# Patient Record
Sex: MALE | Race: WHITE | NOT HISPANIC OR LATINO | Employment: OTHER | URBAN - METROPOLITAN AREA
[De-identification: names, ages, dates, MRNs, and addresses within clinical notes are randomized per-mention and may not be internally consistent; named-entity substitution may affect disease eponyms.]

---

## 2017-02-05 RX ORDER — FUROSEMIDE 40 MG/1
TABLET ORAL
Qty: 60 TABLET | Refills: 9 | Status: SHIPPED | OUTPATIENT
Start: 2017-02-05 | End: 2020-04-07 | Stop reason: ALTCHOICE

## 2017-12-21 RX ORDER — FUROSEMIDE 40 MG/1
TABLET ORAL
Qty: 60 TABLET | Refills: 9 | OUTPATIENT
Start: 2017-12-21

## 2019-01-30 ENCOUNTER — HOSPITAL ENCOUNTER (OUTPATIENT)
Dept: RADIOLOGY | Facility: HOSPITAL | Age: 69
Discharge: HOME | End: 2019-01-30
Attending: INTERNAL MEDICINE
Payer: MEDICARE

## 2019-01-30 ENCOUNTER — TELEPHONE (OUTPATIENT)
Dept: INTERNAL MEDICINE | Facility: CLINIC | Age: 69
End: 2019-01-30

## 2019-01-30 ENCOUNTER — OFFICE VISIT (OUTPATIENT)
Dept: INTERNAL MEDICINE | Facility: CLINIC | Age: 69
End: 2019-01-30
Payer: MEDICARE

## 2019-01-30 VITALS
HEIGHT: 66 IN | DIASTOLIC BLOOD PRESSURE: 62 MMHG | SYSTOLIC BLOOD PRESSURE: 120 MMHG | BODY MASS INDEX: 34.61 KG/M2 | HEART RATE: 78 BPM | OXYGEN SATURATION: 95 % | TEMPERATURE: 97 F | WEIGHT: 215.38 LBS

## 2019-01-30 DIAGNOSIS — Z85.46 HISTORY OF PROSTATE CANCER: ICD-10-CM

## 2019-01-30 DIAGNOSIS — Z13.6 SCREENING FOR AAA (ABDOMINAL AORTIC ANEURYSM): ICD-10-CM

## 2019-01-30 DIAGNOSIS — Z87.39 HISTORY OF GOUT: ICD-10-CM

## 2019-01-30 DIAGNOSIS — R73.01 IFG (IMPAIRED FASTING GLUCOSE): ICD-10-CM

## 2019-01-30 DIAGNOSIS — E78.00 PURE HYPERCHOLESTEROLEMIA: ICD-10-CM

## 2019-01-30 DIAGNOSIS — I10 ESSENTIAL HYPERTENSION: ICD-10-CM

## 2019-01-30 DIAGNOSIS — Z12.5 PROSTATE CANCER SCREENING: ICD-10-CM

## 2019-01-30 DIAGNOSIS — G47.33 OSA ON CPAP: ICD-10-CM

## 2019-01-30 DIAGNOSIS — M51.36 DDD (DEGENERATIVE DISC DISEASE), LUMBAR: ICD-10-CM

## 2019-01-30 DIAGNOSIS — Z12.11 SCREEN FOR COLON CANCER: ICD-10-CM

## 2019-01-30 DIAGNOSIS — Z00.00 ROUTINE GENERAL MEDICAL EXAMINATION AT A HEALTH CARE FACILITY: Primary | ICD-10-CM

## 2019-01-30 DIAGNOSIS — Z72.0 TOBACCO ABUSE: ICD-10-CM

## 2019-01-30 PROCEDURE — 3074F PR MOST RECENT SYSTOLIC BLOOD PRESSURE < 130 MM HG: ICD-10-PCS | Mod: CPTII,S$GLB,, | Performed by: INTERNAL MEDICINE

## 2019-01-30 PROCEDURE — 76775 US ABDOMINAL AORTA: ICD-10-PCS | Mod: 26,,, | Performed by: RADIOLOGY

## 2019-01-30 PROCEDURE — 3078F PR MOST RECENT DIASTOLIC BLOOD PRESSURE < 80 MM HG: ICD-10-PCS | Mod: CPTII,S$GLB,, | Performed by: INTERNAL MEDICINE

## 2019-01-30 PROCEDURE — 99387 INIT PM E/M NEW PAT 65+ YRS: CPT | Mod: S$GLB,,, | Performed by: INTERNAL MEDICINE

## 2019-01-30 PROCEDURE — 99999 PR PBB SHADOW E&M-EST. PATIENT-LVL III: CPT | Mod: PBBFAC,,, | Performed by: INTERNAL MEDICINE

## 2019-01-30 PROCEDURE — 3074F SYST BP LT 130 MM HG: CPT | Mod: CPTII,S$GLB,, | Performed by: INTERNAL MEDICINE

## 2019-01-30 PROCEDURE — 99999 PR PBB SHADOW E&M-EST. PATIENT-LVL III: ICD-10-PCS | Mod: PBBFAC,,, | Performed by: INTERNAL MEDICINE

## 2019-01-30 PROCEDURE — 3078F DIAST BP <80 MM HG: CPT | Mod: CPTII,S$GLB,, | Performed by: INTERNAL MEDICINE

## 2019-01-30 PROCEDURE — 76775 US EXAM ABDO BACK WALL LIM: CPT | Mod: 26,,, | Performed by: RADIOLOGY

## 2019-01-30 PROCEDURE — 99499 RISK ADDL DX/OHS AUDIT: ICD-10-PCS | Mod: S$GLB,,, | Performed by: INTERNAL MEDICINE

## 2019-01-30 PROCEDURE — 99499 UNLISTED E&M SERVICE: CPT | Mod: S$GLB,,, | Performed by: INTERNAL MEDICINE

## 2019-01-30 PROCEDURE — 76775 US EXAM ABDO BACK WALL LIM: CPT | Mod: TC

## 2019-01-30 PROCEDURE — 99387 PR PREVENTIVE VISIT,NEW,65 & OVER: ICD-10-PCS | Mod: S$GLB,,, | Performed by: INTERNAL MEDICINE

## 2019-01-30 RX ORDER — GLIMEPIRIDE 1 MG/1
TABLET ORAL
Refills: 4 | COMMUNITY
Start: 2019-01-28 | End: 2020-08-06

## 2019-01-30 RX ORDER — PROMETHAZINE HYDROCHLORIDE 12.5 MG/1
12.5 TABLET ORAL EVERY 6 HOURS PRN
Refills: 2 | COMMUNITY
Start: 2018-12-07 | End: 2022-03-11

## 2019-01-30 RX ORDER — METHOCARBAMOL 750 MG/1
TABLET, FILM COATED ORAL
Refills: 3 | COMMUNITY
Start: 2018-12-27 | End: 2019-11-14

## 2019-01-30 RX ORDER — MOMETASONE FUROATE 1 MG/G
CREAM TOPICAL
Refills: 0 | COMMUNITY
Start: 2018-12-12 | End: 2022-03-11

## 2019-01-30 RX ORDER — OMEPRAZOLE 40 MG/1
40 CAPSULE, DELAYED RELEASE ORAL DAILY
Qty: 30 CAPSULE | Refills: 11
Start: 2019-01-30 | End: 2019-10-26 | Stop reason: SDUPTHER

## 2019-01-30 RX ORDER — ZOLPIDEM TARTRATE 10 MG/1
5 TABLET ORAL NIGHTLY PRN
COMMUNITY
End: 2022-03-11

## 2019-01-30 RX ORDER — ALLOPURINOL 300 MG/1
TABLET ORAL
Refills: 3 | COMMUNITY
Start: 2018-12-04 | End: 2019-08-16

## 2019-01-30 RX ORDER — FLUTICASONE PROPIONATE 50 MCG
SPRAY, SUSPENSION (ML) NASAL
Refills: 4 | COMMUNITY
Start: 2019-01-17 | End: 2020-07-30 | Stop reason: SDUPTHER

## 2019-01-30 RX ORDER — COLCHICINE 0.6 MG/1
0.6 TABLET ORAL DAILY
COMMUNITY
End: 2019-02-05 | Stop reason: SDUPTHER

## 2019-01-30 NOTE — TELEPHONE ENCOUNTER
----- Message from Kashif Acosta MD sent at 1/30/2019  1:12 PM CST -----  Your abdominal ultrasound was normal

## 2019-01-30 NOTE — PROGRESS NOTES
"HPI:  Patient is a 68-year-old man who comes today for his initial visit myself to establish care.  Patient has no acute complaints.  We have reviewed his extensive past medical history outlined below.  His blood pressure has been doing well.  He uses his CPAP mask about half of the time.  He has had no recent flares of gout.  His recent PSAs have been undetectable.      Current MEDS: medcard review, verified and update  Allergies: Per the electronic medical record    Past Medical History:   Diagnosis Date    DDD (degenerative disc disease), lumbar     History of gout     History of prostate cancer     Hyperlipidemia     Hypertension     IFG (impaired fasting glucose)     BRADLEY on CPAP     Tobacco abuse        Past Surgical History:   Procedure Laterality Date    BICEPS TENDON REPAIR      HEMORRHOID SURGERY      HERNIA REPAIR      KNEE ARTHROSCOPY      LUMBAR LAMINECTOMY      PROSTATECTOMY         SHx: per the electronic medical record    FHx: recorded in the electronic medical record    ROS:    denies any chest pains or shortness of breath. Denies any nausea, vomiting or diarrhea. Denies any fever, chills or sweats. Denies any change in weight, voice, stool, skin or hair. Denies any dysuria, dyspepsia or dysphagia. Denies any change in vision, hearing or headaches. Denies any swollen lymph nodes or loss of memory.    PE:  /62 (BP Location: Right arm, Patient Position: Sitting, BP Method: Large (Manual))   Pulse 78   Temp 97.1 °F (36.2 °C)   Ht 5' 6.25" (1.683 m)   Wt 97.7 kg (215 lb 6.2 oz)   SpO2 95%   BMI 34.50 kg/m²   Gen: Well-developed, well-nourished, male, in no acute distress, oriented x3  HEENT: neck is supple, no adenopathy, carotids 2+ equal without bruits, thyroid exam normal size without nodules.  CHEST: clear to auscultation and percussion  CVS: regular rate and rhythm without significant murmur, gallop, or rubs  ABD: soft, benign, no rebound no guarding, no distention.  Bowel " sounds are normal.     nontender.  No palpable masses.  No organomegaly and no audible bruits.  RECTAL:  Deferred  EXT: no clubbing, cyanosis, or edema  LYMPH: no cervical, inguinal, or axillary adenopathy  FEET: no loss of sensation.  No ulcers or pressure sores.  NEURO: gait normal.  Cranial nerves II- XII intact. No nystagmus.  Speech normal.   Gross motor and sensory unremarkable.        Impression:  Multiple medical problems below  Patient Active Problem List   Diagnosis    Hypertension    Hyperlipidemia    History of prostate cancer    BRADLEY on CPAP    IFG (impaired fasting glucose)    History of gout    DDD (degenerative disc disease), lumbar    Tobacco abuse       Plan:   Orders Placed This Encounter    US Abdominal Aorta    CBC auto differential    Comprehensive metabolic panel    Lipid panel    TSH    PSA, Screening    Hemoglobin A1c    Uric acid    Hemoglobin A1c    Protein / creatinine ratio, urine    Basic metabolic panel    Lipid panel    omeprazole (PRILOSEC) 40 MG capsule    Case request GI: COLONOSCOPY   Patient will have an ultrasound of abdominal aorta for screening.  He will have the above lab work done today.  He will have additional lab work in 6 months.  Medications remain the same.  he is due for colonoscopy.

## 2019-01-31 ENCOUNTER — TELEPHONE (OUTPATIENT)
Dept: INTERNAL MEDICINE | Facility: CLINIC | Age: 69
End: 2019-01-31

## 2019-01-31 NOTE — TELEPHONE ENCOUNTER
----- Message from Kashif Acosta MD sent at 1/31/2019  6:15 AM CST -----  Your lab work was all within acceptable ranges. Stay on your current meds.

## 2019-02-05 ENCOUNTER — TELEPHONE (OUTPATIENT)
Dept: ENDOSCOPY | Facility: HOSPITAL | Age: 69
End: 2019-02-05

## 2019-02-05 ENCOUNTER — DOCUMENTATION ONLY (OUTPATIENT)
Dept: ENDOSCOPY | Facility: HOSPITAL | Age: 69
End: 2019-02-05

## 2019-02-05 RX ORDER — SODIUM, POTASSIUM,MAG SULFATES 17.5-3.13G
SOLUTION, RECONSTITUTED, ORAL ORAL
Qty: 354 ML | Refills: 0 | Status: SHIPPED | OUTPATIENT
Start: 2019-02-05 | End: 2019-12-18

## 2019-02-05 RX ORDER — COLCHICINE 0.6 MG/1
0.6 TABLET ORAL DAILY
Qty: 30 TABLET | Refills: 11 | Status: SHIPPED | OUTPATIENT
Start: 2019-02-05 | End: 2020-02-05 | Stop reason: SDUPTHER

## 2019-02-05 RX ORDER — DICLOFENAC SODIUM 10 MG/G
2 GEL TOPICAL
COMMUNITY
End: 2020-03-10

## 2019-02-05 NOTE — PROGRESS NOTES
Endoscopy Scheduling Questionnaire:    Call Type:Patient returned Televox call    1. Have you been admitted overnight to the hospital in the past 3 months? no  2. Do you get CP and SOB while walking up a flight of stairs? no  3. Have you had a stent placed in the past 12 months? no  4. Have you had a stroke or heart attack in the past 6 months? no  5. Have you had any chest pain in the past 3 months? no      If so, have you been evaluated by your PCP or Cardiologist? no  6. Do you take weight loss medications? no  7. Have you been diagnosed with Diverticulitis within the past 3 months? no  8. Are you having any GI symptoms that you feel need to be evaluated prior to your procedure? no  9. Are you on dialysis? no  10. Are you diabetic? no  11. Do you have any other health issues that you feel might limit your ability to safely have the procedure and/or sedation? no  12. Is the patient over 81 yo? no        If so, has the patient been seen by their PCP or GI in the last 3 months? N/A       -I have reviewed the last colonoscopy for recommendations regarding surveillance and bowel prep  is NA  -I have reviewed the patient's medications and allergies. He is not on high risk medications and will require cardiac clearance. A clearance request NA  -I have verified the pharmacy information. The prep being used is Suprep. The patient's prep instructions were sent via mail..    Date Endoscopy Scheduled: NA  Or  Date Gastro office visit Scheduled: Date: 3/13/19

## 2019-02-06 ENCOUNTER — TELEPHONE (OUTPATIENT)
Dept: INTERNAL MEDICINE | Facility: CLINIC | Age: 69
End: 2019-02-06

## 2019-02-06 NOTE — TELEPHONE ENCOUNTER
Fax received from [employer].  Colchicine 0.6 mg tablets is either not included on list of covered drugs or it's included on the formulary but subject to certain limits. Pt provided whit a temporary supply.  May need a prior authorization for refills

## 2019-02-07 ENCOUNTER — TELEPHONE (OUTPATIENT)
Dept: ENDOSCOPY | Facility: HOSPITAL | Age: 69
End: 2019-02-07

## 2019-02-07 ENCOUNTER — DOCUMENTATION ONLY (OUTPATIENT)
Dept: ENDOSCOPY | Facility: HOSPITAL | Age: 69
End: 2019-02-07

## 2019-03-04 ENCOUNTER — TELEPHONE (OUTPATIENT)
Dept: ENDOSCOPY | Facility: HOSPITAL | Age: 69
End: 2019-03-04

## 2019-03-04 NOTE — TELEPHONE ENCOUNTER
Advised pt of new arrival time.03/27 at 0900. Offered 077 spot. Did not want. Due to ride. Instructed to start prep one hour earlier.

## 2019-03-27 ENCOUNTER — HOSPITAL ENCOUNTER (OUTPATIENT)
Facility: HOSPITAL | Age: 69
Discharge: HOME | End: 2019-03-27
Attending: COLON & RECTAL SURGERY | Admitting: COLON & RECTAL SURGERY
Payer: MEDICARE

## 2019-03-27 ENCOUNTER — ANESTHESIA (OUTPATIENT)
Dept: ENDOSCOPY | Facility: HOSPITAL | Age: 69
End: 2019-03-27
Payer: MEDICARE

## 2019-03-27 ENCOUNTER — ANESTHESIA EVENT (OUTPATIENT)
Dept: ENDOSCOPY | Facility: HOSPITAL | Age: 69
End: 2019-03-27
Payer: MEDICARE

## 2019-03-27 DIAGNOSIS — Z12.11 SCREENING FOR COLON CANCER: ICD-10-CM

## 2019-03-27 PROCEDURE — 25000003 PHARM REV CODE 250: Performed by: COLON & RECTAL SURGERY

## 2019-03-27 PROCEDURE — 45380 COLONOSCOPY AND BIOPSY: CPT | Performed by: COLON & RECTAL SURGERY

## 2019-03-27 PROCEDURE — 45385 COLONOSCOPY W/LESION REMOVAL: CPT | Mod: PT,,, | Performed by: COLON & RECTAL SURGERY

## 2019-03-27 PROCEDURE — 63600175 PHARM REV CODE 636 W HCPCS: Performed by: NURSE ANESTHETIST, CERTIFIED REGISTERED

## 2019-03-27 PROCEDURE — 45380 PR COLONOSCOPY,BIOPSY: ICD-10-PCS | Mod: 59,,, | Performed by: COLON & RECTAL SURGERY

## 2019-03-27 PROCEDURE — 45385 COLONOSCOPY W/LESION REMOVAL: CPT | Performed by: COLON & RECTAL SURGERY

## 2019-03-27 PROCEDURE — 45385 PR COLONOSCOPY,REMV LESN,SNARE: ICD-10-PCS | Mod: PT,,, | Performed by: COLON & RECTAL SURGERY

## 2019-03-27 PROCEDURE — 27201089 HC SNARE, DISP (ANY): Performed by: COLON & RECTAL SURGERY

## 2019-03-27 PROCEDURE — 88305 TISSUE EXAM BY PATHOLOGIST: CPT | Mod: 26,,, | Performed by: PATHOLOGY

## 2019-03-27 PROCEDURE — 37000008 HC ANESTHESIA 1ST 15 MINUTES: Performed by: COLON & RECTAL SURGERY

## 2019-03-27 PROCEDURE — 88305 TISSUE SPECIMEN TO PATHOLOGY - SURGERY: ICD-10-PCS | Mod: 26,,, | Performed by: PATHOLOGY

## 2019-03-27 PROCEDURE — 45380 COLONOSCOPY AND BIOPSY: CPT | Mod: 59,,, | Performed by: COLON & RECTAL SURGERY

## 2019-03-27 PROCEDURE — 88305 TISSUE EXAM BY PATHOLOGIST: CPT | Mod: 59 | Performed by: PATHOLOGY

## 2019-03-27 PROCEDURE — 37000009 HC ANESTHESIA EA ADD 15 MINS: Performed by: COLON & RECTAL SURGERY

## 2019-03-27 PROCEDURE — 27201012 HC FORCEPS, HOT/COLD, DISP: Performed by: COLON & RECTAL SURGERY

## 2019-03-27 RX ORDER — LIDOCAINE HCL/PF 100 MG/5ML
SYRINGE (ML) INTRAVENOUS
Status: DISCONTINUED | OUTPATIENT
Start: 2019-03-27 | End: 2019-03-27

## 2019-03-27 RX ORDER — PROPOFOL 10 MG/ML
INJECTION, EMULSION INTRAVENOUS
Status: DISCONTINUED | OUTPATIENT
Start: 2019-03-27 | End: 2019-03-27

## 2019-03-27 RX ORDER — SODIUM CHLORIDE, SODIUM LACTATE, POTASSIUM CHLORIDE, CALCIUM CHLORIDE 600; 310; 30; 20 MG/100ML; MG/100ML; MG/100ML; MG/100ML
INJECTION, SOLUTION INTRAVENOUS CONTINUOUS
Status: DISCONTINUED | OUTPATIENT
Start: 2019-03-27 | End: 2019-03-27 | Stop reason: HOSPADM

## 2019-03-27 RX ADMIN — PROPOFOL 140 MG: 10 INJECTION, EMULSION INTRAVENOUS at 10:03

## 2019-03-27 RX ADMIN — PROPOFOL 30 MG: 10 INJECTION, EMULSION INTRAVENOUS at 10:03

## 2019-03-27 RX ADMIN — PROPOFOL 40 MG: 10 INJECTION, EMULSION INTRAVENOUS at 11:03

## 2019-03-27 RX ADMIN — LIDOCAINE HYDROCHLORIDE 100 MG: 20 INJECTION, SOLUTION INTRAVENOUS at 10:03

## 2019-03-27 RX ADMIN — SODIUM CHLORIDE, SODIUM LACTATE, POTASSIUM CHLORIDE, AND CALCIUM CHLORIDE: 600; 310; 30; 20 INJECTION, SOLUTION INTRAVENOUS at 10:03

## 2019-03-27 RX ADMIN — PROPOFOL 40 MG: 10 INJECTION, EMULSION INTRAVENOUS at 10:03

## 2019-03-27 NOTE — DISCHARGE INSTRUCTIONS

## 2019-03-27 NOTE — H&P
[facility]  Colon and Rectal Surgery  History & Physical    Patient Name: Santiago Khan  MRN: [id number]  Admission Date: 3/27/2019  Attending Physician: James Roger MD  Primary Care Provider: Kashif Acosta MD    Patient information was obtained from patient and medical records.    Subjective:     Chief Complaint/Reason for Admission: Here for Colonoscopy    History of Present Illness:  Patient is a 68 y.o. male presents for colonoscopy. Reports last colonscopy 5yrs ago without polyps or masses and no previous hx of polyps/masses. No current BRBPR, hematochezia, melena or change in bowel habits. No personal or fam hx of CRC, polyps or IBD.    No current facility-administered medications on file prior to encounter.      Current Outpatient Medications on File Prior to Encounter   Medication Sig    acetaZOLAMIDE (DIAMOX) 250 MG tablet Take 1 tablet by mouth Every Monday, Wednesday, and Friday.    allopurinol (ZYLOPRIM) 300 MG tablet TK 1/2 T PO ONCE D    bisoprolol (ZEBETA) 5 MG tablet     fluticasone (FLONASE) 50 mcg/actuation nasal spray U 1 TO 2 SPRAYS IEN QD    furosemide (LASIX) 40 MG tablet take 1 tablet by mouth twice a day    glimepiride (AMARYL) 1 MG tablet TK 1 T PO ONCE D    hydrocodone-acetaminophen (VICODIN ES) 7.5-750 mg per tablet Take 1 tablet by mouth Twice daily as needed.    methocarbamol (ROBAXIN) 750 MG Tab TK 1 T PO  BID PRF MUSCLE SPASMS    metolazone (ZAROXOLYN) 2.5 MG tablet Take 1 tablet by mouth Daily.    multivitamin-minerals-lutein (CENTRUM SILVER) Tab Take 1 tablet by mouth Daily.    omeprazole (PRILOSEC) 40 MG capsule Take 1 capsule (40 mg total) by mouth once daily.    potassium chloride SA (K-DUR,KLOR-CON) 20 MEQ tablet Take 1 tablet by mouth 3 (three) times daily.     promethazine (PHENERGAN) 12.5 MG Tab Take 12.5 mg by mouth every 6 (six) hours as needed.    simvastatin (ZOCOR) 40 MG tablet Take 1 tablet by mouth Daily.    mometasone 0.1%  (ELOCON) 0.1 % cream MARIA TERESA TO HANDS QD PRF FLARE UPS    tramadol (ULTRAM) 50 mg tablet Take 1 tablet by mouth Three times daily as needed.    zolpidem (AMBIEN) 10 mg Tab Take 5 mg by mouth nightly as needed.       Review of patient's allergies indicates:   Allergen Reactions    Amitriptyline      Other reaction(s): Rash    Celecoxib      Other reaction(s): Rash       Past Medical History:   Diagnosis Date    DDD (degenerative disc disease), lumbar     History of gout     History of prostate cancer     Hyperlipidemia     Hypertension     IFG (impaired fasting glucose)     BRADLEY on CPAP     Tobacco abuse      Past Surgical History:   Procedure Laterality Date    BICEPS TENDON REPAIR      HEMORRHOID SURGERY      HERNIA REPAIR      KNEE ARTHROSCOPY      LUMBAR LAMINECTOMY      PROSTATECTOMY       Family History     Problem Relation (Age of Onset)    Coronary artery disease Father    Prostate cancer Father        Tobacco Use    Smoking status: Current Every Day Smoker     Years: 50.00     Types: Cigarettes   Substance and Sexual Activity    Alcohol use: No     Frequency: Never    Drug use: Not on file    Sexual activity: Not on file     Review of Systems   Constitutional: Negative for activity change, appetite change, chills, fatigue, fever and unexpected weight change.   HENT: Negative for congestion, ear pain, sore throat and trouble swallowing.    Eyes: Negative for pain, redness and itching.   Respiratory: Negative for cough, shortness of breath and wheezing.    Cardiovascular: Negative for chest pain, palpitations and leg swelling.   Gastrointestinal: Negative for abdominal distention, abdominal pain, anal bleeding, blood in stool, constipation, diarrhea, nausea, rectal pain and vomiting.   Endocrine: Negative for cold intolerance, heat intolerance and polyuria.   Genitourinary: Negative for dysuria, flank pain, frequency and hematuria.   Musculoskeletal: Negative for gait problem, joint swelling  and neck pain.   Skin: Negative for color change, rash and wound.   Allergic/Immunologic: Negative for environmental allergies and immunocompromised state.   Neurological: Negative for dizziness, speech difficulty, weakness and numbness.   Psychiatric/Behavioral: Negative for agitation, confusion and hallucinations.     Objective:     Vital Signs (Most Recent):  Temp: 98.1 °F (36.7 °C) (03/27/19 1000)  Pulse: 80 (03/27/19 1000)  Resp: 18 (03/27/19 1000)  BP: 112/75 (03/27/19 1000)  SpO2: (!) 94 % (03/27/19 1000) Vital Signs (24h Range):  Temp:  [98.1 °F (36.7 °C)] 98.1 °F (36.7 °C)  Pulse:  [80] 80  Resp:  [18] 18  SpO2:  [94 %] 94 %  BP: (112)/(75) 112/75     Weight: 95.6 kg (210 lb 12.2 oz)  Body mass index is 33.01 kg/m².    Physical Exam   Constitutional: He is oriented to person, place, and time. He appears well-developed.   HENT:   Head: Normocephalic and atraumatic.   Eyes: Conjunctivae and EOM are normal.   Neck: Normal range of motion. No thyromegaly present.   Cardiovascular: Normal rate and regular rhythm.   Pulmonary/Chest: Effort normal. No respiratory distress.   Abdominal: Soft. He exhibits no distension and no mass. There is no tenderness.   Musculoskeletal: Normal range of motion. He exhibits no edema or tenderness.   Neurological: He is alert and oriented to person, place, and time.   Skin: Skin is warm and dry. Capillary refill takes less than 2 seconds. No rash noted.   Psychiatric: He has a normal mood and affect.         Assessment/Plan:     Patient is a 68 y.o. male who presents for colonoscopy     - Ok to proceed to endoscopy suite for colonoscopy  - Consent obtained. All risks, benefits and alternatives fully explained to patient, including but not limited to bleeding, infection, perforation, and missed polyps. All questions appropriately answered to patient's satisfaction. Consent signed and placed on chart.    Active Diagnoses:    Diagnosis Date Noted POA    Screening for colon cancer  [Z12.11] 03/27/2019 Not Applicable      Problems Resolved During this Admission:     VTE Risk Mitigation (From admission, onward)    None          James Roger MD  Colon and Rectal Surgery  [facility]

## 2019-03-27 NOTE — ANESTHESIA POSTPROCEDURE EVALUATION
Anesthesia Post Evaluation    Patient: Santiago Khan    Procedure(s) Performed: Procedure(s) (LRB):  COLONOSCOPY (N/A)    Final Anesthesia Type: MAC  Patient location during evaluation: PACU  Patient participation: Yes- Able to Participate  Level of consciousness: oriented and awake and alert  Post-procedure vital signs: reviewed and stable  Pain management: adequate  Airway patency: patent  PONV status at discharge: No PONV  Anesthetic complications: no      Cardiovascular status: blood pressure returned to baseline  Respiratory status: unassisted, spontaneous ventilation and room air  Hydration status: euvolemic  Follow-up not needed.          Vitals Value Taken Time   /73 3/27/2019 11:52 AM   Temp 36.7 °C (98.1 °F) 3/27/2019 10:00 AM   Pulse 72 3/27/2019 11:52 AM   Resp 18 3/27/2019 11:52 AM   SpO2 96 % 3/27/2019 11:52 AM         Event Time     Out of Recovery 12:11:49          Pain/Jeaneth Score: Jeaneth Score: 10 (3/27/2019 11:52 AM)

## 2019-03-27 NOTE — ANESTHESIA RELEASE NOTE
"Anesthesia Release from PACU Note    Patient: Santiago Khan    Procedure(s) Performed: Procedure(s) (LRB):  COLONOSCOPY (N/A)    Anesthesia type: MAC    Post pain: Adequate analgesia    Post assessment: no apparent anesthetic complications, tolerated procedure well and no evidence of recall    Last Vitals:   Visit Vitals  /73 (BP Location: Left arm, Patient Position: Lying)   Pulse 72   Temp 36.7 °C (98.1 °F) (Tympanic)   Resp 18   Ht 5' 7" (1.702 m)   Wt 95.6 kg (210 lb 12.2 oz)   SpO2 96%   BMI 33.01 kg/m²       Post vital signs: stable    Level of consciousness: awake and alert     Nausea/Vomiting: no nausea/no vomiting    Complications: none    Airway Patency: patent    Respiratory: unassisted, spontaneous ventilation, room air    Cardiovascular: stable    Hydration: euvolemic  "

## 2019-03-27 NOTE — BRIEF OP NOTE
[facility] -   Brief Operative Note     SUMMARY     Surgery Date: 3/27/2019     Surgeon(s) and Role:     * James Roger MD - Primary    Assisting Surgeon: None    Pre-op Diagnosis:  Screening [Z13.9]    Post-op Diagnosis:  Post-Op Diagnosis Codes:     * Screening [Z13.9]    Procedure(s) (LRB):  COLONOSCOPY (N/A)    Anesthesia: Choice    Description of the findings of the procedure: See Op Note    Findings/Key Components: See Op Note    Estimated Blood Loss: * No values recorded between 3/27/2019 12:00 AM and 3/27/2019 11:21 AM *         Specimens:   Specimen (12h ago, onward)    Start     Ordered    03/27/19 1110  Specimen to Pathology - Surgery  Once     Comments:  #1 descending polyps x2#2 sigmoid polyp#3 recto sigmoid polyps x3#4 recto sigmoid polyps x2     Start Status     03/27/19 1110 Collected (03/27/19 1129) Order ID: [id number]       03/27/19 1126          Discharge Note    SUMMARY     Admit Date: 3/27/2019    Discharge Date and Time: 3/27/2019 12:15 PM    Hospital Course Patient was seen in the preoperative area by both myself and anesthesia. All consents were verified and all questions appropriately answered. All risks, benefits and alternatives explained to patient. Patient proceeded to endoscopy suite for colonoscopy and was discharged home postoperative once cleared by anesthesia.    Final Diagnosis: Post-Op Diagnosis Codes:     * Screening [Z13.9]    Disposition: Home or Self Care    Follow Up/Patient Instructions: See Provation report    Medications:  Reconciled Home Medications:      Medication List      CONTINUE taking these medications    acetaZOLAMIDE 250 MG tablet  Commonly known as:  DIAMOX  Take 1 tablet by mouth Every Monday, Wednesday, and Friday.     allopurinol 300 MG tablet  Commonly known as:  ZYLOPRIM  TK 1/2 T PO ONCE D     bisoprolol 5 MG tablet  Commonly known as:  ZEBETA     CENTRUM SILVER Tab  Generic drug:  multivitamin-minerals-lutein  Take 1 tablet by mouth  Daily.     colchicine 0.6 mg tablet  Commonly known as:  COLCRYS  Take 1 tablet (0.6 mg total) by mouth once daily.     fluticasone 50 mcg/actuation nasal spray  Commonly known as:  FLONASE  U 1 TO 2 SPRAYS IEN QD     furosemide 40 MG tablet  Commonly known as:  LASIX  take 1 tablet by mouth twice a day     glimepiride 1 MG tablet  Commonly known as:  AMARYL  TK 1 T PO ONCE D     HYDROcodone-acetaminophen 7.5-750 mg per tablet  Commonly known as:  VICODIN ES  Take 1 tablet by mouth Twice daily as needed.     methocarbamol 750 MG Tab  Commonly known as:  ROBAXIN  TK 1 T PO  BID PRF MUSCLE SPASMS     metOLazone 2.5 MG tablet  Commonly known as:  ZAROXOLYN  Take 1 tablet by mouth Daily.     mometasone 0.1% 0.1 % cream  Commonly known as:  ELOCON  MARIA TERESA TO HANDS QD PRF FLARE UPS     omeprazole 40 MG capsule  Commonly known as:  PRILOSEC  Take 1 capsule (40 mg total) by mouth once daily.     potassium chloride SA 20 MEQ tablet  Commonly known as:  K-DUR,KLOR-CON  Take 1 tablet by mouth 3 (three) times daily.     promethazine 12.5 MG Tab  Commonly known as:  PHENERGAN  Take 12.5 mg by mouth every 6 (six) hours as needed.     simvastatin 40 MG tablet  Commonly known as:  ZOCOR  Take 1 tablet by mouth Daily.     SUPREP BOWEL PREP KIT 17.5-3.13-1.6 gram Solr  Generic drug:  sodium,potassium,mag sulfates  Use as directed     traMADol 50 mg tablet  Commonly known as:  ULTRAM  Take 1 tablet by mouth Three times daily as needed.     VOLTAREN 1 % Gel  Generic drug:  diclofenac sodium  Apply 2 g topically as needed.     zolpidem 10 mg Tab  Commonly known as:  AMBIEN  Take 5 mg by mouth nightly as needed.          Discharge Procedure Orders   Diet general     Call MD for:  temperature >100.4     Call MD for:  persistent nausea and vomiting     Call MD for:  severe uncontrolled pain     Call MD for:  difficulty breathing, headache or visual disturbances     Call MD for:  redness, tenderness, or signs of infection (pain, swelling,  redness, odor or green/yellow discharge around incision site)     Call MD for:  hives     Call MD for:  persistent dizziness or light-headedness     Call MD for:  extreme fatigue     Activity as tolerated     Follow-up Information     Kashif Acosta MD.    Specialty:  Internal Medicine  Why:  As needed  Contact information:  [address]  [address]  [facility] [address]  [phone]

## 2019-03-27 NOTE — ANESTHESIA PREPROCEDURE EVALUATION
03/27/2019  Santiago Khan is a 68 y.o., male.    Anesthesia Evaluation    I have reviewed the Patient Summary Reports.     I have reviewed the Medications.     Review of Systems  Anesthesia Hx:  No problems with previous Anesthesia Prolonged surgery with prostatectomy and pt slow to wake but no problems with knee scope History of prior surgery of interest to airway management or planning: Previous anesthesia: General Denies Family Hx of Anesthesia complications.   Denies Personal Hx of Anesthesia complications.   Social:  Smoker    Hematology/Oncology:  Hematology Normal       -- Cancer in past history:  surgery    EENT/Dental:EENT/Dental Normal   Cardiovascular:   Hypertension, well controlled Sees cardiology yearly for check ups.    Pulmonary:   Sleep Apnea, CPAP    Renal/:   Cr 1.4   Hepatic/GI:   Bowel Prep.    Musculoskeletal:   Arthritis     Neurological:  Neurology Normal    Endocrine:   Hgb A1C 6.9   Dermatological:  Skin Normal    Psych:  Psychiatric Normal           Physical Exam  General:  Well nourished    Airway/Jaw/Neck:  Airway Findings: Mouth Opening: Normal Tongue: Normal  General Airway Assessment: Adult  Mallampati: IV  Improves to III with phonation.  TM Distance: 4 - 6 cm  Jaw/Neck Findings:  Neck ROM: Normal ROM      Dental:  Dental Findings: Upper Dentures, Lower Dentures        Mental Status:  Mental Status Findings:  Cooperative, Alert and Oriented         Anesthesia Plan  Type of Anesthesia, risks & benefits discussed:  Anesthesia Type:  MAC  Patient's Preference:   Intra-op Monitoring Plan: standard ASA monitors  Intra-op Monitoring Plan Comments:   Post Op Pain Control Plan:   Post Op Pain Control Plan Comments:   Induction:   IV  Beta Blocker:  Patient is on a Beta-Blocker and has received one dose within the past 24 hours (No further documentation required).        Informed Consent: Patient understands risks and agrees with Anesthesia plan.  Questions answered.   ASA Score: 3     Day of Surgery Review of History & Physical: I have interviewed and examined the patient. I have reviewed the patient's H&P dated:            Ready For Surgery From Anesthesia Perspective.

## 2019-03-27 NOTE — [employer] PATIENT INSTRUCTIONS
Discharge Summary/Instructions after an Endoscopic Procedure  Patient Name: Santiago Khan  Patient MRN: [id number]  Patient YOB: 1950 Wednesday, March 27, 2019 James Roger MD  RESTRICTIONS:  During your procedure today, you received medications for sedation.  These   medications may affect your judgment, balance and coordination.  Therefore,   for 24 hours, you have the following restrictions:   - DO NOT drive a car, operate machinery, make legal/financial decisions,   sign important papers or drink alcohol.    ACTIVITY:  Today: no heavy lifting, straining or running due to procedural   sedation/anesthesia.  The following day: return to full activity including work.  DIET:  Eat and drink normally unless instructed otherwise.     TREATMENT FOR COMMON SIDE EFFECTS:  - Mild abdominal pain, nausea, belching, bloating or excessive gas:  rest,   eat lightly and use a heating pad.  - Sore Throat: treat with throat lozenges and/or gargle with warm salt   water.  - Because air was used during the procedure, expelling large amounts of air   from your rectum or belching is normal.  - If a bowel prep was taken, you may not have a bowel movement for 1-3 days.    This is normal.  SYMPTOMS TO WATCH FOR AND REPORT TO YOUR PHYSICIAN:  1. Abdominal pain or bloating, other than gas cramps.  2. Chest pain.  3. Back pain.  4. Signs of infection such as: chills or fever occurring within 24 hours   after the procedure.  5. Rectal bleeding, which would show as bright red, maroon, or black stools.   (A tablespoon of blood from the rectum is not serious, especially if   hemorrhoids are present.)  6. Vomiting.  7. Weakness or dizziness.  GO DIRECTLY TO THE NEAREST EMERGENCY ROOM IF YOU HAVE ANY OF THE FOLLOWING:      Difficulty breathing              Chills and/or fever over 101 F   Persistent vomiting and/or vomiting blood   Severe abdominal pain   Severe chest pain   Black, tarry stools   Bleeding- more than one  tablespoon   Any other symptom or condition that you feel may need urgent attention  Your doctor recommends these additional instructions:  If any biopsies were taken, your doctors clinic will contact you in 1 to 2   weeks with any results.  - Discharge patient to home.   - Resume previous diet.   - Continue present medications.   - Await pathology results.   - Repeat colonoscopy date to be determined after pending pathology results   are reviewed for surveillance based on pathology results and for   surveillance of multiple polyps.   - Return to primary care physician PRN.  For questions, problems or results please call your physician James Roger MD at Work:  [phone]  If you have any questions about the above instructions, call the GI   department at [phone] or call the endoscopy unit at [phone]   from 7am until 3 pm.  [facility], EMERGENCY ROOM PHONE NUMBER:   [phone]  IF A COMPLICATION OR EMERGENCY SITUATION ARISES AND YOU ARE UNABLE TO REACH   YOUR PHYSICIAN - GO DIRECTLY TO THE EMERGENCY ROOM.  I have read or have had read to me these discharge instructions for my   procedure and have received a written copy.  I understand these   instructions and will follow-up with my physician if I have any questions.     __________________________________       _____________________________________  Nurse Signature                                          Patient/Designated   Responsible Party Signature  MD James Arzola MD  3/27/2019 11:33:25 AM  This report has been verified and signed electronically.  [employer]

## 2019-03-27 NOTE — TRANSFER OF CARE
"Anesthesia Transfer of Care Note    Patient: Santiago Khan    Procedure(s) Performed: Procedure(s) (LRB):  COLONOSCOPY (N/A)    Patient location: PACU    Anesthesia Type: MAC    Transport from OR: Transported from OR on room air with adequate spontaneous ventilation    Post pain: adequate analgesia    Post assessment: no apparent anesthetic complications    Post vital signs: stable    Level of consciousness: awake and alert    Nausea/Vomiting: no nausea/vomiting    Complications: none    Transfer of care protocol was followed      Last vitals:   Visit Vitals  /73 (BP Location: Left arm, Patient Position: Lying)   Pulse 72   Temp 36.7 °C (98.1 °F) (Tympanic)   Resp 18   Ht 5' 7" (1.702 m)   Wt 95.6 kg (210 lb 12.2 oz)   SpO2 96%   BMI 33.01 kg/m²     "

## 2019-03-28 VITALS
TEMPERATURE: 98 F | BODY MASS INDEX: 33.08 KG/M2 | HEIGHT: 67 IN | SYSTOLIC BLOOD PRESSURE: 128 MMHG | WEIGHT: 210.75 LBS | DIASTOLIC BLOOD PRESSURE: 73 MMHG | HEART RATE: 72 BPM | OXYGEN SATURATION: 96 % | RESPIRATION RATE: 18 BRPM

## 2019-07-24 ENCOUNTER — LAB VISIT (OUTPATIENT)
Dept: LAB | Facility: HOSPITAL | Age: 69
End: 2019-07-24
Attending: INTERNAL MEDICINE
Payer: MEDICARE

## 2019-07-24 DIAGNOSIS — E78.00 PURE HYPERCHOLESTEROLEMIA: ICD-10-CM

## 2019-07-24 DIAGNOSIS — R73.01 IFG (IMPAIRED FASTING GLUCOSE): ICD-10-CM

## 2019-07-24 LAB
ANION GAP SERPL CALC-SCNC: 11 MMOL/L (ref 8–16)
BUN SERPL-MCNC: 22 MG/DL (ref 8–23)
CALCIUM SERPL-MCNC: 9.8 MG/DL (ref 8.7–10.5)
CHLORIDE SERPL-SCNC: 96 MMOL/L (ref 95–110)
CHOLEST SERPL-MCNC: 168 MG/DL (ref 120–199)
CHOLEST/HDLC SERPL: 3.3 {RATIO} (ref 2–5)
CO2 SERPL-SCNC: 33 MMOL/L (ref 23–29)
CREAT SERPL-MCNC: 1.6 MG/DL (ref 0.5–1.4)
EST. GFR  (AFRICAN AMERICAN): 50.8 ML/MIN/1.73 M^2
EST. GFR  (NON AFRICAN AMERICAN): 43.9 ML/MIN/1.73 M^2
ESTIMATED AVG GLUCOSE: 140 MG/DL (ref 68–131)
GLUCOSE SERPL-MCNC: 113 MG/DL (ref 70–110)
HBA1C MFR BLD HPLC: 6.5 % (ref 4–5.6)
HDLC SERPL-MCNC: 51 MG/DL (ref 40–75)
HDLC SERPL: 30.4 % (ref 20–50)
LDLC SERPL CALC-MCNC: 89.6 MG/DL (ref 63–159)
NONHDLC SERPL-MCNC: 117 MG/DL
POTASSIUM SERPL-SCNC: 3.2 MMOL/L (ref 3.5–5.1)
SODIUM SERPL-SCNC: 140 MMOL/L (ref 136–145)
TRIGL SERPL-MCNC: 137 MG/DL (ref 30–150)

## 2019-07-24 PROCEDURE — 80048 BASIC METABOLIC PNL TOTAL CA: CPT

## 2019-07-24 PROCEDURE — 83036 HEMOGLOBIN GLYCOSYLATED A1C: CPT

## 2019-07-24 PROCEDURE — 36415 COLL VENOUS BLD VENIPUNCTURE: CPT | Mod: PO

## 2019-07-24 PROCEDURE — 80061 LIPID PANEL: CPT

## 2019-07-30 ENCOUNTER — OFFICE VISIT (OUTPATIENT)
Dept: INTERNAL MEDICINE | Facility: CLINIC | Age: 69
End: 2019-07-30
Payer: MEDICARE

## 2019-07-30 ENCOUNTER — TELEPHONE (OUTPATIENT)
Dept: INTERNAL MEDICINE | Facility: CLINIC | Age: 69
End: 2019-07-30

## 2019-07-30 VITALS
HEART RATE: 83 BPM | SYSTOLIC BLOOD PRESSURE: 108 MMHG | BODY MASS INDEX: 34.11 KG/M2 | DIASTOLIC BLOOD PRESSURE: 62 MMHG | WEIGHT: 217.81 LBS | OXYGEN SATURATION: 96 % | TEMPERATURE: 98 F

## 2019-07-30 DIAGNOSIS — G47.33 OSA ON CPAP: ICD-10-CM

## 2019-07-30 DIAGNOSIS — N18.30 CHRONIC KIDNEY DISEASE, STAGE 3: ICD-10-CM

## 2019-07-30 DIAGNOSIS — E78.5 HYPERLIPIDEMIA ASSOCIATED WITH TYPE 2 DIABETES MELLITUS: ICD-10-CM

## 2019-07-30 DIAGNOSIS — E11.69 HYPERLIPIDEMIA ASSOCIATED WITH TYPE 2 DIABETES MELLITUS: ICD-10-CM

## 2019-07-30 DIAGNOSIS — I12.9 HYPERTENSION ASSOCIATED WITH STAGE 3 CHRONIC KIDNEY DISEASE DUE TO TYPE 2 DIABETES MELLITUS: ICD-10-CM

## 2019-07-30 DIAGNOSIS — E11.8 DM (DIABETES MELLITUS) WITH COMPLICATIONS: ICD-10-CM

## 2019-07-30 DIAGNOSIS — Z87.39 HISTORY OF GOUT: Primary | ICD-10-CM

## 2019-07-30 DIAGNOSIS — E11.22 HYPERTENSION ASSOCIATED WITH STAGE 3 CHRONIC KIDNEY DISEASE DUE TO TYPE 2 DIABETES MELLITUS: ICD-10-CM

## 2019-07-30 DIAGNOSIS — Z85.46 HISTORY OF PROSTATE CANCER: ICD-10-CM

## 2019-07-30 DIAGNOSIS — N18.30 HYPERTENSION ASSOCIATED WITH STAGE 3 CHRONIC KIDNEY DISEASE DUE TO TYPE 2 DIABETES MELLITUS: ICD-10-CM

## 2019-07-30 PROCEDURE — 3078F PR MOST RECENT DIASTOLIC BLOOD PRESSURE < 80 MM HG: ICD-10-PCS | Mod: CPTII,S$GLB,, | Performed by: INTERNAL MEDICINE

## 2019-07-30 PROCEDURE — 99499 UNLISTED E&M SERVICE: CPT | Mod: S$GLB,,, | Performed by: INTERNAL MEDICINE

## 2019-07-30 PROCEDURE — 3044F HG A1C LEVEL LT 7.0%: CPT | Mod: CPTII,S$GLB,, | Performed by: INTERNAL MEDICINE

## 2019-07-30 PROCEDURE — 3074F SYST BP LT 130 MM HG: CPT | Mod: CPTII,S$GLB,, | Performed by: INTERNAL MEDICINE

## 2019-07-30 PROCEDURE — 99214 PR OFFICE/OUTPT VISIT, EST, LEVL IV, 30-39 MIN: ICD-10-PCS | Mod: S$GLB,,, | Performed by: INTERNAL MEDICINE

## 2019-07-30 PROCEDURE — 3074F PR MOST RECENT SYSTOLIC BLOOD PRESSURE < 130 MM HG: ICD-10-PCS | Mod: CPTII,S$GLB,, | Performed by: INTERNAL MEDICINE

## 2019-07-30 PROCEDURE — 3078F DIAST BP <80 MM HG: CPT | Mod: CPTII,S$GLB,, | Performed by: INTERNAL MEDICINE

## 2019-07-30 PROCEDURE — 3044F PR MOST RECENT HEMOGLOBIN A1C LEVEL <7.0%: ICD-10-PCS | Mod: CPTII,S$GLB,, | Performed by: INTERNAL MEDICINE

## 2019-07-30 PROCEDURE — 99214 OFFICE O/P EST MOD 30 MIN: CPT | Mod: S$GLB,,, | Performed by: INTERNAL MEDICINE

## 2019-07-30 PROCEDURE — 99499 RISK ADDL DX/OHS AUDIT: ICD-10-PCS | Mod: S$GLB,,, | Performed by: INTERNAL MEDICINE

## 2019-07-30 PROCEDURE — 1101F PR PT FALLS ASSESS DOC 0-1 FALLS W/OUT INJ PAST YR: ICD-10-PCS | Mod: CPTII,S$GLB,, | Performed by: INTERNAL MEDICINE

## 2019-07-30 PROCEDURE — 99999 PR PBB SHADOW E&M-EST. PATIENT-LVL III: CPT | Mod: PBBFAC,,, | Performed by: INTERNAL MEDICINE

## 2019-07-30 PROCEDURE — 1101F PT FALLS ASSESS-DOCD LE1/YR: CPT | Mod: CPTII,S$GLB,, | Performed by: INTERNAL MEDICINE

## 2019-07-30 PROCEDURE — 99999 PR PBB SHADOW E&M-EST. PATIENT-LVL III: ICD-10-PCS | Mod: PBBFAC,,, | Performed by: INTERNAL MEDICINE

## 2019-07-30 RX ORDER — VARENICLINE TARTRATE 1 MG/1
1 TABLET, FILM COATED ORAL 2 TIMES DAILY
Qty: 60 TABLET | Refills: 1 | Status: SHIPPED | OUTPATIENT
Start: 2019-07-30 | End: 2019-12-18

## 2019-07-30 RX ORDER — VARENICLINE TARTRATE 0.5 (11)-1
KIT ORAL
Qty: 1 PACKAGE | Refills: 0 | Status: SHIPPED | OUTPATIENT
Start: 2019-07-30 | End: 2019-12-18

## 2019-07-30 RX ORDER — BACLOFEN 10 MG/1
1 TABLET ORAL NIGHTLY
Refills: 2 | COMMUNITY
Start: 2019-07-19

## 2019-07-30 NOTE — PROGRESS NOTES
HPI:  Patient is a 68-year-old man who comes today for follow-up of his diabetes, hypertension, lipids, chronic kidney disease, and history of gout.  He denies any flares of gout.  His blood pressures been very well controlled at home.  He denies any hypoglycemic events.  At this time, he does have a skin lesion along the right side of his lower neck that he would like to have removed because it irritates him.    Current meds have been verified and updated per the EMR  Exam:/62   Pulse 83   Temp 97.8 °F (36.6 °C)   Wt 98.8 kg (217 lb 13 oz)   SpO2 96%   BMI 34.11 kg/m²   The skin lesion is a seborrheic keratosis.  Carotids 2+ equal without bruit  Chest clear  Cardiovascular regular rate and rhythm without murmur gallop or rub  Extremity without edema    Lab Results   Component Value Date    WBC 11.80 01/30/2019    HGB 13.5 (L) 01/30/2019    HCT 45.3 01/30/2019     01/30/2019    CHOL 168 07/24/2019    TRIG 137 07/24/2019    HDL 51 07/24/2019    ALT 26 01/30/2019    AST 31 01/30/2019     07/24/2019    K 3.2 (L) 07/24/2019    CL 96 07/24/2019    CREATININE 1.6 (H) 07/24/2019    BUN 22 07/24/2019    CO2 33 (H) 07/24/2019    TSH 1.322 01/30/2019    PSA <0.01 01/30/2019    HGBA1C 6.5 (H) 07/24/2019       Impression:   tobacco dependence, he agrees to try Chantix  Diabetes, hypertension, lipids, chronic kidney disease all stable  Seborrheic keratosis  Patient Active Problem List   Diagnosis    History of prostate cancer    BRADLEY on CPAP    History of gout    DDD (degenerative disc disease), lumbar    Tobacco abuse    DM (diabetes mellitus) with complications    Hypertension associated with stage 3 chronic kidney disease due to type 2 diabetes mellitus    Hyperlipidemia associated with type 2 diabetes mellitus    Chronic kidney disease, stage 3       Plan:  Orders Placed This Encounter    Hemoglobin A1c    Comprehensive metabolic panel    Lipid panel    Protein / creatinine ratio, urine     TSH    CBC auto differential    Prostate Specific Antigen, Diagnostic    Uric acid    Ambulatory consult to Optometry    varenicline (CHANTIX STARTING MONTH BOX) 0.5 mg (11)- 1 mg (42) tablet    varenicline (CHANTIX) 1 mg Tab     Patient will see me in 6 months with above lab work.  He is due to get an eye exam.  He will get his immunizations record from his pharmacy.  He will start on Chantix.  He will see me in 2-3 weeks to freeze the skin lesion.  We currently do not have any liquid nitrogen    This note is generated with speech recognition software and is subject to transcription error and sound alike phrases that may be missed by proofreading.

## 2019-08-14 ENCOUNTER — OFFICE VISIT (OUTPATIENT)
Dept: INTERNAL MEDICINE | Facility: CLINIC | Age: 69
End: 2019-08-14
Payer: MEDICARE

## 2019-08-14 ENCOUNTER — LAB VISIT (OUTPATIENT)
Dept: LAB | Facility: HOSPITAL | Age: 69
End: 2019-08-14
Attending: INTERNAL MEDICINE
Payer: MEDICARE

## 2019-08-14 VITALS
DIASTOLIC BLOOD PRESSURE: 54 MMHG | TEMPERATURE: 98 F | HEART RATE: 90 BPM | BODY MASS INDEX: 34.36 KG/M2 | SYSTOLIC BLOOD PRESSURE: 95 MMHG | HEIGHT: 67 IN | OXYGEN SATURATION: 95 % | WEIGHT: 218.94 LBS | RESPIRATION RATE: 18 BRPM

## 2019-08-14 DIAGNOSIS — M10.9 GOUT, UNSPECIFIED CAUSE, UNSPECIFIED CHRONICITY, UNSPECIFIED SITE: Primary | ICD-10-CM

## 2019-08-14 DIAGNOSIS — M10.9 GOUT, UNSPECIFIED CAUSE, UNSPECIFIED CHRONICITY, UNSPECIFIED SITE: ICD-10-CM

## 2019-08-14 LAB
CRP SERPL-MCNC: 28.7 MG/L (ref 0–8.2)
ERYTHROCYTE [SEDIMENTATION RATE] IN BLOOD BY WESTERGREN METHOD: 67 MM/HR (ref 0–23)
URATE SERPL-MCNC: 8.7 MG/DL (ref 3.4–7)

## 2019-08-14 PROCEDURE — 1101F PR PT FALLS ASSESS DOC 0-1 FALLS W/OUT INJ PAST YR: ICD-10-PCS | Mod: CPTII,S$GLB,, | Performed by: NURSE PRACTITIONER

## 2019-08-14 PROCEDURE — 99999 PR PBB SHADOW E&M-EST. PATIENT-LVL V: ICD-10-PCS | Mod: PBBFAC,,, | Performed by: NURSE PRACTITIONER

## 2019-08-14 PROCEDURE — 84550 ASSAY OF BLOOD/URIC ACID: CPT

## 2019-08-14 PROCEDURE — 99213 PR OFFICE/OUTPT VISIT, EST, LEVL III, 20-29 MIN: ICD-10-PCS | Mod: 25,S$GLB,, | Performed by: NURSE PRACTITIONER

## 2019-08-14 PROCEDURE — 99213 OFFICE O/P EST LOW 20 MIN: CPT | Mod: 25,S$GLB,, | Performed by: NURSE PRACTITIONER

## 2019-08-14 PROCEDURE — 1101F PT FALLS ASSESS-DOCD LE1/YR: CPT | Mod: CPTII,S$GLB,, | Performed by: NURSE PRACTITIONER

## 2019-08-14 PROCEDURE — 3074F PR MOST RECENT SYSTOLIC BLOOD PRESSURE < 130 MM HG: ICD-10-PCS | Mod: CPTII,S$GLB,, | Performed by: NURSE PRACTITIONER

## 2019-08-14 PROCEDURE — 3078F PR MOST RECENT DIASTOLIC BLOOD PRESSURE < 80 MM HG: ICD-10-PCS | Mod: CPTII,S$GLB,, | Performed by: NURSE PRACTITIONER

## 2019-08-14 PROCEDURE — 99999 PR PBB SHADOW E&M-EST. PATIENT-LVL V: CPT | Mod: PBBFAC,,, | Performed by: NURSE PRACTITIONER

## 2019-08-14 PROCEDURE — 86140 C-REACTIVE PROTEIN: CPT

## 2019-08-14 PROCEDURE — 85652 RBC SED RATE AUTOMATED: CPT

## 2019-08-14 PROCEDURE — 36415 COLL VENOUS BLD VENIPUNCTURE: CPT | Mod: PO

## 2019-08-14 PROCEDURE — 3078F DIAST BP <80 MM HG: CPT | Mod: CPTII,S$GLB,, | Performed by: NURSE PRACTITIONER

## 2019-08-14 PROCEDURE — 96372 THER/PROPH/DIAG INJ SC/IM: CPT | Mod: S$GLB,,, | Performed by: NURSE PRACTITIONER

## 2019-08-14 PROCEDURE — 96372 PR INJECTION,THERAP/PROPH/DIAG2ST, IM OR SUBCUT: ICD-10-PCS | Mod: S$GLB,,, | Performed by: NURSE PRACTITIONER

## 2019-08-14 PROCEDURE — 3074F SYST BP LT 130 MM HG: CPT | Mod: CPTII,S$GLB,, | Performed by: NURSE PRACTITIONER

## 2019-08-14 RX ORDER — PREDNISONE 20 MG/1
TABLET ORAL
Qty: 11 TABLET | Refills: 0 | Status: SHIPPED | OUTPATIENT
Start: 2019-08-14 | End: 2020-01-30 | Stop reason: ALTCHOICE

## 2019-08-14 RX ORDER — METHYLPREDNISOLONE ACETATE 80 MG/ML
80 INJECTION, SUSPENSION INTRA-ARTICULAR; INTRALESIONAL; INTRAMUSCULAR; SOFT TISSUE
Status: COMPLETED | OUTPATIENT
Start: 2019-08-14 | End: 2019-08-14

## 2019-08-14 RX ADMIN — METHYLPREDNISOLONE ACETATE 80 MG: 80 INJECTION, SUSPENSION INTRA-ARTICULAR; INTRALESIONAL; INTRAMUSCULAR; SOFT TISSUE at 02:08

## 2019-08-14 NOTE — PROGRESS NOTES
"Subjective:      Patient ID: Santiago Khan is a 68 y.o. male.    Chief Complaint: Wrist Pain (right wrist swelling,hot to touch,hx of gout)    HPI:  Patient states he woke up with a red, swollen right wrist. Has a hx of gout to his knees, toes, several times.  He is on Allopurinol, 150 mg a day, says he doesn't remember why he had to cut the 300 mg in half many years ago.  He has a hx of CKD, last GFR 43.9.  Doesn't think he has been eating the wrong foods.  Denies any falls or injury to the wrist.      Past Medical History:   Diagnosis Date    Chronic kidney disease, stage 3     DDD (degenerative disc disease), lumbar     DM (diabetes mellitus) with complications     History of gout     History of prostate cancer     Hyperlipidemia associated with type 2 diabetes mellitus     Hypertension associated with stage 3 chronic kidney disease due to type 2 diabetes mellitus     BRADLEY on CPAP     Tobacco abuse        Past Surgical History:   Procedure Laterality Date    BICEPS TENDON REPAIR      COLONOSCOPY N/A 3/27/2019    Performed by James Roger MD at [facility]    HEMORRHOID SURGERY      HERNIA REPAIR      KNEE ARTHROSCOPY      LUMBAR LAMINECTOMY      PROSTATECTOMY         Lab Results   Component Value Date    WBC 11.80 01/30/2019    HGB 13.5 (L) 01/30/2019    HCT 45.3 01/30/2019     01/30/2019    CHOL 168 07/24/2019    TRIG 137 07/24/2019    HDL 51 07/24/2019    ALT 26 01/30/2019    AST 31 01/30/2019     07/24/2019    K 3.2 (L) 07/24/2019    CL 96 07/24/2019    CREATININE 1.6 (H) 07/24/2019    BUN 22 07/24/2019    CO2 33 (H) 07/24/2019    TSH 1.322 01/30/2019    PSA <0.01 01/30/2019    HGBA1C 6.5 (H) 07/24/2019       BP (!) 95/54   Pulse 90   Temp 98.4 °F (36.9 °C) (Oral)   Resp 18   Ht 5' 7" (1.702 m)   Wt 99.3 kg (218 lb 14.7 oz)   SpO2 95%   BMI 34.29 kg/m²       Review of Systems   Constitutional: Negative for appetite change, chills, diaphoresis and fever.   HENT: " Negative for congestion, ear pain, postnasal drip, rhinorrhea, sneezing, sore throat and trouble swallowing.    Eyes: Negative for photophobia, pain and visual disturbance.   Respiratory: Negative for apnea, cough, choking, chest tightness, shortness of breath and wheezing.    Cardiovascular: Negative for chest pain, palpitations and leg swelling.   Gastrointestinal: Negative for abdominal pain, constipation, diarrhea, nausea and vomiting.   Genitourinary: Negative for decreased urine volume, difficulty urinating, dysuria, hematuria and urgency.   Musculoskeletal: Positive for arthralgias. Negative for gait problem, joint swelling and myalgias.   Skin: Negative for rash.   Neurological: Negative for dizziness, tremors, seizures, syncope, weakness, light-headedness, numbness and headaches.   Psychiatric/Behavioral: Negative for agitation, confusion, decreased concentration, hallucinations and sleep disturbance. The patient is not nervous/anxious.       Objective:     Physical Exam   Constitutional: He is oriented to person, place, and time. He appears well-developed and well-nourished. No distress.   Musculoskeletal:   Normal gait  Top of his wrist is warm, red, TTP, a little swollen.     Neurological: He is alert and oriented to person, place, and time.   Skin: Skin is warm and dry.   Psychiatric: He has a normal mood and affect. His behavior is normal.     Assessment:      1. Gout, unspecified cause, unspecified chronicity, unspecified site      Plan:   Gout, unspecified cause, unspecified chronicity, unspecified site  -     Uric acid; Future; Expected date: 08/14/2019  -     Sedimentation rate; Future; Expected date: 08/14/2019  -     C-reactive protein; Future; Expected date: 08/14/2019    Other orders  -     methylPREDNISolone acetate injection 80 mg  -     predniSONE (DELTASONE) 20 MG tablet; Take two a day for 3 days, then one a day for 3 days, then 1/2 a day for 3 days  Dispense: 11 tablet; Refill: 0    will  monitor for lab results.        Current Outpatient Medications:     acetaZOLAMIDE (DIAMOX) 250 MG tablet, Take 1 tablet by mouth Every Monday, Wednesday, and Friday., Disp: , Rfl:     allopurinol (ZYLOPRIM) 300 MG tablet, TK 1/2 T PO ONCE D, Disp: , Rfl: 3    baclofen (LIORESAL) 10 MG tablet, Take 1 tablet by mouth every evening., Disp: , Rfl: 2    bisoprolol (ZEBETA) 5 MG tablet, , Disp: , Rfl:     colchicine (COLCRYS) 0.6 mg tablet, Take 1 tablet (0.6 mg total) by mouth once daily., Disp: 30 tablet, Rfl: 11    diclofenac sodium (VOLTAREN) 1 % Gel, Apply 2 g topically as needed., Disp: , Rfl:     fluticasone (FLONASE) 50 mcg/actuation nasal spray, U 1 TO 2 SPRAYS IEN QD, Disp: , Rfl: 4    furosemide (LASIX) 40 MG tablet, take 1 tablet by mouth twice a day, Disp: 60 tablet, Rfl: 9    glimepiride (AMARYL) 1 MG tablet, TK 1 T PO ONCE D, Disp: , Rfl: 4    hydrocodone-acetaminophen (VICODIN ES) 7.5-750 mg per tablet, Take 1 tablet by mouth Twice daily as needed., Disp: , Rfl:     methocarbamol (ROBAXIN) 750 MG Tab, TK 1 T PO  BID PRF MUSCLE SPASMS, Disp: , Rfl: 3    metolazone (ZAROXOLYN) 2.5 MG tablet, Take 1 tablet by mouth Daily., Disp: , Rfl:     mometasone 0.1% (ELOCON) 0.1 % cream, MARIA TERESA TO HANDS QD PRF FLARE UPS, Disp: , Rfl: 0    multivitamin-minerals-lutein (CENTRUM SILVER) Tab, Take 1 tablet by mouth Daily., Disp: , Rfl:     omeprazole (PRILOSEC) 40 MG capsule, Take 1 capsule (40 mg total) by mouth once daily., Disp: 30 capsule, Rfl: 11    potassium chloride SA (K-DUR,KLOR-CON) 20 MEQ tablet, Take 1 tablet by mouth 3 (three) times daily. , Disp: , Rfl:     promethazine (PHENERGAN) 12.5 MG Tab, Take 12.5 mg by mouth every 6 (six) hours as needed., Disp: , Rfl: 2    simvastatin (ZOCOR) 40 MG tablet, Take 1 tablet by mouth Daily., Disp: , Rfl:     SUPREP BOWEL PREP KIT 17.5-3.13-1.6 gram SolR, Use as directed, Disp: 354 mL, Rfl: 0    tramadol (ULTRAM) 50 mg tablet, Take 1 tablet by mouth Three  times daily as needed., Disp: , Rfl:     varenicline (CHANTIX STARTING MONTH BOX) 0.5 mg (11)- 1 mg (42) tablet, Take one 0.5mg tab by mouth once daily X3 days,then increase to one 0.5mg tab twice daily X4 days,then increase to one 1mg tab twice daily, Disp: 1 Package, Rfl: 0    varenicline (CHANTIX) 1 mg Tab, Take 1 tablet (1 mg total) by mouth 2 (two) times daily., Disp: 60 tablet, Rfl: 1    zolpidem (AMBIEN) 10 mg Tab, Take 5 mg by mouth nightly as needed., Disp: , Rfl:     predniSONE (DELTASONE) 20 MG tablet, Take two a day for 3 days, then one a day for 3 days, then 1/2 a day for 3 days, Disp: 11 tablet, Rfl: 0  No current facility-administered medications for this visit.

## 2019-08-16 ENCOUNTER — TELEPHONE (OUTPATIENT)
Dept: INTERNAL MEDICINE | Facility: CLINIC | Age: 69
End: 2019-08-16

## 2019-08-16 RX ORDER — FEBUXOSTAT 80 MG/1
80 TABLET, FILM COATED ORAL DAILY
Qty: 30 EACH | Refills: 11 | Status: SHIPPED | OUTPATIENT
Start: 2019-08-16 | End: 2020-07-30

## 2019-08-16 NOTE — TELEPHONE ENCOUNTER
----- Message from Sydnee Starr sent at 8/16/2019 11:06 AM CDT -----  Contact: [phone]  Would like to consult with the nurse, patient has some more Question that he needs to speak with the nurse About, please call back at, [phone], thanks sj

## 2019-08-16 NOTE — TELEPHONE ENCOUNTER
I called to check on him, says he is slowly improving.  The redness and tenderness in the wrist has gone down.  We decided to stop the allopurinol and start Uloric to see if we can prevent another flare up.  He voiced understanding

## 2019-08-28 ENCOUNTER — PATIENT OUTREACH (OUTPATIENT)
Dept: ADMINISTRATIVE | Facility: HOSPITAL | Age: 69
End: 2019-08-28

## 2019-08-28 NOTE — PROGRESS NOTES
I have contacted patient to schedule dm eye exam. Appointment scheduled 9-2-19 with Dr. Guadalupe Salamanca

## 2019-09-03 ENCOUNTER — OFFICE VISIT (OUTPATIENT)
Dept: OPHTHALMOLOGY | Facility: CLINIC | Age: 69
End: 2019-09-03
Payer: MEDICARE

## 2019-09-03 DIAGNOSIS — E11.9 TYPE 2 DIABETES MELLITUS WITHOUT RETINOPATHY: Primary | ICD-10-CM

## 2019-09-03 DIAGNOSIS — H52.4 MYOPIA OF BOTH EYES WITH ASTIGMATISM AND PRESBYOPIA: ICD-10-CM

## 2019-09-03 DIAGNOSIS — H52.13 MYOPIA OF BOTH EYES WITH ASTIGMATISM AND PRESBYOPIA: ICD-10-CM

## 2019-09-03 DIAGNOSIS — Z13.5 GLAUCOMA SCREENING: ICD-10-CM

## 2019-09-03 DIAGNOSIS — H52.203 MYOPIA OF BOTH EYES WITH ASTIGMATISM AND PRESBYOPIA: ICD-10-CM

## 2019-09-03 PROCEDURE — 99499 RISK ADDL DX/OHS AUDIT: ICD-10-PCS | Mod: S$GLB,,, | Performed by: OPTOMETRIST

## 2019-09-03 PROCEDURE — 99499 UNLISTED E&M SERVICE: CPT | Mod: S$GLB,,, | Performed by: OPTOMETRIST

## 2019-09-03 PROCEDURE — 99999 PR PBB SHADOW E&M-EST. PATIENT-LVL II: CPT | Mod: PBBFAC,,, | Performed by: OPTOMETRIST

## 2019-09-03 PROCEDURE — 92015 PR REFRACTION: ICD-10-PCS | Mod: S$GLB,,, | Performed by: OPTOMETRIST

## 2019-09-03 PROCEDURE — 92004 COMPRE OPH EXAM NEW PT 1/>: CPT | Mod: S$GLB,,, | Performed by: OPTOMETRIST

## 2019-09-03 PROCEDURE — 92015 DETERMINE REFRACTIVE STATE: CPT | Mod: S$GLB,,, | Performed by: OPTOMETRIST

## 2019-09-03 PROCEDURE — 99999 PR PBB SHADOW E&M-EST. PATIENT-LVL II: ICD-10-PCS | Mod: PBBFAC,,, | Performed by: OPTOMETRIST

## 2019-09-03 PROCEDURE — 92004 PR EYE EXAM, NEW PATIENT,COMPREHESV: ICD-10-PCS | Mod: S$GLB,,, | Performed by: OPTOMETRIST

## 2019-09-03 NOTE — LETTER
September 3, 2019      Kashif Acosta MD  [address]  [address]  [place] [address]           [facility]  [address] [place] [address]  Phone: [phone]  Fax: [phone]          Patient: Santiago Khan   MR Number: [id number]   YOB: 1950   Date of Visit: 9/3/2019       Dear Dr. Kashif Acosta:    Thank you for referring Santiago Khan to me for evaluation. Attached you will find relevant portions of my assessment and plan of care.    If you have questions, please do not hesitate to call me. I look forward to following Santiago Khan along with you.    Sincerely,    Guadalupe Salamanca, OD    Enclosure  CC:  No Recipients    If you would like to receive this communication electronically, please contact [email or web address] or [phone] to request more information on [employer] Link access.    For providers and/or their staff who would like to refer a patient to [facility], please contact us through our one-stop-shop provider referral line, [facility] , at [phone].    If you feel you have received this communication in error or would no longer like to receive these types of communications, please e-mail [email or web address]

## 2019-09-03 NOTE — PROGRESS NOTES
HPI     Eye Exam      Additional comments: Yearly              Comments     NP to [facility]- hypertension and diabetes are less of a problem since weight   loss.  Last eye exam  Patient here today for yearly eye exam  Wears SVL distance glasses part-time updated 3-4 years ago  Wears CTL full-time  Denies over wear  Uses Biotrue solution   CTL wearer 15+ years  Wears B&L colors  No other complaints  No drops          Last edited by Guadalupe Salamanca, OD on 9/3/2019  3:04 PM. (History)            Assessment /Plan     For exam results, see Encounter Report.    Type 2 diabetes mellitus without retinopathy    Glaucoma screening    Myopia of both eyes with astigmatism and presbyopia      No diabetic retinopathy in either eye.  Glaucoma screening negative in both eyes.  Refractive error change. Updated glasses and contact lens prescriptions.   Return to clinic 1 yr.    9-  Mr Khan reports that his current contact lens prescription is -2.00, not the -1.75 that we originally recorded.  I have made changes accordingly.

## 2019-09-13 ENCOUNTER — PATIENT MESSAGE (OUTPATIENT)
Dept: OPHTHALMOLOGY | Facility: CLINIC | Age: 69
End: 2019-09-13

## 2019-10-15 ENCOUNTER — PATIENT MESSAGE (OUTPATIENT)
Dept: INTERNAL MEDICINE | Facility: CLINIC | Age: 69
End: 2019-10-15

## 2019-10-16 ENCOUNTER — PATIENT MESSAGE (OUTPATIENT)
Dept: OPHTHALMOLOGY | Facility: CLINIC | Age: 69
End: 2019-10-16

## 2019-10-26 RX ORDER — OMEPRAZOLE 40 MG/1
CAPSULE, DELAYED RELEASE ORAL
Qty: 30 CAPSULE | Refills: 11 | Status: SHIPPED | OUTPATIENT
Start: 2019-10-26 | End: 2020-10-15

## 2019-11-14 ENCOUNTER — PATIENT OUTREACH (OUTPATIENT)
Dept: ADMINISTRATIVE | Facility: HOSPITAL | Age: 69
End: 2019-11-14

## 2019-11-14 ENCOUNTER — PATIENT MESSAGE (OUTPATIENT)
Dept: INTERNAL MEDICINE | Facility: CLINIC | Age: 69
End: 2019-11-14

## 2019-11-14 DIAGNOSIS — Z11.59 NEED FOR HEPATITIS C SCREENING TEST: Primary | ICD-10-CM

## 2019-11-14 RX ORDER — HYDROCODONE BITARTRATE AND ACETAMINOPHEN 7.5; 325 MG/1; MG/1
1 TABLET ORAL EVERY 4 HOURS PRN
Refills: 0 | COMMUNITY
Start: 2019-10-16

## 2019-11-15 ENCOUNTER — OFFICE VISIT (OUTPATIENT)
Dept: INTERNAL MEDICINE | Facility: CLINIC | Age: 69
End: 2019-11-15
Payer: MEDICARE

## 2019-11-15 VITALS
SYSTOLIC BLOOD PRESSURE: 110 MMHG | OXYGEN SATURATION: 94 % | DIASTOLIC BLOOD PRESSURE: 64 MMHG | WEIGHT: 207.44 LBS | BODY MASS INDEX: 32.56 KG/M2 | HEIGHT: 67 IN | TEMPERATURE: 98 F | HEART RATE: 83 BPM

## 2019-11-15 DIAGNOSIS — L82.1 SEBORRHEIC KERATOSIS: ICD-10-CM

## 2019-11-15 DIAGNOSIS — B35.1 TOENAIL FUNGUS: ICD-10-CM

## 2019-11-15 DIAGNOSIS — G47.33 OSA ON CPAP: Primary | ICD-10-CM

## 2019-11-15 DIAGNOSIS — E11.8 DM (DIABETES MELLITUS) WITH COMPLICATIONS: ICD-10-CM

## 2019-11-15 PROCEDURE — 99499 UNLISTED E&M SERVICE: CPT | Mod: S$GLB,,, | Performed by: INTERNAL MEDICINE

## 2019-11-15 PROCEDURE — 3078F DIAST BP <80 MM HG: CPT | Mod: CPTII,S$GLB,, | Performed by: INTERNAL MEDICINE

## 2019-11-15 PROCEDURE — 3044F PR MOST RECENT HEMOGLOBIN A1C LEVEL <7.0%: ICD-10-PCS | Mod: CPTII,S$GLB,, | Performed by: INTERNAL MEDICINE

## 2019-11-15 PROCEDURE — 17000 PR DESTRUCTION(LASER SURGERY,CRYOSURGERY,CHEMOSURGERY),PREMALIGNANT LESIONS,FIRST LESION: ICD-10-PCS | Mod: S$GLB,,, | Performed by: INTERNAL MEDICINE

## 2019-11-15 PROCEDURE — 99999 PR PBB SHADOW E&M-EST. PATIENT-LVL V: CPT | Mod: PBBFAC,,, | Performed by: INTERNAL MEDICINE

## 2019-11-15 PROCEDURE — 99213 PR OFFICE/OUTPT VISIT, EST, LEVL III, 20-29 MIN: ICD-10-PCS | Mod: 25,S$GLB,, | Performed by: INTERNAL MEDICINE

## 2019-11-15 PROCEDURE — 3044F HG A1C LEVEL LT 7.0%: CPT | Mod: CPTII,S$GLB,, | Performed by: INTERNAL MEDICINE

## 2019-11-15 PROCEDURE — 3074F SYST BP LT 130 MM HG: CPT | Mod: CPTII,S$GLB,, | Performed by: INTERNAL MEDICINE

## 2019-11-15 PROCEDURE — 1101F PR PT FALLS ASSESS DOC 0-1 FALLS W/OUT INJ PAST YR: ICD-10-PCS | Mod: CPTII,S$GLB,, | Performed by: INTERNAL MEDICINE

## 2019-11-15 PROCEDURE — 17000 DESTRUCT PREMALG LESION: CPT | Mod: S$GLB,,, | Performed by: INTERNAL MEDICINE

## 2019-11-15 PROCEDURE — 99213 OFFICE O/P EST LOW 20 MIN: CPT | Mod: 25,S$GLB,, | Performed by: INTERNAL MEDICINE

## 2019-11-15 PROCEDURE — 99499 RISK ADDL DX/OHS AUDIT: ICD-10-PCS | Mod: S$GLB,,, | Performed by: INTERNAL MEDICINE

## 2019-11-15 PROCEDURE — 3078F PR MOST RECENT DIASTOLIC BLOOD PRESSURE < 80 MM HG: ICD-10-PCS | Mod: CPTII,S$GLB,, | Performed by: INTERNAL MEDICINE

## 2019-11-15 PROCEDURE — 3074F PR MOST RECENT SYSTOLIC BLOOD PRESSURE < 130 MM HG: ICD-10-PCS | Mod: CPTII,S$GLB,, | Performed by: INTERNAL MEDICINE

## 2019-11-15 PROCEDURE — 99999 PR PBB SHADOW E&M-EST. PATIENT-LVL V: ICD-10-PCS | Mod: PBBFAC,,, | Performed by: INTERNAL MEDICINE

## 2019-11-15 PROCEDURE — 1101F PT FALLS ASSESS-DOCD LE1/YR: CPT | Mod: CPTII,S$GLB,, | Performed by: INTERNAL MEDICINE

## 2019-11-15 RX ORDER — ALLOPURINOL 300 MG/1
TABLET ORAL
Refills: 3 | COMMUNITY
Start: 2019-08-31 | End: 2019-11-15

## 2019-11-15 NOTE — PROGRESS NOTES
"HPI:  Patient is a 69-year-old man who comes in today for several concerns.  First see like to see somebody about getting put back on CPAP.  It has been several years since he has been on anything.  His last study was several years ago.  Next he likely also see a foot doctor about some toenail problems.  Lasted like to get a skin mole removed.    Current meds have been verified and updated per the EMR  Exam:/64 (BP Location: Right arm)   Pulse 83   Temp 98.4 °F (36.9 °C) (Tympanic)   Ht 5' 7" (1.702 m)   Wt 94.1 kg (207 lb 7.3 oz)   SpO2 (!) 94%   BMI 32.49 kg/m²   The skin lesion is a seborrheic keratosis in his right anterior superior chest wall near the neckline.    Lab Results   Component Value Date    WBC 11.80 01/30/2019    HGB 13.5 (L) 01/30/2019    HCT 45.3 01/30/2019     01/30/2019    CHOL 168 07/24/2019    TRIG 137 07/24/2019    HDL 51 07/24/2019    ALT 26 01/30/2019    AST 31 01/30/2019     07/24/2019    K 3.2 (L) 07/24/2019    CL 96 07/24/2019    CREATININE 1.6 (H) 07/24/2019    BUN 22 07/24/2019    CO2 33 (H) 07/24/2019    TSH 1.322 01/30/2019    PSA <0.01 01/30/2019    HGBA1C 6.5 (H) 07/24/2019       Impression:  Seborrheic keratosis  His obstructive sleep apnea  Onychomycosis  Patient Active Problem List   Diagnosis    History of prostate cancer    BRADLEY on CPAP    History of gout    DDD (degenerative disc disease), lumbar    Tobacco abuse    DM (diabetes mellitus) with complications    Hypertension associated with stage 3 chronic kidney disease due to type 2 diabetes mellitus    Hyperlipidemia associated with type 2 diabetes mellitus    Chronic kidney disease, stage 3       Plan:  Orders Placed This Encounter    Ambulatory consult to Pulmonology    Ambulatory consult to Podiatry    He had the skin lesion frozen off today.  He was referred to see Pulmonary and Podiatry.  He will see me in January for his regular appointment      This note is generated with speech " recognition software and is subject to transcription error and sound alike phrases that may be missed by proofreading.

## 2019-11-25 ENCOUNTER — OFFICE VISIT (OUTPATIENT)
Dept: PULMONOLOGY | Facility: CLINIC | Age: 69
End: 2019-11-25
Payer: MEDICARE

## 2019-11-25 ENCOUNTER — OFFICE VISIT (OUTPATIENT)
Dept: PODIATRY | Facility: CLINIC | Age: 69
End: 2019-11-25
Payer: MEDICARE

## 2019-11-25 VITALS
BODY MASS INDEX: 31.97 KG/M2 | SYSTOLIC BLOOD PRESSURE: 111 MMHG | WEIGHT: 203.69 LBS | HEIGHT: 67 IN | HEART RATE: 87 BPM | DIASTOLIC BLOOD PRESSURE: 77 MMHG

## 2019-11-25 VITALS
HEART RATE: 79 BPM | RESPIRATION RATE: 18 BRPM | WEIGHT: 203.69 LBS | DIASTOLIC BLOOD PRESSURE: 60 MMHG | SYSTOLIC BLOOD PRESSURE: 100 MMHG | BODY MASS INDEX: 31.97 KG/M2 | OXYGEN SATURATION: 96 % | HEIGHT: 67 IN

## 2019-11-25 DIAGNOSIS — G47.33 OSA (OBSTRUCTIVE SLEEP APNEA): Primary | ICD-10-CM

## 2019-11-25 DIAGNOSIS — B35.1 ONYCHOMYCOSIS OF TOENAIL: Primary | ICD-10-CM

## 2019-11-25 DIAGNOSIS — E66.9 OBESITY, CLASS I, BMI 30-34.9: ICD-10-CM

## 2019-11-25 PROBLEM — E66.811 OBESITY, CLASS I, BMI 30-34.9: Status: ACTIVE | Noted: 2019-11-25

## 2019-11-25 PROCEDURE — 99203 PR OFFICE/OUTPT VISIT, NEW, LEVL III, 30-44 MIN: ICD-10-PCS | Mod: S$GLB,,, | Performed by: PODIATRIST

## 2019-11-25 PROCEDURE — 3078F PR MOST RECENT DIASTOLIC BLOOD PRESSURE < 80 MM HG: ICD-10-PCS | Mod: CPTII,S$GLB,, | Performed by: PODIATRIST

## 2019-11-25 PROCEDURE — 99999 PR PBB SHADOW E&M-EST. PATIENT-LVL III: ICD-10-PCS | Mod: PBBFAC,,, | Performed by: PODIATRIST

## 2019-11-25 PROCEDURE — 1101F PR PT FALLS ASSESS DOC 0-1 FALLS W/OUT INJ PAST YR: ICD-10-PCS | Mod: CPTII,S$GLB,, | Performed by: PODIATRIST

## 2019-11-25 PROCEDURE — 1126F AMNT PAIN NOTED NONE PRSNT: CPT | Mod: S$GLB,,, | Performed by: PODIATRIST

## 2019-11-25 PROCEDURE — 3074F PR MOST RECENT SYSTOLIC BLOOD PRESSURE < 130 MM HG: ICD-10-PCS | Mod: CPTII,S$GLB,, | Performed by: PODIATRIST

## 2019-11-25 PROCEDURE — 3078F PR MOST RECENT DIASTOLIC BLOOD PRESSURE < 80 MM HG: ICD-10-PCS | Mod: CPTII,S$GLB,, | Performed by: NURSE PRACTITIONER

## 2019-11-25 PROCEDURE — 99999 PR PBB SHADOW E&M-EST. PATIENT-LVL III: CPT | Mod: PBBFAC,,, | Performed by: PODIATRIST

## 2019-11-25 PROCEDURE — 1159F MED LIST DOCD IN RCRD: CPT | Mod: S$GLB,,, | Performed by: PODIATRIST

## 2019-11-25 PROCEDURE — 1101F PT FALLS ASSESS-DOCD LE1/YR: CPT | Mod: CPTII,S$GLB,, | Performed by: NURSE PRACTITIONER

## 2019-11-25 PROCEDURE — 99204 PR OFFICE/OUTPT VISIT, NEW, LEVL IV, 45-59 MIN: ICD-10-PCS | Mod: S$GLB,,, | Performed by: NURSE PRACTITIONER

## 2019-11-25 PROCEDURE — 3074F SYST BP LT 130 MM HG: CPT | Mod: CPTII,S$GLB,, | Performed by: PODIATRIST

## 2019-11-25 PROCEDURE — 99999 PR PBB SHADOW E&M-EST. PATIENT-LVL V: CPT | Mod: PBBFAC,,, | Performed by: NURSE PRACTITIONER

## 2019-11-25 PROCEDURE — 1159F MED LIST DOCD IN RCRD: CPT | Mod: S$GLB,,, | Performed by: NURSE PRACTITIONER

## 2019-11-25 PROCEDURE — 1101F PR PT FALLS ASSESS DOC 0-1 FALLS W/OUT INJ PAST YR: ICD-10-PCS | Mod: CPTII,S$GLB,, | Performed by: NURSE PRACTITIONER

## 2019-11-25 PROCEDURE — 3078F DIAST BP <80 MM HG: CPT | Mod: CPTII,S$GLB,, | Performed by: NURSE PRACTITIONER

## 2019-11-25 PROCEDURE — 99203 OFFICE O/P NEW LOW 30 MIN: CPT | Mod: S$GLB,,, | Performed by: PODIATRIST

## 2019-11-25 PROCEDURE — 1126F PR PAIN SEVERITY QUANTIFIED, NO PAIN PRESENT: ICD-10-PCS | Mod: S$GLB,,, | Performed by: PODIATRIST

## 2019-11-25 PROCEDURE — 3074F PR MOST RECENT SYSTOLIC BLOOD PRESSURE < 130 MM HG: ICD-10-PCS | Mod: CPTII,S$GLB,, | Performed by: NURSE PRACTITIONER

## 2019-11-25 PROCEDURE — 99999 PR PBB SHADOW E&M-EST. PATIENT-LVL V: ICD-10-PCS | Mod: PBBFAC,,, | Performed by: NURSE PRACTITIONER

## 2019-11-25 PROCEDURE — 3078F DIAST BP <80 MM HG: CPT | Mod: CPTII,S$GLB,, | Performed by: PODIATRIST

## 2019-11-25 PROCEDURE — 1101F PT FALLS ASSESS-DOCD LE1/YR: CPT | Mod: CPTII,S$GLB,, | Performed by: PODIATRIST

## 2019-11-25 PROCEDURE — 1159F PR MEDICATION LIST DOCUMENTED IN MEDICAL RECORD: ICD-10-PCS | Mod: S$GLB,,, | Performed by: NURSE PRACTITIONER

## 2019-11-25 PROCEDURE — 1159F PR MEDICATION LIST DOCUMENTED IN MEDICAL RECORD: ICD-10-PCS | Mod: S$GLB,,, | Performed by: PODIATRIST

## 2019-11-25 PROCEDURE — 99204 OFFICE O/P NEW MOD 45 MIN: CPT | Mod: S$GLB,,, | Performed by: NURSE PRACTITIONER

## 2019-11-25 PROCEDURE — 3074F SYST BP LT 130 MM HG: CPT | Mod: CPTII,S$GLB,, | Performed by: NURSE PRACTITIONER

## 2019-11-25 NOTE — PATIENT INSTRUCTIONS
A home sleep study has been ordered.  You should be notified within 2 weeks to schedule your sleep study. If you have any questions please contact our sleep lab at [phone] or send a message to us through [employer].

## 2019-11-25 NOTE — LETTER
November 25, 2019      Kashif Acosta MD  [address]  [address]  [place] [address]           [facility]  [address] [place] [address]  Phone: [phone]  Fax: [phone]          Patient: Santiago Khan   MR Number: [id number]   YOB: 1950   Date of Visit: 11/25/2019       Dear Dr. Kashif Acosta:    Thank you for referring Sanitago Khan to me for evaluation. Attached you will find relevant portions of my assessment and plan of care.    If you have questions, please do not hesitate to call me. I look forward to following Santiago Khan along with you.    Sincerely,    Ricarda Burger, JERALD    Enclosure  CC:  No Recipients    If you would like to receive this communication electronically, please contact [email or web address] or [phone] to request more information on [employer] Link access.    For providers and/or their staff who would like to refer a patient to [facility], please contact us through our one-stop-shop provider referral line, [facility] , at [phone].    If you feel you have received this communication in error or would no longer like to receive these types of communications, please e-mail [email or web address]

## 2019-11-25 NOTE — PROGRESS NOTES
Subjective:      Patient ID: Santiago Khan is a 69 y.o. male.    Chief Complaint: Sleep Apnea    HPI    Patient presents to the office today for evaluation of sleep apnea.  He was diagnosed with sleep apnea quite a few years ago.  He returned his newer machine in 2017 due to high co-payment and problems with CPAP pressure.  He is interested in restarting CPAP therapy.  Patient with snoring and witnessed apneas. Patient not having problems falling asleep, but wakes up frequently throughout the night.  Patient does not wake up feeling refreshed in the morning.  Patient with daytime hypersomnolence.  [place] Sleepiness Scale score 24. He is having concentration problems. Comorbidities include HTN, DM      STOP - BANG Questionnaire:     1. Snoring : Do you snore loudly ?    Yes    2. Tired : Do you often feel tired, fatigued, or sleepy during daytime?   Yes    3. Observed: Has anyone observed you stop breathing during your sleep?   Yes    4. Blood pressure : Do you have or are you being treated for high blood pressure?   No    5. BMI :BMI more than 35 kg/m2?   No    6. Age : Age over 50 yr old?   Yes    7. Neck circumference: Neck circumference greater than 40 cm?   Yes    8. Gender: Gender male?   Yes    High risk of BRADLEY: Yes 5 - 8  Intermediate risk of BRADLEY: Yes 3 - 4  Low risk of BRADLEY: Yes 0 - 2      References:   STOP Questionnaire   A Tool to Screen Patients for Obstructive Sleep Apnea: RAHEL Leblanc.C.P.C., CANDE Gee.B.B.S., Aura Corona M.D.,Ilana Tsang, Ph.D., CANDE Porter.B.B.S.,_ Lul Lacey.,_ Roderick Polk M.D., Chun Swanson, F.R.C.P.C.; Anesthesiology 2008; 108:812-21 Copyright © 2008, the American Society of Anesthesiologists, Inc. [employer]    Patient Active Problem List   Diagnosis    History of prostate cancer    BRADLEY (obstructive sleep apnea)    History of gout    DDD (degenerative disc disease), lumbar    Tobacco abuse  "   DM (diabetes mellitus) with complications    Hypertension associated with stage 3 chronic kidney disease due to type 2 diabetes mellitus    Hyperlipidemia associated with type 2 diabetes mellitus    Chronic kidney disease, stage 3    Obesity, Class I, BMI 30-34.9         /60   Pulse 79   Resp 18   Ht 5' 7" (1.702 m)   Wt 92.4 kg (203 lb 11.3 oz)   SpO2 96%   BMI 31.90 kg/m²   Body mass index is 31.9 kg/m².    Review of Systems   Constitutional: Positive for fatigue.   Respiratory: Positive for snoring and somnolence.    Psychiatric/Behavioral: Positive for sleep disturbance.   All other systems reviewed and are negative.        Objective:      Physical Exam   Constitutional: He is oriented to person, place, and time. He appears well-developed and well-nourished.   HENT:   Head: Normocephalic and atraumatic.   Mouth/Throat: Oropharynx is clear and moist.   Mallampati Score: III   Eyes: Pupils are equal, round, and reactive to light. EOM are normal.   Neck: Normal range of motion. Neck supple.   Neck circumference:17.5 inches    Cardiovascular: Normal rate and regular rhythm. Exam reveals no gallop and no friction rub.   No murmur heard.  Pulmonary/Chest: Effort normal and breath sounds normal.   Abdominal: Soft. Bowel sounds are normal. He exhibits no distension. There is no tenderness.   Musculoskeletal: Normal range of motion.   Neurological: He is alert and oriented to person, place, and time.   Skin: Skin is warm and dry.   Psychiatric: He has a normal mood and affect.         Assessment:     1. BRADLEY (obstructive sleep apnea)    2. Obesity, Class I, BMI 30-34.9       Outpatient Encounter Medications as of 11/25/2019   Medication Sig Dispense Refill    acetaZOLAMIDE (DIAMOX) 250 MG tablet Take 1 tablet by mouth Every Monday, Wednesday, and Friday.      baclofen (LIORESAL) 10 MG tablet Take 1 tablet by mouth every evening.  2    bisoprolol (ZEBETA) 5 MG tablet       colchicine (COLCRYS) 0.6 " mg tablet Take 1 tablet (0.6 mg total) by mouth once daily. 30 tablet 11    diclofenac sodium (VOLTAREN) 1 % Gel Apply 2 g topically as needed.      febuxostat (ULORIC) 80 mg Tab Take 1 tablet (80 mg total) by mouth once daily. 30 each 11    fluticasone (FLONASE) 50 mcg/actuation nasal spray U 1 TO 2 SPRAYS IEN QD  4    furosemide (LASIX) 40 MG tablet take 1 tablet by mouth twice a day 60 tablet 9    glimepiride (AMARYL) 1 MG tablet TK 1 T PO ONCE D  4    HYDROcodone-acetaminophen (NORCO) 7.5-325 mg per tablet TK 1 T PO  TID PRF CHRONIC PAIN  0    metolazone (ZAROXOLYN) 2.5 MG tablet Take 1 tablet by mouth Daily.      mometasone 0.1% (ELOCON) 0.1 % cream MARIA TERESA TO HANDS QD PRF FLARE UPS  0    multivitamin-minerals-lutein (CENTRUM SILVER) Tab Take 1 tablet by mouth Daily.      omeprazole (PRILOSEC) 40 MG capsule TAKE ONE CAPSULE BY MOUTH EVERY DAY 30 capsule 11    potassium chloride SA (K-DUR,KLOR-CON) 20 MEQ tablet Take 1 tablet by mouth 3 (three) times daily.       predniSONE (DELTASONE) 20 MG tablet Take two a day for 3 days, then one a day for 3 days, then 1/2 a day for 3 days 11 tablet 0    promethazine (PHENERGAN) 12.5 MG Tab Take 12.5 mg by mouth every 6 (six) hours as needed.  2    simvastatin (ZOCOR) 40 MG tablet Take 1 tablet by mouth Daily.      zolpidem (AMBIEN) 10 mg Tab Take 5 mg by mouth nightly as needed.      SUPREP BOWEL PREP KIT 17.5-3.13-1.6 gram SolR Use as directed (Patient not taking: Reported on 11/15/2019) 354 mL 0    varenicline (CHANTIX STARTING MONTH BOX) 0.5 mg (11)- 1 mg (42) tablet Take one 0.5mg tab by mouth once daily X3 days,then increase to one 0.5mg tab twice daily X4 days,then increase to one 1mg tab twice daily (Patient not taking: Reported on 11/15/2019) 1 Package 0    varenicline (CHANTIX) 1 mg Tab Take 1 tablet (1 mg total) by mouth 2 (two) times daily. (Patient not taking: Reported on 11/15/2019) 60 tablet 1     No facility-administered encounter medications on  file as of 11/25/2019.      Orders Placed This Encounter   Procedures    Home Sleep Studies     Standing Status:   Future     Standing Expiration Date:   11/25/2020     Scheduling Instructions:      2 night protocol     Plan:     Problem List Items Addressed This Visit        Endocrine    Obesity, Class I, BMI 30-34.9    Current Assessment & Plan     Neck circumference 17.5. Weight loss and exercise to improve overall health.              Other    BRADLEY (obstructive sleep apnea) - Primary    Current Assessment & Plan     HST and anticipate starting CPAP therapy.         Relevant Orders    Home Sleep Studies

## 2019-11-25 NOTE — PROGRESS NOTES
Subjective:     Patient ID: Santiago Khan is a 69 y.o. male.    Chief Complaint: Nail Problem (c/o possible fungus to multiple digits. Pt has questions to L hallux nail, is wondering if the nail will grow back. c/o no pain. Diabetic Pt. Wears tennis shoes. PCP Dr Acosta last visit 11/15/19)    Santiago is a 69 y.o. male who presents to the clinic complaining of thick and discolored toenails on the left foot. Santiago is inquiring about treatment options.    Patient Active Problem List   Diagnosis    History of prostate cancer    BRADLEY (obstructive sleep apnea)    History of gout    DDD (degenerative disc disease), lumbar    Tobacco abuse    DM (diabetes mellitus) with complications    Hypertension associated with stage 3 chronic kidney disease due to type 2 diabetes mellitus    Hyperlipidemia associated with type 2 diabetes mellitus    Chronic kidney disease, stage 3    Obesity, Class I, BMI 30-34.9       Medication List with Changes/Refills   Current Medications    ACETAZOLAMIDE (DIAMOX) 250 MG TABLET    Take 1 tablet by mouth Every Monday, Wednesday, and Friday.    BACLOFEN (LIORESAL) 10 MG TABLET    Take 1 tablet by mouth every evening.    BISOPROLOL (ZEBETA) 5 MG TABLET        COLCHICINE (COLCRYS) 0.6 MG TABLET    Take 1 tablet (0.6 mg total) by mouth once daily.    DICLOFENAC SODIUM (VOLTAREN) 1 % GEL    Apply 2 g topically as needed.    FEBUXOSTAT (ULORIC) 80 MG TAB    Take 1 tablet (80 mg total) by mouth once daily.    FLUTICASONE (FLONASE) 50 MCG/ACTUATION NASAL SPRAY    U 1 TO 2 SPRAYS IEN QD    FUROSEMIDE (LASIX) 40 MG TABLET    take 1 tablet by mouth twice a day    GLIMEPIRIDE (AMARYL) 1 MG TABLET    TK 1 T PO ONCE D    HYDROCODONE-ACETAMINOPHEN (NORCO) 7.5-325 MG PER TABLET    TK 1 T PO  TID PRF CHRONIC PAIN    METOLAZONE (ZAROXOLYN) 2.5 MG TABLET    Take 1 tablet by mouth Daily.    MOMETASONE 0.1% (ELOCON) 0.1 % CREAM    MARIA TERESA TO HANDS QD PRF FLARE UPS    MULTIVITAMIN-MINERALS-LUTEIN  (CENTRUM SILVER) TAB    Take 1 tablet by mouth Daily.    OMEPRAZOLE (PRILOSEC) 40 MG CAPSULE    TAKE ONE CAPSULE BY MOUTH EVERY DAY    POTASSIUM CHLORIDE SA (K-DUR,KLOR-CON) 20 MEQ TABLET    Take 1 tablet by mouth 3 (three) times daily.     PREDNISONE (DELTASONE) 20 MG TABLET    Take two a day for 3 days, then one a day for 3 days, then 1/2 a day for 3 days    PROMETHAZINE (PHENERGAN) 12.5 MG TAB    Take 12.5 mg by mouth every 6 (six) hours as needed.    SIMVASTATIN (ZOCOR) 40 MG TABLET    Take 1 tablet by mouth Daily.    SUPREP BOWEL PREP KIT 17.5-3.13-1.6 GRAM SOLR    Use as directed    VARENICLINE (CHANTIX STARTING MONTH BOX) 0.5 MG (11)- 1 MG (42) TABLET    Take one 0.5mg tab by mouth once daily X3 days,then increase to one 0.5mg tab twice daily X4 days,then increase to one 1mg tab twice daily    VARENICLINE (CHANTIX) 1 MG TAB    Take 1 tablet (1 mg total) by mouth 2 (two) times daily.    ZOLPIDEM (AMBIEN) 10 MG TAB    Take 5 mg by mouth nightly as needed.       Review of patient's allergies indicates:   Allergen Reactions    Amitriptyline      Other reaction(s): Rash    Celecoxib      Other reaction(s): Rash       Past Surgical History:   Procedure Laterality Date    BICEPS TENDON REPAIR      COLONOSCOPY N/A 3/27/2019    Procedure: COLONOSCOPY;  Surgeon: James Roger MD;  Location[facility];  Service: Endoscopy;  Laterality: N/A;    HEMORRHOID SURGERY      HERNIA REPAIR      KNEE ARTHROSCOPY      LUMBAR LAMINECTOMY      PROSTATECTOMY         Family History   Problem Relation Age of Onset    Prostate cancer Father     Coronary artery disease Father        Social History     Socioeconomic History    Marital status:      Spouse name: Not on file    Number of children: Not on file    Years of education: Not on file    Highest education level: Not on file   Occupational History    Not on file   Social Needs    Financial resource strain: Somewhat hard    Food insecurity:     Worry:  "Never true     Inability: Never true    Transportation needs:     Medical: No     Non-medical: No   Tobacco Use    Smoking status: Current Every Day Smoker     Years: 50.00     Types: Cigarettes   Substance and Sexual Activity    Alcohol use: No     Frequency: Never     Drinks per session: Patient refused     Binge frequency: Never    Drug use: Not on file    Sexual activity: Not on file   Lifestyle    Physical activity:     Days per week: 0 days     Minutes per session: 0 min    Stress: To some extent   Relationships    Social connections:     Talks on phone: More than three times a week     Gets together: Twice a week     Attends L[employer] service: Not on file     Active member of club or organization: No     Attends meetings of clubs or organizations: Never     Relationship status:    Other Topics Concern    Not on file   Social History Narrative    Not on file       Vitals:    11/25/19 1354   BP: 111/77   Pulse: 87   Weight: 92.4 kg (203 lb 11.3 oz)   Height: 5' 7" (1.702 m)   PainSc: 0-No pain       Review of Systems   Constitutional: Negative for chills and fever.   Respiratory: Negative for shortness of breath.    Cardiovascular: Negative for chest pain, palpitations, orthopnea, claudication and leg swelling.   Gastrointestinal: Negative for diarrhea, nausea and vomiting.   Musculoskeletal: Negative for joint pain.   Skin: Negative for rash.   Neurological: Negative for dizziness, tingling, sensory change, focal weakness and weakness.   Psychiatric/Behavioral: Negative.              Objective:      PHYSICAL EXAM: Apperance: Alert and orient in no distress,well developed, and with good attention to grooming and body habits.   Patient presents ambulating in  LOWER EXTREMITY EXAM:  VASCULAR: Dorsalis pedis pulses 2/4 bilateral and Posterior Tibial pulses 2/4 bilateral. Capillary fill time <4 seconds bilateral. No edema observed bilateral. Varicosities absent bilateral. Skin temperature of the " lower extremities is warm to warm, proximal to distal. Hair growth WNL bilateral.  DERMATOLOGICAL: No skin rashes, subcutaneous nodules, lesions, or ulcers observed bilateral. Nails 1 bilateral and right 2nd thickened, and discolored with subungual debris. Webspaces 1,2,3,4 bilateral clean, dry and without evidence of break in skin integrity.  NEUROLOGICAL: Light touch, sharp-dull, proprioception all present and equal bilaterally.     MUSCULOSKELETAL: Muscle strength is 5/5 for foot inverters, everters, plantarflexors, and dorsiflexors. Muscle tone is normal. Negative pain on palpation of left hallux.         Assessment:       Encounter Diagnosis   Name Primary?    Onychomycosis of toenail Yes         Plan:   Onychomycosis of toenail      I counseled the patient on his conditions, regarding findings of my examination, my impressions, and usual treatment plan.   Patient instructed to spray all shoes with Lysol disinfectant spray and let dry before wearing. Patient instructed to wash all socks in hot water and bleach.  Discuss treatment options for nail fungus.  I explained that fungus lives in a warm dark moist environment and therefore patient should make every attempt to keep feet clean and dry.  We discussed drying feet thoroughly after shower particularly between the toes and then applying powder between the toes and in the shoes.    With patient's permission, nail clippings obtained for fungal nail culture.   For fungal toenails I prescribed topical medication to be used daily for up to a year.  We also discussed oral Lamisil but I did not recommend it as a first line of treatment since it is an internal medicine that may potentially have side effects, including liver problems. Patient elects for topical OTC treatment.  Recommendations given for OTC tea tree oil or Fungi Nail.     Patient to return as needed            Ricarda Burger DPM  [facility]

## 2019-11-25 NOTE — LETTER
November 25, 2019      Kashif Acosta MD  [address]  [address]  [place] [address]           [facility]  [address] [place] [place] [address]  Phone: [phone]  Fax: [phone]          Patient: Santiago Khan   MR Number: [id number]   YOB: 1950   Date of Visit: 11/25/2019       Dear Dr. Kashif Acosta:    Thank you for referring Santiago Khan to me for evaluation. Attached you will find relevant portions of my assessment and plan of care.    If you have questions, please do not hesitate to call me. I look forward to following Santiago Kahn along with you.    Sincerely,    Elizabeth Lejeune, RAVEN    Enclosure  CC:  No Recipients    If you would like to receive this communication electronically, please contact [email or web address] or [phone] to request more information on [employer] access.    For providers and/or their staff who would like to refer a patient to [facility], please contact us through our one-stop-shop provider referral line, [facility] , at [phone].    If you feel you have received this communication in error or would no longer like to receive these types of communications, please e-mail [email or web address]

## 2019-12-02 ENCOUNTER — TELEPHONE (OUTPATIENT)
Dept: PULMONOLOGY | Facility: HOSPITAL | Age: 69
End: 2019-12-02

## 2019-12-16 ENCOUNTER — PROCEDURE VISIT (OUTPATIENT)
Dept: SLEEP MEDICINE | Facility: CLINIC | Age: 69
End: 2019-12-16
Payer: MEDICARE

## 2019-12-16 DIAGNOSIS — G47.33 OSA (OBSTRUCTIVE SLEEP APNEA): Primary | ICD-10-CM

## 2019-12-16 PROCEDURE — 95800 SLP STDY UNATTENDED: CPT | Mod: 26,,, | Performed by: INTERNAL MEDICINE

## 2019-12-16 PROCEDURE — 99499 NO LOS: ICD-10-PCS | Mod: S$GLB,,, | Performed by: INTERNAL MEDICINE

## 2019-12-16 PROCEDURE — 95800 SLP STDY UNATTENDED: CPT

## 2019-12-16 PROCEDURE — 99499 UNLISTED E&M SERVICE: CPT | Mod: S$GLB,,, | Performed by: INTERNAL MEDICINE

## 2019-12-16 PROCEDURE — 95800 PR SLEEP STUDY, UNATTENDED, RECORD HEART RATE/O2 SAT/RESP ANAL/SLEEP TIME: ICD-10-PCS | Mod: 26,,, | Performed by: INTERNAL MEDICINE

## 2019-12-16 NOTE — Clinical Note
PHYSICIAN INTERPRETATION AND COMMENTS: Findings are consistent with severe, non-positional obstructive sleepapnea (BRADLEY). There is insufficient supine study time to assess the severity of positional obstructive sleep apnea (BRADLEY). Indicationsof cardiac dysrhythmia are recorded. Night #1 had no valid data. Night #2: AHI was 37.0/hr with 3.5 hours sleep. Consider INLABCPAP titration to allow proper habituation and mask fitting.CLINICAL HISTORY: 69 year old male presented with:Prior diagnosis of BRADLEY, seeks to restablish therapy. STOPBANG score6. Frequent waking up at night, snoring, Non refreshing sleep. 17 inch neck, BMI of 30, an Debord sleepiness score of 20, historyof diabetes and symptoms of nocturnal snoring, waking up choking and witnessed apneas. Based on the clinical history, the patienthas a high pre-test probability of having severe BRADLEY.SLEEP STUDY FINDINGS: Patient underwent a two night Home Sleep Test and by behavioral criteria, slept for approximately3.5 hours, with a sleep latency

## 2019-12-17 NOTE — PROCEDURES
Home Sleep Studies  Date/Time: 12/16/2019 8:00 AM  Performed by: John Ghotra MD  Authorized by: Elizabeth Lejeune, NP       PHYSICIAN INTERPRETATION AND COMMENTS: Findings are consistent with severe, non-positional obstructive sleep  apnea (BRADLEY). There is insufficient supine study time to assess the severity of positional obstructive sleep apnea (BRADLEY). Indications  of cardiac dysrhythmia are recorded. Night #1 had no valid data. Night #2: AHI was 37.0/hr with 3.5 hours sleep. Consider INLAB  CPAP titration to allow proper habituation and mask fitting.  CLINICAL HISTORY: 69 year old male presented with:Prior diagnosis of BRADLEY, seeks to restablish therapy. STOPBANG score  6. Frequent waking up at night, snoring, Non refreshing sleep. 17 inch neck, BMI of 30, an Leon sleepiness score of 20, history  of diabetes and symptoms of nocturnal snoring, waking up choking and witnessed apneas. Based on the clinical history, the patient  has a high pre-test probability of having severe BRADLEY.  SLEEP STUDY FINDINGS: Patient underwent a two night Home Sleep Test and by behavioral criteria, slept for approximately  3.5 hours, with a sleep latency of 24 minutes and a sleep efficiency of 66.6%. Severe sleep disordered breathing (AHI=37) is noted  based on a 4% hypopnea desaturation criteria. The patient slept supine 1.6% of the night based on valid recording time of 3.54  hours. The apneas/hypopneas are accompanied by moderate oxygen desaturation (percent time below 90% SpO2: 11.6%, Min  SpO2: 82.8%). The average desaturation across all sleep disordered breathing events is 5.0%. Snoring occurs for 37.3% (30 dB) of  the study, 22.6% is very loud. The mean pulse rate is 77 BPM, with very frequent pulse rate variability (90 events with >= 6 BPM  increase/decrease per hour).  TREATMENT CONSIDERATIONS: Consider nasal continuous positive airway pressure (CPAP/AutoPAP) as the initial  treatment choice for severe obstructive sleep  apnea. A mandibular advancement splint (MAS) or referral to an ENT surgeon for  modification to the airway should be considered to reduce the risk of mortality caused by severe BRADLEY if the patient prefers an  alternative therapy or the CPAP trial is unsuccessful.  DISEASE MANAGEMENT CONSIDERATIONS: Irregularity of the pulse rate signals indicates possible cardiac  dysrhythmia. If clinically appropriate, further cardiac evaluation is suggested. Sleeping pills may increase the severity of  untreated BRADLEY and their use should be reevaluated once the BRADLEY is treated.

## 2019-12-17 NOTE — PROGRESS NOTES
PHYSICIAN INTERPRETATION AND COMMENTS: Findings are consistent with severe, non-positional obstructive sleep  apnea (BRADLEY). There is insufficient supine study time to assess the severity of positional obstructive sleep apnea (BRADLEY). Indications  of cardiac dysrhythmia are recorded. Night #1 had no valid data. Night #2: AHI was 37.0/hr with 3.5 hours sleep. Consider INLAB  CPAP titration to allow proper habituation and mask fitting.  CLINICAL HISTORY: 69 year old male presented with:Prior diagnosis of BRADLEY, seeks to restablish therapy. STOPBANG score  6. Frequent waking up at night, snoring, Non refreshing sleep. 17 inch neck, BMI of 30, an Derby sleepiness score of 20, history  of diabetes and symptoms of nocturnal snoring, waking up choking and witnessed apneas. Based on the clinical history, the patient  has a high pre-test probability of having severe BRADLEY.  SLEEP STUDY FINDINGS: Patient underwent a two night Home Sleep Test and by behavioral criteria, slept for approximately  3.5 hours, with a sleep latency of 24 minutes and a sleep efficiency of 66.6%. Severe sleep disordered breathing (AHI=37) is noted  based on a 4% hypopnea desaturation criteria. The patient slept supine 1.6% of the night based on valid recording time of 3.54  hours. The apneas/hypopneas are accompanied by moderate oxygen desaturation (percent time below 90% SpO2: 11.6%, Min  SpO2: 82.8%). The average desaturation across all sleep disordered breathing events is 5.0%. Snoring occurs for 37.3% (30 dB) of  the study, 22.6% is very loud. The mean pulse rate is 77 BPM, with very frequent pulse rate variability (90 events with >= 6 BPM  increase/decrease per hour).  TREATMENT CONSIDERATIONS: Consider nasal continuous positive airway pressure (CPAP/AutoPAP) as the initial  treatment choice for severe obstructive sleep apnea. A mandibular advancement splint (MAS) or referral to an ENT surgeon for  modification to the airway should be considered to  reduce the risk of mortality caused by severe BRADLEY if the patient prefers an  alternative therapy or the CPAP trial is unsuccessful.  DISEASE MANAGEMENT CONSIDERATIONS: Irregularity of the pulse rate signals indicates possible cardiac  dysrhythmia. If clinically appropriate, further cardiac evaluation is suggested. Sleeping pills may increase the severity of  untreated BRADLEY and their use should be reevaluated once the BRADLEY is treated.

## 2019-12-18 ENCOUNTER — OFFICE VISIT (OUTPATIENT)
Dept: SLEEP MEDICINE | Facility: CLINIC | Age: 69
End: 2019-12-18
Payer: MEDICARE

## 2019-12-18 ENCOUNTER — TELEPHONE (OUTPATIENT)
Dept: PULMONOLOGY | Facility: CLINIC | Age: 69
End: 2019-12-18

## 2019-12-18 VITALS
DIASTOLIC BLOOD PRESSURE: 70 MMHG | SYSTOLIC BLOOD PRESSURE: 108 MMHG | WEIGHT: 201.5 LBS | HEART RATE: 90 BPM | OXYGEN SATURATION: 98 % | HEIGHT: 67 IN | BODY MASS INDEX: 31.63 KG/M2 | RESPIRATION RATE: 17 BRPM

## 2019-12-18 DIAGNOSIS — E66.9 OBESITY, CLASS I, BMI 30-34.9: ICD-10-CM

## 2019-12-18 DIAGNOSIS — G47.33 OSA (OBSTRUCTIVE SLEEP APNEA): Primary | ICD-10-CM

## 2019-12-18 PROCEDURE — 3074F PR MOST RECENT SYSTOLIC BLOOD PRESSURE < 130 MM HG: ICD-10-PCS | Mod: CPTII,S$GLB,, | Performed by: NURSE PRACTITIONER

## 2019-12-18 PROCEDURE — 3078F DIAST BP <80 MM HG: CPT | Mod: CPTII,S$GLB,, | Performed by: NURSE PRACTITIONER

## 2019-12-18 PROCEDURE — 1159F PR MEDICATION LIST DOCUMENTED IN MEDICAL RECORD: ICD-10-PCS | Mod: S$GLB,,, | Performed by: NURSE PRACTITIONER

## 2019-12-18 PROCEDURE — 99999 PR PBB SHADOW E&M-EST. PATIENT-LVL IV: ICD-10-PCS | Mod: PBBFAC,,, | Performed by: NURSE PRACTITIONER

## 2019-12-18 PROCEDURE — 99213 PR OFFICE/OUTPT VISIT, EST, LEVL III, 20-29 MIN: ICD-10-PCS | Mod: S$GLB,,, | Performed by: NURSE PRACTITIONER

## 2019-12-18 PROCEDURE — 99999 PR PBB SHADOW E&M-EST. PATIENT-LVL IV: CPT | Mod: PBBFAC,,, | Performed by: NURSE PRACTITIONER

## 2019-12-18 PROCEDURE — 1101F PR PT FALLS ASSESS DOC 0-1 FALLS W/OUT INJ PAST YR: ICD-10-PCS | Mod: CPTII,S$GLB,, | Performed by: NURSE PRACTITIONER

## 2019-12-18 PROCEDURE — 3074F SYST BP LT 130 MM HG: CPT | Mod: CPTII,S$GLB,, | Performed by: NURSE PRACTITIONER

## 2019-12-18 PROCEDURE — 3078F PR MOST RECENT DIASTOLIC BLOOD PRESSURE < 80 MM HG: ICD-10-PCS | Mod: CPTII,S$GLB,, | Performed by: NURSE PRACTITIONER

## 2019-12-18 PROCEDURE — 99213 OFFICE O/P EST LOW 20 MIN: CPT | Mod: S$GLB,,, | Performed by: NURSE PRACTITIONER

## 2019-12-18 PROCEDURE — 1101F PT FALLS ASSESS-DOCD LE1/YR: CPT | Mod: CPTII,S$GLB,, | Performed by: NURSE PRACTITIONER

## 2019-12-18 PROCEDURE — 1159F MED LIST DOCD IN RCRD: CPT | Mod: S$GLB,,, | Performed by: NURSE PRACTITIONER

## 2019-12-18 NOTE — PROGRESS NOTES
"Subjective:      Patient ID: Santiago Khan is a 69 y.o. male.    Chief Complaint: Sleep Apnea    HPI  Patient presents to the office today for evaluation of sleep apnea.  He was diagnosed with sleep apnea quite a few years ago.  He returned his newer machine in 2017 due to high co-payment and problems with CPAP pressure.  He is interested in restarting CPAP therapy.  Patient with snoring and witnessed apneas. Patient not having problems falling asleep, but wakes up frequently throughout the night.  Patient does not wake up feeling refreshed in the morning.  Patient with daytime hypersomnolence.  Lamar Sleepiness Scale score 21. He is having concentration problems. Comorbidities include HTN, DM. He is motivated to start PAP therapy. He had HST.      Patient Active Problem List   Diagnosis    History of prostate cancer    BRADLEY (obstructive sleep apnea)    History of gout    DDD (degenerative disc disease), lumbar    Tobacco abuse    DM (diabetes mellitus) with complications    Hypertension associated with stage 3 chronic kidney disease due to type 2 diabetes mellitus    Hyperlipidemia associated with type 2 diabetes mellitus    Chronic kidney disease, stage 3    Obesity, Class I, BMI 30-34.9       /70   Pulse 90   Resp 17   Ht 5' 7" (1.702 m)   Wt 91.4 kg (201 lb 8 oz)   SpO2 98%   BMI 31.56 kg/m²   Body mass index is 31.56 kg/m².    Review of Systems   Constitutional: Positive for fatigue.   HENT: Negative.    Respiratory: Positive for snoring and somnolence.    Cardiovascular: Negative.    Musculoskeletal: Negative.    Gastrointestinal: Negative.    Neurological: Negative.    Psychiatric/Behavioral: Positive for sleep disturbance.     Objective:      Physical Exam   Constitutional: He is oriented to person, place, and time. He appears well-developed and well-nourished.   HENT:   Head: Normocephalic and atraumatic.   Neck: Normal range of motion. Neck supple.   Cardiovascular: Normal " rate and regular rhythm.   Pulmonary/Chest: Effort normal and breath sounds normal.   Abdominal: Soft. There is no tenderness.   Musculoskeletal: He exhibits no edema.   Neurological: He is alert and oriented to person, place, and time.   Skin: Skin is warm and dry.   Psychiatric: He has a normal mood and affect.     Personal Diagnostic Review  HST with severe judy    Assessment:       1. JUDY (obstructive sleep apnea)    2. Obesity, Class I, BMI 30-34.9        Outpatient Encounter Medications as of 12/18/2019   Medication Sig Dispense Refill    acetaZOLAMIDE (DIAMOX) 250 MG tablet Take 1 tablet by mouth Every Monday, Wednesday, and Friday.      baclofen (LIORESAL) 10 MG tablet Take 1 tablet by mouth every evening.  2    bisoprolol (ZEBETA) 5 MG tablet       colchicine (COLCRYS) 0.6 mg tablet Take 1 tablet (0.6 mg total) by mouth once daily. 30 tablet 11    diclofenac sodium (VOLTAREN) 1 % Gel Apply 2 g topically as needed.      febuxostat (ULORIC) 80 mg Tab Take 1 tablet (80 mg total) by mouth once daily. 30 each 11    fluticasone (FLONASE) 50 mcg/actuation nasal spray U 1 TO 2 SPRAYS IEN QD  4    furosemide (LASIX) 40 MG tablet take 1 tablet by mouth twice a day 60 tablet 9    glimepiride (AMARYL) 1 MG tablet TK 1 T PO ONCE D  4    HYDROcodone-acetaminophen (NORCO) 7.5-325 mg per tablet TK 1 T PO  TID PRF CHRONIC PAIN  0    metolazone (ZAROXOLYN) 2.5 MG tablet Take 1 tablet by mouth Daily.      mometasone 0.1% (ELOCON) 0.1 % cream MARIA TERESA TO HANDS QD PRF FLARE UPS  0    multivitamin-minerals-lutein (CENTRUM SILVER) Tab Take 1 tablet by mouth Daily.      omeprazole (PRILOSEC) 40 MG capsule TAKE ONE CAPSULE BY MOUTH EVERY DAY 30 capsule 11    potassium chloride SA (K-DUR,KLOR-CON) 20 MEQ tablet Take 1 tablet by mouth 3 (three) times daily.       predniSONE (DELTASONE) 20 MG tablet Take two a day for 3 days, then one a day for 3 days, then 1/2 a day for 3 days 11 tablet 0    promethazine (PHENERGAN) 12.5  MG Tab Take 12.5 mg by mouth every 6 (six) hours as needed.  2    simvastatin (ZOCOR) 40 MG tablet Take 1 tablet by mouth Daily.      zolpidem (AMBIEN) 10 mg Tab Take 5 mg by mouth nightly as needed.      [DISCONTINUED] SUPREP BOWEL PREP KIT 17.5-3.13-1.6 gram SolR Use as directed 354 mL 0    [DISCONTINUED] varenicline (CHANTIX STARTING MONTH BOX) 0.5 mg (11)- 1 mg (42) tablet Take one 0.5mg tab by mouth once daily X3 days,then increase to one 0.5mg tab twice daily X4 days,then increase to one 1mg tab twice daily 1 Package 0    [DISCONTINUED] varenicline (CHANTIX) 1 mg Tab Take 1 tablet (1 mg total) by mouth 2 (two) times daily. 60 tablet 1     No facility-administered encounter medications on file as of 12/18/2019.      No orders of the defined types were placed in this encounter.    Plan:        Problem List Items Addressed This Visit        Endocrine    Obesity, Class I, BMI 30-34.9    Current Assessment & Plan     Weight loss and exercise to improve overall health.              Other    BRADLEY (obstructive sleep apnea) - Primary    Overview     Severe BRADLEY         Current Assessment & Plan     Ordered autopap

## 2020-01-02 ENCOUNTER — TELEPHONE (OUTPATIENT)
Dept: PULMONOLOGY | Facility: CLINIC | Age: 70
End: 2020-01-02

## 2020-01-02 NOTE — TELEPHONE ENCOUNTER
----- Message from Brea Kwong sent at 1/2/2020  2:00 PM CST -----  Contact: Self  Patient requesting a call back regarding his CPAP machine. He states that he was told to call in if he has not heard anything about it. Please call patient back at [phone]

## 2020-01-23 ENCOUNTER — LAB VISIT (OUTPATIENT)
Dept: LAB | Facility: HOSPITAL | Age: 70
End: 2020-01-23
Attending: INTERNAL MEDICINE
Payer: MEDICARE

## 2020-01-23 DIAGNOSIS — Z87.39 HISTORY OF GOUT: ICD-10-CM

## 2020-01-23 DIAGNOSIS — Z85.46 HISTORY OF PROSTATE CANCER: ICD-10-CM

## 2020-01-23 DIAGNOSIS — E11.8 DM (DIABETES MELLITUS) WITH COMPLICATIONS: ICD-10-CM

## 2020-01-23 DIAGNOSIS — Z11.59 NEED FOR HEPATITIS C SCREENING TEST: ICD-10-CM

## 2020-01-23 LAB
ALBUMIN SERPL BCP-MCNC: 3.9 G/DL (ref 3.5–5.2)
ALP SERPL-CCNC: 72 U/L (ref 55–135)
ALT SERPL W/O P-5'-P-CCNC: 25 U/L (ref 10–44)
ANION GAP SERPL CALC-SCNC: 11 MMOL/L (ref 8–16)
AST SERPL-CCNC: 26 U/L (ref 10–40)
BASOPHILS # BLD AUTO: 0.1 K/UL (ref 0–0.2)
BASOPHILS NFR BLD: 0.9 % (ref 0–1.9)
BILIRUB SERPL-MCNC: 0.5 MG/DL (ref 0.1–1)
BUN SERPL-MCNC: 27 MG/DL (ref 8–23)
CALCIUM SERPL-MCNC: 8.4 MG/DL (ref 8.7–10.5)
CHLORIDE SERPL-SCNC: 95 MMOL/L (ref 95–110)
CHOLEST SERPL-MCNC: 158 MG/DL (ref 120–199)
CHOLEST/HDLC SERPL: 3.3 {RATIO} (ref 2–5)
CO2 SERPL-SCNC: 33 MMOL/L (ref 23–29)
COMPLEXED PSA SERPL-MCNC: 0.02 NG/ML (ref 0–4)
CREAT SERPL-MCNC: 1.5 MG/DL (ref 0.5–1.4)
DIFFERENTIAL METHOD: ABNORMAL
EOSINOPHIL # BLD AUTO: 0.8 K/UL (ref 0–0.5)
EOSINOPHIL NFR BLD: 7.2 % (ref 0–8)
ERYTHROCYTE [DISTWIDTH] IN BLOOD BY AUTOMATED COUNT: 17.5 % (ref 11.5–14.5)
EST. GFR  (AFRICAN AMERICAN): 54.5 ML/MIN/1.73 M^2
EST. GFR  (NON AFRICAN AMERICAN): 47.2 ML/MIN/1.73 M^2
GLUCOSE SERPL-MCNC: 110 MG/DL (ref 70–110)
HCT VFR BLD AUTO: 45.9 % (ref 40–54)
HDLC SERPL-MCNC: 48 MG/DL (ref 40–75)
HDLC SERPL: 30.4 % (ref 20–50)
HGB BLD-MCNC: 13.3 G/DL (ref 14–18)
IMM GRANULOCYTES # BLD AUTO: 0.04 K/UL (ref 0–0.04)
IMM GRANULOCYTES NFR BLD AUTO: 0.4 % (ref 0–0.5)
LDLC SERPL CALC-MCNC: 89.4 MG/DL (ref 63–159)
LYMPHOCYTES # BLD AUTO: 2.8 K/UL (ref 1–4.8)
LYMPHOCYTES NFR BLD: 26.4 % (ref 18–48)
MCH RBC QN AUTO: 24 PG (ref 27–31)
MCHC RBC AUTO-ENTMCNC: 29 G/DL (ref 32–36)
MCV RBC AUTO: 83 FL (ref 82–98)
MONOCYTES # BLD AUTO: 0.8 K/UL (ref 0.3–1)
MONOCYTES NFR BLD: 7 % (ref 4–15)
NEUTROPHILS # BLD AUTO: 6.2 K/UL (ref 1.8–7.7)
NEUTROPHILS NFR BLD: 58.1 % (ref 38–73)
NONHDLC SERPL-MCNC: 110 MG/DL
NRBC BLD-RTO: 0 /100 WBC
PLATELET # BLD AUTO: 290 K/UL (ref 150–350)
PMV BLD AUTO: 10.7 FL (ref 9.2–12.9)
POTASSIUM SERPL-SCNC: 2.9 MMOL/L (ref 3.5–5.1)
PROT SERPL-MCNC: 7.5 G/DL (ref 6–8.4)
RBC # BLD AUTO: 5.54 M/UL (ref 4.6–6.2)
SODIUM SERPL-SCNC: 139 MMOL/L (ref 136–145)
TRIGL SERPL-MCNC: 103 MG/DL (ref 30–150)
TSH SERPL DL<=0.005 MIU/L-ACNC: 1.06 UIU/ML (ref 0.4–4)
URATE SERPL-MCNC: 6.3 MG/DL (ref 3.4–7)
WBC # BLD AUTO: 10.71 K/UL (ref 3.9–12.7)

## 2020-01-23 PROCEDURE — 80053 COMPREHEN METABOLIC PANEL: CPT

## 2020-01-23 PROCEDURE — 83036 HEMOGLOBIN GLYCOSYLATED A1C: CPT

## 2020-01-23 PROCEDURE — 84153 ASSAY OF PSA TOTAL: CPT

## 2020-01-23 PROCEDURE — 86803 HEPATITIS C AB TEST: CPT

## 2020-01-23 PROCEDURE — 85025 COMPLETE CBC W/AUTO DIFF WBC: CPT

## 2020-01-23 PROCEDURE — 84443 ASSAY THYROID STIM HORMONE: CPT

## 2020-01-23 PROCEDURE — 84550 ASSAY OF BLOOD/URIC ACID: CPT

## 2020-01-23 PROCEDURE — 80061 LIPID PANEL: CPT

## 2020-01-23 PROCEDURE — 36415 COLL VENOUS BLD VENIPUNCTURE: CPT | Mod: PO

## 2020-01-24 LAB
ESTIMATED AVG GLUCOSE: 123 MG/DL (ref 68–131)
HBA1C MFR BLD HPLC: 5.9 % (ref 4–5.6)
HCV AB SERPL QL IA: NEGATIVE

## 2020-01-30 ENCOUNTER — TELEPHONE (OUTPATIENT)
Dept: INTERNAL MEDICINE | Facility: CLINIC | Age: 70
End: 2020-01-30

## 2020-01-30 ENCOUNTER — OFFICE VISIT (OUTPATIENT)
Dept: INTERNAL MEDICINE | Facility: CLINIC | Age: 70
End: 2020-01-30
Payer: MEDICARE

## 2020-01-30 VITALS
WEIGHT: 206.56 LBS | BODY MASS INDEX: 32.42 KG/M2 | DIASTOLIC BLOOD PRESSURE: 64 MMHG | TEMPERATURE: 98 F | HEIGHT: 67 IN | OXYGEN SATURATION: 96 % | HEART RATE: 81 BPM | SYSTOLIC BLOOD PRESSURE: 110 MMHG

## 2020-01-30 DIAGNOSIS — N18.30 CHRONIC KIDNEY DISEASE, STAGE 3: ICD-10-CM

## 2020-01-30 DIAGNOSIS — E11.8 DM (DIABETES MELLITUS) WITH COMPLICATIONS: ICD-10-CM

## 2020-01-30 DIAGNOSIS — E66.9 OBESITY, CLASS I, BMI 30-34.9: ICD-10-CM

## 2020-01-30 DIAGNOSIS — Z85.46 HISTORY OF PROSTATE CANCER: ICD-10-CM

## 2020-01-30 DIAGNOSIS — E11.22 HYPERTENSION ASSOCIATED WITH STAGE 3 CHRONIC KIDNEY DISEASE DUE TO TYPE 2 DIABETES MELLITUS: ICD-10-CM

## 2020-01-30 DIAGNOSIS — G47.33 OSA (OBSTRUCTIVE SLEEP APNEA): ICD-10-CM

## 2020-01-30 DIAGNOSIS — Z00.00 ROUTINE GENERAL MEDICAL EXAMINATION AT A HEALTH CARE FACILITY: Primary | ICD-10-CM

## 2020-01-30 DIAGNOSIS — E87.6 HYPOKALEMIA: ICD-10-CM

## 2020-01-30 DIAGNOSIS — E11.69 HYPERLIPIDEMIA ASSOCIATED WITH TYPE 2 DIABETES MELLITUS: ICD-10-CM

## 2020-01-30 DIAGNOSIS — N18.30 HYPERTENSION ASSOCIATED WITH STAGE 3 CHRONIC KIDNEY DISEASE DUE TO TYPE 2 DIABETES MELLITUS: ICD-10-CM

## 2020-01-30 DIAGNOSIS — Z87.39 HISTORY OF GOUT: ICD-10-CM

## 2020-01-30 DIAGNOSIS — Z72.0 TOBACCO ABUSE: ICD-10-CM

## 2020-01-30 DIAGNOSIS — E78.5 HYPERLIPIDEMIA ASSOCIATED WITH TYPE 2 DIABETES MELLITUS: ICD-10-CM

## 2020-01-30 DIAGNOSIS — I12.9 HYPERTENSION ASSOCIATED WITH STAGE 3 CHRONIC KIDNEY DISEASE DUE TO TYPE 2 DIABETES MELLITUS: ICD-10-CM

## 2020-01-30 PROCEDURE — 99214 OFFICE O/P EST MOD 30 MIN: CPT | Mod: S$GLB,,, | Performed by: INTERNAL MEDICINE

## 2020-01-30 PROCEDURE — 99214 PR OFFICE/OUTPT VISIT, EST, LEVL IV, 30-39 MIN: ICD-10-PCS | Mod: S$GLB,,, | Performed by: INTERNAL MEDICINE

## 2020-01-30 PROCEDURE — 3044F HG A1C LEVEL LT 7.0%: CPT | Mod: CPTII,S$GLB,, | Performed by: INTERNAL MEDICINE

## 2020-01-30 PROCEDURE — 99499 RISK ADDL DX/OHS AUDIT: ICD-10-PCS | Mod: S$GLB,,, | Performed by: INTERNAL MEDICINE

## 2020-01-30 PROCEDURE — 3074F PR MOST RECENT SYSTOLIC BLOOD PRESSURE < 130 MM HG: ICD-10-PCS | Mod: CPTII,S$GLB,, | Performed by: INTERNAL MEDICINE

## 2020-01-30 PROCEDURE — 99999 PR PBB SHADOW E&M-EST. PATIENT-LVL IV: CPT | Mod: PBBFAC,,, | Performed by: INTERNAL MEDICINE

## 2020-01-30 PROCEDURE — 3078F PR MOST RECENT DIASTOLIC BLOOD PRESSURE < 80 MM HG: ICD-10-PCS | Mod: CPTII,S$GLB,, | Performed by: INTERNAL MEDICINE

## 2020-01-30 PROCEDURE — 3044F PR MOST RECENT HEMOGLOBIN A1C LEVEL <7.0%: ICD-10-PCS | Mod: CPTII,S$GLB,, | Performed by: INTERNAL MEDICINE

## 2020-01-30 PROCEDURE — 3078F DIAST BP <80 MM HG: CPT | Mod: CPTII,S$GLB,, | Performed by: INTERNAL MEDICINE

## 2020-01-30 PROCEDURE — 3074F SYST BP LT 130 MM HG: CPT | Mod: CPTII,S$GLB,, | Performed by: INTERNAL MEDICINE

## 2020-01-30 PROCEDURE — 99999 PR PBB SHADOW E&M-EST. PATIENT-LVL IV: ICD-10-PCS | Mod: PBBFAC,,, | Performed by: INTERNAL MEDICINE

## 2020-01-30 PROCEDURE — 99499 UNLISTED E&M SERVICE: CPT | Mod: S$GLB,,, | Performed by: INTERNAL MEDICINE

## 2020-01-30 RX ORDER — POTASSIUM CHLORIDE 20 MEQ/1
20 TABLET, EXTENDED RELEASE ORAL 4 TIMES DAILY
Qty: 120 TABLET | Refills: 11 | Status: ON HOLD | OUTPATIENT
Start: 2020-01-30 | End: 2020-06-29 | Stop reason: SDUPTHER

## 2020-01-30 NOTE — TELEPHONE ENCOUNTER
----- Message from Brigid Mckenna sent at 1/30/2020 10:57 AM CST -----  Will set up nephro appt on line

## 2020-01-30 NOTE — PROGRESS NOTES
"HPI:  Patient is a 69-year-old man who comes today for follow-up of his diabetes, hypertension, lipids, chronic kidney, and for his annual physical.  He she is doing well.  He denies any problems or complaints.  He states his blood pressures been doing well.  He also states he has had no problems with his diabetes.  He denies any hypoglycemia.  He states he has 100% compliant with his medications.      Current MEDS: medcard review, verified and update  Allergies: Per the electronic medical record    Past Medical History:   Diagnosis Date    Chronic kidney disease, stage 3     DDD (degenerative disc disease), lumbar     DM (diabetes mellitus) with complications     History of gout     History of prostate cancer     Hyperlipidemia associated with type 2 diabetes mellitus     Hypertension associated with stage 3 chronic kidney disease due to type 2 diabetes mellitus     BRADLEY on CPAP     Tobacco abuse        Past Surgical History:   Procedure Laterality Date    BICEPS TENDON REPAIR      COLONOSCOPY N/A 3/27/2019    Procedure: COLONOSCOPY;  Surgeon: James Roger MD;  Location: [facility];  Service: Endoscopy;  Laterality: N/A;    HEMORRHOID SURGERY      HERNIA REPAIR      KNEE ARTHROSCOPY      LUMBAR LAMINECTOMY      PROSTATECTOMY         SHx: per the electronic medical record    FHx: recorded in the electronic medical record    ROS:    denies any chest pains or shortness of breath. Denies any nausea, vomiting or diarrhea. Denies any fever, chills or sweats. Denies any change in weight, voice, stool, skin or hair. Denies any dysuria, dyspepsia or dysphagia. Denies any change in vision, hearing or headaches. Denies any swollen lymph nodes or loss of memory.    PE:  /64   Pulse 81   Temp 97.6 °F (36.4 °C) (Tympanic)   Ht 5' 7" (1.702 m)   Wt 93.7 kg (206 lb 9.1 oz)   SpO2 96%   BMI 32.35 kg/m²   Gen: Well-developed, well-nourished, male, in no acute distress, oriented x3  HEENT: neck is " supple, no adenopathy, carotids 2+ equal without bruits, thyroid exam normal size without nodules.  CHEST: clear to auscultation and percussion  CVS: regular rate and rhythm without significant murmur, gallop, or rubs  ABD: soft, benign, no rebound no guarding, no distention.  Bowel sounds are normal.     nontender.  No palpable masses.  No organomegaly and no audible bruits.  RECTAL:  Deferred  EXT: no clubbing, cyanosis, or edema  LYMPH: no cervical, inguinal, or axillary adenopathy  FEET: no loss of sensation.  No ulcers or pressure sores.  Monofilament testing normal  NEURO: gait normal.  Cranial nerves II- XII intact. No nystagmus.  Speech normal.   Gross motor and sensory unremarkable.    Lab Results   Component Value Date    WBC 10.71 01/23/2020    HGB 13.3 (L) 01/23/2020    HCT 45.9 01/23/2020     01/23/2020    CHOL 158 01/23/2020    TRIG 103 01/23/2020    HDL 48 01/23/2020    ALT 25 01/23/2020    AST 26 01/23/2020     01/23/2020    K 2.9 (L) 01/23/2020    CL 95 01/23/2020    CREATININE 1.5 (H) 01/23/2020    BUN 27 (H) 01/23/2020    CO2 33 (H) 01/23/2020    TSH 1.064 01/23/2020    PSA <0.01 01/30/2019    HGBA1C 5.9 (H) 01/23/2020       Impression:  Significant hypokalemia.  Patient is already taking 60 mg of potassium per day.  He is taking Lasix and metolazone prescribed by other physicians.  He states he has to take it in order to prevent swelling in his feet.  Diabetes, hypertension, lipids well controlled  Continued tobacco abuse  Other medical problems below, stable  Patient Active Problem List   Diagnosis    History of prostate cancer    BRADLEY (obstructive sleep apnea)    History of gout    DDD (degenerative disc disease), lumbar    Tobacco abuse    DM (diabetes mellitus) with complications    Hypertension associated with stage 3 chronic kidney disease due to type 2 diabetes mellitus    Hyperlipidemia associated with type 2 diabetes mellitus    Chronic kidney disease, stage 3     Obesity, Class I, BMI 30-34.9       Plan:   Orders Placed This Encounter    Basic metabolic panel    Hemoglobin A1c    Comprehensive metabolic panel    Lipid panel    TSH    CBC auto differential    Uric acid    Ambulatory consult to Nephrology    Ambulatory referral to Smoking Cessation Program    potassium chloride SA (K-STEPHANIE,KLOR-CON) 20 MEQ tablet     I would like him due to see the nephrologist that he is seen in the past.  Patient will increase the potassium to take 4 pills per day, 80 mEq per day.  He will see me in 1 month with repeat BMP  This note is generated with speech recognition software and is subject to transcription error and sound alike phrases that may be missed by proofreading.

## 2020-02-05 RX ORDER — COLCHICINE 0.6 MG/1
0.6 TABLET ORAL DAILY
Qty: 30 TABLET | Refills: 11 | Status: SHIPPED | OUTPATIENT
Start: 2020-02-05 | End: 2020-10-26 | Stop reason: SDUPTHER

## 2020-02-10 ENCOUNTER — CLINICAL SUPPORT (OUTPATIENT)
Dept: SMOKING CESSATION | Facility: CLINIC | Age: 70
End: 2020-02-10
Payer: COMMERCIAL

## 2020-02-10 DIAGNOSIS — F17.200 NICOTINE DEPENDENCE: Primary | ICD-10-CM

## 2020-02-10 PROCEDURE — 99404 PREV MED CNSL INDIV APPRX 60: CPT | Mod: S$GLB,,, | Performed by: GENERAL PRACTICE

## 2020-02-10 PROCEDURE — 99999 PR PBB SHADOW E&M-EST. PATIENT-LVL I: CPT | Mod: PBBFAC,,,

## 2020-02-10 PROCEDURE — 99404 PR PREVENT COUNSEL,INDIV,60 MIN: ICD-10-PCS | Mod: S$GLB,,, | Performed by: GENERAL PRACTICE

## 2020-02-10 PROCEDURE — 99999 PR PBB SHADOW E&M-EST. PATIENT-LVL I: ICD-10-PCS | Mod: PBBFAC,,,

## 2020-02-10 RX ORDER — VARENICLINE TARTRATE 0.5 (11)-1
KIT ORAL
Qty: 53 TABLET | Refills: 0 | Status: SHIPPED | OUTPATIENT
Start: 2020-02-10 | End: 2020-03-03 | Stop reason: ALTCHOICE

## 2020-02-17 ENCOUNTER — CLINICAL SUPPORT (OUTPATIENT)
Dept: SMOKING CESSATION | Facility: CLINIC | Age: 70
End: 2020-02-17
Payer: COMMERCIAL

## 2020-02-17 DIAGNOSIS — F17.200 NICOTINE DEPENDENCE: Primary | ICD-10-CM

## 2020-02-17 PROCEDURE — 90853 GROUP PSYCHOTHERAPY: CPT | Mod: S$GLB,,, | Performed by: GENERAL PRACTICE

## 2020-02-17 PROCEDURE — 90853 PR GROUP PSYCHOTHERAPY: ICD-10-PCS | Mod: S$GLB,,, | Performed by: GENERAL PRACTICE

## 2020-02-17 NOTE — PROGRESS NOTES
Smoking Cessation Group Session #2  Site:[facility]  Date:  2/17/2020  Clinical Status of Patient: Outpatient   Length of Service and Code: 90 minutes - 88245   Number in Attendance: 2  Group Activities/Focus of Group:  Sharing last weeks challenges, triggers, and coping activities to remain quit and/ or keep making progress toward cessation, completion of TCRS (Tobacco Cessation Rating Scale) learned addiction model, personal reasons for quitting, medications, goals, quit date.    Specific session focus: completion of TCRS (Tobacco Cessation Rating Scale) reviewed strategies, cues, and triggers. Introduced the negative impact of tobacco on health, the health advantages of discontinuing the use of tobacco, time line improved health changes after a quit, withdrawal issues to expect from nicotine and habit, and ways to achieve the goal of a quit.    Target symptoms:  withdrawal and medication side effects             The following were rated moderate (3) to severe (4) on TCRS:       Moderate 3: desire tobacco, nausea, headache     Severe 4:   none  Patient's Response to Intervention: The patient remains on the prescribed tobacco cessation medication regimen of 1 mg Chantix BID without any negative side effects at this time.   Progress Toward Goals and Other Mental Status Changes: The patient denies any abnormal behavioral or mental changes at this time.   Interval History: Patient has decreased his smoking to less than 1 pack per day.    Diagnosis: Z72.0  Plan: The patient will continue with group therapy sessions and medication regimen prescribed with management by physician or by the [facility] Provider. Patient will inform Smoking Cessation Counselor of symptoms as rated high on TCRS.    Return to Clinic: 1 week

## 2020-02-24 ENCOUNTER — CLINICAL SUPPORT (OUTPATIENT)
Dept: SMOKING CESSATION | Facility: CLINIC | Age: 70
End: 2020-02-24
Payer: COMMERCIAL

## 2020-02-24 DIAGNOSIS — F17.200 NICOTINE DEPENDENCE: Primary | ICD-10-CM

## 2020-02-24 PROCEDURE — 90853 GROUP PSYCHOTHERAPY: CPT | Mod: S$GLB,,, | Performed by: GENERAL PRACTICE

## 2020-02-24 PROCEDURE — 99999 PR PBB SHADOW E&M-EST. PATIENT-LVL I: CPT | Mod: PBBFAC,,,

## 2020-02-24 PROCEDURE — 99999 PR PBB SHADOW E&M-EST. PATIENT-LVL I: ICD-10-PCS | Mod: PBBFAC,,,

## 2020-02-24 PROCEDURE — 90853 PR GROUP PSYCHOTHERAPY: ICD-10-PCS | Mod: S$GLB,,, | Performed by: GENERAL PRACTICE

## 2020-02-26 NOTE — PROGRESS NOTES
Smoking Cessation Group Session #3    Site:[facility]  Date:  2-  Clinical Status of Patient: Outpatient   Length of Service and Code: 90 minutes - 10207   Number in Attendance: 3  Group Activities/Focus of Group:  Sharing last weeks challenges, triggers, and coping activities to remain quit and/ or keep making progress toward cessation, completion of TCRS (Tobacco Cessation Rating Scale) learned addiction model, personal reasons for quitting, medications, goals, quit date.    Specific session focus: completion of TCRS (Tobacco Cessation Rating Scale) reviewed strategies, controlling environment, cues, triggers, new goals set. Introduced high risk situations with preparation interventions, caffeine similarities with withdrawal issues of habit and nicotine, Alcohol, Understanding urges, cravings, stress and relaxation. Open discussion with intervention discussion.    Target symptoms:  withdrawal and medication side effects             The following were rated moderate (3) to severe (4) on TCRS:       Moderate 3: insomnia, back pain (chronic)     Severe 4:   none  Patient's Response to Intervention: The patient remains on the prescribed tobacco cessation medication regimen of 1 mg Chantix BID without any negative side effects at this time.   Progress Toward Goals and Other Mental Status Changes: The patient denies any abnormal behavioral or mental changes at this time.   Diagnosis: Z72.0  Plan: The patient will continue with group therapy sessions and medication regimen prescribed with management by physician or by the [facility] Provider. Patient will inform Smoking Cessation Counselor of symptoms as rated high on TCRS.    Return to Clinic: 1 week

## 2020-02-27 ENCOUNTER — OFFICE VISIT (OUTPATIENT)
Dept: PULMONOLOGY | Facility: CLINIC | Age: 70
End: 2020-02-27
Payer: MEDICARE

## 2020-02-27 VITALS
SYSTOLIC BLOOD PRESSURE: 114 MMHG | HEIGHT: 67 IN | RESPIRATION RATE: 18 BRPM | DIASTOLIC BLOOD PRESSURE: 60 MMHG | WEIGHT: 203.25 LBS | OXYGEN SATURATION: 98 % | BODY MASS INDEX: 31.9 KG/M2 | HEART RATE: 78 BPM

## 2020-02-27 DIAGNOSIS — E66.9 OBESITY, CLASS I, BMI 30-34.9: ICD-10-CM

## 2020-02-27 DIAGNOSIS — G47.33 OSA (OBSTRUCTIVE SLEEP APNEA): Primary | ICD-10-CM

## 2020-02-27 PROCEDURE — 1159F PR MEDICATION LIST DOCUMENTED IN MEDICAL RECORD: ICD-10-PCS | Mod: S$GLB,,, | Performed by: NURSE PRACTITIONER

## 2020-02-27 PROCEDURE — 3078F DIAST BP <80 MM HG: CPT | Mod: CPTII,S$GLB,, | Performed by: NURSE PRACTITIONER

## 2020-02-27 PROCEDURE — 3074F PR MOST RECENT SYSTOLIC BLOOD PRESSURE < 130 MM HG: ICD-10-PCS | Mod: CPTII,S$GLB,, | Performed by: NURSE PRACTITIONER

## 2020-02-27 PROCEDURE — 1101F PR PT FALLS ASSESS DOC 0-1 FALLS W/OUT INJ PAST YR: ICD-10-PCS | Mod: CPTII,S$GLB,, | Performed by: NURSE PRACTITIONER

## 2020-02-27 PROCEDURE — 99213 PR OFFICE/OUTPT VISIT, EST, LEVL III, 20-29 MIN: ICD-10-PCS | Mod: S$GLB,,, | Performed by: NURSE PRACTITIONER

## 2020-02-27 PROCEDURE — 3078F PR MOST RECENT DIASTOLIC BLOOD PRESSURE < 80 MM HG: ICD-10-PCS | Mod: CPTII,S$GLB,, | Performed by: NURSE PRACTITIONER

## 2020-02-27 PROCEDURE — 99999 PR PBB SHADOW E&M-EST. PATIENT-LVL V: ICD-10-PCS | Mod: PBBFAC,,, | Performed by: NURSE PRACTITIONER

## 2020-02-27 PROCEDURE — 1101F PT FALLS ASSESS-DOCD LE1/YR: CPT | Mod: CPTII,S$GLB,, | Performed by: NURSE PRACTITIONER

## 2020-02-27 PROCEDURE — 99213 OFFICE O/P EST LOW 20 MIN: CPT | Mod: S$GLB,,, | Performed by: NURSE PRACTITIONER

## 2020-02-27 PROCEDURE — 3074F SYST BP LT 130 MM HG: CPT | Mod: CPTII,S$GLB,, | Performed by: NURSE PRACTITIONER

## 2020-02-27 PROCEDURE — 1159F MED LIST DOCD IN RCRD: CPT | Mod: S$GLB,,, | Performed by: NURSE PRACTITIONER

## 2020-02-27 PROCEDURE — 99999 PR PBB SHADOW E&M-EST. PATIENT-LVL V: CPT | Mod: PBBFAC,,, | Performed by: NURSE PRACTITIONER

## 2020-02-27 NOTE — PROGRESS NOTES
"Subjective:      Patient ID: Santiago Khan is a 69 y.o. male.    Chief Complaint: Sleep Apnea (dl in media)    HPI  Presents to office for review of AutoPAP therapy. Patient states improved symptoms with use of AutoPAP. Sleeping more soundly. Waking up feeling more refreshed. Improved daytime sleepiness. Patient states he is benefiting from use of the AutoPAP. He is having better results with pressure settings from his last CPAP that was prescribed in the past that he did not tolerate.     Patient Active Problem List   Diagnosis    History of prostate cancer    BRADLEY (obstructive sleep apnea)    History of gout    DDD (degenerative disc disease), lumbar    Tobacco abuse    DM (diabetes mellitus) with complications    Hypertension associated with stage 3 chronic kidney disease due to type 2 diabetes mellitus    Hyperlipidemia associated with type 2 diabetes mellitus    Chronic kidney disease, stage 3    Obesity, Class I, BMI 30-34.9       /60   Pulse 78   Resp 18   Ht 5' 7" (1.702 m)   Wt 92.2 kg (203 lb 4.2 oz)   SpO2 98%   BMI 31.84 kg/m²   Body mass index is 31.84 kg/m².    Review of Systems   Respiratory: Negative.    All other systems reviewed and are negative.    Objective:      Physical Exam   Constitutional: He is oriented to person, place, and time. He appears well-developed and well-nourished.   HENT:   Head: Normocephalic and atraumatic.   Neck: Normal range of motion. Neck supple.   Cardiovascular: Normal rate and regular rhythm.   Pulmonary/Chest: Effort normal and breath sounds normal.   Abdominal: Soft. Bowel sounds are normal.   Neurological: He is alert and oriented to person, place, and time.   Skin: Skin is warm and dry.   Psychiatric: He has a normal mood and affect.     Personal Diagnostic Review  Review of PAP download. Special study media link attached to this encounter. Normal AHI and compliant.     Assessment:       1. BRADLEY (obstructive sleep apnea)    2. Obesity, " Class I, BMI 30-34.9        Outpatient Encounter Medications as of 2/27/2020   Medication Sig Dispense Refill    acetaZOLAMIDE (DIAMOX) 250 MG tablet Take 1 tablet by mouth Every Monday, Wednesday, and Friday.      baclofen (LIORESAL) 10 MG tablet Take 1 tablet by mouth every evening.  2    bisoprolol (ZEBETA) 5 MG tablet       colchicine (COLCRYS) 0.6 mg tablet Take 1 tablet (0.6 mg total) by mouth once daily. 30 tablet 11    diclofenac sodium (VOLTAREN) 1 % Gel Apply 2 g topically as needed.      febuxostat (ULORIC) 80 mg Tab Take 1 tablet (80 mg total) by mouth once daily. 30 each 11    fluticasone (FLONASE) 50 mcg/actuation nasal spray U 1 TO 2 SPRAYS IEN QD  4    furosemide (LASIX) 40 MG tablet take 1 tablet by mouth twice a day 60 tablet 9    glimepiride (AMARYL) 1 MG tablet TK 1 T PO ONCE D  4    HYDROcodone-acetaminophen (NORCO) 7.5-325 mg per tablet TK 1 T PO  TID PRF CHRONIC PAIN  0    metolazone (ZAROXOLYN) 2.5 MG tablet Take 1 tablet by mouth Daily.      mometasone 0.1% (ELOCON) 0.1 % cream MARIA TERESA TO HANDS QD PRF FLARE UPS  0    multivitamin-minerals-lutein (CENTRUM SILVER) Tab Take 1 tablet by mouth Daily.      omeprazole (PRILOSEC) 40 MG capsule TAKE ONE CAPSULE BY MOUTH EVERY DAY 30 capsule 11    potassium chloride SA (K-DUR,KLOR-CON) 20 MEQ tablet Take 1 tablet (20 mEq total) by mouth 4 (four) times daily. 120 tablet 11    promethazine (PHENERGAN) 12.5 MG Tab Take 12.5 mg by mouth every 6 (six) hours as needed.  2    simvastatin (ZOCOR) 40 MG tablet Take 1 tablet by mouth Daily.      varenicline (CHANTIX STARTING MONTH BOX) 0.5 mg (11)- 1 mg (42) tablet Take one 0.5mg tab by mouth once daily X3 days,then increase to one 0.5mg tab twice daily X4 days,then increase to one 1mg tab twice daily 53 tablet 0    zolpidem (AMBIEN) 10 mg Tab Take 5 mg by mouth nightly as needed.       No facility-administered encounter medications on file as of 2/27/2020.      No orders of the defined types  were placed in this encounter.    Plan:   Doing well on PAP settings. Patient is compliant. Follow up in 6 months with PAP data download or call earlier if any problems.     Weight loss and exercise to improve overall health.    Problem List Items Addressed This Visit        Endocrine    Obesity, Class I, BMI 30-34.9       Other    BRADLEY (obstructive sleep apnea) - Primary    Overview     Severe BRADLEY

## 2020-03-02 ENCOUNTER — CLINICAL SUPPORT (OUTPATIENT)
Dept: SMOKING CESSATION | Facility: CLINIC | Age: 70
End: 2020-03-02
Payer: COMMERCIAL

## 2020-03-02 DIAGNOSIS — F17.200 NICOTINE DEPENDENCE: Primary | ICD-10-CM

## 2020-03-02 PROCEDURE — 90853 GROUP PSYCHOTHERAPY: CPT | Mod: S$GLB,,, | Performed by: GENERAL PRACTICE

## 2020-03-02 PROCEDURE — 90853 PR GROUP PSYCHOTHERAPY: ICD-10-PCS | Mod: S$GLB,,, | Performed by: GENERAL PRACTICE

## 2020-03-03 DIAGNOSIS — F17.200 NICOTINE DEPENDENCE: Primary | ICD-10-CM

## 2020-03-03 RX ORDER — DM/P-EPHED/ACETAMINOPH/DOXYLAM 30-7.5/3
2 LIQUID (ML) ORAL
Qty: 300 LOZENGE | Refills: 1 | Status: SHIPPED | OUTPATIENT
Start: 2020-03-03 | End: 2021-02-05

## 2020-03-03 RX ORDER — VARENICLINE TARTRATE 1 MG/1
1 TABLET, FILM COATED ORAL DAILY
Qty: 60 TABLET | Refills: 2 | Status: SHIPPED | OUTPATIENT
Start: 2020-03-03 | End: 2020-07-30

## 2020-03-03 NOTE — PROGRESS NOTES
Smoking Cessation Group Session #4  Site: [facility]  Date:  3-2-2020  Clinical Status of Patient: Outpatient   Length of Service and Code: 90 minutes - 67762   Number in Attendance: 4  Group Activities/Focus of Group:  Sharing last weeks challenges, triggers, and coping activities to remain quit and/ or keep making progress toward cessation, completion of TCRS (Tobacco Cessation Rating Scale) learned addiction model, personal reasons for quitting, medications, goals, quit date.    Specific session focus: completion of TCRS (Tobacco Cessation Rating Scale) reviewed strategies, habitual behavior, stress, and high risk situations. Introduced stress with addition interventions, SOLVE, relaxation with interventions, nutrition, exercise, weight gain, and the importance of rewarding oneself for accomplishments toward becoming tobacco free. Open discussion of all items with interventions.     Patient's Response to Intervention: The patient remains on the prescribed tobacco cessation medication regimen of 1 mg Chantix BID without any negative side effects at this time.   Progress Toward Goals and Other Mental Status Changes: The patient denies any abnormal behavioral or mental changes at this time.       Diagnosis: Z72.0  Plan: The patient will continue with group therapy sessions and medication regimen prescribed with management by physician or by the [facility] Provider. Patient will inform Smoking Cessation Counselor of symptoms as rated high on TCRS.    Return to Clinic: 1 week    Quit Date: 2-

## 2020-03-09 ENCOUNTER — LAB VISIT (OUTPATIENT)
Dept: LAB | Facility: HOSPITAL | Age: 70
End: 2020-03-09
Attending: INTERNAL MEDICINE
Payer: MEDICARE

## 2020-03-09 ENCOUNTER — CLINICAL SUPPORT (OUTPATIENT)
Dept: SMOKING CESSATION | Facility: CLINIC | Age: 70
End: 2020-03-09
Payer: COMMERCIAL

## 2020-03-09 DIAGNOSIS — F17.200 NICOTINE DEPENDENCE: Primary | ICD-10-CM

## 2020-03-09 DIAGNOSIS — E11.8 DM (DIABETES MELLITUS) WITH COMPLICATIONS: ICD-10-CM

## 2020-03-09 LAB
ANION GAP SERPL CALC-SCNC: 11 MMOL/L (ref 8–16)
BUN SERPL-MCNC: 20 MG/DL (ref 8–23)
CALCIUM SERPL-MCNC: 7.7 MG/DL (ref 8.7–10.5)
CHLORIDE SERPL-SCNC: 95 MMOL/L (ref 95–110)
CO2 SERPL-SCNC: 32 MMOL/L (ref 23–29)
CREAT SERPL-MCNC: 1.4 MG/DL (ref 0.5–1.4)
EST. GFR  (AFRICAN AMERICAN): 59.3 ML/MIN/1.73 M^2
EST. GFR  (NON AFRICAN AMERICAN): 51.3 ML/MIN/1.73 M^2
GLUCOSE SERPL-MCNC: 119 MG/DL (ref 70–110)
POTASSIUM SERPL-SCNC: 2.6 MMOL/L (ref 3.5–5.1)
SODIUM SERPL-SCNC: 138 MMOL/L (ref 136–145)

## 2020-03-09 PROCEDURE — 90853 GROUP PSYCHOTHERAPY: CPT | Mod: S$GLB,,, | Performed by: GENERAL PRACTICE

## 2020-03-09 PROCEDURE — 99999 PR PBB SHADOW E&M-EST. PATIENT-LVL I: CPT | Mod: PBBFAC,,,

## 2020-03-09 PROCEDURE — 90853 PR GROUP PSYCHOTHERAPY: ICD-10-PCS | Mod: S$GLB,,, | Performed by: GENERAL PRACTICE

## 2020-03-09 PROCEDURE — 36415 COLL VENOUS BLD VENIPUNCTURE: CPT | Mod: PO

## 2020-03-09 PROCEDURE — 99999 PR PBB SHADOW E&M-EST. PATIENT-LVL I: ICD-10-PCS | Mod: PBBFAC,,,

## 2020-03-09 PROCEDURE — 80048 BASIC METABOLIC PNL TOTAL CA: CPT

## 2020-03-10 ENCOUNTER — HOSPITAL ENCOUNTER (EMERGENCY)
Facility: HOSPITAL | Age: 70
Discharge: HOME | End: 2020-03-10
Attending: EMERGENCY MEDICINE
Payer: MEDICARE

## 2020-03-10 ENCOUNTER — PATIENT MESSAGE (OUTPATIENT)
Dept: INTERNAL MEDICINE | Facility: CLINIC | Age: 70
End: 2020-03-10

## 2020-03-10 VITALS
WEIGHT: 205.13 LBS | RESPIRATION RATE: 19 BRPM | BODY MASS INDEX: 32.13 KG/M2 | DIASTOLIC BLOOD PRESSURE: 63 MMHG | TEMPERATURE: 98 F | HEART RATE: 74 BPM | OXYGEN SATURATION: 97 % | SYSTOLIC BLOOD PRESSURE: 105 MMHG

## 2020-03-10 DIAGNOSIS — R07.9 CHEST PAIN: ICD-10-CM

## 2020-03-10 DIAGNOSIS — E87.6 ACUTE HYPOKALEMIA: Primary | ICD-10-CM

## 2020-03-10 LAB
ALBUMIN SERPL BCP-MCNC: 3.9 G/DL (ref 3.5–5.2)
ALP SERPL-CCNC: 65 U/L (ref 55–135)
ALT SERPL W/O P-5'-P-CCNC: 24 U/L (ref 10–44)
ANION GAP SERPL CALC-SCNC: 12 MMOL/L (ref 8–16)
ANION GAP SERPL CALC-SCNC: 13 MMOL/L (ref 8–16)
APTT BLDCRRT: 26.6 SEC (ref 21–32)
AST SERPL-CCNC: 28 U/L (ref 10–40)
BASOPHILS # BLD AUTO: 0.05 K/UL (ref 0–0.2)
BASOPHILS NFR BLD: 0.5 % (ref 0–1.9)
BILIRUB SERPL-MCNC: 0.3 MG/DL (ref 0.1–1)
BUN SERPL-MCNC: 17 MG/DL (ref 8–23)
BUN SERPL-MCNC: 20 MG/DL (ref 8–23)
CALCIUM SERPL-MCNC: 7.4 MG/DL (ref 8.7–10.5)
CALCIUM SERPL-MCNC: 8.2 MG/DL (ref 8.7–10.5)
CHLORIDE SERPL-SCNC: 95 MMOL/L (ref 95–110)
CHLORIDE SERPL-SCNC: 97 MMOL/L (ref 95–110)
CO2 SERPL-SCNC: 27 MMOL/L (ref 23–29)
CO2 SERPL-SCNC: 28 MMOL/L (ref 23–29)
CREAT SERPL-MCNC: 1.4 MG/DL (ref 0.5–1.4)
CREAT SERPL-MCNC: 1.4 MG/DL (ref 0.5–1.4)
DIFFERENTIAL METHOD: ABNORMAL
EOSINOPHIL # BLD AUTO: 0.5 K/UL (ref 0–0.5)
EOSINOPHIL NFR BLD: 5.5 % (ref 0–8)
ERYTHROCYTE [DISTWIDTH] IN BLOOD BY AUTOMATED COUNT: 17.1 % (ref 11.5–14.5)
EST. GFR  (AFRICAN AMERICAN): 59 ML/MIN/1.73 M^2
EST. GFR  (AFRICAN AMERICAN): 59 ML/MIN/1.73 M^2
EST. GFR  (NON AFRICAN AMERICAN): 51 ML/MIN/1.73 M^2
EST. GFR  (NON AFRICAN AMERICAN): 51 ML/MIN/1.73 M^2
GLUCOSE SERPL-MCNC: 126 MG/DL (ref 70–110)
GLUCOSE SERPL-MCNC: 168 MG/DL (ref 70–110)
HCT VFR BLD AUTO: 33.2 % (ref 40–54)
HGB BLD-MCNC: 10.3 G/DL (ref 14–18)
IMM GRANULOCYTES # BLD AUTO: 0.06 K/UL (ref 0–0.04)
IMM GRANULOCYTES NFR BLD AUTO: 0.6 % (ref 0–0.5)
INR PPP: 1 (ref 0.8–1.2)
LYMPHOCYTES # BLD AUTO: 2.3 K/UL (ref 1–4.8)
LYMPHOCYTES NFR BLD: 25 % (ref 18–48)
MCH RBC QN AUTO: 24.5 PG (ref 27–31)
MCHC RBC AUTO-ENTMCNC: 31 G/DL (ref 32–36)
MCV RBC AUTO: 79 FL (ref 82–98)
MONOCYTES # BLD AUTO: 0.5 K/UL (ref 0.3–1)
MONOCYTES NFR BLD: 5.7 % (ref 4–15)
NEUTROPHILS # BLD AUTO: 5.9 K/UL (ref 1.8–7.7)
NEUTROPHILS NFR BLD: 62.7 % (ref 38–73)
NRBC BLD-RTO: 0 /100 WBC
PLATELET # BLD AUTO: 184 K/UL (ref 150–350)
PMV BLD AUTO: 10.3 FL (ref 9.2–12.9)
POTASSIUM SERPL-SCNC: 2.6 MMOL/L (ref 3.5–5.1)
POTASSIUM SERPL-SCNC: 2.7 MMOL/L (ref 3.5–5.1)
PROT SERPL-MCNC: 7.2 G/DL (ref 6–8.4)
PROTHROMBIN TIME: 10.9 SEC (ref 9–12.5)
RBC # BLD AUTO: 4.2 M/UL (ref 4.6–6.2)
SODIUM SERPL-SCNC: 136 MMOL/L (ref 136–145)
SODIUM SERPL-SCNC: 136 MMOL/L (ref 136–145)
TROPONIN I SERPL DL<=0.01 NG/ML-MCNC: 0.01 NG/ML (ref 0–0.03)
TROPONIN I SERPL DL<=0.01 NG/ML-MCNC: 0.01 NG/ML (ref 0–0.03)
WBC # BLD AUTO: 9.33 K/UL (ref 3.9–12.7)

## 2020-03-10 PROCEDURE — 80053 COMPREHEN METABOLIC PANEL: CPT

## 2020-03-10 PROCEDURE — 84484 ASSAY OF TROPONIN QUANT: CPT | Mod: 91

## 2020-03-10 PROCEDURE — 85610 PROTHROMBIN TIME: CPT

## 2020-03-10 PROCEDURE — 80048 BASIC METABOLIC PNL TOTAL CA: CPT

## 2020-03-10 PROCEDURE — 96365 THER/PROPH/DIAG IV INF INIT: CPT

## 2020-03-10 PROCEDURE — 85730 THROMBOPLASTIN TIME PARTIAL: CPT

## 2020-03-10 PROCEDURE — 25000003 PHARM REV CODE 250: Performed by: EMERGENCY MEDICINE

## 2020-03-10 PROCEDURE — 96366 THER/PROPH/DIAG IV INF ADDON: CPT

## 2020-03-10 PROCEDURE — 36415 COLL VENOUS BLD VENIPUNCTURE: CPT

## 2020-03-10 PROCEDURE — 63600175 PHARM REV CODE 636 W HCPCS: Performed by: EMERGENCY MEDICINE

## 2020-03-10 PROCEDURE — 93010 EKG 12-LEAD: ICD-10-PCS | Mod: ,,, | Performed by: INTERNAL MEDICINE

## 2020-03-10 PROCEDURE — 99284 EMERGENCY DEPT VISIT MOD MDM: CPT | Mod: 25

## 2020-03-10 PROCEDURE — 93005 ELECTROCARDIOGRAM TRACING: CPT

## 2020-03-10 PROCEDURE — 93010 ELECTROCARDIOGRAM REPORT: CPT | Mod: ,,, | Performed by: INTERNAL MEDICINE

## 2020-03-10 PROCEDURE — 85025 COMPLETE CBC W/AUTO DIFF WBC: CPT

## 2020-03-10 RX ORDER — POTASSIUM CHLORIDE 20 MEQ/15ML
40 SOLUTION ORAL
Status: COMPLETED | OUTPATIENT
Start: 2020-03-10 | End: 2020-03-10

## 2020-03-10 RX ORDER — POTASSIUM CHLORIDE 20 MEQ/15ML
20 SOLUTION ORAL
Status: COMPLETED | OUTPATIENT
Start: 2020-03-10 | End: 2020-03-10

## 2020-03-10 RX ORDER — MAGNESIUM SULFATE HEPTAHYDRATE 40 MG/ML
2 INJECTION, SOLUTION INTRAVENOUS
Status: COMPLETED | OUTPATIENT
Start: 2020-03-10 | End: 2020-03-10

## 2020-03-10 RX ADMIN — MAGNESIUM SULFATE 2 G: 2 INJECTION INTRAVENOUS at 02:03

## 2020-03-10 RX ADMIN — POTASSIUM CHLORIDE 40 MEQ: 20 SOLUTION ORAL at 02:03

## 2020-03-10 RX ADMIN — POTASSIUM CHLORIDE 20 MEQ: 20 SOLUTION ORAL at 04:03

## 2020-03-10 NOTE — ED PROVIDER NOTES
SCRIBE #1 NOTE: I, Jaki Rodriguez, am scribing for, and in the presence of, Guicho Reyes MD. I have scribed the entire note.       History     Chief Complaint   Patient presents with    Chest Pain     Chest pressure & tightness that began around 1pm. Radiates to L arm. States primary care doctor called him last night and told him his potassium was 2.3     Review of patient's allergies indicates:   Allergen Reactions    Amitriptyline      Other reaction(s): Rash    Celecoxib      Other reaction(s): Rash         History of Present Illness     HPI    3/10/2020, 1:43 AM  History obtained from the patient      History of Present Illness: Santiago Khan is a 69 y.o. male patient with a PMHx of DDD, CKD, DM, prostate, HLD, HTN who presents to the Emergency Department for evaluation of CP radiating to LUE which onset gradually around 1PM. Dr. Devi (Internal Medicine) his potassium level was 2.6 yesterday. He reports missing two days of KCL supplementation and Lasix. Symptoms are constant and moderate in severity. No mitigating or exacerbating factors reported. No other sxs reported. Patient denies any fever, chills, SOB, diaphoresis, palpitations, extremity weakness/numbness, leg swelling, dizziness, cough, N/V, and all other sxs at this time. No further complaints or concerns at this time.         Arrival mode: Personal vehicle      PCP: Kashif Acosta MD        Past Medical History:  Past Medical History:   Diagnosis Date    Chronic kidney disease, stage 3     DDD (degenerative disc disease), lumbar     DM (diabetes mellitus) with complications     History of gout     History of prostate cancer     Hyperlipidemia associated with type 2 diabetes mellitus     Hypertension associated with stage 3 chronic kidney disease due to type 2 diabetes mellitus     BRADLEY on CPAP     Tobacco abuse        Past Surgical History:  Past Surgical History:   Procedure Laterality Date    BICEPS TENDON REPAIR       COLONOSCOPY N/A 3/27/2019    Procedure: COLONOSCOPY;  Surgeon: James Roger MD;  Location: [facility];  Service: Endoscopy;  Laterality: N/A;    HEMORRHOID SURGERY      HERNIA REPAIR      KNEE ARTHROSCOPY      LUMBAR LAMINECTOMY      PROSTATECTOMY           Family History:  Family History   Problem Relation Age of Onset    Prostate cancer Father     Coronary artery disease Father        Social History:  Social History     Tobacco Use    Smoking status: Former Smoker     Packs/day: 2.00     Years: 50.00     Pack years: 100.00     Types: Cigarettes     Last attempt to quit: 2020     Years since quittin.0    Smokeless tobacco: Former User   Substance and Sexual Activity    Alcohol use: No     Frequency: Never     Drinks per session: Patient refused     Binge frequency: Never    Drug use: Never    Sexual activity: Unknown        Review of Systems     Review of Systems   Constitutional: Negative for chills, diaphoresis and fever.   HENT: Negative for sore throat.    Respiratory: Negative for cough and shortness of breath.    Cardiovascular: Positive for chest pain (radiating to LUE). Negative for palpitations and leg swelling.   Gastrointestinal: Negative for nausea and vomiting.   Genitourinary: Negative for dysuria.   Musculoskeletal: Negative for back pain.   Skin: Negative for rash.   Neurological: Negative for dizziness, weakness and numbness.   Hematological: Does not bruise/bleed easily.        Physical Exam     Initial Vitals [03/10/20 0101]   BP Pulse Resp Temp SpO2   128/70 78 16 98.2 °F (36.8 °C) 95 %      MAP       --          Physical Exam  Nursing Notes and Vital Signs Reviewed.  Constitutional: Patient is in no acute distress. Well-developed and well-nourished.  Head: Atraumatic. Normocephalic.  Eyes: PERRL. EOM intact. Conjunctivae are not pale. No scleral icterus.  ENT: Mucous membranes are moist. Oropharynx is clear and symmetric.    Neck: Supple. Full ROM. No  lymphadenopathy.  Cardiovascular: Regular rate. Regular rhythm. No murmurs, rubs, or gallops. Distal pulses are 2+ and symmetric.  Pulmonary/Chest: No respiratory distress. Clear to auscultation bilaterally. No wheezing or rales.  Abdominal: Soft and non-distended.  There is no tenderness.  No rebound, guarding, or rigidity. Good bowel sounds.  Genitourinary: No CVA tenderness  Musculoskeletal: Moves all extremities. No obvious deformities. No edema. No calf tenderness.  Skin: Warm and dry.  Neurological:  Alert, awake, and appropriate.  Normal speech.  No acute focal neurological deficits are appreciated.  Psychiatric: Normal affect. Good eye contact. Appropriate in content.     ED Course   Critical Care  Date/Time: 3/11/2020 6:01 PM  Performed by: Guicho Reyes MD  Authorized by: Guicho Reyes MD   Total critical care time (exclusive of procedural time) : 35 minutes  Critical care was necessary to treat or prevent imminent or life-threatening deterioration of the following conditions: severe hypokalemia.  Critical care was time spent personally by me on the following activities: blood draw for specimens, interpretation of cardiac output measurements, evaluation of patient's response to treatment, examination of patient, obtaining history from patient or surrogate, ordering and performing treatments and interventions, ordering and review of laboratory studies, ordering and review of radiographic studies, pulse oximetry, re-evaluation of patient's condition and review of old charts.        ED Vital Signs:  Vitals:    03/10/20 0101 03/10/20 0301 03/10/20 0332 03/10/20 0401   BP: 128/70 109/66 116/63 107/62   Pulse: 78 76 77 76   Resp: 16 (!) 22 20 20   Temp: 98.2 °F (36.8 °C)      TempSrc: Oral      SpO2: 95% (!) 93% 95% 95%   Weight: 93.1 kg (205 lb 2.2 oz)       03/10/20 0432 03/10/20 0501 03/10/20 0516   BP: (!) 108/59 105/63    Pulse: 74 74    Resp: 18 19    Temp:   98 °F (36.7 °C)   TempSrc:   Oral    SpO2: 97% 97%    Weight:          Abnormal Lab Results:  Labs Reviewed   CBC W/ AUTO DIFFERENTIAL - Abnormal; Notable for the following components:       Result Value    RBC 4.20 (*)     Hemoglobin 10.3 (*)     Hematocrit 33.2 (*)     Mean Corpuscular Volume 79 (*)     Mean Corpuscular Hemoglobin 24.5 (*)     Mean Corpuscular Hemoglobin Conc 31.0 (*)     RDW 17.1 (*)     Immature Granulocytes 0.6 (*)     Immature Grans (Abs) 0.06 (*)     All other components within normal limits   COMPREHENSIVE METABOLIC PANEL - Abnormal; Notable for the following components:    Potassium 2.6 (*)     Glucose 126 (*)     Calcium 8.2 (*)     eGFR if  59 (*)     eGFR if non  51 (*)     All other components within normal limits    Narrative:     K critical result(s) called and verbal readback obtained from KEM PELAYO RN by [facility] 03/10/2020 02:04   BASIC METABOLIC PANEL - Abnormal; Notable for the following components:    Potassium 2.7 (*)     Glucose 168 (*)     Calcium 7.4 (*)     eGFR if  59 (*)     eGFR if non  51 (*)     All other components within normal limits    Narrative:     K critical result(s) called and verbal readback obtained from LENNY REYES RN by [facility] 03/10/2020 04:13   TROPONIN I   PROTIME-INR   APTT   TROPONIN I        All Lab Results:  Results for orders placed or performed during the hospital encounter of 03/10/20   CBC auto differential   Result Value Ref Range    WBC 9.33 3.90 - 12.70 K/uL    RBC 4.20 (L) 4.60 - 6.20 M/uL    Hemoglobin 10.3 (L) 14.0 - 18.0 g/dL    Hematocrit 33.2 (L) 40.0 - 54.0 %    Mean Corpuscular Volume 79 (L) 82 - 98 fL    Mean Corpuscular Hemoglobin 24.5 (L) 27.0 - 31.0 pg    Mean Corpuscular Hemoglobin Conc 31.0 (L) 32.0 - 36.0 g/dL    RDW 17.1 (H) 11.5 - 14.5 %    Platelets 184 150 - 350 K/uL    MPV 10.3 9.2 - 12.9 fL    Immature Granulocytes 0.6 (H) 0.0 - 0.5 %    Gran # (ANC) 5.9 1.8 - 7.7 K/uL    Immature Grans  (Abs) 0.06 (H) 0.00 - 0.04 K/uL    Lymph # 2.3 1.0 - 4.8 K/uL    Mono # 0.5 0.3 - 1.0 K/uL    Eos # 0.5 0.0 - 0.5 K/uL    Baso # 0.05 0.00 - 0.20 K/uL    nRBC 0 0 /100 WBC    Gran% 62.7 38.0 - 73.0 %    Lymph% 25.0 18.0 - 48.0 %    Mono% 5.7 4.0 - 15.0 %    Eosinophil% 5.5 0.0 - 8.0 %    Basophil% 0.5 0.0 - 1.9 %    Differential Method Automated    Comprehensive metabolic panel   Result Value Ref Range    Sodium 136 136 - 145 mmol/L    Potassium 2.6 (LL) 3.5 - 5.1 mmol/L    Chloride 95 95 - 110 mmol/L    CO2 28 23 - 29 mmol/L    Glucose 126 (H) 70 - 110 mg/dL    BUN, Bld 17 8 - 23 mg/dL    Creatinine 1.4 0.5 - 1.4 mg/dL    Calcium 8.2 (L) 8.7 - 10.5 mg/dL    Total Protein 7.2 6.0 - 8.4 g/dL    Albumin 3.9 3.5 - 5.2 g/dL    Total Bilirubin 0.3 0.1 - 1.0 mg/dL    Alkaline Phosphatase 65 55 - 135 U/L    AST 28 10 - 40 U/L    ALT 24 10 - 44 U/L    Anion Gap 13 8 - 16 mmol/L    eGFR if African American 59 (A) >60 mL/min/1.73 m^2    eGFR if non African American 51 (A) >60 mL/min/1.73 m^2   Troponin I   Result Value Ref Range    Troponin I 0.011 0.000 - 0.026 ng/mL   Protime-INR   Result Value Ref Range    Prothrombin Time 10.9 9.0 - 12.5 sec    INR 1.0 0.8 - 1.2   APTT   Result Value Ref Range    aPTT 26.6 21.0 - 32.0 sec   Troponin I   Result Value Ref Range    Troponin I 0.010 0.000 - 0.026 ng/mL   Basic metabolic panel   Result Value Ref Range    Sodium 136 136 - 145 mmol/L    Potassium 2.7 (LL) 3.5 - 5.1 mmol/L    Chloride 97 95 - 110 mmol/L    CO2 27 23 - 29 mmol/L    Glucose 168 (H) 70 - 110 mg/dL    BUN, Bld 20 8 - 23 mg/dL    Creatinine 1.4 0.5 - 1.4 mg/dL    Calcium 7.4 (L) 8.7 - 10.5 mg/dL    Anion Gap 12 8 - 16 mmol/L    eGFR if African American 59 (A) >60 mL/min/1.73 m^2    eGFR if non African American 51 (A) >60 mL/min/1.73 m^2         Imaging Results:  Imaging Results          X-Ray Chest AP Portable (Final result)  Result time 03/10/20 07:56:39    Final result by Mohan Kaba MD (03/10/20 07:56:39)                  Impression:      No acute process seen.      Electronically signed by: Mohan Kaba MD  Date:    03/10/2020  Time:    07:56             Narrative:    EXAMINATION:  XR CHEST AP PORTABLE    CLINICAL HISTORY:  Chest pain, unspecified    FINDINGS:  Single view of the chest.  Aorta demonstrates atherosclerotic disease.    Cardiac silhouette is normal.  The lungs demonstrate no evidence of active disease.  No evidence of pleural effusion or pneumothorax.  Bones demonstrate degenerative changes.                               Per ED physician, pt's CXR results NAF.      The EKG was ordered, reviewed, and independently interpreted by the ED provider.  Interpretation time: 0109  Rate: 70 BPM  Rhythm: normal sinus rhythm  Interpretation: LAD. RBBB. Moderate voltage criteria for LVH, may be normal variant. Cannot rule out Septal infarct, age undetermined. No STEMI.         The Emergency Provider reviewed the vital signs and test results, which are outlined above.     ED Discussion       5:05 AM: Reassessed pt at this time.  Pt states his condition has improved at this time. Discussed with pt all pertinent ED information and results. Discussed pt dx and plan of tx. Pt to resume his potassium supplement. Gave pt all f/u and return to the ED instructions. All questions and concerns were addressed at this time. Pt expresses understanding of information and instructions, and is comfortable with plan to discharge. Pt is stable for discharge.    I discussed with patient and/or family/caretaker that evaluation in the ED does not suggest any emergent or life threatening medical conditions requiring immediate intervention beyond what was provided in the ED, and I believe patient is safe for discharge.  Regardless, an unremarkable evaluation in the ED does not preclude the development or presence of a serious of life threatening condition. As such, patient was instructed to return immediately for any worsening or change in  current symptoms.         Medical Decision Making:   Clinical Tests:   Lab Tests: Ordered and Reviewed  Radiological Study: Ordered and Reviewed  Medical Tests: Ordered and Reviewed           ED Medication(s):  Medications   potassium chloride 10% oral solution 40 mEq (40 mEq Oral Given 3/10/20 0216)   magnesium sulfate 2g in water 50mL IVPB (premix) (0 g Intravenous Stopped 3/10/20 0426)   potassium chloride 10% oral solution 20 mEq (20 mEq Oral Given 3/10/20 9154)       Discharge Medication List as of 3/10/2020  4:52 AM          Follow-up Information     Kashif Acosta MD In 2 days.    Specialty:  Internal Medicine  Why:  for repeat potassium level  Contact information:  [address]  [address]  [facility] [address] [phone]             [facility]    Specialty:  Emergency Medicine  Why:  As needed, If symptoms worsen  Contact information:  [address]  [facility] [address] [phone]                     Scribe Attestation:   Scribe #1: I performed the above scribed service and the documentation accurately describes the services I performed. I attest to the accuracy of the note.     Attending:   Physician Attestation Statement for Scribe #1: I, Guicho Reyes MD, personally performed the services described in this documentation, as scribed by Jaki Rodriguez, in my presence, and it is both accurate and complete.           Clinical Impression       ICD-10-CM ICD-9-CM   1. Acute hypokalemia E87.6 276.8   2. Chest pain R07.9 786.50       Disposition:   Disposition: Discharged  Condition: Stable         Guicho Reyes MD  03/11/20 0491

## 2020-03-10 NOTE — PROGRESS NOTES
Smoking Cessation Group Session #4  Site: [facility]  Date:  3-9-2020  Clinical Status of Patient: Outpatient   Length of Service and Code: 90 minutes - 78615   Number in Attendance: 4  Group Activities/Focus of Group:  Sharing last weeks challenges, triggers, and coping activities to remain quit and/ or keep making progress toward cessation, completion of TCRS (Tobacco Cessation Rating Scale) learned addiction model, personal reasons for quitting, medications, goals, quit date.    Specific session focus: completion of TCRS (Tobacco Cessation Rating Scale) reviewed strategies, habitual behavior, stress, and high risk situations. Introduced stress with addition interventions, SOLVE, relaxation with interventions, nutrition, exercise, weight gain, and the importance of rewarding oneself for accomplishments toward becoming tobacco free. Open discussion of all items with interventions.     Patient's Response to Intervention: The patient remains on the prescribed tobacco cessation medication regimen of 1 mg Chantix BID without any negative side effects at this time.   Progress Toward Goals and Other Mental Status Changes: The patient denies any abnormal behavioral or mental changes at this time.   Diagnosis: Z72.0  Plan: The patient will continue with group therapy sessions and medication regimen prescribed with management by physician or by the Cessation Clinic Provider. Patient will inform Smoking Cessation Counselor of symptoms as rated high on TCRS.    Return to Clinic: 1 week    Quit Date: 2-

## 2020-03-10 NOTE — PROGRESS NOTES
ON CALL MD    Called and left voice message on both phone numbers listed RE: critical lab of K of 2.6, which I suspect may be due to excessive furosemide relative to KCL supplement. Encouraged him on VM to either go to ER, particularly if sx of arrhythmia or severe cramping or to call us for further recommendations.

## 2020-03-10 NOTE — PROGRESS NOTES
ON CALL MD    Spoke with Me. Erin RE: K of 2.6, who reported missing two days of KCL supplementation due to being on the go, for which we discussed how doing so increases risk of arrhythmia, heart attack and stroke. He voiced understanding the importance and plans to take his KCL now and to f/u with Dr. Freed in the AM (and to set an alarm to remind himself in the future). He denied CP or SOB or irregular heartbeat or palpitations or dizziness or blurry vision or diaphoresis or nausea. He endorsed some pins & needles sensations of his chest and bilateral arms over the past few hours, which he described as the sensation of sleeping on his limbs, but no significant numbness or weakness. Recommended he go to the ER if this persists or worsens or any of the above negative ROS sx occur, which he agreed to do.

## 2020-03-13 ENCOUNTER — TELEPHONE (OUTPATIENT)
Dept: INTERNAL MEDICINE | Facility: CLINIC | Age: 70
End: 2020-03-13

## 2020-03-13 NOTE — TELEPHONE ENCOUNTER
----- Message from Celine Weems sent at 3/13/2020 10:05 AM CDT -----  Contact: PT  Type:  Patient Returning Call    Who Called:PT  Who Left Message for Patient:NURSE/MA  Does the patient know what this is regarding?:YES  Would the patient rather a call back or a response via [facility]? CALL BACK  Best Call Back Number:[phone]  Additional Information:

## 2020-03-16 ENCOUNTER — CLINICAL SUPPORT (OUTPATIENT)
Dept: SMOKING CESSATION | Facility: CLINIC | Age: 70
End: 2020-03-16
Payer: COMMERCIAL

## 2020-03-16 ENCOUNTER — TELEPHONE (OUTPATIENT)
Dept: SMOKING CESSATION | Facility: CLINIC | Age: 70
End: 2020-03-16

## 2020-03-16 DIAGNOSIS — F17.200 NICOTINE DEPENDENCE: Primary | ICD-10-CM

## 2020-03-16 PROCEDURE — 99407 BEHAV CHNG SMOKING > 10 MIN: CPT | Mod: S$GLB,,, | Performed by: GENERAL PRACTICE

## 2020-03-16 PROCEDURE — 99407 PR TOBACCO USE CESSATION INTENSIVE >10 MINUTES: ICD-10-PCS | Mod: S$GLB,,, | Performed by: GENERAL PRACTICE

## 2020-03-17 ENCOUNTER — OFFICE VISIT (OUTPATIENT)
Dept: INTERNAL MEDICINE | Facility: CLINIC | Age: 70
DRG: 641 | End: 2020-03-17
Payer: MEDICARE

## 2020-03-17 VITALS
DIASTOLIC BLOOD PRESSURE: 66 MMHG | BODY MASS INDEX: 32.04 KG/M2 | HEART RATE: 75 BPM | TEMPERATURE: 98 F | HEIGHT: 67 IN | WEIGHT: 204.13 LBS | SYSTOLIC BLOOD PRESSURE: 108 MMHG | OXYGEN SATURATION: 97 %

## 2020-03-17 DIAGNOSIS — H02.403 PTOSIS OF EYELID, BILATERAL: ICD-10-CM

## 2020-03-17 DIAGNOSIS — E87.6 HYPOKALEMIA: Primary | ICD-10-CM

## 2020-03-17 PROCEDURE — 1126F PR PAIN SEVERITY QUANTIFIED, NO PAIN PRESENT: ICD-10-PCS | Mod: S$GLB,,, | Performed by: INTERNAL MEDICINE

## 2020-03-17 PROCEDURE — 1101F PR PT FALLS ASSESS DOC 0-1 FALLS W/OUT INJ PAST YR: ICD-10-PCS | Mod: CPTII,S$GLB,, | Performed by: INTERNAL MEDICINE

## 2020-03-17 PROCEDURE — 3074F PR MOST RECENT SYSTOLIC BLOOD PRESSURE < 130 MM HG: ICD-10-PCS | Mod: CPTII,S$GLB,, | Performed by: INTERNAL MEDICINE

## 2020-03-17 PROCEDURE — 3078F DIAST BP <80 MM HG: CPT | Mod: CPTII,S$GLB,, | Performed by: INTERNAL MEDICINE

## 2020-03-17 PROCEDURE — 1159F MED LIST DOCD IN RCRD: CPT | Mod: S$GLB,,, | Performed by: INTERNAL MEDICINE

## 2020-03-17 PROCEDURE — 99213 PR OFFICE/OUTPT VISIT, EST, LEVL III, 20-29 MIN: ICD-10-PCS | Mod: S$GLB,,, | Performed by: INTERNAL MEDICINE

## 2020-03-17 PROCEDURE — 1126F AMNT PAIN NOTED NONE PRSNT: CPT | Mod: S$GLB,,, | Performed by: INTERNAL MEDICINE

## 2020-03-17 PROCEDURE — 3074F SYST BP LT 130 MM HG: CPT | Mod: CPTII,S$GLB,, | Performed by: INTERNAL MEDICINE

## 2020-03-17 PROCEDURE — 99999 PR PBB SHADOW E&M-EST. PATIENT-LVL III: ICD-10-PCS | Mod: PBBFAC,,, | Performed by: INTERNAL MEDICINE

## 2020-03-17 PROCEDURE — 99999 PR PBB SHADOW E&M-EST. PATIENT-LVL III: CPT | Mod: PBBFAC,,, | Performed by: INTERNAL MEDICINE

## 2020-03-17 PROCEDURE — 99213 OFFICE O/P EST LOW 20 MIN: CPT | Mod: S$GLB,,, | Performed by: INTERNAL MEDICINE

## 2020-03-17 PROCEDURE — 1159F PR MEDICATION LIST DOCUMENTED IN MEDICAL RECORD: ICD-10-PCS | Mod: S$GLB,,, | Performed by: INTERNAL MEDICINE

## 2020-03-17 PROCEDURE — 1101F PT FALLS ASSESS-DOCD LE1/YR: CPT | Mod: CPTII,S$GLB,, | Performed by: INTERNAL MEDICINE

## 2020-03-17 PROCEDURE — 3078F PR MOST RECENT DIASTOLIC BLOOD PRESSURE < 80 MM HG: ICD-10-PCS | Mod: CPTII,S$GLB,, | Performed by: INTERNAL MEDICINE

## 2020-03-17 NOTE — PROGRESS NOTES
"HPI:  Patient is a 69-year-old man who comes today for follow-up of his hypokalemia.  He unfortunately had not been taking his potassium as directed.  Was seen in the ER because his potassium was 2.6.  He is now taking it like he supposed to.  He was also supposed to see the his nephrologist but he did not do so.  He has problems with his eyelid coming over his field of vision.  He would like to see an ophthalmologist    Current meds have been verified and updated per the EMR  Exam:/66   Pulse 75   Temp 98.1 °F (36.7 °C) (Tympanic)   Ht 5' 7" (1.702 m)   Wt 92.6 kg (204 lb 2.3 oz)   SpO2 97%   BMI 31.97 kg/m²    he has ptosis due to excessive eyelid falling down over his eyes    Lab Results   Component Value Date    WBC 9.33 03/10/2020    HGB 10.3 (L) 03/10/2020    HCT 33.2 (L) 03/10/2020     03/10/2020    CHOL 158 01/23/2020    TRIG 103 01/23/2020    HDL 48 01/23/2020    ALT 24 03/10/2020    AST 28 03/10/2020     03/10/2020    K 2.7 (LL) 03/10/2020    CL 97 03/10/2020    CREATININE 1.4 03/10/2020    BUN 20 03/10/2020    CO2 27 03/10/2020    TSH 1.064 01/23/2020    PSA <0.01 01/30/2019    INR 1.0 03/10/2020    HGBA1C 5.9 (H) 01/23/2020       Impression:   hypokalemia   ptosis  Patient Active Problem List   Diagnosis    History of prostate cancer    BRADLEY (obstructive sleep apnea)    History of gout    DDD (degenerative disc disease), lumbar    Tobacco abuse    DM (diabetes mellitus) with complications    Hypertension associated with stage 3 chronic kidney disease due to type 2 diabetes mellitus    Hyperlipidemia associated with type 2 diabetes mellitus    Chronic kidney disease, stage 3    Obesity, Class I, BMI 30-34.9       Plan:  Orders Placed This Encounter    Basic metabolic panel    Ambulatory referral/consult to Ophthalmology    he will have repeat potassium level done today.  He needs his be seen by the nephrologist.  He is also referred to see an ophthalmologist.      This " note is generated with speech recognition software and is subject to transcription error and sound alike phrases that may be missed by proofreading.

## 2020-03-18 ENCOUNTER — PATIENT MESSAGE (OUTPATIENT)
Dept: INTERNAL MEDICINE | Facility: CLINIC | Age: 70
End: 2020-03-18

## 2020-03-18 NOTE — TELEPHONE ENCOUNTER
Call patient and tell him his potassium is still extremely low at 2.6.  I want him to take the potassium pills 2 tablets 3 times a day for now.  He absolutely needs to see his nephrologist this Friday.

## 2020-03-18 NOTE — TELEPHONE ENCOUNTER
"Called patient and informed him of what the provider stated. Patient verbalized understanding and stated "alright"  "

## 2020-03-19 ENCOUNTER — PATIENT OUTREACH (OUTPATIENT)
Dept: ADMINISTRATIVE | Facility: OTHER | Age: 70
End: 2020-03-19

## 2020-03-20 ENCOUNTER — OFFICE VISIT (OUTPATIENT)
Dept: NEPHROLOGY | Facility: CLINIC | Age: 70
DRG: 641 | End: 2020-03-20
Payer: MEDICARE

## 2020-03-20 ENCOUNTER — HOSPITAL ENCOUNTER (INPATIENT)
Facility: HOSPITAL | Age: 70
LOS: 1 days | Discharge: HOME | DRG: 641 | End: 2020-03-21
Attending: EMERGENCY MEDICINE | Admitting: EMERGENCY MEDICINE
Payer: MEDICARE

## 2020-03-20 VITALS
WEIGHT: 203.94 LBS | BODY MASS INDEX: 32.01 KG/M2 | DIASTOLIC BLOOD PRESSURE: 70 MMHG | HEIGHT: 67 IN | HEART RATE: 70 BPM | SYSTOLIC BLOOD PRESSURE: 118 MMHG

## 2020-03-20 DIAGNOSIS — I12.9 HYPERTENSION ASSOCIATED WITH STAGE 3 CHRONIC KIDNEY DISEASE DUE TO TYPE 2 DIABETES MELLITUS: ICD-10-CM

## 2020-03-20 DIAGNOSIS — N18.30 CHRONIC KIDNEY DISEASE, STAGE 3: ICD-10-CM

## 2020-03-20 DIAGNOSIS — E83.42 HYPOMAGNESEMIA: ICD-10-CM

## 2020-03-20 DIAGNOSIS — N18.30 CKD (CHRONIC KIDNEY DISEASE) STAGE 3, GFR 30-59 ML/MIN: Primary | ICD-10-CM

## 2020-03-20 DIAGNOSIS — N18.30 HYPERTENSION ASSOCIATED WITH STAGE 3 CHRONIC KIDNEY DISEASE DUE TO TYPE 2 DIABETES MELLITUS: ICD-10-CM

## 2020-03-20 DIAGNOSIS — N18.30 CKD (CHRONIC KIDNEY DISEASE) STAGE 3, GFR 30-59 ML/MIN: ICD-10-CM

## 2020-03-20 DIAGNOSIS — E87.6 HYPOKALEMIA: Primary | ICD-10-CM

## 2020-03-20 DIAGNOSIS — E83.51 HYPOCALCEMIA: ICD-10-CM

## 2020-03-20 DIAGNOSIS — E87.8 ELECTROLYTE DISORDER: ICD-10-CM

## 2020-03-20 DIAGNOSIS — I50.30 DIASTOLIC CHF: ICD-10-CM

## 2020-03-20 DIAGNOSIS — E11.22 HYPERTENSION ASSOCIATED WITH STAGE 3 CHRONIC KIDNEY DISEASE DUE TO TYPE 2 DIABETES MELLITUS: ICD-10-CM

## 2020-03-20 DIAGNOSIS — E87.6 HYPOKALEMIA: ICD-10-CM

## 2020-03-20 PROBLEM — D50.9 MICROCYTIC ANEMIA: Status: ACTIVE | Noted: 2020-03-20

## 2020-03-20 LAB — POCT GLUCOSE: 166 MG/DL (ref 70–110)

## 2020-03-20 PROCEDURE — 36415 COLL VENOUS BLD VENIPUNCTURE: CPT

## 2020-03-20 PROCEDURE — 25000003 PHARM REV CODE 250: Performed by: NURSE PRACTITIONER

## 2020-03-20 PROCEDURE — 99499 UNLISTED E&M SERVICE: CPT | Mod: S$GLB,,, | Performed by: INTERNAL MEDICINE

## 2020-03-20 PROCEDURE — 99223 1ST HOSP IP/OBS HIGH 75: CPT | Mod: ,,, | Performed by: INTERNAL MEDICINE

## 2020-03-20 PROCEDURE — 1159F MED LIST DOCD IN RCRD: CPT | Mod: S$GLB,,, | Performed by: INTERNAL MEDICINE

## 2020-03-20 PROCEDURE — 99999 PR PBB SHADOW E&M-EST. PATIENT-LVL III: ICD-10-PCS | Mod: PBBFAC,,, | Performed by: INTERNAL MEDICINE

## 2020-03-20 PROCEDURE — 84132 ASSAY OF SERUM POTASSIUM: CPT

## 2020-03-20 PROCEDURE — 99205 PR OFFICE/OUTPT VISIT, NEW, LEVL V, 60-74 MIN: ICD-10-PCS | Mod: S$GLB,,, | Performed by: INTERNAL MEDICINE

## 2020-03-20 PROCEDURE — 3074F SYST BP LT 130 MM HG: CPT | Mod: CPTII,S$GLB,, | Performed by: INTERNAL MEDICINE

## 2020-03-20 PROCEDURE — 1159F PR MEDICATION LIST DOCUMENTED IN MEDICAL RECORD: ICD-10-PCS | Mod: S$GLB,,, | Performed by: INTERNAL MEDICINE

## 2020-03-20 PROCEDURE — 1101F PR PT FALLS ASSESS DOC 0-1 FALLS W/OUT INJ PAST YR: ICD-10-PCS | Mod: CPTII,S$GLB,, | Performed by: INTERNAL MEDICINE

## 2020-03-20 PROCEDURE — 99223 PR INITIAL HOSPITAL CARE,LEVL III: ICD-10-PCS | Mod: ,,, | Performed by: INTERNAL MEDICINE

## 2020-03-20 PROCEDURE — 99291 CRITICAL CARE FIRST HOUR: CPT | Mod: 25

## 2020-03-20 PROCEDURE — 99499 RISK ADDL DX/OHS AUDIT: ICD-10-PCS | Mod: S$GLB,,, | Performed by: INTERNAL MEDICINE

## 2020-03-20 PROCEDURE — 63600175 PHARM REV CODE 636 W HCPCS: Performed by: NURSE PRACTITIONER

## 2020-03-20 PROCEDURE — 21400001 HC TELEMETRY ROOM

## 2020-03-20 PROCEDURE — 93010 EKG 12-LEAD: ICD-10-PCS | Mod: ,,, | Performed by: INTERNAL MEDICINE

## 2020-03-20 PROCEDURE — 1101F PT FALLS ASSESS-DOCD LE1/YR: CPT | Mod: CPTII,S$GLB,, | Performed by: INTERNAL MEDICINE

## 2020-03-20 PROCEDURE — 1126F AMNT PAIN NOTED NONE PRSNT: CPT | Mod: S$GLB,,, | Performed by: INTERNAL MEDICINE

## 2020-03-20 PROCEDURE — 3078F DIAST BP <80 MM HG: CPT | Mod: CPTII,S$GLB,, | Performed by: INTERNAL MEDICINE

## 2020-03-20 PROCEDURE — 63600175 PHARM REV CODE 636 W HCPCS: Performed by: EMERGENCY MEDICINE

## 2020-03-20 PROCEDURE — 1126F PR PAIN SEVERITY QUANTIFIED, NO PAIN PRESENT: ICD-10-PCS | Mod: S$GLB,,, | Performed by: INTERNAL MEDICINE

## 2020-03-20 PROCEDURE — 93005 ELECTROCARDIOGRAM TRACING: CPT

## 2020-03-20 PROCEDURE — 96374 THER/PROPH/DIAG INJ IV PUSH: CPT

## 2020-03-20 PROCEDURE — 99205 OFFICE O/P NEW HI 60 MIN: CPT | Mod: S$GLB,,, | Performed by: INTERNAL MEDICINE

## 2020-03-20 PROCEDURE — 93010 ELECTROCARDIOGRAM REPORT: CPT | Mod: ,,, | Performed by: INTERNAL MEDICINE

## 2020-03-20 PROCEDURE — 3078F PR MOST RECENT DIASTOLIC BLOOD PRESSURE < 80 MM HG: ICD-10-PCS | Mod: CPTII,S$GLB,, | Performed by: INTERNAL MEDICINE

## 2020-03-20 PROCEDURE — 99999 PR PBB SHADOW E&M-EST. PATIENT-LVL III: CPT | Mod: PBBFAC,,, | Performed by: INTERNAL MEDICINE

## 2020-03-20 PROCEDURE — 3074F PR MOST RECENT SYSTOLIC BLOOD PRESSURE < 130 MM HG: ICD-10-PCS | Mod: CPTII,S$GLB,, | Performed by: INTERNAL MEDICINE

## 2020-03-20 RX ORDER — ALLOPURINOL 100 MG/1
100 TABLET ORAL DAILY
COMMUNITY
End: 2020-10-26

## 2020-03-20 RX ORDER — VARENICLINE TARTRATE 0.5 MG/1
1 TABLET, FILM COATED ORAL DAILY
Status: DISCONTINUED | OUTPATIENT
Start: 2020-03-21 | End: 2020-03-21 | Stop reason: HOSPADM

## 2020-03-20 RX ORDER — DEXTROSE MONOHYDRATE 100 MG/ML
12.5 INJECTION, SOLUTION INTRAVENOUS
Status: DISCONTINUED | OUTPATIENT
Start: 2020-03-20 | End: 2020-03-21 | Stop reason: HOSPADM

## 2020-03-20 RX ORDER — POTASSIUM CHLORIDE 20 MEQ/1
40 TABLET, EXTENDED RELEASE ORAL ONCE
Status: COMPLETED | OUTPATIENT
Start: 2020-03-20 | End: 2020-03-20

## 2020-03-20 RX ORDER — LANOLIN ALCOHOL/MO/W.PET/CERES
400 CREAM (GRAM) TOPICAL 3 TIMES DAILY
Status: DISCONTINUED | OUTPATIENT
Start: 2020-03-21 | End: 2020-03-21 | Stop reason: HOSPADM

## 2020-03-20 RX ORDER — PANTOPRAZOLE SODIUM 40 MG/1
40 TABLET, DELAYED RELEASE ORAL DAILY
Status: DISCONTINUED | OUTPATIENT
Start: 2020-03-21 | End: 2020-03-20

## 2020-03-20 RX ORDER — GLUCAGON 1 MG
1 KIT INJECTION
Status: DISCONTINUED | OUTPATIENT
Start: 2020-03-20 | End: 2020-03-21 | Stop reason: HOSPADM

## 2020-03-20 RX ORDER — IBUPROFEN 200 MG
24 TABLET ORAL
Status: DISCONTINUED | OUTPATIENT
Start: 2020-03-20 | End: 2020-03-21 | Stop reason: HOSPADM

## 2020-03-20 RX ORDER — DEXTROSE MONOHYDRATE 100 MG/ML
25 INJECTION, SOLUTION INTRAVENOUS
Status: DISCONTINUED | OUTPATIENT
Start: 2020-03-20 | End: 2020-03-21 | Stop reason: HOSPADM

## 2020-03-20 RX ORDER — INSULIN ASPART 100 [IU]/ML
0-5 INJECTION, SOLUTION INTRAVENOUS; SUBCUTANEOUS
Status: DISCONTINUED | OUTPATIENT
Start: 2020-03-20 | End: 2020-03-21 | Stop reason: HOSPADM

## 2020-03-20 RX ORDER — HYDROCODONE BITARTRATE AND ACETAMINOPHEN 7.5; 325 MG/1; MG/1
1 TABLET ORAL EVERY 6 HOURS PRN
Status: DISCONTINUED | OUTPATIENT
Start: 2020-03-20 | End: 2020-03-21 | Stop reason: HOSPADM

## 2020-03-20 RX ORDER — BACLOFEN 10 MG/1
10 TABLET ORAL NIGHTLY
Status: DISCONTINUED | OUTPATIENT
Start: 2020-03-20 | End: 2020-03-21 | Stop reason: HOSPADM

## 2020-03-20 RX ORDER — ALLOPURINOL 100 MG/1
100 TABLET ORAL DAILY
Status: DISCONTINUED | OUTPATIENT
Start: 2020-03-21 | End: 2020-03-21 | Stop reason: HOSPADM

## 2020-03-20 RX ORDER — MAGNESIUM SULFATE HEPTAHYDRATE 40 MG/ML
2 INJECTION, SOLUTION INTRAVENOUS ONCE
Status: COMPLETED | OUTPATIENT
Start: 2020-03-20 | End: 2020-03-20

## 2020-03-20 RX ORDER — SIMVASTATIN 20 MG/1
40 TABLET, FILM COATED ORAL NIGHTLY
Status: DISCONTINUED | OUTPATIENT
Start: 2020-03-20 | End: 2020-03-21 | Stop reason: HOSPADM

## 2020-03-20 RX ORDER — IBUPROFEN 200 MG
16 TABLET ORAL
Status: DISCONTINUED | OUTPATIENT
Start: 2020-03-20 | End: 2020-03-21 | Stop reason: HOSPADM

## 2020-03-20 RX ORDER — MAGNESIUM SULFATE HEPTAHYDRATE 40 MG/ML
2 INJECTION, SOLUTION INTRAVENOUS
Status: COMPLETED | OUTPATIENT
Start: 2020-03-20 | End: 2020-03-20

## 2020-03-20 RX ORDER — FAMOTIDINE 20 MG/1
20 TABLET, FILM COATED ORAL 2 TIMES DAILY
Status: DISCONTINUED | OUTPATIENT
Start: 2020-03-20 | End: 2020-03-21 | Stop reason: HOSPADM

## 2020-03-20 RX ORDER — HEPARIN SODIUM 5000 [USP'U]/ML
5000 INJECTION, SOLUTION INTRAVENOUS; SUBCUTANEOUS EVERY 8 HOURS
Status: DISCONTINUED | OUTPATIENT
Start: 2020-03-20 | End: 2020-03-21 | Stop reason: HOSPADM

## 2020-03-20 RX ORDER — BISOPROLOL FUMARATE 5 MG/1
2.5 TABLET, FILM COATED ORAL DAILY
Status: DISCONTINUED | OUTPATIENT
Start: 2020-03-21 | End: 2020-03-20

## 2020-03-20 RX ADMIN — SIMVASTATIN 40 MG: 20 TABLET, FILM COATED ORAL at 09:03

## 2020-03-20 RX ADMIN — FAMOTIDINE 20 MG: 20 TABLET ORAL at 09:03

## 2020-03-20 RX ADMIN — BACLOFEN 10 MG: 10 TABLET ORAL at 09:03

## 2020-03-20 RX ADMIN — HEPARIN SODIUM 5000 UNITS: 5000 INJECTION INTRAVENOUS; SUBCUTANEOUS at 05:03

## 2020-03-20 RX ADMIN — MAGNESIUM SULFATE 2 G: 2 INJECTION INTRAVENOUS at 05:03

## 2020-03-20 RX ADMIN — POTASSIUM CHLORIDE 40 MEQ: 1500 TABLET, EXTENDED RELEASE ORAL at 09:03

## 2020-03-20 RX ADMIN — HEPARIN SODIUM 5000 UNITS: 5000 INJECTION INTRAVENOUS; SUBCUTANEOUS at 09:03

## 2020-03-20 RX ADMIN — POTASSIUM CHLORIDE 40 MEQ: 1500 TABLET, EXTENDED RELEASE ORAL at 04:03

## 2020-03-20 RX ADMIN — MAGNESIUM SULFATE IN WATER 2 G: 40 INJECTION, SOLUTION INTRAVENOUS at 03:03

## 2020-03-20 NOTE — ED PROVIDER NOTES
"SCRIBE #1 NOTE: I, Corinne Mack, am scribing for, and in the presence of, Florinda Doran MD. I have scribed the entire note.      History      Chief Complaint   Patient presents with    Abnormal Lab       Review of patient's allergies indicates:   Allergen Reactions    Amitriptyline      Other reaction(s): Rash    Celecoxib      Other reaction(s): Rash        HPI   HPI    3/20/2020, 3:45 PM   History obtained from the patient      History of Present Illness: Santiago Khan is a 69 y.o. male patient with PMHx of CKD, DM, HTN, and BRADLEY who presents to the Emergency Department for evaluation. Pt was seen by Dr. Burkett (Nephrology) today who referred the pt to the ED.    Per Dr. Burkett's note, "69 y.o. male with history of sleep apnea, HTN, DM2, hyperlipidemia, prostate cancer, gout presents to the renal clinic for evaluation of renal insufficiency and hypokalemia.     1. Renal insufficiency: Patient presents with mild renal insufficiency, consistent with CKD stage 3. Cause of his renal insufficiency is not entirely clear but past NSAID use and HTN in past may have contributed. Diabetes is less likely given absence of proteinuria. Hyperuricemia may also play a role. Patient's renal function will be monitored closely and he will return to the clinic in 2 weeks for follow up. I will order a renal panel, CBC, urinalysis, protein creatinine ratio, PTH prior to his return visit. Patient was advised to avoid NSAID pain medications such as advil, aleve, motrin, ibuprofen, naprosyn, meloxicam, diclofenac, mobic and to hydrate with 60-80 ounces of water per day.      2. Electrolytes:   2.1. Hypokalemia: possibly due to combined Lasix and metolazone use. Patient is currently on 120 mEq of KCl per day. In addition, hyperaldosteronism cannot be excluded and I will check daniel/renin levels. Will also follow up on hypomagnesemia and check magnesium level.      3. Acid base status: No acute issues.     4. Volume: " "Euvolemic at present.      5. Hypertension: Good BP control.      6. Medications: Reviewed. Agree with current medical regimen.      7. Anemia: Will monitor.     8. Diabetes mellitus: well controlled. Continue glimepiride".    Pt otherwise has no complaints in the ED. No associated sxs. Patient denies any fever, chills, CP, SOB, N/V/D, abd pain, back pain, neck pain, HA, dizziness, and all other sxs at this time. No prior Tx. No further complaints or concerns at this time.         Arrival mode: Personal vehicle    PCP: Kashif Acosta MD       Past Medical History:  Past Medical History:   Diagnosis Date    Chronic kidney disease, stage 3     DDD (degenerative disc disease), lumbar     DM (diabetes mellitus) with complications     History of gout     History of prostate cancer     Hyperlipidemia associated with type 2 diabetes mellitus     Hypertension associated with stage 3 chronic kidney disease due to type 2 diabetes mellitus     BRADLEY on CPAP     Tobacco abuse        Past Surgical History:  Past Surgical History:   Procedure Laterality Date    BICEPS TENDON REPAIR      COLONOSCOPY N/A 3/27/2019    Procedure: COLONOSCOPY;  Surgeon: James Roger MD;  Location: [facility];  Service: Endoscopy;  Laterality: N/A;    HEMORRHOID SURGERY      INGUINAL HERNIA REPAIR Left     KNEE ARTHROSCOPY Right     LUMBAR LAMINECTOMY      PROSTATECTOMY           Family History:  Family History   Problem Relation Age of Onset    Prostate cancer Father     Coronary artery disease Father 90    Alzheimer's disease Father     Hyperlipidemia Mother        Social History:  Social History     Tobacco Use    Smoking status: Former Smoker     Packs/day: 2.00     Years: 50.00     Pack years: 100.00     Types: Cigarettes     Last attempt to quit: 2020     Years since quittin.0    Smokeless tobacco: Former User   Substance and Sexual Activity    Alcohol use: No     Frequency: Never     Drinks per session: " Patient refused     Binge frequency: Never    Drug use: Never    Sexual activity: Unknown       ROS   Review of Systems   Constitutional: Negative for chills and fever.   Respiratory: Negative for cough and shortness of breath.    Cardiovascular: Negative for chest pain and leg swelling.   Gastrointestinal: Negative for abdominal pain, diarrhea, nausea and vomiting.   Musculoskeletal: Negative for back pain, neck pain and neck stiffness.   Skin: Negative for rash and wound.   Neurological: Negative for dizziness, light-headedness, numbness and headaches.   All other systems reviewed and are negative.    Physical Exam      Initial Vitals   BP Pulse Resp Temp SpO2   03/20/20 1537 03/20/20 1537 03/20/20 1537 03/20/20 1538 03/20/20 1537   126/75 82 (!) 24 98.6 °F (37 °C) 95 %      MAP       --                 Physical Exam  Nursing Notes and Vital Signs Reviewed.  Constitutional: Patient is in no acute distress. Well-developed and well-nourished.  Head: Atraumatic. Normocephalic.  Eyes: PERRL. EOM intact. Conjunctivae are not pale. No scleral icterus.  ENT: Mucous membranes are moist. Oropharynx is clear and symmetric.    Neck: Supple. Full ROM. No lymphadenopathy.  Cardiovascular: Regular rate. Regular rhythm. No murmurs, rubs, or gallops. Distal pulses are 2+ and symmetric.  Pulmonary/Chest: No respiratory distress. Clear to auscultation bilaterally. No wheezing or rales.  Abdominal: Soft and non-distended.  There is no tenderness.  No rebound, guarding, or rigidity.   Musculoskeletal: Moves all extremities. No obvious deformities. No edema.   Skin: Warm and dry.  Neurological:  Alert, awake, and appropriate.  Normal speech.  No acute focal neurological deficits are appreciated.  Psychiatric: Normal affect. Good eye contact. Appropriate in content.    ED Course    Critical Care  Date/Time: 3/20/2020 3:50 PM  Performed by: Florinda Doran MD  Authorized by: Florinda Doran MD   Direct patient critical  care time: 11 minutes  Additional history critical care time: 6 minutes  Ordering / reviewing critical care time: 5 minutes  Documentation critical care time: 7 minutes  Consulting other physicians critical care time: 6 minutes  Total critical care time (exclusive of procedural time) : 35 minutes  Critical care time was exclusive of separately billable procedures and treating other patients and teaching time.  Critical care was necessary to treat or prevent imminent or life-threatening deterioration of the following conditions: metabolic crisis.  Critical care was time spent personally by me on the following activities: blood draw for specimens, development of treatment plan with patient or surrogate, discussions with consultants, interpretation of cardiac output measurements, evaluation of patient's response to treatment, examination of patient, obtaining history from patient or surrogate, ordering and performing treatments and interventions, ordering and review of laboratory studies, pulse oximetry, re-evaluation of patient's condition and review of old charts.        ED Vital Signs:  Vitals:    03/20/20 1602 03/20/20 1651 03/20/20 1750 03/20/20 1955   BP: (!) 108/56 116/64  130/71   Pulse: 82 82 81 77   Resp:  16  18   Temp:  98.4 °F (36.9 °C)  98.2 °F (36.8 °C)   TempSrc:  Oral  Oral   SpO2: (!) 94% 97%  98%   Weight:       Height:        03/20/20 2115 03/20/20 2300 03/20/20 2352 03/21/20 0100   BP:   109/64    Pulse: 72 88 76 74   Resp:   18    Temp:   98.3 °F (36.8 °C)    TempSrc:   Oral    SpO2:   97%    Weight:       Height:        03/21/20 0300 03/21/20 0411 03/21/20 0500 03/21/20 0600   BP:  110/64     Pulse: 70 69 72    Resp:  18     Temp:  97.9 °F (36.6 °C)     TempSrc:  Oral     SpO2:  95%     Weight:    92 kg (202 lb 13.2 oz)   Height:        03/21/20 0742 03/21/20 0900 03/21/20 1100   BP: 113/73 113/73    Pulse: 67 72 74   Resp: 14     Temp: 97.5 °F (36.4 °C)     TempSrc: Axillary     SpO2: 99%    "  Weight:  91.6 kg (202 lb)    Height:  5' 7" (1.702 m)        Abnormal Lab Results:  Labs Reviewed - No data to display     All Lab Results:  Results for orders placed or performed in visit on 03/20/20   Urinalysis   Result Value Ref Range    Specimen UA Urine, Clean Catch     Color, UA Yellow Yellow, Straw, Martine    Appearance, UA Clear Clear    pH, UA 6.0 5.0 - 8.0    Specific Gravity, UA 1.015 1.005 - 1.030    Protein, UA Negative Negative    Glucose, UA Negative Negative    Ketones, UA Negative Negative    Bilirubin (UA) Negative Negative    Occult Blood UA Negative Negative    Nitrite, UA Negative Negative    Leukocytes, UA Negative Negative   Results for KLAUDIA ROBLEDO (MRN [id number]) as of 3/20/2020 15:52   Ref. Range 3/20/2020 10:27   Sodium Latest Ref Range: 136 - 145 mmol/L 139   Potassium Latest Ref Range: 3.5 - 5.1 mmol/L 2.8 (L)   Chloride Latest Ref Range: 95 - 110 mmol/L 98   CO2 Latest Ref Range: 23 - 29 mmol/L 28   Anion Gap Latest Ref Range: 8 - 16 mmol/L 13   BUN, Bld Latest Ref Range: 8 - 23 mg/dL 15   Creatinine Latest Ref Range: 0.5 - 1.4 mg/dL 1.4   eGFR if non African American Latest Ref Range: >60 mL/min/1.73 m^2 51 (A)   eGFR if African American Latest Ref Range: >60 mL/min/1.73 m^2 59 (A)   Glucose Latest Ref Range: 70 - 110 mg/dL 145 (H)   Calcium Latest Ref Range: 8.7 - 10.5 mg/dL 7.6 (L)   Phosphorus Latest Ref Range: 2.7 - 4.5 mg/dL 3.4   Magnesium Latest Ref Range: 1.6 - 2.6 mg/dL <0.7 (LL)   Albumin Latest Ref Range: 3.5 - 5.2 g/dL 3.8       Imaging Results:  Imaging Results    None          The EKG was ordered, reviewed, and independently interpreted by the ED provider.  Interpretation time: 1540  Rate: 81 BPM  Rhythm: normal sinus rhythm  Interpretation: RBBB. L anterior fascicular block. Bifascicular block. Minimal voltage criteria for LVH. No STEMI.             The Emergency Provider reviewed the vital signs and test results, which are outlined above.    ED Discussion "     3:51 PM: Discussed case with Brianna Cabrera NP ([facility]). Brianna agrees with current care and management of pt and accepts admission.   Admitting Service: Hospital medicine   Admitting Physician: Dr. Barney  Admit to: Telemetry    3:52 PM: Re-evaluated pt. I have discussed test results, shared treatment plan, and the need for admission with patient. Pt expresses understanding at this time and agrees with all information. All questions answered. Pt has no further questions or concerns at this time. Pt is ready for admit.      ED Medication(s):  Medications   magnesium sulfate 2g in water 50mL IVPB (premix) (2 g Intravenous New Bag 3/20/20 1545)   magnesium sulfate 2g in water 50mL IVPB (premix) (2 g Intravenous New Bag 3/20/20 1740)   potassium chloride SA CR tablet 40 mEq (40 mEq Oral Given 3/20/20 1615)   potassium chloride SA CR tablet 40 mEq (40 mEq Oral Given 3/20/20 2108)   potassium chloride SA CR tablet 40 mEq (40 mEq Oral Given 3/21/20 1047)          Medication List      CHANGE how you take these medications    colchicine 0.6 mg tablet  Commonly known as:  COLCRYS  Take 1 tablet (0.6 mg total) by mouth once daily.  What changed:    · when to take this  · reasons to take this        CONTINUE taking these medications    acetaZOLAMIDE 250 MG tablet  Commonly known as:  DIAMOX     allopurinoL 100 MG tablet  Commonly known as:  ZYLOPRIM     baclofen 10 MG tablet  Commonly known as:  LIORESAL     bisoprolol 5 MG tablet  Commonly known as:  ZEBETA     CENTRUM SILVER Tab  Generic drug:  multivitamin-minerals-lutein     CHANTIX 1 mg Tab  Generic drug:  varenicline  Take 1 tablet (1 mg total) by mouth once daily.     febuxostat 80 mg Tab  Commonly known as:  ULORIC  Take 1 tablet (80 mg total) by mouth once daily.     fluticasone propionate 50 mcg/actuation nasal spray  Commonly known as:  FLONASE     furosemide 40 MG tablet  Commonly known as:  LASIX  take 1 tablet by mouth twice a day     glimepiride 1  MG tablet  Commonly known as:  AMARYL     HYDROcodone-acetaminophen 7.5-325 mg per tablet  Commonly known as:  NORCO     mometasone 0.1% 0.1 % cream  Commonly known as:  ELOCON     nicotine polacrilex 2 MG Lozg  Take 1 lozenge (2 mg total) by mouth as needed (use  no more than 8 lozenges in 24 hours.).     omeprazole 40 MG capsule  Commonly known as:  PRILOSEC  TAKE ONE CAPSULE BY MOUTH EVERY DAY     potassium chloride SA 20 MEQ tablet  Commonly known as:  K-DUR,KLOR-CON  Take 1 tablet (20 mEq total) by mouth 4 (four) times daily.     promethazine 12.5 MG Tab  Commonly known as:  PHENERGAN     simvastatin 40 MG tablet  Commonly known as:  ZOCOR     zolpidem 10 mg Tab  Commonly known as:  AMBIEN        STOP taking these medications    metOLazone 2.5 MG tablet  Commonly known as:  ZAROXOLYN            Follow-up Information     Tomás Jennings MD In 2 weeks.    Specialty:  Vascular Surgery  Why:  Hospital follow up systolic heart failure management  Contact information:  [address] [place] [id number] [phone]             Jp Burkett MD In 2 weeks.    Specialty:  Nephrology  Why:  Hospital follow up  Contact information:  [id number]  [place] [id number] [phone]                     Medical Decision Making           Medical Decision Making:   Clinical Tests:   Lab Tests: Reviewed  Medical Tests: Ordered and Reviewed           Scribe Attestation:   Scribe #1: I performed the above scribed service and the documentation accurately describes the services I performed. I attest to the accuracy of the note 03/20/2020.    Attending:   Physician Attestation Statement for Scribe #1: I, Florinda Doran MD, personally performed the services described in this documentation, as scribed by Corinne Mack, in my presence, and it is both accurate and complete.          Clinical Impression       ICD-10-CM ICD-9-CM   1. Hypokalemia E87.6 276.8   2. Hypomagnesemia E83.42 275.2   3. CKD (chronic kidney  disease) stage 3, GFR 30-59 ml/min N18.3 585.3   4. Diastolic CHF I50.30 428.30     428.0   5. Chronic kidney disease, stage 3 N18.3 585.3   6. Electrolyte disorder E87.8 276.9   7. Hypertension associated with stage 3 chronic kidney disease due to type 2 diabetes mellitus E11.22 250.40    I12.9 403.90    N18.3 585.3   8. Hypocalcemia E83.51 275.41       Disposition:   Disposition: Admitted  Condition: Sabine Doran MD  03/23/20 2183

## 2020-03-20 NOTE — ASSESSMENT & PLAN NOTE
Magnesium level is undetectable  Pt is symptomatic   New Bifascicular block on EKG  Replete magnesium and recheck   Hold All Diuretics for now   Nephrology has been consulted  Unsure of why pt is on 3 diuretics   Will obtain Cardiac echo

## 2020-03-20 NOTE — PLAN OF CARE
Patient admitted to tele room 247 this shift from ED via stretcher.  Plan of care reviewed with patient, he verbalized understanding.  Pt remains free from falls this shift, fall precautions in place.  Pt remains free from skin breakdown, he turns and repositions independently while in bed and is ambulatory to the restroom.  AAOx4, VSS, NAD noted at this time.  Pt remained afebrile.  Room air.  NSR on the tele monitor.  Blood glucose monitoring AC/HS.  2g Mag sulfate infusing at this time.  Pt admitted for hypokalemia and hypomagnesemia; IV mag infusing and PO potassium given in the ED/  Pt currently resting comfortably in bed.  Hourly rounding complete.  Will continue to monitor.

## 2020-03-20 NOTE — HPI
The patient is a 70 yo male with BRADLEY, Prostate ca s/p prostatectomy, DM, HTN, CKD3, HLD,Gout, DDD who was sent to ED by Nephrology for hypomagnesemia and hypokalemia. The patient reports some intermittent generalized weakness and tremor. Pt's home medications include Diamox three times a week, Lasix 40mg BID, and Metolazone 2.5mg daily. Pt also takes KCL 20meq 4-6 times daily. Pt does not recall why he was prescribed diuretics. He denies hx of CHF.     In the ED, Serum K was 2.8. Serum Magnesium was <0.7.

## 2020-03-20 NOTE — SUBJECTIVE & OBJECTIVE
Past Medical History:   Diagnosis Date    Chronic kidney disease, stage 3     DDD (degenerative disc disease), lumbar     DM (diabetes mellitus) with complications     History of gout     History of prostate cancer     Hyperlipidemia associated with type 2 diabetes mellitus     Hypertension associated with stage 3 chronic kidney disease due to type 2 diabetes mellitus     BRADLEY on CPAP     Tobacco abuse        Past Surgical History:   Procedure Laterality Date    BICEPS TENDON REPAIR      COLONOSCOPY N/A 3/27/2019    Procedure: COLONOSCOPY;  Surgeon: James Roger MD;  Location: [facility];  Service: Endoscopy;  Laterality: N/A;    HEMORRHOID SURGERY      INGUINAL HERNIA REPAIR Left     KNEE ARTHROSCOPY Right     LUMBAR LAMINECTOMY      PROSTATECTOMY         Review of patient's allergies indicates:   Allergen Reactions    Amitriptyline      Other reaction(s): Rash    Celecoxib      Other reaction(s): Rash       No current facility-administered medications on file prior to encounter.      Current Outpatient Medications on File Prior to Encounter   Medication Sig    acetaZOLAMIDE (DIAMOX) 250 MG tablet Take 1 tablet by mouth Every Monday, Wednesday, and Friday.    allopurinoL (ZYLOPRIM) 100 MG tablet Take 100 mg by mouth once daily.    baclofen (LIORESAL) 10 MG tablet Take 1 tablet by mouth every evening.    bisoprolol (ZEBETA) 5 MG tablet Take 2.5 mg by mouth once daily.     colchicine (COLCRYS) 0.6 mg tablet Take 1 tablet (0.6 mg total) by mouth once daily. (Patient taking differently: Take 0.6 mg by mouth daily as needed. )    febuxostat (ULORIC) 80 mg Tab Take 1 tablet (80 mg total) by mouth once daily.    fluticasone (FLONASE) 50 mcg/actuation nasal spray U 1 TO 2 SPRAYS IEN QD    furosemide (LASIX) 40 MG tablet take 1 tablet by mouth twice a day    glimepiride (AMARYL) 1 MG tablet TK 1 T PO ONCE D    HYDROcodone-acetaminophen (NORCO) 7.5-325 mg per tablet TK 1 T PO  TID PRF CHRONIC  PAIN    metolazone (ZAROXOLYN) 2.5 MG tablet Take 1 tablet by mouth Daily.    multivitamin-minerals-lutein (CENTRUM SILVER) Tab Take 1 tablet by mouth Daily.    nicotine polacrilex 2 MG Lozg Take 1 lozenge (2 mg total) by mouth as needed (use  no more than 8 lozenges in 24 hours.).    omeprazole (PRILOSEC) 40 MG capsule TAKE ONE CAPSULE BY MOUTH EVERY DAY    potassium chloride SA (K-DUR,KLOR-CON) 20 MEQ tablet Take 1 tablet (20 mEq total) by mouth 4 (four) times daily.    promethazine (PHENERGAN) 12.5 MG Tab Take 12.5 mg by mouth every 6 (six) hours as needed.    simvastatin (ZOCOR) 40 MG tablet Take 40 mg by mouth every evening.     varenicline (CHANTIX) 1 mg Tab Take 1 tablet (1 mg total) by mouth once daily.    mometasone 0.1% (ELOCON) 0.1 % cream MARIA TERESA TO HANDS QD PRF FLARE [employer]    zolpidem (AMBIEN) 10 mg Tab Take 5 mg by mouth nightly as needed.     Family History     Problem Relation (Age of Onset)    Alzheimer's disease Father    Coronary artery disease Father (90)    Hyperlipidemia Mother    Prostate cancer Father        Tobacco Use    Smoking status: Former Smoker     Packs/day: 2.00     Years: 50.00     Pack years: 100.00     Types: Cigarettes     Last attempt to quit: 2020     Years since quittin.0    Smokeless tobacco: Former User   Substance and Sexual Activity    Alcohol use: Not Currently     Frequency: Never     Drinks per session: Patient refused     Binge frequency: Never    Drug use: Never    Sexual activity: Not on file     Review of Systems   Constitutional: Positive for fatigue. Negative for appetite change, chills, diaphoresis and fever.   HENT: Negative for congestion, nosebleeds, sore throat and trouble swallowing.    Eyes: Negative for pain, discharge and visual disturbance.   Respiratory: Negative for apnea, cough, chest tightness, shortness of breath, wheezing and stridor.    Cardiovascular: Negative for chest pain, palpitations and leg swelling.   Gastrointestinal:  Negative for abdominal distention, abdominal pain, blood in stool, constipation, diarrhea, nausea and vomiting.   Endocrine: Negative for cold intolerance and heat intolerance.   Genitourinary: Negative for difficulty urinating, dysuria, flank pain, frequency and urgency.   Musculoskeletal: Negative for arthralgias, back pain, joint swelling, myalgias, neck pain and neck stiffness.   Skin: Negative for rash and wound.   Allergic/Immunologic: Negative for food allergies and immunocompromised state.   Neurological: Positive for tremors and weakness. Negative for dizziness, seizures, syncope, facial asymmetry, light-headedness and headaches.   Hematological: Negative for adenopathy.   Psychiatric/Behavioral: Negative for agitation, behavioral problems and confusion. The patient is not nervous/anxious.      Objective:     Vital Signs (Most Recent):  Temp: 98.4 °F (36.9 °C) (03/20/20 1651)  Pulse: 82 (03/20/20 1651)  Resp: 16 (03/20/20 1651)  BP: 116/64 (03/20/20 1651)  SpO2: 97 % (03/20/20 1651) Vital Signs (24h Range):  Temp:  [98.4 °F (36.9 °C)-98.6 °F (37 °C)] 98.4 °F (36.9 °C)  Pulse:  [70-82] 82  Resp:  [16-24] 16  SpO2:  [94 %-97 %] 97 %  BP: (108-126)/(56-75) 116/64     Weight: 97 kg (213 lb 12.8 oz)  Body mass index is 33.49 kg/m².    Physical Exam   Constitutional: He is oriented to person, place, and time. He appears well-developed and well-nourished. No distress.   HENT:   Head: Normocephalic and atraumatic.   Nose: Nose normal.   Mouth/Throat: Oropharynx is clear and moist.   Eyes: Conjunctivae and EOM are normal. No scleral icterus.   Neck: Normal range of motion. Neck supple.   Cardiovascular: Normal rate, regular rhythm, normal heart sounds and intact distal pulses. Exam reveals no gallop and no friction rub.   No murmur heard.  Pulmonary/Chest: Effort normal and breath sounds normal. No stridor. No respiratory distress. He has no wheezes. He has no rales. He exhibits no tenderness.   Abdominal: Soft.  Bowel sounds are normal. He exhibits no distension. There is no tenderness. There is no rebound and no guarding.   Musculoskeletal: Normal range of motion. He exhibits no edema, tenderness or deformity.   Neurological: He is alert and oriented to person, place, and time. He has normal reflexes. No cranial nerve deficit. He exhibits normal muscle tone. Coordination normal.   Skin: Skin is warm and dry. No rash noted. He is not diaphoretic. No erythema. No pallor.   Psychiatric: He has a normal mood and affect. His behavior is normal. Thought content normal.         CRANIAL NERVES     CN III, IV, VI   Extraocular motions are normal.        Significant Labs:   Results for orders placed or performed in visit on 03/20/20   Urinalysis   Result Value Ref Range    Specimen UA Urine, Clean Catch     Color, UA Yellow Yellow, Straw, Martine    Appearance, UA Clear Clear    pH, UA 6.0 5.0 - 8.0    Specific Gravity, UA 1.015 1.005 - 1.030    Protein, UA Negative Negative    Glucose, UA Negative Negative    Ketones, UA Negative Negative    Bilirubin (UA) Negative Negative    Occult Blood UA Negative Negative    Nitrite, UA Negative Negative    Leukocytes, UA Negative Negative       Significant Imaging:   Imaging Results    None

## 2020-03-20 NOTE — PROGRESS NOTES
NEPHROLOGY CONSULTATION    PHYSICIAN REQUESTING THE CONSULT: Dr. Kashif Acosta    REASON FOR CONSULTATION: Renal insufficiency/hypokalemia    69 y.o. male with history of sleep apnea, HTN, DM2, hyperlipidemia, prostate cancer, gout presents to the renal clinic for evaluation of renal insufficiency and hypokalemia. A consultation has been requested by the patient's PMD, Dr. Kashif Acosta. Patient today presents to the clinic complaining of LE swelling. He denies any headaches, congenital hearing loss, chest pain, SOB, hemoptysis, abdominal or flank pain, diarrhea, nausea/vomiting, hematuria, dysuria, rashes, hand/foot paresthesia, nasal congestion. Patient reports occasional NSAID in distant past (details are unclear).   Patient has a longstanding history of DM2 that was diagnosed > 3 years ago. He is not aware of any associated diabetic retinopathy. Blood glucose is currently controlled with last HgA1c at 5.9 (1/23/20).  Patient is currently on glimepiride only. Patient also reports > 10 year history of hypertension. He is currently on bisoprolol. BP is currently well controlled at 118/70.  In addition, patient reports history of sleep apnea (on CPAP), prostate cancer (s/p prostatectomy about 10 years ago), gout (well controlled with Uloric and Colchicine, last attack was about 5 months ago), nephrolithiasis (single episode in distant past when he passed stone, no recurrence). Patient has been using Lasix/metolazone for several years because of LE edema. He is on KCl supplements. Dose was recently increased to 120 mEq of KCl per day.  Patient denies any history of renal disease or electrolyte abnormalities in his family. Laboratory review revealed that the patient's renal function has been mildly affected with creatinine fluctuating between 1.4 and 1.6 over the past 8 years. Hypokalemia is chronic with K ranging from 3.3 to 2.6 over the past 6-8 years.         Past Medical History:   Diagnosis Date    Chronic kidney  disease, stage 3     DDD (degenerative disc disease), lumbar     DM (diabetes mellitus) with complications     History of gout     History of prostate cancer     Hyperlipidemia associated with type 2 diabetes mellitus     Hypertension associated with stage 3 chronic kidney disease due to type 2 diabetes mellitus     BRADLEY on CPAP     Tobacco abuse        Past Surgical History:   Procedure Laterality Date    BICEPS TENDON REPAIR      COLONOSCOPY N/A 3/27/2019    Procedure: COLONOSCOPY;  Surgeon: James Roger MD;  Location: [facility];  Service: Endoscopy;  Laterality: N/A;    HEMORRHOID SURGERY      HERNIA REPAIR      KNEE ARTHROSCOPY      LUMBAR LAMINECTOMY      PROSTATECTOMY         Review of patient's allergies indicates:   Allergen Reactions    Amitriptyline      Other reaction(s): Rash    Celecoxib      Other reaction(s): Rash       Current Outpatient Medications   Medication Sig Dispense Refill    acetaZOLAMIDE (DIAMOX) 250 MG tablet Take 1 tablet by mouth Every Monday, Wednesday, and Friday.      baclofen (LIORESAL) 10 MG tablet Take 1 tablet by mouth every evening.  2    bisoprolol (ZEBETA) 5 MG tablet       colchicine (COLCRYS) 0.6 mg tablet Take 1 tablet (0.6 mg total) by mouth once daily. 30 tablet 11    fluticasone (FLONASE) 50 mcg/actuation nasal spray U 1 TO 2 SPRAYS IEN QD  4    furosemide (LASIX) 40 MG tablet take 1 tablet by mouth twice a day 60 tablet 9    glimepiride (AMARYL) 1 MG tablet TK 1 T PO ONCE D  4    HYDROcodone-acetaminophen (NORCO) 7.5-325 mg per tablet TK 1 T PO  TID PRF CHRONIC PAIN  0    metolazone (ZAROXOLYN) 2.5 MG tablet Take 1 tablet by mouth Daily.      mometasone 0.1% (ELOCON) 0.1 % cream MARIA TERESA TO HANDS QD PRF FLARE UPS  0    multivitamin-minerals-lutein (CENTRUM SILVER) Tab Take 1 tablet by mouth Daily.      nicotine polacrilex 2 MG Lozg Take 1 lozenge (2 mg total) by mouth as needed (use  no more than 8 lozenges in 24 hours.). 300 lozenge 1     omeprazole (PRILOSEC) 40 MG capsule TAKE ONE CAPSULE BY MOUTH EVERY DAY 30 capsule 11    potassium chloride SA (K-DUR,KLOR-CON) 20 MEQ tablet Take 1 tablet (20 mEq total) by mouth 4 (four) times daily. (Patient taking differently: Take 20 mEq by mouth 6 (six) times daily. ) 120 tablet 11    promethazine (PHENERGAN) 12.5 MG Tab Take 12.5 mg by mouth every 6 (six) hours as needed.  2    simvastatin (ZOCOR) 40 MG tablet Take 1 tablet by mouth Daily.      varenicline (CHANTIX) 1 mg Tab Take 1 tablet (1 mg total) by mouth once daily. 60 tablet 2    zolpidem (AMBIEN) 10 mg Tab Take 5 mg by mouth nightly as needed.      febuxostat (ULORIC) 80 mg Tab Take 1 tablet (80 mg total) by mouth once daily. 30 each 11     No current facility-administered medications for this visit.        Family History   Problem Relation Age of Onset    Prostate cancer Father     Coronary artery disease Father        Social History     Socioeconomic History    Marital status:      Spouse name: Not on file    Number of children: Not on file    Years of education: Not on file    Highest education level: Not on file   Occupational History    Not on file   Social Needs    Financial resource strain: Somewhat hard    Food insecurity:     Worry: Never true     Inability: Never true    Transportation needs:     Medical: No     Non-medical: No   Tobacco Use    Smoking status: Former Smoker     Packs/day: 2.00     Years: 50.00     Pack years: 100.00     Types: Cigarettes     Last attempt to quit: 2020     Years since quittin.0    Smokeless tobacco: Former User   Substance and Sexual Activity    Alcohol use: No     Frequency: Never     Drinks per session: Patient refused     Binge frequency: Never    Drug use: Never    Sexual activity: Not on file   Lifestyle    Physical activity:     Days per week: 0 days     Minutes per session: 0 min    Stress: Only a little   Relationships    Social connections:     Talks on phone:  "More than three times a week     Gets together: Twice a week     Attends [employer] service: Not on file     Active member of club or organization: No     Attends meetings of clubs or organizations: Never     Relationship status:    Other Topics Concern    Not on file   Social History Narrative    Not on file       Review of Systems:  1. GENERAL: patient denies any fever, weight changes, generalized weakness, dizziness.  2. HEENT: patient denies headaches, visual disturbances, swallowing problems, sinus pain, nasal congestion.  3. CARDIOVASCULAR: patient denies chest pain, palpitations.  4. PULMONARY: patient denies SOB, coughing, hemoptysis, wheezing.  5. GASTROINTESTINAL: patient denies abdominal pain, nausea, vomiting, diarrhea.  6. GENITOURINARY: patient denies dysuria, hematuria, hesitancy, frequency.  7. EXTREMITIES: patient reports LE edema, no LE cramping.  8. DERMATOLOGY: patient denies rashes, ulcers.  9. NEURO: patient denies tremors, extremity weakness, extremity numbness/tingling.  10. MUSCULOSKELETAL: patient denies joint pain, joint swelling.  11. HEMATOLOGY: patient denies rectal or gum bleeding.  12: PSYCH: patient denies anxiety, depression.      PHYSICAL EXAM:    /70   Pulse 70   Ht 5' 7" (1.702 m)   Wt 92.5 kg (203 lb 14.8 oz)   BMI 31.94 kg/m²     GENERAL: Pleasant gentleman presents to clinic with non-labored breathing.  HEENT: PER, no nasal discharge, no icterus, no oral exudates, moist mucosal membranes.  NECK: no thyroid mass, no lymphadenopathy.  HEART: RRR S1/S2, no rubs, good peripheral pulses.  LUNGS: CTA bilaterally, no wheezing, breathing is nonlabored.  ABDOMEN: soft, nontender, not distended, bowel sounds are present, no abdominal hernia.  EXTREM: no LE edema.  SKIN: no rashes, skin is warm and dry.  NEURO: A & O x 3, no obvious focal signs.    LABORATORY RESULTS:    Lab Results   Component Value Date    CREATININE 1.6 (H) 03/17/2020    BUN 21 03/17/2020     " 03/17/2020    K 2.6 (LL) 03/17/2020    CL 94 (L) 03/17/2020    CO2 32 (H) 03/17/2020      Lab Results   Component Value Date    CALCIUM 8.3 (L) 03/17/2020    PHOS 3.0 07/27/2012     Lab Results   Component Value Date    ALBUMIN 3.9 03/10/2020     Lab Results   Component Value Date    WBC 9.33 03/10/2020    HGB 10.3 (L) 03/10/2020    HCT 33.2 (L) 03/10/2020    MCV 79 (L) 03/10/2020     03/10/2020     Protein Creatinine Ratios:    Creatinine, Random Ur   Date Value Ref Range Status   01/23/2020 227.0 23.0 - 375.0 mg/dL Final     Comment:     The random urine reference ranges provided were established   for 24 hour urine collections.  No reference ranges exist for  random urine specimens.  Correlate clinically.     01/30/2019 163.0 23.0 - 375.0 mg/dL Final     Comment:     The random urine reference ranges provided were established   for 24 hour urine collections.  No reference ranges exist for  random urine specimens.  Correlate clinically.     07/27/2012 29 23 - 375 mg/dl Final     Protein, Urine Random   Date Value Ref Range Status   01/23/2020 24 (H) 0 - 15 mg/dL Final     Comment:     The random urine reference ranges provided were established   for 24 hour urine collections.  No reference ranges exist for  random urine specimens.  Correlate clinically.     01/30/2019 13 0 - 15 mg/dL Final     Comment:     The random urine reference ranges provided were established   for 24 hour urine collections.  No reference ranges exist for  random urine specimens.  Correlate clinically.     07/27/2012 < 7.0 0 - 15 mg/dl Final     Prot/Creat Ratio, Ur   Date Value Ref Range Status   01/23/2020 0.11 0.00 - 0.20 Final   01/30/2019 0.08 0.00 - 0.20 Final   07/27/2012 UNABLE TO CALCULATE 0.0 - 0.2 Final           ASSESSMENT AND PLAN:  69 y.o. male with history of sleep apnea, HTN, DM2, hyperlipidemia, prostate cancer, gout presents to the renal clinic for evaluation of renal insufficiency and hypokalemia.    1. Renal  insufficiency: Patient presents with mild renal insufficiency, consistent with CKD stage 3. Cause of his renal insufficiency is not entirely clear but past NSAID use and HTN in past may have contributed. Diabetes is less likely given absence of proteinuria. Hyperuricemia may also play a role. Patient's renal function will be monitored closely and he will return to the clinic in 2 weeks for follow up. I will order a renal panel, CBC, urinalysis, protein creatinine ratio, PTH prior to his return visit. Patient was advised to avoid NSAID pain medications such as advil, aleve, motrin, ibuprofen, naprosyn, meloxicam, diclofenac, mobic and to hydrate with 60-80 ounces of water per day.     2. Electrolytes:   2.1. Hypokalemia: possibly due to combined Lasix and metolazone use. Patient is currently on 120 mEq of KCl per day. In addition, hyperaldosteronism cannot be excluded and I will check daniel/renin levels. Will also follow up on hypomagnesemia and check magnesium level.     3. Acid base status: No acute issues.    4. Volume: Euvolemic at present.     5. Hypertension: Good BP control.     6. Medications: Reviewed. Agree with current medical regimen.     7. Anemia: Will monitor.    8. Diabetes mellitus: well controlled. Continue glimepiride.         Thank you very much for this consult. Please see my note in Epic for recommendations.    Total time spent was about 45 min. 50 % or more of time was spent on counseling patient about treatment options and educating patient about disease etiology. Complex case. Level 5 consult.       Addendum: magnesium level came back at < 0.7. Patient was contacted by phone to go to ER for IV magnesium. Patient verbalized understanding and will go to ER for IV magnesium loading.

## 2020-03-20 NOTE — H&P
[facility] Hospital Medicine  History & Physical    Patient Name: Santiago Khan  MRN: [id number]  Admission Date: 3/20/2020  Attending Physician: Carmen Barney MD   Primary Care Provider: Kashif Acosta MD         Patient information was obtained from patient and ER records.     Subjective:     Principal Problem:Hypomagnesemia    Chief Complaint:   Chief Complaint   Patient presents with    Abnormal Lab        HPI: The patient is a 68 yo male with BRADLEY, Prostate ca s/p prostatectomy, DM, HTN, CKD3, HLD,Gout, DDD who was sent to ED by Nephrology for hypomagnesemia and hypokalemia. The patient reports some intermittent generalized weakness and tremor. Pt's home medications include Diamox three times a week, Lasix 40mg BID, and Metolazone 2.5mg daily. Pt also takes KCL 20meq 4-6 times daily. Pt does not recall why he was prescribed diuretics. He denies hx of CHF.     In the ED, Serum K was 2.8. Serum Magnesium was <0.7.     Past Medical History:   Diagnosis Date    Chronic kidney disease, stage 3     DDD (degenerative disc disease), lumbar     DM (diabetes mellitus) with complications     History of gout     History of prostate cancer     Hyperlipidemia associated with type 2 diabetes mellitus     Hypertension associated with stage 3 chronic kidney disease due to type 2 diabetes mellitus     BRADLEY on CPAP     Tobacco abuse        Past Surgical History:   Procedure Laterality Date    BICEPS TENDON REPAIR      COLONOSCOPY N/A 3/27/2019    Procedure: COLONOSCOPY;  Surgeon: James Roger MD;  Location: [facility];  Service: Endoscopy;  Laterality: N/A;    HEMORRHOID SURGERY      INGUINAL HERNIA REPAIR Left     KNEE ARTHROSCOPY Right     LUMBAR LAMINECTOMY      PROSTATECTOMY         Review of patient's allergies indicates:   Allergen Reactions    Amitriptyline      Other reaction(s): Rash    Celecoxib      Other reaction(s): Rash       No current facility-administered  medications on file prior to encounter.      Current Outpatient Medications on File Prior to Encounter   Medication Sig    acetaZOLAMIDE (DIAMOX) 250 MG tablet Take 1 tablet by mouth Every Monday, Wednesday, and Friday.    allopurinoL (ZYLOPRIM) 100 MG tablet Take 100 mg by mouth once daily.    baclofen (LIORESAL) 10 MG tablet Take 1 tablet by mouth every evening.    bisoprolol (ZEBETA) 5 MG tablet Take 2.5 mg by mouth once daily.     colchicine (COLCRYS) 0.6 mg tablet Take 1 tablet (0.6 mg total) by mouth once daily. (Patient taking differently: Take 0.6 mg by mouth daily as needed. )    febuxostat (ULORIC) 80 mg Tab Take 1 tablet (80 mg total) by mouth once daily.    fluticasone (FLONASE) 50 mcg/actuation nasal spray U 1 TO 2 SPRAYS IEN QD    furosemide (LASIX) 40 MG tablet take 1 tablet by mouth twice a day    glimepiride (AMARYL) 1 MG tablet TK 1 T PO ONCE D    HYDROcodone-acetaminophen (NORCO) 7.5-325 mg per tablet TK 1 T PO  TID PRF CHRONIC PAIN    metolazone (ZAROXOLYN) 2.5 MG tablet Take 1 tablet by mouth Daily.    multivitamin-minerals-lutein (CENTRUM SILVER) Tab Take 1 tablet by mouth Daily.    nicotine polacrilex 2 MG Lozg Take 1 lozenge (2 mg total) by mouth as needed (use  no more than 8 lozenges in 24 hours.).    omeprazole (PRILOSEC) 40 MG capsule TAKE ONE CAPSULE BY MOUTH EVERY DAY    potassium chloride SA (K-DUR,KLOR-CON) 20 MEQ tablet Take 1 tablet (20 mEq total) by mouth 4 (four) times daily.    promethazine (PHENERGAN) 12.5 MG Tab Take 12.5 mg by mouth every 6 (six) hours as needed.    simvastatin (ZOCOR) 40 MG tablet Take 40 mg by mouth every evening.     varenicline (CHANTIX) 1 mg Tab Take 1 tablet (1 mg total) by mouth once daily.    mometasone 0.1% (ELOCON) 0.1 % cream MARIA TERESA TO HANDS QD PRF FLARE UPS    zolpidem (AMBIEN) 10 mg Tab Take 5 mg by mouth nightly as needed.     Family History     Problem Relation (Age of Onset)    Alzheimer's disease Father    Coronary artery  disease Father (90)    Hyperlipidemia Mother    Prostate cancer Father        Tobacco Use    Smoking status: Former Smoker     Packs/day: 2.00     Years: 50.00     Pack years: 100.00     Types: Cigarettes     Last attempt to quit: 2020     Years since quittin.0    Smokeless tobacco: Former User   Substance and Sexual Activity    Alcohol use: Not Currently     Frequency: Never     Drinks per session: Patient refused     Binge frequency: Never    Drug use: Never    Sexual activity: Not on file     Review of Systems   Constitutional: Positive for fatigue. Negative for appetite change, chills, diaphoresis and fever.   HENT: Negative for congestion, nosebleeds, sore throat and trouble swallowing.    Eyes: Negative for pain, discharge and visual disturbance.   Respiratory: Negative for apnea, cough, chest tightness, shortness of breath, wheezing and stridor.    Cardiovascular: Negative for chest pain, palpitations and leg swelling.   Gastrointestinal: Negative for abdominal distention, abdominal pain, blood in stool, constipation, diarrhea, nausea and vomiting.   Endocrine: Negative for cold intolerance and heat intolerance.   Genitourinary: Negative for difficulty urinating, dysuria, flank pain, frequency and urgency.   Musculoskeletal: Negative for arthralgias, back pain, joint swelling, myalgias, neck pain and neck stiffness.   Skin: Negative for rash and wound.   Allergic/Immunologic: Negative for food allergies and immunocompromised state.   Neurological: Positive for tremors and weakness. Negative for dizziness, seizures, syncope, facial asymmetry, light-headedness and headaches.   Hematological: Negative for adenopathy.   Psychiatric/Behavioral: Negative for agitation, behavioral problems and confusion. The patient is not nervous/anxious.      Objective:     Vital Signs (Most Recent):  Temp: 98.4 °F (36.9 °C) (20)  Pulse: 82 (20)  Resp: 16 (20)  BP: 116/64 (20  1651)  SpO2: 97 % (03/20/20 1651) Vital Signs (24h Range):  Temp:  [98.4 °F (36.9 °C)-98.6 °F (37 °C)] 98.4 °F (36.9 °C)  Pulse:  [70-82] 82  Resp:  [16-24] 16  SpO2:  [94 %-97 %] 97 %  BP: (108-126)/(56-75) 116/64     Weight: 97 kg (213 lb 12.8 oz)  Body mass index is 33.49 kg/m².    Physical Exam   Constitutional: He is oriented to person, place, and time. He appears well-developed and well-nourished. No distress.   HENT:   Head: Normocephalic and atraumatic.   Nose: Nose normal.   Mouth/Throat: Oropharynx is clear and moist.   Eyes: Conjunctivae and EOM are normal. No scleral icterus.   Neck: Normal range of motion. Neck supple.   Cardiovascular: Normal rate, regular rhythm, normal heart sounds and intact distal pulses. Exam reveals no gallop and no friction rub.   No murmur heard.  Pulmonary/Chest: Effort normal and breath sounds normal. No stridor. No respiratory distress. He has no wheezes. He has no rales. He exhibits no tenderness.   Abdominal: Soft. Bowel sounds are normal. He exhibits no distension. There is no tenderness. There is no rebound and no guarding.   Musculoskeletal: Normal range of motion. He exhibits no edema, tenderness or deformity.   Neurological: He is alert and oriented to person, place, and time. He has normal reflexes. No cranial nerve deficit. He exhibits normal muscle tone. Coordination normal.   Skin: Skin is warm and dry. No rash noted. He is not diaphoretic. No erythema. No pallor.   Psychiatric: He has a normal mood and affect. His behavior is normal. Thought content normal.         CRANIAL NERVES     CN III, IV, VI   Extraocular motions are normal.        Significant Labs:   Results for orders placed or performed in visit on 03/20/20   Urinalysis   Result Value Ref Range    Specimen UA Urine, Clean Catch     Color, UA Yellow Yellow, Straw, Martine    Appearance, UA Clear Clear    pH, UA 6.0 5.0 - 8.0    Specific Gravity, UA 1.015 1.005 - 1.030    Protein, UA Negative Negative     Glucose, UA Negative Negative    Ketones, UA Negative Negative    Bilirubin (UA) Negative Negative    Occult Blood UA Negative Negative    Nitrite, UA Negative Negative    Leukocytes, UA Negative Negative       Significant Imaging:   Imaging Results    None       Assessment/Plan:     * Hypomagnesemia  Magnesium level is undetectable  Pt is symptomatic   New Bifascicular block on EKG  Replete magnesium and recheck   Hold All Diuretics for now   Nephrology has been consulted  Unsure of why pt is on 3 diuretics   Will obtain Cardiac echo      Hypokalemia  Replete  Hold Diuretics for now       Chronic kidney disease, stage 3  Appears stable   Monitor       Hypertension associated with stage 3 chronic kidney disease due to type 2 diabetes mellitus  Cont Bisoprolol  Hold Diuretics       DM (diabetes mellitus) with complications  NISS  Diabetic diet       BRADLEY (obstructive sleep apnea)  Cont CPAP    Microcytic anemia  Appears stable  F/u with PCP      Hyperlipidemia associated with type 2 diabetes mellitus  Cont Statin         VTE Risk Mitigation (From admission, onward)    None             Brianna Cabrera NP  Department of Hospital Medicine   [facility]

## 2020-03-20 NOTE — LETTER
March 20, 2020      Kashif Acosta MD  [address]  [address]  [place] [place] [facility] [place] [address]           [facility]  [address] [place] [id number]  Phone: [phone]  Fax: [phone]          Patient: Santiago Khan   MR Number: [id number]   YOB: 1950   Date of Visit: 3/20/2020       Dear Dr. Kashif Acosta:    Thank you for referring Santiago Khan to me for evaluation. Attached you will find relevant portions of my assessment and plan of care.    If you have questions, please do not hesitate to call me. I look forward to following Santiago Khan along with you.    Sincerely,    Jp Burkett MD    Enclosure  CC:  No Recipients    If you would like to receive this communication electronically, please contact [email or web address] or [phone] to request more information on [employer] access.    For providers and/or their staff who would like to refer a patient to [facility], please contact us through our one-stop-shop provider referral line, [facility], at [phone].    If you feel you have received this communication in error or would no longer like to receive these types of communications, please e-mail [email or web address]

## 2020-03-21 VITALS
TEMPERATURE: 98 F | OXYGEN SATURATION: 99 % | WEIGHT: 202 LBS | SYSTOLIC BLOOD PRESSURE: 113 MMHG | BODY MASS INDEX: 31.71 KG/M2 | RESPIRATION RATE: 14 BRPM | HEART RATE: 74 BPM | DIASTOLIC BLOOD PRESSURE: 73 MMHG | HEIGHT: 67 IN

## 2020-03-21 LAB
ALBUMIN SERPL BCP-MCNC: 3.8 G/DL (ref 3.5–5.2)
ALP SERPL-CCNC: 61 U/L (ref 55–135)
ALT SERPL W/O P-5'-P-CCNC: 26 U/L (ref 10–44)
ANION GAP SERPL CALC-SCNC: 15 MMOL/L (ref 8–16)
AORTIC ROOT ANNULUS: 3.59 CM
ASCENDING AORTA: 3.56 CM
AST SERPL-CCNC: 26 U/L (ref 10–40)
AV INDEX (PROSTH): 0.6
AV MEAN GRADIENT: 5 MMHG
AV PEAK GRADIENT: 8 MMHG
AV VALVE AREA: 2.15 CM2
AV VELOCITY RATIO: 0.59
BASOPHILS # BLD AUTO: 0.08 K/UL (ref 0–0.2)
BASOPHILS NFR BLD: 0.9 % (ref 0–1.9)
BILIRUB SERPL-MCNC: 0.4 MG/DL (ref 0.1–1)
BSA FOR ECHO PROCEDURE: 2.08 M2
BUN SERPL-MCNC: 13 MG/DL (ref 8–23)
CALCIUM SERPL-MCNC: 7.5 MG/DL (ref 8.7–10.5)
CHLORIDE SERPL-SCNC: 99 MMOL/L (ref 95–110)
CO2 SERPL-SCNC: 27 MMOL/L (ref 23–29)
CREAT SERPL-MCNC: 1.4 MG/DL (ref 0.5–1.4)
CV ECHO LV RWT: 0.47 CM
DIFFERENTIAL METHOD: ABNORMAL
DOP CALC AO PEAK VEL: 1.41 M/S
DOP CALC AO VTI: 28.82 CM
DOP CALC LVOT AREA: 3.6 CM2
DOP CALC LVOT DIAMETER: 2.14 CM
DOP CALC LVOT PEAK VEL: 0.83 M/S
DOP CALC LVOT STROKE VOLUME: 61.98 CM3
DOP CALCLVOT PEAK VEL VTI: 17.24 CM
E WAVE DECELERATION TIME: 172.08 MSEC
E/A RATIO: 0.83
E/E' RATIO: 9.29 M/S
ECHO LV POSTERIOR WALL: 1.16 CM (ref 0.6–1.1)
EOSINOPHIL # BLD AUTO: 0.5 K/UL (ref 0–0.5)
EOSINOPHIL NFR BLD: 5.4 % (ref 0–8)
ERYTHROCYTE [DISTWIDTH] IN BLOOD BY AUTOMATED COUNT: 17.9 % (ref 11.5–14.5)
EST. GFR  (AFRICAN AMERICAN): 59 ML/MIN/1.73 M^2
EST. GFR  (NON AFRICAN AMERICAN): 51 ML/MIN/1.73 M^2
FRACTIONAL SHORTENING: 21 % (ref 28–44)
GLUCOSE SERPL-MCNC: 106 MG/DL (ref 70–110)
HCT VFR BLD AUTO: 38.1 % (ref 40–54)
HGB BLD-MCNC: 11.3 G/DL (ref 14–18)
IMM GRANULOCYTES # BLD AUTO: 0.04 K/UL (ref 0–0.04)
IMM GRANULOCYTES NFR BLD AUTO: 0.5 % (ref 0–0.5)
INTERVENTRICULAR SEPTUM: 1.31 CM (ref 0.6–1.1)
IVRT: 88.49 MSEC
LA MAJOR: 4.9 CM
LA MINOR: 3.93 CM
LEFT ATRIUM SIZE: 3.27 CM
LEFT INTERNAL DIMENSION IN SYSTOLE: 3.88 CM (ref 2.1–4)
LEFT VENTRICLE DIASTOLIC VOLUME INDEX: 56.53 ML/M2
LEFT VENTRICLE DIASTOLIC VOLUME: 114.8 ML
LEFT VENTRICLE MASS INDEX: 118 G/M2
LEFT VENTRICLE SYSTOLIC VOLUME INDEX: 32.1 ML/M2
LEFT VENTRICLE SYSTOLIC VOLUME: 65.1 ML
LEFT VENTRICULAR INTERNAL DIMENSION IN DIASTOLE: 4.94 CM (ref 3.5–6)
LEFT VENTRICULAR MASS: 238.81 G
LV LATERAL E/E' RATIO: 9.29 M/S
LV SEPTAL E/E' RATIO: 9.29 M/S
LYMPHOCYTES # BLD AUTO: 2.4 K/UL (ref 1–4.8)
LYMPHOCYTES NFR BLD: 27.3 % (ref 18–48)
MAGNESIUM SERPL-MCNC: 1.1 MG/DL (ref 1.6–2.6)
MCH RBC QN AUTO: 24.1 PG (ref 27–31)
MCHC RBC AUTO-ENTMCNC: 29.7 G/DL (ref 32–36)
MCV RBC AUTO: 81 FL (ref 82–98)
MONOCYTES # BLD AUTO: 0.6 K/UL (ref 0.3–1)
MONOCYTES NFR BLD: 6.4 % (ref 4–15)
MV PEAK A VEL: 0.78 M/S
MV PEAK E VEL: 0.65 M/S
NEUTROPHILS # BLD AUTO: 5.1 K/UL (ref 1.8–7.7)
NEUTROPHILS NFR BLD: 59.5 % (ref 38–73)
NRBC BLD-RTO: 0 /100 WBC
PHOSPHATE SERPL-MCNC: 3.4 MG/DL (ref 2.7–4.5)
PISA TR MAX VEL: 2.24 M/S
PLATELET # BLD AUTO: 243 K/UL (ref 150–350)
PMV BLD AUTO: 9.9 FL (ref 9.2–12.9)
POCT GLUCOSE: 130 MG/DL (ref 70–110)
POTASSIUM SERPL-SCNC: 2.9 MMOL/L (ref 3.5–5.1)
POTASSIUM SERPL-SCNC: 3.2 MMOL/L (ref 3.5–5.1)
PROT SERPL-MCNC: 7 G/DL (ref 6–8.4)
RA MAJOR: 3.92 CM
RA PRESSURE: 3 MMHG
RBC # BLD AUTO: 4.69 M/UL (ref 4.6–6.2)
RIGHT VENTRICULAR END-DIASTOLIC DIMENSION: 3.17 CM
SINUS: 3.74 CM
SODIUM SERPL-SCNC: 141 MMOL/L (ref 136–145)
STJ: 3.57 CM
TDI LATERAL: 0.07 M/S
TDI SEPTAL: 0.07 M/S
TDI: 0.07 M/S
TR MAX PG: 20 MMHG
TRICUSPID ANNULAR PLANE SYSTOLIC EXCURSION: 1.59 CM
TV REST PULMONARY ARTERY PRESSURE: 23 MMHG
WBC # BLD AUTO: 8.63 K/UL (ref 3.9–12.7)

## 2020-03-21 PROCEDURE — 84100 ASSAY OF PHOSPHORUS: CPT

## 2020-03-21 PROCEDURE — 63600175 PHARM REV CODE 636 W HCPCS: Performed by: NURSE PRACTITIONER

## 2020-03-21 PROCEDURE — 80053 COMPREHEN METABOLIC PANEL: CPT

## 2020-03-21 PROCEDURE — 99233 SBSQ HOSP IP/OBS HIGH 50: CPT | Mod: ,,, | Performed by: INTERNAL MEDICINE

## 2020-03-21 PROCEDURE — 25000003 PHARM REV CODE 250: Performed by: INTERNAL MEDICINE

## 2020-03-21 PROCEDURE — 36415 COLL VENOUS BLD VENIPUNCTURE: CPT

## 2020-03-21 PROCEDURE — 25000003 PHARM REV CODE 250: Performed by: EMERGENCY MEDICINE

## 2020-03-21 PROCEDURE — 85025 COMPLETE CBC W/AUTO DIFF WBC: CPT

## 2020-03-21 PROCEDURE — 25000003 PHARM REV CODE 250: Performed by: NURSE PRACTITIONER

## 2020-03-21 PROCEDURE — 83735 ASSAY OF MAGNESIUM: CPT

## 2020-03-21 PROCEDURE — 99233 PR SUBSEQUENT HOSPITAL CARE,LEVL III: ICD-10-PCS | Mod: ,,, | Performed by: INTERNAL MEDICINE

## 2020-03-21 RX ORDER — POTASSIUM CHLORIDE 20 MEQ/1
40 TABLET, EXTENDED RELEASE ORAL ONCE
Status: COMPLETED | OUTPATIENT
Start: 2020-03-21 | End: 2020-03-21

## 2020-03-21 RX ADMIN — METOPROLOL SUCCINATE 12.5 MG: 25 TABLET, EXTENDED RELEASE ORAL at 08:03

## 2020-03-21 RX ADMIN — POTASSIUM CHLORIDE 40 MEQ: 1500 TABLET, EXTENDED RELEASE ORAL at 10:03

## 2020-03-21 RX ADMIN — ALLOPURINOL 100 MG: 100 TABLET ORAL at 08:03

## 2020-03-21 RX ADMIN — Medication 400 MG: at 08:03

## 2020-03-21 RX ADMIN — HEPARIN SODIUM 5000 UNITS: 5000 INJECTION INTRAVENOUS; SUBCUTANEOUS at 05:03

## 2020-03-21 RX ADMIN — FAMOTIDINE 20 MG: 20 TABLET ORAL at 08:03

## 2020-03-21 RX ADMIN — VARENICLINE TARTRATE 1 MG: 0.5 TABLET, FILM COATED ORAL at 08:03

## 2020-03-21 NOTE — SUBJECTIVE & OBJECTIVE
Interval History: Pt was seen and examined this am. Ms Nidhi Patel, charge nurse, was present. Pt had a good night, no new events, no new c/o's, no chest discomfort, no SOB, no leg swelling. Discussed with pt his electrolyte deficiencies. Updated him of the lab and w/u results. Made suggestions and recommendations to reduce or adjust diuretic doses to match the specific medical indication for diuretics. Apart from very mild decrease in EF and mild diastolic dysfunction, pt was not found to have any other reasons for 3rd spacing. Advised pt that the number and the doses of diuretics he had used are out of proportion to the severity of his illness. Evidently, has has no leg swelling at all at this point. Pt is suspected to have used or abused diuretics, leading to developing diuretic rebound, which is a capillary refill disorder. Made pt aware of the potential for cardiac arrhythmia, palpitation, even sudden cardiac death with electrolyte deficiencies caused by diuretics. Opportunity for discussion provided. Again, charge nurse was present during rounds.    Review of patient's allergies indicates:   Allergen Reactions    Amitriptyline      Other reaction(s): Rash    Celecoxib      Other reaction(s): Rash     Current Facility-Administered Medications   Medication Frequency    allopurinoL tablet 100 mg Daily    baclofen tablet 10 mg QHS    dextrose 10 % infusion PRN    dextrose 10 % infusion PRN    famotidine tablet 20 mg BID    glucagon (human recombinant) injection 1 mg PRN    glucose chewable tablet 16 g PRN    glucose chewable tablet 24 g PRN    heparin (porcine) injection 5,000 Units Q8H    HYDROcodone-acetaminophen 7.5-325 mg per tablet 1 tablet Q6H PRN    insulin aspart U-100 pen 0-5 Units QID (AC + HS) PRN    magnesium oxide tablet 400 mg TID    metoprolol succinate (TOPROL-XL) 24 hr split tablet 12.5 mg Daily    simvastatin tablet 40 mg QHS    varenicline tablet 1 mg Daily       Objective:      Vital Signs (Most Recent):  Temp: 97.5 °F (36.4 °C) (03/21/20 0742)  Pulse: 74 (03/21/20 1100)  Resp: 14 (03/21/20 0742)  BP: 113/73 (03/21/20 0900)  SpO2: 99 % (03/21/20 0742)  O2 Device (Oxygen Therapy): room air (03/21/20 0742) Vital Signs (24h Range):  Temp:  [97.5 °F (36.4 °C)-98.6 °F (37 °C)] 97.5 °F (36.4 °C)  Pulse:  [67-88] 74  Resp:  [14-24] 14  SpO2:  [94 %-99 %] 99 %  BP: (108-130)/(56-75) 113/73     Weight: 91.6 kg (202 lb) (03/21/20 0900)  Body mass index is 31.64 kg/m².  Body surface area is 2.08 meters squared.    I/O last 3 completed shifts:  In: 236 [P.O.:236]  Out: 2 [Urine:2]    Physical Exam   Constitutional: He is oriented to person, place, and time. He appears well-developed and well-nourished. No distress.   HENT:   Head: Normocephalic and atraumatic.   Neck: No JVD present.   Cardiovascular: Normal rate, regular rhythm and normal heart sounds. Exam reveals no friction rub.   Pulmonary/Chest: Effort normal and breath sounds normal. He has no rales.   Abdominal: Soft. Bowel sounds are normal.   Musculoskeletal: He exhibits no edema.   No edema at all in either leg   Neurological: He is oriented to person, place, and time.   Skin: Skin is warm and dry.   Psychiatric: He has a normal mood and affect. His behavior is normal.   Nursing note and vitals reviewed.      Significant Labs: reviewed  BMP  Lab Results   Component Value Date     03/21/2020    K 2.9 (L) 03/21/2020    CL 99 03/21/2020    CO2 27 03/21/2020    BUN 13 03/21/2020    CREATININE 1.4 03/21/2020    CALCIUM 7.5 (L) 03/21/2020    ANIONGAP 15 03/21/2020    ESTGFRAFRICA 59 (A) 03/21/2020    EGFRNONAA 51 (A) 03/21/2020     Lab Results   Component Value Date    WBC 8.63 03/21/2020    HGB 11.3 (L) 03/21/2020    HCT 38.1 (L) 03/21/2020    MCV 81 (L) 03/21/2020     03/21/2020         Significant Imaging: reviewed

## 2020-03-21 NOTE — ASSESSMENT & PLAN NOTE
70 y/o male with multiple electrolyte deficiencies:     Electrolyte disorder  Has multiple electrolyte disorders:  Hypokalemia  Hypomagnesemia   Hypocalcemia      Most likely cause these multiple electrolyte disorders is the diuretics pt is taking:  Lasix (a loop diuretic) causes low K, low Mg, low Ca  Diamox (a carbonic anhydrase inhibitor) causes severe low K  Metolazone (a thiazide diuretic) causes low K and low Mg     Reason for the intake of these multiple diuretics not clear  No leg swelling present  Lungs are clear  Pt says leg swelling returns when pt stops diuretics or lowers dose  No known h/o of heart, liver, or thyroid disease that would explain the leg swelling  TSH normal  Liver tests normal  Renal insufficiency is mild and would not explain any fluid gain  Only negligible proteinuria  s albumin is normal (3.9), in the presence of which 3rd spacing is not possible        Highly suspect pt is using diuretics inappropriately or even abusing them  Pt likely has diuretic rebound     Pt was advised that inappropriate use of diuretics may cause cardiac complications, including palpitation, arrhythmias, and even sudden cardiac death     Chronic kidney disease, stage 3  Mild, stable, unchanged     Hypertension associated with stage 3 chronic kidney disease due to type 2 diabetes mellitus  H/o of DM noted.  Pt is hypotensive due to overdiuresis              Plans and recommendations:  As detailed above  Opportunity for questions provided  Order echo to r/o CHF  Hold all diuretics  Agree with current K and mg replacement  For more effective Mg replacement, use po route  Pt may need psychological assistance to cope with understanding that he is abusing diuretics  Total time spent 70 minutes including time needed to review the records, the   patient evaluation, documentation, face-to-face discussion with the patient,   more than 50% of the time was spent on coordination of care and counseling.    Level V visit.

## 2020-03-21 NOTE — PLAN OF CARE
SW spoke with patient to complete discharge planning assessment. Patient lives alone and states he is independent with his needs. Patient states his friend solomon will pick him up at discharge. Patient uses a CPAP machine and denies using any other assistive equipment. Patient states he attends smoking sensation classes weekly. Patient denies any other outpatient treatment. Patient denies LW/POA, but will be provided to him to complete at home per his request. No other CM needs identified at this time.       D/C PLAN: HOME  [id number] [place] - [address]  [address]  [place] [facility] [place] [address]  Phone: [phone] Fax: [phone]    [id number] - [place] - [address] AT [facility]  [address] [place] [address]  Phone: [phone] Fax: [phone]    [facility] [address] [address]  Phone: [phone] Fax: [phone]       03/21/20 1120   Discharge Assessment   Assessment Type Discharge Planning Assessment   Confirmed/corrected address and phone number on facesheet? Yes   Assessment information obtained from? Patient   Prior to hospitilization cognitive status: Alert/Oriented   Prior to hospitalization functional status: Independent   Current cognitive status: Alert/Oriented   Current Functional Status: Independent   Lives With alone   Is patient able to care for self after discharge? Yes   Who are your caregiver(s) and their phone number(s)? Abe Simon [phone] Friend    Readmission Within the Last 30 Days no previous admission in last 30 days   Patient currently being followed by outpatient case management? No   Patient currently receives any other outside agency services? No   Equipment Currently Used at Home CPAP   Do you have any problems affording any of your prescribed medications? No   Is the patient taking  medications as prescribed? yes   Does the patient have transportation home? Yes   Transportation Anticipated family or friend will provide   Does the patient receive services at the [facility]mad[facility] Clinic? No   Discharge Plan A Home with family   Discharge Plan B Home   DME Needed Upon Discharge  none   Patient/Family in Agreement with Plan yes

## 2020-03-21 NOTE — NURSING
I was at the bedside with Dr. Torres who was discussing the patient's medications with him.  Mr. Khan did not seem open to receiving education or recommendations from Dr. Torres at that time regarding the plan to decrease/eliminate diuretics from the patient's medication regimen.

## 2020-03-21 NOTE — NURSING
Discharge instructions reviewed with patient, he verbalizes understanding,  PIV removed, catheter intact.  Tele box # [id number] removed and returned to monitor room.  Stressed importance to keep and attend all follow up appointments.  Educated patient on the need to make prescribed medication meds.  Instructed patient to call when ride arrives.

## 2020-03-21 NOTE — PROGRESS NOTES
[facility] -   Nephrology  Progress Note    Patient Name: Santiago Khan  MRN: [id number]  Admission Date: 3/20/2020  Hospital Length of Stay: 1 days  Attending Provider: Zeeshan Falcon MD   Primary Care Physician: Kashif Acosta MD  Principal Problem:Hypomagnesemia and hypokalemia    Subjective:     HPI: Pt was seen and examined. H/o and chart reviewed. Pt is a 70 y/o male who was seen in renal clinic today for low Mg and K and was admitted. Pt has no acute or new c/o's today. No palpiattion. Reports occasional weakness. No SOB, no leg swelling. Pt is noted to be on 3 diuretics: lasix 40 mg po bid, metolazone 2.5 mg po qd, and diamox 250 mg po qod. Pt does not give a clear reason why he is using these diuretics, has used them for 5-6 years. He says is he does not use them, he gets leg swelling. The swelling is bilateral, not present currently, no SOB has occurred, no prior leg surgeries. No h/o of kidney failure (has very mild CKD), no known heart, liver, or thyroid disease.    Interval History: Pt was seen and examined this am. Ms Nidhi Patel, charge nurse, was present. Pt had a good night, no new events, no new c/o's, no chest discomfort, no SOB, no leg swelling. Discussed with pt his electrolyte deficiencies. Updated him of the lab and w/u results. Made suggestions and recommendations to reduce or adjust diuretic doses to match the specific medical indication for diuretics. Apart from very mild decrease in EF and mild diastolic dysfunction, pt was not found to have any other reasons for 3rd spacing. Advised pt that the number and the doses of diuretics he had used are out of proportion to the severity of his illness. Evidently, has has no leg swelling at all at this point. Pt is suspected to have used or abused diuretics, leading to developing diuretic rebound, which is a capillary refill disorder. Made pt aware of the potential for cardiac arrhythmia, palpitation, even sudden cardiac  death with electrolyte deficiencies caused by diuretics. Opportunity for discussion provided. Again, charge nurse was present during rounds. Pt was resistant to receiving education and recommendations.    Review of patient's allergies indicates:   Allergen Reactions    Amitriptyline      Other reaction(s): Rash    Celecoxib      Other reaction(s): Rash     Current Facility-Administered Medications   Medication Frequency    allopurinoL tablet 100 mg Daily    baclofen tablet 10 mg QHS    dextrose 10 % infusion PRN    dextrose 10 % infusion PRN    famotidine tablet 20 mg BID    glucagon (human recombinant) injection 1 mg PRN    glucose chewable tablet 16 g PRN    glucose chewable tablet 24 g PRN    heparin (porcine) injection 5,000 Units Q8H    HYDROcodone-acetaminophen 7.5-325 mg per tablet 1 tablet Q6H PRN    insulin aspart U-100 pen 0-5 Units QID (AC + HS) PRN    magnesium oxide tablet 400 mg TID    metoprolol succinate (TOPROL-XL) 24 hr split tablet 12.5 mg Daily    simvastatin tablet 40 mg QHS    varenicline tablet 1 mg Daily       Objective:     Vital Signs (Most Recent):  Temp: 97.5 °F (36.4 °C) (03/21/20 0742)  Pulse: 74 (03/21/20 1100)  Resp: 14 (03/21/20 0742)  BP: 113/73 (03/21/20 0900)  SpO2: 99 % (03/21/20 0742)  O2 Device (Oxygen Therapy): room air (03/21/20 0742) Vital Signs (24h Range):  Temp:  [97.5 °F (36.4 °C)-98.6 °F (37 °C)] 97.5 °F (36.4 °C)  Pulse:  [67-88] 74  Resp:  [14-24] 14  SpO2:  [94 %-99 %] 99 %  BP: (108-130)/(56-75) 113/73     Weight: 91.6 kg (202 lb) (03/21/20 0900)  Body mass index is 31.64 kg/m².  Body surface area is 2.08 meters squared.    I/O last 3 completed shifts:  In: 236 [P.O.:236]  Out: 2 [Urine:2]    Physical Exam   Constitutional: He is oriented to person, place, and time. He appears well-developed and well-nourished. No distress.   HENT:   Head: Normocephalic and atraumatic.   Neck: No JVD present.   Cardiovascular: Normal rate, regular rhythm and normal  heart sounds. Exam reveals no friction rub.   Pulmonary/Chest: Effort normal and breath sounds normal. He has no rales.   Abdominal: Soft. Bowel sounds are normal.   Musculoskeletal: He exhibits no edema.   No edema at all in either leg   Neurological: He is oriented to person, place, and time.   Skin: Skin is warm and dry.   Psychiatric: He has a normal mood and affect. His behavior is normal.   Nursing note and vitals reviewed.      Significant Labs: reviewed  BMP  Lab Results   Component Value Date     03/21/2020    K 2.9 (L) 03/21/2020    CL 99 03/21/2020    CO2 27 03/21/2020    BUN 13 03/21/2020    CREATININE 1.4 03/21/2020    CALCIUM 7.5 (L) 03/21/2020    ANIONGAP 15 03/21/2020    ESTGFRAFRICA 59 (A) 03/21/2020    EGFRNONAA 51 (A) 03/21/2020     Lab Results   Component Value Date    WBC 8.63 03/21/2020    HGB 11.3 (L) 03/21/2020    HCT 38.1 (L) 03/21/2020    MCV 81 (L) 03/21/2020     03/21/2020     Urine protein/Cr 110 mg    Significant Imaging: reviewed    Echo results:  · Mild concentric left ventricular hypertrophy.  · Mildly decreased left ventricular systolic function. The estimated ejection fraction is 45%.  · Grade I (mild) left ventricular diastolic dysfunction consistent with impaired relaxation.  · Low normal right ventricular systolic function.  · Moderate mitral regurgitation.  · Normal central venous pressure (3 mmHg).  · The estimated PA systolic pressure is 23 mmHg        Assessment/Plan:     68 y/o male with multiple electrolyte deficiencies:     Electrolyte disorder  Presented with multiple electrolyte disorders:  Hypokalemia.   Hypomagnesemia   Hypocalcemia   Improved with replacement     Due to the number and dosage of the diuretics pt was taking:  Lasix (a loop diuretic) causes low K, low Mg, low Ca  Diamox (a carbonic anhydrase inhibitor) causes severe low K  Metolazone (a thiazide diuretic) causes low K and low Mg     Pt does not have an overwhelming reason for high doses of  diuretic intake  Has only mild CHF  No current sx's of signs of fluid gain, looks euvolemic, even hypovolemic (BP low)  Reason for the intake of these multiple diuretics not clear  No known h/o of liver, or thyroid disease that would explain the leg swelling  TSH normal  Liver tests normal  Renal insufficiency is mild and would not explain any fluid gain  Only negligible proteinuria  s albumin is normal (3.8 to 3.9), in the presence of which 3rd spacing is not possible        Highly suspect pt is using diuretics inappropriately or even abusing them.  Pt likely has diuretic rebound, a capillary refill disorder which can develop with persistent diuretic use in the absence of a pathologic mechanism for 3rd spacing to occur  However, pt does have mild CHF, and salt and fluid moderation is advised     Pt was advised that inappropriate use of diuretics may cause cardiac complications, including palpitation, arrhythmias, and even sudden cardiac death     Chronic kidney disease, stage 3  Mild, stable, unchanged     Hypertension associated with stage 3 chronic kidney disease due to type 2 diabetes mellitus  H/o of DM noted.  Pt is mildly hypotensive due to overdiuresis  No sx's.              Plans and recommendations:  As detailed above  Opportunity for questions provided  Pt does not appear to need diuresis at this point.  Has f/u with renal on 4/6/20  Has f/u with Dr. Acosta, PCP  Agree with current K and mg replacement  Pt may need psychological assistance to cope with understanding that he is inappropriately using diuretics.  Charge nurse was present during rounds today with the pt        Ammon Torres MD  Nephrology  [facility]

## 2020-03-21 NOTE — DISCHARGE SUMMARY
[facility] Hospital Medicine  Discharge Summary      Patient Name: Santiago Khan  MRN: [id number]  Admission Date: 3/20/2020  Hospital Length of Stay: 1 days  Discharge Date and Time: 3/21/2020  2:58 PM  Attending Physician:  Zeeshan Falcon MD  Discharging Provider: Zeeshan Falcon MD  Primary Care Provider: Kashif Acosta MD      HPI:   The patient is a 68 yo male with BRADLEY, Prostate ca s/p prostatectomy, DM, HTN, CKD3, HLD,Gout, DDD who was sent to ED by Nephrology for hypomagnesemia and hypokalemia. The patient reports some intermittent generalized weakness and tremor. Pt's home medications include Diamox three times a week, Lasix 40mg BID, and Metolazone 2.5mg daily. Pt also takes KCL 20meq 4-6 times daily. Pt does not recall why he was prescribed diuretics. He denies hx of CHF.     In the ED, Serum K was 2.8. Serum Magnesium was <0.7.     * No surgery found *      Hospital Course:   Admitted for evaluation and treatment of hypomagnesemia and hypokalemia.  Held diuretics - Furosemide, Metolazone, and Acetazolamide.  IV and oral replacement of Magnesium and Potassium.  Asymptomatic.  Nephrology evaluated and assisted with management.  Performed cardiac echo which showed mild systolic heart failure.  Discharge plan to return home and stop home Metolazone.  Follow up outpatient with Cardiology and Nephrology.     Consults:     No new Assessment & Plan notes have been filed under this hospital service since the last note was generated.  Service: Hospital Medicine    Final Active Diagnoses:    Diagnosis Date Noted POA    PRINCIPAL PROBLEM:  Hypomagnesemia [E83.42] 03/20/2020 Yes    Hypokalemia [E87.6] 03/20/2020 Yes    Microcytic anemia [D50.9] 03/20/2020 Unknown    Electrolyte disorder [E87.8] 03/20/2020 Unknown    Hypocalcemia [E83.51]  Yes    Chronic kidney disease, stage 3 [N18.3]  Yes    Hypertension associated with stage 3 chronic kidney disease due to type 2 diabetes  mellitus [E11.22, I12.9, N18.3]  Yes    DM (diabetes mellitus) with complications [E11.8]  Yes    Hyperlipidemia associated with type 2 diabetes mellitus [E11.69, E78.5]  Yes    BRADLEY (obstructive sleep apnea) [G47.33]  Yes    DDD (degenerative disc disease), lumbar [M51.36]  Yes      Problems Resolved During this Admission:       Discharged Condition: good    Disposition: Home or Self Care    Follow Up:  Follow-up Information     Tomás Jennings MD In 2 weeks.    Specialty:  Vascular Surgery  Why:  Hospital follow up systolic heart failure management  Contact information:  [address] [place] [address] [phone]             Jp Burkett MD In 2 weeks.    Specialty:  Nephrology  Why:  Hospital follow up  Contact information:  [address]  [place] [address] [phone]                 Patient Instructions:      Diet Cardiac     Activity as tolerated       Significant Diagnostic Studies: Labs: All labs within the past 24 hours have been reviewed    Pending Diagnostic Studies:     None         Medications:  Reconciled Home Medications:      Medication List      CHANGE how you take these medications    colchicine 0.6 mg tablet  Commonly known as:  COLCRYS  Take 1 tablet (0.6 mg total) by mouth once daily.  What changed:    · when to take this  · reasons to take this        CONTINUE taking these medications    acetaZOLAMIDE 250 MG tablet  Commonly known as:  DIAMOX  Take 1 tablet by mouth Every Monday, Wednesday, and Friday.     allopurinoL 100 MG tablet  Commonly known as:  ZYLOPRIM  Take 100 mg by mouth once daily.     baclofen 10 MG tablet  Commonly known as:  LIORESAL  Take 1 tablet by mouth every evening.     bisoprolol 5 MG tablet  Commonly known as:  ZEBETA  Take 2.5 mg by mouth once daily.     CENTRUM SILVER Tab  Generic drug:  multivitamin-minerals-lutein  Take 1 tablet by mouth Daily.     CHANTIX 1 mg Tab  Generic drug:  varenicline  Take 1 tablet (1 mg total) by mouth once  daily.     febuxostat 80 mg Tab  Commonly known as:  ULORIC  Take 1 tablet (80 mg total) by mouth once daily.     fluticasone propionate 50 mcg/actuation nasal spray  Commonly known as:  FLONASE  U 1 TO 2 SPRAYS IEN QD     furosemide 40 MG tablet  Commonly known as:  LASIX  take 1 tablet by mouth twice a day     glimepiride 1 MG tablet  Commonly known as:  AMARYL  TK 1 T PO ONCE D     HYDROcodone-acetaminophen 7.5-325 mg per tablet  Commonly known as:  NORCO  TK 1 T PO  TID PRF CHRONIC PAIN     mometasone 0.1% 0.1 % cream  Commonly known as:  ELOCON  MARIA TERESA TO HANDS QD PRF FLARE UPS     nicotine polacrilex 2 MG Lozg  Take 1 lozenge (2 mg total) by mouth as needed (use  no more than 8 lozenges in 24 hours.).     omeprazole 40 MG capsule  Commonly known as:  PRILOSEC  TAKE ONE CAPSULE BY MOUTH EVERY DAY     potassium chloride SA 20 MEQ tablet  Commonly known as:  K-DUR,KLOR-CON  Take 1 tablet (20 mEq total) by mouth 4 (four) times daily.     promethazine 12.5 MG Tab  Commonly known as:  PHENERGAN  Take 12.5 mg by mouth every 6 (six) hours as needed.     simvastatin 40 MG tablet  Commonly known as:  ZOCOR  Take 40 mg by mouth every evening.     zolpidem 10 mg Tab  Commonly known as:  AMBIEN  Take 5 mg by mouth nightly as needed.        STOP taking these medications    metOLazone 2.5 MG tablet  Commonly known as:  ZAROXOLYN            Indwelling Lines/Drains at time of discharge:   Lines/Drains/Airways     None                 Time spent on the discharge of patient: 30 minutes  Patient was seen and examined on the date of discharge and determined to be suitable for discharge.         Zeeshan Falcon MD  Department of Hospital Medicine  [facility]

## 2020-03-21 NOTE — SUBJECTIVE & OBJECTIVE
Past Medical History:   Diagnosis Date    Chronic kidney disease, stage 3     DDD (degenerative disc disease), lumbar     DM (diabetes mellitus) with complications     History of gout     History of prostate cancer     Hyperlipidemia associated with type 2 diabetes mellitus     Hypertension associated with stage 3 chronic kidney disease due to type 2 diabetes mellitus     BRADLEY on CPAP     Tobacco abuse        Past Surgical History:   Procedure Laterality Date    BICEPS TENDON REPAIR      COLONOSCOPY N/A 3/27/2019    Procedure: COLONOSCOPY;  Surgeon: James Roger MD;  Location: [facility];  Service: Endoscopy;  Laterality: N/A;    HEMORRHOID SURGERY      INGUINAL HERNIA REPAIR Left     KNEE ARTHROSCOPY Right     LUMBAR LAMINECTOMY      PROSTATECTOMY         Review of patient's allergies indicates:   Allergen Reactions    Amitriptyline      Other reaction(s): Rash    Celecoxib      Other reaction(s): Rash     Current Facility-Administered Medications   Medication Frequency    [START ON 3/21/2020] allopurinoL tablet 100 mg Daily    baclofen tablet 10 mg QHS    dextrose 10 % infusion PRN    dextrose 10 % infusion PRN    famotidine tablet 20 mg BID    glucagon (human recombinant) injection 1 mg PRN    glucose chewable tablet 16 g PRN    glucose chewable tablet 24 g PRN    heparin (porcine) injection 5,000 Units Q8H    HYDROcodone-acetaminophen 7.5-325 mg per tablet 1 tablet Q6H PRN    insulin aspart U-100 pen 0-5 Units QID (AC + HS) PRN    magnesium sulfate 2g in water 50mL IVPB (premix) Once    [START ON 3/21/2020] metoprolol succinate (TOPROL-XL) 24 hr split tablet 12.5 mg Daily    potassium chloride SA CR tablet 40 mEq Once    simvastatin tablet 40 mg QHS    [START ON 3/21/2020] varenicline tablet 1 mg Daily     Family History     Problem Relation (Age of Onset)    Alzheimer's disease Father    Coronary artery disease Father (90)    Hyperlipidemia Mother    Prostate cancer Father         Tobacco Use    Smoking status: Former Smoker     Packs/day: 2.00     Years: 50.00     Pack years: 100.00     Types: Cigarettes     Last attempt to quit: 2020     Years since quittin.0    Smokeless tobacco: Former User   Substance and Sexual Activity    Alcohol use: Not Currently     Frequency: Never     Drinks per session: Patient refused     Binge frequency: Never    Drug use: Never    Sexual activity: Not on file     Review of Systems   Constitutional: Negative.    HENT: Negative.    Respiratory: Negative.    Cardiovascular: Negative.    Gastrointestinal: Negative.    Genitourinary: Negative.    Musculoskeletal: Negative.    Neurological: Positive for weakness.   Psychiatric/Behavioral: Negative.      Objective:     Vital Signs (Most Recent):  Temp: 98.4 °F (36.9 °C) (20)  Pulse: 81 (20 1750)  Resp: 16 (20)  BP: 116/64 (20)  SpO2: 97 % (20)  O2 Device (Oxygen Therapy): room air (20) Vital Signs (24h Range):  Temp:  [98.4 °F (36.9 °C)-98.6 °F (37 °C)] 98.4 °F (36.9 °C)  Pulse:  [70-82] 81  Resp:  [16-24] 16  SpO2:  [94 %-97 %] 97 %  BP: (108-126)/(56-75) 116/64     Weight: 97 kg (213 lb 12.8 oz) (20)  Body mass index is 33.49 kg/m².  Body surface area is 2.14 meters squared.    No intake/output data recorded.    Physical Exam   Constitutional: He is oriented to person, place, and time. He appears well-developed and well-nourished. No distress.   HENT:   Head: Normocephalic and atraumatic.   Neck: No JVD present.   Cardiovascular: Normal rate, regular rhythm and normal heart sounds. Exam reveals no friction rub.   Pulmonary/Chest: Effort normal and breath sounds normal. He has no rales.   Abdominal: Soft. Bowel sounds are normal.   Musculoskeletal: He exhibits no edema.   No edema at all in either leg   Neurological: He is oriented to person, place, and time.   Skin: Skin is warm and dry.   Psychiatric: He has a normal mood  and affect. His behavior is normal.   Nursing note and vitals reviewed.      Significant Labs: reviewed  BMP  Lab Results   Component Value Date     03/20/2020    K 2.8 (L) 03/20/2020    CL 98 03/20/2020    CO2 28 03/20/2020    BUN 15 03/20/2020    CREATININE 1.4 03/20/2020    CALCIUM 7.6 (L) 03/20/2020    ANIONGAP 13 03/20/2020    ESTGFRAFRICA 59 (A) 03/20/2020    EGFRNONAA 51 (A) 03/20/2020     Lab Results   Component Value Date    WBC 9.33 03/10/2020    HGB 10.3 (L) 03/10/2020    HCT 33.2 (L) 03/10/2020    MCV 79 (L) 03/10/2020     03/10/2020     Mg 0.7    Lab Results   Component Value Date    CALCIUM 7.6 (L) 03/20/2020    PHOS 3.4 03/20/2020         Significant Imaging: reviewed

## 2020-03-21 NOTE — CONSULTS
[facility] -   Nephrology  Consult Note    Patient Name: Santiago Khan  MRN: [id number]  Admission Date: 3/20/2020  Hospital Length of Stay: 0 days  Attending Provider: Carmen Barney MD   Primary Care Physician: Kashif Acosta MD  Principal Problem:Hypomagnesemia     Reason for consult: electrolyte deficiencies  Referring physician: Dr. Barney    Consults  Subjective:     HPI: Pt was seen and examined. H/o and chart reviewed. Pt is a 68 y/o male who was seen in renal clinic today for low Mg and K and was admitted. Pt has no acute or new c/o's today. No palpiattion. Reports occasional weakness. No SOB, no leg swelling. Pt is noted to be on 3 diuretics: lasix 40 mg po bid, metolazone 2.5 mg po qd, and diamox 250 mg po qod. Pt does not give a clear reason why he is using these diuretics, has used them for 5-6 years. He says is he does not use them, he gets leg swelling. The swelling is bilateral, not present currently, no SOB has occurred, no prior leg surgeries. No h/o of kidney failure (has very mild CKD), no known heart, liver, or thyroid disease. Says diet is not exceptionally salty. Fluid intake is about 6 glasses a day.    Past Medical History:   Diagnosis Date    Chronic kidney disease, stage 3     DDD (degenerative disc disease), lumbar     DM (diabetes mellitus) with complications     History of gout     History of prostate cancer     Hyperlipidemia associated with type 2 diabetes mellitus     Hypertension associated with stage 3 chronic kidney disease due to type 2 diabetes mellitus     BRADLEY on CPAP     Tobacco abuse        Past Surgical History:   Procedure Laterality Date    BICEPS TENDON REPAIR      COLONOSCOPY N/A 3/27/2019    Procedure: COLONOSCOPY;  Surgeon: James Roger MD;  Location: [facility];  Service: Endoscopy;  Laterality: N/A;    HEMORRHOID SURGERY      INGUINAL HERNIA REPAIR Left     KNEE ARTHROSCOPY Right     LUMBAR LAMINECTOMY      PROSTATECTOMY          Review of patient's allergies indicates:   Allergen Reactions    Amitriptyline      Other reaction(s): Rash    Celecoxib      Other reaction(s): Rash     Current Facility-Administered Medications   Medication Frequency    [START ON 3/21/2020] allopurinoL tablet 100 mg Daily    baclofen tablet 10 mg QHS    dextrose 10 % infusion PRN    dextrose 10 % infusion PRN    famotidine tablet 20 mg BID    glucagon (human recombinant) injection 1 mg PRN    glucose chewable tablet 16 g PRN    glucose chewable tablet 24 g PRN    heparin (porcine) injection 5,000 Units Q8H    HYDROcodone-acetaminophen 7.5-325 mg per tablet 1 tablet Q6H PRN    insulin aspart U-100 pen 0-5 Units QID (AC + HS) PRN    magnesium sulfate 2g in water 50mL IVPB (premix) Once    [START ON 3/21/2020] metoprolol succinate (TOPROL-XL) 24 hr split tablet 12.5 mg Daily    potassium chloride SA CR tablet 40 mEq Once    simvastatin tablet 40 mg QHS    [START ON 3/21/2020] varenicline tablet 1 mg Daily     Family History     Problem Relation (Age of Onset)    Alzheimer's disease Father    Coronary artery disease Father (90)    Hyperlipidemia Mother    Prostate cancer Father        Tobacco Use    Smoking status: Former Smoker     Packs/day: 2.00     Years: 50.00     Pack years: 100.00     Types: Cigarettes     Last attempt to quit: 2020     Years since quittin.0    Smokeless tobacco: Former User   Substance and Sexual Activity    Alcohol use: Not Currently     Frequency: Never     Drinks per session: Patient refused     Binge frequency: Never    Drug use: Never    Sexual activity: Not on file     Review of Systems   Constitutional: Negative.    HENT: Negative.    Respiratory: Negative.    Cardiovascular: Negative.    Gastrointestinal: Negative.    Genitourinary: Negative.    Musculoskeletal: Negative.    Neurological: Positive for weakness.   Psychiatric/Behavioral: Negative.      Objective:     Vital Signs (Most  Recent):  Temp: 98.4 °F (36.9 °C) (03/20/20 1651)  Pulse: 81 (03/20/20 1750)  Resp: 16 (03/20/20 1651)  BP: 116/64 (03/20/20 1651)  SpO2: 97 % (03/20/20 1651)  O2 Device (Oxygen Therapy): room air (03/20/20 1538) Vital Signs (24h Range):  Temp:  [98.4 °F (36.9 °C)-98.6 °F (37 °C)] 98.4 °F (36.9 °C)  Pulse:  [70-82] 81  Resp:  [16-24] 16  SpO2:  [94 %-97 %] 97 %  BP: (108-126)/(56-75) 116/64     Weight: 97 kg (213 lb 12.8 oz) (03/20/20 1538)  Body mass index is 33.49 kg/m².  Body surface area is 2.14 meters squared.    No intake/output data recorded.    Physical Exam   Constitutional: He is oriented to person, place, and time. He appears well-developed and well-nourished. No distress.   HENT:   Head: Normocephalic and atraumatic.   Neck: No JVD present.   Cardiovascular: Normal rate, regular rhythm and normal heart sounds. Exam reveals no friction rub.   Pulmonary/Chest: Effort normal and breath sounds normal. He has no rales.   Abdominal: Soft. Bowel sounds are normal.   Musculoskeletal: He exhibits no edema.   No edema at all in either leg   Neurological: He is oriented to person, place, and time.   Skin: Skin is warm and dry.   Psychiatric: He has a normal mood and affect. His behavior is normal.   Nursing note and vitals reviewed.      Significant Labs: reviewed  BMP  Lab Results   Component Value Date     03/20/2020    K 2.8 (L) 03/20/2020    CL 98 03/20/2020    CO2 28 03/20/2020    BUN 15 03/20/2020    CREATININE 1.4 03/20/2020    CALCIUM 7.6 (L) 03/20/2020    ANIONGAP 13 03/20/2020    ESTGFRAFRICA 59 (A) 03/20/2020    EGFRNONAA 51 (A) 03/20/2020     Lab Results   Component Value Date    WBC 9.33 03/10/2020    HGB 10.3 (L) 03/10/2020    HCT 33.2 (L) 03/10/2020    MCV 79 (L) 03/10/2020     03/10/2020     Mg 0.7    Lab Results   Component Value Date    CALCIUM 7.6 (L) 03/20/2020    PHOS 3.4 03/20/2020     TSH 1.96  Liver tests normal  Urine protein/Cr 1.1 g      Significant Imaging:  reviewed    Assessment/Plan:   68 y/o male with multiple electrolyte deficiencies:    Electrolyte disorder  Has multiple electrolyte disorders:  Hypokalemia  Hypomagnesemia   Hypocalcemia     Most likely cause these multiple electrolyte disorders is the diuretics pt is taking:  Lasix (a loop diuretic) causes low K, low Mg, low Ca  Diamox (a carbonic anhydrase inhibitor) causes severe low K  Metolazone (a thiazide diuretic) causes low K and low Mg    Reason for the intake of these multiple diuretics not clear  No leg swelling present  Lungs are clear  Pt says leg swelling returns when pt stops diuretics or lowers dose  No known h/o of heart, liver, or thyroid disease that would explain the leg swelling  TSH normal  Liver tests normal  Renal insufficiency is mild and would not explain any fluid gain  Only negligible proteinuria  s albumin is normal (3.9), in the presence of which 3rd spacing is not possible      Highly suspect pt is using diuretics inappropriately or even abusing them  Pt likely has diuretic rebound    Pt was advised that inappropriate use of diuretics may cause cardiac complications, including palpitation, arrhythmias, and even sudden cardiac death    Chronic kidney disease, stage 3  Mild, stable, unchanged    Hypertension associated with stage 3 chronic kidney disease due to type 2 diabetes mellitus  H/o of DM noted.  Pt is hypotensive due to overdiuresis          Plans and recommendations:  As detailed above  Opportunity for questions provided  Order echo to r/o CHF  Hold all diuretics  Agree with current K and mg replacement  For more effective Mg replacement, use po route  Pt may need psychological assistance to cope with understanding that he is abusing diuretics  Total time spent 70 minutes including time needed to review the records, the   patient evaluation, documentation, face-to-face discussion with the patient,   more than 50% of the time was spent on coordination of care and counseling.     Level V visit.          Ammon Torres MD   Nephrology  [facility]

## 2020-03-21 NOTE — HOSPITAL COURSE
Admitted for evaluation and treatment of hypomagnesemia and hypokalemia.  Held diuretics - Furosemide, Metolazone, and Acetazolamide.  IV and oral replacement of Magnesium and Potassium.  Asymptomatic.  Nephrology evaluated and assisted with management.  Performed cardiac echo which showed mild systolic heart failure.  Discharge plan to return home and stop home Metolazone.  Follow up outpatient with Cardiology and Nephrology.

## 2020-03-21 NOTE — HPI
Pt was seen and examined. H/o and chart reviewed. Pt is a 68 y/o male who was seen in renal clinic today for low Mg and K and was admitted. Pt has no acute or new c/o's today. No palpiattion. Reports occasional weakness. No SOB, no leg swelling. Pt is noted to be on 3 diuretics: lasix 40 mg po bid, metolazone 2.5 mg po qd, and diamox 250 mg po qod. Pt does not give a clear reason why he is using these diuretics, has used them for 5-6 years. He says is he does not use them, he gets leg swelling. The swelling is bilateral, not present currently, no SOB has occurred, no prior leg surgeries. No h/o of kidney failure (has very mild CKD), no known heart, liver, or thyroid disease.

## 2020-03-21 NOTE — ASSESSMENT & PLAN NOTE
Has multiple electrolyte disorders:  Hypokalemia  Hypomagnesemia   Hypocalcemia     Most likely cause these multiple electrolyte disorders is the diuretics pt is taking:  Lasix (a loop diuretic) causes low K, low Mg, low Ca  Diamox (a carbonic anhydrase inhibitor) causes severe low K  Metolazone (a thiazide diuretic) causes low K and low Mg    Reason for the intake of these multiple diuretics not clear  No leg swelling present  Lungs are clear  Pt says leg swelling returns when pt stops diuretics or lowers dose  No known h/o of heart, liver, or thyroid disease that would explain the leg swelling  Renal insufficiency is mild and would not explain any fluid gain  s albumin is normal (3.9), in the presence of which 3rd spacing is not possible    Highly suspect ps is using diuretics inappropriately or even abusing them  Pt likely has diuretic rebound

## 2020-03-23 ENCOUNTER — CLINICAL SUPPORT (OUTPATIENT)
Dept: SMOKING CESSATION | Facility: CLINIC | Age: 70
End: 2020-03-23
Payer: COMMERCIAL

## 2020-03-23 ENCOUNTER — TELEPHONE (OUTPATIENT)
Dept: SMOKING CESSATION | Facility: CLINIC | Age: 70
End: 2020-03-23

## 2020-03-23 DIAGNOSIS — F17.200 NICOTINE DEPENDENCE: Primary | ICD-10-CM

## 2020-03-23 PROCEDURE — 99407 BEHAV CHNG SMOKING > 10 MIN: CPT | Mod: S$GLB,,, | Performed by: GENERAL PRACTICE

## 2020-03-23 PROCEDURE — 99407 PR TOBACCO USE CESSATION INTENSIVE >10 MINUTES: ICD-10-PCS | Mod: S$GLB,,, | Performed by: GENERAL PRACTICE

## 2020-03-23 NOTE — TELEPHONE ENCOUNTER
Attempt to contact patient for a smoking cessation progress update. Recorded message left with return contact information

## 2020-04-01 ENCOUNTER — TELEPHONE (OUTPATIENT)
Dept: NEPHROLOGY | Facility: CLINIC | Age: 70
End: 2020-04-01

## 2020-04-01 NOTE — TELEPHONE ENCOUNTER
----- Message from Sydnee Starr sent at 4/1/2020  9:54 AM CDT -----  Contact: [phone]  Would like to consult with the nurse, patient is returning the nurse call, please call back at  [phone], Thanks sj  .Type:  Patient Returning Call    Who Called: Mr Tinoco  Who Left Message for Patient:Marlen  Does the patient know what this is regarding?:no  Would the patient rather a call back or a response via [facility]? callback  Best Call Back Number:[phone]  Additional Information:

## 2020-04-03 ENCOUNTER — PATIENT OUTREACH (OUTPATIENT)
Dept: ADMINISTRATIVE | Facility: OTHER | Age: 70
End: 2020-04-03

## 2020-04-03 DIAGNOSIS — N18.30 CHRONIC KIDNEY DISEASE, STAGE 3: Primary | ICD-10-CM

## 2020-04-06 ENCOUNTER — LAB VISIT (OUTPATIENT)
Dept: LAB | Facility: HOSPITAL | Age: 70
End: 2020-04-06
Attending: INTERNAL MEDICINE
Payer: MEDICARE

## 2020-04-06 ENCOUNTER — CLINICAL SUPPORT (OUTPATIENT)
Dept: SMOKING CESSATION | Facility: CLINIC | Age: 70
End: 2020-04-06
Payer: COMMERCIAL

## 2020-04-06 ENCOUNTER — OFFICE VISIT (OUTPATIENT)
Dept: NEPHROLOGY | Facility: CLINIC | Age: 70
End: 2020-04-06
Payer: MEDICARE

## 2020-04-06 VITALS
BODY MASS INDEX: 34.71 KG/M2 | HEIGHT: 67 IN | HEART RATE: 62 BPM | WEIGHT: 221.13 LBS | SYSTOLIC BLOOD PRESSURE: 126 MMHG | DIASTOLIC BLOOD PRESSURE: 76 MMHG

## 2020-04-06 DIAGNOSIS — N18.30 CHRONIC KIDNEY DISEASE, STAGE 3: ICD-10-CM

## 2020-04-06 DIAGNOSIS — T50.2X5A DIURETIC-INDUCED HYPOKALEMIA: ICD-10-CM

## 2020-04-06 DIAGNOSIS — E87.6 HYPOKALEMIA: ICD-10-CM

## 2020-04-06 DIAGNOSIS — E83.42 HYPOMAGNESEMIA: ICD-10-CM

## 2020-04-06 DIAGNOSIS — I50.22 CHRONIC SYSTOLIC CONGESTIVE HEART FAILURE: Primary | ICD-10-CM

## 2020-04-06 DIAGNOSIS — F17.200 NICOTINE DEPENDENCE: Primary | ICD-10-CM

## 2020-04-06 DIAGNOSIS — E87.6 DIURETIC-INDUCED HYPOKALEMIA: ICD-10-CM

## 2020-04-06 DIAGNOSIS — E86.0 DEHYDRATION: ICD-10-CM

## 2020-04-06 LAB
ACANTHOCYTES BLD QL SMEAR: PRESENT
ALBUMIN SERPL BCP-MCNC: 3.8 G/DL (ref 3.5–5.2)
ANION GAP SERPL CALC-SCNC: 8 MMOL/L (ref 8–16)
ANISOCYTOSIS BLD QL SMEAR: SLIGHT
BASOPHILS # BLD AUTO: 0.04 K/UL (ref 0–0.2)
BASOPHILS NFR BLD: 0.5 % (ref 0–1.9)
BUN SERPL-MCNC: 10 MG/DL (ref 8–23)
CALCIUM SERPL-MCNC: 7.4 MG/DL (ref 8.7–10.5)
CHLORIDE SERPL-SCNC: 111 MMOL/L (ref 95–110)
CO2 SERPL-SCNC: 23 MMOL/L (ref 23–29)
CREAT SERPL-MCNC: 1.2 MG/DL (ref 0.5–1.4)
DIFFERENTIAL METHOD: ABNORMAL
EOSINOPHIL # BLD AUTO: 0.3 K/UL (ref 0–0.5)
EOSINOPHIL NFR BLD: 4.1 % (ref 0–8)
ERYTHROCYTE [DISTWIDTH] IN BLOOD BY AUTOMATED COUNT: 18.8 % (ref 11.5–14.5)
EST. GFR  (AFRICAN AMERICAN): >60 ML/MIN/1.73 M^2
EST. GFR  (NON AFRICAN AMERICAN): >60 ML/MIN/1.73 M^2
GLUCOSE SERPL-MCNC: 106 MG/DL (ref 70–110)
HCT VFR BLD AUTO: 35.7 % (ref 40–54)
HGB BLD-MCNC: 10.6 G/DL (ref 14–18)
HYPOCHROMIA BLD QL SMEAR: ABNORMAL
IMM GRANULOCYTES # BLD AUTO: 0.07 K/UL (ref 0–0.04)
IMM GRANULOCYTES NFR BLD AUTO: 0.9 % (ref 0–0.5)
LYMPHOCYTES # BLD AUTO: 2.1 K/UL (ref 1–4.8)
LYMPHOCYTES NFR BLD: 25.8 % (ref 18–48)
MCH RBC QN AUTO: 25 PG (ref 27–31)
MCHC RBC AUTO-ENTMCNC: 29.7 G/DL (ref 32–36)
MCV RBC AUTO: 84 FL (ref 82–98)
MONOCYTES # BLD AUTO: 0.6 K/UL (ref 0.3–1)
MONOCYTES NFR BLD: 7.4 % (ref 4–15)
NEUTROPHILS # BLD AUTO: 5.1 K/UL (ref 1.8–7.7)
NEUTROPHILS NFR BLD: 62.2 % (ref 38–73)
NRBC BLD-RTO: 0 /100 WBC
OVALOCYTES BLD QL SMEAR: ABNORMAL
PHOSPHATE SERPL-MCNC: 2.2 MG/DL (ref 2.7–4.5)
PLATELET # BLD AUTO: 176 K/UL (ref 150–350)
PMV BLD AUTO: 9.7 FL (ref 9.2–12.9)
POIKILOCYTOSIS BLD QL SMEAR: SLIGHT
POLYCHROMASIA BLD QL SMEAR: ABNORMAL
POTASSIUM SERPL-SCNC: 4.3 MMOL/L (ref 3.5–5.1)
RBC # BLD AUTO: 4.24 M/UL (ref 4.6–6.2)
SODIUM SERPL-SCNC: 142 MMOL/L (ref 136–145)
WBC # BLD AUTO: 8.22 K/UL (ref 3.9–12.7)

## 2020-04-06 PROCEDURE — 1101F PR PT FALLS ASSESS DOC 0-1 FALLS W/OUT INJ PAST YR: ICD-10-PCS | Mod: CPTII,S$GLB,, | Performed by: INTERNAL MEDICINE

## 2020-04-06 PROCEDURE — 99402 PR PREVENT COUNSEL,INDIV,30 MIN: ICD-10-PCS | Mod: S$GLB,,, | Performed by: GENERAL PRACTICE

## 2020-04-06 PROCEDURE — 3078F DIAST BP <80 MM HG: CPT | Mod: CPTII,S$GLB,, | Performed by: INTERNAL MEDICINE

## 2020-04-06 PROCEDURE — 3074F PR MOST RECENT SYSTOLIC BLOOD PRESSURE < 130 MM HG: ICD-10-PCS | Mod: CPTII,S$GLB,, | Performed by: INTERNAL MEDICINE

## 2020-04-06 PROCEDURE — 99402 PREV MED CNSL INDIV APPRX 30: CPT | Mod: S$GLB,,, | Performed by: GENERAL PRACTICE

## 2020-04-06 PROCEDURE — 3078F PR MOST RECENT DIASTOLIC BLOOD PRESSURE < 80 MM HG: ICD-10-PCS | Mod: CPTII,S$GLB,, | Performed by: INTERNAL MEDICINE

## 2020-04-06 PROCEDURE — 99215 OFFICE O/P EST HI 40 MIN: CPT | Mod: S$GLB,,, | Performed by: INTERNAL MEDICINE

## 2020-04-06 PROCEDURE — 36415 COLL VENOUS BLD VENIPUNCTURE: CPT

## 2020-04-06 PROCEDURE — 1159F PR MEDICATION LIST DOCUMENTED IN MEDICAL RECORD: ICD-10-PCS | Mod: S$GLB,,, | Performed by: INTERNAL MEDICINE

## 2020-04-06 PROCEDURE — 1126F PR PAIN SEVERITY QUANTIFIED, NO PAIN PRESENT: ICD-10-PCS | Mod: S$GLB,,, | Performed by: INTERNAL MEDICINE

## 2020-04-06 PROCEDURE — 99999 PR PBB SHADOW E&M-EST. PATIENT-LVL IV: ICD-10-PCS | Mod: PBBFAC,,, | Performed by: INTERNAL MEDICINE

## 2020-04-06 PROCEDURE — 3074F SYST BP LT 130 MM HG: CPT | Mod: CPTII,S$GLB,, | Performed by: INTERNAL MEDICINE

## 2020-04-06 PROCEDURE — 1159F MED LIST DOCD IN RCRD: CPT | Mod: S$GLB,,, | Performed by: INTERNAL MEDICINE

## 2020-04-06 PROCEDURE — 99215 PR OFFICE/OUTPT VISIT, EST, LEVL V, 40-54 MIN: ICD-10-PCS | Mod: S$GLB,,, | Performed by: INTERNAL MEDICINE

## 2020-04-06 PROCEDURE — 85025 COMPLETE CBC W/AUTO DIFF WBC: CPT

## 2020-04-06 PROCEDURE — 80069 RENAL FUNCTION PANEL: CPT

## 2020-04-06 PROCEDURE — 1101F PT FALLS ASSESS-DOCD LE1/YR: CPT | Mod: CPTII,S$GLB,, | Performed by: INTERNAL MEDICINE

## 2020-04-06 PROCEDURE — 1126F AMNT PAIN NOTED NONE PRSNT: CPT | Mod: S$GLB,,, | Performed by: INTERNAL MEDICINE

## 2020-04-06 PROCEDURE — 99999 PR PBB SHADOW E&M-EST. PATIENT-LVL I: CPT | Mod: PBBFAC,,,

## 2020-04-06 PROCEDURE — 99999 PR PBB SHADOW E&M-EST. PATIENT-LVL IV: CPT | Mod: PBBFAC,,, | Performed by: INTERNAL MEDICINE

## 2020-04-06 PROCEDURE — 99999 PR PBB SHADOW E&M-EST. PATIENT-LVL I: ICD-10-PCS | Mod: PBBFAC,,,

## 2020-04-06 NOTE — PROGRESS NOTES
"Santiago Khan is a 69 y.o. male for whom nephrology consult has been requested to evaluate and give opinion.   Renal clinic f/u note:  Date of clinic visit: 4/6/20  Reason for f/u and chief c/o: post hospital f/u for hypokalemia  PCP: Dr. Kashif Acosta    HPI: Pt was seen and examined. H/o was reviewed. Pt is a 68 y/o male who was admitted to [facility] in late March 2020 for hypokalemia and hypomagnesemia. As documented in details, pt was using 3 diuretics (lasix, metolazone, and diamox) without clear reasons. Diuretic abuse leading to diuretic rebound syndrome was suspected. Pt's c/o and reason for using these diuretics were leg swelling and wt gain. The leg edema was notable for being non-pitting. Full w/u however also showed that pt had mild CHF. There was no proteinuria and no renal, thyroid, or liver causes of 3rd spacing were found. Pt received education and advice bitb from us and from hospitalist team. On d/c, metolazone was stopped. On f/u today, pt feels "OK", has some leg swelling, no SOB, no new or acute issues.      PAST MEDICAL HISTORY: DDD (degenerative disc disease), lumbar, DM (diabetes mellitus) with complications, History of gout, History of prostate cancer, Hyperlipidemia associated with type 2 diabetes mellitus, Hypertension, BRADLEY on CPAP, and Tobacco abuse.    PAST SURGICAL HISTORY:  He  has a past surgical history that includes Lumbar laminectomy; Prostatectomy; Knee arthroscopy (Right); Hemorrhoid surgery; Biceps tendon repair; Colonoscopy (N/A, 3/27/2019); and Inguinal hernia repair (Left).    SOCIAL HISTORY:  He  reports that he quit smoking about 5 weeks ago. His smoking use included cigarettes. He has a 100.00 pack-year smoking history. He has quit using smokeless tobacco. He reports that he drank alcohol. He reports that he does not use drugs.    FAMILY MEDICAL HISTORY:  His family history includes Alzheimer's disease in his father; Coronary artery disease (age of onset: 90) in his " father; Hyperlipidemia in his mother; Prostate cancer in his father.    Review of patient's allergies indicates:   Allergen Reactions    Amitriptyline      Other reaction(s): Rash    Celecoxib      Other reaction(s): Rash           Prior to Admission medications    Medication Sig Start Date End Date Taking? Authorizing Provider   acetaZOLAMIDE (DIAMOX) 250 MG tablet Take 1 tablet by mouth Every Monday, Wednesday, and Friday. 5/10/12  Yes Historical Provider, MD   allopurinoL (ZYLOPRIM) 100 MG tablet Take 100 mg by mouth once daily.   Yes Historical Provider, MD   baclofen (LIORESAL) 10 MG tablet Take 1 tablet by mouth every evening. 7/19/19  Yes Historical Provider, MD   bisoprolol (ZEBETA) 5 MG tablet Take 2.5 mg by mouth once daily.  5/10/12  Yes Historical Provider, MD   colchicine (COLCRYS) 0.6 mg tablet Take 1 tablet (0.6 mg total) by mouth once daily.  Patient taking differently: Take 0.6 mg by mouth daily as needed.  2/5/20  Yes Kashif Acosta MD   fluticasone (FLONASE) 50 mcg/actuation nasal spray U 1 TO 2 SPRAYS IEN QD 1/17/19  Yes Historical Provider, MD   furosemide (LASIX) 40 MG tablet take 1 tablet by mouth twice a day 2/5/17  Yes Paul Fenton MD   glimepiride (AMARYL) 1 MG tablet TK 1 T PO ONCE D 1/28/19  Yes Historical Provider, MD   HYDROcodone-acetaminophen (NORCO) 7.5-325 mg per tablet TK 1 T PO  TID PRF CHRONIC PAIN 10/16/19  Yes Historical Provider, MD   mometasone 0.1% (ELOCON) 0.1 % cream MARIA TERESA TO HANDS QD PRF FLARE UPS 12/12/18  Yes Historical Provider, MD   multivitamin-minerals-lutein (CENTRUM SILVER) Tab Take 1 tablet by mouth Daily. 5/10/12  Yes Historical Provider, MD   omeprazole (PRILOSEC) 40 MG capsule TAKE ONE CAPSULE BY MOUTH EVERY DAY 10/26/19  Yes Kashif Acosta MD   potassium chloride SA (K-DUR,KLOR-CON) 20 MEQ tablet Take 1 tablet (20 mEq total) by mouth 4 (four) times daily.  Patient taking differently: Take 40 mEq by mouth 4 (four) times daily.  1/30/20  Yes Kashif CARR  "MD Dave   promethazine (PHENERGAN) 12.5 MG Tab Take 12.5 mg by mouth every 6 (six) hours as needed. 12/7/18  Yes Historical Provider, MD   simvastatin (ZOCOR) 40 MG tablet Take 40 mg by mouth every evening.  5/10/12  Yes Historical Provider, MD   varenicline (CHANTIX) 1 mg Tab Take 1 tablet (1 mg total) by mouth once daily. 3/3/20  Yes Lois Guajardo MD   zolpidem (AMBIEN) 10 mg Tab Take 5 mg by mouth nightly as needed.   Yes Historical Provider, MD   febuxostat (ULORIC) 80 mg Tab Take 1 tablet (80 mg total) by mouth once daily.  Patient not taking: Reported on 4/6/2020 8/16/19   Chuck Liu NP   nicotine polacrilex 2 MG Lozg Take 1 lozenge (2 mg total) by mouth as needed (use  no more than 8 lozenges in 24 hours.).  Patient not taking: Reported on 4/6/2020 3/3/20   Lois Guajardo MD        REVIEW OF SYSTEMS:  Patient has no fever, fatigue, visual changes, chest pain, edema, cough, dyspnea, nausea, vomiting, constipation, diarrhea, arthralgias, pruritis, dizziness, weakness, depression, confusion.    PHYSICAL EXAM:   height is 5' 7" (1.702 m) and weight is 100.3 kg (221 lb 1.9 oz). His blood pressure is 126/76 and his pulse is 62.   Gen: WDWN male in no apparent distress  Psych: Normal mood and affect  Skin: No rashes or ulcers  Eyes: Normal conjunctiva and lids, PERRLA  ENT: Normal hearing with no oropharyngeal lesions  Neck: No JVD  Chest: Clear with no rales, rhonchi, wheezing with normal effort  CV: Regular with no murmurs, gallops or rubs  Abd: Soft, nontender, no distension, positive bowel sounds  Ext: 1+ non-pitting edema    Labs reviewed  BMP  Lab Results   Component Value Date     04/06/2020    K 4.3 04/06/2020     (H) 04/06/2020    CO2 23 04/06/2020    BUN 10 04/06/2020    CREATININE 1.2 04/06/2020    CALCIUM 7.4 (L) 04/06/2020    ANIONGAP 8 04/06/2020    ESTGFRAFRICA >60 04/06/2020    EGFRNONAA >60 04/06/2020     Alb 3.8  U/a: no proteinuria    Echo: EF 45% and mild diastolic " dysfunction      IMPRESSION AND RECOMMENDATIONS:  70 y/o male with electrolyte deficiencies related to extensive diuretics use:     Electrolyte disorder  Presented initially with with multiple electrolyte disorders:  Related to using multiple diuretics  Metolazone has been stopped  Hypokalemia. has corrected, K nromal  Hypomagnesemia: improved, repeat pending  Hypocalcemia: improved     Again, to review:   Lasix (a loop diuretic) causes low K, low Mg, low Ca  Diamox (a carbonic anhydrase inhibitor) causes severe low K  Metolazone (a thiazide diuretic) causes low K and low Mg     Pt does not have an overwhelming reason for high doses of diuretic intake  Has only mild CHF  No current sx's of signs of fluid gain, looks euvolemic  No known h/o of liver, or thyroid disease that would explain the leg swelling  TSH normal  Liver tests normal  Renal insufficiency is mild and would not explain any fluid gain  Only negligible proteinuria  s albumin is normal (3.8 to 3.9), in the presence of which 3rd spacing is not possible        Highly suspect pt is using diuretics inappropriately  Pt likely has diuretic rebound, a capillary refill disorder which can develop with persistent diuretic use in the absence of a pathologic mechanism for 3rd spacing to occur  However, pt does have mild CHF, and salt and fluid moderation is advised     Pt was advised that inappropriate use of diuretics may cause cardiac complications, including palpitation, arrhythmias, and even sudden cardiac death     Chronic kidney disease  Mild, stable, improved after reduction in diuretics  Was due to dehydration     Hypertension associated with stage 3 chronic kidney disease due to type 2 diabetes mellitus  H/o of DM noted.  Pt is mildly hypotensive due to overdiuresis  No sx's.              Plans and recommendations:  As detailed above  Opportunity for questions provided  Advised pt to reduce salt intake, examples of salty foods disscussed with pt  Keep  fluid intake low to moderate  Only low dose lasix 20 mg po qd to bid  Recommend d/c diamox  Has f/u with Dr. Acosta, PCP  Will refer routinely to cardiology for mild CHF  Total time spent 40 minutes including time needed to review the records, the   patient evaluation, documentation, face-to-face discussion with the patient,   more than 50% of the time was spent on coordination of care and counseling.    Level V visit.  Complex visit  RTC 3-4 months    Ammon Trores MD

## 2020-04-06 NOTE — PROGRESS NOTES
Individual Follow-Up Form    4/6/2020    Quit Date: 2-    Clinical Status of Patient: Outpatient    Length of Service: 30 minutes    Continuing Medication: no    Comments: Spoke with patient by telephone in regards to his smoking cessation progress. He remains tobacco free at this time. He states that he is interested in weaning off of Chantix at this time. Discussed coping strategies to help aid in any urges that he may have once off of Chantix. He verbalized understanding. He is only taking Chantix once daily and will discontinue use. The patient denies any abnormal behavioral or mental changes at this time. Discussed follow up appointments to assess long term progress. Will continue to encourage and monitor progress.    Diagnosis: F17.200    Next Visit: 2 weeks

## 2020-04-07 ENCOUNTER — PATIENT MESSAGE (OUTPATIENT)
Dept: INTERNAL MEDICINE | Facility: CLINIC | Age: 70
End: 2020-04-07

## 2020-04-07 ENCOUNTER — PATIENT MESSAGE (OUTPATIENT)
Dept: SMOKING CESSATION | Facility: CLINIC | Age: 70
End: 2020-04-07

## 2020-04-07 ENCOUNTER — OFFICE VISIT (OUTPATIENT)
Dept: CARDIOLOGY | Facility: CLINIC | Age: 70
End: 2020-04-07
Payer: MEDICARE

## 2020-04-07 VITALS
BODY MASS INDEX: 34.53 KG/M2 | OXYGEN SATURATION: 98 % | SYSTOLIC BLOOD PRESSURE: 112 MMHG | DIASTOLIC BLOOD PRESSURE: 66 MMHG | WEIGHT: 220.44 LBS | HEART RATE: 81 BPM

## 2020-04-07 DIAGNOSIS — Z72.0 TOBACCO ABUSE: ICD-10-CM

## 2020-04-07 DIAGNOSIS — E78.5 HYPERLIPIDEMIA ASSOCIATED WITH TYPE 2 DIABETES MELLITUS: ICD-10-CM

## 2020-04-07 DIAGNOSIS — E11.22 HYPERTENSION ASSOCIATED WITH STAGE 3 CHRONIC KIDNEY DISEASE DUE TO TYPE 2 DIABETES MELLITUS: ICD-10-CM

## 2020-04-07 DIAGNOSIS — N18.30 CHRONIC KIDNEY DISEASE, STAGE 3: ICD-10-CM

## 2020-04-07 DIAGNOSIS — E11.69 HYPERLIPIDEMIA ASSOCIATED WITH TYPE 2 DIABETES MELLITUS: ICD-10-CM

## 2020-04-07 DIAGNOSIS — I50.32 CHRONIC DIASTOLIC CONGESTIVE HEART FAILURE: Primary | ICD-10-CM

## 2020-04-07 DIAGNOSIS — I12.9 HYPERTENSION ASSOCIATED WITH STAGE 3 CHRONIC KIDNEY DISEASE DUE TO TYPE 2 DIABETES MELLITUS: ICD-10-CM

## 2020-04-07 DIAGNOSIS — G47.33 OSA (OBSTRUCTIVE SLEEP APNEA): ICD-10-CM

## 2020-04-07 DIAGNOSIS — N18.30 HYPERTENSION ASSOCIATED WITH STAGE 3 CHRONIC KIDNEY DISEASE DUE TO TYPE 2 DIABETES MELLITUS: ICD-10-CM

## 2020-04-07 DIAGNOSIS — E11.8 DM (DIABETES MELLITUS) WITH COMPLICATIONS: ICD-10-CM

## 2020-04-07 PROCEDURE — 3074F SYST BP LT 130 MM HG: CPT | Mod: CPTII,S$GLB,, | Performed by: INTERNAL MEDICINE

## 2020-04-07 PROCEDURE — 1159F PR MEDICATION LIST DOCUMENTED IN MEDICAL RECORD: ICD-10-PCS | Mod: S$GLB,,, | Performed by: INTERNAL MEDICINE

## 2020-04-07 PROCEDURE — 3078F PR MOST RECENT DIASTOLIC BLOOD PRESSURE < 80 MM HG: ICD-10-PCS | Mod: CPTII,S$GLB,, | Performed by: INTERNAL MEDICINE

## 2020-04-07 PROCEDURE — 3044F PR MOST RECENT HEMOGLOBIN A1C LEVEL <7.0%: ICD-10-PCS | Mod: CPTII,S$GLB,, | Performed by: INTERNAL MEDICINE

## 2020-04-07 PROCEDURE — 3074F PR MOST RECENT SYSTOLIC BLOOD PRESSURE < 130 MM HG: ICD-10-PCS | Mod: CPTII,S$GLB,, | Performed by: INTERNAL MEDICINE

## 2020-04-07 PROCEDURE — 99999 PR PBB SHADOW E&M-EST. PATIENT-LVL III: CPT | Mod: PBBFAC,,, | Performed by: INTERNAL MEDICINE

## 2020-04-07 PROCEDURE — 1101F PR PT FALLS ASSESS DOC 0-1 FALLS W/OUT INJ PAST YR: ICD-10-PCS | Mod: CPTII,S$GLB,, | Performed by: INTERNAL MEDICINE

## 2020-04-07 PROCEDURE — 3044F HG A1C LEVEL LT 7.0%: CPT | Mod: CPTII,S$GLB,, | Performed by: INTERNAL MEDICINE

## 2020-04-07 PROCEDURE — 3078F DIAST BP <80 MM HG: CPT | Mod: CPTII,S$GLB,, | Performed by: INTERNAL MEDICINE

## 2020-04-07 PROCEDURE — 1101F PT FALLS ASSESS-DOCD LE1/YR: CPT | Mod: CPTII,S$GLB,, | Performed by: INTERNAL MEDICINE

## 2020-04-07 PROCEDURE — 1125F AMNT PAIN NOTED PAIN PRSNT: CPT | Mod: S$GLB,,, | Performed by: INTERNAL MEDICINE

## 2020-04-07 PROCEDURE — 1125F PR PAIN SEVERITY QUANTIFIED, PAIN PRESENT: ICD-10-PCS | Mod: S$GLB,,, | Performed by: INTERNAL MEDICINE

## 2020-04-07 PROCEDURE — 99999 PR PBB SHADOW E&M-EST. PATIENT-LVL III: ICD-10-PCS | Mod: PBBFAC,,, | Performed by: INTERNAL MEDICINE

## 2020-04-07 PROCEDURE — 99205 PR OFFICE/OUTPT VISIT, NEW, LEVL V, 60-74 MIN: ICD-10-PCS | Mod: S$GLB,,, | Performed by: INTERNAL MEDICINE

## 2020-04-07 PROCEDURE — 99205 OFFICE O/P NEW HI 60 MIN: CPT | Mod: S$GLB,,, | Performed by: INTERNAL MEDICINE

## 2020-04-07 PROCEDURE — 1159F MED LIST DOCD IN RCRD: CPT | Mod: S$GLB,,, | Performed by: INTERNAL MEDICINE

## 2020-04-07 RX ORDER — BUMETANIDE 1 MG/1
1.5 TABLET ORAL 2 TIMES DAILY
Qty: 90 TABLET | Refills: 11 | Status: SHIPPED | OUTPATIENT
Start: 2020-04-07 | End: 2020-06-30

## 2020-04-07 NOTE — LETTER
April 7, 2020      Ammon Torres MD  [address]  [place] [address]           [facility]  [address] [facility] [address]  Phone: [phone]  Fax: [phone]          Patient: Santiago Khan   MR Number: [id number]   YOB: 1950   Date of Visit: 4/7/2020       Dear Dr. Ammon Torres:    Thank you for referring Santiago Khan to me for evaluation. Attached you will find relevant portions of my assessment and plan of care.    If you have questions, please do not hesitate to call me. I look forward to following Santiago Khan along with you.    Sincerely,    Sabino Cr MD    Enclosure  CC:  No Recipients    If you would like to receive this communication electronically, please contact [email or web address] or [phone] to request more information on [employer] Link access.    For providers and/or their staff who would like to refer a patient to [facility], please contact us through our one-stop-shop provider referral line, [facility], at [phone].    If you feel you have received this communication in error or would no longer like to receive these types of communications, please e-mail [email or web address]

## 2020-04-07 NOTE — PROGRESS NOTES
Subjective:   Patient ID:  Santiago Khan is a 69 y.o. male who presents for evaluation of No chief complaint on file.      68 yo male, referred to r/o CHF  PMH DM 4 yrs, HTN, HLD, CKD III, prostates Ca s/p TURP in , no h/o chemo RX and XRT. Gout. No h/o stroke and heart attack. Smoked 1ppd for 40 yrs quit in . Social drinker.   admitted for [facility] due to chest tightness. Had low K and Mg. Troponin x2 negative and EKG showed NSR, RBBB, and LVH. Echo showed EF 45% global HK and moderate MR. Had K and Mg supplement.  After d/c, continued Lasix 40 mg bid, and Diamox 3 time a week.  States that gained weight for 15 pounds  Worsening breathing and leg swelling  No orthopnea, sleeps with CPAP  No chest pain, faint      Past Medical History:   Diagnosis Date    Chronic kidney disease, stage 3     DDD (degenerative disc disease), lumbar     DM (diabetes mellitus) with complications     History of gout     History of prostate cancer     Hyperlipidemia associated with type 2 diabetes mellitus     Hypertension associated with stage 3 chronic kidney disease due to type 2 diabetes mellitus     BRADLEY on CPAP     Tobacco abuse        Past Surgical History:   Procedure Laterality Date    BICEPS TENDON REPAIR      COLONOSCOPY N/A 3/27/2019    Procedure: COLONOSCOPY;  Surgeon: James Roger MD;  Location: [facility];  Service: Endoscopy;  Laterality: N/A;    HEMORRHOID SURGERY      INGUINAL HERNIA REPAIR Left     KNEE ARTHROSCOPY Right     LUMBAR LAMINECTOMY      PROSTATECTOMY         Social History     Tobacco Use    Smoking status: Former Smoker     Packs/day: 2.00     Years: 50.00     Pack years: 100.00     Types: Cigarettes     Last attempt to quit: 2020     Years since quittin.1    Smokeless tobacco: Former User   Substance Use Topics    Alcohol use: Not Currently     Frequency: Never     Drinks per session: Patient refused     Binge frequency: Never    Drug use: Never        Family History   Problem Relation Age of Onset    Prostate cancer Father     Coronary artery disease Father 90    Alzheimer's disease Father     Hyperlipidemia Mother        Review of Systems   Constitution: Positive for weight gain. Negative for decreased appetite, diaphoresis, fever, malaise/fatigue and night sweats.   HENT: Negative for nosebleeds.    Eyes: Negative for blurred vision and double vision.   Cardiovascular: Positive for dyspnea on exertion and leg swelling. Negative for chest pain, claudication, irregular heartbeat, near-syncope, orthopnea, palpitations, paroxysmal nocturnal dyspnea and syncope.   Respiratory: Negative for cough, shortness of breath, sleep disturbances due to breathing, snoring, sputum production and wheezing.    Endocrine: Negative for cold intolerance and polyuria.   Hematologic/Lymphatic: Does not bruise/bleed easily.   Skin: Negative for rash.   Musculoskeletal: Negative for back pain, falls, joint pain, joint swelling and neck pain.   Gastrointestinal: Negative for abdominal pain, heartburn, nausea and vomiting.   Genitourinary: Negative for dysuria, frequency and hematuria.   Neurological: Negative for difficulty with concentration, dizziness, focal weakness, headaches, light-headedness, numbness, seizures and weakness.   Psychiatric/Behavioral: Negative for depression, memory loss and substance abuse. The patient does not have insomnia.    Allergic/Immunologic: Negative for HIV exposure and hives.       Objective:   Physical Exam   Constitutional: He is oriented to person, place, and time. He appears well-nourished.   HENT:   Head: Normocephalic.   Eyes: Pupils are equal, round, and reactive to light.   Neck: Normal carotid pulses and no JVD present. Carotid bruit is not present. No thyromegaly present.   Cardiovascular: Normal rate, regular rhythm, normal heart sounds and normal pulses.  No extrasystoles are present. PMI is not displaced. Exam reveals no gallop and  no S3.   No murmur heard.  Pulmonary/Chest: Breath sounds normal. No stridor. No respiratory distress.   Abdominal: Soft. Bowel sounds are normal. There is no tenderness. There is no rebound.   Musculoskeletal: Normal range of motion. He exhibits edema (BLE + edema).   Neurological: He is alert and oriented to person, place, and time.   Skin: Skin is intact. No rash noted.   Psychiatric: His behavior is normal.       Lab Results   Component Value Date    CHOL 158 01/23/2020    CHOL 168 07/24/2019    CHOL 158 01/30/2019     Lab Results   Component Value Date    HDL 48 01/23/2020    HDL 51 07/24/2019    HDL 46 01/30/2019     Lab Results   Component Value Date    LDLCALC 89.4 01/23/2020    LDLCALC 89.6 07/24/2019    LDLCALC 86.4 01/30/2019     Lab Results   Component Value Date    TRIG 103 01/23/2020    TRIG 137 07/24/2019    TRIG 128 01/30/2019     Lab Results   Component Value Date    CHOLHDL 30.4 01/23/2020    CHOLHDL 30.4 07/24/2019    CHOLHDL 29.1 01/30/2019       Chemistry        Component Value Date/Time     04/06/2020 1140    K 4.3 04/06/2020 1140     (H) 04/06/2020 1140    CO2 23 04/06/2020 1140    BUN 10 04/06/2020 1140    CREATININE 1.2 04/06/2020 1140     04/06/2020 1140        Component Value Date/Time    CALCIUM 7.4 (L) 04/06/2020 1140    ALKPHOS 61 03/21/2020 0457    AST 26 03/21/2020 0457    ALT 26 03/21/2020 0457    BILITOT 0.4 03/21/2020 0457    ESTGFRAFRICA >60 04/06/2020 1140    EGFRNONAA >60 04/06/2020 1140          Lab Results   Component Value Date    HGBA1C 5.9 (H) 01/23/2020     Lab Results   Component Value Date    TSH 1.064 01/23/2020     Lab Results   Component Value Date    INR 1.0 03/10/2020     Lab Results   Component Value Date    WBC 8.22 04/06/2020    HGB 10.6 (L) 04/06/2020    HCT 35.7 (L) 04/06/2020    MCV 84 04/06/2020     04/06/2020     BNP  @LABRCNTIP(BNP,BNPTRIAGEBLO)@  Estimated Creatinine Clearance: 66.4 mL/min (based on SCr of 1.2 mg/dL).  No results  found in the last 24 hours.  No results found in the last 24 hours.  No results found in the last 24 hours.    Assessment:      1. Chronic diastolic congestive heart failure    2. Hyperlipidemia associated with type 2 diabetes mellitus    3. Hypertension associated with stage 3 chronic kidney disease due to type 2 diabetes mellitus    4. Chronic kidney disease, stage 3    5. DM (diabetes mellitus) with complications    6. BRADLEY (obstructive sleep apnea)    7. Tobacco abuse      CHFpEF borderline EF 45%, fluid overloaded  Graves been on Lasix over 5 yrs    Plan:   NUKE stress test ordered due to low EF, CHF, DM and life long smoker  D/c Lasix  Switch to Bumex 2mg in AM and 1 mg in PM for 2 weeks and then 1mg bid  Check BMP and BNP in 2 weeks  Daily weight. Please call the office if gain 3 pounds in 1 day or 5 pounds in 1 week.  Fluid restriction 1.5 liters a day  Na< 2 gm  Continue DIAMOX, BB, statin and KCL  DM Rx per PCP  Recommend heart-healthy diet, weight control and regular exercise.  Mahsa. Risk modification.   RTC in 3 months    I have reviewed all pertinent labs and cardiac studies. Plans and recommendations have been formulated under my direct supervision. All questions answered and patient voiced understanding. Patient to continue current medications.

## 2020-04-21 ENCOUNTER — CLINICAL SUPPORT (OUTPATIENT)
Dept: SMOKING CESSATION | Facility: CLINIC | Age: 70
End: 2020-04-21
Payer: COMMERCIAL

## 2020-04-21 ENCOUNTER — LAB VISIT (OUTPATIENT)
Dept: LAB | Facility: HOSPITAL | Age: 70
End: 2020-04-21
Attending: INTERNAL MEDICINE
Payer: MEDICARE

## 2020-04-21 DIAGNOSIS — I50.32 CHRONIC DIASTOLIC CONGESTIVE HEART FAILURE: ICD-10-CM

## 2020-04-21 DIAGNOSIS — F17.200 NICOTINE DEPENDENCE: Primary | ICD-10-CM

## 2020-04-21 LAB
ANION GAP SERPL CALC-SCNC: 11 MMOL/L (ref 8–16)
BNP SERPL-MCNC: 15 PG/ML (ref 0–99)
BUN SERPL-MCNC: 12 MG/DL (ref 8–23)
CALCIUM SERPL-MCNC: 6.9 MG/DL (ref 8.7–10.5)
CHLORIDE SERPL-SCNC: 107 MMOL/L (ref 95–110)
CO2 SERPL-SCNC: 26 MMOL/L (ref 23–29)
CREAT SERPL-MCNC: 1.4 MG/DL (ref 0.5–1.4)
EST. GFR  (AFRICAN AMERICAN): 59.3 ML/MIN/1.73 M^2
EST. GFR  (NON AFRICAN AMERICAN): 51.3 ML/MIN/1.73 M^2
GLUCOSE SERPL-MCNC: 92 MG/DL (ref 70–110)
POTASSIUM SERPL-SCNC: 3.7 MMOL/L (ref 3.5–5.1)
SODIUM SERPL-SCNC: 144 MMOL/L (ref 136–145)

## 2020-04-21 PROCEDURE — 36415 COLL VENOUS BLD VENIPUNCTURE: CPT | Mod: PO

## 2020-04-21 PROCEDURE — 83880 ASSAY OF NATRIURETIC PEPTIDE: CPT

## 2020-04-21 PROCEDURE — 99402 PR PREVENT COUNSEL,INDIV,30 MIN: ICD-10-PCS | Mod: S$GLB,,, | Performed by: GENERAL PRACTICE

## 2020-04-21 PROCEDURE — 99999 PR PBB SHADOW E&M-EST. PATIENT-LVL I: CPT | Mod: PBBFAC,,,

## 2020-04-21 PROCEDURE — 99999 PR PBB SHADOW E&M-EST. PATIENT-LVL I: ICD-10-PCS | Mod: PBBFAC,,,

## 2020-04-21 PROCEDURE — 99402 PREV MED CNSL INDIV APPRX 30: CPT | Mod: S$GLB,,, | Performed by: GENERAL PRACTICE

## 2020-04-21 PROCEDURE — 80048 BASIC METABOLIC PNL TOTAL CA: CPT

## 2020-04-21 NOTE — PROGRESS NOTES
"Individual Follow-Up Form    4/21/2020    Quit Date: 2-    Clinical Status of Patient: Outpatient    Length of Service: 30 minutes    Continuing Medication: yes  Chantix    Comments: Spoke with patient at length in regards to his smoking cessation progress. He remains tobacco free at this time. He states that he continues to use Chantix but is not taking as prescribed. He is taking 1 mg every other day. He states that he has 15 pills left and feels that he will have more long term success "weaning" off of chantix this way. Discussed affects of chantix once he has completed his prescription. Reviewed coping strategies should he have increased urges to smoke. He states that he has been using coping strategies as previously discussed when he gets agitated with staying at home with the coronavirus pandemic. He states that he gets agitated and bored which can lead to urges. Discussed relaxation techniques and stress management. Discussed future follow up appointments. The patient denies any abnormal behavioral or mental changes at this time. Will continue to encourage and monitor long term progress.    Diagnosis: F17.200    Next Visit: 2 weeks  "

## 2020-06-04 ENCOUNTER — TELEPHONE (OUTPATIENT)
Dept: SMOKING CESSATION | Facility: CLINIC | Age: 70
End: 2020-06-04

## 2020-06-09 ENCOUNTER — TELEPHONE (OUTPATIENT)
Dept: SMOKING CESSATION | Facility: CLINIC | Age: 70
End: 2020-06-09

## 2020-06-22 ENCOUNTER — HOSPITAL ENCOUNTER (OUTPATIENT)
Dept: RADIOLOGY | Facility: HOSPITAL | Age: 70
Discharge: HOME | End: 2020-06-22
Attending: INTERNAL MEDICINE
Payer: MEDICARE

## 2020-06-22 ENCOUNTER — HOSPITAL ENCOUNTER (OUTPATIENT)
Dept: CARDIOLOGY | Facility: HOSPITAL | Age: 70
Discharge: HOME | End: 2020-06-22
Attending: INTERNAL MEDICINE
Payer: MEDICARE

## 2020-06-22 DIAGNOSIS — I50.32 CHRONIC DIASTOLIC CONGESTIVE HEART FAILURE: ICD-10-CM

## 2020-06-22 LAB
CV STRESS BASE HR: 69 BPM
DIASTOLIC BLOOD PRESSURE: 69 MMHG
NUC REST EJECTION FRACTION: 47
NUC STRESS EJECTION FRACTION: 45 %
OHS CV CPX 85 PERCENT MAX PREDICTED HEART RATE MALE: 129
OHS CV CPX ESTIMATED METS: 1
OHS CV CPX MAX PREDICTED HEART RATE: 152
OHS CV CPX PATIENT IS FEMALE: 0
OHS CV CPX PATIENT IS MALE: 1
OHS CV CPX PEAK DIASTOLIC BLOOD PRESSURE: 63 MMHG
OHS CV CPX PEAK HEAR RATE: 92 BPM
OHS CV CPX PEAK RATE PRESSURE PRODUCT: NORMAL
OHS CV CPX PEAK SYSTOLIC BLOOD PRESSURE: 139 MMHG
OHS CV CPX PERCENT MAX PREDICTED HEART RATE ACHIEVED: 61
OHS CV CPX RATE PRESSURE PRODUCT PRESENTING: 8694
STRESS ECHO POST EXERCISE DUR MIN: 1 MINUTES
STRESS ECHO POST EXERCISE DUR SEC: 9 SECONDS
SYSTOLIC BLOOD PRESSURE: 126 MMHG

## 2020-06-22 PROCEDURE — 93017 CV STRESS TEST TRACING ONLY: CPT

## 2020-06-22 PROCEDURE — 78452 HT MUSCLE IMAGE SPECT MULT: CPT | Mod: 26,,, | Performed by: INTERNAL MEDICINE

## 2020-06-22 PROCEDURE — 93016 CV STRESS TEST SUPVJ ONLY: CPT | Mod: ,,, | Performed by: INTERNAL MEDICINE

## 2020-06-22 PROCEDURE — 93018 CV STRESS TEST I&R ONLY: CPT | Mod: ,,, | Performed by: INTERNAL MEDICINE

## 2020-06-22 PROCEDURE — 78452 STRESS TEST WITH MYOCARDIAL PERFUSION (CUPID ONLY): ICD-10-PCS | Mod: 26,,, | Performed by: INTERNAL MEDICINE

## 2020-06-22 PROCEDURE — 93018 STRESS TEST WITH MYOCARDIAL PERFUSION (CUPID ONLY): ICD-10-PCS | Mod: ,,, | Performed by: INTERNAL MEDICINE

## 2020-06-22 PROCEDURE — A9502 TC99M TETROFOSMIN: HCPCS

## 2020-06-22 PROCEDURE — 93016 STRESS TEST WITH MYOCARDIAL PERFUSION (CUPID ONLY): ICD-10-PCS | Mod: ,,, | Performed by: INTERNAL MEDICINE

## 2020-06-22 RX ORDER — REGADENOSON 0.08 MG/ML
0.4 INJECTION, SOLUTION INTRAVENOUS ONCE
Status: COMPLETED | OUTPATIENT
Start: 2020-06-22 | End: 2020-06-22

## 2020-06-22 RX ADMIN — REGADENOSON 0.4 MG: 0.08 INJECTION, SOLUTION INTRAVENOUS at 10:06

## 2020-06-23 ENCOUNTER — TELEPHONE (OUTPATIENT)
Dept: CARDIOLOGY | Facility: CLINIC | Age: 70
End: 2020-06-23

## 2020-06-23 NOTE — TELEPHONE ENCOUNTER
Patient contacted and was advised that he had a  NUKE STRESS TEST SHOWED SMALL BLOCKAGE  MAKE A F/U THIS WEEK AND DOUBLE BOOK OK. The patient has been scheduled 06/25/2020 to discuss the next steps of care.

## 2020-06-24 ENCOUNTER — PATIENT OUTREACH (OUTPATIENT)
Dept: ADMINISTRATIVE | Facility: OTHER | Age: 70
End: 2020-06-24

## 2020-06-24 NOTE — PROGRESS NOTES
Requested updates within Care Everywhere.  Patient's chart was reviewed for overdue ISELA topics.  Immunizations reconciled.

## 2020-06-25 ENCOUNTER — TELEPHONE (OUTPATIENT)
Dept: CARDIOLOGY | Facility: CLINIC | Age: 70
End: 2020-06-25

## 2020-06-25 ENCOUNTER — OFFICE VISIT (OUTPATIENT)
Dept: CARDIOLOGY | Facility: CLINIC | Age: 70
End: 2020-06-25
Payer: MEDICARE

## 2020-06-25 VITALS
HEART RATE: 95 BPM | BODY MASS INDEX: 36.29 KG/M2 | OXYGEN SATURATION: 96 % | WEIGHT: 231.69 LBS | DIASTOLIC BLOOD PRESSURE: 78 MMHG | SYSTOLIC BLOOD PRESSURE: 122 MMHG

## 2020-06-25 DIAGNOSIS — E11.8 DM (DIABETES MELLITUS) WITH COMPLICATIONS: ICD-10-CM

## 2020-06-25 DIAGNOSIS — R94.39 ABNORMAL NUCLEAR STRESS TEST: ICD-10-CM

## 2020-06-25 DIAGNOSIS — I50.42 CHRONIC COMBINED SYSTOLIC AND DIASTOLIC CONGESTIVE HEART FAILURE: Primary | ICD-10-CM

## 2020-06-25 DIAGNOSIS — G47.33 OSA (OBSTRUCTIVE SLEEP APNEA): ICD-10-CM

## 2020-06-25 DIAGNOSIS — R94.39 ABNORMAL STRESS TEST: ICD-10-CM

## 2020-06-25 DIAGNOSIS — N18.30 CHRONIC KIDNEY DISEASE, STAGE 3: ICD-10-CM

## 2020-06-25 DIAGNOSIS — E11.22 HYPERTENSION ASSOCIATED WITH STAGE 3 CHRONIC KIDNEY DISEASE DUE TO TYPE 2 DIABETES MELLITUS: ICD-10-CM

## 2020-06-25 DIAGNOSIS — E78.5 HYPERLIPIDEMIA ASSOCIATED WITH TYPE 2 DIABETES MELLITUS: ICD-10-CM

## 2020-06-25 DIAGNOSIS — E11.69 HYPERLIPIDEMIA ASSOCIATED WITH TYPE 2 DIABETES MELLITUS: ICD-10-CM

## 2020-06-25 DIAGNOSIS — I12.9 HYPERTENSION ASSOCIATED WITH STAGE 3 CHRONIC KIDNEY DISEASE DUE TO TYPE 2 DIABETES MELLITUS: ICD-10-CM

## 2020-06-25 DIAGNOSIS — Z72.0 TOBACCO ABUSE: ICD-10-CM

## 2020-06-25 DIAGNOSIS — I25.118 CORONARY ARTERY DISEASE OF NATIVE ARTERY OF NATIVE HEART WITH STABLE ANGINA PECTORIS: ICD-10-CM

## 2020-06-25 DIAGNOSIS — N18.30 HYPERTENSION ASSOCIATED WITH STAGE 3 CHRONIC KIDNEY DISEASE DUE TO TYPE 2 DIABETES MELLITUS: ICD-10-CM

## 2020-06-25 PROCEDURE — 99999 PR PBB SHADOW E&M-EST. PATIENT-LVL III: CPT | Mod: PBBFAC,,, | Performed by: INTERNAL MEDICINE

## 2020-06-25 PROCEDURE — 3044F HG A1C LEVEL LT 7.0%: CPT | Mod: CPTII,S$GLB,, | Performed by: INTERNAL MEDICINE

## 2020-06-25 PROCEDURE — 99215 OFFICE O/P EST HI 40 MIN: CPT | Mod: S$GLB,,, | Performed by: INTERNAL MEDICINE

## 2020-06-25 PROCEDURE — 3078F DIAST BP <80 MM HG: CPT | Mod: CPTII,S$GLB,, | Performed by: INTERNAL MEDICINE

## 2020-06-25 PROCEDURE — 3078F PR MOST RECENT DIASTOLIC BLOOD PRESSURE < 80 MM HG: ICD-10-PCS | Mod: CPTII,S$GLB,, | Performed by: INTERNAL MEDICINE

## 2020-06-25 PROCEDURE — 99215 PR OFFICE/OUTPT VISIT, EST, LEVL V, 40-54 MIN: ICD-10-PCS | Mod: S$GLB,,, | Performed by: INTERNAL MEDICINE

## 2020-06-25 PROCEDURE — 1101F PR PT FALLS ASSESS DOC 0-1 FALLS W/OUT INJ PAST YR: ICD-10-PCS | Mod: CPTII,S$GLB,, | Performed by: INTERNAL MEDICINE

## 2020-06-25 PROCEDURE — 1159F PR MEDICATION LIST DOCUMENTED IN MEDICAL RECORD: ICD-10-PCS | Mod: S$GLB,,, | Performed by: INTERNAL MEDICINE

## 2020-06-25 PROCEDURE — 99499 UNLISTED E&M SERVICE: CPT | Mod: S$GLB,,, | Performed by: INTERNAL MEDICINE

## 2020-06-25 PROCEDURE — 99999 PR PBB SHADOW E&M-EST. PATIENT-LVL III: ICD-10-PCS | Mod: PBBFAC,,, | Performed by: INTERNAL MEDICINE

## 2020-06-25 PROCEDURE — 3074F PR MOST RECENT SYSTOLIC BLOOD PRESSURE < 130 MM HG: ICD-10-PCS | Mod: CPTII,S$GLB,, | Performed by: INTERNAL MEDICINE

## 2020-06-25 PROCEDURE — 99499 RISK ADDL DX/OHS AUDIT: ICD-10-PCS | Mod: S$GLB,,, | Performed by: INTERNAL MEDICINE

## 2020-06-25 PROCEDURE — 1159F MED LIST DOCD IN RCRD: CPT | Mod: S$GLB,,, | Performed by: INTERNAL MEDICINE

## 2020-06-25 PROCEDURE — 3074F SYST BP LT 130 MM HG: CPT | Mod: CPTII,S$GLB,, | Performed by: INTERNAL MEDICINE

## 2020-06-25 PROCEDURE — 1101F PT FALLS ASSESS-DOCD LE1/YR: CPT | Mod: CPTII,S$GLB,, | Performed by: INTERNAL MEDICINE

## 2020-06-25 PROCEDURE — 3044F PR MOST RECENT HEMOGLOBIN A1C LEVEL <7.0%: ICD-10-PCS | Mod: CPTII,S$GLB,, | Performed by: INTERNAL MEDICINE

## 2020-06-25 RX ORDER — ASPIRIN 81 MG/1
81 TABLET ORAL DAILY
COMMUNITY

## 2020-06-25 NOTE — H&P (VIEW-ONLY)
Subjective:   Patient ID:  Santiago Khan is a 69 y.o. male who presents for follow up of No chief complaint on file.      70 yo male, came in for abnormal nuke stress test   PMH CAD, DM 4 yrs, life long smoker, quit in ,  HTN, HLD, CKD III, prostates Ca s/p TURP in , no h/o chemo RX and XRT. Gout. No h/o stroke and heart attack. Social drinker.  Remote LHC showed miminal blockage by Dr. Mayra CHAPPELL,    admitted for [facility] due to chest tightness. Had low K and Mg. Troponin x2 negative and EKG showed NSR, RBBB, and LVH. Echo showed EF 45% global HK and moderate MR. Had K and Mg supplement.  States that gained weight for 30 pounds since 3/202 after held Metolazone. Even he was taking Bumex 1.5 mg bid. In  BNP 15 and Cr 1.4  Still has GOVEA. No orthopnea, sleeps with CPAP. No chest pain, faint  NUKE stress done in  showed inferior ischemia with scar. EF 45%              Past Medical History:   Diagnosis Date    Chronic kidney disease, stage 3     DDD (degenerative disc disease), lumbar     DM (diabetes mellitus) with complications     History of gout     History of prostate cancer     Hyperlipidemia associated with type 2 diabetes mellitus     Hypertension associated with stage 3 chronic kidney disease due to type 2 diabetes mellitus     BRADLEY on CPAP     Tobacco abuse        Past Surgical History:   Procedure Laterality Date    BICEPS TENDON REPAIR      COLONOSCOPY N/A 3/27/2019    Procedure: COLONOSCOPY;  Surgeon: James Roger MD;  Location: [facility];  Service: Endoscopy;  Laterality: N/A;    HEMORRHOID SURGERY      INGUINAL HERNIA REPAIR Left     KNEE ARTHROSCOPY Right     LUMBAR LAMINECTOMY      PROSTATECTOMY         Social History     Tobacco Use    Smoking status: Former Smoker     Packs/day: 2.00     Years: 50.00     Pack years: 100.00     Types: Cigarettes     Quit date: 2020     Years since quittin.3    Smokeless tobacco: Former User    Substance Use Topics    Alcohol use: Not Currently     Frequency: Never     Drinks per session: Patient refused     Binge frequency: Never    Drug use: Never       Family History   Problem Relation Age of Onset    Prostate cancer Father     Coronary artery disease Father 90    Alzheimer's disease Father     Hyperlipidemia Mother          Review of Systems   Constitution: Positive for weight gain. Negative for decreased appetite, diaphoresis, fever, malaise/fatigue and night sweats.   HENT: Negative for nosebleeds.    Eyes: Negative for blurred vision and double vision.   Cardiovascular: Positive for dyspnea on exertion and leg swelling. Negative for chest pain, claudication, irregular heartbeat, near-syncope, orthopnea, palpitations, paroxysmal nocturnal dyspnea and syncope.   Respiratory: Negative for cough, shortness of breath, sleep disturbances due to breathing, snoring, sputum production and wheezing.    Endocrine: Negative for cold intolerance and polyuria.   Hematologic/Lymphatic: Does not bruise/bleed easily.   Skin: Negative for rash.   Musculoskeletal: Negative for back pain, falls, joint pain, joint swelling and neck pain.   Gastrointestinal: Negative for abdominal pain, heartburn, nausea and vomiting.   Genitourinary: Negative for dysuria, frequency and hematuria.   Neurological: Negative for difficulty with concentration, dizziness, focal weakness, headaches, light-headedness, numbness, seizures and weakness.   Psychiatric/Behavioral: Negative for depression, memory loss and substance abuse. The patient does not have insomnia.    Allergic/Immunologic: Negative for HIV exposure and hives.       Objective:   Physical Exam   Constitutional: He is oriented to person, place, and time. He appears well-nourished.   HENT:   Head: Normocephalic.   Eyes: Pupils are equal, round, and reactive to light.   Neck: Normal carotid pulses and no JVD present. Carotid bruit is not present. No thyromegaly present.    Cardiovascular: Normal rate, regular rhythm, normal heart sounds and normal pulses.  No extrasystoles are present. PMI is not displaced. Exam reveals no gallop and no S3.   No murmur heard.  Pulmonary/Chest: Breath sounds normal. No stridor. No respiratory distress.   Abdominal: Soft. Bowel sounds are normal. There is no abdominal tenderness. There is no rebound.   Musculoskeletal: Normal range of motion.         General: Edema (BLE + edema) present.   Neurological: He is alert and oriented to person, place, and time.   Skin: Skin is intact. No rash noted.   Psychiatric: His behavior is normal.       Lab Results   Component Value Date    CHOL 158 01/23/2020    CHOL 168 07/24/2019    CHOL 158 01/30/2019     Lab Results   Component Value Date    HDL 48 01/23/2020    HDL 51 07/24/2019    HDL 46 01/30/2019     Lab Results   Component Value Date    LDLCALC 89.4 01/23/2020    LDLCALC 89.6 07/24/2019    LDLCALC 86.4 01/30/2019     Lab Results   Component Value Date    TRIG 103 01/23/2020    TRIG 137 07/24/2019    TRIG 128 01/30/2019     Lab Results   Component Value Date    CHOLHDL 30.4 01/23/2020    CHOLHDL 30.4 07/24/2019    CHOLHDL 29.1 01/30/2019       Chemistry        Component Value Date/Time     04/21/2020 0952    K 3.7 04/21/2020 0952     04/21/2020 0952    CO2 26 04/21/2020 0952    BUN 12 04/21/2020 0952    CREATININE 1.4 04/21/2020 0952    GLU 92 04/21/2020 0952        Component Value Date/Time    CALCIUM 6.9 (LL) 04/21/2020 0952    ALKPHOS 61 03/21/2020 0457    AST 26 03/21/2020 0457    ALT 26 03/21/2020 0457    BILITOT 0.4 03/21/2020 0457    ESTGFRAFRICA 59.3 (A) 04/21/2020 0952    EGFRNONAA 51.3 (A) 04/21/2020 0952          Lab Results   Component Value Date    HGBA1C 5.9 (H) 01/23/2020     Lab Results   Component Value Date    TSH 1.064 01/23/2020     Lab Results   Component Value Date    INR 1.0 03/10/2020     Lab Results   Component Value Date    WBC 8.22 04/06/2020    HGB 10.6 (L) 04/06/2020     HCT 35.7 (L) 04/06/2020    MCV 84 04/06/2020     04/06/2020     BMP  Sodium   Date Value Ref Range Status   04/21/2020 144 136 - 145 mmol/L Final     Potassium   Date Value Ref Range Status   04/21/2020 3.7 3.5 - 5.1 mmol/L Final     Chloride   Date Value Ref Range Status   04/21/2020 107 95 - 110 mmol/L Final     CO2   Date Value Ref Range Status   04/21/2020 26 23 - 29 mmol/L Final     BUN, Bld   Date Value Ref Range Status   04/21/2020 12 8 - 23 mg/dL Final     Creatinine   Date Value Ref Range Status   04/21/2020 1.4 0.5 - 1.4 mg/dL Final     Calcium   Date Value Ref Range Status   04/21/2020 6.9 (LL) 8.7 - 10.5 mg/dL Final     Comment:     *Critical value -  Results called to and read back by:JAYNE KENNY MD     Anion Gap   Date Value Ref Range Status   04/21/2020 11 8 - 16 mmol/L Final     eGFR if    Date Value Ref Range Status   04/21/2020 59.3 (A) >60 mL/min/1.73 m^2 Final     eGFR if non    Date Value Ref Range Status   04/21/2020 51.3 (A) >60 mL/min/1.73 m^2 Final     Comment:     Calculation used to obtain the estimated glomerular filtration  rate (eGFR) is the CKD-EPI equation.        BNP  @LABRCNTIP(BNP,BNPTRIAGEBLO)@  @LABRCNTIP(troponini)@  CrCl cannot be calculated (Patient's most recent lab result is older than the maximum 7 days allowed.).  No results found in the last 24 hours.  No results found in the last 24 hours.  No results found in the last 24 hours.    Assessment:      1. Chronic combined systolic and diastolic congestive heart failure    2. Abnormal stress test    3. Coronary artery disease of native artery of native heart with stable angina pectoris    4. Abnormal nuclear stress test    5. Hyperlipidemia associated with type 2 diabetes mellitus    6. Hypertension associated with stage 3 chronic kidney disease due to type 2 diabetes mellitus    7. Chronic kidney disease, stage 3    8. DM (diabetes mellitus) with complications    9. Tobacco abuse     10. BRADLEY (obstructive sleep apnea)        Plan:   Chronic combined systolic and diastolic congestive heart failure  -     CBC auto differential; Future; Expected date: 06/25/2020  -     Basic metabolic panel; Future; Expected date: 06/25/2020  -     APTT; Future; Expected date: 06/25/2020  -     Protime-INR; Future; Expected date: 06/25/2020  -     COVID-19 Routine Screening; Future; Expected date: 06/26/2020    Abnormal stress test  -     CBC auto differential; Future; Expected date: 06/25/2020  -     Basic metabolic panel; Future; Expected date: 06/25/2020  -     APTT; Future; Expected date: 06/25/2020  -     Protime-INR; Future; Expected date: 06/25/2020  -     COVID-19 Routine Screening; Future; Expected date: 06/26/2020    Arrange LHC  Continue Bisoprolol, statin and Bumex  Add ASA 81mg daily  I have explained the risks, benefits, and alternatives of the procedure in detail with patient and family. The patient voices understanding and all questions have been addressed.  The patient agrees to proceed as planned.  F/u after LHC done

## 2020-06-25 NOTE — PROGRESS NOTES
Subjective:   Patient ID:  Santiago Khan is a 69 y.o. male who presents for follow up of No chief complaint on file.      70 yo male, came in for abnormal nuke stress test   PMH CAD, DM 4 yrs, life long smoker, quit in ,  HTN, HLD, CKD III, prostates Ca s/p TURP in , no h/o chemo RX and XRT. Gout. No h/o stroke and heart attack. Social drinker.  Remote LHC showed miminal blockage by Dr. Mayra CHAPPELL,    admitted for [facility] due to chest tightness. Had low K and Mg. Troponin x2 negative and EKG showed NSR, RBBB, and LVH. Echo showed EF 45% global HK and moderate MR. Had K and Mg supplement.  States that gained weight for 30 pounds since 3/202 after held Metolazone. Even he was taking Bumex 1.5 mg bid. In  BNP 15 and Cr 1.4  Still has GOVEA. No orthopnea, sleeps with CPAP. No chest pain, faint  NUKE stress done in  showed inferior ischemia with scar. EF 45%              Past Medical History:   Diagnosis Date    Chronic kidney disease, stage 3     DDD (degenerative disc disease), lumbar     DM (diabetes mellitus) with complications     History of gout     History of prostate cancer     Hyperlipidemia associated with type 2 diabetes mellitus     Hypertension associated with stage 3 chronic kidney disease due to type 2 diabetes mellitus     BRADLEY on CPAP     Tobacco abuse        Past Surgical History:   Procedure Laterality Date    BICEPS TENDON REPAIR      COLONOSCOPY N/A 3/27/2019    Procedure: COLONOSCOPY;  Surgeon: James Roger MD;  Location: [facility];  Service: Endoscopy;  Laterality: N/A;    HEMORRHOID SURGERY      INGUINAL HERNIA REPAIR Left     KNEE ARTHROSCOPY Right     LUMBAR LAMINECTOMY      PROSTATECTOMY         Social History     Tobacco Use    Smoking status: Former Smoker     Packs/day: 2.00     Years: 50.00     Pack years: 100.00     Types: Cigarettes     Quit date: 2020     Years since quittin.3    Smokeless tobacco: Former User    Substance Use Topics    Alcohol use: Not Currently     Frequency: Never     Drinks per session: Patient refused     Binge frequency: Never    Drug use: Never       Family History   Problem Relation Age of Onset    Prostate cancer Father     Coronary artery disease Father 90    Alzheimer's disease Father     Hyperlipidemia Mother          Review of Systems   Constitution: Positive for weight gain. Negative for decreased appetite, diaphoresis, fever, malaise/fatigue and night sweats.   HENT: Negative for nosebleeds.    Eyes: Negative for blurred vision and double vision.   Cardiovascular: Positive for dyspnea on exertion and leg swelling. Negative for chest pain, claudication, irregular heartbeat, near-syncope, orthopnea, palpitations, paroxysmal nocturnal dyspnea and syncope.   Respiratory: Negative for cough, shortness of breath, sleep disturbances due to breathing, snoring, sputum production and wheezing.    Endocrine: Negative for cold intolerance and polyuria.   Hematologic/Lymphatic: Does not bruise/bleed easily.   Skin: Negative for rash.   Musculoskeletal: Negative for back pain, falls, joint pain, joint swelling and neck pain.   Gastrointestinal: Negative for abdominal pain, heartburn, nausea and vomiting.   Genitourinary: Negative for dysuria, frequency and hematuria.   Neurological: Negative for difficulty with concentration, dizziness, focal weakness, headaches, light-headedness, numbness, seizures and weakness.   Psychiatric/Behavioral: Negative for depression, memory loss and substance abuse. The patient does not have insomnia.    Allergic/Immunologic: Negative for HIV exposure and hives.       Objective:   Physical Exam   Constitutional: He is oriented to person, place, and time. He appears well-nourished.   HENT:   Head: Normocephalic.   Eyes: Pupils are equal, round, and reactive to light.   Neck: Normal carotid pulses and no JVD present. Carotid bruit is not present. No thyromegaly present.    Cardiovascular: Normal rate, regular rhythm, normal heart sounds and normal pulses.  No extrasystoles are present. PMI is not displaced. Exam reveals no gallop and no S3.   No murmur heard.  Pulmonary/Chest: Breath sounds normal. No stridor. No respiratory distress.   Abdominal: Soft. Bowel sounds are normal. There is no abdominal tenderness. There is no rebound.   Musculoskeletal: Normal range of motion.         General: Edema (BLE + edema) present.   Neurological: He is alert and oriented to person, place, and time.   Skin: Skin is intact. No rash noted.   Psychiatric: His behavior is normal.       Lab Results   Component Value Date    CHOL 158 01/23/2020    CHOL 168 07/24/2019    CHOL 158 01/30/2019     Lab Results   Component Value Date    HDL 48 01/23/2020    HDL 51 07/24/2019    HDL 46 01/30/2019     Lab Results   Component Value Date    LDLCALC 89.4 01/23/2020    LDLCALC 89.6 07/24/2019    LDLCALC 86.4 01/30/2019     Lab Results   Component Value Date    TRIG 103 01/23/2020    TRIG 137 07/24/2019    TRIG 128 01/30/2019     Lab Results   Component Value Date    CHOLHDL 30.4 01/23/2020    CHOLHDL 30.4 07/24/2019    CHOLHDL 29.1 01/30/2019       Chemistry        Component Value Date/Time     04/21/2020 0952    K 3.7 04/21/2020 0952     04/21/2020 0952    CO2 26 04/21/2020 0952    BUN 12 04/21/2020 0952    CREATININE 1.4 04/21/2020 0952    GLU 92 04/21/2020 0952        Component Value Date/Time    CALCIUM 6.9 (LL) 04/21/2020 0952    ALKPHOS 61 03/21/2020 0457    AST 26 03/21/2020 0457    ALT 26 03/21/2020 0457    BILITOT 0.4 03/21/2020 0457    ESTGFRAFRICA 59.3 (A) 04/21/2020 0952    EGFRNONAA 51.3 (A) 04/21/2020 0952          Lab Results   Component Value Date    HGBA1C 5.9 (H) 01/23/2020     Lab Results   Component Value Date    TSH 1.064 01/23/2020     Lab Results   Component Value Date    INR 1.0 03/10/2020     Lab Results   Component Value Date    WBC 8.22 04/06/2020    HGB 10.6 (L) 04/06/2020     HCT 35.7 (L) 04/06/2020    MCV 84 04/06/2020     04/06/2020     BMP  Sodium   Date Value Ref Range Status   04/21/2020 144 136 - 145 mmol/L Final     Potassium   Date Value Ref Range Status   04/21/2020 3.7 3.5 - 5.1 mmol/L Final     Chloride   Date Value Ref Range Status   04/21/2020 107 95 - 110 mmol/L Final     CO2   Date Value Ref Range Status   04/21/2020 26 23 - 29 mmol/L Final     BUN, Bld   Date Value Ref Range Status   04/21/2020 12 8 - 23 mg/dL Final     Creatinine   Date Value Ref Range Status   04/21/2020 1.4 0.5 - 1.4 mg/dL Final     Calcium   Date Value Ref Range Status   04/21/2020 6.9 (LL) 8.7 - 10.5 mg/dL Final     Comment:     *Critical value -  Results called to and read back by:JAYNE KENNY MD     Anion Gap   Date Value Ref Range Status   04/21/2020 11 8 - 16 mmol/L Final     eGFR if    Date Value Ref Range Status   04/21/2020 59.3 (A) >60 mL/min/1.73 m^2 Final     eGFR if non    Date Value Ref Range Status   04/21/2020 51.3 (A) >60 mL/min/1.73 m^2 Final     Comment:     Calculation used to obtain the estimated glomerular filtration  rate (eGFR) is the CKD-EPI equation.        BNP  @LABRCNTIP(BNP,BNPTRIAGEBLO)@  @LABRCNTIP(troponini)@  CrCl cannot be calculated (Patient's most recent lab result is older than the maximum 7 days allowed.).  No results found in the last 24 hours.  No results found in the last 24 hours.  No results found in the last 24 hours.    Assessment:      1. Chronic combined systolic and diastolic congestive heart failure    2. Abnormal stress test    3. Coronary artery disease of native artery of native heart with stable angina pectoris    4. Abnormal nuclear stress test    5. Hyperlipidemia associated with type 2 diabetes mellitus    6. Hypertension associated with stage 3 chronic kidney disease due to type 2 diabetes mellitus    7. Chronic kidney disease, stage 3    8. DM (diabetes mellitus) with complications    9. Tobacco abuse     10. BRADLEY (obstructive sleep apnea)        Plan:   Chronic combined systolic and diastolic congestive heart failure  -     CBC auto differential; Future; Expected date: 06/25/2020  -     Basic metabolic panel; Future; Expected date: 06/25/2020  -     APTT; Future; Expected date: 06/25/2020  -     Protime-INR; Future; Expected date: 06/25/2020  -     COVID-19 Routine Screening; Future; Expected date: 06/26/2020    Abnormal stress test  -     CBC auto differential; Future; Expected date: 06/25/2020  -     Basic metabolic panel; Future; Expected date: 06/25/2020  -     APTT; Future; Expected date: 06/25/2020  -     Protime-INR; Future; Expected date: 06/25/2020  -     COVID-19 Routine Screening; Future; Expected date: 06/26/2020    Arrange LHC  Continue Bisoprolol, statin and Bumex  Add ASA 81mg daily  I have explained the risks, benefits, and alternatives of the procedure in detail with patient and family. The patient voices understanding and all questions have been addressed.  The patient agrees to proceed as planned.  F/u after LHC done

## 2020-06-25 NOTE — TELEPHONE ENCOUNTER
Patient has already been instructed by special procedures nurse.      ----- Message from Sabino Cr MD sent at 6/25/2020  4:32 PM CDT -----  Remind pt to start ASA 81 mg daily

## 2020-06-26 ENCOUNTER — LAB VISIT (OUTPATIENT)
Dept: LAB | Facility: HOSPITAL | Age: 70
End: 2020-06-26
Attending: INTERNAL MEDICINE
Payer: MEDICARE

## 2020-06-26 DIAGNOSIS — R94.39 ABNORMAL STRESS TEST: ICD-10-CM

## 2020-06-26 DIAGNOSIS — I50.42 CHRONIC COMBINED SYSTOLIC AND DIASTOLIC CONGESTIVE HEART FAILURE: ICD-10-CM

## 2020-06-26 LAB
ANION GAP SERPL CALC-SCNC: 8 MMOL/L (ref 8–16)
ANISOCYTOSIS BLD QL SMEAR: SLIGHT
APTT BLDCRRT: 25.4 SEC (ref 21–32)
BASOPHILS # BLD AUTO: 0.06 K/UL (ref 0–0.2)
BASOPHILS NFR BLD: 0.6 % (ref 0–1.9)
BUN SERPL-MCNC: 14 MG/DL (ref 8–23)
CALCIUM SERPL-MCNC: 8.8 MG/DL (ref 8.7–10.5)
CHLORIDE SERPL-SCNC: 108 MMOL/L (ref 95–110)
CO2 SERPL-SCNC: 24 MMOL/L (ref 23–29)
CREAT SERPL-MCNC: 1.4 MG/DL (ref 0.5–1.4)
DACRYOCYTES BLD QL SMEAR: ABNORMAL
DIFFERENTIAL METHOD: ABNORMAL
EOSINOPHIL # BLD AUTO: 0.6 K/UL (ref 0–0.5)
EOSINOPHIL NFR BLD: 6 % (ref 0–8)
ERYTHROCYTE [DISTWIDTH] IN BLOOD BY AUTOMATED COUNT: 16.7 % (ref 11.5–14.5)
EST. GFR  (AFRICAN AMERICAN): 59 ML/MIN/1.73 M^2
EST. GFR  (NON AFRICAN AMERICAN): 51 ML/MIN/1.73 M^2
GLUCOSE SERPL-MCNC: 151 MG/DL (ref 70–110)
HCT VFR BLD AUTO: 37.6 % (ref 40–54)
HGB BLD-MCNC: 11.3 G/DL (ref 14–18)
IMM GRANULOCYTES # BLD AUTO: 0.06 K/UL (ref 0–0.04)
IMM GRANULOCYTES NFR BLD AUTO: 0.6 % (ref 0–0.5)
INR PPP: 1 (ref 0.8–1.2)
LYMPHOCYTES # BLD AUTO: 2.6 K/UL (ref 1–4.8)
LYMPHOCYTES NFR BLD: 26.7 % (ref 18–48)
MCH RBC QN AUTO: 25.6 PG (ref 27–31)
MCHC RBC AUTO-ENTMCNC: 30.1 G/DL (ref 32–36)
MCV RBC AUTO: 85 FL (ref 82–98)
MONOCYTES # BLD AUTO: 0.7 K/UL (ref 0.3–1)
MONOCYTES NFR BLD: 7 % (ref 4–15)
NEUTROPHILS # BLD AUTO: 5.8 K/UL (ref 1.8–7.7)
NEUTROPHILS NFR BLD: 59.7 % (ref 38–73)
NRBC BLD-RTO: 0 /100 WBC
OVALOCYTES BLD QL SMEAR: ABNORMAL
PLATELET # BLD AUTO: 198 K/UL (ref 150–350)
PLATELET BLD QL SMEAR: ABNORMAL
PMV BLD AUTO: 10 FL (ref 9.2–12.9)
POIKILOCYTOSIS BLD QL SMEAR: SLIGHT
POLYCHROMASIA BLD QL SMEAR: ABNORMAL
POTASSIUM SERPL-SCNC: 4.1 MMOL/L (ref 3.5–5.1)
PROTHROMBIN TIME: 11 SEC (ref 9–12.5)
RBC # BLD AUTO: 4.41 M/UL (ref 4.6–6.2)
SODIUM SERPL-SCNC: 140 MMOL/L (ref 136–145)
WBC # BLD AUTO: 9.71 K/UL (ref 3.9–12.7)

## 2020-06-26 PROCEDURE — 80048 BASIC METABOLIC PNL TOTAL CA: CPT

## 2020-06-26 PROCEDURE — 85025 COMPLETE CBC W/AUTO DIFF WBC: CPT

## 2020-06-26 PROCEDURE — 85730 THROMBOPLASTIN TIME PARTIAL: CPT

## 2020-06-26 PROCEDURE — 36415 COLL VENOUS BLD VENIPUNCTURE: CPT

## 2020-06-26 PROCEDURE — 85610 PROTHROMBIN TIME: CPT

## 2020-06-28 ENCOUNTER — PATIENT MESSAGE (OUTPATIENT)
Dept: INTERNAL MEDICINE | Facility: CLINIC | Age: 70
End: 2020-06-28

## 2020-06-29 ENCOUNTER — HOSPITAL ENCOUNTER (OUTPATIENT)
Facility: HOSPITAL | Age: 70
Discharge: HOME | End: 2020-06-29
Attending: INTERNAL MEDICINE | Admitting: INTERNAL MEDICINE
Payer: MEDICARE

## 2020-06-29 ENCOUNTER — TELEPHONE (OUTPATIENT)
Dept: INTERNAL MEDICINE | Facility: CLINIC | Age: 70
End: 2020-06-29

## 2020-06-29 VITALS
HEIGHT: 67 IN | BODY MASS INDEX: 36.26 KG/M2 | SYSTOLIC BLOOD PRESSURE: 112 MMHG | TEMPERATURE: 98 F | HEART RATE: 73 BPM | WEIGHT: 231 LBS | DIASTOLIC BLOOD PRESSURE: 60 MMHG | RESPIRATION RATE: 21 BRPM | OXYGEN SATURATION: 96 %

## 2020-06-29 DIAGNOSIS — R94.39 ABNORMAL STRESS TEST: ICD-10-CM

## 2020-06-29 DIAGNOSIS — I50.42 CHRONIC COMBINED SYSTOLIC AND DIASTOLIC CONGESTIVE HEART FAILURE: ICD-10-CM

## 2020-06-29 LAB
CATH EF QUANTITATIVE: 45 %
POCT GLUCOSE: 124 MG/DL (ref 70–110)

## 2020-06-29 PROCEDURE — 93458 PR CATH PLACE/CORON ANGIO, IMG SUPER/INTERP,W LEFT HEART VENTRICULOGRAPHY: ICD-10-PCS | Mod: 26,,, | Performed by: INTERNAL MEDICINE

## 2020-06-29 PROCEDURE — 27201423 OPTIME MED/SURG SUP & DEVICES STERILE SUPPLY: Performed by: INTERNAL MEDICINE

## 2020-06-29 PROCEDURE — 99152 MOD SED SAME PHYS/QHP 5/>YRS: CPT | Performed by: INTERNAL MEDICINE

## 2020-06-29 PROCEDURE — C1894 INTRO/SHEATH, NON-LASER: HCPCS | Performed by: INTERNAL MEDICINE

## 2020-06-29 PROCEDURE — 63600175 PHARM REV CODE 636 W HCPCS: Performed by: INTERNAL MEDICINE

## 2020-06-29 PROCEDURE — 99152 MOD SED SAME PHYS/QHP 5/>YRS: CPT | Mod: ,,, | Performed by: INTERNAL MEDICINE

## 2020-06-29 PROCEDURE — 93458 L HRT ARTERY/VENTRICLE ANGIO: CPT | Performed by: INTERNAL MEDICINE

## 2020-06-29 PROCEDURE — 93458 L HRT ARTERY/VENTRICLE ANGIO: CPT | Mod: 26,,, | Performed by: INTERNAL MEDICINE

## 2020-06-29 PROCEDURE — 99152 PR MOD CONSCIOUS SEDATION, SAME PHYS, 5+ YRS, FIRST 15 MIN: ICD-10-PCS | Mod: ,,, | Performed by: INTERNAL MEDICINE

## 2020-06-29 PROCEDURE — 25000003 PHARM REV CODE 250: Performed by: INTERNAL MEDICINE

## 2020-06-29 PROCEDURE — C1769 GUIDE WIRE: HCPCS | Performed by: INTERNAL MEDICINE

## 2020-06-29 PROCEDURE — 25500020 PHARM REV CODE 255: Performed by: INTERNAL MEDICINE

## 2020-06-29 RX ORDER — LIDOCAINE HYDROCHLORIDE 20 MG/ML
INJECTION, SOLUTION EPIDURAL; INFILTRATION; INTRACAUDAL; PERINEURAL
Status: DISCONTINUED | OUTPATIENT
Start: 2020-06-29 | End: 2020-06-29 | Stop reason: HOSPADM

## 2020-06-29 RX ORDER — SODIUM CHLORIDE 9 MG/ML
INJECTION, SOLUTION INTRAVENOUS CONTINUOUS
Status: DISCONTINUED | OUTPATIENT
Start: 2020-06-29 | End: 2020-06-29 | Stop reason: HOSPADM

## 2020-06-29 RX ORDER — METOPROLOL TARTRATE 1 MG/ML
INJECTION, SOLUTION INTRAVENOUS
Status: DISCONTINUED | OUTPATIENT
Start: 2020-06-29 | End: 2020-06-29 | Stop reason: HOSPADM

## 2020-06-29 RX ORDER — DIPHENHYDRAMINE HCL 50 MG
50 CAPSULE ORAL ONCE
Status: COMPLETED | OUTPATIENT
Start: 2020-06-29 | End: 2020-06-29

## 2020-06-29 RX ORDER — HEPARIN SODIUM 1000 [USP'U]/ML
INJECTION INTRAVENOUS; SUBCUTANEOUS
Status: DISCONTINUED | OUTPATIENT
Start: 2020-06-29 | End: 2020-06-29 | Stop reason: HOSPADM

## 2020-06-29 RX ORDER — VERAPAMIL HYDROCHLORIDE 2.5 MG/ML
INJECTION, SOLUTION INTRAVENOUS
Status: DISCONTINUED | OUTPATIENT
Start: 2020-06-29 | End: 2020-06-29 | Stop reason: HOSPADM

## 2020-06-29 RX ORDER — NAPROXEN SODIUM 220 MG/1
81 TABLET, FILM COATED ORAL ONCE
Status: DISCONTINUED | OUTPATIENT
Start: 2020-06-29 | End: 2020-06-29

## 2020-06-29 RX ORDER — POTASSIUM CHLORIDE 20 MEQ/1
20 TABLET, EXTENDED RELEASE ORAL 2 TIMES DAILY
Qty: 60 TABLET | Refills: 11 | Status: SHIPPED | OUTPATIENT
Start: 2020-06-29 | End: 2020-07-01 | Stop reason: SDUPTHER

## 2020-06-29 RX ORDER — SODIUM CHLORIDE 9 MG/ML
INJECTION, SOLUTION INTRAVENOUS
Status: DISCONTINUED | OUTPATIENT
Start: 2020-06-29 | End: 2020-06-29 | Stop reason: HOSPADM

## 2020-06-29 RX ORDER — FENTANYL CITRATE 50 UG/ML
INJECTION, SOLUTION INTRAMUSCULAR; INTRAVENOUS
Status: DISCONTINUED | OUTPATIENT
Start: 2020-06-29 | End: 2020-06-29 | Stop reason: HOSPADM

## 2020-06-29 RX ORDER — NITROGLYCERIN 5 MG/ML
INJECTION, SOLUTION INTRAVENOUS
Status: DISCONTINUED | OUTPATIENT
Start: 2020-06-29 | End: 2020-06-29 | Stop reason: HOSPADM

## 2020-06-29 RX ORDER — DIAZEPAM 5 MG/1
5 TABLET ORAL
Status: DISCONTINUED | OUTPATIENT
Start: 2020-06-29 | End: 2020-06-29 | Stop reason: HOSPADM

## 2020-06-29 RX ORDER — MIDAZOLAM HYDROCHLORIDE 1 MG/ML
INJECTION, SOLUTION INTRAMUSCULAR; INTRAVENOUS
Status: DISCONTINUED | OUTPATIENT
Start: 2020-06-29 | End: 2020-06-29 | Stop reason: HOSPADM

## 2020-06-29 RX ADMIN — DIPHENHYDRAMINE HYDROCHLORIDE 50 MG: 50 CAPSULE ORAL at 12:06

## 2020-06-29 RX ADMIN — SODIUM CHLORIDE: 0.9 INJECTION, SOLUTION INTRAVENOUS at 12:06

## 2020-06-29 RX ADMIN — DIAZEPAM 5 MG: 5 TABLET ORAL at 12:06

## 2020-06-29 NOTE — TELEPHONE ENCOUNTER
Patient states per your instructions he is taking 6 potassium per day. Needs new script. Please advise.

## 2020-06-29 NOTE — TELEPHONE ENCOUNTER
He should not need nearly as much potassium as he has in the past since off the other two diuretics. Tell him to only take the potassium twice per day

## 2020-06-29 NOTE — NURSING
Informed Dr. Wilson pt's creatinine 1.4; instructed to give pt NS bolus 250 ml preprocedure; which has been completed.

## 2020-06-29 NOTE — Clinical Note
Catheter is inserted into the ostium   right coronary artery. All catheter exchanges occurs over wire.

## 2020-06-29 NOTE — TELEPHONE ENCOUNTER
The patient is not taking the lasix and the metolazone, but he is taking the diamox. Please advise

## 2020-06-29 NOTE — INTERVAL H&P NOTE
The patient has been examined and the H&P has been reviewed:    I concur with the findings and no changes have occurred since H&P was written.  Has cardiomyopathy with ischemia on cardiolite for [facility].  Anesthesia/Surgery risks, benefits and alternative options discussed and understood by patient/family.  I have explained the risks, benefits , and alternatives of the procedure in detail.the patient voices understanding and all questions have been answered.the patient agrees to proceed as planned.        Active Hospital Problems    Diagnosis  POA    Abnormal stress test [R94.39]  Yes     Priority: High      Resolved Hospital Problems   No resolved problems to display.

## 2020-06-29 NOTE — BRIEF OP NOTE
Discharge Note  Short Stay      SUMMARY     Admit Date: 6/29/2020    Attending Physician: Cheli Wilson MD     Discharge Physician: Estuardo Wilson MD     Discharge Date: 6/29/2020    Final Diagnosis: cad    Outcome of Stay:patient tolerated procedure well has non obs cad with ef45% with inferior akinesis .hie d1 70% stenosis lad non obs cad . He was deemed stable for discahrge and will continue MEdical therapy AND  rf modification . Further therapy to be determined by DR KENNY.    Disposition: Home or Self Care    Patient Instructions:   Current Discharge Medication List      CONTINUE these medications which have NOT CHANGED    Details   acetaZOLAMIDE (DIAMOX) 250 MG tablet Take 1 tablet by mouth Every Monday, Wednesday, and Friday.      allopurinoL (ZYLOPRIM) 100 MG tablet Take 100 mg by mouth once daily.      aspirin (ECOTRIN) 81 MG EC tablet Take 81 mg by mouth once daily.      baclofen (LIORESAL) 10 MG tablet Take 1 tablet by mouth every evening.  Refills: 2      bisoprolol (ZEBETA) 5 MG tablet Take 2.5 mg by mouth once daily.       bumetanide (BUMEX) 1 MG tablet Take 1.5 tablets (1.5 mg total) by mouth 2 (two) times daily. Taking Bumex 2 mg in AM and 1 mg in PM for two weeks then 1mg bid.  Qty: 90 tablet, Refills: 11      fluticasone (FLONASE) 50 mcg/actuation nasal spray U 1 TO 2 SPRAYS IEN QD  Refills: 4      glimepiride (AMARYL) 1 MG tablet TK 1 T PO ONCE D  Refills: 4      HYDROcodone-acetaminophen (NORCO) 7.5-325 mg per tablet TK 1 T PO  TID PRF CHRONIC PAIN  Refills: 0    Comments: Quantity prescribed more than 7 day supply? Press F2 and select one:[id number]        mometasone 0.1% (ELOCON) 0.1 % cream MARIA TERESA TO HANDS QD PRF FLARE UPS  Refills: 0      multivitamin-minerals-lutein (CENTRUM SILVER) Tab Take 1 tablet by mouth Daily.      omeprazole (PRILOSEC) 40 MG capsule TAKE ONE CAPSULE BY MOUTH EVERY DAY  Qty: 30 capsule, Refills: 11      potassium chloride SA (K-DUR,KLOR-CON) 20 MEQ tablet Take 1 tablet (20  mEq total) by mouth 4 (four) times daily.  Qty: 120 tablet, Refills: 11      simvastatin (ZOCOR) 40 MG tablet Take 40 mg by mouth every evening.       colchicine (COLCRYS) 0.6 mg tablet Take 1 tablet (0.6 mg total) by mouth once daily.  Qty: 30 tablet, Refills: 11      febuxostat (ULORIC) 80 mg Tab Take 1 tablet (80 mg total) by mouth once daily.  Qty: 30 each, Refills: 11      nicotine polacrilex 2 MG Lozg Take 1 lozenge (2 mg total) by mouth as needed (use  no more than 8 lozenges in 24 hours.).  Qty: 300 lozenge, Refills: 1    Comments: Patient is part of the smoking cessation program.  Associated Diagnoses: Nicotine dependence      promethazine (PHENERGAN) 12.5 MG Tab Take 12.5 mg by mouth every 6 (six) hours as needed.  Refills: 2      varenicline (CHANTIX) 1 mg Tab Take 1 tablet (1 mg total) by mouth once daily.  Qty: 60 tablet, Refills: 2    Comments: Patient is participating in the smoking cessation program and qualifies for the free [facility] Ch[facility]ix.  Associated Diagnoses: Nicotine dependence      zolpidem (AMBIEN) 10 mg Tab Take 5 mg by mouth nightly as needed.             Discharge Procedure Orders (must include Diet, Follow-up, Activity)   Discharge Procedure Orders (must include Diet, Follow-up, Activity)   Diet general     Call MD for:  temperature >100.4     Call MD for:  persistent nausea and vomiting     Call MD for:  severe uncontrolled pain     Call MD for:  difficulty breathing, headache or visual disturbances     Call MD for:  redness, tenderness, or signs of infection (pain, swelling, redness, odor or green/yellow discharge around incision site)     Call MD for:  hives     Call MD for:  persistent dizziness or light-headedness     Call MD for:  extreme fatigue     Follow-up Information     Sabino Cr MD In 2 weeks.    Specialties: Cardiology, Cardiovascular Disease  Contact information:  [address]  [place] [address] [phone]

## 2020-06-29 NOTE — TELEPHONE ENCOUNTER
Patient's significant other stated that she is not sure if he's taking those. He is having a heart cath right now and she will get back with us later

## 2020-06-29 NOTE — TELEPHONE ENCOUNTER
----- Message from Tomy Fung sent at 6/29/2020 12:36 PM CDT -----  Name of Who is Calling: pt    What is the request in detail: is returning a call. Pt states he is going in to a procedure and please deliver the message through Aurora Fish (pt friend). Please contact to further discuss and advise      Can the clinic reply by [facility]: no    What Number [facility] Back [facility]: [phone]

## 2020-06-29 NOTE — PROGRESS NOTES
Patient, Santiago Khan (MRN #[id number]), presented with a recorded BMI of 36.29 kg/m^2 and a documented comorbidity(s):  - Diabetes Mellitus Type 2  - Obstructive Sleep Apnea  - Hypertension  - Hyperlipidemia  - Atrial Fibrillation  to which the severe obesity is a contributing factor. This is consistent with the definition of severe obesity (BMI 35.0-39.9) with comorbidity (ICD-10 E66.01, Z68.35). The patient's severe obesity was monitored, evaluated, addressed and/or treated. This addendum to the medical record is made on 06/29/2020.

## 2020-06-30 NOTE — NURSING
Pt given discharge instructions; reviewed; questions answered; understanding verbalized; pt discharged home via personal vehicle; accompanied by friend Aurora Fish; pt able to ambulate with standby asst from wheelchair to vehicle; ROBERTO; ELDA.

## 2020-07-01 ENCOUNTER — TELEPHONE (OUTPATIENT)
Dept: CARDIOLOGY | Facility: CLINIC | Age: 70
End: 2020-07-01

## 2020-07-01 RX ORDER — POTASSIUM CHLORIDE 20 MEQ/1
60 TABLET, EXTENDED RELEASE ORAL 2 TIMES DAILY
Qty: 180 TABLET | Refills: 11 | Status: SHIPPED | OUTPATIENT
Start: 2020-07-01 | End: 2021-09-20

## 2020-07-01 NOTE — TELEPHONE ENCOUNTER
Patient contacted and was advised that he should take  KCL 60 meq bid sent  Please do the BMP Dr. Freed' ordered for you. The patient stated understanding with no questions or concerns.

## 2020-07-02 ENCOUNTER — TELEPHONE (OUTPATIENT)
Dept: CARDIOLOGY | Facility: CLINIC | Age: 70
End: 2020-07-02

## 2020-07-02 NOTE — TELEPHONE ENCOUNTER
----- Message from Kashif Acosta MD sent at 7/1/2020  2:34 PM CDT -----  Patient told my nursing staff on two occasions that he was only taking Diamox and no other diuretics. If you know that not to be true, please confirm with the patient what he is taking as there is a discrepancy between what is in his records and what he states he is taking. He saw nephrology and they felt he was abusing diuretics in the past as the cause of his hypokalemia. He has no underlying kidney pathology that is causing potassium leak so if he is only taking Diamox as he told us, he should not be needing 80 meq of KCL per day.   Dr Acosta  ----- Message -----  From: Sabino Cr MD  Sent: 7/1/2020   1:27 PM CDT  To: MD Dr. Dave Rios    Pt called in that he is concerning about his K level due to taking BUmex and h/o hypokalemia.  His K was 2.9 about three months ago and has been taking  meq for a while. K was 4.1 on 06/26. Would advise him to take same KCL dosage and f/u BMP you ordered for him.  THX  -Dr. RIVERA

## 2020-07-02 NOTE — TELEPHONE ENCOUNTER
Patient contacted and was asked to   Please clarify the diuretics he is taking  Bumex and/or Diamox. The patient stated he is taking both medications, the medication list does not list Bumex as a medication prescribed.

## 2020-07-06 NOTE — TELEPHONE ENCOUNTER
Patient contacted and was advised of appointment times. The patient was scheduled for 07/09/2020 f/u.

## 2020-07-07 ENCOUNTER — OFFICE VISIT (OUTPATIENT)
Dept: OPHTHALMOLOGY | Facility: CLINIC | Age: 70
End: 2020-07-07
Payer: MEDICARE

## 2020-07-07 ENCOUNTER — CLINICAL SUPPORT (OUTPATIENT)
Dept: SMOKING CESSATION | Facility: CLINIC | Age: 70
End: 2020-07-07
Payer: COMMERCIAL

## 2020-07-07 DIAGNOSIS — H02.403 PTOSIS OF EYELID, BILATERAL: ICD-10-CM

## 2020-07-07 DIAGNOSIS — F17.200 NICOTINE DEPENDENCE: Primary | ICD-10-CM

## 2020-07-07 DIAGNOSIS — E11.9 DIABETES MELLITUS TYPE 2 WITHOUT RETINOPATHY: Primary | ICD-10-CM

## 2020-07-07 DIAGNOSIS — Z46.0 ENCOUNTER FOR FITTING OR ADJUSTMENT OF SPECTACLES OR CONTACT LENSES: ICD-10-CM

## 2020-07-07 DIAGNOSIS — H02.834 DERMATOCHALASIS OF BOTH UPPER EYELIDS: ICD-10-CM

## 2020-07-07 DIAGNOSIS — H52.4 MYOPIA OF BOTH EYES WITH ASTIGMATISM AND PRESBYOPIA: ICD-10-CM

## 2020-07-07 DIAGNOSIS — E11.36 DIABETIC CATARACT: ICD-10-CM

## 2020-07-07 DIAGNOSIS — H52.203 MYOPIA OF BOTH EYES WITH ASTIGMATISM AND PRESBYOPIA: ICD-10-CM

## 2020-07-07 DIAGNOSIS — H02.831 DERMATOCHALASIS OF BOTH UPPER EYELIDS: ICD-10-CM

## 2020-07-07 DIAGNOSIS — H52.13 MYOPIA OF BOTH EYES WITH ASTIGMATISM AND PRESBYOPIA: ICD-10-CM

## 2020-07-07 PROCEDURE — 92015 DETERMINE REFRACTIVE STATE: CPT | Mod: S$GLB,,, | Performed by: OPTOMETRIST

## 2020-07-07 PROCEDURE — 99999 PR PBB SHADOW E&M-EST. PATIENT-LVL III: CPT | Mod: PBBFAC,,, | Performed by: OPTOMETRIST

## 2020-07-07 PROCEDURE — 92285 EXTERNAL OCULAR PHOTOGRAPHY: CPT | Mod: S$GLB,,, | Performed by: OPTOMETRIST

## 2020-07-07 PROCEDURE — 92285 PR EYE PHOTOGRAPHY: ICD-10-PCS | Mod: S$GLB,,, | Performed by: OPTOMETRIST

## 2020-07-07 PROCEDURE — 92310 PR CONTACT LENS FITTING (NO CHANGE): ICD-10-PCS | Mod: CSM,S$GLB,, | Performed by: OPTOMETRIST

## 2020-07-07 PROCEDURE — 99407 PR TOBACCO USE CESSATION INTENSIVE >10 MINUTES: ICD-10-PCS | Mod: S$GLB,,, | Performed by: INTERNAL MEDICINE

## 2020-07-07 PROCEDURE — 99407 BEHAV CHNG SMOKING > 10 MIN: CPT | Mod: S$GLB,,, | Performed by: INTERNAL MEDICINE

## 2020-07-07 PROCEDURE — 92014 COMPRE OPH EXAM EST PT 1/>: CPT | Mod: S$GLB,,, | Performed by: OPTOMETRIST

## 2020-07-07 PROCEDURE — 92014 PR EYE EXAM, EST PATIENT,COMPREHESV: ICD-10-PCS | Mod: S$GLB,,, | Performed by: OPTOMETRIST

## 2020-07-07 PROCEDURE — 92310 CONTACT LENS FITTING OU: CPT | Mod: CSM,S$GLB,, | Performed by: OPTOMETRIST

## 2020-07-07 PROCEDURE — 99999 PR PBB SHADOW E&M-EST. PATIENT-LVL III: ICD-10-PCS | Mod: PBBFAC,,, | Performed by: OPTOMETRIST

## 2020-07-07 PROCEDURE — 92015 PR REFRACTION: ICD-10-PCS | Mod: S$GLB,,, | Performed by: OPTOMETRIST

## 2020-07-07 NOTE — PROGRESS NOTES
HPI     NIDDM exam.  Drooping eyelid both eyes.  Last eye exam 09/03/2019 [facility].   Patient states contact lenses prescription has never been correct from   last visit.  Patient wears monovision contact lenses never able to wear left eye   contact lens.  Update glasses and contact lenses RX.  Discuss fee to update contact lenses.  New patient to [facility].  Lab Results       Component                Value               Date                       HGBA1C                   5.9 (H)             01/23/2020              Last edited by Missael Rider, OD on 7/7/2020 12:42 PM. (History)            Assessment /Plan     For exam results, see Encounter Report.    Diabetes mellitus type 2 without retinopathy    Ptosis of eyelid, bilateral  -     Ambulatory referral/consult to Ophthalmology    Dermatochalasis of both upper eyelids    Diabetic cataract    Myopia of both eyes with astigmatism and presbyopia    Encounter for fitting or adjustment of spectacles or contact lenses      No Background Diabetic Retinopathy    Dermatochalasis OD/OS, consult oculoplastics for eval    Lid looks edematous but pt states that is normal. Dad had it too.      Discussed CL charges.  Dispense Final Rx for glasses  Note changes CL Rx  RTC 1 year  Discussed above and answered questions.

## 2020-07-07 NOTE — PROGRESS NOTES
Spoke with patient today in regard to smoking cessation progress for 3 month telephone follow up, he states tobacco free. Patient states using the prescribed tobacco cessation medication of Chantix and nicotine lozenges to help aid in his quit. Commended patient on the accomplishment thus far. Informed patient of benefit period, future follow ups, and contact information if any further help or support is needed. Will complete smart form for 3 month follow up on Quit attempt #1.

## 2020-07-07 NOTE — LETTER
July 7, 2020      Kashif Acosta MD  [address]  [address]  [place] [facility] [address]           [facility]  [address] [place] [id number]  Phone: [phone]  Fax: [phone]          Patient: Santiago Khan   MR Number: [id number]   YOB: 1950   Date of Visit: 7/7/2020       Dear Dr. Kashif Acosta:    Thank you for referring Santiago Khan to me for evaluation. Attached you will find relevant portions of my assessment and plan of care.    If you have questions, please do not hesitate to call me. I look forward to following Santiago Khan along with you.    Sincerely,    Missael Rider, OD    Enclosure  CC:  No Recipients    If you would like to receive this communication electronically, please contact [email or web address] or [phone] to request more information on [employer] access.    For providers and/or their staff who would like to refer a patient to [facility], please contact us through our one-stop-shop provider referral line, [facility], at [phone].    If you feel you have received this communication in error or would no longer like to receive these types of communications, please e-mail [email or web address]

## 2020-07-08 ENCOUNTER — PATIENT OUTREACH (OUTPATIENT)
Dept: ADMINISTRATIVE | Facility: OTHER | Age: 70
End: 2020-07-08

## 2020-07-09 ENCOUNTER — OFFICE VISIT (OUTPATIENT)
Dept: CARDIOLOGY | Facility: CLINIC | Age: 70
End: 2020-07-09
Payer: MEDICARE

## 2020-07-09 VITALS
SYSTOLIC BLOOD PRESSURE: 134 MMHG | HEART RATE: 77 BPM | WEIGHT: 236.31 LBS | BODY MASS INDEX: 37.02 KG/M2 | DIASTOLIC BLOOD PRESSURE: 78 MMHG

## 2020-07-09 DIAGNOSIS — N18.30 HYPERTENSION ASSOCIATED WITH STAGE 3 CHRONIC KIDNEY DISEASE DUE TO TYPE 2 DIABETES MELLITUS: ICD-10-CM

## 2020-07-09 DIAGNOSIS — I25.118 CORONARY ARTERY DISEASE OF NATIVE ARTERY OF NATIVE HEART WITH STABLE ANGINA PECTORIS: Primary | ICD-10-CM

## 2020-07-09 DIAGNOSIS — E11.22 HYPERTENSION ASSOCIATED WITH STAGE 3 CHRONIC KIDNEY DISEASE DUE TO TYPE 2 DIABETES MELLITUS: ICD-10-CM

## 2020-07-09 DIAGNOSIS — E78.5 HYPERLIPIDEMIA ASSOCIATED WITH TYPE 2 DIABETES MELLITUS: ICD-10-CM

## 2020-07-09 DIAGNOSIS — I50.22 CHRONIC SYSTOLIC CONGESTIVE HEART FAILURE: ICD-10-CM

## 2020-07-09 DIAGNOSIS — I12.9 HYPERTENSION ASSOCIATED WITH STAGE 3 CHRONIC KIDNEY DISEASE DUE TO TYPE 2 DIABETES MELLITUS: ICD-10-CM

## 2020-07-09 DIAGNOSIS — N18.30 CHRONIC KIDNEY DISEASE, STAGE 3: ICD-10-CM

## 2020-07-09 DIAGNOSIS — E11.69 HYPERLIPIDEMIA ASSOCIATED WITH TYPE 2 DIABETES MELLITUS: ICD-10-CM

## 2020-07-09 DIAGNOSIS — G47.33 OSA (OBSTRUCTIVE SLEEP APNEA): ICD-10-CM

## 2020-07-09 PROCEDURE — 99999 PR PBB SHADOW E&M-EST. PATIENT-LVL IV: CPT | Mod: PBBFAC,,, | Performed by: INTERNAL MEDICINE

## 2020-07-09 PROCEDURE — 3044F PR MOST RECENT HEMOGLOBIN A1C LEVEL <7.0%: ICD-10-PCS | Mod: CPTII,S$GLB,, | Performed by: INTERNAL MEDICINE

## 2020-07-09 PROCEDURE — 99999 PR PBB SHADOW E&M-EST. PATIENT-LVL IV: ICD-10-PCS | Mod: PBBFAC,,, | Performed by: INTERNAL MEDICINE

## 2020-07-09 PROCEDURE — 3075F PR MOST RECENT SYSTOLIC BLOOD PRESS GE 130-139MM HG: ICD-10-PCS | Mod: CPTII,S$GLB,, | Performed by: INTERNAL MEDICINE

## 2020-07-09 PROCEDURE — 3078F DIAST BP <80 MM HG: CPT | Mod: CPTII,S$GLB,, | Performed by: INTERNAL MEDICINE

## 2020-07-09 PROCEDURE — 3008F BODY MASS INDEX DOCD: CPT | Mod: CPTII,S$GLB,, | Performed by: INTERNAL MEDICINE

## 2020-07-09 PROCEDURE — 1159F MED LIST DOCD IN RCRD: CPT | Mod: S$GLB,,, | Performed by: INTERNAL MEDICINE

## 2020-07-09 PROCEDURE — 1101F PR PT FALLS ASSESS DOC 0-1 FALLS W/OUT INJ PAST YR: ICD-10-PCS | Mod: CPTII,S$GLB,, | Performed by: INTERNAL MEDICINE

## 2020-07-09 PROCEDURE — 3044F HG A1C LEVEL LT 7.0%: CPT | Mod: CPTII,S$GLB,, | Performed by: INTERNAL MEDICINE

## 2020-07-09 PROCEDURE — 1159F PR MEDICATION LIST DOCUMENTED IN MEDICAL RECORD: ICD-10-PCS | Mod: S$GLB,,, | Performed by: INTERNAL MEDICINE

## 2020-07-09 PROCEDURE — 3008F PR BODY MASS INDEX (BMI) DOCUMENTED: ICD-10-PCS | Mod: CPTII,S$GLB,, | Performed by: INTERNAL MEDICINE

## 2020-07-09 PROCEDURE — 3078F PR MOST RECENT DIASTOLIC BLOOD PRESSURE < 80 MM HG: ICD-10-PCS | Mod: CPTII,S$GLB,, | Performed by: INTERNAL MEDICINE

## 2020-07-09 PROCEDURE — 1101F PT FALLS ASSESS-DOCD LE1/YR: CPT | Mod: CPTII,S$GLB,, | Performed by: INTERNAL MEDICINE

## 2020-07-09 PROCEDURE — 99214 PR OFFICE/OUTPT VISIT, EST, LEVL IV, 30-39 MIN: ICD-10-PCS | Mod: S$GLB,,, | Performed by: INTERNAL MEDICINE

## 2020-07-09 PROCEDURE — 3075F SYST BP GE 130 - 139MM HG: CPT | Mod: CPTII,S$GLB,, | Performed by: INTERNAL MEDICINE

## 2020-07-09 PROCEDURE — 99214 OFFICE O/P EST MOD 30 MIN: CPT | Mod: S$GLB,,, | Performed by: INTERNAL MEDICINE

## 2020-07-09 RX ORDER — BUMETANIDE 2 MG/1
2 TABLET ORAL 2 TIMES DAILY
Qty: 60 TABLET | Refills: 11 | Status: SHIPPED | OUTPATIENT
Start: 2020-07-09 | End: 2020-09-01 | Stop reason: SDUPTHER

## 2020-07-09 RX ORDER — SACUBITRIL AND VALSARTAN 24; 26 MG/1; MG/1
1 TABLET, FILM COATED ORAL 2 TIMES DAILY
Qty: 60 TABLET | Refills: 3 | Status: SHIPPED | OUTPATIENT
Start: 2020-07-09 | End: 2020-08-10

## 2020-07-09 NOTE — PROGRESS NOTES
Subjective:   Patient ID:  Santiago Khan is a 69 y.o. male who presents for follow up of Follow-up      70 yo male, came in for CHF  PMH CAD s/p LHC in  D1 70%, no PCi, CHFmrEF 45%, DM 4 yrs, life long smoker, quit in ,  HTN, HLD, CKD III, prostates Ca s/p TURP in 2011, no h/o chemo RX and XRT. Gout. No h/o stroke and heart attack. Social drinker.  Remote LHC showed miminal blockage by Dr. Mayra CHAPPELL,    admitted for [facility] due to chest tightness. Had low K and Mg. Troponin x2 negative and EKG showed NSR, RBBB, and LVH. Echo showed EF 45% global HK and moderate MR. Had K and Mg supplement.  States that gained weight for 30 pounds since 3/202 after held Metolazone. Even he was taking Bumex 1.5 mg bid. In  BNP 15 and Cr 1.4  Still has GOVEA. No orthopnea, sleeps with CPAP. No chest pain, faint  NUKE stress done in  showed inferior ischemia with scar. EF 45%  LHC done in  D1 70% no PCI    No chest pain . Left radial access ok  SOB, weight gain  Sleeps on 1 pillow. No PND  Taking Bumex 2 mg bid and DIamox               Past Medical History:   Diagnosis Date    Chronic kidney disease, stage 3     DDD (degenerative disc disease), lumbar     DM (diabetes mellitus)     BS doesn't check 07/07/2020    DM (diabetes mellitus) with complications     History of gout     History of prostate cancer     Hyperlipidemia associated with type 2 diabetes mellitus     Hypertension associated with stage 3 chronic kidney disease due to type 2 diabetes mellitus     BRADLEY on CPAP     Tobacco abuse        Past Surgical History:   Procedure Laterality Date    BICEPS TENDON REPAIR      COLONOSCOPY N/A 3/27/2019    Procedure: COLONOSCOPY;  Surgeon: James Roger MD;  Location: [facility];  Service: Endoscopy;  Laterality: N/A;    HEMORRHOID SURGERY      INGUINAL HERNIA REPAIR Left     KNEE ARTHROSCOPY Right     LEFT HEART CATHETERIZATION Left 6/29/2020    Procedure:  CATHETERIZATION, HEART, LEFT;  Surgeon: Cheli Wilson MD;  Location: [facility] CATH LAB;  Service: Cardiology;  Laterality: Left;    LUMBAR LAMINECTOMY      PROSTATECTOMY         Social History     Tobacco Use    Smoking status: Former Smoker     Packs/day: 2.00     Years: 50.00     Pack years: 100.00     Types: Cigarettes     Quit date: 2020     Years since quittin.3    Smokeless tobacco: Former User   Substance Use Topics    Alcohol use: Not Currently     Frequency: Never     Drinks per session: Patient refused     Binge frequency: Never    Drug use: Never       Family History   Problem Relation Age of Onset    Prostate cancer Father     Coronary artery disease Father 90    Alzheimer's disease Father     Hyperlipidemia Mother          Review of Systems   Constitution: Positive for weight gain. Negative for decreased appetite, diaphoresis, fever, malaise/fatigue and night sweats.   HENT: Negative for nosebleeds.    Eyes: Negative for blurred vision and double vision.   Cardiovascular: Positive for dyspnea on exertion and leg swelling. Negative for chest pain, claudication, irregular heartbeat, near-syncope, orthopnea, palpitations, paroxysmal nocturnal dyspnea and syncope.   Respiratory: Negative for cough, shortness of breath, sleep disturbances due to breathing, snoring, sputum production and wheezing.    Endocrine: Negative for cold intolerance and polyuria.   Hematologic/Lymphatic: Does not bruise/bleed easily.   Skin: Negative for rash.   Musculoskeletal: Negative for back pain, falls, joint pain, joint swelling and neck pain.   Gastrointestinal: Negative for abdominal pain, heartburn, nausea and vomiting.   Genitourinary: Negative for dysuria, frequency and hematuria.   Neurological: Negative for difficulty with concentration, dizziness, focal weakness, headaches, light-headedness, numbness, seizures and weakness.   Psychiatric/Behavioral: Negative for depression, memory loss and substance  abuse. The patient does not have insomnia.    Allergic/Immunologic: Negative for HIV exposure and hives.       Objective:   Physical Exam   Constitutional: He is oriented to person, place, and time. He appears well-nourished.   HENT:   Head: Normocephalic.   Eyes: Pupils are equal, round, and reactive to light.   Neck: Normal carotid pulses and no JVD present. Carotid bruit is not present. No thyromegaly present.   Cardiovascular: Normal rate, regular rhythm, normal heart sounds and normal pulses.  No extrasystoles are present. PMI is not displaced. Exam reveals no gallop and no S3.   No murmur heard.  Pulmonary/Chest: Breath sounds normal. No stridor. No respiratory distress.   Abdominal: Soft. Bowel sounds are normal. There is no abdominal tenderness. There is no rebound.   Musculoskeletal: Normal range of motion.         General: Edema (BLE + edema) present.   Neurological: He is alert and oriented to person, place, and time.   Skin: Skin is intact. No rash noted.   Psychiatric: His behavior is normal.       Lab Results   Component Value Date    CHOL 158 01/23/2020    CHOL 168 07/24/2019    CHOL 158 01/30/2019     Lab Results   Component Value Date    HDL 48 01/23/2020    HDL 51 07/24/2019    HDL 46 01/30/2019     Lab Results   Component Value Date    LDLCALC 89.4 01/23/2020    LDLCALC 89.6 07/24/2019    LDLCALC 86.4 01/30/2019     Lab Results   Component Value Date    TRIG 103 01/23/2020    TRIG 137 07/24/2019    TRIG 128 01/30/2019     Lab Results   Component Value Date    CHOLHDL 30.4 01/23/2020    CHOLHDL 30.4 07/24/2019    CHOLHDL 29.1 01/30/2019       Chemistry        Component Value Date/Time     06/26/2020 0950    K 4.1 06/26/2020 0950     06/26/2020 0950    CO2 24 06/26/2020 0950    BUN 14 06/26/2020 0950    CREATININE 1.4 06/26/2020 0950     (H) 06/26/2020 0950        Component Value Date/Time    CALCIUM 8.8 06/26/2020 0950    ALKPHOS 61 03/21/2020 0457    AST 26 03/21/2020 0457     ALT 26 03/21/2020 0457    BILITOT 0.4 03/21/2020 0457    ESTGFRAFRICA 59 (A) 06/26/2020 0950    EGFRNONAA 51 (A) 06/26/2020 0950          Lab Results   Component Value Date    HGBA1C 5.9 (H) 01/23/2020     Lab Results   Component Value Date    TSH 1.064 01/23/2020     Lab Results   Component Value Date    INR 1.0 06/26/2020    INR 1.0 03/10/2020     Lab Results   Component Value Date    WBC 9.71 06/26/2020    HGB 11.3 (L) 06/26/2020    HCT 37.6 (L) 06/26/2020    MCV 85 06/26/2020     06/26/2020     BMP  Sodium   Date Value Ref Range Status   06/26/2020 140 136 - 145 mmol/L Final     Potassium   Date Value Ref Range Status   06/26/2020 4.1 3.5 - 5.1 mmol/L Final     Chloride   Date Value Ref Range Status   06/26/2020 108 95 - 110 mmol/L Final     CO2   Date Value Ref Range Status   06/26/2020 24 23 - 29 mmol/L Final     BUN, Bld   Date Value Ref Range Status   06/26/2020 14 8 - 23 mg/dL Final     Creatinine   Date Value Ref Range Status   06/26/2020 1.4 0.5 - 1.4 mg/dL Final     Calcium   Date Value Ref Range Status   06/26/2020 8.8 8.7 - 10.5 mg/dL Final     Anion Gap   Date Value Ref Range Status   06/26/2020 8 8 - 16 mmol/L Final     eGFR if    Date Value Ref Range Status   06/26/2020 59 (A) >60 mL/min/1.73 m^2 Final     eGFR if non    Date Value Ref Range Status   06/26/2020 51 (A) >60 mL/min/1.73 m^2 Final     Comment:     Calculation used to obtain the estimated glomerular filtration  rate (eGFR) is the CKD-EPI equation.        BNP  @LABRCNTIP(BNP,BNPTRIAGEBLO)@  @LABRCNTIP(troponini)@  CrCl cannot be calculated (Patient's most recent lab result is older than the maximum 7 days allowed.).  No results found in the last 24 hours.  No results found in the last 24 hours.  No results found in the last 24 hours.    Assessment:      1. Coronary artery disease of native artery of native heart with stable angina pectoris    2. Chronic systolic congestive heart failure    3.  Hyperlipidemia associated with type 2 diabetes mellitus    4. Hypertension associated with stage 3 chronic kidney disease due to type 2 diabetes mellitus    5. Chronic kidney disease, stage 3    6. BRADLEY (obstructive sleep apnea)        Plan:   Add zetia 10 mg   Continue Crestor 40 mg daily  Check Lipid profile and CMP in 3 months  Fluid restriction 50 to 60 oz a day  Continue Bumex 2mg bid and DIAMOX  conmtinue ASA bisoprolol  Add [id number]/26 mg bid  Check BMP in 2 weeks  US to r/o ascites  RTC in 2 months

## 2020-07-21 ENCOUNTER — TELEPHONE (OUTPATIENT)
Dept: RADIOLOGY | Facility: HOSPITAL | Age: 70
End: 2020-07-21

## 2020-07-22 ENCOUNTER — HOSPITAL ENCOUNTER (OUTPATIENT)
Dept: RADIOLOGY | Facility: HOSPITAL | Age: 70
Discharge: HOME | End: 2020-07-22
Attending: INTERNAL MEDICINE
Payer: MEDICARE

## 2020-07-22 DIAGNOSIS — I50.22 CHRONIC SYSTOLIC CONGESTIVE HEART FAILURE: ICD-10-CM

## 2020-07-22 PROCEDURE — 76705 ECHO EXAM OF ABDOMEN: CPT | Mod: 26,,, | Performed by: RADIOLOGY

## 2020-07-22 PROCEDURE — 76705 ECHO EXAM OF ABDOMEN: CPT | Mod: TC

## 2020-07-22 PROCEDURE — 76705 US ABDOMEN LIMITED: ICD-10-PCS | Mod: 26,,, | Performed by: RADIOLOGY

## 2020-07-23 ENCOUNTER — LAB VISIT (OUTPATIENT)
Dept: LAB | Facility: HOSPITAL | Age: 70
End: 2020-07-23
Attending: INTERNAL MEDICINE
Payer: MEDICARE

## 2020-07-23 DIAGNOSIS — E11.8 DM (DIABETES MELLITUS) WITH COMPLICATIONS: ICD-10-CM

## 2020-07-23 DIAGNOSIS — Z87.39 HISTORY OF GOUT: ICD-10-CM

## 2020-07-23 LAB
BASOPHILS # BLD AUTO: 0.09 K/UL (ref 0–0.2)
BASOPHILS NFR BLD: 1.1 % (ref 0–1.9)
DIFFERENTIAL METHOD: ABNORMAL
EOSINOPHIL # BLD AUTO: 0.6 K/UL (ref 0–0.5)
EOSINOPHIL NFR BLD: 7.4 % (ref 0–8)
ERYTHROCYTE [DISTWIDTH] IN BLOOD BY AUTOMATED COUNT: 16.8 % (ref 11.5–14.5)
HCT VFR BLD AUTO: 39.6 % (ref 40–54)
HGB BLD-MCNC: 11.7 G/DL (ref 14–18)
IMM GRANULOCYTES # BLD AUTO: 0.05 K/UL (ref 0–0.04)
IMM GRANULOCYTES NFR BLD AUTO: 0.6 % (ref 0–0.5)
LYMPHOCYTES # BLD AUTO: 2.3 K/UL (ref 1–4.8)
LYMPHOCYTES NFR BLD: 28.2 % (ref 18–48)
MCH RBC QN AUTO: 25.5 PG (ref 27–31)
MCHC RBC AUTO-ENTMCNC: 29.5 G/DL (ref 32–36)
MCV RBC AUTO: 86 FL (ref 82–98)
MONOCYTES # BLD AUTO: 0.6 K/UL (ref 0.3–1)
MONOCYTES NFR BLD: 6.8 % (ref 4–15)
NEUTROPHILS # BLD AUTO: 4.6 K/UL (ref 1.8–7.7)
NEUTROPHILS NFR BLD: 55.9 % (ref 38–73)
NRBC BLD-RTO: 0 /100 WBC
PLATELET # BLD AUTO: 190 K/UL (ref 150–350)
PMV BLD AUTO: 11.8 FL (ref 9.2–12.9)
RBC # BLD AUTO: 4.59 M/UL (ref 4.6–6.2)
WBC # BLD AUTO: 8.13 K/UL (ref 3.9–12.7)

## 2020-07-23 PROCEDURE — 84550 ASSAY OF BLOOD/URIC ACID: CPT

## 2020-07-23 PROCEDURE — 84443 ASSAY THYROID STIM HORMONE: CPT

## 2020-07-23 PROCEDURE — 80053 COMPREHEN METABOLIC PANEL: CPT

## 2020-07-23 PROCEDURE — 36415 COLL VENOUS BLD VENIPUNCTURE: CPT | Mod: PO

## 2020-07-23 PROCEDURE — 80061 LIPID PANEL: CPT

## 2020-07-23 PROCEDURE — 83036 HEMOGLOBIN GLYCOSYLATED A1C: CPT

## 2020-07-23 PROCEDURE — 85025 COMPLETE CBC W/AUTO DIFF WBC: CPT

## 2020-07-24 LAB
ALBUMIN SERPL BCP-MCNC: 4 G/DL (ref 3.5–5.2)
ALP SERPL-CCNC: 47 U/L (ref 55–135)
ALT SERPL W/O P-5'-P-CCNC: 24 U/L (ref 10–44)
ANION GAP SERPL CALC-SCNC: 8 MMOL/L (ref 8–16)
AST SERPL-CCNC: 28 U/L (ref 10–40)
BILIRUB SERPL-MCNC: 0.5 MG/DL (ref 0.1–1)
BUN SERPL-MCNC: 14 MG/DL (ref 8–23)
CALCIUM SERPL-MCNC: 8.5 MG/DL (ref 8.7–10.5)
CHLORIDE SERPL-SCNC: 105 MMOL/L (ref 95–110)
CHOLEST SERPL-MCNC: 153 MG/DL (ref 120–199)
CHOLEST/HDLC SERPL: 3.3 {RATIO} (ref 2–5)
CO2 SERPL-SCNC: 26 MMOL/L (ref 23–29)
CREAT SERPL-MCNC: 1.5 MG/DL (ref 0.5–1.4)
EST. GFR  (AFRICAN AMERICAN): 54.5 ML/MIN/1.73 M^2
EST. GFR  (NON AFRICAN AMERICAN): 47.2 ML/MIN/1.73 M^2
ESTIMATED AVG GLUCOSE: 134 MG/DL (ref 68–131)
GLUCOSE SERPL-MCNC: 141 MG/DL (ref 70–110)
HBA1C MFR BLD HPLC: 6.3 % (ref 4–5.6)
HDLC SERPL-MCNC: 47 MG/DL (ref 40–75)
HDLC SERPL: 30.7 % (ref 20–50)
LDLC SERPL CALC-MCNC: 87.6 MG/DL (ref 63–159)
NONHDLC SERPL-MCNC: 106 MG/DL
POTASSIUM SERPL-SCNC: 3.9 MMOL/L (ref 3.5–5.1)
PROT SERPL-MCNC: 7 G/DL (ref 6–8.4)
SODIUM SERPL-SCNC: 139 MMOL/L (ref 136–145)
TRIGL SERPL-MCNC: 92 MG/DL (ref 30–150)
TSH SERPL DL<=0.005 MIU/L-ACNC: 1.45 UIU/ML (ref 0.4–4)
URATE SERPL-MCNC: 6.3 MG/DL (ref 3.4–7)

## 2020-07-27 ENCOUNTER — TELEPHONE (OUTPATIENT)
Dept: CARDIOLOGY | Facility: CLINIC | Age: 70
End: 2020-07-27

## 2020-07-27 NOTE — TELEPHONE ENCOUNTER
Patient contacted, Left voicemail to return the call to the office to discuss.  ABD US REVEALED NO ASCITES. The patient stated understanding with no questions or concerns

## 2020-07-27 NOTE — TELEPHONE ENCOUNTER
Patient contacted, Left voicemail to return the call to the office to discuss.  ABD US REVEALED NO ASCITES

## 2020-07-30 ENCOUNTER — OFFICE VISIT (OUTPATIENT)
Dept: INTERNAL MEDICINE | Facility: CLINIC | Age: 70
End: 2020-07-30
Payer: MEDICARE

## 2020-07-30 ENCOUNTER — LAB VISIT (OUTPATIENT)
Dept: LAB | Facility: HOSPITAL | Age: 70
End: 2020-07-30
Attending: INTERNAL MEDICINE
Payer: MEDICARE

## 2020-07-30 VITALS
RESPIRATION RATE: 20 BRPM | BODY MASS INDEX: 35.92 KG/M2 | HEART RATE: 84 BPM | WEIGHT: 237 LBS | TEMPERATURE: 98 F | SYSTOLIC BLOOD PRESSURE: 116 MMHG | HEIGHT: 68 IN | DIASTOLIC BLOOD PRESSURE: 64 MMHG

## 2020-07-30 DIAGNOSIS — Z85.46 HISTORY OF PROSTATE CANCER: ICD-10-CM

## 2020-07-30 DIAGNOSIS — I12.9 HYPERTENSION ASSOCIATED WITH STAGE 3 CHRONIC KIDNEY DISEASE DUE TO TYPE 2 DIABETES MELLITUS: ICD-10-CM

## 2020-07-30 DIAGNOSIS — I50.22 CHRONIC SYSTOLIC CONGESTIVE HEART FAILURE: ICD-10-CM

## 2020-07-30 DIAGNOSIS — Z87.39 HISTORY OF GOUT: ICD-10-CM

## 2020-07-30 DIAGNOSIS — G47.33 OSA (OBSTRUCTIVE SLEEP APNEA): ICD-10-CM

## 2020-07-30 DIAGNOSIS — E87.6 HYPOKALEMIA: ICD-10-CM

## 2020-07-30 DIAGNOSIS — E11.22 HYPERTENSION ASSOCIATED WITH STAGE 3 CHRONIC KIDNEY DISEASE DUE TO TYPE 2 DIABETES MELLITUS: ICD-10-CM

## 2020-07-30 DIAGNOSIS — Z12.5 PROSTATE CANCER SCREENING: ICD-10-CM

## 2020-07-30 DIAGNOSIS — E11.8 DM (DIABETES MELLITUS) WITH COMPLICATIONS: ICD-10-CM

## 2020-07-30 DIAGNOSIS — I50.32 CHRONIC DIASTOLIC CONGESTIVE HEART FAILURE: ICD-10-CM

## 2020-07-30 DIAGNOSIS — N18.30 HYPERTENSION ASSOCIATED WITH STAGE 3 CHRONIC KIDNEY DISEASE DUE TO TYPE 2 DIABETES MELLITUS: ICD-10-CM

## 2020-07-30 DIAGNOSIS — M51.36 DDD (DEGENERATIVE DISC DISEASE), LUMBAR: ICD-10-CM

## 2020-07-30 DIAGNOSIS — N18.30 CHRONIC KIDNEY DISEASE, STAGE 3: ICD-10-CM

## 2020-07-30 DIAGNOSIS — I25.118 CORONARY ARTERY DISEASE OF NATIVE ARTERY OF NATIVE HEART WITH STABLE ANGINA PECTORIS: ICD-10-CM

## 2020-07-30 DIAGNOSIS — E78.5 HYPERLIPIDEMIA ASSOCIATED WITH TYPE 2 DIABETES MELLITUS: Primary | ICD-10-CM

## 2020-07-30 DIAGNOSIS — E11.69 HYPERLIPIDEMIA ASSOCIATED WITH TYPE 2 DIABETES MELLITUS: Primary | ICD-10-CM

## 2020-07-30 LAB
ANION GAP SERPL CALC-SCNC: 7 MMOL/L (ref 8–16)
BUN SERPL-MCNC: 15 MG/DL (ref 8–23)
CALCIUM SERPL-MCNC: 9.2 MG/DL (ref 8.7–10.5)
CHLORIDE SERPL-SCNC: 107 MMOL/L (ref 95–110)
CO2 SERPL-SCNC: 25 MMOL/L (ref 23–29)
CREAT SERPL-MCNC: 1.5 MG/DL (ref 0.5–1.4)
EST. GFR  (AFRICAN AMERICAN): 54.5 ML/MIN/1.73 M^2
EST. GFR  (NON AFRICAN AMERICAN): 47.2 ML/MIN/1.73 M^2
GLUCOSE SERPL-MCNC: 139 MG/DL (ref 70–110)
POTASSIUM SERPL-SCNC: 4.2 MMOL/L (ref 3.5–5.1)
SODIUM SERPL-SCNC: 139 MMOL/L (ref 136–145)

## 2020-07-30 PROCEDURE — 99214 PR OFFICE/OUTPT VISIT, EST, LEVL IV, 30-39 MIN: ICD-10-PCS | Mod: S$GLB,,, | Performed by: INTERNAL MEDICINE

## 2020-07-30 PROCEDURE — 3008F BODY MASS INDEX DOCD: CPT | Mod: CPTII,S$GLB,, | Performed by: INTERNAL MEDICINE

## 2020-07-30 PROCEDURE — 3008F PR BODY MASS INDEX (BMI) DOCUMENTED: ICD-10-PCS | Mod: CPTII,S$GLB,, | Performed by: INTERNAL MEDICINE

## 2020-07-30 PROCEDURE — 3078F PR MOST RECENT DIASTOLIC BLOOD PRESSURE < 80 MM HG: ICD-10-PCS | Mod: CPTII,S$GLB,, | Performed by: INTERNAL MEDICINE

## 2020-07-30 PROCEDURE — 1101F PT FALLS ASSESS-DOCD LE1/YR: CPT | Mod: CPTII,S$GLB,, | Performed by: INTERNAL MEDICINE

## 2020-07-30 PROCEDURE — 36415 COLL VENOUS BLD VENIPUNCTURE: CPT | Mod: PO

## 2020-07-30 PROCEDURE — 3078F DIAST BP <80 MM HG: CPT | Mod: CPTII,S$GLB,, | Performed by: INTERNAL MEDICINE

## 2020-07-30 PROCEDURE — 3074F PR MOST RECENT SYSTOLIC BLOOD PRESSURE < 130 MM HG: ICD-10-PCS | Mod: CPTII,S$GLB,, | Performed by: INTERNAL MEDICINE

## 2020-07-30 PROCEDURE — 99999 PR PBB SHADOW E&M-EST. PATIENT-LVL IV: CPT | Mod: PBBFAC,,, | Performed by: INTERNAL MEDICINE

## 2020-07-30 PROCEDURE — 3044F PR MOST RECENT HEMOGLOBIN A1C LEVEL <7.0%: ICD-10-PCS | Mod: CPTII,S$GLB,, | Performed by: INTERNAL MEDICINE

## 2020-07-30 PROCEDURE — 3074F SYST BP LT 130 MM HG: CPT | Mod: CPTII,S$GLB,, | Performed by: INTERNAL MEDICINE

## 2020-07-30 PROCEDURE — 80048 BASIC METABOLIC PNL TOTAL CA: CPT

## 2020-07-30 PROCEDURE — 99999 PR PBB SHADOW E&M-EST. PATIENT-LVL IV: ICD-10-PCS | Mod: PBBFAC,,, | Performed by: INTERNAL MEDICINE

## 2020-07-30 PROCEDURE — 1126F AMNT PAIN NOTED NONE PRSNT: CPT | Mod: S$GLB,,, | Performed by: INTERNAL MEDICINE

## 2020-07-30 PROCEDURE — 1101F PR PT FALLS ASSESS DOC 0-1 FALLS W/OUT INJ PAST YR: ICD-10-PCS | Mod: CPTII,S$GLB,, | Performed by: INTERNAL MEDICINE

## 2020-07-30 PROCEDURE — 1159F MED LIST DOCD IN RCRD: CPT | Mod: S$GLB,,, | Performed by: INTERNAL MEDICINE

## 2020-07-30 PROCEDURE — 3044F HG A1C LEVEL LT 7.0%: CPT | Mod: CPTII,S$GLB,, | Performed by: INTERNAL MEDICINE

## 2020-07-30 PROCEDURE — 99214 OFFICE O/P EST MOD 30 MIN: CPT | Mod: S$GLB,,, | Performed by: INTERNAL MEDICINE

## 2020-07-30 PROCEDURE — 1126F PR PAIN SEVERITY QUANTIFIED, NO PAIN PRESENT: ICD-10-PCS | Mod: S$GLB,,, | Performed by: INTERNAL MEDICINE

## 2020-07-30 PROCEDURE — 1159F PR MEDICATION LIST DOCUMENTED IN MEDICAL RECORD: ICD-10-PCS | Mod: S$GLB,,, | Performed by: INTERNAL MEDICINE

## 2020-07-30 RX ORDER — FLUTICASONE PROPIONATE 50 MCG
2 SPRAY, SUSPENSION (ML) NASAL DAILY
Qty: 16 G | Refills: 11 | Status: SHIPPED | OUTPATIENT
Start: 2020-07-30 | End: 2021-07-04

## 2020-07-30 NOTE — PROGRESS NOTES
"HPI:  Patient is a 69-year-old man who comes today for follow-up of his diabetes, hypertension, lipids, mild systolic and diastolic heart failure.  Patient's blood sugars have been doing fine.  He denies any hypoglycemic events.  He denies any chest pains or shortness of breath.  He had a recent heart catheterization that was fairly unremarkable.  His ejection fraction is about 45%.  He has had no flares of gout.    Current meds have been verified and updated per the EMR  Exam:/64   Pulse 84   Temp 98.4 °F (36.9 °C) (Temporal)   Resp 20   Ht 5' 8" (1.727 m)   Wt 107.5 kg (236 lb 15.9 oz)   BMI 36.03 kg/m²   Carotids 2+ equal without bruits  Chest clear  Cardiovascular regular rate and rhythm without murmur gallop or rub  Extremities shows no edema    Lab Results   Component Value Date    WBC 8.13 07/23/2020    HGB 11.7 (L) 07/23/2020    HCT 39.6 (L) 07/23/2020     07/23/2020    CHOL 153 07/23/2020    TRIG 92 07/23/2020    HDL 47 07/23/2020    ALT 24 07/23/2020    AST 28 07/23/2020     07/23/2020    K 3.9 07/23/2020     07/23/2020    CREATININE 1.5 (H) 07/23/2020    BUN 14 07/23/2020    CO2 26 07/23/2020    TSH 1.448 07/23/2020    PSA <0.01 01/30/2019    INR 1.0 06/26/2020    HGBA1C 6.3 (H) 07/23/2020       Impression:  Multiple medical problems identified below, stable  Patient Active Problem List   Diagnosis    History of prostate cancer    BRADLEY (obstructive sleep apnea)    History of gout    DDD (degenerative disc disease), lumbar    Tobacco abuse    DM (diabetes mellitus) with complications    Hypertension associated with stage 3 chronic kidney disease due to type 2 diabetes mellitus    Hyperlipidemia associated with type 2 diabetes mellitus    Chronic kidney disease, stage 3    Obesity, Class I, BMI 30-34.9    Chronic diastolic congestive heart failure    Coronary artery disease of native artery of native heart with stable angina pectoris    Chronic systolic congestive " heart failure       Plan:  Orders Placed This Encounter    Comprehensive metabolic panel    Lipid Panel    Hemoglobin A1C    Protein / creatinine ratio, urine    TSH    CBC auto differential    PSA, Screening    fluticasone propionate (FLONASE) 50 mcg/actuation nasal spray     Medications remain the same.  He will be seen again in 6 months with above lab work.    This note is generated with speech recognition software and is subject to transcription error and sound alike phrases that may be missed by proofreading.

## 2020-07-31 ENCOUNTER — TELEPHONE (OUTPATIENT)
Dept: CARDIOLOGY | Facility: CLINIC | Age: 70
End: 2020-07-31

## 2020-07-31 NOTE — TELEPHONE ENCOUNTER
Attempted to contact pt about results, lvm for pt to call back          ----- Message from Sabino Cr MD sent at 7/31/2020  4:29 PM CDT -----  Lab stable

## 2020-08-10 ENCOUNTER — PATIENT MESSAGE (OUTPATIENT)
Dept: CARDIOLOGY | Facility: CLINIC | Age: 70
End: 2020-08-10

## 2020-08-10 RX ORDER — LOSARTAN POTASSIUM 50 MG/1
50 TABLET ORAL DAILY
Qty: 30 TABLET | Refills: 11 | Status: SHIPPED | OUTPATIENT
Start: 2020-08-10 | End: 2021-08-02 | Stop reason: SDUPTHER

## 2020-08-17 ENCOUNTER — LAB VISIT (OUTPATIENT)
Dept: LAB | Facility: HOSPITAL | Age: 70
End: 2020-08-17
Attending: INTERNAL MEDICINE
Payer: MEDICARE

## 2020-08-17 DIAGNOSIS — E87.6 HYPOKALEMIA: ICD-10-CM

## 2020-08-17 PROCEDURE — 83735 ASSAY OF MAGNESIUM: CPT

## 2020-08-17 PROCEDURE — 80069 RENAL FUNCTION PANEL: CPT

## 2020-08-17 PROCEDURE — 36415 COLL VENOUS BLD VENIPUNCTURE: CPT | Mod: PO

## 2020-08-18 LAB
ALBUMIN SERPL BCP-MCNC: 3.9 G/DL (ref 3.5–5.2)
ANION GAP SERPL CALC-SCNC: 9 MMOL/L (ref 8–16)
BUN SERPL-MCNC: 18 MG/DL (ref 8–23)
CALCIUM SERPL-MCNC: 8.2 MG/DL (ref 8.7–10.5)
CHLORIDE SERPL-SCNC: 107 MMOL/L (ref 95–110)
CO2 SERPL-SCNC: 27 MMOL/L (ref 23–29)
CREAT SERPL-MCNC: 1.7 MG/DL (ref 0.5–1.4)
EST. GFR  (AFRICAN AMERICAN): 46.9 ML/MIN/1.73 M^2
EST. GFR  (NON AFRICAN AMERICAN): 40.5 ML/MIN/1.73 M^2
GLUCOSE SERPL-MCNC: 181 MG/DL (ref 70–110)
MAGNESIUM SERPL-MCNC: <0.7 MG/DL (ref 1.6–2.6)
PHOSPHATE SERPL-MCNC: 2.8 MG/DL (ref 2.7–4.5)
POTASSIUM SERPL-SCNC: 4.1 MMOL/L (ref 3.5–5.1)
SODIUM SERPL-SCNC: 143 MMOL/L (ref 136–145)

## 2020-08-18 RX ORDER — LANOLIN ALCOHOL/MO/W.PET/CERES
400 CREAM (GRAM) TOPICAL 2 TIMES DAILY
Qty: 60 TABLET | Refills: 2 | COMMUNITY
Start: 2020-08-18 | End: 2020-08-18 | Stop reason: SDUPTHER

## 2020-08-18 RX ORDER — LANOLIN ALCOHOL/MO/W.PET/CERES
400 CREAM (GRAM) TOPICAL 2 TIMES DAILY
Qty: 60 TABLET | Refills: 2 | Status: SHIPPED | OUTPATIENT
Start: 2020-08-18 | End: 2020-09-04

## 2020-08-18 NOTE — PROGRESS NOTES
Critical Magnesium <0.7     chart reviewed : seems like chronic issue     multiple attempts to call the patient but no answer     Rx : MgO 400 BID called in to the pharmacy     dr. Torres note reviewed     Fern Lanier MD

## 2020-08-19 ENCOUNTER — TELEPHONE (OUTPATIENT)
Dept: INTERNAL MEDICINE | Facility: CLINIC | Age: 70
End: 2020-08-19

## 2020-08-19 ENCOUNTER — TELEPHONE (OUTPATIENT)
Dept: NEPHROLOGY | Facility: CLINIC | Age: 70
End: 2020-08-19

## 2020-08-19 DIAGNOSIS — E83.42 HYPOMAGNESEMIA: Primary | ICD-10-CM

## 2020-08-19 NOTE — TELEPHONE ENCOUNTER
He needs to address this with his nephrologist as they are the ones who have been treating and monitoring his magnesium level

## 2020-08-19 NOTE — TELEPHONE ENCOUNTER
----- Message from Luciana Ramirez sent at 8/19/2020  2:00 PM CDT -----  Regarding: magnesium recheck  Contact: Patient  Please call patient concerning getting orders to have his magnesium levels rechecked at Ph .[phone] (home)

## 2020-08-19 NOTE — TELEPHONE ENCOUNTER
----- Message from Luciana Ramirez sent at 8/19/2020  2:00 PM CDT -----  Regarding: magnesium recheck  Contact: Patient  Please call patient concerning getting orders to have his magnesium levels rechecked at Ph .[phone] (home)

## 2020-08-19 NOTE — TELEPHONE ENCOUNTER
He wants to be tested again or mag since put on it yesterday so I told him that dr martin would order for him what he needs on Monday. He also wants to follow up with dr cruz as much as possible. Let him know that he was on vacation in the next month and had  No where to schedule him thisd time. He was ok with that.8/19/2020/1442/sf

## 2020-08-19 NOTE — PROGRESS NOTES
Called patient and sent to pharmacy: he will take 400 mg tablet 5 total right now = 2 grams and then 400 bid and recheck     no chest pain or shortness of breath ; no nausea or vomiting ; no hematemesis no melena no bleeding from anywhere;  no fevers no chills;  The rest of the review of system involving all 10 systems of the body is negative    Explained to come to ER if he has shortness of breath or nausea or vomiting or chest pain

## 2020-08-19 NOTE — TELEPHONE ENCOUNTER
Spoke with pt, pt states prev magnesium lab result was low pt is concerned and is requesting magnesium order placed before appt with Nephrology. Pt contacted Nephrology department as well. Please advise!

## 2020-08-20 NOTE — TELEPHONE ENCOUNTER
Spoke with patient and informed him of what the provider stated. Patient stated that he spoke with the Nephrology department and he has an appointment scheduled for labs tomorrow

## 2020-08-21 ENCOUNTER — LAB VISIT (OUTPATIENT)
Dept: LAB | Facility: HOSPITAL | Age: 70
End: 2020-08-21
Attending: INTERNAL MEDICINE
Payer: MEDICARE

## 2020-08-21 DIAGNOSIS — E83.42 HYPOMAGNESEMIA: ICD-10-CM

## 2020-08-21 LAB
ALBUMIN SERPL BCP-MCNC: 3.8 G/DL (ref 3.5–5.2)
ANION GAP SERPL CALC-SCNC: 9 MMOL/L (ref 8–16)
BASOPHILS # BLD AUTO: 0.06 K/UL (ref 0–0.2)
BASOPHILS NFR BLD: 0.7 % (ref 0–1.9)
BUN SERPL-MCNC: 13 MG/DL (ref 8–23)
CALCIUM SERPL-MCNC: 8.9 MG/DL (ref 8.7–10.5)
CHLORIDE SERPL-SCNC: 107 MMOL/L (ref 95–110)
CO2 SERPL-SCNC: 25 MMOL/L (ref 23–29)
CREAT SERPL-MCNC: 1.5 MG/DL (ref 0.5–1.4)
DIFFERENTIAL METHOD: ABNORMAL
EOSINOPHIL # BLD AUTO: 0.5 K/UL (ref 0–0.5)
EOSINOPHIL NFR BLD: 6 % (ref 0–8)
ERYTHROCYTE [DISTWIDTH] IN BLOOD BY AUTOMATED COUNT: 16.8 % (ref 11.5–14.5)
EST. GFR  (AFRICAN AMERICAN): 55 ML/MIN/1.73 M^2
EST. GFR  (NON AFRICAN AMERICAN): 47 ML/MIN/1.73 M^2
GLUCOSE SERPL-MCNC: 104 MG/DL (ref 70–110)
HCT VFR BLD AUTO: 36.1 % (ref 40–54)
HGB BLD-MCNC: 10.7 G/DL (ref 14–18)
IMM GRANULOCYTES # BLD AUTO: 0.04 K/UL (ref 0–0.04)
IMM GRANULOCYTES NFR BLD AUTO: 0.5 % (ref 0–0.5)
LYMPHOCYTES # BLD AUTO: 1.9 K/UL (ref 1–4.8)
LYMPHOCYTES NFR BLD: 22.2 % (ref 18–48)
MAGNESIUM SERPL-MCNC: 1.1 MG/DL (ref 1.6–2.6)
MCH RBC QN AUTO: 24.7 PG (ref 27–31)
MCHC RBC AUTO-ENTMCNC: 29.6 G/DL (ref 32–36)
MCV RBC AUTO: 83 FL (ref 82–98)
MONOCYTES # BLD AUTO: 0.7 K/UL (ref 0.3–1)
MONOCYTES NFR BLD: 7.5 % (ref 4–15)
NEUTROPHILS # BLD AUTO: 5.5 K/UL (ref 1.8–7.7)
NEUTROPHILS NFR BLD: 63.1 % (ref 38–73)
NRBC BLD-RTO: 0 /100 WBC
PHOSPHATE SERPL-MCNC: 2.7 MG/DL (ref 2.7–4.5)
PLATELET # BLD AUTO: 178 K/UL (ref 150–350)
PMV BLD AUTO: 10.3 FL (ref 9.2–12.9)
POTASSIUM SERPL-SCNC: 4.3 MMOL/L (ref 3.5–5.1)
RBC # BLD AUTO: 4.33 M/UL (ref 4.6–6.2)
SODIUM SERPL-SCNC: 141 MMOL/L (ref 136–145)
WBC # BLD AUTO: 8.69 K/UL (ref 3.9–12.7)

## 2020-08-21 PROCEDURE — 36415 COLL VENOUS BLD VENIPUNCTURE: CPT

## 2020-08-21 PROCEDURE — 85025 COMPLETE CBC W/AUTO DIFF WBC: CPT

## 2020-08-21 PROCEDURE — 83735 ASSAY OF MAGNESIUM: CPT

## 2020-08-21 PROCEDURE — 80069 RENAL FUNCTION PANEL: CPT

## 2020-08-24 ENCOUNTER — OFFICE VISIT (OUTPATIENT)
Dept: NEPHROLOGY | Facility: CLINIC | Age: 70
End: 2020-08-24
Payer: MEDICARE

## 2020-08-24 ENCOUNTER — PATIENT OUTREACH (OUTPATIENT)
Dept: ADMINISTRATIVE | Facility: OTHER | Age: 70
End: 2020-08-24

## 2020-08-24 VITALS — WEIGHT: 241.88 LBS | HEIGHT: 67 IN | BODY MASS INDEX: 37.96 KG/M2

## 2020-08-24 DIAGNOSIS — N18.30 CKD (CHRONIC KIDNEY DISEASE) STAGE 3, GFR 30-59 ML/MIN: Primary | ICD-10-CM

## 2020-08-24 DIAGNOSIS — E83.42 HYPOMAGNESEMIA: ICD-10-CM

## 2020-08-24 PROCEDURE — 3008F BODY MASS INDEX DOCD: CPT | Mod: CPTII,S$GLB,, | Performed by: INTERNAL MEDICINE

## 2020-08-24 PROCEDURE — 99214 OFFICE O/P EST MOD 30 MIN: CPT | Mod: S$GLB,,, | Performed by: INTERNAL MEDICINE

## 2020-08-24 PROCEDURE — 1159F PR MEDICATION LIST DOCUMENTED IN MEDICAL RECORD: ICD-10-PCS | Mod: S$GLB,,, | Performed by: INTERNAL MEDICINE

## 2020-08-24 PROCEDURE — 1101F PR PT FALLS ASSESS DOC 0-1 FALLS W/OUT INJ PAST YR: ICD-10-PCS | Mod: CPTII,S$GLB,, | Performed by: INTERNAL MEDICINE

## 2020-08-24 PROCEDURE — 99214 PR OFFICE/OUTPT VISIT, EST, LEVL IV, 30-39 MIN: ICD-10-PCS | Mod: S$GLB,,, | Performed by: INTERNAL MEDICINE

## 2020-08-24 PROCEDURE — 3008F PR BODY MASS INDEX (BMI) DOCUMENTED: ICD-10-PCS | Mod: CPTII,S$GLB,, | Performed by: INTERNAL MEDICINE

## 2020-08-24 PROCEDURE — 1126F PR PAIN SEVERITY QUANTIFIED, NO PAIN PRESENT: ICD-10-PCS | Mod: S$GLB,,, | Performed by: INTERNAL MEDICINE

## 2020-08-24 PROCEDURE — 1126F AMNT PAIN NOTED NONE PRSNT: CPT | Mod: S$GLB,,, | Performed by: INTERNAL MEDICINE

## 2020-08-24 PROCEDURE — 1101F PT FALLS ASSESS-DOCD LE1/YR: CPT | Mod: CPTII,S$GLB,, | Performed by: INTERNAL MEDICINE

## 2020-08-24 PROCEDURE — 99999 PR PBB SHADOW E&M-EST. PATIENT-LVL IV: CPT | Mod: PBBFAC,,, | Performed by: INTERNAL MEDICINE

## 2020-08-24 PROCEDURE — 1159F MED LIST DOCD IN RCRD: CPT | Mod: S$GLB,,, | Performed by: INTERNAL MEDICINE

## 2020-08-24 PROCEDURE — 99999 PR PBB SHADOW E&M-EST. PATIENT-LVL IV: ICD-10-PCS | Mod: PBBFAC,,, | Performed by: INTERNAL MEDICINE

## 2020-08-24 NOTE — PROGRESS NOTES
Health Maintenance Due   Topic Date Due    TETANUS VACCINE  09/14/1968    LDCT Lung Screen  09/14/2006    Shingles Vaccine (2 of 3) 07/18/2012    Pneumococcal Vaccine (65+ Low/Medium Risk) (2 of 2 - PPSV23) 10/12/2017     Updates were requested from care everywhere.  Chart was reviewed for overdue Proactive [facility] Encounters (ISELA) topics (CRS, Breast Cancer Screening, Eye exam)  Health Maintenance has been updated.  LINKS immunization registry triggered.  Immunizations were reconciled.

## 2020-08-24 NOTE — PROGRESS NOTES
PROGRESS NOTE FOR ESTABLISHED PATIENT    PHYSICIAN REQUESTING THE CONSULT: Dr. Kashif Acosta    REASON FOR VISIT: Renal insufficiency/hypokalemia      Initial consult note:  69 y.o. male with history of sleep apnea, HTN, DM2, hyperlipidemia, prostate cancer, gout presents to the renal clinic for evaluation of renal insufficiency and hypokalemia. A consultation has been requested by the patient's PMD, Dr. Kashif Acosta. Patient today presents to the clinic complaining of LE swelling. He denies any headaches, congenital hearing loss, chest pain, SOB, hemoptysis, abdominal or flank pain, diarrhea, nausea/vomiting, hematuria, dysuria, rashes, hand/foot paresthesia, nasal congestion. Patient reports occasional NSAID in distant past (details are unclear).   Patient has a longstanding history of DM2 that was diagnosed > 3 years ago. He is not aware of any associated diabetic retinopathy. Blood glucose is currently controlled with last HgA1c at 5.9 (1/23/20).  Patient is currently on glimepiride only. Patient also reports > 10 year history of hypertension. He is currently on bisoprolol. BP is currently well controlled at 118/70.  In addition, patient reports history of sleep apnea (on CPAP), prostate cancer (s/p prostatectomy about 10 years ago), gout (well controlled with Uloric and Colchicine, last attack was about 5 months ago), nephrolithiasis (single episode in distant past when he passed stone, no recurrence). Patient has been using Lasix/metolazone for several years because of LE edema. He is on KCl supplements. Dose was recently increased to 120 mEq of KCl per day.  Patient denies any history of renal disease or electrolyte abnormalities in his family. Laboratory review revealed that the patient's renal function has been mildly affected with creatinine fluctuating between 1.4 and 1.6 over the past 8 years. Hypokalemia is chronic with K ranging from 3.3 to 2.6 over the past 6-8 years.     August 24, 2020:  Patient  today. Creatinine stable at 1.5. Hypomagnesemia has improved from < 0.7 from 8/17/20) to 1.1 on 8/21/20.        Past Medical History:   Diagnosis Date    Chronic diastolic congestive heart failure 4/7/2020    Chronic kidney disease, stage 3     Chronic systolic congestive heart failure 7/9/2020    DDD (degenerative disc disease), lumbar     DM (diabetes mellitus) with complications     History of gout     History of prostate cancer     Hyperlipidemia associated with type 2 diabetes mellitus     Hypertension associated with stage 3 chronic kidney disease due to type 2 diabetes mellitus     BRADLEY on CPAP     Tobacco abuse        Past Surgical History:   Procedure Laterality Date    BICEPS TENDON REPAIR      COLONOSCOPY N/A 3/27/2019    Procedure: COLONOSCOPY;  Surgeon: James Roger MD;  Location: [facility] ENDO;  Service: Endoscopy;  Laterality: N/A;    HEMORRHOID SURGERY      INGUINAL HERNIA REPAIR Left     KNEE ARTHROSCOPY Right     LEFT HEART CATHETERIZATION Left 6/29/2020    Procedure: CATHETERIZATION, HEART, LEFT;  Surgeon: Cheli Wilson MD;  Location: [facility] CATH LAB;  Service: Cardiology;  Laterality: Left;    LUMBAR LAMINECTOMY      PROSTATECTOMY         Review of patient's allergies indicates:   Allergen Reactions    Amitriptyline      Other reaction(s): Rash    Celecoxib      Other reaction(s): Rash       Current Outpatient Medications   Medication Sig Dispense Refill    acetaZOLAMIDE (DIAMOX) 250 MG tablet Take 1 tablet by mouth Every Monday, Wednesday, and Friday.      allopurinoL (ZYLOPRIM) 100 MG tablet Take 100 mg by mouth once daily.      aspirin (ECOTRIN) 81 MG EC tablet Take 81 mg by mouth once daily.      baclofen (LIORESAL) 10 MG tablet Take 1 tablet by mouth every evening.  2    bisoprolol (ZEBETA) 5 MG tablet Take 2.5 mg by mouth once daily.       bumetanide (BUMEX) 2 MG tablet Take 1 tablet (2 mg total) by mouth 2 (two) times daily. 60 tablet 11    colchicine (COLCRYS)  0.6 mg tablet Take 1 tablet (0.6 mg total) by mouth once daily. (Patient taking differently: Take 0.6 mg by mouth daily as needed. ) 30 tablet 11    fluticasone propionate (FLONASE) 50 mcg/actuation nasal spray 2 sprays (100 mcg total) by Each Nostril route once daily. 16 g 11    glimepiride (AMARYL) 1 MG tablet TAKE 1 TABLET BY MOUTH ONCE DAILY 30 tablet 6    HYDROcodone-acetaminophen (NORCO) 7.5-325 mg per tablet TK 1 T PO  TID PRF CHRONIC PAIN  0    losartan (COZAAR) 50 MG tablet Take 1 tablet (50 mg total) by mouth once daily. 30 tablet 11    magnesium oxide (MAG-OX) 400 mg (241.3 mg magnesium) tablet Take 1 tablet (400 mg total) by mouth 2 (two) times daily. 60 tablet 2    mometasone 0.1% (ELOCON) 0.1 % cream MARIA TERESA TO HANDS QD PRF FLARE UPS  0    multivitamin-minerals-lutein (CENTRUM SILVER) Tab Take 1 tablet by mouth Daily.      nicotine polacrilex 2 MG Lozg Take 1 lozenge (2 mg total) by mouth as needed (use  no more than 8 lozenges in 24 hours.). 300 lozenge 1    omeprazole (PRILOSEC) 40 MG capsule TAKE ONE CAPSULE BY MOUTH EVERY DAY 30 capsule 11    potassium chloride SA (K-DUR,KLOR-CON) 20 MEQ tablet Take 3 tablets (60 mEq total) by mouth 2 (two) times daily. 180 tablet 11    promethazine (PHENERGAN) 12.5 MG Tab Take 12.5 mg by mouth every 6 (six) hours as needed.  2    simvastatin (ZOCOR) 40 MG tablet Take 40 mg by mouth every evening.       zolpidem (AMBIEN) 10 mg Tab Take 5 mg by mouth nightly as needed.       No current facility-administered medications for this visit.        Family History   Problem Relation Age of Onset    Prostate cancer Father     Coronary artery disease Father 90    Alzheimer's disease Father     Hyperlipidemia Mother        Social History     Socioeconomic History    Marital status:      Spouse name: Not on file    Number of children: Not on file    Years of education: Not on file    Highest education level: Not on file   Occupational History    Not on  file   Social Needs    Financial resource strain: Somewhat hard    Food insecurity     Worry: Never true     Inability: Never true    Transportation needs     Medical: No     Non-medical: No   Tobacco Use    Smoking status: Former Smoker     Packs/day: 2.00     Years: 50.00     Pack years: 100.00     Types: Cigarettes     Quit date: 2020     Years since quittin.4    Smokeless tobacco: Former User   Substance and Sexual Activity    Alcohol use: Not Currently     Frequency: Never     Drinks per session: Patient refused     Binge frequency: Never    Drug use: Never    Sexual activity: Not on file   Lifestyle    Physical activity     Days per week: 0 days     Minutes per session: 0 min    Stress: Only a little   Relationships    Social connections     Talks on phone: More than three times a week     Gets together: Twice a week     Attends [employer]ical service: Not on file     Active member of club or organization: No     Attends meetings of clubs or organizations: Never     Relationship status:    Other Topics Concern    Not on file   Social History Narrative    Not on file       Review of Systems:  1. GENERAL: patient denies any fever, weight changes, generalized weakness, dizziness.  2. HEENT: patient denies headaches, visual disturbances, swallowing problems, sinus pain, nasal congestion.  3. CARDIOVASCULAR: patient denies chest pain, palpitations.  4. PULMONARY: patient denies SOB, coughing, hemoptysis, wheezing.  5. GASTROINTESTINAL: patient denies abdominal pain, nausea, vomiting, diarrhea.  6. GENITOURINARY: patient denies dysuria, hematuria, hesitancy, frequency.  7. EXTREMITIES: patient reports LE edema, no LE cramping.  8. DERMATOLOGY: patient denies rashes, ulcers.  9. NEURO: patient denies tremors, extremity weakness, extremity numbness/tingling.  10. MUSCULOSKELETAL: patient denies joint pain, joint swelling.  11. HEMATOLOGY: patient denies rectal or gum bleeding.  12: PSYCH:  "patient denies anxiety, depression.      PHYSICAL EXAM:  Ht 5' 7" (1.702 m)   Wt 109.7 kg (241 lb 13.5 oz)   BMI 37.88 kg/m²   BP: 120/70  GENERAL: Pleasant gentleman presents to clinic with non-labored breathing.  HEENT: PER, no nasal discharge, no icterus, no oral exudates, moist mucosal membranes.  NECK: no thyroid mass, no lymphadenopathy.  HEART: RRR S1/S2, no rubs, good peripheral pulses.  LUNGS: CTA bilaterally, no wheezing, breathing is nonlabored.  ABDOMEN: soft, nontender, not distended, bowel sounds are present, no abdominal hernia.  EXTREM: trace bilateral LE edema.  SKIN: no rashes, skin is warm and dry.  NEURO: A & O x 3, no obvious focal signs.    LABORATORY RESULTS:    Lab Results   Component Value Date    CREATININE 1.5 (H) 08/21/2020    BUN 13 08/21/2020     08/21/2020    K 4.3 08/21/2020     08/21/2020    CO2 25 08/21/2020      Lab Results   Component Value Date    CALCIUM 8.9 08/21/2020    PHOS 2.7 08/21/2020     Lab Results   Component Value Date    ALBUMIN 3.8 08/21/2020     Lab Results   Component Value Date    WBC 8.69 08/21/2020    HGB 10.7 (L) 08/21/2020    HCT 36.1 (L) 08/21/2020    MCV 83 08/21/2020     08/21/2020       Magnesium: 1.1 (8/21/20), was < 0.7 on 8/17/20      Protein Creatinine Ratios:    Creatinine, Random Ur   Date Value Ref Range Status   04/06/2020 41.0 23.0 - 375.0 mg/dL Final     Comment:     The random urine reference ranges provided were established   for 24 hour urine collections.  No reference ranges exist for  random urine specimens.  Correlate clinically.     01/23/2020 227.0 23.0 - 375.0 mg/dL Final     Comment:     The random urine reference ranges provided were established   for 24 hour urine collections.  No reference ranges exist for  random urine specimens.  Correlate clinically.     01/30/2019 163.0 23.0 - 375.0 mg/dL Final     Comment:     The random urine reference ranges provided were established   for 24 hour urine collections.  No " reference ranges exist for  random urine specimens.  Correlate clinically.       Protein, Urine Random   Date Value Ref Range Status   04/06/2020 10 0 - 15 mg/dL Final     Comment:     The random urine reference ranges provided were established   for 24 hour urine collections.  No reference ranges exist for  random urine specimens.  Correlate clinically.     01/23/2020 24 (H) 0 - 15 mg/dL Final     Comment:     The random urine reference ranges provided were established   for 24 hour urine collections.  No reference ranges exist for  random urine specimens.  Correlate clinically.     01/30/2019 13 0 - 15 mg/dL Final     Comment:     The random urine reference ranges provided were established   for 24 hour urine collections.  No reference ranges exist for  random urine specimens.  Correlate clinically.       Prot/Creat Ratio, Ur   Date Value Ref Range Status   04/06/2020 0.24 (H) 0.00 - 0.20 Final   01/23/2020 0.11 0.00 - 0.20 Final   01/30/2019 0.08 0.00 - 0.20 Final           ASSESSMENT AND PLAN:  69 y.o. male with history of sleep apnea, HTN, DM2, hyperlipidemia, prostate cancer, gout presents to the renal clinic for evaluation of renal insufficiency and hypokalemia.    1. Renal insufficiency: Patient presents with mild renal insufficiency, consistent with CKD stage 3. Cause of his renal insufficiency is not entirely clear but past NSAID use and HTN in past may have contributed. Diabetes is less likely given absence of proteinuria. Hyperuricemia may also play a role. Last creatinine was 1.5. Patient's renal function will be monitored closely and he will return to the clinic in 1 week for follow up. Patient was advised to avoid NSAID pain medications such as advil, aleve, motrin, ibuprofen, naprosyn, meloxicam, diclofenac, mobic and to hydrate with 60-80 ounces of water per day.     2. Electrolytes:   2.1. Hypokalemia: this has now resolved. It was likely due to diuretic use and hypomagnesemia (he is currently on  Bumex and Diamox). Will hold Diamox. Patient is also 120 mEq of KCl per day which will be continued for now.  2.2. Hypomagnesemia: patient presented with severe hypomagnesemia (< 0.7 on 8/17/20). Patient is currently taking 3 x 400 mg of magnesium oxide per day. Magnesium level has increased to 1.1 (8/21/20). He will continue current magnesium supplements. Hypomagnesia is contributing to hypokalemia and has required high doses of K supplements. Hopefully K supplements can be reduced once magnesium level has normalized. Hypomagnesemia is likely related to diuretic use. It may also be related to omeprazole use. However, patient claims that this is the only medication that helps with his GERD and omeprazole will be continued for now.       3. Acid base status: No acute issues.    4. Volume: Trace LE edema. Continue Bumex.     5. Hypertension: Good BP control.     6. Medications: Reviewed. Diamox will be stopped.     7. Anemia: Will monitor.    8. Diabetes mellitus: well controlled. Continue glimepiride.     9. CHF: as per Cardiology.

## 2020-09-01 ENCOUNTER — OFFICE VISIT (OUTPATIENT)
Dept: CARDIOLOGY | Facility: CLINIC | Age: 70
End: 2020-09-01
Payer: MEDICARE

## 2020-09-01 ENCOUNTER — OFFICE VISIT (OUTPATIENT)
Dept: SLEEP MEDICINE | Facility: CLINIC | Age: 70
End: 2020-09-01
Payer: MEDICARE

## 2020-09-01 VITALS
DIASTOLIC BLOOD PRESSURE: 80 MMHG | BODY MASS INDEX: 37.92 KG/M2 | OXYGEN SATURATION: 95 % | WEIGHT: 241.63 LBS | SYSTOLIC BLOOD PRESSURE: 120 MMHG | DIASTOLIC BLOOD PRESSURE: 80 MMHG | RESPIRATION RATE: 17 BRPM | WEIGHT: 241.63 LBS | OXYGEN SATURATION: 95 % | HEART RATE: 83 BPM | BODY MASS INDEX: 37.84 KG/M2 | HEART RATE: 83 BPM | HEIGHT: 67 IN | SYSTOLIC BLOOD PRESSURE: 120 MMHG

## 2020-09-01 DIAGNOSIS — G47.33 OSA (OBSTRUCTIVE SLEEP APNEA): ICD-10-CM

## 2020-09-01 DIAGNOSIS — E11.8 DM (DIABETES MELLITUS) WITH COMPLICATIONS: ICD-10-CM

## 2020-09-01 DIAGNOSIS — R63.5 WEIGHT GAIN: ICD-10-CM

## 2020-09-01 DIAGNOSIS — E78.5 HYPERLIPIDEMIA ASSOCIATED WITH TYPE 2 DIABETES MELLITUS: ICD-10-CM

## 2020-09-01 DIAGNOSIS — Z72.0 TOBACCO ABUSE: Primary | ICD-10-CM

## 2020-09-01 DIAGNOSIS — N18.30 CHRONIC KIDNEY DISEASE, STAGE 3: ICD-10-CM

## 2020-09-01 DIAGNOSIS — I50.32 CHRONIC DIASTOLIC CONGESTIVE HEART FAILURE: Primary | ICD-10-CM

## 2020-09-01 DIAGNOSIS — R14.0 ABDOMINAL DISTENSION: ICD-10-CM

## 2020-09-01 DIAGNOSIS — I25.118 CORONARY ARTERY DISEASE OF NATIVE ARTERY OF NATIVE HEART WITH STABLE ANGINA PECTORIS: ICD-10-CM

## 2020-09-01 DIAGNOSIS — E66.01 CLASS 2 SEVERE OBESITY WITH SERIOUS COMORBIDITY AND BODY MASS INDEX (BMI) OF 37.0 TO 37.9 IN ADULT, UNSPECIFIED OBESITY TYPE: ICD-10-CM

## 2020-09-01 DIAGNOSIS — R63.5 ABNORMAL WEIGHT GAIN: ICD-10-CM

## 2020-09-01 DIAGNOSIS — E11.69 HYPERLIPIDEMIA ASSOCIATED WITH TYPE 2 DIABETES MELLITUS: ICD-10-CM

## 2020-09-01 PROCEDURE — 1126F AMNT PAIN NOTED NONE PRSNT: CPT | Mod: S$GLB,,, | Performed by: INTERNAL MEDICINE

## 2020-09-01 PROCEDURE — 3008F PR BODY MASS INDEX (BMI) DOCUMENTED: ICD-10-PCS | Mod: CPTII,S$GLB,, | Performed by: INTERNAL MEDICINE

## 2020-09-01 PROCEDURE — 1101F PT FALLS ASSESS-DOCD LE1/YR: CPT | Mod: CPTII,S$GLB,, | Performed by: INTERNAL MEDICINE

## 2020-09-01 PROCEDURE — 99999 PR PBB SHADOW E&M-EST. PATIENT-LVL V: CPT | Mod: PBBFAC,,, | Performed by: NURSE PRACTITIONER

## 2020-09-01 PROCEDURE — 99214 OFFICE O/P EST MOD 30 MIN: CPT | Mod: S$GLB,,, | Performed by: INTERNAL MEDICINE

## 2020-09-01 PROCEDURE — 99214 OFFICE O/P EST MOD 30 MIN: CPT | Mod: S$GLB,,, | Performed by: NURSE PRACTITIONER

## 2020-09-01 PROCEDURE — 3074F PR MOST RECENT SYSTOLIC BLOOD PRESSURE < 130 MM HG: ICD-10-PCS | Mod: CPTII,S$GLB,, | Performed by: NURSE PRACTITIONER

## 2020-09-01 PROCEDURE — 3044F PR MOST RECENT HEMOGLOBIN A1C LEVEL <7.0%: ICD-10-PCS | Mod: CPTII,S$GLB,, | Performed by: INTERNAL MEDICINE

## 2020-09-01 PROCEDURE — 1159F PR MEDICATION LIST DOCUMENTED IN MEDICAL RECORD: ICD-10-PCS | Mod: S$GLB,,, | Performed by: INTERNAL MEDICINE

## 2020-09-01 PROCEDURE — 1159F MED LIST DOCD IN RCRD: CPT | Mod: S$GLB,,, | Performed by: INTERNAL MEDICINE

## 2020-09-01 PROCEDURE — 3079F PR MOST RECENT DIASTOLIC BLOOD PRESSURE 80-89 MM HG: ICD-10-PCS | Mod: CPTII,S$GLB,, | Performed by: NURSE PRACTITIONER

## 2020-09-01 PROCEDURE — 3008F PR BODY MASS INDEX (BMI) DOCUMENTED: ICD-10-PCS | Mod: CPTII,S$GLB,, | Performed by: NURSE PRACTITIONER

## 2020-09-01 PROCEDURE — 3044F HG A1C LEVEL LT 7.0%: CPT | Mod: CPTII,S$GLB,, | Performed by: INTERNAL MEDICINE

## 2020-09-01 PROCEDURE — 1159F PR MEDICATION LIST DOCUMENTED IN MEDICAL RECORD: ICD-10-PCS | Mod: S$GLB,,, | Performed by: NURSE PRACTITIONER

## 2020-09-01 PROCEDURE — 1101F PR PT FALLS ASSESS DOC 0-1 FALLS W/OUT INJ PAST YR: ICD-10-PCS | Mod: CPTII,S$GLB,, | Performed by: INTERNAL MEDICINE

## 2020-09-01 PROCEDURE — 99999 PR PBB SHADOW E&M-EST. PATIENT-LVL IV: ICD-10-PCS | Mod: PBBFAC,,, | Performed by: INTERNAL MEDICINE

## 2020-09-01 PROCEDURE — 3074F PR MOST RECENT SYSTOLIC BLOOD PRESSURE < 130 MM HG: ICD-10-PCS | Mod: CPTII,S$GLB,, | Performed by: INTERNAL MEDICINE

## 2020-09-01 PROCEDURE — 1159F MED LIST DOCD IN RCRD: CPT | Mod: S$GLB,,, | Performed by: NURSE PRACTITIONER

## 2020-09-01 PROCEDURE — 3074F SYST BP LT 130 MM HG: CPT | Mod: CPTII,S$GLB,, | Performed by: INTERNAL MEDICINE

## 2020-09-01 PROCEDURE — 3008F BODY MASS INDEX DOCD: CPT | Mod: CPTII,S$GLB,, | Performed by: INTERNAL MEDICINE

## 2020-09-01 PROCEDURE — 1101F PT FALLS ASSESS-DOCD LE1/YR: CPT | Mod: CPTII,S$GLB,, | Performed by: NURSE PRACTITIONER

## 2020-09-01 PROCEDURE — 99999 PR PBB SHADOW E&M-EST. PATIENT-LVL V: ICD-10-PCS | Mod: PBBFAC,,, | Performed by: NURSE PRACTITIONER

## 2020-09-01 PROCEDURE — 3079F DIAST BP 80-89 MM HG: CPT | Mod: CPTII,S$GLB,, | Performed by: INTERNAL MEDICINE

## 2020-09-01 PROCEDURE — 99214 PR OFFICE/OUTPT VISIT, EST, LEVL IV, 30-39 MIN: ICD-10-PCS | Mod: S$GLB,,, | Performed by: INTERNAL MEDICINE

## 2020-09-01 PROCEDURE — 3074F SYST BP LT 130 MM HG: CPT | Mod: CPTII,S$GLB,, | Performed by: NURSE PRACTITIONER

## 2020-09-01 PROCEDURE — 3079F DIAST BP 80-89 MM HG: CPT | Mod: CPTII,S$GLB,, | Performed by: NURSE PRACTITIONER

## 2020-09-01 PROCEDURE — 1101F PR PT FALLS ASSESS DOC 0-1 FALLS W/OUT INJ PAST YR: ICD-10-PCS | Mod: CPTII,S$GLB,, | Performed by: NURSE PRACTITIONER

## 2020-09-01 PROCEDURE — 99999 PR PBB SHADOW E&M-EST. PATIENT-LVL IV: CPT | Mod: PBBFAC,,, | Performed by: INTERNAL MEDICINE

## 2020-09-01 PROCEDURE — 3079F PR MOST RECENT DIASTOLIC BLOOD PRESSURE 80-89 MM HG: ICD-10-PCS | Mod: CPTII,S$GLB,, | Performed by: INTERNAL MEDICINE

## 2020-09-01 PROCEDURE — 3008F BODY MASS INDEX DOCD: CPT | Mod: CPTII,S$GLB,, | Performed by: NURSE PRACTITIONER

## 2020-09-01 PROCEDURE — 1126F PR PAIN SEVERITY QUANTIFIED, NO PAIN PRESENT: ICD-10-PCS | Mod: S$GLB,,, | Performed by: INTERNAL MEDICINE

## 2020-09-01 PROCEDURE — 99214 PR OFFICE/OUTPT VISIT, EST, LEVL IV, 30-39 MIN: ICD-10-PCS | Mod: S$GLB,,, | Performed by: NURSE PRACTITIONER

## 2020-09-01 RX ORDER — METOLAZONE 2.5 MG/1
2.5 TABLET ORAL
Qty: 12 TABLET | Refills: 11 | Status: SHIPPED | OUTPATIENT
Start: 2020-09-02 | End: 2021-07-05

## 2020-09-01 RX ORDER — BUMETANIDE 1 MG/1
1 TABLET ORAL 2 TIMES DAILY
Qty: 60 TABLET | Refills: 11 | Status: SHIPPED | OUTPATIENT
Start: 2020-09-01 | End: 2021-03-26

## 2020-09-01 NOTE — PROGRESS NOTES
"Subjective:      Patient ID: Santiago Khan is a 69 y.o. male.    Chief Complaint: Sleep Apnea    HPI  Presents to office for review of AutoPAP therapy. Patient states improved symptoms with use of AutoPAP. Sleeping more soundly. Waking up feeling more refreshed. Improved daytime sleepiness. Patient states he is benefiting from use of the AutoPAP. He puts on nightly but may wake up in middle of night with mask off.     Patient Active Problem List   Diagnosis    History of prostate cancer    BRADLEY (obstructive sleep apnea)    History of gout    DDD (degenerative disc disease), lumbar    Tobacco abuse    DM (diabetes mellitus) with complications    Hypertension associated with stage 3 chronic kidney disease due to type 2 diabetes mellitus    Hyperlipidemia associated with type 2 diabetes mellitus    Chronic kidney disease, stage 3    Obesity with serious comorbidity    Chronic diastolic congestive heart failure    Coronary artery disease of native artery of native heart with stable angina pectoris    Chronic systolic congestive heart failure       /80   Pulse 83   Resp 17   Ht 5' 7" (1.702 m)   Wt 109.6 kg (241 lb 10 oz)   SpO2 95%   BMI 37.84 kg/m²   Body mass index is 37.84 kg/m².    Review of Systems   Constitutional: Positive for weight gain.   Musculoskeletal: Positive for arthralgias.   All other systems reviewed and are negative.    Objective:      Physical Exam  Constitutional:       Appearance: He is well-developed. He is obese.   HENT:      Head: Normocephalic and atraumatic.      Mouth/Throat:      Comments: Wearing mask due to COVID-19 protocol  Neck:      Musculoskeletal: Normal range of motion and neck supple.      Thyroid: No thyroid mass or thyromegaly.      Trachea: Trachea normal.   Cardiovascular:      Rate and Rhythm: Normal rate and regular rhythm.      Heart sounds: Normal heart sounds.   Pulmonary:      Effort: Pulmonary effort is normal.      Breath sounds: Normal " breath sounds. No wheezing, rhonchi or rales.   Chest:      Chest wall: There is no dullness to percussion.   Abdominal:      Palpations: Abdomen is soft. There is no splenomegaly or mass.      Tenderness: There is no abdominal tenderness.   Musculoskeletal: Normal range of motion.         General: No swelling.   Skin:     General: Skin is warm and dry.   Neurological:      Mental Status: He is alert and oriented to person, place, and time.   Psychiatric:         Mood and Affect: Mood normal.         Behavior: Behavior normal.       Personal Diagnostic Review  Review of PAP download. Special study media link attached to this encounter. Normal AHI and 43%compliant.       Assessment:       1. Tobacco abuse    2. BRADLEY (obstructive sleep apnea)    3. Class 2 severe obesity with serious comorbidity and body mass index (BMI) of 37.0 to 37.9 in adult, unspecified obesity type        Outpatient Encounter Medications as of 9/1/2020   Medication Sig Dispense Refill    allopurinoL (ZYLOPRIM) 100 MG tablet Take 100 mg by mouth once daily.      aspirin (ECOTRIN) 81 MG EC tablet Take 81 mg by mouth once daily.      baclofen (LIORESAL) 10 MG tablet Take 1 tablet by mouth every evening.  2    bisoprolol (ZEBETA) 5 MG tablet Take 2.5 mg by mouth once daily.       bumetanide (BUMEX) 2 MG tablet Take 1 tablet (2 mg total) by mouth 2 (two) times daily. 60 tablet 11    colchicine (COLCRYS) 0.6 mg tablet Take 1 tablet (0.6 mg total) by mouth once daily. (Patient taking differently: Take 0.6 mg by mouth daily as needed. ) 30 tablet 11    fluticasone propionate (FLONASE) 50 mcg/actuation nasal spray 2 sprays (100 mcg total) by Each Nostril route once daily. 16 g 11    glimepiride (AMARYL) 1 MG tablet TAKE 1 TABLET BY MOUTH ONCE DAILY 30 tablet 6    HYDROcodone-acetaminophen (NORCO) 7.5-325 mg per tablet TK 1 T PO  TID PRF CHRONIC PAIN  0    losartan (COZAAR) 50 MG tablet Take 1 tablet (50 mg total) by mouth once daily. 30 tablet  11    magnesium oxide (MAG-OX) 400 mg (241.3 mg magnesium) tablet Take 1 tablet (400 mg total) by mouth 2 (two) times daily. 60 tablet 2    mometasone 0.1% (ELOCON) 0.1 % cream MARIA TERESA TO HANDS QD PRF FLARE UPS  0    multivitamin-minerals-lutein (CENTRUM SILVER) Tab Take 1 tablet by mouth Daily.      nicotine polacrilex 2 MG Lozg Take 1 lozenge (2 mg total) by mouth as needed (use  no more than 8 lozenges in 24 hours.). 300 lozenge 1    omeprazole (PRILOSEC) 40 MG capsule TAKE ONE CAPSULE BY MOUTH EVERY DAY 30 capsule 11    potassium chloride SA (K-DUR,KLOR-CON) 20 MEQ tablet Take 3 tablets (60 mEq total) by mouth 2 (two) times daily. 180 tablet 11    promethazine (PHENERGAN) 12.5 MG Tab Take 12.5 mg by mouth every 6 (six) hours as needed.  2    simvastatin (ZOCOR) 40 MG tablet Take 40 mg by mouth every evening.       zolpidem (AMBIEN) 10 mg Tab Take 5 mg by mouth nightly as needed.      [DISCONTINUED] acetaZOLAMIDE (DIAMOX) 250 MG tablet Take 1 tablet by mouth Every Monday, Wednesday, and Friday.       No facility-administered encounter medications on file as of 9/1/2020.      No orders of the defined types were placed in this encounter.    Plan:        Problem List Items Addressed This Visit        Endocrine    Obesity with serious comorbidity    Current Assessment & Plan     Weight loss and exercise to improve overall health discussed.  38lb weight gain from 6 months ago.              Other    BRADLEY (obstructive sleep apnea)    Overview     Severe BRADLEY. Autopap.            Current Assessment & Plan     Wears nightly. Increase sleep time on PAP. Follow up one year.         Tobacco abuse - Primary    Overview     Quit smoking 02/2020           weight gain possibly due to smoking cessation. No fluid weight.   Discussed calorie restriction

## 2020-09-01 NOTE — ASSESSMENT & PLAN NOTE
Weight loss and exercise to improve overall health discussed.  38lb weight gain from 6 months ago.

## 2020-09-01 NOTE — PROGRESS NOTES
Subjective:   Patient ID:  Santiago Khan is a 69 y.o. male who presents for follow up of No chief complaint on file.      70 yo male, came in for CHF  PMH CAD s/p LHC in  D1 70%, no PCi, CHFmrEF 45%, DM 4 yrs, life long smoker, quit in ,  HTN, HLD, CKD III, prostates Ca s/p TURP in 2011, no h/o chemo RX and XRT. Gout. No h/o stroke and heart attack. Social drinker.  Remote LHC showed miminal blockage by Dr. Mayra CHAPPELL,    admitted for [facility] due to chest tightness. Had low K and Mg. Troponin x2 negative and EKG showed NSR, RBBB, and LVH. Echo showed EF 45% global HK and moderate MR. Had K and Mg supplement.  States that gained weight for 30 pounds since 3/202 after held Metolazone. Even he was taking Bumex 1.5 mg bid. In  BNP 15 and Cr 1.4  Still has GOVEA. No orthopnea, sleeps with CPAP. No chest pain, faint  NUKE stress done in  showed inferior ischemia with scar. EF 45%  LHC done in  D1 70% no PCI    No chest pain . Left radial access ok  SOB, weight gain  Sleeps on 1 pillow. No PND  Taking Bumex 2 mg bid and DIamox   Pt c/o weight gain 30 pounds after held his metolazone in the past 4 months  C/o abd distanesion SOB.               Past Medical History:   Diagnosis Date    Chronic diastolic congestive heart failure 4/7/2020    Chronic kidney disease, stage 3     Chronic systolic congestive heart failure 7/9/2020    DDD (degenerative disc disease), lumbar     DM (diabetes mellitus) with complications     History of gout     History of prostate cancer     Hyperlipidemia associated with type 2 diabetes mellitus     Hypertension associated with stage 3 chronic kidney disease due to type 2 diabetes mellitus     BRADLEY on CPAP     Tobacco abuse        Past Surgical History:   Procedure Laterality Date    BICEPS TENDON REPAIR      COLONOSCOPY N/A 3/27/2019    Procedure: COLONOSCOPY;  Surgeon: James Roger MD;  Location: [facility];  Service: Endoscopy;   Laterality: N/A;    HEMORRHOID SURGERY      INGUINAL HERNIA REPAIR Left     KNEE ARTHROSCOPY Right     LEFT HEART CATHETERIZATION Left 2020    Procedure: CATHETERIZATION, HEART, LEFT;  Surgeon: Cheli Wilson MD;  Location: [facility] CATH LAB;  Service: Cardiology;  Laterality: Left;    LUMBAR LAMINECTOMY      PROSTATECTOMY         Social History     Tobacco Use    Smoking status: Former Smoker     Packs/day: 2.00     Years: 50.00     Pack years: 100.00     Types: Cigarettes     Quit date: 2020     Years since quittin.5    Smokeless tobacco: Former User   Substance Use Topics    Alcohol use: Not Currently     Frequency: Never     Drinks per session: Patient refused     Binge frequency: Never    Drug use: Never       Family History   Problem Relation Age of Onset    Prostate cancer Father     Coronary artery disease Father 90    Alzheimer's disease Father     Hyperlipidemia Mother          Review of Systems   Constitution: Positive for weight gain. Negative for decreased appetite, diaphoresis, fever, malaise/fatigue and night sweats.   HENT: Negative for nosebleeds.    Eyes: Negative for blurred vision and double vision.   Cardiovascular: Positive for dyspnea on exertion and leg swelling. Negative for chest pain, claudication, irregular heartbeat, near-syncope, orthopnea, palpitations, paroxysmal nocturnal dyspnea and syncope.   Respiratory: Negative for cough, shortness of breath, sleep disturbances due to breathing, snoring, sputum production and wheezing.    Endocrine: Negative for cold intolerance and polyuria.   Hematologic/Lymphatic: Does not bruise/bleed easily.   Skin: Negative for rash.   Musculoskeletal: Negative for back pain, falls, joint pain, joint swelling and neck pain.   Gastrointestinal: Negative for abdominal pain, heartburn, nausea and vomiting.   Genitourinary: Negative for dysuria, frequency and hematuria.   Neurological: Negative for difficulty with concentration,  dizziness, focal weakness, headaches, light-headedness, numbness, seizures and weakness.   Psychiatric/Behavioral: Negative for depression, memory loss and substance abuse. The patient does not have insomnia.    Allergic/Immunologic: Negative for HIV exposure and hives.       Objective:   Physical Exam   Constitutional: He is oriented to person, place, and time. He appears well-nourished.   HENT:   Head: Normocephalic.   Eyes: Pupils are equal, round, and reactive to light.   Neck: Normal carotid pulses and no JVD present. Carotid bruit is not present. No thyromegaly present.   Cardiovascular: Normal rate, regular rhythm, normal heart sounds and normal pulses.  No extrasystoles are present. PMI is not displaced. Exam reveals no gallop and no S3.   No murmur heard.  Pulmonary/Chest: Breath sounds normal. No stridor. No respiratory distress.   Abdominal: Soft. Bowel sounds are normal. There is no abdominal tenderness. There is no rebound.   Musculoskeletal: Normal range of motion.         General: Edema (BLE + edema) present.   Neurological: He is alert and oriented to person, place, and time.   Skin: Skin is intact. No rash noted.   Psychiatric: His behavior is normal.       Lab Results   Component Value Date    CHOL 153 07/23/2020    CHOL 158 01/23/2020    CHOL 168 07/24/2019     Lab Results   Component Value Date    HDL 47 07/23/2020    HDL 48 01/23/2020    HDL 51 07/24/2019     Lab Results   Component Value Date    LDLCALC 87.6 07/23/2020    LDLCALC 89.4 01/23/2020    LDLCALC 89.6 07/24/2019     Lab Results   Component Value Date    TRIG 92 07/23/2020    TRIG 103 01/23/2020    TRIG 137 07/24/2019     Lab Results   Component Value Date    CHOLHDL 30.7 07/23/2020    CHOLHDL 30.4 01/23/2020    CHOLHDL 30.4 07/24/2019       Chemistry        Component Value Date/Time     08/21/2020 1209    K 4.3 08/21/2020 1209     08/21/2020 1209    CO2 25 08/21/2020 1209    BUN 13 08/21/2020 1209    CREATININE 1.5 (H)  08/21/2020 1209     08/21/2020 1209        Component Value Date/Time    CALCIUM 8.9 08/21/2020 1209    ALKPHOS 47 (L) 07/23/2020 0903    AST 28 07/23/2020 0903    ALT 24 07/23/2020 0903    BILITOT 0.5 07/23/2020 0903    ESTGFRAFRICA 55 (A) 08/21/2020 1209    EGFRNONAA 47 (A) 08/21/2020 1209          Lab Results   Component Value Date    HGBA1C 6.3 (H) 07/23/2020     Lab Results   Component Value Date    TSH 1.448 07/23/2020     Lab Results   Component Value Date    INR 1.0 06/26/2020    INR 1.0 03/10/2020     Lab Results   Component Value Date    WBC 8.69 08/21/2020    HGB 10.7 (L) 08/21/2020    HCT 36.1 (L) 08/21/2020    MCV 83 08/21/2020     08/21/2020     BMP  Sodium   Date Value Ref Range Status   08/21/2020 141 136 - 145 mmol/L Final     Potassium   Date Value Ref Range Status   08/21/2020 4.3 3.5 - 5.1 mmol/L Final     Chloride   Date Value Ref Range Status   08/21/2020 107 95 - 110 mmol/L Final     CO2   Date Value Ref Range Status   08/21/2020 25 23 - 29 mmol/L Final     BUN, Bld   Date Value Ref Range Status   08/21/2020 13 8 - 23 mg/dL Final     Creatinine   Date Value Ref Range Status   08/21/2020 1.5 (H) 0.5 - 1.4 mg/dL Final     Calcium   Date Value Ref Range Status   08/21/2020 8.9 8.7 - 10.5 mg/dL Final     Anion Gap   Date Value Ref Range Status   08/21/2020 9 8 - 16 mmol/L Final     eGFR if    Date Value Ref Range Status   08/21/2020 55 (A) >60 mL/min/1.73 m^2 Final     eGFR if non    Date Value Ref Range Status   08/21/2020 47 (A) >60 mL/min/1.73 m^2 Final     Comment:     Calculation used to obtain the estimated glomerular filtration  rate (eGFR) is the CKD-EPI equation.        BNP  @LABRCNTIP(BNP,BNPTRIAGEBLO)@  @LABRCNTIP(troponini)@  CrCl cannot be calculated (Patient's most recent lab result is older than the maximum 7 days allowed.).  No results found in the last 24 hours.  No results found in the last 24 hours.  No results found in the last 24  hours.    Assessment:      1. Chronic diastolic congestive heart failure    2. Coronary artery disease of native artery of native heart with stable angina pectoris    3. Hyperlipidemia associated with type 2 diabetes mellitus    4. Chronic kidney disease, stage 3    5. DM (diabetes mellitus) with complications    6. BRADLEY (obstructive sleep apnea)    7. Weight gain    8. Abdominal distension    9. Abnormal weight gain          CHFmrEF compensated  LVEDP normal per C  BNP wnl  Weight  Gain  ABD US negative for ascites  Plan:   CT of chest abd and pelvis with oral contrast  Decrease Bumex from 2 mg bid to 1 mg bid  Resume Metolaozne 2.5 mg on MWF (pt believed that weight gain after metolazone was held)  Continue ASA BB losartan and simva  Fluid restriction 1.5 liters a day  Na< 2 gm  DM Rx per PCP  Counseled DASH  Check Lipid profile in 6 months  Recommend heart-healthy diet, weight control and regular exercise.  Mahsa. Risk modification.   RTC in 6 months    I have reviewed all pertinent labs and cardiac studies independently. Plans and recommendations have been formulated under my direct supervision. All questions answered and patient voiced understanding.   If symptoms persist go to the ED

## 2020-09-03 ENCOUNTER — LAB VISIT (OUTPATIENT)
Dept: LAB | Facility: HOSPITAL | Age: 70
End: 2020-09-03
Attending: INTERNAL MEDICINE
Payer: MEDICARE

## 2020-09-03 DIAGNOSIS — N18.30 CKD (CHRONIC KIDNEY DISEASE) STAGE 3, GFR 30-59 ML/MIN: ICD-10-CM

## 2020-09-03 DIAGNOSIS — E83.42 HYPOMAGNESEMIA: ICD-10-CM

## 2020-09-03 LAB
ALBUMIN SERPL BCP-MCNC: 4.4 G/DL (ref 3.5–5.2)
ANION GAP SERPL CALC-SCNC: 11 MMOL/L (ref 8–16)
BUN SERPL-MCNC: 18 MG/DL (ref 8–23)
CALCIUM SERPL-MCNC: 10.5 MG/DL (ref 8.7–10.5)
CHLORIDE SERPL-SCNC: 96 MMOL/L (ref 95–110)
CO2 SERPL-SCNC: 32 MMOL/L (ref 23–29)
CREAT SERPL-MCNC: 1.8 MG/DL (ref 0.5–1.4)
EST. GFR  (AFRICAN AMERICAN): 44 ML/MIN/1.73 M^2
EST. GFR  (NON AFRICAN AMERICAN): 38 ML/MIN/1.73 M^2
GLUCOSE SERPL-MCNC: 167 MG/DL (ref 70–110)
MAGNESIUM SERPL-MCNC: 1.1 MG/DL (ref 1.6–2.6)
PHOSPHATE SERPL-MCNC: 4.3 MG/DL (ref 2.7–4.5)
POTASSIUM SERPL-SCNC: 4.3 MMOL/L (ref 3.5–5.1)
SODIUM SERPL-SCNC: 139 MMOL/L (ref 136–145)

## 2020-09-03 PROCEDURE — 80069 RENAL FUNCTION PANEL: CPT

## 2020-09-03 PROCEDURE — 83735 ASSAY OF MAGNESIUM: CPT

## 2020-09-03 PROCEDURE — 36415 COLL VENOUS BLD VENIPUNCTURE: CPT

## 2020-09-04 ENCOUNTER — TELEPHONE (OUTPATIENT)
Dept: NEPHROLOGY | Facility: CLINIC | Age: 70
End: 2020-09-04

## 2020-09-04 ENCOUNTER — OFFICE VISIT (OUTPATIENT)
Dept: NEPHROLOGY | Facility: CLINIC | Age: 70
End: 2020-09-04
Payer: MEDICARE

## 2020-09-04 VITALS
WEIGHT: 236.75 LBS | RESPIRATION RATE: 20 BRPM | HEIGHT: 67 IN | DIASTOLIC BLOOD PRESSURE: 68 MMHG | HEART RATE: 78 BPM | SYSTOLIC BLOOD PRESSURE: 120 MMHG | BODY MASS INDEX: 37.16 KG/M2

## 2020-09-04 DIAGNOSIS — R80.9 PROTEINURIA DUE TO TYPE 2 DIABETES MELLITUS: ICD-10-CM

## 2020-09-04 DIAGNOSIS — E83.42 HYPOMAGNESEMIA: ICD-10-CM

## 2020-09-04 DIAGNOSIS — E11.29 PROTEINURIA DUE TO TYPE 2 DIABETES MELLITUS: ICD-10-CM

## 2020-09-04 DIAGNOSIS — N18.30 CKD (CHRONIC KIDNEY DISEASE) STAGE 3, GFR 30-59 ML/MIN: Primary | ICD-10-CM

## 2020-09-04 PROCEDURE — 1159F PR MEDICATION LIST DOCUMENTED IN MEDICAL RECORD: ICD-10-PCS | Mod: S$GLB,,, | Performed by: INTERNAL MEDICINE

## 2020-09-04 PROCEDURE — 3078F PR MOST RECENT DIASTOLIC BLOOD PRESSURE < 80 MM HG: ICD-10-PCS | Mod: CPTII,S$GLB,, | Performed by: INTERNAL MEDICINE

## 2020-09-04 PROCEDURE — 1101F PR PT FALLS ASSESS DOC 0-1 FALLS W/OUT INJ PAST YR: ICD-10-PCS | Mod: CPTII,S$GLB,, | Performed by: INTERNAL MEDICINE

## 2020-09-04 PROCEDURE — 99999 PR PBB SHADOW E&M-EST. PATIENT-LVL IV: ICD-10-PCS | Mod: PBBFAC,,, | Performed by: INTERNAL MEDICINE

## 2020-09-04 PROCEDURE — 1101F PT FALLS ASSESS-DOCD LE1/YR: CPT | Mod: CPTII,S$GLB,, | Performed by: INTERNAL MEDICINE

## 2020-09-04 PROCEDURE — 99214 PR OFFICE/OUTPT VISIT, EST, LEVL IV, 30-39 MIN: ICD-10-PCS | Mod: S$GLB,,, | Performed by: INTERNAL MEDICINE

## 2020-09-04 PROCEDURE — 3074F PR MOST RECENT SYSTOLIC BLOOD PRESSURE < 130 MM HG: ICD-10-PCS | Mod: CPTII,S$GLB,, | Performed by: INTERNAL MEDICINE

## 2020-09-04 PROCEDURE — 3044F PR MOST RECENT HEMOGLOBIN A1C LEVEL <7.0%: ICD-10-PCS | Mod: CPTII,S$GLB,, | Performed by: INTERNAL MEDICINE

## 2020-09-04 PROCEDURE — 3044F HG A1C LEVEL LT 7.0%: CPT | Mod: CPTII,S$GLB,, | Performed by: INTERNAL MEDICINE

## 2020-09-04 PROCEDURE — 1159F MED LIST DOCD IN RCRD: CPT | Mod: S$GLB,,, | Performed by: INTERNAL MEDICINE

## 2020-09-04 PROCEDURE — 1126F AMNT PAIN NOTED NONE PRSNT: CPT | Mod: S$GLB,,, | Performed by: INTERNAL MEDICINE

## 2020-09-04 PROCEDURE — 3008F BODY MASS INDEX DOCD: CPT | Mod: CPTII,S$GLB,, | Performed by: INTERNAL MEDICINE

## 2020-09-04 PROCEDURE — 99999 PR PBB SHADOW E&M-EST. PATIENT-LVL IV: CPT | Mod: PBBFAC,,, | Performed by: INTERNAL MEDICINE

## 2020-09-04 PROCEDURE — 3008F PR BODY MASS INDEX (BMI) DOCUMENTED: ICD-10-PCS | Mod: CPTII,S$GLB,, | Performed by: INTERNAL MEDICINE

## 2020-09-04 PROCEDURE — 1126F PR PAIN SEVERITY QUANTIFIED, NO PAIN PRESENT: ICD-10-PCS | Mod: S$GLB,,, | Performed by: INTERNAL MEDICINE

## 2020-09-04 PROCEDURE — 99214 OFFICE O/P EST MOD 30 MIN: CPT | Mod: S$GLB,,, | Performed by: INTERNAL MEDICINE

## 2020-09-04 PROCEDURE — 3078F DIAST BP <80 MM HG: CPT | Mod: CPTII,S$GLB,, | Performed by: INTERNAL MEDICINE

## 2020-09-04 PROCEDURE — 3074F SYST BP LT 130 MM HG: CPT | Mod: CPTII,S$GLB,, | Performed by: INTERNAL MEDICINE

## 2020-09-04 RX ORDER — LANOLIN ALCOHOL/MO/W.PET/CERES
800 CREAM (GRAM) TOPICAL 2 TIMES DAILY
Qty: 120 TABLET | Refills: 5 | Status: SHIPPED | OUTPATIENT
Start: 2020-09-04 | End: 2020-12-03

## 2020-09-04 NOTE — PROGRESS NOTES
PROGRESS NOTE FOR ESTABLISHED PATIENT    PHYSICIAN REQUESTING THE CONSULT: Dr. Kashif Acosta    REASON FOR VISIT: Renal insufficiency/hypokalemia      Initial consult note:  69 y.o. male with history of sleep apnea, HTN, DM2, hyperlipidemia, prostate cancer, gout presents to the renal clinic for evaluation of renal insufficiency and hypokalemia. A consultation has been requested by the patient's PMD, Dr. Kashif Acosta. Patient today presents to the clinic complaining of LE swelling. He denies any headaches, congenital hearing loss, chest pain, SOB, hemoptysis, abdominal or flank pain, diarrhea, nausea/vomiting, hematuria, dysuria, rashes, hand/foot paresthesia, nasal congestion. Patient reports occasional NSAID in distant past (details are unclear).   Patient has a longstanding history of DM2 that was diagnosed > 3 years ago. He is not aware of any associated diabetic retinopathy. Blood glucose is currently controlled with last HgA1c at 5.9 (1/23/20).  Patient is currently on glimepiride only. Patient also reports > 10 year history of hypertension. He is currently on bisoprolol. BP is currently well controlled at 118/70.  In addition, patient reports history of sleep apnea (on CPAP), prostate cancer (s/p prostatectomy about 10 years ago), gout (well controlled with Uloric and Colchicine, last attack was about 5 months ago), nephrolithiasis (single episode in distant past when he passed stone, no recurrence). Patient has been using Lasix/metolazone for several years because of LE edema. He is on KCl supplements. Dose was recently increased to 120 mEq of KCl per day.  Patient denies any history of renal disease or electrolyte abnormalities in his family. Laboratory review revealed that the patient's renal function has been mildly affected with creatinine fluctuating between 1.4 and 1.6 over the past 8 years. Hypokalemia is chronic with K ranging from 3.3 to 2.6 over the past 6-8 years.     August 24, 2020:  Patient  today. Creatinine stable at 1.5. Hypomagnesemia has improved from < 0.7 from 8/17/20) to 1.1 on 8/21/20.    September 4, 2020:   Magnesium has remained at 1.1. Patient is feeling OK.         Past Medical History:   Diagnosis Date    Chronic diastolic congestive heart failure 4/7/2020    Chronic kidney disease, stage 3     Chronic systolic congestive heart failure 7/9/2020    DDD (degenerative disc disease), lumbar     DM (diabetes mellitus) with complications     History of gout     History of prostate cancer     Hyperlipidemia associated with type 2 diabetes mellitus     Hypertension associated with stage 3 chronic kidney disease due to type 2 diabetes mellitus     BRADLEY on CPAP     Tobacco abuse        Past Surgical History:   Procedure Laterality Date    BICEPS TENDON REPAIR      COLONOSCOPY N/A 3/27/2019    Procedure: COLONOSCOPY;  Surgeon: James Roger MD;  Location: [facility] ENDO;  Service: Endoscopy;  Laterality: N/A;    HEMORRHOID SURGERY      INGUINAL HERNIA REPAIR Left     KNEE ARTHROSCOPY Right     LEFT HEART CATHETERIZATION Left 6/29/2020    Procedure: CATHETERIZATION, HEART, LEFT;  Surgeon: Cheli Wilson MD;  Location: [facility] CATH LAB;  Service: Cardiology;  Laterality: Left;    LUMBAR LAMINECTOMY      PROSTATECTOMY         Review of patient's allergies indicates:   Allergen Reactions    Amitriptyline      Other reaction(s): Rash    Celecoxib      Other reaction(s): Rash       Current Outpatient Medications   Medication Sig Dispense Refill    allopurinoL (ZYLOPRIM) 100 MG tablet Take 100 mg by mouth once daily.      aspirin (ECOTRIN) 81 MG EC tablet Take 81 mg by mouth once daily.      baclofen (LIORESAL) 10 MG tablet Take 1 tablet by mouth every evening.  2    bisoprolol (ZEBETA) 5 MG tablet Take 2.5 mg by mouth once daily.       bumetanide (BUMEX) 1 MG tablet Take 1 tablet (1 mg total) by mouth 2 (two) times daily. 60 tablet 11    colchicine (COLCRYS) 0.6 mg tablet Take 1  tablet (0.6 mg total) by mouth once daily. (Patient taking differently: Take 0.6 mg by mouth daily as needed. ) 30 tablet 11    fluticasone propionate (FLONASE) 50 mcg/actuation nasal spray 2 sprays (100 mcg total) by Each Nostril route once daily. 16 g 11    glimepiride (AMARYL) 1 MG tablet TAKE 1 TABLET BY MOUTH ONCE DAILY 30 tablet 6    HYDROcodone-acetaminophen (NORCO) 7.5-325 mg per tablet TK 1 T PO  TID PRF CHRONIC PAIN  0    losartan (COZAAR) 50 MG tablet Take 1 tablet (50 mg total) by mouth once daily. 30 tablet 11    magnesium oxide (MAG-OX) 400 mg (241.3 mg magnesium) tablet Take 1 tablet (400 mg total) by mouth 2 (two) times daily. 60 tablet 2    metOLazone (ZAROXOLYN) 2.5 MG tablet Take 1 tablet (2.5 mg total) by mouth every Mon, Wed, Fri. 12 tablet 11    mometasone 0.1% (ELOCON) 0.1 % cream MARIA TERESA TO HANDS QD PRF FLARE UPS  0    multivitamin-minerals-lutein (CENTRUM SILVER) Tab Take 1 tablet by mouth Daily.      omeprazole (PRILOSEC) 40 MG capsule TAKE ONE CAPSULE BY MOUTH EVERY DAY 30 capsule 11    potassium chloride SA (K-DUR,KLOR-CON) 20 MEQ tablet Take 3 tablets (60 mEq total) by mouth 2 (two) times daily. 180 tablet 11    promethazine (PHENERGAN) 12.5 MG Tab Take 12.5 mg by mouth every 6 (six) hours as needed.  2    simvastatin (ZOCOR) 40 MG tablet Take 40 mg by mouth every evening.       zolpidem (AMBIEN) 10 mg Tab Take 5 mg by mouth nightly as needed.      nicotine polacrilex 2 MG Lozg Take 1 lozenge (2 mg total) by mouth as needed (use  no more than 8 lozenges in 24 hours.). 300 lozenge 1     No current facility-administered medications for this visit.        Family History   Problem Relation Age of Onset    Prostate cancer Father     Coronary artery disease Father 90    Alzheimer's disease Father     Hyperlipidemia Mother        Social History     Socioeconomic History    Marital status:      Spouse name: Not on file    Number of children: Not on file    Years of  education: Not on file    Highest education level: Not on file   Occupational History    Not on file   Social Needs    Financial resource strain: Somewhat hard    Food insecurity     Worry: Never true     Inability: Never true    Transportation needs     Medical: No     Non-medical: No   Tobacco Use    Smoking status: Former Smoker     Packs/day: 2.00     Years: 50.00     Pack years: 100.00     Types: Cigarettes     Quit date: 2020     Years since quittin.5    Smokeless tobacco: Former User   Substance and Sexual Activity    Alcohol use: Not Currently     Frequency: Never     Drinks per session: Patient refused     Binge frequency: Never    Drug use: Never    Sexual activity: Not on file   Lifestyle    Physical activity     Days per week: 0 days     Minutes per session: 0 min    Stress: Only a little   Relationships    Social connections     Talks on phone: More than three times a week     Gets together: Twice a week     Attends Judaism service: Not on file     Active member of club or organization: No     Attends meetings of clubs or organizations: Never     Relationship status:    Other Topics Concern    Not on file   Social History Narrative    Not on file       Review of Systems:  1. GENERAL: patient denies any fever, weight changes, generalized weakness, dizziness.  2. HEENT: patient denies headaches, visual disturbances, swallowing problems, sinus pain, nasal congestion.  3. CARDIOVASCULAR: patient denies chest pain, palpitations.  4. PULMONARY: patient denies SOB, coughing, hemoptysis, wheezing.  5. GASTROINTESTINAL: patient denies abdominal pain, nausea, vomiting, diarrhea.  6. GENITOURINARY: patient denies dysuria, hematuria, hesitancy, frequency.  7. EXTREMITIES: patient reports LE edema, no LE cramping.  8. DERMATOLOGY: patient denies rashes, ulcers.  9. NEURO: patient denies tremors, extremity weakness, extremity numbness/tingling.  10. MUSCULOSKELETAL: patient denies  "joint pain, joint swelling.  11. HEMATOLOGY: patient denies rectal or gum bleeding.  12: PSYCH: patient denies anxiety, depression.      PHYSICAL EXAM:  /68   Pulse 78   Resp 20   Ht 5' 7" (1.702 m)   Wt 107.4 kg (236 lb 12.4 oz)   BMI 37.08 kg/m²   BP: 120/70  GENERAL: Pleasant gentleman presents to clinic with non-labored breathing.  HEENT: PER, no nasal discharge, no icterus, no oral exudates, moist mucosal membranes.  NECK: no thyroid mass, no lymphadenopathy.  HEART: RRR S1/S2, no rubs, good peripheral pulses.  LUNGS: CTA bilaterally, no wheezing, breathing is nonlabored.  ABDOMEN: soft, nontender, not distended, bowel sounds are present, no abdominal hernia.  EXTREM: trace bilateral LE edema.  SKIN: no rashes, skin is warm and dry.  NEURO: A & O x 3, no obvious focal signs.    LABORATORY RESULTS:    Lab Results   Component Value Date    CREATININE 1.8 (H) 09/03/2020    BUN 18 09/03/2020     09/03/2020    K 4.3 09/03/2020    CL 96 09/03/2020    CO2 32 (H) 09/03/2020      Lab Results   Component Value Date    CALCIUM 10.5 09/03/2020    PHOS 4.3 09/03/2020     Lab Results   Component Value Date    ALBUMIN 4.4 09/03/2020     Lab Results   Component Value Date    WBC 8.69 08/21/2020    HGB 10.7 (L) 08/21/2020    HCT 36.1 (L) 08/21/2020    MCV 83 08/21/2020     08/21/2020       Magnesium: 1.1 (9/3/20), was < 0.7 on 8/17/20      Protein Creatinine Ratios:    Creatinine, Random Ur   Date Value Ref Range Status   04/06/2020 41.0 23.0 - 375.0 mg/dL Final     Comment:     The random urine reference ranges provided were established   for 24 hour urine collections.  No reference ranges exist for  random urine specimens.  Correlate clinically.     01/23/2020 227.0 23.0 - 375.0 mg/dL Final     Comment:     The random urine reference ranges provided were established   for 24 hour urine collections.  No reference ranges exist for  random urine specimens.  Correlate clinically.     01/30/2019 163.0 23.0 " - 375.0 mg/dL Final     Comment:     The random urine reference ranges provided were established   for 24 hour urine collections.  No reference ranges exist for  random urine specimens.  Correlate clinically.       Protein, Urine Random   Date Value Ref Range Status   04/06/2020 10 0 - 15 mg/dL Final     Comment:     The random urine reference ranges provided were established   for 24 hour urine collections.  No reference ranges exist for  random urine specimens.  Correlate clinically.     01/23/2020 24 (H) 0 - 15 mg/dL Final     Comment:     The random urine reference ranges provided were established   for 24 hour urine collections.  No reference ranges exist for  random urine specimens.  Correlate clinically.     01/30/2019 13 0 - 15 mg/dL Final     Comment:     The random urine reference ranges provided were established   for 24 hour urine collections.  No reference ranges exist for  random urine specimens.  Correlate clinically.       Prot/Creat Ratio, Ur   Date Value Ref Range Status   04/06/2020 0.24 (H) 0.00 - 0.20 Final   01/23/2020 0.11 0.00 - 0.20 Final   01/30/2019 0.08 0.00 - 0.20 Final           ASSESSMENT AND PLAN:  69 y.o. male with history of sleep apnea, HTN, DM2, hyperlipidemia, prostate cancer, gout presents to the renal clinic for evaluation of renal insufficiency and hypokalemia.    1. Renal insufficiency: Patient presents with mild renal insufficiency, consistent with CKD stage 3. Cause of his renal insufficiency is not entirely clear but past NSAID use and HTN in past may have contributed. Diabetes is less likely given absence of proteinuria. Hyperuricemia may also play a role. Last creatinine was 1.8. Patient's renal function will be monitored closely and he will return to the clinic in 1 month for follow up. Patient was advised to avoid NSAID pain medications such as advil, aleve, motrin, ibuprofen, naprosyn, meloxicam, diclofenac, mobic and to hydrate with 60-80 ounces of water per day.      2. Electrolytes:   2.1. Hypokalemia: this has now resolved. It was likely due to diuretic use and hypomagnesemia (he is currently on Bumex and Metolazone). Continue KCl supplements.   2.2. Hypomagnesemia: patient presented with severe hypomagnesemia (< 0.7 on 8/17/20). Patient is currently taking 2 x 400 mg of magnesium oxide per day. Magnesium level has increased to remained at 1.1 (9/3/20). He will increase magnesium supplements to 800 mg po bid. Hypomagnesia is contributing to hypokalemia and has required high doses of K supplements. Hopefully K supplements can be reduced once magnesium level has normalized. Hypomagnesemia is likely related to diuretic use. It may also be related to omeprazole use. However, patient claims that this is the only medication that helps with his GERD and omeprazole will be continued for now.       3. Acid base status: No acute issues.    4. Volume: Trace LE edema. Continue Bumex/metolazone.     5. Hypertension: Good BP control.     6. Medications: Reviewed.     7. Anemia: Will monitor.    8. Diabetes mellitus: well controlled. Continue glimepiride.     9. CHF: as per Cardiology.

## 2020-09-04 NOTE — TELEPHONE ENCOUNTER
----- Message from Yessenia Shaffer sent at 9/4/2020  9:18 AM CDT -----  .Type:  Patient Returning Call    Who Called:pt  Who Left Message for Patient:nurse  Does the patient know what this is regarding?:no possibly appt this morning  Would the patient rather a call back or a response via [facility]? Call back  Best Call Back Number:[phone]  Additional Information:

## 2020-09-09 ENCOUNTER — TELEPHONE (OUTPATIENT)
Dept: RADIOLOGY | Facility: HOSPITAL | Age: 70
End: 2020-09-09

## 2020-09-10 ENCOUNTER — HOSPITAL ENCOUNTER (OUTPATIENT)
Dept: RADIOLOGY | Facility: HOSPITAL | Age: 70
Discharge: HOME | End: 2020-09-10
Attending: INTERNAL MEDICINE
Payer: MEDICARE

## 2020-09-10 ENCOUNTER — TELEPHONE (OUTPATIENT)
Dept: CARDIOLOGY | Facility: CLINIC | Age: 70
End: 2020-09-10

## 2020-09-10 DIAGNOSIS — R63.5 ABNORMAL WEIGHT GAIN: ICD-10-CM

## 2020-09-10 PROCEDURE — 71250 CT CHEST ABDOMEN PELVIS WITHOUT CONTRAST(XPD): ICD-10-PCS | Mod: 26,,, | Performed by: RADIOLOGY

## 2020-09-10 PROCEDURE — 74176 CT ABD & PELVIS W/O CONTRAST: CPT | Mod: 26,,, | Performed by: RADIOLOGY

## 2020-09-10 PROCEDURE — 74176 CT ABD & PELVIS W/O CONTRAST: CPT | Mod: TC

## 2020-09-10 PROCEDURE — 25500020 PHARM REV CODE 255: Performed by: INTERNAL MEDICINE

## 2020-09-10 PROCEDURE — 74176 CT CHEST ABDOMEN PELVIS WITHOUT CONTRAST(XPD): ICD-10-PCS | Mod: 26,,, | Performed by: RADIOLOGY

## 2020-09-10 PROCEDURE — 71250 CT THORAX DX C-: CPT | Mod: TC

## 2020-09-10 PROCEDURE — 71250 CT THORAX DX C-: CPT | Mod: 26,,, | Performed by: RADIOLOGY

## 2020-09-10 RX ADMIN — IOHEXOL 30 ML: 350 INJECTION, SOLUTION INTRAVENOUS at 11:09

## 2020-09-10 NOTE — TELEPHONE ENCOUNTER
Pt was contacted about results. Pt was advised to F/U with PCP. Pt verbalized understanding with no question or concern.          ----- Message from Sabino Cr MD sent at 9/10/2020  3:36 PM CDT -----  CT scan showed gastritis, renal cyst  Advise to f/u with Dr. Acosta

## 2020-09-17 ENCOUNTER — PATIENT MESSAGE (OUTPATIENT)
Dept: INTERNAL MEDICINE | Facility: CLINIC | Age: 70
End: 2020-09-17

## 2020-09-18 RX ORDER — SIMVASTATIN 40 MG/1
40 TABLET, FILM COATED ORAL NIGHTLY
Qty: 90 TABLET | Refills: 3 | Status: SHIPPED | OUTPATIENT
Start: 2020-09-18 | End: 2021-09-10

## 2020-09-24 ENCOUNTER — TELEPHONE (OUTPATIENT)
Dept: SMOKING CESSATION | Facility: CLINIC | Age: 70
End: 2020-09-24

## 2020-10-08 ENCOUNTER — LAB VISIT (OUTPATIENT)
Dept: LAB | Facility: HOSPITAL | Age: 70
End: 2020-10-08
Attending: INTERNAL MEDICINE
Payer: MEDICARE

## 2020-10-08 ENCOUNTER — IMMUNIZATION (OUTPATIENT)
Dept: INTERNAL MEDICINE | Facility: CLINIC | Age: 70
End: 2020-10-08
Payer: MEDICARE

## 2020-10-08 DIAGNOSIS — I12.9 HYPERTENSION ASSOCIATED WITH STAGE 3 CHRONIC KIDNEY DISEASE DUE TO TYPE 2 DIABETES MELLITUS: ICD-10-CM

## 2020-10-08 DIAGNOSIS — N18.30 HYPERTENSION ASSOCIATED WITH STAGE 3 CHRONIC KIDNEY DISEASE DUE TO TYPE 2 DIABETES MELLITUS: ICD-10-CM

## 2020-10-08 DIAGNOSIS — E11.22 HYPERTENSION ASSOCIATED WITH STAGE 3 CHRONIC KIDNEY DISEASE DUE TO TYPE 2 DIABETES MELLITUS: ICD-10-CM

## 2020-10-08 LAB
ALBUMIN SERPL BCP-MCNC: 4.2 G/DL (ref 3.5–5.2)
ALP SERPL-CCNC: 59 U/L (ref 55–135)
ALT SERPL W/O P-5'-P-CCNC: 27 U/L (ref 10–44)
ANION GAP SERPL CALC-SCNC: 13 MMOL/L (ref 8–16)
AST SERPL-CCNC: 28 U/L (ref 10–40)
BILIRUB SERPL-MCNC: 0.4 MG/DL (ref 0.1–1)
BUN SERPL-MCNC: 16 MG/DL (ref 8–23)
CALCIUM SERPL-MCNC: 9.9 MG/DL (ref 8.7–10.5)
CHLORIDE SERPL-SCNC: 103 MMOL/L (ref 95–110)
CHOLEST SERPL-MCNC: 138 MG/DL (ref 120–199)
CHOLEST/HDLC SERPL: 3.1 {RATIO} (ref 2–5)
CO2 SERPL-SCNC: 24 MMOL/L (ref 23–29)
CREAT SERPL-MCNC: 1.7 MG/DL (ref 0.5–1.4)
EST. GFR  (AFRICAN AMERICAN): 46.5 ML/MIN/1.73 M^2
EST. GFR  (NON AFRICAN AMERICAN): 40.3 ML/MIN/1.73 M^2
GLUCOSE SERPL-MCNC: 110 MG/DL (ref 70–110)
HDLC SERPL-MCNC: 44 MG/DL (ref 40–75)
HDLC SERPL: 31.9 % (ref 20–50)
LDLC SERPL CALC-MCNC: 64.6 MG/DL (ref 63–159)
NONHDLC SERPL-MCNC: 94 MG/DL
POTASSIUM SERPL-SCNC: 3.8 MMOL/L (ref 3.5–5.1)
PROT SERPL-MCNC: 7.3 G/DL (ref 6–8.4)
SODIUM SERPL-SCNC: 140 MMOL/L (ref 136–145)
TRIGL SERPL-MCNC: 147 MG/DL (ref 30–150)

## 2020-10-08 PROCEDURE — 80053 COMPREHEN METABOLIC PANEL: CPT

## 2020-10-08 PROCEDURE — 90694 FLU VACCINE - QUADRIVALENT - ADJUVANTED: ICD-10-PCS | Mod: S$GLB,,, | Performed by: INTERNAL MEDICINE

## 2020-10-08 PROCEDURE — G0008 ADMIN INFLUENZA VIRUS VAC: HCPCS | Mod: S$GLB,,, | Performed by: INTERNAL MEDICINE

## 2020-10-08 PROCEDURE — 80061 LIPID PANEL: CPT

## 2020-10-08 PROCEDURE — 36415 COLL VENOUS BLD VENIPUNCTURE: CPT | Mod: PO

## 2020-10-08 PROCEDURE — G0008 FLU VACCINE - QUADRIVALENT - ADJUVANTED: ICD-10-PCS | Mod: S$GLB,,, | Performed by: INTERNAL MEDICINE

## 2020-10-08 PROCEDURE — 90694 VACC AIIV4 NO PRSRV 0.5ML IM: CPT | Mod: S$GLB,,, | Performed by: INTERNAL MEDICINE

## 2020-10-12 ENCOUNTER — TELEPHONE (OUTPATIENT)
Dept: NEPHROLOGY | Facility: CLINIC | Age: 70
End: 2020-10-12

## 2020-10-12 ENCOUNTER — LAB VISIT (OUTPATIENT)
Dept: LAB | Facility: HOSPITAL | Age: 70
End: 2020-10-12
Attending: INTERNAL MEDICINE
Payer: MEDICARE

## 2020-10-12 ENCOUNTER — TELEPHONE (OUTPATIENT)
Dept: CARDIOLOGY | Facility: CLINIC | Age: 70
End: 2020-10-12

## 2020-10-12 ENCOUNTER — OFFICE VISIT (OUTPATIENT)
Dept: NEPHROLOGY | Facility: CLINIC | Age: 70
End: 2020-10-12
Payer: MEDICARE

## 2020-10-12 VITALS
HEART RATE: 70 BPM | DIASTOLIC BLOOD PRESSURE: 70 MMHG | BODY MASS INDEX: 36.6 KG/M2 | SYSTOLIC BLOOD PRESSURE: 120 MMHG | WEIGHT: 233.69 LBS | OXYGEN SATURATION: 95 %

## 2020-10-12 DIAGNOSIS — D64.9 ANEMIA, UNSPECIFIED TYPE: ICD-10-CM

## 2020-10-12 DIAGNOSIS — E83.42 HYPOMAGNESEMIA: Primary | ICD-10-CM

## 2020-10-12 DIAGNOSIS — N18.30 STAGE 3 CHRONIC KIDNEY DISEASE, UNSPECIFIED WHETHER STAGE 3A OR 3B CKD: ICD-10-CM

## 2020-10-12 DIAGNOSIS — E83.42 HYPOMAGNESEMIA: ICD-10-CM

## 2020-10-12 LAB — MAGNESIUM SERPL-MCNC: 1.4 MG/DL (ref 1.6–2.6)

## 2020-10-12 PROCEDURE — 36415 COLL VENOUS BLD VENIPUNCTURE: CPT

## 2020-10-12 PROCEDURE — 3078F DIAST BP <80 MM HG: CPT | Mod: CPTII,S$GLB,, | Performed by: INTERNAL MEDICINE

## 2020-10-12 PROCEDURE — 1126F PR PAIN SEVERITY QUANTIFIED, NO PAIN PRESENT: ICD-10-PCS | Mod: S$GLB,,, | Performed by: INTERNAL MEDICINE

## 2020-10-12 PROCEDURE — 3008F PR BODY MASS INDEX (BMI) DOCUMENTED: ICD-10-PCS | Mod: CPTII,S$GLB,, | Performed by: INTERNAL MEDICINE

## 2020-10-12 PROCEDURE — 83540 ASSAY OF IRON: CPT

## 2020-10-12 PROCEDURE — 3078F PR MOST RECENT DIASTOLIC BLOOD PRESSURE < 80 MM HG: ICD-10-PCS | Mod: CPTII,S$GLB,, | Performed by: INTERNAL MEDICINE

## 2020-10-12 PROCEDURE — 3074F SYST BP LT 130 MM HG: CPT | Mod: CPTII,S$GLB,, | Performed by: INTERNAL MEDICINE

## 2020-10-12 PROCEDURE — 1101F PR PT FALLS ASSESS DOC 0-1 FALLS W/OUT INJ PAST YR: ICD-10-PCS | Mod: CPTII,S$GLB,, | Performed by: INTERNAL MEDICINE

## 2020-10-12 PROCEDURE — 99999 PR PBB SHADOW E&M-EST. PATIENT-LVL IV: ICD-10-PCS | Mod: PBBFAC,,, | Performed by: INTERNAL MEDICINE

## 2020-10-12 PROCEDURE — 1159F PR MEDICATION LIST DOCUMENTED IN MEDICAL RECORD: ICD-10-PCS | Mod: S$GLB,,, | Performed by: INTERNAL MEDICINE

## 2020-10-12 PROCEDURE — 3008F BODY MASS INDEX DOCD: CPT | Mod: CPTII,S$GLB,, | Performed by: INTERNAL MEDICINE

## 2020-10-12 PROCEDURE — 99214 OFFICE O/P EST MOD 30 MIN: CPT | Mod: S$GLB,,, | Performed by: INTERNAL MEDICINE

## 2020-10-12 PROCEDURE — 83735 ASSAY OF MAGNESIUM: CPT

## 2020-10-12 PROCEDURE — 99999 PR PBB SHADOW E&M-EST. PATIENT-LVL IV: CPT | Mod: PBBFAC,,, | Performed by: INTERNAL MEDICINE

## 2020-10-12 PROCEDURE — 3074F PR MOST RECENT SYSTOLIC BLOOD PRESSURE < 130 MM HG: ICD-10-PCS | Mod: CPTII,S$GLB,, | Performed by: INTERNAL MEDICINE

## 2020-10-12 PROCEDURE — 1159F MED LIST DOCD IN RCRD: CPT | Mod: S$GLB,,, | Performed by: INTERNAL MEDICINE

## 2020-10-12 PROCEDURE — 1126F AMNT PAIN NOTED NONE PRSNT: CPT | Mod: S$GLB,,, | Performed by: INTERNAL MEDICINE

## 2020-10-12 PROCEDURE — 1101F PT FALLS ASSESS-DOCD LE1/YR: CPT | Mod: CPTII,S$GLB,, | Performed by: INTERNAL MEDICINE

## 2020-10-12 PROCEDURE — 99214 PR OFFICE/OUTPT VISIT, EST, LEVL IV, 30-39 MIN: ICD-10-PCS | Mod: S$GLB,,, | Performed by: INTERNAL MEDICINE

## 2020-10-12 NOTE — TELEPHONE ENCOUNTER
----- Message from Camille Benitez sent at 10/12/2020  2:58 PM CDT -----  Regarding: pt  .Type:  Patient Returning Call    Who Called:pt   Who Left Message for Patient:Ursula   Does the patient know what this is regarding?:unsure   Would the patient rather a call back or a response via [facility]? Call back   Best Call Back Number:.[phone] (home)   Additional Information:     Thank you,   Camille Benitez

## 2020-10-12 NOTE — PROGRESS NOTES
PROGRESS NOTE FOR ESTABLISHED PATIENT    PHYSICIAN REQUESTING THE CONSULT: Dr. Kashif Acosta    REASON FOR VISIT: Renal insufficiency/hypokalemia      Initial consult note:  70 y.o. male with history of sleep apnea, HTN, DM2, hyperlipidemia, prostate cancer, gout presents to the renal clinic for evaluation of renal insufficiency and hypokalemia. A consultation has been requested by the patient's PMD, Dr. Kashif Acosta. Patient today presents to the clinic complaining of LE swelling. He denies any headaches, congenital hearing loss, chest pain, SOB, hemoptysis, abdominal or flank pain, diarrhea, nausea/vomiting, hematuria, dysuria, rashes, hand/foot paresthesia, nasal congestion. Patient reports occasional NSAID in distant past (details are unclear).   Patient has a longstanding history of DM2 that was diagnosed > 3 years ago. He is not aware of any associated diabetic retinopathy. Blood glucose is currently controlled with last HgA1c at 5.9 (1/23/20).  Patient is currently on glimepiride only. Patient also reports > 10 year history of hypertension. He is currently on bisoprolol. BP is currently well controlled at 118/70.  In addition, patient reports history of sleep apnea (on CPAP), prostate cancer (s/p prostatectomy about 10 years ago), gout (well controlled with Uloric and Colchicine, last attack was about 5 months ago), nephrolithiasis (single episode in distant past when he passed stone, no recurrence). Patient has been using Lasix/metolazone for several years because of LE edema. He is on KCl supplements. Dose was recently increased to 120 mEq of KCl per day.  Patient denies any history of renal disease or electrolyte abnormalities in his family. Laboratory review revealed that the patient's renal function has been mildly affected with creatinine fluctuating between 1.4 and 1.6 over the past 8 years. Hypokalemia is chronic with K ranging from 3.3 to 2.6 over the past 6-8 years.     August 24, 2020:  Patient  today. Creatinine stable at 1.5. Hypomagnesemia has improved from < 0.7 from 8/17/20) to 1.1 on 8/21/20.    September 4, 2020:   Magnesium has remained at 1.1. Patient is feeling OK.     October 12, 2020:  Patient confirms that he has increased MgO to 800 mg po bid. He denies any complaints.           Past Medical History:   Diagnosis Date    Chronic diastolic congestive heart failure 4/7/2020    Chronic kidney disease, stage 3     Chronic systolic congestive heart failure 7/9/2020    DDD (degenerative disc disease), lumbar     DM (diabetes mellitus) with complications     History of gout     History of prostate cancer     Hyperlipidemia associated with type 2 diabetes mellitus     Hypertension associated with stage 3 chronic kidney disease due to type 2 diabetes mellitus     BRADLEY on CPAP     Tobacco abuse        Past Surgical History:   Procedure Laterality Date    BICEPS TENDON REPAIR      COLONOSCOPY N/A 3/27/2019    Procedure: COLONOSCOPY;  Surgeon: James Roger MD;  Location: [facility] ENDO;  Service: Endoscopy;  Laterality: N/A;    HEMORRHOID SURGERY      INGUINAL HERNIA REPAIR Left     KNEE ARTHROSCOPY Right     LEFT HEART CATHETERIZATION Left 6/29/2020    Procedure: CATHETERIZATION, HEART, LEFT;  Surgeon: Cheli Wilson MD;  Location: [facility] CATH LAB;  Service: Cardiology;  Laterality: Left;    LUMBAR LAMINECTOMY      PROSTATECTOMY         Review of patient's allergies indicates:   Allergen Reactions    Amitriptyline      Other reaction(s): Rash    Celecoxib      Other reaction(s): Rash       Current Outpatient Medications   Medication Sig Dispense Refill    allopurinoL (ZYLOPRIM) 100 MG tablet Take 100 mg by mouth once daily.      aspirin (ECOTRIN) 81 MG EC tablet Take 81 mg by mouth once daily.      baclofen (LIORESAL) 10 MG tablet Take 1 tablet by mouth every evening.  2    bisoprolol (ZEBETA) 5 MG tablet Take 2.5 mg by mouth once daily.       bumetanide (BUMEX) 1 MG tablet Take 1  tablet (1 mg total) by mouth 2 (two) times daily. 60 tablet 11    colchicine (COLCRYS) 0.6 mg tablet Take 1 tablet (0.6 mg total) by mouth once daily. (Patient taking differently: Take 0.6 mg by mouth daily as needed. ) 30 tablet 11    fluticasone propionate (FLONASE) 50 mcg/actuation nasal spray 2 sprays (100 mcg total) by Each Nostril route once daily. 16 g 11    glimepiride (AMARYL) 1 MG tablet TAKE 1 TABLET BY MOUTH ONCE DAILY 30 tablet 6    HYDROcodone-acetaminophen (NORCO) 7.5-325 mg per tablet TK 1 T PO  TID PRF CHRONIC PAIN  0    losartan (COZAAR) 50 MG tablet Take 1 tablet (50 mg total) by mouth once daily. 30 tablet 11    magnesium oxide (MAG-OX) 400 mg (241.3 mg magnesium) tablet Take 2 tablets (800 mg total) by mouth 2 (two) times daily. 120 tablet 5    metOLazone (ZAROXOLYN) 2.5 MG tablet Take 1 tablet (2.5 mg total) by mouth every Mon, Wed, Fri. 12 tablet 11    mometasone 0.1% (ELOCON) 0.1 % cream MARIA TERESA TO HANDS QD PRF FLARE UPS  0    multivitamin-minerals-lutein (CENTRUM SILVER) Tab Take 1 tablet by mouth Daily.      nicotine polacrilex 2 MG Lozg Take 1 lozenge (2 mg total) by mouth as needed (use  no more than 8 lozenges in 24 hours.). 300 lozenge 1    omeprazole (PRILOSEC) 40 MG capsule TAKE ONE CAPSULE BY MOUTH EVERY DAY 30 capsule 11    potassium chloride SA (K-DUR,KLOR-CON) 20 MEQ tablet Take 3 tablets (60 mEq total) by mouth 2 (two) times daily. 180 tablet 11    promethazine (PHENERGAN) 12.5 MG Tab Take 12.5 mg by mouth every 6 (six) hours as needed.  2    simvastatin (ZOCOR) 40 MG tablet Take 1 tablet (40 mg total) by mouth every evening. 90 tablet 3    zolpidem (AMBIEN) 10 mg Tab Take 5 mg by mouth nightly as needed.       No current facility-administered medications for this visit.        Family History   Problem Relation Age of Onset    Prostate cancer Father     Coronary artery disease Father 90    Alzheimer's disease Father     Hyperlipidemia Mother        Social History      Socioeconomic History    Marital status:      Spouse name: Not on file    Number of children: Not on file    Years of education: Not on file    Highest education level: Not on file   Occupational History    Not on file   Social Needs    Financial resource strain: Somewhat hard    Food insecurity     Worry: Never true     Inability: Never true    Transportation needs     Medical: No     Non-medical: No   Tobacco Use    Smoking status: Former Smoker     Packs/day: 2.00     Years: 50.00     Pack years: 100.00     Types: Cigarettes     Quit date: 2020     Years since quittin.6    Smokeless tobacco: Former User   Substance and Sexual Activity    Alcohol use: Not Currently     Frequency: Never     Drinks per session: Patient refused     Binge frequency: Never    Drug use: Never    Sexual activity: Not on file   Lifestyle    Physical activity     Days per week: 0 days     Minutes per session: 0 min    Stress: Only a little   Relationships    Social connections     Talks on phone: More than three times a week     Gets together: Twice a week     Attends Protestant service: Not on file     Active member of club or organization: No     Attends meetings of clubs or organizations: Never     Relationship status:    Other Topics Concern    Not on file   Social History Narrative    Not on file       Review of Systems:  1. GENERAL: patient denies any fever, weight changes, generalized weakness, dizziness.  2. HEENT: patient denies headaches, visual disturbances, swallowing problems, sinus pain, nasal congestion.  3. CARDIOVASCULAR: patient denies chest pain, palpitations.  4. PULMONARY: patient denies SOB, coughing, hemoptysis, wheezing.  5. GASTROINTESTINAL: patient denies abdominal pain, nausea, vomiting, diarrhea.  6. GENITOURINARY: patient denies dysuria, hematuria, hesitancy, frequency.  7. EXTREMITIES: patient reports LE edema, no LE cramping.  8. DERMATOLOGY: patient denies  rashes, ulcers.  9. NEURO: patient denies tremors, extremity weakness, extremity numbness/tingling.  10. MUSCULOSKELETAL: patient denies joint pain, joint swelling.  11. HEMATOLOGY: patient denies rectal or gum bleeding.  12: PSYCH: patient denies anxiety, depression.      PHYSICAL EXAM:  /70   Pulse 70   Wt 106 kg (233 lb 11 oz)   SpO2 95%   BMI 36.60 kg/m²     GENERAL: Pleasant gentleman presents to clinic with non-labored breathing.  HEENT: PER, no nasal discharge, no icterus, no oral exudates, moist mucosal membranes.  NECK: no thyroid mass, no lymphadenopathy.  HEART: RRR S1/S2, no rubs, good peripheral pulses.  LUNGS: CTA bilaterally, no wheezing, breathing is nonlabored.  ABDOMEN: soft, nontender, not distended, bowel sounds are present, no abdominal hernia.  EXTREM: trace bilateral LE edema.  SKIN: no rashes, skin is warm and dry.  NEURO: A & O x 3, no obvious focal signs.    LABORATORY RESULTS:    Lab Results   Component Value Date    CREATININE 1.7 (H) 10/08/2020    BUN 16 10/08/2020     10/08/2020    K 3.8 10/08/2020     10/08/2020    CO2 24 10/08/2020      Lab Results   Component Value Date    CALCIUM 9.9 10/08/2020    PHOS 4.3 09/03/2020     Lab Results   Component Value Date    ALBUMIN 4.2 10/08/2020     Lab Results   Component Value Date    WBC 8.69 08/21/2020    HGB 10.7 (L) 08/21/2020    HCT 36.1 (L) 08/21/2020    MCV 83 08/21/2020     08/21/2020       Magnesium: 1.1 (9/3/20), was < 0.7 on 8/17/20      Protein Creatinine Ratios:    Creatinine, Random Ur   Date Value Ref Range Status   04/06/2020 41.0 23.0 - 375.0 mg/dL Final     Comment:     The random urine reference ranges provided were established   for 24 hour urine collections.  No reference ranges exist for  random urine specimens.  Correlate clinically.     01/23/2020 227.0 23.0 - 375.0 mg/dL Final     Comment:     The random urine reference ranges provided were established   for 24 hour urine collections.  No  reference ranges exist for  random urine specimens.  Correlate clinically.     01/30/2019 163.0 23.0 - 375.0 mg/dL Final     Comment:     The random urine reference ranges provided were established   for 24 hour urine collections.  No reference ranges exist for  random urine specimens.  Correlate clinically.       Protein, Urine Random   Date Value Ref Range Status   04/06/2020 10 0 - 15 mg/dL Final     Comment:     The random urine reference ranges provided were established   for 24 hour urine collections.  No reference ranges exist for  random urine specimens.  Correlate clinically.     01/23/2020 24 (H) 0 - 15 mg/dL Final     Comment:     The random urine reference ranges provided were established   for 24 hour urine collections.  No reference ranges exist for  random urine specimens.  Correlate clinically.     01/30/2019 13 0 - 15 mg/dL Final     Comment:     The random urine reference ranges provided were established   for 24 hour urine collections.  No reference ranges exist for  random urine specimens.  Correlate clinically.       Prot/Creat Ratio, Ur   Date Value Ref Range Status   04/06/2020 0.24 (H) 0.00 - 0.20 Final   01/23/2020 0.11 0.00 - 0.20 Final   01/30/2019 0.08 0.00 - 0.20 Final           ASSESSMENT AND PLAN:  70 y.o. male with history of sleep apnea, HTN, DM2, hyperlipidemia, prostate cancer, gout presents to the renal clinic for evaluation of renal insufficiency and hypokalemia.    1. Renal insufficiency: Patient presents with mild renal insufficiency, consistent with CKD stage 3. Cause of his renal insufficiency is not entirely clear but past NSAID use and HTN in past may have contributed. Diabetes is less likely given absence of proteinuria. Hyperuricemia may also play a role. Last creatinine was 1.7 (stable). Patient's renal function will be monitored closely and he will return to the clinic in 3 months for follow up. Patient was advised to avoid NSAID pain medications such as advil, aleve,  motrin, ibuprofen, naprosyn, meloxicam, diclofenac, mobic and to hydrate with 60-80 ounces of water per day.     2. Electrolytes:   2.1. Hypokalemia: this has now resolved. It was likely due to diuretic use and hypomagnesemia (he is currently on Bumex and Metolazone). Continue KCl supplements.   2.2. Hypomagnesemia: patient presented with severe hypomagnesemia (< 0.7 on 8/17/20). Patient is currently taking 2 x 800 mg of magnesium oxide per day. Magnesium level has increased to 1.1 (9/3/20). Will recheck magnesium today. Hypomagnesemia is likely related to diuretic use. It may also be related to omeprazole use. However, patient claims that this is the only medication that helps with his GERD and omeprazole will be continued for now.       3. Acid base status: No acute issues.    4. Volume: Trace LE edema. Continue Bumex/metolazone.     5. Hypertension: Good BP control.     6. Medications: Reviewed.     7. Anemia: Will monitor.    8. Diabetes mellitus: well controlled. Continue glimepiride.     9. CHF: as per Cardiology.     Addendum:  Repeat magnesium from 10/12/20 revealed level of 1.4. Patient was asked to continue current MgO dose of 800 mg po bid.

## 2020-10-13 ENCOUNTER — TELEPHONE (OUTPATIENT)
Dept: SMOKING CESSATION | Facility: CLINIC | Age: 70
End: 2020-10-13

## 2020-10-13 LAB
IRON SERPL-MCNC: 43 UG/DL (ref 45–160)
SATURATED IRON: 7 % (ref 20–50)
TOTAL IRON BINDING CAPACITY: 626 UG/DL (ref 250–450)
TRANSFERRIN SERPL-MCNC: 423 MG/DL (ref 200–375)

## 2020-10-16 ENCOUNTER — HOSPITAL ENCOUNTER (EMERGENCY)
Facility: HOSPITAL | Age: 70
Discharge: HOME | End: 2020-10-16
Attending: EMERGENCY MEDICINE
Payer: MEDICARE

## 2020-10-16 VITALS
BODY MASS INDEX: 36.47 KG/M2 | DIASTOLIC BLOOD PRESSURE: 71 MMHG | RESPIRATION RATE: 20 BRPM | WEIGHT: 232.38 LBS | TEMPERATURE: 98 F | HEIGHT: 67 IN | HEART RATE: 84 BPM | OXYGEN SATURATION: 97 % | SYSTOLIC BLOOD PRESSURE: 126 MMHG

## 2020-10-16 DIAGNOSIS — M10.9 ACUTE GOUT OF RIGHT HAND, UNSPECIFIED CAUSE: Primary | ICD-10-CM

## 2020-10-16 DIAGNOSIS — M79.641 RIGHT HAND PAIN: ICD-10-CM

## 2020-10-16 LAB
HCV AB SERPL QL IA: NEGATIVE
URATE SERPL-MCNC: 7.2 MG/DL (ref 3.4–7)

## 2020-10-16 PROCEDURE — 63600175 PHARM REV CODE 636 W HCPCS: Performed by: REGISTERED NURSE

## 2020-10-16 PROCEDURE — 86803 HEPATITIS C AB TEST: CPT

## 2020-10-16 PROCEDURE — 96372 THER/PROPH/DIAG INJ SC/IM: CPT

## 2020-10-16 PROCEDURE — 84550 ASSAY OF BLOOD/URIC ACID: CPT

## 2020-10-16 PROCEDURE — 99284 EMERGENCY DEPT VISIT MOD MDM: CPT | Mod: 25

## 2020-10-16 RX ORDER — PREDNISONE 10 MG/1
10 TABLET ORAL DAILY
Qty: 21 TABLET | Refills: 0 | Status: SHIPPED | OUTPATIENT
Start: 2020-10-16 | End: 2021-02-05

## 2020-10-16 RX ORDER — DEXAMETHASONE SODIUM PHOSPHATE 4 MG/ML
8 INJECTION, SOLUTION INTRA-ARTICULAR; INTRALESIONAL; INTRAMUSCULAR; INTRAVENOUS; SOFT TISSUE
Status: COMPLETED | OUTPATIENT
Start: 2020-10-16 | End: 2020-10-16

## 2020-10-16 RX ADMIN — DEXAMETHASONE SODIUM PHOSPHATE 8 MG: 4 INJECTION, SOLUTION INTRAMUSCULAR; INTRAVENOUS at 03:10

## 2020-10-16 NOTE — ED PROVIDER NOTES
Encounter Date: 10/16/2020       History     Chief Complaint   Patient presents with    Hand Pain     pt c/o R hand pain and swelling that began upon waking     The history is provided by the patient.   Hand Pain  This is a new problem. The current episode started 12 to 24 hours ago. The problem occurs constantly. Pertinent negatives include no chest pain and no shortness of breath.   R hand pain and swelling that began last night. Denies any injury or fever, hx of gout    Review of patient's allergies indicates:   Allergen Reactions    Amitriptyline      Other reaction(s): Rash    Celecoxib      Other reaction(s): Rash     Past Medical History:   Diagnosis Date    Chronic diastolic congestive heart failure 4/7/2020    Chronic kidney disease, stage 3     Chronic systolic congestive heart failure 7/9/2020    DDD (degenerative disc disease), lumbar     DM (diabetes mellitus) with complications     History of gout     History of prostate cancer     Hyperlipidemia associated with type 2 diabetes mellitus     Hypertension associated with stage 3 chronic kidney disease due to type 2 diabetes mellitus     BRADLEY on CPAP     Tobacco abuse      Past Surgical History:   Procedure Laterality Date    BICEPS TENDON REPAIR      COLONOSCOPY N/A 3/27/2019    Procedure: COLONOSCOPY;  Surgeon: James Roger MD;  Location: [facility] ENDO;  Service: Endoscopy;  Laterality: N/A;    HEMORRHOID SURGERY      INGUINAL HERNIA REPAIR Left     KNEE ARTHROSCOPY Right     LEFT HEART CATHETERIZATION Left 6/29/2020    Procedure: CATHETERIZATION, HEART, LEFT;  Surgeon: Cheli Wilson MD;  Location: [facility] CATH LAB;  Service: Cardiology;  Laterality: Left;    LUMBAR LAMINECTOMY      PROSTATECTOMY       Family History   Problem Relation Age of Onset    Prostate cancer Father     Coronary artery disease Father 90    Alzheimer's disease Father     Hyperlipidemia Mother      Social History     Tobacco Use    Smoking status: Former  Smoker     Packs/day: 2.00     Years: 50.00     Pack years: 100.00     Types: Cigarettes     Quit date: 2020     Years since quittin.6    Smokeless tobacco: Former User   Substance Use Topics    Alcohol use: Not Currently     Frequency: Never     Drinks per session: Patient refused     Binge frequency: Never    Drug use: Never     Review of Systems   Constitutional: Negative for fever.   HENT: Negative for sore throat.    Respiratory: Negative for shortness of breath.    Cardiovascular: Negative for chest pain.   Gastrointestinal: Negative for nausea.   Genitourinary: Negative for dysuria.   Musculoskeletal: Negative for back pain.        + R hand pain and swelling   Skin: Negative for rash.   Neurological: Negative for weakness.   Hematological: Does not bruise/bleed easily.   All other systems reviewed and are negative.      Physical Exam     Initial Vitals [10/16/20 1339]   BP Pulse Resp Temp SpO2   126/71 84 20 97.9 °F (36.6 °C) 97 %      MAP       --         Physical Exam    Constitutional: He appears well-developed and well-nourished. No distress.   HENT:   Head: Normocephalic and atraumatic.   Nose: Nose normal.   Mouth/Throat: Uvula is midline and oropharynx is clear and moist.   Eyes: Conjunctivae and EOM are normal. Pupils are equal, round, and reactive to light.   Neck: Normal range of motion. Neck supple.   Cardiovascular: Normal rate and regular rhythm.   Pulmonary/Chest: Effort normal and breath sounds normal. No respiratory distress. He has no decreased breath sounds. He has no wheezes. He has no rales.   Abdominal: Soft. Normal appearance and bowel sounds are normal. There is no abdominal tenderness.   Musculoskeletal: Normal range of motion.        Right hand: He exhibits tenderness and swelling. He exhibits normal range of motion, normal capillary refill and no deformity.        Hands:       Comments: Erythema and warmth to area, tenderness noted    Neurological: He is alert and  oriented to person, place, and time. He has normal strength. GCS eye subscore is 4. GCS verbal subscore is 5. GCS motor subscore is 6.   Skin: Skin is warm and dry. Capillary refill takes less than 2 seconds. No rash noted.   Psychiatric: He has a normal mood and affect. His speech is normal and behavior is normal.         ED Course   Procedures  Labs Reviewed   URIC ACID - Abnormal; Notable for the following components:       Result Value    Uric Acid 7.2 (*)     All other components within normal limits   HEPATITIS C ANTIBODY          Imaging Results          X-Ray Hand 2 View Right (Final result)  Result time 10/16/20 14:12:19    Final result by Mohan Jimenez MD (10/16/20 14:12:19)                 Impression:      Normal hand.      Electronically signed by: Mohan Jimenez MD  Date:    10/16/2020  Time:    14:12             Narrative:    EXAMINATION:  XR HAND 2 VIEW RIGHT    CLINICAL HISTORY:  Pain in right hand    TECHNIQUE:  PA, lateral, and oblique views of the right hand were performed.    COMPARISON:  None    FINDINGS:  No evidence of fracture or dislocation identified.                                   I discussed with patient and/or family/caretaker that evaluation in the ED does not suggest any emergent or life threatening medical conditions requiring immediate intervention beyond what was provided in the ED, and I believe patient is safe for discharge.  Regardless, an unremarkable evaluation in the ED does not preclude the development or presence of a serious of life threatening condition. As such, patient was instructed to return immediately for any worsening or change in current symptoms.                             Clinical Impression:       ICD-10-CM ICD-9-CM   1. Acute gout of right hand, unspecified cause  M10.9 274.01   2. Right hand pain  M79.641 729.5                          ED Disposition Condition    Discharge Stable        ED Prescriptions     Medication Sig Dispense Start Date End Date Auth.  Provider    predniSONE (DELTASONE) 10 MG tablet Take 1 tablet (10 mg total) by mouth once daily. Take 4 tabs x 3 days, then  Take 2 tabs x 3 days, then   Take 1 tab x 3 days. 21 tablet 10/16/2020  Edmund Morales Jr., FNP        Follow-up Information     Follow up With Specialties Details Why Contact Info    Kashif Acosta MD Internal Medicine In 3 days  [address]  [address]  [place] [address]  [phone]      [facility] -  Emergency Medicine  If symptoms worsen [address]  [place] [address] [phone]                                       Edmund Morales Jr., LOUISA  10/16/20 8922

## 2020-10-26 ENCOUNTER — OFFICE VISIT (OUTPATIENT)
Dept: INTERNAL MEDICINE | Facility: CLINIC | Age: 70
End: 2020-10-26
Payer: MEDICARE

## 2020-10-26 VITALS
BODY MASS INDEX: 37.2 KG/M2 | WEIGHT: 237 LBS | SYSTOLIC BLOOD PRESSURE: 132 MMHG | RESPIRATION RATE: 20 BRPM | TEMPERATURE: 99 F | DIASTOLIC BLOOD PRESSURE: 70 MMHG | OXYGEN SATURATION: 96 % | HEIGHT: 67 IN | HEART RATE: 66 BPM

## 2020-10-26 DIAGNOSIS — Z87.39 HISTORY OF GOUT: Primary | ICD-10-CM

## 2020-10-26 DIAGNOSIS — N18.30 HYPERTENSION ASSOCIATED WITH STAGE 3 CHRONIC KIDNEY DISEASE DUE TO TYPE 2 DIABETES MELLITUS: ICD-10-CM

## 2020-10-26 DIAGNOSIS — I12.9 HYPERTENSION ASSOCIATED WITH STAGE 3 CHRONIC KIDNEY DISEASE DUE TO TYPE 2 DIABETES MELLITUS: ICD-10-CM

## 2020-10-26 DIAGNOSIS — E11.22 HYPERTENSION ASSOCIATED WITH STAGE 3 CHRONIC KIDNEY DISEASE DUE TO TYPE 2 DIABETES MELLITUS: ICD-10-CM

## 2020-10-26 PROCEDURE — 99999 PR PBB SHADOW E&M-EST. PATIENT-LVL V: ICD-10-PCS | Mod: PBBFAC,,, | Performed by: INTERNAL MEDICINE

## 2020-10-26 PROCEDURE — 3075F SYST BP GE 130 - 139MM HG: CPT | Mod: CPTII,S$GLB,, | Performed by: INTERNAL MEDICINE

## 2020-10-26 PROCEDURE — 3075F PR MOST RECENT SYSTOLIC BLOOD PRESS GE 130-139MM HG: ICD-10-PCS | Mod: CPTII,S$GLB,, | Performed by: INTERNAL MEDICINE

## 2020-10-26 PROCEDURE — 99999 PR PBB SHADOW E&M-EST. PATIENT-LVL V: CPT | Mod: PBBFAC,,, | Performed by: INTERNAL MEDICINE

## 2020-10-26 PROCEDURE — 99213 PR OFFICE/OUTPT VISIT, EST, LEVL III, 20-29 MIN: ICD-10-PCS | Mod: S$GLB,,, | Performed by: INTERNAL MEDICINE

## 2020-10-26 PROCEDURE — 1159F MED LIST DOCD IN RCRD: CPT | Mod: S$GLB,,, | Performed by: INTERNAL MEDICINE

## 2020-10-26 PROCEDURE — 1101F PT FALLS ASSESS-DOCD LE1/YR: CPT | Mod: CPTII,S$GLB,, | Performed by: INTERNAL MEDICINE

## 2020-10-26 PROCEDURE — 1125F PR PAIN SEVERITY QUANTIFIED, PAIN PRESENT: ICD-10-PCS | Mod: S$GLB,,, | Performed by: INTERNAL MEDICINE

## 2020-10-26 PROCEDURE — 3008F BODY MASS INDEX DOCD: CPT | Mod: CPTII,S$GLB,, | Performed by: INTERNAL MEDICINE

## 2020-10-26 PROCEDURE — 3008F PR BODY MASS INDEX (BMI) DOCUMENTED: ICD-10-PCS | Mod: CPTII,S$GLB,, | Performed by: INTERNAL MEDICINE

## 2020-10-26 PROCEDURE — 1125F AMNT PAIN NOTED PAIN PRSNT: CPT | Mod: S$GLB,,, | Performed by: INTERNAL MEDICINE

## 2020-10-26 PROCEDURE — 3078F PR MOST RECENT DIASTOLIC BLOOD PRESSURE < 80 MM HG: ICD-10-PCS | Mod: CPTII,S$GLB,, | Performed by: INTERNAL MEDICINE

## 2020-10-26 PROCEDURE — 99213 OFFICE O/P EST LOW 20 MIN: CPT | Mod: S$GLB,,, | Performed by: INTERNAL MEDICINE

## 2020-10-26 PROCEDURE — 1101F PR PT FALLS ASSESS DOC 0-1 FALLS W/OUT INJ PAST YR: ICD-10-PCS | Mod: CPTII,S$GLB,, | Performed by: INTERNAL MEDICINE

## 2020-10-26 PROCEDURE — 3078F DIAST BP <80 MM HG: CPT | Mod: CPTII,S$GLB,, | Performed by: INTERNAL MEDICINE

## 2020-10-26 PROCEDURE — 3044F PR MOST RECENT HEMOGLOBIN A1C LEVEL <7.0%: ICD-10-PCS | Mod: CPTII,S$GLB,, | Performed by: INTERNAL MEDICINE

## 2020-10-26 PROCEDURE — 3044F HG A1C LEVEL LT 7.0%: CPT | Mod: CPTII,S$GLB,, | Performed by: INTERNAL MEDICINE

## 2020-10-26 PROCEDURE — 1159F PR MEDICATION LIST DOCUMENTED IN MEDICAL RECORD: ICD-10-PCS | Mod: S$GLB,,, | Performed by: INTERNAL MEDICINE

## 2020-10-26 RX ORDER — COLCHICINE 0.6 MG/1
0.6 TABLET ORAL DAILY PRN
Qty: 30 TABLET | Refills: 1 | Status: SHIPPED | OUTPATIENT
Start: 2020-10-26 | End: 2021-11-01

## 2020-10-26 RX ORDER — ALLOPURINOL 300 MG/1
300 TABLET ORAL DAILY
Qty: 90 TABLET | Refills: 3 | Status: SHIPPED | OUTPATIENT
Start: 2020-10-26 | End: 2021-10-18

## 2020-10-26 NOTE — PROGRESS NOTES
"HPI:  Patient is a 70-year-old man who comes in today for ER follow-up.  He was seen in the ER for flare of his gout.  He has shown me pictures [place] very classical monoarticular flare gout involving the right 4th MCP joint.  He was treated in ER with steroids.  He responded very nicely.    Current meds have been verified and updated per the EMR  Exam:/70   Pulse 66   Temp 98.6 °F (37 °C) (Temporal)   Resp 20   Ht 5' 7" (1.702 m)   Wt 107.5 kg (236 lb 15.9 oz)   SpO2 96%   BMI 37.12 kg/m²   He has no signs the synovitis today    Lab Results   Component Value Date    WBC 8.69 08/21/2020    HGB 10.7 (L) 08/21/2020    HCT 36.1 (L) 08/21/2020     08/21/2020    CHOL 138 10/08/2020    TRIG 147 10/08/2020    HDL 44 10/08/2020    ALT 27 10/08/2020    AST 28 10/08/2020     10/08/2020    K 3.8 10/08/2020     10/08/2020    CREATININE 1.7 (H) 10/08/2020    BUN 16 10/08/2020    CO2 24 10/08/2020    TSH 1.448 07/23/2020    PSA <0.01 01/30/2019    INR 1.0 06/26/2020    HGBA1C 6.3 (H) 07/23/2020       Impression:  History of gout with a recent flare  Patient Active Problem List   Diagnosis    History of prostate cancer    BRADLEY (obstructive sleep apnea)    History of gout    DDD (degenerative disc disease), lumbar    Tobacco abuse    DM (diabetes mellitus) with complications    Hypertension associated with stage 3 chronic kidney disease due to type 2 diabetes mellitus    Hyperlipidemia associated with type 2 diabetes mellitus    Chronic kidney disease, stage 3    Obesity with serious comorbidity    Chronic diastolic congestive heart failure    Coronary artery disease of native artery of native heart with stable angina pectoris    Chronic systolic congestive heart failure       Plan:  Orders Placed This Encounter    allopurinoL (ZYLOPRIM) 300 MG tablet    colchicine (COLCRYS) 0.6 mg tablet     He will increase the allopurinol take 300 mg today.  He will see me at his regular scheduled " appointment.    This note is generated with speech recognition software and is subject to transcription error and sound alike phrases that may be missed by proofreading.

## 2020-11-11 NOTE — ED NOTES
"Report received from JAJA Brannon. Pt resting on cart in room, denies any chest tightness/pain at this time. Breathing even and unlabored, pt in NAD. Pt states "I'm ready to go home." VSS, call light within reach, bed in low locked position. Family at bedside, will continue to monitor.  " Hemigard Postcare Instructions: The HEMIGARD strips are to remain completely dry for at least 5-7 days.

## 2020-11-23 RX ORDER — BISOPROLOL FUMARATE 5 MG/1
2.5 TABLET, FILM COATED ORAL DAILY
Qty: 90 TABLET | Refills: 3 | Status: SHIPPED | OUTPATIENT
Start: 2020-11-23 | End: 2022-01-13

## 2020-12-28 ENCOUNTER — LAB VISIT (OUTPATIENT)
Dept: LAB | Facility: HOSPITAL | Age: 70
End: 2020-12-28
Attending: INTERNAL MEDICINE
Payer: MEDICARE

## 2020-12-28 DIAGNOSIS — E83.42 HYPOMAGNESEMIA: ICD-10-CM

## 2020-12-28 DIAGNOSIS — N18.30 CKD (CHRONIC KIDNEY DISEASE) STAGE 3, GFR 30-59 ML/MIN: ICD-10-CM

## 2020-12-28 LAB
ALBUMIN SERPL BCP-MCNC: 4 G/DL (ref 3.5–5.2)
ANION GAP SERPL CALC-SCNC: 10 MMOL/L (ref 8–16)
BUN SERPL-MCNC: 22 MG/DL (ref 8–23)
CALCIUM SERPL-MCNC: 9.5 MG/DL (ref 8.7–10.5)
CHLORIDE SERPL-SCNC: 99 MMOL/L (ref 95–110)
CO2 SERPL-SCNC: 30 MMOL/L (ref 23–29)
CREAT SERPL-MCNC: 1.6 MG/DL (ref 0.5–1.4)
EST. GFR  (AFRICAN AMERICAN): 50.1 ML/MIN/1.73 M^2
EST. GFR  (NON AFRICAN AMERICAN): 43.3 ML/MIN/1.73 M^2
GLUCOSE SERPL-MCNC: 142 MG/DL (ref 70–110)
MAGNESIUM SERPL-MCNC: 1.6 MG/DL (ref 1.6–2.6)
PHOSPHATE SERPL-MCNC: 3.2 MG/DL (ref 2.7–4.5)
POTASSIUM SERPL-SCNC: 3.4 MMOL/L (ref 3.5–5.1)
SODIUM SERPL-SCNC: 139 MMOL/L (ref 136–145)

## 2020-12-28 PROCEDURE — 36415 COLL VENOUS BLD VENIPUNCTURE: CPT | Mod: PO

## 2020-12-28 PROCEDURE — 83735 ASSAY OF MAGNESIUM: CPT

## 2020-12-28 PROCEDURE — 80069 RENAL FUNCTION PANEL: CPT

## 2020-12-30 ENCOUNTER — PATIENT OUTREACH (OUTPATIENT)
Dept: ADMINISTRATIVE | Facility: OTHER | Age: 70
End: 2020-12-30

## 2021-01-04 ENCOUNTER — OFFICE VISIT (OUTPATIENT)
Dept: NEPHROLOGY | Facility: CLINIC | Age: 71
End: 2021-01-04
Payer: MEDICARE

## 2021-01-04 VITALS
DIASTOLIC BLOOD PRESSURE: 70 MMHG | HEIGHT: 67 IN | BODY MASS INDEX: 37.16 KG/M2 | HEART RATE: 72 BPM | SYSTOLIC BLOOD PRESSURE: 110 MMHG | WEIGHT: 236.75 LBS

## 2021-01-04 DIAGNOSIS — N18.30 STAGE 3 CHRONIC KIDNEY DISEASE, UNSPECIFIED WHETHER STAGE 3A OR 3B CKD: ICD-10-CM

## 2021-01-04 DIAGNOSIS — E87.6 HYPOKALEMIA: ICD-10-CM

## 2021-01-04 DIAGNOSIS — E83.42 HYPOMAGNESEMIA: Primary | ICD-10-CM

## 2021-01-04 PROCEDURE — 99214 OFFICE O/P EST MOD 30 MIN: CPT | Mod: S$GLB,,, | Performed by: INTERNAL MEDICINE

## 2021-01-04 PROCEDURE — 3072F PR LOW RISK FOR RETINOPATHY: ICD-10-PCS | Mod: S$GLB,,, | Performed by: INTERNAL MEDICINE

## 2021-01-04 PROCEDURE — 3074F SYST BP LT 130 MM HG: CPT | Mod: CPTII,S$GLB,, | Performed by: INTERNAL MEDICINE

## 2021-01-04 PROCEDURE — 1126F PR PAIN SEVERITY QUANTIFIED, NO PAIN PRESENT: ICD-10-PCS | Mod: S$GLB,,, | Performed by: INTERNAL MEDICINE

## 2021-01-04 PROCEDURE — 3074F PR MOST RECENT SYSTOLIC BLOOD PRESSURE < 130 MM HG: ICD-10-PCS | Mod: CPTII,S$GLB,, | Performed by: INTERNAL MEDICINE

## 2021-01-04 PROCEDURE — 3288F FALL RISK ASSESSMENT DOCD: CPT | Mod: CPTII,S$GLB,, | Performed by: INTERNAL MEDICINE

## 2021-01-04 PROCEDURE — 1101F PT FALLS ASSESS-DOCD LE1/YR: CPT | Mod: CPTII,S$GLB,, | Performed by: INTERNAL MEDICINE

## 2021-01-04 PROCEDURE — 3008F BODY MASS INDEX DOCD: CPT | Mod: CPTII,S$GLB,, | Performed by: INTERNAL MEDICINE

## 2021-01-04 PROCEDURE — 99214 PR OFFICE/OUTPT VISIT, EST, LEVL IV, 30-39 MIN: ICD-10-PCS | Mod: S$GLB,,, | Performed by: INTERNAL MEDICINE

## 2021-01-04 PROCEDURE — 1126F AMNT PAIN NOTED NONE PRSNT: CPT | Mod: S$GLB,,, | Performed by: INTERNAL MEDICINE

## 2021-01-04 PROCEDURE — 3008F PR BODY MASS INDEX (BMI) DOCUMENTED: ICD-10-PCS | Mod: CPTII,S$GLB,, | Performed by: INTERNAL MEDICINE

## 2021-01-04 PROCEDURE — 99999 PR PBB SHADOW E&M-EST. PATIENT-LVL IV: ICD-10-PCS | Mod: PBBFAC,,, | Performed by: INTERNAL MEDICINE

## 2021-01-04 PROCEDURE — 1159F MED LIST DOCD IN RCRD: CPT | Mod: S$GLB,,, | Performed by: INTERNAL MEDICINE

## 2021-01-04 PROCEDURE — 1159F PR MEDICATION LIST DOCUMENTED IN MEDICAL RECORD: ICD-10-PCS | Mod: S$GLB,,, | Performed by: INTERNAL MEDICINE

## 2021-01-04 PROCEDURE — 3078F PR MOST RECENT DIASTOLIC BLOOD PRESSURE < 80 MM HG: ICD-10-PCS | Mod: CPTII,S$GLB,, | Performed by: INTERNAL MEDICINE

## 2021-01-04 PROCEDURE — 99999 PR PBB SHADOW E&M-EST. PATIENT-LVL IV: CPT | Mod: PBBFAC,,, | Performed by: INTERNAL MEDICINE

## 2021-01-04 PROCEDURE — 1101F PR PT FALLS ASSESS DOC 0-1 FALLS W/OUT INJ PAST YR: ICD-10-PCS | Mod: CPTII,S$GLB,, | Performed by: INTERNAL MEDICINE

## 2021-01-04 PROCEDURE — 3288F PR FALLS RISK ASSESSMENT DOCUMENTED: ICD-10-PCS | Mod: CPTII,S$GLB,, | Performed by: INTERNAL MEDICINE

## 2021-01-04 PROCEDURE — 3078F DIAST BP <80 MM HG: CPT | Mod: CPTII,S$GLB,, | Performed by: INTERNAL MEDICINE

## 2021-01-04 PROCEDURE — 3072F LOW RISK FOR RETINOPATHY: CPT | Mod: S$GLB,,, | Performed by: INTERNAL MEDICINE

## 2021-01-04 RX ORDER — LANOLIN ALCOHOL/MO/W.PET/CERES
800 CREAM (GRAM) TOPICAL 2 TIMES DAILY
Qty: 120 TABLET | Refills: 5 | COMMUNITY
Start: 2021-01-04 | End: 2021-03-03

## 2021-02-01 ENCOUNTER — LAB VISIT (OUTPATIENT)
Dept: LAB | Facility: HOSPITAL | Age: 71
End: 2021-02-01
Attending: INTERNAL MEDICINE
Payer: MEDICARE

## 2021-02-01 DIAGNOSIS — Z12.5 PROSTATE CANCER SCREENING: ICD-10-CM

## 2021-02-01 DIAGNOSIS — E87.6 HYPOKALEMIA: ICD-10-CM

## 2021-02-01 DIAGNOSIS — E11.8 DM (DIABETES MELLITUS) WITH COMPLICATIONS: ICD-10-CM

## 2021-02-01 LAB
BASOPHILS # BLD AUTO: 0.09 K/UL (ref 0–0.2)
BASOPHILS NFR BLD: 0.9 % (ref 0–1.9)
DIFFERENTIAL METHOD: ABNORMAL
EOSINOPHIL # BLD AUTO: 0.7 K/UL (ref 0–0.5)
EOSINOPHIL NFR BLD: 6.8 % (ref 0–8)
ERYTHROCYTE [DISTWIDTH] IN BLOOD BY AUTOMATED COUNT: 18 % (ref 11.5–14.5)
HCT VFR BLD AUTO: 39.4 % (ref 40–54)
HGB BLD-MCNC: 11.4 G/DL (ref 14–18)
IMM GRANULOCYTES # BLD AUTO: 0.09 K/UL (ref 0–0.04)
IMM GRANULOCYTES NFR BLD AUTO: 0.9 % (ref 0–0.5)
LYMPHOCYTES # BLD AUTO: 2.6 K/UL (ref 1–4.8)
LYMPHOCYTES NFR BLD: 24.8 % (ref 18–48)
MCH RBC QN AUTO: 23.4 PG (ref 27–31)
MCHC RBC AUTO-ENTMCNC: 28.9 G/DL (ref 32–36)
MCV RBC AUTO: 81 FL (ref 82–98)
MONOCYTES # BLD AUTO: 0.7 K/UL (ref 0.3–1)
MONOCYTES NFR BLD: 6.2 % (ref 4–15)
NEUTROPHILS # BLD AUTO: 6.4 K/UL (ref 1.8–7.7)
NEUTROPHILS NFR BLD: 60.4 % (ref 38–73)
NRBC BLD-RTO: 0 /100 WBC
PLATELET # BLD AUTO: 250 K/UL (ref 150–350)
PMV BLD AUTO: 10.5 FL (ref 9.2–12.9)
RBC # BLD AUTO: 4.87 M/UL (ref 4.6–6.2)
WBC # BLD AUTO: 10.57 K/UL (ref 3.9–12.7)

## 2021-02-01 PROCEDURE — 80061 LIPID PANEL: CPT

## 2021-02-01 PROCEDURE — 83036 HEMOGLOBIN GLYCOSYLATED A1C: CPT

## 2021-02-01 PROCEDURE — 84443 ASSAY THYROID STIM HORMONE: CPT

## 2021-02-01 PROCEDURE — 84153 ASSAY OF PSA TOTAL: CPT

## 2021-02-01 PROCEDURE — 85025 COMPLETE CBC W/AUTO DIFF WBC: CPT

## 2021-02-01 PROCEDURE — 80053 COMPREHEN METABOLIC PANEL: CPT

## 2021-02-01 PROCEDURE — 36415 COLL VENOUS BLD VENIPUNCTURE: CPT | Mod: PO

## 2021-02-02 LAB
ALBUMIN SERPL BCP-MCNC: 4 G/DL (ref 3.5–5.2)
ALP SERPL-CCNC: 64 U/L (ref 55–135)
ALT SERPL W/O P-5'-P-CCNC: 20 U/L (ref 10–44)
ANION GAP SERPL CALC-SCNC: 15 MMOL/L (ref 8–16)
ANION GAP SERPL CALC-SCNC: 15 MMOL/L (ref 8–16)
AST SERPL-CCNC: 25 U/L (ref 10–40)
BILIRUB SERPL-MCNC: 0.3 MG/DL (ref 0.1–1)
BUN SERPL-MCNC: 20 MG/DL (ref 8–23)
BUN SERPL-MCNC: 20 MG/DL (ref 8–23)
CALCIUM SERPL-MCNC: 9.5 MG/DL (ref 8.7–10.5)
CALCIUM SERPL-MCNC: 9.5 MG/DL (ref 8.7–10.5)
CHLORIDE SERPL-SCNC: 101 MMOL/L (ref 95–110)
CHLORIDE SERPL-SCNC: 101 MMOL/L (ref 95–110)
CHOLEST SERPL-MCNC: 148 MG/DL (ref 120–199)
CHOLEST/HDLC SERPL: 3.2 {RATIO} (ref 2–5)
CO2 SERPL-SCNC: 25 MMOL/L (ref 23–29)
CO2 SERPL-SCNC: 25 MMOL/L (ref 23–29)
COMPLEXED PSA SERPL-MCNC: 0.15 NG/ML (ref 0–4)
CREAT SERPL-MCNC: 1.6 MG/DL (ref 0.5–1.4)
CREAT SERPL-MCNC: 1.6 MG/DL (ref 0.5–1.4)
EST. GFR  (AFRICAN AMERICAN): 50.1 ML/MIN/1.73 M^2
EST. GFR  (AFRICAN AMERICAN): 50.1 ML/MIN/1.73 M^2
EST. GFR  (NON AFRICAN AMERICAN): 43.3 ML/MIN/1.73 M^2
EST. GFR  (NON AFRICAN AMERICAN): 43.3 ML/MIN/1.73 M^2
ESTIMATED AVG GLUCOSE: 148 MG/DL (ref 68–131)
GLUCOSE SERPL-MCNC: 110 MG/DL (ref 70–110)
GLUCOSE SERPL-MCNC: 110 MG/DL (ref 70–110)
HBA1C MFR BLD: 6.8 % (ref 4–5.6)
HDLC SERPL-MCNC: 46 MG/DL (ref 40–75)
HDLC SERPL: 31.1 % (ref 20–50)
LDLC SERPL CALC-MCNC: 82.2 MG/DL (ref 63–159)
NONHDLC SERPL-MCNC: 102 MG/DL
POTASSIUM SERPL-SCNC: 4.2 MMOL/L (ref 3.5–5.1)
POTASSIUM SERPL-SCNC: 4.2 MMOL/L (ref 3.5–5.1)
PROT SERPL-MCNC: 7.3 G/DL (ref 6–8.4)
SODIUM SERPL-SCNC: 141 MMOL/L (ref 136–145)
SODIUM SERPL-SCNC: 141 MMOL/L (ref 136–145)
TRIGL SERPL-MCNC: 99 MG/DL (ref 30–150)
TSH SERPL DL<=0.005 MIU/L-ACNC: 1.24 UIU/ML (ref 0.4–4)

## 2021-02-05 ENCOUNTER — OFFICE VISIT (OUTPATIENT)
Dept: INTERNAL MEDICINE | Facility: CLINIC | Age: 71
End: 2021-02-05
Payer: MEDICARE

## 2021-02-05 VITALS
HEIGHT: 67 IN | DIASTOLIC BLOOD PRESSURE: 62 MMHG | BODY MASS INDEX: 37.06 KG/M2 | TEMPERATURE: 98 F | OXYGEN SATURATION: 93 % | WEIGHT: 236.13 LBS | SYSTOLIC BLOOD PRESSURE: 106 MMHG | HEART RATE: 81 BPM

## 2021-02-05 DIAGNOSIS — Z85.46 HISTORY OF PROSTATE CANCER: ICD-10-CM

## 2021-02-05 DIAGNOSIS — I50.22 CHRONIC SYSTOLIC CONGESTIVE HEART FAILURE: ICD-10-CM

## 2021-02-05 DIAGNOSIS — N18.32 STAGE 3B CHRONIC KIDNEY DISEASE: ICD-10-CM

## 2021-02-05 DIAGNOSIS — I50.32 CHRONIC DIASTOLIC CONGESTIVE HEART FAILURE: ICD-10-CM

## 2021-02-05 DIAGNOSIS — E78.5 HYPERLIPIDEMIA ASSOCIATED WITH TYPE 2 DIABETES MELLITUS: ICD-10-CM

## 2021-02-05 DIAGNOSIS — E11.22 HYPERTENSION ASSOCIATED WITH STAGE 3 CHRONIC KIDNEY DISEASE DUE TO TYPE 2 DIABETES MELLITUS: ICD-10-CM

## 2021-02-05 DIAGNOSIS — E11.8 DM (DIABETES MELLITUS) WITH COMPLICATIONS: ICD-10-CM

## 2021-02-05 DIAGNOSIS — N18.30 HYPERTENSION ASSOCIATED WITH STAGE 3 CHRONIC KIDNEY DISEASE DUE TO TYPE 2 DIABETES MELLITUS: ICD-10-CM

## 2021-02-05 DIAGNOSIS — G47.33 OSA (OBSTRUCTIVE SLEEP APNEA): ICD-10-CM

## 2021-02-05 DIAGNOSIS — E11.69 HYPERLIPIDEMIA ASSOCIATED WITH TYPE 2 DIABETES MELLITUS: ICD-10-CM

## 2021-02-05 DIAGNOSIS — I25.118 CORONARY ARTERY DISEASE OF NATIVE ARTERY OF NATIVE HEART WITH STABLE ANGINA PECTORIS: ICD-10-CM

## 2021-02-05 DIAGNOSIS — Z00.00 ROUTINE GENERAL MEDICAL EXAMINATION AT A HEALTH CARE FACILITY: Primary | ICD-10-CM

## 2021-02-05 DIAGNOSIS — I12.9 HYPERTENSION ASSOCIATED WITH STAGE 3 CHRONIC KIDNEY DISEASE DUE TO TYPE 2 DIABETES MELLITUS: ICD-10-CM

## 2021-02-05 DIAGNOSIS — E66.01 CLASS 2 SEVERE OBESITY WITH SERIOUS COMORBIDITY AND BODY MASS INDEX (BMI) OF 37.0 TO 37.9 IN ADULT, UNSPECIFIED OBESITY TYPE: ICD-10-CM

## 2021-02-05 DIAGNOSIS — Z87.39 HISTORY OF GOUT: ICD-10-CM

## 2021-02-05 PROCEDURE — 99999 PR PBB SHADOW E&M-EST. PATIENT-LVL III: ICD-10-PCS | Mod: PBBFAC,,, | Performed by: INTERNAL MEDICINE

## 2021-02-05 PROCEDURE — 3078F PR MOST RECENT DIASTOLIC BLOOD PRESSURE < 80 MM HG: ICD-10-PCS | Mod: CPTII,S$GLB,, | Performed by: INTERNAL MEDICINE

## 2021-02-05 PROCEDURE — 3074F SYST BP LT 130 MM HG: CPT | Mod: CPTII,S$GLB,, | Performed by: INTERNAL MEDICINE

## 2021-02-05 PROCEDURE — 99214 OFFICE O/P EST MOD 30 MIN: CPT | Mod: S$GLB,,, | Performed by: INTERNAL MEDICINE

## 2021-02-05 PROCEDURE — 1126F PR PAIN SEVERITY QUANTIFIED, NO PAIN PRESENT: ICD-10-PCS | Mod: S$GLB,,, | Performed by: INTERNAL MEDICINE

## 2021-02-05 PROCEDURE — 3078F DIAST BP <80 MM HG: CPT | Mod: CPTII,S$GLB,, | Performed by: INTERNAL MEDICINE

## 2021-02-05 PROCEDURE — 3044F HG A1C LEVEL LT 7.0%: CPT | Mod: CPTII,S$GLB,, | Performed by: INTERNAL MEDICINE

## 2021-02-05 PROCEDURE — 3072F LOW RISK FOR RETINOPATHY: CPT | Mod: S$GLB,,, | Performed by: INTERNAL MEDICINE

## 2021-02-05 PROCEDURE — 99214 PR OFFICE/OUTPT VISIT, EST, LEVL IV, 30-39 MIN: ICD-10-PCS | Mod: S$GLB,,, | Performed by: INTERNAL MEDICINE

## 2021-02-05 PROCEDURE — 3008F BODY MASS INDEX DOCD: CPT | Mod: CPTII,S$GLB,, | Performed by: INTERNAL MEDICINE

## 2021-02-05 PROCEDURE — 3008F PR BODY MASS INDEX (BMI) DOCUMENTED: ICD-10-PCS | Mod: CPTII,S$GLB,, | Performed by: INTERNAL MEDICINE

## 2021-02-05 PROCEDURE — 1126F AMNT PAIN NOTED NONE PRSNT: CPT | Mod: S$GLB,,, | Performed by: INTERNAL MEDICINE

## 2021-02-05 PROCEDURE — 3074F PR MOST RECENT SYSTOLIC BLOOD PRESSURE < 130 MM HG: ICD-10-PCS | Mod: CPTII,S$GLB,, | Performed by: INTERNAL MEDICINE

## 2021-02-05 PROCEDURE — 3044F PR MOST RECENT HEMOGLOBIN A1C LEVEL <7.0%: ICD-10-PCS | Mod: CPTII,S$GLB,, | Performed by: INTERNAL MEDICINE

## 2021-02-05 PROCEDURE — 99999 PR PBB SHADOW E&M-EST. PATIENT-LVL III: CPT | Mod: PBBFAC,,, | Performed by: INTERNAL MEDICINE

## 2021-02-05 PROCEDURE — 3072F PR LOW RISK FOR RETINOPATHY: ICD-10-PCS | Mod: S$GLB,,, | Performed by: INTERNAL MEDICINE

## 2021-02-05 RX ORDER — ACETAZOLAMIDE 250 MG/1
250 TABLET ORAL
COMMUNITY
Start: 2021-01-27 | End: 2021-10-07 | Stop reason: SINTOL

## 2021-02-10 ENCOUNTER — TELEPHONE (OUTPATIENT)
Dept: SMOKING CESSATION | Facility: CLINIC | Age: 71
End: 2021-02-10

## 2021-02-10 ENCOUNTER — CLINICAL SUPPORT (OUTPATIENT)
Dept: SMOKING CESSATION | Facility: CLINIC | Age: 71
End: 2021-02-10
Payer: COMMERCIAL

## 2021-02-10 DIAGNOSIS — F17.200 NICOTINE DEPENDENCE: Primary | ICD-10-CM

## 2021-02-10 PROCEDURE — 99407 PR TOBACCO USE CESSATION INTENSIVE >10 MINUTES: ICD-10-PCS | Mod: S$GLB,,,

## 2021-02-10 PROCEDURE — 99407 BEHAV CHNG SMOKING > 10 MIN: CPT | Mod: S$GLB,,,

## 2021-02-18 ENCOUNTER — CLINICAL SUPPORT (OUTPATIENT)
Dept: SMOKING CESSATION | Facility: CLINIC | Age: 71
End: 2021-02-18
Payer: COMMERCIAL

## 2021-02-18 DIAGNOSIS — F17.200 NICOTINE DEPENDENCE: Primary | ICD-10-CM

## 2021-02-18 PROCEDURE — 99999 PR PBB SHADOW E&M-EST. PATIENT-LVL I: CPT | Mod: PBBFAC,,,

## 2021-02-18 PROCEDURE — 99999 PR PBB SHADOW E&M-EST. PATIENT-LVL I: ICD-10-PCS | Mod: PBBFAC,,,

## 2021-02-18 PROCEDURE — 99404 PREV MED CNSL INDIV APPRX 60: CPT | Mod: S$GLB,,, | Performed by: GENERAL PRACTICE

## 2021-02-18 PROCEDURE — 99404 PR PREVENT COUNSEL,INDIV,60 MIN: ICD-10-PCS | Mod: S$GLB,,, | Performed by: GENERAL PRACTICE

## 2021-02-18 RX ORDER — VARENICLINE TARTRATE 0.5 (11)-1
KIT ORAL
Qty: 53 TABLET | Refills: 0 | Status: SHIPPED | OUTPATIENT
Start: 2021-02-18 | End: 2021-03-16 | Stop reason: DRUGHIGH

## 2021-02-22 ENCOUNTER — CLINICAL SUPPORT (OUTPATIENT)
Dept: SMOKING CESSATION | Facility: CLINIC | Age: 71
End: 2021-02-22
Payer: COMMERCIAL

## 2021-02-22 DIAGNOSIS — F17.200 NICOTINE DEPENDENCE: Primary | ICD-10-CM

## 2021-02-22 PROCEDURE — 90853 PR GROUP PSYCHOTHERAPY: ICD-10-PCS | Mod: S$GLB,,, | Performed by: GENERAL PRACTICE

## 2021-02-22 PROCEDURE — 90853 GROUP PSYCHOTHERAPY: CPT | Mod: S$GLB,,, | Performed by: GENERAL PRACTICE

## 2021-02-22 PROCEDURE — 99999 PR PBB SHADOW E&M-EST. PATIENT-LVL I: CPT | Mod: PBBFAC,,,

## 2021-02-22 PROCEDURE — 99999 PR PBB SHADOW E&M-EST. PATIENT-LVL I: ICD-10-PCS | Mod: PBBFAC,,,

## 2021-03-01 ENCOUNTER — CLINICAL SUPPORT (OUTPATIENT)
Dept: SMOKING CESSATION | Facility: CLINIC | Age: 71
End: 2021-03-01
Payer: COMMERCIAL

## 2021-03-01 DIAGNOSIS — F17.200 NICOTINE DEPENDENCE: Primary | ICD-10-CM

## 2021-03-01 PROCEDURE — 90853 PR GROUP PSYCHOTHERAPY: ICD-10-PCS | Mod: S$GLB,,, | Performed by: GENERAL PRACTICE

## 2021-03-01 PROCEDURE — 90853 GROUP PSYCHOTHERAPY: CPT | Mod: S$GLB,,, | Performed by: GENERAL PRACTICE

## 2021-03-03 RX ORDER — LANOLIN ALCOHOL/MO/W.PET/CERES
CREAM (GRAM) TOPICAL
Qty: 120 TABLET | Refills: 5 | Status: SHIPPED | OUTPATIENT
Start: 2021-03-03 | End: 2021-09-10

## 2021-03-08 ENCOUNTER — CLINICAL SUPPORT (OUTPATIENT)
Dept: SMOKING CESSATION | Facility: CLINIC | Age: 71
End: 2021-03-08
Payer: COMMERCIAL

## 2021-03-08 DIAGNOSIS — F17.200 NICOTINE DEPENDENCE: Primary | ICD-10-CM

## 2021-03-08 PROCEDURE — 99999 PR PBB SHADOW E&M-EST. PATIENT-LVL I: CPT | Mod: PBBFAC,,,

## 2021-03-08 PROCEDURE — 90853 PR GROUP PSYCHOTHERAPY: ICD-10-PCS | Mod: S$GLB,,, | Performed by: GENERAL PRACTICE

## 2021-03-08 PROCEDURE — 99999 PR PBB SHADOW E&M-EST. PATIENT-LVL I: ICD-10-PCS | Mod: PBBFAC,,,

## 2021-03-08 PROCEDURE — 90853 GROUP PSYCHOTHERAPY: CPT | Mod: S$GLB,,, | Performed by: GENERAL PRACTICE

## 2021-03-15 ENCOUNTER — CLINICAL SUPPORT (OUTPATIENT)
Dept: SMOKING CESSATION | Facility: CLINIC | Age: 71
End: 2021-03-15
Payer: COMMERCIAL

## 2021-03-15 DIAGNOSIS — F17.200 NICOTINE DEPENDENCE: Primary | ICD-10-CM

## 2021-03-15 PROCEDURE — 99999 PR PBB SHADOW E&M-EST. PATIENT-LVL I: ICD-10-PCS | Mod: PBBFAC,,,

## 2021-03-15 PROCEDURE — 99999 PR PBB SHADOW E&M-EST. PATIENT-LVL I: CPT | Mod: PBBFAC,,,

## 2021-03-15 PROCEDURE — 90853 GROUP PSYCHOTHERAPY: CPT | Mod: S$GLB,,, | Performed by: GENERAL PRACTICE

## 2021-03-15 PROCEDURE — 90853 PR GROUP PSYCHOTHERAPY: ICD-10-PCS | Mod: S$GLB,,, | Performed by: GENERAL PRACTICE

## 2021-03-16 RX ORDER — VARENICLINE TARTRATE 1 MG/1
1 TABLET, FILM COATED ORAL 2 TIMES DAILY
Qty: 60 TABLET | Refills: 2 | Status: SHIPPED | OUTPATIENT
Start: 2021-03-16 | End: 2022-03-11

## 2021-03-19 ENCOUNTER — LAB VISIT (OUTPATIENT)
Dept: LAB | Facility: HOSPITAL | Age: 71
End: 2021-03-19
Attending: INTERNAL MEDICINE
Payer: MEDICARE

## 2021-03-19 DIAGNOSIS — E87.6 HYPOKALEMIA: ICD-10-CM

## 2021-03-19 DIAGNOSIS — N18.30 STAGE 3 CHRONIC KIDNEY DISEASE, UNSPECIFIED WHETHER STAGE 3A OR 3B CKD: ICD-10-CM

## 2021-03-19 DIAGNOSIS — E83.42 HYPOMAGNESEMIA: ICD-10-CM

## 2021-03-19 LAB
ALBUMIN SERPL BCP-MCNC: 3.9 G/DL (ref 3.5–5.2)
ANION GAP SERPL CALC-SCNC: 10 MMOL/L (ref 8–16)
BASOPHILS # BLD AUTO: 0.11 K/UL (ref 0–0.2)
BASOPHILS NFR BLD: 0.9 % (ref 0–1.9)
BUN SERPL-MCNC: 16 MG/DL (ref 8–23)
CALCIUM SERPL-MCNC: 9.1 MG/DL (ref 8.7–10.5)
CHLORIDE SERPL-SCNC: 98 MMOL/L (ref 95–110)
CO2 SERPL-SCNC: 29 MMOL/L (ref 23–29)
CREAT SERPL-MCNC: 1.5 MG/DL (ref 0.5–1.4)
DIFFERENTIAL METHOD: ABNORMAL
EOSINOPHIL # BLD AUTO: 0.8 K/UL (ref 0–0.5)
EOSINOPHIL NFR BLD: 6.6 % (ref 0–8)
ERYTHROCYTE [DISTWIDTH] IN BLOOD BY AUTOMATED COUNT: 18.7 % (ref 11.5–14.5)
EST. GFR  (AFRICAN AMERICAN): 54 ML/MIN/1.73 M^2
EST. GFR  (NON AFRICAN AMERICAN): 47 ML/MIN/1.73 M^2
GLUCOSE SERPL-MCNC: 207 MG/DL (ref 70–110)
HCT VFR BLD AUTO: 40.3 % (ref 40–54)
HGB BLD-MCNC: 11.9 G/DL (ref 14–18)
IMM GRANULOCYTES # BLD AUTO: 0.14 K/UL (ref 0–0.04)
IMM GRANULOCYTES NFR BLD AUTO: 1.2 % (ref 0–0.5)
LYMPHOCYTES # BLD AUTO: 2.5 K/UL (ref 1–4.8)
LYMPHOCYTES NFR BLD: 21 % (ref 18–48)
MAGNESIUM SERPL-MCNC: 1.2 MG/DL (ref 1.6–2.6)
MCH RBC QN AUTO: 23.4 PG (ref 27–31)
MCHC RBC AUTO-ENTMCNC: 29.5 G/DL (ref 32–36)
MCV RBC AUTO: 79 FL (ref 82–98)
MONOCYTES # BLD AUTO: 0.7 K/UL (ref 0.3–1)
MONOCYTES NFR BLD: 5.8 % (ref 4–15)
NEUTROPHILS # BLD AUTO: 7.6 K/UL (ref 1.8–7.7)
NEUTROPHILS NFR BLD: 64.5 % (ref 38–73)
NRBC BLD-RTO: 0 /100 WBC
PHOSPHATE SERPL-MCNC: 3.7 MG/DL (ref 2.7–4.5)
PLATELET # BLD AUTO: 214 K/UL (ref 150–350)
PMV BLD AUTO: 9.7 FL (ref 9.2–12.9)
POTASSIUM SERPL-SCNC: 3.5 MMOL/L (ref 3.5–5.1)
RBC # BLD AUTO: 5.09 M/UL (ref 4.6–6.2)
SODIUM SERPL-SCNC: 137 MMOL/L (ref 136–145)
WBC # BLD AUTO: 11.72 K/UL (ref 3.9–12.7)

## 2021-03-19 PROCEDURE — 83735 ASSAY OF MAGNESIUM: CPT | Performed by: INTERNAL MEDICINE

## 2021-03-19 PROCEDURE — 80069 RENAL FUNCTION PANEL: CPT | Performed by: INTERNAL MEDICINE

## 2021-03-19 PROCEDURE — 36415 COLL VENOUS BLD VENIPUNCTURE: CPT | Mod: PO | Performed by: INTERNAL MEDICINE

## 2021-03-19 PROCEDURE — 85025 COMPLETE CBC W/AUTO DIFF WBC: CPT | Performed by: INTERNAL MEDICINE

## 2021-03-22 ENCOUNTER — CLINICAL SUPPORT (OUTPATIENT)
Dept: SMOKING CESSATION | Facility: CLINIC | Age: 71
End: 2021-03-22
Payer: COMMERCIAL

## 2021-03-22 DIAGNOSIS — F17.200 NICOTINE DEPENDENCE: Primary | ICD-10-CM

## 2021-03-22 PROCEDURE — 90853 GROUP PSYCHOTHERAPY: CPT | Mod: S$GLB,,, | Performed by: GENERAL PRACTICE

## 2021-03-22 PROCEDURE — 99999 PR PBB SHADOW E&M-EST. PATIENT-LVL I: CPT | Mod: PBBFAC,,,

## 2021-03-22 PROCEDURE — 99999 PR PBB SHADOW E&M-EST. PATIENT-LVL I: ICD-10-PCS | Mod: PBBFAC,,,

## 2021-03-22 PROCEDURE — 90853 PR GROUP PSYCHOTHERAPY: ICD-10-PCS | Mod: S$GLB,,, | Performed by: GENERAL PRACTICE

## 2021-03-24 ENCOUNTER — PATIENT OUTREACH (OUTPATIENT)
Dept: ADMINISTRATIVE | Facility: OTHER | Age: 71
End: 2021-03-24

## 2021-03-26 ENCOUNTER — OFFICE VISIT (OUTPATIENT)
Dept: NEPHROLOGY | Facility: CLINIC | Age: 71
End: 2021-03-26
Payer: MEDICARE

## 2021-03-26 VITALS
HEART RATE: 60 BPM | HEIGHT: 67 IN | WEIGHT: 237 LBS | RESPIRATION RATE: 16 BRPM | BODY MASS INDEX: 37.2 KG/M2 | DIASTOLIC BLOOD PRESSURE: 64 MMHG | SYSTOLIC BLOOD PRESSURE: 96 MMHG

## 2021-03-26 DIAGNOSIS — K21.9 GASTROESOPHAGEAL REFLUX DISEASE WITHOUT ESOPHAGITIS: ICD-10-CM

## 2021-03-26 DIAGNOSIS — N25.81 SECONDARY HYPERPARATHYROIDISM OF RENAL ORIGIN: ICD-10-CM

## 2021-03-26 DIAGNOSIS — I10 HTN (HYPERTENSION), BENIGN: ICD-10-CM

## 2021-03-26 DIAGNOSIS — N18.31 STAGE 3A CHRONIC KIDNEY DISEASE: Primary | ICD-10-CM

## 2021-03-26 DIAGNOSIS — R60.0 LOCALIZED EDEMA: ICD-10-CM

## 2021-03-26 PROCEDURE — 3288F PR FALLS RISK ASSESSMENT DOCUMENTED: ICD-10-PCS | Mod: CPTII,S$GLB,, | Performed by: INTERNAL MEDICINE

## 2021-03-26 PROCEDURE — 3072F PR LOW RISK FOR RETINOPATHY: ICD-10-PCS | Mod: S$GLB,,, | Performed by: INTERNAL MEDICINE

## 2021-03-26 PROCEDURE — 1125F AMNT PAIN NOTED PAIN PRSNT: CPT | Mod: S$GLB,,, | Performed by: INTERNAL MEDICINE

## 2021-03-26 PROCEDURE — 1159F MED LIST DOCD IN RCRD: CPT | Mod: S$GLB,,, | Performed by: INTERNAL MEDICINE

## 2021-03-26 PROCEDURE — 99999 PR PBB SHADOW E&M-EST. PATIENT-LVL V: CPT | Mod: PBBFAC,,, | Performed by: INTERNAL MEDICINE

## 2021-03-26 PROCEDURE — 3008F BODY MASS INDEX DOCD: CPT | Mod: CPTII,S$GLB,, | Performed by: INTERNAL MEDICINE

## 2021-03-26 PROCEDURE — 1159F PR MEDICATION LIST DOCUMENTED IN MEDICAL RECORD: ICD-10-PCS | Mod: S$GLB,,, | Performed by: INTERNAL MEDICINE

## 2021-03-26 PROCEDURE — 3078F DIAST BP <80 MM HG: CPT | Mod: CPTII,S$GLB,, | Performed by: INTERNAL MEDICINE

## 2021-03-26 PROCEDURE — 1101F PT FALLS ASSESS-DOCD LE1/YR: CPT | Mod: CPTII,S$GLB,, | Performed by: INTERNAL MEDICINE

## 2021-03-26 PROCEDURE — 3288F FALL RISK ASSESSMENT DOCD: CPT | Mod: CPTII,S$GLB,, | Performed by: INTERNAL MEDICINE

## 2021-03-26 PROCEDURE — 99999 PR PBB SHADOW E&M-EST. PATIENT-LVL V: ICD-10-PCS | Mod: PBBFAC,,, | Performed by: INTERNAL MEDICINE

## 2021-03-26 PROCEDURE — 99214 OFFICE O/P EST MOD 30 MIN: CPT | Mod: S$GLB,,, | Performed by: INTERNAL MEDICINE

## 2021-03-26 PROCEDURE — 3074F SYST BP LT 130 MM HG: CPT | Mod: CPTII,S$GLB,, | Performed by: INTERNAL MEDICINE

## 2021-03-26 PROCEDURE — 3074F PR MOST RECENT SYSTOLIC BLOOD PRESSURE < 130 MM HG: ICD-10-PCS | Mod: CPTII,S$GLB,, | Performed by: INTERNAL MEDICINE

## 2021-03-26 PROCEDURE — 1101F PR PT FALLS ASSESS DOC 0-1 FALLS W/OUT INJ PAST YR: ICD-10-PCS | Mod: CPTII,S$GLB,, | Performed by: INTERNAL MEDICINE

## 2021-03-26 PROCEDURE — 3078F PR MOST RECENT DIASTOLIC BLOOD PRESSURE < 80 MM HG: ICD-10-PCS | Mod: CPTII,S$GLB,, | Performed by: INTERNAL MEDICINE

## 2021-03-26 PROCEDURE — 1125F PR PAIN SEVERITY QUANTIFIED, PAIN PRESENT: ICD-10-PCS | Mod: S$GLB,,, | Performed by: INTERNAL MEDICINE

## 2021-03-26 PROCEDURE — 99214 PR OFFICE/OUTPT VISIT, EST, LEVL IV, 30-39 MIN: ICD-10-PCS | Mod: S$GLB,,, | Performed by: INTERNAL MEDICINE

## 2021-03-26 PROCEDURE — 3008F PR BODY MASS INDEX (BMI) DOCUMENTED: ICD-10-PCS | Mod: CPTII,S$GLB,, | Performed by: INTERNAL MEDICINE

## 2021-03-26 PROCEDURE — 3072F LOW RISK FOR RETINOPATHY: CPT | Mod: S$GLB,,, | Performed by: INTERNAL MEDICINE

## 2021-03-26 RX ORDER — OMEPRAZOLE 40 MG/1
40 CAPSULE, DELAYED RELEASE ORAL EVERY OTHER DAY
Qty: 30 CAPSULE | Refills: 11
Start: 2021-03-26 | End: 2021-10-08

## 2021-03-26 RX ORDER — FUROSEMIDE 40 MG/1
40 TABLET ORAL 2 TIMES DAILY
COMMUNITY
End: 2021-03-26 | Stop reason: ALTCHOICE

## 2021-03-26 RX ORDER — BUMETANIDE 1 MG/1
1 TABLET ORAL 2 TIMES DAILY
Qty: 60 TABLET | Refills: 11
Start: 2021-03-26 | End: 2021-07-29

## 2021-03-26 RX ORDER — LOSARTAN POTASSIUM 25 MG/1
25 TABLET ORAL DAILY
Qty: 90 TABLET | Refills: 3 | Status: SHIPPED | OUTPATIENT
Start: 2021-03-26 | End: 2021-09-10

## 2021-03-29 ENCOUNTER — CLINICAL SUPPORT (OUTPATIENT)
Dept: SMOKING CESSATION | Facility: CLINIC | Age: 71
End: 2021-03-29
Payer: COMMERCIAL

## 2021-03-29 DIAGNOSIS — F17.200 NICOTINE DEPENDENCE: Primary | ICD-10-CM

## 2021-03-29 PROCEDURE — 90853 PR GROUP PSYCHOTHERAPY: ICD-10-PCS | Mod: S$GLB,,, | Performed by: GENERAL PRACTICE

## 2021-03-29 PROCEDURE — 90853 GROUP PSYCHOTHERAPY: CPT | Mod: S$GLB,,, | Performed by: GENERAL PRACTICE

## 2021-03-29 PROCEDURE — 99999 PR PBB SHADOW E&M-EST. PATIENT-LVL I: ICD-10-PCS | Mod: PBBFAC,,,

## 2021-03-29 PROCEDURE — 99999 PR PBB SHADOW E&M-EST. PATIENT-LVL I: CPT | Mod: PBBFAC,,,

## 2021-04-05 ENCOUNTER — TELEPHONE (OUTPATIENT)
Dept: SMOKING CESSATION | Facility: CLINIC | Age: 71
End: 2021-04-05

## 2021-04-12 ENCOUNTER — CLINICAL SUPPORT (OUTPATIENT)
Dept: SMOKING CESSATION | Facility: CLINIC | Age: 71
End: 2021-04-12
Payer: COMMERCIAL

## 2021-04-12 DIAGNOSIS — F17.200 NICOTINE DEPENDENCE: Primary | ICD-10-CM

## 2021-04-12 PROCEDURE — 99404 PREV MED CNSL INDIV APPRX 60: CPT | Mod: S$GLB,,, | Performed by: GENERAL PRACTICE

## 2021-04-12 PROCEDURE — 99404 PR PREVENT COUNSEL,INDIV,60 MIN: ICD-10-PCS | Mod: S$GLB,,, | Performed by: GENERAL PRACTICE

## 2021-04-19 ENCOUNTER — CLINICAL SUPPORT (OUTPATIENT)
Dept: SMOKING CESSATION | Facility: CLINIC | Age: 71
End: 2021-04-19
Payer: COMMERCIAL

## 2021-04-19 DIAGNOSIS — F17.200 NICOTINE DEPENDENCE: Primary | ICD-10-CM

## 2021-04-19 PROCEDURE — 99404 PREV MED CNSL INDIV APPRX 60: CPT | Mod: S$GLB,,, | Performed by: GENERAL PRACTICE

## 2021-04-19 PROCEDURE — 99404 PR PREVENT COUNSEL,INDIV,60 MIN: ICD-10-PCS | Mod: S$GLB,,, | Performed by: GENERAL PRACTICE

## 2021-04-26 ENCOUNTER — CLINICAL SUPPORT (OUTPATIENT)
Dept: SMOKING CESSATION | Facility: CLINIC | Age: 71
End: 2021-04-26
Payer: COMMERCIAL

## 2021-04-26 DIAGNOSIS — F17.200 NICOTINE DEPENDENCE: Primary | ICD-10-CM

## 2021-04-26 PROCEDURE — 99404 PREV MED CNSL INDIV APPRX 60: CPT | Mod: S$GLB,,, | Performed by: GENERAL PRACTICE

## 2021-04-26 PROCEDURE — 99404 PR PREVENT COUNSEL,INDIV,60 MIN: ICD-10-PCS | Mod: S$GLB,,, | Performed by: GENERAL PRACTICE

## 2021-04-28 ENCOUNTER — PATIENT MESSAGE (OUTPATIENT)
Dept: RESEARCH | Facility: HOSPITAL | Age: 71
End: 2021-04-28

## 2021-05-03 ENCOUNTER — CLINICAL SUPPORT (OUTPATIENT)
Dept: SMOKING CESSATION | Facility: CLINIC | Age: 71
End: 2021-05-03
Payer: COMMERCIAL

## 2021-05-03 DIAGNOSIS — F17.200 NICOTINE DEPENDENCE: Primary | ICD-10-CM

## 2021-05-03 PROCEDURE — 99404 PR PREVENT COUNSEL,INDIV,60 MIN: ICD-10-PCS | Mod: S$GLB,,, | Performed by: GENERAL PRACTICE

## 2021-05-03 PROCEDURE — 99404 PREV MED CNSL INDIV APPRX 60: CPT | Mod: S$GLB,,, | Performed by: GENERAL PRACTICE

## 2021-05-10 ENCOUNTER — CLINICAL SUPPORT (OUTPATIENT)
Dept: SMOKING CESSATION | Facility: CLINIC | Age: 71
End: 2021-05-10
Payer: COMMERCIAL

## 2021-05-10 DIAGNOSIS — F17.200 NICOTINE DEPENDENCE: Primary | ICD-10-CM

## 2021-05-10 PROCEDURE — 99407 PR TOBACCO USE CESSATION INTENSIVE >10 MINUTES: ICD-10-PCS | Mod: S$GLB,,, | Performed by: GENERAL PRACTICE

## 2021-05-10 PROCEDURE — 99999 PR PBB SHADOW E&M-EST. PATIENT-LVL III: ICD-10-PCS | Mod: PBBFAC,,,

## 2021-05-10 PROCEDURE — 99407 BEHAV CHNG SMOKING > 10 MIN: CPT | Mod: S$GLB,,, | Performed by: GENERAL PRACTICE

## 2021-05-10 PROCEDURE — 99999 PR PBB SHADOW E&M-EST. PATIENT-LVL III: CPT | Mod: PBBFAC,,,

## 2021-05-17 ENCOUNTER — CLINICAL SUPPORT (OUTPATIENT)
Dept: SMOKING CESSATION | Facility: CLINIC | Age: 71
End: 2021-05-17
Payer: COMMERCIAL

## 2021-05-17 DIAGNOSIS — F17.200 NICOTINE DEPENDENCE: Primary | ICD-10-CM

## 2021-05-17 PROCEDURE — 99407 PR TOBACCO USE CESSATION INTENSIVE >10 MINUTES: ICD-10-PCS | Mod: S$GLB,,, | Performed by: GENERAL PRACTICE

## 2021-05-17 PROCEDURE — 99407 BEHAV CHNG SMOKING > 10 MIN: CPT | Mod: S$GLB,,, | Performed by: GENERAL PRACTICE

## 2021-05-17 PROCEDURE — 99999 PR PBB SHADOW E&M-EST. PATIENT-LVL III: CPT | Mod: PBBFAC,,,

## 2021-05-17 PROCEDURE — 99999 PR PBB SHADOW E&M-EST. PATIENT-LVL III: ICD-10-PCS | Mod: PBBFAC,,,

## 2021-05-26 ENCOUNTER — TELEPHONE (OUTPATIENT)
Dept: SMOKING CESSATION | Facility: CLINIC | Age: 71
End: 2021-05-26

## 2021-06-08 ENCOUNTER — TELEPHONE (OUTPATIENT)
Dept: SMOKING CESSATION | Facility: CLINIC | Age: 71
End: 2021-06-08

## 2021-06-10 ENCOUNTER — PATIENT MESSAGE (OUTPATIENT)
Dept: INTERNAL MEDICINE | Facility: CLINIC | Age: 71
End: 2021-06-10

## 2021-06-10 DIAGNOSIS — T16.9XXA FOREIGN BODY IN EAR, UNSPECIFIED LATERALITY, INITIAL ENCOUNTER: Primary | ICD-10-CM

## 2021-06-11 ENCOUNTER — TELEPHONE (OUTPATIENT)
Dept: INTERNAL MEDICINE | Facility: CLINIC | Age: 71
End: 2021-06-11

## 2021-06-11 ENCOUNTER — OFFICE VISIT (OUTPATIENT)
Dept: OTOLARYNGOLOGY | Facility: CLINIC | Age: 71
End: 2021-06-11
Payer: MEDICARE

## 2021-06-11 VITALS — WEIGHT: 239.19 LBS | HEIGHT: 67 IN | BODY MASS INDEX: 37.54 KG/M2

## 2021-06-11 DIAGNOSIS — T16.2XXA FOREIGN BODY OF LEFT EAR, INITIAL ENCOUNTER: Primary | ICD-10-CM

## 2021-06-11 DIAGNOSIS — T16.9XXA FOREIGN BODY IN EAR, UNSPECIFIED LATERALITY, INITIAL ENCOUNTER: ICD-10-CM

## 2021-06-11 PROCEDURE — 99213 OFFICE O/P EST LOW 20 MIN: CPT | Mod: 25,S$GLB,, | Performed by: PHYSICIAN ASSISTANT

## 2021-06-11 PROCEDURE — 99999 PR PBB SHADOW E&M-EST. PATIENT-LVL III: ICD-10-PCS | Mod: PBBFAC,,, | Performed by: PHYSICIAN ASSISTANT

## 2021-06-11 PROCEDURE — 1159F PR MEDICATION LIST DOCUMENTED IN MEDICAL RECORD: ICD-10-PCS | Mod: S$GLB,,, | Performed by: PHYSICIAN ASSISTANT

## 2021-06-11 PROCEDURE — 69200 CLEAR OUTER EAR CANAL: CPT | Mod: LT,S$GLB,, | Performed by: PHYSICIAN ASSISTANT

## 2021-06-11 PROCEDURE — 99999 PR PBB SHADOW E&M-EST. PATIENT-LVL III: CPT | Mod: PBBFAC,,, | Performed by: PHYSICIAN ASSISTANT

## 2021-06-11 PROCEDURE — 3072F LOW RISK FOR RETINOPATHY: CPT | Mod: S$GLB,,, | Performed by: PHYSICIAN ASSISTANT

## 2021-06-11 PROCEDURE — 1126F AMNT PAIN NOTED NONE PRSNT: CPT | Mod: S$GLB,,, | Performed by: PHYSICIAN ASSISTANT

## 2021-06-11 PROCEDURE — 3008F BODY MASS INDEX DOCD: CPT | Mod: CPTII,S$GLB,, | Performed by: PHYSICIAN ASSISTANT

## 2021-06-11 PROCEDURE — 1159F MED LIST DOCD IN RCRD: CPT | Mod: S$GLB,,, | Performed by: PHYSICIAN ASSISTANT

## 2021-06-11 PROCEDURE — 99213 PR OFFICE/OUTPT VISIT, EST, LEVL III, 20-29 MIN: ICD-10-PCS | Mod: 25,S$GLB,, | Performed by: PHYSICIAN ASSISTANT

## 2021-06-11 PROCEDURE — 69200 PR REMV EXT CANAL FOREIGN BODY: ICD-10-PCS | Mod: LT,S$GLB,, | Performed by: PHYSICIAN ASSISTANT

## 2021-06-11 PROCEDURE — 1126F PR PAIN SEVERITY QUANTIFIED, NO PAIN PRESENT: ICD-10-PCS | Mod: S$GLB,,, | Performed by: PHYSICIAN ASSISTANT

## 2021-06-11 PROCEDURE — 3008F PR BODY MASS INDEX (BMI) DOCUMENTED: ICD-10-PCS | Mod: CPTII,S$GLB,, | Performed by: PHYSICIAN ASSISTANT

## 2021-06-11 PROCEDURE — 3072F PR LOW RISK FOR RETINOPATHY: ICD-10-PCS | Mod: S$GLB,,, | Performed by: PHYSICIAN ASSISTANT

## 2021-06-15 ENCOUNTER — TELEPHONE (OUTPATIENT)
Dept: SMOKING CESSATION | Facility: CLINIC | Age: 71
End: 2021-06-15

## 2021-06-22 ENCOUNTER — TELEPHONE (OUTPATIENT)
Dept: SMOKING CESSATION | Facility: CLINIC | Age: 71
End: 2021-06-22

## 2021-06-24 ENCOUNTER — TELEPHONE (OUTPATIENT)
Dept: INTERNAL MEDICINE | Facility: CLINIC | Age: 71
End: 2021-06-24

## 2021-06-24 ENCOUNTER — PATIENT MESSAGE (OUTPATIENT)
Dept: SLEEP MEDICINE | Facility: CLINIC | Age: 71
End: 2021-06-24

## 2021-06-29 ENCOUNTER — TELEPHONE (OUTPATIENT)
Dept: SMOKING CESSATION | Facility: CLINIC | Age: 71
End: 2021-06-29

## 2021-07-17 ENCOUNTER — PATIENT MESSAGE (OUTPATIENT)
Dept: CARDIOLOGY | Facility: CLINIC | Age: 71
End: 2021-07-17

## 2021-07-23 ENCOUNTER — TELEPHONE (OUTPATIENT)
Dept: INTERNAL MEDICINE | Facility: CLINIC | Age: 71
End: 2021-07-23

## 2021-07-29 RX ORDER — BUMETANIDE 2 MG/1
TABLET ORAL
Qty: 60 TABLET | Refills: 5 | Status: SHIPPED | OUTPATIENT
Start: 2021-07-29 | End: 2021-11-23

## 2021-07-30 ENCOUNTER — TELEPHONE (OUTPATIENT)
Dept: INTERNAL MEDICINE | Facility: CLINIC | Age: 71
End: 2021-07-30

## 2021-07-30 ENCOUNTER — LAB VISIT (OUTPATIENT)
Dept: LAB | Facility: HOSPITAL | Age: 71
End: 2021-07-30
Attending: INTERNAL MEDICINE
Payer: MEDICARE

## 2021-07-30 DIAGNOSIS — Z85.46 HISTORY OF PROSTATE CANCER: ICD-10-CM

## 2021-07-30 DIAGNOSIS — E11.8 DM (DIABETES MELLITUS) WITH COMPLICATIONS: ICD-10-CM

## 2021-07-30 LAB
BASOPHILS # BLD AUTO: 0.09 K/UL (ref 0–0.2)
BASOPHILS NFR BLD: 1.2 % (ref 0–1.9)
DIFFERENTIAL METHOD: ABNORMAL
EOSINOPHIL # BLD AUTO: 0.6 K/UL (ref 0–0.5)
EOSINOPHIL NFR BLD: 7.3 % (ref 0–8)
ERYTHROCYTE [DISTWIDTH] IN BLOOD BY AUTOMATED COUNT: 21.1 % (ref 11.5–14.5)
HCT VFR BLD AUTO: 40.4 % (ref 40–54)
HGB BLD-MCNC: 12.2 G/DL (ref 14–18)
IMM GRANULOCYTES # BLD AUTO: 0.08 K/UL (ref 0–0.04)
IMM GRANULOCYTES NFR BLD AUTO: 1 % (ref 0–0.5)
LYMPHOCYTES # BLD AUTO: 1.9 K/UL (ref 1–4.8)
LYMPHOCYTES NFR BLD: 24.3 % (ref 18–48)
MCH RBC QN AUTO: 28.2 PG (ref 27–31)
MCHC RBC AUTO-ENTMCNC: 30.2 G/DL (ref 32–36)
MCV RBC AUTO: 94 FL (ref 82–98)
MONOCYTES # BLD AUTO: 0.5 K/UL (ref 0.3–1)
MONOCYTES NFR BLD: 6.5 % (ref 4–15)
NEUTROPHILS # BLD AUTO: 4.7 K/UL (ref 1.8–7.7)
NEUTROPHILS NFR BLD: 59.7 % (ref 38–73)
NRBC BLD-RTO: 1 /100 WBC
PLATELET # BLD AUTO: 176 K/UL (ref 150–450)
PMV BLD AUTO: 10.7 FL (ref 9.2–12.9)
RBC # BLD AUTO: 4.32 M/UL (ref 4.6–6.2)
WBC # BLD AUTO: 7.79 K/UL (ref 3.9–12.7)

## 2021-07-30 PROCEDURE — 84153 ASSAY OF PSA TOTAL: CPT | Performed by: INTERNAL MEDICINE

## 2021-07-30 PROCEDURE — 84443 ASSAY THYROID STIM HORMONE: CPT | Performed by: INTERNAL MEDICINE

## 2021-07-30 PROCEDURE — 80053 COMPREHEN METABOLIC PANEL: CPT | Performed by: INTERNAL MEDICINE

## 2021-07-30 PROCEDURE — 36415 COLL VENOUS BLD VENIPUNCTURE: CPT | Mod: PO | Performed by: INTERNAL MEDICINE

## 2021-07-30 PROCEDURE — 85025 COMPLETE CBC W/AUTO DIFF WBC: CPT | Performed by: INTERNAL MEDICINE

## 2021-07-30 PROCEDURE — 80061 LIPID PANEL: CPT | Performed by: INTERNAL MEDICINE

## 2021-07-30 PROCEDURE — 83036 HEMOGLOBIN GLYCOSYLATED A1C: CPT | Performed by: INTERNAL MEDICINE

## 2021-07-31 LAB
ALBUMIN SERPL BCP-MCNC: 3.8 G/DL (ref 3.5–5.2)
ALP SERPL-CCNC: 67 U/L (ref 55–135)
ALT SERPL W/O P-5'-P-CCNC: 30 U/L (ref 10–44)
ANION GAP SERPL CALC-SCNC: 12 MMOL/L (ref 8–16)
AST SERPL-CCNC: 31 U/L (ref 10–40)
BILIRUB SERPL-MCNC: 0.5 MG/DL (ref 0.1–1)
BUN SERPL-MCNC: 8 MG/DL (ref 8–23)
CALCIUM SERPL-MCNC: 9.6 MG/DL (ref 8.7–10.5)
CHLORIDE SERPL-SCNC: 106 MMOL/L (ref 95–110)
CHOLEST SERPL-MCNC: 135 MG/DL (ref 120–199)
CHOLEST/HDLC SERPL: 3.1 {RATIO} (ref 2–5)
CO2 SERPL-SCNC: 21 MMOL/L (ref 23–29)
COMPLEXED PSA SERPL-MCNC: 0.36 NG/ML (ref 0–4)
CREAT SERPL-MCNC: 1.4 MG/DL (ref 0.5–1.4)
EST. GFR  (AFRICAN AMERICAN): 58.8 ML/MIN/1.73 M^2
EST. GFR  (NON AFRICAN AMERICAN): 50.9 ML/MIN/1.73 M^2
ESTIMATED AVG GLUCOSE: 217 MG/DL (ref 68–131)
GLUCOSE SERPL-MCNC: 201 MG/DL (ref 70–110)
HBA1C MFR BLD: 9.2 % (ref 4–5.6)
HDLC SERPL-MCNC: 44 MG/DL (ref 40–75)
HDLC SERPL: 32.6 % (ref 20–50)
LDLC SERPL CALC-MCNC: 64.6 MG/DL (ref 63–159)
NONHDLC SERPL-MCNC: 91 MG/DL
POTASSIUM SERPL-SCNC: 4.8 MMOL/L (ref 3.5–5.1)
PROT SERPL-MCNC: 6.8 G/DL (ref 6–8.4)
SODIUM SERPL-SCNC: 139 MMOL/L (ref 136–145)
TRIGL SERPL-MCNC: 132 MG/DL (ref 30–150)
TSH SERPL DL<=0.005 MIU/L-ACNC: 1.25 UIU/ML (ref 0.4–4)

## 2021-08-10 ENCOUNTER — OFFICE VISIT (OUTPATIENT)
Dept: INTERNAL MEDICINE | Facility: CLINIC | Age: 71
End: 2021-08-10
Payer: MEDICARE

## 2021-08-10 VITALS
HEART RATE: 85 BPM | BODY MASS INDEX: 37.4 KG/M2 | WEIGHT: 238.31 LBS | SYSTOLIC BLOOD PRESSURE: 120 MMHG | DIASTOLIC BLOOD PRESSURE: 80 MMHG | HEIGHT: 67 IN | TEMPERATURE: 98 F | OXYGEN SATURATION: 95 %

## 2021-08-10 DIAGNOSIS — E11.8 DM (DIABETES MELLITUS) WITH COMPLICATIONS: ICD-10-CM

## 2021-08-10 DIAGNOSIS — I50.22 CHRONIC SYSTOLIC CONGESTIVE HEART FAILURE: ICD-10-CM

## 2021-08-10 DIAGNOSIS — Z72.0 TOBACCO ABUSE: ICD-10-CM

## 2021-08-10 DIAGNOSIS — N18.30 HYPERTENSION ASSOCIATED WITH STAGE 3 CHRONIC KIDNEY DISEASE DUE TO TYPE 2 DIABETES MELLITUS: ICD-10-CM

## 2021-08-10 DIAGNOSIS — E11.22 HYPERTENSION ASSOCIATED WITH STAGE 3 CHRONIC KIDNEY DISEASE DUE TO TYPE 2 DIABETES MELLITUS: ICD-10-CM

## 2021-08-10 DIAGNOSIS — I50.32 CHRONIC DIASTOLIC CONGESTIVE HEART FAILURE: ICD-10-CM

## 2021-08-10 DIAGNOSIS — N18.32 STAGE 3B CHRONIC KIDNEY DISEASE: ICD-10-CM

## 2021-08-10 DIAGNOSIS — E11.69 HYPERLIPIDEMIA ASSOCIATED WITH TYPE 2 DIABETES MELLITUS: Primary | ICD-10-CM

## 2021-08-10 DIAGNOSIS — E78.5 HYPERLIPIDEMIA ASSOCIATED WITH TYPE 2 DIABETES MELLITUS: Primary | ICD-10-CM

## 2021-08-10 DIAGNOSIS — G47.33 OSA (OBSTRUCTIVE SLEEP APNEA): ICD-10-CM

## 2021-08-10 DIAGNOSIS — Z87.39 HISTORY OF GOUT: ICD-10-CM

## 2021-08-10 DIAGNOSIS — Z85.46 HISTORY OF PROSTATE CANCER: ICD-10-CM

## 2021-08-10 DIAGNOSIS — I12.9 HYPERTENSION ASSOCIATED WITH STAGE 3 CHRONIC KIDNEY DISEASE DUE TO TYPE 2 DIABETES MELLITUS: ICD-10-CM

## 2021-08-10 DIAGNOSIS — I25.118 CORONARY ARTERY DISEASE OF NATIVE ARTERY OF NATIVE HEART WITH STABLE ANGINA PECTORIS: ICD-10-CM

## 2021-08-10 PROCEDURE — 99499 RISK ADDL DX/OHS AUDIT: ICD-10-PCS | Mod: S$GLB,,, | Performed by: INTERNAL MEDICINE

## 2021-08-10 PROCEDURE — 1159F MED LIST DOCD IN RCRD: CPT | Mod: CPTII,S$GLB,, | Performed by: INTERNAL MEDICINE

## 2021-08-10 PROCEDURE — 3072F PR LOW RISK FOR RETINOPATHY: ICD-10-PCS | Mod: CPTII,S$GLB,, | Performed by: INTERNAL MEDICINE

## 2021-08-10 PROCEDURE — 99999 PR PBB SHADOW E&M-EST. PATIENT-LVL IV: CPT | Mod: PBBFAC,,, | Performed by: INTERNAL MEDICINE

## 2021-08-10 PROCEDURE — 3072F LOW RISK FOR RETINOPATHY: CPT | Mod: CPTII,S$GLB,, | Performed by: INTERNAL MEDICINE

## 2021-08-10 PROCEDURE — 3046F HEMOGLOBIN A1C LEVEL >9.0%: CPT | Mod: CPTII,S$GLB,, | Performed by: INTERNAL MEDICINE

## 2021-08-10 PROCEDURE — 1126F PR PAIN SEVERITY QUANTIFIED, NO PAIN PRESENT: ICD-10-PCS | Mod: CPTII,S$GLB,, | Performed by: INTERNAL MEDICINE

## 2021-08-10 PROCEDURE — 3079F PR MOST RECENT DIASTOLIC BLOOD PRESSURE 80-89 MM HG: ICD-10-PCS | Mod: CPTII,S$GLB,, | Performed by: INTERNAL MEDICINE

## 2021-08-10 PROCEDURE — 99214 OFFICE O/P EST MOD 30 MIN: CPT | Mod: S$GLB,,, | Performed by: INTERNAL MEDICINE

## 2021-08-10 PROCEDURE — 3008F PR BODY MASS INDEX (BMI) DOCUMENTED: ICD-10-PCS | Mod: CPTII,S$GLB,, | Performed by: INTERNAL MEDICINE

## 2021-08-10 PROCEDURE — 1160F PR REVIEW ALL MEDS BY PRESCRIBER/CLIN PHARMACIST DOCUMENTED: ICD-10-PCS | Mod: CPTII,S$GLB,, | Performed by: INTERNAL MEDICINE

## 2021-08-10 PROCEDURE — 3288F PR FALLS RISK ASSESSMENT DOCUMENTED: ICD-10-PCS | Mod: CPTII,S$GLB,, | Performed by: INTERNAL MEDICINE

## 2021-08-10 PROCEDURE — 3074F PR MOST RECENT SYSTOLIC BLOOD PRESSURE < 130 MM HG: ICD-10-PCS | Mod: CPTII,S$GLB,, | Performed by: INTERNAL MEDICINE

## 2021-08-10 PROCEDURE — 1101F PR PT FALLS ASSESS DOC 0-1 FALLS W/OUT INJ PAST YR: ICD-10-PCS | Mod: CPTII,S$GLB,, | Performed by: INTERNAL MEDICINE

## 2021-08-10 PROCEDURE — 1159F PR MEDICATION LIST DOCUMENTED IN MEDICAL RECORD: ICD-10-PCS | Mod: CPTII,S$GLB,, | Performed by: INTERNAL MEDICINE

## 2021-08-10 PROCEDURE — 1126F AMNT PAIN NOTED NONE PRSNT: CPT | Mod: CPTII,S$GLB,, | Performed by: INTERNAL MEDICINE

## 2021-08-10 PROCEDURE — 1160F RVW MEDS BY RX/DR IN RCRD: CPT | Mod: CPTII,S$GLB,, | Performed by: INTERNAL MEDICINE

## 2021-08-10 PROCEDURE — 3074F SYST BP LT 130 MM HG: CPT | Mod: CPTII,S$GLB,, | Performed by: INTERNAL MEDICINE

## 2021-08-10 PROCEDURE — 99214 PR OFFICE/OUTPT VISIT, EST, LEVL IV, 30-39 MIN: ICD-10-PCS | Mod: S$GLB,,, | Performed by: INTERNAL MEDICINE

## 2021-08-10 PROCEDURE — 99499 UNLISTED E&M SERVICE: CPT | Mod: S$GLB,,, | Performed by: INTERNAL MEDICINE

## 2021-08-10 PROCEDURE — 3079F DIAST BP 80-89 MM HG: CPT | Mod: CPTII,S$GLB,, | Performed by: INTERNAL MEDICINE

## 2021-08-10 PROCEDURE — 99999 PR PBB SHADOW E&M-EST. PATIENT-LVL IV: ICD-10-PCS | Mod: PBBFAC,,, | Performed by: INTERNAL MEDICINE

## 2021-08-10 PROCEDURE — 3288F FALL RISK ASSESSMENT DOCD: CPT | Mod: CPTII,S$GLB,, | Performed by: INTERNAL MEDICINE

## 2021-08-10 PROCEDURE — 3046F PR MOST RECENT HEMOGLOBIN A1C LEVEL > 9.0%: ICD-10-PCS | Mod: CPTII,S$GLB,, | Performed by: INTERNAL MEDICINE

## 2021-08-10 PROCEDURE — 3008F BODY MASS INDEX DOCD: CPT | Mod: CPTII,S$GLB,, | Performed by: INTERNAL MEDICINE

## 2021-08-10 PROCEDURE — 1101F PT FALLS ASSESS-DOCD LE1/YR: CPT | Mod: CPTII,S$GLB,, | Performed by: INTERNAL MEDICINE

## 2021-08-10 RX ORDER — METFORMIN HYDROCHLORIDE 500 MG/1
500 TABLET ORAL
Qty: 90 TABLET | Refills: 3 | Status: SHIPPED | OUTPATIENT
Start: 2021-08-10 | End: 2022-05-04

## 2021-08-19 ENCOUNTER — TELEPHONE (OUTPATIENT)
Dept: INTERNAL MEDICINE | Facility: CLINIC | Age: 71
End: 2021-08-19

## 2021-08-19 ENCOUNTER — PATIENT MESSAGE (OUTPATIENT)
Dept: INTERNAL MEDICINE | Facility: CLINIC | Age: 71
End: 2021-08-19

## 2021-08-19 DIAGNOSIS — C61 PROSTATE CANCER: Primary | ICD-10-CM

## 2021-08-19 DIAGNOSIS — M65.30 TRIGGER FINGER, UNSPECIFIED FINGER, UNSPECIFIED LATERALITY: ICD-10-CM

## 2021-08-23 ENCOUNTER — TELEPHONE (OUTPATIENT)
Dept: HEMATOLOGY/ONCOLOGY | Facility: CLINIC | Age: 71
End: 2021-08-23

## 2021-08-25 ENCOUNTER — PATIENT MESSAGE (OUTPATIENT)
Dept: SLEEP MEDICINE | Facility: CLINIC | Age: 71
End: 2021-08-25

## 2021-08-25 ENCOUNTER — PATIENT MESSAGE (OUTPATIENT)
Dept: PULMONOLOGY | Facility: CLINIC | Age: 71
End: 2021-08-25

## 2021-08-27 ENCOUNTER — PATIENT MESSAGE (OUTPATIENT)
Dept: ORTHOPEDICS | Facility: CLINIC | Age: 71
End: 2021-08-27

## 2021-08-27 DIAGNOSIS — M79.641 RIGHT HAND PAIN: Primary | ICD-10-CM

## 2021-08-31 ENCOUNTER — TELEPHONE (OUTPATIENT)
Dept: ORTHOPEDICS | Facility: CLINIC | Age: 71
End: 2021-08-31

## 2021-08-31 ENCOUNTER — HOSPITAL ENCOUNTER (OUTPATIENT)
Dept: RADIOLOGY | Facility: HOSPITAL | Age: 71
Discharge: HOME | End: 2021-08-31
Attending: ORTHOPAEDIC SURGERY
Payer: MEDICARE

## 2021-08-31 ENCOUNTER — OFFICE VISIT (OUTPATIENT)
Dept: ORTHOPEDICS | Facility: CLINIC | Age: 71
End: 2021-08-31
Payer: MEDICARE

## 2021-08-31 VITALS
SYSTOLIC BLOOD PRESSURE: 111 MMHG | WEIGHT: 238.31 LBS | RESPIRATION RATE: 18 BRPM | HEIGHT: 67 IN | BODY MASS INDEX: 37.4 KG/M2 | DIASTOLIC BLOOD PRESSURE: 75 MMHG | HEART RATE: 85 BPM

## 2021-08-31 DIAGNOSIS — M65.30 TRIGGER FINGER, ACQUIRED: Primary | ICD-10-CM

## 2021-08-31 DIAGNOSIS — M79.641 RIGHT HAND PAIN: ICD-10-CM

## 2021-08-31 PROCEDURE — 73130 X-RAY EXAM OF HAND: CPT | Mod: TC,RT

## 2021-08-31 PROCEDURE — 3288F FALL RISK ASSESSMENT DOCD: CPT | Mod: CPTII,S$GLB,, | Performed by: ORTHOPAEDIC SURGERY

## 2021-08-31 PROCEDURE — 3074F SYST BP LT 130 MM HG: CPT | Mod: CPTII,S$GLB,, | Performed by: ORTHOPAEDIC SURGERY

## 2021-08-31 PROCEDURE — 3078F PR MOST RECENT DIASTOLIC BLOOD PRESSURE < 80 MM HG: ICD-10-PCS | Mod: CPTII,S$GLB,, | Performed by: ORTHOPAEDIC SURGERY

## 2021-08-31 PROCEDURE — 1101F PT FALLS ASSESS-DOCD LE1/YR: CPT | Mod: CPTII,S$GLB,, | Performed by: ORTHOPAEDIC SURGERY

## 2021-08-31 PROCEDURE — 99203 PR OFFICE/OUTPT VISIT, NEW, LEVL III, 30-44 MIN: ICD-10-PCS | Mod: 25,S$GLB,, | Performed by: ORTHOPAEDIC SURGERY

## 2021-08-31 PROCEDURE — 1101F PR PT FALLS ASSESS DOC 0-1 FALLS W/OUT INJ PAST YR: ICD-10-PCS | Mod: CPTII,S$GLB,, | Performed by: ORTHOPAEDIC SURGERY

## 2021-08-31 PROCEDURE — 20550 TENDON SHEATH: ICD-10-PCS | Mod: F7,S$GLB,, | Performed by: ORTHOPAEDIC SURGERY

## 2021-08-31 PROCEDURE — 3074F PR MOST RECENT SYSTOLIC BLOOD PRESSURE < 130 MM HG: ICD-10-PCS | Mod: CPTII,S$GLB,, | Performed by: ORTHOPAEDIC SURGERY

## 2021-08-31 PROCEDURE — 99999 PR PBB SHADOW E&M-EST. PATIENT-LVL IV: ICD-10-PCS | Mod: PBBFAC,,, | Performed by: ORTHOPAEDIC SURGERY

## 2021-08-31 PROCEDURE — 99999 PR PBB SHADOW E&M-EST. PATIENT-LVL IV: CPT | Mod: PBBFAC,,, | Performed by: ORTHOPAEDIC SURGERY

## 2021-08-31 PROCEDURE — 3046F HEMOGLOBIN A1C LEVEL >9.0%: CPT | Mod: CPTII,S$GLB,, | Performed by: ORTHOPAEDIC SURGERY

## 2021-08-31 PROCEDURE — 3072F PR LOW RISK FOR RETINOPATHY: ICD-10-PCS | Mod: CPTII,S$GLB,, | Performed by: ORTHOPAEDIC SURGERY

## 2021-08-31 PROCEDURE — 73130 X-RAY EXAM OF HAND: CPT | Mod: 26,RT,, | Performed by: RADIOLOGY

## 2021-08-31 PROCEDURE — 1125F PR PAIN SEVERITY QUANTIFIED, PAIN PRESENT: ICD-10-PCS | Mod: CPTII,S$GLB,, | Performed by: ORTHOPAEDIC SURGERY

## 2021-08-31 PROCEDURE — 1160F RVW MEDS BY RX/DR IN RCRD: CPT | Mod: CPTII,S$GLB,, | Performed by: ORTHOPAEDIC SURGERY

## 2021-08-31 PROCEDURE — 1160F PR REVIEW ALL MEDS BY PRESCRIBER/CLIN PHARMACIST DOCUMENTED: ICD-10-PCS | Mod: CPTII,S$GLB,, | Performed by: ORTHOPAEDIC SURGERY

## 2021-08-31 PROCEDURE — 1159F PR MEDICATION LIST DOCUMENTED IN MEDICAL RECORD: ICD-10-PCS | Mod: CPTII,S$GLB,, | Performed by: ORTHOPAEDIC SURGERY

## 2021-08-31 PROCEDURE — 3046F PR MOST RECENT HEMOGLOBIN A1C LEVEL > 9.0%: ICD-10-PCS | Mod: CPTII,S$GLB,, | Performed by: ORTHOPAEDIC SURGERY

## 2021-08-31 PROCEDURE — 3078F DIAST BP <80 MM HG: CPT | Mod: CPTII,S$GLB,, | Performed by: ORTHOPAEDIC SURGERY

## 2021-08-31 PROCEDURE — 3288F PR FALLS RISK ASSESSMENT DOCUMENTED: ICD-10-PCS | Mod: CPTII,S$GLB,, | Performed by: ORTHOPAEDIC SURGERY

## 2021-08-31 PROCEDURE — 3008F PR BODY MASS INDEX (BMI) DOCUMENTED: ICD-10-PCS | Mod: CPTII,S$GLB,, | Performed by: ORTHOPAEDIC SURGERY

## 2021-08-31 PROCEDURE — 73130 XR HAND COMPLETE 3 VIEW RIGHT: ICD-10-PCS | Mod: 26,RT,, | Performed by: RADIOLOGY

## 2021-08-31 PROCEDURE — 99203 OFFICE O/P NEW LOW 30 MIN: CPT | Mod: 25,S$GLB,, | Performed by: ORTHOPAEDIC SURGERY

## 2021-08-31 PROCEDURE — 3072F LOW RISK FOR RETINOPATHY: CPT | Mod: CPTII,S$GLB,, | Performed by: ORTHOPAEDIC SURGERY

## 2021-08-31 PROCEDURE — 3008F BODY MASS INDEX DOCD: CPT | Mod: CPTII,S$GLB,, | Performed by: ORTHOPAEDIC SURGERY

## 2021-08-31 PROCEDURE — 1125F AMNT PAIN NOTED PAIN PRSNT: CPT | Mod: CPTII,S$GLB,, | Performed by: ORTHOPAEDIC SURGERY

## 2021-08-31 PROCEDURE — 1159F MED LIST DOCD IN RCRD: CPT | Mod: CPTII,S$GLB,, | Performed by: ORTHOPAEDIC SURGERY

## 2021-08-31 PROCEDURE — 20550 NJX 1 TENDON SHEATH/LIGAMENT: CPT | Mod: F7,S$GLB,, | Performed by: ORTHOPAEDIC SURGERY

## 2021-08-31 RX ORDER — TRIAMCINOLONE ACETONIDE 40 MG/ML
40 INJECTION, SUSPENSION INTRA-ARTICULAR; INTRAMUSCULAR
Status: DISCONTINUED | OUTPATIENT
Start: 2021-08-31 | End: 2021-08-31 | Stop reason: HOSPADM

## 2021-08-31 RX ADMIN — TRIAMCINOLONE ACETONIDE 40 MG: 40 INJECTION, SUSPENSION INTRA-ARTICULAR; INTRAMUSCULAR at 11:08

## 2021-09-16 ENCOUNTER — TELEPHONE (OUTPATIENT)
Dept: SMOKING CESSATION | Facility: CLINIC | Age: 71
End: 2021-09-16

## 2021-09-30 ENCOUNTER — TELEPHONE (OUTPATIENT)
Dept: SMOKING CESSATION | Facility: CLINIC | Age: 71
End: 2021-09-30

## 2021-10-05 ENCOUNTER — LAB VISIT (OUTPATIENT)
Dept: LAB | Facility: HOSPITAL | Age: 71
End: 2021-10-05
Attending: INTERNAL MEDICINE
Payer: MEDICARE

## 2021-10-05 DIAGNOSIS — N18.31 STAGE 3A CHRONIC KIDNEY DISEASE: ICD-10-CM

## 2021-10-05 DIAGNOSIS — N25.81 SECONDARY HYPERPARATHYROIDISM OF RENAL ORIGIN: ICD-10-CM

## 2021-10-05 LAB
25(OH)D3+25(OH)D2 SERPL-MCNC: 42 NG/ML (ref 30–96)
ALBUMIN SERPL BCP-MCNC: 4.1 G/DL (ref 3.5–5.2)
ANION GAP SERPL CALC-SCNC: 11 MMOL/L (ref 8–16)
BASOPHILS # BLD AUTO: 0.09 K/UL (ref 0–0.2)
BASOPHILS NFR BLD: 0.8 % (ref 0–1.9)
BUN SERPL-MCNC: 18 MG/DL (ref 8–23)
CALCIUM SERPL-MCNC: 10.1 MG/DL (ref 8.7–10.5)
CHLORIDE SERPL-SCNC: 97 MMOL/L (ref 95–110)
CO2 SERPL-SCNC: 31 MMOL/L (ref 23–29)
CREAT SERPL-MCNC: 1.7 MG/DL (ref 0.5–1.4)
DIFFERENTIAL METHOD: ABNORMAL
EOSINOPHIL # BLD AUTO: 0.5 K/UL (ref 0–0.5)
EOSINOPHIL NFR BLD: 4.6 % (ref 0–8)
ERYTHROCYTE [DISTWIDTH] IN BLOOD BY AUTOMATED COUNT: 16.4 % (ref 11.5–14.5)
EST. GFR  (AFRICAN AMERICAN): 46 ML/MIN/1.73 M^2
EST. GFR  (NON AFRICAN AMERICAN): 40 ML/MIN/1.73 M^2
GLUCOSE SERPL-MCNC: 166 MG/DL (ref 70–110)
HCT VFR BLD AUTO: 45.2 % (ref 40–54)
HGB BLD-MCNC: 13.9 G/DL (ref 14–18)
IMM GRANULOCYTES # BLD AUTO: 0.1 K/UL (ref 0–0.04)
IMM GRANULOCYTES NFR BLD AUTO: 0.9 % (ref 0–0.5)
LYMPHOCYTES # BLD AUTO: 2.8 K/UL (ref 1–4.8)
LYMPHOCYTES NFR BLD: 25.4 % (ref 18–48)
MAGNESIUM SERPL-MCNC: 1.8 MG/DL (ref 1.6–2.6)
MCH RBC QN AUTO: 30.2 PG (ref 27–31)
MCHC RBC AUTO-ENTMCNC: 30.8 G/DL (ref 32–36)
MCV RBC AUTO: 98 FL (ref 82–98)
MONOCYTES # BLD AUTO: 0.6 K/UL (ref 0.3–1)
MONOCYTES NFR BLD: 5.3 % (ref 4–15)
NEUTROPHILS # BLD AUTO: 7 K/UL (ref 1.8–7.7)
NEUTROPHILS NFR BLD: 63 % (ref 38–73)
NRBC BLD-RTO: 0 /100 WBC
PHOSPHATE SERPL-MCNC: 3.6 MG/DL (ref 2.7–4.5)
PLATELET # BLD AUTO: 207 K/UL (ref 150–450)
PMV BLD AUTO: 10.8 FL (ref 9.2–12.9)
POTASSIUM SERPL-SCNC: 4.2 MMOL/L (ref 3.5–5.1)
PTH-INTACT SERPL-MCNC: 127.9 PG/ML (ref 9–77)
RBC # BLD AUTO: 4.6 M/UL (ref 4.6–6.2)
WBC # BLD AUTO: 11.12 K/UL (ref 3.9–12.7)

## 2021-10-05 PROCEDURE — 83735 ASSAY OF MAGNESIUM: CPT | Performed by: INTERNAL MEDICINE

## 2021-10-05 PROCEDURE — 36415 COLL VENOUS BLD VENIPUNCTURE: CPT | Mod: PO | Performed by: INTERNAL MEDICINE

## 2021-10-05 PROCEDURE — 80069 RENAL FUNCTION PANEL: CPT | Performed by: INTERNAL MEDICINE

## 2021-10-05 PROCEDURE — 85025 COMPLETE CBC W/AUTO DIFF WBC: CPT | Performed by: INTERNAL MEDICINE

## 2021-10-05 PROCEDURE — 83970 ASSAY OF PARATHORMONE: CPT | Performed by: INTERNAL MEDICINE

## 2021-10-05 PROCEDURE — 82306 VITAMIN D 25 HYDROXY: CPT | Performed by: INTERNAL MEDICINE

## 2021-10-06 ENCOUNTER — PATIENT OUTREACH (OUTPATIENT)
Dept: ADMINISTRATIVE | Facility: OTHER | Age: 71
End: 2021-10-06

## 2021-10-07 ENCOUNTER — IMMUNIZATION (OUTPATIENT)
Dept: PHARMACY | Facility: CLINIC | Age: 71
End: 2021-10-07
Payer: MEDICARE

## 2021-10-07 ENCOUNTER — OFFICE VISIT (OUTPATIENT)
Dept: NEPHROLOGY | Facility: CLINIC | Age: 71
End: 2021-10-07
Payer: MEDICARE

## 2021-10-07 VITALS
HEART RATE: 78 BPM | RESPIRATION RATE: 16 BRPM | DIASTOLIC BLOOD PRESSURE: 70 MMHG | BODY MASS INDEX: 35.75 KG/M2 | HEIGHT: 67 IN | SYSTOLIC BLOOD PRESSURE: 119 MMHG | WEIGHT: 227.75 LBS

## 2021-10-07 DIAGNOSIS — E83.42 HYPOMAGNESEMIA: ICD-10-CM

## 2021-10-07 DIAGNOSIS — E87.6 HYPOKALEMIA: Primary | ICD-10-CM

## 2021-10-07 PROCEDURE — 3074F PR MOST RECENT SYSTOLIC BLOOD PRESSURE < 130 MM HG: ICD-10-PCS | Mod: CPTII,S$GLB,, | Performed by: INTERNAL MEDICINE

## 2021-10-07 PROCEDURE — 1101F PT FALLS ASSESS-DOCD LE1/YR: CPT | Mod: CPTII,S$GLB,, | Performed by: INTERNAL MEDICINE

## 2021-10-07 PROCEDURE — 1125F AMNT PAIN NOTED PAIN PRSNT: CPT | Mod: CPTII,S$GLB,, | Performed by: INTERNAL MEDICINE

## 2021-10-07 PROCEDURE — 3288F PR FALLS RISK ASSESSMENT DOCUMENTED: ICD-10-PCS | Mod: CPTII,S$GLB,, | Performed by: INTERNAL MEDICINE

## 2021-10-07 PROCEDURE — 99999 PR PBB SHADOW E&M-EST. PATIENT-LVL IV: ICD-10-PCS | Mod: PBBFAC,,, | Performed by: INTERNAL MEDICINE

## 2021-10-07 PROCEDURE — 1159F PR MEDICATION LIST DOCUMENTED IN MEDICAL RECORD: ICD-10-PCS | Mod: CPTII,S$GLB,, | Performed by: INTERNAL MEDICINE

## 2021-10-07 PROCEDURE — 3078F PR MOST RECENT DIASTOLIC BLOOD PRESSURE < 80 MM HG: ICD-10-PCS | Mod: CPTII,S$GLB,, | Performed by: INTERNAL MEDICINE

## 2021-10-07 PROCEDURE — 3046F HEMOGLOBIN A1C LEVEL >9.0%: CPT | Mod: CPTII,S$GLB,, | Performed by: INTERNAL MEDICINE

## 2021-10-07 PROCEDURE — 3288F FALL RISK ASSESSMENT DOCD: CPT | Mod: CPTII,S$GLB,, | Performed by: INTERNAL MEDICINE

## 2021-10-07 PROCEDURE — 99499 RISK ADDL DX/OHS AUDIT: ICD-10-PCS | Mod: S$GLB,,, | Performed by: INTERNAL MEDICINE

## 2021-10-07 PROCEDURE — 3008F BODY MASS INDEX DOCD: CPT | Mod: CPTII,S$GLB,, | Performed by: INTERNAL MEDICINE

## 2021-10-07 PROCEDURE — 3046F PR MOST RECENT HEMOGLOBIN A1C LEVEL > 9.0%: ICD-10-PCS | Mod: CPTII,S$GLB,, | Performed by: INTERNAL MEDICINE

## 2021-10-07 PROCEDURE — 3078F DIAST BP <80 MM HG: CPT | Mod: CPTII,S$GLB,, | Performed by: INTERNAL MEDICINE

## 2021-10-07 PROCEDURE — 3072F LOW RISK FOR RETINOPATHY: CPT | Mod: CPTII,S$GLB,, | Performed by: INTERNAL MEDICINE

## 2021-10-07 PROCEDURE — 99999 PR PBB SHADOW E&M-EST. PATIENT-LVL IV: CPT | Mod: PBBFAC,,, | Performed by: INTERNAL MEDICINE

## 2021-10-07 PROCEDURE — 4010F PR ACE/ARB THEARPY RXD/TAKEN: ICD-10-PCS | Mod: CPTII,S$GLB,, | Performed by: INTERNAL MEDICINE

## 2021-10-07 PROCEDURE — 1125F PR PAIN SEVERITY QUANTIFIED, PAIN PRESENT: ICD-10-PCS | Mod: CPTII,S$GLB,, | Performed by: INTERNAL MEDICINE

## 2021-10-07 PROCEDURE — 3066F PR DOCUMENTATION OF TREATMENT FOR NEPHROPATHY: ICD-10-PCS | Mod: CPTII,S$GLB,, | Performed by: INTERNAL MEDICINE

## 2021-10-07 PROCEDURE — 3074F SYST BP LT 130 MM HG: CPT | Mod: CPTII,S$GLB,, | Performed by: INTERNAL MEDICINE

## 2021-10-07 PROCEDURE — 3008F PR BODY MASS INDEX (BMI) DOCUMENTED: ICD-10-PCS | Mod: CPTII,S$GLB,, | Performed by: INTERNAL MEDICINE

## 2021-10-07 PROCEDURE — 1159F MED LIST DOCD IN RCRD: CPT | Mod: CPTII,S$GLB,, | Performed by: INTERNAL MEDICINE

## 2021-10-07 PROCEDURE — 4010F ACE/ARB THERAPY RXD/TAKEN: CPT | Mod: CPTII,S$GLB,, | Performed by: INTERNAL MEDICINE

## 2021-10-07 PROCEDURE — 1101F PR PT FALLS ASSESS DOC 0-1 FALLS W/OUT INJ PAST YR: ICD-10-PCS | Mod: CPTII,S$GLB,, | Performed by: INTERNAL MEDICINE

## 2021-10-07 PROCEDURE — 3066F NEPHROPATHY DOC TX: CPT | Mod: CPTII,S$GLB,, | Performed by: INTERNAL MEDICINE

## 2021-10-07 PROCEDURE — 99215 OFFICE O/P EST HI 40 MIN: CPT | Mod: S$GLB,,, | Performed by: INTERNAL MEDICINE

## 2021-10-07 PROCEDURE — 3072F PR LOW RISK FOR RETINOPATHY: ICD-10-PCS | Mod: CPTII,S$GLB,, | Performed by: INTERNAL MEDICINE

## 2021-10-07 PROCEDURE — 99215 PR OFFICE/OUTPT VISIT, EST, LEVL V, 40-54 MIN: ICD-10-PCS | Mod: S$GLB,,, | Performed by: INTERNAL MEDICINE

## 2021-10-07 PROCEDURE — 99499 UNLISTED E&M SERVICE: CPT | Mod: S$GLB,,, | Performed by: INTERNAL MEDICINE

## 2021-10-13 ENCOUNTER — TELEPHONE (OUTPATIENT)
Dept: SMOKING CESSATION | Facility: CLINIC | Age: 71
End: 2021-10-13

## 2021-10-18 ENCOUNTER — OFFICE VISIT (OUTPATIENT)
Dept: INTERNAL MEDICINE | Facility: CLINIC | Age: 71
End: 2021-10-18
Payer: MEDICARE

## 2021-10-18 VITALS
HEIGHT: 67 IN | SYSTOLIC BLOOD PRESSURE: 114 MMHG | WEIGHT: 240.5 LBS | HEART RATE: 81 BPM | DIASTOLIC BLOOD PRESSURE: 80 MMHG | BODY MASS INDEX: 37.75 KG/M2 | OXYGEN SATURATION: 93 %

## 2021-10-18 DIAGNOSIS — S80.12XA HEMATOMA OF LEFT LOWER LEG: Primary | ICD-10-CM

## 2021-10-18 PROCEDURE — 3046F HEMOGLOBIN A1C LEVEL >9.0%: CPT | Mod: CPTII,S$GLB,, | Performed by: INTERNAL MEDICINE

## 2021-10-18 PROCEDURE — 3046F PR MOST RECENT HEMOGLOBIN A1C LEVEL > 9.0%: ICD-10-PCS | Mod: CPTII,S$GLB,, | Performed by: INTERNAL MEDICINE

## 2021-10-18 PROCEDURE — 99213 OFFICE O/P EST LOW 20 MIN: CPT | Mod: S$GLB,,, | Performed by: INTERNAL MEDICINE

## 2021-10-18 PROCEDURE — 99999 PR PBB SHADOW E&M-EST. PATIENT-LVL IV: CPT | Mod: PBBFAC,,, | Performed by: INTERNAL MEDICINE

## 2021-10-18 PROCEDURE — 3288F PR FALLS RISK ASSESSMENT DOCUMENTED: ICD-10-PCS | Mod: CPTII,S$GLB,, | Performed by: INTERNAL MEDICINE

## 2021-10-18 PROCEDURE — 3008F PR BODY MASS INDEX (BMI) DOCUMENTED: ICD-10-PCS | Mod: CPTII,S$GLB,, | Performed by: INTERNAL MEDICINE

## 2021-10-18 PROCEDURE — 3074F SYST BP LT 130 MM HG: CPT | Mod: CPTII,S$GLB,, | Performed by: INTERNAL MEDICINE

## 2021-10-18 PROCEDURE — 3079F PR MOST RECENT DIASTOLIC BLOOD PRESSURE 80-89 MM HG: ICD-10-PCS | Mod: CPTII,S$GLB,, | Performed by: INTERNAL MEDICINE

## 2021-10-18 PROCEDURE — 1100F PR PT FALLS ASSESS DOC 2+ FALLS/FALL W/INJURY/YR: ICD-10-PCS | Mod: CPTII,S$GLB,, | Performed by: INTERNAL MEDICINE

## 2021-10-18 PROCEDURE — 1125F AMNT PAIN NOTED PAIN PRSNT: CPT | Mod: CPTII,S$GLB,, | Performed by: INTERNAL MEDICINE

## 2021-10-18 PROCEDURE — 1125F PR PAIN SEVERITY QUANTIFIED, PAIN PRESENT: ICD-10-PCS | Mod: CPTII,S$GLB,, | Performed by: INTERNAL MEDICINE

## 2021-10-18 PROCEDURE — 99213 PR OFFICE/OUTPT VISIT, EST, LEVL III, 20-29 MIN: ICD-10-PCS | Mod: S$GLB,,, | Performed by: INTERNAL MEDICINE

## 2021-10-18 PROCEDURE — 1159F PR MEDICATION LIST DOCUMENTED IN MEDICAL RECORD: ICD-10-PCS | Mod: CPTII,S$GLB,, | Performed by: INTERNAL MEDICINE

## 2021-10-18 PROCEDURE — 4010F PR ACE/ARB THEARPY RXD/TAKEN: ICD-10-PCS | Mod: CPTII,S$GLB,, | Performed by: INTERNAL MEDICINE

## 2021-10-18 PROCEDURE — 4010F ACE/ARB THERAPY RXD/TAKEN: CPT | Mod: CPTII,S$GLB,, | Performed by: INTERNAL MEDICINE

## 2021-10-18 PROCEDURE — 3072F PR LOW RISK FOR RETINOPATHY: ICD-10-PCS | Mod: CPTII,S$GLB,, | Performed by: INTERNAL MEDICINE

## 2021-10-18 PROCEDURE — 3288F FALL RISK ASSESSMENT DOCD: CPT | Mod: CPTII,S$GLB,, | Performed by: INTERNAL MEDICINE

## 2021-10-18 PROCEDURE — 3008F BODY MASS INDEX DOCD: CPT | Mod: CPTII,S$GLB,, | Performed by: INTERNAL MEDICINE

## 2021-10-18 PROCEDURE — 3074F PR MOST RECENT SYSTOLIC BLOOD PRESSURE < 130 MM HG: ICD-10-PCS | Mod: CPTII,S$GLB,, | Performed by: INTERNAL MEDICINE

## 2021-10-18 PROCEDURE — 3079F DIAST BP 80-89 MM HG: CPT | Mod: CPTII,S$GLB,, | Performed by: INTERNAL MEDICINE

## 2021-10-18 PROCEDURE — 3066F PR DOCUMENTATION OF TREATMENT FOR NEPHROPATHY: ICD-10-PCS | Mod: CPTII,S$GLB,, | Performed by: INTERNAL MEDICINE

## 2021-10-18 PROCEDURE — 1100F PTFALLS ASSESS-DOCD GE2>/YR: CPT | Mod: CPTII,S$GLB,, | Performed by: INTERNAL MEDICINE

## 2021-10-18 PROCEDURE — 1159F MED LIST DOCD IN RCRD: CPT | Mod: CPTII,S$GLB,, | Performed by: INTERNAL MEDICINE

## 2021-10-18 PROCEDURE — 3066F NEPHROPATHY DOC TX: CPT | Mod: CPTII,S$GLB,, | Performed by: INTERNAL MEDICINE

## 2021-10-18 PROCEDURE — 99999 PR PBB SHADOW E&M-EST. PATIENT-LVL IV: ICD-10-PCS | Mod: PBBFAC,,, | Performed by: INTERNAL MEDICINE

## 2021-10-18 PROCEDURE — 3072F LOW RISK FOR RETINOPATHY: CPT | Mod: CPTII,S$GLB,, | Performed by: INTERNAL MEDICINE

## 2021-10-21 ENCOUNTER — HOSPITAL ENCOUNTER (EMERGENCY)
Facility: HOSPITAL | Age: 71
Discharge: HOME | End: 2021-10-21
Attending: EMERGENCY MEDICINE
Payer: MEDICARE

## 2021-10-21 ENCOUNTER — TELEPHONE (OUTPATIENT)
Dept: INTERNAL MEDICINE | Facility: CLINIC | Age: 71
End: 2021-10-21

## 2021-10-21 VITALS
OXYGEN SATURATION: 95 % | TEMPERATURE: 96 F | DIASTOLIC BLOOD PRESSURE: 74 MMHG | BODY MASS INDEX: 36.39 KG/M2 | RESPIRATION RATE: 18 BRPM | WEIGHT: 232.38 LBS | HEART RATE: 75 BPM | SYSTOLIC BLOOD PRESSURE: 139 MMHG

## 2021-10-21 DIAGNOSIS — L03.116 CELLULITIS OF LEFT LOWER EXTREMITY: Primary | ICD-10-CM

## 2021-10-21 DIAGNOSIS — M79.89 LEG SWELLING: ICD-10-CM

## 2021-10-21 DIAGNOSIS — M79.606 LEG PAIN: ICD-10-CM

## 2021-10-21 PROCEDURE — 99284 EMERGENCY DEPT VISIT MOD MDM: CPT | Mod: 25

## 2021-10-21 RX ORDER — CLINDAMYCIN HYDROCHLORIDE 150 MG/1
450 CAPSULE ORAL 3 TIMES DAILY
Qty: 63 CAPSULE | Refills: 0 | Status: SHIPPED | OUTPATIENT
Start: 2021-10-21 | End: 2021-10-28

## 2021-11-01 ENCOUNTER — CLINICAL SUPPORT (OUTPATIENT)
Dept: SMOKING CESSATION | Facility: CLINIC | Age: 71
End: 2021-11-01
Payer: COMMERCIAL

## 2021-11-01 DIAGNOSIS — F17.200 NICOTINE DEPENDENCE: Primary | ICD-10-CM

## 2021-11-01 PROCEDURE — 99407 BEHAV CHNG SMOKING > 10 MIN: CPT | Mod: S$GLB,,,

## 2021-11-01 PROCEDURE — 99407 PR TOBACCO USE CESSATION INTENSIVE >10 MINUTES: ICD-10-PCS | Mod: S$GLB,,,

## 2021-11-03 ENCOUNTER — TELEPHONE (OUTPATIENT)
Dept: SMOKING CESSATION | Facility: CLINIC | Age: 71
End: 2021-11-03
Payer: MEDICARE

## 2021-11-04 ENCOUNTER — TELEPHONE (OUTPATIENT)
Dept: HEMATOLOGY/ONCOLOGY | Facility: CLINIC | Age: 71
End: 2021-11-04
Payer: MEDICARE

## 2021-11-05 ENCOUNTER — PATIENT MESSAGE (OUTPATIENT)
Dept: INTERNAL MEDICINE | Facility: CLINIC | Age: 71
End: 2021-11-05
Payer: MEDICARE

## 2021-11-09 ENCOUNTER — PATIENT OUTREACH (OUTPATIENT)
Dept: ADMINISTRATIVE | Facility: HOSPITAL | Age: 71
End: 2021-11-09
Payer: MEDICARE

## 2021-11-09 DIAGNOSIS — E11.8 DM (DIABETES MELLITUS) WITH COMPLICATIONS: Primary | ICD-10-CM

## 2021-11-12 ENCOUNTER — LAB VISIT (OUTPATIENT)
Dept: LAB | Facility: HOSPITAL | Age: 71
End: 2021-11-12
Attending: INTERNAL MEDICINE
Payer: MEDICARE

## 2021-11-12 DIAGNOSIS — E11.8 DM (DIABETES MELLITUS) WITH COMPLICATIONS: ICD-10-CM

## 2021-11-12 LAB
ALBUMIN/CREAT UR: 460 UG/MG (ref 0–30)
ANION GAP SERPL CALC-SCNC: 9 MMOL/L (ref 8–16)
BUN SERPL-MCNC: 17 MG/DL (ref 8–23)
CALCIUM SERPL-MCNC: 10 MG/DL (ref 8.7–10.5)
CHLORIDE SERPL-SCNC: 98 MMOL/L (ref 95–110)
CO2 SERPL-SCNC: 33 MMOL/L (ref 23–29)
CREAT SERPL-MCNC: 1.3 MG/DL (ref 0.5–1.4)
CREAT UR-MCNC: 95 MG/DL (ref 23–375)
EST. GFR  (AFRICAN AMERICAN): >60 ML/MIN/1.73 M^2
EST. GFR  (NON AFRICAN AMERICAN): 55.3 ML/MIN/1.73 M^2
ESTIMATED AVG GLUCOSE: 148 MG/DL (ref 68–131)
GLUCOSE SERPL-MCNC: 125 MG/DL (ref 70–110)
HBA1C MFR BLD: 6.8 % (ref 4–5.6)
MICROALBUMIN UR DL<=1MG/L-MCNC: 437 UG/ML
POTASSIUM SERPL-SCNC: 4.7 MMOL/L (ref 3.5–5.1)
SODIUM SERPL-SCNC: 140 MMOL/L (ref 136–145)

## 2021-11-12 PROCEDURE — 36415 COLL VENOUS BLD VENIPUNCTURE: CPT | Mod: PO | Performed by: INTERNAL MEDICINE

## 2021-11-12 PROCEDURE — 80048 BASIC METABOLIC PNL TOTAL CA: CPT | Performed by: INTERNAL MEDICINE

## 2021-11-12 PROCEDURE — 83036 HEMOGLOBIN GLYCOSYLATED A1C: CPT | Performed by: INTERNAL MEDICINE

## 2021-11-12 PROCEDURE — 82570 ASSAY OF URINE CREATININE: CPT | Performed by: INTERNAL MEDICINE

## 2021-11-15 ENCOUNTER — PATIENT MESSAGE (OUTPATIENT)
Dept: INTERNAL MEDICINE | Facility: CLINIC | Age: 71
End: 2021-11-15
Payer: MEDICARE

## 2021-11-16 ENCOUNTER — PATIENT OUTREACH (OUTPATIENT)
Dept: ADMINISTRATIVE | Facility: OTHER | Age: 71
End: 2021-11-16
Payer: MEDICARE

## 2021-11-17 ENCOUNTER — HOSPITAL ENCOUNTER (OUTPATIENT)
Dept: RADIOLOGY | Facility: HOSPITAL | Age: 71
Discharge: HOME | End: 2021-11-17
Attending: NURSE PRACTITIONER
Payer: MEDICARE

## 2021-11-17 ENCOUNTER — OFFICE VISIT (OUTPATIENT)
Dept: SLEEP MEDICINE | Facility: CLINIC | Age: 71
End: 2021-11-17
Payer: MEDICARE

## 2021-11-17 VITALS
HEART RATE: 103 BPM | BODY MASS INDEX: 36.95 KG/M2 | RESPIRATION RATE: 18 BRPM | DIASTOLIC BLOOD PRESSURE: 82 MMHG | OXYGEN SATURATION: 93 % | WEIGHT: 235.44 LBS | HEIGHT: 67 IN | SYSTOLIC BLOOD PRESSURE: 130 MMHG

## 2021-11-17 DIAGNOSIS — R06.02 SOB (SHORTNESS OF BREATH): Primary | ICD-10-CM

## 2021-11-17 DIAGNOSIS — Z72.0 TOBACCO USE: ICD-10-CM

## 2021-11-17 DIAGNOSIS — E66.01 SEVERE OBESITY (BMI 35.0-39.9) WITH COMORBIDITY: ICD-10-CM

## 2021-11-17 DIAGNOSIS — R06.02 SOB (SHORTNESS OF BREATH): ICD-10-CM

## 2021-11-17 PROCEDURE — 71046 X-RAY EXAM CHEST 2 VIEWS: CPT | Mod: TC

## 2021-11-17 PROCEDURE — 99214 PR OFFICE/OUTPT VISIT, EST, LEVL IV, 30-39 MIN: ICD-10-PCS | Mod: S$GLB,,, | Performed by: NURSE PRACTITIONER

## 2021-11-17 PROCEDURE — 3062F POS MACROALBUMINURIA REV: CPT | Mod: CPTII,S$GLB,, | Performed by: NURSE PRACTITIONER

## 2021-11-17 PROCEDURE — 71046 X-RAY EXAM CHEST 2 VIEWS: CPT | Mod: 26,,, | Performed by: RADIOLOGY

## 2021-11-17 PROCEDURE — 3072F PR LOW RISK FOR RETINOPATHY: ICD-10-PCS | Mod: CPTII,S$GLB,, | Performed by: NURSE PRACTITIONER

## 2021-11-17 PROCEDURE — 3079F PR MOST RECENT DIASTOLIC BLOOD PRESSURE 80-89 MM HG: ICD-10-PCS | Mod: CPTII,S$GLB,, | Performed by: NURSE PRACTITIONER

## 2021-11-17 PROCEDURE — 3288F PR FALLS RISK ASSESSMENT DOCUMENTED: ICD-10-PCS | Mod: CPTII,S$GLB,, | Performed by: NURSE PRACTITIONER

## 2021-11-17 PROCEDURE — 3008F BODY MASS INDEX DOCD: CPT | Mod: CPTII,S$GLB,, | Performed by: NURSE PRACTITIONER

## 2021-11-17 PROCEDURE — 1160F PR REVIEW ALL MEDS BY PRESCRIBER/CLIN PHARMACIST DOCUMENTED: ICD-10-PCS | Mod: CPTII,S$GLB,, | Performed by: NURSE PRACTITIONER

## 2021-11-17 PROCEDURE — 1159F PR MEDICATION LIST DOCUMENTED IN MEDICAL RECORD: ICD-10-PCS | Mod: CPTII,S$GLB,, | Performed by: NURSE PRACTITIONER

## 2021-11-17 PROCEDURE — 71046 XR CHEST PA AND LATERAL: ICD-10-PCS | Mod: 26,,, | Performed by: RADIOLOGY

## 2021-11-17 PROCEDURE — 3008F PR BODY MASS INDEX (BMI) DOCUMENTED: ICD-10-PCS | Mod: CPTII,S$GLB,, | Performed by: NURSE PRACTITIONER

## 2021-11-17 PROCEDURE — 1101F PT FALLS ASSESS-DOCD LE1/YR: CPT | Mod: CPTII,S$GLB,, | Performed by: NURSE PRACTITIONER

## 2021-11-17 PROCEDURE — 4010F ACE/ARB THERAPY RXD/TAKEN: CPT | Mod: CPTII,S$GLB,, | Performed by: NURSE PRACTITIONER

## 2021-11-17 PROCEDURE — 3066F NEPHROPATHY DOC TX: CPT | Mod: CPTII,S$GLB,, | Performed by: NURSE PRACTITIONER

## 2021-11-17 PROCEDURE — 99214 OFFICE O/P EST MOD 30 MIN: CPT | Mod: S$GLB,,, | Performed by: NURSE PRACTITIONER

## 2021-11-17 PROCEDURE — 99999 PR PBB SHADOW E&M-EST. PATIENT-LVL V: CPT | Mod: PBBFAC,,, | Performed by: NURSE PRACTITIONER

## 2021-11-17 PROCEDURE — 1101F PR PT FALLS ASSESS DOC 0-1 FALLS W/OUT INJ PAST YR: ICD-10-PCS | Mod: CPTII,S$GLB,, | Performed by: NURSE PRACTITIONER

## 2021-11-17 PROCEDURE — 3288F FALL RISK ASSESSMENT DOCD: CPT | Mod: CPTII,S$GLB,, | Performed by: NURSE PRACTITIONER

## 2021-11-17 PROCEDURE — 1159F MED LIST DOCD IN RCRD: CPT | Mod: CPTII,S$GLB,, | Performed by: NURSE PRACTITIONER

## 2021-11-17 PROCEDURE — 3075F SYST BP GE 130 - 139MM HG: CPT | Mod: CPTII,S$GLB,, | Performed by: NURSE PRACTITIONER

## 2021-11-17 PROCEDURE — 3066F PR DOCUMENTATION OF TREATMENT FOR NEPHROPATHY: ICD-10-PCS | Mod: CPTII,S$GLB,, | Performed by: NURSE PRACTITIONER

## 2021-11-17 PROCEDURE — 99999 PR PBB SHADOW E&M-EST. PATIENT-LVL V: ICD-10-PCS | Mod: PBBFAC,,, | Performed by: NURSE PRACTITIONER

## 2021-11-17 PROCEDURE — 3079F DIAST BP 80-89 MM HG: CPT | Mod: CPTII,S$GLB,, | Performed by: NURSE PRACTITIONER

## 2021-11-17 PROCEDURE — 99499 UNLISTED E&M SERVICE: CPT | Mod: S$GLB,,, | Performed by: NURSE PRACTITIONER

## 2021-11-17 PROCEDURE — 1160F RVW MEDS BY RX/DR IN RCRD: CPT | Mod: CPTII,S$GLB,, | Performed by: NURSE PRACTITIONER

## 2021-11-17 PROCEDURE — 3044F PR MOST RECENT HEMOGLOBIN A1C LEVEL <7.0%: ICD-10-PCS | Mod: CPTII,S$GLB,, | Performed by: NURSE PRACTITIONER

## 2021-11-17 PROCEDURE — 3075F PR MOST RECENT SYSTOLIC BLOOD PRESS GE 130-139MM HG: ICD-10-PCS | Mod: CPTII,S$GLB,, | Performed by: NURSE PRACTITIONER

## 2021-11-17 PROCEDURE — 3044F HG A1C LEVEL LT 7.0%: CPT | Mod: CPTII,S$GLB,, | Performed by: NURSE PRACTITIONER

## 2021-11-17 PROCEDURE — 99499 RISK ADDL DX/OHS AUDIT: ICD-10-PCS | Mod: S$GLB,,, | Performed by: NURSE PRACTITIONER

## 2021-11-17 PROCEDURE — 4010F PR ACE/ARB THEARPY RXD/TAKEN: ICD-10-PCS | Mod: CPTII,S$GLB,, | Performed by: NURSE PRACTITIONER

## 2021-11-17 PROCEDURE — 3072F LOW RISK FOR RETINOPATHY: CPT | Mod: CPTII,S$GLB,, | Performed by: NURSE PRACTITIONER

## 2021-11-17 PROCEDURE — 3062F PR POS MACROALBUMINURIA RESULT DOCUMENTED/REVIEW: ICD-10-PCS | Mod: CPTII,S$GLB,, | Performed by: NURSE PRACTITIONER

## 2021-11-17 RX ORDER — ALBUTEROL SULFATE 90 UG/1
2 AEROSOL, METERED RESPIRATORY (INHALATION) EVERY 4 HOURS PRN
Qty: 8.5 G | Refills: 1 | Status: SHIPPED | OUTPATIENT
Start: 2021-11-17 | End: 2022-02-14 | Stop reason: SDUPTHER

## 2021-11-19 ENCOUNTER — OFFICE VISIT (OUTPATIENT)
Dept: INTERNAL MEDICINE | Facility: CLINIC | Age: 71
End: 2021-11-19
Payer: MEDICARE

## 2021-11-19 VITALS
HEIGHT: 67 IN | HEART RATE: 88 BPM | DIASTOLIC BLOOD PRESSURE: 70 MMHG | TEMPERATURE: 98 F | SYSTOLIC BLOOD PRESSURE: 128 MMHG | OXYGEN SATURATION: 91 % | BODY MASS INDEX: 37.47 KG/M2 | WEIGHT: 238.75 LBS

## 2021-11-19 DIAGNOSIS — Z72.0 TOBACCO ABUSE: ICD-10-CM

## 2021-11-19 DIAGNOSIS — E11.8 DM (DIABETES MELLITUS) WITH COMPLICATIONS: ICD-10-CM

## 2021-11-19 DIAGNOSIS — I25.118 CORONARY ARTERY DISEASE OF NATIVE ARTERY OF NATIVE HEART WITH STABLE ANGINA PECTORIS: ICD-10-CM

## 2021-11-19 DIAGNOSIS — E66.01 CLASS 2 SEVERE OBESITY WITH SERIOUS COMORBIDITY AND BODY MASS INDEX (BMI) OF 37.0 TO 37.9 IN ADULT, UNSPECIFIED OBESITY TYPE: ICD-10-CM

## 2021-11-19 DIAGNOSIS — C61 PROSTATE CANCER: ICD-10-CM

## 2021-11-19 DIAGNOSIS — I50.32 CHRONIC DIASTOLIC CONGESTIVE HEART FAILURE: ICD-10-CM

## 2021-11-19 DIAGNOSIS — E11.22 HYPERTENSION ASSOCIATED WITH STAGE 3 CHRONIC KIDNEY DISEASE DUE TO TYPE 2 DIABETES MELLITUS: ICD-10-CM

## 2021-11-19 DIAGNOSIS — N18.30 HYPERTENSION ASSOCIATED WITH STAGE 3 CHRONIC KIDNEY DISEASE DUE TO TYPE 2 DIABETES MELLITUS: ICD-10-CM

## 2021-11-19 DIAGNOSIS — Z85.46 HISTORY OF PROSTATE CANCER: ICD-10-CM

## 2021-11-19 DIAGNOSIS — E78.5 HYPERLIPIDEMIA ASSOCIATED WITH TYPE 2 DIABETES MELLITUS: Primary | ICD-10-CM

## 2021-11-19 DIAGNOSIS — I12.9 HYPERTENSION ASSOCIATED WITH STAGE 3 CHRONIC KIDNEY DISEASE DUE TO TYPE 2 DIABETES MELLITUS: ICD-10-CM

## 2021-11-19 DIAGNOSIS — N18.32 STAGE 3B CHRONIC KIDNEY DISEASE: ICD-10-CM

## 2021-11-19 DIAGNOSIS — E11.69 HYPERLIPIDEMIA ASSOCIATED WITH TYPE 2 DIABETES MELLITUS: Primary | ICD-10-CM

## 2021-11-19 DIAGNOSIS — I50.22 CHRONIC SYSTOLIC CONGESTIVE HEART FAILURE: ICD-10-CM

## 2021-11-19 DIAGNOSIS — Z87.39 HISTORY OF GOUT: ICD-10-CM

## 2021-11-19 DIAGNOSIS — G47.33 OSA (OBSTRUCTIVE SLEEP APNEA): ICD-10-CM

## 2021-11-19 PROCEDURE — 3074F SYST BP LT 130 MM HG: CPT | Mod: CPTII,S$GLB,, | Performed by: INTERNAL MEDICINE

## 2021-11-19 PROCEDURE — 1160F RVW MEDS BY RX/DR IN RCRD: CPT | Mod: CPTII,S$GLB,, | Performed by: INTERNAL MEDICINE

## 2021-11-19 PROCEDURE — 3008F BODY MASS INDEX DOCD: CPT | Mod: CPTII,S$GLB,, | Performed by: INTERNAL MEDICINE

## 2021-11-19 PROCEDURE — 3078F DIAST BP <80 MM HG: CPT | Mod: CPTII,S$GLB,, | Performed by: INTERNAL MEDICINE

## 2021-11-19 PROCEDURE — 3062F POS MACROALBUMINURIA REV: CPT | Mod: CPTII,S$GLB,, | Performed by: INTERNAL MEDICINE

## 2021-11-19 PROCEDURE — 3008F PR BODY MASS INDEX (BMI) DOCUMENTED: ICD-10-PCS | Mod: CPTII,S$GLB,, | Performed by: INTERNAL MEDICINE

## 2021-11-19 PROCEDURE — 3044F HG A1C LEVEL LT 7.0%: CPT | Mod: CPTII,S$GLB,, | Performed by: INTERNAL MEDICINE

## 2021-11-19 PROCEDURE — 3074F PR MOST RECENT SYSTOLIC BLOOD PRESSURE < 130 MM HG: ICD-10-PCS | Mod: CPTII,S$GLB,, | Performed by: INTERNAL MEDICINE

## 2021-11-19 PROCEDURE — 1101F PR PT FALLS ASSESS DOC 0-1 FALLS W/OUT INJ PAST YR: ICD-10-PCS | Mod: CPTII,S$GLB,, | Performed by: INTERNAL MEDICINE

## 2021-11-19 PROCEDURE — 1125F AMNT PAIN NOTED PAIN PRSNT: CPT | Mod: CPTII,S$GLB,, | Performed by: INTERNAL MEDICINE

## 2021-11-19 PROCEDURE — 99214 PR OFFICE/OUTPT VISIT, EST, LEVL IV, 30-39 MIN: ICD-10-PCS | Mod: S$GLB,,, | Performed by: INTERNAL MEDICINE

## 2021-11-19 PROCEDURE — 3062F PR POS MACROALBUMINURIA RESULT DOCUMENTED/REVIEW: ICD-10-PCS | Mod: CPTII,S$GLB,, | Performed by: INTERNAL MEDICINE

## 2021-11-19 PROCEDURE — 99499 UNLISTED E&M SERVICE: CPT | Mod: S$GLB,,, | Performed by: INTERNAL MEDICINE

## 2021-11-19 PROCEDURE — 3044F PR MOST RECENT HEMOGLOBIN A1C LEVEL <7.0%: ICD-10-PCS | Mod: CPTII,S$GLB,, | Performed by: INTERNAL MEDICINE

## 2021-11-19 PROCEDURE — 1101F PT FALLS ASSESS-DOCD LE1/YR: CPT | Mod: CPTII,S$GLB,, | Performed by: INTERNAL MEDICINE

## 2021-11-19 PROCEDURE — 3066F NEPHROPATHY DOC TX: CPT | Mod: CPTII,S$GLB,, | Performed by: INTERNAL MEDICINE

## 2021-11-19 PROCEDURE — 1159F PR MEDICATION LIST DOCUMENTED IN MEDICAL RECORD: ICD-10-PCS | Mod: CPTII,S$GLB,, | Performed by: INTERNAL MEDICINE

## 2021-11-19 PROCEDURE — 99499 RISK ADDL DX/OHS AUDIT: ICD-10-PCS | Mod: S$GLB,,, | Performed by: INTERNAL MEDICINE

## 2021-11-19 PROCEDURE — 99999 PR PBB SHADOW E&M-EST. PATIENT-LVL V: ICD-10-PCS | Mod: PBBFAC,,, | Performed by: INTERNAL MEDICINE

## 2021-11-19 PROCEDURE — 4010F PR ACE/ARB THEARPY RXD/TAKEN: ICD-10-PCS | Mod: CPTII,S$GLB,, | Performed by: INTERNAL MEDICINE

## 2021-11-19 PROCEDURE — 1125F PR PAIN SEVERITY QUANTIFIED, PAIN PRESENT: ICD-10-PCS | Mod: CPTII,S$GLB,, | Performed by: INTERNAL MEDICINE

## 2021-11-19 PROCEDURE — 1159F MED LIST DOCD IN RCRD: CPT | Mod: CPTII,S$GLB,, | Performed by: INTERNAL MEDICINE

## 2021-11-19 PROCEDURE — 4010F ACE/ARB THERAPY RXD/TAKEN: CPT | Mod: CPTII,S$GLB,, | Performed by: INTERNAL MEDICINE

## 2021-11-19 PROCEDURE — 3066F PR DOCUMENTATION OF TREATMENT FOR NEPHROPATHY: ICD-10-PCS | Mod: CPTII,S$GLB,, | Performed by: INTERNAL MEDICINE

## 2021-11-19 PROCEDURE — 3072F LOW RISK FOR RETINOPATHY: CPT | Mod: CPTII,S$GLB,, | Performed by: INTERNAL MEDICINE

## 2021-11-19 PROCEDURE — 99999 PR PBB SHADOW E&M-EST. PATIENT-LVL V: CPT | Mod: PBBFAC,,, | Performed by: INTERNAL MEDICINE

## 2021-11-19 PROCEDURE — 3072F PR LOW RISK FOR RETINOPATHY: ICD-10-PCS | Mod: CPTII,S$GLB,, | Performed by: INTERNAL MEDICINE

## 2021-11-19 PROCEDURE — 3288F PR FALLS RISK ASSESSMENT DOCUMENTED: ICD-10-PCS | Mod: CPTII,S$GLB,, | Performed by: INTERNAL MEDICINE

## 2021-11-19 PROCEDURE — 99214 OFFICE O/P EST MOD 30 MIN: CPT | Mod: S$GLB,,, | Performed by: INTERNAL MEDICINE

## 2021-11-19 PROCEDURE — 3288F FALL RISK ASSESSMENT DOCD: CPT | Mod: CPTII,S$GLB,, | Performed by: INTERNAL MEDICINE

## 2021-11-19 PROCEDURE — 3078F PR MOST RECENT DIASTOLIC BLOOD PRESSURE < 80 MM HG: ICD-10-PCS | Mod: CPTII,S$GLB,, | Performed by: INTERNAL MEDICINE

## 2021-11-19 PROCEDURE — 1160F PR REVIEW ALL MEDS BY PRESCRIBER/CLIN PHARMACIST DOCUMENTED: ICD-10-PCS | Mod: CPTII,S$GLB,, | Performed by: INTERNAL MEDICINE

## 2021-11-19 RX ORDER — DICLOFENAC SODIUM 10 MG/G
GEL TOPICAL DAILY
COMMUNITY
Start: 2021-11-05 | End: 2022-03-11

## 2021-11-23 ENCOUNTER — TELEPHONE (OUTPATIENT)
Dept: SMOKING CESSATION | Facility: CLINIC | Age: 71
End: 2021-11-23
Payer: MEDICARE

## 2021-11-23 ENCOUNTER — OFFICE VISIT (OUTPATIENT)
Dept: CARDIOLOGY | Facility: CLINIC | Age: 71
End: 2021-11-23
Payer: MEDICARE

## 2021-11-23 VITALS
BODY MASS INDEX: 37.46 KG/M2 | SYSTOLIC BLOOD PRESSURE: 140 MMHG | WEIGHT: 239.19 LBS | HEART RATE: 85 BPM | OXYGEN SATURATION: 95 % | DIASTOLIC BLOOD PRESSURE: 86 MMHG

## 2021-11-23 DIAGNOSIS — I12.9 HYPERTENSIVE CHRONIC KIDNEY DISEASE WITH STAGE 1 THROUGH STAGE 4 CHRONIC KIDNEY DISEASE, OR UNSPECIFIED CHRONIC KIDNEY DISEASE: ICD-10-CM

## 2021-11-23 DIAGNOSIS — I12.9 HYPERTENSION ASSOCIATED WITH STAGE 3 CHRONIC KIDNEY DISEASE DUE TO TYPE 2 DIABETES MELLITUS: ICD-10-CM

## 2021-11-23 DIAGNOSIS — E11.8 DM (DIABETES MELLITUS) WITH COMPLICATIONS: ICD-10-CM

## 2021-11-23 DIAGNOSIS — N18.32 STAGE 3B CHRONIC KIDNEY DISEASE: ICD-10-CM

## 2021-11-23 DIAGNOSIS — Z72.0 TOBACCO ABUSE: ICD-10-CM

## 2021-11-23 DIAGNOSIS — E11.69 HYPERLIPIDEMIA ASSOCIATED WITH TYPE 2 DIABETES MELLITUS: ICD-10-CM

## 2021-11-23 DIAGNOSIS — I25.118 CORONARY ARTERY DISEASE OF NATIVE ARTERY OF NATIVE HEART WITH STABLE ANGINA PECTORIS: ICD-10-CM

## 2021-11-23 DIAGNOSIS — E78.5 HYPERLIPIDEMIA ASSOCIATED WITH TYPE 2 DIABETES MELLITUS: ICD-10-CM

## 2021-11-23 DIAGNOSIS — N18.30 HYPERTENSION ASSOCIATED WITH STAGE 3 CHRONIC KIDNEY DISEASE DUE TO TYPE 2 DIABETES MELLITUS: ICD-10-CM

## 2021-11-23 DIAGNOSIS — E11.22 HYPERTENSION ASSOCIATED WITH STAGE 3 CHRONIC KIDNEY DISEASE DUE TO TYPE 2 DIABETES MELLITUS: ICD-10-CM

## 2021-11-23 DIAGNOSIS — I50.32 CHRONIC DIASTOLIC CONGESTIVE HEART FAILURE: ICD-10-CM

## 2021-11-23 DIAGNOSIS — I50.22 CHRONIC SYSTOLIC CONGESTIVE HEART FAILURE: Primary | ICD-10-CM

## 2021-11-23 PROCEDURE — 3066F PR DOCUMENTATION OF TREATMENT FOR NEPHROPATHY: ICD-10-PCS | Mod: CPTII,S$GLB,, | Performed by: INTERNAL MEDICINE

## 2021-11-23 PROCEDURE — 4010F PR ACE/ARB THEARPY RXD/TAKEN: ICD-10-PCS | Mod: CPTII,S$GLB,, | Performed by: INTERNAL MEDICINE

## 2021-11-23 PROCEDURE — 99214 OFFICE O/P EST MOD 30 MIN: CPT | Mod: S$GLB,,, | Performed by: INTERNAL MEDICINE

## 2021-11-23 PROCEDURE — 99214 PR OFFICE/OUTPT VISIT, EST, LEVL IV, 30-39 MIN: ICD-10-PCS | Mod: S$GLB,,, | Performed by: INTERNAL MEDICINE

## 2021-11-23 PROCEDURE — 99499 UNLISTED E&M SERVICE: CPT | Mod: S$GLB,,, | Performed by: INTERNAL MEDICINE

## 2021-11-23 PROCEDURE — 3072F PR LOW RISK FOR RETINOPATHY: ICD-10-PCS | Mod: CPTII,S$GLB,, | Performed by: INTERNAL MEDICINE

## 2021-11-23 PROCEDURE — 99999 PR PBB SHADOW E&M-EST. PATIENT-LVL V: CPT | Mod: PBBFAC,,, | Performed by: INTERNAL MEDICINE

## 2021-11-23 PROCEDURE — 3062F PR POS MACROALBUMINURIA RESULT DOCUMENTED/REVIEW: ICD-10-PCS | Mod: CPTII,S$GLB,, | Performed by: INTERNAL MEDICINE

## 2021-11-23 PROCEDURE — 99999 PR PBB SHADOW E&M-EST. PATIENT-LVL V: ICD-10-PCS | Mod: PBBFAC,,, | Performed by: INTERNAL MEDICINE

## 2021-11-23 PROCEDURE — 4010F ACE/ARB THERAPY RXD/TAKEN: CPT | Mod: CPTII,S$GLB,, | Performed by: INTERNAL MEDICINE

## 2021-11-23 PROCEDURE — 3066F NEPHROPATHY DOC TX: CPT | Mod: CPTII,S$GLB,, | Performed by: INTERNAL MEDICINE

## 2021-11-23 PROCEDURE — 3072F LOW RISK FOR RETINOPATHY: CPT | Mod: CPTII,S$GLB,, | Performed by: INTERNAL MEDICINE

## 2021-11-23 PROCEDURE — 99499 RISK ADDL DX/OHS AUDIT: ICD-10-PCS | Mod: S$GLB,,, | Performed by: INTERNAL MEDICINE

## 2021-11-23 PROCEDURE — 3062F POS MACROALBUMINURIA REV: CPT | Mod: CPTII,S$GLB,, | Performed by: INTERNAL MEDICINE

## 2021-11-23 RX ORDER — TORSEMIDE 20 MG/1
40 TABLET ORAL 2 TIMES DAILY
Qty: 120 TABLET | Refills: 11 | Status: SHIPPED | OUTPATIENT
Start: 2021-11-23 | End: 2021-12-01 | Stop reason: SDUPTHER

## 2021-11-30 ENCOUNTER — LAB VISIT (OUTPATIENT)
Dept: LAB | Facility: HOSPITAL | Age: 71
End: 2021-11-30
Attending: INTERNAL MEDICINE
Payer: MEDICARE

## 2021-11-30 DIAGNOSIS — I50.22 CHRONIC SYSTOLIC CONGESTIVE HEART FAILURE: ICD-10-CM

## 2021-11-30 LAB
ANION GAP SERPL CALC-SCNC: 10 MMOL/L (ref 8–16)
BNP SERPL-MCNC: <10 PG/ML (ref 0–99)
BUN SERPL-MCNC: 12 MG/DL (ref 8–23)
CALCIUM SERPL-MCNC: 10 MG/DL (ref 8.7–10.5)
CHLORIDE SERPL-SCNC: 96 MMOL/L (ref 95–110)
CO2 SERPL-SCNC: 32 MMOL/L (ref 23–29)
CREAT SERPL-MCNC: 1.6 MG/DL (ref 0.5–1.4)
EST. GFR  (AFRICAN AMERICAN): 49.7 ML/MIN/1.73 M^2
EST. GFR  (NON AFRICAN AMERICAN): 43 ML/MIN/1.73 M^2
GLUCOSE SERPL-MCNC: 145 MG/DL (ref 70–110)
POTASSIUM SERPL-SCNC: 4.6 MMOL/L (ref 3.5–5.1)
SODIUM SERPL-SCNC: 138 MMOL/L (ref 136–145)

## 2021-11-30 PROCEDURE — 80048 BASIC METABOLIC PNL TOTAL CA: CPT | Performed by: INTERNAL MEDICINE

## 2021-11-30 PROCEDURE — 36415 COLL VENOUS BLD VENIPUNCTURE: CPT | Performed by: INTERNAL MEDICINE

## 2021-11-30 PROCEDURE — 83880 ASSAY OF NATRIURETIC PEPTIDE: CPT | Performed by: INTERNAL MEDICINE

## 2021-12-01 ENCOUNTER — TELEPHONE (OUTPATIENT)
Dept: CARDIOLOGY | Facility: CLINIC | Age: 71
End: 2021-12-01
Payer: MEDICARE

## 2021-12-01 DIAGNOSIS — I50.22 CHRONIC SYSTOLIC CONGESTIVE HEART FAILURE: Primary | ICD-10-CM

## 2021-12-01 DIAGNOSIS — I50.32 CHRONIC DIASTOLIC CONGESTIVE HEART FAILURE: Primary | ICD-10-CM

## 2021-12-01 RX ORDER — TORSEMIDE 20 MG/1
20 TABLET ORAL 2 TIMES DAILY
Qty: 60 TABLET | Refills: 11 | Status: SHIPPED | OUTPATIENT
Start: 2021-12-01 | End: 2021-12-12 | Stop reason: SDUPTHER

## 2021-12-02 ENCOUNTER — PATIENT MESSAGE (OUTPATIENT)
Dept: CARDIOLOGY | Facility: CLINIC | Age: 71
End: 2021-12-02
Payer: MEDICARE

## 2021-12-03 ENCOUNTER — HOSPITAL ENCOUNTER (OUTPATIENT)
Dept: CARDIOLOGY | Facility: HOSPITAL | Age: 71
Discharge: HOME | End: 2021-12-03
Attending: INTERNAL MEDICINE
Payer: MEDICARE

## 2021-12-03 ENCOUNTER — OFFICE VISIT (OUTPATIENT)
Dept: CARDIOLOGY | Facility: CLINIC | Age: 71
End: 2021-12-03
Payer: MEDICARE

## 2021-12-03 VITALS
BODY MASS INDEX: 37.28 KG/M2 | SYSTOLIC BLOOD PRESSURE: 110 MMHG | DIASTOLIC BLOOD PRESSURE: 74 MMHG | HEART RATE: 73 BPM | WEIGHT: 238 LBS | OXYGEN SATURATION: 94 %

## 2021-12-03 DIAGNOSIS — E11.69 HYPERLIPIDEMIA ASSOCIATED WITH TYPE 2 DIABETES MELLITUS: ICD-10-CM

## 2021-12-03 DIAGNOSIS — I50.32 CHRONIC DIASTOLIC CONGESTIVE HEART FAILURE: ICD-10-CM

## 2021-12-03 DIAGNOSIS — I73.9 PVD (PERIPHERAL VASCULAR DISEASE): Primary | ICD-10-CM

## 2021-12-03 DIAGNOSIS — E66.01 CLASS 2 SEVERE OBESITY WITH SERIOUS COMORBIDITY AND BODY MASS INDEX (BMI) OF 37.0 TO 37.9 IN ADULT, UNSPECIFIED OBESITY TYPE: ICD-10-CM

## 2021-12-03 DIAGNOSIS — E78.5 HYPERLIPIDEMIA ASSOCIATED WITH TYPE 2 DIABETES MELLITUS: ICD-10-CM

## 2021-12-03 DIAGNOSIS — N18.30 HYPERTENSION ASSOCIATED WITH STAGE 3 CHRONIC KIDNEY DISEASE DUE TO TYPE 2 DIABETES MELLITUS: ICD-10-CM

## 2021-12-03 DIAGNOSIS — E11.22 HYPERTENSION ASSOCIATED WITH STAGE 3 CHRONIC KIDNEY DISEASE DUE TO TYPE 2 DIABETES MELLITUS: ICD-10-CM

## 2021-12-03 DIAGNOSIS — I12.9 HYPERTENSION ASSOCIATED WITH STAGE 3 CHRONIC KIDNEY DISEASE DUE TO TYPE 2 DIABETES MELLITUS: ICD-10-CM

## 2021-12-03 DIAGNOSIS — E11.8 DM (DIABETES MELLITUS) WITH COMPLICATIONS: ICD-10-CM

## 2021-12-03 DIAGNOSIS — N18.32 STAGE 3B CHRONIC KIDNEY DISEASE: ICD-10-CM

## 2021-12-03 DIAGNOSIS — I42.8 NICM (NONISCHEMIC CARDIOMYOPATHY): ICD-10-CM

## 2021-12-03 PROCEDURE — 4010F ACE/ARB THERAPY RXD/TAKEN: CPT | Mod: CPTII,S$GLB,, | Performed by: INTERNAL MEDICINE

## 2021-12-03 PROCEDURE — 3066F PR DOCUMENTATION OF TREATMENT FOR NEPHROPATHY: ICD-10-PCS | Mod: CPTII,S$GLB,, | Performed by: INTERNAL MEDICINE

## 2021-12-03 PROCEDURE — 3072F PR LOW RISK FOR RETINOPATHY: ICD-10-PCS | Mod: CPTII,S$GLB,, | Performed by: INTERNAL MEDICINE

## 2021-12-03 PROCEDURE — 4010F PR ACE/ARB THEARPY RXD/TAKEN: ICD-10-PCS | Mod: CPTII,S$GLB,, | Performed by: INTERNAL MEDICINE

## 2021-12-03 PROCEDURE — 99499 RISK ADDL DX/OHS AUDIT: ICD-10-PCS | Mod: S$GLB,,, | Performed by: INTERNAL MEDICINE

## 2021-12-03 PROCEDURE — 3066F NEPHROPATHY DOC TX: CPT | Mod: CPTII,S$GLB,, | Performed by: INTERNAL MEDICINE

## 2021-12-03 PROCEDURE — 99999 PR PBB SHADOW E&M-EST. PATIENT-LVL IV: CPT | Mod: PBBFAC,,, | Performed by: INTERNAL MEDICINE

## 2021-12-03 PROCEDURE — 3062F PR POS MACROALBUMINURIA RESULT DOCUMENTED/REVIEW: ICD-10-PCS | Mod: CPTII,S$GLB,, | Performed by: INTERNAL MEDICINE

## 2021-12-03 PROCEDURE — 99499 UNLISTED E&M SERVICE: CPT | Mod: S$GLB,,, | Performed by: INTERNAL MEDICINE

## 2021-12-03 PROCEDURE — 93010 ELECTROCARDIOGRAM REPORT: CPT | Mod: ,,, | Performed by: INTERNAL MEDICINE

## 2021-12-03 PROCEDURE — 99214 PR OFFICE/OUTPT VISIT, EST, LEVL IV, 30-39 MIN: ICD-10-PCS | Mod: S$GLB,,, | Performed by: INTERNAL MEDICINE

## 2021-12-03 PROCEDURE — 3072F LOW RISK FOR RETINOPATHY: CPT | Mod: CPTII,S$GLB,, | Performed by: INTERNAL MEDICINE

## 2021-12-03 PROCEDURE — 3062F POS MACROALBUMINURIA REV: CPT | Mod: CPTII,S$GLB,, | Performed by: INTERNAL MEDICINE

## 2021-12-03 PROCEDURE — 93010 EKG 12-LEAD: ICD-10-PCS | Mod: ,,, | Performed by: INTERNAL MEDICINE

## 2021-12-03 PROCEDURE — 99214 OFFICE O/P EST MOD 30 MIN: CPT | Mod: S$GLB,,, | Performed by: INTERNAL MEDICINE

## 2021-12-03 PROCEDURE — 93005 ELECTROCARDIOGRAM TRACING: CPT

## 2021-12-03 PROCEDURE — 99999 PR PBB SHADOW E&M-EST. PATIENT-LVL IV: ICD-10-PCS | Mod: PBBFAC,,, | Performed by: INTERNAL MEDICINE

## 2021-12-04 ENCOUNTER — HOSPITAL ENCOUNTER (EMERGENCY)
Facility: HOSPITAL | Age: 71
Discharge: HOME | End: 2021-12-04
Attending: EMERGENCY MEDICINE
Payer: MEDICARE

## 2021-12-04 VITALS
SYSTOLIC BLOOD PRESSURE: 134 MMHG | HEART RATE: 80 BPM | TEMPERATURE: 98 F | WEIGHT: 237 LBS | DIASTOLIC BLOOD PRESSURE: 70 MMHG | BODY MASS INDEX: 37.12 KG/M2 | RESPIRATION RATE: 18 BRPM | OXYGEN SATURATION: 96 %

## 2021-12-04 DIAGNOSIS — S62.661B NONDISPLACED FRACTURE OF DISTAL PHALANX OF LEFT INDEX FINGER, INITIAL ENCOUNTER FOR OPEN FRACTURE: Primary | ICD-10-CM

## 2021-12-04 PROBLEM — I42.8 NICM (NONISCHEMIC CARDIOMYOPATHY): Status: ACTIVE | Noted: 2021-12-04

## 2021-12-04 PROBLEM — I73.9 PVD (PERIPHERAL VASCULAR DISEASE): Status: ACTIVE | Noted: 2021-12-04

## 2021-12-04 PROCEDURE — 12001 RPR S/N/AX/GEN/TRNK 2.5CM/<: CPT

## 2021-12-04 PROCEDURE — 25000003 PHARM REV CODE 250: Performed by: NURSE PRACTITIONER

## 2021-12-04 PROCEDURE — 90471 IMMUNIZATION ADMIN: CPT | Performed by: NURSE PRACTITIONER

## 2021-12-04 PROCEDURE — 96372 THER/PROPH/DIAG INJ SC/IM: CPT | Mod: 59

## 2021-12-04 PROCEDURE — 99284 EMERGENCY DEPT VISIT MOD MDM: CPT | Mod: 25

## 2021-12-04 PROCEDURE — 63600175 PHARM REV CODE 636 W HCPCS: Performed by: NURSE PRACTITIONER

## 2021-12-04 PROCEDURE — 90715 TDAP VACCINE 7 YRS/> IM: CPT | Performed by: NURSE PRACTITIONER

## 2021-12-04 RX ORDER — CEPHALEXIN 500 MG/1
500 CAPSULE ORAL 4 TIMES DAILY
Qty: 28 CAPSULE | Refills: 0 | Status: SHIPPED | OUTPATIENT
Start: 2021-12-04 | End: 2021-12-11

## 2021-12-04 RX ORDER — CEFAZOLIN SODIUM 1 G/3ML
1 INJECTION, POWDER, FOR SOLUTION INTRAMUSCULAR; INTRAVENOUS
Status: COMPLETED | OUTPATIENT
Start: 2021-12-04 | End: 2021-12-04

## 2021-12-04 RX ORDER — LIDOCAINE HYDROCHLORIDE 10 MG/ML
10 INJECTION, SOLUTION EPIDURAL; INFILTRATION; INTRACAUDAL; PERINEURAL
Status: COMPLETED | OUTPATIENT
Start: 2021-12-04 | End: 2021-12-04

## 2021-12-04 RX ADMIN — Medication: at 08:12

## 2021-12-04 RX ADMIN — TETANUS TOXOID, REDUCED DIPHTHERIA TOXOID AND ACELLULAR PERTUSSIS VACCINE, ADSORBED 0.5 ML: 5; 2.5; 8; 8; 2.5 SUSPENSION INTRAMUSCULAR at 08:12

## 2021-12-04 RX ADMIN — LIDOCAINE HYDROCHLORIDE 100 MG: 10 INJECTION, SOLUTION EPIDURAL; INFILTRATION; INTRACAUDAL at 08:12

## 2021-12-04 RX ADMIN — CEFAZOLIN 1 G: 330 INJECTION, POWDER, FOR SOLUTION INTRAMUSCULAR; INTRAVENOUS at 10:12

## 2021-12-09 ENCOUNTER — LAB VISIT (OUTPATIENT)
Dept: LAB | Facility: HOSPITAL | Age: 71
End: 2021-12-09
Attending: INTERNAL MEDICINE
Payer: MEDICARE

## 2021-12-09 DIAGNOSIS — I50.22 CHRONIC SYSTOLIC CONGESTIVE HEART FAILURE: ICD-10-CM

## 2021-12-09 LAB
ANION GAP SERPL CALC-SCNC: 15 MMOL/L (ref 8–16)
BNP SERPL-MCNC: 13 PG/ML (ref 0–99)
BUN SERPL-MCNC: 12 MG/DL (ref 8–23)
CALCIUM SERPL-MCNC: 10 MG/DL (ref 8.7–10.5)
CHLORIDE SERPL-SCNC: 99 MMOL/L (ref 95–110)
CO2 SERPL-SCNC: 29 MMOL/L (ref 23–29)
CREAT SERPL-MCNC: 1.5 MG/DL (ref 0.5–1.4)
EST. GFR  (AFRICAN AMERICAN): 53.8 ML/MIN/1.73 M^2
EST. GFR  (NON AFRICAN AMERICAN): 46.5 ML/MIN/1.73 M^2
GLUCOSE SERPL-MCNC: 144 MG/DL (ref 70–110)
POTASSIUM SERPL-SCNC: 5 MMOL/L (ref 3.5–5.1)
SODIUM SERPL-SCNC: 143 MMOL/L (ref 136–145)

## 2021-12-09 PROCEDURE — 80048 BASIC METABOLIC PNL TOTAL CA: CPT | Performed by: INTERNAL MEDICINE

## 2021-12-09 PROCEDURE — 83880 ASSAY OF NATRIURETIC PEPTIDE: CPT | Performed by: INTERNAL MEDICINE

## 2021-12-09 PROCEDURE — 36415 COLL VENOUS BLD VENIPUNCTURE: CPT | Performed by: INTERNAL MEDICINE

## 2021-12-10 ENCOUNTER — OFFICE VISIT (OUTPATIENT)
Dept: OPHTHALMOLOGY | Facility: CLINIC | Age: 71
End: 2021-12-10
Payer: MEDICARE

## 2021-12-10 ENCOUNTER — TELEPHONE (OUTPATIENT)
Dept: OPHTHALMOLOGY | Facility: CLINIC | Age: 71
End: 2021-12-10

## 2021-12-10 DIAGNOSIS — E11.9 DIABETES MELLITUS TYPE 2 WITHOUT RETINOPATHY: Primary | ICD-10-CM

## 2021-12-10 DIAGNOSIS — H26.9 CORTICAL CATARACT OF BOTH EYES: ICD-10-CM

## 2021-12-10 DIAGNOSIS — H49.01 THIRD NERVE PALSY, RIGHT: ICD-10-CM

## 2021-12-10 PROCEDURE — 92004 COMPRE OPH EXAM NEW PT 1/>: CPT | Mod: S$GLB,,, | Performed by: OPHTHALMOLOGY

## 2021-12-10 PROCEDURE — 99999 PR PBB SHADOW E&M-EST. PATIENT-LVL II: ICD-10-PCS | Mod: PBBFAC,,, | Performed by: OPHTHALMOLOGY

## 2021-12-10 PROCEDURE — 3066F PR DOCUMENTATION OF TREATMENT FOR NEPHROPATHY: ICD-10-PCS | Mod: CPTII,S$GLB,, | Performed by: OPHTHALMOLOGY

## 2021-12-10 PROCEDURE — 3062F PR POS MACROALBUMINURIA RESULT DOCUMENTED/REVIEW: ICD-10-PCS | Mod: CPTII,S$GLB,, | Performed by: OPHTHALMOLOGY

## 2021-12-10 PROCEDURE — 99499 UNLISTED E&M SERVICE: CPT | Mod: S$GLB,,, | Performed by: OPHTHALMOLOGY

## 2021-12-10 PROCEDURE — 92004 PR EYE EXAM, NEW PATIENT,COMPREHESV: ICD-10-PCS | Mod: S$GLB,,, | Performed by: OPHTHALMOLOGY

## 2021-12-10 PROCEDURE — 3062F POS MACROALBUMINURIA REV: CPT | Mod: CPTII,S$GLB,, | Performed by: OPHTHALMOLOGY

## 2021-12-10 PROCEDURE — 2023F PR DILATED RETINAL EXAM W/O EVID OF RETINOPATHY: ICD-10-PCS | Mod: CPTII,S$GLB,, | Performed by: OPHTHALMOLOGY

## 2021-12-10 PROCEDURE — 3066F NEPHROPATHY DOC TX: CPT | Mod: CPTII,S$GLB,, | Performed by: OPHTHALMOLOGY

## 2021-12-10 PROCEDURE — 99499 RISK ADDL DX/OHS AUDIT: ICD-10-PCS | Mod: S$GLB,,, | Performed by: OPHTHALMOLOGY

## 2021-12-10 PROCEDURE — 99999 PR PBB SHADOW E&M-EST. PATIENT-LVL II: CPT | Mod: PBBFAC,,, | Performed by: OPHTHALMOLOGY

## 2021-12-10 PROCEDURE — 4010F PR ACE/ARB THEARPY RXD/TAKEN: ICD-10-PCS | Mod: CPTII,S$GLB,, | Performed by: OPHTHALMOLOGY

## 2021-12-10 PROCEDURE — 2023F DILAT RTA XM W/O RTNOPTHY: CPT | Mod: CPTII,S$GLB,, | Performed by: OPHTHALMOLOGY

## 2021-12-10 PROCEDURE — 4010F ACE/ARB THERAPY RXD/TAKEN: CPT | Mod: CPTII,S$GLB,, | Performed by: OPHTHALMOLOGY

## 2021-12-12 ENCOUNTER — TELEPHONE (OUTPATIENT)
Dept: CARDIOLOGY | Facility: CLINIC | Age: 71
End: 2021-12-12
Payer: MEDICARE

## 2021-12-12 RX ORDER — TORSEMIDE 20 MG/1
20 TABLET ORAL DAILY
Qty: 30 TABLET | Refills: 11 | Status: SHIPPED | OUTPATIENT
Start: 2021-12-12 | End: 2022-10-18 | Stop reason: SDUPTHER

## 2021-12-13 ENCOUNTER — TELEPHONE (OUTPATIENT)
Dept: CARDIOLOGY | Facility: CLINIC | Age: 71
End: 2021-12-13
Payer: MEDICARE

## 2021-12-13 ENCOUNTER — TELEPHONE (OUTPATIENT)
Dept: OPHTHALMOLOGY | Facility: CLINIC | Age: 71
End: 2021-12-13
Payer: MEDICARE

## 2021-12-14 ENCOUNTER — TELEPHONE (OUTPATIENT)
Dept: ORTHOPEDICS | Facility: CLINIC | Age: 71
End: 2021-12-14
Payer: MEDICARE

## 2021-12-14 ENCOUNTER — TELEPHONE (OUTPATIENT)
Dept: OPHTHALMOLOGY | Facility: CLINIC | Age: 71
End: 2021-12-14
Payer: MEDICARE

## 2021-12-14 DIAGNOSIS — M79.642 PAIN OF LEFT HAND: Primary | ICD-10-CM

## 2021-12-15 ENCOUNTER — TELEPHONE (OUTPATIENT)
Dept: NEUROLOGY | Facility: CLINIC | Age: 71
End: 2021-12-15
Payer: MEDICARE

## 2021-12-16 DIAGNOSIS — M79.645 PAIN OF FINGER OF LEFT HAND: Primary | ICD-10-CM

## 2021-12-17 ENCOUNTER — OFFICE VISIT (OUTPATIENT)
Dept: ORTHOPEDICS | Facility: CLINIC | Age: 71
End: 2021-12-17
Payer: MEDICARE

## 2021-12-17 ENCOUNTER — HOSPITAL ENCOUNTER (OUTPATIENT)
Dept: RADIOLOGY | Facility: HOSPITAL | Age: 71
Discharge: HOME | End: 2021-12-17
Attending: ORTHOPAEDIC SURGERY
Payer: MEDICARE

## 2021-12-17 VITALS
DIASTOLIC BLOOD PRESSURE: 77 MMHG | HEIGHT: 67 IN | SYSTOLIC BLOOD PRESSURE: 124 MMHG | BODY MASS INDEX: 37.2 KG/M2 | TEMPERATURE: 98 F | WEIGHT: 237 LBS | HEART RATE: 76 BPM

## 2021-12-17 DIAGNOSIS — M79.645 PAIN OF FINGER OF LEFT HAND: Primary | ICD-10-CM

## 2021-12-17 DIAGNOSIS — S62.639A CLOSED FRACTURE OF TUFT OF DISTAL PHALANX OF FINGER: Primary | ICD-10-CM

## 2021-12-17 DIAGNOSIS — M79.645 PAIN OF FINGER OF LEFT HAND: ICD-10-CM

## 2021-12-17 DIAGNOSIS — E11.8 DM (DIABETES MELLITUS) WITH COMPLICATIONS: ICD-10-CM

## 2021-12-17 PROCEDURE — 3062F PR POS MACROALBUMINURIA RESULT DOCUMENTED/REVIEW: ICD-10-PCS | Mod: CPTII,S$GLB,, | Performed by: ORTHOPAEDIC SURGERY

## 2021-12-17 PROCEDURE — 3072F LOW RISK FOR RETINOPATHY: CPT | Mod: CPTII,S$GLB,, | Performed by: ORTHOPAEDIC SURGERY

## 2021-12-17 PROCEDURE — 73140 X-RAY EXAM OF FINGER(S): CPT | Mod: TC

## 2021-12-17 PROCEDURE — 73140 X-RAY EXAM OF FINGER(S): CPT | Mod: 26,LT,, | Performed by: RADIOLOGY

## 2021-12-17 PROCEDURE — 99214 PR OFFICE/OUTPT VISIT, EST, LEVL IV, 30-39 MIN: ICD-10-PCS | Mod: S$GLB,,, | Performed by: ORTHOPAEDIC SURGERY

## 2021-12-17 PROCEDURE — 99999 PR PBB SHADOW E&M-EST. PATIENT-LVL V: CPT | Mod: PBBFAC,,, | Performed by: ORTHOPAEDIC SURGERY

## 2021-12-17 PROCEDURE — 4010F PR ACE/ARB THEARPY RXD/TAKEN: ICD-10-PCS | Mod: CPTII,S$GLB,, | Performed by: ORTHOPAEDIC SURGERY

## 2021-12-17 PROCEDURE — 3072F PR LOW RISK FOR RETINOPATHY: ICD-10-PCS | Mod: CPTII,S$GLB,, | Performed by: ORTHOPAEDIC SURGERY

## 2021-12-17 PROCEDURE — 3066F NEPHROPATHY DOC TX: CPT | Mod: CPTII,S$GLB,, | Performed by: ORTHOPAEDIC SURGERY

## 2021-12-17 PROCEDURE — 73140 XR FINGER 2 OR MORE VIEWS: ICD-10-PCS | Mod: 26,LT,, | Performed by: RADIOLOGY

## 2021-12-17 PROCEDURE — 3066F PR DOCUMENTATION OF TREATMENT FOR NEPHROPATHY: ICD-10-PCS | Mod: CPTII,S$GLB,, | Performed by: ORTHOPAEDIC SURGERY

## 2021-12-17 PROCEDURE — 99214 OFFICE O/P EST MOD 30 MIN: CPT | Mod: S$GLB,,, | Performed by: ORTHOPAEDIC SURGERY

## 2021-12-17 PROCEDURE — 3062F POS MACROALBUMINURIA REV: CPT | Mod: CPTII,S$GLB,, | Performed by: ORTHOPAEDIC SURGERY

## 2021-12-17 PROCEDURE — 4010F ACE/ARB THERAPY RXD/TAKEN: CPT | Mod: CPTII,S$GLB,, | Performed by: ORTHOPAEDIC SURGERY

## 2021-12-17 PROCEDURE — 99999 PR PBB SHADOW E&M-EST. PATIENT-LVL V: ICD-10-PCS | Mod: PBBFAC,,, | Performed by: ORTHOPAEDIC SURGERY

## 2021-12-30 ENCOUNTER — OFFICE VISIT (OUTPATIENT)
Dept: INTERNAL MEDICINE | Facility: CLINIC | Age: 71
End: 2021-12-30
Payer: MEDICARE

## 2021-12-30 VITALS
BODY MASS INDEX: 37.72 KG/M2 | DIASTOLIC BLOOD PRESSURE: 69 MMHG | OXYGEN SATURATION: 92 % | SYSTOLIC BLOOD PRESSURE: 133 MMHG | HEIGHT: 67 IN | WEIGHT: 240.31 LBS | TEMPERATURE: 98 F | HEART RATE: 75 BPM

## 2021-12-30 DIAGNOSIS — N18.32 STAGE 3B CHRONIC KIDNEY DISEASE: Primary | ICD-10-CM

## 2021-12-30 DIAGNOSIS — I50.32 CHRONIC DIASTOLIC CONGESTIVE HEART FAILURE: ICD-10-CM

## 2021-12-30 DIAGNOSIS — I50.22 CHRONIC SYSTOLIC CONGESTIVE HEART FAILURE: ICD-10-CM

## 2021-12-30 DIAGNOSIS — I87.2 CHRONIC VENOUS INSUFFICIENCY: ICD-10-CM

## 2021-12-30 PROCEDURE — 1159F PR MEDICATION LIST DOCUMENTED IN MEDICAL RECORD: ICD-10-PCS | Mod: CPTII,S$GLB,, | Performed by: INTERNAL MEDICINE

## 2021-12-30 PROCEDURE — 3066F PR DOCUMENTATION OF TREATMENT FOR NEPHROPATHY: ICD-10-PCS | Mod: CPTII,S$GLB,, | Performed by: INTERNAL MEDICINE

## 2021-12-30 PROCEDURE — 3072F LOW RISK FOR RETINOPATHY: CPT | Mod: CPTII,S$GLB,, | Performed by: INTERNAL MEDICINE

## 2021-12-30 PROCEDURE — 3066F NEPHROPATHY DOC TX: CPT | Mod: CPTII,S$GLB,, | Performed by: INTERNAL MEDICINE

## 2021-12-30 PROCEDURE — 4010F ACE/ARB THERAPY RXD/TAKEN: CPT | Mod: CPTII,S$GLB,, | Performed by: INTERNAL MEDICINE

## 2021-12-30 PROCEDURE — 3078F PR MOST RECENT DIASTOLIC BLOOD PRESSURE < 80 MM HG: ICD-10-PCS | Mod: CPTII,S$GLB,, | Performed by: INTERNAL MEDICINE

## 2021-12-30 PROCEDURE — 3075F SYST BP GE 130 - 139MM HG: CPT | Mod: CPTII,S$GLB,, | Performed by: INTERNAL MEDICINE

## 2021-12-30 PROCEDURE — 3288F FALL RISK ASSESSMENT DOCD: CPT | Mod: CPTII,S$GLB,, | Performed by: INTERNAL MEDICINE

## 2021-12-30 PROCEDURE — 3288F PR FALLS RISK ASSESSMENT DOCUMENTED: ICD-10-PCS | Mod: CPTII,S$GLB,, | Performed by: INTERNAL MEDICINE

## 2021-12-30 PROCEDURE — 3062F PR POS MACROALBUMINURIA RESULT DOCUMENTED/REVIEW: ICD-10-PCS | Mod: CPTII,S$GLB,, | Performed by: INTERNAL MEDICINE

## 2021-12-30 PROCEDURE — 1159F MED LIST DOCD IN RCRD: CPT | Mod: CPTII,S$GLB,, | Performed by: INTERNAL MEDICINE

## 2021-12-30 PROCEDURE — 1125F PR PAIN SEVERITY QUANTIFIED, PAIN PRESENT: ICD-10-PCS | Mod: CPTII,S$GLB,, | Performed by: INTERNAL MEDICINE

## 2021-12-30 PROCEDURE — 99214 PR OFFICE/OUTPT VISIT, EST, LEVL IV, 30-39 MIN: ICD-10-PCS | Mod: S$GLB,,, | Performed by: INTERNAL MEDICINE

## 2021-12-30 PROCEDURE — 99999 PR PBB SHADOW E&M-EST. PATIENT-LVL V: ICD-10-PCS | Mod: PBBFAC,,, | Performed by: INTERNAL MEDICINE

## 2021-12-30 PROCEDURE — 3062F POS MACROALBUMINURIA REV: CPT | Mod: CPTII,S$GLB,, | Performed by: INTERNAL MEDICINE

## 2021-12-30 PROCEDURE — 3044F HG A1C LEVEL LT 7.0%: CPT | Mod: CPTII,S$GLB,, | Performed by: INTERNAL MEDICINE

## 2021-12-30 PROCEDURE — 1101F PT FALLS ASSESS-DOCD LE1/YR: CPT | Mod: CPTII,S$GLB,, | Performed by: INTERNAL MEDICINE

## 2021-12-30 PROCEDURE — 3075F PR MOST RECENT SYSTOLIC BLOOD PRESS GE 130-139MM HG: ICD-10-PCS | Mod: CPTII,S$GLB,, | Performed by: INTERNAL MEDICINE

## 2021-12-30 PROCEDURE — 3072F PR LOW RISK FOR RETINOPATHY: ICD-10-PCS | Mod: CPTII,S$GLB,, | Performed by: INTERNAL MEDICINE

## 2021-12-30 PROCEDURE — 4010F PR ACE/ARB THEARPY RXD/TAKEN: ICD-10-PCS | Mod: CPTII,S$GLB,, | Performed by: INTERNAL MEDICINE

## 2021-12-30 PROCEDURE — 1125F AMNT PAIN NOTED PAIN PRSNT: CPT | Mod: CPTII,S$GLB,, | Performed by: INTERNAL MEDICINE

## 2021-12-30 PROCEDURE — 3078F DIAST BP <80 MM HG: CPT | Mod: CPTII,S$GLB,, | Performed by: INTERNAL MEDICINE

## 2021-12-30 PROCEDURE — 3044F PR MOST RECENT HEMOGLOBIN A1C LEVEL <7.0%: ICD-10-PCS | Mod: CPTII,S$GLB,, | Performed by: INTERNAL MEDICINE

## 2021-12-30 PROCEDURE — 99214 OFFICE O/P EST MOD 30 MIN: CPT | Mod: S$GLB,,, | Performed by: INTERNAL MEDICINE

## 2021-12-30 PROCEDURE — 99999 PR PBB SHADOW E&M-EST. PATIENT-LVL V: CPT | Mod: PBBFAC,,, | Performed by: INTERNAL MEDICINE

## 2021-12-30 PROCEDURE — 1101F PR PT FALLS ASSESS DOC 0-1 FALLS W/OUT INJ PAST YR: ICD-10-PCS | Mod: CPTII,S$GLB,, | Performed by: INTERNAL MEDICINE

## 2021-12-30 PROCEDURE — 3008F BODY MASS INDEX DOCD: CPT | Mod: CPTII,S$GLB,, | Performed by: INTERNAL MEDICINE

## 2021-12-30 PROCEDURE — 3008F PR BODY MASS INDEX (BMI) DOCUMENTED: ICD-10-PCS | Mod: CPTII,S$GLB,, | Performed by: INTERNAL MEDICINE

## 2021-12-30 RX ORDER — METOLAZONE 2.5 MG/1
2.5 TABLET ORAL EVERY OTHER DAY
Qty: 15 TABLET | Refills: 11 | Status: SHIPPED | OUTPATIENT
Start: 2021-12-30 | End: 2022-01-13 | Stop reason: SDUPTHER

## 2022-01-04 ENCOUNTER — PATIENT MESSAGE (OUTPATIENT)
Dept: ADMINISTRATIVE | Facility: OTHER | Age: 72
End: 2022-01-04
Payer: MEDICARE

## 2022-01-04 ENCOUNTER — LAB VISIT (OUTPATIENT)
Dept: PRIMARY CARE CLINIC | Facility: OTHER | Age: 72
End: 2022-01-04
Attending: INTERNAL MEDICINE
Payer: MEDICARE

## 2022-01-04 DIAGNOSIS — R06.02 SHORTNESS OF BREATH: ICD-10-CM

## 2022-01-04 DIAGNOSIS — Z20.822 ENCOUNTER FOR LABORATORY TESTING FOR COVID-19 VIRUS: ICD-10-CM

## 2022-01-04 PROCEDURE — U0003 INFECTIOUS AGENT DETECTION BY NUCLEIC ACID (DNA OR RNA); SEVERE ACUTE RESPIRATORY SYNDROME CORONAVIRUS 2 (SARS-COV-2) (CORONAVIRUS DISEASE [COVID-19]), AMPLIFIED PROBE TECHNIQUE, MAKING USE OF HIGH THROUGHPUT TECHNOLOGIES AS DESCRIBED BY CMS-2020-01-R: HCPCS | Performed by: INTERNAL MEDICINE

## 2022-01-06 LAB — SARS-COV-2 RNA RESP QL NAA+PROBE: NOT DETECTED

## 2022-01-07 ENCOUNTER — OFFICE VISIT (OUTPATIENT)
Dept: ORTHOPEDICS | Facility: CLINIC | Age: 72
End: 2022-01-07
Payer: MEDICARE

## 2022-01-07 ENCOUNTER — HOSPITAL ENCOUNTER (OUTPATIENT)
Dept: RADIOLOGY | Facility: HOSPITAL | Age: 72
Discharge: HOME | End: 2022-01-07
Attending: ORTHOPAEDIC SURGERY
Payer: MEDICARE

## 2022-01-07 VITALS
DIASTOLIC BLOOD PRESSURE: 77 MMHG | BODY MASS INDEX: 37.72 KG/M2 | WEIGHT: 240.31 LBS | HEIGHT: 67 IN | HEART RATE: 82 BPM | SYSTOLIC BLOOD PRESSURE: 122 MMHG

## 2022-01-07 DIAGNOSIS — S62.639A CLOSED FRACTURE OF TUFT OF DISTAL PHALANX OF FINGER: Primary | ICD-10-CM

## 2022-01-07 DIAGNOSIS — M79.645 PAIN OF FINGER OF LEFT HAND: ICD-10-CM

## 2022-01-07 PROCEDURE — 3078F DIAST BP <80 MM HG: CPT | Mod: CPTII,S$GLB,, | Performed by: ORTHOPAEDIC SURGERY

## 2022-01-07 PROCEDURE — 3074F PR MOST RECENT SYSTOLIC BLOOD PRESSURE < 130 MM HG: ICD-10-PCS | Mod: CPTII,S$GLB,, | Performed by: ORTHOPAEDIC SURGERY

## 2022-01-07 PROCEDURE — 1159F MED LIST DOCD IN RCRD: CPT | Mod: CPTII,S$GLB,, | Performed by: ORTHOPAEDIC SURGERY

## 2022-01-07 PROCEDURE — 73140 XR FINGER 2 OR MORE VIEWS: ICD-10-PCS | Mod: 26,LT,, | Performed by: RADIOLOGY

## 2022-01-07 PROCEDURE — 99999 PR PBB SHADOW E&M-EST. PATIENT-LVL V: ICD-10-PCS | Mod: PBBFAC,,, | Performed by: ORTHOPAEDIC SURGERY

## 2022-01-07 PROCEDURE — 99213 PR OFFICE/OUTPT VISIT, EST, LEVL III, 20-29 MIN: ICD-10-PCS | Mod: S$GLB,,, | Performed by: ORTHOPAEDIC SURGERY

## 2022-01-07 PROCEDURE — 1159F PR MEDICATION LIST DOCUMENTED IN MEDICAL RECORD: ICD-10-PCS | Mod: CPTII,S$GLB,, | Performed by: ORTHOPAEDIC SURGERY

## 2022-01-07 PROCEDURE — 3072F LOW RISK FOR RETINOPATHY: CPT | Mod: CPTII,S$GLB,, | Performed by: ORTHOPAEDIC SURGERY

## 2022-01-07 PROCEDURE — 1160F RVW MEDS BY RX/DR IN RCRD: CPT | Mod: CPTII,S$GLB,, | Performed by: ORTHOPAEDIC SURGERY

## 2022-01-07 PROCEDURE — 3072F PR LOW RISK FOR RETINOPATHY: ICD-10-PCS | Mod: CPTII,S$GLB,, | Performed by: ORTHOPAEDIC SURGERY

## 2022-01-07 PROCEDURE — 1160F PR REVIEW ALL MEDS BY PRESCRIBER/CLIN PHARMACIST DOCUMENTED: ICD-10-PCS | Mod: CPTII,S$GLB,, | Performed by: ORTHOPAEDIC SURGERY

## 2022-01-07 PROCEDURE — 3288F FALL RISK ASSESSMENT DOCD: CPT | Mod: CPTII,S$GLB,, | Performed by: ORTHOPAEDIC SURGERY

## 2022-01-07 PROCEDURE — 3074F SYST BP LT 130 MM HG: CPT | Mod: CPTII,S$GLB,, | Performed by: ORTHOPAEDIC SURGERY

## 2022-01-07 PROCEDURE — 3078F PR MOST RECENT DIASTOLIC BLOOD PRESSURE < 80 MM HG: ICD-10-PCS | Mod: CPTII,S$GLB,, | Performed by: ORTHOPAEDIC SURGERY

## 2022-01-07 PROCEDURE — 1101F PR PT FALLS ASSESS DOC 0-1 FALLS W/OUT INJ PAST YR: ICD-10-PCS | Mod: CPTII,S$GLB,, | Performed by: ORTHOPAEDIC SURGERY

## 2022-01-07 PROCEDURE — 3288F PR FALLS RISK ASSESSMENT DOCUMENTED: ICD-10-PCS | Mod: CPTII,S$GLB,, | Performed by: ORTHOPAEDIC SURGERY

## 2022-01-07 PROCEDURE — 99999 PR PBB SHADOW E&M-EST. PATIENT-LVL V: CPT | Mod: PBBFAC,,, | Performed by: ORTHOPAEDIC SURGERY

## 2022-01-07 PROCEDURE — 1125F PR PAIN SEVERITY QUANTIFIED, PAIN PRESENT: ICD-10-PCS | Mod: CPTII,S$GLB,, | Performed by: ORTHOPAEDIC SURGERY

## 2022-01-07 PROCEDURE — 73140 X-RAY EXAM OF FINGER(S): CPT | Mod: 26,LT,, | Performed by: RADIOLOGY

## 2022-01-07 PROCEDURE — 1101F PT FALLS ASSESS-DOCD LE1/YR: CPT | Mod: CPTII,S$GLB,, | Performed by: ORTHOPAEDIC SURGERY

## 2022-01-07 PROCEDURE — 73140 X-RAY EXAM OF FINGER(S): CPT | Mod: TC

## 2022-01-07 PROCEDURE — 3008F PR BODY MASS INDEX (BMI) DOCUMENTED: ICD-10-PCS | Mod: CPTII,S$GLB,, | Performed by: ORTHOPAEDIC SURGERY

## 2022-01-07 PROCEDURE — 99213 OFFICE O/P EST LOW 20 MIN: CPT | Mod: S$GLB,,, | Performed by: ORTHOPAEDIC SURGERY

## 2022-01-07 PROCEDURE — 1125F AMNT PAIN NOTED PAIN PRSNT: CPT | Mod: CPTII,S$GLB,, | Performed by: ORTHOPAEDIC SURGERY

## 2022-01-07 PROCEDURE — 3008F BODY MASS INDEX DOCD: CPT | Mod: CPTII,S$GLB,, | Performed by: ORTHOPAEDIC SURGERY

## 2022-01-07 RX ORDER — BUMETANIDE 2 MG/1
2 TABLET ORAL 2 TIMES DAILY
COMMUNITY
Start: 2022-01-04 | End: 2022-01-13

## 2022-01-07 NOTE — PATIENT INSTRUCTIONS
May discontinue use of the splint, or use it for situations if needed.  Can protect the nail with a Band-Aid as needed.  Expect several weeks longer for healing and sensitivity to resolve.  Return here as needed.

## 2022-01-07 NOTE — PROGRESS NOTES
Subjective:     Patient ID: Santiago Khan is a 71 y.o. male.    Chief Complaint: Pain of the Left Hand      HPI:  The patient returns for follow-up of his left index finger tip injury of December 4th.  Was a crushing injury when he fell catching the finger between a bench and the floor.  He went to the emergency room.  A fracture was identified.  He says that glue was applied to the fingernail.  He has been in a splint.  He had 1 visit with Dr. Bruner, put into a Stax splint    Past Medical History:   Diagnosis Date    Chronic diastolic congestive heart failure 4/7/2020    Chronic kidney disease, stage 3     Chronic systolic congestive heart failure 7/9/2020    Chronic venous insufficiency     DDD (degenerative disc disease), lumbar     DM (diabetes mellitus) with complications     History of gout     Hyperlipidemia associated with type 2 diabetes mellitus     Hypertension associated with stage 3 chronic kidney disease due to type 2 diabetes mellitus     BRADLEY on CPAP     Prostate cancer     prostatectomy in 2010, rising PSA that started in 2021    Tobacco abuse      Past Surgical History:   Procedure Laterality Date    BICEPS TENDON REPAIR      COLONOSCOPY N/A 3/27/2019    Procedure: COLONOSCOPY;  Surgeon: James Roger MD;  Location: [facility] ENDO;  Service: Endoscopy;  Laterality: N/A;    HEMORRHOID SURGERY      INGUINAL HERNIA REPAIR Left     KNEE ARTHROSCOPY Right     LEFT HEART CATHETERIZATION Left 6/29/2020    Procedure: CATHETERIZATION, HEART, LEFT;  Surgeon: Cheli Wilson MD;  Location: [facility] CATH LAB;  Service: Cardiology;  Laterality: Left;    LUMBAR LAMINECTOMY      PROSTATECTOMY       Family History   Problem Relation Age of Onset    Prostate cancer Father     Coronary artery disease Father 90    Alzheimer's disease Father     Hyperlipidemia Mother      Social History     Socioeconomic History    Marital status:    Tobacco Use    Smoking status: Former Smoker      Packs/day: 0.00     Years: 50.00     Pack years: 0.00     Types: Cigarettes     Quit date: 3/1/2021     Years since quittin.8    Smokeless tobacco: Former User   Substance and Sexual Activity    Alcohol use: Not Currently    Drug use: Never    Sexual activity: Not Currently     Partners: Female     Social Determinants of Health     Financial Resource Strain: Medium Risk    Difficulty of Paying Living Expenses: Somewhat hard   Food Insecurity: No Food Insecurity    Worried About Running Out of Food in the Last Year: Never true    Ran Out of Food in the Last Year: Never true   Transportation Needs: No Transportation Needs    Lack of Transportation (Medical): No    Lack of Transportation (Non-Medical): No   Physical Activity: Inactive    Days of Exercise per Week: 0 days    Minutes of Exercise per Session: 0 min   Stress: Stress Concern Present    Feeling of Stress : To some extent   Social Connections: Unknown    Frequency of Communication with Friends and Family: More than three times a week    Frequency of Social Gatherings with Friends and Family: Once a week    Active Member of Clubs or Organizations: No    Attends Club or Organization Meetings: Patient refused    Marital Status:    Housing Stability: Low Risk     Unable to Pay for Housing in the Last Year: No    Number of Places Lived in the Last Year: 1    Unstable Housing in the Last Year: No     Medication List with Changes/Refills   Current Medications    ALBUTEROL (PROVENTIL/VENTOLIN HFA) 90 MCG/ACTUATION INHALER    Inhale 2 puffs into the lungs every 4 (four) hours as needed for Wheezing. Rescue    ALLOPURINOL (ZYLOPRIM) 300 MG TABLET    TAKE 1 TABLET(300 MG) BY MOUTH EVERY DAY    ASPIRIN (ECOTRIN) 81 MG EC TABLET    Take 81 mg by mouth once daily.    BACLOFEN (LIORESAL) 10 MG TABLET    Take 1 tablet by mouth every evening.    BISOPROLOL (ZEBETA) 5 MG TABLET    Take 0.5 tablets (2.5 mg total) by mouth once daily.     BUMETANIDE (BUMEX) 2 MG TABLET    Take 2 mg by mouth 2 (two) times daily.    COLCHICINE (COLCRYS) 0.6 MG TABLET    TAKE 1 TABLET(0.6 MG) BY MOUTH DAILY AS NEEDED    FLUTICASONE PROPIONATE (FLONASE) 50 MCG/ACTUATION NASAL SPRAY    SHAKE LIQUID AND USE 2 SPRAYS(100 MCG) IN EACH NOSTRIL EVERY DAY    GLIMEPIRIDE (AMARYL) 1 MG TABLET    TAKE 1 TABLET BY MOUTH ONCE DAILY    HYDROCODONE-ACETAMINOPHEN (NORCO) 7.5-325 MG PER TABLET    TK 1 T PO  TID PRF CHRONIC PAIN    LOSARTAN (COZAAR) 50 MG TABLET    TAKE 1 TABLET(50 MG) BY MOUTH ONCE DAILY    MAGNESIUM OXIDE (MAG-OX) 400 MG (241.3 MG MAGNESIUM) TABLET    TAKE 2 TABLETS(800 MG) BY MOUTH TWICE DAILY    METFORMIN (GLUCOPHAGE) 500 MG TABLET    Take 1 tablet (500 mg total) by mouth daily with breakfast.    METOLAZONE (ZAROXOLYN) 2.5 MG TABLET    Take 1 tablet (2.5 mg total) by mouth every other day.    MOMETASONE 0.1% (ELOCON) 0.1 % CREAM    MARIA TERESA TO HANDS QD PRF FLARE UPS    MULTIVITAMIN-MINERALS-LUTEIN TAB    Take 1 tablet by mouth Daily.    OMEPRAZOLE (PRILOSEC) 40 MG CAPSULE    TAKE ONE CAPSULE BY MOUTH EVERY DAY    POTASSIUM CHLORIDE SA (K-DUR,KLOR-CON) 20 MEQ TABLET    TAKE 3 TABLETS(60 MEQ) BY MOUTH TWICE DAILY    PROMETHAZINE (PHENERGAN) 12.5 MG TAB    Take 12.5 mg by mouth every 6 (six) hours as needed.    SIMVASTATIN (ZOCOR) 40 MG TABLET    TAKE 1 TABLET(40 MG) BY MOUTH EVERY EVENING    TORSEMIDE (DEMADEX) 20 MG TAB    Take 1 tablet (20 mg total) by mouth once daily.    VARENICLINE (CHANTIX) 1 MG TAB    Take 1 tablet (1 mg total) by mouth 2 (two) times daily.    VOLTAREN ARTHRITIS PAIN 1 % GEL    Apply topically once daily.    ZOLPIDEM (AMBIEN) 10 MG TAB    Take 5 mg by mouth nightly as needed.     Review of patient's allergies indicates:   Allergen Reactions    Amitriptyline      Other reaction(s): Rash    Celecoxib      Other reaction(s): Rash     ROS     Objective:   Body mass index is 37.64 kg/m².  Vitals:    01/07/22 1117   BP: 122/77   Pulse: 82   Weight: 109  "kg (240 lb 4.8 oz)   Height: 5' 7" (1.702 m)   PainSc:   3   PainLoc: Hand       PHYSICAL EXAM:  Well-developed well-nourished male in no acute distress.  Awake, alert, oriented, cooperative with evaluation.    Examination right index finger reveals bruising under the nail of mild degree.  There is moderate stiffness of the joints.  Slight erythema at the nail margins.    X-rays reveal a tuft fracture.    Closed fracture of tuft of distal phalanx of finger        Plan:  The patient had a crush injury to the left index finger tip.  He could use the splint if needed or just use a Band-Aid which I applied forearm.  This will protect the nail and the joint for comfort purposes.  Encouraged range of motion.  Once he is more comfortable he can stop with all taping or splinting.  Return for follow-up if needed    Patient Instructions   May discontinue use of the splint, or use it for situations if needed.  Can protect the nail with a Band-Aid as needed.  Expect several weeks longer for healing and sensitivity to resolve.  Return here as needed.             Evan Can MD, FAAOS [facility], Orthopedic Trauma Service  [place]    "

## 2022-01-11 ENCOUNTER — LAB VISIT (OUTPATIENT)
Dept: LAB | Facility: HOSPITAL | Age: 72
End: 2022-01-11
Attending: INTERNAL MEDICINE
Payer: MEDICARE

## 2022-01-11 DIAGNOSIS — I50.32 CHRONIC DIASTOLIC CONGESTIVE HEART FAILURE: ICD-10-CM

## 2022-01-11 DIAGNOSIS — I50.22 CHRONIC SYSTOLIC CONGESTIVE HEART FAILURE: ICD-10-CM

## 2022-01-11 DIAGNOSIS — N18.32 STAGE 3B CHRONIC KIDNEY DISEASE: ICD-10-CM

## 2022-01-11 LAB
ANION GAP SERPL CALC-SCNC: 12 MMOL/L (ref 8–16)
BUN SERPL-MCNC: 26 MG/DL (ref 8–23)
CALCIUM SERPL-MCNC: 9.9 MG/DL (ref 8.7–10.5)
CHLORIDE SERPL-SCNC: 91 MMOL/L (ref 95–110)
CO2 SERPL-SCNC: 37 MMOL/L (ref 23–29)
CREAT SERPL-MCNC: 1.7 MG/DL (ref 0.5–1.4)
EST. GFR  (AFRICAN AMERICAN): 46.2 ML/MIN/1.73 M^2
EST. GFR  (NON AFRICAN AMERICAN): 40 ML/MIN/1.73 M^2
GLUCOSE SERPL-MCNC: 168 MG/DL (ref 70–110)
POTASSIUM SERPL-SCNC: 4.3 MMOL/L (ref 3.5–5.1)
SODIUM SERPL-SCNC: 140 MMOL/L (ref 136–145)

## 2022-01-11 PROCEDURE — 36415 COLL VENOUS BLD VENIPUNCTURE: CPT | Mod: PO | Performed by: INTERNAL MEDICINE

## 2022-01-11 PROCEDURE — 80048 BASIC METABOLIC PNL TOTAL CA: CPT | Performed by: INTERNAL MEDICINE

## 2022-01-12 ENCOUNTER — TELEPHONE (OUTPATIENT)
Dept: INTERNAL MEDICINE | Facility: CLINIC | Age: 72
End: 2022-01-12
Payer: MEDICARE

## 2022-01-12 ENCOUNTER — PATIENT OUTREACH (OUTPATIENT)
Dept: ADMINISTRATIVE | Facility: OTHER | Age: 72
End: 2022-01-12
Payer: MEDICARE

## 2022-01-12 ENCOUNTER — PATIENT MESSAGE (OUTPATIENT)
Dept: INTERNAL MEDICINE | Facility: CLINIC | Age: 72
End: 2022-01-12
Payer: MEDICARE

## 2022-01-12 NOTE — TELEPHONE ENCOUNTER
----- Message from Lauren Wall sent at 1/12/2022  8:56 AM CST -----  .Type:  Patient Returning Call    Who Called:MATT ROBLEDO [[id number]]  Who Left Message for Patient: [place]   Does the patient know what this is regarding?  Would the patient rather a call back or a response via [facility]?  Best Call Back Number: [phone]  Additional Information:

## 2022-01-12 NOTE — TELEPHONE ENCOUNTER
Call pt and find out if he has seen any improvement in his fluid swelling and if he has lost any weight. His lab work is okay.

## 2022-01-13 ENCOUNTER — OFFICE VISIT (OUTPATIENT)
Dept: CARDIOLOGY | Facility: CLINIC | Age: 72
End: 2022-01-13
Payer: MEDICARE

## 2022-01-13 VITALS
HEART RATE: 88 BPM | BODY MASS INDEX: 36.46 KG/M2 | SYSTOLIC BLOOD PRESSURE: 130 MMHG | WEIGHT: 232.81 LBS | OXYGEN SATURATION: 96 % | DIASTOLIC BLOOD PRESSURE: 72 MMHG

## 2022-01-13 DIAGNOSIS — E11.22 HYPERTENSION ASSOCIATED WITH STAGE 3 CHRONIC KIDNEY DISEASE DUE TO TYPE 2 DIABETES MELLITUS: ICD-10-CM

## 2022-01-13 DIAGNOSIS — I50.32 CHRONIC DIASTOLIC CONGESTIVE HEART FAILURE: ICD-10-CM

## 2022-01-13 DIAGNOSIS — E11.69 HYPERLIPIDEMIA ASSOCIATED WITH TYPE 2 DIABETES MELLITUS: ICD-10-CM

## 2022-01-13 DIAGNOSIS — E78.5 HYPERLIPIDEMIA ASSOCIATED WITH TYPE 2 DIABETES MELLITUS: ICD-10-CM

## 2022-01-13 DIAGNOSIS — G47.33 OSA (OBSTRUCTIVE SLEEP APNEA): ICD-10-CM

## 2022-01-13 DIAGNOSIS — I42.8 NICM (NONISCHEMIC CARDIOMYOPATHY): ICD-10-CM

## 2022-01-13 DIAGNOSIS — I87.2 CHRONIC VENOUS INSUFFICIENCY: ICD-10-CM

## 2022-01-13 DIAGNOSIS — I12.9 HYPERTENSION ASSOCIATED WITH STAGE 3 CHRONIC KIDNEY DISEASE DUE TO TYPE 2 DIABETES MELLITUS: ICD-10-CM

## 2022-01-13 DIAGNOSIS — R06.02 SOB (SHORTNESS OF BREATH): Primary | ICD-10-CM

## 2022-01-13 DIAGNOSIS — N18.30 HYPERTENSION ASSOCIATED WITH STAGE 3 CHRONIC KIDNEY DISEASE DUE TO TYPE 2 DIABETES MELLITUS: ICD-10-CM

## 2022-01-13 PROCEDURE — 1160F PR REVIEW ALL MEDS BY PRESCRIBER/CLIN PHARMACIST DOCUMENTED: ICD-10-PCS | Mod: CPTII,S$GLB,, | Performed by: INTERNAL MEDICINE

## 2022-01-13 PROCEDURE — 99214 PR OFFICE/OUTPT VISIT, EST, LEVL IV, 30-39 MIN: ICD-10-PCS | Mod: S$GLB,,, | Performed by: INTERNAL MEDICINE

## 2022-01-13 PROCEDURE — 3072F PR LOW RISK FOR RETINOPATHY: ICD-10-PCS | Mod: CPTII,S$GLB,, | Performed by: INTERNAL MEDICINE

## 2022-01-13 PROCEDURE — 3078F PR MOST RECENT DIASTOLIC BLOOD PRESSURE < 80 MM HG: ICD-10-PCS | Mod: CPTII,S$GLB,, | Performed by: INTERNAL MEDICINE

## 2022-01-13 PROCEDURE — 99999 PR PBB SHADOW E&M-EST. PATIENT-LVL V: CPT | Mod: PBBFAC,,, | Performed by: INTERNAL MEDICINE

## 2022-01-13 PROCEDURE — 99214 OFFICE O/P EST MOD 30 MIN: CPT | Mod: S$GLB,,, | Performed by: INTERNAL MEDICINE

## 2022-01-13 PROCEDURE — 4010F PR ACE/ARB THEARPY RXD/TAKEN: ICD-10-PCS | Mod: CPTII,S$GLB,, | Performed by: INTERNAL MEDICINE

## 2022-01-13 PROCEDURE — 99999 PR PBB SHADOW E&M-EST. PATIENT-LVL V: ICD-10-PCS | Mod: PBBFAC,,, | Performed by: INTERNAL MEDICINE

## 2022-01-13 PROCEDURE — 1159F PR MEDICATION LIST DOCUMENTED IN MEDICAL RECORD: ICD-10-PCS | Mod: CPTII,S$GLB,, | Performed by: INTERNAL MEDICINE

## 2022-01-13 PROCEDURE — 4010F ACE/ARB THERAPY RXD/TAKEN: CPT | Mod: CPTII,S$GLB,, | Performed by: INTERNAL MEDICINE

## 2022-01-13 PROCEDURE — 3008F BODY MASS INDEX DOCD: CPT | Mod: CPTII,S$GLB,, | Performed by: INTERNAL MEDICINE

## 2022-01-13 PROCEDURE — 1160F RVW MEDS BY RX/DR IN RCRD: CPT | Mod: CPTII,S$GLB,, | Performed by: INTERNAL MEDICINE

## 2022-01-13 PROCEDURE — 3072F LOW RISK FOR RETINOPATHY: CPT | Mod: CPTII,S$GLB,, | Performed by: INTERNAL MEDICINE

## 2022-01-13 PROCEDURE — 3075F SYST BP GE 130 - 139MM HG: CPT | Mod: CPTII,S$GLB,, | Performed by: INTERNAL MEDICINE

## 2022-01-13 PROCEDURE — 3075F PR MOST RECENT SYSTOLIC BLOOD PRESS GE 130-139MM HG: ICD-10-PCS | Mod: CPTII,S$GLB,, | Performed by: INTERNAL MEDICINE

## 2022-01-13 PROCEDURE — 1159F MED LIST DOCD IN RCRD: CPT | Mod: CPTII,S$GLB,, | Performed by: INTERNAL MEDICINE

## 2022-01-13 PROCEDURE — 3078F DIAST BP <80 MM HG: CPT | Mod: CPTII,S$GLB,, | Performed by: INTERNAL MEDICINE

## 2022-01-13 PROCEDURE — 3008F PR BODY MASS INDEX (BMI) DOCUMENTED: ICD-10-PCS | Mod: CPTII,S$GLB,, | Performed by: INTERNAL MEDICINE

## 2022-01-13 RX ORDER — METOLAZONE 2.5 MG/1
2.5 TABLET ORAL WEEKLY
Qty: 4 TABLET | Refills: 11 | Status: SHIPPED | OUTPATIENT
Start: 2022-01-13 | End: 2022-04-14

## 2022-01-13 RX ORDER — LOSARTAN POTASSIUM 25 MG/1
25 TABLET ORAL DAILY
Start: 2022-01-13 | End: 2023-07-17

## 2022-01-13 NOTE — PROGRESS NOTES
Subjective:   Patient ID:  Santiago Khan is a 71 y.o. male who presents for follow up of Shortness of Breath      68 yo male, came in for SOB  PMH CAD s/p LHC in  D1 70%, no PCi, CHFmrEF 45%, DM 4 yrs, life long smoker, quit in ,  HTN, HLD, CKD III, prostates Ca s/p TURP in 2011, no h/o chemo RX and XRT. Gout. No h/o stroke and heart attack. Social drinker.  Remote LHC showed miminal blockage by Dr. Mayra CHAPPELL,    admitted for [facility] due to chest tightness. Had low K and Mg. Troponin x2 negative and EKG showed NSR, RBBB, and LVH. Echo showed EF 45% global HK and moderate MR. Had K and Mg supplement.  States that gained weight for 30 pounds since 3/202 after held Metolazone. Even he was taking Bumex 1.5 mg bid. In  BNP 15 and Cr 1.4  Still has GOVEA. No orthopnea, sleeps with CPAP. No chest pain, faint  NUKE stress done in  showed inferior ischemia with scar. EF 45%  LHC done in  D1 70% no PCI. Normal filling pressure  No chest pain . Left radial access ok  SOB, weight gain  Sleeps on 1 pillow. No PND  Taking Bumex 2 mg bid and DIamox   Pt c/o weight gain 30 pounds after held his metolazone in the past 4 months  C/o abd distanesion SOB.     11/23/2021 visit  SOB for few months, positive orthopnea. Sleeps on CPAP. + leg swelling and left leg pain and redness  The last seen was   On Bumex 2 mg bid. Held metolazone. Quit smoking  H/o prostate cancer and PSA recurrently elevated. CXR in  negative     12/2021   Leg swelling and erythema. At last visit, D/c bumex and added torsemide 40 mg bid. Good urination. BNP negative and Cr up to 1.6. Decreased torsemide to 20 mg bid  Still leg swelling and SOB. Quit smoking 9 months ago.     Interval history  Resumed metolazone 3 times a week about 2 weeks ago and BMP done two days ago showed elevated Cr.   Felt neck tightness and improved breathing after the head tilts back and stretched up. Not f/u with pulm  yet  On compresion socks.   Still SOB. Serial BNPs wnl and h/o LHC showed normal filling pressure suggested CHF controlled                  Past Medical History:   Diagnosis Date    Chronic diastolic congestive heart failure 2020    Chronic kidney disease, stage 3     Chronic systolic congestive heart failure 2020    Chronic venous insufficiency     DDD (degenerative disc disease), lumbar     DM (diabetes mellitus) with complications     History of gout     Hyperlipidemia associated with type 2 diabetes mellitus     Hypertension associated with stage 3 chronic kidney disease due to type 2 diabetes mellitus     BRADLEY on CPAP     Prostate cancer     prostatectomy in , rising PSA that started in     Tobacco abuse        Past Surgical History:   Procedure Laterality Date    BICEPS TENDON REPAIR      COLONOSCOPY N/A 3/27/2019    Procedure: COLONOSCOPY;  Surgeon: James Roger MD;  Location: [facility] ENDO;  Service: Endoscopy;  Laterality: N/A;    HEMORRHOID SURGERY      INGUINAL HERNIA REPAIR Left     KNEE ARTHROSCOPY Right     LEFT HEART CATHETERIZATION Left 2020    Procedure: CATHETERIZATION, HEART, LEFT;  Surgeon: Cheli Wilson MD;  Location: [facility] CATH LAB;  Service: Cardiology;  Laterality: Left;    LUMBAR LAMINECTOMY      PROSTATECTOMY         Social History     Tobacco Use    Smoking status: Former Smoker     Packs/day: 0.00     Years: 50.00     Pack years: 0.00     Types: Cigarettes     Quit date: 3/1/2021     Years since quittin.8    Smokeless tobacco: Former User   Substance Use Topics    Alcohol use: Not Currently    Drug use: Never       Family History   Problem Relation Age of Onset    Prostate cancer Father     Coronary artery disease Father 90    Alzheimer's disease Father     Hyperlipidemia Mother          Review of Systems   Constitutional: Positive for malaise/fatigue and weight gain. Negative for decreased appetite, diaphoresis, fever and night  sweats.   HENT: Negative for nosebleeds.    Eyes: Negative for blurred vision and double vision.   Cardiovascular: Positive for dyspnea on exertion, leg swelling and orthopnea. Negative for chest pain, claudication, irregular heartbeat, near-syncope, palpitations, paroxysmal nocturnal dyspnea and syncope.   Respiratory: Negative for cough, shortness of breath, sleep disturbances due to breathing, snoring, sputum production and wheezing.    Endocrine: Negative for cold intolerance and polyuria.   Hematologic/Lymphatic: Does not bruise/bleed easily.   Skin: Negative for rash.   Musculoskeletal: Negative for back pain, falls, joint pain, joint swelling and neck pain.   Gastrointestinal: Negative for abdominal pain, heartburn, nausea and vomiting.   Genitourinary: Negative for dysuria, frequency and hematuria.   Neurological: Negative for difficulty with concentration, dizziness, focal weakness, headaches, light-headedness, numbness, seizures and weakness.   Psychiatric/Behavioral: Negative for depression, memory loss and substance abuse. The patient does not have insomnia.    Allergic/Immunologic: Negative for HIV exposure and hives.       Objective:   Physical Exam  HENT:      Head: Normocephalic.   Eyes:      Pupils: Pupils are equal, round, and reactive to light.   Neck:      Thyroid: No thyromegaly.      Vascular: Normal carotid pulses. No carotid bruit or JVD.   Cardiovascular:      Rate and Rhythm: Normal rate and regular rhythm.  No extrasystoles are present.     Chest Wall: PMI is not displaced.      Pulses: Normal pulses.      Heart sounds: Normal heart sounds. No murmur heard.  No gallop. No S3 sounds.    Pulmonary:      Effort: No respiratory distress.      Breath sounds: Normal breath sounds. No stridor.   Abdominal:      General: Bowel sounds are normal.      Palpations: Abdomen is soft.      Tenderness: There is no abdominal tenderness. There is no rebound.   Musculoskeletal:         General: Swelling  present. Normal range of motion.   Skin:     Findings: No rash.   Neurological:      Mental Status: He is alert and oriented to person, place, and time.   Psychiatric:         Behavior: Behavior normal.         Lab Results   Component Value Date    CHOL 135 07/30/2021    CHOL 148 02/01/2021    CHOL 138 10/08/2020     Lab Results   Component Value Date    HDL 44 07/30/2021    HDL 46 02/01/2021    HDL 44 10/08/2020     Lab Results   Component Value Date    LDLCALC 64.6 07/30/2021    LDLCALC 82.2 02/01/2021    LDLCALC 64.6 10/08/2020     Lab Results   Component Value Date    TRIG 132 07/30/2021    TRIG 99 02/01/2021    TRIG 147 10/08/2020     Lab Results   Component Value Date    CHOLHDL 32.6 07/30/2021    CHOLHDL 31.1 02/01/2021    CHOLHDL 31.9 10/08/2020       Chemistry        Component Value Date/Time     01/11/2022 0947    K 4.3 01/11/2022 0947    CL 91 (L) 01/11/2022 0947    CO2 37 (H) 01/11/2022 0947    BUN 26 (H) 01/11/2022 0947    CREATININE 1.7 (H) 01/11/2022 0947     (H) 01/11/2022 0947        Component Value Date/Time    CALCIUM 9.9 01/11/2022 0947    ALKPHOS 67 07/30/2021 0813    AST 31 07/30/2021 0813    ALT 30 07/30/2021 0813    BILITOT 0.5 07/30/2021 0813    ESTGFRAFRICA 46.2 (A) 01/11/2022 0947    EGFRNONAA 40.0 (A) 01/11/2022 0947          Lab Results   Component Value Date    HGBA1C 6.8 (H) 11/12/2021     Lab Results   Component Value Date    TSH 1.251 07/30/2021     Lab Results   Component Value Date    INR 1.0 06/26/2020    INR 1.0 03/10/2020     Lab Results   Component Value Date    WBC 11.12 10/05/2021    HGB 13.9 (L) 10/05/2021    HCT 45.2 10/05/2021    MCV 98 10/05/2021     10/05/2021     BMP  Sodium   Date Value Ref Range Status   01/11/2022 140 136 - 145 mmol/L Final     Potassium   Date Value Ref Range Status   01/11/2022 4.3 3.5 - 5.1 mmol/L Final     Chloride   Date Value Ref Range Status   01/11/2022 91 (L) 95 - 110 mmol/L Final     CO2   Date Value Ref Range Status    01/11/2022 37 (H) 23 - 29 mmol/L Final     BUN   Date Value Ref Range Status   01/11/2022 26 (H) 8 - 23 mg/dL Final     Creatinine   Date Value Ref Range Status   01/11/2022 1.7 (H) 0.5 - 1.4 mg/dL Final     Calcium   Date Value Ref Range Status   01/11/2022 9.9 8.7 - 10.5 mg/dL Final     Anion Gap   Date Value Ref Range Status   01/11/2022 12 8 - 16 mmol/L Final     eGFR if    Date Value Ref Range Status   01/11/2022 46.2 (A) >60 mL/min/1.73 m^2 Final     eGFR if non    Date Value Ref Range Status   01/11/2022 40.0 (A) >60 mL/min/1.73 m^2 Final     Comment:     Calculation used to obtain the estimated glomerular filtration  rate (eGFR) is the CKD-EPI equation.        BNP  @LABRCNTIP(BNP,BNPTRIAGEBLO)@  @LABRCNTIP(troponini)@  Estimated Creatinine Clearance: 46.8 mL/min (A) (based on SCr of 1.7 mg/dL (H)).  No results found in the last 24 hours.  No results found in the last 24 hours.  No results found in the last 24 hours.    Assessment:      1. SOB (shortness of breath)    2. Chronic diastolic congestive heart failure    3. BRADLEY (obstructive sleep apnea)    4. Hypertension associated with stage 3 chronic kidney disease due to type 2 diabetes mellitus    5. Hyperlipidemia associated with type 2 diabetes mellitus    6. NICM (nonischemic cardiomyopathy)    7. Chronic venous insufficiency        Plan:   Decrease Metolazone to once a week due to elevated Cr  Hold bisoprolol for 1 to 2 weeks. If SOB no change, resume it. Continue compression socks  Refer to ENT for SOB  pulm service eval pending  Continue ASA Statin Losartan torsemide    DM Rx per PCP  Counseled DASH  Check Lipid profile in 6 months  Recommend heart-healthy diet, weight control and regular exercise.  Mahsa. Risk modification.   I have reviewed all pertinent labs and cardiac studies independently. Plans and recommendations have been formulated under my direct supervision. All questions answered and patient voiced  understanding.   If symptoms persist go to the ED  RTC in 3 months

## 2022-01-14 ENCOUNTER — TELEPHONE (OUTPATIENT)
Dept: OTOLARYNGOLOGY | Facility: CLINIC | Age: 72
End: 2022-01-14
Payer: MEDICARE

## 2022-01-14 NOTE — PROGRESS NOTES
Patient, Santiago Khan (MRN #[id number]), presented with a recorded BMI of 36.46 kg/m^2 and a documented comorbidity(s):  - Diabetes Mellitus Type 2  - Obstructive Sleep Apnea  - Hypertension  - Hyperlipidemia  - Atrial Fibrillation  to which the severe obesity is a contributing factor. This is consistent with the definition of severe obesity (BMI 35.0-39.9) with comorbidity (ICD-10 E66.01, Z68.35). The patient's severe obesity was monitored, evaluated, addressed and/or treated. This addendum to the medical record is made on 01/14/2022.

## 2022-02-10 ENCOUNTER — PES CALL (OUTPATIENT)
Dept: ADMINISTRATIVE | Facility: CLINIC | Age: 72
End: 2022-02-10
Payer: MEDICARE

## 2022-02-14 ENCOUNTER — PATIENT MESSAGE (OUTPATIENT)
Dept: SLEEP MEDICINE | Facility: CLINIC | Age: 72
End: 2022-02-14
Payer: MEDICARE

## 2022-02-14 ENCOUNTER — OFFICE VISIT (OUTPATIENT)
Dept: OTOLARYNGOLOGY | Facility: CLINIC | Age: 72
End: 2022-02-14
Payer: MEDICARE

## 2022-02-14 VITALS — HEIGHT: 67 IN | WEIGHT: 233.69 LBS | BODY MASS INDEX: 36.68 KG/M2

## 2022-02-14 DIAGNOSIS — R06.02 SOB (SHORTNESS OF BREATH): ICD-10-CM

## 2022-02-14 DIAGNOSIS — Z00.00 NORMAL ENT EXAM: Primary | ICD-10-CM

## 2022-02-14 PROCEDURE — 1126F PR PAIN SEVERITY QUANTIFIED, NO PAIN PRESENT: ICD-10-PCS | Mod: CPTII,S$GLB,, | Performed by: PHYSICIAN ASSISTANT

## 2022-02-14 PROCEDURE — 99999 PR PBB SHADOW E&M-EST. PATIENT-LVL IV: CPT | Mod: PBBFAC,,, | Performed by: PHYSICIAN ASSISTANT

## 2022-02-14 PROCEDURE — 3072F LOW RISK FOR RETINOPATHY: CPT | Mod: CPTII,S$GLB,, | Performed by: PHYSICIAN ASSISTANT

## 2022-02-14 PROCEDURE — 1159F MED LIST DOCD IN RCRD: CPT | Mod: CPTII,S$GLB,, | Performed by: PHYSICIAN ASSISTANT

## 2022-02-14 PROCEDURE — 4010F ACE/ARB THERAPY RXD/TAKEN: CPT | Mod: CPTII,S$GLB,, | Performed by: PHYSICIAN ASSISTANT

## 2022-02-14 PROCEDURE — 4010F PR ACE/ARB THEARPY RXD/TAKEN: ICD-10-PCS | Mod: CPTII,S$GLB,, | Performed by: PHYSICIAN ASSISTANT

## 2022-02-14 PROCEDURE — 3072F PR LOW RISK FOR RETINOPATHY: ICD-10-PCS | Mod: CPTII,S$GLB,, | Performed by: PHYSICIAN ASSISTANT

## 2022-02-14 PROCEDURE — 1101F PR PT FALLS ASSESS DOC 0-1 FALLS W/OUT INJ PAST YR: ICD-10-PCS | Mod: CPTII,S$GLB,, | Performed by: PHYSICIAN ASSISTANT

## 2022-02-14 PROCEDURE — 1101F PT FALLS ASSESS-DOCD LE1/YR: CPT | Mod: CPTII,S$GLB,, | Performed by: PHYSICIAN ASSISTANT

## 2022-02-14 PROCEDURE — 3288F PR FALLS RISK ASSESSMENT DOCUMENTED: ICD-10-PCS | Mod: CPTII,S$GLB,, | Performed by: PHYSICIAN ASSISTANT

## 2022-02-14 PROCEDURE — 99999 PR PBB SHADOW E&M-EST. PATIENT-LVL IV: ICD-10-PCS | Mod: PBBFAC,,, | Performed by: PHYSICIAN ASSISTANT

## 2022-02-14 PROCEDURE — 3288F FALL RISK ASSESSMENT DOCD: CPT | Mod: CPTII,S$GLB,, | Performed by: PHYSICIAN ASSISTANT

## 2022-02-14 PROCEDURE — 3008F PR BODY MASS INDEX (BMI) DOCUMENTED: ICD-10-PCS | Mod: CPTII,S$GLB,, | Performed by: PHYSICIAN ASSISTANT

## 2022-02-14 PROCEDURE — 99213 PR OFFICE/OUTPT VISIT, EST, LEVL III, 20-29 MIN: ICD-10-PCS | Mod: S$GLB,,, | Performed by: PHYSICIAN ASSISTANT

## 2022-02-14 PROCEDURE — 3008F BODY MASS INDEX DOCD: CPT | Mod: CPTII,S$GLB,, | Performed by: PHYSICIAN ASSISTANT

## 2022-02-14 PROCEDURE — 1159F PR MEDICATION LIST DOCUMENTED IN MEDICAL RECORD: ICD-10-PCS | Mod: CPTII,S$GLB,, | Performed by: PHYSICIAN ASSISTANT

## 2022-02-14 PROCEDURE — 1126F AMNT PAIN NOTED NONE PRSNT: CPT | Mod: CPTII,S$GLB,, | Performed by: PHYSICIAN ASSISTANT

## 2022-02-14 PROCEDURE — 99213 OFFICE O/P EST LOW 20 MIN: CPT | Mod: S$GLB,,, | Performed by: PHYSICIAN ASSISTANT

## 2022-02-14 RX ORDER — ALBUTEROL SULFATE 90 UG/1
2 AEROSOL, METERED RESPIRATORY (INHALATION) EVERY 4 HOURS PRN
Qty: 8.5 G | Refills: 3 | Status: SHIPPED | OUTPATIENT
Start: 2022-02-14 | End: 2022-04-14 | Stop reason: SDUPTHER

## 2022-02-14 NOTE — TELEPHONE ENCOUNTER
Pt requesting refill on albuterol inhaler to be sent to the [facility].     Last office visit 11/27/2021

## 2022-02-14 NOTE — PROGRESS NOTES
"  Referring Provider:    No referring provider defined for this encounter.  Subjective:   Patient: Santiago Khan [id number], :1950   Visit date:2022 1:55 PM    Chief Complaint:  Foreign Body in Ear    HPI:    Prior notes reviewed by myself.  Clinical documentation obtained by nursing staff reviewed.     Pt c/o using a q-tip to clean his ear last night and when he pulled it out of his L ear noticed the cotton tip missing. Reported cotton being stuck in his L ear. No pain. No HL. No other complaints.       Objective:     Physical Exam:  Vitals:  Ht 5' 7" (1.702 m)   Wt 106 kg (233 lb 11 oz)   BMI 36.60 kg/m²   General appearance:  Well developed, well nourished    Ears:  Otoscopy of external auditory canals and tympanic membranes was normal, clinical speech reception thresholds grossly intact, no mass/lesion of auricle.    Nose:  No masses/lesions of external nose, nasal mucosa, septum, and turbinates were within normal limits.    Mouth:  No mass/lesion of lips, teeth, gums, hard/soft palate, tongue, tonsils, or oropharynx.    Neck & Lymphatics:  No cervical lymphadenopathy, no neck mass/crepitus/ asymmetry, trachea is midline, no thyroid enlargement/tenderness/mass.        Assessment & Plan:   Normal ENT exam    Re-assured pt there is no foreign body in either EAC. No cotton. No cerumen.   Counseled pt to not use q-tips in his ear.   prn    Thank you for allowing me to participate in the care of Santiago.        Brea Cope PA-C  [facility]   [facility]  [address]  [place] [address]  P: [phone]  F: [phone]  "

## 2022-02-15 ENCOUNTER — PATIENT OUTREACH (OUTPATIENT)
Dept: ADMINISTRATIVE | Facility: OTHER | Age: 72
End: 2022-02-15
Payer: MEDICARE

## 2022-02-15 NOTE — PROGRESS NOTES
Health Maintenance Due   Topic Date Due    LDCT Lung Screen  Never done    Shingles Vaccine (2 of 3) 07/18/2012    Pneumococcal Vaccines (Age 65+) (2 of 4 - PPSV23) 12/07/2016    Foot Exam  02/06/2022     Updates were requested from care everywhere.  Chart was reviewed for overdue Proactive [facility] ([facility]) topics (CRS, Breast Cancer Screening, Eye exam)  Health Maintenance has been updated.  LINKS immunization registry triggered.  Immunizations were reconciled.

## 2022-02-16 ENCOUNTER — LAB VISIT (OUTPATIENT)
Dept: LAB | Facility: HOSPITAL | Age: 72
End: 2022-02-16
Attending: PSYCHIATRY & NEUROLOGY
Payer: MEDICARE

## 2022-02-16 ENCOUNTER — OFFICE VISIT (OUTPATIENT)
Dept: NEUROLOGY | Facility: CLINIC | Age: 72
End: 2022-02-16
Payer: MEDICARE

## 2022-02-16 VITALS
BODY MASS INDEX: 36.79 KG/M2 | HEIGHT: 67 IN | SYSTOLIC BLOOD PRESSURE: 138 MMHG | WEIGHT: 234.38 LBS | HEART RATE: 98 BPM | DIASTOLIC BLOOD PRESSURE: 70 MMHG | RESPIRATION RATE: 16 BRPM | OXYGEN SATURATION: 98 %

## 2022-02-16 DIAGNOSIS — E11.8 DM (DIABETES MELLITUS) WITH COMPLICATIONS: ICD-10-CM

## 2022-02-16 DIAGNOSIS — Z87.891 QUIT SMOKING WITHIN PAST YEAR: ICD-10-CM

## 2022-02-16 DIAGNOSIS — H49.01 THIRD NERVE PALSY, RIGHT: Primary | ICD-10-CM

## 2022-02-16 DIAGNOSIS — H49.01 PARALYTIC STRABISMUS ASSOCIATED WITH RIGHT PARTIAL OCULOMOTOR NERVE PALSY: ICD-10-CM

## 2022-02-16 DIAGNOSIS — H49.01 THIRD NERVE PALSY, RIGHT: ICD-10-CM

## 2022-02-16 LAB
CREAT SERPL-MCNC: 1.5 MG/DL (ref 0.5–1.4)
EST. GFR  (AFRICAN AMERICAN): 53.8 ML/MIN/1.73 M^2
EST. GFR  (NON AFRICAN AMERICAN): 46.5 ML/MIN/1.73 M^2

## 2022-02-16 PROCEDURE — 83519 RIA NONANTIBODY: CPT | Performed by: PSYCHIATRY & NEUROLOGY

## 2022-02-16 PROCEDURE — 3072F PR LOW RISK FOR RETINOPATHY: ICD-10-PCS | Mod: CPTII,S$GLB,, | Performed by: PSYCHIATRY & NEUROLOGY

## 2022-02-16 PROCEDURE — 3288F FALL RISK ASSESSMENT DOCD: CPT | Mod: CPTII,S$GLB,, | Performed by: PSYCHIATRY & NEUROLOGY

## 2022-02-16 PROCEDURE — 82565 ASSAY OF CREATININE: CPT | Performed by: PSYCHIATRY & NEUROLOGY

## 2022-02-16 PROCEDURE — 3008F BODY MASS INDEX DOCD: CPT | Mod: CPTII,S$GLB,, | Performed by: PSYCHIATRY & NEUROLOGY

## 2022-02-16 PROCEDURE — 1101F PT FALLS ASSESS-DOCD LE1/YR: CPT | Mod: CPTII,S$GLB,, | Performed by: PSYCHIATRY & NEUROLOGY

## 2022-02-16 PROCEDURE — 4010F ACE/ARB THERAPY RXD/TAKEN: CPT | Mod: CPTII,S$GLB,, | Performed by: PSYCHIATRY & NEUROLOGY

## 2022-02-16 PROCEDURE — 3288F PR FALLS RISK ASSESSMENT DOCUMENTED: ICD-10-PCS | Mod: CPTII,S$GLB,, | Performed by: PSYCHIATRY & NEUROLOGY

## 2022-02-16 PROCEDURE — 3078F PR MOST RECENT DIASTOLIC BLOOD PRESSURE < 80 MM HG: ICD-10-PCS | Mod: CPTII,S$GLB,, | Performed by: PSYCHIATRY & NEUROLOGY

## 2022-02-16 PROCEDURE — 99999 PR PBB SHADOW E&M-EST. PATIENT-LVL V: CPT | Mod: PBBFAC,,, | Performed by: PSYCHIATRY & NEUROLOGY

## 2022-02-16 PROCEDURE — 3075F PR MOST RECENT SYSTOLIC BLOOD PRESS GE 130-139MM HG: ICD-10-PCS | Mod: CPTII,S$GLB,, | Performed by: PSYCHIATRY & NEUROLOGY

## 2022-02-16 PROCEDURE — 36415 COLL VENOUS BLD VENIPUNCTURE: CPT | Performed by: PSYCHIATRY & NEUROLOGY

## 2022-02-16 PROCEDURE — 1159F PR MEDICATION LIST DOCUMENTED IN MEDICAL RECORD: ICD-10-PCS | Mod: CPTII,S$GLB,, | Performed by: PSYCHIATRY & NEUROLOGY

## 2022-02-16 PROCEDURE — 99499 UNLISTED E&M SERVICE: CPT | Mod: S$GLB,,, | Performed by: PSYCHIATRY & NEUROLOGY

## 2022-02-16 PROCEDURE — 1125F PR PAIN SEVERITY QUANTIFIED, PAIN PRESENT: ICD-10-PCS | Mod: CPTII,S$GLB,, | Performed by: PSYCHIATRY & NEUROLOGY

## 2022-02-16 PROCEDURE — 83519 RIA NONANTIBODY: CPT | Mod: 59 | Performed by: PSYCHIATRY & NEUROLOGY

## 2022-02-16 PROCEDURE — 99999 PR PBB SHADOW E&M-EST. PATIENT-LVL V: ICD-10-PCS | Mod: PBBFAC,,, | Performed by: PSYCHIATRY & NEUROLOGY

## 2022-02-16 PROCEDURE — 1101F PR PT FALLS ASSESS DOC 0-1 FALLS W/OUT INJ PAST YR: ICD-10-PCS | Mod: CPTII,S$GLB,, | Performed by: PSYCHIATRY & NEUROLOGY

## 2022-02-16 PROCEDURE — 99205 PR OFFICE/OUTPT VISIT, NEW, LEVL V, 60-74 MIN: ICD-10-PCS | Mod: S$GLB,,, | Performed by: PSYCHIATRY & NEUROLOGY

## 2022-02-16 PROCEDURE — 4010F PR ACE/ARB THEARPY RXD/TAKEN: ICD-10-PCS | Mod: CPTII,S$GLB,, | Performed by: PSYCHIATRY & NEUROLOGY

## 2022-02-16 PROCEDURE — 1159F MED LIST DOCD IN RCRD: CPT | Mod: CPTII,S$GLB,, | Performed by: PSYCHIATRY & NEUROLOGY

## 2022-02-16 PROCEDURE — 99499 RISK ADDL DX/OHS AUDIT: ICD-10-PCS | Mod: S$GLB,,, | Performed by: PSYCHIATRY & NEUROLOGY

## 2022-02-16 PROCEDURE — 3072F LOW RISK FOR RETINOPATHY: CPT | Mod: CPTII,S$GLB,, | Performed by: PSYCHIATRY & NEUROLOGY

## 2022-02-16 PROCEDURE — 3008F PR BODY MASS INDEX (BMI) DOCUMENTED: ICD-10-PCS | Mod: CPTII,S$GLB,, | Performed by: PSYCHIATRY & NEUROLOGY

## 2022-02-16 PROCEDURE — 3075F SYST BP GE 130 - 139MM HG: CPT | Mod: CPTII,S$GLB,, | Performed by: PSYCHIATRY & NEUROLOGY

## 2022-02-16 PROCEDURE — 1125F AMNT PAIN NOTED PAIN PRSNT: CPT | Mod: CPTII,S$GLB,, | Performed by: PSYCHIATRY & NEUROLOGY

## 2022-02-16 PROCEDURE — 3078F DIAST BP <80 MM HG: CPT | Mod: CPTII,S$GLB,, | Performed by: PSYCHIATRY & NEUROLOGY

## 2022-02-16 PROCEDURE — 99205 OFFICE O/P NEW HI 60 MIN: CPT | Mod: S$GLB,,, | Performed by: PSYCHIATRY & NEUROLOGY

## 2022-02-16 NOTE — PROGRESS NOTES
Subjective:       Patient ID: Santiago Khan is a 71 y.o. male.    Chief Complaint: Third nerve palsy, right , Neuromuscular (Myasthenia Gravis          HPI         The patient was in [facility] till  when noted double vision then dropping of the RT eye.The patient noticed the double vision when  was looking forwards and upwards but not downwards. The 2 pictures are side by side and at angle (diagnonal).. When he closes RT eye the double vision resolves. Medical history is remarkable for  Uncontrolled DM. No trauma. No alcoholism. No thyroid problems. No tick bites. No headache. No weakness. No slurring of speech. No trouble swallowing.          Review of Systems   Constitutional: Negative for appetite change and fatigue.   HENT: Negative for hearing loss and tinnitus.    Eyes: Positive for visual disturbance. Negative for photophobia.   Respiratory: Positive for apnea and shortness of breath.    Cardiovascular: Positive for leg swelling. Negative for chest pain and palpitations.   Gastrointestinal: Negative for nausea and vomiting.   Endocrine: Negative for cold intolerance and heat intolerance.   Genitourinary: Negative for difficulty urinating and urgency.   Musculoskeletal: Positive for arthralgias, back pain and joint swelling. Negative for gait problem, myalgias, neck pain and neck stiffness.   Skin: Negative for color change and rash.   Allergic/Immunologic: Negative for environmental allergies and immunocompromised state.   Neurological: Negative for dizziness, tremors, seizures, syncope, facial asymmetry, speech difficulty, weakness, light-headedness, numbness and headaches.   Hematological: Negative for adenopathy. Does not bruise/bleed easily.   Psychiatric/Behavioral: Negative for agitation, behavioral problems, confusion, decreased concentration, dysphoric mood, hallucinations, self-injury, sleep disturbance and suicidal ideas. The patient is not hyperactive.                  Current Outpatient  Medications:     albuterol (PROVENTIL/VENTOLIN HFA) 90 mcg/actuation inhaler, Inhale 2 puffs into the lungs every 4 (four) hours as needed for Wheezing. Rescue, Disp: 8.5 g, Rfl: 3    allopurinoL (ZYLOPRIM) 300 MG tablet, TAKE 1 TABLET(300 MG) BY MOUTH EVERY DAY, Disp: 90 tablet, Rfl: 3    aspirin (ECOTRIN) 81 MG EC tablet, Take 81 mg by mouth once daily., Disp: , Rfl:     baclofen (LIORESAL) 10 MG tablet, Take 1 tablet by mouth every evening., Disp: , Rfl: 2    bisoprolol (ZEBETA) 5 MG tablet, TAKE 1/2 TABLET BY MOUTH ONCE DAILY, Disp: 90 tablet, Rfl: 3    colchicine (COLCRYS) 0.6 mg tablet, TAKE 1 TABLET(0.6 MG) BY MOUTH DAILY AS NEEDED, Disp: 30 tablet, Rfl: 1    fluticasone propionate (FLONASE) 50 mcg/actuation nasal spray, SHAKE LIQUID AND USE 2 SPRAYS(100 MCG) IN EACH NOSTRIL EVERY DAY, Disp: 16 g, Rfl: 11    glimepiride (AMARYL) 1 MG tablet, TAKE 1 TABLET BY MOUTH ONCE DAILY, Disp: 90 tablet, Rfl: 3    HYDROcodone-acetaminophen (NORCO) 7.5-325 mg per tablet, TK 1 T PO  TID PRF CHRONIC PAIN, Disp: , Rfl: 0    losartan (COZAAR) 25 MG tablet, Take 1 tablet (25 mg total) by mouth once daily., Disp: , Rfl:     magnesium oxide (MAG-OX) 400 mg (241.3 mg magnesium) tablet, TAKE 2 TABLETS(800 MG) BY MOUTH TWICE DAILY, Disp: 120 tablet, Rfl: 5    metFORMIN (GLUCOPHAGE) 500 MG tablet, Take 1 tablet (500 mg total) by mouth daily with breakfast., Disp: 90 tablet, Rfl: 3    metOLazone (ZAROXOLYN) 2.5 MG tablet, Take 1 tablet (2.5 mg total) by mouth once a week., Disp: 4 tablet, Rfl: 11    mometasone 0.1% (ELOCON) 0.1 % cream, MARIA TERESA TO HANDS QD PRF FLARE UPS, Disp: , Rfl: 0    multivitamin-minerals-lutein Tab, Take 1 tablet by mouth Daily., Disp: , Rfl:     omeprazole (PRILOSEC) 40 MG capsule, TAKE ONE CAPSULE BY MOUTH EVERY DAY, Disp: 30 capsule, Rfl: 11    potassium chloride SA (K-DUR,KLOR-CON) 20 MEQ tablet, TAKE 3 TABLETS(60 MEQ) BY MOUTH TWICE DAILY, Disp: 720 tablet, Rfl: 2    promethazine (PHENERGAN)  12.5 MG Tab, Take 12.5 mg by mouth every 6 (six) hours as needed., Disp: , Rfl: 2    simvastatin (ZOCOR) 40 MG tablet, TAKE 1 TABLET(40 MG) BY MOUTH EVERY EVENING, Disp: 90 tablet, Rfl: 3    torsemide (DEMADEX) 20 MG Tab, Take 1 tablet (20 mg total) by mouth once daily., Disp: 30 tablet, Rfl: 11    varenicline (CHANTIX) 1 mg Tab, Take 1 tablet (1 mg total) by mouth 2 (two) times daily., Disp: 60 tablet, Rfl: 2    VOLTAREN ARTHRITIS PAIN 1 % Gel, Apply topically once daily., Disp: , Rfl:     zolpidem (AMBIEN) 10 mg Tab, Take 5 mg by mouth nightly as needed., Disp: , Rfl:   Past Medical History:   Diagnosis Date    Chronic diastolic congestive heart failure 4/7/2020    Chronic kidney disease, stage 3     Chronic systolic congestive heart failure 7/9/2020    Chronic venous insufficiency     DDD (degenerative disc disease), lumbar     DM (diabetes mellitus) with complications     History of gout     Hyperlipidemia associated with type 2 diabetes mellitus     Hypertension associated with stage 3 chronic kidney disease due to type 2 diabetes mellitus     BRADLEY on CPAP     Prostate cancer     prostatectomy in 2010, rising PSA that started in 2021    Tobacco abuse      Past Surgical History:   Procedure Laterality Date    BICEPS TENDON REPAIR      COLONOSCOPY N/A 3/27/2019    Procedure: COLONOSCOPY;  Surgeon: James Roger MD;  Location: [facility] ENDO;  Service: Endoscopy;  Laterality: N/A;    HEMORRHOID SURGERY      INGUINAL HERNIA REPAIR Left     KNEE ARTHROSCOPY Right     LEFT HEART CATHETERIZATION Left 6/29/2020    Procedure: CATHETERIZATION, HEART, LEFT;  Surgeon: Cheli Wilson MD;  Location: [facility] CATH LAB;  Service: Cardiology;  Laterality: Left;    LUMBAR LAMINECTOMY      PROSTATECTOMY       Social History     Socioeconomic History    Marital status:    Tobacco Use    Smoking status: Former Smoker     Packs/day: 0.00     Years: 50.00     Pack years: 0.00     Types: Cigarettes      Quit date: 3/1/2021     Years since quittin.9    Smokeless tobacco: Former User   Substance and Sexual Activity    Alcohol use: Not Currently    Drug use: Never    Sexual activity: Not Currently     Partners: Female     Social Determinants of Health     Financial Resource Strain: Medium Risk    Difficulty of Paying Living Expenses: Somewhat hard   Food Insecurity: No Food Insecurity    Worried About Running Out of Food in the Last Year: Never true    Ran Out of Food in the Last Year: Never true   Transportation Needs: No Transportation Needs    Lack of Transportation (Medical): No    Lack of Transportation (Non-Medical): No   Physical Activity: Inactive    Days of Exercise per Week: 0 days    Minutes of Exercise per Session: 0 min   Stress: Stress Concern Present    Feeling of Stress : To some extent   Social Connections: Unknown    Frequency of Communication with Friends and Family: More than three times a week    Frequency of Social Gatherings with Friends and Family: Once a week    Active Member of Clubs or Organizations: No    Attends Club or Organization Meetings: Patient refused    Marital Status:    Housing Stability: Low Risk     Unable to Pay for Housing in the Last Year: No    Number of Places Lived in the Last Year: 1    Unstable Housing in the Last Year: No             Past/Current Medical/Surgical History, Past/Current Social History, Past/Current Family History and Past/Current Medications were reviewed in detail.        Objective:           VITAL SIGNS WERE REVIEWED      GENERAL APPEARANCE:     The patient looks comfortable.    BMI 36.70     No signs of respiratory distress.    Normal breathing pattern.    No dysmorphic features    Normal eye contact.     GENERAL MEDICAL EXAM:    HEENT:  Head is atraumatic normocephalic. Fundoscopic (Ophthalmoscopic) exam showed no disc edema.      Neck and Axillae: No JVD. No visible lesions.    Cardiopulmonary: No cyanosis. No  tachypnea. Normal respiratory effort.    Gastrointestinal/Urogenital:  No jaundice. No stomas or lesions. No visible hernias. No catheters.     Skin, Hair and Nails: No pathognonomic skin rash. No neurofibromatosis. No visible lesions.No stigmata of autoimmune disease. No clubbing.    Limbs: No varicose veins. BLE visible swelling.    Muskoskeletal: OA visible deformities.No visible lesions.           Neurologic Exam     Mental Status   Oriented to person, place, and time.   Follows 3 step commands.   Attention: normal. Concentration: normal.   Speech: speech is normal   Level of consciousness: alert  Knowledge: good.   Able to name object. Able to repeat. Normal comprehension.     Cranial Nerves     CN II   Visual fields full to confrontation.   Right visual field deficit: none  Left visual field deficit: none     CN III, IV, VI   Pupils are equal, round, and reactive to light.  Right pupil: Size: 2 mm. Shape: regular. Reactivity: brisk. Consensual response: intact.   Left pupil: Size: 2 mm. Shape: regular. Reactivity: brisk. Consensual response: intact.   CN III: right CN III palsy  CN VI: no CN VI palsy  Nystagmus: none   Diplopia: right, horizontal and vertical (Diagonal)  Ophthalmoparesis: none  Upgaze: abnormal  Downgaze: abnormal  Conjugate gaze: absent    CN V   Facial sensation intact.   Right facial sensation deficit: none  Left facial sensation deficit: none    CN VII   Facial expression full, symmetric.   Right facial weakness: none  Left facial weakness: none    CN VIII   CN VIII normal.   Hearing: intact    CN IX, X   CN IX normal.   CN X normal.   Palate: symmetric    CN XI   CN XI normal.   Right sternocleidomastoid strength: normal  Left sternocleidomastoid strength: normal  Right trapezius strength: normal  Left trapezius strength: normal    CN XII   CN XII normal.   Tongue: not atrophic  Fasciculations: absent  Tongue deviation: none    Motor Exam   Muscle bulk: normal  Overall muscle tone:  normal  Right arm tone: normal  Left arm tone: normal  Right arm pronator drift: absent  Left arm pronator drift: absent  Right leg tone: normal  Left leg tone: normal    Strength   Strength 5/5 throughout.   Right neck flexion:   Left neck flexion:   Right neck extension:   Left neck extension:   Right deltoid:   Left deltoid:   Right biceps:   Left biceps:   Right triceps:   Left triceps:   Right wrist flexion:   Left wrist flexion:   Right wrist extension:   Left wrist extension:   Right interossei:   Left interossei:   Right iliopsoas:   Left iliopsoas:   Right quadriceps:   Left quadriceps:   Right hamstrin/5  Left hamstrin/5  Right glutei:   Left glutei:   Right anterior tibial:   Left anterior tibial:   Right posterior tibial:   Left posterior tibial:   Right peroneal:   Left peroneal:   Right gastroc:   Left gastroc:     Sensory Exam   Right arm light touch: decreased from wrist  Left arm light touch: decreased from wrist  Right leg light touch: decreased from knee  Left leg light touch: decreased from knee  Right arm vibration: normal  Left arm vibration: normal  Right leg vibration: decreased from ankle  Left leg vibration: decreased from ankle  Right arm proprioception: normal  Left arm proprioception: normal  Right leg proprioception: decreased from ankle  Left leg proprioception: decreased from ankle  Right arm pinprick: decreased from wrist  Left arm pinprick: decreased from wrist  Right leg pinprick: decreased from knee  Left leg pinprick: decreased from knee  Graphesthesia: normal  Stereognosis: normal    Gait, Coordination, and Reflexes     Gait  Gait: wide-based    Coordination   Romberg: negative  Finger to nose coordination: normal  Heel to shin coordination: normal    Tremor   Resting tremor: absent  Intention tremor: absent  Action tremor: absent    Reflexes   Right brachioradialis: 1+  Left  brachioradialis: 1+  Right biceps: 1+  Left biceps: 1+  Right triceps: 1+  Left triceps: 1+  Right patellar: 0  Left patellar: 0  Right achilles: 0  Left achilles: 0  Right plantar: normal  Left plantar: normal  Right Camarena: absent  Left [place]rena: absent  Right ankle clonus: absent  Left ankle clonus: absent  Right pendular knee jerk: absent  Left pendular knee jerk: absent      Lab Results   Component Value Date    WBC 11.12 10/05/2021    HGB 13.9 (L) 10/05/2021    HCT 45.2 10/05/2021    MCV 98 10/05/2021     10/05/2021     Sodium   Date Value Ref Range Status   01/11/2022 140 136 - 145 mmol/L Final     Potassium   Date Value Ref Range Status   01/11/2022 4.3 3.5 - 5.1 mmol/L Final     Chloride   Date Value Ref Range Status   01/11/2022 91 (L) 95 - 110 mmol/L Final     CO2   Date Value Ref Range Status   01/11/2022 37 (H) 23 - 29 mmol/L Final     Glucose   Date Value Ref Range Status   01/11/2022 168 (H) 70 - 110 mg/dL Final     BUN   Date Value Ref Range Status   01/11/2022 26 (H) 8 - 23 mg/dL Final     Creatinine   Date Value Ref Range Status   01/11/2022 1.7 (H) 0.5 - 1.4 mg/dL Final     Calcium   Date Value Ref Range Status   01/11/2022 9.9 8.7 - 10.5 mg/dL Final     Total Protein   Date Value Ref Range Status   07/30/2021 6.8 6.0 - 8.4 g/dL Final     Albumin   Date Value Ref Range Status   10/05/2021 4.1 3.5 - 5.2 g/dL Final     Total Bilirubin   Date Value Ref Range Status   07/30/2021 0.5 0.1 - 1.0 mg/dL Final     Comment:     For infants and newborns, interpretation of results should be based  on gestational age, weight and in agreement with clinical  observations.    Premature Infant recommended reference ranges:  Up to 24 hours.............<8.0 mg/dL  Up to 48 hours............<12.0 mg/dL  3-5 days..................<15.0 mg/dL  6-29 days.................<15.0 mg/dL       Alkaline Phosphatase   Date Value Ref Range Status   07/30/2021 67 55 - 135 U/L Final     AST   Date Value Ref Range Status    07/30/2021 31 10 - 40 U/L Final     ALT   Date Value Ref Range Status   07/30/2021 30 10 - 44 U/L Final     Anion Gap   Date Value Ref Range Status   01/11/2022 12 8 - 16 mmol/L Final     eGFR if    Date Value Ref Range Status   01/11/2022 46.2 (A) >60 mL/min/1.73 m^2 Final     eGFR if non    Date Value Ref Range Status   01/11/2022 40.0 (A) >60 mL/min/1.73 m^2 Final     Comment:     Calculation used to obtain the estimated glomerular filtration  rate (eGFR) is the CKD-EPI equation.        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 This does not definitely emanate from the extra-ocular muscles.  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 MRI of the orbits may be definitive although there is a focus of metal imbedded within the subcutaneous tissues of the right scalp.    2. Normal CTA of the head with no evidence of aneurysm.  Assessment:       1. Third nerve palsy, right    2. DM (diabetes mellitus) with complications    3. Quit smoking within past year    4. 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    RT eye patching.    BS Control.    Expectant approach.     CTA Brain R/O PCOM Aneurysm. Severely claustrophobic.    MG Panel.            MEDICAL/SURGICAL COMORBIDITIES     All relevant medical comorbidities noted and managed by primary care physician and medical care team.          MISCELLANEOUS MEDICAL PROBLEMS       HEALTHY LIFESTYLE AND PREVENTATIVE CARE    The patient to adhere to the age-appropriate health maintenance guidelines including screening  tests and vaccinations. The patient to adhere to  healthy lifestyle, optimal weight, exercise, healthy diet, good sleep hygiene and avoiding drugs including smoking, alcohol and recreational drugs.        RTC in 3 months       Alisa Najera MD, FAAN    Attending Neurologist/Epileptologist         Diplomate, American Board of Psychiatry and Neurology    Diplomate, American Board of Clinical Neurophysiology     Fellow, American Academy of Neurology         E/M 66

## 2022-02-22 LAB
ACHR BIND AB SER-SCNC: 0 NMOL/L
MG INTERPRETIVE COMMENTS: NORMAL

## 2022-02-23 ENCOUNTER — TELEPHONE (OUTPATIENT)
Dept: NEUROLOGY | Facility: CLINIC | Age: 72
End: 2022-02-23
Payer: MEDICARE

## 2022-02-23 LAB — MUSK ANTIBODY TEST: 0 NMOL/L (ref 0–0.02)

## 2022-02-25 ENCOUNTER — HOSPITAL ENCOUNTER (OUTPATIENT)
Dept: RADIOLOGY | Facility: HOSPITAL | Age: 72
Discharge: HOME | End: 2022-02-25
Attending: PSYCHIATRY & NEUROLOGY
Payer: MEDICARE

## 2022-02-25 DIAGNOSIS — H49.01 THIRD NERVE PALSY, RIGHT: ICD-10-CM

## 2022-02-28 ENCOUNTER — PATIENT MESSAGE (OUTPATIENT)
Dept: ADMINISTRATIVE | Facility: OTHER | Age: 72
End: 2022-02-28
Payer: MEDICARE

## 2022-03-05 ENCOUNTER — OFFICE VISIT (OUTPATIENT)
Dept: URGENT CARE | Facility: CLINIC | Age: 72
End: 2022-03-05
Payer: MEDICARE

## 2022-03-05 VITALS
WEIGHT: 230 LBS | OXYGEN SATURATION: 94 % | SYSTOLIC BLOOD PRESSURE: 128 MMHG | RESPIRATION RATE: 18 BRPM | TEMPERATURE: 98 F | HEIGHT: 67 IN | BODY MASS INDEX: 36.1 KG/M2 | HEART RATE: 99 BPM | DIASTOLIC BLOOD PRESSURE: 60 MMHG

## 2022-03-05 DIAGNOSIS — N18.30 HYPERTENSION ASSOCIATED WITH STAGE 3 CHRONIC KIDNEY DISEASE DUE TO TYPE 2 DIABETES MELLITUS: ICD-10-CM

## 2022-03-05 DIAGNOSIS — L03.90 CELLULITIS, UNSPECIFIED CELLULITIS SITE: ICD-10-CM

## 2022-03-05 DIAGNOSIS — R60.9 EDEMA, UNSPECIFIED TYPE: ICD-10-CM

## 2022-03-05 DIAGNOSIS — I12.9 HYPERTENSION ASSOCIATED WITH STAGE 3 CHRONIC KIDNEY DISEASE DUE TO TYPE 2 DIABETES MELLITUS: ICD-10-CM

## 2022-03-05 DIAGNOSIS — M25.562 ACUTE PAIN OF LEFT KNEE: Primary | ICD-10-CM

## 2022-03-05 DIAGNOSIS — E11.22 HYPERTENSION ASSOCIATED WITH STAGE 3 CHRONIC KIDNEY DISEASE DUE TO TYPE 2 DIABETES MELLITUS: ICD-10-CM

## 2022-03-05 PROCEDURE — 1160F PR REVIEW ALL MEDS BY PRESCRIBER/CLIN PHARMACIST DOCUMENTED: ICD-10-PCS | Mod: CPTII,S$GLB,, | Performed by: NURSE PRACTITIONER

## 2022-03-05 PROCEDURE — 1125F AMNT PAIN NOTED PAIN PRSNT: CPT | Mod: CPTII,S$GLB,, | Performed by: NURSE PRACTITIONER

## 2022-03-05 PROCEDURE — 99214 PR OFFICE/OUTPT VISIT, EST, LEVL IV, 30-39 MIN: ICD-10-PCS | Mod: S$GLB,,, | Performed by: NURSE PRACTITIONER

## 2022-03-05 PROCEDURE — 3074F SYST BP LT 130 MM HG: CPT | Mod: CPTII,S$GLB,, | Performed by: NURSE PRACTITIONER

## 2022-03-05 PROCEDURE — 3008F PR BODY MASS INDEX (BMI) DOCUMENTED: ICD-10-PCS | Mod: CPTII,S$GLB,, | Performed by: NURSE PRACTITIONER

## 2022-03-05 PROCEDURE — 99214 OFFICE O/P EST MOD 30 MIN: CPT | Mod: S$GLB,,, | Performed by: NURSE PRACTITIONER

## 2022-03-05 PROCEDURE — 3072F LOW RISK FOR RETINOPATHY: CPT | Mod: CPTII,S$GLB,, | Performed by: NURSE PRACTITIONER

## 2022-03-05 PROCEDURE — 3078F PR MOST RECENT DIASTOLIC BLOOD PRESSURE < 80 MM HG: ICD-10-PCS | Mod: CPTII,S$GLB,, | Performed by: NURSE PRACTITIONER

## 2022-03-05 PROCEDURE — 3078F DIAST BP <80 MM HG: CPT | Mod: CPTII,S$GLB,, | Performed by: NURSE PRACTITIONER

## 2022-03-05 PROCEDURE — 3074F PR MOST RECENT SYSTOLIC BLOOD PRESSURE < 130 MM HG: ICD-10-PCS | Mod: CPTII,S$GLB,, | Performed by: NURSE PRACTITIONER

## 2022-03-05 PROCEDURE — 4010F ACE/ARB THERAPY RXD/TAKEN: CPT | Mod: CPTII,S$GLB,, | Performed by: NURSE PRACTITIONER

## 2022-03-05 PROCEDURE — 3008F BODY MASS INDEX DOCD: CPT | Mod: CPTII,S$GLB,, | Performed by: NURSE PRACTITIONER

## 2022-03-05 PROCEDURE — 4010F PR ACE/ARB THEARPY RXD/TAKEN: ICD-10-PCS | Mod: CPTII,S$GLB,, | Performed by: NURSE PRACTITIONER

## 2022-03-05 PROCEDURE — 1160F RVW MEDS BY RX/DR IN RCRD: CPT | Mod: CPTII,S$GLB,, | Performed by: NURSE PRACTITIONER

## 2022-03-05 PROCEDURE — 3072F PR LOW RISK FOR RETINOPATHY: ICD-10-PCS | Mod: CPTII,S$GLB,, | Performed by: NURSE PRACTITIONER

## 2022-03-05 PROCEDURE — 1159F MED LIST DOCD IN RCRD: CPT | Mod: CPTII,S$GLB,, | Performed by: NURSE PRACTITIONER

## 2022-03-05 PROCEDURE — 1159F PR MEDICATION LIST DOCUMENTED IN MEDICAL RECORD: ICD-10-PCS | Mod: CPTII,S$GLB,, | Performed by: NURSE PRACTITIONER

## 2022-03-05 PROCEDURE — 1125F PR PAIN SEVERITY QUANTIFIED, PAIN PRESENT: ICD-10-PCS | Mod: CPTII,S$GLB,, | Performed by: NURSE PRACTITIONER

## 2022-03-05 RX ORDER — CLINDAMYCIN HYDROCHLORIDE 300 MG/1
300 CAPSULE ORAL 2 TIMES DAILY
Qty: 20 CAPSULE | Refills: 0 | Status: ON HOLD | OUTPATIENT
Start: 2022-03-05 | End: 2022-03-13 | Stop reason: HOSPADM

## 2022-03-05 NOTE — PATIENT INSTRUCTIONS
PLAN:   Advise use of cool compresses and elevate  Advise increase oral fluids  Meds: Clindamycin / no refills  Advise follow up with PCP inn 2-3 days for recheck  Advise go to ER if symptoms worsen or fail to improve with treatment.  AVS provided and reviewed with patient including supportive care, follow up, and red flag symptoms.   Patient verbalizes understanding and agrees with treatment plan. Discharged from Urgent Care in stable condition.

## 2022-03-05 NOTE — PROGRESS NOTES
"Subjective:       Patient ID: Santiago Khan is a 71 y.o. male.    Vitals:  height is 5' 7" (1.702 m) and weight is 104.3 kg (230 lb). His tympanic temperature is 98.2 °F (36.8 °C). His blood pressure is 128/60 and his pulse is 99. His respiration is 18 and oxygen saturation is 94% (abnormal).     Chief Complaint: Joint Swelling (X 3 days Lt knee pain,PMH gout )    Patient presented here today w/X 3 days Lt knee pain, w/redness & swelling. Sensitive to touch, unable to bear wt on Lt leg. PMH gout. Pt denies fever       Pain  This is a new problem. The current episode started yesterday. The problem occurs constantly. The problem has been gradually worsening. Associated symptoms include joint swelling and myalgias. Pertinent negatives include no abdominal pain, anorexia, arthralgias, change in bowel habit, chest pain, chills, congestion, coughing, diaphoresis, fatigue, fever, headaches, nausea, neck pain, numbness, rash, sore throat, swollen glands, urinary symptoms, vertigo, visual change, vomiting or weakness. He has tried acetaminophen, NSAIDs, immobilization, rest, relaxation and position changes for the symptoms. The treatment provided no relief.       Constitution: Negative for chills, sweating, fatigue and fever.   HENT: Negative for congestion and sore throat.    Neck: Negative for neck pain.   Cardiovascular: Negative for chest pain.   Respiratory: Negative for cough.    Gastrointestinal: Negative for abdominal pain, nausea and vomiting.   Musculoskeletal: Positive for joint swelling and muscle ache. Negative for joint pain.   Skin: Negative for rash.   Neurological: Negative for history of vertigo, headaches and numbness.       CHIEF COMPLAINT/REASON FOR VISIT: Reports red left knee    HISTORY OF PRESENT ILLNESS:  71-year-old female complains of redness, swelling and painful left knee, lower leg onset 3-4 days ago.  Admits has a scratch to left knee, bumped knee on furniture in bedroom.  Denies " seeking emergency room treatment.  Admits tried gout medication  for 3 days with no relief.  Patient admits seen and treated at [facility] ER with cellulitis in 2021, with relief from antibiotics.    Past Medical History:   Diagnosis Date    Chronic diastolic congestive heart failure 2020    Chronic kidney disease, stage 3     Chronic systolic congestive heart failure 2020    Chronic venous insufficiency     DDD (degenerative disc disease), lumbar     DM (diabetes mellitus) with complications     History of gout     Hyperlipidemia associated with type 2 diabetes mellitus     Hypertension associated with stage 3 chronic kidney disease due to type 2 diabetes mellitus     BRADLEY on CPAP     Prostate cancer     prostatectomy in , rising PSA that started in     Tobacco abuse        Past Surgical History:   Procedure Laterality Date    BICEPS TENDON REPAIR      COLONOSCOPY N/A 3/27/2019    Procedure: COLONOSCOPY;  Surgeon: James Roger MD;  Location: [facility] ENDO;  Service: Endoscopy;  Laterality: N/A;    HEMORRHOID SURGERY      INGUINAL HERNIA REPAIR Left     KNEE ARTHROSCOPY Right     LEFT HEART CATHETERIZATION Left 2020    Procedure: CATHETERIZATION, HEART, LEFT;  Surgeon: Cheli Wilson MD;  Location: [facility] CATH LAB;  Service: Cardiology;  Laterality: Left;    LUMBAR LAMINECTOMY      PROSTATECTOMY              Family History   Problem Relation Age of Onset    Prostate cancer Father     Coronary artery disease Father 90    Alzheimer's disease Father     Hyperlipidemia Mother             Social History     Socioeconomic History    Marital status:    Tobacco Use    Smoking status: Former Smoker     Packs/day: 0.00     Years: 50.00     Pack years: 0.00     Types: Cigarettes     Quit date: 3/1/2021     Years since quittin.0    Smokeless tobacco: Former User   Substance and Sexual Activity    Alcohol use: Not Currently    Drug use: Never    Sexual activity:  Not Currently     Partners: Female     Social Determinants of Health     Financial Resource Strain: Medium Risk    Difficulty of Paying Living Expenses: Somewhat hard   Food Insecurity: No Food Insecurity    Worried About Running Out of Food in the Last Year: Never true    Ran Out of Food in the Last Year: Never true   Transportation Needs: No Transportation Needs    Lack of Transportation (Medical): No    Lack of Transportation (Non-Medical): No   Physical Activity: Inactive    Days of Exercise per Week: 0 days    Minutes of Exercise per Session: 0 min   Stress: Stress Concern Present    Feeling of Stress : To some extent   Social Connections: Unknown    Frequency of Communication with Friends and Family: More than three times a week    Frequency of Social Gatherings with Friends and Family: Once a week    Active Member of Clubs or Organizations: No    Attends Club or Organization Meetings: Patient refused    Marital Status:    Housing Stability: Low Risk     Unable to Pay for Housing in the Last Year: No    Number of Places Lived in the Last Year: 1    Unstable Housing in the Last Year: No       ROS:  GENERAL: No fever, chills, fatigability or weight loss.  SKIN: Reports red skin left knee and lower leg.  HEENT: No headaches or recent head trauma. Denies ear pain, discharge or vertigo. No loss of smell, no epistaxis or postnasal drip. No hoarseness or change in voice.   CHEST: Denies cyanosis, wheezing, cough and sputum production.  CARDIOVASCULAR: Denies chest pain, shortness of breath  MUSCULOSKELETAL: redness and swelling left knee and lower leg  NEUROLOGIC: No history of seizures, paralysis, alteration of gait or coordination.  PSYCHIATRIC: Denies mood swings, depression or suicidal thoughts.    PE:   APPEARANCE: Well nourished, well developed, in mild distress.  Temp 98.2°  V/S: Reviewed.  SKIN:  Left knee with scratch and lower extremity with redness, hot to touch,  tenderness on  touch, with soft tissue swelling       CHEST:  No respiratory symptoms  CARDIOVASCULAR: Regular rate and rhythm.  MUSCULOSKELETAL: Left knee with scratch and lower extremity with redness, hot to touch,  tenderness on touch, with soft tissue swelling   NEUROLOGIC: No sensory deficits. Gait & Posture: Normal. No cerebellar signs.  MENTAL STATUS: Patient alert, oriented x 3 & conversant.    PLAN:   Advise use of cool compresses and elevate  Advise increase oral fluids  Meds: Clindamycin / no refills  Advise follow up with PCP inn 2-3 days for recheck  Advise go to ER if symptoms worsen or fail to improve with treatment.  AVS provided and reviewed with patient including supportive care, follow up, and red flag symptoms.   Patient verbalizes understanding and agrees with treatment plan. Discharged from Urgent Care in stable condition.    DIAGNOSIS:  Cellulitis  Left knee pain/ edema  Left lower leg/edema  Hypertension associated with stage III chronic kidney disease due to diabetes

## 2022-03-08 ENCOUNTER — TELEPHONE (OUTPATIENT)
Dept: URGENT CARE | Facility: CLINIC | Age: 72
End: 2022-03-08
Payer: MEDICARE

## 2022-03-11 ENCOUNTER — OFFICE VISIT (OUTPATIENT)
Dept: INTERNAL MEDICINE | Facility: CLINIC | Age: 72
End: 2022-03-11
Payer: MEDICARE

## 2022-03-11 ENCOUNTER — HOSPITAL ENCOUNTER (OUTPATIENT)
Facility: HOSPITAL | Age: 72
Discharge: HOME | End: 2022-03-13
Attending: EMERGENCY MEDICINE | Admitting: INTERNAL MEDICINE
Payer: MEDICARE

## 2022-03-11 VITALS
WEIGHT: 231.69 LBS | OXYGEN SATURATION: 96 % | SYSTOLIC BLOOD PRESSURE: 108 MMHG | BODY MASS INDEX: 36.36 KG/M2 | HEIGHT: 67 IN | DIASTOLIC BLOOD PRESSURE: 70 MMHG | HEART RATE: 89 BPM

## 2022-03-11 DIAGNOSIS — M25.562 PAIN AND SWELLING OF LEFT KNEE: ICD-10-CM

## 2022-03-11 DIAGNOSIS — M25.462 PAIN AND SWELLING OF LEFT KNEE: ICD-10-CM

## 2022-03-11 DIAGNOSIS — L02.416 CELLULITIS AND ABSCESS OF LEFT LOWER EXTREMITY: Primary | ICD-10-CM

## 2022-03-11 DIAGNOSIS — L03.116 CELLULITIS AND ABSCESS OF LEFT LOWER EXTREMITY: Primary | ICD-10-CM

## 2022-03-11 DIAGNOSIS — R06.02 SHORTNESS OF BREATH: ICD-10-CM

## 2022-03-11 DIAGNOSIS — L03.116 CELLULITIS OF LEFT KNEE: Primary | ICD-10-CM

## 2022-03-11 DIAGNOSIS — I50.9 HEART FAILURE: ICD-10-CM

## 2022-03-11 PROBLEM — I50.42 CHRONIC COMBINED SYSTOLIC AND DIASTOLIC CONGESTIVE HEART FAILURE: Chronic | Status: ACTIVE | Noted: 2020-07-09

## 2022-03-11 PROBLEM — I10 PRIMARY HYPERTENSION: Chronic | Status: ACTIVE | Noted: 2022-03-11

## 2022-03-11 PROBLEM — I25.118 CORONARY ARTERY DISEASE OF NATIVE ARTERY OF NATIVE HEART WITH STABLE ANGINA PECTORIS: Chronic | Status: ACTIVE | Noted: 2020-06-25

## 2022-03-11 LAB
ALBUMIN SERPL BCP-MCNC: 3.7 G/DL (ref 3.5–5.2)
ALP SERPL-CCNC: 91 U/L (ref 55–135)
ALT SERPL W/O P-5'-P-CCNC: 26 U/L (ref 10–44)
ANION GAP SERPL CALC-SCNC: 9 MMOL/L (ref 8–16)
AST SERPL-CCNC: 27 U/L (ref 10–40)
BASOPHILS # BLD AUTO: 0.05 K/UL (ref 0–0.2)
BASOPHILS NFR BLD: 0.5 % (ref 0–1.9)
BILIRUB SERPL-MCNC: 0.6 MG/DL (ref 0.1–1)
BNP SERPL-MCNC: 11 PG/ML (ref 0–99)
BUN SERPL-MCNC: 17 MG/DL (ref 8–23)
CALCIUM SERPL-MCNC: 9.6 MG/DL (ref 8.7–10.5)
CHLORIDE SERPL-SCNC: 96 MMOL/L (ref 95–110)
CO2 SERPL-SCNC: 32 MMOL/L (ref 23–29)
CREAT SERPL-MCNC: 1.8 MG/DL (ref 0.5–1.4)
CRP SERPL-MCNC: 10.3 MG/L (ref 0–8.2)
CTP QC/QA: YES
DIFFERENTIAL METHOD: ABNORMAL
EOSINOPHIL # BLD AUTO: 0.4 K/UL (ref 0–0.5)
EOSINOPHIL NFR BLD: 3.9 % (ref 0–8)
ERYTHROCYTE [DISTWIDTH] IN BLOOD BY AUTOMATED COUNT: 15.1 % (ref 11.5–14.5)
ERYTHROCYTE [SEDIMENTATION RATE] IN BLOOD BY WESTERGREN METHOD: 38 MM/HR (ref 0–10)
EST. GFR  (AFRICAN AMERICAN): 43 ML/MIN/1.73 M^2
EST. GFR  (NON AFRICAN AMERICAN): 37 ML/MIN/1.73 M^2
GLUCOSE SERPL-MCNC: 170 MG/DL (ref 70–110)
HCT VFR BLD AUTO: 38.4 % (ref 40–54)
HGB BLD-MCNC: 12.4 G/DL (ref 14–18)
IMM GRANULOCYTES # BLD AUTO: 0.08 K/UL (ref 0–0.04)
IMM GRANULOCYTES NFR BLD AUTO: 0.8 % (ref 0–0.5)
LACTATE SERPL-SCNC: 2.2 MMOL/L (ref 0.5–2.2)
LYMPHOCYTES # BLD AUTO: 1.2 K/UL (ref 1–4.8)
LYMPHOCYTES NFR BLD: 12.3 % (ref 18–48)
MCH RBC QN AUTO: 30.5 PG (ref 27–31)
MCHC RBC AUTO-ENTMCNC: 32.3 G/DL (ref 32–36)
MCV RBC AUTO: 94 FL (ref 82–98)
MONOCYTES # BLD AUTO: 0.4 K/UL (ref 0.3–1)
MONOCYTES NFR BLD: 4.3 % (ref 4–15)
NEUTROPHILS # BLD AUTO: 7.5 K/UL (ref 1.8–7.7)
NEUTROPHILS NFR BLD: 78.2 % (ref 38–73)
NRBC BLD-RTO: 0 /100 WBC
PLATELET # BLD AUTO: 188 K/UL (ref 150–450)
PMV BLD AUTO: 10.1 FL (ref 9.2–12.9)
POCT GLUCOSE: 199 MG/DL (ref 70–110)
POTASSIUM SERPL-SCNC: 4.2 MMOL/L (ref 3.5–5.1)
PROCALCITONIN SERPL IA-MCNC: 0.06 NG/ML
PROT SERPL-MCNC: 7.7 G/DL (ref 6–8.4)
RBC # BLD AUTO: 4.07 M/UL (ref 4.6–6.2)
SARS-COV-2 RDRP RESP QL NAA+PROBE: NEGATIVE
SODIUM SERPL-SCNC: 137 MMOL/L (ref 136–145)
TROPONIN I SERPL DL<=0.01 NG/ML-MCNC: 0.01 NG/ML (ref 0–0.03)
URATE SERPL-MCNC: 6.9 MG/DL (ref 3.4–7)
URATE SERPL-MCNC: 7 MG/DL (ref 3.4–7)
WBC # BLD AUTO: 9.54 K/UL (ref 3.9–12.7)

## 2022-03-11 PROCEDURE — 3008F BODY MASS INDEX DOCD: CPT | Mod: CPTII,S$GLB,, | Performed by: INTERNAL MEDICINE

## 2022-03-11 PROCEDURE — 99285 EMERGENCY DEPT VISIT HI MDM: CPT | Mod: 25

## 2022-03-11 PROCEDURE — 1101F PR PT FALLS ASSESS DOC 0-1 FALLS W/OUT INJ PAST YR: ICD-10-PCS | Mod: CPTII,S$GLB,, | Performed by: INTERNAL MEDICINE

## 2022-03-11 PROCEDURE — 3288F FALL RISK ASSESSMENT DOCD: CPT | Mod: CPTII,S$GLB,, | Performed by: INTERNAL MEDICINE

## 2022-03-11 PROCEDURE — 25000003 PHARM REV CODE 250: Performed by: NURSE PRACTITIONER

## 2022-03-11 PROCEDURE — 93010 EKG 12-LEAD: ICD-10-PCS | Mod: ,,, | Performed by: INTERNAL MEDICINE

## 2022-03-11 PROCEDURE — 86140 C-REACTIVE PROTEIN: CPT | Performed by: EMERGENCY MEDICINE

## 2022-03-11 PROCEDURE — 3008F PR BODY MASS INDEX (BMI) DOCUMENTED: ICD-10-PCS | Mod: CPTII,S$GLB,, | Performed by: INTERNAL MEDICINE

## 2022-03-11 PROCEDURE — 3072F PR LOW RISK FOR RETINOPATHY: ICD-10-PCS | Mod: CPTII,S$GLB,, | Performed by: INTERNAL MEDICINE

## 2022-03-11 PROCEDURE — 99999 PR PBB SHADOW E&M-EST. PATIENT-LVL IV: ICD-10-PCS | Mod: PBBFAC,,, | Performed by: INTERNAL MEDICINE

## 2022-03-11 PROCEDURE — 3288F PR FALLS RISK ASSESSMENT DOCUMENTED: ICD-10-PCS | Mod: CPTII,S$GLB,, | Performed by: INTERNAL MEDICINE

## 2022-03-11 PROCEDURE — 83036 HEMOGLOBIN GLYCOSYLATED A1C: CPT | Performed by: NURSE PRACTITIONER

## 2022-03-11 PROCEDURE — 3072F LOW RISK FOR RETINOPATHY: CPT | Mod: CPTII,S$GLB,, | Performed by: INTERNAL MEDICINE

## 2022-03-11 PROCEDURE — U0002 COVID-19 LAB TEST NON-CDC: HCPCS | Performed by: EMERGENCY MEDICINE

## 2022-03-11 PROCEDURE — 96375 TX/PRO/DX INJ NEW DRUG ADDON: CPT

## 2022-03-11 PROCEDURE — 1101F PT FALLS ASSESS-DOCD LE1/YR: CPT | Mod: CPTII,S$GLB,, | Performed by: INTERNAL MEDICINE

## 2022-03-11 PROCEDURE — 84550 ASSAY OF BLOOD/URIC ACID: CPT | Mod: 91 | Performed by: EMERGENCY MEDICINE

## 2022-03-11 PROCEDURE — 99213 OFFICE O/P EST LOW 20 MIN: CPT | Mod: S$GLB,,, | Performed by: INTERNAL MEDICINE

## 2022-03-11 PROCEDURE — 25000003 PHARM REV CODE 250: Performed by: INTERNAL MEDICINE

## 2022-03-11 PROCEDURE — 3074F PR MOST RECENT SYSTOLIC BLOOD PRESSURE < 130 MM HG: ICD-10-PCS | Mod: CPTII,S$GLB,, | Performed by: INTERNAL MEDICINE

## 2022-03-11 PROCEDURE — 3078F DIAST BP <80 MM HG: CPT | Mod: CPTII,S$GLB,, | Performed by: INTERNAL MEDICINE

## 2022-03-11 PROCEDURE — 99213 PR OFFICE/OUTPT VISIT, EST, LEVL III, 20-29 MIN: ICD-10-PCS | Mod: S$GLB,,, | Performed by: INTERNAL MEDICINE

## 2022-03-11 PROCEDURE — 83880 ASSAY OF NATRIURETIC PEPTIDE: CPT | Performed by: EMERGENCY MEDICINE

## 2022-03-11 PROCEDURE — 63600175 PHARM REV CODE 636 W HCPCS: Performed by: EMERGENCY MEDICINE

## 2022-03-11 PROCEDURE — G0378 HOSPITAL OBSERVATION PER HR: HCPCS

## 2022-03-11 PROCEDURE — 3074F SYST BP LT 130 MM HG: CPT | Mod: CPTII,S$GLB,, | Performed by: INTERNAL MEDICINE

## 2022-03-11 PROCEDURE — 36415 COLL VENOUS BLD VENIPUNCTURE: CPT | Performed by: NURSE PRACTITIONER

## 2022-03-11 PROCEDURE — 96367 TX/PROPH/DG ADDL SEQ IV INF: CPT

## 2022-03-11 PROCEDURE — 4010F ACE/ARB THERAPY RXD/TAKEN: CPT | Mod: CPTII,S$GLB,, | Performed by: INTERNAL MEDICINE

## 2022-03-11 PROCEDURE — 93005 ELECTROCARDIOGRAM TRACING: CPT

## 2022-03-11 PROCEDURE — 4010F PR ACE/ARB THEARPY RXD/TAKEN: ICD-10-PCS | Mod: CPTII,S$GLB,, | Performed by: INTERNAL MEDICINE

## 2022-03-11 PROCEDURE — 99999 PR PBB SHADOW E&M-EST. PATIENT-LVL IV: CPT | Mod: PBBFAC,,, | Performed by: INTERNAL MEDICINE

## 2022-03-11 PROCEDURE — 84550 ASSAY OF BLOOD/URIC ACID: CPT | Performed by: NURSE PRACTITIONER

## 2022-03-11 PROCEDURE — 85025 COMPLETE CBC W/AUTO DIFF WBC: CPT | Performed by: EMERGENCY MEDICINE

## 2022-03-11 PROCEDURE — 93010 ELECTROCARDIOGRAM REPORT: CPT | Mod: ,,, | Performed by: INTERNAL MEDICINE

## 2022-03-11 PROCEDURE — 1159F MED LIST DOCD IN RCRD: CPT | Mod: CPTII,S$GLB,, | Performed by: INTERNAL MEDICINE

## 2022-03-11 PROCEDURE — 3078F PR MOST RECENT DIASTOLIC BLOOD PRESSURE < 80 MM HG: ICD-10-PCS | Mod: CPTII,S$GLB,, | Performed by: INTERNAL MEDICINE

## 2022-03-11 PROCEDURE — 96365 THER/PROPH/DIAG IV INF INIT: CPT

## 2022-03-11 PROCEDURE — 1125F AMNT PAIN NOTED PAIN PRSNT: CPT | Mod: CPTII,S$GLB,, | Performed by: INTERNAL MEDICINE

## 2022-03-11 PROCEDURE — 1125F PR PAIN SEVERITY QUANTIFIED, PAIN PRESENT: ICD-10-PCS | Mod: CPTII,S$GLB,, | Performed by: INTERNAL MEDICINE

## 2022-03-11 PROCEDURE — 84484 ASSAY OF TROPONIN QUANT: CPT | Performed by: EMERGENCY MEDICINE

## 2022-03-11 PROCEDURE — 83605 ASSAY OF LACTIC ACID: CPT | Performed by: EMERGENCY MEDICINE

## 2022-03-11 PROCEDURE — 1159F PR MEDICATION LIST DOCUMENTED IN MEDICAL RECORD: ICD-10-PCS | Mod: CPTII,S$GLB,, | Performed by: INTERNAL MEDICINE

## 2022-03-11 PROCEDURE — 85651 RBC SED RATE NONAUTOMATED: CPT | Performed by: EMERGENCY MEDICINE

## 2022-03-11 PROCEDURE — 84145 PROCALCITONIN (PCT): CPT | Performed by: EMERGENCY MEDICINE

## 2022-03-11 PROCEDURE — 80053 COMPREHEN METABOLIC PANEL: CPT | Performed by: EMERGENCY MEDICINE

## 2022-03-11 PROCEDURE — 87040 BLOOD CULTURE FOR BACTERIA: CPT | Mod: 59 | Performed by: EMERGENCY MEDICINE

## 2022-03-11 RX ORDER — PROMETHAZINE HYDROCHLORIDE 25 MG/1
25 TABLET ORAL EVERY 6 HOURS PRN
Status: DISCONTINUED | OUTPATIENT
Start: 2022-03-11 | End: 2022-03-13 | Stop reason: HOSPADM

## 2022-03-11 RX ORDER — GLUCAGON 1 MG
1 KIT INJECTION
Status: DISCONTINUED | OUTPATIENT
Start: 2022-03-11 | End: 2022-03-13 | Stop reason: HOSPADM

## 2022-03-11 RX ORDER — IBUPROFEN 200 MG
16 TABLET ORAL
Status: DISCONTINUED | OUTPATIENT
Start: 2022-03-11 | End: 2022-03-13 | Stop reason: HOSPADM

## 2022-03-11 RX ORDER — HYDROCODONE BITARTRATE AND ACETAMINOPHEN 5; 325 MG/1; MG/1
1 TABLET ORAL EVERY 6 HOURS PRN
Status: DISCONTINUED | OUTPATIENT
Start: 2022-03-11 | End: 2022-03-13 | Stop reason: HOSPADM

## 2022-03-11 RX ORDER — TALC
6 POWDER (GRAM) TOPICAL NIGHTLY PRN
Status: DISCONTINUED | OUTPATIENT
Start: 2022-03-11 | End: 2022-03-13 | Stop reason: HOSPADM

## 2022-03-11 RX ORDER — ONDANSETRON 2 MG/ML
4 INJECTION INTRAMUSCULAR; INTRAVENOUS EVERY 8 HOURS PRN
Status: DISCONTINUED | OUTPATIENT
Start: 2022-03-11 | End: 2022-03-13 | Stop reason: HOSPADM

## 2022-03-11 RX ORDER — HYDROCODONE BITARTRATE AND ACETAMINOPHEN 10; 325 MG/1; MG/1
1 TABLET ORAL EVERY 6 HOURS PRN
Status: DISCONTINUED | OUTPATIENT
Start: 2022-03-11 | End: 2022-03-13 | Stop reason: HOSPADM

## 2022-03-11 RX ORDER — ATORVASTATIN CALCIUM 10 MG/1
20 TABLET, FILM COATED ORAL DAILY
Refills: 3 | Status: DISCONTINUED | OUTPATIENT
Start: 2022-03-12 | End: 2022-03-13 | Stop reason: HOSPADM

## 2022-03-11 RX ORDER — FUROSEMIDE 10 MG/ML
40 INJECTION INTRAMUSCULAR; INTRAVENOUS
Status: COMPLETED | OUTPATIENT
Start: 2022-03-11 | End: 2022-03-11

## 2022-03-11 RX ORDER — ALLOPURINOL 300 MG/1
300 TABLET ORAL DAILY
Status: DISCONTINUED | OUTPATIENT
Start: 2022-03-12 | End: 2022-03-13 | Stop reason: HOSPADM

## 2022-03-11 RX ORDER — LANOLIN ALCOHOL/MO/W.PET/CERES
400 CREAM (GRAM) TOPICAL 2 TIMES DAILY
Status: DISCONTINUED | OUTPATIENT
Start: 2022-03-11 | End: 2022-03-13 | Stop reason: HOSPADM

## 2022-03-11 RX ORDER — CEFAZOLIN SODIUM 2 G/50ML
2 SOLUTION INTRAVENOUS
Status: DISCONTINUED | OUTPATIENT
Start: 2022-03-11 | End: 2022-03-12

## 2022-03-11 RX ORDER — POTASSIUM CHLORIDE 20 MEQ/1
40 TABLET, EXTENDED RELEASE ORAL 2 TIMES DAILY
Status: DISCONTINUED | OUTPATIENT
Start: 2022-03-11 | End: 2022-03-13

## 2022-03-11 RX ORDER — SODIUM CHLORIDE 9 MG/ML
INJECTION, SOLUTION INTRAVENOUS CONTINUOUS
Status: DISCONTINUED | OUTPATIENT
Start: 2022-03-11 | End: 2022-03-11

## 2022-03-11 RX ORDER — COLCHICINE 0.6 MG/1
0.6 TABLET, FILM COATED ORAL ONCE
Status: DISCONTINUED | OUTPATIENT
Start: 2022-03-11 | End: 2022-03-12

## 2022-03-11 RX ORDER — LOSARTAN POTASSIUM 25 MG/1
25 TABLET ORAL DAILY
Status: DISCONTINUED | OUTPATIENT
Start: 2022-03-12 | End: 2022-03-13 | Stop reason: HOSPADM

## 2022-03-11 RX ORDER — METOPROLOL TARTRATE 25 MG/1
25 TABLET, FILM COATED ORAL 2 TIMES DAILY
Refills: 3 | Status: DISCONTINUED | OUTPATIENT
Start: 2022-03-11 | End: 2022-03-13 | Stop reason: HOSPADM

## 2022-03-11 RX ORDER — INSULIN ASPART 100 [IU]/ML
0-5 INJECTION, SOLUTION INTRAVENOUS; SUBCUTANEOUS
Status: DISCONTINUED | OUTPATIENT
Start: 2022-03-11 | End: 2022-03-13 | Stop reason: HOSPADM

## 2022-03-11 RX ORDER — ASPIRIN 81 MG/1
81 TABLET ORAL DAILY
Status: DISCONTINUED | OUTPATIENT
Start: 2022-03-12 | End: 2022-03-13 | Stop reason: HOSPADM

## 2022-03-11 RX ORDER — BACLOFEN 10 MG/1
10 TABLET ORAL NIGHTLY
Status: DISCONTINUED | OUTPATIENT
Start: 2022-03-11 | End: 2022-03-13 | Stop reason: HOSPADM

## 2022-03-11 RX ORDER — ACETAMINOPHEN 325 MG/1
650 TABLET ORAL EVERY 6 HOURS PRN
Status: DISCONTINUED | OUTPATIENT
Start: 2022-03-11 | End: 2022-03-13 | Stop reason: HOSPADM

## 2022-03-11 RX ORDER — TORSEMIDE 10 MG/1
40 TABLET ORAL 2 TIMES DAILY
Status: DISCONTINUED | OUTPATIENT
Start: 2022-03-11 | End: 2022-03-13

## 2022-03-11 RX ORDER — SODIUM CHLORIDE 0.9 % (FLUSH) 0.9 %
10 SYRINGE (ML) INJECTION EVERY 12 HOURS PRN
Status: DISCONTINUED | OUTPATIENT
Start: 2022-03-11 | End: 2022-03-13 | Stop reason: HOSPADM

## 2022-03-11 RX ORDER — IBUPROFEN 200 MG
24 TABLET ORAL
Status: DISCONTINUED | OUTPATIENT
Start: 2022-03-11 | End: 2022-03-13 | Stop reason: HOSPADM

## 2022-03-11 RX ADMIN — BACLOFEN 10 MG: 10 TABLET ORAL at 10:03

## 2022-03-11 RX ADMIN — METOPROLOL TARTRATE 25 MG: 25 TABLET, FILM COATED ORAL at 10:03

## 2022-03-11 RX ADMIN — FUROSEMIDE 40 MG: 10 INJECTION, SOLUTION INTRAMUSCULAR; INTRAVENOUS at 05:03

## 2022-03-11 RX ADMIN — TORSEMIDE 40 MG: 10 TABLET ORAL at 09:03

## 2022-03-11 RX ADMIN — CEFAZOLIN SODIUM 2 G: 2 SOLUTION INTRAVENOUS at 06:03

## 2022-03-11 RX ADMIN — HYDROCODONE BITARTRATE AND ACETAMINOPHEN 1 TABLET: 10; 325 TABLET ORAL at 10:03

## 2022-03-11 NOTE — ED PROVIDER NOTES
"SCRIBE #1 NOTE: I, Darwin Sanchez, am scribing for, and in the presence of, Florinda Doran MD. I have scribed the entire note.       History     Chief Complaint   Patient presents with    Joint Swelling     Left knee swelling, hardness and warm to touch, was given abx a week ago with no relief, was told to come to ER so he could see an orthopedist before the weekend to "have it drained"     Review of patient's allergies indicates:   Allergen Reactions    Amitriptyline Rash    Celecoxib Rash         History of Present Illness     HPI    3/11/2022, 4:07 PM  History obtained from the patient      History of Present Illness: Santiago Khan is a 71 y.o. male patient with a PMHx of CHF, CKD, venous insufficiency, DDD, DM, gout, HLD, HTN, BRADLEY on CPAP, prostate cancer who presents to the Emergency Department for evaluation of L knee joint swelling which onset gradually a week PTA. Pt says he was put on Clindamycin to no effect. Pt says his leg swelling is chronic but the L knee swelling has increased and is painful, limiting his ROM. Symptoms are constant and moderate in severity. No mitigating or exacerbating factors reported. Associated sxs include L knee pain, leg swelling. Patient denies any fever, chills, n/v/d, and all other sxs at this time. Prior Tx includes Clindamycin. Pt reports being on a fluid pill. Pt reports an allergy to Elavil and Latex. No further complaints or concerns at this time.       Arrival mode: Personal vehicle    PCP: Kashif Acosta MD        Past Medical History:  Past Medical History:   Diagnosis Date    Chronic diastolic congestive heart failure 4/7/2020    Chronic kidney disease, stage 3     Chronic systolic congestive heart failure 7/9/2020    Chronic venous insufficiency     DDD (degenerative disc disease), lumbar     DM (diabetes mellitus) with complications     History of gout     Hyperlipidemia associated with type 2 diabetes mellitus     Hypertension associated " with stage 3 chronic kidney disease due to type 2 diabetes mellitus     BRADLEY on CPAP     Prostate cancer     prostatectomy in , rising PSA that started in     Tobacco abuse        Past Surgical History:  Past Surgical History:   Procedure Laterality Date    BICEPS TENDON REPAIR      COLONOSCOPY N/A 3/27/2019    Procedure: COLONOSCOPY;  Surgeon: James Roger MD;  Location: [facility] ENDO;  Service: Endoscopy;  Laterality: N/A;    HEMORRHOID SURGERY      INGUINAL HERNIA REPAIR Left     KNEE ARTHROSCOPY Right     LEFT HEART CATHETERIZATION Left 2020    Procedure: CATHETERIZATION, HEART, LEFT;  Surgeon: Cheli Wilson MD;  Location: [facility] CATH LAB;  Service: Cardiology;  Laterality: Left;    LUMBAR LAMINECTOMY      PROSTATECTOMY           Family History:  Family History   Problem Relation Age of Onset    Prostate cancer Father     Coronary artery disease Father 90    Alzheimer's disease Father     Hyperlipidemia Mother        Social History:  Social History     Tobacco Use    Smoking status: Former Smoker     Packs/day: 0.00     Years: 50.00     Pack years: 0.00     Types: Cigarettes     Quit date: 3/1/2021     Years since quittin.0    Smokeless tobacco: Former User   Substance and Sexual Activity    Alcohol use: Not Currently    Drug use: Never    Sexual activity: Not Currently     Partners: Female        Review of Systems     Review of Systems   Constitutional: Negative for chills and fever.   HENT: Negative for sore throat.    Respiratory: Negative for shortness of breath.    Cardiovascular: Positive for leg swelling (chronic). Negative for chest pain.   Gastrointestinal: Negative for diarrhea, nausea and vomiting.   Genitourinary: Negative for dysuria.   Musculoskeletal: Positive for arthralgias (L knee) and joint swelling (L knee). Negative for back pain.   Skin: Negative for rash.   Neurological: Negative for weakness.   Hematological: Does not bruise/bleed easily.   All other  systems reviewed and are negative.     Physical Exam     Initial Vitals   BP Pulse Resp Temp SpO2   03/11/22 1543 03/11/22 1543 03/11/22 1543 03/11/22 1544 03/11/22 1543   (!) 144/65 97 20 98 °F (36.7 °C) (!) 92 %      MAP       --                 Physical Exam  Nursing Notes and Vital Signs Reviewed.  Constitutional: Patient is in no acute distress. Well-developed and well-nourished.  Head: Atraumatic. Normocephalic.  Eyes: PERRL. EOM intact. Conjunctivae are not pale. No scleral icterus.  ENT: Mucous membranes are moist. Oropharynx is clear and symmetric.    Neck: Supple. Full ROM. No lymphadenopathy.  Cardiovascular: Regular rate. Regular rhythm. No murmurs, rubs, or gallops. Distal pulses are 2+ and symmetric.  Pulmonary/Chest: No respiratory distress. Clear to auscultation bilaterally. No wheezing or rales.  Abdominal: Soft and non-distended.  There is no tenderness.  No rebound, guarding, or rigidity. Good bowel sounds.  Genitourinary: No CVA tenderness  Musculoskeletal: Moves all extremities.  There is soft tissue swelling, redness, warmth and tenderness to anterior aspect of left knee, no induration, able to move the knee without difficulty but noted to have some pain.         Skin: Warm and dry.  Neurological:  Alert, awake, and appropriate.  Normal speech.  No acute focal neurological deficits are appreciated.  Psychiatric: Normal affect. Good eye contact. Appropriate in content.     ED Course   Procedures  ED Vital Signs:  Vitals:    03/11/22 1543 03/11/22 1544 03/11/22 1600 03/11/22 1746   BP: (!) 144/65  138/79 133/71   Pulse: 97  92 87   Resp: 20  18 20   Temp:  98 °F (36.7 °C) 98.3 °F (36.8 °C) 98.2 °F (36.8 °C)   TempSrc:  Oral Oral Oral   SpO2: (!) 92%  95% (!) 94%   Weight: 104.2 kg (229 lb 13.3 oz)          Abnormal Lab Results:  Labs Reviewed   CBC W/ AUTO DIFFERENTIAL - Abnormal; Notable for the following components:       Result Value    RBC 4.07 (*)     Hemoglobin 12.4 (*)     Hematocrit 38.4  (*)     RDW 15.1 (*)     Immature Granulocytes 0.8 (*)     Immature Grans (Abs) 0.08 (*)     Gran % 78.2 (*)     Lymph % 12.3 (*)     All other components within normal limits   COMPREHENSIVE METABOLIC PANEL - Abnormal; Notable for the following components:    CO2 32 (*)     Glucose 170 (*)     Creatinine 1.8 (*)     eGFR if  43 (*)     eGFR if non  37 (*)     All other components within normal limits   C-REACTIVE PROTEIN - Abnormal; Notable for the following components:    CRP 10.3 (*)     All other components within normal limits   SEDIMENTATION RATE - Abnormal; Notable for the following components:    Sed Rate 38 (*)     All other components within normal limits   CULTURE, BLOOD   CULTURE, BLOOD   B-TYPE NATRIURETIC PEPTIDE   LACTIC ACID, PLASMA   PROCALCITONIN   URIC ACID   TROPONIN I   SARS-COV-2 RDRP GENE    Narrative:     This test utilizes isothermal nucleic acid amplification   technology to detect the SARS-CoV-2 RdRp nucleic acid segment.   The analytical sensitivity (limit of detection) is 125 genome   equivalents/mL.   A POSITIVE result implies infection with the SARS-CoV-2 virus;   the patient is presumed to be contagious.     A NEGATIVE result means that SARS-CoV-2 nucleic acids are not   present above the limit of detection. A NEGATIVE result should be   treated as presumptive. It does not rule out the possibility of   COVID-19 and should not be the sole basis for treatment decisions.   If COVID-19 is strongly suspected based on clinical and exposure   history, re-testing using an alternate molecular assay should be   considered.   This test is only for use under the Food and Drug   Administration s Emergency Use Authorization (EUA).   Commercial kits are provided by [employer].   Performance characteristics of the EUA have been independently   verified by [facility] Department of   Pathology and Laboratory Medicine.    _________________________________________________________________   The authorized Fact Sheet for Healthcare Providers and the authorized Fact   Sheet for Patients of the ID NOW COVID-19 are available on the FDA   website:     https://www.fda.gov/media/294429/download  https://www.fda.gov/media/420175/download                All Lab Results:  Results for orders placed or performed during the hospital encounter of 03/11/22   CBC auto differential   Result Value Ref Range    WBC 9.54 3.90 - 12.70 K/uL    RBC 4.07 (L) 4.60 - 6.20 M/uL    Hemoglobin 12.4 (L) 14.0 - 18.0 g/dL    Hematocrit 38.4 (L) 40.0 - 54.0 %    MCV 94 82 - 98 fL    MCH 30.5 27.0 - 31.0 pg    MCHC 32.3 32.0 - 36.0 g/dL    RDW 15.1 (H) 11.5 - 14.5 %    Platelets 188 150 - 450 K/uL    MPV 10.1 9.2 - 12.9 fL    Immature Granulocytes 0.8 (H) 0.0 - 0.5 %    Gran # (ANC) 7.5 1.8 - 7.7 K/uL    Immature Grans (Abs) 0.08 (H) 0.00 - 0.04 K/uL    Lymph # 1.2 1.0 - 4.8 K/uL    Mono # 0.4 0.3 - 1.0 K/uL    Eos # 0.4 0.0 - 0.5 K/uL    Baso # 0.05 0.00 - 0.20 K/uL    nRBC 0 0 /100 WBC    Gran % 78.2 (H) 38.0 - 73.0 %    Lymph % 12.3 (L) 18.0 - 48.0 %    Mono % 4.3 4.0 - 15.0 %    Eosinophil % 3.9 0.0 - 8.0 %    Basophil % 0.5 0.0 - 1.9 %    Differential Method Automated    Comprehensive metabolic panel   Result Value Ref Range    Sodium 137 136 - 145 mmol/L    Potassium 4.2 3.5 - 5.1 mmol/L    Chloride 96 95 - 110 mmol/L    CO2 32 (H) 23 - 29 mmol/L    Glucose 170 (H) 70 - 110 mg/dL    BUN 17 8 - 23 mg/dL    Creatinine 1.8 (H) 0.5 - 1.4 mg/dL    Calcium 9.6 8.7 - 10.5 mg/dL    Total Protein 7.7 6.0 - 8.4 g/dL    Albumin 3.7 3.5 - 5.2 g/dL    Total Bilirubin 0.6 0.1 - 1.0 mg/dL    Alkaline Phosphatase 91 55 - 135 U/L    AST 27 10 - 40 U/L    ALT 26 10 - 44 U/L    Anion Gap 9 8 - 16 mmol/L    eGFR if African American 43 (A) >60 mL/min/1.73 m^2    eGFR if non African American 37 (A) >60 mL/min/1.73 m^2   Brain natriuretic peptide   Result Value Ref Range    BNP 11 0  - 99 pg/mL   C-reactive protein   Result Value Ref Range    CRP 10.3 (H) 0.0 - 8.2 mg/L   Sedimentation rate   Result Value Ref Range    Sed Rate 38 (H) 0 - 10 mm/Hr   Lactic acid, plasma   Result Value Ref Range    Lactate (Lactic Acid) 2.2 0.5 - 2.2 mmol/L   Procalcitonin   Result Value Ref Range    Procalcitonin 0.06 <0.25 ng/mL   Uric acid   Result Value Ref Range    Uric Acid 6.9 3.4 - 7.0 mg/dL   Troponin I   Result Value Ref Range    Troponin I 0.009 0.000 - 0.026 ng/mL   POCT COVID-19 Rapid Screening   Result Value Ref Range    POC Rapid COVID Negative Negative     Acceptable Yes          Imaging Results:  Imaging Results          X-Ray Knee Complete 4 or More Views Left (Final result)  Result time 03/11/22 16:49:31    Final result by Herminio Sandy MD (03/11/22 16:49:31)                 Impression:      Soft tissue swelling without acute osseous abnormality.  Degenerative changes minimal for age.      Electronically signed by: Herminio Sandy  Date:    03/11/2022  Time:    16:49             Narrative:    EXAMINATION:  XR KNEE COMP 4 OR MORE VIEWS LEFT    CLINICAL HISTORY:  Pain in left knee    TECHNIQUE:  Four views left knee    COMPARISON:  None    FINDINGS:  No fracture.  No definite osseous destructive process.  No joint effusion.  Minimal patellar osteophytosis.  Joint spaces are well preserved for age.  Some soft tissue swelling                               X-Ray Chest AP Portable (Final result)  Result time 03/11/22 16:55:02    Final result by Herminio Sandy MD (03/11/22 16:55:02)                 Impression:      As above.      Electronically signed by: Herminio Sandy  Date:    03/11/2022  Time:    16:55             Narrative:    EXAMINATION:  XR CHEST AP PORTABLE    CLINICAL HISTORY:  Heart failure, unspecified    TECHNIQUE:  Single frontal view of the chest was performed.    COMPARISON:  Multiple priors.    FINDINGS:  Mild bibasilar pulmonary opacities possibly reflecting minimal  early edema.  Correlation is advised.    The cardiac silhouette is borderline enlarged.  The hilar and mediastinal contours are unremarkable.    Bones are intact.                                 The EKG was ordered, reviewed, and independently interpreted by the ED provider.  Interpretation time: 1632  Rate: 95 BPM  Rhythm: normal sinus rhythm  Interpretation: Right bundle branch block. Left anterior fascicular block. **Bifascicular block**. Minimal voltage criteria for LVH, may be normal variant (R in aVL). Abnormal EKG. No STEMI.             The Emergency Provider reviewed the vital signs and test results, which are outlined above.     ED Discussion       6:34 PM: Discussed case with Lina Quinones NP ([facility]). Dr. Jones agrees with current care and management of pt and accepts admission.   Admitting Service: Hospital Medicine  Admitting Physician: Dr. Jones  Admit to: Obs Med Surg    6:34 PM: Re-evaluated pt. I have discussed test results, shared treatment plan, and the need for admission with patient and family at bedside. Pt and family express understanding at this time and agree with all information. All questions answered. Pt and family have no further questions or concerns at this time. Pt is ready for admit.         Medical Decision Making:   Clinical Tests:   Lab Tests: Ordered and Reviewed  Radiological Study: Ordered and Reviewed  Medical Tests: Ordered and Reviewed           ED Medication(s):  Medications   cefazolin (ANCEF) 2 gram in dextrose 5% 50 mL IVPB (premix) (2 g Intravenous New Bag 3/11/22 1800)   furosemide injection 40 mg (40 mg Intravenous Given 3/11/22 1721)       New Prescriptions    No medications on file               Scribe Attestation:   Scribe #1: I performed the above scribed service and the documentation accurately describes the services I performed. I attest to the accuracy of the note.     Attending:   Physician Attestation Statement for Scribe #1: Florinda VEGA  MD Espinoza, personally performed the services described in this documentation, as scribed by Darwin Sanchez, in my presence, and it is both accurate and complete.           Clinical Impression       ICD-10-CM ICD-9-CM   1. Cellulitis of left knee  L03.116 682.6   2. Pain and swelling of left knee  M25.562 719.46    M25.462 719.06   3. Heart failure  I50.9 428.9   4. Shortness of breath  R06.02 786.05       Disposition:   Disposition: Admitted  Condition: Sabine Doran MD  03/11/22 8793

## 2022-03-11 NOTE — PROGRESS NOTES
"HPI:  Patient is a 71-year-old man who comes in today with complaints follow-up of cellulitis of the left lower extremity.  He states that 6 days ago he was seen at the urgent care clinic and placed on clindamycin.  He states that it is not got better.     Current meds have been verified and updated per the EMR  Exam:/70 (BP Location: Right arm)   Pulse 89   Ht 5' 7" (1.702 m)   Wt 105.1 kg (231 lb 11.3 oz)   SpO2 96%   BMI 36.29 kg/m²   He has significant warmth and erythema over the left anterior knee.  He has some pain with range of motion on flexion of the knee.  He has prepatellar effusion and suspect he also has a effusion in the knee itself    Lab Results   Component Value Date    WBC 11.12 10/05/2021    HGB 13.9 (L) 10/05/2021    HCT 45.2 10/05/2021     10/05/2021    CHOL 135 07/30/2021    TRIG 132 07/30/2021    HDL 44 07/30/2021    ALT 30 07/30/2021    AST 31 07/30/2021     01/11/2022    K 4.3 01/11/2022    CL 91 (L) 01/11/2022    CREATININE 1.8 (H) 02/25/2022    BUN 26 (H) 01/11/2022    CO2 37 (H) 01/11/2022    TSH 1.251 07/30/2021    PSA 0.15 02/01/2021    INR 1.0 06/26/2020    HGBA1C 6.8 (H) 11/12/2021       Impression:  Cellulitis/prepatellar bursitis/questionable septic arthritis of the left knee  Patient Active Problem List   Diagnosis    BRADLEY (obstructive sleep apnea)    History of gout    DDD (degenerative disc disease), lumbar    Tobacco abuse    DM (diabetes mellitus) with complications    Hypertension associated with stage 3 chronic kidney disease due to type 2 diabetes mellitus    Hyperlipidemia associated with type 2 diabetes mellitus    Chronic kidney disease, stage 3    Obesity with serious comorbidity    Chronic diastolic congestive heart failure    Coronary artery disease of native artery of native heart with stable angina pectoris    Chronic systolic congestive heart failure    Prostate cancer    PVD (peripheral vascular disease)    NICM (nonischemic " cardiomyopathy)    Closed fracture of tuft of distal phalanx of finger    Chronic venous insufficiency    SOB (shortness of breath)    Third nerve palsy, right    Quit smoking within past year    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  Patient was referred to the ER.  They were notified.    This note is generated with speech recognition software and is subject to transcription error and sound alike phrases that may be missed by proofreading.

## 2022-03-12 PROBLEM — M70.42 PREPATELLAR BURSITIS OF LEFT KNEE: Status: ACTIVE | Noted: 2022-03-12

## 2022-03-12 PROBLEM — E66.2 MORBID OBESITY WITH ALVEOLAR HYPOVENTILATION: Status: ACTIVE | Noted: 2022-03-12

## 2022-03-12 LAB
ANION GAP SERPL CALC-SCNC: 13 MMOL/L (ref 8–16)
BASOPHILS # BLD AUTO: 0.08 K/UL (ref 0–0.2)
BASOPHILS NFR BLD: 0.8 % (ref 0–1.9)
BUN SERPL-MCNC: 18 MG/DL (ref 8–23)
CALCIUM SERPL-MCNC: 9.4 MG/DL (ref 8.7–10.5)
CHLORIDE SERPL-SCNC: 96 MMOL/L (ref 95–110)
CO2 SERPL-SCNC: 31 MMOL/L (ref 23–29)
CREAT SERPL-MCNC: 1.6 MG/DL (ref 0.5–1.4)
DIFFERENTIAL METHOD: ABNORMAL
EOSINOPHIL # BLD AUTO: 0.5 K/UL (ref 0–0.5)
EOSINOPHIL NFR BLD: 4.7 % (ref 0–8)
ERYTHROCYTE [DISTWIDTH] IN BLOOD BY AUTOMATED COUNT: 15 % (ref 11.5–14.5)
EST. GFR  (AFRICAN AMERICAN): 50 ML/MIN/1.73 M^2
EST. GFR  (NON AFRICAN AMERICAN): 43 ML/MIN/1.73 M^2
ESTIMATED AVG GLUCOSE: 174 MG/DL (ref 68–131)
GLUCOSE SERPL-MCNC: 108 MG/DL (ref 70–110)
HBA1C MFR BLD: 7.7 % (ref 4–5.6)
HCT VFR BLD AUTO: 39.2 % (ref 40–54)
HGB BLD-MCNC: 12.7 G/DL (ref 14–18)
IMM GRANULOCYTES # BLD AUTO: 0.09 K/UL (ref 0–0.04)
IMM GRANULOCYTES NFR BLD AUTO: 0.9 % (ref 0–0.5)
LYMPHOCYTES # BLD AUTO: 1.7 K/UL (ref 1–4.8)
LYMPHOCYTES NFR BLD: 17.8 % (ref 18–48)
MCH RBC QN AUTO: 30.7 PG (ref 27–31)
MCHC RBC AUTO-ENTMCNC: 32.4 G/DL (ref 32–36)
MCV RBC AUTO: 95 FL (ref 82–98)
MONOCYTES # BLD AUTO: 0.5 K/UL (ref 0.3–1)
MONOCYTES NFR BLD: 5.1 % (ref 4–15)
NEUTROPHILS # BLD AUTO: 6.9 K/UL (ref 1.8–7.7)
NEUTROPHILS NFR BLD: 70.7 % (ref 38–73)
NRBC BLD-RTO: 0 /100 WBC
PLATELET # BLD AUTO: 201 K/UL (ref 150–450)
PMV BLD AUTO: 10.7 FL (ref 9.2–12.9)
POCT GLUCOSE: 117 MG/DL (ref 70–110)
POCT GLUCOSE: 158 MG/DL (ref 70–110)
POTASSIUM SERPL-SCNC: 3.6 MMOL/L (ref 3.5–5.1)
RBC # BLD AUTO: 4.14 M/UL (ref 4.6–6.2)
SODIUM SERPL-SCNC: 140 MMOL/L (ref 136–145)
WBC # BLD AUTO: 9.74 K/UL (ref 3.9–12.7)

## 2022-03-12 PROCEDURE — 96361 HYDRATE IV INFUSION ADD-ON: CPT | Performed by: EMERGENCY MEDICINE

## 2022-03-12 PROCEDURE — 87070 CULTURE OTHR SPECIMN AEROBIC: CPT | Performed by: ORTHOPAEDIC SURGERY

## 2022-03-12 PROCEDURE — 63600175 PHARM REV CODE 636 W HCPCS: Performed by: NURSE PRACTITIONER

## 2022-03-12 PROCEDURE — 63600175 PHARM REV CODE 636 W HCPCS: Performed by: EMERGENCY MEDICINE

## 2022-03-12 PROCEDURE — 96367 TX/PROPH/DG ADDL SEQ IV INF: CPT | Performed by: EMERGENCY MEDICINE

## 2022-03-12 PROCEDURE — 96366 THER/PROPH/DIAG IV INF ADDON: CPT | Performed by: EMERGENCY MEDICINE

## 2022-03-12 PROCEDURE — 85025 COMPLETE CBC W/AUTO DIFF WBC: CPT | Performed by: NURSE PRACTITIONER

## 2022-03-12 PROCEDURE — 63600175 PHARM REV CODE 636 W HCPCS: Performed by: FAMILY MEDICINE

## 2022-03-12 PROCEDURE — 87075 CULTR BACTERIA EXCEPT BLOOD: CPT | Performed by: ORTHOPAEDIC SURGERY

## 2022-03-12 PROCEDURE — 25000003 PHARM REV CODE 250: Performed by: FAMILY MEDICINE

## 2022-03-12 PROCEDURE — 99900035 HC TECH TIME PER 15 MIN (STAT)

## 2022-03-12 PROCEDURE — 25000003 PHARM REV CODE 250: Performed by: ORTHOPAEDIC SURGERY

## 2022-03-12 PROCEDURE — 87205 SMEAR GRAM STAIN: CPT | Performed by: ORTHOPAEDIC SURGERY

## 2022-03-12 PROCEDURE — 25000003 PHARM REV CODE 250: Performed by: NURSE PRACTITIONER

## 2022-03-12 PROCEDURE — 36415 COLL VENOUS BLD VENIPUNCTURE: CPT | Performed by: NURSE PRACTITIONER

## 2022-03-12 PROCEDURE — 96372 THER/PROPH/DIAG INJ SC/IM: CPT | Performed by: NURSE PRACTITIONER

## 2022-03-12 PROCEDURE — 80048 BASIC METABOLIC PNL TOTAL CA: CPT | Performed by: NURSE PRACTITIONER

## 2022-03-12 PROCEDURE — G0378 HOSPITAL OBSERVATION PER HR: HCPCS

## 2022-03-12 RX ORDER — HEPARIN SODIUM 5000 [USP'U]/ML
5000 INJECTION, SOLUTION INTRAVENOUS; SUBCUTANEOUS EVERY 8 HOURS
Status: DISCONTINUED | OUTPATIENT
Start: 2022-03-12 | End: 2022-03-13 | Stop reason: HOSPADM

## 2022-03-12 RX ORDER — DOXYCYCLINE HYCLATE 100 MG
100 TABLET ORAL EVERY 12 HOURS
Status: DISCONTINUED | OUTPATIENT
Start: 2022-03-12 | End: 2022-03-12

## 2022-03-12 RX ORDER — AMOXICILLIN AND CLAVULANATE POTASSIUM 875; 125 MG/1; MG/1
1 TABLET, FILM COATED ORAL EVERY 12 HOURS
Status: DISCONTINUED | OUTPATIENT
Start: 2022-03-12 | End: 2022-03-12

## 2022-03-12 RX ORDER — LIDOCAINE HYDROCHLORIDE 10 MG/ML
10 INJECTION, SOLUTION EPIDURAL; INFILTRATION; INTRACAUDAL; PERINEURAL ONCE
Status: COMPLETED | OUTPATIENT
Start: 2022-03-12 | End: 2022-03-12

## 2022-03-12 RX ORDER — SODIUM CHLORIDE 9 MG/ML
INJECTION, SOLUTION INTRAVENOUS CONTINUOUS
Status: DISCONTINUED | OUTPATIENT
Start: 2022-03-12 | End: 2022-03-13 | Stop reason: HOSPADM

## 2022-03-12 RX ADMIN — LOSARTAN POTASSIUM 25 MG: 25 TABLET, FILM COATED ORAL at 09:03

## 2022-03-12 RX ADMIN — HEPARIN SODIUM 5000 UNITS: 5000 INJECTION INTRAVENOUS; SUBCUTANEOUS at 05:03

## 2022-03-12 RX ADMIN — CEFTRIAXONE 2 G: 2 INJECTION, SOLUTION INTRAVENOUS at 07:03

## 2022-03-12 RX ADMIN — POTASSIUM CHLORIDE 40 MEQ: 1500 TABLET, EXTENDED RELEASE ORAL at 09:03

## 2022-03-12 RX ADMIN — TORSEMIDE 40 MG: 10 TABLET ORAL at 09:03

## 2022-03-12 RX ADMIN — CEFAZOLIN SODIUM 2 G: 2 SOLUTION INTRAVENOUS at 02:03

## 2022-03-12 RX ADMIN — METOPROLOL TARTRATE 25 MG: 25 TABLET, FILM COATED ORAL at 09:03

## 2022-03-12 RX ADMIN — Medication 400 MG: at 09:03

## 2022-03-12 RX ADMIN — HEPARIN SODIUM 5000 UNITS: 5000 INJECTION INTRAVENOUS; SUBCUTANEOUS at 09:03

## 2022-03-12 RX ADMIN — ATORVASTATIN CALCIUM 20 MG: 10 TABLET, FILM COATED ORAL at 09:03

## 2022-03-12 RX ADMIN — HEPARIN SODIUM 5000 UNITS: 5000 INJECTION INTRAVENOUS; SUBCUTANEOUS at 04:03

## 2022-03-12 RX ADMIN — ASPIRIN 81 MG: 81 TABLET, COATED ORAL at 09:03

## 2022-03-12 RX ADMIN — SODIUM CHLORIDE: 0.9 INJECTION, SOLUTION INTRAVENOUS at 10:03

## 2022-03-12 RX ADMIN — CEFAZOLIN SODIUM 2 G: 2 SOLUTION INTRAVENOUS at 10:03

## 2022-03-12 RX ADMIN — ALLOPURINOL 300 MG: 300 TABLET ORAL at 09:03

## 2022-03-12 RX ADMIN — LIDOCAINE HYDROCHLORIDE 100 MG: 10 INJECTION, SOLUTION EPIDURAL; INFILTRATION; INTRACAUDAL; PERINEURAL at 04:03

## 2022-03-12 RX ADMIN — VANCOMYCIN HYDROCHLORIDE 2000 MG: 500 INJECTION, POWDER, LYOPHILIZED, FOR SOLUTION INTRAVENOUS at 09:03

## 2022-03-12 RX ADMIN — BACLOFEN 10 MG: 10 TABLET ORAL at 09:03

## 2022-03-12 NOTE — SUBJECTIVE & OBJECTIVE
Interval History: CT knee shows Infected bursa superficial to the patella measuring 4.8 x 1.3 x 7 cm.  No fluid in the joint capsule can be identified. Discussed with Dr. Bruner, she will see pt and plan on aspiration with cx of fluid. WBC decreased overnight. Continue PRN analgesia.    Review of Systems   Constitutional:  Negative for chills, diaphoresis, fatigue and fever.   Respiratory:  Negative for cough, shortness of breath and wheezing.    Cardiovascular:  Positive for leg swelling (chronic BLE). Negative for chest pain and palpitations.   Gastrointestinal:  Negative for abdominal pain, diarrhea, nausea and vomiting.   Genitourinary:  Negative for dysuria and hematuria.   Musculoskeletal:  Positive for arthralgias (left knee) and joint swelling (left knee). Negative for back pain, gait problem and myalgias.   Skin:  Negative for pallor and rash.        (+) Redness and swelling to left knee.    Neurological:  Negative for dizziness, syncope, weakness, light-headedness, numbness and headaches.   All other systems reviewed and are negative.  Objective:     Vital Signs (Most Recent):  Temp: 98.1 °F (36.7 °C) (03/12/22 0720)  Pulse: 87 (03/12/22 0720)  Resp: 18 (03/12/22 0720)  BP: (!) 142/71 (03/12/22 0902)  SpO2: (!) 92 % (03/12/22 0720)   Vital Signs (24h Range):  Temp:  [97.9 °F (36.6 °C)-98.3 °F (36.8 °C)] 98.1 °F (36.7 °C)  Pulse:  [87-97] 87  Resp:  [18-20] 18  SpO2:  [92 %-96 %] 92 %  BP: (108-144)/(65-79) 142/71     Weight: 104.3 kg (229 lb 15 oz)  Body mass index is 36.01 kg/m².    Intake/Output Summary (Last 24 hours) at 3/12/2022 1132  Last data filed at 3/11/2022 2300  Gross per 24 hour   Intake 480 ml   Output --   Net 480 ml      Physical Exam  Vitals and nursing note reviewed.   Constitutional:       Appearance: He is obese.   HENT:      Head: Normocephalic and atraumatic.      Nose: Nose normal.      Mouth/Throat:      Mouth: Mucous membranes are moist.   Eyes:      General: No scleral icterus.      Extraocular Movements: Extraocular movements intact.      Pupils: Pupils are equal, round, and reactive to light.   Cardiovascular:      Rate and Rhythm: Normal rate and regular rhythm.      Pulses: Normal pulses.           Popliteal pulses are 2+ on the right side and 2+ on the left side.        Dorsalis pedis pulses are 2+ on the right side and 2+ on the left side.      Heart sounds: No murmur heard.    No friction rub. No gallop.   Pulmonary:      Effort: Pulmonary effort is normal. No respiratory distress.      Breath sounds: Normal breath sounds.   Abdominal:      General: Bowel sounds are normal. There is no distension.      Palpations: Abdomen is soft.      Tenderness: There is no abdominal tenderness.   Genitourinary:     Comments: deferred  Musculoskeletal:         General: Swelling (L kmee) present.      Cervical back: Normal range of motion and neck supple.      Right knee: Normal.      Left knee: Swelling, effusion and erythema present. No bony tenderness. Normal range of motion. Tenderness present. Normal pulse.      Right lower le+ Pitting Edema present.      Left lower leg: Swelling present. No tenderness. 2+ Pitting Edema present.      Comments: Soft tissue swelling, redness, warmth and tenderness to anterior aspect of left knee. No induration or fluctuance appreciated. Full ROM, but pain noted. NVI.   Skin:     General: Skin is warm and dry.      Capillary Refill: Capillary refill takes 2 to 3 seconds.      Findings: Erythema present.      Comments: Soft tissue swelling, redness, warmth and tenderness to anterior aspect of left knee. No induration or fluctuance appreciated.    Neurological:      Mental Status: He is alert. Mental status is at baseline.   Psychiatric:         Mood and Affect: Mood is anxious. Affect is angry.         Behavior: Behavior is agitated.         Significant Labs: All pertinent labs within the past 24 hours have been reviewed.  Recent Lab Results         22  2277    03/12/22  0530   03/11/22  2329   03/11/22  2243   03/11/22  1819        Procalcitonin               Albumin               Alkaline Phosphatase               ALT               Anion Gap   13             AST               Baso #   0.08             Basophil %   0.8             BILIRUBIN TOTAL               Blood Culture, Routine               BNP               BUN   18             Calcium   9.4             Chloride   96             CO2   31             Creatinine   1.6             CRP               Differential Method   Automated             eGFR if    50             eGFR if non    43  Comment: Calculation used to obtain the estimated glomerular filtration  rate (eGFR) is the CKD-EPI equation.                Eos #   0.5             Eosinophil %   4.7             Glucose   108             Gran # (ANC)   6.9             Gran %   70.7             Hematocrit   39.2             Hemoglobin   12.7             Immature Grans (Abs)   0.09  Comment: Mild elevation in immature granulocytes is non specific and   can be seen in a variety of conditions including stress response,   acute inflammation, trauma and pregnancy. Correlation with other   laboratory and clinical findings is essential.               Immature Granulocytes   0.9             Lactate, Zaid               Lymph #   1.7             Lymph %   17.8             MCH   30.7             MCHC   32.4             MCV   95             Mono #   0.5             Mono %   5.1             MPV   10.7             nRBC   0             Platelets   201             POCT Glucose 117     199           Potassium   3.6             PROTEIN TOTAL                Acceptable         Yes       RBC   4.14             RDW   15.0             SARS-CoV-2 RNA, Amplification, Qual         Negative       Sed Rate               Sodium   140             Troponin I               Uric Acid       7.0         WBC   9.74                                 03/11/22  1708   03/11/22  1627   03/11/22  1626        Procalcitonin     0.06  Comment: A concentration < 0.25 ng/mL represents a low risk of bacterial   infection.  Procalcitonin may not be accurate among patients with localized   infection, recent trauma or major surgery, immunosuppressed state,   invasive fungal infection, renal dysfunction. Decisions regarding   initiation or continuation of antibiotic therapy should not be based   solely on procalcitonin levels.         Albumin     3.7       Alkaline Phosphatase     91       ALT     26       Anion Gap     9       AST     27       Baso #     0.05       Basophil %     0.5       BILIRUBIN TOTAL     0.6  Comment: For infants and newborns, interpretation of results should be based  on gestational age, weight and in agreement with clinical  observations.    Premature Infant recommended reference ranges:  Up to 24 hours.............<8.0 mg/dL  Up to 48 hours............<12.0 mg/dL  3-5 days..................<15.0 mg/dL  6-29 days.................<15.0 mg/dL         Blood Culture, Routine   No Growth to date  [P]            No Growth to date  [P]         BNP     11  Comment: Values of less than 100 pg/ml are consistent with non-CHF populations.       BUN     17       Calcium     9.6       Chloride     96       CO2     32       Creatinine     1.8       CRP     10.3       Differential Method     Automated       eGFR if      43       eGFR if non      37  Comment: Calculation used to obtain the estimated glomerular filtration  rate (eGFR) is the CKD-EPI equation.          Eos #     0.4       Eosinophil %     3.9       Glucose     170       Gran # (ANC)     7.5       Gran %     78.2       Hematocrit     38.4       Hemoglobin     12.4       Immature Grans (Abs)     0.08  Comment: Mild elevation in immature granulocytes is non specific and   can be seen in a variety of conditions including stress response,   acute inflammation, trauma and  pregnancy. Correlation with other   laboratory and clinical findings is essential.         Immature Granulocytes     0.8       Lactate, Zaid     2.2  Comment: Falsely low lactic acid results can be found in samples   containing >=13.0 mg/dL total bilirubin and/or >=3.5 mg/dL   direct bilirubin.         Lymph #     1.2       Lymph %     12.3       MCH     30.5       MCHC     32.3       MCV     94       Mono #     0.4       Mono %     4.3       MPV     10.1       nRBC     0       Platelets     188       POCT Glucose           Potassium     4.2       PROTEIN TOTAL     7.7        Acceptable           RBC     4.07       RDW     15.1       SARS-CoV-2 RNA, Amplification, Qual           Sed Rate     38       Sodium     137       Troponin I 0.009  Comment: The reference interval for Troponin I represents the 99th percentile   cutoff   for our facility and is consistent with 3rd generation assay   performance.             Uric Acid     6.9       WBC     9.54                [P] - Preliminary Result               Significant Imaging: I have reviewed all pertinent imaging results/findings within the past 24 hours.

## 2022-03-12 NOTE — PROGRESS NOTES
PROCEDURE NOTE:    After time out was performed confirming allergies, procedure, side, and site, the left knee was sterilly prepped with chlorahexidine and alcohol.  A 22-gauge needle was introduced into the subcutaneous tissue over the suprapatellar bursa site without complication and 5cc of 1% plain lidocaine was injected. 3cc of sanguinous fluid was aspirated from the bursa and sent for aerobic and anaerobic cultures.  A bandage was applied.  The patient tolerated the procedure well.    ____  I strongly recommend that we change him to broad spectrum abx and keep him in the hospital over. We will reassess him in the AM for improvement and possible discharge.

## 2022-03-12 NOTE — PHARMACY MED REC
"Admission Medication History     The home medication history was taken by [facility].    You may go to "Admission" then "Reconcile Home Medications" tabs to review and/or act upon these items.      The home medication list has been updated by the Pharmacy department.    Please read ALL comments highlighted in yellow.    Please address this information as you see fit.     Feel free to contact us if you have any questions or require assistance.      The medications listed below were removed from the home medication list. Please reorder if appropriate:  Patient reports no longer taking the following medication(s):   DICLOFENAC 1 % TOPICAL GEL   MOMETASONE 0.1 % TOPICAL CREAM   PROMETHAZINE 12.5 MG TABLET   VARENICLINE 1 MG TABLET   ZOLPIDEM 10 MG TABLET    Medications listed below were obtained from: Patient/family and Analytic software- [employer]  (Not in a hospital admission)      Potential issues to be addressed PRIOR TO DISCHARGE: NONE      [facility] CPhT  [facility] [phone]      Current Outpatient Medications on File Prior to Encounter   Medication Sig Dispense Refill Last Dose    albuterol (PROVENTIL/VENTOLIN HFA) 90 mcg/actuation inhaler Inhale 2 puffs into the lungs every 4 (four) hours as needed for Wheezing. Rescue 8.5 g 3 3/11/2022 at Unknown time    allopurinoL (ZYLOPRIM) 300 MG tablet TAKE 1 TABLET(300 MG) BY MOUTH EVERY DAY 90 tablet 3 3/11/2022 at Unknown time    aspirin (ECOTRIN) 81 MG EC tablet Take 81 mg by mouth once daily.   3/10/2022 at Unknown time    baclofen (LIORESAL) 10 MG tablet Take 1 tablet by mouth every evening.  2 3/10/2022 at Unknown time    bisoprolol (ZEBETA) 5 MG tablet TAKE 1/2 TABLET BY MOUTH ONCE DAILY 90 tablet 3 3/11/2022 at Unknown time    clindamycin (CLEOCIN) 300 MG capsule Take 1 capsule (300 mg total) by mouth 2 (two) times daily. for 10 days 20 capsule 0 3/11/2022 at Unknown time    colchicine (COLCRYS) 0.6 mg tablet TAKE 1 TABLET(0.6 MG) BY " MOUTH DAILY AS NEEDED 30 tablet 1 Past Week at Unknown time    fluticasone propionate (FLONASE) 50 mcg/actuation nasal spray SHAKE LIQUID AND USE 2 SPRAYS(100 MCG) IN EACH NOSTRIL EVERY DAY 16 g 11 3/11/2022 at Unknown time    glimepiride (AMARYL) 1 MG tablet TAKE 1 TABLET BY MOUTH ONCE DAILY 90 tablet 3 3/11/2022 at Unknown time    HYDROcodone-acetaminophen (NORCO) 7.5-325 mg per tablet Take 1 tablet by mouth every 4 (four) hours as needed (for chronic pain).  0 3/11/2022 at Unknown time    losartan (COZAAR) 25 MG tablet Take 1 tablet (25 mg total) by mouth once daily.   3/10/2022 at Unknown time    magnesium oxide (MAG-OX) 400 mg (241.3 mg magnesium) tablet TAKE 2 TABLETS(800 MG) BY MOUTH TWICE DAILY 120 tablet 5 3/11/2022 at Unknown time    metFORMIN (GLUCOPHAGE) 500 MG tablet Take 1 tablet (500 mg total) by mouth daily with breakfast. 90 tablet 3 3/11/2022 at Unknown time    metOLazone (ZAROXOLYN) 2.5 MG tablet Take 1 tablet (2.5 mg total) by mouth once a week. 4 tablet 11 3/8/2022    multivitamin-minerals-lutein Tab Take 1 tablet by mouth Daily.   3/11/2022 at Unknown time    omeprazole (PRILOSEC) 40 MG capsule TAKE ONE CAPSULE BY MOUTH EVERY DAY 30 capsule 11 3/11/2022 at Unknown time    potassium chloride SA (K-DUR,KLOR-CON) 20 MEQ tablet TAKE 3 TABLETS(60 MEQ) BY MOUTH TWICE DAILY 720 tablet 2 3/11/2022 at Unknown time    simvastatin (ZOCOR) 40 MG tablet TAKE 1 TABLET(40 MG) BY MOUTH EVERY EVENING 90 tablet 3 3/10/2022 at Unknown time    torsemide (DEMADEX) 20 MG Tab Take 1 tablet (20 mg total) by mouth once daily. (Patient taking differently: Take 40 mg by mouth 2 (two) times a day.) 30 tablet 11 3/11/2022 at Unknown time    [DISCONTINUED] mometasone 0.1% (ELOCON) 0.1 % cream MARIA TERESA TO HANDS QD PRF FLARE UPS  0     [DISCONTINUED] promethazine (PHENERGAN) 12.5 MG Tab Take 12.5 mg by mouth every 6 (six) hours as needed.  2     [DISCONTINUED] varenicline (CHANTIX) 1 mg Tab Take 1 tablet (1 mg  total) by mouth 2 (two) times daily. 60 tablet 2     [DISCONTINUED] VOLTAREN ARTHRITIS PAIN 1 % Gel Apply topically once daily.       [DISCONTINUED] zolpidem (AMBIEN) 10 mg Tab Take 5 mg by mouth nightly as needed.                                .

## 2022-03-12 NOTE — PLAN OF CARE
Went over POC with Pt and wife. Wife was receptive, pt was withdrawn. Safety measures in place, will continue to monitor.   Problem: Adult Inpatient Plan of Care  Goal: Optimal Comfort and Wellbeing  Outcome: Ongoing, Progressing     Problem: Diabetes Comorbidity  Goal: Blood Glucose Level Within Targeted Range  Outcome: Ongoing, Progressing

## 2022-03-12 NOTE — SUBJECTIVE & OBJECTIVE
Past Medical History:   Diagnosis Date    Chronic diastolic congestive heart failure 4/7/2020    Chronic kidney disease, stage 3     Chronic systolic congestive heart failure 7/9/2020    Chronic venous insufficiency     DDD (degenerative disc disease), lumbar     DM (diabetes mellitus) with complications     History of gout     Hyperlipidemia associated with type 2 diabetes mellitus     Hypertension associated with stage 3 chronic kidney disease due to type 2 diabetes mellitus     BRADLEY on CPAP     Prostate cancer     prostatectomy in 2010, rising PSA that started in 2021    Tobacco abuse        Past Surgical History:   Procedure Laterality Date    BICEPS TENDON REPAIR      COLONOSCOPY N/A 3/27/2019    Procedure: COLONOSCOPY;  Surgeon: James Roger MD;  Location: [facility] ENDO;  Service: Endoscopy;  Laterality: N/A;    HEMORRHOID SURGERY      INGUINAL HERNIA REPAIR Left     KNEE ARTHROSCOPY Right     LEFT HEART CATHETERIZATION Left 6/29/2020    Procedure: CATHETERIZATION, HEART, LEFT;  Surgeon: Cheli Wilson MD;  Location: [facility] CATH LAB;  Service: Cardiology;  Laterality: Left;    LUMBAR LAMINECTOMY      PROSTATECTOMY         Review of patient's allergies indicates:   Allergen Reactions    Amitriptyline Rash    Celecoxib Rash       No current facility-administered medications on file prior to encounter.     Current Outpatient Medications on File Prior to Encounter   Medication Sig    albuterol (PROVENTIL/VENTOLIN HFA) 90 mcg/actuation inhaler Inhale 2 puffs into the lungs every 4 (four) hours as needed for Wheezing. Rescue    allopurinoL (ZYLOPRIM) 300 MG tablet TAKE 1 TABLET(300 MG) BY MOUTH EVERY DAY    aspirin (ECOTRIN) 81 MG EC tablet Take 81 mg by mouth once daily.    baclofen (LIORESAL) 10 MG tablet Take 1 tablet by mouth every evening.    bisoprolol (ZEBETA) 5 MG tablet TAKE 1/2 TABLET BY MOUTH ONCE DAILY    clindamycin (CLEOCIN) 300 MG capsule Take 1 capsule (300 mg total) by mouth 2 (two) times daily. for  10 days    colchicine (COLCRYS) 0.6 mg tablet TAKE 1 TABLET(0.6 MG) BY MOUTH DAILY AS NEEDED    fluticasone propionate (FLONASE) 50 mcg/actuation nasal spray SHAKE LIQUID AND USE 2 SPRAYS(100 MCG) IN EACH NOSTRIL EVERY DAY    glimepiride (AMARYL) 1 MG tablet TAKE 1 TABLET BY MOUTH ONCE DAILY    HYDROcodone-acetaminophen (NORCO) 7.5-325 mg per tablet Take 1 tablet by mouth every 4 (four) hours as needed (for chronic pain).    losartan (COZAAR) 25 MG tablet Take 1 tablet (25 mg total) by mouth once daily.    magnesium oxide (MAG-OX) 400 mg (241.3 mg magnesium) tablet TAKE 2 TABLETS(800 MG) BY MOUTH TWICE DAILY    metFORMIN (GLUCOPHAGE) 500 MG tablet Take 1 tablet (500 mg total) by mouth daily with breakfast.    metOLazone (ZAROXOLYN) 2.5 MG tablet Take 1 tablet (2.5 mg total) by mouth once a week.    multivitamin-minerals-lutein Tab Take 1 tablet by mouth Daily.    omeprazole (PRILOSEC) 40 MG capsule TAKE ONE CAPSULE BY MOUTH EVERY DAY    potassium chloride SA (K-DUR,KLOR-CON) 20 MEQ tablet TAKE 3 TABLETS(60 MEQ) BY MOUTH TWICE DAILY    simvastatin (ZOCOR) 40 MG tablet TAKE 1 TABLET(40 MG) BY MOUTH EVERY EVENING    torsemide (DEMADEX) 20 MG Tab Take 1 tablet (20 mg total) by mouth once daily. (Patient taking differently: Take 40 mg by mouth 2 (two) times a day.)    [DISCONTINUED] mometasone 0.1% (ELOCON) 0.1 % cream MARIA TERESA TO HANDS QD PRF FLARE UPS    [DISCONTINUED] promethazine (PHENERGAN) 12.5 MG Tab Take 12.5 mg by mouth every 6 (six) hours as needed.    [DISCONTINUED] varenicline (CHANTIX) 1 mg Tab Take 1 tablet (1 mg total) by mouth 2 (two) times daily.    [DISCONTINUED] VOLTAREN ARTHRITIS PAIN 1 % Gel Apply topically once daily.    [DISCONTINUED] zolpidem (AMBIEN) 10 mg Tab Take 5 mg by mouth nightly as needed.     Family History       Problem Relation (Age of Onset)    Alzheimer's disease Father    Coronary artery disease Father (90)    Hyperlipidemia Mother    Prostate cancer Father          Tobacco Use     Smoking status: Former Smoker     Packs/day: 0.00     Years: 50.00     Pack years: 0.00     Types: Cigarettes     Quit date: 3/1/2021     Years since quittin.0    Smokeless tobacco: Former User   Substance and Sexual Activity    Alcohol use: Not Currently    Drug use: Never    Sexual activity: Not Currently     Partners: Female     Review of Systems   Constitutional:  Negative for chills, diaphoresis, fatigue and fever.   Respiratory:  Negative for cough, shortness of breath and wheezing.    Cardiovascular:  Positive for leg swelling (chronic BLE). Negative for chest pain and palpitations.   Gastrointestinal:  Negative for abdominal pain, diarrhea, nausea and vomiting.   Genitourinary:  Negative for dysuria and hematuria.   Musculoskeletal:  Positive for arthralgias (left knee) and joint swelling (left knee). Negative for back pain, gait problem and myalgias.   Skin:  Negative for pallor and rash.        (+) Redness and swelling to left knee.    Neurological:  Negative for dizziness, syncope, weakness, light-headedness, numbness and headaches.   All other systems reviewed and are negative.  Objective:     Vital Signs (Most Recent):  Temp: 98.2 °F (36.8 °C) (22)  Pulse: 87 (22)  Resp: 20 (22)  BP: 133/71 (22)  SpO2: (!) 94 % (22)   Vital Signs (24h Range):  Temp:  [98 °F (36.7 °C)-98.3 °F (36.8 °C)] 98.2 °F (36.8 °C)  Pulse:  [87-97] 87  Resp:  [18-20] 20  SpO2:  [92 %-96 %] 94 %  BP: (108-144)/(65-79) 133/71     Weight: 104.2 kg (229 lb 13.3 oz)  Body mass index is 36 kg/m².    Physical Exam  Cardiovascular:      Pulses:           Popliteal pulses are 2+ on the right side and 2+ on the left side.        Dorsalis pedis pulses are 2+ on the right side and 2+ on the left side.   Musculoskeletal:      Right knee: Normal.      Left knee: Swelling, effusion and erythema present. No bony tenderness. Normal range of motion. Tenderness present. Normal pulse.       Right lower le+ Pitting Edema present.      Left lower leg: Swelling present. No tenderness. 2+ Pitting Edema present.      Comments: Soft tissue swelling, redness, warmth and tenderness to anterior aspect of left knee. No induration or fluctuance appreciated. Full ROM, but pain noted. NVI.   Skin:     Findings: Erythema present.      Comments: Soft tissue swelling, redness, warmth and tenderness to anterior aspect of left knee. No induration or fluctuance appreciated.                  Significant Labs:  Results for orders placed or performed during the hospital encounter of 22   CBC auto differential   Result Value Ref Range    WBC 9.54 3.90 - 12.70 K/uL    RBC 4.07 (L) 4.60 - 6.20 M/uL    Hemoglobin 12.4 (L) 14.0 - 18.0 g/dL    Hematocrit 38.4 (L) 40.0 - 54.0 %    MCV 94 82 - 98 fL    MCH 30.5 27.0 - 31.0 pg    MCHC 32.3 32.0 - 36.0 g/dL    RDW 15.1 (H) 11.5 - 14.5 %    Platelets 188 150 - 450 K/uL    MPV 10.1 9.2 - 12.9 fL    Immature Granulocytes 0.8 (H) 0.0 - 0.5 %    Gran # (ANC) 7.5 1.8 - 7.7 K/uL    Immature Grans (Abs) 0.08 (H) 0.00 - 0.04 K/uL    Lymph # 1.2 1.0 - 4.8 K/uL    Mono # 0.4 0.3 - 1.0 K/uL    Eos # 0.4 0.0 - 0.5 K/uL    Baso # 0.05 0.00 - 0.20 K/uL    nRBC 0 0 /100 WBC    Gran % 78.2 (H) 38.0 - 73.0 %    Lymph % 12.3 (L) 18.0 - 48.0 %    Mono % 4.3 4.0 - 15.0 %    Eosinophil % 3.9 0.0 - 8.0 %    Basophil % 0.5 0.0 - 1.9 %    Differential Method Automated    Comprehensive metabolic panel   Result Value Ref Range    Sodium 137 136 - 145 mmol/L    Potassium 4.2 3.5 - 5.1 mmol/L    Chloride 96 95 - 110 mmol/L    CO2 32 (H) 23 - 29 mmol/L    Glucose 170 (H) 70 - 110 mg/dL    BUN 17 8 - 23 mg/dL    Creatinine 1.8 (H) 0.5 - 1.4 mg/dL    Calcium 9.6 8.7 - 10.5 mg/dL    Total Protein 7.7 6.0 - 8.4 g/dL    Albumin 3.7 3.5 - 5.2 g/dL    Total Bilirubin 0.6 0.1 - 1.0 mg/dL    Alkaline Phosphatase 91 55 - 135 U/L    AST 27 10 - 40 U/L    ALT 26 10 - 44 U/L    Anion Gap 9 8 - 16 mmol/L    eGFR if   43 (A) >60 mL/min/1.73 m^2    eGFR if non African American 37 (A) >60 mL/min/1.73 m^2   Brain natriuretic peptide   Result Value Ref Range    BNP 11 0 - 99 pg/mL   C-reactive protein   Result Value Ref Range    CRP 10.3 (H) 0.0 - 8.2 mg/L   Sedimentation rate   Result Value Ref Range    Sed Rate 38 (H) 0 - 10 mm/Hr   Lactic acid, plasma   Result Value Ref Range    Lactate (Lactic Acid) 2.2 0.5 - 2.2 mmol/L   Procalcitonin   Result Value Ref Range    Procalcitonin 0.06 <0.25 ng/mL   Uric acid   Result Value Ref Range    Uric Acid 6.9 3.4 - 7.0 mg/dL   Troponin I   Result Value Ref Range    Troponin I 0.009 0.000 - 0.026 ng/mL   POCT COVID-19 Rapid Screening   Result Value Ref Range    POC Rapid COVID Negative Negative     Acceptable Yes       All pertinent labs within the past 24 hours have been reviewed.    Significant Imaging:  Imaging Results              X-Ray Knee Complete 4 or More Views Left (Final result)  Result time 03/11/22 16:49:31      Final result by Herminio Sandy MD (03/11/22 16:49:31)                   Impression:      Soft tissue swelling without acute osseous abnormality.  Degenerative changes minimal for age.      Electronically signed by: Herminio Sandy  Date:    03/11/2022  Time:    16:49               Narrative:    EXAMINATION:  XR KNEE COMP 4 OR MORE VIEWS LEFT    CLINICAL HISTORY:  Pain in left knee    TECHNIQUE:  Four views left knee    COMPARISON:  None    FINDINGS:  No fracture.  No definite osseous destructive process.  No joint effusion.  Minimal patellar osteophytosis.  Joint spaces are well preserved for age.  Some soft tissue swelling                                       X-Ray Chest AP Portable (Final result)  Result time 03/11/22 16:55:02      Final result by Herminio Sandy MD (03/11/22 16:55:02)                   Impression:      As above.      Electronically signed by: Herminio Sandy  Date:    03/11/2022  Time:    16:55               Narrative:     EXAMINATION:  XR CHEST AP PORTABLE    CLINICAL HISTORY:  Heart failure, unspecified    TECHNIQUE:  Single frontal view of the chest was performed.    COMPARISON:  Multiple priors.    FINDINGS:  Mild bibasilar pulmonary opacities possibly reflecting minimal early edema.  Correlation is advised.    The cardiac silhouette is borderline enlarged.  The hilar and mediastinal contours are unremarkable.    Bones are intact.                                     I have reviewed all pertinent imaging results/findings within the past 24 hours.

## 2022-03-12 NOTE — H&P
"[facility]  History & Physical    Patient Name: Santiago Khan  MRN: [id number]  Patient Class: OP- Observation  Admission Date: 3/11/2022  Attending Physician: Giovany Jones MD   Primary Care Provider: Kashif Acosta MD         Patient information was obtained from patient, past medical records and ER records.     Subjective:     Principal Problem:Cellulitis of left knee    Chief Complaint:   Chief Complaint   Patient presents with    Joint Swelling     Left knee swelling, hardness and warm to touch, was given abx a week ago with no relief, was told to come to ER so he could see an orthopedist before the weekend to "have it drained"        HPI: Santiago Khan is a 71 y.o. male with a PMHx of CAD, chronic combined systolic/diastolic HFrEF of 45%, CKD, DM II, GERD, gout, HLD, HTN, BRADLEY on CPAP, and h/o prostate cancer who presented to the Emergency Department with c/o left knee pain that has progressively worsened over the past 1 week. No aggravating or alleviating factors. Associated left knee swelling, left knee redness, and chronic BLE edema. Patient states he was diagnosed with cellulitis of that left knee last week and was prescribed clindamycin. Patient reports compliance with antibiotic, but symptoms continue to worsen. Denies any weakness, numbness/tingling, decreased ROM, effusion, drainage, trauma, CP, SOB, ABD pain, N/V, lightheadedness, dizziness, fever or chills. Work-up in the ED showed: Normal WBC and lactic, ESR 38, CRP 10, procalcitonin 0.06. Left knee XR showed soft tissue swelling without acute osseous abnormality, no joint effusion. IV Ancef given in the ED. Hospital Medicine was contacted for admission and patient placed in Observation.       Past Medical History:   Diagnosis Date    Chronic diastolic congestive heart failure 4/7/2020    Chronic kidney disease, stage 3     Chronic systolic congestive heart failure 7/9/2020    Chronic venous " insufficiency     DDD (degenerative disc disease), lumbar     DM (diabetes mellitus) with complications     History of gout     Hyperlipidemia associated with type 2 diabetes mellitus     Hypertension associated with stage 3 chronic kidney disease due to type 2 diabetes mellitus     BRADLEY on CPAP     Prostate cancer     prostatectomy in 2010, rising PSA that started in 2021    Tobacco abuse        Past Surgical History:   Procedure Laterality Date    BICEPS TENDON REPAIR      COLONOSCOPY N/A 3/27/2019    Procedure: COLONOSCOPY;  Surgeon: James Roger MD;  Location: [facility] ENDO;  Service: Endoscopy;  Laterality: N/A;    HEMORRHOID SURGERY      INGUINAL HERNIA REPAIR Left     KNEE ARTHROSCOPY Right     LEFT HEART CATHETERIZATION Left 6/29/2020    Procedure: CATHETERIZATION, HEART, LEFT;  Surgeon: Cheli Wilson MD;  Location: [facility] CATH LAB;  Service: Cardiology;  Laterality: Left;    LUMBAR LAMINECTOMY      PROSTATECTOMY         Review of patient's allergies indicates:   Allergen Reactions    Amitriptyline Rash    Celecoxib Rash       No current facility-administered medications on file prior to encounter.     Current Outpatient Medications on File Prior to Encounter   Medication Sig    albuterol (PROVENTIL/VENTOLIN HFA) 90 mcg/actuation inhaler Inhale 2 puffs into the lungs every 4 (four) hours as needed for Wheezing. Rescue    allopurinoL (ZYLOPRIM) 300 MG tablet TAKE 1 TABLET(300 MG) BY MOUTH EVERY DAY    aspirin (ECOTRIN) 81 MG EC tablet Take 81 mg by mouth once daily.    baclofen (LIORESAL) 10 MG tablet Take 1 tablet by mouth every evening.    bisoprolol (ZEBETA) 5 MG tablet TAKE 1/2 TABLET BY MOUTH ONCE DAILY    clindamycin (CLEOCIN) 300 MG capsule Take 1 capsule (300 mg total) by mouth 2 (two) times daily. for 10 days    colchicine (COLCRYS) 0.6 mg tablet TAKE 1 TABLET(0.6 MG) BY MOUTH DAILY AS NEEDED    fluticasone propionate (FLONASE) 50 mcg/actuation nasal spray SHAKE LIQUID AND  USE 2 SPRAYS(100 MCG) IN EACH NOSTRIL EVERY DAY    glimepiride (AMARYL) 1 MG tablet TAKE 1 TABLET BY MOUTH ONCE DAILY    HYDROcodone-acetaminophen (NORCO) 7.5-325 mg per tablet Take 1 tablet by mouth every 4 (four) hours as needed (for chronic pain).    losartan (COZAAR) 25 MG tablet Take 1 tablet (25 mg total) by mouth once daily.    magnesium oxide (MAG-OX) 400 mg (241.3 mg magnesium) tablet TAKE 2 TABLETS(800 MG) BY MOUTH TWICE DAILY    metFORMIN (GLUCOPHAGE) 500 MG tablet Take 1 tablet (500 mg total) by mouth daily with breakfast.    metOLazone (ZAROXOLYN) 2.5 MG tablet Take 1 tablet (2.5 mg total) by mouth once a week.    multivitamin-minerals-lutein Tab Take 1 tablet by mouth Daily.    omeprazole (PRILOSEC) 40 MG capsule TAKE ONE CAPSULE BY MOUTH EVERY DAY    potassium chloride SA (K-DUR,KLOR-CON) 20 MEQ tablet TAKE 3 TABLETS(60 MEQ) BY MOUTH TWICE DAILY    simvastatin (ZOCOR) 40 MG tablet TAKE 1 TABLET(40 MG) BY MOUTH EVERY EVENING    torsemide (DEMADEX) 20 MG Tab Take 1 tablet (20 mg total) by mouth once daily. (Patient taking differently: Take 40 mg by mouth 2 (two) times a day.)    [DISCONTINUED] mometasone 0.1% (ELOCON) 0.1 % cream MARIA TERESA TO HANDS QD PRF FLARE UPS    [DISCONTINUED] promethazine (PHENERGAN) 12.5 MG Tab Take 12.5 mg by mouth every 6 (six) hours as needed.    [DISCONTINUED] varenicline (CHANTIX) 1 mg Tab Take 1 tablet (1 mg total) by mouth 2 (two) times daily.    [DISCONTINUED] VOLTAREN ARTHRITIS PAIN 1 % Gel Apply topically once daily.    [DISCONTINUED] zolpidem (AMBIEN) 10 mg Tab Take 5 mg by mouth nightly as needed.     Family History       Problem Relation (Age of Onset)    Alzheimer's disease Father    Coronary artery disease Father (90)    Hyperlipidemia Mother    Prostate cancer Father          Tobacco Use    Smoking status: Former Smoker     Packs/day: 0.00     Years: 50.00     Pack years: 0.00     Types: Cigarettes     Quit date: 3/1/2021     Years since quittin.0     Smokeless tobacco: Former User   Substance and Sexual Activity    Alcohol use: Not Currently    Drug use: Never    Sexual activity: Not Currently     Partners: Female     Review of Systems   Constitutional:  Negative for chills, diaphoresis, fatigue and fever.   Respiratory:  Negative for cough, shortness of breath and wheezing.    Cardiovascular:  Positive for leg swelling (chronic BLE). Negative for chest pain and palpitations.   Gastrointestinal:  Negative for abdominal pain, diarrhea, nausea and vomiting.   Genitourinary:  Negative for dysuria and hematuria.   Musculoskeletal:  Positive for arthralgias (left knee) and joint swelling (left knee). Negative for back pain, gait problem and myalgias.   Skin:  Negative for pallor and rash.        (+) Redness and swelling to left knee.    Neurological:  Negative for dizziness, syncope, weakness, light-headedness, numbness and headaches.   All other systems reviewed and are negative.  Objective:     Vital Signs (Most Recent):  Temp: 98.2 °F (36.8 °C) (22)  Pulse: 87 (22)  Resp: 20 (22)  BP: 133/71 (22)  SpO2: (!) 94 % (22)   Vital Signs (24h Range):  Temp:  [98 °F (36.7 °C)-98.3 °F (36.8 °C)] 98.2 °F (36.8 °C)  Pulse:  [87-97] 87  Resp:  [18-20] 20  SpO2:  [92 %-96 %] 94 %  BP: (108-144)/(65-79) 133/71     Weight: 104.2 kg (229 lb 13.3 oz)  Body mass index is 36 kg/m².    Physical Exam  Cardiovascular:      Pulses:           Popliteal pulses are 2+ on the right side and 2+ on the left side.        Dorsalis pedis pulses are 2+ on the right side and 2+ on the left side.   Musculoskeletal:      Right knee: Normal.      Left knee: Swelling, effusion and erythema present. No bony tenderness. Normal range of motion. Tenderness present. Normal pulse.      Right lower le+ Pitting Edema present.      Left lower leg: Swelling present. No tenderness. 2+ Pitting Edema present.      Comments: Soft tissue swelling,  redness, warmth and tenderness to anterior aspect of left knee. No induration or fluctuance appreciated. Full ROM, but pain noted. NVI.   Skin:     Findings: Erythema present.      Comments: Soft tissue swelling, redness, warmth and tenderness to anterior aspect of left knee. No induration or fluctuance appreciated.                  Significant Labs:  Results for orders placed or performed during the hospital encounter of 03/11/22   CBC auto differential   Result Value Ref Range    WBC 9.54 3.90 - 12.70 K/uL    RBC 4.07 (L) 4.60 - 6.20 M/uL    Hemoglobin 12.4 (L) 14.0 - 18.0 g/dL    Hematocrit 38.4 (L) 40.0 - 54.0 %    MCV 94 82 - 98 fL    MCH 30.5 27.0 - 31.0 pg    MCHC 32.3 32.0 - 36.0 g/dL    RDW 15.1 (H) 11.5 - 14.5 %    Platelets 188 150 - 450 K/uL    MPV 10.1 9.2 - 12.9 fL    Immature Granulocytes 0.8 (H) 0.0 - 0.5 %    Gran # (ANC) 7.5 1.8 - 7.7 K/uL    Immature Grans (Abs) 0.08 (H) 0.00 - 0.04 K/uL    Lymph # 1.2 1.0 - 4.8 K/uL    Mono # 0.4 0.3 - 1.0 K/uL    Eos # 0.4 0.0 - 0.5 K/uL    Baso # 0.05 0.00 - 0.20 K/uL    nRBC 0 0 /100 WBC    Gran % 78.2 (H) 38.0 - 73.0 %    Lymph % 12.3 (L) 18.0 - 48.0 %    Mono % 4.3 4.0 - 15.0 %    Eosinophil % 3.9 0.0 - 8.0 %    Basophil % 0.5 0.0 - 1.9 %    Differential Method Automated    Comprehensive metabolic panel   Result Value Ref Range    Sodium 137 136 - 145 mmol/L    Potassium 4.2 3.5 - 5.1 mmol/L    Chloride 96 95 - 110 mmol/L    CO2 32 (H) 23 - 29 mmol/L    Glucose 170 (H) 70 - 110 mg/dL    BUN 17 8 - 23 mg/dL    Creatinine 1.8 (H) 0.5 - 1.4 mg/dL    Calcium 9.6 8.7 - 10.5 mg/dL    Total Protein 7.7 6.0 - 8.4 g/dL    Albumin 3.7 3.5 - 5.2 g/dL    Total Bilirubin 0.6 0.1 - 1.0 mg/dL    Alkaline Phosphatase 91 55 - 135 U/L    AST 27 10 - 40 U/L    ALT 26 10 - 44 U/L    Anion Gap 9 8 - 16 mmol/L    eGFR if African American 43 (A) >60 mL/min/1.73 m^2    eGFR if non African American 37 (A) >60 mL/min/1.73 m^2   Brain natriuretic peptide   Result Value Ref Range     BNP 11 0 - 99 pg/mL   C-reactive protein   Result Value Ref Range    CRP 10.3 (H) 0.0 - 8.2 mg/L   Sedimentation rate   Result Value Ref Range    Sed Rate 38 (H) 0 - 10 mm/Hr   Lactic acid, plasma   Result Value Ref Range    Lactate (Lactic Acid) 2.2 0.5 - 2.2 mmol/L   Procalcitonin   Result Value Ref Range    Procalcitonin 0.06 <0.25 ng/mL   Uric acid   Result Value Ref Range    Uric Acid 6.9 3.4 - 7.0 mg/dL   Troponin I   Result Value Ref Range    Troponin I 0.009 0.000 - 0.026 ng/mL   POCT COVID-19 Rapid Screening   Result Value Ref Range    POC Rapid COVID Negative Negative     Acceptable Yes       All pertinent labs within the past 24 hours have been reviewed.    Significant Imaging:  Imaging Results              X-Ray Knee Complete 4 or More Views Left (Final result)  Result time 03/11/22 16:49:31      Final result by Herminio Sandy MD (03/11/22 16:49:31)                   Impression:      Soft tissue swelling without acute osseous abnormality.  Degenerative changes minimal for age.      Electronically signed by: Herminio Sandy  Date:    03/11/2022  Time:    16:49               Narrative:    EXAMINATION:  XR KNEE COMP 4 OR MORE VIEWS LEFT    CLINICAL HISTORY:  Pain in left knee    TECHNIQUE:  Four views left knee    COMPARISON:  None    FINDINGS:  No fracture.  No definite osseous destructive process.  No joint effusion.  Minimal patellar osteophytosis.  Joint spaces are well preserved for age.  Some soft tissue swelling                                       X-Ray Chest AP Portable (Final result)  Result time 03/11/22 16:55:02      Final result by Herminio Sandy MD (03/11/22 16:55:02)                   Impression:      As above.      Electronically signed by: Herminio Sandy  Date:    03/11/2022  Time:    16:55               Narrative:    EXAMINATION:  XR CHEST AP PORTABLE    CLINICAL HISTORY:  Heart failure, unspecified    TECHNIQUE:  Single frontal view of the chest was  performed.    COMPARISON:  Multiple priors.    FINDINGS:  Mild bibasilar pulmonary opacities possibly reflecting minimal early edema.  Correlation is advised.    The cardiac silhouette is borderline enlarged.  The hilar and mediastinal contours are unremarkable.    Bones are intact.                                     I have reviewed all pertinent imaging results/findings within the past 24 hours.            Assessment/Plan:     * Cellulitis of left knee  - Failed outpatient clindamycin.   - Will obtain further imaging to r/o joint involvement.   - Continue empiric IV abx.  - Analgesics as needed.     Primary hypertension  - Continue home antihypertensives.     Chronic combined systolic and diastolic congestive heart failure  Patient is identified as having Combined Systolic and Diastolic heart failure that is Chronic. CHF is currently controlled. Latest ECHO performed and demonstrates- Results for orders placed during the hospital encounter of 03/20/20    Echo Color Flow Doppler? Yes; Bubble Contrast? No    Interpretation Summary  · Mild concentric left ventricular hypertrophy.  · Mildly decreased left ventricular systolic function. The estimated ejection fraction is 45%.  · Grade I (mild) left ventricular diastolic dysfunction consistent with impaired relaxation.  · Low normal right ventricular systolic function.  · Moderate mitral regurgitation.  · Normal central venous pressure (3 mmHg).  · The estimated PA systolic pressure is 23 mmHg.  Continue Beta Blocker and ACE/ARB and torsemide, and monitor clinical status closely. Monitor on telemetry. Patient is off CHF pathway.  Monitor strict Is&Os and daily weights.  Place on fluid restriction of 1.5 L. Continue to stress to patient importance of self efficacy and  on diet for CHF. Last BNP reviewed- and noted below   Recent Labs   Lab 03/11/22  1626   BNP 11       Coronary artery disease of native artery of native heart with stable angina pectoris  - Stable,  no angina. Continue medical management.     Chronic kidney disease, stage 3  - Creatinine stable at baseline. Avoid nephrotoxic agents. Follow labs.     Type 2 diabetes mellitus, without long-term current use of insulin  - Hold metformin and Amaryl during hospital stay.   - Accuchecks with low dose SSI.  - Diabetic diet.  - Recent HbA1c 6.8.      VTE Risk Mitigation (From admission, onward)         Ordered     Reason for No Pharmacological VTE Prophylaxis  Once        Question:  Reasons:  Answer:  Risk of Bleeding    03/11/22 2048     IP VTE HIGH RISK PATIENT  Once         03/11/22 2048     Place sequential compression device  Until discontinued         03/11/22 2048                   Lina Quinones NP  Department of Hospital Medicine   [facility]

## 2022-03-12 NOTE — SUBJECTIVE & OBJECTIVE
Past Medical History:   Diagnosis Date    Chronic diastolic congestive heart failure 4/7/2020    Chronic kidney disease, stage 3     Chronic systolic congestive heart failure 7/9/2020    Chronic venous insufficiency     DDD (degenerative disc disease), lumbar     DM (diabetes mellitus) with complications     History of gout     Hyperlipidemia associated with type 2 diabetes mellitus     Hypertension associated with stage 3 chronic kidney disease due to type 2 diabetes mellitus     BRADLEY on CPAP     Prostate cancer     prostatectomy in 2010, rising PSA that started in 2021    Tobacco abuse        Past Surgical History:   Procedure Laterality Date    BICEPS TENDON REPAIR      COLONOSCOPY N/A 3/27/2019    Procedure: COLONOSCOPY;  Surgeon: James Roger MD;  Location: [facility] ENDO;  Service: Endoscopy;  Laterality: N/A;    HEMORRHOID SURGERY      INGUINAL HERNIA REPAIR Left     KNEE ARTHROSCOPY Right     LEFT HEART CATHETERIZATION Left 6/29/2020    Procedure: CATHETERIZATION, HEART, LEFT;  Surgeon: Cheli Wilson MD;  Location: [facility] CATH LAB;  Service: Cardiology;  Laterality: Left;    LUMBAR LAMINECTOMY      PROSTATECTOMY         Review of patient's allergies indicates:   Allergen Reactions    Amitriptyline Rash    Celecoxib Rash       Current Facility-Administered Medications   Medication    0.9%  NaCl infusion    acetaminophen tablet 650 mg    allopurinoL tablet 300 mg    aspirin EC tablet 81 mg    atorvastatin tablet 20 mg    baclofen tablet 10 mg    cefazolin (ANCEF) 2 gram in dextrose 5% 50 mL IVPB (premix)    dextrose 10% bolus 125 mL    dextrose 10% bolus 250 mL    glucagon (human recombinant) injection 1 mg    glucose chewable tablet 16 g    glucose chewable tablet 24 g    heparin (porcine) injection 5,000 Units    HYDROcodone-acetaminophen  mg per tablet 1 tablet    HYDROcodone-acetaminophen 5-325 mg per tablet 1 tablet    insulin aspart U-100 pen 0-5 Units    LIDOcaine (PF) 10 mg/ml (1%) injection 100  "mg    losartan tablet 25 mg    magnesium oxide tablet 400 mg    melatonin tablet 6 mg    metoprolol tartrate (LOPRESSOR) tablet 25 mg    ondansetron injection 4 mg    potassium chloride SA CR tablet 40 mEq    promethazine tablet 25 mg    sodium chloride 0.9% flush 10 mL    torsemide tablet 40 mg     Family History       Problem Relation (Age of Onset)    Alzheimer's disease Father    Coronary artery disease Father (90)    Hyperlipidemia Mother    Prostate cancer Father          Tobacco Use    Smoking status: Former Smoker     Packs/day: 0.00     Years: 50.00     Pack years: 0.00     Types: Cigarettes     Quit date: 3/1/2021     Years since quittin.0    Smokeless tobacco: Former User   Substance and Sexual Activity    Alcohol use: Not Currently    Drug use: Never    Sexual activity: Not Currently     Partners: Female     Review of Systems   Constitutional: Negative for decreased appetite.   Cardiovascular:  Negative for chest pain.   Respiratory:  Negative for cough.    Hematologic/Lymphatic: Does not bruise/bleed easily.   Musculoskeletal:  Positive for joint pain.   Gastrointestinal:  Negative for abdominal pain, nausea and vomiting.   Neurological:  Negative for weakness.   All other systems reviewed and are negative.  Objective:     Vital Signs (Most Recent):  Temp: 98.1 °F (36.7 °C) (22 1141)  Pulse: 83 (22 1141)  Resp: 18 (22 1141)  BP: 104/67 (22 1141)  SpO2: (!) 92 % (22 1141)   Vital Signs (24h Range):  Temp:  [97.9 °F (36.6 °C)-98.2 °F (36.8 °C)] 98.1 °F (36.7 °C)  Pulse:  [83-93] 83  Resp:  [18-20] 18  SpO2:  [92 %-95 %] 92 %  BP: (104-142)/(67-71) 104/67     Weight: 104.3 kg (229 lb 15 oz)  Height: 5' 7" (170.2 cm)  Body mass index is 36.01 kg/m².      Intake/Output Summary (Last 24 hours) at 3/12/2022 1624  Last data filed at 3/12/2022 1339  Gross per 24 hour   Intake 711.11 ml   Output --   Net 711.11 ml       General    Nursing note and vitals " reviewed.  Constitutional: He is oriented to person, place, and time. He appears well-developed and well-nourished. No distress.   HENT:   Head: Normocephalic and atraumatic.   Eyes: EOM are normal. No scleral icterus.   Cardiovascular:  Intact distal pulses.            Pulmonary/Chest: Effort normal. No respiratory distress.   Abdominal: Soft. He exhibits no distension.   Neurological: He is alert and oriented to person, place, and time.   Psychiatric: He has a normal mood and affect. His behavior is normal. Judgment and thought content normal.             Left Knee Exam     Comments:  Left lower extremity:  Patient's skin is warm dry and intact.  He has hyperpigmentation distally consistent with venous stasis changes.  Does have some hyperemia and warmth over the prepatellar bursa.  The prepatellar bursa effusion is noted.  Does have some tenderness to palpation there.  No prior surgical scars are noted.  No pain with range of motion of the joint is noted.  Sensation is grossly intact to light touch.  Recent Lab Results         03/12/22  0552   03/12/22  0530   03/11/22  2329   03/11/22  2243   03/11/22  1819        Procalcitonin               Albumin               Alkaline Phosphatase               ALT               Anion Gap   13             AST               Baso #   0.08             Basophil %   0.8             BILIRUBIN TOTAL               Blood Culture, Routine               BNP               BUN   18             Calcium   9.4             Chloride   96             CO2   31             Creatinine   1.6             CRP               Differential Method   Automated             eGFR if    50             eGFR if non    43  Comment: Calculation used to obtain the estimated glomerular filtration  rate (eGFR) is the CKD-EPI equation.                Eos #   0.5             Eosinophil %   4.7             Estimated Avg Glucose       174         Glucose   108             Gran # (ANC)    6.9             Gran %   70.7             Hematocrit   39.2             Hemoglobin   12.7             Hemoglobin A1C External       7.7  Comment: ADA Screening Guidelines:  5.7-6.4%  Consistent with prediabetes  >or=6.5%  Consistent with diabetes    High levels of fetal hemoglobin interfere with the HbA1C  assay. Heterozygous hemoglobin variants (HbS, HgC, etc)do  not significantly interfere with this assay.   However, presence of multiple variants may affect accuracy.           Immature Grans (Abs)   0.09  Comment: Mild elevation in immature granulocytes is non specific and   can be seen in a variety of conditions including stress response,   acute inflammation, trauma and pregnancy. Correlation with other   laboratory and clinical findings is essential.               Immature Granulocytes   0.9             Lactate, Zaid               Lymph #   1.7             Lymph %   17.8             MCH   30.7             MCHC   32.4             MCV   95             Mono #   0.5             Mono %   5.1             MPV   10.7             nRBC   0             Platelets   201             POCT Glucose 117     199           Potassium   3.6             PROTEIN TOTAL                Acceptable         Yes       RBC   4.14             RDW   15.0             SARS-CoV-2 RNA, Amplification, Qual         Negative       Sed Rate               Sodium   140             Troponin I               Uric Acid       7.0         WBC   9.74                                03/11/22  1708   03/11/22  1627   03/11/22  1626        Procalcitonin     0.06  Comment: A concentration < 0.25 ng/mL represents a low risk of bacterial   infection.  Procalcitonin may not be accurate among patients with localized   infection, recent trauma or major surgery, immunosuppressed state,   invasive fungal infection, renal dysfunction. Decisions regarding   initiation or continuation of antibiotic therapy should not be based   solely on procalcitonin  levels.         Albumin     3.7       Alkaline Phosphatase     91       ALT     26       Anion Gap     9       AST     27       Baso #     0.05       Basophil %     0.5       BILIRUBIN TOTAL     0.6  Comment: For infants and newborns, interpretation of results should be based  on gestational age, weight and in agreement with clinical  observations.    Premature Infant recommended reference ranges:  Up to 24 hours.............<8.0 mg/dL  Up to 48 hours............<12.0 mg/dL  3-5 days..................<15.0 mg/dL  6-29 days.................<15.0 mg/dL         Blood Culture, Routine   No Growth to date  [P]            No Growth to date  [P]         BNP     11  Comment: Values of less than 100 pg/ml are consistent with non-CHF populations.       BUN     17       Calcium     9.6       Chloride     96       CO2     32       Creatinine     1.8       CRP     10.3       Differential Method     Automated       eGFR if      43       eGFR if non      37  Comment: Calculation used to obtain the estimated glomerular filtration  rate (eGFR) is the CKD-EPI equation.          Eos #     0.4       Eosinophil %     3.9       Estimated Avg Glucose           Glucose     170       Gran # (ANC)     7.5       Gran %     78.2       Hematocrit     38.4       Hemoglobin     12.4       Hemoglobin A1C External           Immature Grans (Abs)     0.08  Comment: Mild elevation in immature granulocytes is non specific and   can be seen in a variety of conditions including stress response,   acute inflammation, trauma and pregnancy. Correlation with other   laboratory and clinical findings is essential.         Immature Granulocytes     0.8       Lactate, Zaid     2.2  Comment: Falsely low lactic acid results can be found in samples   containing >=13.0 mg/dL total bilirubin and/or >=3.5 mg/dL   direct bilirubin.         Lymph #     1.2       Lymph %     12.3       MCH     30.5       MCHC     32.3       MCV     94        Mono #     0.4       Mono %     4.3       MPV     10.1       nRBC     0       Platelets     188       POCT Glucose           Potassium     4.2       PROTEIN TOTAL     7.7        Acceptable           RBC     4.07       RDW     15.1       SARS-CoV-2 RNA, Amplification, Qual           Sed Rate     38       Sodium     137       Troponin I 0.009  Comment: The reference interval for Troponin I represents the 99th percentile   cutoff   for our facility and is consistent with 3rd generation assay   performance.             Uric Acid     6.9       WBC     9.54                [P] - Preliminary Result             All pertinent labs within the past 24 hours have been reviewed.    Significant Labs: All pertinent labs within the past 24 hours have been reviewed.    Significant Imaging: X-Ray: I have reviewed all pertinent results/findings and my personal findings are:  I reviewed the patient's plain films patient has mildly decreased joint space in the medial compartment.  Patient does have a loose body noted.; I also reviewed patient's CT scan which demonstrated a prepatellar fluid bursa without any communication to the knee joint proper.

## 2022-03-12 NOTE — ASSESSMENT & PLAN NOTE
- Failed outpatient clindamycin.  - Will obtain further imaging to r/o joint involvement.   - Continue empiric IV abx.  - Analgesics as needed.   03/12:  --CT shows Infected bursa superficial to the patella measuring 4.8 x 1.3 x 7 cm.  No fluid in the joint capsule can be identified.    --Ortho surgery consulted  --continue IV ancef  --plans for fluid aspiration and cx

## 2022-03-12 NOTE — ASSESSMENT & PLAN NOTE
- Will obtain further imaging to r/o joint involvement.   - Continue empiric IV abx.  - Analgesics as needed.

## 2022-03-12 NOTE — ASSESSMENT & PLAN NOTE
Patient's physical exam and history are consistent with left knee prepatellar bursitis.  And discussed with the patient my concerns I would like to aspirate his prepatellar bursa.  We will try to send this fluid for culture.  Unfortunately patient has been on IV antibiotics and p.o. antibiotics we may not get an actual bacteria.  I also recommend broadening the spectrum of his antibiotics to cover MRSA.

## 2022-03-12 NOTE — HPI
Santiago Khan is a 71 y.o. male with a PMHx of CAD, chronic combined systolic/diastolic HFrEF of 45%, CKD, DM II, GERD, gout, HLD, HTN, BRADLEY on CPAP, and h/o prostate cancer who presented to the Emergency Department with c/o left knee pain that has progressively worsened over the past 1 week. No aggravating or alleviating factors. Associated left knee swelling, left knee redness, and chronic BLE edema. Patient states he was diagnosed with cellulitis of that left knee last week and was prescribed clindamycin. Patient reports compliance with antibiotic, but symptoms continue to worsen. Denies any weakness, numbness/tingling, decreased ROM, effusion, drainage, trauma, CP, SOB, ABD pain, N/V, lightheadedness, dizziness, fever or chills. Work-up in the ED showed: Normal WBC and lactic, ESR 38, CRP 10, procalcitonin 0.06. Left knee XR showed soft tissue swelling without acute osseous abnormality, no joint effusion. IV Ancef given in the ED. Hospital Medicine was contacted for admission and patient placed in Observation.

## 2022-03-12 NOTE — CONSULTS
"[facility]  Orthopedics  Consult Note    Patient Name: Santiago Khan  MRN: [id number]  Admission Date: 3/11/2022  Hospital Length of Stay: 0 days  Attending Provider: Drew Nichole MD  Primary Care Provider: Kashif Acosta MD    Patient information was obtained from patient, past medical records and ER records.     Consults  Subjective:     Principal Problem:Prepatellar bursitis of left knee    Chief Complaint:   Chief Complaint   Patient presents with    Joint Swelling     Left knee swelling, hardness and warm to touch, was given abx a week ago with no relief, was told to come to ER so he could see an orthopedist before the weekend to "have it drained"        HPI: Patient is a very pleasant 71-year-old  male who presents with approximately 6 a history of left knee pain and swelling.  He presented to urgent care proximally 1 week ago and was started on clindamycin as a outpatient.  After several days of treatment he states the pain was not getting better and continued to have swelling.  Of note patient does have a history of gout as well as type 2 diabetes that is moderately controlled.  He was sent to the emergency department by his primary care physician yesterday.  Patient has had bursitis in the past which required drainage.  He denies any history of trauma.  Upon admission to the hospital yesterday patient was started on IV Ancef.      Past Medical History:   Diagnosis Date    Chronic diastolic congestive heart failure 4/7/2020    Chronic kidney disease, stage 3     Chronic systolic congestive heart failure 7/9/2020    Chronic venous insufficiency     DDD (degenerative disc disease), lumbar     DM (diabetes mellitus) with complications     History of gout     Hyperlipidemia associated with type 2 diabetes mellitus     Hypertension associated with stage 3 chronic kidney disease due to type 2 diabetes mellitus     BRADLEY on CPAP     Prostate cancer     prostatectomy in 2010, rising PSA " that started in 2021    Tobacco abuse        Past Surgical History:   Procedure Laterality Date    BICEPS TENDON REPAIR      COLONOSCOPY N/A 3/27/2019    Procedure: COLONOSCOPY;  Surgeon: James Roger MD;  Location: [facility] ENDO;  Service: Endoscopy;  Laterality: N/A;    HEMORRHOID SURGERY      INGUINAL HERNIA REPAIR Left     KNEE ARTHROSCOPY Right     LEFT HEART CATHETERIZATION Left 6/29/2020    Procedure: CATHETERIZATION, HEART, LEFT;  Surgeon: Cheli Wilson MD;  Location: [facility] CATH LAB;  Service: Cardiology;  Laterality: Left;    LUMBAR LAMINECTOMY      PROSTATECTOMY         Review of patient's allergies indicates:   Allergen Reactions    Amitriptyline Rash    Celecoxib Rash       Current Facility-Administered Medications   Medication    0.9%  NaCl infusion    acetaminophen tablet 650 mg    allopurinoL tablet 300 mg    aspirin EC tablet 81 mg    atorvastatin tablet 20 mg    baclofen tablet 10 mg    cefazolin (ANCEF) 2 gram in dextrose 5% 50 mL IVPB (premix)    dextrose 10% bolus 125 mL    dextrose 10% bolus 250 mL    glucagon (human recombinant) injection 1 mg    glucose chewable tablet 16 g    glucose chewable tablet 24 g    heparin (porcine) injection 5,000 Units    HYDROcodone-acetaminophen  mg per tablet 1 tablet    HYDROcodone-acetaminophen 5-325 mg per tablet 1 tablet    insulin aspart U-100 pen 0-5 Units    LIDOcaine (PF) 10 mg/ml (1%) injection 100 mg    losartan tablet 25 mg    magnesium oxide tablet 400 mg    melatonin tablet 6 mg    metoprolol tartrate (LOPRESSOR) tablet 25 mg    ondansetron injection 4 mg    potassium chloride SA CR tablet 40 mEq    promethazine tablet 25 mg    sodium chloride 0.9% flush 10 mL    torsemide tablet 40 mg     Family History       Problem Relation (Age of Onset)    Alzheimer's disease Father    Coronary artery disease Father (90)    Hyperlipidemia Mother    Prostate cancer Father          Tobacco Use    Smoking status:  "Former Smoker     Packs/day: 0.00     Years: 50.00     Pack years: 0.00     Types: Cigarettes     Quit date: 3/1/2021     Years since quittin.0    Smokeless tobacco: Former User   Substance and Sexual Activity    Alcohol use: Not Currently    Drug use: Never    Sexual activity: Not Currently     Partners: Female     Review of Systems   Constitutional: Negative for decreased appetite.   Cardiovascular:  Negative for chest pain.   Respiratory:  Negative for cough.    Hematologic/Lymphatic: Does not bruise/bleed easily.   Musculoskeletal:  Positive for joint pain.   Gastrointestinal:  Negative for abdominal pain, nausea and vomiting.   Neurological:  Negative for weakness.   All other systems reviewed and are negative.  Objective:     Vital Signs (Most Recent):  Temp: 98.1 °F (36.7 °C) (22 1141)  Pulse: 83 (22 1141)  Resp: 18 (22 1141)  BP: 104/67 (22 1141)  SpO2: (!) 92 % (22 1141)   Vital Signs (24h Range):  Temp:  [97.9 °F (36.6 °C)-98.2 °F (36.8 °C)] 98.1 °F (36.7 °C)  Pulse:  [83-93] 83  Resp:  [18-20] 18  SpO2:  [92 %-95 %] 92 %  BP: (104-142)/(67-71) 104/67     Weight: 104.3 kg (229 lb 15 oz)  Height: 5' 7" (170.2 cm)  Body mass index is 36.01 kg/m².      Intake/Output Summary (Last 24 hours) at 3/12/2022 1624  Last data filed at 3/12/2022 1339  Gross per 24 hour   Intake 711.11 ml   Output --   Net 711.11 ml       General    Nursing note and vitals reviewed.  Constitutional: He is oriented to person, place, and time. He appears well-developed and well-nourished. No distress.   HENT:   Head: Normocephalic and atraumatic.   Eyes: EOM are normal. No scleral icterus.   Cardiovascular:  Intact distal pulses.            Pulmonary/Chest: Effort normal. No respiratory distress.   Abdominal: Soft. He exhibits no distension.   Neurological: He is alert and oriented to person, place, and time.   Psychiatric: He has a normal mood and affect. His behavior is normal. Judgment and thought " content normal.             Left Knee Exam     Comments:  Left lower extremity:  Patient's skin is warm dry and intact.  He has hyperpigmentation distally consistent with venous stasis changes.  Does have some hyperemia and warmth over the prepatellar bursa.  The prepatellar bursa effusion is noted.  Does have some tenderness to palpation there.  No prior surgical scars are noted.  No pain with range of motion of the joint is noted.  Sensation is grossly intact to light touch.  Recent Lab Results         03/12/22  0552   03/12/22  0530   03/11/22  2329   03/11/22  2243   03/11/22  1819        Procalcitonin               Albumin               Alkaline Phosphatase               ALT               Anion Gap   13             AST               Baso #   0.08             Basophil %   0.8             BILIRUBIN TOTAL               Blood Culture, Routine               BNP               BUN   18             Calcium   9.4             Chloride   96             CO2   31             Creatinine   1.6             CRP               Differential Method   Automated             eGFR if    50             eGFR if non    43  Comment: Calculation used to obtain the estimated glomerular filtration  rate (eGFR) is the CKD-EPI equation.                Eos #   0.5             Eosinophil %   4.7             Estimated Avg Glucose       174         Glucose   108             Gran # (ANC)   6.9             Gran %   70.7             Hematocrit   39.2             Hemoglobin   12.7             Hemoglobin A1C External       7.7  Comment: ADA Screening Guidelines:  5.7-6.4%  Consistent with prediabetes  >or=6.5%  Consistent with diabetes    High levels of fetal hemoglobin interfere with the HbA1C  assay. Heterozygous hemoglobin variants (HbS, HgC, etc)do  not significantly interfere with this assay.   However, presence of multiple variants may affect accuracy.           Immature Grans (Abs)   0.09  Comment: Mild elevation  in immature granulocytes is non specific and   can be seen in a variety of conditions including stress response,   acute inflammation, trauma and pregnancy. Correlation with other   laboratory and clinical findings is essential.               Immature Granulocytes   0.9             Lactate, Zaid               Lymph #   1.7             Lymph %   17.8             MCH   30.7             MCHC   32.4             MCV   95             Mono #   0.5             Mono %   5.1             MPV   10.7             nRBC   0             Platelets   201             POCT Glucose 117     199           Potassium   3.6             PROTEIN TOTAL                Acceptable         Yes       RBC   4.14             RDW   15.0             SARS-CoV-2 RNA, Amplification, Qual         Negative       Sed Rate               Sodium   140             Troponin I               Uric Acid       7.0         WBC   9.74                                03/11/22  1708   03/11/22  1627   03/11/22  1626        Procalcitonin     0.06  Comment: A concentration < 0.25 ng/mL represents a low risk of bacterial   infection.  Procalcitonin may not be accurate among patients with localized   infection, recent trauma or major surgery, immunosuppressed state,   invasive fungal infection, renal dysfunction. Decisions regarding   initiation or continuation of antibiotic therapy should not be based   solely on procalcitonin levels.         Albumin     3.7       Alkaline Phosphatase     91       ALT     26       Anion Gap     9       AST     27       Baso #     0.05       Basophil %     0.5       BILIRUBIN TOTAL     0.6  Comment: For infants and newborns, interpretation of results should be based  on gestational age, weight and in agreement with clinical  observations.    Premature Infant recommended reference ranges:  Up to 24 hours.............<8.0 mg/dL  Up to 48 hours............<12.0 mg/dL  3-5 days..................<15.0 mg/dL  6-29  days.................<15.0 mg/dL         Blood Culture, Routine   No Growth to date  [P]            No Growth to date  [P]         BNP     11  Comment: Values of less than 100 pg/ml are consistent with non-CHF populations.       BUN     17       Calcium     9.6       Chloride     96       CO2     32       Creatinine     1.8       CRP     10.3       Differential Method     Automated       eGFR if      43       eGFR if non      37  Comment: Calculation used to obtain the estimated glomerular filtration  rate (eGFR) is the CKD-EPI equation.          Eos #     0.4       Eosinophil %     3.9       Estimated Avg Glucose           Glucose     170       Gran # (ANC)     7.5       Gran %     78.2       Hematocrit     38.4       Hemoglobin     12.4       Hemoglobin A1C External           Immature Grans (Abs)     0.08  Comment: Mild elevation in immature granulocytes is non specific and   can be seen in a variety of conditions including stress response,   acute inflammation, trauma and pregnancy. Correlation with other   laboratory and clinical findings is essential.         Immature Granulocytes     0.8       Lactate, Zaid     2.2  Comment: Falsely low lactic acid results can be found in samples   containing >=13.0 mg/dL total bilirubin and/or >=3.5 mg/dL   direct bilirubin.         Lymph #     1.2       Lymph %     12.3       MCH     30.5       MCHC     32.3       MCV     94       Mono #     0.4       Mono %     4.3       MPV     10.1       nRBC     0       Platelets     188       POCT Glucose           Potassium     4.2       PROTEIN TOTAL     7.7        Acceptable           RBC     4.07       RDW     15.1       SARS-CoV-2 RNA, Amplification, Qual           Sed Rate     38       Sodium     137       Troponin I 0.009  Comment: The reference interval for Troponin I represents the 99th percentile   cutoff   for our facility and is consistent with 3rd generation assay    performance.             Uric Acid     6.9       WBC     9.54                [P] - Preliminary Result             All pertinent labs within the past 24 hours have been reviewed.    Significant Labs: All pertinent labs within the past 24 hours have been reviewed.    Significant Imaging: X-Ray: I have reviewed all pertinent results/findings and my personal findings are:  I reviewed the patient's plain films patient has mildly decreased joint space in the medial compartment.  Patient does have a loose body noted.; I also reviewed patient's CT scan which demonstrated a prepatellar fluid bursa without any communication to the knee joint proper.    Assessment/Plan:     * Prepatellar bursitis of left knee  Patient's physical exam and history are consistent with left knee prepatellar bursitis.  And discussed with the patient my concerns I would like to aspirate his prepatellar bursa.  We will try to send this fluid for culture.  Unfortunately patient has been on IV antibiotics and p.o. antibiotics we may not get an actual bacteria.  I also recommend broadening the spectrum of his antibiotics to cover MRSA.        Thank you for your consult. I will follow-up with patient. Please contact us if you have any additional questions.    Rhina Bruner MD  Orthopedics  [facility]

## 2022-03-12 NOTE — NURSING
"Educated patient as to why insulin is ordered prn on a sliding scale patient stated "I am not taking insulin I am not taking any shots"  "

## 2022-03-12 NOTE — ASSESSMENT & PLAN NOTE
--BMI 36 with DM, CAD, and combined systolic and diastolic heart failure  --BRADLEY  --CPAP q HS

## 2022-03-12 NOTE — ED NOTES
Patient agitated stating he has being asking for something to eat since 5pm but wasn't attended to. This RN informed patient of shift changed and not being aware of his meal request. Patient at this time is declining available sandwiches as he is [employer] and doesn't eat certain food on Fridays. RN apologize for the inconvenience at this time with no effect. RN provided requested juices and crackers to patient.       Patient remain agitated asking to be transported to floor but current RN has been busy with other patients for floor report. RN called floor RN to give report but unable to as RN is in a patients room at this time, will call back in 15'

## 2022-03-12 NOTE — ASSESSMENT & PLAN NOTE
- Hold metformin and Amaryl during hospital stay.   - Accuchecks with low dose SSI.  - Diabetic diet.  - Recent HbA1c 6.8.

## 2022-03-12 NOTE — ASSESSMENT & PLAN NOTE
Patient is identified as having Combined Systolic and Diastolic heart failure that is Chronic. CHF is currently controlled. Latest ECHO performed and demonstrates- Results for orders placed during the hospital encounter of 03/20/20    Echo Color Flow Doppler? Yes; Bubble Contrast? No    Interpretation Summary  · Mild concentric left ventricular hypertrophy.  · Mildly decreased left ventricular systolic function. The estimated ejection fraction is 45%.  · Grade I (mild) left ventricular diastolic dysfunction consistent with impaired relaxation.  · Low normal right ventricular systolic function.  · Moderate mitral regurgitation.  · Normal central venous pressure (3 mmHg).  · The estimated PA systolic pressure is 23 mmHg.  Continue Beta Blocker and ACE/ARB and torsemide, and monitor clinical status closely. Monitor on telemetry. Patient is off CHF pathway.  Monitor strict Is&Os and daily weights.  Place on fluid restriction of 1.5 L. Continue to stress to patient importance of self efficacy and  on diet for CHF. Last BNP reviewed- and noted below   Recent Labs   Lab 03/11/22  1626   BNP 11

## 2022-03-12 NOTE — HPI
Patient is a very pleasant 71-year-old  male who presents with approximately 6 a history of left knee pain and swelling.  He presented to urgent care proximally 1 week ago and was started on clindamycin as a outpatient.  After several days of treatment he states the pain was not getting better and continued to have swelling.  Of note patient does have a history of gout as well as type 2 diabetes that is moderately controlled.  He was sent to the emergency department by his primary care physician yesterday.  Patient has had bursitis in the past which required drainage.  He denies any history of trauma.  Upon admission to the hospital yesterday patient was started on IV Ancef.

## 2022-03-12 NOTE — HOSPITAL COURSE
"Patient kept on obs for L knee cellulitis under the care of hospital medicine. He was started in IV ancef.  03/12: CT knee shows Infected bursa superficial to the patella measuring 4.8 x 1.3 x 7 cm.  No fluid in the joint capsule can be identified. Discussed with Dr. Bruner, she will see pt and plan on aspiration with cx of fluid. WBC decreased overnight. Continue PRN analgesia.  03/13: Patient verbally abusive to staff throughout entire stay, even telling NP to "get the F** out of my room and send in a doctor". He made lunging motions at staff insinuating he would physically harm them. He has raised his voice and used profanity towards nurse, CNA, phlebotomy, and providers. Rx for augmentin and doxycycline X 14 days printed. He will f/u OP with PCP and ortho surgery. Patient was seen and examined today and deemed stable for discharge home.  "

## 2022-03-12 NOTE — PROGRESS NOTES
[facility] Medicine  Progress Note    Patient Name: Santiago Khan  MRN: [id number]  Patient Class: OP- Observation   Admission Date: 3/11/2022  Length of Stay: 0 days  Attending Physician: Drew Nichole MD  Primary Care Provider: Kashif Acosta MD        Subjective:     Principal Problem:Cellulitis of left knee        HPI:  Santiago Khan is a 71 y.o. male with a PMHx of CAD, chronic combined systolic/diastolic HFrEF of 45%, CKD, DM II, GERD, gout, HLD, HTN, BRADLEY on CPAP, and h/o prostate cancer who presented to the Emergency Department with c/o left knee pain that has progressively worsened over the past 1 week. No aggravating or alleviating factors. Associated left knee swelling, left knee redness, and chronic BLE edema. Patient states he was diagnosed with cellulitis of that left knee last week and was prescribed clindamycin. Patient reports compliance with antibiotic, but symptoms continue to worsen. Denies any weakness, numbness/tingling, decreased ROM, effusion, drainage, trauma, CP, SOB, ABD pain, N/V, lightheadedness, dizziness, fever or chills. Work-up in the ED showed: Normal WBC and lactic, ESR 38, CRP 10, procalcitonin 0.06. Left knee XR showed soft tissue swelling without acute osseous abnormality, no joint effusion. IV Ancef given in the ED. Hospital Medicine was contacted for admission and patient placed in Observation.       Overview/Hospital Course:  Patient kept on obs for L knee cellulitis under the care of hospital medicine. He was started in IV ancef.      Interval History: CT knee shows Infected bursa superficial to the patella measuring 4.8 x 1.3 x 7 cm.  No fluid in the joint capsule can be identified. Discussed with Dr. Bruner, she will see pt and plan on aspiration with cx of fluid. WBC decreased overnight. Continue PRN analgesia.    Review of Systems   Constitutional:  Negative for chills, diaphoresis, fatigue and fever.   Respiratory:  Negative for cough, shortness  of breath and wheezing.    Cardiovascular:  Positive for leg swelling (chronic BLE). Negative for chest pain and palpitations.   Gastrointestinal:  Negative for abdominal pain, diarrhea, nausea and vomiting.   Genitourinary:  Negative for dysuria and hematuria.   Musculoskeletal:  Positive for arthralgias (left knee) and joint swelling (left knee). Negative for back pain, gait problem and myalgias.   Skin:  Negative for pallor and rash.        (+) Redness and swelling to left knee.    Neurological:  Negative for dizziness, syncope, weakness, light-headedness, numbness and headaches.   All other systems reviewed and are negative.  Objective:     Vital Signs (Most Recent):  Temp: 98.1 °F (36.7 °C) (03/12/22 0720)  Pulse: 87 (03/12/22 0720)  Resp: 18 (03/12/22 0720)  BP: (!) 142/71 (03/12/22 0902)  SpO2: (!) 92 % (03/12/22 0720)   Vital Signs (24h Range):  Temp:  [97.9 °F (36.6 °C)-98.3 °F (36.8 °C)] 98.1 °F (36.7 °C)  Pulse:  [87-97] 87  Resp:  [18-20] 18  SpO2:  [92 %-96 %] 92 %  BP: (108-144)/(65-79) 142/71     Weight: 104.3 kg (229 lb 15 oz)  Body mass index is 36.01 kg/m².    Intake/Output Summary (Last 24 hours) at 3/12/2022 1132  Last data filed at 3/11/2022 2300  Gross per 24 hour   Intake 480 ml   Output --   Net 480 ml      Physical Exam  Vitals and nursing note reviewed.   Constitutional:       Appearance: He is obese.   HENT:      Head: Normocephalic and atraumatic.      Nose: Nose normal.      Mouth/Throat:      Mouth: Mucous membranes are moist.   Eyes:      General: No scleral icterus.     Extraocular Movements: Extraocular movements intact.      Pupils: Pupils are equal, round, and reactive to light.   Cardiovascular:      Rate and Rhythm: Normal rate and regular rhythm.      Pulses: Normal pulses.           Popliteal pulses are 2+ on the right side and 2+ on the left side.        Dorsalis pedis pulses are 2+ on the right side and 2+ on the left side.      Heart sounds: No murmur heard.    No friction  rub. No gallop.   Pulmonary:      Effort: Pulmonary effort is normal. No respiratory distress.      Breath sounds: Normal breath sounds.   Abdominal:      General: Bowel sounds are normal. There is no distension.      Palpations: Abdomen is soft.      Tenderness: There is no abdominal tenderness.   Genitourinary:     Comments: deferred  Musculoskeletal:         General: Swelling (L kmee) present.      Cervical back: Normal range of motion and neck supple.      Right knee: Normal.      Left knee: Swelling, effusion and erythema present. No bony tenderness. Normal range of motion. Tenderness present. Normal pulse.      Right lower le+ Pitting Edema present.      Left lower leg: Swelling present. No tenderness. 2+ Pitting Edema present.      Comments: Soft tissue swelling, redness, warmth and tenderness to anterior aspect of left knee. No induration or fluctuance appreciated. Full ROM, but pain noted. NVI.   Skin:     General: Skin is warm and dry.      Capillary Refill: Capillary refill takes 2 to 3 seconds.      Findings: Erythema present.      Comments: Soft tissue swelling, redness, warmth and tenderness to anterior aspect of left knee. No induration or fluctuance appreciated.    Neurological:      Mental Status: He is alert. Mental status is at baseline.   Psychiatric:         Mood and Affect: Mood is anxious. Affect is angry.         Behavior: Behavior is agitated.         Significant Labs: All pertinent labs within the past 24 hours have been reviewed.  Recent Lab Results         22  0552   22  0530   22  2329   22  2243   22  1819        Procalcitonin               Albumin               Alkaline Phosphatase               ALT               Anion Gap   13             AST               Baso #   0.08             Basophil %   0.8             BILIRUBIN TOTAL               Blood Culture, Routine               BNP               BUN   18             Calcium   9.4             Chloride    96             CO2   31             Creatinine   1.6             CRP               Differential Method   Automated             eGFR if    50             eGFR if non    43  Comment: Calculation used to obtain the estimated glomerular filtration  rate (eGFR) is the CKD-EPI equation.                Eos #   0.5             Eosinophil %   4.7             Glucose   108             Gran # (ANC)   6.9             Gran %   70.7             Hematocrit   39.2             Hemoglobin   12.7             Immature Grans (Abs)   0.09  Comment: Mild elevation in immature granulocytes is non specific and   can be seen in a variety of conditions including stress response,   acute inflammation, trauma and pregnancy. Correlation with other   laboratory and clinical findings is essential.               Immature Granulocytes   0.9             Lactate, Zaid               Lymph #   1.7             Lymph %   17.8             MCH   30.7             MCHC   32.4             MCV   95             Mono #   0.5             Mono %   5.1             MPV   10.7             nRBC   0             Platelets   201             POCT Glucose 117     199           Potassium   3.6             PROTEIN TOTAL                Acceptable         Yes       RBC   4.14             RDW   15.0             SARS-CoV-2 RNA, Amplification, Qual         Negative       Sed Rate               Sodium   140             Troponin I               Uric Acid       7.0         WBC   9.74                                03/11/22  1708   03/11/22  1627   03/11/22  1626        Procalcitonin     0.06  Comment: A concentration < 0.25 ng/mL represents a low risk of bacterial   infection.  Procalcitonin may not be accurate among patients with localized   infection, recent trauma or major surgery, immunosuppressed state,   invasive fungal infection, renal dysfunction. Decisions regarding   initiation or continuation of antibiotic therapy should not be  based   solely on procalcitonin levels.         Albumin     3.7       Alkaline Phosphatase     91       ALT     26       Anion Gap     9       AST     27       Baso #     0.05       Basophil %     0.5       BILIRUBIN TOTAL     0.6  Comment: For infants and newborns, interpretation of results should be based  on gestational age, weight and in agreement with clinical  observations.    Premature Infant recommended reference ranges:  Up to 24 hours.............<8.0 mg/dL  Up to 48 hours............<12.0 mg/dL  3-5 days..................<15.0 mg/dL  6-29 days.................<15.0 mg/dL         Blood Culture, Routine   No Growth to date  [P]            No Growth to date  [P]         BNP     11  Comment: Values of less than 100 pg/ml are consistent with non-CHF populations.       BUN     17       Calcium     9.6       Chloride     96       CO2     32       Creatinine     1.8       CRP     10.3       Differential Method     Automated       eGFR if      43       eGFR if non      37  Comment: Calculation used to obtain the estimated glomerular filtration  rate (eGFR) is the CKD-EPI equation.          Eos #     0.4       Eosinophil %     3.9       Glucose     170       Gran # (ANC)     7.5       Gran %     78.2       Hematocrit     38.4       Hemoglobin     12.4       Immature Grans (Abs)     0.08  Comment: Mild elevation in immature granulocytes is non specific and   can be seen in a variety of conditions including stress response,   acute inflammation, trauma and pregnancy. Correlation with other   laboratory and clinical findings is essential.         Immature Granulocytes     0.8       Lactate, Zaid     2.2  Comment: Falsely low lactic acid results can be found in samples   containing >=13.0 mg/dL total bilirubin and/or >=3.5 mg/dL   direct bilirubin.         Lymph #     1.2       Lymph %     12.3       MCH     30.5       MCHC     32.3       MCV     94       Mono #     0.4       Mono %      4.3       MPV     10.1       nRBC     0       Platelets     188       POCT Glucose           Potassium     4.2       PROTEIN TOTAL     7.7        Acceptable           RBC     4.07       RDW     15.1       SARS-CoV-2 RNA, Amplification, Qual           Sed Rate     38       Sodium     137       Troponin I 0.009  Comment: The reference interval for Troponin I represents the 99th percentile   cutoff   for our facility and is consistent with 3rd generation assay   performance.             Uric Acid     6.9       WBC     9.54                [P] - Preliminary Result               Significant Imaging: I have reviewed all pertinent imaging results/findings within the past 24 hours.      Assessment/Plan:      * Cellulitis of left knee  - Failed outpatient clindamycin.  - Will obtain further imaging to r/o joint involvement.   - Continue empiric IV abx.  - Analgesics as needed.   03/12:  --CT shows Infected bursa superficial to the patella measuring 4.8 x 1.3 x 7 cm.  No fluid in the joint capsule can be identified.    --Ortho surgery consulted  --continue IV ancef  --plans for fluid aspiration and cx    Morbid obesity with alveolar hypoventilation  --BMI 36 with DM, CAD, and combined systolic and diastolic heart failure  --BRADLEY  --CPAP q HS      Primary hypertension  - Continue home antihypertensives.     Chronic combined systolic and diastolic congestive heart failure  Patient is identified as having Combined Systolic and Diastolic heart failure that is Chronic. CHF is currently controlled. Latest ECHO performed and demonstrates- Results for orders placed during the hospital encounter of 03/20/20    Echo Color Flow Doppler? Yes; Bubble Contrast? No    Interpretation Summary  · Mild concentric left ventricular hypertrophy.  · Mildly decreased left ventricular systolic function. The estimated ejection fraction is 45%.  · Grade I (mild) left ventricular diastolic dysfunction consistent with impaired relaxation.  ·  Low normal right ventricular systolic function.  · Moderate mitral regurgitation.  · Normal central venous pressure (3 mmHg).  · The estimated PA systolic pressure is 23 mmHg.  Continue Beta Blocker and ACE/ARB and torsemide, and monitor clinical status closely. Monitor on telemetry. Patient is off CHF pathway.  Monitor strict Is&Os and daily weights.  Place on fluid restriction of 1.5 L. Continue to stress to patient importance of self efficacy and  on diet for CHF. Last BNP reviewed- and noted below   Recent Labs   Lab 03/11/22  1626   BNP 11       Coronary artery disease of native artery of native heart with stable angina pectoris  - Stable, no angina. Continue medical management.     Chronic kidney disease, stage 3  - Creatinine stable at baseline. Avoid nephrotoxic agents. Follow labs.     Type 2 diabetes mellitus, without long-term current use of insulin  - Hold metformin and Amaryl during hospital stay.   - Accuchecks with low dose SSI.  - Diabetic diet.  - Recent HbA1c 6.8.      VTE Risk Mitigation (From admission, onward)         Ordered     heparin (porcine) injection 5,000 Units  Every 8 hours         03/12/22 0235     IP VTE HIGH RISK PATIENT  Once         03/11/22 2048     Place sequential compression device  Until discontinued         03/11/22 2048                Discharge Planning   ANTHONY:      Code Status: Full Code   Is the patient medically ready for discharge?:     Reason for patient still in hospital (select all that apply): Patient trending condition, Treatment and Consult recommendations                     UJRGEN Strange  Department of Hospital Medicine   [facility] - Med Surg

## 2022-03-13 VITALS
TEMPERATURE: 98 F | RESPIRATION RATE: 18 BRPM | HEIGHT: 67 IN | DIASTOLIC BLOOD PRESSURE: 59 MMHG | SYSTOLIC BLOOD PRESSURE: 115 MMHG | BODY MASS INDEX: 34.95 KG/M2 | HEART RATE: 93 BPM | WEIGHT: 222.69 LBS | OXYGEN SATURATION: 95 %

## 2022-03-13 LAB
BASOPHILS # BLD AUTO: 0.06 K/UL (ref 0–0.2)
BASOPHILS NFR BLD: 0.6 % (ref 0–1.9)
CREAT SERPL-MCNC: 1.7 MG/DL (ref 0.5–1.4)
DIFFERENTIAL METHOD: ABNORMAL
EOSINOPHIL # BLD AUTO: 0.4 K/UL (ref 0–0.5)
EOSINOPHIL NFR BLD: 4.3 % (ref 0–8)
ERYTHROCYTE [DISTWIDTH] IN BLOOD BY AUTOMATED COUNT: 15 % (ref 11.5–14.5)
EST. GFR  (AFRICAN AMERICAN): 46 ML/MIN/1.73 M^2
EST. GFR  (NON AFRICAN AMERICAN): 40 ML/MIN/1.73 M^2
GRAM STN SPEC: NORMAL
GRAM STN SPEC: NORMAL
HCT VFR BLD AUTO: 40 % (ref 40–54)
HGB BLD-MCNC: 13 G/DL (ref 14–18)
IMM GRANULOCYTES # BLD AUTO: 0.08 K/UL (ref 0–0.04)
IMM GRANULOCYTES NFR BLD AUTO: 0.8 % (ref 0–0.5)
LYMPHOCYTES # BLD AUTO: 1.6 K/UL (ref 1–4.8)
LYMPHOCYTES NFR BLD: 16.3 % (ref 18–48)
MAGNESIUM SERPL-MCNC: 1.3 MG/DL (ref 1.6–2.6)
MCH RBC QN AUTO: 30.7 PG (ref 27–31)
MCHC RBC AUTO-ENTMCNC: 32.5 G/DL (ref 32–36)
MCV RBC AUTO: 95 FL (ref 82–98)
MONOCYTES # BLD AUTO: 0.5 K/UL (ref 0.3–1)
MONOCYTES NFR BLD: 4.9 % (ref 4–15)
NEUTROPHILS # BLD AUTO: 7.1 K/UL (ref 1.8–7.7)
NEUTROPHILS NFR BLD: 73.1 % (ref 38–73)
NRBC BLD-RTO: 0 /100 WBC
PLATELET # BLD AUTO: 204 K/UL (ref 150–450)
PMV BLD AUTO: 10.8 FL (ref 9.2–12.9)
POCT GLUCOSE: 143 MG/DL (ref 70–110)
RBC # BLD AUTO: 4.23 M/UL (ref 4.6–6.2)
VANCOMYCIN SERPL-MCNC: 21.9 UG/ML
WBC # BLD AUTO: 9.73 K/UL (ref 3.9–12.7)

## 2022-03-13 PROCEDURE — 82565 ASSAY OF CREATININE: CPT | Performed by: FAMILY MEDICINE

## 2022-03-13 PROCEDURE — 80202 ASSAY OF VANCOMYCIN: CPT | Performed by: FAMILY MEDICINE

## 2022-03-13 PROCEDURE — 36415 COLL VENOUS BLD VENIPUNCTURE: CPT | Performed by: FAMILY MEDICINE

## 2022-03-13 PROCEDURE — 85025 COMPLETE CBC W/AUTO DIFF WBC: CPT | Performed by: NURSE PRACTITIONER

## 2022-03-13 PROCEDURE — 83735 ASSAY OF MAGNESIUM: CPT | Performed by: NURSE PRACTITIONER

## 2022-03-13 PROCEDURE — G0378 HOSPITAL OBSERVATION PER HR: HCPCS

## 2022-03-13 PROCEDURE — 25000003 PHARM REV CODE 250: Performed by: NURSE PRACTITIONER

## 2022-03-13 RX ORDER — DOXYCYCLINE 100 MG/1
100 CAPSULE ORAL 2 TIMES DAILY
Qty: 28 CAPSULE | Refills: 0 | Status: SHIPPED | OUTPATIENT
Start: 2022-03-13 | End: 2022-03-27

## 2022-03-13 RX ORDER — POTASSIUM CHLORIDE 20 MEQ/1
60 TABLET, EXTENDED RELEASE ORAL 2 TIMES DAILY
Status: DISCONTINUED | OUTPATIENT
Start: 2022-03-13 | End: 2022-03-13 | Stop reason: HOSPADM

## 2022-03-13 RX ORDER — AMOXICILLIN AND CLAVULANATE POTASSIUM 875; 125 MG/1; MG/1
1 TABLET, FILM COATED ORAL 2 TIMES DAILY
Qty: 28 TABLET | Refills: 0 | Status: SHIPPED | OUTPATIENT
Start: 2022-03-13 | End: 2022-03-13 | Stop reason: SDUPTHER

## 2022-03-13 RX ORDER — AMOXICILLIN AND CLAVULANATE POTASSIUM 875; 125 MG/1; MG/1
1 TABLET, FILM COATED ORAL 2 TIMES DAILY
Qty: 28 TABLET | Refills: 0 | Status: SHIPPED | OUTPATIENT
Start: 2022-03-13 | End: 2022-03-27

## 2022-03-13 RX ORDER — DOXYCYCLINE HYCLATE 100 MG
100 TABLET ORAL EVERY 12 HOURS
Status: DISCONTINUED | OUTPATIENT
Start: 2022-03-13 | End: 2022-03-13 | Stop reason: HOSPADM

## 2022-03-13 RX ORDER — TORSEMIDE 10 MG/1
40 TABLET ORAL 2 TIMES DAILY
Status: DISCONTINUED | OUTPATIENT
Start: 2022-03-13 | End: 2022-03-13 | Stop reason: HOSPADM

## 2022-03-13 RX ORDER — DOXYCYCLINE 100 MG/1
100 CAPSULE ORAL 2 TIMES DAILY
Qty: 28 CAPSULE | Refills: 0 | Status: SHIPPED | OUTPATIENT
Start: 2022-03-13 | End: 2022-03-13 | Stop reason: SDUPTHER

## 2022-03-13 RX ADMIN — ALLOPURINOL 300 MG: 300 TABLET ORAL at 08:03

## 2022-03-13 RX ADMIN — ATORVASTATIN CALCIUM 20 MG: 10 TABLET, FILM COATED ORAL at 08:03

## 2022-03-13 RX ADMIN — ASPIRIN 81 MG: 81 TABLET, COATED ORAL at 08:03

## 2022-03-13 RX ADMIN — LOSARTAN POTASSIUM 25 MG: 25 TABLET, FILM COATED ORAL at 08:03

## 2022-03-13 RX ADMIN — POTASSIUM CHLORIDE 60 MEQ: 1500 TABLET, EXTENDED RELEASE ORAL at 08:03

## 2022-03-13 RX ADMIN — Medication 400 MG: at 08:03

## 2022-03-13 RX ADMIN — METOPROLOL TARTRATE 25 MG: 25 TABLET, FILM COATED ORAL at 08:03

## 2022-03-13 RX ADMIN — TORSEMIDE 40 MG: 10 TABLET ORAL at 08:03

## 2022-03-13 NOTE — PLAN OF CARE
Problem: Adult Inpatient Plan of Care  Goal: Plan of Care Review  Outcome: Ongoing, Progressing     Problem: Adult Inpatient Plan of Care  Goal: Absence of Hospital-Acquired Illness or Injury  Outcome: Ongoing, Progressing     Problem: Diabetes Comorbidity  Goal: Blood Glucose Level Within Targeted Range  Outcome: Ongoing, Progressing     Problem: Fall Injury Risk  Goal: Absence of Fall and Fall-Related Injury  Outcome: Ongoing, Progressing

## 2022-03-13 NOTE — PROGRESS NOTES
Ortho Daily Progress Note    Santiago Khan is a 71 y.o. male admitted on 3/11/2022        Hospital Day: 2    CC: left knee pain     SUBJECTIVE:  The patient was seen and examined this morning at the bedside. Patient reports no acute issues overnight.  Patient reports that pain has improved overnight  _______________    OBJECTIVE:    Vital Signs (Most Recent)  Vitals:    03/13/22 0439   BP: 124/68   Pulse: 87   Resp: 18   Temp: 98.6 °F (37 °C)       Intake/Output Summary (Last 24 hours) at 3/13/2022 0750  Last data filed at 3/13/2022 0646  Gross per 24 hour   Intake 746.55 ml   Output 750 ml   Net -3.45 ml        Patient is resting comfortable in bed  AAOx3  left lower extremity : + erythema but no induration or drainage. + swelling of bursa  NVI Distally  Palpable distal pulses  Brisk cap refill      Labs:   Recent Lab Results       03/12/22 2158        POCT Glucose 158            Microbiology Results (last 7 days)     Procedure Component Value Units Date/Time    Blood Culture #1 **CANNOT BE ORDERED STAT** [[id number]] Collected: 03/11/22 1627    Order Status: Completed Specimen: Blood from Peripheral, Antecubital, Right Updated: 03/13/22 0613     Blood Culture, Routine No Growth to date      No Growth to date    Blood Culture #2 **CANNOT BE ORDERED STAT** [[id number]] Collected: 03/11/22 1627    Order Status: Completed Specimen: Blood from Peripheral, Antecubital, Right Updated: 03/13/22 0613     Blood Culture, Routine No Growth to date      No Growth to date    Gram stain [[id number]] Collected: 03/12/22 1654    Order Status: Sent Specimen: Joint Fluid from Knee, Left Updated: 03/13/22 0047    Culture, Anaerobe [[id number]] Collected: 03/12/22 1654    Order Status: Sent Specimen: Joint Fluid from Knee, Left Updated: 03/13/22 0047    Aerobic culture [[id number]] Collected: 03/12/22 1654    Order Status: Sent Specimen: Joint Fluid from Knee, Left Updated: 03/13/22 0047         Micro -  pending  _______________    ASSESSMENT:    1. Left knee prepatellar bursitis and left knee cellulitis-improved              _______________    PLAN:  Patient is progressing well since we adjusted antibiotics.  We are still awaiting cultures.  At this point I do believe it would be reasonable to discharge him on broad-spectrum p.o. antibiotics later today.  He may follow-up in Orthopedics or with his primary care physician.  We will sign off on this patient at this time.  Please feel free to contact us with any questions or concerns.       Rhina Bruner MD, FAAOS  Orthopaedic Surgeon

## 2022-03-13 NOTE — CONSULTS
Pharmacokinetic Assessment Follow Up: IV Vancomycin    Vancomycin serum concentration assessment(s):    The random level was drawn correctly and can be used to guide therapy at this time. The measurement is above the desired definitive target range of 15 to 20 mcg/mL.    Vancomycin Regimen Plan:    Therapy with Vancomycin complete and/or consult discontinued by provider.  Pharmacy will sign off, please re-consult as needed.    Drug levels (last 3 results):  Recent Labs   Lab Result Units 03/13/22  0946   Vancomycin, Random ug/mL 21.9     Please contact pharmacy at extension [phone] for questions regarding this assessment.    Thank you for the consult,   Amarilis Loera PharmD       Patient brief summary:  Santiago Khan is a 71 y.o. male initiated on antimicrobial therapy with IV Vancomycin for treatment of Septic bursitis, cellulitis of L knee    The patient's current regimen is pulse dosing (dosing by level) when random level is less than 20 mcg/mL.    Drug Allergies:   Review of patient's allergies indicates:   Allergen Reactions    Amitriptyline Rash    Celecoxib Rash       Actual Body Weight:   101 kg    Renal Function:   Estimated Creatinine Clearance: 45.8 mL/min (A) (based on SCr of 1.7 mg/dL (H)).,     Dialysis Method (if applicable):  N/A    CBC (last 72 hours):  Recent Labs   Lab Result Units 03/11/22  1626 03/11/22  2243 03/12/22  0530 03/13/22  0854   WBC K/uL 9.54  --  9.74 9.73   Hemoglobin g/dL 12.4*  --  12.7* 13.0*   Hemoglobin A1C %  --  7.7*  --   --    Hematocrit % 38.4*  --  39.2* 40.0   Platelets K/uL 188  --  201 204   Gran % % 78.2*  --  70.7 73.1*   Lymph % % 12.3*  --  17.8* 16.3*   Mono % % 4.3  --  5.1 4.9   Eosinophil % % 3.9  --  4.7 4.3   Basophil % % 0.5  --  0.8 0.6   Differential Method  Automated  --  Automated Automated       Metabolic Panel (last 72 hours):  Recent Labs   Lab Result Units 03/11/22  1626 03/12/22  0530 03/13/22  0853 03/13/22  0956   Sodium mmol/L 137 140   --   --    Potassium mmol/L 4.2 3.6  --   --    Chloride mmol/L 96 96  --   --    CO2 mmol/L 32* 31*  --   --    Glucose mg/dL 170* 108  --   --    BUN mg/dL 17 18  --   --    Creatinine mg/dL 1.8* 1.6* 1.7*  --    Albumin g/dL 3.7  --   --   --    Total Bilirubin mg/dL 0.6  --   --   --    Alkaline Phosphatase U/L 91  --   --   --    AST U/L 27  --   --   --    ALT U/L 26  --   --   --    Magnesium mg/dL  --   --   --  1.3*       Vancomycin Administrations:  vancomycin given in the last 96 hours                   vancomycin 2 g in dextrose 5 % 500 mL IVPB ()  Restarted 03/12/22 2308      Restarted  2214     2,000 mg New Bag  2141                Microbiologic Results:  Microbiology Results (last 7 days)     Procedure Component Value Units Date/Time    Gram stain [[id number]] Collected: 03/12/22 1654    Order Status: Completed Specimen: Joint Fluid from Knee, Left Updated: 03/13/22 0859     Gram Stain Result Rare WBC's      No organisms seen    Narrative:      Suprapatella bursa    Blood Culture #1 **CANNOT BE ORDERED STAT** [[id number]] Collected: 03/11/22 1627    Order Status: Completed Specimen: Blood from Peripheral, Antecubital, Right Updated: 03/13/22 0613     Blood Culture, Routine No Growth to date      No Growth to date    Blood Culture #2 **CANNOT BE ORDERED STAT** [[id number]] Collected: 03/11/22 1627    Order Status: Completed Specimen: Blood from Peripheral, Antecubital, Right Updated: 03/13/22 0613     Blood Culture, Routine No Growth to date      No Growth to date    Culture, Anaerobe [[id number]] Collected: 03/12/22 1654    Order Status: Sent Specimen: Joint Fluid from Knee, Left Updated: 03/13/22 0047    Aerobic culture [[id number]] Collected: 03/12/22 1654    Order Status: Sent Specimen: Joint Fluid from Knee, Left Updated: 03/13/22 0047        Thank you for allowing us to participate in this patient's care.     Amarilis Zenaida Loera 03/13/2022 10:47 AM

## 2022-03-14 ENCOUNTER — TELEPHONE (OUTPATIENT)
Dept: ORTHOPEDICS | Facility: CLINIC | Age: 72
End: 2022-03-14
Payer: MEDICARE

## 2022-03-14 ENCOUNTER — PATIENT MESSAGE (OUTPATIENT)
Dept: ORTHOPEDICS | Facility: CLINIC | Age: 72
End: 2022-03-14
Payer: MEDICARE

## 2022-03-14 ENCOUNTER — PES CALL (OUTPATIENT)
Dept: ADMINISTRATIVE | Facility: CLINIC | Age: 72
End: 2022-03-14
Payer: MEDICARE

## 2022-03-14 NOTE — TELEPHONE ENCOUNTER
----- Message from Angelita Luz RN sent at 3/13/2022 10:12 AM CDT -----  Regarding: Three day follow up from hospital  Tried to set up an appointment but was unable to see schedule. Can you call and set up an appointment

## 2022-03-14 NOTE — TELEPHONE ENCOUNTER
Spoke with patient and scheduled his ER follow up appointment. Patient verbalized understanding of appointment date, time and location.

## 2022-03-14 NOTE — TELEPHONE ENCOUNTER
Phoned patient to schedule his ER follow up appointment for his left knee. Left a message for patient to call back to schedule his appointment.

## 2022-03-16 LAB — BACTERIA SPEC AEROBE CULT: NO GROWTH

## 2022-03-17 LAB
BACTERIA BLD CULT: NORMAL
BACTERIA BLD CULT: NORMAL
BACTERIA SPEC ANAEROBE CULT: NORMAL

## 2022-03-18 ENCOUNTER — OFFICE VISIT (OUTPATIENT)
Dept: ORTHOPEDICS | Facility: CLINIC | Age: 72
End: 2022-03-18
Payer: MEDICARE

## 2022-03-18 VITALS
DIASTOLIC BLOOD PRESSURE: 70 MMHG | BODY MASS INDEX: 34.95 KG/M2 | HEIGHT: 67 IN | TEMPERATURE: 98 F | HEART RATE: 90 BPM | WEIGHT: 222.69 LBS | SYSTOLIC BLOOD PRESSURE: 105 MMHG

## 2022-03-18 DIAGNOSIS — M70.42 PREPATELLAR BURSITIS OF LEFT KNEE: Primary | ICD-10-CM

## 2022-03-18 PROCEDURE — 3051F PR MOST RECENT HEMOGLOBIN A1C LEVEL 7.0 - < 8.0%: ICD-10-PCS | Mod: CPTII,S$GLB,, | Performed by: PHYSICIAN ASSISTANT

## 2022-03-18 PROCEDURE — 3074F SYST BP LT 130 MM HG: CPT | Mod: CPTII,S$GLB,, | Performed by: PHYSICIAN ASSISTANT

## 2022-03-18 PROCEDURE — 3008F PR BODY MASS INDEX (BMI) DOCUMENTED: ICD-10-PCS | Mod: CPTII,S$GLB,, | Performed by: PHYSICIAN ASSISTANT

## 2022-03-18 PROCEDURE — 1100F PTFALLS ASSESS-DOCD GE2>/YR: CPT | Mod: CPTII,S$GLB,, | Performed by: PHYSICIAN ASSISTANT

## 2022-03-18 PROCEDURE — 3074F PR MOST RECENT SYSTOLIC BLOOD PRESSURE < 130 MM HG: ICD-10-PCS | Mod: CPTII,S$GLB,, | Performed by: PHYSICIAN ASSISTANT

## 2022-03-18 PROCEDURE — 1159F MED LIST DOCD IN RCRD: CPT | Mod: CPTII,S$GLB,, | Performed by: PHYSICIAN ASSISTANT

## 2022-03-18 PROCEDURE — 1160F PR REVIEW ALL MEDS BY PRESCRIBER/CLIN PHARMACIST DOCUMENTED: ICD-10-PCS | Mod: CPTII,S$GLB,, | Performed by: PHYSICIAN ASSISTANT

## 2022-03-18 PROCEDURE — 1159F PR MEDICATION LIST DOCUMENTED IN MEDICAL RECORD: ICD-10-PCS | Mod: CPTII,S$GLB,, | Performed by: PHYSICIAN ASSISTANT

## 2022-03-18 PROCEDURE — 4010F PR ACE/ARB THEARPY RXD/TAKEN: ICD-10-PCS | Mod: CPTII,S$GLB,, | Performed by: PHYSICIAN ASSISTANT

## 2022-03-18 PROCEDURE — 3288F FALL RISK ASSESSMENT DOCD: CPT | Mod: CPTII,S$GLB,, | Performed by: PHYSICIAN ASSISTANT

## 2022-03-18 PROCEDURE — 4010F ACE/ARB THERAPY RXD/TAKEN: CPT | Mod: CPTII,S$GLB,, | Performed by: PHYSICIAN ASSISTANT

## 2022-03-18 PROCEDURE — 1160F RVW MEDS BY RX/DR IN RCRD: CPT | Mod: CPTII,S$GLB,, | Performed by: PHYSICIAN ASSISTANT

## 2022-03-18 PROCEDURE — 99999 PR PBB SHADOW E&M-EST. PATIENT-LVL V: ICD-10-PCS | Mod: PBBFAC,,, | Performed by: PHYSICIAN ASSISTANT

## 2022-03-18 PROCEDURE — 99999 PR PBB SHADOW E&M-EST. PATIENT-LVL V: CPT | Mod: PBBFAC,,, | Performed by: PHYSICIAN ASSISTANT

## 2022-03-18 PROCEDURE — 3288F PR FALLS RISK ASSESSMENT DOCUMENTED: ICD-10-PCS | Mod: CPTII,S$GLB,, | Performed by: PHYSICIAN ASSISTANT

## 2022-03-18 PROCEDURE — 1100F PR PT FALLS ASSESS DOC 2+ FALLS/FALL W/INJURY/YR: ICD-10-PCS | Mod: CPTII,S$GLB,, | Performed by: PHYSICIAN ASSISTANT

## 2022-03-18 PROCEDURE — 99214 PR OFFICE/OUTPT VISIT, EST, LEVL IV, 30-39 MIN: ICD-10-PCS | Mod: S$GLB,,, | Performed by: PHYSICIAN ASSISTANT

## 2022-03-18 PROCEDURE — 3072F PR LOW RISK FOR RETINOPATHY: ICD-10-PCS | Mod: CPTII,S$GLB,, | Performed by: PHYSICIAN ASSISTANT

## 2022-03-18 PROCEDURE — 3078F DIAST BP <80 MM HG: CPT | Mod: CPTII,S$GLB,, | Performed by: PHYSICIAN ASSISTANT

## 2022-03-18 PROCEDURE — 3051F HG A1C>EQUAL 7.0%<8.0%: CPT | Mod: CPTII,S$GLB,, | Performed by: PHYSICIAN ASSISTANT

## 2022-03-18 PROCEDURE — 99214 OFFICE O/P EST MOD 30 MIN: CPT | Mod: S$GLB,,, | Performed by: PHYSICIAN ASSISTANT

## 2022-03-18 PROCEDURE — 1125F PR PAIN SEVERITY QUANTIFIED, PAIN PRESENT: ICD-10-PCS | Mod: CPTII,S$GLB,, | Performed by: PHYSICIAN ASSISTANT

## 2022-03-18 PROCEDURE — 1125F AMNT PAIN NOTED PAIN PRSNT: CPT | Mod: CPTII,S$GLB,, | Performed by: PHYSICIAN ASSISTANT

## 2022-03-18 PROCEDURE — 3008F BODY MASS INDEX DOCD: CPT | Mod: CPTII,S$GLB,, | Performed by: PHYSICIAN ASSISTANT

## 2022-03-18 PROCEDURE — 3072F LOW RISK FOR RETINOPATHY: CPT | Mod: CPTII,S$GLB,, | Performed by: PHYSICIAN ASSISTANT

## 2022-03-18 PROCEDURE — 3078F PR MOST RECENT DIASTOLIC BLOOD PRESSURE < 80 MM HG: ICD-10-PCS | Mod: CPTII,S$GLB,, | Performed by: PHYSICIAN ASSISTANT

## 2022-03-18 NOTE — PROGRESS NOTES
Subjective:      Patient ID: Santiago Khan is a 71 y.o. male.    Chief Complaint: Pain of the Left Knee      HPI: Santiago Khan is a 71-year-old male in clinic today for follow-up of left knee prepatellar bursitis.  Patient was seen by Dr. Bruner 6 days ago while he was in the hospital for this problem.  She aspirated the knee and fluid was sent for cultures which were negative, but patient had been previously treated with IV antibiotics, so this may have been a false negative.  Patient reports that knee pain is improving as well as swelling and redness.  Patient denies any systemic symptoms of infection.  Patient is taking Augmentin and doxycycline as prescribed at hospital discharge    Past Medical History:   Diagnosis Date    Chronic diastolic congestive heart failure 4/7/2020    Chronic kidney disease, stage 3     Chronic systolic congestive heart failure 7/9/2020    Chronic venous insufficiency     DDD (degenerative disc disease), lumbar     DM (diabetes mellitus) with complications     History of gout     Hyperlipidemia associated with type 2 diabetes mellitus     Hypertension associated with stage 3 chronic kidney disease due to type 2 diabetes mellitus     BRADLEY on CPAP     Prostate cancer     prostatectomy in 2010, rising PSA that started in 2021    Tobacco abuse        Current Outpatient Medications:     albuterol (PROVENTIL/VENTOLIN HFA) 90 mcg/actuation inhaler, Inhale 2 puffs into the lungs every 4 (four) hours as needed for Wheezing. Rescue, Disp: 8.5 g, Rfl: 3    allopurinoL (ZYLOPRIM) 300 MG tablet, TAKE 1 TABLET(300 MG) BY MOUTH EVERY DAY, Disp: 90 tablet, Rfl: 3    amoxicillin-clavulanate 875-125mg (AUGMENTIN) 875-125 mg per tablet, Take 1 tablet by mouth 2 (two) times daily. for 14 days, Disp: 28 tablet, Rfl: 0    aspirin (ECOTRIN) 81 MG EC tablet, Take 81 mg by mouth once daily., Disp: , Rfl:     baclofen (LIORESAL) 10 MG tablet, Take 1 tablet by mouth every evening.,  Disp: , Rfl: 2    bisoprolol (ZEBETA) 5 MG tablet, TAKE 1/2 TABLET BY MOUTH ONCE DAILY, Disp: 90 tablet, Rfl: 3    colchicine (COLCRYS) 0.6 mg tablet, TAKE 1 TABLET(0.6 MG) BY MOUTH DAILY AS NEEDED, Disp: 30 tablet, Rfl: 1    doxycycline (MONODOX) 100 MG capsule, Take 1 capsule (100 mg total) by mouth 2 (two) times daily. for 14 days, Disp: 28 capsule, Rfl: 0    fluticasone propionate (FLONASE) 50 mcg/actuation nasal spray, SHAKE LIQUID AND USE 2 SPRAYS(100 MCG) IN EACH NOSTRIL EVERY DAY, Disp: 16 g, Rfl: 11    glimepiride (AMARYL) 1 MG tablet, TAKE 1 TABLET BY MOUTH ONCE DAILY, Disp: 90 tablet, Rfl: 3    HYDROcodone-acetaminophen (NORCO) 7.5-325 mg per tablet, Take 1 tablet by mouth every 4 (four) hours as needed (for chronic pain)., Disp: , Rfl: 0    losartan (COZAAR) 25 MG tablet, Take 1 tablet (25 mg total) by mouth once daily., Disp: , Rfl:     magnesium oxide (MAG-OX) 400 mg (241.3 mg magnesium) tablet, TAKE 2 TABLETS(800 MG) BY MOUTH TWICE DAILY, Disp: 120 tablet, Rfl: 5    metFORMIN (GLUCOPHAGE) 500 MG tablet, Take 1 tablet (500 mg total) by mouth daily with breakfast., Disp: 90 tablet, Rfl: 3    metOLazone (ZAROXOLYN) 2.5 MG tablet, Take 1 tablet (2.5 mg total) by mouth once a week., Disp: 4 tablet, Rfl: 11    multivitamin-minerals-lutein Tab, Take 1 tablet by mouth Daily., Disp: , Rfl:     omeprazole (PRILOSEC) 40 MG capsule, TAKE ONE CAPSULE BY MOUTH EVERY DAY, Disp: 30 capsule, Rfl: 11    potassium chloride SA (K-DUR,KLOR-CON) 20 MEQ tablet, TAKE 3 TABLETS(60 MEQ) BY MOUTH TWICE DAILY, Disp: 720 tablet, Rfl: 2    simvastatin (ZOCOR) 40 MG tablet, TAKE 1 TABLET(40 MG) BY MOUTH EVERY EVENING, Disp: 90 tablet, Rfl: 3    torsemide (DEMADEX) 20 MG Tab, Take 1 tablet (20 mg total) by mouth once daily. (Patient taking differently: Take 40 mg by mouth 2 (two) times a day.), Disp: 30 tablet, Rfl: 11  Review of patient's allergies indicates:   Allergen Reactions    Amitriptyline Rash    Celecoxib  "Rash       /70 (BP Location: Left arm, Patient Position: Sitting, BP Method: Large (Automatic))   Pulse 90   Temp 98.4 °F (36.9 °C)   Ht 5' 7" (1.702 m)   Wt 101 kg (222 lb 10.6 oz)   BMI 34.87 kg/m²     ROS      Objective:    Ortho Exam   Left knee:  Moderate to severe edema of the prepatellar region  Mild TTP  ROM normal  No joint effusion noted  No fluctuance noted  Calf and compartments are soft and compressible  Motor exam normal  Sensation and pulses intact    GEN: Well developed, well nourished male. AAOX3. No acute distress.   Normocephalic, atraumatic.   NICO  Breathing unlabored.  Mood and affect appropriate.        Assessment:     Imaging:  No new imaging ordered today        1. Prepatellar bursitis of left knee          Plan:       Patient is improving with oral antibiotics.  I would like for him to continue with these until his course is complete and then return to clinic for further evaluation.  Patient was given strict return precautions for signs of worsening infection     Follow up in about 10 days (around 3/28/2022).          Patient note was created using MModal Dictation.  Any errors in syntax or even information may not have been identified and edited on initial review prior to signing this note.    "

## 2022-03-28 ENCOUNTER — OFFICE VISIT (OUTPATIENT)
Dept: ORTHOPEDICS | Facility: CLINIC | Age: 72
End: 2022-03-28
Payer: MEDICARE

## 2022-03-28 VITALS
DIASTOLIC BLOOD PRESSURE: 74 MMHG | SYSTOLIC BLOOD PRESSURE: 122 MMHG | BODY MASS INDEX: 34.95 KG/M2 | TEMPERATURE: 100 F | HEIGHT: 67 IN | HEART RATE: 87 BPM | WEIGHT: 222.69 LBS

## 2022-03-28 DIAGNOSIS — M70.42 PREPATELLAR BURSITIS OF LEFT KNEE: Primary | ICD-10-CM

## 2022-03-28 DIAGNOSIS — M65.331 TRIGGER FINGER, RIGHT MIDDLE FINGER: ICD-10-CM

## 2022-03-28 PROCEDURE — 3008F BODY MASS INDEX DOCD: CPT | Mod: CPTII,S$GLB,, | Performed by: STUDENT IN AN ORGANIZED HEALTH CARE EDUCATION/TRAINING PROGRAM

## 2022-03-28 PROCEDURE — 4010F ACE/ARB THERAPY RXD/TAKEN: CPT | Mod: CPTII,S$GLB,, | Performed by: STUDENT IN AN ORGANIZED HEALTH CARE EDUCATION/TRAINING PROGRAM

## 2022-03-28 PROCEDURE — 99999 PR PBB SHADOW E&M-EST. PATIENT-LVL V: CPT | Mod: PBBFAC,,, | Performed by: STUDENT IN AN ORGANIZED HEALTH CARE EDUCATION/TRAINING PROGRAM

## 2022-03-28 PROCEDURE — 3078F DIAST BP <80 MM HG: CPT | Mod: CPTII,S$GLB,, | Performed by: STUDENT IN AN ORGANIZED HEALTH CARE EDUCATION/TRAINING PROGRAM

## 2022-03-28 PROCEDURE — 1159F PR MEDICATION LIST DOCUMENTED IN MEDICAL RECORD: ICD-10-PCS | Mod: CPTII,S$GLB,, | Performed by: STUDENT IN AN ORGANIZED HEALTH CARE EDUCATION/TRAINING PROGRAM

## 2022-03-28 PROCEDURE — 4010F PR ACE/ARB THEARPY RXD/TAKEN: ICD-10-PCS | Mod: CPTII,S$GLB,, | Performed by: STUDENT IN AN ORGANIZED HEALTH CARE EDUCATION/TRAINING PROGRAM

## 2022-03-28 PROCEDURE — 3072F PR LOW RISK FOR RETINOPATHY: ICD-10-PCS | Mod: CPTII,S$GLB,, | Performed by: STUDENT IN AN ORGANIZED HEALTH CARE EDUCATION/TRAINING PROGRAM

## 2022-03-28 PROCEDURE — 3074F PR MOST RECENT SYSTOLIC BLOOD PRESSURE < 130 MM HG: ICD-10-PCS | Mod: CPTII,S$GLB,, | Performed by: STUDENT IN AN ORGANIZED HEALTH CARE EDUCATION/TRAINING PROGRAM

## 2022-03-28 PROCEDURE — 1125F PR PAIN SEVERITY QUANTIFIED, PAIN PRESENT: ICD-10-PCS | Mod: CPTII,S$GLB,, | Performed by: STUDENT IN AN ORGANIZED HEALTH CARE EDUCATION/TRAINING PROGRAM

## 2022-03-28 PROCEDURE — 99999 PR PBB SHADOW E&M-EST. PATIENT-LVL V: ICD-10-PCS | Mod: PBBFAC,,, | Performed by: STUDENT IN AN ORGANIZED HEALTH CARE EDUCATION/TRAINING PROGRAM

## 2022-03-28 PROCEDURE — 3051F HG A1C>EQUAL 7.0%<8.0%: CPT | Mod: CPTII,S$GLB,, | Performed by: STUDENT IN AN ORGANIZED HEALTH CARE EDUCATION/TRAINING PROGRAM

## 2022-03-28 PROCEDURE — 3288F PR FALLS RISK ASSESSMENT DOCUMENTED: ICD-10-PCS | Mod: CPTII,S$GLB,, | Performed by: STUDENT IN AN ORGANIZED HEALTH CARE EDUCATION/TRAINING PROGRAM

## 2022-03-28 PROCEDURE — 1101F PR PT FALLS ASSESS DOC 0-1 FALLS W/OUT INJ PAST YR: ICD-10-PCS | Mod: CPTII,S$GLB,, | Performed by: STUDENT IN AN ORGANIZED HEALTH CARE EDUCATION/TRAINING PROGRAM

## 2022-03-28 PROCEDURE — 3008F PR BODY MASS INDEX (BMI) DOCUMENTED: ICD-10-PCS | Mod: CPTII,S$GLB,, | Performed by: STUDENT IN AN ORGANIZED HEALTH CARE EDUCATION/TRAINING PROGRAM

## 2022-03-28 PROCEDURE — 3288F FALL RISK ASSESSMENT DOCD: CPT | Mod: CPTII,S$GLB,, | Performed by: STUDENT IN AN ORGANIZED HEALTH CARE EDUCATION/TRAINING PROGRAM

## 2022-03-28 PROCEDURE — 3072F LOW RISK FOR RETINOPATHY: CPT | Mod: CPTII,S$GLB,, | Performed by: STUDENT IN AN ORGANIZED HEALTH CARE EDUCATION/TRAINING PROGRAM

## 2022-03-28 PROCEDURE — 3078F PR MOST RECENT DIASTOLIC BLOOD PRESSURE < 80 MM HG: ICD-10-PCS | Mod: CPTII,S$GLB,, | Performed by: STUDENT IN AN ORGANIZED HEALTH CARE EDUCATION/TRAINING PROGRAM

## 2022-03-28 PROCEDURE — 99214 OFFICE O/P EST MOD 30 MIN: CPT | Mod: S$GLB,,, | Performed by: STUDENT IN AN ORGANIZED HEALTH CARE EDUCATION/TRAINING PROGRAM

## 2022-03-28 PROCEDURE — 1159F MED LIST DOCD IN RCRD: CPT | Mod: CPTII,S$GLB,, | Performed by: STUDENT IN AN ORGANIZED HEALTH CARE EDUCATION/TRAINING PROGRAM

## 2022-03-28 PROCEDURE — 3074F SYST BP LT 130 MM HG: CPT | Mod: CPTII,S$GLB,, | Performed by: STUDENT IN AN ORGANIZED HEALTH CARE EDUCATION/TRAINING PROGRAM

## 2022-03-28 PROCEDURE — 3051F PR MOST RECENT HEMOGLOBIN A1C LEVEL 7.0 - < 8.0%: ICD-10-PCS | Mod: CPTII,S$GLB,, | Performed by: STUDENT IN AN ORGANIZED HEALTH CARE EDUCATION/TRAINING PROGRAM

## 2022-03-28 PROCEDURE — 1101F PT FALLS ASSESS-DOCD LE1/YR: CPT | Mod: CPTII,S$GLB,, | Performed by: STUDENT IN AN ORGANIZED HEALTH CARE EDUCATION/TRAINING PROGRAM

## 2022-03-28 PROCEDURE — 99214 PR OFFICE/OUTPT VISIT, EST, LEVL IV, 30-39 MIN: ICD-10-PCS | Mod: S$GLB,,, | Performed by: STUDENT IN AN ORGANIZED HEALTH CARE EDUCATION/TRAINING PROGRAM

## 2022-03-28 PROCEDURE — 1125F AMNT PAIN NOTED PAIN PRSNT: CPT | Mod: CPTII,S$GLB,, | Performed by: STUDENT IN AN ORGANIZED HEALTH CARE EDUCATION/TRAINING PROGRAM

## 2022-03-28 NOTE — PROGRESS NOTES
Subjective:      Patient ID: Santiago Khan is a 71 y.o. male.    Chief Complaint: Pain of the Left Knee      HPI:  Patient is a 71-year-old male who is here to follow-up for left knee prepatellar bursitis.  Patient has complete his 10 day of oral antibiotics.  Patient had his prepatellar bursa aspirated in the emergency department by Dr. Jimenez.  Cultures have not revealed any growth.  The patient states that his pain is improved in range of motion of his knee is improved.  Patient is also complaining of right hand middle finger triggering.  He had a previous injection.  He is interested in injections in his right middle finger again.  Denies any fevers or chills or drainage from his knee.    Past Medical History:   Diagnosis Date    Chronic diastolic congestive heart failure 4/7/2020    Chronic kidney disease, stage 3     Chronic systolic congestive heart failure 7/9/2020    Chronic venous insufficiency     DDD (degenerative disc disease), lumbar     DM (diabetes mellitus) with complications     History of gout     Hyperlipidemia associated with type 2 diabetes mellitus     Hypertension associated with stage 3 chronic kidney disease due to type 2 diabetes mellitus     BRADLEY on CPAP     Prostate cancer     prostatectomy in 2010, rising PSA that started in 2021    Tobacco abuse        Current Outpatient Medications:     albuterol (PROVENTIL/VENTOLIN HFA) 90 mcg/actuation inhaler, Inhale 2 puffs into the lungs every 4 (four) hours as needed for Wheezing. Rescue, Disp: 8.5 g, Rfl: 3    allopurinoL (ZYLOPRIM) 300 MG tablet, TAKE 1 TABLET(300 MG) BY MOUTH EVERY DAY, Disp: 90 tablet, Rfl: 3    aspirin (ECOTRIN) 81 MG EC tablet, Take 81 mg by mouth once daily., Disp: , Rfl:     baclofen (LIORESAL) 10 MG tablet, Take 1 tablet by mouth every evening., Disp: , Rfl: 2    bisoprolol (ZEBETA) 5 MG tablet, TAKE 1/2 TABLET BY MOUTH ONCE DAILY, Disp: 90 tablet, Rfl: 3    colchicine (COLCRYS) 0.6 mg tablet,  "TAKE 1 TABLET(0.6 MG) BY MOUTH DAILY AS NEEDED, Disp: 30 tablet, Rfl: 1    fluticasone propionate (FLONASE) 50 mcg/actuation nasal spray, SHAKE LIQUID AND USE 2 SPRAYS(100 MCG) IN EACH NOSTRIL EVERY DAY, Disp: 16 g, Rfl: 11    glimepiride (AMARYL) 1 MG tablet, TAKE 1 TABLET BY MOUTH ONCE DAILY, Disp: 90 tablet, Rfl: 3    HYDROcodone-acetaminophen (NORCO) 7.5-325 mg per tablet, Take 1 tablet by mouth every 4 (four) hours as needed (for chronic pain)., Disp: , Rfl: 0    losartan (COZAAR) 25 MG tablet, Take 1 tablet (25 mg total) by mouth once daily., Disp: , Rfl:     magnesium oxide (MAG-OX) 400 mg (241.3 mg magnesium) tablet, TAKE 2 TABLETS(800 MG) BY MOUTH TWICE DAILY, Disp: 120 tablet, Rfl: 5    metFORMIN (GLUCOPHAGE) 500 MG tablet, Take 1 tablet (500 mg total) by mouth daily with breakfast., Disp: 90 tablet, Rfl: 3    metOLazone (ZAROXOLYN) 2.5 MG tablet, Take 1 tablet (2.5 mg total) by mouth once a week., Disp: 4 tablet, Rfl: 11    multivitamin-minerals-lutein Tab, Take 1 tablet by mouth Daily., Disp: , Rfl:     omeprazole (PRILOSEC) 40 MG capsule, TAKE ONE CAPSULE BY MOUTH EVERY DAY, Disp: 30 capsule, Rfl: 11    potassium chloride SA (K-DUR,KLOR-CON) 20 MEQ tablet, TAKE 3 TABLETS(60 MEQ) BY MOUTH TWICE DAILY, Disp: 720 tablet, Rfl: 2    simvastatin (ZOCOR) 40 MG tablet, TAKE 1 TABLET(40 MG) BY MOUTH EVERY EVENING, Disp: 90 tablet, Rfl: 3    torsemide (DEMADEX) 20 MG Tab, Take 1 tablet (20 mg total) by mouth once daily. (Patient taking differently: Take 40 mg by mouth 2 (two) times a day.), Disp: 30 tablet, Rfl: 11  Review of patient's allergies indicates:   Allergen Reactions    Amitriptyline Rash    Celecoxib Rash       /74 (BP Location: Left arm, Patient Position: Sitting, BP Method: Large (Automatic))   Pulse 87   Temp 99.5 °F (37.5 °C)   Ht 5' 7" (1.702 m)   Wt 101 kg (222 lb 10.6 oz)   BMI 34.87 kg/m²     Review of Systems   All other systems reviewed and are negative.      "   Objective:    Left Knee Exam     Range of Motion   Extension: abnormal   Flexion: abnormal     Other   Erythema: present  Sensation: normal  Swelling: mild    Comments:  Patient has mild erythema noted over the patella.  He does have swelling over his prepatellar bursa.  No pain with range of motion of his knee.  No pain with axial loading.  Patient is able to ambulate throughout clinic.  He does have dependent erythema which is consistent with this contralateral side.      Right Hand Exam     Tenderness   The patient is experiencing tenderness in the palmar area.    Muscle Strength   Wrist extension: 5/5   Wrist flexion: 5/5   : 5/5     Other   Erythema: absent  Scars: absent  Sensation: normal  Pulse: present    Comments:  Patient has triggering of his right middle finger.  Tenderness over A1 pulley.      Left Hand Exam   Left hand exam is normal.               GEN: Well developed, well nourished male. AAOX3. No acute distress.   Normocephalic, atraumatic.   NICO  Breathing unlabored.  Mood and affect normal.        Assessment:     Imaging:  No new imaging obtained at today's        1. Prepatellar bursitis of left knee    2. Trigger finger, right middle finger          Plan:       Patient is a 71-year-old male with left knee prepatellar bursitis.  He does not appear to have septic prepatellar bursitis at this point time.  He has completed his course of antibiotics.  We will follow him and see him back in 2 weeks for a wound check.  If he has worsening of his symptoms he was instructed to follow back up in the emergency department or clinic.  The patient may require a course of IV antibiotics if he fails the oral antibiotics.  The patient will also follow-up in 2 weeks for a trigger finger injection of his right hand middle finger.       Follow up in about 2 weeks (around 4/11/2022) for wound check left knee and trigger finger injection of right hand middle finger.        Zeeshan Montalvo MD  Orthopedic Surgeon      Patient note was created using MModal Dictation.  Any errors in syntax or even information may not have been identified and edited on initial review prior to signing this note.

## 2022-03-28 NOTE — PATIENT INSTRUCTIONS
Please follow-up in 2 weeks for a wound check.  If your symptoms worsen please return back to clinic or the emergency department sooner.  We will also address your right middle finger trigger finger at the next follow-up visit.

## 2022-04-11 ENCOUNTER — OFFICE VISIT (OUTPATIENT)
Dept: ORTHOPEDICS | Facility: CLINIC | Age: 72
End: 2022-04-11
Payer: MEDICARE

## 2022-04-11 VITALS
WEIGHT: 222.69 LBS | HEART RATE: 95 BPM | DIASTOLIC BLOOD PRESSURE: 73 MMHG | BODY MASS INDEX: 34.95 KG/M2 | SYSTOLIC BLOOD PRESSURE: 125 MMHG | HEIGHT: 67 IN

## 2022-04-11 DIAGNOSIS — M65.331 TRIGGER MIDDLE FINGER OF RIGHT HAND: Primary | ICD-10-CM

## 2022-04-11 DIAGNOSIS — M70.42 PREPATELLAR BURSITIS OF LEFT KNEE: ICD-10-CM

## 2022-04-11 PROCEDURE — 99214 PR OFFICE/OUTPT VISIT, EST, LEVL IV, 30-39 MIN: ICD-10-PCS | Mod: 25,S$GLB,, | Performed by: STUDENT IN AN ORGANIZED HEALTH CARE EDUCATION/TRAINING PROGRAM

## 2022-04-11 PROCEDURE — 3288F PR FALLS RISK ASSESSMENT DOCUMENTED: ICD-10-PCS | Mod: CPTII,S$GLB,, | Performed by: STUDENT IN AN ORGANIZED HEALTH CARE EDUCATION/TRAINING PROGRAM

## 2022-04-11 PROCEDURE — 99214 OFFICE O/P EST MOD 30 MIN: CPT | Mod: 25,S$GLB,, | Performed by: STUDENT IN AN ORGANIZED HEALTH CARE EDUCATION/TRAINING PROGRAM

## 2022-04-11 PROCEDURE — 1159F MED LIST DOCD IN RCRD: CPT | Mod: CPTII,S$GLB,, | Performed by: STUDENT IN AN ORGANIZED HEALTH CARE EDUCATION/TRAINING PROGRAM

## 2022-04-11 PROCEDURE — 1125F PR PAIN SEVERITY QUANTIFIED, PAIN PRESENT: ICD-10-PCS | Mod: CPTII,S$GLB,, | Performed by: STUDENT IN AN ORGANIZED HEALTH CARE EDUCATION/TRAINING PROGRAM

## 2022-04-11 PROCEDURE — 20550 TENDON SHEATH: ICD-10-PCS | Mod: RT,S$GLB,, | Performed by: STUDENT IN AN ORGANIZED HEALTH CARE EDUCATION/TRAINING PROGRAM

## 2022-04-11 PROCEDURE — 3078F DIAST BP <80 MM HG: CPT | Mod: CPTII,S$GLB,, | Performed by: STUDENT IN AN ORGANIZED HEALTH CARE EDUCATION/TRAINING PROGRAM

## 2022-04-11 PROCEDURE — 1100F PR PT FALLS ASSESS DOC 2+ FALLS/FALL W/INJURY/YR: ICD-10-PCS | Mod: CPTII,S$GLB,, | Performed by: STUDENT IN AN ORGANIZED HEALTH CARE EDUCATION/TRAINING PROGRAM

## 2022-04-11 PROCEDURE — 99999 PR PBB SHADOW E&M-EST. PATIENT-LVL III: CPT | Mod: PBBFAC,,, | Performed by: STUDENT IN AN ORGANIZED HEALTH CARE EDUCATION/TRAINING PROGRAM

## 2022-04-11 PROCEDURE — 1100F PTFALLS ASSESS-DOCD GE2>/YR: CPT | Mod: CPTII,S$GLB,, | Performed by: STUDENT IN AN ORGANIZED HEALTH CARE EDUCATION/TRAINING PROGRAM

## 2022-04-11 PROCEDURE — 3074F SYST BP LT 130 MM HG: CPT | Mod: CPTII,S$GLB,, | Performed by: STUDENT IN AN ORGANIZED HEALTH CARE EDUCATION/TRAINING PROGRAM

## 2022-04-11 PROCEDURE — 20550 NJX 1 TENDON SHEATH/LIGAMENT: CPT | Mod: RT,S$GLB,, | Performed by: STUDENT IN AN ORGANIZED HEALTH CARE EDUCATION/TRAINING PROGRAM

## 2022-04-11 PROCEDURE — 3051F PR MOST RECENT HEMOGLOBIN A1C LEVEL 7.0 - < 8.0%: ICD-10-PCS | Mod: CPTII,S$GLB,, | Performed by: STUDENT IN AN ORGANIZED HEALTH CARE EDUCATION/TRAINING PROGRAM

## 2022-04-11 PROCEDURE — 1125F AMNT PAIN NOTED PAIN PRSNT: CPT | Mod: CPTII,S$GLB,, | Performed by: STUDENT IN AN ORGANIZED HEALTH CARE EDUCATION/TRAINING PROGRAM

## 2022-04-11 PROCEDURE — 3008F BODY MASS INDEX DOCD: CPT | Mod: CPTII,S$GLB,, | Performed by: STUDENT IN AN ORGANIZED HEALTH CARE EDUCATION/TRAINING PROGRAM

## 2022-04-11 PROCEDURE — 1159F PR MEDICATION LIST DOCUMENTED IN MEDICAL RECORD: ICD-10-PCS | Mod: CPTII,S$GLB,, | Performed by: STUDENT IN AN ORGANIZED HEALTH CARE EDUCATION/TRAINING PROGRAM

## 2022-04-11 PROCEDURE — 99999 PR PBB SHADOW E&M-EST. PATIENT-LVL III: ICD-10-PCS | Mod: PBBFAC,,, | Performed by: STUDENT IN AN ORGANIZED HEALTH CARE EDUCATION/TRAINING PROGRAM

## 2022-04-11 PROCEDURE — 4010F ACE/ARB THERAPY RXD/TAKEN: CPT | Mod: CPTII,S$GLB,, | Performed by: STUDENT IN AN ORGANIZED HEALTH CARE EDUCATION/TRAINING PROGRAM

## 2022-04-11 PROCEDURE — 3072F PR LOW RISK FOR RETINOPATHY: ICD-10-PCS | Mod: CPTII,S$GLB,, | Performed by: STUDENT IN AN ORGANIZED HEALTH CARE EDUCATION/TRAINING PROGRAM

## 2022-04-11 PROCEDURE — 3074F PR MOST RECENT SYSTOLIC BLOOD PRESSURE < 130 MM HG: ICD-10-PCS | Mod: CPTII,S$GLB,, | Performed by: STUDENT IN AN ORGANIZED HEALTH CARE EDUCATION/TRAINING PROGRAM

## 2022-04-11 PROCEDURE — 3072F LOW RISK FOR RETINOPATHY: CPT | Mod: CPTII,S$GLB,, | Performed by: STUDENT IN AN ORGANIZED HEALTH CARE EDUCATION/TRAINING PROGRAM

## 2022-04-11 PROCEDURE — 3288F FALL RISK ASSESSMENT DOCD: CPT | Mod: CPTII,S$GLB,, | Performed by: STUDENT IN AN ORGANIZED HEALTH CARE EDUCATION/TRAINING PROGRAM

## 2022-04-11 PROCEDURE — 3008F PR BODY MASS INDEX (BMI) DOCUMENTED: ICD-10-PCS | Mod: CPTII,S$GLB,, | Performed by: STUDENT IN AN ORGANIZED HEALTH CARE EDUCATION/TRAINING PROGRAM

## 2022-04-11 PROCEDURE — 3051F HG A1C>EQUAL 7.0%<8.0%: CPT | Mod: CPTII,S$GLB,, | Performed by: STUDENT IN AN ORGANIZED HEALTH CARE EDUCATION/TRAINING PROGRAM

## 2022-04-11 PROCEDURE — 3078F PR MOST RECENT DIASTOLIC BLOOD PRESSURE < 80 MM HG: ICD-10-PCS | Mod: CPTII,S$GLB,, | Performed by: STUDENT IN AN ORGANIZED HEALTH CARE EDUCATION/TRAINING PROGRAM

## 2022-04-11 PROCEDURE — 4010F PR ACE/ARB THEARPY RXD/TAKEN: ICD-10-PCS | Mod: CPTII,S$GLB,, | Performed by: STUDENT IN AN ORGANIZED HEALTH CARE EDUCATION/TRAINING PROGRAM

## 2022-04-11 RX ORDER — TRIAMCINOLONE ACETONIDE 40 MG/ML
20 INJECTION, SUSPENSION INTRA-ARTICULAR; INTRAMUSCULAR
Status: DISCONTINUED | OUTPATIENT
Start: 2022-04-11 | End: 2022-04-11 | Stop reason: HOSPADM

## 2022-04-11 RX ADMIN — TRIAMCINOLONE ACETONIDE 20 MG: 40 INJECTION, SUSPENSION INTRA-ARTICULAR; INTRAMUSCULAR at 08:04

## 2022-04-11 NOTE — PROCEDURES
Tendon Sheath    Date/Time: 4/11/2022 8:40 AM  Performed by: Zeeshan Montalvo MD  Authorized by: Zeeshan Montalvo MD     Consent Done?:  Yes (Verbal)  Indications:  Pain  Site marked: the procedure site was marked    Timeout: prior to procedure the correct patient, procedure, and site was verified    Prep: patient was prepped and draped in usual sterile fashion      Local anesthesia used?: No    Location:  Long finger  Site:  L long flexor tendon sheath  Ultrasonic guidance for needle placement?: No    Needle size:  22 G  Approach:  Volar  Medications:  20 mg triamcinolone acetonide 40 mg/mL  Patient tolerance:  Patient tolerated the procedure well with no immediate complications    Additional Comments: Patient was prepped and draped in normal sterile fashion.  Time-out was performed.  0.5 cc of 1% Lidocaine and 0.5 cc of 20 mg triamcinolone injected into the A1 pulley of the right middle finger. Patient tolerated the procedure well.

## 2022-04-11 NOTE — PROGRESS NOTES
Subjective:      Patient ID: Santiago Khan is a 71 y.o. male.    Chief Complaint: Pain of the Left Knee and Pain of the Right Hand      HPI: Patient is a 71-year-old male who is here to follow-up for left knee prepatellar bursitis.  Patient has completed his 10 day of oral antibiotics.  Patient had his prepatellar bursa aspirated in the emergency department by Dr. Bruner.  Cultures have not revealed any growth.  The patient states that his pain is improved in range of motion of his knee is improved.  Patient is also complaining of right hand middle finger triggering.  He had a previous injection.  He is interested in injections in his right middle finger again.  Denies any fevers or chills or drainage from his knee.    Past Medical History:   Diagnosis Date    Chronic diastolic congestive heart failure 4/7/2020    Chronic kidney disease, stage 3     Chronic systolic congestive heart failure 7/9/2020    Chronic venous insufficiency     DDD (degenerative disc disease), lumbar     DM (diabetes mellitus) with complications     History of gout     Hyperlipidemia associated with type 2 diabetes mellitus     Hypertension associated with stage 3 chronic kidney disease due to type 2 diabetes mellitus     BRADLEY on CPAP     Prostate cancer     prostatectomy in 2010, rising PSA that started in 2021    Tobacco abuse        Current Outpatient Medications:     albuterol (PROVENTIL/VENTOLIN HFA) 90 mcg/actuation inhaler, Inhale 2 puffs into the lungs every 4 (four) hours as needed for Wheezing. Rescue, Disp: 8.5 g, Rfl: 3    allopurinoL (ZYLOPRIM) 300 MG tablet, TAKE 1 TABLET(300 MG) BY MOUTH EVERY DAY, Disp: 90 tablet, Rfl: 3    aspirin (ECOTRIN) 81 MG EC tablet, Take 81 mg by mouth once daily., Disp: , Rfl:     baclofen (LIORESAL) 10 MG tablet, Take 1 tablet by mouth every evening., Disp: , Rfl: 2    bisoprolol (ZEBETA) 5 MG tablet, TAKE 1/2 TABLET BY MOUTH ONCE DAILY, Disp: 90 tablet, Rfl: 3    colchicine  "(COLCRYS) 0.6 mg tablet, TAKE 1 TABLET(0.6 MG) BY MOUTH DAILY AS NEEDED, Disp: 30 tablet, Rfl: 1    fluticasone propionate (FLONASE) 50 mcg/actuation nasal spray, SHAKE LIQUID AND USE 2 SPRAYS(100 MCG) IN EACH NOSTRIL EVERY DAY, Disp: 16 g, Rfl: 11    glimepiride (AMARYL) 1 MG tablet, TAKE 1 TABLET BY MOUTH ONCE DAILY, Disp: 90 tablet, Rfl: 3    HYDROcodone-acetaminophen (NORCO) 7.5-325 mg per tablet, Take 1 tablet by mouth every 4 (four) hours as needed (for chronic pain)., Disp: , Rfl: 0    losartan (COZAAR) 25 MG tablet, Take 1 tablet (25 mg total) by mouth once daily., Disp: , Rfl:     magnesium oxide (MAG-OX) 400 mg (241.3 mg magnesium) tablet, TAKE 2 TABLETS(800 MG) BY MOUTH TWICE DAILY, Disp: 120 tablet, Rfl: 5    metFORMIN (GLUCOPHAGE) 500 MG tablet, Take 1 tablet (500 mg total) by mouth daily with breakfast., Disp: 90 tablet, Rfl: 3    metOLazone (ZAROXOLYN) 2.5 MG tablet, Take 1 tablet (2.5 mg total) by mouth once a week., Disp: 4 tablet, Rfl: 11    multivitamin-minerals-lutein Tab, Take 1 tablet by mouth Daily., Disp: , Rfl:     omeprazole (PRILOSEC) 40 MG capsule, TAKE ONE CAPSULE BY MOUTH EVERY DAY, Disp: 30 capsule, Rfl: 11    potassium chloride SA (K-DUR,KLOR-CON) 20 MEQ tablet, TAKE 3 TABLETS(60 MEQ) BY MOUTH TWICE DAILY, Disp: 720 tablet, Rfl: 2    simvastatin (ZOCOR) 40 MG tablet, TAKE 1 TABLET(40 MG) BY MOUTH EVERY EVENING, Disp: 90 tablet, Rfl: 3    torsemide (DEMADEX) 20 MG Tab, Take 1 tablet (20 mg total) by mouth once daily. (Patient taking differently: Take 40 mg by mouth 2 (two) times a day.), Disp: 30 tablet, Rfl: 11  Review of patient's allergies indicates:   Allergen Reactions    Amitriptyline Rash    Celecoxib Rash       /73 (BP Location: Right arm, Patient Position: Sitting, BP Method: Large (Automatic))   Pulse 95   Ht 5' 7" (1.702 m)   Wt 101 kg (222 lb 10.6 oz)   BMI 34.87 kg/m²     Review of Systems   Constitutional: Negative.   Respiratory: Negative.  "   Skin: Negative.    Musculoskeletal: Positive for joint pain.   Gastrointestinal: Negative.    Genitourinary: Negative.    Neurological: Negative.          Objective:    Left Knee Exam     Range of Motion   Extension: normal   Flexion: normal     Other   Erythema: absent  Sensation: normal  Pulse: present  Swelling: none  Effusion: no effusion present    Comments:  Erythema has significantly improved, no tenderness, no evidence of infection.       Right Hand Exam     Tenderness   The patient is experiencing tenderness in the palmar area.    Other   Sensation: normal  Pulse: present    Comments:  Tenderness over A1 pulley with active triggering on exam.                GEN: Well developed, well nourished male. AAOX3. No acute distress.   Normocephalic, atraumatic.   NICO  Breathing unlabored.  Mood and affect normal.        Assessment:     Imaging: None        1. Trigger middle finger of right hand    2. Prepatellar bursitis of left knee          Plan:       Patient is a 71-year-old male with left knee prepatellar bursitis.  He does not appear to have septic prepatellar bursitis at this point time.  He has completed his course of antibiotics.  If he has worsening of his symptoms he was instructed to follow back up in the emergency department or clinic.  The patient may require a course of IV antibiotics if he fails the oral antibiotics.      The patient underwent a trigger finger injection of his right hand middle finger. He tolerated this well. Please see procedure note. This is his second injection. I explained that if he fails this injection he may be warranted for a surgery. Patient voiced understanding of this.           Zeeshan Montalvo MD  Orthopedic Surgeon     Orders Placed This Encounter    Tendon Sheath     Follow up if symptoms worsen or fail to improve.          Patient note was created using MModal Dictation.  Any errors in syntax or even information may not have been identified and edited on initial  review prior to signing this note.

## 2022-04-14 ENCOUNTER — OFFICE VISIT (OUTPATIENT)
Dept: CARDIOLOGY | Facility: CLINIC | Age: 72
End: 2022-04-14
Payer: MEDICARE

## 2022-04-14 VITALS
HEART RATE: 96 BPM | OXYGEN SATURATION: 95 % | SYSTOLIC BLOOD PRESSURE: 116 MMHG | BODY MASS INDEX: 36.22 KG/M2 | WEIGHT: 231.25 LBS | DIASTOLIC BLOOD PRESSURE: 72 MMHG

## 2022-04-14 DIAGNOSIS — I42.8 NICM (NONISCHEMIC CARDIOMYOPATHY): ICD-10-CM

## 2022-04-14 DIAGNOSIS — R06.02 SOB (SHORTNESS OF BREATH): ICD-10-CM

## 2022-04-14 DIAGNOSIS — I25.118 CORONARY ARTERY DISEASE OF NATIVE ARTERY OF NATIVE HEART WITH STABLE ANGINA PECTORIS: Primary | Chronic | ICD-10-CM

## 2022-04-14 DIAGNOSIS — E11.69 HYPERLIPIDEMIA ASSOCIATED WITH TYPE 2 DIABETES MELLITUS: ICD-10-CM

## 2022-04-14 DIAGNOSIS — E78.5 HYPERLIPIDEMIA ASSOCIATED WITH TYPE 2 DIABETES MELLITUS: ICD-10-CM

## 2022-04-14 DIAGNOSIS — N18.30 HYPERTENSION ASSOCIATED WITH STAGE 3 CHRONIC KIDNEY DISEASE DUE TO TYPE 2 DIABETES MELLITUS: ICD-10-CM

## 2022-04-14 DIAGNOSIS — E11.22 HYPERTENSION ASSOCIATED WITH STAGE 3 CHRONIC KIDNEY DISEASE DUE TO TYPE 2 DIABETES MELLITUS: ICD-10-CM

## 2022-04-14 DIAGNOSIS — I12.9 HYPERTENSION ASSOCIATED WITH STAGE 3 CHRONIC KIDNEY DISEASE DUE TO TYPE 2 DIABETES MELLITUS: ICD-10-CM

## 2022-04-14 DIAGNOSIS — I73.9 PVD (PERIPHERAL VASCULAR DISEASE): ICD-10-CM

## 2022-04-14 PROCEDURE — 3078F DIAST BP <80 MM HG: CPT | Mod: CPTII,S$GLB,, | Performed by: INTERNAL MEDICINE

## 2022-04-14 PROCEDURE — 3051F HG A1C>EQUAL 7.0%<8.0%: CPT | Mod: CPTII,S$GLB,, | Performed by: INTERNAL MEDICINE

## 2022-04-14 PROCEDURE — 1159F MED LIST DOCD IN RCRD: CPT | Mod: CPTII,S$GLB,, | Performed by: INTERNAL MEDICINE

## 2022-04-14 PROCEDURE — 99499 RISK ADDL DX/OHS AUDIT: ICD-10-PCS | Mod: S$GLB,,, | Performed by: INTERNAL MEDICINE

## 2022-04-14 PROCEDURE — 99999 PR PBB SHADOW E&M-EST. PATIENT-LVL III: CPT | Mod: PBBFAC,,, | Performed by: INTERNAL MEDICINE

## 2022-04-14 PROCEDURE — 3074F PR MOST RECENT SYSTOLIC BLOOD PRESSURE < 130 MM HG: ICD-10-PCS | Mod: CPTII,S$GLB,, | Performed by: INTERNAL MEDICINE

## 2022-04-14 PROCEDURE — 1160F RVW MEDS BY RX/DR IN RCRD: CPT | Mod: CPTII,S$GLB,, | Performed by: INTERNAL MEDICINE

## 2022-04-14 PROCEDURE — 3074F SYST BP LT 130 MM HG: CPT | Mod: CPTII,S$GLB,, | Performed by: INTERNAL MEDICINE

## 2022-04-14 PROCEDURE — 3008F PR BODY MASS INDEX (BMI) DOCUMENTED: ICD-10-PCS | Mod: CPTII,S$GLB,, | Performed by: INTERNAL MEDICINE

## 2022-04-14 PROCEDURE — 3078F PR MOST RECENT DIASTOLIC BLOOD PRESSURE < 80 MM HG: ICD-10-PCS | Mod: CPTII,S$GLB,, | Performed by: INTERNAL MEDICINE

## 2022-04-14 PROCEDURE — 1160F PR REVIEW ALL MEDS BY PRESCRIBER/CLIN PHARMACIST DOCUMENTED: ICD-10-PCS | Mod: CPTII,S$GLB,, | Performed by: INTERNAL MEDICINE

## 2022-04-14 PROCEDURE — 3051F PR MOST RECENT HEMOGLOBIN A1C LEVEL 7.0 - < 8.0%: ICD-10-PCS | Mod: CPTII,S$GLB,, | Performed by: INTERNAL MEDICINE

## 2022-04-14 PROCEDURE — 99214 OFFICE O/P EST MOD 30 MIN: CPT | Mod: S$GLB,,, | Performed by: INTERNAL MEDICINE

## 2022-04-14 PROCEDURE — 1159F PR MEDICATION LIST DOCUMENTED IN MEDICAL RECORD: ICD-10-PCS | Mod: CPTII,S$GLB,, | Performed by: INTERNAL MEDICINE

## 2022-04-14 PROCEDURE — 3072F LOW RISK FOR RETINOPATHY: CPT | Mod: CPTII,S$GLB,, | Performed by: INTERNAL MEDICINE

## 2022-04-14 PROCEDURE — 4010F ACE/ARB THERAPY RXD/TAKEN: CPT | Mod: CPTII,S$GLB,, | Performed by: INTERNAL MEDICINE

## 2022-04-14 PROCEDURE — 99999 PR PBB SHADOW E&M-EST. PATIENT-LVL III: ICD-10-PCS | Mod: PBBFAC,,, | Performed by: INTERNAL MEDICINE

## 2022-04-14 PROCEDURE — 4010F PR ACE/ARB THEARPY RXD/TAKEN: ICD-10-PCS | Mod: CPTII,S$GLB,, | Performed by: INTERNAL MEDICINE

## 2022-04-14 PROCEDURE — 99214 PR OFFICE/OUTPT VISIT, EST, LEVL IV, 30-39 MIN: ICD-10-PCS | Mod: S$GLB,,, | Performed by: INTERNAL MEDICINE

## 2022-04-14 PROCEDURE — 3008F BODY MASS INDEX DOCD: CPT | Mod: CPTII,S$GLB,, | Performed by: INTERNAL MEDICINE

## 2022-04-14 PROCEDURE — 99499 UNLISTED E&M SERVICE: CPT | Mod: S$GLB,,, | Performed by: INTERNAL MEDICINE

## 2022-04-14 PROCEDURE — 3072F PR LOW RISK FOR RETINOPATHY: ICD-10-PCS | Mod: CPTII,S$GLB,, | Performed by: INTERNAL MEDICINE

## 2022-04-14 RX ORDER — ALBUTEROL SULFATE 90 UG/1
2 AEROSOL, METERED RESPIRATORY (INHALATION) EVERY 4 HOURS PRN
Qty: 8.5 G | Refills: 3 | Status: SHIPPED | OUTPATIENT
Start: 2022-04-14 | End: 2022-06-13

## 2022-04-14 NOTE — PROGRESS NOTES
Subjective:   Patient ID:  Santiago Khan is a 71 y.o. male who presents for follow up of No chief complaint on file.      68 yo male, came in for SOB  PMH CAD s/p LHC in  D1 70%, no PCi, CHFmrEF 45%, DM 4 yrs, life long smoker, quit in ,  HTN, HLD, CKD III, prostates Ca s/p TURP in 2011, no h/o chemo RX and XRT. Gout. No h/o stroke and heart attack. Social drinker.  Remote LHC showed miminal blockage by Dr. Mayra CHAPPELL,    admitted for [facility] due to chest tightness. Had low K and Mg. Troponin x2 negative and EKG showed NSR, RBBB, and LVH. Echo showed EF 45% global HK and moderate MR. Had K and Mg supplement.  States that gained weight for 30 pounds since 3/202 after held Metolazone. Even he was taking Bumex 1.5 mg bid. In  BNP 15 and Cr 1.4  Still has GOVEA. No orthopnea, sleeps with CPAP. No chest pain, faint  NUKE stress done in  showed inferior ischemia with scar. EF 45%  LHC done in  D1 70% no PCI. Normal filling pressure  No chest pain . Left radial access ok  SOB, weight gain  Sleeps on 1 pillow. No PND  Taking Bumex 2 mg bid and DIamox   Pt c/o weight gain 30 pounds after held his metolazone in the past 4 months  C/o abd distanesion SOB.     11/23/2021 visit  SOB for few months, positive orthopnea. Sleeps on CPAP. + leg swelling and left leg pain and redness  The last seen was   On Bumex 2 mg bid. Held metolazone. Quit smoking  H/o prostate cancer and PSA recurrently elevated. CXR in  negative     12/2021   Leg swelling and erythema. At last visit, D/c bumex and added torsemide 40 mg bid. Good urination. BNP negative and Cr up to 1.6. Decreased torsemide to 20 mg bid  Still leg swelling and SOB. Quit smoking 9 months ago.      visit  Resumed metolazone 3 times a week about 2 weeks ago and BMP done two days ago showed elevated Cr.   Felt neck tightness and improved breathing after the head tilts back and stretched up. Not f/u with pulm  yet  On compresion socks.   Still SOB. Serial BNPs wnl and h/o LHC showed normal filling pressure suggested CHF controlled    Interval history  Quit smoking 1 yr and on breathing rx  No chest pain dizziness and faint. BP controlled  BNP < 10. Cr 1.7    echo  · Mild concentric left ventricular hypertrophy.  · Mildly decreased left ventricular systolic function. The estimated ejection fraction is 45%.  · Grade I (mild) left ventricular diastolic dysfunction consistent with impaired relaxation.  · Low normal right ventricular systolic function.  · Moderate mitral regurgitation.  · Normal central venous pressure (3 mmHg).  · The estimated PA systolic pressure is 23 mmHg.                      Past Medical History:   Diagnosis Date    Chronic diastolic congestive heart failure 4/7/2020    Chronic kidney disease, stage 3     Chronic systolic congestive heart failure 7/9/2020    Chronic venous insufficiency     DDD (degenerative disc disease), lumbar     DM (diabetes mellitus) with complications     History of gout     Hyperlipidemia associated with type 2 diabetes mellitus     Hypertension associated with stage 3 chronic kidney disease due to type 2 diabetes mellitus     BRADLEY on CPAP     Prostate cancer     prostatectomy in 2010, rising PSA that started in 2021    Tobacco abuse        Past Surgical History:   Procedure Laterality Date    BICEPS TENDON REPAIR      COLONOSCOPY N/A 3/27/2019    Procedure: COLONOSCOPY;  Surgeon: James Roger MD;  Location: [facility] ENDO;  Service: Endoscopy;  Laterality: N/A;    HEMORRHOID SURGERY      INGUINAL HERNIA REPAIR Left     KNEE ARTHROSCOPY Right     LEFT HEART CATHETERIZATION Left 6/29/2020    Procedure: CATHETERIZATION, HEART, LEFT;  Surgeon: Cheli Wilson MD;  Location: [facility] CATH LAB;  Service: Cardiology;  Laterality: Left;    LUMBAR LAMINECTOMY      PROSTATECTOMY         Social History     Tobacco Use    Smoking status: Former Smoker     Packs/day:  0.00     Years: 50.00     Pack years: 0.00     Types: Cigarettes     Quit date: 3/1/2021     Years since quittin.1    Smokeless tobacco: Former User   Substance Use Topics    Alcohol use: Not Currently    Drug use: Never       Family History   Problem Relation Age of Onset    Prostate cancer Father     Coronary artery disease Father 90    Alzheimer's disease Father     Hyperlipidemia Mother          Review of Systems   Constitutional: Positive for malaise/fatigue. Negative for decreased appetite, diaphoresis, fever and night sweats.   HENT: Negative for nosebleeds.    Eyes: Negative for blurred vision and double vision.   Cardiovascular: Positive for dyspnea on exertion and leg swelling. Negative for chest pain, claudication, irregular heartbeat, near-syncope, palpitations, paroxysmal nocturnal dyspnea and syncope.   Respiratory: Negative for cough, shortness of breath, sleep disturbances due to breathing, snoring, sputum production and wheezing.    Endocrine: Negative for cold intolerance and polyuria.   Hematologic/Lymphatic: Does not bruise/bleed easily.   Skin: Negative for rash.   Musculoskeletal: Negative for back pain, falls, joint pain, joint swelling and neck pain.   Gastrointestinal: Negative for abdominal pain, heartburn, nausea and vomiting.   Genitourinary: Negative for dysuria, frequency and hematuria.   Neurological: Negative for difficulty with concentration, dizziness, focal weakness, headaches, light-headedness, numbness, seizures and weakness.   Psychiatric/Behavioral: Negative for depression, memory loss and substance abuse. The patient does not have insomnia.    Allergic/Immunologic: Negative for HIV exposure and hives.       Objective:   Physical Exam  HENT:      Head: Normocephalic.   Eyes:      Pupils: Pupils are equal, round, and reactive to light.   Neck:      Thyroid: No thyromegaly.      Vascular: Normal carotid pulses. No carotid bruit or JVD.   Cardiovascular:      Rate and  Rhythm: Normal rate and regular rhythm.  No extrasystoles are present.     Chest Wall: PMI is not displaced.      Pulses: Normal pulses.      Heart sounds: Normal heart sounds. No murmur heard.    No gallop. No S3 sounds.   Pulmonary:      Effort: No respiratory distress.      Breath sounds: Normal breath sounds. No stridor.   Abdominal:      General: Bowel sounds are normal.      Palpations: Abdomen is soft.      Tenderness: There is no abdominal tenderness. There is no rebound.   Musculoskeletal:         General: Swelling present. Normal range of motion.   Skin:     Findings: No rash.   Neurological:      Mental Status: He is alert and oriented to person, place, and time.   Psychiatric:         Behavior: Behavior normal.         Lab Results   Component Value Date    CHOL 135 07/30/2021    CHOL 148 02/01/2021    CHOL 138 10/08/2020     Lab Results   Component Value Date    HDL 44 07/30/2021    HDL 46 02/01/2021    HDL 44 10/08/2020     Lab Results   Component Value Date    LDLCALC 64.6 07/30/2021    LDLCALC 82.2 02/01/2021    LDLCALC 64.6 10/08/2020     Lab Results   Component Value Date    TRIG 132 07/30/2021    TRIG 99 02/01/2021    TRIG 147 10/08/2020     Lab Results   Component Value Date    CHOLHDL 32.6 07/30/2021    CHOLHDL 31.1 02/01/2021    CHOLHDL 31.9 10/08/2020       Chemistry        Component Value Date/Time     03/12/2022 0530    K 3.6 03/12/2022 0530    CL 96 03/12/2022 0530    CO2 31 (H) 03/12/2022 0530    BUN 18 03/12/2022 0530    CREATININE 1.7 (H) 03/13/2022 0853     03/12/2022 0530        Component Value Date/Time    CALCIUM 9.4 03/12/2022 0530    ALKPHOS 91 03/11/2022 1626    AST 27 03/11/2022 1626    ALT 26 03/11/2022 1626    BILITOT 0.6 03/11/2022 1626    ESTGFRAFRICA 46 (A) 03/13/2022 0853    EGFRNONAA 40 (A) 03/13/2022 0853          Lab Results   Component Value Date    HGBA1C 7.7 (H) 03/11/2022     Lab Results   Component Value Date    TSH 1.251 07/30/2021     Lab Results    Component Value Date    INR 1.0 06/26/2020    INR 1.0 03/10/2020     Lab Results   Component Value Date    WBC 9.73 03/13/2022    HGB 13.0 (L) 03/13/2022    HCT 40.0 03/13/2022    MCV 95 03/13/2022     03/13/2022     BMP  Sodium   Date Value Ref Range Status   03/12/2022 140 136 - 145 mmol/L Final     Potassium   Date Value Ref Range Status   03/12/2022 3.6 3.5 - 5.1 mmol/L Final     Chloride   Date Value Ref Range Status   03/12/2022 96 95 - 110 mmol/L Final     CO2   Date Value Ref Range Status   03/12/2022 31 (H) 23 - 29 mmol/L Final     BUN   Date Value Ref Range Status   03/12/2022 18 8 - 23 mg/dL Final     Creatinine   Date Value Ref Range Status   03/13/2022 1.7 (H) 0.5 - 1.4 mg/dL Final     Calcium   Date Value Ref Range Status   03/12/2022 9.4 8.7 - 10.5 mg/dL Final     Anion Gap   Date Value Ref Range Status   03/12/2022 13 8 - 16 mmol/L Final     eGFR if    Date Value Ref Range Status   03/13/2022 46 (A) >60 mL/min/1.73 m^2 Final     eGFR if non    Date Value Ref Range Status   03/13/2022 40 (A) >60 mL/min/1.73 m^2 Final     Comment:     Calculation used to obtain the estimated glomerular filtration  rate (eGFR) is the CKD-EPI equation.        BNP  @LABRCNTIP(BNP,BNPTRIAGEBLO)@  @LABRCNTIP(troponini)@  CrCl cannot be calculated (Patient's most recent lab result is older than the maximum 7 days allowed.).  No results found in the last 24 hours.  No results found in the last 24 hours.  No results found in the last 24 hours.    Assessment:      1. Coronary artery disease of native artery of native heart with stable angina pectoris    2. Hypertension associated with stage 3 chronic kidney disease due to type 2 diabetes mellitus    3. Hyperlipidemia associated with type 2 diabetes mellitus    4. NICM (nonischemic cardiomyopathy)    5. PVD (peripheral vascular disease)    6. SOB (shortness of breath)      CHFpEF compensated BNP wnl  PVD on compression sock  BP  controlled  Plan:     Continue compression sock  D/c metolazone  Continue Torsemide 40 mg bid  Continue ASA statin bisoprolol and losartan  SOB f/u with pulm   DM Rx per PCP  Counseled DASH  Check Lipid profile in 6 months  Recommend heart-healthy diet, weight control and regular exercise.  Mahsa. Risk modification.   I have reviewed all pertinent labs and cardiac studies independently. Plans and recommendations have been formulated under my direct supervision. All questions answered and patient voiced understanding.   If symptoms persist go to the ED  RTC in 6 months

## 2022-04-26 ENCOUNTER — PATIENT MESSAGE (OUTPATIENT)
Dept: NEPHROLOGY | Facility: CLINIC | Age: 72
End: 2022-04-26
Payer: MEDICARE

## 2022-04-29 ENCOUNTER — OFFICE VISIT (OUTPATIENT)
Dept: INTERNAL MEDICINE | Facility: CLINIC | Age: 72
End: 2022-04-29
Payer: MEDICARE

## 2022-04-29 ENCOUNTER — LAB VISIT (OUTPATIENT)
Dept: LAB | Facility: HOSPITAL | Age: 72
End: 2022-04-29
Attending: INTERNAL MEDICINE
Payer: MEDICARE

## 2022-04-29 ENCOUNTER — PATIENT MESSAGE (OUTPATIENT)
Dept: INTERNAL MEDICINE | Facility: CLINIC | Age: 72
End: 2022-04-29

## 2022-04-29 VITALS
OXYGEN SATURATION: 90 % | WEIGHT: 238.75 LBS | DIASTOLIC BLOOD PRESSURE: 66 MMHG | HEART RATE: 83 BPM | SYSTOLIC BLOOD PRESSURE: 110 MMHG | HEIGHT: 67 IN | BODY MASS INDEX: 37.47 KG/M2

## 2022-04-29 DIAGNOSIS — N18.30 HYPERTENSION ASSOCIATED WITH STAGE 3 CHRONIC KIDNEY DISEASE DUE TO TYPE 2 DIABETES MELLITUS: ICD-10-CM

## 2022-04-29 DIAGNOSIS — I50.42 CHRONIC COMBINED SYSTOLIC AND DIASTOLIC CONGESTIVE HEART FAILURE: Chronic | ICD-10-CM

## 2022-04-29 DIAGNOSIS — I50.32 CHRONIC DIASTOLIC CONGESTIVE HEART FAILURE: ICD-10-CM

## 2022-04-29 DIAGNOSIS — E11.69 HYPERLIPIDEMIA ASSOCIATED WITH TYPE 2 DIABETES MELLITUS: ICD-10-CM

## 2022-04-29 DIAGNOSIS — E11.22 HYPERTENSION ASSOCIATED WITH STAGE 3 CHRONIC KIDNEY DISEASE DUE TO TYPE 2 DIABETES MELLITUS: ICD-10-CM

## 2022-04-29 DIAGNOSIS — I73.9 PVD (PERIPHERAL VASCULAR DISEASE): ICD-10-CM

## 2022-04-29 DIAGNOSIS — I87.2 CHRONIC VENOUS INSUFFICIENCY: ICD-10-CM

## 2022-04-29 DIAGNOSIS — I42.8 NICM (NONISCHEMIC CARDIOMYOPATHY): ICD-10-CM

## 2022-04-29 DIAGNOSIS — Z87.39 HISTORY OF GOUT: ICD-10-CM

## 2022-04-29 DIAGNOSIS — I25.118 CORONARY ARTERY DISEASE OF NATIVE ARTERY OF NATIVE HEART WITH STABLE ANGINA PECTORIS: Chronic | ICD-10-CM

## 2022-04-29 DIAGNOSIS — G47.33 OSA (OBSTRUCTIVE SLEEP APNEA): ICD-10-CM

## 2022-04-29 DIAGNOSIS — I12.9 HYPERTENSION ASSOCIATED WITH STAGE 3 CHRONIC KIDNEY DISEASE DUE TO TYPE 2 DIABETES MELLITUS: ICD-10-CM

## 2022-04-29 DIAGNOSIS — E11.8 DM (DIABETES MELLITUS) WITH COMPLICATIONS: ICD-10-CM

## 2022-04-29 DIAGNOSIS — N18.32 STAGE 3B CHRONIC KIDNEY DISEASE: Chronic | ICD-10-CM

## 2022-04-29 DIAGNOSIS — E78.5 HYPERLIPIDEMIA ASSOCIATED WITH TYPE 2 DIABETES MELLITUS: ICD-10-CM

## 2022-04-29 DIAGNOSIS — Z00.00 ROUTINE GENERAL MEDICAL EXAMINATION AT A HEALTH CARE FACILITY: Primary | ICD-10-CM

## 2022-04-29 DIAGNOSIS — E66.01 CLASS 2 SEVERE OBESITY WITH SERIOUS COMORBIDITY AND BODY MASS INDEX (BMI) OF 37.0 TO 37.9 IN ADULT, UNSPECIFIED OBESITY TYPE: ICD-10-CM

## 2022-04-29 DIAGNOSIS — Z85.46 HISTORY OF PROSTATE CANCER: ICD-10-CM

## 2022-04-29 LAB
ALBUMIN SERPL BCP-MCNC: 4.1 G/DL (ref 3.5–5.2)
ALP SERPL-CCNC: 75 U/L (ref 55–135)
ALT SERPL W/O P-5'-P-CCNC: 25 U/L (ref 10–44)
ANION GAP SERPL CALC-SCNC: 11 MMOL/L (ref 8–16)
AST SERPL-CCNC: 26 U/L (ref 10–40)
BASOPHILS # BLD AUTO: 0.05 K/UL (ref 0–0.2)
BASOPHILS NFR BLD: 0.6 % (ref 0–1.9)
BILIRUB SERPL-MCNC: 0.6 MG/DL (ref 0.1–1)
BNP SERPL-MCNC: 22 PG/ML (ref 0–99)
BUN SERPL-MCNC: 15 MG/DL (ref 8–23)
CALCIUM SERPL-MCNC: 9.8 MG/DL (ref 8.7–10.5)
CHLORIDE SERPL-SCNC: 99 MMOL/L (ref 95–110)
CHOLEST SERPL-MCNC: 150 MG/DL (ref 120–199)
CHOLEST/HDLC SERPL: 3.1 {RATIO} (ref 2–5)
CO2 SERPL-SCNC: 31 MMOL/L (ref 23–29)
COMPLEXED PSA SERPL-MCNC: 1.7 NG/ML (ref 0–4)
CREAT SERPL-MCNC: 1.6 MG/DL (ref 0.5–1.4)
DIFFERENTIAL METHOD: ABNORMAL
EOSINOPHIL # BLD AUTO: 0.6 K/UL (ref 0–0.5)
EOSINOPHIL NFR BLD: 7 % (ref 0–8)
ERYTHROCYTE [DISTWIDTH] IN BLOOD BY AUTOMATED COUNT: 16.8 % (ref 11.5–14.5)
EST. GFR  (AFRICAN AMERICAN): 49.7 ML/MIN/1.73 M^2
EST. GFR  (NON AFRICAN AMERICAN): 43 ML/MIN/1.73 M^2
ESTIMATED AVG GLUCOSE: 137 MG/DL (ref 68–131)
GLUCOSE SERPL-MCNC: 152 MG/DL (ref 70–110)
HBA1C MFR BLD: 6.4 % (ref 4–5.6)
HCT VFR BLD AUTO: 38.9 % (ref 40–54)
HDLC SERPL-MCNC: 49 MG/DL (ref 40–75)
HDLC SERPL: 32.7 % (ref 20–50)
HGB BLD-MCNC: 12.1 G/DL (ref 14–18)
IMM GRANULOCYTES # BLD AUTO: 0.07 K/UL (ref 0–0.04)
IMM GRANULOCYTES NFR BLD AUTO: 0.8 % (ref 0–0.5)
LDLC SERPL CALC-MCNC: 74.4 MG/DL (ref 63–159)
LYMPHOCYTES # BLD AUTO: 1.9 K/UL (ref 1–4.8)
LYMPHOCYTES NFR BLD: 22.4 % (ref 18–48)
MCH RBC QN AUTO: 31.2 PG (ref 27–31)
MCHC RBC AUTO-ENTMCNC: 31.1 G/DL (ref 32–36)
MCV RBC AUTO: 100 FL (ref 82–98)
MONOCYTES # BLD AUTO: 0.5 K/UL (ref 0.3–1)
MONOCYTES NFR BLD: 6.1 % (ref 4–15)
NEUTROPHILS # BLD AUTO: 5.5 K/UL (ref 1.8–7.7)
NEUTROPHILS NFR BLD: 63.1 % (ref 38–73)
NONHDLC SERPL-MCNC: 101 MG/DL
NRBC BLD-RTO: 0 /100 WBC
PLATELET # BLD AUTO: 170 K/UL (ref 150–450)
PMV BLD AUTO: 11 FL (ref 9.2–12.9)
POTASSIUM SERPL-SCNC: 5.1 MMOL/L (ref 3.5–5.1)
PROT SERPL-MCNC: 6.9 G/DL (ref 6–8.4)
RBC # BLD AUTO: 3.88 M/UL (ref 4.6–6.2)
SODIUM SERPL-SCNC: 141 MMOL/L (ref 136–145)
TRIGL SERPL-MCNC: 133 MG/DL (ref 30–150)
TSH SERPL DL<=0.005 MIU/L-ACNC: 1.46 UIU/ML (ref 0.4–4)
WBC # BLD AUTO: 8.63 K/UL (ref 3.9–12.7)

## 2022-04-29 PROCEDURE — 99999 PR PBB SHADOW E&M-EST. PATIENT-LVL V: CPT | Mod: PBBFAC,,, | Performed by: INTERNAL MEDICINE

## 2022-04-29 PROCEDURE — 1159F PR MEDICATION LIST DOCUMENTED IN MEDICAL RECORD: ICD-10-PCS | Mod: CPTII,S$GLB,, | Performed by: INTERNAL MEDICINE

## 2022-04-29 PROCEDURE — 36415 COLL VENOUS BLD VENIPUNCTURE: CPT | Mod: PO | Performed by: INTERNAL MEDICINE

## 2022-04-29 PROCEDURE — 3008F PR BODY MASS INDEX (BMI) DOCUMENTED: ICD-10-PCS | Mod: CPTII,S$GLB,, | Performed by: INTERNAL MEDICINE

## 2022-04-29 PROCEDURE — 85025 COMPLETE CBC W/AUTO DIFF WBC: CPT | Performed by: INTERNAL MEDICINE

## 2022-04-29 PROCEDURE — 3074F SYST BP LT 130 MM HG: CPT | Mod: CPTII,S$GLB,, | Performed by: INTERNAL MEDICINE

## 2022-04-29 PROCEDURE — 3078F PR MOST RECENT DIASTOLIC BLOOD PRESSURE < 80 MM HG: ICD-10-PCS | Mod: CPTII,S$GLB,, | Performed by: INTERNAL MEDICINE

## 2022-04-29 PROCEDURE — 3072F PR LOW RISK FOR RETINOPATHY: ICD-10-PCS | Mod: CPTII,S$GLB,, | Performed by: INTERNAL MEDICINE

## 2022-04-29 PROCEDURE — 3288F PR FALLS RISK ASSESSMENT DOCUMENTED: ICD-10-PCS | Mod: CPTII,S$GLB,, | Performed by: INTERNAL MEDICINE

## 2022-04-29 PROCEDURE — 83880 ASSAY OF NATRIURETIC PEPTIDE: CPT | Performed by: INTERNAL MEDICINE

## 2022-04-29 PROCEDURE — 1101F PR PT FALLS ASSESS DOC 0-1 FALLS W/OUT INJ PAST YR: ICD-10-PCS | Mod: CPTII,S$GLB,, | Performed by: INTERNAL MEDICINE

## 2022-04-29 PROCEDURE — 4010F ACE/ARB THERAPY RXD/TAKEN: CPT | Mod: CPTII,S$GLB,, | Performed by: INTERNAL MEDICINE

## 2022-04-29 PROCEDURE — 84443 ASSAY THYROID STIM HORMONE: CPT | Performed by: INTERNAL MEDICINE

## 2022-04-29 PROCEDURE — 80053 COMPREHEN METABOLIC PANEL: CPT | Performed by: INTERNAL MEDICINE

## 2022-04-29 PROCEDURE — 1101F PT FALLS ASSESS-DOCD LE1/YR: CPT | Mod: CPTII,S$GLB,, | Performed by: INTERNAL MEDICINE

## 2022-04-29 PROCEDURE — 3051F PR MOST RECENT HEMOGLOBIN A1C LEVEL 7.0 - < 8.0%: ICD-10-PCS | Mod: CPTII,S$GLB,, | Performed by: INTERNAL MEDICINE

## 2022-04-29 PROCEDURE — 3008F BODY MASS INDEX DOCD: CPT | Mod: CPTII,S$GLB,, | Performed by: INTERNAL MEDICINE

## 2022-04-29 PROCEDURE — 3078F DIAST BP <80 MM HG: CPT | Mod: CPTII,S$GLB,, | Performed by: INTERNAL MEDICINE

## 2022-04-29 PROCEDURE — 3051F HG A1C>EQUAL 7.0%<8.0%: CPT | Mod: CPTII,S$GLB,, | Performed by: INTERNAL MEDICINE

## 2022-04-29 PROCEDURE — 1126F AMNT PAIN NOTED NONE PRSNT: CPT | Mod: CPTII,S$GLB,, | Performed by: INTERNAL MEDICINE

## 2022-04-29 PROCEDURE — 3288F FALL RISK ASSESSMENT DOCD: CPT | Mod: CPTII,S$GLB,, | Performed by: INTERNAL MEDICINE

## 2022-04-29 PROCEDURE — 1159F MED LIST DOCD IN RCRD: CPT | Mod: CPTII,S$GLB,, | Performed by: INTERNAL MEDICINE

## 2022-04-29 PROCEDURE — 83036 HEMOGLOBIN GLYCOSYLATED A1C: CPT | Performed by: INTERNAL MEDICINE

## 2022-04-29 PROCEDURE — 99214 OFFICE O/P EST MOD 30 MIN: CPT | Mod: S$GLB,,, | Performed by: INTERNAL MEDICINE

## 2022-04-29 PROCEDURE — 4010F PR ACE/ARB THEARPY RXD/TAKEN: ICD-10-PCS | Mod: CPTII,S$GLB,, | Performed by: INTERNAL MEDICINE

## 2022-04-29 PROCEDURE — 80061 LIPID PANEL: CPT | Performed by: INTERNAL MEDICINE

## 2022-04-29 PROCEDURE — 3074F PR MOST RECENT SYSTOLIC BLOOD PRESSURE < 130 MM HG: ICD-10-PCS | Mod: CPTII,S$GLB,, | Performed by: INTERNAL MEDICINE

## 2022-04-29 PROCEDURE — 99214 PR OFFICE/OUTPT VISIT, EST, LEVL IV, 30-39 MIN: ICD-10-PCS | Mod: S$GLB,,, | Performed by: INTERNAL MEDICINE

## 2022-04-29 PROCEDURE — 99999 PR PBB SHADOW E&M-EST. PATIENT-LVL V: ICD-10-PCS | Mod: PBBFAC,,, | Performed by: INTERNAL MEDICINE

## 2022-04-29 PROCEDURE — 84153 ASSAY OF PSA TOTAL: CPT | Performed by: INTERNAL MEDICINE

## 2022-04-29 PROCEDURE — 1126F PR PAIN SEVERITY QUANTIFIED, NO PAIN PRESENT: ICD-10-PCS | Mod: CPTII,S$GLB,, | Performed by: INTERNAL MEDICINE

## 2022-04-29 PROCEDURE — 3072F LOW RISK FOR RETINOPATHY: CPT | Mod: CPTII,S$GLB,, | Performed by: INTERNAL MEDICINE

## 2022-04-29 NOTE — PROGRESS NOTES
HPI:  Patient is a 71-year-old man who comes today for follow-up of diabetes, congestive heart failure, hypertension, lipids, sleep apnea, chronic kidney disease and for his annual physical.  Patient denies any chest pain or shortness of breath there he does not check his blood sugar at home.  He denies any hypoglycemia.  He states his blood pressures been controlled.  Today the patient is very somnolent and sleepy.  He states he did not sleep well last night.  He admits he has not been wearing his CPAP mask for quite some time.  He states the mask does not fit well.  It has been a long time since she had a sleep study      Current MEDS: medcard review, verified and update  Allergies: Per the electronic medical record    Past Medical History:   Diagnosis Date    Chronic diastolic congestive heart failure 4/7/2020    Chronic kidney disease, stage 3     Chronic systolic congestive heart failure 7/9/2020    Chronic venous insufficiency     DDD (degenerative disc disease), lumbar     DM (diabetes mellitus) with complications     History of gout     Hyperlipidemia associated with type 2 diabetes mellitus     Hypertension associated with stage 3 chronic kidney disease due to type 2 diabetes mellitus     BRADLEY on CPAP     Prostate cancer     prostatectomy in 2010, rising PSA that started in 2021    Tobacco abuse        Past Surgical History:   Procedure Laterality Date    BICEPS TENDON REPAIR      COLONOSCOPY N/A 3/27/2019    Procedure: COLONOSCOPY;  Surgeon: James Roger MD;  Location: [facility] ENDO;  Service: Endoscopy;  Laterality: N/A;    HEMORRHOID SURGERY      INGUINAL HERNIA REPAIR Left     KNEE ARTHROSCOPY Right     LEFT HEART CATHETERIZATION Left 6/29/2020    Procedure: CATHETERIZATION, HEART, LEFT;  Surgeon: Cheli Wilson MD;  Location: [facility] CATH LAB;  Service: Cardiology;  Laterality: Left;    LUMBAR LAMINECTOMY      PROSTATECTOMY         SHx: per the electronic medical record    FHx:  "recorded in the electronic medical record    ROS:    denies any chest pains or shortness of breath. Denies any nausea, vomiting or diarrhea. Denies any fever, chills or sweats. Denies any change in weight, voice, stool, skin or hair. Denies any dysuria, dyspepsia or dysphagia. Denies any change in vision, hearing or headaches. Denies any swollen lymph nodes or loss of memory.    PE:  /66 (BP Location: Right arm)   Pulse 83   Ht 5' 7" (1.702 m)   Wt 108.3 kg (238 lb 12.1 oz)   SpO2 (!) 90%   BMI 37.39 kg/m²   Gen: Well-developed, well-nourished, male, in no acute distress, oriented x3  HEENT: neck is supple, no adenopathy, carotids 2+ equal without bruits, thyroid exam normal size without nodules.  CHEST: clear to auscultation and percussion  CVS: regular rate and rhythm without significant murmur, gallop, or rubs  ABD: soft, benign, no rebound no guarding, no distention.  Bowel sounds are normal.     nontender.  No palpable masses.  No organomegaly and no audible bruits.  RECTAL:  Deferred.  EXT:  He has bilateral 1 to 2+ edema and changes consistent with chronic venous insufficiency.  LYMPH: no cervical, inguinal, or axillary adenopathy  FEET: no loss of sensation.  No ulcers or pressure sores.  Monofilament testing normal  NEURO: gait normal.  Cranial nerves II- XII intact. No nystagmus.  Speech normal.   Gross motor and sensory unremarkable.    Lab Results   Component Value Date    WBC 9.73 03/13/2022    HGB 13.0 (L) 03/13/2022    HCT 40.0 03/13/2022     03/13/2022    CHOL 135 07/30/2021    TRIG 132 07/30/2021    HDL 44 07/30/2021    ALT 26 03/11/2022    AST 27 03/11/2022     03/12/2022    K 3.6 03/12/2022    CL 96 03/12/2022    CREATININE 1.7 (H) 03/13/2022    BUN 18 03/12/2022    CO2 31 (H) 03/12/2022    TSH 1.251 07/30/2021    PSA 0.15 02/01/2021    INR 1.0 06/26/2020    HGBA1C 7.7 (H) 03/11/2022       Impression:  Obstructive sleep apnea, poorly controlled due to noncompliance to wear " her mask.  Other medical problems below, stable.  Patient needs lab work today and be assessed  Patient Active Problem List   Diagnosis    BRADLEY (obstructive sleep apnea)    History of gout    DDD (degenerative disc disease), lumbar    Tobacco abuse    Hypertension associated with stage 3 chronic kidney disease due to type 2 diabetes mellitus    Hyperlipidemia associated with type 2 diabetes mellitus    Chronic kidney disease, stage 3    Obesity with serious comorbidity    Chronic diastolic congestive heart failure    Coronary artery disease of native artery of native heart with stable angina pectoris    Chronic combined systolic and diastolic congestive heart failure    Prostate cancer    PVD (peripheral vascular disease)    NICM (nonischemic cardiomyopathy)    Closed fracture of tuft of distal phalanx of finger    Chronic venous insufficiency    Third nerve palsy, right    Quit smoking within past year    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  Will have the above lab work and see me in 6 months.  He was referred back to see Pulmonary for re-evaluation of his sleep apnea.  This note is generated with speech recognition software and is subject to transcription error and sound alike phrases that may be missed by proofreading.

## 2022-05-04 RX ORDER — METFORMIN HYDROCHLORIDE 500 MG/1
TABLET ORAL
Qty: 90 TABLET | Refills: 3 | Status: SHIPPED | OUTPATIENT
Start: 2022-05-04 | End: 2023-04-27

## 2022-05-04 NOTE — TELEPHONE ENCOUNTER
Refill Routing Note   Medication(s) are not appropriate for processing by [facility] for the following reason(s):      - Required laboratory values are abnormal    [facility] action(s):  Defer          Medication reconciliation completed: No     Appointments  past 12m or future 3m with PCP    Date Provider   Last Visit   4/29/2022 Kashif Acosta MD   Next Visit   Visit date not found Kashif Acosta MD   ED visits in past 90 days: 0        Note composed:2:23 PM 05/04/2022

## 2022-05-04 NOTE — TELEPHONE ENCOUNTER
No new care gaps identified.  [facility] Embedded Care Gaps. Reference number: [id number]. 5/04/2022   8:04:36 AM KELVINT

## 2022-05-14 ENCOUNTER — PATIENT OUTREACH (OUTPATIENT)
Dept: ADMINISTRATIVE | Facility: OTHER | Age: 72
End: 2022-05-14
Payer: MEDICARE

## 2022-05-14 NOTE — PROGRESS NOTES
Health Maintenance Due   Topic Date Due    Shingles Vaccine (1 of 2) 07/18/2012    Pneumococcal Vaccines (Age 65+) (2 - PPSV23 or PCV20) 10/12/2017    Foot Exam  02/06/2022    COVID-19 Vaccine (4 - Booster for Pfizer series) 04/10/2022     Updates were requested from care everywhere.  Chart was reviewed for overdue Proactive [facility] Encounters (ISELA) topics (CRS, Breast Cancer Screening, Eye exam)  Health Maintenance has been updated.  LINKS immunization registry triggered.  Immunizations were reconciled.

## 2022-05-17 ENCOUNTER — TELEPHONE (OUTPATIENT)
Dept: NEUROLOGY | Facility: CLINIC | Age: 72
End: 2022-05-17

## 2022-05-17 ENCOUNTER — OFFICE VISIT (OUTPATIENT)
Dept: NEUROLOGY | Facility: CLINIC | Age: 72
End: 2022-05-17
Payer: MEDICARE

## 2022-05-17 VITALS
HEIGHT: 67 IN | RESPIRATION RATE: 16 BRPM | SYSTOLIC BLOOD PRESSURE: 114 MMHG | HEART RATE: 86 BPM | WEIGHT: 229.25 LBS | DIASTOLIC BLOOD PRESSURE: 75 MMHG | BODY MASS INDEX: 35.98 KG/M2 | OXYGEN SATURATION: 92 %

## 2022-05-17 DIAGNOSIS — Z87.891 QUIT SMOKING WITHIN PAST YEAR: ICD-10-CM

## 2022-05-17 DIAGNOSIS — G47.33 OSA (OBSTRUCTIVE SLEEP APNEA): ICD-10-CM

## 2022-05-17 DIAGNOSIS — R06.02 SHORTNESS OF BREATH: Primary | ICD-10-CM

## 2022-05-17 PROCEDURE — 1160F PR REVIEW ALL MEDS BY PRESCRIBER/CLIN PHARMACIST DOCUMENTED: ICD-10-PCS | Mod: CPTII,S$GLB,, | Performed by: NURSE PRACTITIONER

## 2022-05-17 PROCEDURE — 3288F FALL RISK ASSESSMENT DOCD: CPT | Mod: CPTII,S$GLB,, | Performed by: NURSE PRACTITIONER

## 2022-05-17 PROCEDURE — 99499 RISK ADDL DX/OHS AUDIT: ICD-10-PCS | Mod: S$GLB,,, | Performed by: NURSE PRACTITIONER

## 2022-05-17 PROCEDURE — 3078F PR MOST RECENT DIASTOLIC BLOOD PRESSURE < 80 MM HG: ICD-10-PCS | Mod: CPTII,S$GLB,, | Performed by: NURSE PRACTITIONER

## 2022-05-17 PROCEDURE — 3066F PR DOCUMENTATION OF TREATMENT FOR NEPHROPATHY: ICD-10-PCS | Mod: CPTII,S$GLB,, | Performed by: NURSE PRACTITIONER

## 2022-05-17 PROCEDURE — 1100F PTFALLS ASSESS-DOCD GE2>/YR: CPT | Mod: CPTII,S$GLB,, | Performed by: NURSE PRACTITIONER

## 2022-05-17 PROCEDURE — 3044F PR MOST RECENT HEMOGLOBIN A1C LEVEL <7.0%: ICD-10-PCS | Mod: CPTII,S$GLB,, | Performed by: NURSE PRACTITIONER

## 2022-05-17 PROCEDURE — 99999 PR PBB SHADOW E&M-EST. PATIENT-LVL V: CPT | Mod: PBBFAC,,, | Performed by: NURSE PRACTITIONER

## 2022-05-17 PROCEDURE — 99215 PR OFFICE/OUTPT VISIT, EST, LEVL V, 40-54 MIN: ICD-10-PCS | Mod: S$GLB,,, | Performed by: NURSE PRACTITIONER

## 2022-05-17 PROCEDURE — 1159F MED LIST DOCD IN RCRD: CPT | Mod: CPTII,S$GLB,, | Performed by: NURSE PRACTITIONER

## 2022-05-17 PROCEDURE — 99999 PR PBB SHADOW E&M-EST. PATIENT-LVL V: ICD-10-PCS | Mod: PBBFAC,,, | Performed by: NURSE PRACTITIONER

## 2022-05-17 PROCEDURE — 1125F PR PAIN SEVERITY QUANTIFIED, PAIN PRESENT: ICD-10-PCS | Mod: CPTII,S$GLB,, | Performed by: NURSE PRACTITIONER

## 2022-05-17 PROCEDURE — 3074F PR MOST RECENT SYSTOLIC BLOOD PRESSURE < 130 MM HG: ICD-10-PCS | Mod: CPTII,S$GLB,, | Performed by: NURSE PRACTITIONER

## 2022-05-17 PROCEDURE — 4010F ACE/ARB THERAPY RXD/TAKEN: CPT | Mod: CPTII,S$GLB,, | Performed by: NURSE PRACTITIONER

## 2022-05-17 PROCEDURE — 1100F PR PT FALLS ASSESS DOC 2+ FALLS/FALL W/INJURY/YR: ICD-10-PCS | Mod: CPTII,S$GLB,, | Performed by: NURSE PRACTITIONER

## 2022-05-17 PROCEDURE — 3008F BODY MASS INDEX DOCD: CPT | Mod: CPTII,S$GLB,, | Performed by: NURSE PRACTITIONER

## 2022-05-17 PROCEDURE — 1125F AMNT PAIN NOTED PAIN PRSNT: CPT | Mod: CPTII,S$GLB,, | Performed by: NURSE PRACTITIONER

## 2022-05-17 PROCEDURE — 99499 UNLISTED E&M SERVICE: CPT | Mod: S$GLB,,, | Performed by: NURSE PRACTITIONER

## 2022-05-17 PROCEDURE — 3072F LOW RISK FOR RETINOPATHY: CPT | Mod: CPTII,S$GLB,, | Performed by: NURSE PRACTITIONER

## 2022-05-17 PROCEDURE — 3062F POS MACROALBUMINURIA REV: CPT | Mod: CPTII,S$GLB,, | Performed by: NURSE PRACTITIONER

## 2022-05-17 PROCEDURE — 3288F PR FALLS RISK ASSESSMENT DOCUMENTED: ICD-10-PCS | Mod: CPTII,S$GLB,, | Performed by: NURSE PRACTITIONER

## 2022-05-17 PROCEDURE — 3074F SYST BP LT 130 MM HG: CPT | Mod: CPTII,S$GLB,, | Performed by: NURSE PRACTITIONER

## 2022-05-17 PROCEDURE — 3062F PR POS MACROALBUMINURIA RESULT DOCUMENTED/REVIEW: ICD-10-PCS | Mod: CPTII,S$GLB,, | Performed by: NURSE PRACTITIONER

## 2022-05-17 PROCEDURE — 3078F DIAST BP <80 MM HG: CPT | Mod: CPTII,S$GLB,, | Performed by: NURSE PRACTITIONER

## 2022-05-17 PROCEDURE — 99215 OFFICE O/P EST HI 40 MIN: CPT | Mod: S$GLB,,, | Performed by: NURSE PRACTITIONER

## 2022-05-17 PROCEDURE — 1160F RVW MEDS BY RX/DR IN RCRD: CPT | Mod: CPTII,S$GLB,, | Performed by: NURSE PRACTITIONER

## 2022-05-17 PROCEDURE — 1159F PR MEDICATION LIST DOCUMENTED IN MEDICAL RECORD: ICD-10-PCS | Mod: CPTII,S$GLB,, | Performed by: NURSE PRACTITIONER

## 2022-05-17 PROCEDURE — 3044F HG A1C LEVEL LT 7.0%: CPT | Mod: CPTII,S$GLB,, | Performed by: NURSE PRACTITIONER

## 2022-05-17 PROCEDURE — 3066F NEPHROPATHY DOC TX: CPT | Mod: CPTII,S$GLB,, | Performed by: NURSE PRACTITIONER

## 2022-05-17 PROCEDURE — 4010F PR ACE/ARB THEARPY RXD/TAKEN: ICD-10-PCS | Mod: CPTII,S$GLB,, | Performed by: NURSE PRACTITIONER

## 2022-05-17 PROCEDURE — 3072F PR LOW RISK FOR RETINOPATHY: ICD-10-PCS | Mod: CPTII,S$GLB,, | Performed by: NURSE PRACTITIONER

## 2022-05-17 PROCEDURE — 3008F PR BODY MASS INDEX (BMI) DOCUMENTED: ICD-10-PCS | Mod: CPTII,S$GLB,, | Performed by: NURSE PRACTITIONER

## 2022-05-17 NOTE — PROGRESS NOTES
Subjective:       Patient ID: Santiago Khan is a 71 y.o. male.    Chief Complaint: Follow-up    HPI       The patient was in [facility] till  when noted double vision then dropping of the RT eye.The patient noticed the double vision when  was looking forwards and upwards but not downwards. The 2 pictures are side by side and at angle (diagnonal).. When he closes RT eye the double vision resolves. Medical history is remarkable for  Uncontrolled DM. No trauma. No alcoholism. No thyroid problems. No tick bites. No headache. No weakness. No slurring of speech. No trouble swallowing.      Interval History 5-: Established with Dr. Najera, new to [facility]. Patient presents to appointment unaccompanied. Patient states he continues to have double vision in his right eye. He states he has had drooping of his eyelid since childhood. Patient states he periodically wears a right eye patch which is not helping very much. Has not completed CTA head related to severe claustrophobia. 02- MG Panel -ve.       Review of Systems   Constitutional: Negative for appetite change and fatigue.   HENT: Negative for hearing loss and tinnitus.    Eyes: Positive for visual disturbance. Negative for photophobia.   Respiratory: Positive for apnea and shortness of breath.    Cardiovascular: Positive for leg swelling. Negative for chest pain and palpitations.   Gastrointestinal: Negative for nausea and vomiting.   Endocrine: Negative for cold intolerance and heat intolerance.   Genitourinary: Negative for difficulty urinating and urgency.   Musculoskeletal: Positive for arthralgias, back pain and joint swelling. Negative for gait problem, myalgias, neck pain and neck stiffness.   Skin: Negative for color change and rash.   Allergic/Immunologic: Negative for environmental allergies and immunocompromised state.   Neurological: Negative for dizziness, tremors, seizures, syncope, facial asymmetry, speech difficulty, weakness,  light-headedness, numbness and headaches.   Hematological: Negative for adenopathy. Does not bruise/bleed easily.   Psychiatric/Behavioral: Positive for sleep disturbance. Negative for agitation, behavioral problems, confusion, decreased concentration, dysphoric mood, hallucinations, self-injury and suicidal ideas. The patient is not hyperactive.                  Current Outpatient Medications:     albuterol (PROVENTIL/VENTOLIN HFA) 90 mcg/actuation inhaler, Inhale 2 puffs into the lungs every 4 (four) hours as needed for Wheezing. Rescue, Disp: 8.5 g, Rfl: 3    allopurinoL (ZYLOPRIM) 300 MG tablet, TAKE 1 TABLET(300 MG) BY MOUTH EVERY DAY, Disp: 90 tablet, Rfl: 3    aspirin (ECOTRIN) 81 MG EC tablet, Take 81 mg by mouth once daily., Disp: , Rfl:     baclofen (LIORESAL) 10 MG tablet, Take 1 tablet by mouth every evening., Disp: , Rfl: 2    bisoprolol (ZEBETA) 5 MG tablet, TAKE 1/2 TABLET BY MOUTH ONCE DAILY, Disp: 90 tablet, Rfl: 3    colchicine (COLCRYS) 0.6 mg tablet, TAKE 1 TABLET(0.6 MG) BY MOUTH DAILY AS NEEDED, Disp: 30 tablet, Rfl: 1    fluticasone propionate (FLONASE) 50 mcg/actuation nasal spray, SHAKE LIQUID AND USE 2 SPRAYS(100 MCG) IN EACH NOSTRIL EVERY DAY, Disp: 16 g, Rfl: 11    glimepiride (AMARYL) 1 MG tablet, TAKE 1 TABLET BY MOUTH ONCE DAILY, Disp: 90 tablet, Rfl: 3    HYDROcodone-acetaminophen (NORCO) 7.5-325 mg per tablet, Take 1 tablet by mouth every 4 (four) hours as needed (for chronic pain)., Disp: , Rfl: 0    losartan (COZAAR) 25 MG tablet, Take 1 tablet (25 mg total) by mouth once daily., Disp: , Rfl:     magnesium oxide (MAG-OX) 400 mg (241.3 mg magnesium) tablet, TAKE 2 TABLETS(800 MG) BY MOUTH TWICE DAILY, Disp: 120 tablet, Rfl: 5    metFORMIN (GLUCOPHAGE) 500 MG tablet, TAKE 1 TABLET(500 MG) BY MOUTH DAILY WITH BREAKFAST, Disp: 90 tablet, Rfl: 3    multivitamin-minerals-lutein Tab, Take 1 tablet by mouth Daily., Disp: , Rfl:     omeprazole (PRILOSEC) 40 MG capsule, TAKE  ONE CAPSULE BY MOUTH EVERY DAY, Disp: 30 capsule, Rfl: 11    potassium chloride SA (K-DUR,KLOR-CON) 20 MEQ tablet, TAKE 3 TABLETS(60 MEQ) BY MOUTH TWICE DAILY, Disp: 720 tablet, Rfl: 2    simvastatin (ZOCOR) 40 MG tablet, TAKE 1 TABLET(40 MG) BY MOUTH EVERY EVENING, Disp: 90 tablet, Rfl: 3    torsemide (DEMADEX) 20 MG Tab, Take 1 tablet (20 mg total) by mouth once daily. (Patient taking differently: Take 40 mg by mouth 2 (two) times a day.), Disp: 30 tablet, Rfl: 11  Past Medical History:   Diagnosis Date    Chronic diastolic congestive heart failure 2020    Chronic kidney disease, stage 3     Chronic systolic congestive heart failure 2020    Chronic venous insufficiency     DDD (degenerative disc disease), lumbar     DM (diabetes mellitus) with complications     History of gout     Hyperlipidemia associated with type 2 diabetes mellitus     Hypertension associated with stage 3 chronic kidney disease due to type 2 diabetes mellitus     BRADLEY on CPAP     Prostate cancer     prostatectomy in , rising PSA that started in     Tobacco abuse      Past Surgical History:   Procedure Laterality Date    BICEPS TENDON REPAIR      COLONOSCOPY N/A 3/27/2019    Procedure: COLONOSCOPY;  Surgeon: James Roger MD;  Location: [facility] ENDO;  Service: Endoscopy;  Laterality: N/A;    HEMORRHOID SURGERY      INGUINAL HERNIA REPAIR Left     KNEE ARTHROSCOPY Right     LEFT HEART CATHETERIZATION Left 2020    Procedure: CATHETERIZATION, HEART, LEFT;  Surgeon: Cheli Wilson MD;  Location: [facility] CATH LAB;  Service: Cardiology;  Laterality: Left;    LUMBAR LAMINECTOMY      PROSTATECTOMY       Social History     Socioeconomic History    Marital status:    Tobacco Use    Smoking status: Former Smoker     Packs/day: 0.00     Years: 50.00     Pack years: 0.00     Types: Cigarettes     Quit date: 3/1/2021     Years since quittin.2    Smokeless tobacco: Former User   Substance and Sexual  Activity    Alcohol use: Not Currently    Drug use: Never    Sexual activity: Not Currently     Partners: Female     Social Determinants of Health     Financial Resource Strain: Low Risk     Difficulty of Paying Living Expenses: Not very hard   Food Insecurity: No Food Insecurity    Worried About Running Out of Food in the Last Year: Never true    Ran Out of Food in the Last Year: Never true   Transportation Needs: No Transportation Needs    Lack of Transportation (Medical): No    Lack of Transportation (Non-Medical): No   Physical Activity: Inactive    Days of Exercise per Week: 0 days    Minutes of Exercise per Session: 0 min   Stress: Stress Concern Present    Feeling of Stress : To some extent   Social Connections: Unknown    Frequency of Communication with Friends and Family: More than three times a week    Frequency of Social Gatherings with Friends and Family: Once a week    Active Member of Clubs or Organizations: No    Attends Club or Organization Meetings: Never    Marital Status:    Housing Stability: Unknown    Unable to Pay for Housing in the Last Year: Patient refused    Number of Places Lived in the Last Year: 1    Unstable Housing in the Last Year: No             Past/Current Medical/Surgical History, Past/Current Social History, Past/Current Family History and Past/Current Medications were reviewed in detail.        Objective:     VITAL SIGNS WERE REVIEWED      GENERAL APPEARANCE:     The patient looks comfortable.    BMI 35.91     No signs of respiratory distress.    Normal breathing pattern.    No dysmorphic features    Normal eye contact.     GENERAL MEDICAL EXAM:    HEENT:  Head is atraumatic normocephalic. Fundoscopic (Ophthalmoscopic) exam showed no disc edema.      Neck and Axillae: No JVD. No visible lesions.    Cardiopulmonary: No cyanosis. No tachypnea. Normal respiratory effort.    Gastrointestinal/Urogenital:  No jaundice. No stomas or lesions. No visible  hernias. No catheters.     Skin, Hair and Nails: No pathognonomic skin rash. No neurofibromatosis. No visible lesions.No stigmata of autoimmune disease. No clubbing.    Limbs: No varicose veins. BLE visible swelling.    Muskoskeletal: OA visible deformities.No visible lesions.           Neurologic Exam     Mental Status   Oriented to person, place, and time.   Follows 3 step commands.   Attention: normal. Concentration: normal.   Speech: speech is normal   Level of consciousness: alert  Knowledge: good.   Able to name object. Able to repeat. Normal comprehension.     Cranial Nerves     CN II   Visual fields full to confrontation.   Right visual field deficit: none  Left visual field deficit: none     CN III, IV, VI   Pupils are equal, round, and reactive to light.  Right pupil: Size: 2 mm. Shape: regular. Reactivity: brisk. Consensual response: intact.   Left pupil: Size: 2 mm. Shape: regular. Reactivity: brisk. Consensual response: intact.   CN III: right CN III palsy  CN VI: no CN VI palsy  Nystagmus: none   Diplopia: right, horizontal and vertical (Diagonal)  Ophthalmoparesis: none  Upgaze: abnormal  Downgaze: abnormal  Conjugate gaze: absent    CN V   Facial sensation intact.   Right facial sensation deficit: none  Left facial sensation deficit: none    CN VII   Facial expression full, symmetric.   Right facial weakness: none  Left facial weakness: none    CN VIII   CN VIII normal.   Hearing: intact    CN IX, X   CN IX normal.   CN X normal.   Palate: symmetric    CN XI   CN XI normal.   Right sternocleidomastoid strength: normal  Left sternocleidomastoid strength: normal  Right trapezius strength: normal  Left trapezius strength: normal    CN XII   CN XII normal.   Tongue: not atrophic  Fasciculations: absent  Tongue deviation: none    Motor Exam   Muscle bulk: normal  Overall muscle tone: normal  Right arm tone: normal  Left arm tone: normal  Right arm pronator drift: absent  Left arm pronator drift:  absent  Right leg tone: normal  Left leg tone: normal    Strength   Right neck flexion: 5/5  Left neck flexion: 5/5  Right neck extension: 5/5  Left neck extension: 5/5  Right deltoid: 5/5  Left deltoid: 5/5  Right biceps: 5/5  Left biceps: 5/5  Right triceps: 5/5  Left triceps: 5/  Right wrist flexion: 5/5  Left wrist flexion: 5/5  Right wrist extension: 55  Left wrist extension:   Right interossei: 55  Left interossei:   Right iliopsoas: 4/5  Left iliopsoas: 4/5  Right quadriceps: 4/5  Left quadriceps: 4/5  Right hamstrin5  Left hamstrin5  Right glutei: 45  Left glutei:   Right anterior tibial: 45  Left anterior tibial:   Right posterior tibial:   Left posterior tibial:   Right peroneal: 45  Left peroneal: 45  Right gastroc: 5  Left gastroc: 45    Sensory Exam   Right arm light touch: decreased from wrist  Left arm light touch: decreased from wrist  Right leg light touch: decreased from knee  Left leg light touch: decreased from knee  Right arm vibration: normal  Left arm vibration: normal  Right leg vibration: decreased from toes  Left leg vibration: decreased from toes  Right arm proprioception: normal  Left arm proprioception: normal  Right leg proprioception: decreased from toes  Left leg proprioception: decreased from toes  Right arm pinprick: decreased from wrist  Left arm pinprick: decreased from wrist  Right leg pinprick: decreased from knee  Left leg pinprick: decreased from knee    Gait, Coordination, and Reflexes     Gait  Gait: wide-based    Coordination   Romberg: negative  Finger to nose coordination: normal  Heel to shin coordination: normal    Tremor   Resting tremor: absent  Intention tremor: absent  Action tremor: absent    Reflexes   Right brachioradialis: 1+  Left brachioradialis: 1+  Right biceps: 1+  Left biceps: 1+  Right triceps: 1+  Left triceps: 1+  Right patellar: 0  Left patellar: 0  Right achilles: 0  Left achilles: 0  Right plantar: normal  Left  plantar: normal  Right Camarena: absent  Left C[place]rena: absent  Right ankle clonus: absent  Left ankle clonus: absent  Right pendular knee jerk: absent  Left pendular knee jerk: absent      Lab Results   Component Value Date    WBC 8.63 04/29/2022    HGB 12.1 (L) 04/29/2022    HCT 38.9 (L) 04/29/2022     (H) 04/29/2022     04/29/2022     Sodium   Date Value Ref Range Status   04/29/2022 141 136 - 145 mmol/L Final     Potassium   Date Value Ref Range Status   04/29/2022 5.1 3.5 - 5.1 mmol/L Final     Chloride   Date Value Ref Range Status   04/29/2022 99 95 - 110 mmol/L Final     CO2   Date Value Ref Range Status   04/29/2022 31 (H) 23 - 29 mmol/L Final     Glucose   Date Value Ref Range Status   04/29/2022 152 (H) 70 - 110 mg/dL Final     BUN   Date Value Ref Range Status   04/29/2022 15 8 - 23 mg/dL Final     Creatinine   Date Value Ref Range Status   04/29/2022 1.6 (H) 0.5 - 1.4 mg/dL Final     Calcium   Date Value Ref Range Status   04/29/2022 9.8 8.7 - 10.5 mg/dL Final     Total Protein   Date Value Ref Range Status   04/29/2022 6.9 6.0 - 8.4 g/dL Final     Albumin   Date Value Ref Range Status   04/29/2022 4.1 3.5 - 5.2 g/dL Final     Total Bilirubin   Date Value Ref Range Status   04/29/2022 0.6 0.1 - 1.0 mg/dL Final     Comment:     For infants and newborns, interpretation of results should be based  on gestational age, weight and in agreement with clinical  observations.    Premature Infant recommended reference ranges:  Up to 24 hours.............<8.0 mg/dL  Up to 48 hours............<12.0 mg/dL  3-5 days..................<15.0 mg/dL  6-29 days.................<15.0 mg/dL       Alkaline Phosphatase   Date Value Ref Range Status   04/29/2022 75 55 - 135 U/L Final     AST   Date Value Ref Range Status   04/29/2022 26 10 - 40 U/L Final     ALT   Date Value Ref Range Status   04/29/2022 25 10 - 44 U/L Final     Anion Gap   Date Value Ref Range Status   04/29/2022 11 8 - 16 mmol/L Final     eGFR if     Date Value Ref Range Status   04/29/2022 49.7 (A) >60 mL/min/1.73 m^2 Final     eGFR if non    Date Value Ref Range Status   04/29/2022 43.0 (A) >60 mL/min/1.73 m^2 Final     Comment:     Calculation used to obtain the estimated glomerular filtration  rate (eGFR) is the CKD-EPI equation.        Lab Results   Component Value Date    HBPDVGJA70 672 07/27/2012     Lab Results   Component Value Date    TSH 1.456 04/29/2022 02-    MG Panel -ve     Assessment:       1. Shortness of breath    2. BRADLEY (obstructive sleep apnea)    3. Quit smoking within past year        Plan:         PUPIL-SPARING RT OCULOMOTOR (THIRD NERVE) PALSY    ISCHEMIC (DIABETIC)     BS Control.    RT eye patching.    Expectant approach.     CTA Brain R/O PCOM Aneurysm. Severely claustrophobic.    Consider neuroophthalmologic referral for prism.        SHORTNESS OF BREATH/BRADLEY    Referral for pulmonology placed.             MEDICAL/SURGICAL COMORBIDITIES     All relevant medical comorbidities noted and managed by primary care physician and medical care team.          MISCELLANEOUS MEDICAL PROBLEMS       HEALTHY LIFESTYLE AND PREVENTATIVE CARE    The patient to adhere to the age-appropriate health maintenance guidelines including screening tests and vaccinations. The patient to adhere to  healthy lifestyle, optimal weight, exercise, healthy diet, good sleep hygiene and avoiding drugs including smoking, alcohol and recreational drugs.        I spent a total of 50 minutes on the day of the visit.  This includes face to face time and non-face to face time preparing to see the patient (eg, review of tests), obtaining and/or reviewing separately obtained history, documenting clinical information in the electronic or other health record, independently interpreting results and communicating results to the patient/family/caregiver, or care coordinator.        RTC in 3 months           Mable Boateng, MSN,  NP    Collaborating Provider: Alisa Najera MD, FAAN Neurologist/Epileptologist

## 2022-05-17 NOTE — PATIENT INSTRUCTIONS
Patching one eye is useful in alleviating diplopia, particularly in the short term.    Prism therapy may be employed for small, comitant, long-standing deviations

## 2022-05-17 NOTE — TELEPHONE ENCOUNTER
----- Message from Mable Boateng NP sent at 5/17/2022 12:12 PM CDT -----  I placed a pulmonology referral for this patient for shortness of breath (thinking COPD) and BRADLEY. He stated he did not want to see any APPs and I wrote that on the referral. Can we contact pulmonology and change his appointment to see a doctor. Thanks!

## 2022-06-05 RX ORDER — SIMVASTATIN 40 MG/1
TABLET, FILM COATED ORAL
Qty: 90 TABLET | Refills: 3 | Status: SHIPPED | OUTPATIENT
Start: 2022-06-05 | End: 2023-06-20 | Stop reason: SDUPTHER

## 2022-06-05 NOTE — TELEPHONE ENCOUNTER
No new care gaps identified.  [facility] Embedded Care Gaps. Reference number: [id number]. 6/05/2022   8:04:18 AM CDT

## 2022-06-05 NOTE — TELEPHONE ENCOUNTER
Refill Authorization Note   Santiago Khan  is requesting a refill authorization.  Brief Assessment and Rationale for Refill:  Approve     Medication Therapy Plan:       Medication Reconciliation Completed: No   Comments:     No Care Gaps recommended.     Note composed:3:51 PM 06/05/2022

## 2022-06-18 ENCOUNTER — OFFICE VISIT (OUTPATIENT)
Dept: URGENT CARE | Facility: CLINIC | Age: 72
End: 2022-06-18
Payer: MEDICARE

## 2022-06-18 VITALS
BODY MASS INDEX: 35.94 KG/M2 | HEART RATE: 84 BPM | WEIGHT: 229 LBS | OXYGEN SATURATION: 93 % | DIASTOLIC BLOOD PRESSURE: 64 MMHG | SYSTOLIC BLOOD PRESSURE: 136 MMHG | HEIGHT: 67 IN | RESPIRATION RATE: 14 BRPM | TEMPERATURE: 98 F

## 2022-06-18 DIAGNOSIS — H10.32 ACUTE BACTERIAL CONJUNCTIVITIS OF LEFT EYE: Primary | ICD-10-CM

## 2022-06-18 PROCEDURE — 3072F LOW RISK FOR RETINOPATHY: CPT | Mod: CPTII,S$GLB,, | Performed by: EMERGENCY MEDICINE

## 2022-06-18 PROCEDURE — 1126F PR PAIN SEVERITY QUANTIFIED, NO PAIN PRESENT: ICD-10-PCS | Mod: CPTII,S$GLB,, | Performed by: EMERGENCY MEDICINE

## 2022-06-18 PROCEDURE — 3008F PR BODY MASS INDEX (BMI) DOCUMENTED: ICD-10-PCS | Mod: CPTII,S$GLB,, | Performed by: EMERGENCY MEDICINE

## 2022-06-18 PROCEDURE — 4010F PR ACE/ARB THEARPY RXD/TAKEN: ICD-10-PCS | Mod: CPTII,S$GLB,, | Performed by: EMERGENCY MEDICINE

## 2022-06-18 PROCEDURE — 3008F BODY MASS INDEX DOCD: CPT | Mod: CPTII,S$GLB,, | Performed by: EMERGENCY MEDICINE

## 2022-06-18 PROCEDURE — 3062F PR POS MACROALBUMINURIA RESULT DOCUMENTED/REVIEW: ICD-10-PCS | Mod: CPTII,S$GLB,, | Performed by: EMERGENCY MEDICINE

## 2022-06-18 PROCEDURE — 1159F PR MEDICATION LIST DOCUMENTED IN MEDICAL RECORD: ICD-10-PCS | Mod: CPTII,S$GLB,, | Performed by: EMERGENCY MEDICINE

## 2022-06-18 PROCEDURE — 1126F AMNT PAIN NOTED NONE PRSNT: CPT | Mod: CPTII,S$GLB,, | Performed by: EMERGENCY MEDICINE

## 2022-06-18 PROCEDURE — 3044F HG A1C LEVEL LT 7.0%: CPT | Mod: CPTII,S$GLB,, | Performed by: EMERGENCY MEDICINE

## 2022-06-18 PROCEDURE — 3075F SYST BP GE 130 - 139MM HG: CPT | Mod: CPTII,S$GLB,, | Performed by: EMERGENCY MEDICINE

## 2022-06-18 PROCEDURE — 3066F PR DOCUMENTATION OF TREATMENT FOR NEPHROPATHY: ICD-10-PCS | Mod: CPTII,S$GLB,, | Performed by: EMERGENCY MEDICINE

## 2022-06-18 PROCEDURE — 3072F PR LOW RISK FOR RETINOPATHY: ICD-10-PCS | Mod: CPTII,S$GLB,, | Performed by: EMERGENCY MEDICINE

## 2022-06-18 PROCEDURE — 4010F ACE/ARB THERAPY RXD/TAKEN: CPT | Mod: CPTII,S$GLB,, | Performed by: EMERGENCY MEDICINE

## 2022-06-18 PROCEDURE — 3066F NEPHROPATHY DOC TX: CPT | Mod: CPTII,S$GLB,, | Performed by: EMERGENCY MEDICINE

## 2022-06-18 PROCEDURE — 3078F DIAST BP <80 MM HG: CPT | Mod: CPTII,S$GLB,, | Performed by: EMERGENCY MEDICINE

## 2022-06-18 PROCEDURE — 3078F PR MOST RECENT DIASTOLIC BLOOD PRESSURE < 80 MM HG: ICD-10-PCS | Mod: CPTII,S$GLB,, | Performed by: EMERGENCY MEDICINE

## 2022-06-18 PROCEDURE — 3075F PR MOST RECENT SYSTOLIC BLOOD PRESS GE 130-139MM HG: ICD-10-PCS | Mod: CPTII,S$GLB,, | Performed by: EMERGENCY MEDICINE

## 2022-06-18 PROCEDURE — 99214 OFFICE O/P EST MOD 30 MIN: CPT | Mod: S$GLB,,, | Performed by: EMERGENCY MEDICINE

## 2022-06-18 PROCEDURE — 1160F RVW MEDS BY RX/DR IN RCRD: CPT | Mod: CPTII,S$GLB,, | Performed by: EMERGENCY MEDICINE

## 2022-06-18 PROCEDURE — 99214 PR OFFICE/OUTPT VISIT, EST, LEVL IV, 30-39 MIN: ICD-10-PCS | Mod: S$GLB,,, | Performed by: EMERGENCY MEDICINE

## 2022-06-18 PROCEDURE — 1159F MED LIST DOCD IN RCRD: CPT | Mod: CPTII,S$GLB,, | Performed by: EMERGENCY MEDICINE

## 2022-06-18 PROCEDURE — 1160F PR REVIEW ALL MEDS BY PRESCRIBER/CLIN PHARMACIST DOCUMENTED: ICD-10-PCS | Mod: CPTII,S$GLB,, | Performed by: EMERGENCY MEDICINE

## 2022-06-18 PROCEDURE — 3062F POS MACROALBUMINURIA REV: CPT | Mod: CPTII,S$GLB,, | Performed by: EMERGENCY MEDICINE

## 2022-06-18 PROCEDURE — 3044F PR MOST RECENT HEMOGLOBIN A1C LEVEL <7.0%: ICD-10-PCS | Mod: CPTII,S$GLB,, | Performed by: EMERGENCY MEDICINE

## 2022-06-18 RX ORDER — TOBRAMYCIN 3 MG/ML
1 SOLUTION/ DROPS OPHTHALMIC EVERY 4 HOURS
Qty: 5 ML | Refills: 0 | Status: SHIPPED | OUTPATIENT
Start: 2022-06-18 | End: 2022-06-23

## 2022-06-18 NOTE — PROGRESS NOTES
"Subjective:       Patient ID: Santiago Khan is a 71 y.o. male.    Vitals:  height is 5' 7" (1.702 m) and weight is 103.9 kg (229 lb). His temperature is 97.9 °F (36.6 °C). His blood pressure is 136/64 and his pulse is 84. His respiration is 14 and oxygen saturation is 93% (abnormal).     Chief Complaint: Conjunctivitis    Pt states his left eye has been red and having green discharge for approximately 7-days. Pt has not tried any medications at this time to treat symptoms.  No vision changes.  No contacts.  Note problems in the right eye.    Conjunctivitis  This is a new problem. The current episode started in the past 7 days. The problem occurs constantly. The problem has been unchanged. Pertinent negatives include no fatigue or fever. Nothing aggravates the symptoms. He has tried nothing for the symptoms. The treatment provided no relief.       Constitution: Negative for fatigue and fever.   Eyes: Positive for eye discharge and eye redness. Negative for eye trauma, foreign body in eye, photophobia and vision loss.       Objective:      Physical Exam   Constitutional: He is oriented to person, place, and time.   HENT:   Head: Normocephalic and atraumatic.   Eyes: Pupils are equal, round, and reactive to light. Left eye exhibits discharge. Extraocular movement intact      Comments: Bilateral conjunctiva injected.  Only discharge out of the left eye   Abdominal: Normal appearance.   Neurological: He is alert and oriented to person, place, and time.   Psychiatric: His behavior is normal.   Nursing note and vitals reviewed.        Assessment:       1. Acute bacterial conjunctivitis of left eye          Plan:     advised patient to start drops in the right eye if he develops symptoms.  He verbalizes understanding of and agreement with this plan.    Acute bacterial conjunctivitis of left eye  -     tobramycin sulfate 0.3% (TOBREX) 0.3 % ophthalmic solution; Place 1 drop into the left eye every 4 (four) hours. " for 5 days  Dispense: 5 mL; Refill: 0

## 2022-06-18 NOTE — PATIENT INSTRUCTIONS
Tobramycin as prescribed in the affected eye for 5 days.  If the pinkeye spreads to the opposite eye, start a 5 day treatment in that eye as well.    Quarantine for full 24 hr on the eye antibiotic as you are contagious until you have had 24 hr of treatment.  Wash hands frequently and avoid touching the face to prevent the spread of the disease.    Conjunctivitis (Pinkeye) Discharge Instructions   About this topic   The medical name for pink eye is conjunctivitis. Your eye is irritated or infected. It is red and irritated. Your eye may also itch, burn, or be sensitive to light. If an infection is causing your pink eye, it is easy to spread from person to person.  Infections are often caused by viruses and antibiotics wont help. Most of the time, pink eye will go away on its own without treatment after a few days.  If the doctor thinks you have a bacterial infection in your eye, you will need antibiotics to treat the infection. It is important to take all of your antibiotics even if your eye starts to feel better.       What care is needed at home?   Ask your doctor what you need to do when you go home. Make sure you ask questions if you do not understand what the doctor says.  Wash your hands before touching your eyes. Gently remove your eye drainage or crusting with a clean cloth and warm water.  Avoid pollen if an allergy is causing your pink eye. Stay inside as much as you can with the windows closed during peak allergy seasons.  Lubricating eye drops or allergy eye drops may help your eye feel better. Make sure to read the directions carefully. Wash your hands with care before and after you touch your eye.  When you use eye drops, take care not to touch the bottle or dropper to your eye.  If you wear contact lenses, you will need to stop wearing them for a short time until your symptoms go away. If your contacts are disposable, start with fresh ones after your eye gets better. If not, clean your contacts with  care. Clean your contact case thoroughly or get a new one.  Avoid sharing towels, washcloths, bedding, or personal items when you have pink eye.  You may need to stay out of work or school for a few days.  What follow-up care is needed?   Your doctor may ask you to make visits to the office to check on your progress. Be sure to keep these visits.  What drugs may be needed?   The doctor may order drugs based on the cause of your health problem. Talk to your doctor about what drugs you need to take.   Will physical activity be limited?   Your physical activities will not be limited. Remember, this infection spreads easily from person to person. Ask your doctor when you can go back to work or school.  What problems could happen?   You may be infected again from someone in your house, workplace, or school.  What can be done to prevent this health problem?   Good hygiene can help prevent the spread of this infection.  Wash your hands well and often, after touching your face, and after you cough or sneeze.  Do not share eye make-up or eye drops with others.  Do not share pillows or towels with others.  Clean contact lenses daily. Do not sleep in them unless your eye doctor says it is OK.  When do I need to call the doctor?   You have trouble seeing clearly after blinking.  Your eye is still red or has drainage after 3 days.  You have eye pain that is getting worse.  Teach Back: Helping You Understand   The Teach Back Method helps you understand the information we are giving you. After you talk with the staff, tell them in your own words what you learned. This helps to make sure the staff has described each thing clearly. It also helps to explain things that may have been confusing. Before going home, make sure you can do these:  I can tell you about my condition.  I can tell you how to care for my eye.  I can tell you what I will do if I have eye pain or blurry eyesight.  Where can I learn more?    FamilyDoctor.org  http://familydoctor.org/familydoctor/en/diseases-conditions/allergic-conjunctivitis.html   Kids Health  http://kidshealth.org/en/parents/conjunctivitis.html?ref=search&WT.ac=aaz-f-uhlr-mb-pzrhax-ayk   N[facility] Choices  https://www.nhs.uk/conditions/conjunctivitis/   Last Reviewed Date   2021-06-10  Consumer Information Use and Disclaimer   This information is not specific medical advice and does not replace information you receive from your health care provider. This is only a brief summary of general information. It does NOT include all information about conditions, illnesses, injuries, tests, procedures, treatments, therapies, discharge instructions or life-style choices that may apply to you. You must talk with your health care provider for complete information about your health and treatment options. This information should not be used to decide whether or not to accept your health care providers advice, instructions or recommendations. Only your health care provider has the knowledge and training to provide advice that is right for you.  Copyright   Copyright © 2021 [employer] and its affiliates and/or licensors. All rights reserved.

## 2022-06-21 ENCOUNTER — TELEPHONE (OUTPATIENT)
Dept: URGENT CARE | Facility: CLINIC | Age: 72
End: 2022-06-21
Payer: MEDICARE

## 2022-07-13 ENCOUNTER — OFFICE VISIT (OUTPATIENT)
Dept: PULMONOLOGY | Facility: CLINIC | Age: 72
End: 2022-07-13
Payer: MEDICARE

## 2022-07-13 VITALS
WEIGHT: 226.94 LBS | OXYGEN SATURATION: 97 % | DIASTOLIC BLOOD PRESSURE: 60 MMHG | HEART RATE: 89 BPM | SYSTOLIC BLOOD PRESSURE: 120 MMHG | BODY MASS INDEX: 35.55 KG/M2 | RESPIRATION RATE: 20 BRPM

## 2022-07-13 DIAGNOSIS — R05.9 COUGH: ICD-10-CM

## 2022-07-13 DIAGNOSIS — G47.33 OSA (OBSTRUCTIVE SLEEP APNEA): ICD-10-CM

## 2022-07-13 DIAGNOSIS — R09.02 EXERCISE HYPOXEMIA: ICD-10-CM

## 2022-07-13 DIAGNOSIS — E66.01 CLASS 2 SEVERE OBESITY DUE TO EXCESS CALORIES WITH SERIOUS COMORBIDITY AND BODY MASS INDEX (BMI) OF 35.0 TO 35.9 IN ADULT: ICD-10-CM

## 2022-07-13 DIAGNOSIS — J41.0 SIMPLE CHRONIC BRONCHITIS: ICD-10-CM

## 2022-07-13 DIAGNOSIS — G47.33 OSA ON CPAP: ICD-10-CM

## 2022-07-13 DIAGNOSIS — J44.1 COPD EXACERBATION: Primary | ICD-10-CM

## 2022-07-13 PROBLEM — E66.812 CLASS 2 SEVERE OBESITY DUE TO EXCESS CALORIES WITH SERIOUS COMORBIDITY AND BODY MASS INDEX (BMI) OF 35.0 TO 35.9 IN ADULT: Status: ACTIVE | Noted: 2019-11-25

## 2022-07-13 PROCEDURE — 1101F PT FALLS ASSESS-DOCD LE1/YR: CPT | Mod: CPTII,S$GLB,, | Performed by: INTERNAL MEDICINE

## 2022-07-13 PROCEDURE — 1159F PR MEDICATION LIST DOCUMENTED IN MEDICAL RECORD: ICD-10-PCS | Mod: CPTII,S$GLB,, | Performed by: INTERNAL MEDICINE

## 2022-07-13 PROCEDURE — 3072F LOW RISK FOR RETINOPATHY: CPT | Mod: CPTII,S$GLB,, | Performed by: INTERNAL MEDICINE

## 2022-07-13 PROCEDURE — 3078F PR MOST RECENT DIASTOLIC BLOOD PRESSURE < 80 MM HG: ICD-10-PCS | Mod: CPTII,S$GLB,, | Performed by: INTERNAL MEDICINE

## 2022-07-13 PROCEDURE — 3044F PR MOST RECENT HEMOGLOBIN A1C LEVEL <7.0%: ICD-10-PCS | Mod: CPTII,S$GLB,, | Performed by: INTERNAL MEDICINE

## 2022-07-13 PROCEDURE — 3072F PR LOW RISK FOR RETINOPATHY: ICD-10-PCS | Mod: CPTII,S$GLB,, | Performed by: INTERNAL MEDICINE

## 2022-07-13 PROCEDURE — 99499 UNLISTED E&M SERVICE: CPT | Mod: S$GLB,,, | Performed by: INTERNAL MEDICINE

## 2022-07-13 PROCEDURE — 4010F PR ACE/ARB THEARPY RXD/TAKEN: ICD-10-PCS | Mod: CPTII,S$GLB,, | Performed by: INTERNAL MEDICINE

## 2022-07-13 PROCEDURE — 3008F BODY MASS INDEX DOCD: CPT | Mod: CPTII,S$GLB,, | Performed by: INTERNAL MEDICINE

## 2022-07-13 PROCEDURE — 1159F MED LIST DOCD IN RCRD: CPT | Mod: CPTII,S$GLB,, | Performed by: INTERNAL MEDICINE

## 2022-07-13 PROCEDURE — 3044F HG A1C LEVEL LT 7.0%: CPT | Mod: CPTII,S$GLB,, | Performed by: INTERNAL MEDICINE

## 2022-07-13 PROCEDURE — 3288F FALL RISK ASSESSMENT DOCD: CPT | Mod: CPTII,S$GLB,, | Performed by: INTERNAL MEDICINE

## 2022-07-13 PROCEDURE — 1101F PR PT FALLS ASSESS DOC 0-1 FALLS W/OUT INJ PAST YR: ICD-10-PCS | Mod: CPTII,S$GLB,, | Performed by: INTERNAL MEDICINE

## 2022-07-13 PROCEDURE — 99499 RISK ADDL DX/OHS AUDIT: ICD-10-PCS | Mod: S$GLB,,, | Performed by: INTERNAL MEDICINE

## 2022-07-13 PROCEDURE — 3074F PR MOST RECENT SYSTOLIC BLOOD PRESSURE < 130 MM HG: ICD-10-PCS | Mod: CPTII,S$GLB,, | Performed by: INTERNAL MEDICINE

## 2022-07-13 PROCEDURE — 3074F SYST BP LT 130 MM HG: CPT | Mod: CPTII,S$GLB,, | Performed by: INTERNAL MEDICINE

## 2022-07-13 PROCEDURE — 99215 PR OFFICE/OUTPT VISIT, EST, LEVL V, 40-54 MIN: ICD-10-PCS | Mod: S$GLB,,, | Performed by: INTERNAL MEDICINE

## 2022-07-13 PROCEDURE — 3062F POS MACROALBUMINURIA REV: CPT | Mod: CPTII,S$GLB,, | Performed by: INTERNAL MEDICINE

## 2022-07-13 PROCEDURE — 3008F PR BODY MASS INDEX (BMI) DOCUMENTED: ICD-10-PCS | Mod: CPTII,S$GLB,, | Performed by: INTERNAL MEDICINE

## 2022-07-13 PROCEDURE — 99999 PR PBB SHADOW E&M-EST. PATIENT-LVL V: ICD-10-PCS | Mod: PBBFAC,,, | Performed by: INTERNAL MEDICINE

## 2022-07-13 PROCEDURE — 99215 OFFICE O/P EST HI 40 MIN: CPT | Mod: S$GLB,,, | Performed by: INTERNAL MEDICINE

## 2022-07-13 PROCEDURE — 99999 PR PBB SHADOW E&M-EST. PATIENT-LVL V: CPT | Mod: PBBFAC,,, | Performed by: INTERNAL MEDICINE

## 2022-07-13 PROCEDURE — 3078F DIAST BP <80 MM HG: CPT | Mod: CPTII,S$GLB,, | Performed by: INTERNAL MEDICINE

## 2022-07-13 PROCEDURE — 3066F PR DOCUMENTATION OF TREATMENT FOR NEPHROPATHY: ICD-10-PCS | Mod: CPTII,S$GLB,, | Performed by: INTERNAL MEDICINE

## 2022-07-13 PROCEDURE — 3066F NEPHROPATHY DOC TX: CPT | Mod: CPTII,S$GLB,, | Performed by: INTERNAL MEDICINE

## 2022-07-13 PROCEDURE — 3062F PR POS MACROALBUMINURIA RESULT DOCUMENTED/REVIEW: ICD-10-PCS | Mod: CPTII,S$GLB,, | Performed by: INTERNAL MEDICINE

## 2022-07-13 PROCEDURE — 3288F PR FALLS RISK ASSESSMENT DOCUMENTED: ICD-10-PCS | Mod: CPTII,S$GLB,, | Performed by: INTERNAL MEDICINE

## 2022-07-13 PROCEDURE — 4010F ACE/ARB THERAPY RXD/TAKEN: CPT | Mod: CPTII,S$GLB,, | Performed by: INTERNAL MEDICINE

## 2022-07-13 RX ORDER — METOLAZONE 2.5 MG/1
TABLET ORAL
COMMUNITY
Start: 2022-07-05 | End: 2022-10-18

## 2022-07-13 RX ORDER — AZITHROMYCIN 250 MG/1
TABLET, FILM COATED ORAL
Qty: 6 TABLET | Refills: 0 | Status: SHIPPED | OUTPATIENT
Start: 2022-07-13 | End: 2022-09-27

## 2022-07-13 RX ORDER — PREDNISONE 20 MG/1
TABLET ORAL
Qty: 20 TABLET | Refills: 0 | Status: SHIPPED | OUTPATIENT
Start: 2022-07-13 | End: 2022-08-17

## 2022-07-13 RX ORDER — ALBUTEROL SULFATE 0.83 MG/ML
2.5 SOLUTION RESPIRATORY (INHALATION)
Qty: 360 ML | Refills: 11 | Status: SHIPPED | OUTPATIENT
Start: 2022-07-13 | End: 2022-09-27 | Stop reason: SDUPTHER

## 2022-07-13 NOTE — PROGRESS NOTES
Subjective:     Patient ID: Santiago Khan is a 71 y.o. male.    Chief Complaint:      HPI    COPD  He presents for evaluation and treatment of COPD. The patient is currently having symptoms / an exacerbation. Current symptoms include acute dyspnea, chronic dyspnea, dyspnea after 1 blocks, non-productive cough, cough productive of white sputum in small amounts and wheezing. Symptoms have been present since several months ago and have been gradually worsening. He denies chills and fever. Associated symptoms include fatigue, poor exercise tolerance, shortness of breath and wheezing.  This episode appears to have been triggered by no identifiable factor. Treatments tried for the current exacerbation: albuterol inhaler. The patient has been having similar episodes for approximately 5 years. He uses 1 pillows at night. Patient currently is not on home oxygen therapy.. The patient is having no constitutional symptoms, denying fever, chills, anorexia, or weight loss. The patient has not been hospitalized for this condition before. He has a history of 50 pack years. The patient is experiencing exercise intolerance (difficulty walking 1 blocks on flat ground).      Obstructive Sleep Apnea:  Not able to use Continuous Positive Airway Pressure since obtaining new machine    He received new autopap device from recall. He feels like pressure too high.   He benefits from Autopap use.     Past Medical History:   Diagnosis Date    Chronic diastolic congestive heart failure 4/7/2020    Chronic kidney disease, stage 3     Chronic systolic congestive heart failure 7/9/2020    Chronic venous insufficiency     DDD (degenerative disc disease), lumbar     DM (diabetes mellitus) with complications     History of gout     Hyperlipidemia associated with type 2 diabetes mellitus     Hypertension associated with stage 3 chronic kidney disease due to type 2 diabetes mellitus     BRADLEY on CPAP     Prostate cancer      prostatectomy in 2010, rising PSA that started in 2021    Tobacco abuse      Past Surgical History:   Procedure Laterality Date    BICEPS TENDON REPAIR      COLONOSCOPY N/A 3/27/2019    Procedure: COLONOSCOPY;  Surgeon: James Roger MD;  Location: [facility] ENDO;  Service: Endoscopy;  Laterality: N/A;    HEMORRHOID SURGERY      INGUINAL HERNIA REPAIR Left     KNEE ARTHROSCOPY Right     LEFT HEART CATHETERIZATION Left 6/29/2020    Procedure: CATHETERIZATION, HEART, LEFT;  Surgeon: Cheli Wilosn MD;  Location: [facility] CATH LAB;  Service: Cardiology;  Laterality: Left;    LUMBAR LAMINECTOMY      PROSTATECTOMY       Review of patient's allergies indicates:   Allergen Reactions    Amitriptyline Rash    Celecoxib Rash     Current Outpatient Medications on File Prior to Visit   Medication Sig Dispense Refill    albuterol (PROVENTIL/VENTOLIN HFA) 90 mcg/actuation inhaler Inhale 2 puffs into the lungs every 4 (four) hours as needed for Wheezing. 8.5 g 3    allopurinoL (ZYLOPRIM) 300 MG tablet TAKE 1 TABLET(300 MG) BY MOUTH EVERY DAY 90 tablet 3    aspirin (ECOTRIN) 81 MG EC tablet Take 81 mg by mouth once daily.      baclofen (LIORESAL) 10 MG tablet Take 1 tablet by mouth every evening.  2    bisoprolol (ZEBETA) 5 MG tablet TAKE 1/2 TABLET BY MOUTH ONCE DAILY 90 tablet 3    colchicine (COLCRYS) 0.6 mg tablet TAKE 1 TABLET(0.6 MG) BY MOUTH DAILY AS NEEDED 30 tablet 1    fluticasone propionate (FLONASE) 50 mcg/actuation nasal spray SHAKE LIQUID AND USE 2 SPRAYS(100 MCG) IN EACH NOSTRIL EVERY DAY 48 g 0    glimepiride (AMARYL) 1 MG tablet TAKE 1 TABLET BY MOUTH ONCE DAILY 90 tablet 3    HYDROcodone-acetaminophen (NORCO) 7.5-325 mg per tablet Take 1 tablet by mouth every 4 (four) hours as needed (for chronic pain).  0    losartan (COZAAR) 25 MG tablet Take 1 tablet (25 mg total) by mouth once daily.      magnesium oxide (MAG-OX) 400 mg (241.3 mg magnesium) tablet TAKE 2 TABLETS(800 MG) BY MOUTH TWICE DAILY  120 tablet 5    metFORMIN (GLUCOPHAGE) 500 MG tablet TAKE 1 TABLET(500 MG) BY MOUTH DAILY WITH BREAKFAST 90 tablet 3    metOLazone (ZAROXOLYN) 2.5 MG tablet Take by mouth.      multivitamin-minerals-lutein Tab Take 1 tablet by mouth Daily.      omeprazole (PRILOSEC) 40 MG capsule TAKE ONE CAPSULE BY MOUTH EVERY DAY 30 capsule 11    potassium chloride SA (K-DUR,KLOR-CON) 20 MEQ tablet TAKE 3 TABLETS(60 MEQ) BY MOUTH TWICE DAILY 720 tablet 2    simvastatin (ZOCOR) 40 MG tablet TAKE 1 TABLET(40 MG) BY MOUTH EVERY EVENING 90 tablet 3    torsemide (DEMADEX) 20 MG Tab Take 1 tablet (20 mg total) by mouth once daily. (Patient taking differently: Take 40 mg by mouth 2 (two) times a day.) 30 tablet 11    [DISCONTINUED] allopurinoL (ZYLOPRIM) 300 MG tablet TAKE 1 TABLET(300 MG) BY MOUTH EVERY DAY 90 tablet 3     No current facility-administered medications on file prior to visit.     Social History     Socioeconomic History    Marital status:    Tobacco Use    Smoking status: Former Smoker     Packs/day: 1.00     Years: 50.00     Pack years: 50.00     Types: Cigarettes     Start date: 1971     Quit date: 3/1/2021     Years since quittin.3    Smokeless tobacco: Former User   Substance and Sexual Activity    Alcohol use: Not Currently    Drug use: Never    Sexual activity: Not Currently     Partners: Female     Social Determinants of Health     Financial Resource Strain: Low Risk     Difficulty of Paying Living Expenses: Not very hard   Food Insecurity: No Food Insecurity    Worried About Running Out of Food in the Last Year: Never true    Ran Out of Food in the Last Year: Never true   Transportation Needs: No Transportation Needs    Lack of Transportation (Medical): No    Lack of Transportation (Non-Medical): No   Physical Activity: Inactive    Days of Exercise per Week: 0 days    Minutes of Exercise per Session: 0 min   Stress: Stress Concern Present    Feeling of Stress : To some extent    Social Connections: Unknown    Frequency of Communication with Friends and Family: More than three times a week    Frequency of Social Gatherings with Friends and Family: Once a week    Active Member of Clubs or Organizations: No    Attends Club or Organization Meetings: Never    Marital Status:    Housing Stability: Unknown    Unable to Pay for Housing in the Last Year: Patient refused    Number of Places Lived in the Last Year: 1    Unstable Housing in the Last Year: No     Family History   Problem Relation Age of Onset    Prostate cancer Father     Coronary artery disease Father 90    Alzheimer's disease Father     Hyperlipidemia Mother        Review of Systems   Constitutional: Positive for fatigue. Negative for fever.   HENT: Positive for postnasal drip, rhinorrhea and congestion.    Eyes: Negative for redness and itching.   Respiratory: Positive for cough, sputum production, shortness of breath, dyspnea on extertion, use of rescue inhaler and Paroxysmal Nocturnal Dyspnea.    Cardiovascular: Negative for chest pain, palpitations and leg swelling.   Genitourinary: Negative for difficulty urinating and hematuria.   Endocrine: Negative for cold intolerance and heat intolerance.    Skin: Negative for rash.   Gastrointestinal: Negative for nausea and abdominal pain.   Neurological: Negative for dizziness, syncope, weakness and light-headedness.   Hematological: Negative for adenopathy. Does not bruise/bleed easily.   Psychiatric/Behavioral: Negative for sleep disturbance. The patient is not nervous/anxious.        Objective:      /60   Pulse 89   Resp 20   Wt 103 kg (226 lb 15.4 oz)   SpO2 97%   BMI 35.55 kg/m²   Physical Exam  Vitals and nursing note reviewed.   Constitutional:       Appearance: He is well-developed. He is obese.   HENT:      Head: Normocephalic and atraumatic.      Nose: Congestion present.   Eyes:      Conjunctiva/sclera: Conjunctivae normal.      Pupils: Pupils  are equal, round, and reactive to light.   Neck:      Thyroid: No thyromegaly.      Vascular: No JVD.      Trachea: No tracheal deviation.   Cardiovascular:      Rate and Rhythm: Normal rate and regular rhythm.      Heart sounds: No murmur heard.  Pulmonary:      Breath sounds: Examination of the right-lower field reveals wheezing. Examination of the left-lower field reveals wheezing. Decreased breath sounds and wheezing present. No rhonchi or rales.   Abdominal:      General: Bowel sounds are normal.      Palpations: Abdomen is soft.   Musculoskeletal:         General: No tenderness.      Cervical back: Neck supple.      Comments: Decreased range of motion of knees.     Lymphadenopathy:      Cervical: No cervical adenopathy.   Skin:     General: Skin is warm and dry.   Neurological:      Mental Status: He is alert and oriented to person, place, and time.       Personal Diagnostic Review  PSG: significant for Obstructive Sleep Apnea       John Ghotra MD (Physician)   Pulmonary Disease  Procedure Orders   1. Home Sleep Studies [[id number]] ordered by Elizabeth Lejeune, NP   Pre-procedure Diagnoses   1. BRADLEY (obstructive sleep apnea) [G47.33]   Post-procedure Diagnoses   1. BRADLEY (obstructive sleep apnea) [G47.33]      Home Sleep Studies  Date/Time: 12/16/2019 8:00 AM  Performed by: John Ghotra MD  Authorized by: Elizabeth Lejeune, NP        PHYSICIAN INTERPRETATION AND COMMENTS: Findings are consistent with severe, non-positional obstructive sleep  apnea (BRADLEY). There is insufficient supine study time to assess the severity of positional obstructive sleep apnea (BRADLEY). Indications  of cardiac dysrhythmia are recorded. Night #1 had no valid data. Night #2: AHI was 37.0/hr with 3.5 hours sleep. Consider INLAB  CPAP titration to allow proper habituation and mask fitting.  CLINICAL HISTORY: 69 year old male presented with:Prior diagnosis of BRADLEY, seeks to restablish therapy. STOPBANG score  6. Frequent waking  up at night, snoring, Non refreshing sleep. 17 inch neck, BMI of 30, an [place] sleepiness score of 20, history  of diabetes and symptoms of nocturnal snoring, waking up choking and witnessed apneas. Based on the clinical history, the patient  has a high pre-test probability of having severe BRADLEY.  SLEEP STUDY FINDINGS: Patient underwent a two night Home Sleep Test and by behavioral criteria, slept for approximately  3.5 hours, with a sleep latency of 24 minutes and a sleep efficiency of 66.6%. Severe sleep disordered breathing (AHI=37) is noted  based on a 4% hypopnea desaturation criteria. The patient slept supine 1.6% of the night based on valid recording time of 3.54  hours. The apneas/hypopneas are accompanied by moderate oxygen desaturation (percent time below 90% SpO2: 11.6%, Min  SpO2: 82.8%). The average desaturation across all sleep disordered breathing events is 5.0%. Snoring occurs for 37.3% (30 dB) of  the study, 22.6% is very loud. The mean pulse rate is 77 BPM, with very frequent pulse rate variability (90 events with >= 6 BPM  increase/decrease per hour).  TREATMENT CONSIDERATIONS: Consider nasal continuous positive airway pressure (CPAP/AutoPAP) as the initial  treatment choice for severe obstructive sleep apnea. A mandibular advancement splint (MAS) or referral to an ENT surgeon for  modification to the airway should be considered to reduce the risk of mortality caused by severe BRADLEY if the patient prefers an  alternative therapy or the CPAP trial is unsuccessful.  DISEASE MANAGEMENT CONSIDERATIONS: Irregularity of the pulse rate signals indicates possible cardiac  dysrhythmia. If clinically appropriate, further cardiac evaluation is suggested. Sleeping pills may increase the severity of  untreated BRADLEY and their use should be reevaluated once the BRADELY is treated.            Progress Notes  John Ghotra MD (Physician)   Pulmonary Disease     PHYSICIAN INTERPRETATION AND COMMENTS: Findings are  consistent with severe, non-positional obstructive sleep  apnea (BRADLEY). There is insufficient supine study time to assess the severity of positional obstructive sleep apnea (BRADLEY). Indications  of cardiac dysrhythmia are recorded. Night #1 had no valid data. Night #2: AHI was 37.0/hr with 3.5 hours sleep. Consider INLAB  CPAP titration to allow proper habituation and mask fitting.  CLINICAL HISTORY: 69 year old male presented with:Prior diagnosis of BRADLEY, seeks to restablish therapy. STOPBANG score  6. Frequent waking up at night, snoring, Non refreshing sleep. 17 inch neck, BMI of 30, an [place] sleepiness score of 20, history  of diabetes and symptoms of nocturnal snoring, waking up choking and witnessed apneas. Based on the clinical history, the patient  has a high pre-test probability of having severe BRADLEY.  SLEEP STUDY FINDINGS: Patient underwent a two night Home Sleep Test and by behavioral criteria, slept for approximately  3.5 hours, with a sleep latency of 24 minutes and a sleep efficiency of 66.6%. Severe sleep disordered breathing (AHI=37) is noted  based on a 4% hypopnea desaturation criteria. The patient slept supine 1.6% of the night based on valid recording time of 3.54  hours. The apneas/hypopneas are accompanied by moderate oxygen desaturation (percent time below 90% SpO2: 11.6%, Min  SpO2: 82.8%). The average desaturation across all sleep disordered breathing events is 5.0%. Snoring occurs for 37.3% (30 dB) of  the study, 22.6% is very loud. The mean pulse rate is 77 BPM, with very frequent pulse rate variability (90 events with >= 6 BPM  increase/decrease per hour).  TREATMENT CONSIDERATIONS: Consider nasal continuous positive airway pressure (CPAP/AutoPAP) as the initial  treatment choice for severe obstructive sleep apnea. A mandibular advancement splint (MAS) or referral to an ENT surgeon for  modification to the airway should be considered to reduce the risk of mortality caused by severe BRADLEY if  the patient prefers an  alternative therapy or the CPAP trial is unsuccessful.  DISEASE MANAGEMENT CONSIDERATIONS: Irregularity of the pulse rate signals indicates possible cardiac  dysrhythmia. If clinically appropriate, further cardiac evaluation is suggested. Sleeping pills may increase the severity of  untreated BRADLEY and their use should be reevaluated once the BRADLEY is treated.                  Pulmonary Studies Review 7/13/2022   SpO2 97   Height -   Weight 3631.42   BMI (Calculated) -   Predicted Distance 501.2   Predicted Distance Meters (Calculated) -       CT Knee Without Contrast Left  Addendum: All CT scans at this facility are performed  using dose modulation  techniques as appropriate to performed exam including the following:   automated exposure control; adjustment of mA and/or kV according to the  patients size (this includes techniques or standardized protocols for  targeted exams where dose is matched to indication/reason for exam: i.e.  extremities or head);  iterative reconstruction technique.     Electronically signed by: Mohan Jimenez MD   Date:    04/26/2022   Time:    11:14  Narrative: EXAMINATION:  CT KNEE WITHOUT CONTRAST LEFT    CLINICAL HISTORY:  Septic arthritis suspected, knee, xray done;Soft tissue infection suspected, knee, xray done;    COMPARISON:  None    FINDINGS:  Fluid in a large bursa bursa superficial to the patella, likely infected measuring up to 4.8 x 1.3 by 7 cm.  Extends along the lateral aspect of the knee.    No evidence of a suprapatellar joint effusion and no evidence of fluid in the joint capsule identified.  Impression: Infected bursa superficial to the patella measuring 4.8 x 1.3 x 7 cm.  No fluid in the joint capsule can be identified.  No acute osseous abnormality.    Electronically signed by: Mohan Jimenez MD  Date:    03/12/2022  Time:    13:57      Office Spirometry Results:     No flowsheet data found.  Pulmonary Studies Review 7/13/2022   SpO2 97   Height -    Weight 3631.42   BMI (Calculated) -   Predicted Distance 501.2   Predicted Distance Meters (Calculated) -         Assessment:       Class 2 severe obesity due to excess calories with serious comorbidity and body mass index (BMI) of 35.0 to 35.9 in adult  Last documentation =       BMI noted to be elevated. Changes in weight over time reviewed in chart (if available) and by patient recollection. Patient advised and counseled concerning the adverse medical consequences of obesity. Treatment options discussed - calorie restriction, increasing calorie expenditure, medical and surgical options noted.  The patient's severe obesity was monitored, evaluated, addressed and/or treated.   2013 AHA/ACC/TOS guideline for the management of overweight and obesity in adults: a report of the American College of Cardiology/American Heart Association Task Force on Practice Guidelines and The Obesity Society      BRADLEY on CPAP  Not using CPAP due to pressure too high  Pressure changed to 10 cm     COPD exacerbation  -     NEBULIZER KIT (SUPPLIES) FOR HOME USE  -     NEBULIZER FOR HOME USE  -     albuterol (PROVENTIL) 2.5 mg /3 mL (0.083 %) nebulizer solution; Take 3 mLs (2.5 mg total) by nebulization every 4 to 6 hours as needed for Wheezing or Shortness of Breath.  Dispense: 360 mL; Refill: 11  -     predniSONE (DELTASONE) 20 MG tablet; Prednisone 60 mg/ day for 3 days, 40 mg/day for 3 days,20 mg/ day for 3 days, (1/2 tablet )10 mg a day for 3 days.  Dispense: 20 tablet; Refill: 0  -     azithromycin (ZITHROMAX Z-TAZ) 250 MG tablet; 500 mg on day 1 (two tablets) followed by 250 mg once daily on days 2-5  Dispense: 6 tablet; Refill: 0    BRADLEY on CPAP  -     [facility]; Standing  -     CPAP FOR HOME USE  -     CPAP/BIPAP SUPPLIES    Cough  -     CT Chest Without Contrast; Future; Expected date: 07/13/2022  -     Spirometry with/without bronchodilator; Future; Expected date: 01/13/2023    Exercise hypoxemia  -      Stress test, pulmonary; Future; Expected date: 07/13/2022    Simple chronic bronchitis  -     Spirometry with/without bronchodilator; Future; Expected date: 01/13/2023    Class 2 severe obesity due to excess calories with serious comorbidity and body mass index (BMI) of 35.0 to 35.9 in adult    BRADLEY (obstructive sleep apnea)          Outpatient Encounter Medications as of 7/13/2022   Medication Sig Dispense Refill    albuterol (PROVENTIL) 2.5 mg /3 mL (0.083 %) nebulizer solution Take 3 mLs (2.5 mg total) by nebulization every 4 to 6 hours as needed for Wheezing or Shortness of Breath. 360 mL 11    albuterol (PROVENTIL/VENTOLIN HFA) 90 mcg/actuation inhaler Inhale 2 puffs into the lungs every 4 (four) hours as needed for Wheezing. 8.5 g 3    allopurinoL (ZYLOPRIM) 300 MG tablet TAKE 1 TABLET(300 MG) BY MOUTH EVERY DAY 90 tablet 3    aspirin (ECOTRIN) 81 MG EC tablet Take 81 mg by mouth once daily.      azithromycin (ZITHROMAX Z-TAZ) 250 MG tablet 500 mg on day 1 (two tablets) followed by 250 mg once daily on days 2-5 6 tablet 0    baclofen (LIORESAL) 10 MG tablet Take 1 tablet by mouth every evening.  2    bisoprolol (ZEBETA) 5 MG tablet TAKE 1/2 TABLET BY MOUTH ONCE DAILY 90 tablet 3    colchicine (COLCRYS) 0.6 mg tablet TAKE 1 TABLET(0.6 MG) BY MOUTH DAILY AS NEEDED 30 tablet 1    fluticasone propionate (FLONASE) 50 mcg/actuation nasal spray SHAKE LIQUID AND USE 2 SPRAYS(100 MCG) IN EACH NOSTRIL EVERY DAY 48 g 0    glimepiride (AMARYL) 1 MG tablet TAKE 1 TABLET BY MOUTH ONCE DAILY 90 tablet 3    HYDROcodone-acetaminophen (NORCO) 7.5-325 mg per tablet Take 1 tablet by mouth every 4 (four) hours as needed (for chronic pain).  0    losartan (COZAAR) 25 MG tablet Take 1 tablet (25 mg total) by mouth once daily.      magnesium oxide (MAG-OX) 400 mg (241.3 mg magnesium) tablet TAKE 2 TABLETS(800 MG) BY MOUTH TWICE DAILY 120 tablet 5    metFORMIN (GLUCOPHAGE) 500 MG tablet TAKE 1 TABLET(500 MG) BY MOUTH  DAILY WITH BREAKFAST 90 tablet 3    metOLazone (ZAROXOLYN) 2.5 MG tablet Take by mouth.      multivitamin-minerals-lutein Tab Take 1 tablet by mouth Daily.      omeprazole (PRILOSEC) 40 MG capsule TAKE ONE CAPSULE BY MOUTH EVERY DAY 30 capsule 11    potassium chloride SA (K-DUR,KLOR-CON) 20 MEQ tablet TAKE 3 TABLETS(60 MEQ) BY MOUTH TWICE DAILY 720 tablet 2    predniSONE (DELTASONE) 20 MG tablet Prednisone 60 mg/ day for 3 days, 40 mg/day for 3 days,20 mg/ day for 3 days, (1/2 tablet )10 mg a day for 3 days. 20 tablet 0    simvastatin (ZOCOR) 40 MG tablet TAKE 1 TABLET(40 MG) BY MOUTH EVERY EVENING 90 tablet 3    torsemide (DEMADEX) 20 MG Tab Take 1 tablet (20 mg total) by mouth once daily. (Patient taking differently: Take 40 mg by mouth 2 (two) times a day.) 30 tablet 11    [DISCONTINUED] allopurinoL (ZYLOPRIM) 300 MG tablet TAKE 1 TABLET(300 MG) BY MOUTH EVERY DAY 90 tablet 3     No facility-administered encounter medications on file as of 2022.     Plan:       Requested Prescriptions     Signed Prescriptions Disp Refills    albuterol (PROVENTIL) 2.5 mg /3 mL (0.083 %) nebulizer solution 360 mL 11     Sig: Take 3 mLs (2.5 mg total) by nebulization every 4 to 6 hours as needed for Wheezing or Shortness of Breath.    predniSONE (DELTASONE) 20 MG tablet 20 tablet 0     Sig: Prednisone 60 mg/ day for 3 days, 40 mg/day for 3 days,20 mg/ day for 3 days, (1/2 tablet )10 mg a day for 3 days.    azithromycin (ZITHROMAX Z-TAZ) 250 MG tablet 6 tablet 0     Si mg on day 1 (two tablets) followed by 250 mg once daily on days 2-5     Problem List Items Addressed This Visit     Class 2 severe obesity due to excess calories with serious comorbidity and body mass index (BMI) of 35.0 to 35.9 in adult    Current Assessment & Plan     Last documentation =       BMI noted to be elevated. Changes in weight over time reviewed in chart (if available) and by patient recollection. Patient advised and counseled  concerning the adverse medical consequences of obesity. Treatment options discussed - calorie restriction, increasing calorie expenditure, medical and surgical options noted.  The patient's severe obesity was monitored, evaluated, addressed and/or treated.   2013 AHA/ACC/TOS guideline for the management of overweight and obesity in adults: a report of the American College of Cardiology/American Heart Association Task Force on Practice Guidelines and The Obesity Society             BRADLEY on CPAP    Overview     Severe BRADLEY. Autopap.              Current Assessment & Plan     Not using CPAP due to pressure too high  Pressure changed to 10 cm            Relevant Orders    [facility] MYCHART PATIENT ENTERED CPAP USAGE    CPAP FOR HOME USE    CPAP/BIPAP SUPPLIES      Other Visit Diagnoses     COPD exacerbation    -  Primary    Relevant Medications    albuterol (PROVENTIL) 2.5 mg /3 mL (0.083 %) nebulizer solution    predniSONE (DELTASONE) 20 MG tablet    azithromycin (ZITHROMAX Z-TAZ) 250 MG tablet    Other Relevant Orders    NEBULIZER KIT (SUPPLIES) FOR HOME USE    NEBULIZER FOR HOME USE    Cough        Relevant Orders    CT Chest Without Contrast    Spirometry with/without bronchodilator    Exercise hypoxemia        Relevant Orders    Stress test, pulmonary    Simple chronic bronchitis        Relevant Orders    Spirometry with/without bronchodilator             Follow up in about 6 weeks (around 8/24/2022) for Review alyce 6 min walk Review CT - on return visit, CPAP download - review on day of visit.    MEDICAL DECISION MAKING: Moderate to high complexity.  Overall, the multiple problems listed are of moderate to high severity that may impact quality of life and activities of daily living. Side effects of medications, treatment plan as well as options and alternatives reviewed and discussed with patient. There was counseling of patient concerning these issues.    Total time spent in counseling and coordination of care - 45   minutes of total time spent on the encounter, which includes face to face time and non-face to face time preparing to see the patient (eg, review of tests), Obtaining and/or reviewing separately obtained history, Documenting clinical information in the electronic or other health record, Independently interpreting results (not separately reported) and communicating results to the patient/family/caregiver, or Care coordination (not separately reported).    Time was used in discussion of prognosis, risks, benefits of treatment, instructions and compliance with regimen . Discussion with other physicians and/or health care providers - home health or for use of durable medical equipment (oxygen, nebulizers, CPAP, BiPAP) occurred.

## 2022-07-18 ENCOUNTER — HOSPITAL ENCOUNTER (OUTPATIENT)
Dept: RADIOLOGY | Facility: HOSPITAL | Age: 72
Discharge: HOME | End: 2022-07-18
Attending: PSYCHIATRY & NEUROLOGY
Payer: MEDICARE

## 2022-07-18 PROCEDURE — 70496 CT ANGIOGRAPHY HEAD: CPT | Mod: TC

## 2022-07-18 PROCEDURE — 25500020 PHARM REV CODE 255: Performed by: PSYCHIATRY & NEUROLOGY

## 2022-07-18 RX ADMIN — IOHEXOL 100 ML: 350 INJECTION, SOLUTION INTRAVENOUS at 11:07

## 2022-07-19 ENCOUNTER — TELEPHONE (OUTPATIENT)
Dept: NEUROLOGY | Facility: CLINIC | Age: 72
End: 2022-07-19
Payer: MEDICARE

## 2022-07-19 NOTE — TELEPHONE ENCOUNTER
----- Message from Mariluz Sorensen NP sent at 7/19/2022 10:48 AM CDT -----  CTA Head Results:    07-    1. Predominantly extraconal mass within the superomedial right orbit containing portions of branches of the ophthalmic artery.  This does not definitely emanate from the extra-ocular muscles.  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 MRI of the orbits may be definitive although there is a focus of metal imbedded within the subcutaneous tissues of the right scalp.    2. Normal CTA of the head with no evidence of aneurysm.

## 2022-07-19 NOTE — PROGRESS NOTES
CTA Head Results:    07-    1. Predominantly extraconal mass within the superomedial right orbit containing portions of branches of the ophthalmic artery.  This does not definitely emanate from the extra-ocular muscles.  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 MRI of the orbits may be definitive although there is a focus of metal imbedded within the subcutaneous tissues of the right scalp.    2. Normal CTA of the head with no evidence of aneurysm.

## 2022-07-26 NOTE — TELEPHONE ENCOUNTER
Appt confirmed.   Quality 431: Preventive Care And Screening: Unhealthy Alcohol Use - Screening: Patient not identified as an unhealthy alcohol user when screened for unhealthy alcohol use using a systematic screening method Quality 130: Documentation Of Current Medications In The Medical Record: Current Medications Documented Quality 226: Preventive Care And Screening: Tobacco Use: Screening And Cessation Intervention: Patient screened for tobacco use and is an ex/non-smoker Detail Level: Detailed

## 2022-08-01 ENCOUNTER — PATIENT MESSAGE (OUTPATIENT)
Dept: PULMONOLOGY | Facility: CLINIC | Age: 72
End: 2022-08-01
Payer: MEDICARE

## 2022-08-03 DIAGNOSIS — K21.9 GASTROESOPHAGEAL REFLUX DISEASE WITHOUT ESOPHAGITIS: ICD-10-CM

## 2022-08-03 RX ORDER — OMEPRAZOLE 40 MG/1
CAPSULE, DELAYED RELEASE ORAL
Qty: 30 CAPSULE | Refills: 2 | Status: SHIPPED | OUTPATIENT
Start: 2022-08-03 | End: 2023-01-09

## 2022-08-04 NOTE — TELEPHONE ENCOUNTER
Refill Decision Note   Santiago Khan  is requesting a refill authorization.  Brief Assessment and Rationale for Refill:  Approve    -Medication-Related Problems Identified: Requires labs  Medication Therapy Plan:       Medication Reconciliation Completed: No   Comments:     Provider Staff:     Action is required for this patient.   Please see care gap opportunities below in Care Due Message.     Thanks!  [facility]     Appointments      Date Provider   Last Visit   4/29/2022 Kashif Acosta MD   Next Visit   11/4/2022 Kashif Acosta MD     Note composed:11:48 PM 08/03/2022           Note composed:11:48 PM 08/03/2022

## 2022-08-07 ENCOUNTER — HOSPITAL ENCOUNTER (EMERGENCY)
Facility: HOSPITAL | Age: 72
Discharge: HOME | End: 2022-08-07
Attending: EMERGENCY MEDICINE
Payer: MEDICARE

## 2022-08-07 VITALS
DIASTOLIC BLOOD PRESSURE: 84 MMHG | RESPIRATION RATE: 18 BRPM | SYSTOLIC BLOOD PRESSURE: 124 MMHG | HEART RATE: 98 BPM | OXYGEN SATURATION: 93 % | WEIGHT: 227.38 LBS | HEIGHT: 67 IN | TEMPERATURE: 99 F | BODY MASS INDEX: 35.69 KG/M2

## 2022-08-07 DIAGNOSIS — J34.89 MAXILLARY SINUS MASS: ICD-10-CM
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
## 2022-08-07 DIAGNOSIS — S05.12XA PERIORBITAL CONTUSION OF LEFT EYE, INITIAL ENCOUNTER: Primary | ICD-10-CM

## 2022-08-07 PROCEDURE — 99284 EMERGENCY DEPT VISIT MOD MDM: CPT | Mod: 25

## 2022-08-07 NOTE — ED PROVIDER NOTES
SCRIBE #1 NOTE: I, Viridiana Sanchez, am scribing for, and in the presence of, Florinda Doran MD. I have scribed the entire note.      History      Chief Complaint   Patient presents with    Fall     Pt fell last night, hit corner of furniture, bruising noted to left eye/ surrounding tissue, denies LOC, denies visual changes, denies headache or nausea, denies blood thinners       Review of patient's allergies indicates:   Allergen Reactions    Amitriptyline Rash    Celecoxib Rash        HPI   HPI    8/7/2022, 6:04 PM   History obtained from the patient      History of Present Illness: Santiago Khan is a 71 y.o. male patient with a PMHx of chronic diastolic congestive heart failure, CKD, chronic systolic congestive heart failure, DM, HLD, HTN, BRADLEY on CPAP, prostate cancer, and tobacco abuse who presents to the Emergency Department for an evaluation because he fell 2 nights ago and hit his L eye on the corner of a night stand. Pt denies taking blood thinners. Associated sxs include L periorbital bruising and L periorbital pain. Symptoms are constant and moderate in severity. No mitigating or exacerbating factors reported. Patient denies any visual disturbance, N/V/D, dizziness, weakness, numbness, headaches, fever, chills, SOB, CP, and all other sxs at this time. No prior Tx reported. Pt reports that he drove himself to the ED. No further complaints or concerns at this time.         Arrival mode: Personal vehicle     PCP: Kashif Acosta MD       Past Medical History:  Past Medical History:   Diagnosis Date    Chronic diastolic congestive heart failure 04/07/2020    Chronic kidney disease, stage 3     Chronic systolic congestive heart failure 07/09/2020    Chronic venous insufficiency     COPD (chronic obstructive pulmonary disease)     DDD (degenerative disc disease), lumbar     DM (diabetes mellitus) with complications     History of gout     Hyperlipidemia associated with type 2 diabetes mellitus      Hypertension associated with stage 3 chronic kidney disease due to type 2 diabetes mellitus     BRADLEY on CPAP     Prostate cancer     prostatectomy in 2010, rising PSA that started in 2021    Tobacco abuse        Past Surgical History:  Past Surgical History:   Procedure Laterality Date    BICEPS TENDON REPAIR      COLONOSCOPY N/A 3/27/2019    Procedure: COLONOSCOPY;  Surgeon: James Roger MD;  Location: [facility] ENDO;  Service: Endoscopy;  Laterality: N/A;    HEMORRHOID SURGERY      INGUINAL HERNIA REPAIR Left     KNEE ARTHROSCOPY Right     LEFT HEART CATHETERIZATION Left 6/29/2020    Procedure: CATHETERIZATION, HEART, LEFT;  Surgeon: Cheli Wilson MD;  Location: [facility] CATH LAB;  Service: Cardiology;  Laterality: Left;    LUMBAR LAMINECTOMY      PROSTATECTOMY           Family History:  Family History   Problem Relation Age of Onset    Prostate cancer Father     Coronary artery disease Father 90    Alzheimer's disease Father     Hyperlipidemia Mother        Social History:  Social History     Tobacco Use    Smoking status: Current Every Day Smoker     Packs/day: 0.50     Types: Cigarettes     Start date: 1/1/1971    Smokeless tobacco: Former User   Substance and Sexual Activity    Alcohol use: Not Currently    Drug use: Never    Sexual activity: Not Currently     Partners: Female       ROS   Review of Systems   Constitutional: Negative for chills and fever.   HENT: Negative for sore throat.    Eyes: Negative for visual disturbance.        (+) L periorbital pain   Respiratory: Negative for shortness of breath.    Cardiovascular: Negative for chest pain.   Gastrointestinal: Negative for diarrhea, nausea and vomiting.   Genitourinary: Negative for dysuria.   Musculoskeletal: Negative for back pain.   Skin: Positive for color change (L periorbital bruising). Negative for rash.   Neurological: Negative for dizziness, weakness, numbness and headaches.   Hematological: Does not bruise/bleed  "easily.   All other systems reviewed and are negative.    Physical Exam      Initial Vitals [08/07/22 1748]   BP Pulse Resp Temp SpO2   138/74 104 20 98.5 °F (36.9 °C) (!) 93 %      MAP       --          Physical Exam  Nursing Notes and Vital Signs Reviewed.  Constitutional: Patient is in no acute distress. Well-developed and well-nourished.  Head: There is L periorbital bruising and swelling. Normocephalic.  Eyes: PERRL. EOM intact. Conjunctivae are not pale. No scleral icterus. There is L periorbital bruising and swelling. No subconjunctival hemorrhage.  ENT: Mucous membranes are moist. Oropharynx is clear and symmetric.    Neck: Supple. Full ROM. No lymphadenopathy.  Cardiovascular: Regular rate. Regular rhythm. No murmurs, rubs, or gallops. Distal pulses are 2+ and symmetric.  Pulmonary/Chest: No respiratory distress. Clear to auscultation bilaterally. No wheezing or rales.  Abdominal: Soft and non-distended.  There is no tenderness.  No rebound, guarding, or rigidity.   Musculoskeletal: Moves all extremities. No obvious deformities. No edema.  Skin: Warm and dry.  Neurological:  Alert, awake, and appropriate.  Normal speech.  No acute focal neurological deficits are appreciated.  Psychiatric: Normal affect. Good eye contact. Appropriate in content.    ED Course    Procedures  ED Vital Signs:  Vitals:    08/07/22 1748 08/07/22 1815   BP: 138/74 122/84   Pulse: 104 97   Resp: 20 18   Temp: 98.5 °F (36.9 °C) 98.6 °F (37 °C)   TempSrc: Oral    SpO2: (!) 93% (!) 93%   Weight: 103.1 kg (227 lb 6.5 oz)    Height: 5' 7" (1.702 m)        Abnormal Lab Results:  Labs Reviewed - No data to display       Imaging Results:  Imaging Results          CT Maxillofacial Without Contrast (Final result)  Result time 08/07/22 18:59:14    Final result by Tammi Burroughs MD (08/07/22 18:59:14)                 Impression:      Left periorbital hematoma    Slight nasal irregularity may relate to recent trauma    Foreign body left scalp " soft tissues    Serpiginous mass in the superior nasal aspect of the left orbit within the intra coronal extending to the extraconal region suspect underlying mass.  Posttraumatic change cannot be excluded.  Recommend ophthalmology consultation.    Lytic lesion involving the anterior aspect of the maxilla measuring 10 by 12 mm.  Recommend correlation to malignancy.    Minimal mucosal thickening of the left maxillary sinus      Electronically signed by: Manjeet Cadena  Date:    08/07/2022  Time:    18:59             Narrative:    EXAMINATION:  CT MAXILLOFACIAL WITHOUT CONTRAST    CLINICAL HISTORY:  Facial trauma, blunt;    TECHNIQUE:  Low dose axial images, sagittal and coronal reformations were obtained through the face.  Contrast was not administered.    COMPARISON:  None    FINDINGS:  Minimal irregularity of the nasal tip may relate to recent injury and represent in minimal nasal bone fracture.  Left periorbital hematoma and soft tissue swelling noted.  Minimal mucosal thickening of the left maxillary sinus.  No definite evidence for sinusitis.  No zygomatic arch fracture.  No orbital fracture.  Serpiginous soft tissue in the nasal side of the right orbit 3.5 cm in AP 11 mm in transverse and 16 mm in superior inferior dimension.  Although this could relate to a a recent injury, primary consideration should be given to underlying mass.  Recommend ophthalmology consultation.  Hyperdense focus in the left scalp measuring 6 mm consistent with foreign body.  Smaller tiny hyperdensity in the adjacent left scalp.  Mild a cerebral atrophy.    Lytic lesion involving the anterior aspect of the maxilla measuring 10 by 12 mm                               CT Head Without Contrast (Final result)  Result time 08/07/22 18:44:58    Final result by Tammi Burroughs MD (08/07/22 18:44:58)                 Impression:      No acute abnormality.    Atrophy and chronic white matter changes    Stable 7 mm hyperdensity at the foramen of  Monro suggestive of a small colloid cyst.  No hydrocephalus.    All CT scans   are performed using dose optimization techniques including the following: automated exposure control; adjustment of the mA and/or kV; use of iterative reconstruction technique.  Dose modulation was employed for ALARA by means of: Automated exposure control; adjustment of the mA and/or kV according to patient size (this includes techniques or standardized protocols for targeted exams where dose is matched to indication/reason for exam; i.e. extremities or head); and/or use of iterative reconstructive technique.      Electronically signed by: Manjeet Cadena  Date:    08/07/2022  Time:    18:44             Narrative:    EXAMINATION:  CT HEAD WITHOUT CONTRAST    CLINICAL HISTORY:  Head trauma, moderate-severe;    TECHNIQUE:  Low dose axial CT images obtained throughout the head without intravenous contrast. Sagittal and coronal reconstructions were performed.    COMPARISON:  None.    FINDINGS:  Atrophy and chronic white matter changes.  No extra-axial blood or fluid collections.  Stable 7 mm hyperdensity at the foramen of Monro suggestive of a small colloid cyst.  No hydrocephalus.    No parenchymal mass, hemorrhage, edema or major vascular distribution infarct.    Skull/extracranial contents (limited evaluation): No fracture. Mastoid air cells and paranasal sinuses are essentially clear.                                        The Emergency Provider reviewed the vital signs and test results, which are outlined above.    ED Discussion     7:52 PM: Reassessed pt at this time. Advised pt to f/u with ENT and Ophthalmology for close follow up of a possible facial mass. Discussed with pt all pertinent ED information and results. Discussed pt dx and plan of tx. Gave pt all f/u and return to the ED instructions. All questions and concerns were addressed at this time. Pt expresses understanding of information and instructions, and is comfortable with  plan to discharge. Pt is stable for discharge.    I discussed with patient and/or family/caretaker that evaluation in the ED does not suggest any emergent or life threatening medical conditions requiring immediate intervention beyond what was provided in the ED, and I believe patient is safe for discharge.  Regardless, an unremarkable evaluation in the ED does not preclude the development or presence of a serious of life threatening condition. As such, patient was instructed to return immediately for any worsening or change in current symptoms.           ED Medication(s):  Medications - No data to display    New Prescriptions    No medications on file        Follow-up Information     Otolaryngology. Schedule an appointment as soon as possible for a visit in 2 days.    Specialty: Otolaryngology  Contact information:  [address] [facility] [address]  [phone]           [facility] Ophthal[facility]. Schedule an appointment as soon as possible for a visit in 2 days.    Specialty: Ophthalmology  Why: Return to the Emergency Room, If symptoms worsen  Contact information:  [address] [facility] [id number] [phone]  Additional information:  Please park on the [address] side and use the [address] entrance. Check in on the 3rd floor or use any kiosk for self check-in.                         Medical Decision Making    Medical Decision Making:   Clinical Tests:   Radiological Study: Ordered and Reviewed           Scribe Attestation:   Scribe #1: I performed the above scribed service and the documentation accurately describes the services I performed. I attest to the accuracy of the note.    Attending:   Physician Attestation Statement for Scribe #1: I, Florinda Doran MD, personally performed the services described in this documentation, as scribed by Viridiana Sanchez, in my presence, and it is both accurate and complete.          Clinical Impression       ICD-10-CM  ICD-9-CM   1. Periorbital contusion of left eye, initial encounter  S05.12XA 921.1   2. Fall at home, initial encounter  W19.XXXA E888.9    Y92.009 E849.0   3. Maxillary sinus mass  J34.89 478.19       Disposition:   Disposition: Discharged  Condition: Stable         Florinda Doran MD  08/07/22 1959

## 2022-08-10 ENCOUNTER — PATIENT MESSAGE (OUTPATIENT)
Dept: INTERNAL MEDICINE | Facility: CLINIC | Age: 72
End: 2022-08-10
Payer: MEDICARE

## 2022-08-17 ENCOUNTER — HOSPITAL ENCOUNTER (OUTPATIENT)
Dept: RADIOLOGY | Facility: HOSPITAL | Age: 72
Discharge: HOME | End: 2022-08-17
Attending: NURSE PRACTITIONER
Payer: MEDICARE

## 2022-08-17 ENCOUNTER — TELEPHONE (OUTPATIENT)
Dept: NEUROLOGY | Facility: CLINIC | Age: 72
End: 2022-08-17

## 2022-08-17 ENCOUNTER — OFFICE VISIT (OUTPATIENT)
Dept: NEUROLOGY | Facility: CLINIC | Age: 72
End: 2022-08-17
Payer: MEDICARE

## 2022-08-17 VITALS
DIASTOLIC BLOOD PRESSURE: 65 MMHG | HEIGHT: 67 IN | SYSTOLIC BLOOD PRESSURE: 108 MMHG | OXYGEN SATURATION: 92 % | HEART RATE: 97 BPM | RESPIRATION RATE: 16 BRPM | BODY MASS INDEX: 35.26 KG/M2 | WEIGHT: 224.63 LBS

## 2022-08-17 DIAGNOSIS — F40.240 CLAUSTROPHOBIA: ICD-10-CM

## 2022-08-17 DIAGNOSIS — M79.605 LEFT LEG PAIN: ICD-10-CM

## 2022-08-17 DIAGNOSIS — Z96.7 METAL PLATE IN SKULL: ICD-10-CM

## 2022-08-17 DIAGNOSIS — H49.01 THIRD NERVE PALSY, RIGHT: Primary | ICD-10-CM

## 2022-08-17 DIAGNOSIS — H53.2 DIPLOPIA: ICD-10-CM

## 2022-08-17 DIAGNOSIS — H49.01 PARALYTIC STRABISMUS ASSOCIATED WITH RIGHT PARTIAL OCULOMOTOR NERVE PALSY: ICD-10-CM

## 2022-08-17 DIAGNOSIS — Q27.9 VENOUS MALFORMATION: ICD-10-CM

## 2022-08-17 DIAGNOSIS — E11.8 DM (DIABETES MELLITUS) WITH COMPLICATIONS: ICD-10-CM

## 2022-08-17 PROCEDURE — 1125F PR PAIN SEVERITY QUANTIFIED, PAIN PRESENT: ICD-10-PCS | Mod: CPTII,S$GLB,, | Performed by: NURSE PRACTITIONER

## 2022-08-17 PROCEDURE — 3066F PR DOCUMENTATION OF TREATMENT FOR NEPHROPATHY: ICD-10-PCS | Mod: CPTII,S$GLB,, | Performed by: NURSE PRACTITIONER

## 2022-08-17 PROCEDURE — 3008F PR BODY MASS INDEX (BMI) DOCUMENTED: ICD-10-PCS | Mod: CPTII,S$GLB,, | Performed by: NURSE PRACTITIONER

## 2022-08-17 PROCEDURE — 70260 X-RAY EXAM OF SKULL: CPT | Mod: TC

## 2022-08-17 PROCEDURE — 99214 PR OFFICE/OUTPT VISIT, EST, LEVL IV, 30-39 MIN: ICD-10-PCS | Mod: S$GLB,,, | Performed by: NURSE PRACTITIONER

## 2022-08-17 PROCEDURE — 3008F BODY MASS INDEX DOCD: CPT | Mod: CPTII,S$GLB,, | Performed by: NURSE PRACTITIONER

## 2022-08-17 PROCEDURE — 1159F PR MEDICATION LIST DOCUMENTED IN MEDICAL RECORD: ICD-10-PCS | Mod: CPTII,S$GLB,, | Performed by: NURSE PRACTITIONER

## 2022-08-17 PROCEDURE — 3044F PR MOST RECENT HEMOGLOBIN A1C LEVEL <7.0%: ICD-10-PCS | Mod: CPTII,S$GLB,, | Performed by: NURSE PRACTITIONER

## 2022-08-17 PROCEDURE — 1160F PR REVIEW ALL MEDS BY PRESCRIBER/CLIN PHARMACIST DOCUMENTED: ICD-10-PCS | Mod: CPTII,S$GLB,, | Performed by: NURSE PRACTITIONER

## 2022-08-17 PROCEDURE — 70260 XR SKULL COMPLETE MIN 4 VIEWS: ICD-10-PCS | Mod: 26,,, | Performed by: RADIOLOGY

## 2022-08-17 PROCEDURE — 3078F PR MOST RECENT DIASTOLIC BLOOD PRESSURE < 80 MM HG: ICD-10-PCS | Mod: CPTII,S$GLB,, | Performed by: NURSE PRACTITIONER

## 2022-08-17 PROCEDURE — 99499 UNLISTED E&M SERVICE: CPT | Mod: S$GLB,,, | Performed by: NURSE PRACTITIONER

## 2022-08-17 PROCEDURE — 3074F PR MOST RECENT SYSTOLIC BLOOD PRESSURE < 130 MM HG: ICD-10-PCS | Mod: CPTII,S$GLB,, | Performed by: NURSE PRACTITIONER

## 2022-08-17 PROCEDURE — 3288F PR FALLS RISK ASSESSMENT DOCUMENTED: ICD-10-PCS | Mod: CPTII,S$GLB,, | Performed by: NURSE PRACTITIONER

## 2022-08-17 PROCEDURE — 4010F ACE/ARB THERAPY RXD/TAKEN: CPT | Mod: CPTII,S$GLB,, | Performed by: NURSE PRACTITIONER

## 2022-08-17 PROCEDURE — 70260 X-RAY EXAM OF SKULL: CPT | Mod: 26,,, | Performed by: RADIOLOGY

## 2022-08-17 PROCEDURE — 1100F PTFALLS ASSESS-DOCD GE2>/YR: CPT | Mod: CPTII,S$GLB,, | Performed by: NURSE PRACTITIONER

## 2022-08-17 PROCEDURE — 1159F MED LIST DOCD IN RCRD: CPT | Mod: CPTII,S$GLB,, | Performed by: NURSE PRACTITIONER

## 2022-08-17 PROCEDURE — 3288F FALL RISK ASSESSMENT DOCD: CPT | Mod: CPTII,S$GLB,, | Performed by: NURSE PRACTITIONER

## 2022-08-17 PROCEDURE — 3072F LOW RISK FOR RETINOPATHY: CPT | Mod: CPTII,S$GLB,, | Performed by: NURSE PRACTITIONER

## 2022-08-17 PROCEDURE — 99999 PR PBB SHADOW E&M-EST. PATIENT-LVL V: CPT | Mod: PBBFAC,,, | Performed by: NURSE PRACTITIONER

## 2022-08-17 PROCEDURE — 3066F NEPHROPATHY DOC TX: CPT | Mod: CPTII,S$GLB,, | Performed by: NURSE PRACTITIONER

## 2022-08-17 PROCEDURE — 99214 OFFICE O/P EST MOD 30 MIN: CPT | Mod: S$GLB,,, | Performed by: NURSE PRACTITIONER

## 2022-08-17 PROCEDURE — 3062F PR POS MACROALBUMINURIA RESULT DOCUMENTED/REVIEW: ICD-10-PCS | Mod: CPTII,S$GLB,, | Performed by: NURSE PRACTITIONER

## 2022-08-17 PROCEDURE — 3062F POS MACROALBUMINURIA REV: CPT | Mod: CPTII,S$GLB,, | Performed by: NURSE PRACTITIONER

## 2022-08-17 PROCEDURE — 99499 RISK ADDL DX/OHS AUDIT: ICD-10-PCS | Mod: S$GLB,,, | Performed by: NURSE PRACTITIONER

## 2022-08-17 PROCEDURE — 99999 PR PBB SHADOW E&M-EST. PATIENT-LVL V: ICD-10-PCS | Mod: PBBFAC,,, | Performed by: NURSE PRACTITIONER

## 2022-08-17 PROCEDURE — 3044F HG A1C LEVEL LT 7.0%: CPT | Mod: CPTII,S$GLB,, | Performed by: NURSE PRACTITIONER

## 2022-08-17 PROCEDURE — 1160F RVW MEDS BY RX/DR IN RCRD: CPT | Mod: CPTII,S$GLB,, | Performed by: NURSE PRACTITIONER

## 2022-08-17 PROCEDURE — 4010F PR ACE/ARB THEARPY RXD/TAKEN: ICD-10-PCS | Mod: CPTII,S$GLB,, | Performed by: NURSE PRACTITIONER

## 2022-08-17 PROCEDURE — 3074F SYST BP LT 130 MM HG: CPT | Mod: CPTII,S$GLB,, | Performed by: NURSE PRACTITIONER

## 2022-08-17 PROCEDURE — 3078F DIAST BP <80 MM HG: CPT | Mod: CPTII,S$GLB,, | Performed by: NURSE PRACTITIONER

## 2022-08-17 PROCEDURE — 3072F PR LOW RISK FOR RETINOPATHY: ICD-10-PCS | Mod: CPTII,S$GLB,, | Performed by: NURSE PRACTITIONER

## 2022-08-17 PROCEDURE — 1100F PR PT FALLS ASSESS DOC 2+ FALLS/FALL W/INJURY/YR: ICD-10-PCS | Mod: CPTII,S$GLB,, | Performed by: NURSE PRACTITIONER

## 2022-08-17 PROCEDURE — 1125F AMNT PAIN NOTED PAIN PRSNT: CPT | Mod: CPTII,S$GLB,, | Performed by: NURSE PRACTITIONER

## 2022-08-17 RX ORDER — DIAZEPAM 5 MG/1
5 TABLET ORAL ONCE
Qty: 1 TABLET | Refills: 0 | Status: SHIPPED | OUTPATIENT
Start: 2022-08-17 | End: 2023-03-23

## 2022-08-17 NOTE — PROGRESS NOTES
Subjective:       Patient ID: Santiago Khan is a 71 y.o. male.    Chief Complaint: Follow-up    HPI     BACKGROUND    The patient was in [facility] till  when noted double vision then dropping of the RT eye.The patient noticed the double vision when  was looking forwards and upwards but not downwards. The 2 pictures are side by side and at angle (diagnonal).. When he closes RT eye the double vision resolves. Medical history is remarkable for  Uncontrolled DM. No trauma. No alcoholism. No thyroid problems. No tick bites. No headache. No weakness. No slurring of speech. No trouble swallowing. Patient states he continues to have double vision in his right eye. He states he has had drooping of his eyelid since childhood. Patient states he periodically wears a right eye patch which is not helping very much. Has not completed CTA head related to severe claustrophobia. 02- MG Panel -ve.     Interval History 8-: Patient presents to follow up appointment unaccompanied. Continues to have double vision in right eye with no improvement. States he closes his eye rather than patching it. 7- CTA head and neck shows predominantly extraconal mass within the superomedial right orbit containing portions of branches of the ophthalmic artery.  This does not definitely emanate from the extra-ocular muscles.  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  MRI of the orbits may be definitive although there is a focus of metal imbedded within the subcutaneous tissues of the right scalp. Normal CTA of the head with no evidence of aneurysm.      Review of Systems   Constitutional: Negative for appetite change and fatigue.   HENT: Negative for hearing loss and tinnitus.    Eyes: Positive for visual disturbance. Negative for photophobia.   Respiratory: Positive for apnea and shortness of breath.     Cardiovascular: Positive for leg swelling. Negative for chest pain and palpitations.   Gastrointestinal: Negative for nausea and vomiting.   Endocrine: Negative for cold intolerance and heat intolerance.   Genitourinary: Negative for difficulty urinating and urgency.   Musculoskeletal: Positive for arthralgias, back pain, gait problem and joint swelling. Negative for myalgias, neck pain and neck stiffness.   Skin: Negative for color change and rash.   Allergic/Immunologic: Negative for environmental allergies and immunocompromised state.   Neurological: Positive for weakness. Negative for dizziness, tremors, seizures, syncope, facial asymmetry, speech difficulty, light-headedness, numbness and headaches.   Hematological: Negative for adenopathy. Does not bruise/bleed easily.   Psychiatric/Behavioral: Positive for sleep disturbance. Negative for agitation, behavioral problems, confusion, decreased concentration, dysphoric mood, hallucinations, self-injury and suicidal ideas. The patient is not hyperactive.                  Current Outpatient Medications:     albuterol (PROVENTIL) 2.5 mg /3 mL (0.083 %) nebulizer solution, Take 3 mLs (2.5 mg total) by nebulization every 4 to 6 hours as needed for Wheezing or Shortness of Breath., Disp: 360 mL, Rfl: 11    albuterol (PROVENTIL/VENTOLIN HFA) 90 mcg/actuation inhaler, Inhale 2 puffs into the lungs every 4 (four) hours as needed for Wheezing., Disp: 8.5 g, Rfl: 3    allopurinoL (ZYLOPRIM) 300 MG tablet, TAKE 1 TABLET(300 MG) BY MOUTH EVERY DAY, Disp: 90 tablet, Rfl: 3    aspirin (ECOTRIN) 81 MG EC tablet, Take 81 mg by mouth once daily., Disp: , Rfl:     azithromycin (ZITHROMAX Z-TAZ) 250 MG tablet, 500 mg on day 1 (two tablets) followed by 250 mg once daily on days 2-5, Disp: 6 tablet, Rfl: 0    baclofen (LIORESAL) 10 MG tablet, Take 1 tablet by mouth every evening., Disp: , Rfl: 2    bisoprolol (ZEBETA) 5 MG tablet, TAKE 1/2 TABLET BY MOUTH ONCE DAILY, Disp: 90  tablet, Rfl: 3    colchicine (COLCRYS) 0.6 mg tablet, TAKE 1 TABLET(0.6 MG) BY MOUTH DAILY AS NEEDED, Disp: 30 tablet, Rfl: 1    fluticasone propionate (FLONASE) 50 mcg/actuation nasal spray, SHAKE LIQUID AND USE 2 SPRAYS(100 MCG) IN EACH NOSTRIL EVERY DAY, Disp: 48 g, Rfl: 0    glimepiride (AMARYL) 1 MG tablet, TAKE 1 TABLET BY MOUTH ONCE DAILY, Disp: 90 tablet, Rfl: 3    HYDROcodone-acetaminophen (NORCO) 7.5-325 mg per tablet, Take 1 tablet by mouth every 4 (four) hours as needed (for chronic pain)., Disp: , Rfl: 0    losartan (COZAAR) 25 MG tablet, Take 1 tablet (25 mg total) by mouth once daily., Disp: , Rfl:     magnesium oxide (MAG-OX) 400 mg (241.3 mg magnesium) tablet, TAKE 2 TABLETS(800 MG) BY MOUTH TWICE DAILY, Disp: 120 tablet, Rfl: 5    metFORMIN (GLUCOPHAGE) 500 MG tablet, TAKE 1 TABLET(500 MG) BY MOUTH DAILY WITH BREAKFAST, Disp: 90 tablet, Rfl: 3    metOLazone (ZAROXOLYN) 2.5 MG tablet, Take by mouth., Disp: , Rfl:     multivitamin-minerals-lutein Tab, Take 1 tablet by mouth Daily., Disp: , Rfl:     omeprazole (PRILOSEC) 40 MG capsule, TAKE 1 CAPSULE BY MOUTH EVERY DAY, Disp: 30 capsule, Rfl: 2    potassium chloride SA (K-DUR,KLOR-CON) 20 MEQ tablet, TAKE 3 TABLETS(60 MEQ) BY MOUTH TWICE DAILY, Disp: 720 tablet, Rfl: 2    simvastatin (ZOCOR) 40 MG tablet, TAKE 1 TABLET(40 MG) BY MOUTH EVERY EVENING, Disp: 90 tablet, Rfl: 3    torsemide (DEMADEX) 20 MG Tab, Take 1 tablet (20 mg total) by mouth once daily. (Patient taking differently: Take 40 mg by mouth 2 (two) times a day.), Disp: 30 tablet, Rfl: 11    diazePAM (VALIUM) 5 MG tablet, Take 1 tablet (5 mg total) by mouth once. Take 30 minutes prior to MRI for 1 dose, Disp: 1 tablet, Rfl: 0    predniSONE (DELTASONE) 20 MG tablet, Prednisone 60 mg/ day for 3 days, 40 mg/day for 3 days,20 mg/ day for 3 days, (1/2 tablet )10 mg a day for 3 days., Disp: 20 tablet, Rfl: 0  Past Medical History:   Diagnosis Date    Chronic diastolic congestive  heart failure 04/07/2020    Chronic kidney disease, stage 3     Chronic systolic congestive heart failure 07/09/2020    Chronic venous insufficiency     COPD (chronic obstructive pulmonary disease)     DDD (degenerative disc disease), lumbar     DM (diabetes mellitus) with complications     History of gout     Hyperlipidemia associated with type 2 diabetes mellitus     Hypertension associated with stage 3 chronic kidney disease due to type 2 diabetes mellitus     BRADLEY on CPAP     Prostate cancer     prostatectomy in 2010, rising PSA that started in 2021    Tobacco abuse      Past Surgical History:   Procedure Laterality Date    BICEPS TENDON REPAIR      COLONOSCOPY N/A 3/27/2019    Procedure: COLONOSCOPY;  Surgeon: James Roger MD;  Location: [facility] ENDO;  Service: Endoscopy;  Laterality: N/A;    HEMORRHOID SURGERY      INGUINAL HERNIA REPAIR Left     KNEE ARTHROSCOPY Right     LEFT HEART CATHETERIZATION Left 6/29/2020    Procedure: CATHETERIZATION, HEART, LEFT;  Surgeon: Cheli Wilson MD;  Location: [facility] CATH LAB;  Service: Cardiology;  Laterality: Left;    LUMBAR LAMINECTOMY      PROSTATECTOMY       Social History     Socioeconomic History    Marital status:    Tobacco Use    Smoking status: Current Every Day Smoker     Packs/day: 0.50     Types: Cigarettes     Start date: 1/1/1971    Smokeless tobacco: Former User   Substance and Sexual Activity    Alcohol use: Not Currently    Drug use: Never    Sexual activity: Not Currently     Partners: Female     Social Determinants of Health     Financial Resource Strain: Low Risk     Difficulty of Paying Living Expenses: Not very hard   Food Insecurity: No Food Insecurity    Worried About Running Out of Food in the Last Year: Never true    Ran Out of Food in the Last Year: Never true   Transportation Needs: No Transportation Needs    Lack of Transportation (Medical): No    Lack of Transportation (Non-Medical): No   Physical  Activity: Inactive    Days of Exercise per Week: 0 days    Minutes of Exercise per Session: 0 min   Stress: Stress Concern Present    Feeling of Stress : To some extent   Social Connections: Unknown    Frequency of Communication with Friends and Family: More than three times a week    Frequency of Social Gatherings with Friends and Family: Once a week    Active Member of Clubs or Organizations: No    Attends Club or Organization Meetings: Never    Marital Status:    Housing Stability: Unknown    Unable to Pay for Housing in the Last Year: Patient refused    Number of Places Lived in the Last Year: 1    Unstable Housing in the Last Year: No             Past/Current Medical/Surgical History, Past/Current Social History, Past/Current Family History and Past/Current Medications were reviewed in detail.        Objective:     VITAL SIGNS WERE REVIEWED      GENERAL APPEARANCE:     The patient looks uncomfortable.    BMI 35.18    Appears short of breath but no distress    Normal breathing pattern.    No dysmorphic features    Normal eye contact.     GENERAL MEDICAL EXAM:    HEENT:  Head is atraumatic normocephalic. Left eye bruising. Fundoscopic (Ophthalmoscopic) exam showed no disc edema.      Neck and Axillae: No JVD. No visible lesions.    Cardiopulmonary: No cyanosis. No tachypnea. Normal respiratory effort.    Gastrointestinal/Urogenital:  No jaundice. No stomas or lesions. No visible hernias. No catheters.     Skin, Hair and Nails: No pathognonomic skin rash. No neurofibromatosis. No visible lesions.No stigmata of autoimmune disease. No clubbing.    Limbs: Varicose veins. BLE visible swelling.    Muskoskeletal: OA visible deformities.No visible lesions.           Neurologic Exam     Mental Status   Oriented to person, place, and time.   Follows 3 step commands.   Attention: normal. Concentration: normal.   Speech: speech is normal   Level of consciousness: alert  Knowledge: good.   Able to name  object. Able to repeat. Normal comprehension.     Cranial Nerves     CN II   Visual fields full to confrontation.   Right visual field deficit: none  Left visual field deficit: none     CN III, IV, VI   Pupils are equal, round, and reactive to light.  Right pupil: Size: 2 mm. Shape: regular. Reactivity: brisk. Consensual response: intact.   Left pupil: Size: 2 mm. Shape: regular. Reactivity: brisk. Consensual response: intact.   CN III: right CN III palsy  CN VI: no CN VI palsy  Nystagmus: none   Diplopia: right, horizontal and vertical (Diagonal)  Ophthalmoparesis: none  Upgaze: abnormal  Downgaze: abnormal  Conjugate gaze: absent    CN V   Facial sensation intact.   Right facial sensation deficit: none  Left facial sensation deficit: none    CN VII   Facial expression full, symmetric.   Right facial weakness: none  Left facial weakness: none    CN VIII   CN VIII normal.   Hearing: intact    CN IX, X   CN IX normal.   CN X normal.   Palate: symmetric    CN XI   CN XI normal.   Right sternocleidomastoid strength: normal  Left sternocleidomastoid strength: normal  Right trapezius strength: normal  Left trapezius strength: normal    CN XII   CN XII normal.   Tongue: not atrophic  Fasciculations: absent  Tongue deviation: none    Motor Exam   Muscle bulk: normal  Overall muscle tone: normal  Right arm tone: normal  Left arm tone: normal  Right arm pronator drift: absent  Left arm pronator drift: absent  Right leg tone: normal  Left leg tone: normal    Strength   Right neck flexion: 5/5  Left neck flexion: 5/5  Right neck extension: 5/5  Left neck extension: 5/5  Right deltoid: 5/5  Left deltoid: 5/5  Right biceps: 5/5  Left biceps: 5/5  Right triceps: 5/5  Left triceps: 5/5  Right wrist flexion: 5/5  Left wrist flexion: 5/5  Right wrist extension: 5/5  Left wrist extension: 5/5  Right interossei: 5/5  Left interossei: 5/5  Right iliopsoas: 4/5  Left iliopsoas: 4/5  Right quadriceps: 4/5  Left quadriceps: 3/5  Right  hamstrin/5  Left hamstring: 3/5  Right glutei: 4/5  Left glutei: 3/5  Right anterior tibial: 4/5  Left anterior tibial: 3/5  Right posterior tibial: 4/5  Left posterior tibial: 4/5  Right peroneal: 4/5  Left peroneal: 4/5  Right gastroc: 4/5  Left gastroc: 4/5    Sensory Exam   Right arm light touch: decreased from wrist  Left arm light touch: decreased from wrist  Right leg light touch: decreased from knee  Left leg light touch: decreased from knee  Right arm vibration: normal  Left arm vibration: normal  Right leg vibration: decreased from toes  Left leg vibration: decreased from toes  Right arm proprioception: normal  Left arm proprioception: normal  Right leg proprioception: decreased from toes  Left leg proprioception: decreased from toes  Right arm pinprick: decreased from wrist  Left arm pinprick: decreased from wrist  Right leg pinprick: decreased from knee  Left leg pinprick: decreased from knee    Gait, Coordination, and Reflexes     Gait  Gait: wide-based    Coordination   Romberg: negative  Finger to nose coordination: normal  Heel to shin coordination: normal    Tremor   Resting tremor: absent  Intention tremor: absent  Action tremor: absent    Reflexes   Right brachioradialis: 1+  Left brachioradialis: 1+  Right biceps: 1+  Left biceps: 1+  Right triceps: 1+  Left triceps: 1+  Right patellar: 0  Left patellar: 0  Right achilles: 0  Left achilles: 0  Right plantar: normal  Left plantar: normal  Right [place]: absent  Left [place]: absent  Right ankle clonus: absent  Left ankle clonus: absent  Right pendular knee jerk: absent  Left pendular knee jerk: absent      Lab Results   Component Value Date    WBC 8.63 2022    HGB 12.1 (L) 2022    HCT 38.9 (L) 2022     (H) 2022     2022     Sodium   Date Value Ref Range Status   2022 141 136 - 145 mmol/L Final     Potassium   Date Value Ref Range Status   2022 5.1 3.5 - 5.1 mmol/L Final     Chloride    Date Value Ref Range Status   04/29/2022 99 95 - 110 mmol/L Final     CO2   Date Value Ref Range Status   04/29/2022 31 (H) 23 - 29 mmol/L Final     Glucose   Date Value Ref Range Status   04/29/2022 152 (H) 70 - 110 mg/dL Final     BUN   Date Value Ref Range Status   04/29/2022 15 8 - 23 mg/dL Final     Creatinine   Date Value Ref Range Status   07/18/2022 1.8 (H) 0.5 - 1.4 mg/dL Final     Calcium   Date Value Ref Range Status   04/29/2022 9.8 8.7 - 10.5 mg/dL Final     Total Protein   Date Value Ref Range Status   04/29/2022 6.9 6.0 - 8.4 g/dL Final     Albumin   Date Value Ref Range Status   04/29/2022 4.1 3.5 - 5.2 g/dL Final     Total Bilirubin   Date Value Ref Range Status   04/29/2022 0.6 0.1 - 1.0 mg/dL Final     Comment:     For infants and newborns, interpretation of results should be based  on gestational age, weight and in agreement with clinical  observations.    Premature Infant recommended reference ranges:  Up to 24 hours.............<8.0 mg/dL  Up to 48 hours............<12.0 mg/dL  3-5 days..................<15.0 mg/dL  6-29 days.................<15.0 mg/dL       Alkaline Phosphatase   Date Value Ref Range Status   04/29/2022 75 55 - 135 U/L Final     AST   Date Value Ref Range Status   04/29/2022 26 10 - 40 U/L Final     ALT   Date Value Ref Range Status   04/29/2022 25 10 - 44 U/L Final     Anion Gap   Date Value Ref Range Status   04/29/2022 11 8 - 16 mmol/L Final     eGFR if    Date Value Ref Range Status   07/18/2022 43 (A) >60 mL/min/1.73 m^2 Final     eGFR if non    Date Value Ref Range Status   07/18/2022 37 (A) >60 mL/min/1.73 m^2 Final     Comment:     Calculation used to obtain the estimated glomerular filtration  rate (eGFR) is the CKD-EPI equation.        Lab Results   Component Value Date    [id number] 07/27/2012     Lab Results   Component Value Date    TSH 1.456 04/29/2022 02-    MG Panel -ve     7-     CTA head and neck shows  predominantly extraconal mass within the superomedial right orbit containing portions of branches of the ophthalmic artery.  This does not definitely emanate from the extra-ocular muscles.  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  MRI of the orbits may be definitive although there is a focus of metal imbedded within the subcutaneous tissues of the right scalp. Normal CTA of the head with no evidence of aneurysm.    Assessment:       1. Third nerve palsy, right    2. Orbital cavernous venous malformation    3. Diplopia    4. Claustrophobia    5. Metal in skull    6. 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 DM (diabetes mellitus) with complications    8. Left leg pain        Plan:         PUPIL-SPARING RT OCULOMOTOR (THIRD NERVE) PALSY    ISCHEMIC (DIABETIC)     POSSIBLE ORBIT CAVERNOUS VENOUS MALFORMATION    Evaluate MRI orbits with and without contrast. Complete skull x-ray prior due to possible metal imbedded in right scalp    BS Control.    RT eye patching.    Expectant approach.     Consider neuroophthalmologic referral for prism.        LEFT LEG PAIN    Discuss with PM             MEDICAL/SURGICAL COMORBIDITIES     All relevant medical comorbidities noted and managed by primary care physician and medical care team.          MISCELLANEOUS MEDICAL PROBLEMS       HEALTHY LIFESTYLE AND PREVENTATIVE CARE    The patient to adhere to the age-appropriate health maintenance guidelines including screening tests and vaccinations. The patient to adhere to  healthy lifestyle, optimal weight, exercise, healthy diet, good sleep hygiene and avoiding drugs including smoking, alcohol and recreational drugs.        I spent a total of 35 minutes on the day of the visit.  This includes face to face time and non-face to face time preparing to see the patient (eg, review of tests),  obtaining and/or reviewing separately obtained history, documenting clinical information in the electronic or other health record, independently interpreting results and communicating results to the patient/family/caregiver, or care coordinator.        RTC after MRI orbits          Mable Boateng, MSN, NP    Collaborating Provider: Alisa Najera MD, FAAN Neurologist/Epileptologist

## 2022-08-17 NOTE — TELEPHONE ENCOUNTER
----- Message from Mable Boateng NP sent at 8/17/2022  4:09 PM CDT -----  X-ray Skull Reviewed    8-    X-ray skull shows small foreign body over the left frontal sinus. Radiology will review imaging prior to MRI to let us know if you can proceed.

## 2022-08-17 NOTE — PROGRESS NOTES
X-ray Skull Reviewed    8-    X-ray skull shows small foreign body over the left frontal sinus. Radiology will review imaging prior to MRI to let us know if you can proceed.

## 2022-09-08 ENCOUNTER — PATIENT MESSAGE (OUTPATIENT)
Dept: PULMONOLOGY | Facility: CLINIC | Age: 72
End: 2022-09-08
Payer: MEDICARE

## 2022-09-08 ENCOUNTER — TELEPHONE (OUTPATIENT)
Dept: PULMONOLOGY | Facility: CLINIC | Age: 72
End: 2022-09-08
Payer: MEDICARE

## 2022-09-08 DIAGNOSIS — G47.33 OSA ON CPAP: Primary | ICD-10-CM

## 2022-09-08 NOTE — TELEPHONE ENCOUNTER
Mr Erin stoped by the office today to  a sd card for cpap machine . Also mentioned he need pressure to be higher on cpap machine.

## 2022-09-09 NOTE — TELEPHONE ENCOUNTER
Chart reviewed  IN 2021 auto Continuous Positive Airway Pressure suggested Continuous Positive Airway Pressure of 10 or less should be tolerated  Continuous Positive Airway Pressure adjusted to 10 cm on last visit this year.  Now patient leonard that he needs a higher pressure  Will increase to 12 cm    If he has further issues then will need to go back to autocpap

## 2022-09-09 NOTE — TELEPHONE ENCOUNTER
----- Message from Jes David MA sent at 9/8/2022  1:41 PM CDT -----  Regarding: cpap machine pressure  Patient came by the office today states he needs the pressure on his cpap machine to be higher Pressure is to low

## 2022-09-19 ENCOUNTER — LAB VISIT (OUTPATIENT)
Dept: LAB | Facility: HOSPITAL | Age: 72
End: 2022-09-19
Attending: NURSE PRACTITIONER
Payer: MEDICARE

## 2022-09-19 DIAGNOSIS — Q27.9 VENOUS MALFORMATION: ICD-10-CM

## 2022-09-19 LAB
CREAT SERPL-MCNC: 1.5 MG/DL (ref 0.5–1.4)
EST. GFR  (NO RACE VARIABLE): 49.5 ML/MIN/1.73 M^2

## 2022-09-19 PROCEDURE — 36415 COLL VENOUS BLD VENIPUNCTURE: CPT | Performed by: NURSE PRACTITIONER

## 2022-09-19 PROCEDURE — 82565 ASSAY OF CREATININE: CPT | Performed by: NURSE PRACTITIONER

## 2022-09-27 ENCOUNTER — CLINICAL SUPPORT (OUTPATIENT)
Dept: PULMONOLOGY | Facility: CLINIC | Age: 72
End: 2022-09-27
Payer: MEDICARE

## 2022-09-27 ENCOUNTER — OFFICE VISIT (OUTPATIENT)
Dept: PULMONOLOGY | Facility: CLINIC | Age: 72
End: 2022-09-27
Payer: MEDICARE

## 2022-09-27 ENCOUNTER — HOSPITAL ENCOUNTER (OUTPATIENT)
Dept: RADIOLOGY | Facility: HOSPITAL | Age: 72
Discharge: HOME | End: 2022-09-27
Attending: INTERNAL MEDICINE
Payer: MEDICARE

## 2022-09-27 VITALS
SYSTOLIC BLOOD PRESSURE: 135 MMHG | RESPIRATION RATE: 20 BRPM | WEIGHT: 218.06 LBS | OXYGEN SATURATION: 93 % | BODY MASS INDEX: 34.22 KG/M2 | HEIGHT: 67 IN | HEART RATE: 118 BPM | DIASTOLIC BLOOD PRESSURE: 68 MMHG

## 2022-09-27 VITALS — BODY MASS INDEX: 34.22 KG/M2 | HEIGHT: 67 IN | WEIGHT: 218.06 LBS

## 2022-09-27 DIAGNOSIS — R06.02 SOB (SHORTNESS OF BREATH): ICD-10-CM

## 2022-09-27 DIAGNOSIS — J43.9 COPD WITH CHRONIC BRONCHITIS AND EMPHYSEMA: Primary | ICD-10-CM

## 2022-09-27 DIAGNOSIS — J41.0 SIMPLE CHRONIC BRONCHITIS: ICD-10-CM

## 2022-09-27 DIAGNOSIS — J44.89 COPD WITH CHRONIC BRONCHITIS AND EMPHYSEMA: Primary | ICD-10-CM

## 2022-09-27 DIAGNOSIS — R09.02 EXERCISE HYPOXEMIA: ICD-10-CM

## 2022-09-27 DIAGNOSIS — R05.9 COUGH: ICD-10-CM

## 2022-09-27 DIAGNOSIS — J44.1 COPD EXACERBATION: ICD-10-CM

## 2022-09-27 PROCEDURE — 99999 PR PBB SHADOW E&M-EST. PATIENT-LVL IV: ICD-10-PCS | Mod: PBBFAC,,, | Performed by: INTERNAL MEDICINE

## 2022-09-27 PROCEDURE — 1101F PT FALLS ASSESS-DOCD LE1/YR: CPT | Mod: CPTII,S$GLB,, | Performed by: INTERNAL MEDICINE

## 2022-09-27 PROCEDURE — 3288F PR FALLS RISK ASSESSMENT DOCUMENTED: ICD-10-PCS | Mod: CPTII,S$GLB,, | Performed by: INTERNAL MEDICINE

## 2022-09-27 PROCEDURE — 3078F DIAST BP <80 MM HG: CPT | Mod: CPTII,S$GLB,, | Performed by: INTERNAL MEDICINE

## 2022-09-27 PROCEDURE — 1159F PR MEDICATION LIST DOCUMENTED IN MEDICAL RECORD: ICD-10-PCS | Mod: CPTII,S$GLB,, | Performed by: INTERNAL MEDICINE

## 2022-09-27 PROCEDURE — 3072F LOW RISK FOR RETINOPATHY: CPT | Mod: CPTII,S$GLB,, | Performed by: INTERNAL MEDICINE

## 2022-09-27 PROCEDURE — 94010 BREATHING CAPACITY TEST: ICD-10-PCS | Mod: 59,S$GLB,, | Performed by: INTERNAL MEDICINE

## 2022-09-27 PROCEDURE — 99999 PR PBB SHADOW E&M-EST. PATIENT-LVL I: ICD-10-PCS | Mod: PBBFAC,,,

## 2022-09-27 PROCEDURE — 3044F HG A1C LEVEL LT 7.0%: CPT | Mod: CPTII,S$GLB,, | Performed by: INTERNAL MEDICINE

## 2022-09-27 PROCEDURE — 1159F MED LIST DOCD IN RCRD: CPT | Mod: CPTII,S$GLB,, | Performed by: INTERNAL MEDICINE

## 2022-09-27 PROCEDURE — 3062F PR POS MACROALBUMINURIA RESULT DOCUMENTED/REVIEW: ICD-10-PCS | Mod: CPTII,S$GLB,, | Performed by: INTERNAL MEDICINE

## 2022-09-27 PROCEDURE — 3075F SYST BP GE 130 - 139MM HG: CPT | Mod: CPTII,S$GLB,, | Performed by: INTERNAL MEDICINE

## 2022-09-27 PROCEDURE — 3066F PR DOCUMENTATION OF TREATMENT FOR NEPHROPATHY: ICD-10-PCS | Mod: CPTII,S$GLB,, | Performed by: INTERNAL MEDICINE

## 2022-09-27 PROCEDURE — 3075F PR MOST RECENT SYSTOLIC BLOOD PRESS GE 130-139MM HG: ICD-10-PCS | Mod: CPTII,S$GLB,, | Performed by: INTERNAL MEDICINE

## 2022-09-27 PROCEDURE — 3044F PR MOST RECENT HEMOGLOBIN A1C LEVEL <7.0%: ICD-10-PCS | Mod: CPTII,S$GLB,, | Performed by: INTERNAL MEDICINE

## 2022-09-27 PROCEDURE — 99214 OFFICE O/P EST MOD 30 MIN: CPT | Mod: 25,S$GLB,, | Performed by: INTERNAL MEDICINE

## 2022-09-27 PROCEDURE — 71250 CT THORAX DX C-: CPT | Mod: TC

## 2022-09-27 PROCEDURE — 3008F PR BODY MASS INDEX (BMI) DOCUMENTED: ICD-10-PCS | Mod: CPTII,S$GLB,, | Performed by: INTERNAL MEDICINE

## 2022-09-27 PROCEDURE — 3288F FALL RISK ASSESSMENT DOCD: CPT | Mod: CPTII,S$GLB,, | Performed by: INTERNAL MEDICINE

## 2022-09-27 PROCEDURE — 99999 PR PBB SHADOW E&M-EST. PATIENT-LVL IV: CPT | Mod: PBBFAC,,, | Performed by: INTERNAL MEDICINE

## 2022-09-27 PROCEDURE — 3066F NEPHROPATHY DOC TX: CPT | Mod: CPTII,S$GLB,, | Performed by: INTERNAL MEDICINE

## 2022-09-27 PROCEDURE — 4010F ACE/ARB THERAPY RXD/TAKEN: CPT | Mod: CPTII,S$GLB,, | Performed by: INTERNAL MEDICINE

## 2022-09-27 PROCEDURE — 99499 RISK ADDL DX/OHS AUDIT: ICD-10-PCS | Mod: S$GLB,,, | Performed by: INTERNAL MEDICINE

## 2022-09-27 PROCEDURE — 3078F PR MOST RECENT DIASTOLIC BLOOD PRESSURE < 80 MM HG: ICD-10-PCS | Mod: CPTII,S$GLB,, | Performed by: INTERNAL MEDICINE

## 2022-09-27 PROCEDURE — 1101F PR PT FALLS ASSESS DOC 0-1 FALLS W/OUT INJ PAST YR: ICD-10-PCS | Mod: CPTII,S$GLB,, | Performed by: INTERNAL MEDICINE

## 2022-09-27 PROCEDURE — 99999 PR PBB SHADOW E&M-EST. PATIENT-LVL I: CPT | Mod: PBBFAC,,,

## 2022-09-27 PROCEDURE — 94618 PULMONARY STRESS TESTING: ICD-10-PCS | Mod: S$GLB,,, | Performed by: INTERNAL MEDICINE

## 2022-09-27 PROCEDURE — 99499 UNLISTED E&M SERVICE: CPT | Mod: S$GLB,,, | Performed by: INTERNAL MEDICINE

## 2022-09-27 PROCEDURE — 3062F POS MACROALBUMINURIA REV: CPT | Mod: CPTII,S$GLB,, | Performed by: INTERNAL MEDICINE

## 2022-09-27 PROCEDURE — 3072F PR LOW RISK FOR RETINOPATHY: ICD-10-PCS | Mod: CPTII,S$GLB,, | Performed by: INTERNAL MEDICINE

## 2022-09-27 PROCEDURE — 99214 PR OFFICE/OUTPT VISIT, EST, LEVL IV, 30-39 MIN: ICD-10-PCS | Mod: 25,S$GLB,, | Performed by: INTERNAL MEDICINE

## 2022-09-27 PROCEDURE — 4010F PR ACE/ARB THEARPY RXD/TAKEN: ICD-10-PCS | Mod: CPTII,S$GLB,, | Performed by: INTERNAL MEDICINE

## 2022-09-27 PROCEDURE — 94010 BREATHING CAPACITY TEST: CPT | Mod: 59,S$GLB,, | Performed by: INTERNAL MEDICINE

## 2022-09-27 PROCEDURE — 94618 PULMONARY STRESS TESTING: CPT | Mod: S$GLB,,, | Performed by: INTERNAL MEDICINE

## 2022-09-27 PROCEDURE — 3008F BODY MASS INDEX DOCD: CPT | Mod: CPTII,S$GLB,, | Performed by: INTERNAL MEDICINE

## 2022-09-27 RX ORDER — ALBUTEROL SULFATE 0.83 MG/ML
2.5 SOLUTION RESPIRATORY (INHALATION)
Qty: 360 ML | Refills: 11 | Status: SHIPPED | OUTPATIENT
Start: 2022-09-27 | End: 2023-09-27

## 2022-09-27 RX ORDER — METHYLPREDNISOLONE 4 MG/1
TABLET ORAL
Qty: 1 EACH | Refills: 0 | Status: SHIPPED | OUTPATIENT
Start: 2022-09-27 | End: 2023-03-23

## 2022-09-27 RX ORDER — ALBUTEROL SULFATE 90 UG/1
AEROSOL, METERED RESPIRATORY (INHALATION)
Qty: 8.5 G | Refills: 3 | Status: SHIPPED | OUTPATIENT
Start: 2022-09-27 | End: 2022-11-30

## 2022-09-27 RX ORDER — FLUTICASONE FUROATE, UMECLIDINIUM BROMIDE AND VILANTEROL TRIFENATATE 200; 62.5; 25 UG/1; UG/1; UG/1
1 POWDER RESPIRATORY (INHALATION) DAILY
Qty: 180 EACH | Refills: 3 | Status: SHIPPED | OUTPATIENT
Start: 2022-09-27 | End: 2023-03-27 | Stop reason: CLARIF

## 2022-09-27 RX ORDER — DOXYCYCLINE 100 MG/1
100 CAPSULE ORAL EVERY 12 HOURS
Qty: 20 CAPSULE | Refills: 0 | Status: SHIPPED | OUTPATIENT
Start: 2022-09-27 | End: 2023-03-23

## 2022-09-27 NOTE — PROGRESS NOTES
Subjective:     Patient ID: Santiago Khan is a 72 y.o. male.    Chief Complaint:      HPI    COPD  He presents for evaluation and treatment of COPD. The patient is currently having symptoms / an exacerbation. Current symptoms include acute dyspnea, chronic dyspnea, dyspnea after 1 blocks, non-productive cough, cough productive of white sputum in small amounts and wheezing. Symptoms have been present since several months ago and have been gradually worsening. He denies chills and fever. Associated symptoms include fatigue, poor exercise tolerance, shortness of breath and wheezing.  This episode appears to have been triggered by no identifiable factor. Treatments tried for the current exacerbation: albuterol inhaler. The patient has been having similar episodes for approximately 5 years. He uses 1 pillows at night. Patient currently is not on home oxygen therapy.. The patient is having no constitutional symptoms, denying fever, chills, anorexia, or weight loss. The patient has not been hospitalized for this condition before. He has a history of 50 pack years. The patient is experiencing exercise intolerance (difficulty walking 1 blocks on flat ground).  Started smoking again    Obstructive Sleep Apnea:  Not able to use Continuous Positive Airway Pressure since obtaining new machine      Past Medical History:   Diagnosis Date    Chronic diastolic congestive heart failure 04/07/2020    Chronic kidney disease, stage 3     Chronic systolic congestive heart failure 07/09/2020    Chronic venous insufficiency     COPD (chronic obstructive pulmonary disease)     DDD (degenerative disc disease), lumbar     DM (diabetes mellitus) with complications     History of gout     Hyperlipidemia associated with type 2 diabetes mellitus     Hypertension associated with stage 3 chronic kidney disease due to type 2 diabetes mellitus     BRADLEY on CPAP     Prostate cancer     prostatectomy in 2010, rising PSA that started in 2021     Tobacco abuse      Past Surgical History:   Procedure Laterality Date    BICEPS TENDON REPAIR      COLONOSCOPY N/A 3/27/2019    Procedure: COLONOSCOPY;  Surgeon: James Roger MD;  Location: [facility] ENDO;  Service: Endoscopy;  Laterality: N/A;    HEMORRHOID SURGERY      INGUINAL HERNIA REPAIR Left     KNEE ARTHROSCOPY Right     LEFT HEART CATHETERIZATION Left 6/29/2020    Procedure: CATHETERIZATION, HEART, LEFT;  Surgeon: Cheli Wilson MD;  Location: [facility] CATH LAB;  Service: Cardiology;  Laterality: Left;    LUMBAR LAMINECTOMY      PROSTATECTOMY       Review of patient's allergies indicates:   Allergen Reactions    Amitriptyline Rash    Celecoxib Rash     Current Outpatient Medications on File Prior to Visit   Medication Sig Dispense Refill    allopurinoL (ZYLOPRIM) 300 MG tablet TAKE 1 TABLET(300 MG) BY MOUTH EVERY DAY 90 tablet 3    aspirin (ECOTRIN) 81 MG EC tablet Take 81 mg by mouth once daily.      baclofen (LIORESAL) 10 MG tablet Take 1 tablet by mouth every evening.  2    bisoprolol (ZEBETA) 5 MG tablet TAKE 1/2 TABLET BY MOUTH ONCE DAILY 90 tablet 3    colchicine (COLCRYS) 0.6 mg tablet TAKE 1 TABLET(0.6 MG) BY MOUTH DAILY AS NEEDED 30 tablet 1    fluticasone propionate (FLONASE) 50 mcg/actuation nasal spray SHAKE LIQUID AND USE 2 SPRAYS(100 MCG) IN EACH NOSTRIL EVERY DAY 48 g 0    glimepiride (AMARYL) 1 MG tablet TAKE 1 TABLET BY MOUTH ONCE DAILY 90 tablet 3    HYDROcodone-acetaminophen (NORCO) 7.5-325 mg per tablet Take 1 tablet by mouth every 4 (four) hours as needed (for chronic pain).  0    losartan (COZAAR) 25 MG tablet Take 1 tablet (25 mg total) by mouth once daily.      magnesium oxide (MAG-OX) 400 mg (241.3 mg magnesium) tablet TAKE 2 TABLETS(800 MG) BY MOUTH TWICE DAILY 120 tablet 5    metFORMIN (GLUCOPHAGE) 500 MG tablet TAKE 1 TABLET(500 MG) BY MOUTH DAILY WITH BREAKFAST 90 tablet 3    metOLazone (ZAROXOLYN) 2.5 MG tablet Take by mouth.      multivitamin-minerals-lutein Tab Take 1 tablet by  mouth Daily.      omeprazole (PRILOSEC) 40 MG capsule TAKE 1 CAPSULE BY MOUTH EVERY DAY 30 capsule 2    potassium chloride SA (K-DUR,KLOR-CON) 20 MEQ tablet TAKE 3 TABLETS(60 MEQ) BY MOUTH TWICE DAILY 720 tablet 2    simvastatin (ZOCOR) 40 MG tablet TAKE 1 TABLET(40 MG) BY MOUTH EVERY EVENING 90 tablet 3    torsemide (DEMADEX) 20 MG Tab Take 1 tablet (20 mg total) by mouth once daily. (Patient taking differently: Take 40 mg by mouth 2 (two) times a day.) 30 tablet 11    [DISCONTINUED] albuterol (PROVENTIL) 2.5 mg /3 mL (0.083 %) nebulizer solution Take 3 mLs (2.5 mg total) by nebulization every 4 to 6 hours as needed for Wheezing or Shortness of Breath. 360 mL 11    [DISCONTINUED] albuterol (PROVENTIL/VENTOLIN HFA) 90 mcg/actuation inhaler INHALE 2 PUFFS INTO THE LUNGS EVERY 4 HOURS AS NEEDED FOR WHEEZING 8.5 g 3    [DISCONTINUED] azithromycin (ZITHROMAX Z-TAZ) 250 MG tablet 500 mg on day 1 (two tablets) followed by 250 mg once daily on days 2-5 6 tablet 0    diazePAM (VALIUM) 5 MG tablet Take 1 tablet (5 mg total) by mouth once. Take 30 minutes prior to MRI for 1 dose 1 tablet 0     No current facility-administered medications on file prior to visit.     Social History     Socioeconomic History    Marital status:    Tobacco Use    Smoking status: Every Day     Packs/day: 0.50     Types: Cigarettes     Start date: 1/1/1971    Smokeless tobacco: Former   Substance and Sexual Activity    Alcohol use: Not Currently    Drug use: Never    Sexual activity: Not Currently     Partners: Female     Social Determinants of Health     Financial Resource Strain: Low Risk     Difficulty of Paying Living Expenses: Not very hard   Food Insecurity: No Food Insecurity    Worried About Running Out of Food in the Last Year: Never true    Ran Out of Food in the Last Year: Never true   Transportation Needs: No Transportation Needs    Lack of Transportation (Medical): No    Lack of Transportation (Non-Medical): No   Physical  "Activity: Inactive    Days of Exercise per Week: 0 days    Minutes of Exercise per Session: 0 min   Stress: Stress Concern Present    Feeling of Stress : To some extent   Social Connections: Unknown    Frequency of Communication with Friends and Family: More than three times a week    Frequency of Social Gatherings with Friends and Family: Once a week    Active Member of Clubs or Organizations: No    Attends Club or Organization Meetings: Never    Marital Status:    Housing Stability: Unknown    Unable to Pay for Housing in the Last Year: Patient refused    Number of Places Lived in the Last Year: 1    Unstable Housing in the Last Year: No     Family History   Problem Relation Age of Onset    Prostate cancer Father     Coronary artery disease Father 90    Alzheimer's disease Father     Hyperlipidemia Mother        Review of Systems   Constitutional:  Positive for fatigue. Negative for fever.   HENT:  Positive for postnasal drip, rhinorrhea and congestion.    Eyes:  Negative for redness and itching.   Respiratory:  Positive for cough, sputum production, shortness of breath, dyspnea on extertion, use of rescue inhaler and Paroxysmal Nocturnal Dyspnea.    Cardiovascular:  Negative for chest pain, palpitations and leg swelling.   Genitourinary:  Negative for difficulty urinating and hematuria.   Endocrine:  Negative for cold intolerance and heat intolerance.    Skin:  Negative for rash.   Gastrointestinal:  Negative for nausea and abdominal pain.   Neurological:  Negative for dizziness, syncope, weakness and light-headedness.   Hematological:  Negative for adenopathy. Does not bruise/bleed easily.   Psychiatric/Behavioral:  Negative for sleep disturbance. The patient is not nervous/anxious.      Objective:      /68   Pulse (!) 118   Resp 20   Ht 5' 7" (1.702 m)   Wt 98.9 kg (218 lb 0.6 oz)   SpO2 (!) 93%   BMI 34.15 kg/m²   Physical Exam  Vitals and nursing note reviewed.   Constitutional:       " Appearance: He is well-developed. He is obese.   HENT:      Head: Normocephalic and atraumatic.      Nose: Congestion present.   Eyes:      Conjunctiva/sclera: Conjunctivae normal.      Pupils: Pupils are equal, round, and reactive to light.   Neck:      Thyroid: No thyromegaly.      Vascular: No JVD.      Trachea: No tracheal deviation.   Cardiovascular:      Rate and Rhythm: Normal rate and regular rhythm.      Heart sounds: No murmur heard.  Pulmonary:      Breath sounds: Examination of the right-lower field reveals wheezing. Examination of the left-lower field reveals wheezing. Decreased breath sounds and wheezing present. No rhonchi or rales.   Abdominal:      General: Bowel sounds are normal.      Palpations: Abdomen is soft.   Musculoskeletal:         General: No tenderness.      Cervical back: Neck supple.      Comments: Decreased range of motion of knees.     Lymphadenopathy:      Cervical: No cervical adenopathy.   Skin:     General: Skin is warm and dry.   Neurological:      Mental Status: He is alert and oriented to person, place, and time.     Personal Diagnostic Review  PSG: significant for Obstructive Sleep Apnea       John Ghotra MD (Physician)   Pulmonary Disease  Procedure Orders   1. Home Sleep Studies [[id number]] ordered by Elizabeth Lejeune, NP   Pre-procedure Diagnoses   1. BRADLEY (obstructive sleep apnea) [G47.33]   Post-procedure Diagnoses   1. BRADLEY (obstructive sleep apnea) [G47.33]      Home Sleep Studies  Date/Time: 12/16/2019 8:00 AM  Performed by: John Ghotra MD  Authorized by: Elizabeth Lejeune, NP        PHYSICIAN INTERPRETATION AND COMMENTS: Findings are consistent with severe, non-positional obstructive sleep  apnea (BRADLEY). There is insufficient supine study time to assess the severity of positional obstructive sleep apnea (BRADLEY). Indications  of cardiac dysrhythmia are recorded. Night #1 had no valid data. Night #2: AHI was 37.0/hr with 3.5 hours sleep. Consider  INLAB  CPAP titration to allow proper habituation and mask fitting.  CLINICAL HISTORY: 69 year old male presented with:Prior diagnosis of BRADLEY, seeks to restablish therapy. STOPBANG score  6. Frequent waking up at night, snoring, Non refreshing sleep. 17 inch neck, BMI of 30, an [place] sleepiness score of 20, history  of diabetes and symptoms of nocturnal snoring, waking up choking and witnessed apneas. Based on the clinical history, the patient  has a high pre-test probability of having severe BRADLEY.  SLEEP STUDY FINDINGS: Patient underwent a two night Home Sleep Test and by behavioral criteria, slept for approximately  3.5 hours, with a sleep latency of 24 minutes and a sleep efficiency of 66.6%. Severe sleep disordered breathing (AHI=37) is noted  based on a 4% hypopnea desaturation criteria. The patient slept supine 1.6% of the night based on valid recording time of 3.54  hours. The apneas/hypopneas are accompanied by moderate oxygen desaturation (percent time below 90% SpO2: 11.6%, Min  SpO2: 82.8%). The average desaturation across all sleep disordered breathing events is 5.0%. Snoring occurs for 37.3% (30 dB) of  the study, 22.6% is very loud. The mean pulse rate is 77 BPM, with very frequent pulse rate variability (90 events with >= 6 BPM  increase/decrease per hour).  TREATMENT CONSIDERATIONS: Consider nasal continuous positive airway pressure (CPAP/AutoPAP) as the initial  treatment choice for severe obstructive sleep apnea. A mandibular advancement splint (MAS) or referral to an ENT surgeon for  modification to the airway should be considered to reduce the risk of mortality caused by severe BRADLEY if the patient prefers an  alternative therapy or the CPAP trial is unsuccessful.  DISEASE MANAGEMENT CONSIDERATIONS: Irregularity of the pulse rate signals indicates possible cardiac  dysrhythmia. If clinically appropriate, further cardiac evaluation is suggested. Sleeping pills may increase the severity  of  untreated BRADLEY and their use should be reevaluated once the BRADLEY is treated.            Progress Notes  John Ghotra MD (Physician)   Pulmonary Disease     PHYSICIAN INTERPRETATION AND COMMENTS: Findings are consistent with severe, non-positional obstructive sleep  apnea (BRADLEY). There is insufficient supine study time to assess the severity of positional obstructive sleep apnea (BRADLEY). Indications  of cardiac dysrhythmia are recorded. Night #1 had no valid data. Night #2: AHI was 37.0/hr with 3.5 hours sleep. Consider INLAB  CPAP titration to allow proper habituation and mask fitting.  CLINICAL HISTORY: 69 year old male presented with:Prior diagnosis of BRADLEY, seeks to restablish therapy. STOPBANG score  6. Frequent waking up at night, snoring, Non refreshing sleep. 17 inch neck, BMI of 30, an [place] sleepiness score of 20, history  of diabetes and symptoms of nocturnal snoring, waking up choking and witnessed apneas. Based on the clinical history, the patient  has a high pre-test probability of having severe BRADLEY.  SLEEP STUDY FINDINGS: Patient underwent a two night Home Sleep Test and by behavioral criteria, slept for approximately  3.5 hours, with a sleep latency of 24 minutes and a sleep efficiency of 66.6%. Severe sleep disordered breathing (AHI=37) is noted  based on a 4% hypopnea desaturation criteria. The patient slept supine 1.6% of the night based on valid recording time of 3.54  hours. The apneas/hypopneas are accompanied by moderate oxygen desaturation (percent time below 90% SpO2: 11.6%, Min  SpO2: 82.8%). The average desaturation across all sleep disordered breathing events is 5.0%. Snoring occurs for 37.3% (30 dB) of  the study, 22.6% is very loud. The mean pulse rate is 77 BPM, with very frequent pulse rate variability (90 events with >= 6 BPM  increase/decrease per hour).  TREATMENT CONSIDERATIONS: Consider nasal continuous positive airway pressure (CPAP/AutoPAP) as the initial  treatment  choice for severe obstructive sleep apnea. A mandibular advancement splint (MAS) or referral to an ENT surgeon for  modification to the airway should be considered to reduce the risk of mortality caused by severe BRADLEY if the patient prefers an  alternative therapy or the CPAP trial is unsuccessful.  DISEASE MANAGEMENT CONSIDERATIONS: Irregularity of the pulse rate signals indicates possible cardiac  dysrhythmia. If clinically appropriate, further cardiac evaluation is suggested. Sleeping pills may increase the severity of  untreated BRADLEY and their use should be reevaluated once the BRADLEY is treated.                  Pulmonary Studies Review 9/27/2022   SpO2 93   Ordering Provider -   Interpreting Provider -   Performing nurse/tech/RT -   Diagnosis -   Height 67   Weight 3488.56   BMI (Calculated) 34.1   Predicted Distance 302.96   Patient Race -   6MWT Status -   Was O2 used? -   6MW Distance walked (feet) -   Distance walked (meters) -   Did patient stop? -   How many times? -   Stop Time 1 -   Restart Time 1 -   Did patient restart? -   Type of assistive device(s) used? -   Is extra documentation required for this patient? -   Oxygen Saturation -   Supplemental Oxygen -   Heart Rate -   Blood Pressure -   Murphy Dyspnea Rating  -   Oxygen Saturation -   Supplemental Oxygen -   Heart Rate -   Blood Pressure -   Murphy Dyspnea Rating  -   Recovery Time (seconds) -   Oxygen Saturation -   Supplemental Oxygen -   Heart Rate -   Blood Pressure -   Murphy Dyspnea Rating  -   Is procedure ready for interpretation? -   Did the patient stop or pause? -   How many times did the patient stop or pause? -   Stop Time 1 -   Restart Time 1 -   Pause Time 1 -   Total Time Walked (Calculated) -   Total Laps Walked -   Final Partial Lap Distance (feet) -   Total Distance Feet (Calculated) -   Total Distance Meters (Calculated) -   Predicted Distance Meters (Calculated) 448.93   Percentage of Predicted (Calculated) -   Peak VO2 (Calculated) -    Mets -   Has The Patient Had a Previous Six Minute Walk Test? -   Oxygen Qualification? -       CT Chest Without Contrast  Narrative: EXAMINATION:  CT CHEST WITHOUT CONTRAST    CLINICAL HISTORY:  Cough, persistent; Cough, unspecified    TECHNIQUE:  Chest CT without contrast.  Soft tissue and lung algorithms.  Coronal and sagittal reformations.    COMPARISON:  09/10/2020    FINDINGS:  Heart, pericardium, and aorta are normal.  Extensive coronary artery calcifications.  There is no mediastinal or hilar lymphadenopathy.    Trachea and central bronchi are patent.    There is a punctate calcified granuloma left lower lobe with a small calcified left hilar lymph node.    There is no worrisome pulmonary nodule.  Again, there is a 7 mm peripheral left upper lobe cyst with mild wall thickening, benign.  There is minor scarring with biapical and bibasilar septal thickening.  Mild patchy ground-glass opacity dependent lung bases characteristic of minor atelectasis.  Otherwise, lungs are clear.    There are a couple small hepatic cysts.  There are a few calcified liver and spleen granulomas.  Otherwise, the visualized portion of the upper abdominal viscera is unremarkable.    Mild gynecomastia.    The bones are unremarkable.  Impression: No evidence of pneumonia.  Minor scarring biapical and bibasilar septal thickening.  Minimal atelectasis dependent lung bases.  No worrisome pulmonary nodule.  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  Extensive coronary artery calcifications.    All CT scans at this facility are performed  using dose modulation techniques as appropriate to performed exam including the following:  automated exposure control; adjustment of mA and/or kV according to the patients size (this includes techniques or standardized protocols for targeted exams where dose is matched to indication/reason for exam: i.e. extremities or head);  iterative reconstruction technique.    Electronically signed by: Natanael Moore  MD  Date:    09/27/2022  Time:    10:23      Office Spirometry Results:     No flowsheet data found.  Pulmonary Studies Review 9/27/2022   SpO2 93   Ordering Provider -   Interpreting Provider -   Performing nurse/tech/RT -   Diagnosis -   Height 67   Weight 3488.56   BMI (Calculated) 34.1   Predicted Distance 302.96   Patient Race -   6MWT Status -   Was O2 used? -   6MW Distance walked (feet) -   Distance walked (meters) -   Did patient stop? -   How many times? -   Stop Time 1 -   Restart Time 1 -   Did patient restart? -   Type of assistive device(s) used? -   Is extra documentation required for this patient? -   Oxygen Saturation -   Supplemental Oxygen -   Heart Rate -   Blood Pressure -   Murphy Dyspnea Rating  -   Oxygen Saturation -   Supplemental Oxygen -   Heart Rate -   Blood Pressure -   Murphy Dyspnea Rating  -   Recovery Time (seconds) -   Oxygen Saturation -   Supplemental Oxygen -   Heart Rate -   Blood Pressure -   Murphy Dyspnea Rating  -   Is procedure ready for interpretation? -   Did the patient stop or pause? -   How many times did the patient stop or pause? -   Stop Time 1 -   Restart Time 1 -   Pause Time 1 -   Total Time Walked (Calculated) -   Total Laps Walked -   Final Partial Lap Distance (feet) -   Total Distance Feet (Calculated) -   Total Distance Meters (Calculated) -   Predicted Distance Meters (Calculated) 448.93   Percentage of Predicted (Calculated) -   Peak VO2 (Calculated) -   Mets -   Has The Patient Had a Previous Six Minute Walk Test? -   Oxygen Qualification? -         Assessment:            COPD with chronic bronchitis and emphysema  -     albuterol (PROVENTIL/VENTOLIN HFA) 90 mcg/actuation inhaler; INHALE 2 PUFFS INTO THE LUNGS EVERY 4 HOURS AS NEEDED FOR WHEEZING Strength: 90 mcg/actuation  Dispense: 8.5 g; Refill: 3  -     fluticasone-umeclidin-vilanter (TRELEGY ELLIPTA) 200-62.5-25 mcg inhaler; Inhale 1 puff into the lungs once daily. Wash out mouth after use  Dispense: 180  each; Refill: 3    COPD exacerbation  -     methylPREDNISolone (MEDROL DOSEPACK) 4 mg tablet; use as directed  Dispense: 1 each; Refill: 0  -     doxycycline (MONODOX) 100 MG capsule; Take 1 capsule (100 mg total) by mouth every 12 (twelve) hours.  Dispense: 20 capsule; Refill: 0  -     albuterol (PROVENTIL) 2.5 mg /3 mL (0.083 %) nebulizer solution; Take 3 mLs (2.5 mg total) by nebulization every 4 to 6 hours as needed for Wheezing or Shortness of Breath.  Dispense: 360 mL; Refill: 11    SOB (shortness of breath)        Outpatient Encounter Medications as of 9/27/2022   Medication Sig Dispense Refill    allopurinoL (ZYLOPRIM) 300 MG tablet TAKE 1 TABLET(300 MG) BY MOUTH EVERY DAY 90 tablet 3    aspirin (ECOTRIN) 81 MG EC tablet Take 81 mg by mouth once daily.      baclofen (LIORESAL) 10 MG tablet Take 1 tablet by mouth every evening.  2    bisoprolol (ZEBETA) 5 MG tablet TAKE 1/2 TABLET BY MOUTH ONCE DAILY 90 tablet 3    colchicine (COLCRYS) 0.6 mg tablet TAKE 1 TABLET(0.6 MG) BY MOUTH DAILY AS NEEDED 30 tablet 1    fluticasone propionate (FLONASE) 50 mcg/actuation nasal spray SHAKE LIQUID AND USE 2 SPRAYS(100 MCG) IN EACH NOSTRIL EVERY DAY 48 g 0    glimepiride (AMARYL) 1 MG tablet TAKE 1 TABLET BY MOUTH ONCE DAILY 90 tablet 3    HYDROcodone-acetaminophen (NORCO) 7.5-325 mg per tablet Take 1 tablet by mouth every 4 (four) hours as needed (for chronic pain).  0    losartan (COZAAR) 25 MG tablet Take 1 tablet (25 mg total) by mouth once daily.      magnesium oxide (MAG-OX) 400 mg (241.3 mg magnesium) tablet TAKE 2 TABLETS(800 MG) BY MOUTH TWICE DAILY 120 tablet 5    metFORMIN (GLUCOPHAGE) 500 MG tablet TAKE 1 TABLET(500 MG) BY MOUTH DAILY WITH BREAKFAST 90 tablet 3    metOLazone (ZAROXOLYN) 2.5 MG tablet Take by mouth.      multivitamin-minerals-lutein Tab Take 1 tablet by mouth Daily.      omeprazole (PRILOSEC) 40 MG capsule TAKE 1 CAPSULE BY MOUTH EVERY DAY 30 capsule 2    potassium chloride SA (K-DUR,KLOR-CON) 20  MEQ tablet TAKE 3 TABLETS(60 MEQ) BY MOUTH TWICE DAILY 720 tablet 2    simvastatin (ZOCOR) 40 MG tablet TAKE 1 TABLET(40 MG) BY MOUTH EVERY EVENING 90 tablet 3    torsemide (DEMADEX) 20 MG Tab Take 1 tablet (20 mg total) by mouth once daily. (Patient taking differently: Take 40 mg by mouth 2 (two) times a day.) 30 tablet 11    [DISCONTINUED] albuterol (PROVENTIL) 2.5 mg /3 mL (0.083 %) nebulizer solution Take 3 mLs (2.5 mg total) by nebulization every 4 to 6 hours as needed for Wheezing or Shortness of Breath. 360 mL 11    [DISCONTINUED] albuterol (PROVENTIL/VENTOLIN HFA) 90 mcg/actuation inhaler INHALE 2 PUFFS INTO THE LUNGS EVERY 4 HOURS AS NEEDED FOR WHEEZING 8.5 g 3    [DISCONTINUED] azithromycin (ZITHROMAX Z-TAZ) 250 MG tablet 500 mg on day 1 (two tablets) followed by 250 mg once daily on days 2-5 6 tablet 0    albuterol (PROVENTIL) 2.5 mg /3 mL (0.083 %) nebulizer solution Take 3 mLs (2.5 mg total) by nebulization every 4 to 6 hours as needed for Wheezing or Shortness of Breath. 360 mL 11    albuterol (PROVENTIL/VENTOLIN HFA) 90 mcg/actuation inhaler INHALE 2 PUFFS INTO THE LUNGS EVERY 4 HOURS AS NEEDED FOR WHEEZING Strength: 90 mcg/actuation 8.5 g 3    diazePAM (VALIUM) 5 MG tablet Take 1 tablet (5 mg total) by mouth once. Take 30 minutes prior to MRI for 1 dose 1 tablet 0    doxycycline (MONODOX) 100 MG capsule Take 1 capsule (100 mg total) by mouth every 12 (twelve) hours. 20 capsule 0    fluticasone-umeclidin-vilanter (TRELEGY ELLIPTA) 200-62.5-25 mcg inhaler Inhale 1 puff into the lungs once daily. Wash out mouth after use 180 each 3    methylPREDNISolone (MEDROL DOSEPACK) 4 mg tablet use as directed 1 each 0     No facility-administered encounter medications on file as of 9/27/2022.     Plan:       Requested Prescriptions     Signed Prescriptions Disp Refills    methylPREDNISolone (MEDROL DOSEPACK) 4 mg tablet 1 each 0     Sig: use as directed    doxycycline (MONODOX) 100 MG capsule 20 capsule 0     Sig:  Take 1 capsule (100 mg total) by mouth every 12 (twelve) hours.    albuterol (PROVENTIL) 2.5 mg /3 mL (0.083 %) nebulizer solution 360 mL 11     Sig: Take 3 mLs (2.5 mg total) by nebulization every 4 to 6 hours as needed for Wheezing or Shortness of Breath.    albuterol (PROVENTIL/VENTOLIN HFA) 90 mcg/actuation inhaler 8.5 g 3     Sig: INHALE 2 PUFFS INTO THE LUNGS EVERY 4 HOURS AS NEEDED FOR WHEEZING Strength: 90 mcg/actuation    fluticasone-umeclidin-vilanter (TRELEGY ELLIPTA) 200-62.5-25 mcg inhaler 180 each 3     Sig: Inhale 1 puff into the lungs once daily. Wash out mouth after use     Problem List Items Addressed This Visit    None  Visit Diagnoses       COPD with chronic bronchitis and emphysema    -  Primary    Relevant Medications    albuterol (PROVENTIL/VENTOLIN HFA) 90 mcg/actuation inhaler    fluticasone-umeclidin-vilanter (TRELEGY ELLIPTA) 200-62.5-25 mcg inhaler    COPD exacerbation        Relevant Medications    methylPREDNISolone (MEDROL DOSEPACK) 4 mg tablet    doxycycline (MONODOX) 100 MG capsule    albuterol (PROVENTIL) 2.5 mg /3 mL (0.083 %) nebulizer solution    SOB (shortness of breath)                 Follow up in about 6 months (around 3/27/2023) for Review progress.    MEDICAL DECISION MAKING: Moderate to high complexity.  Overall, the multiple problems listed are of moderate to high severity that may impact quality of life and activities of daily living. Side effects of medications, treatment plan as well as options and alternatives reviewed and discussed with patient. There was counseling of patient concerning these issues.    Total time spent in counseling and coordination of care - 45  minutes of total time spent on the encounter, which includes face to face time and non-face to face time preparing to see the patient (eg, review of tests), Obtaining and/or reviewing separately obtained history, Documenting clinical information in the electronic or other health record, Independently  interpreting results (not separately reported) and communicating results to the patient/family/caregiver, or Care coordination (not separately reported).    Time was used in discussion of prognosis, risks, benefits of treatment, instructions and compliance with regimen . Discussion with other physicians and/or health care providers - home health or for use of durable medical equipment (oxygen, nebulizers, CPAP, BiPAP) occurred.

## 2022-09-27 NOTE — PROCEDURES
"[facility] - Pulmonary Function  Six Minute Walk     SUMMARY     Ordering Provider: Dr. Springer   Interpreting Provider: Dr. Springer  Performing nurse/tech/RT: JAN Kirkland RRT  Diagnosis:  (exercise hypoxemia)  Height: 5' 7" (170.2 cm)  Weight: 98.9 kg (218 lb 0.6 oz)  BMI (Calculated): 34.1   Patient Race:             Phase Oxygen Assessment Supplemental O2 Heart   Rate Blood Pressure Murphy Dyspnea Scale Rating   Resting 91 % Room Air 96 bpm 125/60 3   Exercise        Minute        1 90 % Room Air 99 bpm     2 93 % Room Air 104 bpm     3 94 % Room Air 113 bpm     4 94 % Room Air 114 bpm     5 92 % Room Air 117 bpm     6  93 % Room Air 118 bpm 135/68 3   Recovery        Minute        1 92 % Room Air 87 bpm     2 95 % Room Air 91 bpm     3 96 % Room Air 95 bpm     4 93 % Room Air 96 bpm 126/62 2     Six Minute Walk Summary  6MWT Status: completed with stops        Interpretation:  Did the patient stop or pause?: Yes  How many times did the patient stop or pause?: 1  Stop Time 1: 140  Restart Time 1: 213  Pause Time 1: 73 seconds                             Total Time Walked (Calculated): 287 seconds  Final Partial Lap Distance (feet): 25 feet  Total Distance Meters (Calculated): 251.46 meters  Predicted Distance Meters (Calculated): 448.93 meters  Percentage of Predicted (Calculated): 56.01  Peak VO2 (Calculated): 11.52  Mets: 3.29  Has The Patient Had a Previous Six Minute Walk Test?: No       Previous 6MWT Results  Has The Patient Had a Previous Six Minute Walk Test?: No      Interpretation:  Total distance walked in six minutes is moderately reduced indicating a reduction in overall  functional capacity. Oxygen desaturation did not meet criteria for supplemental oxygen prescription.    Clinical correlation suggested.    [] Mild exercise-induced hypoxemia described as an arterial oxygen saturation of 93-95% (or 3-4% less than at rest),  [x] Moderate exercise-induced hypoxemia as 89-93%  [] Severe exercise induced " hypoxemia as < 89% O2 saturation.  Medicare Criteria for oxygen prescription comments:   When arterial oxygen saturation is at or below 88% during exercise on room air (severe exercise induced hypoxemia) then the patient falls under Medicare Group 1 criteria for supplemental oxygen prescription.  Details about Medicare Group Criteria coverage can be found at http://www.cms.[facility].gov/manuals/downloads/   David Springer MD

## 2022-09-28 LAB
BRPFT: ABNORMAL
BRPFT: ABNORMAL
FEF 25 75 LLN: 0.95
FEF 25 75 PRE REF: 54.4 %
FEF 25 75 REF: 2.22
FEV1 FVC LLN: 63
FEV1 FVC PRE REF: 98.9 %
FEV1 FVC REF: 76
FEV1 LLN: 2.07
FEV1 PRE REF: 51.1 %
FEV1 REF: 2.88
FVC LLN: 2.8
FVC PRE REF: 51.5 %
FVC REF: 3.78
PEF LLN: 5.46
PEF PRE REF: 68.7 %
PEF REF: 7.58
PRE FEF 25 75: 1.21 L/S (ref 0.95–3.49)
PRE FET 100: 6.84 SEC
PRE FEV1 FVC: 75.53 % (ref 62.75–90)
PRE FEV1: 1.47 L (ref 2.07–3.68)
PRE FVC: 1.95 L (ref 2.8–4.76)
PRE PEF: 5.21 L/S (ref 5.46–9.7)

## 2022-09-30 ENCOUNTER — PES CALL (OUTPATIENT)
Dept: ADMINISTRATIVE | Facility: CLINIC | Age: 72
End: 2022-09-30
Payer: MEDICARE

## 2022-10-05 ENCOUNTER — TELEPHONE (OUTPATIENT)
Dept: ADMINISTRATIVE | Facility: HOSPITAL | Age: 72
End: 2022-10-05
Payer: MEDICARE

## 2022-10-10 ENCOUNTER — IMMUNIZATION (OUTPATIENT)
Dept: PHARMACY | Facility: CLINIC | Age: 72
End: 2022-10-10
Payer: MEDICARE

## 2022-10-12 DIAGNOSIS — I73.9 PVD (PERIPHERAL VASCULAR DISEASE): Primary | ICD-10-CM

## 2022-10-18 ENCOUNTER — HOSPITAL ENCOUNTER (OUTPATIENT)
Dept: CARDIOLOGY | Facility: HOSPITAL | Age: 72
Discharge: HOME | End: 2022-10-18
Attending: INTERNAL MEDICINE
Payer: MEDICARE

## 2022-10-18 ENCOUNTER — OFFICE VISIT (OUTPATIENT)
Dept: CARDIOLOGY | Facility: CLINIC | Age: 72
End: 2022-10-18
Payer: MEDICARE

## 2022-10-18 VITALS
DIASTOLIC BLOOD PRESSURE: 70 MMHG | BODY MASS INDEX: 33.83 KG/M2 | WEIGHT: 216 LBS | SYSTOLIC BLOOD PRESSURE: 110 MMHG | HEART RATE: 82 BPM | OXYGEN SATURATION: 90 %

## 2022-10-18 DIAGNOSIS — E66.01 CLASS 2 SEVERE OBESITY DUE TO EXCESS CALORIES WITH SERIOUS COMORBIDITY AND BODY MASS INDEX (BMI) OF 35.0 TO 35.9 IN ADULT: ICD-10-CM

## 2022-10-18 DIAGNOSIS — I12.9 HYPERTENSION ASSOCIATED WITH STAGE 3 CHRONIC KIDNEY DISEASE DUE TO TYPE 2 DIABETES MELLITUS: ICD-10-CM

## 2022-10-18 DIAGNOSIS — I25.118 CORONARY ARTERY DISEASE OF NATIVE ARTERY OF NATIVE HEART WITH STABLE ANGINA PECTORIS: Chronic | ICD-10-CM

## 2022-10-18 DIAGNOSIS — N18.30 HYPERTENSION ASSOCIATED WITH STAGE 3 CHRONIC KIDNEY DISEASE DUE TO TYPE 2 DIABETES MELLITUS: ICD-10-CM

## 2022-10-18 DIAGNOSIS — I42.8 NICM (NONISCHEMIC CARDIOMYOPATHY): ICD-10-CM

## 2022-10-18 DIAGNOSIS — E11.69 HYPERLIPIDEMIA ASSOCIATED WITH TYPE 2 DIABETES MELLITUS: ICD-10-CM

## 2022-10-18 DIAGNOSIS — I73.9 PVD (PERIPHERAL VASCULAR DISEASE): Primary | ICD-10-CM

## 2022-10-18 DIAGNOSIS — I73.9 PVD (PERIPHERAL VASCULAR DISEASE): ICD-10-CM

## 2022-10-18 DIAGNOSIS — I50.32 CHRONIC DIASTOLIC CONGESTIVE HEART FAILURE: ICD-10-CM

## 2022-10-18 DIAGNOSIS — E78.5 HYPERLIPIDEMIA ASSOCIATED WITH TYPE 2 DIABETES MELLITUS: ICD-10-CM

## 2022-10-18 DIAGNOSIS — E11.22 HYPERTENSION ASSOCIATED WITH STAGE 3 CHRONIC KIDNEY DISEASE DUE TO TYPE 2 DIABETES MELLITUS: ICD-10-CM

## 2022-10-18 DIAGNOSIS — Z72.0 TOBACCO ABUSE: ICD-10-CM

## 2022-10-18 PROCEDURE — 3044F PR MOST RECENT HEMOGLOBIN A1C LEVEL <7.0%: ICD-10-PCS | Mod: CPTII,S$GLB,, | Performed by: INTERNAL MEDICINE

## 2022-10-18 PROCEDURE — 93010 EKG 12-LEAD: ICD-10-PCS | Mod: ,,, | Performed by: INTERNAL MEDICINE

## 2022-10-18 PROCEDURE — 99999 PR PBB SHADOW E&M-EST. PATIENT-LVL IV: CPT | Mod: PBBFAC,,, | Performed by: INTERNAL MEDICINE

## 2022-10-18 PROCEDURE — 3078F PR MOST RECENT DIASTOLIC BLOOD PRESSURE < 80 MM HG: ICD-10-PCS | Mod: CPTII,S$GLB,, | Performed by: INTERNAL MEDICINE

## 2022-10-18 PROCEDURE — 99214 OFFICE O/P EST MOD 30 MIN: CPT | Mod: S$GLB,,, | Performed by: INTERNAL MEDICINE

## 2022-10-18 PROCEDURE — 99214 PR OFFICE/OUTPT VISIT, EST, LEVL IV, 30-39 MIN: ICD-10-PCS | Mod: S$GLB,,, | Performed by: INTERNAL MEDICINE

## 2022-10-18 PROCEDURE — 93005 ELECTROCARDIOGRAM TRACING: CPT

## 2022-10-18 PROCEDURE — 99499 UNLISTED E&M SERVICE: CPT | Mod: S$GLB,,, | Performed by: INTERNAL MEDICINE

## 2022-10-18 PROCEDURE — 4010F ACE/ARB THERAPY RXD/TAKEN: CPT | Mod: CPTII,S$GLB,, | Performed by: INTERNAL MEDICINE

## 2022-10-18 PROCEDURE — 3044F HG A1C LEVEL LT 7.0%: CPT | Mod: CPTII,S$GLB,, | Performed by: INTERNAL MEDICINE

## 2022-10-18 PROCEDURE — 99999 PR PBB SHADOW E&M-EST. PATIENT-LVL IV: ICD-10-PCS | Mod: PBBFAC,,, | Performed by: INTERNAL MEDICINE

## 2022-10-18 PROCEDURE — 3072F PR LOW RISK FOR RETINOPATHY: ICD-10-PCS | Mod: CPTII,S$GLB,, | Performed by: INTERNAL MEDICINE

## 2022-10-18 PROCEDURE — 3074F PR MOST RECENT SYSTOLIC BLOOD PRESSURE < 130 MM HG: ICD-10-PCS | Mod: CPTII,S$GLB,, | Performed by: INTERNAL MEDICINE

## 2022-10-18 PROCEDURE — 3066F NEPHROPATHY DOC TX: CPT | Mod: CPTII,S$GLB,, | Performed by: INTERNAL MEDICINE

## 2022-10-18 PROCEDURE — 3078F DIAST BP <80 MM HG: CPT | Mod: CPTII,S$GLB,, | Performed by: INTERNAL MEDICINE

## 2022-10-18 PROCEDURE — 1160F PR REVIEW ALL MEDS BY PRESCRIBER/CLIN PHARMACIST DOCUMENTED: ICD-10-PCS | Mod: CPTII,S$GLB,, | Performed by: INTERNAL MEDICINE

## 2022-10-18 PROCEDURE — 1160F RVW MEDS BY RX/DR IN RCRD: CPT | Mod: CPTII,S$GLB,, | Performed by: INTERNAL MEDICINE

## 2022-10-18 PROCEDURE — 3062F PR POS MACROALBUMINURIA RESULT DOCUMENTED/REVIEW: ICD-10-PCS | Mod: CPTII,S$GLB,, | Performed by: INTERNAL MEDICINE

## 2022-10-18 PROCEDURE — 3062F POS MACROALBUMINURIA REV: CPT | Mod: CPTII,S$GLB,, | Performed by: INTERNAL MEDICINE

## 2022-10-18 PROCEDURE — 4010F PR ACE/ARB THEARPY RXD/TAKEN: ICD-10-PCS | Mod: CPTII,S$GLB,, | Performed by: INTERNAL MEDICINE

## 2022-10-18 PROCEDURE — 3072F LOW RISK FOR RETINOPATHY: CPT | Mod: CPTII,S$GLB,, | Performed by: INTERNAL MEDICINE

## 2022-10-18 PROCEDURE — 3066F PR DOCUMENTATION OF TREATMENT FOR NEPHROPATHY: ICD-10-PCS | Mod: CPTII,S$GLB,, | Performed by: INTERNAL MEDICINE

## 2022-10-18 PROCEDURE — 3074F SYST BP LT 130 MM HG: CPT | Mod: CPTII,S$GLB,, | Performed by: INTERNAL MEDICINE

## 2022-10-18 PROCEDURE — 1159F MED LIST DOCD IN RCRD: CPT | Mod: CPTII,S$GLB,, | Performed by: INTERNAL MEDICINE

## 2022-10-18 PROCEDURE — 1159F PR MEDICATION LIST DOCUMENTED IN MEDICAL RECORD: ICD-10-PCS | Mod: CPTII,S$GLB,, | Performed by: INTERNAL MEDICINE

## 2022-10-18 PROCEDURE — 93010 ELECTROCARDIOGRAM REPORT: CPT | Mod: ,,, | Performed by: INTERNAL MEDICINE

## 2022-10-18 RX ORDER — TORSEMIDE 20 MG/1
40 TABLET ORAL 2 TIMES DAILY
Qty: 120 TABLET | Refills: 11 | Status: SHIPPED | OUTPATIENT
Start: 2022-10-18 | End: 2023-10-27

## 2022-10-18 NOTE — PROGRESS NOTES
Subjective:   Patient ID:  Santiago Khan is a 72 y.o. male who presents for follow up of No chief complaint on file.      73 yo male, came in for SOB  PMH CAD s/p LHC in  D1 70%, no PCi, CHFmrEF 45%, normal LVEDP, DM 4 yrs, life long smoker, quit in ,  HTN, HLD, CKD III, prostates Ca s/p TURP in 2011, no h/o chemo RX and XRT. Gout. No h/o stroke and heart attack. Social drinker.  Remote LHC showed miminal blockage by Dr. Mayra CHAPPELL,    admitted for [facility] due to chest tightness. Had low K and Mg. Troponin x2 negative and EKG showed NSR, RBBB, and LVH. Echo showed EF 45% global HK and moderate MR. Had K and Mg supplement.  States that gained weight for 30 pounds since 3/202 after held Metolazone. Even he was taking Bumex 1.5 mg bid. In  BNP 15 and Cr 1.4  Still has GOVEA. No orthopnea, sleeps with CPAP. No chest pain, faint  NUKE stress done in  showed inferior ischemia with scar. EF 45%  LHC done in  D1 70% no PCI. Normal filling pressure  No chest pain . Left radial access ok  SOB, weight gain  Sleeps on 1 pillow. No PND  Taking Bumex 2 mg bid and DIamox   Pt c/o weight gain 30 pounds after held his metolazone in the past 4 months  C/o abd distanesion SOB.     11/23/2021 visit  SOB for few months, positive orthopnea. Sleeps on CPAP. + leg swelling and left leg pain and redness  The last seen was   On Bumex 2 mg bid. Held metolazone. Quit smoking  H/o prostate cancer and PSA recurrently elevated. CXR in  negative     12/2021   Leg swelling and erythema. At last visit, D/c bumex and added torsemide 40 mg bid. Good urination. BNP negative and Cr up to 1.6. Decreased torsemide to 20 mg bid  Still leg swelling and SOB. Quit smoking 9 months ago.      visit  Resumed metolazone 3 times a week about 2 weeks ago and BMP done two days ago showed elevated Cr.   Felt neck tightness and improved breathing after the head tilts back and stretched up. Not  f/u with pulm yet  On compresion socks.   Still SOB. Serial BNPs wnl and h/o LHC showed normal filling pressure suggested CHF controlled     visit  Quit smoking 1 yr and on breathing rx  No chest pain dizziness and faint. BP controlled  BNP < 10. Cr 1.7    echo  · Mild concentric left ventricular hypertrophy.  · Mildly decreased left ventricular systolic function. The estimated ejection fraction is 45%.  · Grade I (mild) left ventricular diastolic dysfunction consistent with impaired relaxation.  · Low normal right ventricular systolic function.  · Moderate mitral regurgitation.  · Normal central venous pressure (3 mmHg).  · The estimated PA systolic pressure is 23 mmHg.    Interval history  H/o 6MWT in : 250 meters, and peal VO 11 calculated  Cane dependent due to lower back pain  Resumed smoking. On breathing tx and f/u with Dr. Escudero  On torsemide 40 mg bid for PVD                    Past Medical History:   Diagnosis Date    Chronic diastolic congestive heart failure 04/07/2020    Chronic kidney disease, stage 3     Chronic systolic congestive heart failure 07/09/2020    Chronic venous insufficiency     COPD (chronic obstructive pulmonary disease)     DDD (degenerative disc disease), lumbar     DM (diabetes mellitus) with complications     History of gout     Hyperlipidemia associated with type 2 diabetes mellitus     Hypertension associated with stage 3 chronic kidney disease due to type 2 diabetes mellitus     BRADLEY on CPAP     Prostate cancer     prostatectomy in 2010, rising PSA that started in 2021    Tobacco abuse        Past Surgical History:   Procedure Laterality Date    BICEPS TENDON REPAIR      COLONOSCOPY N/A 3/27/2019    Procedure: COLONOSCOPY;  Surgeon: James Roger MD;  Location: [facility];  Service: Endoscopy;  Laterality: N/A;    HEMORRHOID SURGERY      INGUINAL HERNIA REPAIR Left     KNEE ARTHROSCOPY Right     LEFT HEART CATHETERIZATION Left 6/29/2020    Procedure:  CATHETERIZATION, HEART, LEFT;  Surgeon: Cheli Wilson MD;  Location: [facility] CATH LAB;  Service: Cardiology;  Laterality: Left;    LUMBAR LAMINECTOMY      PROSTATECTOMY         Social History     Tobacco Use    Smoking status: Every Day     Packs/day: 0.50     Types: Cigarettes     Start date: 1/1/1971    Smokeless tobacco: Former   Substance Use Topics    Alcohol use: Not Currently    Drug use: Never       Family History   Problem Relation Age of Onset    Prostate cancer Father     Coronary artery disease Father 90    Alzheimer's disease Father     Hyperlipidemia Mother          Review of Systems   Constitutional: Positive for malaise/fatigue. Negative for decreased appetite, diaphoresis, fever and night sweats.   HENT:  Negative for nosebleeds.    Eyes:  Negative for blurred vision and double vision.   Cardiovascular:  Positive for dyspnea on exertion and leg swelling. Negative for chest pain, claudication, irregular heartbeat, near-syncope, palpitations, paroxysmal nocturnal dyspnea and syncope.   Respiratory:  Positive for shortness of breath. Negative for cough, sleep disturbances due to breathing, snoring, sputum production and wheezing.    Endocrine: Negative for cold intolerance and polyuria.   Hematologic/Lymphatic: Does not bruise/bleed easily.   Skin:  Negative for rash.   Musculoskeletal:  Negative for back pain, falls, joint pain, joint swelling and neck pain.   Gastrointestinal:  Negative for abdominal pain, heartburn, nausea and vomiting.   Genitourinary:  Negative for dysuria, frequency and hematuria.   Neurological:  Negative for difficulty with concentration, dizziness, focal weakness, headaches, light-headedness, numbness, seizures and weakness.   Psychiatric/Behavioral:  Negative for depression, memory loss and substance abuse. The patient does not have insomnia.    Allergic/Immunologic: Negative for HIV exposure and hives.     Objective:   Physical Exam  HENT:      Head: Normocephalic.   Eyes:       Pupils: Pupils are equal, round, and reactive to light.   Neck:      Thyroid: No thyromegaly.      Vascular: Normal carotid pulses. No carotid bruit or JVD.   Cardiovascular:      Rate and Rhythm: Normal rate and regular rhythm. No extrasystoles are present.     Chest Wall: PMI is not displaced.      Pulses: Normal pulses.      Heart sounds: Normal heart sounds. No murmur heard.    No gallop. No S3 sounds.   Pulmonary:      Effort: No respiratory distress.      Breath sounds: Normal breath sounds. No stridor.   Abdominal:      General: Bowel sounds are normal.      Palpations: Abdomen is soft.      Tenderness: There is no abdominal tenderness. There is no rebound.   Musculoskeletal:         General: Swelling present. Normal range of motion.   Skin:     Findings: No rash.   Neurological:      Mental Status: He is alert and oriented to person, place, and time.   Psychiatric:         Behavior: Behavior normal.       Lab Results   Component Value Date    CHOL 150 04/29/2022    CHOL 135 07/30/2021    CHOL 148 02/01/2021     Lab Results   Component Value Date    HDL 49 04/29/2022    HDL 44 07/30/2021    HDL 46 02/01/2021     Lab Results   Component Value Date    LDLCALC 74.4 04/29/2022    LDLCALC 64.6 07/30/2021    LDLCALC 82.2 02/01/2021     Lab Results   Component Value Date    TRIG 133 04/29/2022    TRIG 132 07/30/2021    TRIG 99 02/01/2021     Lab Results   Component Value Date    CHOLHDL 32.7 04/29/2022    CHOLHDL 32.6 07/30/2021    CHOLHDL 31.1 02/01/2021       Chemistry        Component Value Date/Time     04/29/2022 1007    K 5.1 04/29/2022 1007    CL 99 04/29/2022 1007    CO2 31 (H) 04/29/2022 1007    BUN 15 04/29/2022 1007    CREATININE 1.5 (H) 09/19/2022 0954     (H) 04/29/2022 1007        Component Value Date/Time    CALCIUM 9.8 04/29/2022 1007    ALKPHOS 75 04/29/2022 1007    AST 26 04/29/2022 1007    ALT 25 04/29/2022 1007    BILITOT 0.6 04/29/2022 1007    ESTGFRAFRICA 43 (A) 07/18/2022 0941     EGFRNONAA 37 (A) 07/18/2022 0941          Lab Results   Component Value Date    HGBA1C 6.4 (H) 04/29/2022     Lab Results   Component Value Date    TSH 1.456 04/29/2022     Lab Results   Component Value Date    INR 1.0 06/26/2020    INR 1.0 03/10/2020     Lab Results   Component Value Date    WBC 8.63 04/29/2022    HGB 12.1 (L) 04/29/2022    HCT 38.9 (L) 04/29/2022     (H) 04/29/2022     04/29/2022     BMP  Sodium   Date Value Ref Range Status   04/29/2022 141 136 - 145 mmol/L Final     Potassium   Date Value Ref Range Status   04/29/2022 5.1 3.5 - 5.1 mmol/L Final     Chloride   Date Value Ref Range Status   04/29/2022 99 95 - 110 mmol/L Final     CO2   Date Value Ref Range Status   04/29/2022 31 (H) 23 - 29 mmol/L Final     BUN   Date Value Ref Range Status   04/29/2022 15 8 - 23 mg/dL Final     Creatinine   Date Value Ref Range Status   09/19/2022 1.5 (H) 0.5 - 1.4 mg/dL Final     Calcium   Date Value Ref Range Status   04/29/2022 9.8 8.7 - 10.5 mg/dL Final     Anion Gap   Date Value Ref Range Status   04/29/2022 11 8 - 16 mmol/L Final     eGFR if    Date Value Ref Range Status   07/18/2022 43 (A) >60 mL/min/1.73 m^2 Final     eGFR if non    Date Value Ref Range Status   07/18/2022 37 (A) >60 mL/min/1.73 m^2 Final     Comment:     Calculation used to obtain the estimated glomerular filtration  rate (eGFR) is the CKD-EPI equation.        BNP  @LABRCNTIP(BNP,BNPTRIAGEBLO)@  @LABRCNTIP(troponini)@  CrCl cannot be calculated (Patient's most recent lab result is older than the maximum 7 days allowed.).  No results found in the last 24 hours.  No results found in the last 24 hours.  No results found in the last 24 hours.    Assessment:      1. PVD (peripheral vascular disease)    2. Coronary artery disease of native artery of native heart with stable angina pectoris    3. Hypertension associated with stage 3 chronic kidney disease due to type 2 diabetes mellitus    4.  Hyperlipidemia associated with type 2 diabetes mellitus    5. Chronic diastolic congestive heart failure    6. NICM (nonischemic cardiomyopathy)    7. Class 2 severe obesity due to excess calories with serious comorbidity and body mass index (BMI) of 35.0 to 35.9 in adult    8. Tobacco abuse      CHFmrEF and PVD compensated. NICM   COPD  Back pain    Plan:     Continue bisoprolol losartan Torsemide and statin  Smoking cessation and continue breathing Rx  DM Rx per PCP  Counseled DASH  Check Lipid profile in 6 months  Recommend heart-healthy diet, weight control  Mahsa. Risk modification.   I have reviewed all pertinent labs and cardiac studies independently. Plans and recommendations have been formulated under my direct supervision. All questions answered and patient voiced understanding.   If symptoms persist go to the ED  RTC in 12 months

## 2022-10-24 ENCOUNTER — PATIENT MESSAGE (OUTPATIENT)
Dept: INTERNAL MEDICINE | Facility: CLINIC | Age: 72
End: 2022-10-24
Payer: MEDICARE

## 2022-10-24 NOTE — TELEPHONE ENCOUNTER
Care Due:                  Date            Visit Type   Department     Provider  --------------------------------------------------------------------------------                                EP -                              PRIMARY      [facility] INTERNAL  Last Visit: 04-      CARE ([facility])   MEDICINE       Kashif Acosta                              [facility]                              PRIMARY      [facility] INTERNAL  Next Visit: 11-      CARE ([facility])   MEDICINE       Kashif Acosta                                                            Last  Test          Frequency    Reason                     Performed    Due Date  --------------------------------------------------------------------------------    HBA1C.......  6 months...  glimepiride, metFORMIN...  04-   10-    [facility] Embedded Care Gaps. Reference number: [id number]. 10/24/2022   4:54:16 PM CDT

## 2022-10-25 RX ORDER — FLUTICASONE PROPIONATE 50 MCG
SPRAY, SUSPENSION (ML) NASAL
Qty: 48 G | Refills: 3 | OUTPATIENT
Start: 2022-10-25 | End: 2022-11-03 | Stop reason: SDUPTHER

## 2022-10-31 ENCOUNTER — LAB VISIT (OUTPATIENT)
Dept: LAB | Facility: HOSPITAL | Age: 72
End: 2022-10-31
Attending: INTERNAL MEDICINE
Payer: MEDICARE

## 2022-10-31 DIAGNOSIS — E11.8 DM (DIABETES MELLITUS) WITH COMPLICATIONS: ICD-10-CM

## 2022-10-31 LAB
ANION GAP SERPL CALC-SCNC: 13 MMOL/L (ref 8–16)
BUN SERPL-MCNC: 11 MG/DL (ref 8–23)
CALCIUM SERPL-MCNC: 9.7 MG/DL (ref 8.7–10.5)
CHLORIDE SERPL-SCNC: 97 MMOL/L (ref 95–110)
CHOLEST SERPL-MCNC: 132 MG/DL (ref 120–199)
CHOLEST/HDLC SERPL: 3.2 {RATIO} (ref 2–5)
CO2 SERPL-SCNC: 31 MMOL/L (ref 23–29)
CREAT SERPL-MCNC: 1.5 MG/DL (ref 0.5–1.4)
EST. GFR  (NO RACE VARIABLE): 49.5 ML/MIN/1.73 M^2
ESTIMATED AVG GLUCOSE: 105 MG/DL (ref 68–131)
GLUCOSE SERPL-MCNC: 112 MG/DL (ref 70–110)
HBA1C MFR BLD: 5.3 % (ref 4–5.6)
HDLC SERPL-MCNC: 41 MG/DL (ref 40–75)
HDLC SERPL: 31.1 % (ref 20–50)
LDLC SERPL CALC-MCNC: 61.2 MG/DL (ref 63–159)
NONHDLC SERPL-MCNC: 91 MG/DL
POTASSIUM SERPL-SCNC: 4.4 MMOL/L (ref 3.5–5.1)
SODIUM SERPL-SCNC: 141 MMOL/L (ref 136–145)
TRIGL SERPL-MCNC: 149 MG/DL (ref 30–150)

## 2022-10-31 PROCEDURE — 36415 COLL VENOUS BLD VENIPUNCTURE: CPT | Mod: PO | Performed by: INTERNAL MEDICINE

## 2022-10-31 PROCEDURE — 83036 HEMOGLOBIN GLYCOSYLATED A1C: CPT | Performed by: INTERNAL MEDICINE

## 2022-10-31 PROCEDURE — 80061 LIPID PANEL: CPT | Performed by: INTERNAL MEDICINE

## 2022-10-31 PROCEDURE — 80048 BASIC METABOLIC PNL TOTAL CA: CPT | Performed by: INTERNAL MEDICINE

## 2022-11-01 ENCOUNTER — TELEPHONE (OUTPATIENT)
Dept: INTERNAL MEDICINE | Facility: CLINIC | Age: 72
End: 2022-11-01
Payer: MEDICARE

## 2022-11-03 ENCOUNTER — OFFICE VISIT (OUTPATIENT)
Dept: INTERNAL MEDICINE | Facility: CLINIC | Age: 72
End: 2022-11-03
Payer: MEDICARE

## 2022-11-03 VITALS
DIASTOLIC BLOOD PRESSURE: 68 MMHG | WEIGHT: 218 LBS | SYSTOLIC BLOOD PRESSURE: 120 MMHG | BODY MASS INDEX: 34.14 KG/M2 | HEART RATE: 81 BPM | TEMPERATURE: 97 F | OXYGEN SATURATION: 90 %

## 2022-11-03 DIAGNOSIS — E11.22 HYPERTENSION ASSOCIATED WITH STAGE 3 CHRONIC KIDNEY DISEASE DUE TO TYPE 2 DIABETES MELLITUS: Primary | ICD-10-CM

## 2022-11-03 DIAGNOSIS — E11.69 HYPERLIPIDEMIA ASSOCIATED WITH TYPE 2 DIABETES MELLITUS: ICD-10-CM

## 2022-11-03 DIAGNOSIS — E66.2 MORBID OBESITY WITH ALVEOLAR HYPOVENTILATION: ICD-10-CM

## 2022-11-03 DIAGNOSIS — N18.30 HYPERTENSION ASSOCIATED WITH STAGE 3 CHRONIC KIDNEY DISEASE DUE TO TYPE 2 DIABETES MELLITUS: Primary | ICD-10-CM

## 2022-11-03 DIAGNOSIS — Z72.0 TOBACCO ABUSE: ICD-10-CM

## 2022-11-03 DIAGNOSIS — I12.9 HYPERTENSION ASSOCIATED WITH STAGE 3 CHRONIC KIDNEY DISEASE DUE TO TYPE 2 DIABETES MELLITUS: Primary | ICD-10-CM

## 2022-11-03 DIAGNOSIS — N18.32 STAGE 3B CHRONIC KIDNEY DISEASE: Chronic | ICD-10-CM

## 2022-11-03 DIAGNOSIS — G47.33 OSA ON CPAP: ICD-10-CM

## 2022-11-03 DIAGNOSIS — C61 PROSTATE CANCER: ICD-10-CM

## 2022-11-03 DIAGNOSIS — I87.2 CHRONIC VENOUS INSUFFICIENCY: ICD-10-CM

## 2022-11-03 DIAGNOSIS — I25.118 CORONARY ARTERY DISEASE OF NATIVE ARTERY OF NATIVE HEART WITH STABLE ANGINA PECTORIS: Chronic | ICD-10-CM

## 2022-11-03 DIAGNOSIS — E78.5 HYPERLIPIDEMIA ASSOCIATED WITH TYPE 2 DIABETES MELLITUS: ICD-10-CM

## 2022-11-03 DIAGNOSIS — Z87.39 HISTORY OF GOUT: ICD-10-CM

## 2022-11-03 DIAGNOSIS — E66.01 CLASS 2 SEVERE OBESITY DUE TO EXCESS CALORIES WITH SERIOUS COMORBIDITY AND BODY MASS INDEX (BMI) OF 35.0 TO 35.9 IN ADULT: ICD-10-CM

## 2022-11-03 DIAGNOSIS — E11.8 DM (DIABETES MELLITUS) WITH COMPLICATIONS: ICD-10-CM

## 2022-11-03 DIAGNOSIS — I50.42 CHRONIC COMBINED SYSTOLIC AND DIASTOLIC CONGESTIVE HEART FAILURE: Chronic | ICD-10-CM

## 2022-11-03 DIAGNOSIS — I50.32 CHRONIC DIASTOLIC CONGESTIVE HEART FAILURE: ICD-10-CM

## 2022-11-03 DIAGNOSIS — I73.9 PVD (PERIPHERAL VASCULAR DISEASE): ICD-10-CM

## 2022-11-03 PROCEDURE — 1125F AMNT PAIN NOTED PAIN PRSNT: CPT | Mod: CPTII,S$GLB,, | Performed by: INTERNAL MEDICINE

## 2022-11-03 PROCEDURE — 3074F PR MOST RECENT SYSTOLIC BLOOD PRESSURE < 130 MM HG: ICD-10-PCS | Mod: CPTII,S$GLB,, | Performed by: INTERNAL MEDICINE

## 2022-11-03 PROCEDURE — 3044F HG A1C LEVEL LT 7.0%: CPT | Mod: CPTII,S$GLB,, | Performed by: INTERNAL MEDICINE

## 2022-11-03 PROCEDURE — 4010F PR ACE/ARB THEARPY RXD/TAKEN: ICD-10-PCS | Mod: CPTII,S$GLB,, | Performed by: INTERNAL MEDICINE

## 2022-11-03 PROCEDURE — 1159F MED LIST DOCD IN RCRD: CPT | Mod: CPTII,S$GLB,, | Performed by: INTERNAL MEDICINE

## 2022-11-03 PROCEDURE — 3288F FALL RISK ASSESSMENT DOCD: CPT | Mod: CPTII,S$GLB,, | Performed by: INTERNAL MEDICINE

## 2022-11-03 PROCEDURE — 3008F BODY MASS INDEX DOCD: CPT | Mod: CPTII,S$GLB,, | Performed by: INTERNAL MEDICINE

## 2022-11-03 PROCEDURE — 99999 PR PBB SHADOW E&M-EST. PATIENT-LVL IV: ICD-10-PCS | Mod: PBBFAC,,, | Performed by: INTERNAL MEDICINE

## 2022-11-03 PROCEDURE — 1125F PR PAIN SEVERITY QUANTIFIED, PAIN PRESENT: ICD-10-PCS | Mod: CPTII,S$GLB,, | Performed by: INTERNAL MEDICINE

## 2022-11-03 PROCEDURE — 3072F LOW RISK FOR RETINOPATHY: CPT | Mod: CPTII,S$GLB,, | Performed by: INTERNAL MEDICINE

## 2022-11-03 PROCEDURE — 90677 PNEUMOCOCCAL CONJUGATE VACCINE 20-VALENT: ICD-10-PCS | Mod: S$GLB,,, | Performed by: INTERNAL MEDICINE

## 2022-11-03 PROCEDURE — 90677 PCV20 VACCINE IM: CPT | Mod: S$GLB,,, | Performed by: INTERNAL MEDICINE

## 2022-11-03 PROCEDURE — 3078F PR MOST RECENT DIASTOLIC BLOOD PRESSURE < 80 MM HG: ICD-10-PCS | Mod: CPTII,S$GLB,, | Performed by: INTERNAL MEDICINE

## 2022-11-03 PROCEDURE — 99214 OFFICE O/P EST MOD 30 MIN: CPT | Mod: 25,S$GLB,, | Performed by: INTERNAL MEDICINE

## 2022-11-03 PROCEDURE — 4010F ACE/ARB THERAPY RXD/TAKEN: CPT | Mod: CPTII,S$GLB,, | Performed by: INTERNAL MEDICINE

## 2022-11-03 PROCEDURE — 3078F DIAST BP <80 MM HG: CPT | Mod: CPTII,S$GLB,, | Performed by: INTERNAL MEDICINE

## 2022-11-03 PROCEDURE — 1101F PT FALLS ASSESS-DOCD LE1/YR: CPT | Mod: CPTII,S$GLB,, | Performed by: INTERNAL MEDICINE

## 2022-11-03 PROCEDURE — 99214 PR OFFICE/OUTPT VISIT, EST, LEVL IV, 30-39 MIN: ICD-10-PCS | Mod: 25,S$GLB,, | Performed by: INTERNAL MEDICINE

## 2022-11-03 PROCEDURE — G0009 PNEUMOCOCCAL CONJUGATE VACCINE 20-VALENT: ICD-10-PCS | Mod: S$GLB,,, | Performed by: INTERNAL MEDICINE

## 2022-11-03 PROCEDURE — 99999 PR PBB SHADOW E&M-EST. PATIENT-LVL IV: CPT | Mod: PBBFAC,,, | Performed by: INTERNAL MEDICINE

## 2022-11-03 PROCEDURE — 3062F POS MACROALBUMINURIA REV: CPT | Mod: CPTII,S$GLB,, | Performed by: INTERNAL MEDICINE

## 2022-11-03 PROCEDURE — 3066F NEPHROPATHY DOC TX: CPT | Mod: CPTII,S$GLB,, | Performed by: INTERNAL MEDICINE

## 2022-11-03 PROCEDURE — G0009 ADMIN PNEUMOCOCCAL VACCINE: HCPCS | Mod: S$GLB,,, | Performed by: INTERNAL MEDICINE

## 2022-11-03 PROCEDURE — 3288F PR FALLS RISK ASSESSMENT DOCUMENTED: ICD-10-PCS | Mod: CPTII,S$GLB,, | Performed by: INTERNAL MEDICINE

## 2022-11-03 PROCEDURE — 1101F PR PT FALLS ASSESS DOC 0-1 FALLS W/OUT INJ PAST YR: ICD-10-PCS | Mod: CPTII,S$GLB,, | Performed by: INTERNAL MEDICINE

## 2022-11-03 PROCEDURE — 3062F PR POS MACROALBUMINURIA RESULT DOCUMENTED/REVIEW: ICD-10-PCS | Mod: CPTII,S$GLB,, | Performed by: INTERNAL MEDICINE

## 2022-11-03 PROCEDURE — 3008F PR BODY MASS INDEX (BMI) DOCUMENTED: ICD-10-PCS | Mod: CPTII,S$GLB,, | Performed by: INTERNAL MEDICINE

## 2022-11-03 PROCEDURE — 1159F PR MEDICATION LIST DOCUMENTED IN MEDICAL RECORD: ICD-10-PCS | Mod: CPTII,S$GLB,, | Performed by: INTERNAL MEDICINE

## 2022-11-03 PROCEDURE — 3044F PR MOST RECENT HEMOGLOBIN A1C LEVEL <7.0%: ICD-10-PCS | Mod: CPTII,S$GLB,, | Performed by: INTERNAL MEDICINE

## 2022-11-03 PROCEDURE — 3066F PR DOCUMENTATION OF TREATMENT FOR NEPHROPATHY: ICD-10-PCS | Mod: CPTII,S$GLB,, | Performed by: INTERNAL MEDICINE

## 2022-11-03 PROCEDURE — 3072F PR LOW RISK FOR RETINOPATHY: ICD-10-PCS | Mod: CPTII,S$GLB,, | Performed by: INTERNAL MEDICINE

## 2022-11-03 PROCEDURE — 3074F SYST BP LT 130 MM HG: CPT | Mod: CPTII,S$GLB,, | Performed by: INTERNAL MEDICINE

## 2022-11-03 RX ORDER — FLUTICASONE PROPIONATE 50 MCG
SPRAY, SUSPENSION (ML) NASAL
Qty: 48 G | Refills: 3 | Status: SHIPPED | OUTPATIENT
Start: 2022-11-03 | End: 2024-03-06 | Stop reason: SDUPTHER

## 2022-11-03 NOTE — PROGRESS NOTES
HPI:  Patient is a 72-year-old man who comes today for follow-up of his diabetes, hypertension, lipids, COPD, coronary disease and congestive heart failure.  Patient states he has been doing about the same.  He denies any chest pain.  He has chronic but stable dyspnea on exertion.  He has continued to smoke.  He denies any hypoglycemia.  He does not check his blood sugar or blood pressure at home.    Current meds have been verified and updated per the EMR  Exam:/68   Pulse 81   Temp 97.4 °F (36.3 °C) (Temporal)   Wt 98.9 kg (218 lb)   SpO2 (!) 90%   BMI 34.14 kg/m²   Carotids 2+ equal without bruits  Chest clear  Cardiovascular regular rate and rhythm without murmur gallop or rub    Lab Results   Component Value Date    WBC 8.63 04/29/2022    HGB 12.1 (L) 04/29/2022    HCT 38.9 (L) 04/29/2022     04/29/2022    CHOL 132 10/31/2022    TRIG 149 10/31/2022    HDL 41 10/31/2022    ALT 25 04/29/2022    AST 26 04/29/2022     10/31/2022    K 4.4 10/31/2022    CL 97 10/31/2022    CREATININE 1.5 (H) 10/31/2022    BUN 11 10/31/2022    CO2 31 (H) 10/31/2022    TSH 1.456 04/29/2022    PSA 0.15 02/01/2021    INR 1.0 06/26/2020    HGBA1C 5.3 10/31/2022       Impression:  Numerous medical problems below, stable, patient needs to quit smoking.  He needs to wear his CPAP mask all the time  Patient Active Problem List   Diagnosis    BRADLEY on CPAP    History of gout    DDD (degenerative disc disease), lumbar    Tobacco abuse    Hypertension associated with stage 3 chronic kidney disease due to type 2 diabetes mellitus    Hyperlipidemia associated with type 2 diabetes mellitus    Chronic kidney disease, stage 3    Class 2 severe obesity due to excess calories with serious comorbidity and body mass index (BMI) of 35.0 to 35.9 in adult    Chronic diastolic congestive heart failure    Coronary artery disease of native artery of native heart with stable angina pectoris    Chronic combined systolic and diastolic  congestive heart failure    Prostate cancer    PVD (peripheral vascular disease)    NICM (nonischemic cardiomyopathy)    Closed fracture of tuft of distal phalanx of finger    Chronic venous insufficiency    Third nerve palsy, right    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  Patient will see me again in 6 months with above lab work.  He has been encouraged to quit smoking.  He has been encouraged to wear her CPAP mask all the time.  Patient needs to lose some weight.  He needs to be compliant with all medications    This note is generated with speech recognition software and is subject to transcription error and sound alike phrases that may be missed by proofreading.

## 2022-11-03 NOTE — PROGRESS NOTES
Administered Pneumococcal-20 in the left deltoid. Pt. Tolerated well and was advised to wait 15-mins to watch out for adverse reaction.

## 2022-11-04 ENCOUNTER — PES CALL (OUTPATIENT)
Dept: ADMINISTRATIVE | Facility: CLINIC | Age: 72
End: 2022-11-04
Payer: MEDICARE

## 2023-02-07 ENCOUNTER — OFFICE VISIT (OUTPATIENT)
Dept: ORTHOPEDICS | Facility: CLINIC | Age: 73
End: 2023-02-07
Payer: MEDICARE

## 2023-02-07 VITALS — HEIGHT: 67 IN | BODY MASS INDEX: 34.21 KG/M2 | WEIGHT: 218 LBS

## 2023-02-07 DIAGNOSIS — M65.30 TRIGGER FINGER, ACQUIRED: Primary | ICD-10-CM

## 2023-02-07 PROCEDURE — 20550 NJX 1 TENDON SHEATH/LIGAMENT: CPT | Mod: RT,S$GLB,, | Performed by: ORTHOPAEDIC SURGERY

## 2023-02-07 PROCEDURE — 99213 OFFICE O/P EST LOW 20 MIN: CPT | Mod: 25,S$GLB,, | Performed by: ORTHOPAEDIC SURGERY

## 2023-02-07 PROCEDURE — 1125F AMNT PAIN NOTED PAIN PRSNT: CPT | Mod: CPTII,S$GLB,, | Performed by: ORTHOPAEDIC SURGERY

## 2023-02-07 PROCEDURE — 99213 PR OFFICE/OUTPT VISIT, EST, LEVL III, 20-29 MIN: ICD-10-PCS | Mod: 25,S$GLB,, | Performed by: ORTHOPAEDIC SURGERY

## 2023-02-07 PROCEDURE — 1160F RVW MEDS BY RX/DR IN RCRD: CPT | Mod: CPTII,S$GLB,, | Performed by: ORTHOPAEDIC SURGERY

## 2023-02-07 PROCEDURE — 1159F PR MEDICATION LIST DOCUMENTED IN MEDICAL RECORD: ICD-10-PCS | Mod: CPTII,S$GLB,, | Performed by: ORTHOPAEDIC SURGERY

## 2023-02-07 PROCEDURE — 1159F MED LIST DOCD IN RCRD: CPT | Mod: CPTII,S$GLB,, | Performed by: ORTHOPAEDIC SURGERY

## 2023-02-07 PROCEDURE — 99999 PR PBB SHADOW E&M-EST. PATIENT-LVL III: CPT | Mod: PBBFAC,,, | Performed by: ORTHOPAEDIC SURGERY

## 2023-02-07 PROCEDURE — 3288F FALL RISK ASSESSMENT DOCD: CPT | Mod: CPTII,S$GLB,, | Performed by: ORTHOPAEDIC SURGERY

## 2023-02-07 PROCEDURE — 3008F BODY MASS INDEX DOCD: CPT | Mod: CPTII,S$GLB,, | Performed by: ORTHOPAEDIC SURGERY

## 2023-02-07 PROCEDURE — 99999 PR PBB SHADOW E&M-EST. PATIENT-LVL III: ICD-10-PCS | Mod: PBBFAC,,, | Performed by: ORTHOPAEDIC SURGERY

## 2023-02-07 PROCEDURE — 1160F PR REVIEW ALL MEDS BY PRESCRIBER/CLIN PHARMACIST DOCUMENTED: ICD-10-PCS | Mod: CPTII,S$GLB,, | Performed by: ORTHOPAEDIC SURGERY

## 2023-02-07 PROCEDURE — 1100F PTFALLS ASSESS-DOCD GE2>/YR: CPT | Mod: CPTII,S$GLB,, | Performed by: ORTHOPAEDIC SURGERY

## 2023-02-07 PROCEDURE — 1100F PR PT FALLS ASSESS DOC 2+ FALLS/FALL W/INJURY/YR: ICD-10-PCS | Mod: CPTII,S$GLB,, | Performed by: ORTHOPAEDIC SURGERY

## 2023-02-07 PROCEDURE — 1125F PR PAIN SEVERITY QUANTIFIED, PAIN PRESENT: ICD-10-PCS | Mod: CPTII,S$GLB,, | Performed by: ORTHOPAEDIC SURGERY

## 2023-02-07 PROCEDURE — 3008F PR BODY MASS INDEX (BMI) DOCUMENTED: ICD-10-PCS | Mod: CPTII,S$GLB,, | Performed by: ORTHOPAEDIC SURGERY

## 2023-02-07 PROCEDURE — 3288F PR FALLS RISK ASSESSMENT DOCUMENTED: ICD-10-PCS | Mod: CPTII,S$GLB,, | Performed by: ORTHOPAEDIC SURGERY

## 2023-02-07 PROCEDURE — 20550 TENDON SHEATH: ICD-10-PCS | Mod: RT,S$GLB,, | Performed by: ORTHOPAEDIC SURGERY

## 2023-02-07 RX ORDER — TRIAMCINOLONE ACETONIDE 40 MG/ML
40 INJECTION, SUSPENSION INTRA-ARTICULAR; INTRAMUSCULAR
Status: DISCONTINUED | OUTPATIENT
Start: 2023-02-07 | End: 2023-02-07 | Stop reason: HOSPADM

## 2023-02-07 RX ADMIN — TRIAMCINOLONE ACETONIDE 40 MG: 40 INJECTION, SUSPENSION INTRA-ARTICULAR; INTRAMUSCULAR at 03:02

## 2023-02-07 NOTE — PROGRESS NOTES
Subjective:     Patient ID: Santiago Khan is a 72 y.o. male.    Chief Complaint: Pain of the Right Hand      HPI:  The patient is a 72-year-old male with a right long trigger finger.  He was last injected 08/31/2021 with good relief until recently and requests reinjection today    Past Medical History:   Diagnosis Date    Chronic diastolic congestive heart failure 04/07/2020    Chronic kidney disease, stage 3     Chronic systolic congestive heart failure 07/09/2020    Chronic venous insufficiency     COPD (chronic obstructive pulmonary disease)     DDD (degenerative disc disease), lumbar     DM (diabetes mellitus) with complications     History of gout     Hyperlipidemia associated with type 2 diabetes mellitus     Hypertension associated with stage 3 chronic kidney disease due to type 2 diabetes mellitus     BRADLEY on CPAP     Prostate cancer     prostatectomy in 2010, rising PSA that started in 2021    Tobacco abuse      Past Surgical History:   Procedure Laterality Date    BICEPS TENDON REPAIR      COLONOSCOPY N/A 3/27/2019    Procedure: COLONOSCOPY;  Surgeon: James Roger MD;  Location: [facility] ENDO;  Service: Endoscopy;  Laterality: N/A;    HEMORRHOID SURGERY      INGUINAL HERNIA REPAIR Left     KNEE ARTHROSCOPY Right     LEFT HEART CATHETERIZATION Left 6/29/2020    Procedure: CATHETERIZATION, HEART, LEFT;  Surgeon: Cheli Wilson MD;  Location: [facility] CATH LAB;  Service: Cardiology;  Laterality: Left;    LUMBAR LAMINECTOMY      PROSTATECTOMY       Family History   Problem Relation Age of Onset    Prostate cancer Father     Coronary artery disease Father 90    Alzheimer's disease Father     Hyperlipidemia Mother      Social History     Socioeconomic History    Marital status:    Tobacco Use    Smoking status: Every Day     Packs/day: 0.50     Types: Cigarettes     Start date: 1/1/1971    Smokeless tobacco: Former   Substance and Sexual Activity    Alcohol use: Not Currently    Drug use: Never     Sexual activity: Not Currently     Partners: Female     Social Determinants of Health     Financial Resource Strain: Medium Risk    Difficulty of Paying Living Expenses: Somewhat hard   Food Insecurity: No Food Insecurity    Worried About Running Out of Food in the Last Year: Never true    Ran Out of Food in the Last Year: Never true   Transportation Needs: No Transportation Needs    Lack of Transportation (Medical): No    Lack of Transportation (Non-Medical): No   Physical Activity: Inactive    Days of Exercise per Week: 0 days    Minutes of Exercise per Session: 0 min   Stress: Stress Concern Present    Feeling of Stress : To some extent   Social Connections: Unknown    Frequency of Communication with Friends and Family: More than three times a week    Frequency of Social Gatherings with Friends and Family: Twice a week    Active Member of Clubs or Organizations: No    Attends Club or Organization Meetings: Never    Marital Status:    Housing Stability: Unknown    Unable to Pay for Housing in the Last Year: Patient refused    Number of Places Lived in the Last Year: 1    Unstable Housing in the Last Year: No     Medication List with Changes/Refills   Current Medications    ALBUTEROL (PROVENTIL) 2.5 MG /3 ML (0.083 %) NEBULIZER SOLUTION    Take 3 mLs (2.5 mg total) by nebulization every 4 to 6 hours as needed for Wheezing or Shortness of Breath.    ALBUTEROL (PROVENTIL/VENTOLIN HFA) 90 MCG/ACTUATION INHALER    INHALE 2 PUFFS INTO THE LUNGS EVERY 4 HOURS AS NEEDED FOR WHEEZING    ALLOPURINOL (ZYLOPRIM) 300 MG TABLET    TAKE 1 TABLET(300 MG) BY MOUTH EVERY DAY    ASPIRIN (ECOTRIN) 81 MG EC TABLET    Take 81 mg by mouth once daily.    BACLOFEN (LIORESAL) 10 MG TABLET    Take 1 tablet by mouth every evening.    BISOPROLOL (ZEBETA) 5 MG TABLET    TAKE 1/2 TABLET BY MOUTH ONCE DAILY    COLCHICINE (COLCRYS) 0.6 MG TABLET    TAKE 1 TABLET(0.6 MG) BY MOUTH DAILY AS NEEDED    DIAZEPAM (VALIUM) 5 MG TABLET    Take 1  tablet (5 mg total) by mouth once. Take 30 minutes prior to MRI for 1 dose    DOXYCYCLINE (MONODOX) 100 MG CAPSULE    Take 1 capsule (100 mg total) by mouth every 12 (twelve) hours.    FLUTICASONE PROPIONATE (FLONASE) 50 MCG/ACTUATION NASAL SPRAY    SHAKE LIQUID AND USE 2 SPRAYS(100 MCG) IN EACH NOSTRIL EVERY DAY Strength: 50 mcg/actuation    FLUTICASONE-UMECLIDIN-VILANTER (TRELEGY ELLIPTA) 200-62.5-25 MCG INHALER    Inhale 1 puff into the lungs once daily. Wash out mouth after use    GLIMEPIRIDE (AMARYL) 1 MG TABLET    TAKE 1 TABLET BY MOUTH EVERY DAY    HYDROCODONE-ACETAMINOPHEN (NORCO) 7.5-325 MG PER TABLET    Take 1 tablet by mouth every 4 (four) hours as needed (for chronic pain).    LOSARTAN (COZAAR) 25 MG TABLET    Take 1 tablet (25 mg total) by mouth once daily.    MAGNESIUM OXIDE (MAG-OX) 400 MG (241.3 MG MAGNESIUM) TABLET    TAKE 2 TABLETS(800 MG) BY MOUTH TWICE DAILY    METFORMIN (GLUCOPHAGE) 500 MG TABLET    TAKE 1 TABLET(500 MG) BY MOUTH DAILY WITH BREAKFAST    METHYLPREDNISOLONE (MEDROL DOSEPACK) 4 MG TABLET    use as directed    MULTIVITAMIN-MINERALS-LUTEIN TAB    Take 1 tablet by mouth Daily.    OMEPRAZOLE (PRILOSEC) 40 MG CAPSULE    TAKE 1 CAPSULE BY MOUTH EVERY DAY    POTASSIUM CHLORIDE SA (K-DUR,KLOR-CON) 20 MEQ TABLET    TAKE 3 TABLETS(60 MEQ) BY MOUTH TWICE DAILY    SIMVASTATIN (ZOCOR) 40 MG TABLET    TAKE 1 TABLET(40 MG) BY MOUTH EVERY EVENING    TORSEMIDE (DEMADEX) 20 MG TAB    Take 2 tablets (40 mg total) by mouth 2 (two) times a day.     Review of patient's allergies indicates:   Allergen Reactions    Amitriptyline Rash    Celecoxib Rash     Review of Systems   Constitutional: Negative for malaise/fatigue.   HENT:  Negative for hearing loss.    Eyes:  Positive for visual disturbance. Negative for double vision.   Cardiovascular:  Positive for chest pain.   Respiratory:  Positive for sleep disturbances due to breathing. Negative for shortness of breath.    Endocrine: Negative for cold  intolerance.   Hematologic/Lymphatic: Does not bruise/bleed easily.   Skin:  Negative for poor wound healing and suspicious lesions.   Musculoskeletal:  Positive for arthritis, back pain, gout, joint pain and joint swelling.   Gastrointestinal:  Negative for nausea and vomiting.   Genitourinary:  Positive for frequency, hesitancy, incomplete emptying and nocturia. Negative for dysuria.   Neurological:  Negative for numbness, paresthesias and sensory change.   Psychiatric/Behavioral:  Positive for substance abuse. Negative for depression and memory loss. The patient is not nervous/anxious.    Allergic/Immunologic: Negative for persistent infections.     Objective:   Body mass index is 34.14 kg/m².  There were no vitals filed for this visit.             General    Constitutional: He is oriented to person, place, and time. He appears well-developed and well-nourished. No distress.   HENT:   Head: Normocephalic.   Eyes: EOM are normal.   Pulmonary/Chest: Effort normal.   Neurological: He is oriented to person, place, and time.   Psychiatric: He has a normal mood and affect.             Right Hand/Wrist Exam     Inspection   Scars: Wrist - absent Hand -  absent  Effusion: Wrist - absent Hand -  absent    Pain   Hand - The patient exhibits pain of the middle MCP.    Other     Neuorologic Exam    Median Distribution: normal  Ulnar Distribution: normal  Radial Distribution: normal    Comments:  The patient has active triggering right long finger with a palpable nodule that moves with tendon excursion.          Vascular Exam       Capillary Refill  Right Hand: normal capillary refill        adiographs were not obtained today  Assessment:     Encounter Diagnosis   Name Primary?    Trigger finger, acquired Yes    Right long finger    Plan:       The patient injected right long trigger finger with 0.5 cc of Kenalog and 0.5 cc of 2% plain lidocaine under sterile technique.  He will wait at least 3 months between  injections.              Disclaimer: This note was prepared using a voice recognition system and is likely to have sound alike errors within the text.

## 2023-02-07 NOTE — PROCEDURES
Tendon Sheath    Date/Time: 2/7/2023 3:30 PM  Performed by: Ozzy Thompson MD  Authorized by: Ozzy Thompson MD     Consent Done?:  Yes (Verbal)  Indications:  Pain  Timeout: prior to procedure the correct patient, procedure, and site was verified    Prep: patient was prepped and draped in usual sterile fashion      Local anesthesia used?: Yes    Local anesthetic:  Lidocaine 2% without epinephrine  Anesthetic total (ml):  0.5    Location:  Long finger  Site:  R long flexor tendon sheath  Ultrasonic guidance for needle placement?: No    Needle size:  25 G  Approach:  Volar  Medications:  40 mg triamcinolone acetonide 40 mg/mL

## 2023-03-01 ENCOUNTER — PATIENT MESSAGE (OUTPATIENT)
Dept: NEPHROLOGY | Facility: CLINIC | Age: 73
End: 2023-03-01
Payer: MEDICARE

## 2023-03-01 ENCOUNTER — LAB VISIT (OUTPATIENT)
Dept: LAB | Facility: HOSPITAL | Age: 73
End: 2023-03-01
Attending: NURSE PRACTITIONER
Payer: MEDICARE

## 2023-03-01 DIAGNOSIS — N18.30 STAGE 3 CHRONIC KIDNEY DISEASE, UNSPECIFIED WHETHER STAGE 3A OR 3B CKD: Chronic | ICD-10-CM

## 2023-03-01 DIAGNOSIS — N18.30 STAGE 3 CHRONIC KIDNEY DISEASE, UNSPECIFIED WHETHER STAGE 3A OR 3B CKD: Primary | Chronic | ICD-10-CM

## 2023-03-01 DIAGNOSIS — N18.30 HYPERTENSION ASSOCIATED WITH STAGE 3 CHRONIC KIDNEY DISEASE DUE TO TYPE 2 DIABETES MELLITUS: ICD-10-CM

## 2023-03-01 DIAGNOSIS — N18.30 HYPERTENSION ASSOCIATED WITH STAGE 3 CHRONIC KIDNEY DISEASE DUE TO TYPE 2 DIABETES MELLITUS: Primary | ICD-10-CM

## 2023-03-01 DIAGNOSIS — E11.22 HYPERTENSION ASSOCIATED WITH STAGE 3 CHRONIC KIDNEY DISEASE DUE TO TYPE 2 DIABETES MELLITUS: ICD-10-CM

## 2023-03-01 DIAGNOSIS — E11.22 HYPERTENSION ASSOCIATED WITH STAGE 3 CHRONIC KIDNEY DISEASE DUE TO TYPE 2 DIABETES MELLITUS: Primary | ICD-10-CM

## 2023-03-01 DIAGNOSIS — I12.9 HYPERTENSION ASSOCIATED WITH STAGE 3 CHRONIC KIDNEY DISEASE DUE TO TYPE 2 DIABETES MELLITUS: ICD-10-CM

## 2023-03-01 DIAGNOSIS — I12.9 HYPERTENSION ASSOCIATED WITH STAGE 3 CHRONIC KIDNEY DISEASE DUE TO TYPE 2 DIABETES MELLITUS: Primary | ICD-10-CM

## 2023-03-01 LAB
ALBUMIN SERPL BCP-MCNC: 3.8 G/DL (ref 3.5–5.2)
ANION GAP SERPL CALC-SCNC: 12 MMOL/L (ref 8–16)
BUN SERPL-MCNC: 16 MG/DL (ref 8–23)
CALCIUM SERPL-MCNC: 9.3 MG/DL (ref 8.7–10.5)
CHLORIDE SERPL-SCNC: 99 MMOL/L (ref 95–110)
CO2 SERPL-SCNC: 29 MMOL/L (ref 23–29)
CREAT SERPL-MCNC: 1.8 MG/DL (ref 0.5–1.4)
EST. GFR  (NO RACE VARIABLE): 40 ML/MIN/1.73 M^2
GLUCOSE SERPL-MCNC: 129 MG/DL (ref 70–110)
MAGNESIUM SERPL-MCNC: 1.9 MG/DL (ref 1.6–2.6)
PHOSPHATE SERPL-MCNC: 3.2 MG/DL (ref 2.7–4.5)
POTASSIUM SERPL-SCNC: 4.6 MMOL/L (ref 3.5–5.1)
PTH-INTACT SERPL-MCNC: 127.8 PG/ML (ref 9–77)
SODIUM SERPL-SCNC: 140 MMOL/L (ref 136–145)

## 2023-03-01 PROCEDURE — 80069 RENAL FUNCTION PANEL: CPT | Performed by: NURSE PRACTITIONER

## 2023-03-01 PROCEDURE — 36415 COLL VENOUS BLD VENIPUNCTURE: CPT | Mod: PO | Performed by: NURSE PRACTITIONER

## 2023-03-01 PROCEDURE — 83735 ASSAY OF MAGNESIUM: CPT | Performed by: NURSE PRACTITIONER

## 2023-03-01 PROCEDURE — 83970 ASSAY OF PARATHORMONE: CPT | Performed by: NURSE PRACTITIONER

## 2023-03-01 NOTE — PROGRESS NOTES
Subjective:       Patient ID: Santiago Khan is a 72 y.o. male.    Chief Complaint: Hypomagnesemia, Hypokalemia, CKD stage 3    HPI    Pt is a 72 y/o male who presents to clinic today for routine follow-up. He was previously seen by Nephrology for electrolyte and acid base disorder resulting from the use of multiple diuretics (bumex, metolazone, and diamox) which at that time (4/6/20 and 8/18/20), pt had dangerously low levels of K and Mg and required hospitalization in the past. Pt has mild diastolic CHF. Leg edema has been non-pitting. Diuretic rebound related edema is suspected to be present due to overuse of diuretics. Diamox and metolazone were stopped previously, and pt was kept on a loop diuretic. He reports only taking torsemide at present.     Pt has no orthopnea, has mild leg swelling. Reiterated side effects of inappropriate diuretic use, including electrolyte disturbances, arrhythmias, and even sudden cardiac death. He was advised instead to limit salt and fluid intake to control any leg swelling.     Pt was seen and examined today in clinic.  Pt denies taking NSAIDs, no GI losses, no abx use, no recent infections, no dysuria, no cardiopulmonary sx's.  Laboratory studies and medications were reviewed.  Since pt's last office visit, states doing well with no specific or new complaints voiced.  All Nephrology related questions were answered to the pt's satisfaction.    PAST MEDICAL HISTORY:  He  has a past medical history of Chronic diastolic congestive heart failure (04/07/2020), Chronic kidney disease, stage 3, Chronic systolic congestive heart failure (07/09/2020), Chronic venous insufficiency, COPD (chronic obstructive pulmonary disease), DDD (degenerative disc disease), lumbar, DM (diabetes mellitus) with complications, History of gout, Hyperlipidemia associated with type 2 diabetes mellitus, Hypertension associated with stage 3 chronic kidney disease due to type 2 diabetes mellitus, BRADLEY on  CPAP, Prostate cancer, and Tobacco abuse.    PAST SURGICAL HISTORY:  He  has a past surgical history that includes Lumbar laminectomy; Prostatectomy; Knee arthroscopy (Right); Hemorrhoid surgery; Biceps tendon repair; Colonoscopy (N/A, 3/27/2019); Inguinal hernia repair (Left); and Left heart catheterization (Left, 6/29/2020).    SOCIAL HISTORY:  He  reports that he has been smoking cigarettes. He started smoking about 52 years ago. He has been smoking an average of .5 packs per day. He has quit using smokeless tobacco. He reports that he does not currently use alcohol. He reports that he does not use drugs.    FAMILY MEDICAL HISTORY:  His family history includes Alzheimer's disease in his father; Coronary artery disease (age of onset: 90) in his father; Hyperlipidemia in his mother; Prostate cancer in his father.    Review of patient's allergies indicates:   Allergen Reactions    Amitriptyline Rash    Celecoxib Rash       Review of Systems   Constitutional: Negative.    HENT: Negative.     Respiratory:  Negative for shortness of breath.    Cardiovascular:  Positive for leg swelling (trace).   Gastrointestinal: Negative.    Genitourinary: Negative.    Musculoskeletal: Negative.    Skin: Negative.    Neurological:  Negative for dizziness, light-headedness and headaches.   Psychiatric/Behavioral: Negative.         Lab Results   Component Value Date    WBC 8.63 04/29/2022    HGB 12.1 (L) 04/29/2022    HCT 38.9 (L) 04/29/2022     (H) 04/29/2022     04/29/2022        CMP  Sodium   Date Value Ref Range Status   03/01/2023 140 136 - 145 mmol/L Final     Potassium   Date Value Ref Range Status   03/01/2023 4.6 3.5 - 5.1 mmol/L Final     Chloride   Date Value Ref Range Status   03/01/2023 99 95 - 110 mmol/L Final     CO2   Date Value Ref Range Status   03/01/2023 29 23 - 29 mmol/L Final     Glucose   Date Value Ref Range Status   03/01/2023 129 (H) 70 - 110 mg/dL Final     BUN   Date Value Ref Range Status    03/01/2023 16 8 - 23 mg/dL Final     Creatinine   Date Value Ref Range Status   03/01/2023 1.8 (H) 0.5 - 1.4 mg/dL Final     Calcium   Date Value Ref Range Status   03/01/2023 9.3 8.7 - 10.5 mg/dL Final     Total Protein   Date Value Ref Range Status   04/29/2022 6.9 6.0 - 8.4 g/dL Final     Albumin   Date Value Ref Range Status   03/01/2023 3.8 3.5 - 5.2 g/dL Final     Total Bilirubin   Date Value Ref Range Status   04/29/2022 0.6 0.1 - 1.0 mg/dL Final     Comment:     For infants and newborns, interpretation of results should be based  on gestational age, weight and in agreement with clinical  observations.    Premature Infant recommended reference ranges:  Up to 24 hours.............<8.0 mg/dL  Up to 48 hours............<12.0 mg/dL  3-5 days..................<15.0 mg/dL  6-29 days.................<15.0 mg/dL       Alkaline Phosphatase   Date Value Ref Range Status   04/29/2022 75 55 - 135 U/L Final     AST   Date Value Ref Range Status   04/29/2022 26 10 - 40 U/L Final     ALT   Date Value Ref Range Status   04/29/2022 25 10 - 44 U/L Final     Anion Gap   Date Value Ref Range Status   03/01/2023 12 8 - 16 mmol/L Final     eGFR if    Date Value Ref Range Status   07/18/2022 43 (A) >60 mL/min/1.73 m^2 Final     eGFR if non    Date Value Ref Range Status   07/18/2022 37 (A) >60 mL/min/1.73 m^2 Final     Comment:     Calculation used to obtain the estimated glomerular filtration  rate (eGFR) is the CKD-EPI equation.          Current Outpatient Medications on File Prior to Visit   Medication Sig Dispense Refill    albuterol (PROVENTIL) 2.5 mg /3 mL (0.083 %) nebulizer solution Take 3 mLs (2.5 mg total) by nebulization every 4 to 6 hours as needed for Wheezing or Shortness of Breath. 360 mL 11    albuterol (PROVENTIL/VENTOLIN HFA) 90 mcg/actuation inhaler INHALE 2 PUFFS INTO THE LUNGS EVERY 4 HOURS AS NEEDED FOR WHEEZING 8.5 g 3    aspirin (ECOTRIN) 81 MG EC tablet Take 81 mg by mouth  once daily.      baclofen (LIORESAL) 10 MG tablet Take 1 tablet by mouth every evening.  2    bisoprolol (ZEBETA) 5 MG tablet TAKE 1/2 TABLET BY MOUTH ONCE DAILY 90 tablet 3    colchicine (COLCRYS) 0.6 mg tablet TAKE 1 TABLET(0.6 MG) BY MOUTH DAILY AS NEEDED 30 tablet 1    doxycycline (MONODOX) 100 MG capsule Take 1 capsule (100 mg total) by mouth every 12 (twelve) hours. 20 capsule 0    fluticasone propionate (FLONASE) 50 mcg/actuation nasal spray SHAKE LIQUID AND USE 2 SPRAYS(100 MCG) IN EACH NOSTRIL EVERY DAY Strength: 50 mcg/actuation 48 g 3    fluticasone-umeclidin-vilanter (TRELEGY ELLIPTA) 200-62.5-25 mcg inhaler Inhale 1 puff into the lungs once daily. Wash out mouth after use 180 each 3    glimepiride (AMARYL) 1 MG tablet TAKE 1 TABLET BY MOUTH EVERY DAY 90 tablet 3    HYDROcodone-acetaminophen (NORCO) 7.5-325 mg per tablet Take 1 tablet by mouth every 4 (four) hours as needed (for chronic pain).  0    losartan (COZAAR) 25 MG tablet Take 1 tablet (25 mg total) by mouth once daily.      metFORMIN (GLUCOPHAGE) 500 MG tablet TAKE 1 TABLET(500 MG) BY MOUTH DAILY WITH BREAKFAST 90 tablet 3    methylPREDNISolone (MEDROL DOSEPACK) 4 mg tablet use as directed 1 each 0    multivitamin-minerals-lutein Tab Take 1 tablet by mouth Daily.      omeprazole (PRILOSEC) 40 MG capsule TAKE 1 CAPSULE BY MOUTH EVERY DAY 90 capsule 3    potassium chloride SA (K-DUR,KLOR-CON) 20 MEQ tablet TAKE 3 TABLETS(60 MEQ) BY MOUTH TWICE DAILY 720 tablet 2    simvastatin (ZOCOR) 40 MG tablet TAKE 1 TABLET(40 MG) BY MOUTH EVERY EVENING 90 tablet 3    torsemide (DEMADEX) 20 MG Tab Take 2 tablets (40 mg total) by mouth 2 (two) times a day. 120 tablet 11    [DISCONTINUED] allopurinoL (ZYLOPRIM) 300 MG tablet TAKE 1 TABLET(300 MG) BY MOUTH EVERY DAY 90 tablet 3    [DISCONTINUED] magnesium oxide (MAG-OX) 400 mg (241.3 mg magnesium) tablet TAKE 2 TABLETS(800 MG) BY MOUTH TWICE DAILY 120 tablet 5    diazePAM (VALIUM) 5 MG tablet Take 1 tablet (5 mg  total) by mouth once. Take 30 minutes prior to MRI for 1 dose 1 tablet 0     No current facility-administered medications on file prior to visit.       Objective:        Physical Exam  Vitals and nursing note reviewed.   Constitutional:       Appearance: Normal appearance.   HENT:      Head: Normocephalic and atraumatic.   Eyes:      Extraocular Movements: Extraocular movements intact.      Pupils: Pupils are equal, round, and reactive to light.   Cardiovascular:      Rate and Rhythm: Tachycardia present. Rhythm regularly irregular.   Pulmonary:      Effort: Pulmonary effort is normal.   Abdominal:      General: Bowel sounds are normal.      Palpations: Abdomen is soft.   Musculoskeletal:         General: Normal range of motion.      Right lower leg: Edema (trace) present.      Left lower leg: Edema (trace) present.   Skin:     General: Skin is warm and dry.   Neurological:      General: No focal deficit present.      Mental Status: He is alert and oriented to person, place, and time.   Psychiatric:         Mood and Affect: Mood normal.         Behavior: Behavior normal.         Thought Content: Thought content normal.         Judgment: Judgment normal.         Assessment:       1. Stage 3b chronic kidney disease    2. Chronic combined systolic and diastolic congestive heart failure    3. HTN (hypertension), benign    4. Controlled type 2 diabetes mellitus with stage 3 chronic kidney disease, without long-term current use of insulin    5. Obesity (BMI 30-39.9)    6. Hypokalemia    7. Hypomagnesemia    8. Secondary hyperparathyroidism of renal origin    9. Gout, unspecified cause, unspecified chronicity, unspecified site    10. Proteinuria, unspecified type        Plan:       CKD Stage 3 with stable kidney function; baseline sCr ranging 1.5-1.8 mg/dL in the setting of HTN and pre DM. Most recent Cr 1.8.  Continue RAAS blockade for renal preservation.  Reiterated hydration status as tolerated and maintaining low Na  diet as discussed.  Pt presenting again with multiple electrolyte disorders.  Noted BLE edema on exam today, non-pitting.  Likely r/t diuretic rebound and water intake. Pt was advised again that inappropriate use of diuretics may cause cardiac complications, including palpitation, arrhythmias, and even sudden cardiac death.  He verbalized understanding.    2-3.  Pt with h/o of diastolic CHF.  On diuretic therapy.  Discussed drink water moderately as tolerated, general rule is when thirsty.  Blood pressure is well controlled on current regimen.  He is on a RAAS inhibitor.  Potassium is stable 4.6; continue potassium and magnesium supplements as prescribed.     4. Followed by PCP; most recent A1c 5.3  Advised pt on importance of BP and diabetes control, wt control change in diet, avoiding sodas, fried food, and artificial sweeteners. Lifestyle and wt control discussed with pt.  Continue current regimen as prescribed.    5. Lifestyle and wt control discussed with pt.  Advised pt on importance of BP and diabetes control, wt control, calorie restriction/ change in diet.  Maintain low sodium and low carb diet as directed.  Avoiding sodas/sweets and fried food/boxed foods, etc as discussed today in clinic.  Increase daily exercise as tolerated.  Pt verbalized understanding.    6. Potassium is stable, 4.6.  He is taking potassium supplements daily; on diuretic therapy.  Continue as prescribed.    7. Magnesium is stable, 1.9.  Continue 400 mg a day of magnesium oxide as prescribed; new Rx ordered.    8. Intact PTH is appropriately elevated; stable at 127.8.  Phosphorus was normal 3.2 with calcium of 9.3 with an albumin of 3.8.    9. Pt states controlled with medication and diet compliance.  However, he reports taking 300mg allopurinol daily; will decrease dosage to 100mg daily.  Denies recent symptomatic gout flares.  Reviewed high purine foods and provided pt with a dietary guide and on a gout friendly diet.  Discussed  limiting red meat, organ meat, seafood, beer, and high fructose drinks.    10. He has intermittent low-grade proteinuria; fluctuating from 600mg-1g per PC ratio over the past year in the setting of DM. Reiterated importance of DM control.  Continue RAAS inhibition.      Return to clinic in 5 months    40 minutes of total time spent on the encounter, which includes face to face time and non-face to face time preparing to see the patient (eg, review of tests), obtaining and/or reviewing separately obtained history, documenting clinical information in the electronic or other health record, Independently interpreting results and communicating results to the patient/family/caregiver, or care coordination.    Laura Snell, ROSEMARIE-C

## 2023-03-02 ENCOUNTER — OFFICE VISIT (OUTPATIENT)
Dept: NEPHROLOGY | Facility: CLINIC | Age: 73
End: 2023-03-02
Payer: MEDICARE

## 2023-03-02 VITALS
DIASTOLIC BLOOD PRESSURE: 62 MMHG | HEART RATE: 100 BPM | BODY MASS INDEX: 35.02 KG/M2 | SYSTOLIC BLOOD PRESSURE: 130 MMHG | HEIGHT: 67 IN | WEIGHT: 223.13 LBS

## 2023-03-02 DIAGNOSIS — E87.6 HYPOKALEMIA: ICD-10-CM

## 2023-03-02 DIAGNOSIS — M10.9 GOUT, UNSPECIFIED CAUSE, UNSPECIFIED CHRONICITY, UNSPECIFIED SITE: ICD-10-CM

## 2023-03-02 DIAGNOSIS — N18.32 STAGE 3B CHRONIC KIDNEY DISEASE: Primary | ICD-10-CM

## 2023-03-02 DIAGNOSIS — I10 HTN (HYPERTENSION), BENIGN: ICD-10-CM

## 2023-03-02 DIAGNOSIS — E83.42 HYPOMAGNESEMIA: ICD-10-CM

## 2023-03-02 DIAGNOSIS — R80.9 PROTEINURIA, UNSPECIFIED TYPE: ICD-10-CM

## 2023-03-02 DIAGNOSIS — E11.22 CONTROLLED TYPE 2 DIABETES MELLITUS WITH STAGE 3 CHRONIC KIDNEY DISEASE, WITHOUT LONG-TERM CURRENT USE OF INSULIN: ICD-10-CM

## 2023-03-02 DIAGNOSIS — E66.9 OBESITY (BMI 30-39.9): ICD-10-CM

## 2023-03-02 DIAGNOSIS — I50.42 CHRONIC COMBINED SYSTOLIC AND DIASTOLIC CONGESTIVE HEART FAILURE: Chronic | ICD-10-CM

## 2023-03-02 DIAGNOSIS — N18.30 CONTROLLED TYPE 2 DIABETES MELLITUS WITH STAGE 3 CHRONIC KIDNEY DISEASE, WITHOUT LONG-TERM CURRENT USE OF INSULIN: ICD-10-CM

## 2023-03-02 DIAGNOSIS — N25.81 SECONDARY HYPERPARATHYROIDISM OF RENAL ORIGIN: ICD-10-CM

## 2023-03-02 PROCEDURE — 1160F RVW MEDS BY RX/DR IN RCRD: CPT | Mod: CPTII,S$GLB,, | Performed by: NURSE PRACTITIONER

## 2023-03-02 PROCEDURE — 3066F PR DOCUMENTATION OF TREATMENT FOR NEPHROPATHY: ICD-10-PCS | Mod: CPTII,S$GLB,, | Performed by: NURSE PRACTITIONER

## 2023-03-02 PROCEDURE — 3288F FALL RISK ASSESSMENT DOCD: CPT | Mod: CPTII,S$GLB,, | Performed by: NURSE PRACTITIONER

## 2023-03-02 PROCEDURE — 1100F PTFALLS ASSESS-DOCD GE2>/YR: CPT | Mod: CPTII,S$GLB,, | Performed by: NURSE PRACTITIONER

## 2023-03-02 PROCEDURE — 3066F NEPHROPATHY DOC TX: CPT | Mod: CPTII,S$GLB,, | Performed by: NURSE PRACTITIONER

## 2023-03-02 PROCEDURE — 1159F MED LIST DOCD IN RCRD: CPT | Mod: CPTII,S$GLB,, | Performed by: NURSE PRACTITIONER

## 2023-03-02 PROCEDURE — 3008F PR BODY MASS INDEX (BMI) DOCUMENTED: ICD-10-PCS | Mod: CPTII,S$GLB,, | Performed by: NURSE PRACTITIONER

## 2023-03-02 PROCEDURE — 1159F PR MEDICATION LIST DOCUMENTED IN MEDICAL RECORD: ICD-10-PCS | Mod: CPTII,S$GLB,, | Performed by: NURSE PRACTITIONER

## 2023-03-02 PROCEDURE — 3288F PR FALLS RISK ASSESSMENT DOCUMENTED: ICD-10-PCS | Mod: CPTII,S$GLB,, | Performed by: NURSE PRACTITIONER

## 2023-03-02 PROCEDURE — 1160F PR REVIEW ALL MEDS BY PRESCRIBER/CLIN PHARMACIST DOCUMENTED: ICD-10-PCS | Mod: CPTII,S$GLB,, | Performed by: NURSE PRACTITIONER

## 2023-03-02 PROCEDURE — 1125F AMNT PAIN NOTED PAIN PRSNT: CPT | Mod: CPTII,S$GLB,, | Performed by: NURSE PRACTITIONER

## 2023-03-02 PROCEDURE — 1125F PR PAIN SEVERITY QUANTIFIED, PAIN PRESENT: ICD-10-PCS | Mod: CPTII,S$GLB,, | Performed by: NURSE PRACTITIONER

## 2023-03-02 PROCEDURE — 99999 PR PBB SHADOW E&M-EST. PATIENT-LVL IV: ICD-10-PCS | Mod: PBBFAC,,, | Performed by: NURSE PRACTITIONER

## 2023-03-02 PROCEDURE — 3075F PR MOST RECENT SYSTOLIC BLOOD PRESS GE 130-139MM HG: ICD-10-PCS | Mod: CPTII,S$GLB,, | Performed by: NURSE PRACTITIONER

## 2023-03-02 PROCEDURE — 99215 PR OFFICE/OUTPT VISIT, EST, LEVL V, 40-54 MIN: ICD-10-PCS | Mod: S$GLB,,, | Performed by: NURSE PRACTITIONER

## 2023-03-02 PROCEDURE — 3075F SYST BP GE 130 - 139MM HG: CPT | Mod: CPTII,S$GLB,, | Performed by: NURSE PRACTITIONER

## 2023-03-02 PROCEDURE — 1100F PR PT FALLS ASSESS DOC 2+ FALLS/FALL W/INJURY/YR: ICD-10-PCS | Mod: CPTII,S$GLB,, | Performed by: NURSE PRACTITIONER

## 2023-03-02 PROCEDURE — 99999 PR PBB SHADOW E&M-EST. PATIENT-LVL IV: CPT | Mod: PBBFAC,,, | Performed by: NURSE PRACTITIONER

## 2023-03-02 PROCEDURE — 3078F PR MOST RECENT DIASTOLIC BLOOD PRESSURE < 80 MM HG: ICD-10-PCS | Mod: CPTII,S$GLB,, | Performed by: NURSE PRACTITIONER

## 2023-03-02 PROCEDURE — 99215 OFFICE O/P EST HI 40 MIN: CPT | Mod: S$GLB,,, | Performed by: NURSE PRACTITIONER

## 2023-03-02 PROCEDURE — 3078F DIAST BP <80 MM HG: CPT | Mod: CPTII,S$GLB,, | Performed by: NURSE PRACTITIONER

## 2023-03-02 PROCEDURE — 3008F BODY MASS INDEX DOCD: CPT | Mod: CPTII,S$GLB,, | Performed by: NURSE PRACTITIONER

## 2023-03-02 RX ORDER — LANOLIN ALCOHOL/MO/W.PET/CERES
CREAM (GRAM) TOPICAL
Qty: 120 TABLET | Refills: 6 | Status: SHIPPED | OUTPATIENT
Start: 2023-03-02 | End: 2023-10-02

## 2023-03-02 RX ORDER — ALLOPURINOL 100 MG/1
100 TABLET ORAL DAILY
Qty: 30 TABLET | Refills: 11 | Status: SHIPPED | OUTPATIENT
Start: 2023-03-02 | End: 2023-11-24

## 2023-03-02 NOTE — PATIENT INSTRUCTIONS
Continue all medications as reviewed today:    Keep blood pressure controlled  Low sodium diet: avoid/limit salt, processed foods (boxed or canned), fried foods, fast foods, etc as discussed    Keep blood sugar controlled  Low carbohydrate/sugar diet: avoid/limit sugary drinks, white breads, pasta, rice, etc as discussed    Drink water, flavored water/diluted juice (drink to thirst) daily as tolerated with no swelling/shortness of breath    Avoid NSAIDS: Advil, Ibuprofen, Aleve, Naproxen, Meloxicam, etc. (Aspirin is ok), Tylenol is Best    Exercise as tolerated     Follow up in 5 months and have labs drawn 1-2 weeks prior to visit

## 2023-03-16 ENCOUNTER — PES CALL (OUTPATIENT)
Dept: ADMINISTRATIVE | Facility: CLINIC | Age: 73
End: 2023-03-16
Payer: MEDICARE

## 2023-03-20 PROBLEM — F11.20 OPIOID DEPENDENCE, UNCOMPLICATED: Status: ACTIVE | Noted: 2023-03-20

## 2023-03-20 PROBLEM — J44.1 COPD EXACERBATION: Status: ACTIVE | Noted: 2023-03-20

## 2023-03-23 ENCOUNTER — OFFICE VISIT (OUTPATIENT)
Dept: INTERNAL MEDICINE | Facility: CLINIC | Age: 73
End: 2023-03-23
Payer: MEDICARE

## 2023-03-23 VITALS
RESPIRATION RATE: 20 BRPM | BODY MASS INDEX: 35.19 KG/M2 | WEIGHT: 224.19 LBS | SYSTOLIC BLOOD PRESSURE: 128 MMHG | DIASTOLIC BLOOD PRESSURE: 74 MMHG | HEIGHT: 67 IN | HEART RATE: 78 BPM

## 2023-03-23 DIAGNOSIS — I42.8 NICM (NONISCHEMIC CARDIOMYOPATHY): ICD-10-CM

## 2023-03-23 DIAGNOSIS — H49.01 PARALYTIC STRABISMUS ASSOCIATED WITH RIGHT PARTIAL OCULOMOTOR NERVE PALSY: ICD-10-CM

## 2023-03-23 DIAGNOSIS — E11.69 HYPERLIPIDEMIA ASSOCIATED WITH TYPE 2 DIABETES MELLITUS: ICD-10-CM

## 2023-03-23 DIAGNOSIS — D69.2 OTHER NONTHROMBOCYTOPENIC PURPURA: ICD-10-CM

## 2023-03-23 DIAGNOSIS — Z72.0 TOBACCO ABUSE: ICD-10-CM

## 2023-03-23 DIAGNOSIS — E78.5 HYPERLIPIDEMIA ASSOCIATED WITH TYPE 2 DIABETES MELLITUS: ICD-10-CM

## 2023-03-23 DIAGNOSIS — E11.8 DM (DIABETES MELLITUS) WITH COMPLICATIONS: ICD-10-CM

## 2023-03-23 DIAGNOSIS — I25.118 CORONARY ARTERY DISEASE OF NATIVE ARTERY OF NATIVE HEART WITH STABLE ANGINA PECTORIS: ICD-10-CM

## 2023-03-23 DIAGNOSIS — E11.51 TYPE 2 DIABETES MELLITUS WITH PERIPHERAL VASCULAR DISEASE: ICD-10-CM

## 2023-03-23 DIAGNOSIS — H49.01 THIRD NERVE PALSY, RIGHT: ICD-10-CM

## 2023-03-23 DIAGNOSIS — J44.9 CHRONIC OBSTRUCTIVE PULMONARY DISEASE, UNSPECIFIED COPD TYPE: ICD-10-CM

## 2023-03-23 DIAGNOSIS — C61 PROSTATE CANCER: ICD-10-CM

## 2023-03-23 DIAGNOSIS — Z74.09 OTHER REDUCED MOBILITY: ICD-10-CM

## 2023-03-23 DIAGNOSIS — R26.9 ABNORMALITY OF GAIT AND MOBILITY: ICD-10-CM

## 2023-03-23 DIAGNOSIS — M51.36 DDD (DEGENERATIVE DISC DISEASE), LUMBAR: ICD-10-CM

## 2023-03-23 DIAGNOSIS — E66.2 MORBID OBESITY WITH ALVEOLAR HYPOVENTILATION: ICD-10-CM

## 2023-03-23 DIAGNOSIS — I50.42 CHRONIC COMBINED SYSTOLIC AND DIASTOLIC CONGESTIVE HEART FAILURE: Chronic | ICD-10-CM

## 2023-03-23 DIAGNOSIS — N18.32 STAGE 3B CHRONIC KIDNEY DISEASE: ICD-10-CM

## 2023-03-23 DIAGNOSIS — Z87.39 HISTORY OF GOUT: ICD-10-CM

## 2023-03-23 DIAGNOSIS — N18.30 HYPERTENSION ASSOCIATED WITH STAGE 3 CHRONIC KIDNEY DISEASE DUE TO TYPE 2 DIABETES MELLITUS: ICD-10-CM

## 2023-03-23 DIAGNOSIS — E66.01 CLASS 2 SEVERE OBESITY DUE TO EXCESS CALORIES WITH SERIOUS COMORBIDITY AND BODY MASS INDEX (BMI) OF 35.0 TO 35.9 IN ADULT: ICD-10-CM

## 2023-03-23 DIAGNOSIS — I12.9 HYPERTENSION ASSOCIATED WITH STAGE 3 CHRONIC KIDNEY DISEASE DUE TO TYPE 2 DIABETES MELLITUS: ICD-10-CM

## 2023-03-23 DIAGNOSIS — F11.20 OPIOID DEPENDENCE, UNCOMPLICATED: ICD-10-CM

## 2023-03-23 DIAGNOSIS — Z00.00 ENCOUNTER FOR PREVENTATIVE ADULT HEALTH CARE EXAMINATION: Primary | ICD-10-CM

## 2023-03-23 DIAGNOSIS — E11.22 HYPERTENSION ASSOCIATED WITH STAGE 3 CHRONIC KIDNEY DISEASE DUE TO TYPE 2 DIABETES MELLITUS: ICD-10-CM

## 2023-03-23 DIAGNOSIS — G47.33 OSA ON CPAP: ICD-10-CM

## 2023-03-23 DIAGNOSIS — G89.29 OTHER CHRONIC PAIN: ICD-10-CM

## 2023-03-23 PROCEDURE — 99999 PR PBB SHADOW E&M-EST. PATIENT-LVL V: CPT | Mod: PBBFAC,,, | Performed by: NURSE PRACTITIONER

## 2023-03-23 PROCEDURE — 1160F PR REVIEW ALL MEDS BY PRESCRIBER/CLIN PHARMACIST DOCUMENTED: ICD-10-PCS | Mod: CPTII,S$GLB,, | Performed by: NURSE PRACTITIONER

## 2023-03-23 PROCEDURE — 3074F PR MOST RECENT SYSTOLIC BLOOD PRESSURE < 130 MM HG: ICD-10-PCS | Mod: CPTII,S$GLB,, | Performed by: NURSE PRACTITIONER

## 2023-03-23 PROCEDURE — 3008F PR BODY MASS INDEX (BMI) DOCUMENTED: ICD-10-PCS | Mod: CPTII,S$GLB,, | Performed by: NURSE PRACTITIONER

## 2023-03-23 PROCEDURE — 1159F PR MEDICATION LIST DOCUMENTED IN MEDICAL RECORD: ICD-10-PCS | Mod: CPTII,S$GLB,, | Performed by: NURSE PRACTITIONER

## 2023-03-23 PROCEDURE — 1159F MED LIST DOCD IN RCRD: CPT | Mod: CPTII,S$GLB,, | Performed by: NURSE PRACTITIONER

## 2023-03-23 PROCEDURE — 3066F PR DOCUMENTATION OF TREATMENT FOR NEPHROPATHY: ICD-10-PCS | Mod: CPTII,S$GLB,, | Performed by: NURSE PRACTITIONER

## 2023-03-23 PROCEDURE — 1100F PTFALLS ASSESS-DOCD GE2>/YR: CPT | Mod: CPTII,S$GLB,, | Performed by: NURSE PRACTITIONER

## 2023-03-23 PROCEDURE — 3078F DIAST BP <80 MM HG: CPT | Mod: CPTII,S$GLB,, | Performed by: NURSE PRACTITIONER

## 2023-03-23 PROCEDURE — G0439 PR MEDICARE ANNUAL WELLNESS SUBSEQUENT VISIT: ICD-10-PCS | Mod: S$GLB,,, | Performed by: NURSE PRACTITIONER

## 2023-03-23 PROCEDURE — 1170F PR FUNCTIONAL STATUS ASSESSED: ICD-10-PCS | Mod: CPTII,S$GLB,, | Performed by: NURSE PRACTITIONER

## 2023-03-23 PROCEDURE — 3288F PR FALLS RISK ASSESSMENT DOCUMENTED: ICD-10-PCS | Mod: CPTII,S$GLB,, | Performed by: NURSE PRACTITIONER

## 2023-03-23 PROCEDURE — 3288F FALL RISK ASSESSMENT DOCD: CPT | Mod: CPTII,S$GLB,, | Performed by: NURSE PRACTITIONER

## 2023-03-23 PROCEDURE — 3074F SYST BP LT 130 MM HG: CPT | Mod: CPTII,S$GLB,, | Performed by: NURSE PRACTITIONER

## 2023-03-23 PROCEDURE — 1170F FXNL STATUS ASSESSED: CPT | Mod: CPTII,S$GLB,, | Performed by: NURSE PRACTITIONER

## 2023-03-23 PROCEDURE — 1100F PR PT FALLS ASSESS DOC 2+ FALLS/FALL W/INJURY/YR: ICD-10-PCS | Mod: CPTII,S$GLB,, | Performed by: NURSE PRACTITIONER

## 2023-03-23 PROCEDURE — 3008F BODY MASS INDEX DOCD: CPT | Mod: CPTII,S$GLB,, | Performed by: NURSE PRACTITIONER

## 2023-03-23 PROCEDURE — 1160F RVW MEDS BY RX/DR IN RCRD: CPT | Mod: CPTII,S$GLB,, | Performed by: NURSE PRACTITIONER

## 2023-03-23 PROCEDURE — 99999 PR PBB SHADOW E&M-EST. PATIENT-LVL V: ICD-10-PCS | Mod: PBBFAC,,, | Performed by: NURSE PRACTITIONER

## 2023-03-23 PROCEDURE — 3066F NEPHROPATHY DOC TX: CPT | Mod: CPTII,S$GLB,, | Performed by: NURSE PRACTITIONER

## 2023-03-23 PROCEDURE — 3078F PR MOST RECENT DIASTOLIC BLOOD PRESSURE < 80 MM HG: ICD-10-PCS | Mod: CPTII,S$GLB,, | Performed by: NURSE PRACTITIONER

## 2023-03-23 PROCEDURE — G0439 PPPS, SUBSEQ VISIT: HCPCS | Mod: S$GLB,,, | Performed by: NURSE PRACTITIONER

## 2023-03-23 NOTE — PROGRESS NOTES
"  Santiago Khan presented for a  Medicare AWV and comprehensive Health Risk Assessment today. The following components were reviewed and updated:    Medical history  Family History  Social history  Allergies and Current Medications  Health Risk Assessment  Health Maintenance  Care Team         ** See Completed Assessments for Annual Wellness Visit within the encounter summary.**         The following assessments were completed:  Living Situation  CAGE  Depression Screening  Timed Get Up and Go  Whisper Test  Cognitive Function Screening  Nutrition Screening  ADL Screening  PAQ Screening        Vitals:    03/23/23 1123   BP: 128/74   BP Location: Right arm   Patient Position: Sitting   Pulse: 78   Resp: 20   Weight: 101.7 kg (224 lb 3.3 oz)   Height:    Pain scale 5' 7" (1.702 m)    1-2/5 Chronic pain     Body mass index is 35.12 kg/m².  Physical Exam  Constitutional:       General: He is not in acute distress.     Appearance: He is not ill-appearing or diaphoretic.   HENT:      Mouth/Throat:      Mouth: Mucous membranes are moist.   Eyes:      General:         Right eye: No discharge.         Left eye: No discharge.      Extraocular Movements: Extraocular movements intact.      Conjunctiva/sclera: Conjunctivae normal.   Cardiovascular:      Rate and Rhythm: Normal rate and regular rhythm.   Pulmonary:      Effort: Pulmonary effort is normal. No respiratory distress.   Skin:     General: Skin is warm and dry.   Neurological:      Mental Status: He is alert and oriented to person, place, and time. Mental status is at baseline.   Psychiatric:         Mood and Affect: Mood normal.         Behavior: Behavior normal.         Thought Content: Thought content normal.         Judgment: Judgment normal.           Diagnoses and health risks identified today and associated recommendations/orders:    1. Encounter for preventative adult health care examination  Review for opioid screening: Patient does have Rx for " Opioids-[place] 7.5-325  Patient on chronic opioids-. Norco 7.5-325  Followed by physician    Risk factors reviewed. Risk factors may include Sleep-disordered breathing, renal or hepatic insufficiency, increased risk for falls and fractures, risk of opioid misuse/overdose/opioid use disorder.  Pain evaluated during visit, documented under vitals.  Discussed nonpharmacologic treatments options, will follow up with prescribing provider to discuss further    Review for substance use disorder: Patient does not use substance per chart    Patients states he does not drink alcohol    I offered to discuss advanced care planning, including how to pick a person who would make decisions for you if you were unable to make them for yourself, called a health care power of , and what kind of decisions you might make such as use of life sustaining treatments such as ventilators and tube feeding when faced with a life limiting illness recorded on a living will that they will need to know. (How you want to be cared for as you near the end of your natural life)     X Patient is interested in learning more about how to make advanced directives.  I provided them paperwork and offered to discuss this with them.    2. Chronic obstructive pulmonary disease, unspecified COPD type, BRADLEY on CPAP  Monitor  Follow up with Pulmonology  Continue home proventil, trellegy    - Ambulatory referral/consult to Smoking Cessation Program; Future  - Ambulatory referral/consult to Pharmacy Assistance; Future- Trelegy    3. Hx Prostate cancer  Monitor  Follow up with Urology as directed    4. Morbid obesity with alveolar hypoventilation, BRADLEY on CPAP  Monitor  Continue home CPAP- Patient states he does not wear it at times- Patient encouraged to use as directed  Follow up with PCP, Pulmonology    5. Coronary artery disease of native artery of native heart with stable angina pectoris, Chronic combined systolic and diastolic congestive heart failure,   NICM (nonischemic cardiomyopathy)   Monitor  Follow up with Cardiology as directed  Continue home asa, zocor, demadex, potassium, bisoprolol    6. Type 2 diabetes mellitus with peripheral vascular disease, Hyperlipidemia associated with type 2 diabetes mellitus  Monitor  Follow up with PCP  Continue home asa, zocor    7. Class 2 severe obesity due to excess calories with serious comorbidity and body mass index (BMI) of 35.0 to 35.9 in adult   Monitor  Follow up with PCP    8. 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 Hypertension associated with stage 3 chronic kidney disease due to type 2 diabetes mellitus, Stage 3b chronic kidney disease   Monitor  Continue home bisoprolol, losartan  Follow up with Nephrology    10. Opioid dependence, uncomplicated, Other chronic pain, DDD (degenerative disc disease), lumbar, Abnormality of gait and mobility, Other reduced mobility  Monitor  Follow up as instructed  Continue home Norco 7.5-325, baclofen  Fall precautions  Encouraged to use cane, Hx falls, declined physical therapy referral    11. DM (diabetes mellitus) with complications  Monitor  Stable  Continue home amaryl, metformin    12. History of gout  Monitor  Follow up with PCP, Nephrology  Continue home allopurinol, colchicine    13. Other nonthrombocytopenic purpura  Monitor  Follow up as directed  Patient states his dad also had this    14. Tobacco abuse  Monitor  Patient states he quit before in past and was happy when he quit but that his brother lives with him and causing a lot of stress so he started smoking again  Patient interested in Smoking cessation program- Ordered  Patient declined psych referral at this time         Provided Santiago with a 5-10 year written screening schedule and personal prevention plan. Recommendations were developed using the USPSTF age appropriate recommendations. Education,  counseling, and referrals were provided as needed. After Visit Summary printed and given to patient which includes a list of additional screenings\tests needed.    Follow up in about 1 year (around 3/23/2024) for Annual wellness visit.    LOUISA Sifuentes

## 2023-03-23 NOTE — PATIENT INSTRUCTIONS
Counseling and Referral of Other Preventative  (Italic type indicates deductible and co-insurance are waived)    Patient Name: Santiago Khan  Today's Date: 3/23/2023    Health Maintenance       Date Due Completion Date    Shingles Vaccine (1 of 2) 07/18/2012 5/23/2012    COVID-19 Vaccine (4 - Booster for Pfizer series) 02/04/2022 12/10/2021    Foot Exam 02/06/2022 2/6/2021 (Done)    Override on 2/6/2021: Done    Override on 1/30/2020: Done    Override on 1/30/2019: Done    Eye Exam 12/10/2022 12/10/2021    Override on 7/7/2020: Done    Diabetes Urine Screening 04/29/2023 4/29/2022    Hemoglobin A1c 04/30/2023 10/31/2022    Lipid Panel 10/31/2023 10/31/2022    High Dose Statin 03/23/2024 3/23/2023    Aspirin/Antiplatelet Therapy 03/23/2024 3/23/2023    Colorectal Cancer Screening 03/27/2024 3/27/2019    TETANUS VACCINE 12/04/2031 12/4/2021        No orders of the defined types were placed in this encounter.      The following information is provided to all patients.  This information is to help you find resources for any of the problems found today that may be affecting your health:                Living healthy guide: www.[facility].[place].gov      Understanding Diabetes: www.diabetes.org      Eating healthy: www.cdc.gov/healthyweight      CDC home safety checklist: www.cdc.gov/steadi/patient.html      Agency on Aging: www.goea.[place].[facility]      Alcoholics anonymous (AA): www.aa.org      Physical Activity: www.jayson.nih.gov/nt3qtxc      Tobacco use: www.quitwithusla.org

## 2023-03-27 ENCOUNTER — HOSPITAL ENCOUNTER (OUTPATIENT)
Dept: RADIOLOGY | Facility: HOSPITAL | Age: 73
Discharge: HOME | End: 2023-03-27
Attending: NURSE PRACTITIONER
Payer: MEDICARE

## 2023-03-27 ENCOUNTER — PATIENT MESSAGE (OUTPATIENT)
Dept: PULMONOLOGY | Facility: CLINIC | Age: 73
End: 2023-03-27

## 2023-03-27 ENCOUNTER — OFFICE VISIT (OUTPATIENT)
Dept: PULMONOLOGY | Facility: CLINIC | Age: 73
End: 2023-03-27
Payer: MEDICARE

## 2023-03-27 VITALS
HEART RATE: 80 BPM | DIASTOLIC BLOOD PRESSURE: 78 MMHG | WEIGHT: 226.19 LBS | RESPIRATION RATE: 20 BRPM | BODY MASS INDEX: 35.5 KG/M2 | HEIGHT: 67 IN | SYSTOLIC BLOOD PRESSURE: 126 MMHG | OXYGEN SATURATION: 94 %

## 2023-03-27 DIAGNOSIS — J44.1 COPD WITH ACUTE EXACERBATION: Primary | ICD-10-CM

## 2023-03-27 DIAGNOSIS — G47.33 OSA ON CPAP: ICD-10-CM

## 2023-03-27 DIAGNOSIS — J44.1 COPD WITH ACUTE EXACERBATION: ICD-10-CM

## 2023-03-27 DIAGNOSIS — R05.9 COUGH, UNSPECIFIED TYPE: ICD-10-CM

## 2023-03-27 DIAGNOSIS — N18.32 STAGE 3B CHRONIC KIDNEY DISEASE: Chronic | ICD-10-CM

## 2023-03-27 DIAGNOSIS — F11.20 OPIOID DEPENDENCE, UNCOMPLICATED: ICD-10-CM

## 2023-03-27 DIAGNOSIS — E11.8 DM (DIABETES MELLITUS) WITH COMPLICATIONS: ICD-10-CM

## 2023-03-27 DIAGNOSIS — J44.9 CHRONIC OBSTRUCTIVE PULMONARY DISEASE, UNSPECIFIED COPD TYPE: ICD-10-CM

## 2023-03-27 DIAGNOSIS — F17.210 CIGARETTE NICOTINE DEPENDENCE WITHOUT COMPLICATION: ICD-10-CM

## 2023-03-27 DIAGNOSIS — I87.2 CHRONIC VENOUS INSUFFICIENCY: ICD-10-CM

## 2023-03-27 DIAGNOSIS — I50.42 CHRONIC COMBINED SYSTOLIC AND DIASTOLIC CONGESTIVE HEART FAILURE: Chronic | ICD-10-CM

## 2023-03-27 DIAGNOSIS — E66.01 CLASS 2 SEVERE OBESITY DUE TO EXCESS CALORIES WITH SERIOUS COMORBIDITY AND BODY MASS INDEX (BMI) OF 35.0 TO 35.9 IN ADULT: ICD-10-CM

## 2023-03-27 PROBLEM — E66.2 MORBID OBESITY WITH ALVEOLAR HYPOVENTILATION: Status: RESOLVED | Noted: 2022-03-12 | Resolved: 2023-03-27

## 2023-03-27 PROCEDURE — 3008F BODY MASS INDEX DOCD: CPT | Mod: CPTII,S$GLB,, | Performed by: NURSE PRACTITIONER

## 2023-03-27 PROCEDURE — 3074F SYST BP LT 130 MM HG: CPT | Mod: CPTII,S$GLB,, | Performed by: NURSE PRACTITIONER

## 2023-03-27 PROCEDURE — 99417 PR PROLONGED SVC, OUTPT, W/WO DIRECT PT CONTACT,  EA ADDTL 15 MIN: ICD-10-PCS | Mod: S$GLB,,, | Performed by: NURSE PRACTITIONER

## 2023-03-27 PROCEDURE — 1100F PTFALLS ASSESS-DOCD GE2>/YR: CPT | Mod: CPTII,S$GLB,, | Performed by: NURSE PRACTITIONER

## 2023-03-27 PROCEDURE — 3078F DIAST BP <80 MM HG: CPT | Mod: CPTII,S$GLB,, | Performed by: NURSE PRACTITIONER

## 2023-03-27 PROCEDURE — 1159F PR MEDICATION LIST DOCUMENTED IN MEDICAL RECORD: ICD-10-PCS | Mod: CPTII,S$GLB,, | Performed by: NURSE PRACTITIONER

## 2023-03-27 PROCEDURE — 3288F FALL RISK ASSESSMENT DOCD: CPT | Mod: CPTII,S$GLB,, | Performed by: NURSE PRACTITIONER

## 2023-03-27 PROCEDURE — 99215 PR OFFICE/OUTPT VISIT, EST, LEVL V, 40-54 MIN: ICD-10-PCS | Mod: S$GLB,,, | Performed by: NURSE PRACTITIONER

## 2023-03-27 PROCEDURE — 99999 PR PBB SHADOW E&M-EST. PATIENT-LVL V: ICD-10-PCS | Mod: PBBFAC,,, | Performed by: NURSE PRACTITIONER

## 2023-03-27 PROCEDURE — 3074F PR MOST RECENT SYSTOLIC BLOOD PRESSURE < 130 MM HG: ICD-10-PCS | Mod: CPTII,S$GLB,, | Performed by: NURSE PRACTITIONER

## 2023-03-27 PROCEDURE — 3078F PR MOST RECENT DIASTOLIC BLOOD PRESSURE < 80 MM HG: ICD-10-PCS | Mod: CPTII,S$GLB,, | Performed by: NURSE PRACTITIONER

## 2023-03-27 PROCEDURE — 71046 XR CHEST PA AND LATERAL: ICD-10-PCS | Mod: 26,,, | Performed by: RADIOLOGY

## 2023-03-27 PROCEDURE — 1159F MED LIST DOCD IN RCRD: CPT | Mod: CPTII,S$GLB,, | Performed by: NURSE PRACTITIONER

## 2023-03-27 PROCEDURE — 1160F PR REVIEW ALL MEDS BY PRESCRIBER/CLIN PHARMACIST DOCUMENTED: ICD-10-PCS | Mod: CPTII,S$GLB,, | Performed by: NURSE PRACTITIONER

## 2023-03-27 PROCEDURE — 3288F PR FALLS RISK ASSESSMENT DOCUMENTED: ICD-10-PCS | Mod: CPTII,S$GLB,, | Performed by: NURSE PRACTITIONER

## 2023-03-27 PROCEDURE — 99999 PR PBB SHADOW E&M-EST. PATIENT-LVL V: CPT | Mod: PBBFAC,,, | Performed by: NURSE PRACTITIONER

## 2023-03-27 PROCEDURE — 71046 X-RAY EXAM CHEST 2 VIEWS: CPT | Mod: TC

## 2023-03-27 PROCEDURE — 1100F PR PT FALLS ASSESS DOC 2+ FALLS/FALL W/INJURY/YR: ICD-10-PCS | Mod: CPTII,S$GLB,, | Performed by: NURSE PRACTITIONER

## 2023-03-27 PROCEDURE — 3008F PR BODY MASS INDEX (BMI) DOCUMENTED: ICD-10-PCS | Mod: CPTII,S$GLB,, | Performed by: NURSE PRACTITIONER

## 2023-03-27 PROCEDURE — 71046 X-RAY EXAM CHEST 2 VIEWS: CPT | Mod: 26,,, | Performed by: RADIOLOGY

## 2023-03-27 PROCEDURE — 1126F AMNT PAIN NOTED NONE PRSNT: CPT | Mod: CPTII,S$GLB,, | Performed by: NURSE PRACTITIONER

## 2023-03-27 PROCEDURE — 99215 OFFICE O/P EST HI 40 MIN: CPT | Mod: S$GLB,,, | Performed by: NURSE PRACTITIONER

## 2023-03-27 PROCEDURE — 1160F RVW MEDS BY RX/DR IN RCRD: CPT | Mod: CPTII,S$GLB,, | Performed by: NURSE PRACTITIONER

## 2023-03-27 PROCEDURE — 99499 RISK ADDL DX/OHS AUDIT: ICD-10-PCS | Mod: S$GLB,,, | Performed by: NURSE PRACTITIONER

## 2023-03-27 PROCEDURE — 99499 UNLISTED E&M SERVICE: CPT | Mod: S$GLB,,, | Performed by: NURSE PRACTITIONER

## 2023-03-27 PROCEDURE — 99417 PROLNG OP E/M EACH 15 MIN: CPT | Mod: S$GLB,,, | Performed by: NURSE PRACTITIONER

## 2023-03-27 PROCEDURE — 1126F PR PAIN SEVERITY QUANTIFIED, NO PAIN PRESENT: ICD-10-PCS | Mod: CPTII,S$GLB,, | Performed by: NURSE PRACTITIONER

## 2023-03-27 PROCEDURE — 3066F PR DOCUMENTATION OF TREATMENT FOR NEPHROPATHY: ICD-10-PCS | Mod: CPTII,S$GLB,, | Performed by: NURSE PRACTITIONER

## 2023-03-27 PROCEDURE — 3066F NEPHROPATHY DOC TX: CPT | Mod: CPTII,S$GLB,, | Performed by: NURSE PRACTITIONER

## 2023-03-27 RX ORDER — TIOTROPIUM BROMIDE 18 UG/1
18 CAPSULE ORAL; RESPIRATORY (INHALATION) DAILY
Qty: 30 CAPSULE | Refills: 11 | Status: SHIPPED | OUTPATIENT
Start: 2023-03-27 | End: 2023-07-25

## 2023-03-27 RX ORDER — AZITHROMYCIN 250 MG/1
250 TABLET, FILM COATED ORAL DAILY
Qty: 6 TABLET | Refills: 0 | Status: SHIPPED | OUTPATIENT
Start: 2023-03-27 | End: 2023-04-01

## 2023-03-27 RX ORDER — FLUTICASONE PROPIONATE AND SALMETEROL 250; 50 UG/1; UG/1
1 POWDER RESPIRATORY (INHALATION) 2 TIMES DAILY
Qty: 60 EACH | Refills: 11 | Status: SHIPPED | OUTPATIENT
Start: 2023-03-27 | End: 2023-09-05

## 2023-03-27 RX ORDER — PREDNISONE 20 MG/1
TABLET ORAL
Qty: 20 TABLET | Refills: 0 | Status: SHIPPED | OUTPATIENT
Start: 2023-03-27 | End: 2023-07-25 | Stop reason: SDUPTHER

## 2023-03-27 NOTE — ASSESSMENT & PLAN NOTE
Not used CPAP over 6 months, sinus issues, and machine read no sim card, so thought was not going to work correctly.   On CPAP 10 cm   Some benefit, willing to resume CPAP   Full face mask.   HME: [facility]     BRADLEY Left untreated, sleep apnea can have serious and life-shortening consequences: depression, headaches, diabetes, high blood pressure, heart disease, heart failure, irregular heart beats, and heart attacks, stroke, automobile accidents caused by falling asleep at the wheel and low productivity at work and at home.

## 2023-03-27 NOTE — PROGRESS NOTES
Subjective:     Patient ID: Santiago Khan is a 72 y.o. male.    Chief Complaint:      HPI    COPD  He presents for evaluation and treatment of COPD. The patient is currently having symptoms with exacerbation. Current symptoms include acute dyspnea, chronic dyspnea, dyspnea after 1 blocks, non-productive cough, cough productive of white sputum in small amounts and wheezing. Symptoms have been present since several months ago and have been gradually worsening. He denies chills and fever. Associated symptoms include fatigue, poor exercise tolerance, shortness of breath and wheezing.  This episode appears to have been triggered by smoke and off controller inhalers . Treatments tried for the current exacerbation: albuterol inhaler. The patient has been having similar episodes for approximately many years. Patient currently is not on home oxygen therapy.. The patient is having no constitutional symptoms, denying fever, chills, anorexia, or weight loss. The patient has not been hospitalized for this condition before. He has a history of 77 pack year smoker. The patient is experiencing exercise intolerance (difficulty walking 1 blocks on flat ground).    77 pack year current smoker. Interested in quitting referral to program    Never began Trelegy 200 mcg related to cost. Not on alternate.   Current treatment: Albuterol inhaler. Albuterol nebs.     Obstructive Sleep Apnea:  12/11/2019, 12/12/2019 Home Sleep Study  2 nights Severe BRADLEY (AHI=37) .   Not using CPAP over the past about 6 months.  On CPAP 10 cm   Some benefit, willing to resume CPAP   Full face mask.   HME: [facility]       Past Medical History:   Diagnosis Date    Chronic diastolic congestive heart failure 04/07/2020    Chronic kidney disease, stage 3     Chronic systolic congestive heart failure 07/09/2020    Chronic venous insufficiency     COPD (chronic obstructive pulmonary disease)     DDD (degenerative disc disease), lumbar     DM (diabetes  mellitus) with complications     History of gout     Hyperlipidemia associated with type 2 diabetes mellitus     Hypertension associated with stage 3 chronic kidney disease due to type 2 diabetes mellitus     BRADLEY on CPAP     Prostate cancer     prostatectomy in 2010, rising PSA that started in 2021    Tobacco abuse      Past Surgical History:   Procedure Laterality Date    BICEPS TENDON REPAIR      COLONOSCOPY N/A 3/27/2019    Procedure: COLONOSCOPY;  Surgeon: James Roger MD;  Location: [facility] ENDO;  Service: Endoscopy;  Laterality: N/A;    HEMORRHOID SURGERY      INGUINAL HERNIA REPAIR Left     KNEE ARTHROSCOPY Right     LEFT HEART CATHETERIZATION Left 6/29/2020    Procedure: CATHETERIZATION, HEART, LEFT;  Surgeon: Cheli Wilson MD;  Location: [facility] CATH LAB;  Service: Cardiology;  Laterality: Left;    LUMBAR LAMINECTOMY      PROSTATECTOMY       Review of patient's allergies indicates:   Allergen Reactions    Amitriptyline Rash    Celecoxib Rash     Current Outpatient Medications on File Prior to Visit   Medication Sig Dispense Refill    albuterol (PROVENTIL) 2.5 mg /3 mL (0.083 %) nebulizer solution Take 3 mLs (2.5 mg total) by nebulization every 4 to 6 hours as needed for Wheezing or Shortness of Breath. 360 mL 11    albuterol (PROVENTIL/VENTOLIN HFA) 90 mcg/actuation inhaler INHALE 2 PUFFS INTO THE LUNGS EVERY 4 HOURS AS NEEDED FOR WHEEZING 8.5 g 3    allopurinoL (ZYLOPRIM) 100 MG tablet Take 1 tablet (100 mg total) by mouth once daily. 30 tablet 11    aspirin (ECOTRIN) 81 MG EC tablet Take 81 mg by mouth once daily.      baclofen (LIORESAL) 10 MG tablet Take 1 tablet by mouth every evening.  2    bisoprolol (ZEBETA) 5 MG tablet TAKE 1/2 TABLET BY MOUTH ONCE DAILY 90 tablet 3    colchicine (COLCRYS) 0.6 mg tablet TAKE 1 TABLET(0.6 MG) BY MOUTH DAILY AS NEEDED 30 tablet 1    fluticasone propionate (FLONASE) 50 mcg/actuation nasal spray SHAKE LIQUID AND USE 2 SPRAYS(100 MCG) IN EACH NOSTRIL EVERY DAY  Strength: 50 mcg/actuation 48 g 3    glimepiride (AMARYL) 1 MG tablet TAKE 1 TABLET BY MOUTH EVERY DAY 90 tablet 3    HYDROcodone-acetaminophen (NORCO) 7.5-325 mg per tablet Take 1 tablet by mouth every 4 (four) hours as needed (for chronic pain).  0    losartan (COZAAR) 25 MG tablet Take 1 tablet (25 mg total) by mouth once daily.      magnesium oxide (MAG-OX) 400 mg (241.3 mg magnesium) tablet TAKE 2 TABLETS(800 MG) BY MOUTH TWICE DAILY 120 tablet 6    metFORMIN (GLUCOPHAGE) 500 MG tablet TAKE 1 TABLET(500 MG) BY MOUTH DAILY WITH BREAKFAST 90 tablet 3    multivitamin-minerals-lutein Tab Take 1 tablet by mouth Daily.      omeprazole (PRILOSEC) 40 MG capsule TAKE 1 CAPSULE BY MOUTH EVERY DAY 90 capsule 3    potassium chloride SA (K-DUR,KLOR-CON) 20 MEQ tablet TAKE 3 TABLETS(60 MEQ) BY MOUTH TWICE DAILY 720 tablet 2    simvastatin (ZOCOR) 40 MG tablet TAKE 1 TABLET(40 MG) BY MOUTH EVERY EVENING 90 tablet 3    torsemide (DEMADEX) 20 MG Tab Take 2 tablets (40 mg total) by mouth 2 (two) times a day. 120 tablet 11    [DISCONTINUED] fluticasone-umeclidin-vilanter (TRELEGY ELLIPTA) 200-62.5-25 mcg inhaler Inhale 1 puff into the lungs once daily. Wash out mouth after use (Patient not taking: Reported on 3/23/2023) 180 each 3     No current facility-administered medications on file prior to visit.     Social History     Socioeconomic History    Marital status:    Tobacco Use    Smoking status: Every Day     Packs/day: 1.50     Years: 51.00     Pack years: 76.50     Types: Cigarettes     Start date: 1/1/1971    Smokeless tobacco: Former   Substance and Sexual Activity    Alcohol use: Not Currently    Drug use: Never    Sexual activity: Not Currently     Partners: Female     Social Determinants of Health     Financial Resource Strain: Low Risk     Difficulty of Paying Living Expenses: Not hard at all   Food Insecurity: No Food Insecurity    Worried About Running Out of Food in the Last Year: Never true    Ran Out of  Food in the Last Year: Never true   Transportation Needs: No Transportation Needs    Lack of Transportation (Medical): No    Lack of Transportation (Non-Medical): No   Physical Activity: Inactive    Days of Exercise per Week: 0 days    Minutes of Exercise per Session: 0 min   Stress: Stress Concern Present    Feeling of Stress : To some extent   Social Connections: Moderately Integrated    Frequency of Communication with Friends and Family: More than three times a week    Frequency of Social Gatherings with Friends and Family: Once a week    Attends Mosque Services: More than 4 times per year    Active Member of Clubs or Organizations: Yes    Attends Club or Organization Meetings: More than 4 times per year    Marital Status:    Housing Stability: Low Risk     Unable to Pay for Housing in the Last Year: No    Number of Places Lived in the Last Year: 1    Unstable Housing in the Last Year: No     Family History   Problem Relation Age of Onset    Prostate cancer Father     Coronary artery disease Father 90    Alzheimer's disease Father     Hyperlipidemia Mother        Review of Systems   Constitutional:  Positive for fatigue. Negative for fever.   HENT:  Positive for postnasal drip, rhinorrhea and congestion.    Eyes:  Negative for redness and itching.   Respiratory:  Positive for cough, sputum production, shortness of breath, dyspnea on extertion, use of rescue inhaler and Paroxysmal Nocturnal Dyspnea.    Cardiovascular:  Negative for chest pain, palpitations and leg swelling.   Genitourinary:  Negative for difficulty urinating and hematuria.   Endocrine:  Negative for cold intolerance and heat intolerance.    Skin:  Negative for rash.   Gastrointestinal:  Negative for nausea and abdominal pain.   Neurological:  Negative for dizziness, syncope, weakness and light-headedness.   Hematological:  Negative for adenopathy. Does not bruise/bleed easily.   Psychiatric/Behavioral:  Negative for sleep disturbance.  "The patient is not nervous/anxious.      Objective:      /78   Pulse 80   Resp 20   Ht 5' 7" (1.702 m)   Wt 102.6 kg (226 lb 3.1 oz)   SpO2 (!) 94%   BMI 35.43 kg/m²   Physical Exam  Vitals and nursing note reviewed.   Constitutional:       Appearance: Normal appearance. He is well-developed.   HENT:      Head: Normocephalic and atraumatic.      Nose: Congestion present.   Eyes:      Conjunctiva/sclera: Conjunctivae normal.      Pupils: Pupils are equal, round, and reactive to light.   Neck:      Thyroid: No thyromegaly.      Vascular: No JVD.      Trachea: No tracheal deviation.   Cardiovascular:      Rate and Rhythm: Normal rate and regular rhythm.      Heart sounds: No murmur heard.  Pulmonary:      Effort: Pulmonary effort is normal.      Breath sounds: Decreased breath sounds and rales (cleared with controlled cough) present. No wheezing or rhonchi.   Abdominal:      General: Bowel sounds are normal.      Palpations: Abdomen is soft.   Musculoskeletal:         General: No tenderness.      Cervical back: Neck supple.      Comments: Decreased range of motion of knees.     Lymphadenopathy:      Cervical: No cervical adenopathy.   Skin:     General: Skin is warm and dry.   Neurological:      Mental Status: He is alert and oriented to person, place, and time.     Personal Diagnostic Review  PSG: significant for Obstructive Sleep Apnea       John Ghotra MD (Physician)   Pulmonary Disease  Procedure Orders   1. Home Sleep Studies [[id number]] ordered by Elizabeth Lejeune, NP   Pre-procedure Diagnoses   1. BRADLEY (obstructive sleep apnea) [G47.33]   Post-procedure Diagnoses   1. BRADLEY (obstructive sleep apnea) [G47.33]      Home Sleep Studies  Date/Time: 12/16/2019 8:00 AM  Performed by: John Ghotra MD  Authorized by: Elizabeth Lejeune, NP        PHYSICIAN INTERPRETATION AND COMMENTS: Findings are consistent with severe, non-positional obstructive sleep  apnea (BRADLEY). There is insufficient supine " study time to assess the severity of positional obstructive sleep apnea (BRADLEY). Indications  of cardiac dysrhythmia are recorded. Night #1 had no valid data. Night #2: AHI was 37.0/hr with 3.5 hours sleep. Consider INLAB  CPAP titration to allow proper habituation and mask fitting.  CLINICAL HISTORY: 69 year old male presented with:Prior diagnosis of BRADLEY, seeks to restablish therapy. STOPBANG score  6. Frequent waking up at night, snoring, Non refreshing sleep. 17 inch neck, BMI of 30, an [place] sleepiness score of 20, history  of diabetes and symptoms of nocturnal snoring, waking up choking and witnessed apneas. Based on the clinical history, the patient  has a high pre-test probability of having severe BRADLEY.  SLEEP STUDY FINDINGS: Patient underwent a two night Home Sleep Test and by behavioral criteria, slept for approximately  3.5 hours, with a sleep latency of 24 minutes and a sleep efficiency of 66.6%. Severe sleep disordered breathing (AHI=37) is noted  based on a 4% hypopnea desaturation criteria. The patient slept supine 1.6% of the night based on valid recording time of 3.54  hours. The apneas/hypopneas are accompanied by moderate oxygen desaturation (percent time below 90% SpO2: 11.6%, Min  SpO2: 82.8%). The average desaturation across all sleep disordered breathing events is 5.0%. Snoring occurs for 37.3% (30 dB) of  the study, 22.6% is very loud. The mean pulse rate is 77 BPM, with very frequent pulse rate variability (90 events with >= 6 BPM  increase/decrease per hour).  TREATMENT CONSIDERATIONS: Consider nasal continuous positive airway pressure (CPAP/AutoPAP) as the initial  treatment choice for severe obstructive sleep apnea. A mandibular advancement splint (MAS) or referral to an ENT surgeon for  modification to the airway should be considered to reduce the risk of mortality caused by severe BRADLEY if the patient prefers an  alternative therapy or the CPAP trial is unsuccessful.  DISEASE MANAGEMENT  CONSIDERATIONS: Irregularity of the pulse rate signals indicates possible cardiac  dysrhythmia. If clinically appropriate, further cardiac evaluation is suggested. Sleeping pills may increase the severity of  untreated BRADLEY and their use should be reevaluated once the BRADLEY is treated.            Progress Notes  John Ghotra MD (Physician)   Pulmonary Disease     PHYSICIAN INTERPRETATION AND COMMENTS: Findings are consistent with severe, non-positional obstructive sleep  apnea (BRADLEY). There is insufficient supine study time to assess the severity of positional obstructive sleep apnea (BRADLEY). Indications  of cardiac dysrhythmia are recorded. Night #1 had no valid data. Night #2: AHI was 37.0/hr with 3.5 hours sleep. Consider INLAB  CPAP titration to allow proper habituation and mask fitting.  CLINICAL HISTORY: 69 year old male presented with:Prior diagnosis of BRADLEY, seeks to restablish therapy. STOPBANG score  6. Frequent waking up at night, snoring, Non refreshing sleep. 17 inch neck, BMI of 30, an [place] sleepiness score of 20, history  of diabetes and symptoms of nocturnal snoring, waking up choking and witnessed apneas. Based on the clinical history, the patient  has a high pre-test probability of having severe BRADLEY.  SLEEP STUDY FINDINGS: Patient underwent a two night Home Sleep Test and by behavioral criteria, slept for approximately  3.5 hours, with a sleep latency of 24 minutes and a sleep efficiency of 66.6%. Severe sleep disordered breathing (AHI=37) is noted  based on a 4% hypopnea desaturation criteria. The patient slept supine 1.6% of the night based on valid recording time of 3.54  hours. The apneas/hypopneas are accompanied by moderate oxygen desaturation (percent time below 90% SpO2: 11.6%, Min  SpO2: 82.8%). The average desaturation across all sleep disordered breathing events is 5.0%. Snoring occurs for 37.3% (30 dB) of  the study, 22.6% is very loud. The mean pulse rate is 77 BPM, with very  frequent pulse rate variability (90 events with >= 6 BPM  increase/decrease per hour).  TREATMENT CONSIDERATIONS: Consider nasal continuous positive airway pressure (CPAP/AutoPAP) as the initial  treatment choice for severe obstructive sleep apnea. A mandibular advancement splint (MAS) or referral to an ENT surgeon for  modification to the airway should be considered to reduce the risk of mortality caused by severe BRADLEY if the patient prefers an  alternative therapy or the CPAP trial is unsuccessful.  DISEASE MANAGEMENT CONSIDERATIONS: Irregularity of the pulse rate signals indicates possible cardiac  dysrhythmia. If clinically appropriate, further cardiac evaluation is suggested. Sleeping pills may increase the severity of  untreated BRADLEY and their use should be reevaluated once the BRADLEY is treated.                  Pulmonary Studies Review 3/27/2023   SpO2 94   Ordering Provider -   Interpreting Provider -   Performing nurse/tech/RT -   Diagnosis -   Height 67   Weight 3619.07   BMI (Calculated) 35.4   Predicted Distance 295.67   Patient Race -   6MWT Status -   Was O2 used? -   6MW Distance walked (feet) -   Distance walked (meters) -   Did patient stop? -   How many times? -   Stop Time 1 -   Restart Time 1 -   Did patient restart? -   Type of assistive device(s) used? -   Is extra documentation required for this patient? -   Oxygen Saturation -   Supplemental Oxygen -   Heart Rate -   Blood Pressure -   Murphy Dyspnea Rating  -   Oxygen Saturation -   Supplemental Oxygen -   Heart Rate -   Blood Pressure -   Murphy Dyspnea Rating  -   Recovery Time (seconds) -   Oxygen Saturation -   Supplemental Oxygen -   Heart Rate -   Blood Pressure -   Murphy Dyspnea Rating  -   Is procedure ready for interpretation? -   Did the patient stop or pause? -   How many times did the patient stop or pause? -   Stop Time 1 -   Restart Time 1 -   Pause Time 1 -   Total Time Walked (Calculated) -   Total Laps Walked -   Final Partial Lap  Distance (feet) -   Total Distance Feet (Calculated) -   Total Distance Meters (Calculated) -   Predicted Distance Meters (Calculated) 442.42   Percentage of Predicted (Calculated) -   Peak VO2 (Calculated) -   Mets -   Has The Patient Had a Previous Six Minute Walk Test? -   Oxygen Qualification? -       X-Ray Chest PA And Lateral  Narrative: EXAMINATION:  XR CHEST PA AND LATERAL    CLINICAL HISTORY:  Chronic obstructive pulmonary disease, unspecified    TECHNIQUE:  PA and lateral views of the chest were performed.    COMPARISON:  03/11/2022    FINDINGS:  The lungs are clear, with normal appearance of pulmonary vasculature and no pleural effusion or pneumothorax.    The cardiac silhouette is normal in size. The hilar and mediastinal contours are unremarkable.    Bones are intact.  Impression: No acute abnormality.    Electronically signed by: Elizabeth Mckenna MD  Date:    03/27/2023  Time:    12:33      EXAMINATION:  CT CHEST WITHOUT CONTRAST     CLINICAL HISTORY:  Cough, persistent; Cough, unspecified     TECHNIQUE:  Chest CT without contrast.  Soft tissue and lung algorithms.  Coronal and sagittal reformations.     COMPARISON:  09/10/2020     FINDINGS:  Heart, pericardium, and aorta are normal.  Extensive coronary artery calcifications.  There is no mediastinal or hilar lymphadenopathy.     Trachea and central bronchi are patent.     There is a punctate calcified granuloma left lower lobe with a small calcified left hilar lymph node.     There is no worrisome pulmonary nodule.  Again, there is a 7 mm peripheral left upper lobe cyst with mild wall thickening, benign.  There is minor scarring with biapical and bibasilar septal thickening.  Mild patchy ground-glass opacity dependent lung bases characteristic of minor atelectasis.  Otherwise, lungs are clear.     There are a couple small hepatic cysts.  There are a few calcified liver and spleen granulomas.  Otherwise, the visualized portion of the upper abdominal  viscera is unremarkable.     Mild gynecomastia.     The bones are unremarkable.     Impression:     No evidence of pneumonia.  Minor scarring biapical and bibasilar septal thickening.  Minimal atelectasis dependent lung bases.  No worrisome pulmonary nodule.  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  Extensive coronary artery calcifications.     All CT scans at this facility are performed  using dose modulation techniques as appropriate to performed exam including the following:  automated exposure control; adjustment of mA and/or kV according to the patients size (this includes techniques or standardized protocols for targeted exams where dose is matched to indication/reason for exam: i.e. extremities or head);  iterative reconstruction technique.        Electronically signed by: Natanael Moore MD  Date:                                            09/27/2022    Office Spirometry Results:     No flowsheet data found.  Pulmonary Studies Review 3/27/2023   SpO2 94   Ordering Provider -   Interpreting Provider -   Performing nurse/tech/RT -   Diagnosis -   Height 67   Weight 3619.07   BMI (Calculated) 35.4   Predicted Distance 295.67   Patient Race -   6MWT Status -   Was O2 used? -   6MW Distance walked (feet) -   Distance walked (meters) -   Did patient stop? -   How many times? -   Stop Time 1 -   Restart Time 1 -   Did patient restart? -   Type of assistive device(s) used? -   Is extra documentation required for this patient? -   Oxygen Saturation -   Supplemental Oxygen -   Heart Rate -   Blood Pressure -   Murphy Dyspnea Rating  -   Oxygen Saturation -   Supplemental Oxygen -   Heart Rate -   Blood Pressure -   Murphy Dyspnea Rating  -   Recovery Time (seconds) -   Oxygen Saturation -   Supplemental Oxygen -   Heart Rate -   Blood Pressure -   Murphy Dyspnea Rating  -   Is procedure ready for interpretation? -   Did the patient stop or pause? -   How many times did the patient stop or pause? -   Stop Time 1 -   Restart Time 1 -    Pause Time 1 -   Total Time Walked (Calculated) -   Total Laps Walked -   Final Partial Lap Distance (feet) -   Total Distance Feet (Calculated) -   Total Distance Meters (Calculated) -   Predicted Distance Meters (Calculated) 442.42   Percentage of Predicted (Calculated) -   Peak VO2 (Calculated) -   Mets -   Has The Patient Had a Previous Six Minute Walk Test? -   Oxygen Qualification? -         Assessment:       1. COPD with acute exacerbation  X-Ray Chest PA And Lateral      2. Chronic obstructive pulmonary disease, unspecified COPD type  fluticasone-salmeterol diskus inhaler 250-50 mcg    tiotropium (SPIRIVA WITH HANDIHALER) 18 mcg inhalation capsule    predniSONE (DELTASONE) 20 MG tablet    azithromycin (ZITHROMAX Z-TAZ) 250 MG tablet    X-Ray Chest PA And Lateral    Spirometry with/without bronchodilator    Ambulatory referral/consult to Pulmonary Disease Management w/ Respiratory Therapist    CT Chest Without Contrast      3. BRADLEY on CPAP  CPAP/BIPAP SUPPLIES      4. Chronic venous insufficiency        5. Chronic combined systolic and diastolic congestive heart failure        6. DM (diabetes mellitus) with complications        7. Opioid dependence, uncomplicated        8. Cigarette nicotine dependence without complication  Ambulatory referral/consult to Smoking Cessation Program    CT Chest Without Contrast      9. Cough, unspecified type  CT Chest Without Contrast      10. Stage 3b chronic kidney disease        11. Class 2 severe obesity due to excess calories with serious comorbidity and body mass index (BMI) of 35.0 to 35.9 in adult                      Outpatient Encounter Medications as of 3/27/2023   Medication Sig Dispense Refill    albuterol (PROVENTIL) 2.5 mg /3 mL (0.083 %) nebulizer solution Take 3 mLs (2.5 mg total) by nebulization every 4 to 6 hours as needed for Wheezing or Shortness of Breath. 360 mL 11    albuterol (PROVENTIL/VENTOLIN HFA) 90 mcg/actuation inhaler INHALE 2 PUFFS INTO THE  LUNGS EVERY 4 HOURS AS NEEDED FOR WHEEZING 8.5 g 3    allopurinoL (ZYLOPRIM) 100 MG tablet Take 1 tablet (100 mg total) by mouth once daily. 30 tablet 11    aspirin (ECOTRIN) 81 MG EC tablet Take 81 mg by mouth once daily.      baclofen (LIORESAL) 10 MG tablet Take 1 tablet by mouth every evening.  2    bisoprolol (ZEBETA) 5 MG tablet TAKE 1/2 TABLET BY MOUTH ONCE DAILY 90 tablet 3    colchicine (COLCRYS) 0.6 mg tablet TAKE 1 TABLET(0.6 MG) BY MOUTH DAILY AS NEEDED 30 tablet 1    fluticasone propionate (FLONASE) 50 mcg/actuation nasal spray SHAKE LIQUID AND USE 2 SPRAYS(100 MCG) IN EACH NOSTRIL EVERY DAY Strength: 50 mcg/actuation 48 g 3    glimepiride (AMARYL) 1 MG tablet TAKE 1 TABLET BY MOUTH EVERY DAY 90 tablet 3    HYDROcodone-acetaminophen (NORCO) 7.5-325 mg per tablet Take 1 tablet by mouth every 4 (four) hours as needed (for chronic pain).  0    losartan (COZAAR) 25 MG tablet Take 1 tablet (25 mg total) by mouth once daily.      magnesium oxide (MAG-OX) 400 mg (241.3 mg magnesium) tablet TAKE 2 TABLETS(800 MG) BY MOUTH TWICE DAILY 120 tablet 6    metFORMIN (GLUCOPHAGE) 500 MG tablet TAKE 1 TABLET(500 MG) BY MOUTH DAILY WITH BREAKFAST 90 tablet 3    multivitamin-minerals-lutein Tab Take 1 tablet by mouth Daily.      omeprazole (PRILOSEC) 40 MG capsule TAKE 1 CAPSULE BY MOUTH EVERY DAY 90 capsule 3    potassium chloride SA (K-DUR,KLOR-CON) 20 MEQ tablet TAKE 3 TABLETS(60 MEQ) BY MOUTH TWICE DAILY 720 tablet 2    simvastatin (ZOCOR) 40 MG tablet TAKE 1 TABLET(40 MG) BY MOUTH EVERY EVENING 90 tablet 3    torsemide (DEMADEX) 20 MG Tab Take 2 tablets (40 mg total) by mouth 2 (two) times a day. 120 tablet 11    azithromycin (ZITHROMAX Z-TAZ) 250 MG tablet Take 1 tablet (250 mg total) by mouth once daily. 2 tab day one, then 1 daily x 4 days for 5 days 6 tablet 0    fluticasone-salmeterol diskus inhaler 250-50 mcg Inhale 1 puff into the lungs 2 (two) times daily. Controller 60 each 11    predniSONE (DELTASONE) 20 MG  tablet 3 daily x 3 days, 2 daily x 3 days, 1 daily x 3 days, 1/2 daily x 4 days. 20 tablet 0    tiotropium (SPIRIVA WITH HANDIHALER) 18 mcg inhalation capsule Inhale 1 capsule (18 mcg total) into the lungs once daily. Controller 30 capsule 11    [DISCONTINUED] allopurinoL (ZYLOPRIM) 300 MG tablet TAKE 1 TABLET(300 MG) BY MOUTH EVERY DAY 90 tablet 3    [DISCONTINUED] diazePAM (VALIUM) 5 MG tablet Take 1 tablet (5 mg total) by mouth once. Take 30 minutes prior to MRI for 1 dose 1 tablet 0    [DISCONTINUED] doxycycline (MONODOX) 100 MG capsule Take 1 capsule (100 mg total) by mouth every 12 (twelve) hours. 20 capsule 0    [DISCONTINUED] fluticasone-umeclidin-vilanter (TRELEGY ELLIPTA) 200-62.5-25 mcg inhaler Inhale 1 puff into the lungs once daily. Wash out mouth after use (Patient not taking: Reported on 3/23/2023) 180 each 3    [DISCONTINUED] magnesium oxide (MAG-OX) 400 mg (241.3 mg magnesium) tablet TAKE 2 TABLETS(800 MG) BY MOUTH TWICE DAILY 120 tablet 5    [DISCONTINUED] methylPREDNISolone (MEDROL DOSEPACK) 4 mg tablet use as directed 1 each 0     No facility-administered encounter medications on file as of 3/27/2023.     Plan:       Requested Prescriptions     Signed Prescriptions Disp Refills    fluticasone-salmeterol diskus inhaler 250-50 mcg 60 each 11     Sig: Inhale 1 puff into the lungs 2 (two) times daily. Controller    tiotropium (SPIRIVA WITH HANDIHALER) 18 mcg inhalation capsule 30 capsule 11     Sig: Inhale 1 capsule (18 mcg total) into the lungs once daily. Controller    predniSONE (DELTASONE) 20 MG tablet 20 tablet 0     Sig: 3 daily x 3 days, 2 daily x 3 days, 1 daily x 3 days, 1/2 daily x 4 days.    azithromycin (ZITHROMAX Z-TAZ) 250 MG tablet 6 tablet 0     Sig: Take 1 tablet (250 mg total) by mouth once daily. 2 tab day one, then 1 daily x 4 days for 5 days     Problem List Items Addressed This Visit       Chronic kidney disease, stage 3 (Chronic)     Stable. Managed by primary care provider           Chronic combined systolic and diastolic congestive heart failure (Chronic)     Continued management with cardiology   Latest ECHO performed and demonstrates- Results for orders placed during the hospital encounter of 03/20/20    Echo Color Flow Doppler? Yes; Bubble Contrast? No    Interpretation Summary  · Mild concentric left ventricular hypertrophy.  · Mildly decreased left ventricular systolic function. The estimated ejection fraction is 45%.  · Grade I (mild) left ventricular diastolic dysfunction consistent with impaired relaxation.  · Low normal right ventricular systolic function.  · Moderate mitral regurgitation.  · Normal central venous pressure (3 mmHg).  · The estimated PA systolic pressure is 23 mmHg.           BRADLEY on CPAP     Not used CPAP over 6 months, sinus issues, and machine read no sim card, so thought was not going to work correctly.   On CPAP 10 cm   Some benefit, willing to resume CPAP   Full face mask.   HME: [facility]     BRADLEY Left untreated, sleep apnea can have serious and life-shortening consequences: depression, headaches, diabetes, high blood pressure, heart disease, heart failure, irregular heart beats, and heart attacks, stroke, automobile accidents caused by falling asleep at the wheel and low productivity at work and at home.                Relevant Orders    CPAP/BIPAP SUPPLIES    Opioid dependence, uncomplicated    DM (diabetes mellitus) with complications     Stable and controlled. Continue current treatment plan as previously prescribed with your PCP.   Hemoglobin A1C   Date Value Ref Range Status   10/31/2022 5.3 4.0 - 5.6 % Final     Comment:     ADA Screening Guidelines:  5.7-6.4%  Consistent with prediabetes  >or=6.5%  Consistent with diabetes    High levels of fetal hemoglobin interfere with the HbA1C  assay. Heterozygous hemoglobin variants (HbS, HgC, etc)do  not significantly interfere with this assay.   However, presence of multiple variants may affect accuracy.      04/29/2022 6.4 (H) 4.0 - 5.6 % Final     Comment:     ADA Screening Guidelines:  5.7-6.4%  Consistent with prediabetes  >or=6.5%  Consistent with diabetes    High levels of fetal hemoglobin interfere with the HbA1C  assay. Heterozygous hemoglobin variants (HbS, HgC, etc)do  not significantly interfere with this assay.   However, presence of multiple variants may affect accuracy.     03/11/2022 7.7 (H) 4.0 - 5.6 % Final     Comment:     ADA Screening Guidelines:  5.7-6.4%  Consistent with prediabetes  >or=6.5%  Consistent with diabetes    High levels of fetal hemoglobin interfere with the HbA1C  assay. Heterozygous hemoglobin variants (HbS, HgC, etc)do  not significantly interfere with this assay.   However, presence of multiple variants may affect accuracy.              Class 2 severe obesity due to excess calories with serious comorbidity and body mass index (BMI) of 35.0 to 35.9 in adult     Encouraged calorie reduction and 30 minutes of exercise daily. Discussed impact of obesity on general health.  Wt Readings from Last 9 Encounters:   03/27/23 102.6 kg (226 lb 3.1 oz)   03/23/23 101.7 kg (224 lb 3.3 oz)   03/02/23 101.2 kg (223 lb 1.7 oz)   02/07/23 98.9 kg (218 lb)   11/03/22 98.9 kg (218 lb)   10/18/22 98 kg (216 lb)   09/27/22 98.9 kg (218 lb 0.6 oz)   09/27/22 98.9 kg (218 lb 0.6 oz)   08/17/22 101.9 kg (224 lb 10.4 oz)   Body mass index is 35.43 kg/m².           Cigarette nicotine dependence without complication     77 pack year smoker. Interested in quitting.  Assistance with smoking cessation was offered, including:  []  Medications  [x]  Counseling  []  Printed Information on Smoking Cessation  [x]  Referral to a Smoking Cessation Program    Patient was counseled regarding smoking for 3-10 minutes.    9/27/2022 CT chest No worrisome pulmonary nodule.  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   Follow up as planned with Dr. Springer repeat CT chest 1 yr, September 2023.              Relevant Orders     Ambulatory referral/consult to Smoking Cessation Program    CT Chest Without Contrast    Chronic venous insufficiency     Managed by cardiology            Chronic obstructive pulmonary disease     Flare off controller, trelegy too expensive, never obtained   COPD exacerbation Complete zpack/prednisone taper  Chest xray lungs clear  Exam mild rales, cleared with controlled cough  Begin Advair 250 mcg, Spiriva handihaler. Continue Albuterol inhaler and albuterol nebs   Follow up 3 months evaluate treatment response, alyce.  Current 1.5 pack/day smoker. 77 pack years. Interested in quitting referral to program  Referral pul dis management, education and assistance with medication  Patient preference to only see a doctor, request follow up with Dr. Springer            Relevant Medications    fluticasone-salmeterol diskus inhaler 250-50 mcg    tiotropium (SPIRIVA WITH HANDIHALER) 18 mcg inhalation capsule    predniSONE (DELTASONE) 20 MG tablet    azithromycin (ZITHROMAX Z-TZA) 250 MG tablet    Other Relevant Orders    X-Ray Chest PA And Lateral (Completed)    Spirometry with/without bronchodilator    Ambulatory referral/consult to Pulmonary Disease Management w/ Respiratory Therapist    CT Chest Without Contrast     Other Visit Diagnoses       COPD with acute exacerbation    -  Primary    Relevant Orders    X-Ray Chest PA And Lateral (Completed)    Cough, unspecified type        Relevant Orders    CT Chest Without Contrast          I spent a total of 80 minutes on the day of the visit.  This includes face to face time and non-face to face time preparing to see the patient (eg, review of tests), obtaining and/or reviewing separately obtained history, documenting clinical information in the electronic or other health record, independently interpreting results and communicating results to the patient/family/caregiver, or care coordinator.           Follow up in about 3 months (around 6/27/2023) for COPD evaluate treatment  response, alyce with Dr. Springer, pt request only see a doctor. .

## 2023-03-27 NOTE — ASSESSMENT & PLAN NOTE
Encouraged calorie reduction and 30 minutes of exercise daily. Discussed impact of obesity on general health.  Wt Readings from Last 9 Encounters:   03/27/23 102.6 kg (226 lb 3.1 oz)   03/23/23 101.7 kg (224 lb 3.3 oz)   03/02/23 101.2 kg (223 lb 1.7 oz)   02/07/23 98.9 kg (218 lb)   11/03/22 98.9 kg (218 lb)   10/18/22 98 kg (216 lb)   09/27/22 98.9 kg (218 lb 0.6 oz)   09/27/22 98.9 kg (218 lb 0.6 oz)   08/17/22 101.9 kg (224 lb 10.4 oz)   Body mass index is 35.43 kg/m².

## 2023-03-27 NOTE — ASSESSMENT & PLAN NOTE
Continued management with cardiology   Latest ECHO performed and demonstrates- Results for orders placed during the hospital encounter of 03/20/20    Echo Color Flow Doppler? Yes; Bubble Contrast? No    Interpretation Summary  · Mild concentric left ventricular hypertrophy.  · Mildly decreased left ventricular systolic function. The estimated ejection fraction is 45%.  · Grade I (mild) left ventricular diastolic dysfunction consistent with impaired relaxation.  · Low normal right ventricular systolic function.  · Moderate mitral regurgitation.  · Normal central venous pressure (3 mmHg).  · The estimated PA systolic pressure is 23 mmHg.

## 2023-03-27 NOTE — ASSESSMENT & PLAN NOTE
77 pack year smoker. Interested in quitting.  Assistance with smoking cessation was offered, including:  []  Medications  [x]  Counseling  []  Printed Information on Smoking Cessation  [x]  Referral to a Smoking Cessation Program    Patient was counseled regarding smoking for 3-10 minutes.    9/27/2022 CT chest No worrisome pulmonary nodule.  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   Follow up as planned with Dr. Springer repeat CT chest 1 yr, September 2023.

## 2023-03-27 NOTE — ASSESSMENT & PLAN NOTE
Stable and controlled. Continue current treatment plan as previously prescribed with your PCP.   Hemoglobin A1C   Date Value Ref Range Status   10/31/2022 5.3 4.0 - 5.6 % Final     Comment:     ADA Screening Guidelines:  5.7-6.4%  Consistent with prediabetes  >or=6.5%  Consistent with diabetes    High levels of fetal hemoglobin interfere with the HbA1C  assay. Heterozygous hemoglobin variants (HbS, HgC, etc)do  not significantly interfere with this assay.   However, presence of multiple variants may affect accuracy.     04/29/2022 6.4 (H) 4.0 - 5.6 % Final     Comment:     ADA Screening Guidelines:  5.7-6.4%  Consistent with prediabetes  >or=6.5%  Consistent with diabetes    High levels of fetal hemoglobin interfere with the HbA1C  assay. Heterozygous hemoglobin variants (HbS, HgC, etc)do  not significantly interfere with this assay.   However, presence of multiple variants may affect accuracy.     03/11/2022 7.7 (H) 4.0 - 5.6 % Final     Comment:     ADA Screening Guidelines:  5.7-6.4%  Consistent with prediabetes  >or=6.5%  Consistent with diabetes    High levels of fetal hemoglobin interfere with the HbA1C  assay. Heterozygous hemoglobin variants (HbS, HgC, etc)do  not significantly interfere with this assay.   However, presence of multiple variants may affect accuracy.

## 2023-03-27 NOTE — ASSESSMENT & PLAN NOTE
Flare off controller, trelegy too expensive, never obtained   COPD exacerbation Complete zpack/prednisone taper  Chest xray lungs clear  Exam mild rales, cleared with controlled cough  Begin Advair 250 mcg, Spiriva handihaler. Continue Albuterol inhaler and albuterol nebs   Follow up 3 months evaluate treatment response, alyce.  Current 1.5 pack/day smoker. 77 pack years. Interested in quitting referral to program  Referral pul dis management, education and assistance with medication  Patient preference to only see a doctor, request follow up with Dr. Springer

## 2023-03-27 NOTE — Clinical Note
Please schedule CT chest on or after 9/27/2023 same day appointment with Dr. Springer for review. Thank you

## 2023-03-29 ENCOUNTER — TELEPHONE (OUTPATIENT)
Dept: PHARMACY | Facility: CLINIC | Age: 73
End: 2023-03-29
Payer: MEDICARE

## 2023-03-29 ENCOUNTER — TELEPHONE (OUTPATIENT)
Dept: PULMONOLOGY | Facility: CLINIC | Age: 73
End: 2023-03-29
Payer: MEDICARE

## 2023-03-29 NOTE — TELEPHONE ENCOUNTER
Chronic Disease Management  Called patient to schedule initial Pulmonary Disease Management appointment.   No answer. Left message.

## 2023-03-29 NOTE — TELEPHONE ENCOUNTER
I have reached out to Santiago Khan to inform him of the [employer] application process for Trelegy and whats required to apply.  Santiago Khan did not answer. I left a voicemail and mailed a letter introducing him to the pharmacy patient assistance program. I will follow up in 5 business days.

## 2023-03-29 NOTE — LETTER
May 11, 2023    Santiago Khan  [address] [facility] [address]             Dear Santiago Khan    To follow up on your request for assistance. The Pharmacy Patient Assistance Program needs more information from you before we can submit your Trelegy application to the [employer] Program. Please return the following documents to the Pharmacy Patient Assistance office (forms can be returned by mail, email or fax) asap:       Proof of household Income( such as social security statement, 1099 form, pension statement or 3 consecutive pay stubs   Copy of all Insurance cards( front and back)   Printout from your Insurance or Pharmacy that shows how much you have spent on prescriptions this year            Whats Next:      Once I receive your documentation and authorization from your Provider, your application will be submitted to the [employer] Program for review. Please be advised it will take 2 to 4 weeks for your application to be processed so you may have to purchase a month's supply of medication from your pharmacy to hold you over during the waiting period. You will be notified of approval or denial by The Program(mail) or myself.       If you have any questions or concerns, please give me a call          Sincerely   Rasheeda Canas    Pharmacy Patient Assistance  [address] [place] [address]  [id number] [place] [address]  Phone: [phone]  Fax: [phone]  Email: [email or web address]

## 2023-04-03 ENCOUNTER — TELEPHONE (OUTPATIENT)
Dept: PULMONOLOGY | Facility: CLINIC | Age: 73
End: 2023-04-03
Payer: MEDICARE

## 2023-04-03 ENCOUNTER — HOSPITAL ENCOUNTER (EMERGENCY)
Facility: HOSPITAL | Age: 73
Discharge: HOME | End: 2023-04-03
Attending: FAMILY MEDICINE
Payer: MEDICARE

## 2023-04-03 VITALS
OXYGEN SATURATION: 93 % | TEMPERATURE: 98 F | WEIGHT: 225.19 LBS | RESPIRATION RATE: 20 BRPM | SYSTOLIC BLOOD PRESSURE: 125 MMHG | BODY MASS INDEX: 35.34 KG/M2 | DIASTOLIC BLOOD PRESSURE: 83 MMHG | HEIGHT: 67 IN | HEART RATE: 87 BPM

## 2023-04-03 DIAGNOSIS — S22.42XA CLOSED FRACTURE OF MULTIPLE RIBS OF LEFT SIDE, INITIAL ENCOUNTER: Primary | ICD-10-CM

## 2023-04-03 DIAGNOSIS — R07.81 RIB PAIN ON LEFT SIDE: ICD-10-CM

## 2023-04-03 LAB
ALBUMIN SERPL BCP-MCNC: 3.8 G/DL (ref 3.5–5.2)
ALP SERPL-CCNC: 82 U/L (ref 55–135)
ALT SERPL W/O P-5'-P-CCNC: 35 U/L (ref 10–44)
ANION GAP SERPL CALC-SCNC: 12 MMOL/L (ref 8–16)
AST SERPL-CCNC: 33 U/L (ref 10–40)
BASOPHILS # BLD AUTO: 0.04 K/UL (ref 0–0.2)
BASOPHILS NFR BLD: 0.3 % (ref 0–1.9)
BILIRUB SERPL-MCNC: 0.6 MG/DL (ref 0.1–1)
BUN SERPL-MCNC: 20 MG/DL (ref 8–23)
CALCIUM SERPL-MCNC: 9.1 MG/DL (ref 8.7–10.5)
CHLORIDE SERPL-SCNC: 97 MMOL/L (ref 95–110)
CO2 SERPL-SCNC: 28 MMOL/L (ref 23–29)
CREAT SERPL-MCNC: 1.8 MG/DL (ref 0.5–1.4)
DIFFERENTIAL METHOD: ABNORMAL
EOSINOPHIL # BLD AUTO: 0.1 K/UL (ref 0–0.5)
EOSINOPHIL NFR BLD: 0.9 % (ref 0–8)
ERYTHROCYTE [DISTWIDTH] IN BLOOD BY AUTOMATED COUNT: 16.5 % (ref 11.5–14.5)
EST. GFR  (NO RACE VARIABLE): 40 ML/MIN/1.73 M^2
GLUCOSE SERPL-MCNC: 215 MG/DL (ref 70–110)
HCT VFR BLD AUTO: 45.7 % (ref 40–54)
HGB BLD-MCNC: 14.7 G/DL (ref 14–18)
IMM GRANULOCYTES # BLD AUTO: 0.18 K/UL (ref 0–0.04)
IMM GRANULOCYTES NFR BLD AUTO: 1.3 % (ref 0–0.5)
LIPASE SERPL-CCNC: 62 U/L (ref 4–60)
LYMPHOCYTES # BLD AUTO: 0.9 K/UL (ref 1–4.8)
LYMPHOCYTES NFR BLD: 6.4 % (ref 18–48)
MCH RBC QN AUTO: 30.6 PG (ref 27–31)
MCHC RBC AUTO-ENTMCNC: 32.2 G/DL (ref 32–36)
MCV RBC AUTO: 95 FL (ref 82–98)
MONOCYTES # BLD AUTO: 0.5 K/UL (ref 0.3–1)
MONOCYTES NFR BLD: 3.7 % (ref 4–15)
NEUTROPHILS # BLD AUTO: 11.7 K/UL (ref 1.8–7.7)
NEUTROPHILS NFR BLD: 87.4 % (ref 38–73)
NRBC BLD-RTO: 0 /100 WBC
PLATELET # BLD AUTO: 155 K/UL (ref 150–450)
PMV BLD AUTO: 9.8 FL (ref 9.2–12.9)
POTASSIUM SERPL-SCNC: 4.9 MMOL/L (ref 3.5–5.1)
PROT SERPL-MCNC: 7 G/DL (ref 6–8.4)
RBC # BLD AUTO: 4.81 M/UL (ref 4.6–6.2)
SODIUM SERPL-SCNC: 137 MMOL/L (ref 136–145)
WBC # BLD AUTO: 13.37 K/UL (ref 3.9–12.7)

## 2023-04-03 PROCEDURE — 83690 ASSAY OF LIPASE: CPT | Performed by: NURSE PRACTITIONER

## 2023-04-03 PROCEDURE — 85025 COMPLETE CBC W/AUTO DIFF WBC: CPT | Performed by: NURSE PRACTITIONER

## 2023-04-03 PROCEDURE — 99284 EMERGENCY DEPT VISIT MOD MDM: CPT | Mod: 25

## 2023-04-03 PROCEDURE — 80053 COMPREHEN METABOLIC PANEL: CPT | Performed by: NURSE PRACTITIONER

## 2023-04-03 NOTE — ED NOTES
Bed: 10  Expected date:   Expected time:   Means of arrival: Personal Transportation  Comments:     Quincy Shukla NP  04/03/23 [id number]

## 2023-04-03 NOTE — ED PROVIDER NOTES
SCRIBE #1 NOTE: I, Miguel Whaley, am scribing for, and in the presence of, Edelmira Alejandro MD. I have scribed the entire note.       History     Chief Complaint   Patient presents with    Fall     Slip and fall, hit left side of ribs on foot of bed. + bruising     Review of patient's allergies indicates:   Allergen Reactions    Amitriptyline Rash    Celecoxib Rash         History of Present Illness     HPI    4/3/2023, 4:51 PM  History obtained from the patient      History of Present Illness: Santiago Khan is a 72 y.o. male patient with a PMHx of DM, HTN, cellulitis of LLE and COPD who presents to the Emergency Department for evaluation following a fall which onset PTA. Pt slipped and fell, hitting his left side ribs on the foot os his bed at home. Symptoms are constant and moderate in severity. No mitigating or exacerbating factors reported. No associated symptoms reported. Patient denies any fever, chills, dysuria, SOB, dizziness, HA, and all other sxs at this time. No prior Tx reported. No further complaints or concerns at this time.       Arrival mode: Personal vehicle    PCP: Kashif Acosta MD        Past Medical History:  Past Medical History:   Diagnosis Date    Chronic diastolic congestive heart failure 04/07/2020    Chronic kidney disease, stage 3     Chronic systolic congestive heart failure 07/09/2020    Chronic venous insufficiency     COPD (chronic obstructive pulmonary disease)     DDD (degenerative disc disease), lumbar     DM (diabetes mellitus) with complications     History of gout     Hyperlipidemia associated with type 2 diabetes mellitus     Hypertension associated with stage 3 chronic kidney disease due to type 2 diabetes mellitus     BRADLEY on CPAP     Prostate cancer     prostatectomy in 2010, rising PSA that started in 2021    Tobacco abuse        Past Surgical History:  Past Surgical History:   Procedure Laterality Date    BICEPS TENDON REPAIR      COLONOSCOPY N/A 3/27/2019     Procedure: COLONOSCOPY;  Surgeon: James Roger MD;  Location: [facility] ENDO;  Service: Endoscopy;  Laterality: N/A;    HEMORRHOID SURGERY      INGUINAL HERNIA REPAIR Left     KNEE ARTHROSCOPY Right     LEFT HEART CATHETERIZATION Left 6/29/2020    Procedure: CATHETERIZATION, HEART, LEFT;  Surgeon: Cheli Wilson MD;  Location: [facility] CATH LAB;  Service: Cardiology;  Laterality: Left;    LUMBAR LAMINECTOMY      PROSTATECTOMY           Family History:  Family History   Problem Relation Age of Onset    Prostate cancer Father     Coronary artery disease Father 90    Alzheimer's disease Father     Hyperlipidemia Mother        Social History:  Social History     Tobacco Use    Smoking status: Every Day     Packs/day: 1.50     Years: 51.00     Pack years: 76.50     Types: Cigarettes     Start date: 1/1/1971    Smokeless tobacco: Former   Substance and Sexual Activity    Alcohol use: Not Currently    Drug use: Never    Sexual activity: Not Currently     Partners: Female        Review of Systems     Review of Systems   Constitutional:  Negative for chills and fever.   HENT:  Negative for sore throat.    Respiratory:  Negative for shortness of breath.    Cardiovascular:  Positive for chest pain (left lateral).   Gastrointestinal:  Negative for nausea.   Genitourinary:  Negative for dysuria.   Musculoskeletal:  Negative for back pain.   Skin:  Negative for rash.   Neurological:  Negative for dizziness, weakness and headaches.   Hematological:  Does not bruise/bleed easily.   All other systems reviewed and are negative.     Physical Exam     Initial Vitals [04/03/23 1614]   BP Pulse Resp Temp SpO2   135/71 75 20 97.9 °F (36.6 °C) 95 %      MAP       --          Physical Exam  Nursing Notes and Vital Signs Reviewed.  Constitutional: Patient is in no acute distress. Well-developed and well-nourished.  Head: Atraumatic. Normocephalic.  Eyes: PERRL. EOM intact. Conjunctivae are not pale. No scleral icterus.  ENT: Mucous membranes  "are moist. Oropharynx is clear and symmetric.    Neck: Supple. Full ROM. No lymphadenopathy.  Cardiovascular: Regular rate. Regular rhythm. No murmurs, rubs, or gallops. Distal pulses are 2+ and symmetric.  Pulmonary/Chest: No respiratory distress. Clear to auscultation bilaterally. No wheezing or rales. Tenderness to left lateral chest wall.   Abdominal: Soft and non-distended.  There is no tenderness.  No rebound, guarding, or rigidity. Good bowel sounds.  Musculoskeletal: Moves all extremities. No obvious deformities. No edema. No calf tenderness.   Skin: Warm and dry.  Neurological:  Alert, awake, and appropriate.  Normal speech.  No acute focal neurological deficits are appreciated.  Psychiatric: Normal affect. Good eye contact. Appropriate in content.     ED Course   Procedures  ED Vital Signs:  Vitals:    04/03/23 1614 04/03/23 1717 04/03/23 1718 04/03/23 1719   BP: 135/71 125/83     Pulse: 75 88 87    Resp: 20 20     Temp: 97.9 °F (36.6 °C)      TempSrc: Oral      SpO2: 95% (!) 93%  (!) 93%   Weight: 102.2 kg (225 lb 3.2 oz)      Height: 5' 7" (1.702 m)          Abnormal Lab Results:  Labs Reviewed   CBC W/ AUTO DIFFERENTIAL - Abnormal; Notable for the following components:       Result Value    WBC 13.37 (*)     RDW 16.5 (*)     Immature Granulocytes 1.3 (*)     Gran # (ANC) 11.7 (*)     Immature Grans (Abs) 0.18 (*)     Lymph # 0.9 (*)     Gran % 87.4 (*)     Lymph % 6.4 (*)     Mono % 3.7 (*)     All other components within normal limits   COMPREHENSIVE METABOLIC PANEL - Abnormal; Notable for the following components:    Glucose 215 (*)     Creatinine 1.8 (*)     eGFR 40 (*)     All other components within normal limits   LIPASE - Abnormal; Notable for the following components:    Lipase 62 (*)     All other components within normal limits        All Lab Results:  Results for orders placed or performed during the hospital encounter of 04/03/23   CBC auto differential   Result Value Ref Range    WBC 13.37 " (H) 3.90 - 12.70 K/uL    RBC 4.81 4.60 - 6.20 M/uL    Hemoglobin 14.7 14.0 - 18.0 g/dL    Hematocrit 45.7 40.0 - 54.0 %    MCV 95 82 - 98 fL    MCH 30.6 27.0 - 31.0 pg    MCHC 32.2 32.0 - 36.0 g/dL    RDW 16.5 (H) 11.5 - 14.5 %    Platelets 155 150 - 450 K/uL    MPV 9.8 9.2 - 12.9 fL    Immature Granulocytes 1.3 (H) 0.0 - 0.5 %    Gran # (ANC) 11.7 (H) 1.8 - 7.7 K/uL    Immature Grans (Abs) 0.18 (H) 0.00 - 0.04 K/uL    Lymph # 0.9 (L) 1.0 - 4.8 K/uL    Mono # 0.5 0.3 - 1.0 K/uL    Eos # 0.1 0.0 - 0.5 K/uL    Baso # 0.04 0.00 - 0.20 K/uL    nRBC 0 0 /100 WBC    Gran % 87.4 (H) 38.0 - 73.0 %    Lymph % 6.4 (L) 18.0 - 48.0 %    Mono % 3.7 (L) 4.0 - 15.0 %    Eosinophil % 0.9 0.0 - 8.0 %    Basophil % 0.3 0.0 - 1.9 %    Differential Method Automated    Comprehensive metabolic panel   Result Value Ref Range    Sodium 137 136 - 145 mmol/L    Potassium 4.9 3.5 - 5.1 mmol/L    Chloride 97 95 - 110 mmol/L    CO2 28 23 - 29 mmol/L    Glucose 215 (H) 70 - 110 mg/dL    BUN 20 8 - 23 mg/dL    Creatinine 1.8 (H) 0.5 - 1.4 mg/dL    Calcium 9.1 8.7 - 10.5 mg/dL    Total Protein 7.0 6.0 - 8.4 g/dL    Albumin 3.8 3.5 - 5.2 g/dL    Total Bilirubin 0.6 0.1 - 1.0 mg/dL    Alkaline Phosphatase 82 55 - 135 U/L    AST 33 10 - 40 U/L    ALT 35 10 - 44 U/L    Anion Gap 12 8 - 16 mmol/L    eGFR 40 (A) >60 mL/min/1.73 m^2   Lipase   Result Value Ref Range    Lipase 62 (H) 4 - 60 U/L       Imaging Results:  Imaging Results               X-Ray Ribs 2 View Left (Final result)  Result time 04/03/23 17:19:57      Final result by Herminio Sandy MD (04/03/23 17:19:57)                   Impression:      Left 9th and 8th rib fractures as above.    This report was flagged in Epic as abnormal.      Electronically signed by: Herminio Sandy  Date:    04/03/2023  Time:    17:19               Narrative:    EXAMINATION:  XR RIBS 2 VIEW LEFT    CLINICAL HISTORY:  Pleurodynia    TECHNIQUE:  Two views of the left ribs were  performed.    COMPARISON:  None.    FINDINGS:  Left lung is clear.    Acute essentially nondisplaced left 9th and 8th rib fractures.  No additional fractures identified                                        CT Abdomen Pelvis  Without Contrast (Final result)  Result time 04/03/23 17:08:37      Final result by Herminio Sandy MD (04/03/23 17:08:37)                   Impression:      Question left lateral body wall bruising.    Indeterminate left renal questionable lesion versus dromedary hump in the order of 2.5 cm.  Outpatient retroperitoneal ultrasound suggested.    Sludge fills the gallbladder.    Full findings as above.    This report was flagged in Epic as abnormal.    All CT scans at this facility are performed  using dose modulation techniques as appropriate to performed exam including the following:  automated exposure control; adjustment of mA and/or kV according to the patients size (this includes techniques or standardized protocols for targeted exams where dose is matched to indication/reason for exam: i.e. extremities or head);  iterative reconstruction technique.      Electronically signed by: Herminio Sandy  Date:    04/03/2023  Time:    17:08               Narrative:    EXAMINATION:  CT ABDOMEN PELVIS WITHOUT CONTRAST    CLINICAL HISTORY:  Abdominal trauma, blunt;    TECHNIQUE:  Low dose axial images, sagittal and coronal reformations were obtained from the lung bases to the pubic symphysis.  Contrast was not administered.    COMPARISON:  Multiple priors.    FINDINGS:  Heart: Normal in size. No pericardial effusion.    Lung Bases: Well aerated, without consolidation or pleural fluid.    Liver: Multiple simple hepatic cysts.    Gallbladder: Sludge fills the gallbladder.  No definite wall thickening or findings to suggest acute cholecystitis.    Bile Ducts: No evidence of dilated ducts.    Pancreas: No mass or peripancreatic fat stranding.    Spleen: Prior granulomatous disease.    Adrenals:  Unremarkable.    Kidneys/ Ureters: No hydronephrosis.  No obstructive uropathy.  Bilateral simple renal cyst.  Additional indeterminate left renal lesions suspected versus dromedary hump on the order of 2.5 cm.  Outpatient retroperitoneal ultrasound suggested for more complete evaluation.    Bladder: No evidence of wall thickening.    Reproductive organs: Prostate small size or surgically absent.    GI Tract/Mesentery: Gastric wall thickening of unclear significance.  Correlation for gastritis.  Follow-up endoscopy recommended if not previously performed.  Otherwise the bowel appears unremarkable.  No evidence of appendicitis.    Peritoneal Space: No ascites. No free air.    Retroperitoneum: No significant adenopathy.    Abdominal wall: The lateral body wall on the left is partially excluded.  Question mild bruising along the lateral abdominal wall.    Vasculature: No significant atherosclerosis or aneurysm.    Bones: No acute fracture.  Multifocal lumbar spine degenerative change.                                                    The Emergency Provider reviewed the vital signs and test results, which are outlined above.     ED Discussion       5:36 PM: Reassessed pt at this time. Discussed with pt all pertinent ED information and results. Discussed pt dx and plan of tx. Gave pt all f/u and return to the ED instructions. All questions and concerns were addressed at this time. Pt expresses understanding of information and instructions, and is comfortable with plan to discharge. Pt is stable for discharge.    I discussed with patient and/or family/caretaker that evaluation in the ED does not suggest any emergent or life threatening medical conditions requiring immediate intervention beyond what was provided in the ED, and I believe patient is safe for discharge.  Regardless, an unremarkable evaluation in the ED does not preclude the development or presence of a serious of life threatening condition. As such, patient  was instructed to return immediately for any worsening or change in current symptoms.       Medical Decision Making:   Clinical Tests:   Lab Tests: Ordered and Reviewed  Radiological Study: Ordered and Reviewed         ED Medication(s):  Medications - No data to display    Discharge Medication List as of 4/3/2023  5:32 PM           Follow-up Information       Schedule an appointment as soon as possible for a visit  with Kashif Acosta MD.    Specialty: Internal Medicine  Why: As needed  Contact information:  [facility]  [address]  [facility] [address]  [phone]                                 Scribe Attestation:   Scribe #1: I performed the above scribed service and the documentation accurately describes the services I performed. I attest to the accuracy of the note.     Attending:   Physician Attestation Statement for Scribe #1: I, Edelmira Alejandro MD, personally performed the services described in this documentation, as scribed by Miguel Whaley, in my presence, and it is both accurate and complete.           Clinical Impression       ICD-10-CM ICD-9-CM   1. Closed fracture of multiple ribs of left side, initial encounter  S22.42XA 807.09   2. Rib pain on left side  R07.81 786.50       Disposition:   Disposition: Discharged  Condition: Stable     Edelmira Alejandro MD  04/04/23 1611

## 2023-04-03 NOTE — TELEPHONE ENCOUNTER
Chronic Disease Management:   Called patient to confirm Pulmonary Disease Management appointment.

## 2023-04-03 NOTE — FIRST PROVIDER EVALUATION
"Medical screening examination initiated.  I have conducted a focused provider triage encounter, findings are as follows:    Brief history of present illness:  Patient presents to the ER for evaluation of abdominal pain and left chest wall pain after a fall yesterday.  Patient with bruising noted to the abdomen.    Vitals:    04/03/23 1614   BP: 135/71   BP Location: Right arm   Patient Position: Sitting   Pulse: 75   Resp: 20   Temp: 97.9 °F (36.6 °C)   TempSrc: Oral   SpO2: 95%   Weight: 102.2 kg (225 lb 3.2 oz)   Height: 5' 7" (1.702 m)       Pertinent physical exam:  Uncomfortable    Brief workup plan:  Labs, imaging, further eval    Preliminary workup initiated; this workup will be continued and followed by the physician or advanced practice provider that is assigned to the patient when roomed.  "

## 2023-04-04 ENCOUNTER — CLINICAL SUPPORT (OUTPATIENT)
Dept: PULMONOLOGY | Facility: CLINIC | Age: 73
End: 2023-04-04
Payer: MEDICARE

## 2023-04-04 VITALS
BODY MASS INDEX: 35.63 KG/M2 | OXYGEN SATURATION: 90 % | WEIGHT: 227.5 LBS | RESPIRATION RATE: 16 BRPM | HEART RATE: 86 BPM

## 2023-04-04 DIAGNOSIS — J44.9 CHRONIC OBSTRUCTIVE PULMONARY DISEASE, UNSPECIFIED COPD TYPE: ICD-10-CM

## 2023-04-04 PROCEDURE — 99999 PR PBB SHADOW E&M-EST. PATIENT-LVL III: ICD-10-PCS | Mod: PBBFAC,,,

## 2023-04-04 PROCEDURE — 99999 PR PBB SHADOW E&M-EST. PATIENT-LVL III: CPT | Mod: PBBFAC,,,

## 2023-04-04 NOTE — PATIENT INSTRUCTIONS
What is COPD?  COPD stands for chronic obstructive pulmonary disease. It means the airways in your lungs are blocked (obstructed). Because of this, it is hard to breathe. You may have trouble with daily activities because of shortness of breath. Over time the shortness of breath usually worsens making it more and more difficult to take care of yourself and take part in activities. Chronic bronchitis and emphysema are two common types of COPD.  What happens in chronic bronchitis?  The cells in the airways make more mucus than normal. The mucus builds up, narrowing the airways. This means less air travels into and out of the lungs. The lining of the airways may also become inflamed (swollen) and causes the airways to narrow even more.            What happens in emphysema?  The small airways are damaged and lose their stretchiness. The airways collapse when you exhale, causing air to get trapped in the air sacs. This means that less oxygen enters the blood vessels and less oxygen is delivered to all of the cells of your body. This makes it hard to breathe.         Damage to cilia  Cilia are small hairs that line and protect the airways. Smoking damages the cilia. Damaged cilia can't sweep mucus and particles away. Some of the cilia are destroyed. This damage worsens COPD.      How did I get COPD?  Most people get COPD from smoking. Cigarette smoke damages lungs, which can develop into COPD over many years.  How COPD affects you  COPD makes you work harder to breathe. Air may get trapped in the lungs, which prevents your lungs from filling completely with fresh oxygen-filled air when you inhale (breathe in). It's harder to take deep breaths especially when you are active and start breathing faster. Over time, your lungs may become enlarged filling the lung with air that does not transfer oxygen into the blood. These problems cause you to have shortness of breath (also called dyspnea). Wheezing (hoarse, whistling  breathing), chronic cough, and fatigue (feeling tired and worn out) are also common.   © 9895-2117 [employer]. [address] [place] [address], [place] [address]. All rights reserved. This information is not intended as a substitute for professional medical care. Always follow your healthcare professional's instructions.             Some people have difficulty adjusting to CPAP/BiPAP/AutoCPAP. This is not unusual or hard to understand: Breathing with CPAP is different from ordinary breathing, and this difference is aversive to some. The problem can be overcome, however, and the benefits CPAP confers are certainly worth the effort. Below, you will find a simple and gradual way to get used to CPAP before you try to use it all night, every night. The essence of this procedure is to relax and let breathing with CPAP become a habit. It may take about two weeks and involves the following:  CPAP while awake and comfortably seated during the late evening.  CPAP in bed while attempting sleep at night.  If your discomfort is too great at any time, discontinue and attempt again later the same night for the same amount of time.   You and your physician may alter the times and pressures if necessary.  If you find that it is very easy to get used to CPAP, you may start using it every night when you are comfortable enough to do so.  IMPORTANT REMINDER: If you have a cold or sinus congestion it is okay to miss a night or two of CPAP.  Consider using antihistamines or decongestants to clear up your sinus congestion prior to sleeping.  DAYS 1-3  Start CPAP while awake and comfortably seated during the late evening, after having prepared for bed. You may do this while watching television, listening to music or reading. Use for 1 hour, then take off CPAP and go directly to bed to sleep.     DAYS 4-6  Start CPAP when you go to bed and use for 1 hour or until you fall asleep. If your discomfort is too great at any time, discontinue  and attempt again later the same night for the same designated amount of time (1hour).    DAYS 7-9  Increase time with CPAP to 2 hours a night. If your discomfort is too great at any time, discontinue and attempt again later the same night, for the same designated amount of time (2 hours).    DAYS 10-12  Increase with CPAP to 3 hours a night. If your discomfort is too great at any time, discontinue and attempt again later the same night, for the designated amount of time. (3 hours).     DAYS 13-15  Sleep the entire night with CPAP.  OPTIONAL: You may use Progressive Muscle Relaxation (PMR) to help put you at ease when using CPAP; do PMR twice each day, once in the morning or afternoon, and once in the evening just before using CPAP. You may do PMR prior to any attempt until you comfortable with CPAP.     Asthma Trigger Checklist  Allergens, irritants, and other things may trigger your asthma. Check the box next to each of your triggers. After each trigger is a list of ways to avoid it.   Dust mites. Dust mites live in mattresses, bedding, carpets, curtains, and indoor dust.  To kill dust mites, wash bedding in hot water (130°F) each week.  Cover mattress and pillows with special dust-mite-proof cases.  Don't use upholstered furniture like sofas or chairs in the bedroom.  Use allergy-proof filters for air conditioners and furnaces. Replace or clean them as instructed.  If you can, replace carpeting with wood or tile bhargav, especially in the bedroom.  Animals. Animals with fur or feathers shed dander (allergens).  It's best to choose a pet that doesn't have fur or feathers, such as a fish or a reptile.  If you have pets, keep them off your bed and out of your bedroom.  Wash your hands and clothes after handling pets.    Mold. Mold grows in damp places, such as bathrooms, basements, and closets.  Ask someone to clean damp areas in your home every week. Or try wearing a face mask while you clean.  Run an exhaust fan  while bathing. Or leave a window open in the bathroom.  Repair water leaks in or around your home.  Have someone else cut grass or rake leaves, if possible.  Don't use vaporizers or humidifiers. They encourage mold growth.    Pollen. Pollen from trees, grasses, and weeds is a common allergen. (Flower pollens are generally not a problem).  Try to learn what types of pollen affect you most. Pollen levels vary depending on the plant, the season, and the time of day.  If possible, use air conditioning instead of opening the windows in your home or car.  Have someone else do yard work, if possible.    Cockroaches. Roaches are found in many homes and produce allergens.  Keep your kitchen clean and dry. A leaky faucet or drain can attract roaches.  Remove garbage from your home daily.  Store food in tightly sealed containers. Wash dishes as soon as they are used.  Use bait stations or traps to control roaches. Avoid using chemical sprays.    Smoke. Smoke may be from cigarettes, cigars, pipes, incense or candles, barbecues or grills, and fireplaces.  Don't smoke. And don't let people smoke in your home or car.  When you travel, ask for nonsmoking rental cars and hotel rooms.  Avoid fireplaces and wood stoves. If you can't, sit away from them. Make sure the smoke is directed outside.  Don't burn incense or use candles.  Move away from smoky outdoor cooking grills.    Smog.  Smog is from car exhaust and other pollution.  Read or listen to local air-quality reports. These let you know when air quality is poor.  Stay indoors as much as you can on smoggy days. If possible, use air conditioning instead of opening the windows.  In your car, set air conditioning to recirculate air, so less pollution gets in.    Strong odors. These include air fresheners, deodorizers, and cleaning products; perfume, deodorant, and other beauty products; incense and candles; and insect sprays and other sprays.  Use scent-free products like deodorant  or body lotion.  Avoid using cleaning products with bleach and ammonia. Make your own cleaning solution with white vinegar, baking soda, or mild dish soap.  Use exhaust fans while cooking. Or open a window, if possible.   Avoid perfumes, air fresheners, potpourri, and other scented products.          Other irritants. These include dust, aerosol sprays, and powders.  Wear a face mask while doing tasks like sanding, dusting, sweeping, and yard work. Open doors and windows if working indoors.  Use pump spray bottles instead of aerosols.  Pour liquid  onto a rag or cloth instead of spraying them.    Weather. Weather conditions can trigger symptoms or make them worse.  Watch for very high or low temperatures, very humid conditions, or a lot of wind, as these conditions can make symptoms worse.  Limit outdoor activity during the type of weather that affects you.  Wear a scarf over your mouth and nose in cold weather.    Colds, flu, and sinus infections. Upper respiratory infections can trigger asthma.  Wash your hands often with soap and warm water or use a hand  containing alcohol.  Get a yearly flu shot. And ask if you should get a pneumonia vaccine.  Take care of your general health. Get plenty of sleep. And eat a variety of healthy foods.    Food additives. Food additives can trigger asthma flare-ups in some people.  Check food labels for sulfites or other similar ingredients. These are often found in foods such as wine, beer, and dried fruits.  Avoid foods that contain these additives.    Medicine. Aspirin, NSAIDS like ibuprofen and naproxen, and heart medicines like beta-blockers may be triggers.  Tell your health care provider if you think certain medicines trigger symptoms.   Be sure to read the labels on over-the-counter medicines. They may have ingredients that cause symptoms for you.   , Emotions. Laughing, crying, or feeling excited are triggers for some people.   To help you stay calm: Try  breathing in slowly through your nose for a count of 2 seconds. Close your lips and breathe out for 4 seconds. Repeat.  Try to focus on a soothing image in your mind. This will help relax you and calm your breathing.  Remember to take your daily controller medicines. When you're upset or under stress, it's easy to forget.    Exercise. For some people, exercise can trigger symptoms. Don't let this keep you from being active.   If you have not been exercising regularly, start slow and work up gradually.  Take all of your medicines as prescribed.  If you use quick-relief medicine, make sure you have it with you when you exercise.  Stop if you have any symptoms. Make sure you talk with your provider about these symptoms.  © 2465-3899 [employer]. [address] [place] [address], [place] [address]. All rights reserved. This information is not intended as a substitute for professional medical care. Always follow your healthcare professional's instructions.              Using an Inhaler with a Spacer  To control asthma, you need to use your medicines the right way. Some medicines are inhaled using a device called a metered-dose inhaler (MDI). Metered-dose inhalers deliver medicine with a fine spray. You may be asked to use a spacer (holding tube) with your inhaler. The spacer helps make sure all the medicine you need goes into your lungs.   Steps for using an inhaler with a spacer  Step 1:  Remove the caps from the inhaler and spacer.  Shake the inhaler well and attach the spacer. If the inhaler is being used for the first time or has not been used for a while, prime it as directed by the product maker.  Step 2:  Breathe out normally.  Put the spacer between your teeth. Close your lips tightly around it.  Keep your chin up.  Step 3:  Spray 1 puff into the spacer by pressing down on the inhaler.  Then breathe in through your mouth as slowly and deeply as you can. This should take about 5-10 seconds. If you breathe too  quickly, you may hear a whistling sound in certain spacers.  Step 4:  Take the spacer out of your mouth.  Hold your breath for a count of 10.  Then hold your lips together and slowly breathe out through your mouth   Using Dry-Powder Inhalers (DPIs)  Some asthma medications are inhaled using inhalers. Dry-powder inhalers (DPIs) dispense measured doses of powdered medicine into your lungs. DPIs often have counters to track the number of doses used. Keep in mind that DPIs don't all work the same way. So be sure you know how to use yours properly.  Using a DPI  Load the prescribed dose of medicine by following the instructions that came with the inhaler.  Breathe out normally, holding the inhaler away from your mouth. Hold your chin up.  Put the mouthpiece between your lips. Breathe in deeply through the inhaler--not through your nose. You may not feel or taste the medicine as you breathe in. This is normal.  Take the mouthpiece out of your mouth. Hold your breath for a count of 10, or as long as you can comfortably.  Breathe out slowly--but do not breathe out through the inhaler. Moisture from your breath can make the powder stick inside the inhaler.  Be sure to close the inhaler and store it in a dry place.  Rinse your mouth with water and spit it out, don't swallow it.  Do not wash your DPI with soap and water. To clean the mouthpiece, wipe with a dry cloth as needed.    © 9515-8991 [employer], [employer]. [address] [place] [address], [place] [address]. All rights reserved. This information is not intended as a substitute for professional medical care. Always follow your healthcare professional's instructions.            ACTION PLAN    GREEN ZONE  My sputum is clear/white/usual color and easily cleared.  My breathing is no harder than usual.  I can do my usual activities.  I can think clearly.   Take your usual medicines, including oxygen, as you are told to do so by your health care provider.   YELLOW ZONE  My sputum  has change (color, thickness, amount).  I am more short of breath than usual.  I cough or wheeze more.  I weigh more and my legs/feet swell.  I cannot do my usual activities without resting.   Call your health care provider. You will probably be told to begin taking an antibiotic and prednisone. Have your pharmacy phone number available.   RED ZONE  I cannot cough out my sputum.  I am much more short of breath than normal.  I need to sit up to breathe  I cannot do my usual activities.  I am unable to speak more than one or two words at a time.  I am confused.   Call your health care provider. You may be asked to come in to be seen, told to go to the emergency room, or told to call 9-1-1.

## 2023-04-04 NOTE — PROGRESS NOTES
Pulmonary Disease Management  [facility]  Initial Visit    Referring Provider: RAVEN Mccabe  Diagnosis: COPD   Last Hospital Admission: 3/11/22  Last provider visit: 3/27/23      Current Outpatient Medications:     albuterol (PROVENTIL) 2.5 mg /3 mL (0.083 %) nebulizer solution, Take 3 mLs (2.5 mg total) by nebulization every 4 to 6 hours as needed for Wheezing or Shortness of Breath., Disp: 360 mL, Rfl: 11    albuterol (PROVENTIL/VENTOLIN HFA) 90 mcg/actuation inhaler, INHALE 2 PUFFS INTO THE LUNGS EVERY 4 HOURS AS NEEDED FOR WHEEZING, Disp: 8.5 g, Rfl: 3    aspirin (ECOTRIN) 81 MG EC tablet, Take 81 mg by mouth once daily., Disp: , Rfl:     baclofen (LIORESAL) 10 MG tablet, Take 1 tablet by mouth every evening., Disp: , Rfl: 2    bisoprolol (ZEBETA) 5 MG tablet, TAKE 1/2 TABLET BY MOUTH ONCE DAILY, Disp: 90 tablet, Rfl: 3    colchicine (COLCRYS) 0.6 mg tablet, TAKE 1 TABLET(0.6 MG) BY MOUTH DAILY AS NEEDED, Disp: 30 tablet, Rfl: 1    fluticasone propionate (FLONASE) 50 mcg/actuation nasal spray, SHAKE LIQUID AND USE 2 SPRAYS(100 MCG) IN EACH NOSTRIL EVERY DAY Strength: 50 mcg/actuation, Disp: 48 g, Rfl: 3    fluticasone-salmeterol diskus inhaler 250-50 mcg, Inhale 1 puff into the lungs 2 (two) times daily. Controller, Disp: 60 each, Rfl: 11    glimepiride (AMARYL) 1 MG tablet, TAKE 1 TABLET BY MOUTH EVERY DAY, Disp: 90 tablet, Rfl: 3    HYDROcodone-acetaminophen (NORCO) 7.5-325 mg per tablet, Take 1 tablet by mouth every 4 (four) hours as needed (for chronic pain)., Disp: , Rfl: 0    losartan (COZAAR) 25 MG tablet, Take 1 tablet (25 mg total) by mouth once daily., Disp: , Rfl:     magnesium oxide (MAG-OX) 400 mg (241.3 mg magnesium) tablet, TAKE 2 TABLETS(800 MG) BY MOUTH TWICE DAILY, Disp: 120 tablet, Rfl: 6    metFORMIN (GLUCOPHAGE) 500 MG tablet, TAKE 1 TABLET(500 MG) BY MOUTH DAILY WITH BREAKFAST, Disp: 90 tablet, Rfl: 3    multivitamin-minerals-lutein Tab, Take 1 tablet by mouth Daily., Disp: , Rfl:      omeprazole (PRILOSEC) 40 MG capsule, TAKE 1 CAPSULE BY MOUTH EVERY DAY, Disp: 90 capsule, Rfl: 3    potassium chloride SA (K-DUR,KLOR-CON) 20 MEQ tablet, TAKE 3 TABLETS(60 MEQ) BY MOUTH TWICE DAILY, Disp: 720 tablet, Rfl: 2    predniSONE (DELTASONE) 20 MG tablet, 3 daily x 3 days, 2 daily x 3 days, 1 daily x 3 days, 1/2 daily x 4 days., Disp: 20 tablet, Rfl: 0    simvastatin (ZOCOR) 40 MG tablet, TAKE 1 TABLET(40 MG) BY MOUTH EVERY EVENING, Disp: 90 tablet, Rfl: 3    tiotropium (SPIRIVA WITH HANDIHALER) 18 mcg inhalation capsule, Inhale 1 capsule (18 mcg total) into the lungs once daily. Controller, Disp: 30 capsule, Rfl: 11    torsemide (DEMADEX) 20 MG Tab, Take 2 tablets (40 mg total) by mouth 2 (two) times a day., Disp: 120 tablet, Rfl: 11    Review of patient's allergies indicates:   Allergen Reactions    Amitriptyline Rash    Celecoxib Rash       Smoking history:   Social History     Tobacco Use   Smoking Status Every Day    Packs/day: 1.50    Years: 51.00    Pack years: 76.50    Types: Cigarettes    Start date: 1/1/1971   Smokeless Tobacco Former       Pulse: 86  SpO2: (!) 90 % (room air)  Resp: 16  Weight: 103.2 kg (227 lb 8.2 oz)  DME Provider: [facility]   Does the patient use BiPAP, CPAP or Trilogy?: Yes (Noncompliant)    COPD Questionnaire:  COPD Questionnaire  How often do you cough?: Most of the time  How often do you have phlegm (mucus) in your chest?: Most of the time  How often does your chest feel tight?: Some of the time  When you walk up a hill or one flight of stairs, how often are you breathless?: All of the time  How often are you limited doing any activities at home?: Most of the time  How often are you confident leaving the house despite your lung condition?: All of the time  How often do you sleep soundly?: Almost never  How often do you have energy?: Almost never  Total score: 28    Has this patient had any pulmonary studies (PFTS)?: Yes  PFT Results:  Pre FVC   Date/Time Value Ref Range Status    09/28/2022 04:00 PM 1.95 (L) 2.80 - 4.76 L Corrected     Pre FEV1   Date/Time Value Ref Range Status   09/28/2022 04:00 PM 1.47 (L) 2.07 - 3.68 L Corrected     Pre FEV1 FVC   Date/Time Value Ref Range Status   09/28/2022 04:00 PM 75.53 62.75 - 90.00 % Corrected     Is this patient a candidate for pulmonary rehab?: Yes  Method Used for Education: Literature, Demonstration, Verbal  Did the patient demonstrate understanding?: Yes  What tests has the patient had done today?: Spirometry, Six Minute Walk      Educational assessment:   [x]            Good  []            Fair  []            Poor    Readiness to learn:   [x]            Good  []            Fair  []            Poor    Vision Status:   [x]            Good  []            Fair  []            Poor    Reading Ability:  [x]            Good  []            Fair  []            Poor    Knowledge of condition:   [x]            Good  []            Fair  []            Poor    Language Barriers:   []            Good  []            Fair  []            Poor  [x]            None    Cognitive/ Physical Barriers:   []            Good  []            Fair  []            Poor  [x]            None    Learning best by:                       [x]            Seeing  []            Hearing  []            Reading                         [x]            Doing    Describe any barrier /Limitation or financial implications of care choices identified     []            Financial  []            Emotional  []            Education  []            Vision/Hearing  []            Physical  [x]            None  []                TOPIC /CONTENT FOR TODAY:    [x]            MDI with or without spacer  [x]            Dry powder inhaler  []            Acapella   []           Peak Flow meter  [x]            COPD action plan  [x]            Nebulizer use  [x]            CPAP use  [x]            Breathing and cough techniques  [x]            Energy conservation  [x]            Infection prevention  []            OTHER________________________        Learner:    [x]            Patient   []            Caregiver    Method:    [x]            Verbal explanation  [x]            Audio visual    [x]            Literature  [x]            Teach back      Evaluation:    [x]            Teach back  [x]            Demonstrate  [x]            Follow up phone call    []            2 weeks     [x]            4 weeks   []            PRN    Patient Concerns or Expectations:   Patient had recent ED visit for rib fx  -Provided patient with Incentive spirometer and encouraged deep breathing exercises 10x per hour while awake. Patient practiced proper technique and stated understanding.     Therapist Comments:   Patient was seen today to review respiratory medication purpose and proper technique for use of inhalers. Patient practiced proper technique using MDI with valved holding chamber (spacer) and DPI inhalers. Patient demonstrated understanding. Literature was given to patient.    Reviewed breathing techniques such as pursed-lip breathing, cannon-cough technique, and diaphragmatic breathing. Patient practiced proper technique and verbalized understanding. Literature given to patient.      Discussed therapeutic goals for positive airway pressure therapy(CPAP or BiPAP): Ideal is usage 100% of nights for 6 - 8 hours per night. Minimum usage is 70% of night for at least 4 hours per night used. Reviewed CPAP habituation plan with patient. Patient expressed understanding.      Discussed complications of untreated sleep apnea with patient, including but not limited to high blood pressure, heart attack, stroke, obesity, diabetes and worsening heart failure. Patient voiced understanding.       Asthma trigger checklist was verbally reviewed and literature given to patient.     Infection prevention was discussed. Patient's immunizations are current. Hand hygiene and cleaning of respiratory equipment was also discussed. Patient verbalized understanding.       COPD action plan was reviewed and literature was given to patient. Patient verbalized understanding.      Plan:  Monthly Pulmonary Disease Management Questionnaire  Follow-up PDM appointment scheduled for 6/28/23   Reinforce education  Meds: Proventil, Advair, Spiriva   DME Needs: [facility]   Action Plan: COPD   Immunization: Pneumococcal- current, Flu-current   Next Provider Visit: 6/28/23  Next Spirometry/CPFT: 6/28/23  Approximate time spent with patient: 60 mins

## 2023-04-11 ENCOUNTER — PATIENT MESSAGE (OUTPATIENT)
Dept: PHARMACY | Facility: CLINIC | Age: 73
End: 2023-04-11
Payer: MEDICARE

## 2023-04-11 NOTE — TELEPHONE ENCOUNTER
A 2nd attempt has been made to establish contact with Santiago Khan  via [employer]. The final contact attempt will be made in 5 business days

## 2023-04-17 ENCOUNTER — OFFICE VISIT (OUTPATIENT)
Dept: OPHTHALMOLOGY | Facility: CLINIC | Age: 73
End: 2023-04-17
Payer: MEDICARE

## 2023-04-17 DIAGNOSIS — E11.9 DIABETES MELLITUS TYPE 2 WITHOUT RETINOPATHY: Primary | ICD-10-CM

## 2023-04-17 DIAGNOSIS — H26.9 CORTICAL CATARACT OF BOTH EYES: ICD-10-CM

## 2023-04-17 DIAGNOSIS — H01.00B BLEPHARITIS OF UPPER AND LOWER EYELIDS OF BOTH EYES, UNSPECIFIED TYPE: ICD-10-CM

## 2023-04-17 DIAGNOSIS — H01.00A BLEPHARITIS OF UPPER AND LOWER EYELIDS OF BOTH EYES, UNSPECIFIED TYPE: ICD-10-CM

## 2023-04-17 DIAGNOSIS — H05.89 ORBITAL MASS: ICD-10-CM

## 2023-04-17 PROCEDURE — 3066F NEPHROPATHY DOC TX: CPT | Mod: CPTII,S$GLB,, | Performed by: OPHTHALMOLOGY

## 2023-04-17 PROCEDURE — 92014 PR EYE EXAM, EST PATIENT,COMPREHESV: ICD-10-PCS | Mod: S$GLB,,, | Performed by: OPHTHALMOLOGY

## 2023-04-17 PROCEDURE — 2023F DILAT RTA XM W/O RTNOPTHY: CPT | Mod: CPTII,S$GLB,, | Performed by: OPHTHALMOLOGY

## 2023-04-17 PROCEDURE — 1159F MED LIST DOCD IN RCRD: CPT | Mod: CPTII,S$GLB,, | Performed by: OPHTHALMOLOGY

## 2023-04-17 PROCEDURE — 3066F PR DOCUMENTATION OF TREATMENT FOR NEPHROPATHY: ICD-10-PCS | Mod: CPTII,S$GLB,, | Performed by: OPHTHALMOLOGY

## 2023-04-17 PROCEDURE — 99999 PR PBB SHADOW E&M-EST. PATIENT-LVL III: ICD-10-PCS | Mod: PBBFAC,,, | Performed by: OPHTHALMOLOGY

## 2023-04-17 PROCEDURE — 1159F PR MEDICATION LIST DOCUMENTED IN MEDICAL RECORD: ICD-10-PCS | Mod: CPTII,S$GLB,, | Performed by: OPHTHALMOLOGY

## 2023-04-17 PROCEDURE — 2023F PR DILATED RETINAL EXAM W/O EVID OF RETINOPATHY: ICD-10-PCS | Mod: CPTII,S$GLB,, | Performed by: OPHTHALMOLOGY

## 2023-04-17 PROCEDURE — 1160F RVW MEDS BY RX/DR IN RCRD: CPT | Mod: CPTII,S$GLB,, | Performed by: OPHTHALMOLOGY

## 2023-04-17 PROCEDURE — 99999 PR PBB SHADOW E&M-EST. PATIENT-LVL III: CPT | Mod: PBBFAC,,, | Performed by: OPHTHALMOLOGY

## 2023-04-17 PROCEDURE — 1160F PR REVIEW ALL MEDS BY PRESCRIBER/CLIN PHARMACIST DOCUMENTED: ICD-10-PCS | Mod: CPTII,S$GLB,, | Performed by: OPHTHALMOLOGY

## 2023-04-17 PROCEDURE — 92014 COMPRE OPH EXAM EST PT 1/>: CPT | Mod: S$GLB,,, | Performed by: OPHTHALMOLOGY

## 2023-04-17 NOTE — PROGRESS NOTES
SUBJECTIVE  Santiago Del Khan is 72 y.o. male  Corrected distance visual acuity was 20/50 in the right eye and 20/50 in the left eye.   Chief Complaint   Patient presents with    Annual Exam     Pt reports for annual exam. Denies any pain or but slight irritation OU. Eyes discharging OU, more so in OS. Va stable. No drops. Feels like a bump under RUL.           HPI     Annual Exam     Additional comments: Pt reports for annual exam. Denies any pain or but   slight irritation OU. Eyes discharging OU, more so in OS. Va stable. No   drops. Feels like a bump under RUL.            Comments    1.Type 2 Diabetic x 2016  Diabetic Cataract  2.Ptosis of Eyelid OU  Dermatochalasis both upper lids            Last edited by Jd Good on 4/17/2023  2:30 PM.         Assessment /Plan :  1. Diabetes mellitus type 2 without retinopathy No diabetic retinopathy at this time. Reviewed diabetic eye precautions including avoiding tobacco use,  Good glucose control, and importance of regular follow up.      2. Cortical cataract of both eyes - monitor for now   3. 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 The mass is palpable beneath the superior nasal orbital rim and is non tender.  Pt is claustrophobic and does not want to have an MRI. CT was positive for mass. Consult Dr. Cuevas for recommendations.   4. Blepharitis of upper and lower eyelids of both eyes, unspecified type Findings and symptoms consistent with blepharitis. The blepharitis instruction sheet was reviewed with the pt, recommending:Warm compresses and Gentle Lid Scrubs       RTC in 2 months or prn

## 2023-04-18 ENCOUNTER — TELEPHONE (OUTPATIENT)
Dept: OPHTHALMOLOGY | Facility: CLINIC | Age: 73
End: 2023-04-18
Payer: MEDICARE

## 2023-04-19 ENCOUNTER — TELEPHONE (OUTPATIENT)
Dept: OPHTHALMOLOGY | Facility: CLINIC | Age: 73
End: 2023-04-19
Payer: MEDICARE

## 2023-04-21 ENCOUNTER — PATIENT MESSAGE (OUTPATIENT)
Dept: OPHTHALMOLOGY | Facility: CLINIC | Age: 73
End: 2023-04-21
Payer: MEDICARE

## 2023-04-21 ENCOUNTER — TELEPHONE (OUTPATIENT)
Dept: OPHTHALMOLOGY | Facility: CLINIC | Age: 73
End: 2023-04-21
Payer: MEDICARE

## 2023-04-26 ENCOUNTER — TELEPHONE (OUTPATIENT)
Dept: OPHTHALMOLOGY | Facility: CLINIC | Age: 73
End: 2023-04-26
Payer: MEDICARE

## 2023-04-27 RX ORDER — METFORMIN HYDROCHLORIDE 500 MG/1
TABLET ORAL
Qty: 90 TABLET | Refills: 3 | Status: SHIPPED | OUTPATIENT
Start: 2023-04-27

## 2023-04-27 NOTE — TELEPHONE ENCOUNTER
Care Due:                  Date            Visit Type   Department     Provider  --------------------------------------------------------------------------------                                EP -                              PRIMARY      [facility] INTERNAL  Last Visit: 11-      CARE ([facility])   DAVID Acosta                              [facility]                              PRIMARY      [facility] INTERNAL  Next Visit: 05-      CARE ([facility])   [facility]       Kashif Acosta                                                            Last  Test          Frequency    Reason                     Performed    Due Date  --------------------------------------------------------------------------------    HBA1C.......  6 months...  glimepiride, metFORMIN...  10-   04-    Uric Acid...  12 months..  colchicine...............  03- 03-    [facility] Embedded Care Gaps. Reference number: [id number]. 4/27/2023   8:04:17 AM CDT

## 2023-04-28 ENCOUNTER — HOSPITAL ENCOUNTER (OUTPATIENT)
Dept: RADIOLOGY | Facility: CLINIC | Age: 73
Discharge: HOME | End: 2023-04-28
Attending: NURSE PRACTITIONER
Payer: MEDICARE

## 2023-04-28 ENCOUNTER — OFFICE VISIT (OUTPATIENT)
Dept: URGENT CARE | Facility: CLINIC | Age: 73
End: 2023-04-28
Payer: MEDICARE

## 2023-04-28 VITALS
OXYGEN SATURATION: 91 % | DIASTOLIC BLOOD PRESSURE: 59 MMHG | BODY MASS INDEX: 35.63 KG/M2 | TEMPERATURE: 97 F | HEIGHT: 67 IN | WEIGHT: 227 LBS | SYSTOLIC BLOOD PRESSURE: 118 MMHG | HEART RATE: 85 BPM

## 2023-04-28 DIAGNOSIS — M54.2 NECK PAIN: ICD-10-CM

## 2023-04-28 DIAGNOSIS — S22.42XD CLOSED FRACTURE OF MULTIPLE RIBS OF LEFT SIDE WITH ROUTINE HEALING, SUBSEQUENT ENCOUNTER: ICD-10-CM

## 2023-04-28 DIAGNOSIS — R07.81 RIB PAIN: Primary | ICD-10-CM
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
## 2023-04-28 DIAGNOSIS — R07.81 RIB PAIN: ICD-10-CM

## 2023-04-28 DIAGNOSIS — R07.81 PLEURITIC PAIN: ICD-10-CM

## 2023-04-28 DIAGNOSIS — Z87.81 H/O FRACTURE OF RIB: ICD-10-CM

## 2023-04-28 DIAGNOSIS — M54.9 BACK PAIN, UNSPECIFIED BACK LOCATION, UNSPECIFIED BACK PAIN LATERALITY, UNSPECIFIED CHRONICITY: ICD-10-CM

## 2023-04-28 PROCEDURE — 99214 PR OFFICE/OUTPT VISIT, EST, LEVL IV, 30-39 MIN: ICD-10-PCS | Mod: S$GLB,,, | Performed by: NURSE PRACTITIONER

## 2023-04-28 PROCEDURE — 71100 XR RIBS 2 VIEW LEFT: ICD-10-PCS | Mod: LT,S$GLB,, | Performed by: RADIOLOGY

## 2023-04-28 PROCEDURE — 99214 OFFICE O/P EST MOD 30 MIN: CPT | Mod: S$GLB,,, | Performed by: NURSE PRACTITIONER

## 2023-04-28 PROCEDURE — 71100 X-RAY EXAM RIBS UNI 2 VIEWS: CPT | Mod: LT,S$GLB,, | Performed by: RADIOLOGY

## 2023-04-28 RX ORDER — PROMETHAZINE HYDROCHLORIDE AND DEXTROMETHORPHAN HYDROBROMIDE 6.25; 15 MG/5ML; MG/5ML
5 SYRUP ORAL EVERY 6 HOURS PRN
Qty: 118 ML | Refills: 0 | Status: SHIPPED | OUTPATIENT
Start: 2023-04-28 | End: 2023-05-05
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
## 2023-04-28 NOTE — PATIENT INSTRUCTIONS
Apply ice pack to left ribs for 10 minutes as needed for pain  Deep breathing 10-15 minutes throughout the day  Hold pillow to chest with coughs/deep breaths  Phenergan DM as directed for cough (caution drowsiness)  Continue Hydrocodone as directed for pain  Monitor Home Oxygen sats: F/U for sats persistently less than 90%  Typical course and duration of rib fracture healing discussed (6-8 weeks)  Follow up with primary care provider

## 2023-05-01 ENCOUNTER — TELEPHONE (OUTPATIENT)
Dept: URGENT CARE | Facility: CLINIC | Age: 73
End: 2023-05-01
Payer: MEDICARE

## 2023-05-01 ENCOUNTER — PATIENT OUTREACH (OUTPATIENT)
Dept: ADMINISTRATIVE | Facility: HOSPITAL | Age: 73
End: 2023-05-01
Payer: MEDICARE

## 2023-05-01 NOTE — PROGRESS NOTES
Working Diabetes Lab Report.    Pt has lab appt scheduled, 5/03/23.  All needed labs scheduled.

## 2023-05-03 ENCOUNTER — PATIENT MESSAGE (OUTPATIENT)
Dept: INTERNAL MEDICINE | Facility: CLINIC | Age: 73
End: 2023-05-03
Payer: MEDICARE

## 2023-05-08 ENCOUNTER — PATIENT OUTREACH (OUTPATIENT)
Dept: ADMINISTRATIVE | Facility: HOSPITAL | Age: 73
End: 2023-05-08
Payer: MEDICARE

## 2023-05-08 RX ORDER — BISOPROLOL FUMARATE 5 MG/1
TABLET, FILM COATED ORAL
Qty: 90 TABLET | Refills: 3 | Status: SHIPPED | OUTPATIENT
Start: 2023-05-08

## 2023-05-08 NOTE — TELEPHONE ENCOUNTER
No care due was identified.  [facility] Embedded Care Due Messages. Reference number: [id number].   5/08/2023 3:33:21 AM CDT

## 2023-05-09 ENCOUNTER — LAB VISIT (OUTPATIENT)
Dept: LAB | Facility: HOSPITAL | Age: 73
End: 2023-05-09
Attending: INTERNAL MEDICINE
Payer: MEDICARE

## 2023-05-09 DIAGNOSIS — E11.8 DM (DIABETES MELLITUS) WITH COMPLICATIONS: ICD-10-CM

## 2023-05-09 DIAGNOSIS — Z87.39 HISTORY OF GOUT: ICD-10-CM

## 2023-05-09 LAB
ALBUMIN SERPL BCP-MCNC: 3.8 G/DL (ref 3.5–5.2)
ALP SERPL-CCNC: 94 U/L (ref 55–135)
ALT SERPL W/O P-5'-P-CCNC: 21 U/L (ref 10–44)
ANION GAP SERPL CALC-SCNC: 11 MMOL/L (ref 8–16)
AST SERPL-CCNC: 27 U/L (ref 10–40)
BASOPHILS # BLD AUTO: 0.09 K/UL (ref 0–0.2)
BASOPHILS NFR BLD: 0.9 % (ref 0–1.9)
BILIRUB SERPL-MCNC: 0.5 MG/DL (ref 0.1–1)
BUN SERPL-MCNC: 11 MG/DL (ref 8–23)
CALCIUM SERPL-MCNC: 8.9 MG/DL (ref 8.7–10.5)
CHLORIDE SERPL-SCNC: 98 MMOL/L (ref 95–110)
CHOLEST SERPL-MCNC: 129 MG/DL (ref 120–199)
CHOLEST/HDLC SERPL: 3.4 {RATIO} (ref 2–5)
CO2 SERPL-SCNC: 32 MMOL/L (ref 23–29)
CREAT SERPL-MCNC: 1.6 MG/DL (ref 0.5–1.4)
DIFFERENTIAL METHOD: ABNORMAL
EOSINOPHIL # BLD AUTO: 0.6 K/UL (ref 0–0.5)
EOSINOPHIL NFR BLD: 5.7 % (ref 0–8)
ERYTHROCYTE [DISTWIDTH] IN BLOOD BY AUTOMATED COUNT: 16.7 % (ref 11.5–14.5)
EST. GFR  (NO RACE VARIABLE): 45.8 ML/MIN/1.73 M^2
ESTIMATED AVG GLUCOSE: 120 MG/DL (ref 68–131)
GLUCOSE SERPL-MCNC: 108 MG/DL (ref 70–110)
HBA1C MFR BLD: 5.8 % (ref 4–5.6)
HCT VFR BLD AUTO: 46.1 % (ref 40–54)
HDLC SERPL-MCNC: 38 MG/DL (ref 40–75)
HDLC SERPL: 29.5 % (ref 20–50)
HGB BLD-MCNC: 14.1 G/DL (ref 14–18)
IMM GRANULOCYTES # BLD AUTO: 0.1 K/UL (ref 0–0.04)
IMM GRANULOCYTES NFR BLD AUTO: 1 % (ref 0–0.5)
LDLC SERPL CALC-MCNC: 65.2 MG/DL (ref 63–159)
LYMPHOCYTES # BLD AUTO: 1.9 K/UL (ref 1–4.8)
LYMPHOCYTES NFR BLD: 19.7 % (ref 18–48)
MCH RBC QN AUTO: 30.7 PG (ref 27–31)
MCHC RBC AUTO-ENTMCNC: 30.6 G/DL (ref 32–36)
MCV RBC AUTO: 100 FL (ref 82–98)
MONOCYTES # BLD AUTO: 0.6 K/UL (ref 0.3–1)
MONOCYTES NFR BLD: 5.9 % (ref 4–15)
NEUTROPHILS # BLD AUTO: 6.5 K/UL (ref 1.8–7.7)
NEUTROPHILS NFR BLD: 66.8 % (ref 38–73)
NONHDLC SERPL-MCNC: 91 MG/DL
NRBC BLD-RTO: 0 /100 WBC
PLATELET # BLD AUTO: 161 K/UL (ref 150–450)
PMV BLD AUTO: 10.4 FL (ref 9.2–12.9)
POTASSIUM SERPL-SCNC: 4.7 MMOL/L (ref 3.5–5.1)
PROT SERPL-MCNC: 6.3 G/DL (ref 6–8.4)
RBC # BLD AUTO: 4.6 M/UL (ref 4.6–6.2)
SODIUM SERPL-SCNC: 141 MMOL/L (ref 136–145)
TRIGL SERPL-MCNC: 129 MG/DL (ref 30–150)
TSH SERPL DL<=0.005 MIU/L-ACNC: 2.04 UIU/ML (ref 0.4–4)
URATE SERPL-MCNC: 8.3 MG/DL (ref 3.4–7)
WBC # BLD AUTO: 9.79 K/UL (ref 3.9–12.7)

## 2023-05-09 PROCEDURE — 84443 ASSAY THYROID STIM HORMONE: CPT | Performed by: INTERNAL MEDICINE

## 2023-05-09 PROCEDURE — 84550 ASSAY OF BLOOD/URIC ACID: CPT | Performed by: INTERNAL MEDICINE

## 2023-05-09 PROCEDURE — 80061 LIPID PANEL: CPT | Performed by: INTERNAL MEDICINE

## 2023-05-09 PROCEDURE — 83036 HEMOGLOBIN GLYCOSYLATED A1C: CPT | Performed by: INTERNAL MEDICINE

## 2023-05-09 PROCEDURE — 80053 COMPREHEN METABOLIC PANEL: CPT | Performed by: INTERNAL MEDICINE

## 2023-05-09 PROCEDURE — 36415 COLL VENOUS BLD VENIPUNCTURE: CPT | Mod: PO | Performed by: INTERNAL MEDICINE

## 2023-05-09 PROCEDURE — 85025 COMPLETE CBC W/AUTO DIFF WBC: CPT | Performed by: INTERNAL MEDICINE

## 2023-05-11 ENCOUNTER — PATIENT MESSAGE (OUTPATIENT)
Dept: PHARMACY | Facility: CLINIC | Age: 73
End: 2023-05-11
Payer: MEDICARE

## 2023-05-11 ENCOUNTER — OFFICE VISIT (OUTPATIENT)
Dept: INTERNAL MEDICINE | Facility: CLINIC | Age: 73
End: 2023-05-11
Payer: MEDICARE

## 2023-05-11 VITALS
WEIGHT: 221.81 LBS | BODY MASS INDEX: 34.81 KG/M2 | HEART RATE: 85 BPM | TEMPERATURE: 97 F | OXYGEN SATURATION: 92 % | SYSTOLIC BLOOD PRESSURE: 130 MMHG | HEIGHT: 67 IN | DIASTOLIC BLOOD PRESSURE: 78 MMHG

## 2023-05-11 DIAGNOSIS — E78.5 HYPERLIPIDEMIA ASSOCIATED WITH TYPE 2 DIABETES MELLITUS: ICD-10-CM

## 2023-05-11 DIAGNOSIS — F17.210 CIGARETTE NICOTINE DEPENDENCE WITHOUT COMPLICATION: ICD-10-CM

## 2023-05-11 DIAGNOSIS — N18.30 HYPERTENSION ASSOCIATED WITH STAGE 3 CHRONIC KIDNEY DISEASE DUE TO TYPE 2 DIABETES MELLITUS: ICD-10-CM

## 2023-05-11 DIAGNOSIS — N18.32 STAGE 3B CHRONIC KIDNEY DISEASE: Chronic | ICD-10-CM

## 2023-05-11 DIAGNOSIS — I12.9 HYPERTENSION ASSOCIATED WITH STAGE 3 CHRONIC KIDNEY DISEASE DUE TO TYPE 2 DIABETES MELLITUS: ICD-10-CM

## 2023-05-11 DIAGNOSIS — E11.69 HYPERLIPIDEMIA ASSOCIATED WITH TYPE 2 DIABETES MELLITUS: ICD-10-CM

## 2023-05-11 DIAGNOSIS — I87.2 CHRONIC VENOUS INSUFFICIENCY: ICD-10-CM

## 2023-05-11 DIAGNOSIS — C61 PROSTATE CANCER: ICD-10-CM

## 2023-05-11 DIAGNOSIS — H49.01 THIRD NERVE PALSY, RIGHT: ICD-10-CM

## 2023-05-11 DIAGNOSIS — G47.33 OSA ON CPAP: ICD-10-CM

## 2023-05-11 DIAGNOSIS — I50.42 CHRONIC COMBINED SYSTOLIC AND DIASTOLIC CONGESTIVE HEART FAILURE: Chronic | ICD-10-CM

## 2023-05-11 DIAGNOSIS — I25.118 CORONARY ARTERY DISEASE OF NATIVE ARTERY OF NATIVE HEART WITH STABLE ANGINA PECTORIS: Chronic | ICD-10-CM

## 2023-05-11 DIAGNOSIS — E11.22 HYPERTENSION ASSOCIATED WITH STAGE 3 CHRONIC KIDNEY DISEASE DUE TO TYPE 2 DIABETES MELLITUS: ICD-10-CM

## 2023-05-11 DIAGNOSIS — E11.8 DM (DIABETES MELLITUS) WITH COMPLICATIONS: ICD-10-CM

## 2023-05-11 DIAGNOSIS — Z87.39 HISTORY OF GOUT: ICD-10-CM

## 2023-05-11 DIAGNOSIS — Z00.00 ROUTINE GENERAL MEDICAL EXAMINATION AT A HEALTH CARE FACILITY: Primary | ICD-10-CM

## 2023-05-11 DIAGNOSIS — E66.2 MORBID OBESITY WITH ALVEOLAR HYPOVENTILATION: ICD-10-CM

## 2023-05-11 DIAGNOSIS — I42.8 NICM (NONISCHEMIC CARDIOMYOPATHY): ICD-10-CM

## 2023-05-11 DIAGNOSIS — I73.9 PVD (PERIPHERAL VASCULAR DISEASE): ICD-10-CM

## 2023-05-11 PROCEDURE — 3044F PR MOST RECENT HEMOGLOBIN A1C LEVEL <7.0%: ICD-10-PCS | Mod: CPTII,S$GLB,, | Performed by: INTERNAL MEDICINE

## 2023-05-11 PROCEDURE — 1125F PR PAIN SEVERITY QUANTIFIED, PAIN PRESENT: ICD-10-PCS | Mod: CPTII,S$GLB,, | Performed by: INTERNAL MEDICINE

## 2023-05-11 PROCEDURE — 3008F BODY MASS INDEX DOCD: CPT | Mod: CPTII,S$GLB,, | Performed by: INTERNAL MEDICINE

## 2023-05-11 PROCEDURE — 3288F FALL RISK ASSESSMENT DOCD: CPT | Mod: CPTII,S$GLB,, | Performed by: INTERNAL MEDICINE

## 2023-05-11 PROCEDURE — 99999 PR PBB SHADOW E&M-EST. PATIENT-LVL IV: ICD-10-PCS | Mod: PBBFAC,,, | Performed by: INTERNAL MEDICINE

## 2023-05-11 PROCEDURE — 1159F PR MEDICATION LIST DOCUMENTED IN MEDICAL RECORD: ICD-10-PCS | Mod: CPTII,S$GLB,, | Performed by: INTERNAL MEDICINE

## 2023-05-11 PROCEDURE — 99999 PR PBB SHADOW E&M-EST. PATIENT-LVL IV: CPT | Mod: PBBFAC,,, | Performed by: INTERNAL MEDICINE

## 2023-05-11 PROCEDURE — 99215 OFFICE O/P EST HI 40 MIN: CPT | Mod: S$GLB,,, | Performed by: INTERNAL MEDICINE

## 2023-05-11 PROCEDURE — 3075F PR MOST RECENT SYSTOLIC BLOOD PRESS GE 130-139MM HG: ICD-10-PCS | Mod: CPTII,S$GLB,, | Performed by: INTERNAL MEDICINE

## 2023-05-11 PROCEDURE — 3066F PR DOCUMENTATION OF TREATMENT FOR NEPHROPATHY: ICD-10-PCS | Mod: CPTII,S$GLB,, | Performed by: INTERNAL MEDICINE

## 2023-05-11 PROCEDURE — 3078F PR MOST RECENT DIASTOLIC BLOOD PRESSURE < 80 MM HG: ICD-10-PCS | Mod: CPTII,S$GLB,, | Performed by: INTERNAL MEDICINE

## 2023-05-11 PROCEDURE — 3008F PR BODY MASS INDEX (BMI) DOCUMENTED: ICD-10-PCS | Mod: CPTII,S$GLB,, | Performed by: INTERNAL MEDICINE

## 2023-05-11 PROCEDURE — 3075F SYST BP GE 130 - 139MM HG: CPT | Mod: CPTII,S$GLB,, | Performed by: INTERNAL MEDICINE

## 2023-05-11 PROCEDURE — 3288F PR FALLS RISK ASSESSMENT DOCUMENTED: ICD-10-PCS | Mod: CPTII,S$GLB,, | Performed by: INTERNAL MEDICINE

## 2023-05-11 PROCEDURE — 1160F RVW MEDS BY RX/DR IN RCRD: CPT | Mod: CPTII,S$GLB,, | Performed by: INTERNAL MEDICINE

## 2023-05-11 PROCEDURE — 3044F HG A1C LEVEL LT 7.0%: CPT | Mod: CPTII,S$GLB,, | Performed by: INTERNAL MEDICINE

## 2023-05-11 PROCEDURE — 99215 PR OFFICE/OUTPT VISIT, EST, LEVL V, 40-54 MIN: ICD-10-PCS | Mod: S$GLB,,, | Performed by: INTERNAL MEDICINE

## 2023-05-11 PROCEDURE — 1101F PR PT FALLS ASSESS DOC 0-1 FALLS W/OUT INJ PAST YR: ICD-10-PCS | Mod: CPTII,S$GLB,, | Performed by: INTERNAL MEDICINE

## 2023-05-11 PROCEDURE — 1159F MED LIST DOCD IN RCRD: CPT | Mod: CPTII,S$GLB,, | Performed by: INTERNAL MEDICINE

## 2023-05-11 PROCEDURE — 1125F AMNT PAIN NOTED PAIN PRSNT: CPT | Mod: CPTII,S$GLB,, | Performed by: INTERNAL MEDICINE

## 2023-05-11 PROCEDURE — 3062F POS MACROALBUMINURIA REV: CPT | Mod: CPTII,S$GLB,, | Performed by: INTERNAL MEDICINE

## 2023-05-11 PROCEDURE — 1160F PR REVIEW ALL MEDS BY PRESCRIBER/CLIN PHARMACIST DOCUMENTED: ICD-10-PCS | Mod: CPTII,S$GLB,, | Performed by: INTERNAL MEDICINE

## 2023-05-11 PROCEDURE — 3062F PR POS MACROALBUMINURIA RESULT DOCUMENTED/REVIEW: ICD-10-PCS | Mod: CPTII,S$GLB,, | Performed by: INTERNAL MEDICINE

## 2023-05-11 PROCEDURE — 1101F PT FALLS ASSESS-DOCD LE1/YR: CPT | Mod: CPTII,S$GLB,, | Performed by: INTERNAL MEDICINE

## 2023-05-11 PROCEDURE — 3066F NEPHROPATHY DOC TX: CPT | Mod: CPTII,S$GLB,, | Performed by: INTERNAL MEDICINE

## 2023-05-11 PROCEDURE — 3078F DIAST BP <80 MM HG: CPT | Mod: CPTII,S$GLB,, | Performed by: INTERNAL MEDICINE

## 2023-05-11 RX ORDER — ALLOPURINOL 300 MG/1
300 TABLET ORAL DAILY
Qty: 90 TABLET | Refills: 3 | Status: SHIPPED | OUTPATIENT
Start: 2023-05-11 | End: 2023-11-24 | Stop reason: DRUGHIGH

## 2023-05-11 NOTE — PROGRESS NOTES
HPI:  Patient is a 72-year-old man who comes today for follow-up of his diabetes, hypertension, lipids, COPD, congestive heart failure, chronic kidney disease and for his annual physical exam.  Patient denies any chest pains.  He has chronic but stable shortness of breath due to his COPD.  Patient unfortunately does continue to smoke.  He hopes to try and quit in the near future.  He states his blood pressures been well controlled at home.  He is had no problems with his diabetes.  He denies any hypoglycemic events.      Current MEDS: medcard review, verified and update  Allergies: Per the electronic medical record    Past Medical History:   Diagnosis Date    Chronic diastolic congestive heart failure 04/07/2020    Chronic kidney disease, stage 3     Chronic systolic congestive heart failure 07/09/2020    Chronic venous insufficiency     COPD (chronic obstructive pulmonary disease)     DDD (degenerative disc disease), lumbar     DM (diabetes mellitus) with complications     History of gout     Hyperlipidemia associated with type 2 diabetes mellitus     Hypertension associated with stage 3 chronic kidney disease due to type 2 diabetes mellitus     BRADLEY on CPAP     Prostate cancer     prostatectomy in 2010, rising PSA that started in 2021    Tobacco abuse        Past Surgical History:   Procedure Laterality Date    BICEPS TENDON REPAIR      COLONOSCOPY N/A 3/27/2019    Procedure: COLONOSCOPY;  Surgeon: James Roger MD;  Location: [facility] ENDO;  Service: Endoscopy;  Laterality: N/A;    HEMORRHOID SURGERY      INGUINAL HERNIA REPAIR Left     KNEE ARTHROSCOPY Right     LEFT HEART CATHETERIZATION Left 6/29/2020    Procedure: CATHETERIZATION, HEART, LEFT;  Surgeon: Cheli Wilson MD;  Location: [facility] CATH LAB;  Service: Cardiology;  Laterality: Left;    LUMBAR LAMINECTOMY      PROSTATECTOMY         SHx: per the electronic medical record    FHx: recorded in the electronic medical record    ROS:    denies any chest pains  "or shortness of breath. Denies any nausea, vomiting or diarrhea. Denies any fever, chills or sweats. Denies any change in weight, voice, stool, skin or hair. Denies any dysuria, dyspepsia or dysphagia. Denies any change in vision, hearing or headaches. Denies any swollen lymph nodes or loss of memory.    PE:  /78 (BP Location: Right arm, Patient Position: Sitting, BP Method: Large (Manual))   Pulse 85   Temp 97.4 °F (36.3 °C) (Tympanic)   Ht 5' 7" (1.702 m)   Wt 100.6 kg (221 lb 12.5 oz)   SpO2 (!) 92%   BMI 34.74 kg/m²   Gen: Well-developed, well-nourished, male, in no acute distress, oriented x3  HEENT: neck is supple, no adenopathy, carotids 2+ equal without bruits, thyroid exam normal size without nodules.  CHEST: clear to auscultation and percussion  CVS: regular rate and rhythm without significant murmur, gallop, or rubs  ABD: soft, benign, no rebound no guarding, no distention.  Bowel sounds are normal.     nontender.  No palpable masses.  No organomegaly and no audible bruits.  RECTAL:  Deferred.  EXT: no clubbing, cyanosis, or edema  LYMPH: no cervical, inguinal, or axillary adenopathy  FEET: no loss of sensation.  No ulcers or pressure sores.  Monofilament testing normal  NEURO: gait normal.  Cranial nerves II- XII intact. No nystagmus.  Speech normal.   Gross motor and sensory unremarkable.    Lab Results   Component Value Date    WBC 9.79 05/09/2023    HGB 14.1 05/09/2023    HCT 46.1 05/09/2023     05/09/2023    CHOL 129 05/09/2023    TRIG 129 05/09/2023    HDL 38 (L) 05/09/2023    ALT 21 05/09/2023    AST 27 05/09/2023     05/09/2023    K 4.7 05/09/2023    CL 98 05/09/2023    CREATININE 1.6 (H) 05/09/2023    BUN 11 05/09/2023    CO2 32 (H) 05/09/2023    TSH 2.045 05/09/2023    PSA 0.15 02/01/2021    INR 1.0 06/26/2020    HGBA1C 5.8 (H) 05/09/2023       Impression:  Diabetes, hypertension, lipids all stable and well controlled  Chronic kidney disease, stable renal function  COPD, " stable, unfortunately patient continues smoke  Coronary artery disease and CHF both stable  Numerous other medical problems below, stable  Patient Active Problem List   Diagnosis    BRADLEY on CPAP    History of gout    DDD (degenerative disc disease), lumbar    Cigarette nicotine dependence without complication    Hypertension associated with stage 3 chronic kidney disease due to type 2 diabetes mellitus    Hyperlipidemia associated with type 2 diabetes mellitus    Chronic kidney disease, stage 3    Class 2 severe obesity due to excess calories with serious comorbidity and body mass index (BMI) of 35.0 to 35.9 in adult    Coronary artery disease of native artery of native heart with stable angina pectoris    Chronic combined systolic and diastolic congestive heart failure    Prostate cancer    PVD (peripheral vascular disease)    NICM (nonischemic cardiomyopathy)    Closed fracture of tuft of distal phalanx of finger    Chronic venous insufficiency    Third nerve palsy, right    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  Medications remain the same.  He again has been highly encouraged to quit smoking.  This note is generated with speech recognition software and is subject to transcription error and sound alike phrases that may be missed by proofreading.

## 2023-06-02 ENCOUNTER — PATIENT OUTREACH (OUTPATIENT)
Dept: PULMONOLOGY | Facility: CLINIC | Age: 73
End: 2023-06-02
Payer: MEDICARE

## 2023-06-02 NOTE — TELEPHONE ENCOUNTER
Chronic Disease Management  Called patient to complete Pulmonary Disease Management Questionnaire.    Left message.

## 2023-06-05 ENCOUNTER — TELEPHONE (OUTPATIENT)
Dept: PULMONOLOGY | Facility: CLINIC | Age: 73
End: 2023-06-05
Payer: MEDICARE

## 2023-06-05 NOTE — TELEPHONE ENCOUNTER
Chronic Disease Management:   Called patient to confirm Pulmonary Disease Management appointment.   Left message.

## 2023-06-06 ENCOUNTER — CLINICAL SUPPORT (OUTPATIENT)
Dept: PULMONOLOGY | Facility: CLINIC | Age: 73
End: 2023-06-06
Payer: MEDICARE

## 2023-06-06 VITALS
WEIGHT: 223.75 LBS | HEART RATE: 107 BPM | BODY MASS INDEX: 35.12 KG/M2 | HEIGHT: 67 IN | OXYGEN SATURATION: 91 % | RESPIRATION RATE: 18 BRPM

## 2023-06-06 DIAGNOSIS — J44.9 CHRONIC OBSTRUCTIVE PULMONARY DISEASE: Primary | ICD-10-CM

## 2023-06-06 DIAGNOSIS — J44.9 CHRONIC OBSTRUCTIVE PULMONARY DISEASE, UNSPECIFIED COPD TYPE: ICD-10-CM

## 2023-06-06 PROCEDURE — 99999 PR PBB SHADOW E&M-EST. PATIENT-LVL IV: ICD-10-PCS | Mod: PBBFAC,,,

## 2023-06-06 PROCEDURE — 99999 PR PBB SHADOW E&M-EST. PATIENT-LVL IV: CPT | Mod: PBBFAC,,,

## 2023-06-06 NOTE — PROGRESS NOTES
Pulmonary Disease Management  [facility]  Follow up Visit  Chronic Care Management    Santiago Khan  was seen 6/6/2023  11:00 AM in the Pulmonary Disease Management Clinic for evaluation, education, reinforcement of self management techniques and exacerbation action plan.    Chuck Dumont    Past Medical History:   Diagnosis Date    Chronic diastolic congestive heart failure 04/07/2020    Chronic kidney disease, stage 3     Chronic systolic congestive heart failure 07/09/2020    Chronic venous insufficiency     COPD (chronic obstructive pulmonary disease)     DDD (degenerative disc disease), lumbar     DM (diabetes mellitus) with complications     History of gout     Hyperlipidemia associated with type 2 diabetes mellitus     Hypertension associated with stage 3 chronic kidney disease due to type 2 diabetes mellitus     BRADLEY on CPAP     Prostate cancer     prostatectomy in 2010, rising PSA that started in 2021    Tobacco abuse        Patient's Medications   New Prescriptions    No medications on file   Previous Medications    ALBUTEROL (PROVENTIL) 2.5 MG /3 ML (0.083 %) NEBULIZER SOLUTION    Take 3 mLs (2.5 mg total) by nebulization every 4 to 6 hours as needed for Wheezing or Shortness of Breath.    ALBUTEROL (PROVENTIL/VENTOLIN HFA) 90 MCG/ACTUATION INHALER    INHALE 2 PUFFS INTO THE LUNGS EVERY 4 HOURS AS NEEDED FOR WHEEZING    ALLOPURINOL (ZYLOPRIM) 300 MG TABLET    Take 1 tablet (300 mg total) by mouth once daily.    ASPIRIN (ECOTRIN) 81 MG EC TABLET    Take 81 mg by mouth once daily.    BACLOFEN (LIORESAL) 10 MG TABLET    Take 1 tablet by mouth every evening.    BISOPROLOL (ZEBETA) 5 MG TABLET    TAKE 1/2 TABLET BY MOUTH EVERY DAY    COLCHICINE (COLCRYS) 0.6 MG TABLET    TAKE 1 TABLET(0.6 MG) BY MOUTH DAILY AS NEEDED    FLUTICASONE PROPIONATE (FLONASE) 50 MCG/ACTUATION NASAL SPRAY    SHAKE LIQUID AND USE 2 SPRAYS(100 MCG) IN EACH NOSTRIL EVERY DAY Strength: 50 mcg/actuation     FLUTICASONE-SALMETEROL DISKUS INHALER 250-50 MCG    Inhale 1 puff into the lungs 2 (two) times daily. Controller    GLIMEPIRIDE (AMARYL) 1 MG TABLET    TAKE 1 TABLET BY MOUTH EVERY DAY    HYDROCODONE-ACETAMINOPHEN (NORCO) 7.5-325 MG PER TABLET    Take 1 tablet by mouth every 4 (four) hours as needed (for chronic pain).    LOSARTAN (COZAAR) 25 MG TABLET    Take 1 tablet (25 mg total) by mouth once daily.    MAGNESIUM OXIDE (MAG-OX) 400 MG (241.3 MG MAGNESIUM) TABLET    TAKE 2 TABLETS(800 MG) BY MOUTH TWICE DAILY    METFORMIN (GLUCOPHAGE) 500 MG TABLET    TAKE 1 TABLET(500 MG) BY MOUTH DAILY WITH BREAKFAST    MULTIVITAMIN-MINERALS-LUTEIN TAB    Take 1 tablet by mouth Daily.    OMEPRAZOLE (PRILOSEC) 40 MG CAPSULE    TAKE 1 CAPSULE BY MOUTH EVERY DAY    POTASSIUM CHLORIDE SA (K-DUR,KLOR-CON) 20 MEQ TABLET    TAKE 3 TABLETS(60 MEQ) BY MOUTH TWICE DAILY    PREDNISONE (DELTASONE) 20 MG TABLET    3 daily x 3 days, 2 daily x 3 days, 1 daily x 3 days, 1/2 daily x 4 days.    SIMVASTATIN (ZOCOR) 40 MG TABLET    TAKE 1 TABLET(40 MG) BY MOUTH EVERY EVENING    TIOTROPIUM (SPIRIVA WITH HANDIHALER) 18 MCG INHALATION CAPSULE    Inhale 1 capsule (18 mcg total) into the lungs once daily. Controller    TORSEMIDE (DEMADEX) 20 MG TAB    Take 2 tablets (40 mg total) by mouth 2 (two) times a day.   Modified Medications    No medications on file   Discontinued Medications    No medications on file             Educational assessment:   [x]            Good  []            Fair  []            Poor    Readiness to learn:   [x]            Good  []            Fair  []            Poor    Vision Status:   [x]            Good  []            Fair  []            Poor    Reading Ability:  [x]            Good  []            Fair  []            Poor    Knowledge of condition:   [x]            Good  []            Fair  []            Poor    Language Barriers:   []            Good  []            Fair  []            Poor  [x]            None    Cognitive/  Physical Barriers:   []            Good  []            Fair  []            Poor  [x]            None    Learning best by:                       [x]            Seeing  []            Hearing  []            Reading                         [x]            Doing    Describe any barrier /Limitation or financial implications of care choices identified     []            Financial  [x]            Emotional  []            Education  []            Vision/Hearing  []            Physical  []            None  []                TOPIC /CONTENT FOR TODAY:    [x]            MDI with or without spacer  [x]            Dry powder inhaler  []            Acapella   []           Peak Flow meter  [x]            COPD action plan  [x]            Nebulizer use  [x]            CPAP use  [x]            Breathing and cough techniques  [x]            Energy conservation  [x]            Infection prevention  []           OTHER________________________        Learner:    [x]            Patient   []            Caregiver    Method:    [x]            Verbal explanation  [x]            Audio visual    [x]            Literature  [x]            Teach back      Evaluation:    [x]            Teach back  [x]            Demonstrate  [x]            Follow up phone call    []            2 weeks     [x]            4 weeks   [x]            PRN    Additional comments:   Patient was seen today to review respiratory medication purpose and proper technique for use of inhalers. Patient practiced proper technique using MDI with valved holding chamber (spacer) and DPI inhalers. Patient demonstrated understanding. Literature was given to patient.    Patient is a current, everyday smoker. Patient is motivated to quit but is limited by emotional barriers. Psychology referral placed.      Asthma trigger checklist was verbally reviewed and literature given to patient.    Discussed therapeutic goals for positive airway pressure therapy(CPAP or BiPAP): Ideal is usage 100% of  nights for 6 - 8 hours per night. Minimum usage is 70% of night for at least 4 hours per night used. Reviewed CPAP habituation plan with patient. Patient expressed understanding.      Discussed complications of untreated sleep apnea with patient, including but not limited to high blood pressure, heart attack, stroke, obesity, diabetes and worsening heart failure. Patient voiced understanding.       Infection prevention was discussed. Patient was reminded to get influenza vaccine. Hand hygiene and cleaning of respiratory equipment was also discussed. Patient verbalized understanding.      COPD action plan was reviewed and literature was given to patient. Patient verbalized understanding.      Plan:  Monthly Pulmonary Disease Management Questionnaire  Follow-up PDM appointment scheduled for 10/19/23 at 10:30 at [facility]    Reinforce education  Meds: Advair, albuterol   DME Needs: OHME   Action Plan: COPD   Immunization: Pneumococcal- current, Flu-current , Covid 19- current  Next Provider Visit: 6/28/23  Next Spirometry/CPFT: 6/28/23  Approximate time spent with patient: 30 mins

## 2023-06-07 ENCOUNTER — PATIENT MESSAGE (OUTPATIENT)
Dept: PSYCHIATRY | Facility: CLINIC | Age: 73
End: 2023-06-07
Payer: MEDICARE

## 2023-06-12 ENCOUNTER — OFFICE VISIT (OUTPATIENT)
Dept: INTERNAL MEDICINE | Facility: CLINIC | Age: 73
End: 2023-06-12
Payer: MEDICARE

## 2023-06-12 VITALS
WEIGHT: 223.31 LBS | HEART RATE: 81 BPM | SYSTOLIC BLOOD PRESSURE: 122 MMHG | OXYGEN SATURATION: 93 % | BODY MASS INDEX: 35.05 KG/M2 | DIASTOLIC BLOOD PRESSURE: 72 MMHG | HEIGHT: 67 IN | TEMPERATURE: 97 F

## 2023-06-12 DIAGNOSIS — L72.9 SCROTAL CYST: Primary | ICD-10-CM

## 2023-06-12 PROCEDURE — 3066F NEPHROPATHY DOC TX: CPT | Mod: CPTII,S$GLB,, | Performed by: INTERNAL MEDICINE

## 2023-06-12 PROCEDURE — 3062F PR POS MACROALBUMINURIA RESULT DOCUMENTED/REVIEW: ICD-10-PCS | Mod: CPTII,S$GLB,, | Performed by: INTERNAL MEDICINE

## 2023-06-12 PROCEDURE — 1159F PR MEDICATION LIST DOCUMENTED IN MEDICAL RECORD: ICD-10-PCS | Mod: CPTII,S$GLB,, | Performed by: INTERNAL MEDICINE

## 2023-06-12 PROCEDURE — 99999 PR PBB SHADOW E&M-EST. PATIENT-LVL IV: CPT | Mod: PBBFAC,,, | Performed by: INTERNAL MEDICINE

## 2023-06-12 PROCEDURE — 1125F PR PAIN SEVERITY QUANTIFIED, PAIN PRESENT: ICD-10-PCS | Mod: CPTII,S$GLB,, | Performed by: INTERNAL MEDICINE

## 2023-06-12 PROCEDURE — 3062F POS MACROALBUMINURIA REV: CPT | Mod: CPTII,S$GLB,, | Performed by: INTERNAL MEDICINE

## 2023-06-12 PROCEDURE — 99999 PR PBB SHADOW E&M-EST. PATIENT-LVL IV: ICD-10-PCS | Mod: PBBFAC,,, | Performed by: INTERNAL MEDICINE

## 2023-06-12 PROCEDURE — 1125F AMNT PAIN NOTED PAIN PRSNT: CPT | Mod: CPTII,S$GLB,, | Performed by: INTERNAL MEDICINE

## 2023-06-12 PROCEDURE — 3044F PR MOST RECENT HEMOGLOBIN A1C LEVEL <7.0%: ICD-10-PCS | Mod: CPTII,S$GLB,, | Performed by: INTERNAL MEDICINE

## 2023-06-12 PROCEDURE — 3288F FALL RISK ASSESSMENT DOCD: CPT | Mod: CPTII,S$GLB,, | Performed by: INTERNAL MEDICINE

## 2023-06-12 PROCEDURE — 3044F HG A1C LEVEL LT 7.0%: CPT | Mod: CPTII,S$GLB,, | Performed by: INTERNAL MEDICINE

## 2023-06-12 PROCEDURE — 1101F PR PT FALLS ASSESS DOC 0-1 FALLS W/OUT INJ PAST YR: ICD-10-PCS | Mod: CPTII,S$GLB,, | Performed by: INTERNAL MEDICINE

## 2023-06-12 PROCEDURE — 99213 OFFICE O/P EST LOW 20 MIN: CPT | Mod: S$GLB,,, | Performed by: INTERNAL MEDICINE

## 2023-06-12 PROCEDURE — 99213 PR OFFICE/OUTPT VISIT, EST, LEVL III, 20-29 MIN: ICD-10-PCS | Mod: S$GLB,,, | Performed by: INTERNAL MEDICINE

## 2023-06-12 PROCEDURE — 1159F MED LIST DOCD IN RCRD: CPT | Mod: CPTII,S$GLB,, | Performed by: INTERNAL MEDICINE

## 2023-06-12 PROCEDURE — 3074F SYST BP LT 130 MM HG: CPT | Mod: CPTII,S$GLB,, | Performed by: INTERNAL MEDICINE

## 2023-06-12 PROCEDURE — 3078F DIAST BP <80 MM HG: CPT | Mod: CPTII,S$GLB,, | Performed by: INTERNAL MEDICINE

## 2023-06-12 PROCEDURE — 3008F BODY MASS INDEX DOCD: CPT | Mod: CPTII,S$GLB,, | Performed by: INTERNAL MEDICINE

## 2023-06-12 PROCEDURE — 3074F PR MOST RECENT SYSTOLIC BLOOD PRESSURE < 130 MM HG: ICD-10-PCS | Mod: CPTII,S$GLB,, | Performed by: INTERNAL MEDICINE

## 2023-06-12 PROCEDURE — 3008F PR BODY MASS INDEX (BMI) DOCUMENTED: ICD-10-PCS | Mod: CPTII,S$GLB,, | Performed by: INTERNAL MEDICINE

## 2023-06-12 PROCEDURE — 3288F PR FALLS RISK ASSESSMENT DOCUMENTED: ICD-10-PCS | Mod: CPTII,S$GLB,, | Performed by: INTERNAL MEDICINE

## 2023-06-12 PROCEDURE — 1101F PT FALLS ASSESS-DOCD LE1/YR: CPT | Mod: CPTII,S$GLB,, | Performed by: INTERNAL MEDICINE

## 2023-06-12 PROCEDURE — 3066F PR DOCUMENTATION OF TREATMENT FOR NEPHROPATHY: ICD-10-PCS | Mod: CPTII,S$GLB,, | Performed by: INTERNAL MEDICINE

## 2023-06-12 PROCEDURE — 3078F PR MOST RECENT DIASTOLIC BLOOD PRESSURE < 80 MM HG: ICD-10-PCS | Mod: CPTII,S$GLB,, | Performed by: INTERNAL MEDICINE

## 2023-06-12 RX ORDER — SULFAMETHOXAZOLE AND TRIMETHOPRIM 800; 160 MG/1; MG/1
1 TABLET ORAL 2 TIMES DAILY
Qty: 20 TABLET | Refills: 0 | Status: SHIPPED | OUTPATIENT
Start: 2023-06-12 | End: 2023-08-19

## 2023-06-12 NOTE — PROGRESS NOTES
"HPI:  Patient is a 72-year-old man who comes today complaints of a cyst on his scrotum for the last 3 days.  He is had problems with these in the past as well.    Current meds have been verified and updated per the EMR  Exam:/72 (BP Location: Left arm, Patient Position: Sitting, BP Method: Large (Manual))   Pulse 81   Temp 97 °F (36.1 °C) (Tympanic)   Ht 5' 7" (1.702 m)   Wt 101.3 kg (223 lb 5.2 oz)   SpO2 (!) 93%   BMI 34.98 kg/m²   He has a affected cyst on his scrotal wall.  Is already opened up and draining a slight amount of exudate.  He has a significant amount of swelling and erythema associated with the cyst.  There is no other fluctuance to it at this time    Lab Results   Component Value Date    WBC 9.79 05/09/2023    HGB 14.1 05/09/2023    HCT 46.1 05/09/2023     05/09/2023    CHOL 129 05/09/2023    TRIG 129 05/09/2023    HDL 38 (L) 05/09/2023    ALT 21 05/09/2023    AST 27 05/09/2023     05/09/2023    K 4.7 05/09/2023    CL 98 05/09/2023    CREATININE 1.6 (H) 05/09/2023    BUN 11 05/09/2023    CO2 32 (H) 05/09/2023    TSH 2.045 05/09/2023    PSA 0.15 02/01/2021    INR 1.0 06/26/2020    HGBA1C 5.8 (H) 05/09/2023       Impression:  Scrotal cyst, infected  Patient Active Problem List   Diagnosis    BRADLEY on CPAP    History of gout    DDD (degenerative disc disease), lumbar    Cigarette nicotine dependence without complication    Hypertension associated with stage 3 chronic kidney disease due to type 2 diabetes mellitus    Hyperlipidemia associated with type 2 diabetes mellitus    Chronic kidney disease, stage 3    Class 2 severe obesity due to excess calories with serious comorbidity and body mass index (BMI) of 35.0 to 35.9 in adult    Coronary artery disease of native artery of native heart with stable angina pectoris    Chronic combined systolic and diastolic congestive heart failure    Prostate cancer    PVD (peripheral vascular disease)    NICM (nonischemic cardiomyopathy)    " Closed fracture of tuft of distal phalanx of finger    Chronic venous insufficiency    Third nerve palsy, right    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  He should do warm compresses every 2 hours.  He should gently express the cyst to try to get out any exudate about twice a day.  If things are not better in improve within 3 days he needs to let me know      This note is generated with speech recognition software and is subject to transcription error and sound alike phrases that may be missed by proofreading.

## 2023-06-20 RX ORDER — SIMVASTATIN 40 MG/1
40 TABLET, FILM COATED ORAL NIGHTLY
Qty: 90 TABLET | Refills: 3 | Status: SHIPPED | OUTPATIENT
Start: 2023-06-20 | End: 2023-11-16

## 2023-06-20 NOTE — TELEPHONE ENCOUNTER
No care due was identified.  [facility] Embedded Care Due Messages. Reference number: [id number].   6/20/2023 11:13:03 AM CDT

## 2023-06-23 RX ORDER — POTASSIUM CHLORIDE 20 MEQ/1
TABLET, EXTENDED RELEASE ORAL
Qty: 720 TABLET | Refills: 2 | Status: SHIPPED | OUTPATIENT
Start: 2023-06-23

## 2023-07-14 ENCOUNTER — PATIENT MESSAGE (OUTPATIENT)
Dept: PULMONOLOGY | Facility: CLINIC | Age: 73
End: 2023-07-14
Payer: MEDICARE

## 2023-07-17 RX ORDER — LOSARTAN POTASSIUM 50 MG/1
TABLET ORAL
Qty: 90 TABLET | Refills: 3 | Status: SHIPPED | OUTPATIENT
Start: 2023-07-17 | End: 2023-10-19 | Stop reason: SDUPTHER

## 2023-07-18 ENCOUNTER — PATIENT OUTREACH (OUTPATIENT)
Dept: PULMONOLOGY | Facility: CLINIC | Age: 73
End: 2023-07-18
Payer: MEDICARE

## 2023-07-20 ENCOUNTER — OFFICE VISIT (OUTPATIENT)
Dept: OPHTHALMOLOGY | Facility: CLINIC | Age: 73
End: 2023-07-20
Payer: MEDICARE

## 2023-07-20 DIAGNOSIS — H52.7 REFRACTIVE ERROR: ICD-10-CM

## 2023-07-20 DIAGNOSIS — H26.9 CORTICAL CATARACT OF BOTH EYES: ICD-10-CM

## 2023-07-20 DIAGNOSIS — E11.9 DIABETES MELLITUS TYPE 2 WITHOUT RETINOPATHY: Primary | ICD-10-CM

## 2023-07-20 DIAGNOSIS — H05.89 ORBITAL MASS: ICD-10-CM

## 2023-07-20 DIAGNOSIS — H01.00B BLEPHARITIS OF UPPER AND LOWER EYELIDS OF BOTH EYES, UNSPECIFIED TYPE: ICD-10-CM

## 2023-07-20 DIAGNOSIS — H01.00A BLEPHARITIS OF UPPER AND LOWER EYELIDS OF BOTH EYES, UNSPECIFIED TYPE: ICD-10-CM

## 2023-07-20 PROCEDURE — 1160F RVW MEDS BY RX/DR IN RCRD: CPT | Mod: CPTII,S$GLB,, | Performed by: OPHTHALMOLOGY

## 2023-07-20 PROCEDURE — 92014 PR EYE EXAM, EST PATIENT,COMPREHESV: ICD-10-PCS | Mod: S$GLB,,, | Performed by: OPHTHALMOLOGY

## 2023-07-20 PROCEDURE — 2023F PR DILATED RETINAL EXAM W/O EVID OF RETINOPATHY: ICD-10-PCS | Mod: CPTII,S$GLB,, | Performed by: OPHTHALMOLOGY

## 2023-07-20 PROCEDURE — 4010F PR ACE/ARB THEARPY RXD/TAKEN: ICD-10-PCS | Mod: CPTII,S$GLB,, | Performed by: OPHTHALMOLOGY

## 2023-07-20 PROCEDURE — 1159F MED LIST DOCD IN RCRD: CPT | Mod: CPTII,S$GLB,, | Performed by: OPHTHALMOLOGY

## 2023-07-20 PROCEDURE — 92014 COMPRE OPH EXAM EST PT 1/>: CPT | Mod: S$GLB,,, | Performed by: OPHTHALMOLOGY

## 2023-07-20 PROCEDURE — 3062F POS MACROALBUMINURIA REV: CPT | Mod: CPTII,S$GLB,, | Performed by: OPHTHALMOLOGY

## 2023-07-20 PROCEDURE — 92015 PR REFRACTION: ICD-10-PCS | Mod: S$GLB,,, | Performed by: OPHTHALMOLOGY

## 2023-07-20 PROCEDURE — 1159F PR MEDICATION LIST DOCUMENTED IN MEDICAL RECORD: ICD-10-PCS | Mod: CPTII,S$GLB,, | Performed by: OPHTHALMOLOGY

## 2023-07-20 PROCEDURE — 99999 PR PBB SHADOW E&M-EST. PATIENT-LVL II: ICD-10-PCS | Mod: PBBFAC,,, | Performed by: OPHTHALMOLOGY

## 2023-07-20 PROCEDURE — 3062F PR POS MACROALBUMINURIA RESULT DOCUMENTED/REVIEW: ICD-10-PCS | Mod: CPTII,S$GLB,, | Performed by: OPHTHALMOLOGY

## 2023-07-20 PROCEDURE — 3066F NEPHROPATHY DOC TX: CPT | Mod: CPTII,S$GLB,, | Performed by: OPHTHALMOLOGY

## 2023-07-20 PROCEDURE — 4010F ACE/ARB THERAPY RXD/TAKEN: CPT | Mod: CPTII,S$GLB,, | Performed by: OPHTHALMOLOGY

## 2023-07-20 PROCEDURE — 92015 DETERMINE REFRACTIVE STATE: CPT | Mod: S$GLB,,, | Performed by: OPHTHALMOLOGY

## 2023-07-20 PROCEDURE — 3044F HG A1C LEVEL LT 7.0%: CPT | Mod: CPTII,S$GLB,, | Performed by: OPHTHALMOLOGY

## 2023-07-20 PROCEDURE — 3066F PR DOCUMENTATION OF TREATMENT FOR NEPHROPATHY: ICD-10-PCS | Mod: CPTII,S$GLB,, | Performed by: OPHTHALMOLOGY

## 2023-07-20 PROCEDURE — 99999 PR PBB SHADOW E&M-EST. PATIENT-LVL II: CPT | Mod: PBBFAC,,, | Performed by: OPHTHALMOLOGY

## 2023-07-20 PROCEDURE — 3044F PR MOST RECENT HEMOGLOBIN A1C LEVEL <7.0%: ICD-10-PCS | Mod: CPTII,S$GLB,, | Performed by: OPHTHALMOLOGY

## 2023-07-20 PROCEDURE — 2023F DILAT RTA XM W/O RTNOPTHY: CPT | Mod: CPTII,S$GLB,, | Performed by: OPHTHALMOLOGY

## 2023-07-20 PROCEDURE — 1160F PR REVIEW ALL MEDS BY PRESCRIBER/CLIN PHARMACIST DOCUMENTED: ICD-10-PCS | Mod: CPTII,S$GLB,, | Performed by: OPHTHALMOLOGY

## 2023-07-20 NOTE — PROGRESS NOTES
SUBJECTIVE  Santiago Khan is 72 y.o. male  Corrected distance visual acuity was 20/60 in the right eye and 20/30- in the left eye.   Chief Complaint   Patient presents with    Follow-up     3 month F/U orbital mass          HPI     Follow-up     Additional comments: 3 month F/U orbital mass           Comments    States that his OD seems to be the same. Pt states that he is unable to   travel for care. States that he has still been having a green discharge in   both eyes.     1.Type 2 Diabetic x 2016  Diabetic Cataract  2.Ptosis of Eyelid OU  Dermatochalasis both upper lids            Last edited by Brisa Molina on 7/20/2023 10:55 AM.         Assessment /Plan :  1. Diabetes mellitus type 2 without retinopathy - No diabetic retinopathy at this time. Reviewed diabetic eye precautions including avoiding tobacco use,  Good glucose control, and importance of regular follow up.      2. 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 Has not been to [facility] yet despite attempted appts. Long discussion of impt of evaluation and he agrees to go to Dr Cuevsa now. Has progressed in size from clinical exam. Findings on CT but unable to do MRI from claustrophobia. I emphasized again that this could represent malignant process and neglecting it could have dire consequences.   3. Cortical cataract of both eyes  -- Condition stable, no therapeutic change required. Monitoring routinely.     4. Blepharitis of upper and lower eyelids of both eyes, unspecified type  -- Condition stable, no therapeutic change required. Monitoring routinely.     5. Refractive error - Dispensed SVL Rx               
No

## 2023-07-21 ENCOUNTER — TELEPHONE (OUTPATIENT)
Dept: OTOLARYNGOLOGY | Facility: CLINIC | Age: 73
End: 2023-07-21
Payer: MEDICARE

## 2023-07-21 NOTE — TELEPHONE ENCOUNTER
----- Message from Asher Adkins MD sent at 7/21/2023  1:04 PM CDT -----  Regarding: appt with [place]  Please have patient scheduled with Dr. Bartholomew to consider biopsy of the eye mass.    Thanks!

## 2023-07-25 ENCOUNTER — OFFICE VISIT (OUTPATIENT)
Dept: PULMONOLOGY | Facility: CLINIC | Age: 73
End: 2023-07-25
Payer: MEDICARE

## 2023-07-25 ENCOUNTER — CLINICAL SUPPORT (OUTPATIENT)
Dept: PULMONOLOGY | Facility: CLINIC | Age: 73
End: 2023-07-25
Payer: MEDICARE

## 2023-07-25 ENCOUNTER — HOSPITAL ENCOUNTER (OUTPATIENT)
Dept: RADIOLOGY | Facility: HOSPITAL | Age: 73
Discharge: HOME | End: 2023-07-25
Attending: NURSE PRACTITIONER
Payer: MEDICARE

## 2023-07-25 VITALS
HEART RATE: 80 BPM | BODY MASS INDEX: 34.29 KG/M2 | BODY MASS INDEX: 37.48 KG/M2 | WEIGHT: 218.5 LBS | WEIGHT: 219.56 LBS | DIASTOLIC BLOOD PRESSURE: 62 MMHG | HEIGHT: 64 IN | OXYGEN SATURATION: 89 % | HEIGHT: 67 IN | SYSTOLIC BLOOD PRESSURE: 142 MMHG | RESPIRATION RATE: 19 BRPM

## 2023-07-25 DIAGNOSIS — J41.1 PURULENT BRONCHITIS: ICD-10-CM

## 2023-07-25 DIAGNOSIS — F17.210 CIGARETTE NICOTINE DEPENDENCE WITHOUT COMPLICATION: ICD-10-CM

## 2023-07-25 DIAGNOSIS — J44.9 CHRONIC OBSTRUCTIVE PULMONARY DISEASE, UNSPECIFIED COPD TYPE: ICD-10-CM

## 2023-07-25 DIAGNOSIS — R91.1 SOLITARY PULMONARY NODULE: ICD-10-CM

## 2023-07-25 DIAGNOSIS — E66.01 CLASS 2 SEVERE OBESITY DUE TO EXCESS CALORIES WITH SERIOUS COMORBIDITY AND BODY MASS INDEX (BMI) OF 35.0 TO 35.9 IN ADULT: ICD-10-CM

## 2023-07-25 DIAGNOSIS — F11.20 OPIOID DEPENDENCE, UNCOMPLICATED: ICD-10-CM

## 2023-07-25 DIAGNOSIS — J44.1 COPD, FREQUENT EXACERBATIONS: Primary | ICD-10-CM

## 2023-07-25 DIAGNOSIS — G47.33 OSA ON CPAP: ICD-10-CM

## 2023-07-25 DIAGNOSIS — R05.9 COUGH, UNSPECIFIED TYPE: ICD-10-CM

## 2023-07-25 LAB
BRPFT: ABNORMAL
BRPFT: ABNORMAL
FEF 25 75 LLN: 0.86
FEF 25 75 PRE REF: 43.2 %
FEF 25 75 REF: 2.04
FEV1 FVC LLN: 63
FEV1 FVC PRE REF: 94.3 %
FEV1 FVC REF: 77
FEV1 LLN: 1.86
FEV1 PRE REF: 51.3 %
FEV1 REF: 2.6
FVC LLN: 2.5
FVC PRE REF: 54.3 %
FVC REF: 3.39
PEF LLN: 5.05
PEF PRE REF: 53.7 %
PEF REF: 7
PRE FEF 25 75: 0.88 L/S (ref 0.86–3.21)
PRE FET 100: 7.02 SEC
PRE FEV1 FVC: 72.37 % (ref 62.94–90.61)
PRE FEV1: 1.33 L (ref 1.86–3.33)
PRE FVC: 1.84 L (ref 2.5–4.28)
PRE PEF: 3.76 L/S (ref 5.05–8.95)

## 2023-07-25 PROCEDURE — 94618 PULMONARY STRESS TESTING: CPT | Mod: S$GLB,,, | Performed by: INTERNAL MEDICINE

## 2023-07-25 PROCEDURE — 4010F PR ACE/ARB THEARPY RXD/TAKEN: ICD-10-PCS | Mod: CPTII,S$GLB,, | Performed by: NURSE PRACTITIONER

## 2023-07-25 PROCEDURE — 71250 CT THORAX DX C-: CPT | Mod: 26,,, | Performed by: STUDENT IN AN ORGANIZED HEALTH CARE EDUCATION/TRAINING PROGRAM

## 2023-07-25 PROCEDURE — 99999 PR PBB SHADOW E&M-EST. PATIENT-LVL V: ICD-10-PCS | Mod: PBBFAC,,, | Performed by: NURSE PRACTITIONER

## 2023-07-25 PROCEDURE — 3288F PR FALLS RISK ASSESSMENT DOCUMENTED: ICD-10-PCS | Mod: CPTII,S$GLB,, | Performed by: NURSE PRACTITIONER

## 2023-07-25 PROCEDURE — 3288F FALL RISK ASSESSMENT DOCD: CPT | Mod: CPTII,S$GLB,, | Performed by: NURSE PRACTITIONER

## 2023-07-25 PROCEDURE — 3077F SYST BP >= 140 MM HG: CPT | Mod: CPTII,S$GLB,, | Performed by: NURSE PRACTITIONER

## 2023-07-25 PROCEDURE — 3008F BODY MASS INDEX DOCD: CPT | Mod: CPTII,S$GLB,, | Performed by: NURSE PRACTITIONER

## 2023-07-25 PROCEDURE — 99999 PR PBB SHADOW E&M-EST. PATIENT-LVL I: ICD-10-PCS | Mod: PBBFAC,,,

## 2023-07-25 PROCEDURE — 3062F PR POS MACROALBUMINURIA RESULT DOCUMENTED/REVIEW: ICD-10-PCS | Mod: CPTII,S$GLB,, | Performed by: NURSE PRACTITIONER

## 2023-07-25 PROCEDURE — 4010F ACE/ARB THERAPY RXD/TAKEN: CPT | Mod: CPTII,S$GLB,, | Performed by: NURSE PRACTITIONER

## 2023-07-25 PROCEDURE — 3066F NEPHROPATHY DOC TX: CPT | Mod: CPTII,S$GLB,, | Performed by: NURSE PRACTITIONER

## 2023-07-25 PROCEDURE — 99999 PR PBB SHADOW E&M-EST. PATIENT-LVL V: CPT | Mod: PBBFAC,,, | Performed by: NURSE PRACTITIONER

## 2023-07-25 PROCEDURE — 71250 CT THORAX DX C-: CPT | Mod: TC

## 2023-07-25 PROCEDURE — 3044F HG A1C LEVEL LT 7.0%: CPT | Mod: CPTII,S$GLB,, | Performed by: NURSE PRACTITIONER

## 2023-07-25 PROCEDURE — 99215 OFFICE O/P EST HI 40 MIN: CPT | Mod: 25,S$GLB,, | Performed by: NURSE PRACTITIONER

## 2023-07-25 PROCEDURE — 99215 PR OFFICE/OUTPT VISIT, EST, LEVL V, 40-54 MIN: ICD-10-PCS | Mod: 25,S$GLB,, | Performed by: NURSE PRACTITIONER

## 2023-07-25 PROCEDURE — 94010 BREATHING CAPACITY TEST: CPT | Mod: S$GLB,,, | Performed by: INTERNAL MEDICINE

## 2023-07-25 PROCEDURE — 3044F PR MOST RECENT HEMOGLOBIN A1C LEVEL <7.0%: ICD-10-PCS | Mod: CPTII,S$GLB,, | Performed by: NURSE PRACTITIONER

## 2023-07-25 PROCEDURE — 99499 UNLISTED E&M SERVICE: CPT | Mod: S$GLB,,, | Performed by: NURSE PRACTITIONER

## 2023-07-25 PROCEDURE — 94618 PULMONARY STRESS TESTING: ICD-10-PCS | Mod: S$GLB,,, | Performed by: INTERNAL MEDICINE

## 2023-07-25 PROCEDURE — 1101F PR PT FALLS ASSESS DOC 0-1 FALLS W/OUT INJ PAST YR: ICD-10-PCS | Mod: CPTII,S$GLB,, | Performed by: NURSE PRACTITIONER

## 2023-07-25 PROCEDURE — 1101F PT FALLS ASSESS-DOCD LE1/YR: CPT | Mod: CPTII,S$GLB,, | Performed by: NURSE PRACTITIONER

## 2023-07-25 PROCEDURE — 3066F PR DOCUMENTATION OF TREATMENT FOR NEPHROPATHY: ICD-10-PCS | Mod: CPTII,S$GLB,, | Performed by: NURSE PRACTITIONER

## 2023-07-25 PROCEDURE — 1159F MED LIST DOCD IN RCRD: CPT | Mod: CPTII,S$GLB,, | Performed by: NURSE PRACTITIONER

## 2023-07-25 PROCEDURE — 3008F PR BODY MASS INDEX (BMI) DOCUMENTED: ICD-10-PCS | Mod: CPTII,S$GLB,, | Performed by: NURSE PRACTITIONER

## 2023-07-25 PROCEDURE — 3077F PR MOST RECENT SYSTOLIC BLOOD PRESSURE >= 140 MM HG: ICD-10-PCS | Mod: CPTII,S$GLB,, | Performed by: NURSE PRACTITIONER

## 2023-07-25 PROCEDURE — 1160F PR REVIEW ALL MEDS BY PRESCRIBER/CLIN PHARMACIST DOCUMENTED: ICD-10-PCS | Mod: CPTII,S$GLB,, | Performed by: NURSE PRACTITIONER

## 2023-07-25 PROCEDURE — 1160F RVW MEDS BY RX/DR IN RCRD: CPT | Mod: CPTII,S$GLB,, | Performed by: NURSE PRACTITIONER

## 2023-07-25 PROCEDURE — 1159F PR MEDICATION LIST DOCUMENTED IN MEDICAL RECORD: ICD-10-PCS | Mod: CPTII,S$GLB,, | Performed by: NURSE PRACTITIONER

## 2023-07-25 PROCEDURE — 71250 CT CHEST WITHOUT CONTRAST: ICD-10-PCS | Mod: 26,,, | Performed by: STUDENT IN AN ORGANIZED HEALTH CARE EDUCATION/TRAINING PROGRAM

## 2023-07-25 PROCEDURE — 3078F DIAST BP <80 MM HG: CPT | Mod: CPTII,S$GLB,, | Performed by: NURSE PRACTITIONER

## 2023-07-25 PROCEDURE — 3062F POS MACROALBUMINURIA REV: CPT | Mod: CPTII,S$GLB,, | Performed by: NURSE PRACTITIONER

## 2023-07-25 PROCEDURE — 3078F PR MOST RECENT DIASTOLIC BLOOD PRESSURE < 80 MM HG: ICD-10-PCS | Mod: CPTII,S$GLB,, | Performed by: NURSE PRACTITIONER

## 2023-07-25 PROCEDURE — 99999 PR PBB SHADOW E&M-EST. PATIENT-LVL I: CPT | Mod: PBBFAC,,,

## 2023-07-25 PROCEDURE — 94010 BREATHING CAPACITY TEST: ICD-10-PCS | Mod: S$GLB,,, | Performed by: INTERNAL MEDICINE

## 2023-07-25 RX ORDER — PREDNISONE 20 MG/1
TABLET ORAL
Qty: 20 TABLET | Refills: 0 | Status: SHIPPED | OUTPATIENT
Start: 2023-07-25 | End: 2023-08-19

## 2023-07-25 RX ORDER — LOSARTAN POTASSIUM 25 MG/1
TABLET ORAL
COMMUNITY
End: 2023-08-19

## 2023-07-25 RX ORDER — ZOSTER VACCINE RECOMBINANT, ADJUVANTED 50 MCG/0.5
KIT INTRAMUSCULAR
COMMUNITY
Start: 2023-05-25 | End: 2023-11-16

## 2023-07-25 RX ORDER — TIOTROPIUM BROMIDE INHALATION SPRAY 3.12 UG/1
5 SPRAY, METERED RESPIRATORY (INHALATION) DAILY
Qty: 4 G | Refills: 11 | Status: SHIPPED | OUTPATIENT
Start: 2023-07-25 | End: 2023-11-16

## 2023-07-25 RX ORDER — DOXYCYCLINE 100 MG/1
100 CAPSULE ORAL EVERY 12 HOURS
Qty: 20 CAPSULE | Refills: 0 | Status: SHIPPED | OUTPATIENT
Start: 2023-07-25 | End: 2023-08-04

## 2023-07-25 RX ORDER — OMEPRAZOLE 40 MG/1
1 CAPSULE, DELAYED RELEASE ORAL DAILY
COMMUNITY
End: 2023-08-19

## 2023-07-25 NOTE — PROCEDURES
"[facility]  Six Minute Walk     SUMMARY     Ordering Provider: Dr. Gonzales   Interpreting Provider: Dr. Ghotra  Performing nurse/tech/RT: Guadalupe CRT  Diagnosis: COPD  Height: 5' 4" (162.6 cm)  Weight: 99.6 kg (219 lb 9.3 oz)  BMI (Calculated): 37.7   Patient Race:             Phase Oxygen Assessment Supplemental O2 Heart   Rate Blood Pressure Murphy Dyspnea Scale Rating   Resting 93 % Room Air 86 bpm 112/70 3   Exercise        Minute        1 91 % Room Air 103 bpm     2 92 % Room Air 110 bpm     3 93 % Room Air 114 bpm     4 92 % Room Air 111 bpm     5 92 % Room Air 108 bpm     6  92 % Room Air 118 bpm 128/68 5-6   Recovery        Minute        1 92 % Room Air 98 bpm     2 92 % Room Air 93 bpm     3 92 % Room Air 94 bpm     4 91 % Room Air 93 bpm 122/54 3     Six Minute Walk Summary  6MWT Status: completed with stops  Patient Reported: Dizziness, Dyspnea (back pain, knee pain)     Interpretation:  Did the patient stop or pause?: Yes  How many times did the patient stop or pause?: 2  Stop Time 1: 63  Restart Time 1: 95  Pause Time 1: 32 seconds  Stop Time 2: 225  Restart Time 2: 280  Pause Time 2: 55 seconds               Total Time Walked (Calculated): 273 seconds  Final Partial Lap Distance (feet): 25 feet  Total Distance Meters (Calculated): 251.46 meters  Predicted Distance Meters (Calculated): 390.17 meters  Percentage of Predicted (Calculated): 64.45  Peak VO2 (Calculated): 11.52  Mets: 3.29  Has The Patient Had a Previous Six Minute Walk Test?: Yes       Previous 6MWT Results  Has The Patient Had a Previous Six Minute Walk Test?: Yes  Date of Previous Test: 09/27/22  Total Time Walked: 287 seconds  Total Distance (meters): 251.46  Predicted Distance (meters): 448.93 meters  Percentage of Predicted: 56.01  Percent Change (Calculated): 0    Six minute walk distance is 251.46m /390.17 meters (64.45 % predicted) with somewhat heavy.Patient did not complete the study, walking " 273 seconds of the 360 second test . During exercise, there was no  significant desaturation while breathing room air .Lowest oxygen saturation was 91% .Maximum heart rate during exercise was 118 bpm which is 79 % of maximum predicted heart rate of 149 bpm. Blood pressure remained stable and Heart rate remained stable Based upon age and body mass index, exercise capacity is less than predicted.  Peak VO2 during walking was 11.52 ml/kg/min which is 39 % of predicted Peak VO2 max of 29 ml/kg/min based on a resting heart rate of 86/min.    Patient had a previous study. Since the previous study in 09/27/2022, exercise capacity may be somewhat improved.

## 2023-07-25 NOTE — ASSESSMENT & PLAN NOTE
Encouraged calorie reduction and 30 minutes of exercise daily. Discussed impact of obesity on general health.  Wt Readings from Last 9 Encounters:   07/25/23 99.1 kg (218 lb 7.6 oz)   07/25/23 99.6 kg (219 lb 9.3 oz)   06/12/23 101.3 kg (223 lb 5.2 oz)   06/06/23 101.5 kg (223 lb 12.3 oz)   05/11/23 100.6 kg (221 lb 12.5 oz)   04/28/23 103 kg (227 lb)   04/04/23 103.2 kg (227 lb 8.2 oz)   04/03/23 102.2 kg (225 lb 3.2 oz)   03/27/23 102.6 kg (226 lb 3.1 oz)   Body mass index is 34.22 kg/m².

## 2023-07-25 NOTE — ASSESSMENT & PLAN NOTE
Currently not using CPAP last setting was auto CPAP 6-16 cm  Some benefit, willing to re-attempt CPAP   Full face mask.   HME: [facility]     BRADLEY Left untreated, sleep apnea can have serious and life-shortening consequences: depression, headaches, diabetes, high blood pressure, heart disease, heart failure, irregular heart beats, and heart attacks, stroke, automobile accidents caused by falling asleep at the wheel and low productivity at work and at home.

## 2023-07-25 NOTE — ASSESSMENT & PLAN NOTE
Improved control on Advair 250 mcg,  has complaint of chest congestion mucus production.  Continue Wixela 250 mcg.  Begin Spiriva Respimat.  Never obtained Spiriva Handy Continue Albuterol inhaler and albuterol nebs   Follow up 6 months evaluate treatment response, alyce.  Referral pul dis management, education and assistance with medication  Patient preference to only see a doctor, pt request follow up with Dr. Springer

## 2023-07-25 NOTE — PROGRESS NOTES
Subjective:     Patient ID: Santiago Khan is a 72 y.o. male.    Chief Complaint:        HPI   COPD  He presents for evaluation and treatment of COPD. The patient is currently having symptoms with exacerbation. Current symptoms include acute dyspnea, chronic dyspnea, dyspnea after 1 blocks, non-productive cough, cough productive of white sputum in small amounts and wheezing. Symptoms have been present since several months ago and have been gradually worsening. He denies chills and fever. Associated symptoms include fatigue, poor exercise tolerance, shortness of breath and wheezing.  This episode appears to have been triggered by smoke and off controller inhalers . Treatments tried for the current exacerbation: albuterol inhaler. The patient has been having similar episodes for approximately many years. Patient currently is not on home oxygen therapy.. The patient is having no constitutional symptoms, denying fever, chills, anorexia, or weight loss. The patient has not been hospitalized for this condition before. He has a history of 77 pack year smoker. The patient is experiencing exercise intolerance (difficulty walking 1 blocks on flat ground).    77 pack year current smoker. Interested in quitting referral to program    3/27/2023 visit never began Trelegy 200 mcg related to cost. Was not on alternate. 3/27/2023 began Wixela 250 mcg.     Current treatment: Wixela 250 mcg. Albuterol inhaler. Albuterol nebs.     Obstructive Sleep Apnea:  12/11/2019, 12/12/2019 Home Sleep Study  2 nights Severe BRADLEY (AHI=37) .   Not using CPAP over the past about 6 months.  On CPAP 6-16 cm   Some benefit, tried CPAP twice since March 2023 visit.   He is still willing to continue trials of CPAP.  Full face mask.   HME: [facility]       Past Medical History:   Diagnosis Date    Chronic diastolic congestive heart failure 04/07/2020    Chronic kidney disease, stage 3     Chronic systolic congestive heart failure 07/09/2020     Chronic venous insufficiency     COPD (chronic obstructive pulmonary disease)     DDD (degenerative disc disease), lumbar     DM (diabetes mellitus) with complications     History of gout     Hyperlipidemia associated with type 2 diabetes mellitus     Hypertension associated with stage 3 chronic kidney disease due to type 2 diabetes mellitus     BRADLEY on CPAP     Prostate cancer     prostatectomy in 2010, rising PSA that started in 2021    Tobacco abuse      Past Surgical History:   Procedure Laterality Date    BICEPS TENDON REPAIR      COLONOSCOPY N/A 3/27/2019    Procedure: COLONOSCOPY;  Surgeon: James Roger MD;  Location: [facility] ENDO;  Service: Endoscopy;  Laterality: N/A;    HEMORRHOID SURGERY      INGUINAL HERNIA REPAIR Left     KNEE ARTHROSCOPY Right     LEFT HEART CATHETERIZATION Left 6/29/2020    Procedure: CATHETERIZATION, HEART, LEFT;  Surgeon: Cheli Wilson MD;  Location: [facility] CATH LAB;  Service: Cardiology;  Laterality: Left;    LUMBAR LAMINECTOMY      PROSTATECTOMY       Review of patient's allergies indicates:   Allergen Reactions    Amitriptyline Rash    Celecoxib Rash     Current Outpatient Medications on File Prior to Visit   Medication Sig Dispense Refill    potassium chloride SA (K-DUR,KLOR-CON) 20 MEQ tablet TAKE 3 TABLETS BY MOUTH TWICE DAILY 720 tablet 2    albuterol (PROVENTIL) 2.5 mg /3 mL (0.083 %) nebulizer solution Take 3 mLs (2.5 mg total) by nebulization every 4 to 6 hours as needed for Wheezing or Shortness of Breath. 360 mL 11    albuterol (PROVENTIL/VENTOLIN HFA) 90 mcg/actuation inhaler INHALE 2 PUFFS INTO THE LUNGS EVERY 4 HOURS AS NEEDED FOR WHEEZING 8.5 g 3    allopurinoL (ZYLOPRIM) 300 MG tablet Take 1 tablet (300 mg total) by mouth once daily. 90 tablet 3    aspirin (ECOTRIN) 81 MG EC tablet Take 81 mg by mouth once daily.      baclofen (LIORESAL) 10 MG tablet Take 1 tablet by mouth every evening.  2    bisoprolol (ZEBETA) 5 MG tablet TAKE 1/2 TABLET BY MOUTH EVERY DAY 90  tablet 3    colchicine (COLCRYS) 0.6 mg tablet TAKE 1 TABLET(0.6 MG) BY MOUTH DAILY AS NEEDED 30 tablet 1    fluticasone propionate (FLONASE) 50 mcg/actuation nasal spray SHAKE LIQUID AND USE 2 SPRAYS(100 MCG) IN EACH NOSTRIL EVERY DAY Strength: 50 mcg/actuation 48 g 3    fluticasone-salmeterol diskus inhaler 250-50 mcg Inhale 1 puff into the lungs 2 (two) times daily. Controller 60 each 11    glimepiride (AMARYL) 1 MG tablet TAKE 1 TABLET BY MOUTH EVERY DAY 90 tablet 3    HYDROcodone-acetaminophen (NORCO) 7.5-325 mg per tablet Take 1 tablet by mouth every 4 (four) hours as needed (for chronic pain).  0    losartan (COZAAR) 25 MG tablet       losartan (COZAAR) 50 MG tablet TAKE 1 TABLET(50 MG) BY MOUTH ONCE DAILY 90 tablet 3    magnesium oxide (MAG-OX) 400 mg (241.3 mg magnesium) tablet TAKE 2 TABLETS(800 MG) BY MOUTH TWICE DAILY 120 tablet 6    metFORMIN (GLUCOPHAGE) 500 MG tablet TAKE 1 TABLET(500 MG) BY MOUTH DAILY WITH BREAKFAST 90 tablet 3    multivitamin-minerals-lutein Tab Take 1 tablet by mouth Daily.      omeprazole (PRILOSEC) 40 MG capsule TAKE 1 CAPSULE BY MOUTH EVERY DAY 90 capsule 3    omeprazole (PRILOSEC) 40 MG capsule Take 1 capsule by mouth once daily.      SHINGRIX, PF, 50 mcg/0.5 mL injection       simvastatin (ZOCOR) 40 MG tablet Take 1 tablet (40 mg total) by mouth every evening. 90 tablet 3    sulfamethoxazole-trimethoprim 800-160mg (BACTRIM DS) 800-160 mg Tab Take 1 tablet by mouth 2 (two) times daily. 20 tablet 0    torsemide (DEMADEX) 20 MG Tab Take 2 tablets (40 mg total) by mouth 2 (two) times a day. 120 tablet 11    [DISCONTINUED] predniSONE (DELTASONE) 20 MG tablet 3 daily x 3 days, 2 daily x 3 days, 1 daily x 3 days, 1/2 daily x 4 days. 20 tablet 0    [DISCONTINUED] tiotropium (SPIRIVA WITH HANDIHALER) 18 mcg inhalation capsule Inhale 1 capsule (18 mcg total) into the lungs once daily. Controller 30 capsule 11     No current facility-administered medications on file prior to visit.      Social History     Socioeconomic History    Marital status:    Tobacco Use    Smoking status: Every Day     Packs/day: 1.50     Years: 51.00     Pack years: 76.50     Types: Cigarettes     Start date: 1/1/1971    Smokeless tobacco: Former   Substance and Sexual Activity    Alcohol use: Not Currently    Drug use: Never    Sexual activity: Not Currently     Partners: Female     Social Determinants of Health     Financial Resource Strain: Low Risk     Difficulty of Paying Living Expenses: Not very hard   Food Insecurity: No Food Insecurity    Worried About Running Out of Food in the Last Year: Never true    Ran Out of Food in the Last Year: Never true   Transportation Needs: No Transportation Needs    Lack of Transportation (Medical): No    Lack of Transportation (Non-Medical): No   Physical Activity: Inactive    Days of Exercise per Week: 0 days    Minutes of Exercise per Session: 0 min   Stress: Stress Concern Present    Feeling of Stress : To some extent   Social Connections: Moderately Isolated    Frequency of Communication with Friends and Family: More than three times a week    Frequency of Social Gatherings with Friends and Family: Once a week    Attends Mosque Services: More than 4 times per year    Active Member of Clubs or Organizations: No    Attends Club or Organization Meetings: Never    Marital Status:    Housing Stability: Low Risk     Unable to Pay for Housing in the Last Year: No    Number of Places Lived in the Last Year: 1    Unstable Housing in the Last Year: No     Family History   Problem Relation Age of Onset    Prostate cancer Father     Coronary artery disease Father 90    Alzheimer's disease Father     Hyperlipidemia Mother        Review of Systems   Constitutional:  Positive for fatigue. Negative for fever.   HENT:  Positive for postnasal drip, rhinorrhea and congestion.    Eyes:  Negative for redness and itching.   Respiratory:  Positive for cough, sputum production,  "shortness of breath, dyspnea on extertion, use of rescue inhaler and Paroxysmal Nocturnal Dyspnea.    Cardiovascular:  Negative for chest pain, palpitations and leg swelling.   Genitourinary:  Negative for difficulty urinating and hematuria.   Endocrine:  Negative for cold intolerance and heat intolerance.    Skin:  Negative for rash.   Gastrointestinal:  Negative for nausea and abdominal pain.   Neurological:  Negative for dizziness, syncope, weakness and light-headedness.   Hematological:  Negative for adenopathy. Does not bruise/bleed easily.   Psychiatric/Behavioral:  Negative for sleep disturbance. The patient is not nervous/anxious.      Objective:      BP (!) 142/62   Pulse 80   Resp 19   Ht 5' 7" (1.702 m)   Wt 99.1 kg (218 lb 7.6 oz)   SpO2 (!) 89%   BMI 34.22 kg/m²   Physical Exam  Vitals and nursing note reviewed.   Constitutional:       Appearance: Normal appearance. He is well-developed.   HENT:      Head: Normocephalic and atraumatic.      Nose: Congestion present.   Eyes:      Conjunctiva/sclera: Conjunctivae normal.      Pupils: Pupils are equal, round, and reactive to light.   Neck:      Thyroid: No thyromegaly.      Vascular: No JVD.      Trachea: No tracheal deviation.   Cardiovascular:      Rate and Rhythm: Normal rate and regular rhythm.      Heart sounds: No murmur heard.  Pulmonary:      Effort: Pulmonary effort is normal.      Breath sounds: Decreased breath sounds and rales (cleared with controlled cough) present. No wheezing or rhonchi.   Abdominal:      General: Bowel sounds are normal.      Palpations: Abdomen is soft.   Musculoskeletal:         General: No tenderness.      Cervical back: Neck supple.      Comments: Decreased range of motion of knees.     Lymphadenopathy:      Cervical: No cervical adenopathy.   Skin:     General: Skin is warm and dry.   Neurological:      Mental Status: He is alert and oriented to person, place, and time.     Personal Diagnostic Review    7/25/2023 " Spirometry shows moderate restriction based on the reduction in FVC.      7/25/2023 6MWD No desaturations requiring supplemental oxygen at rest or exertion.  Phase Oxygen Assessment Supplemental O2 Heart   Rate Blood Pressure Murphy Dyspnea Scale Rating   Resting 93 % Room Air 86 bpm 112/70 3   Exercise             Minute             1 91 % Room Air 103 bpm       2 92 % Room Air 110 bpm       3 93 % Room Air 114 bpm       4 92 % Room Air 111 bpm       5 92 % Room Air 108 bpm       6  92 % Room Air 118 bpm 128/68 5-6   Recovery             Minute             1 92 % Room Air 98 bpm       2 92 % Room Air 93 bpm       3 92 % Room Air 94 bpm       4 91 % Room Air 93 bpm 122/54 3      Six Minute Walk Summary  6MWT Status: completed with stops  Patient Reported: Dizziness, Dyspnea (back pain, knee pain)              Interpretation:  Did the patient stop or pause?: Yes  How many times did the patient stop or pause?: 2  Stop Time 1: 63  Restart Time 1: 95  Pause Time 1: 32 seconds  Stop Time 2: 225  Restart Time 2: 280  Pause Time 2: 55 seconds  Total Time Walked (Calculated): 273 seconds  Final Partial Lap Distance (feet): 25 feet  Total Distance Meters (Calculated): 251.46 meters  Predicted Distance Meters (Calculated): 390.17 meters  Percentage of Predicted (Calculated): 64.45  Peak VO2 (Calculated): 11.52  Mets: 3.29  Has The Patient Had a Previous Six Minute Walk Test?: Yes     Previous 6MWT Results  Has The Patient Had a Previous Six Minute Walk Test?: Yes  Date of Previous Test: 09/27/22  Total Time Walked: 287 seconds  Total Distance (meters): 251.46  Predicted Distance (meters): 448.93 meters  Percentage of Predicted: 56.01  Percent Change (Calculated): 0  7/25/2023 6 minute walk distance no desaturations requiring supplemental oxygen    CT Chest Without Contrast  Narrative: EXAMINATION:  CT CHEST WITHOUT CONTRAST    CLINICAL HISTORY:  Cough, persistent;1 year follow up. 77 pack year smoker. COPD. Cough.; Nicotine  dependence, cigarettes, uncomplicated    TECHNIQUE:  Low-dose axial images acquired from the chest without the use of intravenous contrast.  Sagittal and coronal reformats were generated for further review.  All CT scans at this location are performed using dose modulation techniques as appropriate to a performed exam including the following: Automated exposure control; adjustment of the mA and/or kV according to patient size (this includes techniques or standardized protocols for targeted exams where dose is matched to indication/reason for exam; i. e. extremities or head); use of iterative reconstruction technique.    COMPARISON:  CT chest 09/27/2022    FINDINGS:  Base of Neck: No significant abnormality.    Aorta: Nonaneurysmal aorta with minimal scattered atherosclerotic calcification.    Heart/pericardium: Upper limits of normal sized heart.  No significant pericardial fluid.  Severe multivessel coronary artery atherosclerotic calcification.    Natasha/Mediastinum: No pathologic harvinder enlargement.    Upper Abdomen: Stable appearance.  No acute abnormality.    Thoracic soft tissues: No acute abnormality.    Bones: Healing phase fractures at the anterolateral aspects of the left 8th-10th ribs, single part, nondisplaced.  Remaining osseous structures appear intact.    Airways: Patent.    Lungs/pleura: Mild bibasilar chronic interstitial changes, overall stable.  No acute disease.  7 mm nodule in the left upper lobe (axial series 2, image 33).  Impression: New 7 mm nodule in the left upper lobe.  For a solid nodule 6-8 mm, recommend follow up with non-contrast chest CT at 6-12 months and 18-24 months after discovery, given this patient's history.    Healing phase left lower rib fractures, as above.    Additional findings as above.    This report was flagged in Epic as abnormal.    Electronically signed by: Luis Powell  Date:    07/25/2023  Time:    08:33        PSG: significant for Obstructive Sleep Apnea        John Ghotra MD (Physician)   Pulmonary Disease  Procedure Orders   1. Home Sleep Studies [[id number]] ordered by Elizabeth Lejeune, NP   Pre-procedure Diagnoses   1. BRADLEY (obstructive sleep apnea) [G47.33]   Post-procedure Diagnoses   1. BRADLEY (obstructive sleep apnea) [G47.33]      Home Sleep Studies  Date/Time: 12/16/2019 8:00 AM  Performed by: John Ghotra MD  Authorized by: Elizabeth Lejeune, NP        PHYSICIAN INTERPRETATION AND COMMENTS: Findings are consistent with severe, non-positional obstructive sleep  apnea (BRADLEY). There is insufficient supine study time to assess the severity of positional obstructive sleep apnea (BRADLEY). Indications  of cardiac dysrhythmia are recorded. Night #1 had no valid data. Night #2: AHI was 37.0/hr with 3.5 hours sleep. Consider INLAB  CPAP titration to allow proper habituation and mask fitting.  CLINICAL HISTORY: 69 year old male presented with:Prior diagnosis of BRADLEY, seeks to restablish therapy. STOPBANG score  6. Frequent waking up at night, snoring, Non refreshing sleep. 17 inch neck, BMI of 30, an [place] sleepiness score of 20, history  of diabetes and symptoms of nocturnal snoring, waking up choking and witnessed apneas. Based on the clinical history, the patient  has a high pre-test probability of having severe BRADLEY.  SLEEP STUDY FINDINGS: Patient underwent a two night Home Sleep Test and by behavioral criteria, slept for approximately  3.5 hours, with a sleep latency of 24 minutes and a sleep efficiency of 66.6%. Severe sleep disordered breathing (AHI=37) is noted  based on a 4% hypopnea desaturation criteria. The patient slept supine 1.6% of the night based on valid recording time of 3.54  hours. The apneas/hypopneas are accompanied by moderate oxygen desaturation (percent time below 90% SpO2: 11.6%, Min  SpO2: 82.8%). The average desaturation across all sleep disordered breathing events is 5.0%. Snoring occurs for 37.3% (30 dB) of  the study, 22.6% is  very loud. The mean pulse rate is 77 BPM, with very frequent pulse rate variability (90 events with >= 6 BPM  increase/decrease per hour).  TREATMENT CONSIDERATIONS: Consider nasal continuous positive airway pressure (CPAP/AutoPAP) as the initial  treatment choice for severe obstructive sleep apnea. A mandibular advancement splint (MAS) or referral to an ENT surgeon for  modification to the airway should be considered to reduce the risk of mortality caused by severe BRADLEY if the patient prefers an  alternative therapy or the CPAP trial is unsuccessful.  DISEASE MANAGEMENT CONSIDERATIONS: Irregularity of the pulse rate signals indicates possible cardiac  dysrhythmia. If clinically appropriate, further cardiac evaluation is suggested. Sleeping pills may increase the severity of  untreated BRADLEY and their use should be reevaluated once the BRADLEY is treated.            Progress Notes  John Ghotra MD (Physician)   Pulmonary Disease     PHYSICIAN INTERPRETATION AND COMMENTS: Findings are consistent with severe, non-positional obstructive sleep  apnea (BRADLEY). There is insufficient supine study time to assess the severity of positional obstructive sleep apnea (BRADLEY). Indications  of cardiac dysrhythmia are recorded. Night #1 had no valid data. Night #2: AHI was 37.0/hr with 3.5 hours sleep. Consider INLAB  CPAP titration to allow proper habituation and mask fitting.  CLINICAL HISTORY: 69 year old male presented with:Prior diagnosis of BRADLEY, seeks to restablish therapy. STOPBANG score  6. Frequent waking up at night, snoring, Non refreshing sleep. 17 inch neck, BMI of 30, an [place] sleepiness score of 20, history  of diabetes and symptoms of nocturnal snoring, waking up choking and witnessed apneas. Based on the clinical history, the patient  has a high pre-test probability of having severe BRADLEY.  SLEEP STUDY FINDINGS: Patient underwent a two night Home Sleep Test and by behavioral criteria, slept for approximately  3.5  hours, with a sleep latency of 24 minutes and a sleep efficiency of 66.6%. Severe sleep disordered breathing (AHI=37) is noted  based on a 4% hypopnea desaturation criteria. The patient slept supine 1.6% of the night based on valid recording time of 3.54  hours. The apneas/hypopneas are accompanied by moderate oxygen desaturation (percent time below 90% SpO2: 11.6%, Min  SpO2: 82.8%). The average desaturation across all sleep disordered breathing events is 5.0%. Snoring occurs for 37.3% (30 dB) of  the study, 22.6% is very loud. The mean pulse rate is 77 BPM, with very frequent pulse rate variability (90 events with >= 6 BPM  increase/decrease per hour).  TREATMENT CONSIDERATIONS: Consider nasal continuous positive airway pressure (CPAP/AutoPAP) as the initial  treatment choice for severe obstructive sleep apnea. A mandibular advancement splint (MAS) or referral to an ENT surgeon for  modification to the airway should be considered to reduce the risk of mortality caused by severe BRADLEY if the patient prefers an  alternative therapy or the CPAP trial is unsuccessful.  DISEASE MANAGEMENT CONSIDERATIONS: Irregularity of the pulse rate signals indicates possible cardiac  dysrhythmia. If clinically appropriate, further cardiac evaluation is suggested. Sleeping pills may increase the severity of  untreated BRADLEY and their use should be reevaluated once the BRADLEY is treated.                  Pulmonary Studies Review 7/25/2023   SpO2 89   Ordering Provider -   Interpreting Provider -   Performing nurse/tech/RT -   Diagnosis -   Height 67   Weight 3495.61   BMI (Calculated) 34.2   Predicted Distance 302.4   Patient Race -   6MWT Status -   Patient Reported -   Was O2 used? -   6MW Distance walked (feet) -   Distance walked (meters) -   Did patient stop? -   How many times? -   Stop Time 1 -   Restart Time 1 -   Stop Time 2 -   Restart Time 2 -   Did patient restart? -   Type of assistive device(s) used? -   Is extra documentation  required for this patient? -   Oxygen Saturation -   Supplemental Oxygen -   Heart Rate -   Blood Pressure -   Murphy Dyspnea Rating  -   Oxygen Saturation -   Supplemental Oxygen -   Heart Rate -   Blood Pressure -   Murphy Dyspnea Rating  -   Recovery Time (seconds) -   Oxygen Saturation -   Supplemental Oxygen -   Heart Rate -   Blood Pressure -   Murphy Dyspnea Rating  -   Is procedure ready for interpretation? -   Did the patient stop or pause? -   How many times did the patient stop or pause? -   Stop Time 1 -   Restart Time 1 -   Pause Time 1 -   Stop Time 2 -   Restart Time 2 -   Pause Time 2 -   Total Time Walked (Calculated) -   Total Laps Walked -   Final Partial Lap Distance (feet) -   Total Distance Feet (Calculated) -   Total Distance Meters (Calculated) -   Predicted Distance Meters (Calculated) 448.58   Percentage of Predicted (Calculated) -   Peak VO2 (Calculated) -   Mets -   Has The Patient Had a Previous Six Minute Walk Test? -   Oxygen Qualification? -       CT Chest Without Contrast  Narrative: EXAMINATION:  CT CHEST WITHOUT CONTRAST    CLINICAL HISTORY:  Cough, persistent;1 year follow up. 77 pack year smoker. COPD. Cough.; Nicotine dependence, cigarettes, uncomplicated    TECHNIQUE:  Low-dose axial images acquired from the chest without the use of intravenous contrast.  Sagittal and coronal reformats were generated for further review.  All CT scans at this location are performed using dose modulation techniques as appropriate to a performed exam including the following: Automated exposure control; adjustment of the mA and/or kV according to patient size (this includes techniques or standardized protocols for targeted exams where dose is matched to indication/reason for exam; i. e. extremities or head); use of iterative reconstruction technique.    COMPARISON:  CT chest 09/27/2022    FINDINGS:  Base of Neck: No significant abnormality.    Aorta: Nonaneurysmal aorta with minimal scattered  atherosclerotic calcification.    Heart/pericardium: Upper limits of normal sized heart.  No significant pericardial fluid.  Severe multivessel coronary artery atherosclerotic calcification.    Natasha/Mediastinum: No pathologic harvinder enlargement.    Upper Abdomen: Stable appearance.  No acute abnormality.    Thoracic soft tissues: No acute abnormality.    Bones: Healing phase fractures at the anterolateral aspects of the left 8th-10th ribs, single part, nondisplaced.  Remaining osseous structures appear intact.    Airways: Patent.    Lungs/pleura: Mild bibasilar chronic interstitial changes, overall stable.  No acute disease.  7 mm nodule in the left upper lobe (axial series 2, image 33).  Impression: New 7 mm nodule in the left upper lobe.  For a solid nodule 6-8 mm, recommend follow up with non-contrast chest CT at 6-12 months and 18-24 months after discovery, given this patient's history.    Healing phase left lower rib fractures, as above.    Additional findings as above.    This report was flagged in Epic as abnormal.    Electronically signed by: Luis Powell  Date:    07/25/2023  Time:    08:33      EXAMINATION:  CT CHEST WITHOUT CONTRAST     CLINICAL HISTORY:  Cough, persistent; Cough, unspecified     TECHNIQUE:  Chest CT without contrast.  Soft tissue and lung algorithms.  Coronal and sagittal reformations.     COMPARISON:  09/10/2020     FINDINGS:  Heart, pericardium, and aorta are normal.  Extensive coronary artery calcifications.  There is no mediastinal or hilar lymphadenopathy.     Trachea and central bronchi are patent.     There is a punctate calcified granuloma left lower lobe with a small calcified left hilar lymph node.     There is no worrisome pulmonary nodule.  Again, there is a 7 mm peripheral left upper lobe cyst with mild wall thickening, benign.  There is minor scarring with biapical and bibasilar septal thickening.  Mild patchy ground-glass opacity dependent lung bases characteristic of  minor atelectasis.  Otherwise, lungs are clear.     There are a couple small hepatic cysts.  There are a few calcified liver and spleen granulomas.  Otherwise, the visualized portion of the upper abdominal viscera is unremarkable.     Mild gynecomastia.     The bones are unremarkable.     Impression:     No evidence of pneumonia.  Minor scarring biapical and bibasilar septal thickening.  Minimal atelectasis dependent lung bases.  No worrisome pulmonary nodule.  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  Extensive coronary artery calcifications.     All CT scans at this facility are performed  using dose modulation techniques as appropriate to performed exam including the following:  automated exposure control; adjustment of mA and/or kV according to the patients size (this includes techniques or standardized protocols for targeted exams where dose is matched to indication/reason for exam: i.e. extremities or head);  iterative reconstruction technique.        Electronically signed by: Natanael Moore MD  Date:                                            09/27/2022    Office Spirometry Results:     No flowsheet data found.  Pulmonary Studies Review 7/25/2023   SpO2 89   Ordering Provider -   Interpreting Provider -   Performing nurse/tech/RT -   Diagnosis -   Height 67   Weight 3495.61   BMI (Calculated) 34.2   Predicted Distance 302.4   Patient Race -   6MWT Status -   Patient Reported -   Was O2 used? -   6MW Distance walked (feet) -   Distance walked (meters) -   Did patient stop? -   How many times? -   Stop Time 1 -   Restart Time 1 -   Stop Time 2 -   Restart Time 2 -   Did patient restart? -   Type of assistive device(s) used? -   Is extra documentation required for this patient? -   Oxygen Saturation -   Supplemental Oxygen -   Heart Rate -   Blood Pressure -   Murphy Dyspnea Rating  -   Oxygen Saturation -   Supplemental Oxygen -   Heart Rate -   Blood Pressure -   Murphy Dyspnea Rating  -   Recovery Time (seconds) -    Oxygen Saturation -   Supplemental Oxygen -   Heart Rate -   Blood Pressure -   Murphy Dyspnea Rating  -   Is procedure ready for interpretation? -   Did the patient stop or pause? -   How many times did the patient stop or pause? -   Stop Time 1 -   Restart Time 1 -   Pause Time 1 -   Stop Time 2 -   Restart Time 2 -   Pause Time 2 -   Total Time Walked (Calculated) -   Total Laps Walked -   Final Partial Lap Distance (feet) -   Total Distance Feet (Calculated) -   Total Distance Meters (Calculated) -   Predicted Distance Meters (Calculated) 448.58   Percentage of Predicted (Calculated) -   Peak VO2 (Calculated) -   Mets -   Has The Patient Had a Previous Six Minute Walk Test? -   Oxygen Qualification? -         Assessment:       1. COPD, frequent exacerbations  doxycycline (VIBRAMYCIN) 100 MG Cap    predniSONE (DELTASONE) 20 MG tablet    Fungus culture    AFB Culture & Smear    Culture, Respiratory with Gram Stain    tiotropium bromide (SPIRIVA RESPIMAT) 2.5 mcg/actuation inhaler      2. Solitary pulmonary nodule  CT Chest Lung Screening Low Dose      3. Cigarette nicotine dependence without complication  CT Chest Lung Screening Low Dose      4. Chronic obstructive pulmonary disease, unspecified COPD type        5. Purulent bronchitis  Fungus culture    AFB Culture & Smear    Culture, Respiratory with Gram Stain      6. BRADLEY on CPAP  CPAP/BIPAP SUPPLIES      7. Class 2 severe obesity due to excess calories with serious comorbidity and body mass index (BMI) of 35.0 to 35.9 in adult        8. Opioid dependence, uncomplicated                Outpatient Encounter Medications as of 7/25/2023   Medication Sig Dispense Refill    potassium chloride SA (K-DUR,KLOR-CON) 20 MEQ tablet TAKE 3 TABLETS BY MOUTH TWICE DAILY 720 tablet 2    albuterol (PROVENTIL) 2.5 mg /3 mL (0.083 %) nebulizer solution Take 3 mLs (2.5 mg total) by nebulization every 4 to 6 hours as needed for Wheezing or Shortness of Breath. 360 mL 11    albuterol  (PROVENTIL/VENTOLIN HFA) 90 mcg/actuation inhaler INHALE 2 PUFFS INTO THE LUNGS EVERY 4 HOURS AS NEEDED FOR WHEEZING 8.5 g 3    allopurinoL (ZYLOPRIM) 300 MG tablet Take 1 tablet (300 mg total) by mouth once daily. 90 tablet 3    aspirin (ECOTRIN) 81 MG EC tablet Take 81 mg by mouth once daily.      baclofen (LIORESAL) 10 MG tablet Take 1 tablet by mouth every evening.  2    bisoprolol (ZEBETA) 5 MG tablet TAKE 1/2 TABLET BY MOUTH EVERY DAY 90 tablet 3    colchicine (COLCRYS) 0.6 mg tablet TAKE 1 TABLET(0.6 MG) BY MOUTH DAILY AS NEEDED 30 tablet 1    doxycycline (VIBRAMYCIN) 100 MG Cap Take 1 capsule (100 mg total) by mouth every 12 (twelve) hours. for 10 days 20 capsule 0    fluticasone propionate (FLONASE) 50 mcg/actuation nasal spray SHAKE LIQUID AND USE 2 SPRAYS(100 MCG) IN EACH NOSTRIL EVERY DAY Strength: 50 mcg/actuation 48 g 3    fluticasone-salmeterol diskus inhaler 250-50 mcg Inhale 1 puff into the lungs 2 (two) times daily. Controller 60 each 11    glimepiride (AMARYL) 1 MG tablet TAKE 1 TABLET BY MOUTH EVERY DAY 90 tablet 3    HYDROcodone-acetaminophen (NORCO) 7.5-325 mg per tablet Take 1 tablet by mouth every 4 (four) hours as needed (for chronic pain).  0    losartan (COZAAR) 25 MG tablet       losartan (COZAAR) 50 MG tablet TAKE 1 TABLET(50 MG) BY MOUTH ONCE DAILY 90 tablet 3    magnesium oxide (MAG-OX) 400 mg (241.3 mg magnesium) tablet TAKE 2 TABLETS(800 MG) BY MOUTH TWICE DAILY 120 tablet 6    metFORMIN (GLUCOPHAGE) 500 MG tablet TAKE 1 TABLET(500 MG) BY MOUTH DAILY WITH BREAKFAST 90 tablet 3    multivitamin-minerals-lutein Tab Take 1 tablet by mouth Daily.      omeprazole (PRILOSEC) 40 MG capsule TAKE 1 CAPSULE BY MOUTH EVERY DAY 90 capsule 3    omeprazole (PRILOSEC) 40 MG capsule Take 1 capsule by mouth once daily.      predniSONE (DELTASONE) 20 MG tablet 3 daily x 3 days, 2 daily x 3 days, 1 daily x 3 days, 1/2 daily x 4 days. 20 tablet 0    SHINGRIX, PF, 50 mcg/0.5 mL injection       simvastatin  (ZOCOR) 40 MG tablet Take 1 tablet (40 mg total) by mouth every evening. 90 tablet 3    sulfamethoxazole-trimethoprim 800-160mg (BACTRIM DS) 800-160 mg Tab Take 1 tablet by mouth 2 (two) times daily. 20 tablet 0    tiotropium bromide (SPIRIVA RESPIMAT) 2.5 mcg/actuation inhaler Inhale 2 puffs into the lungs once daily. 4 g 11    torsemide (DEMADEX) 20 MG Tab Take 2 tablets (40 mg total) by mouth 2 (two) times a day. 120 tablet 11    [DISCONTINUED] losartan (COZAAR) 25 MG tablet Take 1 tablet (25 mg total) by mouth once daily.      [DISCONTINUED] predniSONE (DELTASONE) 20 MG tablet 3 daily x 3 days, 2 daily x 3 days, 1 daily x 3 days, 1/2 daily x 4 days. 20 tablet 0    [DISCONTINUED] tiotropium (SPIRIVA WITH HANDIHALER) 18 mcg inhalation capsule Inhale 1 capsule (18 mcg total) into the lungs once daily. Controller 30 capsule 11     No facility-administered encounter medications on file as of 7/25/2023.     Plan:       Requested Prescriptions     Signed Prescriptions Disp Refills    doxycycline (VIBRAMYCIN) 100 MG Cap 20 capsule 0     Sig: Take 1 capsule (100 mg total) by mouth every 12 (twelve) hours. for 10 days    predniSONE (DELTASONE) 20 MG tablet 20 tablet 0     Sig: 3 daily x 3 days, 2 daily x 3 days, 1 daily x 3 days, 1/2 daily x 4 days.    tiotropium bromide (SPIRIVA RESPIMAT) 2.5 mcg/actuation inhaler 4 g 11     Sig: Inhale 2 puffs into the lungs once daily.     Problem List Items Addressed This Visit       BRADLEY on CPAP     Currently not using CPAP last setting was auto CPAP 6-16 cm  Some benefit, willing to re-attempt CPAP   Full face mask.   HME: [facility]     BRADLEY Left untreated, sleep apnea can have serious and life-shortening consequences: depression, headaches, diabetes, high blood pressure, heart disease, heart failure, irregular heart beats, and heart attacks, stroke, automobile accidents caused by falling asleep at the wheel and low productivity at work and at home.                Relevant Orders     CPAP/BIPAP SUPPLIES    Opioid dependence, uncomplicated     Norco for pain control          Class 2 severe obesity due to excess calories with serious comorbidity and body mass index (BMI) of 35.0 to 35.9 in adult     Encouraged calorie reduction and 30 minutes of exercise daily. Discussed impact of obesity on general health.  Wt Readings from Last 9 Encounters:   07/25/23 99.1 kg (218 lb 7.6 oz)   07/25/23 99.6 kg (219 lb 9.3 oz)   06/12/23 101.3 kg (223 lb 5.2 oz)   06/06/23 101.5 kg (223 lb 12.3 oz)   05/11/23 100.6 kg (221 lb 12.5 oz)   04/28/23 103 kg (227 lb)   04/04/23 103.2 kg (227 lb 8.2 oz)   04/03/23 102.2 kg (225 lb 3.2 oz)   03/27/23 102.6 kg (226 lb 3.1 oz)   Body mass index is 34.22 kg/m².           Cigarette nicotine dependence without complication     77 pack year smoker. Interested in quitting.  Assistance with smoking cessation was offered, including:  []  Medications  [x]  Counseling  []  Printed Information on Smoking Cessation  [x]  Referral to a Smoking Cessation Program    Patient was counseled regarding smoking for 3-10 minutes.    9/27/2022 CT chest No worrisome pulmonary nodule.  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   Follow up as planned with Dr. Springer repeat CT chest 1 yr, September 2023.              Relevant Orders    CT Chest Lung Screening Low Dose    Chronic obstructive pulmonary disease     Improved control on Advair 250 mcg,  has complaint of chest congestion mucus production.  Continue Wixela 250 mcg.  Begin Spiriva Respimat.  Never obtained Spiriva Handy Continue Albuterol inhaler and albuterol nebs   Follow up 6 months evaluate treatment response, alyce.  Referral pul dis management, education and assistance with medication  Patient preference to only see a doctor, pt request follow up with Dr. Springer             Other Visit Diagnoses       COPD, frequent exacerbations    -  Primary    Relevant Medications    doxycycline (VIBRAMYCIN) 100 MG Cap    predniSONE (DELTASONE) 20 MG  tablet    tiotropium bromide (SPIRIVA RESPIMAT) 2.5 mcg/actuation inhaler    Other Relevant Orders    Fungus culture    AFB Culture & Smear    Culture, Respiratory with Gram Stain    Solitary pulmonary nodule        Relevant Orders    CT Chest Lung Screening Low Dose    Purulent bronchitis        Relevant Orders    Fungus culture    AFB Culture & Smear    Culture, Respiratory with Gram Stain            I spent a total of 44 minutes on the day of the visit.  This includes face to face time and non-face to face time preparing to see the patient (eg, review of tests), obtaining and/or reviewing separately obtained history, documenting clinical information in the electronic or other health record, independently interpreting results and communicating results to the patient/family/caregiver, or care coordinator.           Follow up in about 6 months (around 1/25/2024) for Pulmonary nodule w/review CT chest. copd fu with Dr Springer at [place] pt request..

## 2023-08-11 ENCOUNTER — OFFICE VISIT (OUTPATIENT)
Dept: OTOLARYNGOLOGY | Facility: CLINIC | Age: 73
End: 2023-08-11
Payer: MEDICARE

## 2023-08-11 DIAGNOSIS — H05.20 EXOPHTHALMOS OF RIGHT EYE: ICD-10-CM

## 2023-08-11 DIAGNOSIS — H57.11 PAIN OF RIGHT EYE: ICD-10-CM

## 2023-08-11 DIAGNOSIS — H05.89 ORBITAL MASS: Primary | ICD-10-CM

## 2023-08-11 DIAGNOSIS — H51.21 INTERNUCLEAR OPHTHALMOPLEGIA OF RIGHT EYE: ICD-10-CM

## 2023-08-11 PROCEDURE — 1159F PR MEDICATION LIST DOCUMENTED IN MEDICAL RECORD: ICD-10-PCS | Mod: CPTII,S$GLB,, | Performed by: OTOLARYNGOLOGY

## 2023-08-11 PROCEDURE — 3062F POS MACROALBUMINURIA REV: CPT | Mod: CPTII,S$GLB,, | Performed by: OTOLARYNGOLOGY

## 2023-08-11 PROCEDURE — 99999 PR PBB SHADOW E&M-EST. PATIENT-LVL IV: CPT | Mod: PBBFAC,,, | Performed by: OTOLARYNGOLOGY

## 2023-08-11 PROCEDURE — 99999 PR PBB SHADOW E&M-EST. PATIENT-LVL IV: ICD-10-PCS | Mod: PBBFAC,,, | Performed by: OTOLARYNGOLOGY

## 2023-08-11 PROCEDURE — 3288F FALL RISK ASSESSMENT DOCD: CPT | Mod: CPTII,S$GLB,, | Performed by: OTOLARYNGOLOGY

## 2023-08-11 PROCEDURE — 3062F PR POS MACROALBUMINURIA RESULT DOCUMENTED/REVIEW: ICD-10-PCS | Mod: CPTII,S$GLB,, | Performed by: OTOLARYNGOLOGY

## 2023-08-11 PROCEDURE — 99214 PR OFFICE/OUTPT VISIT, EST, LEVL IV, 30-39 MIN: ICD-10-PCS | Mod: S$GLB,,, | Performed by: OTOLARYNGOLOGY

## 2023-08-11 PROCEDURE — 1100F PTFALLS ASSESS-DOCD GE2>/YR: CPT | Mod: CPTII,S$GLB,, | Performed by: OTOLARYNGOLOGY

## 2023-08-11 PROCEDURE — 3066F NEPHROPATHY DOC TX: CPT | Mod: CPTII,S$GLB,, | Performed by: OTOLARYNGOLOGY

## 2023-08-11 PROCEDURE — 3066F PR DOCUMENTATION OF TREATMENT FOR NEPHROPATHY: ICD-10-PCS | Mod: CPTII,S$GLB,, | Performed by: OTOLARYNGOLOGY

## 2023-08-11 PROCEDURE — 4010F ACE/ARB THERAPY RXD/TAKEN: CPT | Mod: CPTII,S$GLB,, | Performed by: OTOLARYNGOLOGY

## 2023-08-11 PROCEDURE — 1159F MED LIST DOCD IN RCRD: CPT | Mod: CPTII,S$GLB,, | Performed by: OTOLARYNGOLOGY

## 2023-08-11 PROCEDURE — 1100F PR PT FALLS ASSESS DOC 2+ FALLS/FALL W/INJURY/YR: ICD-10-PCS | Mod: CPTII,S$GLB,, | Performed by: OTOLARYNGOLOGY

## 2023-08-11 PROCEDURE — 1160F PR REVIEW ALL MEDS BY PRESCRIBER/CLIN PHARMACIST DOCUMENTED: ICD-10-PCS | Mod: CPTII,S$GLB,, | Performed by: OTOLARYNGOLOGY

## 2023-08-11 PROCEDURE — 3288F PR FALLS RISK ASSESSMENT DOCUMENTED: ICD-10-PCS | Mod: CPTII,S$GLB,, | Performed by: OTOLARYNGOLOGY

## 2023-08-11 PROCEDURE — 4010F PR ACE/ARB THEARPY RXD/TAKEN: ICD-10-PCS | Mod: CPTII,S$GLB,, | Performed by: OTOLARYNGOLOGY

## 2023-08-11 PROCEDURE — 1160F RVW MEDS BY RX/DR IN RCRD: CPT | Mod: CPTII,S$GLB,, | Performed by: OTOLARYNGOLOGY

## 2023-08-11 PROCEDURE — 99214 OFFICE O/P EST MOD 30 MIN: CPT | Mod: S$GLB,,, | Performed by: OTOLARYNGOLOGY

## 2023-08-11 PROCEDURE — 3044F PR MOST RECENT HEMOGLOBIN A1C LEVEL <7.0%: ICD-10-PCS | Mod: CPTII,S$GLB,, | Performed by: OTOLARYNGOLOGY

## 2023-08-11 PROCEDURE — 3044F HG A1C LEVEL LT 7.0%: CPT | Mod: CPTII,S$GLB,, | Performed by: OTOLARYNGOLOGY

## 2023-08-11 NOTE — PROGRESS NOTES
History of Present Illness:   Santiago Khan is a 72 y.o. year old male evaluated on 8/19/2023, in the Otolaryngology-Head and Neck Surgery Clinic at [facility]. The patient was referred by Dr. Adkins for evaluation of orbital mass.  Patient has had a lump in the right eye that he has felt for at least 1-2 years.  He has been very lot reluctant to get this treated.  Has seen Ophthalmology with recommendation and referral to Dr. Cuevas but he has not seen him as he did not want to travel to [place].  He admits that he has been in denial and is concerned that this is malignancy.  He has had watery drainage from the right eye.  He denies any pain at the mass.  He was scheduled to have an orbital MRI but despite Valium due to claustrophobia he did not complete this.  He has not had a CT in over a year.  He has never had biopsy of this.  He does report diplopia in all gazes.  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  He was last seen 1 month ago by Dr. Grace and was told this could be malignancy.  At this time he does want this worked up.         Past Medical/Surgical History  Past Medical History:   Diagnosis Date    Chronic diastolic congestive heart failure 04/07/2020    Chronic kidney disease, stage 3     Chronic systolic congestive heart failure 07/09/2020    Chronic venous insufficiency     COPD (chronic obstructive pulmonary disease)     DDD (degenerative disc disease), lumbar     DM (diabetes mellitus) with complications     History of gout     Hyperlipidemia associated with type 2 diabetes mellitus     Hypertension associated with stage 3 chronic kidney disease due to type 2 diabetes mellitus     BRADLEY on CPAP     Prostate cancer     prostatectomy in 2010, rising PSA that started in 2021    Tobacco abuse      His  has a past surgical history that includes Lumbar laminectomy; Prostatectomy; Knee arthroscopy (Right); Hemorrhoid surgery; Biceps tendon repair; Colonoscopy  (N/A, 3/27/2019); Inguinal hernia repair (Left); and Left heart catheterization (Left, 6/29/2020).     Past Family/Social History  His family history includes Alzheimer's disease in his father; Coronary artery disease (age of onset: 90) in his father; Hyperlipidemia in his mother; Prostate cancer in his father.  He  reports that he has been smoking cigarettes. He started smoking about 52 years ago. He has a 78.9 pack-year smoking history. He has quit using smokeless tobacco. He reports that he does not currently use alcohol. He reports that he does not use drugs.     Medications/Allergies/Immunizations  His current medication(s) include:   Current Outpatient Medications   Medication Sig Dispense Refill    potassium chloride SA (K-DUR,KLOR-CON) 20 MEQ tablet TAKE 3 TABLETS BY MOUTH TWICE DAILY 720 tablet 2    albuterol (PROVENTIL) 2.5 mg /3 mL (0.083 %) nebulizer solution Take 3 mLs (2.5 mg total) by nebulization every 4 to 6 hours as needed for Wheezing or Shortness of Breath. 360 mL 11    albuterol (PROVENTIL/VENTOLIN HFA) 90 mcg/actuation inhaler INHALE 2 PUFFS INTO THE LUNGS EVERY 4 HOURS AS NEEDED FOR WHEEZING 8.5 g 3    allopurinoL (ZYLOPRIM) 300 MG tablet Take 1 tablet (300 mg total) by mouth once daily. 90 tablet 3    aspirin (ECOTRIN) 81 MG EC tablet Take 81 mg by mouth once daily.      baclofen (LIORESAL) 10 MG tablet Take 1 tablet by mouth every evening.  2    bisoprolol (ZEBETA) 5 MG tablet TAKE 1/2 TABLET BY MOUTH EVERY DAY 90 tablet 3    colchicine (COLCRYS) 0.6 mg tablet TAKE 1 TABLET(0.6 MG) BY MOUTH DAILY AS NEEDED 30 tablet 1    fluticasone propionate (FLONASE) 50 mcg/actuation nasal spray SHAKE LIQUID AND USE 2 SPRAYS(100 MCG) IN EACH NOSTRIL EVERY DAY Strength: 50 mcg/actuation 48 g 3    fluticasone-salmeterol diskus inhaler 250-50 mcg Inhale 1 puff into the lungs 2 (two) times daily. Controller 60 each 11    glimepiride (AMARYL) 1 MG tablet TAKE 1 TABLET BY MOUTH EVERY DAY 90 tablet 3     HYDROcodone-acetaminophen (NORCO) 7.5-325 mg per tablet Take 1 tablet by mouth every 4 (four) hours as needed (for chronic pain).  0    losartan (COZAAR) 25 MG tablet       losartan (COZAAR) 50 MG tablet TAKE 1 TABLET(50 MG) BY MOUTH ONCE DAILY 90 tablet 3    magnesium oxide (MAG-OX) 400 mg (241.3 mg magnesium) tablet TAKE 2 TABLETS(800 MG) BY MOUTH TWICE DAILY 120 tablet 6    metFORMIN (GLUCOPHAGE) 500 MG tablet TAKE 1 TABLET(500 MG) BY MOUTH DAILY WITH BREAKFAST 90 tablet 3    multivitamin-minerals-lutein Tab Take 1 tablet by mouth Daily.      omeprazole (PRILOSEC) 40 MG capsule TAKE 1 CAPSULE BY MOUTH EVERY DAY 90 capsule 3    omeprazole (PRILOSEC) 40 MG capsule Take 1 capsule by mouth once daily.      predniSONE (DELTASONE) 20 MG tablet 3 daily x 3 days, 2 daily x 3 days, 1 daily x 3 days, 1/2 daily x 4 days. (Patient not taking: Reported on 8/11/2023) 20 tablet 0    SHINGRIX, PF, 50 mcg/0.5 mL injection       simvastatin (ZOCOR) 40 MG tablet Take 1 tablet (40 mg total) by mouth every evening. 90 tablet 3    sulfamethoxazole-trimethoprim 800-160mg (BACTRIM DS) 800-160 mg Tab Take 1 tablet by mouth 2 (two) times daily. (Patient not taking: Reported on 8/11/2023) 20 tablet 0    tiotropium bromide (SPIRIVA RESPIMAT) 2.5 mcg/actuation inhaler Inhale 2 puffs into the lungs once daily. 4 g 11    torsemide (DEMADEX) 20 MG Tab Take 2 tablets (40 mg total) by mouth 2 (two) times a day. 120 tablet 11     No current facility-administered medications for this visit.        Allergies: Amitriptyline and Celecoxib     Immunizations:   Immunization History   Administered Date(s) Administered    COVID-19, MRNA, LN-S, PF (Pfizer) (Purple Cap) 01/24/2021, 02/14/2021, 12/10/2021    Influenza 12/04/2012, 09/24/2015    Influenza (FLUAD) - Quadrivalent - Adjuvanted - PF *Preferred* (65+) 10/08/2020, 10/07/2021, 10/10/2022    Influenza (FLUAD) - Trivalent - Adjuvanted - PF (65+) 09/28/2017    Influenza - High Dose - PF (65 years and  older) 10/12/2016, 09/20/2018, 10/05/2019    Pneumococcal Conjugate - 13 Valent 10/12/2016    Pneumococcal Conjugate - 20 Valent 11/03/2022    Tdap 12/04/2021    Zoster 05/23/2012         Review of Systems   Constitutional: Negative for fever, weight loss and weight gain.  Skin: Negative for rash, itchiness, dryness  HENT:  As per HPI  Cardiovascular: Negative for chest pain and dyspnea on exertion .   Respiratory: Is not experiencing shortness of breath.   Gastrointestinal: Negative for nausea and vomiting.   Neurological: Negative for headaches.   Lymph/Heme: Negative for lymphadenopathy or easy bruising  Musculoskeletal: Negative for joint or muscle pain  Psychiatric: The patient is not nervous/anxious.        Answers submitted by the patient for this visit:  Review of Symptoms Questionnaire  (Submitted on 8/8/2023)  None of these: Yes  None of these: Yes  Eye Drainage?: Yes  Sleep Apnea?: Yes  shortness of breath: Yes  None of these : Yes  None of these: Yes  None of these: Yes  back pain: Yes  neck pain: Yes  None of these : Yes  None of these: Yes  None of these : Yes  headaches: Yes  Bruises or bleeds easily: Yes  Feeling depressed?: Yes    All other systems are negative except for that listed in the HPI.      PHYSICAL EXAM:   Vital Signs:  There were no vitals taken for this visit.     General:  Well-developed, well-nourished  Communication and Voice:  Clear pitch and clarity  Hearing: Hearing adequate for verbal communication bilaterally   Inspection:  Normocephalic fullness right orbit see eye exam below  Palpation:  Facial skeleton intact without bony stepoffs  Parotid Glands:  No mass or tenderness  Facial Strength:  Facial motility symmetric and full bilaterally  Pinna:  External ear intact and fully developed  External canal:  Canal is patent with intact skin  Tympanic Membrane:  Clear and mobile  External nose:  No scar or anatomic deformity  Internal Nose:  Septum intact and midline.  No edema,  polyp, or rhinorrhea.  TMJ:  No pain to palpation with full mobility  Oral cavity, Lips, Teeth, and Gums:  Mucosa and teeth intact and viable, No lesions, masses or ulcers  Oropharynx: No erythema or exudate, no masses or ulcerations, non-obstructive tonsils  Nasopharynx:  No mass or lesion with intact mucosa  Hypopharynx:  Not well visualized secondary to gagging  Larynx:  Not well visualized secondary to gagging  Neck, Trachea, Lymphatics:  Midline trachea without mass or lesion, no lymphadenopathy  Thyroid:  No mass or nodularity  Eyes: No nystagmus right eye with palpable mass in the right upper eyelid with exophthalmos and displacing orbit down with hypo globus.  He has limited upward and medial movement but does tract with intact lateral movement OD as well as intact downward gaze.  He does have diplopia in all fields  Neuro/Psych/Balance: Patient oriented and appropriate in interaction;  Appropriate mood and affect;  Gait is intact with no imbalance; Cranial nerves I-XII are intact except for right cranial nerve 3 palsy  Respiratory effort:  Equal inspiration and expiration without stridor  Peripheral Vascular:  Warm extremities with equal pulses      PATHOLOGY REVIEW:    Pending  RADIOLOGIC REVIEW:    I have personally reviewed the imaging and went over the images with the patient.  Orbital mass at that time measured 3.5 cm but likely bigger now.  Agree with remainder of radiology reporT EXCEPT FOR LATERALITY OF ORBITAL MASS IS INCORRECT AND MASS  IS IN THE RIGHT ORBIT    CT max face with contrast 08/07/2022   Impression:     Left periorbital hematoma     Slight nasal irregularity may relate to recent trauma     Foreign body left scalp soft tissues     Serpiginous mass in the superior nasal aspect of the left orbit within the intra coronal extending to the extraconal region suspect underlying mass.  Posttraumatic change cannot be excluded.  Recommend ophthalmology consultation.     Lytic lesion involving the  anterior aspect of the maxilla measuring 10 by 12 mm.  Recommend correlation to malignancy.     Minimal mucosal thickening of the left maxillary sinus    ASSESSMENT:   1. Orbital mass    2. Exophthalmos of right eye    3. Pain of right eye    4. Internuclear ophthalmoplegia of right eye            PLAN:   My strong suspicion for malignant diagnosis was discussed with the patient given pain was mass that has definitely grown.  Given nerve palsy and other ocular findings also supports very likely malignant diagnosis.  He needs biopsy of this asap.  He will also need up-to-date imaging.  I would prefer to obtain MRI but patient is very clear that even with open MRI he would not tolerate this.  I will obtain a repeat CT orbit without contrast due to patient's renal function.  I will obtain CT orbit sella to have thinner cuts at the orbit.  I will plan on taking him for incisional biopsy of the orbit on 09/08/2023.  Risks and benefits and alternatives discussed in detail.  I will discuss with Dr. Cuevas  and will likely need combined approach for treatment depending on pathology.      I believe that Mr. Khan has a good understanding of the issues involved and I answered all of his questions.     DISCLAIMER: This note was prepared with [employer] voice recognition transcription software. Garbled syntax, mangled pronouns, and other bizarre constructions may be attributed to that software system. While efforts were made to correct any mistakes made by this voice recognition program, some errors and/or omissions may remain in the note that were missed when the note was originally created.

## 2023-08-11 NOTE — H&P (VIEW-ONLY)
History of Present Illness:   Santiago Khan is a 72 y.o. year old male evaluated on 8/19/2023, in the Otolaryngology-Head and Neck Surgery Clinic at [facility]. The patient was referred by Dr. Adkins for evaluation of orbital mass.  Patient has had a lump in the right eye that he has felt for at least 1-2 years.  He has been very lot reluctant to get this treated.  Has seen Ophthalmology with recommendation and referral to Dr. Cuevas but he has not seen him as he did not want to travel to [place].  He admits that he has been in denial and is concerned that this is malignancy.  He has had watery drainage from the right eye.  He denies any pain at the mass.  He was scheduled to have an orbital MRI but despite Valium due to claustrophobia he did not complete this.  He has not had a CT in over a year.  He has never had biopsy of this.  He does report diplopia in all gazes.  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  He was last seen 1 month ago by Dr. Grace and was told this could be malignancy.  At this time he does want this worked up.         Past Medical/Surgical History  Past Medical History:   Diagnosis Date    Chronic diastolic congestive heart failure 04/07/2020    Chronic kidney disease, stage 3     Chronic systolic congestive heart failure 07/09/2020    Chronic venous insufficiency     COPD (chronic obstructive pulmonary disease)     DDD (degenerative disc disease), lumbar     DM (diabetes mellitus) with complications     History of gout     Hyperlipidemia associated with type 2 diabetes mellitus     Hypertension associated with stage 3 chronic kidney disease due to type 2 diabetes mellitus     BRADLEY on CPAP     Prostate cancer     prostatectomy in 2010, rising PSA that started in 2021    Tobacco abuse      His  has a past surgical history that includes Lumbar laminectomy; Prostatectomy; Knee arthroscopy (Right); Hemorrhoid surgery; Biceps tendon repair; Colonoscopy  (N/A, 3/27/2019); Inguinal hernia repair (Left); and Left heart catheterization (Left, 6/29/2020).     Past Family/Social History  His family history includes Alzheimer's disease in his father; Coronary artery disease (age of onset: 90) in his father; Hyperlipidemia in his mother; Prostate cancer in his father.  He  reports that he has been smoking cigarettes. He started smoking about 52 years ago. He has a 78.9 pack-year smoking history. He has quit using smokeless tobacco. He reports that he does not currently use alcohol. He reports that he does not use drugs.     Medications/Allergies/Immunizations  His current medication(s) include:   Current Outpatient Medications   Medication Sig Dispense Refill    potassium chloride SA (K-DUR,KLOR-CON) 20 MEQ tablet TAKE 3 TABLETS BY MOUTH TWICE DAILY 720 tablet 2    albuterol (PROVENTIL) 2.5 mg /3 mL (0.083 %) nebulizer solution Take 3 mLs (2.5 mg total) by nebulization every 4 to 6 hours as needed for Wheezing or Shortness of Breath. 360 mL 11    albuterol (PROVENTIL/VENTOLIN HFA) 90 mcg/actuation inhaler INHALE 2 PUFFS INTO THE LUNGS EVERY 4 HOURS AS NEEDED FOR WHEEZING 8.5 g 3    allopurinoL (ZYLOPRIM) 300 MG tablet Take 1 tablet (300 mg total) by mouth once daily. 90 tablet 3    aspirin (ECOTRIN) 81 MG EC tablet Take 81 mg by mouth once daily.      baclofen (LIORESAL) 10 MG tablet Take 1 tablet by mouth every evening.  2    bisoprolol (ZEBETA) 5 MG tablet TAKE 1/2 TABLET BY MOUTH EVERY DAY 90 tablet 3    colchicine (COLCRYS) 0.6 mg tablet TAKE 1 TABLET(0.6 MG) BY MOUTH DAILY AS NEEDED 30 tablet 1    fluticasone propionate (FLONASE) 50 mcg/actuation nasal spray SHAKE LIQUID AND USE 2 SPRAYS(100 MCG) IN EACH NOSTRIL EVERY DAY Strength: 50 mcg/actuation 48 g 3    fluticasone-salmeterol diskus inhaler 250-50 mcg Inhale 1 puff into the lungs 2 (two) times daily. Controller 60 each 11    glimepiride (AMARYL) 1 MG tablet TAKE 1 TABLET BY MOUTH EVERY DAY 90 tablet 3     HYDROcodone-acetaminophen (NORCO) 7.5-325 mg per tablet Take 1 tablet by mouth every 4 (four) hours as needed (for chronic pain).  0    losartan (COZAAR) 25 MG tablet       losartan (COZAAR) 50 MG tablet TAKE 1 TABLET(50 MG) BY MOUTH ONCE DAILY 90 tablet 3    magnesium oxide (MAG-OX) 400 mg (241.3 mg magnesium) tablet TAKE 2 TABLETS(800 MG) BY MOUTH TWICE DAILY 120 tablet 6    metFORMIN (GLUCOPHAGE) 500 MG tablet TAKE 1 TABLET(500 MG) BY MOUTH DAILY WITH BREAKFAST 90 tablet 3    multivitamin-minerals-lutein Tab Take 1 tablet by mouth Daily.      omeprazole (PRILOSEC) 40 MG capsule TAKE 1 CAPSULE BY MOUTH EVERY DAY 90 capsule 3    omeprazole (PRILOSEC) 40 MG capsule Take 1 capsule by mouth once daily.      predniSONE (DELTASONE) 20 MG tablet 3 daily x 3 days, 2 daily x 3 days, 1 daily x 3 days, 1/2 daily x 4 days. (Patient not taking: Reported on 8/11/2023) 20 tablet 0    SHINGRIX, PF, 50 mcg/0.5 mL injection       simvastatin (ZOCOR) 40 MG tablet Take 1 tablet (40 mg total) by mouth every evening. 90 tablet 3    sulfamethoxazole-trimethoprim 800-160mg (BACTRIM DS) 800-160 mg Tab Take 1 tablet by mouth 2 (two) times daily. (Patient not taking: Reported on 8/11/2023) 20 tablet 0    tiotropium bromide (SPIRIVA RESPIMAT) 2.5 mcg/actuation inhaler Inhale 2 puffs into the lungs once daily. 4 g 11    torsemide (DEMADEX) 20 MG Tab Take 2 tablets (40 mg total) by mouth 2 (two) times a day. 120 tablet 11     No current facility-administered medications for this visit.        Allergies: Amitriptyline and Celecoxib     Immunizations:   Immunization History   Administered Date(s) Administered    COVID-19, MRNA, LN-S, PF (Pfizer) (Purple Cap) 01/24/2021, 02/14/2021, 12/10/2021    Influenza 12/04/2012, 09/24/2015    Influenza (FLUAD) - Quadrivalent - Adjuvanted - PF *Preferred* (65+) 10/08/2020, 10/07/2021, 10/10/2022    Influenza (FLUAD) - Trivalent - Adjuvanted - PF (65+) 09/28/2017    Influenza - High Dose - PF (65 years and  older) 10/12/2016, 09/20/2018, 10/05/2019    Pneumococcal Conjugate - 13 Valent 10/12/2016    Pneumococcal Conjugate - 20 Valent 11/03/2022    Tdap 12/04/2021    Zoster 05/23/2012         Review of Systems   Constitutional: Negative for fever, weight loss and weight gain.  Skin: Negative for rash, itchiness, dryness  HENT:  As per HPI  Cardiovascular: Negative for chest pain and dyspnea on exertion .   Respiratory: Is not experiencing shortness of breath.   Gastrointestinal: Negative for nausea and vomiting.   Neurological: Negative for headaches.   Lymph/Heme: Negative for lymphadenopathy or easy bruising  Musculoskeletal: Negative for joint or muscle pain  Psychiatric: The patient is not nervous/anxious.        Answers submitted by the patient for this visit:  Review of Symptoms Questionnaire  (Submitted on 8/8/2023)  None of these: Yes  None of these: Yes  Eye Drainage?: Yes  Sleep Apnea?: Yes  shortness of breath: Yes  None of these : Yes  None of these: Yes  None of these: Yes  back pain: Yes  neck pain: Yes  None of these : Yes  None of these: Yes  None of these : Yes  headaches: Yes  Bruises or bleeds easily: Yes  Feeling depressed?: Yes    All other systems are negative except for that listed in the HPI.      PHYSICAL EXAM:   Vital Signs:  There were no vitals taken for this visit.     General:  Well-developed, well-nourished  Communication and Voice:  Clear pitch and clarity  Hearing: Hearing adequate for verbal communication bilaterally   Inspection:  Normocephalic fullness right orbit see eye exam below  Palpation:  Facial skeleton intact without bony stepoffs  Parotid Glands:  No mass or tenderness  Facial Strength:  Facial motility symmetric and full bilaterally  Pinna:  External ear intact and fully developed  External canal:  Canal is patent with intact skin  Tympanic Membrane:  Clear and mobile  External nose:  No scar or anatomic deformity  Internal Nose:  Septum intact and midline.  No edema,  polyp, or rhinorrhea.  TMJ:  No pain to palpation with full mobility  Oral cavity, Lips, Teeth, and Gums:  Mucosa and teeth intact and viable, No lesions, masses or ulcers  Oropharynx: No erythema or exudate, no masses or ulcerations, non-obstructive tonsils  Nasopharynx:  No mass or lesion with intact mucosa  Hypopharynx:  Not well visualized secondary to gagging  Larynx:  Not well visualized secondary to gagging  Neck, Trachea, Lymphatics:  Midline trachea without mass or lesion, no lymphadenopathy  Thyroid:  No mass or nodularity  Eyes: No nystagmus right eye with palpable mass in the right upper eyelid with exophthalmos and displacing orbit down with hypo globus.  He has limited upward and medial movement but does tract with intact lateral movement OD as well as intact downward gaze.  He does have diplopia in all fields  Neuro/Psych/Balance: Patient oriented and appropriate in interaction;  Appropriate mood and affect;  Gait is intact with no imbalance; Cranial nerves I-XII are intact except for right cranial nerve 3 palsy  Respiratory effort:  Equal inspiration and expiration without stridor  Peripheral Vascular:  Warm extremities with equal pulses      PATHOLOGY REVIEW:    Pending  RADIOLOGIC REVIEW:    I have personally reviewed the imaging and went over the images with the patient.  Orbital mass at that time measured 3.5 cm but likely bigger now.  Agree with remainder of radiology reporT EXCEPT FOR LATERALITY OF ORBITAL MASS IS INCORRECT AND MASS  IS IN THE RIGHT ORBIT    CT max face with contrast 08/07/2022   Impression:     Left periorbital hematoma     Slight nasal irregularity may relate to recent trauma     Foreign body left scalp soft tissues     Serpiginous mass in the superior nasal aspect of the left orbit within the intra coronal extending to the extraconal region suspect underlying mass.  Posttraumatic change cannot be excluded.  Recommend ophthalmology consultation.     Lytic lesion involving the  anterior aspect of the maxilla measuring 10 by 12 mm.  Recommend correlation to malignancy.     Minimal mucosal thickening of the left maxillary sinus    ASSESSMENT:   1. Orbital mass    2. Exophthalmos of right eye    3. Pain of right eye    4. Internuclear ophthalmoplegia of right eye            PLAN:   My strong suspicion for malignant diagnosis was discussed with the patient given pain was mass that has definitely grown.  Given nerve palsy and other ocular findings also supports very likely malignant diagnosis.  He needs biopsy of this asap.  He will also need up-to-date imaging.  I would prefer to obtain MRI but patient is very clear that even with open MRI he would not tolerate this.  I will obtain a repeat CT orbit without contrast due to patient's renal function.  I will obtain CT orbit sella to have thinner cuts at the orbit.  I will plan on taking him for incisional biopsy of the orbit on 09/08/2023.  Risks and benefits and alternatives discussed in detail.  I will discuss with Dr. Cuevas  and will likely need combined approach for treatment depending on pathology.      I believe that Mr. Khan has a good understanding of the issues involved and I answered all of his questions.     DISCLAIMER: This note was prepared with We voice recognition transcription software. Garbled syntax, mangled pronouns, and other bizarre constructions may be attributed to that software system. While efforts were made to correct any mistakes made by this voice recognition program, some errors and/or omissions may remain in the note that were missed when the note was originally created.

## 2023-08-21 ENCOUNTER — HOSPITAL ENCOUNTER (OUTPATIENT)
Dept: RADIOLOGY | Facility: HOSPITAL | Age: 73
Discharge: HOME | End: 2023-08-21
Attending: OTOLARYNGOLOGY
Payer: MEDICARE

## 2023-08-21 DIAGNOSIS — H57.11 PAIN OF RIGHT EYE: ICD-10-CM

## 2023-08-21 DIAGNOSIS — H05.20 EXOPHTHALMOS OF RIGHT EYE: ICD-10-CM

## 2023-08-21 PROCEDURE — 70480 CT ORBITS SELLA POST FOSSA WITHOUT CONT: ICD-10-PCS | Mod: 26,,, | Performed by: RADIOLOGY

## 2023-08-21 PROCEDURE — 70480 CT ORBIT/EAR/FOSSA W/O DYE: CPT | Mod: TC

## 2023-08-21 PROCEDURE — 70480 CT ORBIT/EAR/FOSSA W/O DYE: CPT | Mod: 26,,, | Performed by: RADIOLOGY

## 2023-08-23 ENCOUNTER — PATIENT OUTREACH (OUTPATIENT)
Dept: PULMONOLOGY | Facility: CLINIC | Age: 73
End: 2023-08-23
Payer: MEDICARE

## 2023-08-30 ENCOUNTER — CLINICAL SUPPORT (OUTPATIENT)
Dept: SMOKING CESSATION | Facility: CLINIC | Age: 73
End: 2023-08-30
Payer: COMMERCIAL

## 2023-08-30 ENCOUNTER — TELEPHONE (OUTPATIENT)
Dept: SMOKING CESSATION | Facility: CLINIC | Age: 73
End: 2023-08-30
Payer: MEDICARE

## 2023-08-30 DIAGNOSIS — F17.200 NICOTINE DEPENDENCE: Primary | ICD-10-CM

## 2023-08-30 PROCEDURE — 99999 PR PBB SHADOW E&M-EST. PATIENT-LVL II: ICD-10-PCS | Mod: PBBFAC,,, | Performed by: SPEECH-LANGUAGE PATHOLOGIST

## 2023-08-30 PROCEDURE — 99404 PREV MED CNSL INDIV APPRX 60: CPT | Mod: S$GLB,,, | Performed by: SPEECH-LANGUAGE PATHOLOGIST

## 2023-08-30 PROCEDURE — 99404 PR PREVENT COUNSEL,INDIV,60 MIN: ICD-10-PCS | Mod: S$GLB,,, | Performed by: SPEECH-LANGUAGE PATHOLOGIST

## 2023-08-30 PROCEDURE — 99999 PR PBB SHADOW E&M-EST. PATIENT-LVL II: CPT | Mod: PBBFAC,,, | Performed by: SPEECH-LANGUAGE PATHOLOGIST

## 2023-08-30 RX ORDER — VARENICLINE TARTRATE 1 MG/1
TABLET, FILM COATED ORAL
Qty: 56 TABLET | Refills: 0 | Status: SHIPPED | OUTPATIENT
Start: 2023-08-30 | End: 2023-09-20 | Stop reason: SDUPTHER

## 2023-08-30 NOTE — PROGRESS NOTES
At SOC, patient reports smoking almost 40 cpd. Assessed CO with patient reporting smoking 2 prior. CO 28. Discussed the role of tobacco cessation program, role of NRT & behavioral changes to assist the patient to reach his goal of being tobacco free. The patient reports he is willing to utilize Varenicline & will return for a follow up visit.  Education & instruction on the role of the NRT, usage & dosage of Varenicline. The patient verbalized understanding & willingness to apply. Patient instructed to call CTTS anytime. Follow up visit set with the patient for 9/20/2023 at 3:00 pm. Patient states his goal is to be smoke free.

## 2023-08-30 NOTE — TELEPHONE ENCOUNTER
Call to patient for scheduled 10 am appointment. Phone goes straight to voicemail. Text sent & voicemail left

## 2023-09-01 DIAGNOSIS — Z01.818 PRE-OP TESTING: Primary | ICD-10-CM

## 2023-09-05 ENCOUNTER — HOSPITAL ENCOUNTER (OUTPATIENT)
Dept: CARDIOLOGY | Facility: HOSPITAL | Age: 73
Discharge: HOME | End: 2023-09-05
Payer: MEDICARE

## 2023-09-05 ENCOUNTER — OFFICE VISIT (OUTPATIENT)
Dept: INTERNAL MEDICINE | Facility: CLINIC | Age: 73
End: 2023-09-05
Payer: MEDICARE

## 2023-09-05 VITALS
TEMPERATURE: 99 F | OXYGEN SATURATION: 91 % | HEART RATE: 88 BPM | DIASTOLIC BLOOD PRESSURE: 77 MMHG | SYSTOLIC BLOOD PRESSURE: 129 MMHG

## 2023-09-05 DIAGNOSIS — Z01.818 PRE-OP TESTING: ICD-10-CM

## 2023-09-05 DIAGNOSIS — E11.8 DM (DIABETES MELLITUS) WITH COMPLICATIONS: ICD-10-CM

## 2023-09-05 DIAGNOSIS — I50.42 CHRONIC COMBINED SYSTOLIC AND DIASTOLIC CONGESTIVE HEART FAILURE: Chronic | ICD-10-CM

## 2023-09-05 DIAGNOSIS — M51.36 DDD (DEGENERATIVE DISC DISEASE), LUMBAR: ICD-10-CM

## 2023-09-05 DIAGNOSIS — G47.33 OSA ON CPAP: ICD-10-CM

## 2023-09-05 DIAGNOSIS — H05.89 ORBITAL MASS: Primary | ICD-10-CM

## 2023-09-05 PROCEDURE — 93005 ELECTROCARDIOGRAM TRACING: CPT

## 2023-09-05 PROCEDURE — 99999 PR PBB SHADOW E&M-EST. PATIENT-LVL IV: CPT | Mod: PBBFAC,,,

## 2023-09-05 PROCEDURE — 99999 PR PBB SHADOW E&M-EST. PATIENT-LVL IV: ICD-10-PCS | Mod: PBBFAC,,,

## 2023-09-05 PROCEDURE — 93010 EKG 12-LEAD: ICD-10-PCS | Mod: ,,, | Performed by: INTERNAL MEDICINE

## 2023-09-05 PROCEDURE — 93010 ELECTROCARDIOGRAM REPORT: CPT | Mod: ,,, | Performed by: INTERNAL MEDICINE

## 2023-09-05 RX ORDER — GUAIFENESIN 100 MG/5ML
200 SOLUTION ORAL NIGHTLY
COMMUNITY

## 2023-09-05 NOTE — DISCHARGE INSTRUCTIONS
Pre op instructions reviewed with patient during Clinic Visit with Provider, verbalized understanding.    To confirm, Surgery is scheduled on 9/08/23. We will call you late afternoon the business day prior to surgery with your arrival time.    *Please report to the [facility] (1st Floor) located off of [facility] (2nd Entrance/Building on the left, in front of the flag pole).  Address: [address] [place] [address] [place] [address]        INSTRUCTIONS IMPORTANT!!!  Do Not Eat, Drink, or Smoke after 12 midnight unless instructed otherwise by your Surgeon. OK to brush teeth, no gum, candy or mints!    *MEDICATION INSTRUCTIONS as instructed by Kell Mcadams NP: Morning of Surgery, please ONLY take:  -Bisoprolol  -Omeprazole  -Chantix  -Spiriva Inhaler  -Albuterol inhaler, as needed    *Additional Medication Instructions: Hold Aspirin 7 days prior to surgery.    Diabetic Patients: If you take diabetic or weight loss medication, Do NOT take morning of surgery unless instructed by Doctor. Metformin to be stopped 24 hrs prior to surgery. Ozempic/ Mounjaro/ Wegovy or any weight loss injections to be stopped 7 days prior to surgery. DO NOT take long-acting insulin the evening before surgery. Blood sugars will be checked in pre-op by Nurse.    *Patients should HOLD all vitamins, herbal supplements, weight loss medication, aspirin products & NSAIDS 7 days prior to surgery, as these can thin the blood. Ok to take Tylenol.    ____  Avoid Alcoholic beverages 3 days prior to surgery, as it can thin the blood.  ____  NO Acrylic/fake nails or nail polish worn day of surgery (specifically hand/arm & foot surgeries).  ____  NO powder, lotions, deodorants, oils or cream on body.  ____  Remove all jewelry, piercings, & foreign objects prior to arrival and leave at home.  ____  Remove Dentures, Hearing Aids & Contact Lens prior to surgery.  ____  Bring photo ID and insurance information to hospital (Leave Valuables at  Home).  ____  If going home the same day, arrange for a ride home. You will not be able to drive for 24 hrs if Anesthesia was used.   ____  Females (ages 11-60): may need to give a urine sample the morning of surgery; please see Pre op Nurse prior to using the restroom.  ____  Males: Stop ED medications (Viagra, Cialis) 24 hrs prior to surgery.  ____  Wear clean, loose fitting clothing to allow for dressings/ bandages.      Bathing Instructions:    -Shower with anti-bacterial Soap (Hibiclens or Dial) the night before surgery and the morning of.   -Do not use Hibiclens on your face or genitals.   -Apply clean clothes after shower.  -Do not shave your face or body 2 days prior to surgery unless instructed otherwise by your Surgeon.  -Do not shave pubic hair 7 days prior to surgery (gyn pt's).    [facility]Ride Policy:  Only 2 adults allowed in pre op/recovery area during your procedure. You MUST HAVE A RIDE HOME from a responsible adult that you know and trust. Medical Transport, Uber or Lyft can ONLY be used if patient has a responsible adult to accompany them during ride home.    Discharge Instructions: You will receive Post-op/Discharge instructions by your Discharge Nurse prior to going home.   *Prevention of surgical site infections:   -Keep incisions clean and dry.   -Do not soak/submerge incisions in water until completely healed.   -Do not apply lotions, powders, creams, or deodorants to site.   -Always make sure hands are cleaned with antibacterial soap/ alcohol-based  prior to touching the surgical site.        *Signs and symptoms of Infection:               -Redness and pain around the area where you had surgery               -Drainage of cloudy fluid from your surgical wound               -Fever, chills or any flu-like symptoms     >>>Call Surgeon office/on-call Surgeon if you experience any of these signs & symptoms post-surgery @ [phone]<<<       *If you are running late day of  surgery, please call the Surgery Dept @ [phone].       *Billing question, please call  [phone]       Thank you,  -[facility].  [phone] or [phone]  M-F 7:30 am-4:00 pm (Closed Major Holidays)    Additional Tests Scheduled Today:  EKG (4th Floor) Check in at the !  Additional Tests Scheduled Today:  Labs (1st Floor) Check in at the !

## 2023-09-05 NOTE — ASSESSMENT & PLAN NOTE
LROM Lumbar Spine, Previous Laminectomy.   Chronic Pain    --Unable to tolerate lying flat, sleeps in a recliner or elevated >30 degrees

## 2023-09-05 NOTE — PRE-PROCEDURE INSTRUCTIONS
To confirm, Surgery is scheduled on 9/08/23. We will call you late afternoon the business day prior to surgery with your arrival time.    *Please report to the [facility] (1st Floor) located off of [facility] (2nd Entrance/Building on the left, in front of the flag pole).  Address: [address] [place] [address] [place] [address]        INSTRUCTIONS IMPORTANT!!!  Do Not Eat, Drink, or Smoke after 12 midnight unless instructed otherwise by your Surgeon. OK to brush teeth, no gum, candy or mints!    *MEDICATION INSTRUCTIONS as instructed by Kell Mcadams NP: Morning of Surgery, please ONLY take:  -Bisoprolol  -Omeprazole  -Chantix  -Spiriva Inhaler  -Albuterol inhaler, as needed    *Additional Medication Instructions: Hold Aspirin 7 days prior to surgery.    Diabetic Patients: If you take diabetic or weight loss medication, Do NOT take morning of surgery unless instructed by Doctor. Metformin to be stopped 24 hrs prior to surgery. Ozempic/ Mounjaro/ Wegovy or any weight loss injections to be stopped 7 days prior to surgery. DO NOT take long-acting insulin the evening before surgery. Blood sugars will be checked in pre-op by Nurse.    *Patients should HOLD all vitamins, herbal supplements, weight loss medication, aspirin products & NSAIDS 7 days prior to surgery, as these can thin the blood. Ok to take Tylenol.    ____  Avoid Alcoholic beverages 3 days prior to surgery, as it can thin the blood.  ____  NO Acrylic/fake nails or nail polish worn day of surgery (specifically hand/arm & foot surgeries).  ____  NO powder, lotions, deodorants, oils or cream on body.  ____  Remove all jewelry, piercings, & foreign objects prior to arrival and leave at home.  ____  Remove Dentures, Hearing Aids & Contact Lens prior to surgery.  ____  Bring photo ID and insurance information to hospital (Leave Valuables at Home).  ____  If going home the same day, arrange for a ride home. You will not be able to drive for 24 hrs if  Anesthesia was used.   ____  Females (ages 11-60): may need to give a urine sample the morning of surgery; please see Pre op Nurse prior to using the restroom.  ____  Males: Stop ED medications (Viagra, Cialis) 24 hrs prior to surgery.  ____  Wear clean, loose fitting clothing to allow for dressings/ bandages.      Bathing Instructions:    -Shower with anti-bacterial Soap (Hibiclens or Dial) the night before surgery and the morning of.   -Do not use Hibiclens on your face or genitals.   -Apply clean clothes after shower.  -Do not shave your face or body 2 days prior to surgery unless instructed otherwise by your Surgeon.  -Do not shave pubic hair 7 days prior to surgery (gyn pt's).    [facility] Policy:  Only 2 adults allowed in pre op/recovery area during your procedure. You MUST HAVE A RIDE HOME from a responsible adult that you know and trust. Medical Transport, Uber or Lyft can ONLY be used if patient has a responsible adult to accompany them during ride home.    Discharge Instructions: You will receive Post-op/Discharge instructions by your Discharge Nurse prior to going home.   *Prevention of surgical site infections:   -Keep incisions clean and dry.   -Do not soak/submerge incisions in water until completely healed.   -Do not apply lotions, powders, creams, or deodorants to site.   -Always make sure hands are cleaned with antibacterial soap/ alcohol-based  prior to touching the surgical site.        *Signs and symptoms of Infection:               -Redness and pain around the area where you had surgery               -Drainage of cloudy fluid from your surgical wound               -Fever, chills or any flu-like symptoms     >>>Call Surgeon office/on-call Surgeon if you experience any of these signs & symptoms post-surgery @ [phone]<<<       *If you are running late day of surgery, please call the Surgery Dept @ [phone].       *Billing question, please call  [phone]        Thank you,  -[facility]  [phone] or [phone]  M-F 7:30 am-4:00 pm (Closed Major Holidays)    Additional Tests Scheduled Today:  EKG (4th Floor) Check in at the !  Additional Tests Scheduled Today:  Labs (1st Floor) Check in at the !

## 2023-09-05 NOTE — PROGRESS NOTES
Preoperative History and Physical                                                              Hospital Medicine                                                                      Chief Complaint: Preoperative evaluation     History of Present Illness:      Santiago Khan is a 72 y.o. male who presents to the office today for a preoperative consultation at the request of Junaid Fernandez MD who plans on performing Exploration, Orbit on September 8.     Functional Status:      The patient is able to climb a flight of stairs. The patient is able to ambulate with cane without difficulty. The patient's functional status is affected by the surgical problem. The patient's functional status is affected by shortness of breath, dyspnea on exertion.  Not limited by Chest Pain, Fatigue.  MET score greater than 4    Past Medical History:      Past Medical History:   Diagnosis Date    Chronic diastolic congestive heart failure 04/07/2020    Chronic kidney disease, stage 3     Chronic systolic congestive heart failure 07/09/2020    Chronic venous insufficiency     COPD (chronic obstructive pulmonary disease)     DDD (degenerative disc disease), lumbar     DM (diabetes mellitus) with complications     History of gout     Hyperlipidemia associated with type 2 diabetes mellitus     Hypertension associated with stage 3 chronic kidney disease due to type 2 diabetes mellitus     BRADLEY on CPAP     Prostate cancer     prostatectomy in 2010, rising PSA that started in 2021    Tobacco abuse         Past Surgical History:      Past Surgical History:   Procedure Laterality Date    BICEPS TENDON REPAIR      COLONOSCOPY N/A 03/27/2019    Procedure: COLONOSCOPY;  Surgeon: James Roger MD;  Location: [facility];  Service: Endoscopy;  Laterality: N/A;    HEMORRHOID SURGERY      INGUINAL HERNIA REPAIR Left     KNEE ARTHROSCOPY Right     LEFT HEART CATHETERIZATION Left 06/29/2020    Procedure:  CATHETERIZATION, HEART, LEFT;  Surgeon: Cheli Wilson MD;  Location: [facility] CATH LAB;  Service: Cardiology;  Laterality: Left;    LUMBAR LAMINECTOMY      PROSTATECTOMY      TONSILLECTOMY          Social History:      Social History     Socioeconomic History    Marital status:    Tobacco Use    Smoking status: Every Day     Current packs/day: 1.50     Average packs/day: 1.5 packs/day for 52.7 years (79.0 ttl pk-yrs)     Types: Cigarettes     Start date: 1/1/1971    Smokeless tobacco: Former   Substance and Sexual Activity    Alcohol use: Not Currently    Drug use: Never    Sexual activity: Not Currently     Partners: Female     Social Determinants of Health     Financial Resource Strain: Low Risk  (5/8/2023)    Overall Financial Resource Strain (CARDIA)     Difficulty of Paying Living Expenses: Not very hard   Food Insecurity: No Food Insecurity (5/8/2023)    Hunger Vital Sign     Worried About Running Out of Food in the Last Year: Never true     Ran Out of Food in the Last Year: Never true   Transportation Needs: No Transportation Needs (5/8/2023)    PRAPARE - Transportation     Lack of Transportation (Medical): No     Lack of Transportation (Non-Medical): No   Physical Activity: Inactive (5/8/2023)    Exercise Vital Sign     Days of Exercise per Week: 0 days     Minutes of Exercise per Session: 0 min   Stress: Stress Concern Present (5/8/2023)    [place] [place] [employer] - Occupational Stress Questionnaire     Feeling of Stress : To some extent   Social Connections: Moderately Isolated (5/8/2023)    Social Connection and Isolation Panel [NHANES]     Frequency of Communication with Friends and Family: More than three times a week     Frequency of Social Gatherings with Friends and Family: Once a week     Attends Church Services: More than 4 times per year     Active Member of Clubs or Organizations: No     Attends Club or Organization Meetings: Never     Marital Status:    Housing  Stability: Low Risk  (5/8/2023)    Housing Stability Vital Sign     Unable to Pay for Housing in the Last Year: No     Number of Places Lived in the Last Year: 1     Unstable Housing in the Last Year: No        Family History:      Family History   Problem Relation Age of Onset    Prostate cancer Father     Coronary artery disease Father 90    Alzheimer's disease Father     Hyperlipidemia Mother        Allergies:      Review of patient's allergies indicates:   Allergen Reactions    Amitriptyline Rash    Celecoxib Rash       Medications:      Current Outpatient Medications   Medication Sig    albuterol (PROVENTIL) 2.5 mg /3 mL (0.083 %) nebulizer solution Take 3 mLs (2.5 mg total) by nebulization every 4 to 6 hours as needed for Wheezing or Shortness of Breath.    albuterol (PROVENTIL/VENTOLIN HFA) 90 mcg/actuation inhaler INHALE 2 PUFFS INTO THE LUNGS EVERY 4 HOURS AS NEEDED FOR WHEEZING    allopurinoL (ZYLOPRIM) 300 MG tablet Take 1 tablet (300 mg total) by mouth once daily.    aspirin (ECOTRIN) 81 MG EC tablet Take 81 mg by mouth once daily.    baclofen (LIORESAL) 10 MG tablet Take 1 tablet by mouth every evening.    bisoprolol (ZEBETA) 5 MG tablet TAKE 1/2 TABLET BY MOUTH EVERY DAY    colchicine (COLCRYS) 0.6 mg tablet TAKE 1 TABLET(0.6 MG) BY MOUTH DAILY AS NEEDED    diphenhydrAMINE-acetaminophen (TYLENOL PM)  mg Tab Take 1 tablet by mouth nightly as needed.    fluticasone propionate (FLONASE) 50 mcg/actuation nasal spray SHAKE LIQUID AND USE 2 SPRAYS(100 MCG) IN EACH NOSTRIL EVERY DAY Strength: 50 mcg/actuation    glimepiride (AMARYL) 1 MG tablet TAKE 1 TABLET BY MOUTH EVERY DAY    guaiFENesin 100 mg/5 ml (ROBITUSSIN) 100 mg/5 mL syrup Take 200 mg by mouth every evening.    HYDROcodone-acetaminophen (NORCO) 7.5-325 mg per tablet Take 1 tablet by mouth every 4 (four) hours as needed (for chronic pain).    losartan (COZAAR) 50 MG tablet TAKE 1 TABLET(50 MG) BY MOUTH ONCE DAILY    magnesium oxide (MAG-OX)  400 mg (241.3 mg magnesium) tablet TAKE 2 TABLETS(800 MG) BY MOUTH TWICE DAILY    metFORMIN (GLUCOPHAGE) 500 MG tablet TAKE 1 TABLET(500 MG) BY MOUTH DAILY WITH BREAKFAST    multivitamin-minerals-lutein Tab Take 1 tablet by mouth Daily.    omeprazole (PRILOSEC) 40 MG capsule TAKE 1 CAPSULE BY MOUTH EVERY DAY    potassium chloride SA (K-DUR,KLOR-CON) 20 MEQ tablet TAKE 3 TABLETS BY MOUTH TWICE DAILY    simvastatin (ZOCOR) 40 MG tablet Take 1 tablet (40 mg total) by mouth every evening.    tiotropium bromide (SPIRIVA RESPIMAT) 2.5 mcg/actuation inhaler Inhale 2 puffs into the lungs once daily.    torsemide (DEMADEX) 20 MG Tab Take 2 tablets (40 mg total) by mouth 2 (two) times a day.    varenicline (CHANTIX) 1 mg Tab On days 1-3, take 1/2 tablet once daily. On days 4-7, take 1/2 tablet twice daily On days 8 thru the rest of the prescription, take 1 tablet twice daily. Take with full glass of water & with food to avoid nausea    SHINGRIX, PF, 50 mcg/0.5 mL injection      No current facility-administered medications for this visit.       Vitals:      Vitals:    09/05/23 1217   BP: 129/77   Pulse: 88   Temp: 98.6 °F (37 °C)       Review of Systems:        Constitutional: Negative for fever, chills, weight loss, malaise/fatigue and diaphoresis.   HENT: Negative for hearing loss, ear pain, nosebleeds, congestion, sore throat, neck pain, tinnitus and ear discharge.    Eyes: Negative for  photophobia, pain, discharge. Positive for blurry vision (right), Double vision, edema (right eye).   Respiratory: Negative for cough, hemoptysis, sputum production, shortness of breath, wheezing and stridor.    Cardiovascular: Negative for chest pain, palpitations, orthopnea, claudication, leg swelling and PND.   Gastrointestinal: Negative for heartburn, nausea, vomiting, abdominal pain, diarrhea, constipation, blood in stool and melena.   Genitourinary: Negative for dysuria, urgency, frequency, hematuria and flank pain.   Musculoskeletal:  Negative for myalgias, joint pain and falls. Positive for Lower back pain.   Skin: Negative for itching and rash.   Neurological: Negative for dizziness, tingling, tremors, sensory change, speech change, focal weakness, seizures, loss of consciousness, weakness and headaches.   Endo/Heme/Allergies: Negative for environmental allergies and polydipsia. Does not bruise/bleed easily.   Psychiatric/Behavioral: Negative for depression, suicidal ideas, hallucinations, memory loss and substance abuse. The patient is not nervous/anxious and does not have insomnia.    All 14 systems reviewed and negative except as noted above.    Physical Exam:      Constitutional: Appears well-developed, well-nourished and in no acute distress.  Patient is oriented to person, place, and time. Ambulates with cane.   Head: Normocephalic and atraumatic. Mucous membranes moist.  Neck: Neck supple no mass. LROM.   Cardiovascular: Normal rate and regular rhythm.  S1 S2 appreciated by ascultation.  Pulmonary/Chest: Effort normal and clear to auscultation bilaterally. No respiratory distress.   Abdomen: Soft. Non-tender and non-distended. Bowel sounds are normal.   Neurological: Patient is alert and oriented to person, place and time. Moves all extremities.  Skin: Warm and dry. No lesions.  Extremities: No clubbing, cyanosis or edema.    Laboratory data:      Reviewed and noted in plan where applicable. Please see chart for full laboratory data.    @NEOYJDRYX53(cpk,cpkmb,troponini,mb)@ @RBWWLWNTF52(poctglucose)@     Lab Results   Component Value Date    INR 1.0 06/26/2020    INR 1.0 03/10/2020       Lab Results   Component Value Date    WBC 11.60 09/05/2023    HGB 15.3 09/05/2023    HCT 48.8 09/05/2023     (H) 09/05/2023     09/05/2023       @[id number](GLU,NA,K,Cl,CO2,BUN,Creatinine,Calcium,MG)@    Predictors of intubation difficulty:       Morbid obesity? no   Anatomically abnormal facies? no   Prominent incisors? no   Receding  mandible? no   Short, thick neck? yes - Obesity related   Neck range of motion: abnormal, LROM   Dentition:  Full Dentures, Top and Bottom  Based on the Modified Mallampati, patient is a mallampati score: III (soft and hard palate and base of uvula visible)    Cardiographics:      ECG: no change since previous ECG dated 10/18/22  Echocardiogram: not indicated    Imaging:      Chest x-ray: not indicated    Assessment and Plan:      Orbital mass  Planned for Exploration, Orbit, Right with Dr. Junaid Bartholomew on 9/8/23.     Known risk factors for perioperative complications: Congestive heart failure  Chronic pulmonary disease  Diabetes mellitus    Difficulty with intubation is not anticipated.    Cardiac Risk Estimation: Based on the Revised Cardiac Risk index, patient is a Class 2 risk with a 6.0% risk of a major cardiac event in a low risk procedure.    1.) Preoperative workup as follows: ECG, hemoglobin, hematocrit, electrolytes, creatinine, glucose, liver function studies.  2.) Change in medication regimen before surgery: discontinue ASA, NSAIDs 7 days before surgery, hold Metformin 24 hours prior to surgery.  3.) Prophylaxis for cardiac events with perioperative beta-blockers: Continue bisoprolol as prescribed.  4.) Invasive hemodynamic monitoring perioperatively: at the discretion of anesthesiologist.  5.) Deep vein thrombosis prophylaxis postoperatively: intermittent pneumatic compression boots and regimen to be chosen by surgical team.  6.) Surveillance for postoperative MI with ECG immediately postoperatively and on postoperati ve days 1 and 2 AND troponin levels 24 hours postoperatively and on day 4 or hospital discharge (whichever comes first): not indicated.  7.) Current medications which may produce withdrawal symptoms if withheld perioperatively: None  8.) Other measures: Careful attention to perioperative glycemic control (POCT Glucose, treat as Indicated with SSI).     --Morning of Procedure: bisoprolol,  omeprazole, Chantix, Spiriva  --Resume all medications post-operatively  --Hold ASA, NSAIDs and all vitamins/supplements 7 days prior to procedure  --Hold Metformin 24H Prior to surgery      DDD (degenerative disc disease), lumbar  LROM Lumbar Spine, Previous Laminectomy.   Chronic Pain    --Unable to tolerate lying flat, sleeps in a recliner or elevated >30 degrees    BRADLEY on CPAP  Unable to tolerate CPAP, has attempted per Pulmonology    --Does not sleep flat, unable to tolerate due to breathing and chronic back pain  --In recovery, keep head of stretcher elevated >30 degrees    DM (diabetes mellitus) with complications  Last HgA1c 5/9/23: 5.8%  Chronic, Well controlled    --See medication recommendations as above    Chronic combined systolic and diastolic congestive heart failure  Chronic, well controlled. Euvolemic on exam    --See medication recommendations as above  --Cautious use of IV fluids during procedure        Electronically signed by Kell Mcadams NP on 9/6/2023 at 12:25 PM.

## 2023-09-05 NOTE — ASSESSMENT & PLAN NOTE
Chronic, well controlled. Euvolemic on exam    --See medication recommendations as above  --Cautious use of IV fluids during procedure

## 2023-09-05 NOTE — ASSESSMENT & PLAN NOTE
Planned for Exploration, Orbit, Right with Dr. Junaid Bartholomew on 9/8/23.     Known risk factors for perioperative complications: Congestive heart failure  Chronic pulmonary disease  Diabetes mellitus    Difficulty with intubation is not anticipated.    Cardiac Risk Estimation: Based on the Revised Cardiac Risk index, patient is a Class 2 risk with a 6.0% risk of a major cardiac event in a low risk procedure.    1.) Preoperative workup as follows: ECG, hemoglobin, hematocrit, electrolytes, creatinine, glucose, liver function studies.  2.) Change in medication regimen before surgery: discontinue ASA, NSAIDs 7 days before surgery, hold Metformin 24 hours prior to surgery.  3.) Prophylaxis for cardiac events with perioperative beta-blockers: Continue bisoprolol as prescribed.  4.) Invasive hemodynamic monitoring perioperatively: at the discretion of anesthesiologist.  5.) Deep vein thrombosis prophylaxis postoperatively: intermittent pneumatic compression boots and regimen to be chosen by surgical team.  6.) Surveillance for postoperative MI with ECG immediately postoperatively and on postoperati ve days 1 and 2 AND troponin levels 24 hours postoperatively and on day 4 or hospital discharge (whichever comes first): not indicated.  7.) Current medications which may produce withdrawal symptoms if withheld perioperatively: None  8.) Other measures: Careful attention to perioperative glycemic control (POCT Glucose, treat as Indicated with SSI).     --Morning of Procedure: bisoprolol, omeprazole, Chantix, Spiriva  --Resume all medications post-operatively  --Hold ASA, NSAIDs and all vitamins/supplements 7 days prior to procedure  --Hold Metformin 24H Prior to surgery

## 2023-09-07 ENCOUNTER — TELEPHONE (OUTPATIENT)
Dept: PREADMISSION TESTING | Facility: HOSPITAL | Age: 73
End: 2023-09-07

## 2023-09-07 NOTE — TELEPHONE ENCOUNTER
Called and spoke with Aurora Fish (friend) & message left on Mr. Miles's answering service about the following:     Please arrive to [facility] ([facility]) at 0620 on 9/8/23 for your scheduled procedure.  Address: [address] [place] [address] [place] [address] ([address] Building on left, 1st Floor Lobby)  >>>NO eating or drinking after midnight unless instructed otherwise by your Surgeon<<<    Thank you,  -[facility] Pre Admit Testing Dept.  Mon-Fri 7:30 am - 4 pm [phone]

## 2023-09-08 ENCOUNTER — ANESTHESIA (OUTPATIENT)
Dept: SURGERY | Facility: HOSPITAL | Age: 73
End: 2023-09-08
Payer: MEDICARE

## 2023-09-08 ENCOUNTER — HOSPITAL ENCOUNTER (OUTPATIENT)
Facility: HOSPITAL | Age: 73
Discharge: HOME | End: 2023-09-08
Attending: OTOLARYNGOLOGY | Admitting: OTOLARYNGOLOGY
Payer: MEDICARE

## 2023-09-08 ENCOUNTER — ANESTHESIA EVENT (OUTPATIENT)
Dept: SURGERY | Facility: HOSPITAL | Age: 73
End: 2023-09-08
Payer: MEDICARE

## 2023-09-08 DIAGNOSIS — J44.89 COPD WITH CHRONIC BRONCHITIS AND EMPHYSEMA: ICD-10-CM

## 2023-09-08 DIAGNOSIS — J43.9 COPD WITH CHRONIC BRONCHITIS AND EMPHYSEMA: ICD-10-CM

## 2023-09-08 DIAGNOSIS — H05.89 ORBITAL MASS: Primary | ICD-10-CM

## 2023-09-08 LAB
POCT GLUCOSE: 130 MG/DL (ref 70–110)
POCT GLUCOSE: 149 MG/DL (ref 70–110)

## 2023-09-08 PROCEDURE — 37000008 HC ANESTHESIA 1ST 15 MINUTES: Performed by: OTOLARYNGOLOGY

## 2023-09-08 PROCEDURE — 36000706: Performed by: OTOLARYNGOLOGY

## 2023-09-08 PROCEDURE — 88189 FLOWCYTOMETRY/READ 16 & >: CPT | Mod: ,,, | Performed by: PATHOLOGY

## 2023-09-08 PROCEDURE — 88331 PR  PATH CONSULT IN SURG,W FRZ SEC: ICD-10-PCS | Mod: 26,,, | Performed by: STUDENT IN AN ORGANIZED HEALTH CARE EDUCATION/TRAINING PROGRAM

## 2023-09-08 PROCEDURE — 36000707: Performed by: OTOLARYNGOLOGY

## 2023-09-08 PROCEDURE — 88184 FLOWCYTOMETRY/ TC 1 MARKER: CPT | Performed by: PATHOLOGY

## 2023-09-08 PROCEDURE — 71000015 HC POSTOP RECOV 1ST HR: Performed by: OTOLARYNGOLOGY

## 2023-09-08 PROCEDURE — 88341 IMHCHEM/IMCYTCHM EA ADD ANTB: CPT | Mod: 59 | Performed by: STUDENT IN AN ORGANIZED HEALTH CARE EDUCATION/TRAINING PROGRAM

## 2023-09-08 PROCEDURE — 88342 IMHCHEM/IMCYTCHM 1ST ANTB: CPT | Mod: 59 | Performed by: STUDENT IN AN ORGANIZED HEALTH CARE EDUCATION/TRAINING PROGRAM

## 2023-09-08 PROCEDURE — 88341 IMHCHEM/IMCYTCHM EA ADD ANTB: CPT | Mod: 26,,, | Performed by: STUDENT IN AN ORGANIZED HEALTH CARE EDUCATION/TRAINING PROGRAM

## 2023-09-08 PROCEDURE — 88185 FLOWCYTOMETRY/TC ADD-ON: CPT | Mod: 59 | Performed by: PATHOLOGY

## 2023-09-08 PROCEDURE — 67400 EXPLORE/BIOPSY EYE SOCKET: CPT | Mod: RT,,, | Performed by: OTOLARYNGOLOGY

## 2023-09-08 PROCEDURE — 25000003 PHARM REV CODE 250: Performed by: NURSE ANESTHETIST, CERTIFIED REGISTERED

## 2023-09-08 PROCEDURE — 88341 PR IHC OR ICC EACH ADD'L SINGLE ANTIBODY  STAINPR: ICD-10-PCS | Mod: 26,,, | Performed by: STUDENT IN AN ORGANIZED HEALTH CARE EDUCATION/TRAINING PROGRAM

## 2023-09-08 PROCEDURE — 88307 PR  SURG PATH,LEVEL V: ICD-10-PCS | Mod: 26,,, | Performed by: STUDENT IN AN ORGANIZED HEALTH CARE EDUCATION/TRAINING PROGRAM

## 2023-09-08 PROCEDURE — 67400 PR EXPLORE EYE SOCKET: ICD-10-PCS | Mod: RT,,, | Performed by: OTOLARYNGOLOGY

## 2023-09-08 PROCEDURE — 71000039 HC RECOVERY, EACH ADD'L HOUR: Performed by: OTOLARYNGOLOGY

## 2023-09-08 PROCEDURE — 88331 PATH CONSLTJ SURG 1 BLK 1SPC: CPT | Mod: 26,,, | Performed by: STUDENT IN AN ORGANIZED HEALTH CARE EDUCATION/TRAINING PROGRAM

## 2023-09-08 PROCEDURE — 37000009 HC ANESTHESIA EA ADD 15 MINS: Performed by: OTOLARYNGOLOGY

## 2023-09-08 PROCEDURE — 88342 IMHCHEM/IMCYTCHM 1ST ANTB: CPT | Mod: 26,,, | Performed by: STUDENT IN AN ORGANIZED HEALTH CARE EDUCATION/TRAINING PROGRAM

## 2023-09-08 PROCEDURE — 88342 IMHCHEM/IMCYTCHM 1ST ANTB: CPT | Mod: 91 | Performed by: STUDENT IN AN ORGANIZED HEALTH CARE EDUCATION/TRAINING PROGRAM

## 2023-09-08 PROCEDURE — 71000033 HC RECOVERY, INTIAL HOUR: Performed by: OTOLARYNGOLOGY

## 2023-09-08 PROCEDURE — 88307 TISSUE EXAM BY PATHOLOGIST: CPT | Mod: 26,,, | Performed by: STUDENT IN AN ORGANIZED HEALTH CARE EDUCATION/TRAINING PROGRAM

## 2023-09-08 PROCEDURE — 25000003 PHARM REV CODE 250: Performed by: OTOLARYNGOLOGY

## 2023-09-08 PROCEDURE — 88342 CHG IMMUNOCYTOCHEMISTRY: ICD-10-PCS | Mod: 26,,, | Performed by: STUDENT IN AN ORGANIZED HEALTH CARE EDUCATION/TRAINING PROGRAM

## 2023-09-08 PROCEDURE — 88307 TISSUE EXAM BY PATHOLOGIST: CPT | Mod: 59 | Performed by: STUDENT IN AN ORGANIZED HEALTH CARE EDUCATION/TRAINING PROGRAM

## 2023-09-08 PROCEDURE — 63600175 PHARM REV CODE 636 W HCPCS: Performed by: NURSE ANESTHETIST, CERTIFIED REGISTERED

## 2023-09-08 PROCEDURE — 88189 PR  FLOWCYTOMETRY/READ, 16 & > MARKERS: ICD-10-PCS | Mod: ,,, | Performed by: PATHOLOGY

## 2023-09-08 PROCEDURE — 88331 PATH CONSLTJ SURG 1 BLK 1SPC: CPT | Performed by: STUDENT IN AN ORGANIZED HEALTH CARE EDUCATION/TRAINING PROGRAM

## 2023-09-08 RX ORDER — FENTANYL CITRATE 50 UG/ML
INJECTION, SOLUTION INTRAMUSCULAR; INTRAVENOUS
Status: DISCONTINUED | OUTPATIENT
Start: 2023-09-08 | End: 2023-09-08

## 2023-09-08 RX ORDER — ONDANSETRON 2 MG/ML
4 INJECTION INTRAMUSCULAR; INTRAVENOUS DAILY PRN
Status: DISCONTINUED | OUTPATIENT
Start: 2023-09-08 | End: 2023-09-08 | Stop reason: HOSPADM

## 2023-09-08 RX ORDER — CEFAZOLIN SODIUM 1 G/3ML
INJECTION, POWDER, FOR SOLUTION INTRAMUSCULAR; INTRAVENOUS
Status: DISCONTINUED | OUTPATIENT
Start: 2023-09-08 | End: 2023-09-08

## 2023-09-08 RX ORDER — ONDANSETRON 2 MG/ML
INJECTION INTRAMUSCULAR; INTRAVENOUS
Status: DISCONTINUED | OUTPATIENT
Start: 2023-09-08 | End: 2023-09-08

## 2023-09-08 RX ORDER — LIDOCAINE HYDROCHLORIDE 20 MG/ML
INJECTION, SOLUTION EPIDURAL; INFILTRATION; INTRACAUDAL; PERINEURAL
Status: DISCONTINUED | OUTPATIENT
Start: 2023-09-08 | End: 2023-09-08

## 2023-09-08 RX ORDER — PHENYLEPHRINE HYDROCHLORIDE 10 MG/ML
INJECTION INTRAVENOUS
Status: DISCONTINUED | OUTPATIENT
Start: 2023-09-08 | End: 2023-09-08

## 2023-09-08 RX ORDER — PROPOFOL 10 MG/ML
VIAL (ML) INTRAVENOUS
Status: DISCONTINUED | OUTPATIENT
Start: 2023-09-08 | End: 2023-09-08

## 2023-09-08 RX ORDER — CEPHALEXIN 500 MG/1
500 CAPSULE ORAL EVERY 8 HOURS
Qty: 15 CAPSULE | Refills: 0 | Status: SHIPPED | OUTPATIENT
Start: 2023-09-08 | End: 2023-09-13

## 2023-09-08 RX ORDER — DEXMEDETOMIDINE HYDROCHLORIDE 100 UG/ML
INJECTION, SOLUTION INTRAVENOUS
Status: DISCONTINUED | OUTPATIENT
Start: 2023-09-08 | End: 2023-09-08

## 2023-09-08 RX ORDER — EPHEDRINE SULFATE 50 MG/ML
INJECTION, SOLUTION INTRAVENOUS
Status: DISCONTINUED | OUTPATIENT
Start: 2023-09-08 | End: 2023-09-08

## 2023-09-08 RX ORDER — MIDAZOLAM HYDROCHLORIDE 1 MG/ML
INJECTION, SOLUTION INTRAMUSCULAR; INTRAVENOUS
Status: DISCONTINUED | OUTPATIENT
Start: 2023-09-08 | End: 2023-09-08

## 2023-09-08 RX ORDER — HYDROMORPHONE HYDROCHLORIDE 2 MG/ML
0.2 INJECTION, SOLUTION INTRAMUSCULAR; INTRAVENOUS; SUBCUTANEOUS EVERY 5 MIN PRN
Status: DISCONTINUED | OUTPATIENT
Start: 2023-09-08 | End: 2023-09-08 | Stop reason: HOSPADM

## 2023-09-08 RX ORDER — LIDOCAINE HYDROCHLORIDE AND EPINEPHRINE 10; 10 MG/ML; UG/ML
INJECTION, SOLUTION INFILTRATION; PERINEURAL
Status: DISCONTINUED | OUTPATIENT
Start: 2023-09-08 | End: 2023-09-08 | Stop reason: HOSPADM

## 2023-09-08 RX ORDER — ALBUTEROL SULFATE 90 UG/1
AEROSOL, METERED RESPIRATORY (INHALATION)
Qty: 8.5 G | Refills: 3 | Status: SHIPPED | OUTPATIENT
Start: 2023-09-08 | End: 2023-11-14

## 2023-09-08 RX ORDER — OXYCODONE AND ACETAMINOPHEN 5; 325 MG/1; MG/1
1 TABLET ORAL
Status: DISCONTINUED | OUTPATIENT
Start: 2023-09-08 | End: 2023-09-08 | Stop reason: HOSPADM

## 2023-09-08 RX ADMIN — MIDAZOLAM 2 MG: 1 INJECTION INTRAMUSCULAR; INTRAVENOUS at 08:09

## 2023-09-08 RX ADMIN — PROPOFOL 175 MG: 10 INJECTION, EMULSION INTRAVENOUS at 08:09

## 2023-09-08 RX ADMIN — EPHEDRINE SULFATE 25 MG: 50 INJECTION INTRAVENOUS at 08:09

## 2023-09-08 RX ADMIN — LIDOCAINE HYDROCHLORIDE 50 MG: 20 INJECTION, SOLUTION EPIDURAL; INFILTRATION; INTRACAUDAL; PERINEURAL at 08:09

## 2023-09-08 RX ADMIN — SODIUM CHLORIDE, SODIUM LACTATE, POTASSIUM CHLORIDE, AND CALCIUM CHLORIDE: .6; .31; .03; .02 INJECTION, SOLUTION INTRAVENOUS at 08:09

## 2023-09-08 RX ADMIN — ONDANSETRON 4 MG: 2 INJECTION INTRAMUSCULAR; INTRAVENOUS at 08:09

## 2023-09-08 RX ADMIN — FENTANYL CITRATE 50 MCG: 50 INJECTION, SOLUTION INTRAMUSCULAR; INTRAVENOUS at 08:09

## 2023-09-08 RX ADMIN — CEFAZOLIN 2 G: 330 INJECTION, POWDER, FOR SOLUTION INTRAMUSCULAR; INTRAVENOUS at 08:09

## 2023-09-08 RX ADMIN — DEXMEDETOMIDINE 20 MCG: 200 INJECTION, SOLUTION INTRAVENOUS at 09:09

## 2023-09-08 RX ADMIN — PHENYLEPHRINE HYDROCHLORIDE 100 MCG: 10 INJECTION INTRAVENOUS at 08:09

## 2023-09-08 NOTE — DISCHARGE SUMMARY
[facility] - Surgery (Hospital)  Discharge Note  Short Stay    Procedure(s) (LRB):  EXPLORATION, ORBIT (Right)      OUTCOME: Patient tolerated treatment/procedure well without complication and is now ready for discharge.    DISPOSITION: Home or Self Care    FINAL DIAGNOSIS:  <principal problem not specified>    FOLLOWUP: In clinic    DISCHARGE INSTRUCTIONS:    Discharge Procedure Orders   Lifting restrictions   Order Comments: No heavy lifting more than 15 lbs for 7-10 days. No strenuous activity.     No dressing needed   Order Comments: No wetting incision for two days. Apply vaseline or aquafor to incisions twice a day.         Clinical Reference Documents Added to Patient Instructions         Document    CEPHALEXIN, ADULT (ENGLISH)    WOUND CARE (ENGLISH)            TIME SPENT ON DISCHARGE: 5 minutes

## 2023-09-08 NOTE — ANESTHESIA PREPROCEDURE EVALUATION
09/08/2023  Santiago Khan is a 72 y.o., male.    Patient Active Problem List   Diagnosis    BRADLEY on CPAP    History of gout    DDD (degenerative disc disease), lumbar    Cigarette nicotine dependence without complication    Hypertension associated with stage 3 chronic kidney disease due to type 2 diabetes mellitus    Hyperlipidemia associated with type 2 diabetes mellitus    Chronic kidney disease, stage 3    Class 2 severe obesity due to excess calories with serious comorbidity and body mass index (BMI) of 35.0 to 35.9 in adult    Coronary artery disease of native artery of native heart with stable angina pectoris    Chronic combined systolic and diastolic congestive heart failure    Prostate cancer    PVD (peripheral vascular disease)    NICM (nonischemic cardiomyopathy)    Closed fracture of tuft of distal phalanx of finger    Chronic venous insufficiency    Third nerve palsy, right    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    Morbid obesity with alveolar hypoventilation    DM (diabetes mellitus) with complications    Chronic obstructive pulmonary disease    Opioid dependence, uncomplicated    Other nonthrombocytopenic purpura    Other chronic pain    Other reduced mobility    Abnormality of gait and mobility    Nicotine dependence    Orbital mass     Past Surgical History:   Procedure Laterality Date    BICEPS TENDON REPAIR      COLONOSCOPY N/A 03/27/2019    Procedure: COLONOSCOPY;  Surgeon: James Roger MD;  Location: [facility] ENDO;  Service: Endoscopy;  Laterality: N/A;    HEMORRHOID SURGERY      INGUINAL HERNIA REPAIR Left     KNEE ARTHROSCOPY Right     LEFT HEART CATHETERIZATION Left 06/29/2020    Procedure: CATHETERIZATION, HEART, LEFT;  Surgeon: Cheli Wilson MD;  Location: [facility] CATH LAB;  Service: Cardiology;   Laterality: Left;    LUMBAR LAMINECTOMY      PROSTATECTOMY      TONSILLECTOMY         Pre-op Assessment    I have reviewed the Patient Summary Reports.    I have reviewed the NPO Status.   I have reviewed the Medications.     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    Pulmonary:   COPD Sleep Apnea, CPAP    Renal/:   Chronic Renal Disease, CKD    Hepatic/GI:  Hepatic/GI Normal    Musculoskeletal:   Arthritis  DDD   Neurological:  Neurology Normal    Endocrine:   Diabetes  Obesity / BMI > 30      Physical Exam  General: Well nourished    Airway:  Mallampati: II   Mouth Opening: Normal  TM Distance: Normal  Neck ROM: Normal ROM    Dental:  Edentulous        Anesthesia Plan  Type of Anesthesia, risks & benefits discussed:    Anesthesia Type: Gen ETT  Intra-op Monitoring Plan: Standard ASA Monitors  Post Op Pain Control Plan: multimodal analgesia  Induction:  IV  Airway Plan: , Post-Induction  Informed Consent: Informed consent signed with the Patient and all parties understand the risks and agree with anesthesia plan.  All questions answered.   ASA Score: 3    Ready For Surgery From Anesthesia Perspective.     .    Chemistry        Component Value Date/Time     09/05/2023 1320    K 5.3 (H) 09/05/2023 1320     09/05/2023 1320    CO2 30 (H) 09/05/2023 1320    BUN 10 09/05/2023 1320    CREATININE 1.8 (H) 09/05/2023 1320     (H) 09/05/2023 1320        Component Value Date/Time    CALCIUM 9.7 09/05/2023 1320    ALKPHOS 97 09/05/2023 1320    AST 27 09/05/2023 1320    ALT 22 09/05/2023 1320    BILITOT 0.4 09/05/2023 1320    ESTGFRAFRICA 43 (A) 07/18/2022 0941    EGFRNONAA 37 (A) 07/18/2022 0941        Lab Results   Component  Value Date    WBC 11.60 09/05/2023    HGB 15.3 09/05/2023    HCT 48.8 09/05/2023     (H) 09/05/2023     09/05/2023       Normal sinus rhythm   Right bundle branch block   Left anterior fascicular block    Bifascicular block   Minimal voltage criteria for LVH, may be normal variant ( R in aVL )   Abnormal ECG   When compared with ECG of 18-OCT-2022 11:14,   No significant change was found   Confirmed by MILADYS RUIZ MD ([id number]) on 9/5/2023 2:58:41 PM     Echo 3/21/20:   Mild concentric left ventricular hypertrophy.   Mildly decreased left ventricular systolic function. The estimated ejection fraction is 45%.   Grade I (mild) left ventricular diastolic dysfunction consistent with impaired relaxation.   Low normal right ventricular systolic function.   Moderate mitral regurgitation.   Normal central venous pressure (3 mmHg).   The estimated PA systolic pressure is 23 mmHg.     Nuc stress 6/22/20:    Abnormal myocardial perfusion study.    There is a mild to moderate intensity, mostly reversible defect with some fixed areas in the inferior and inferoapical wall(s).    Gated perfusion images showed an ejection fraction of 47% at rest and 45% post stress.    The EKG portion of this study is negative for ischemia.      Heart cath 6/29/20:    Left Main   The vessel is large and is angiographically normal.      Left Anterior Descending   The vessel is large. There is mild diffuse disease throughout the vessel.      Second Diagonal Branch   2nd Diag lesion is 70% stenosed. The lesion is calcified and ostial. The lesion is 10 mm long. Calcification amount is moderate. Lesion shape is eccentric. Lesion contour is smooth. Segment angulation is 45-90 degrees.      Left Circumflex   The vessel is moderate in size. Exhibits minimal luminal irregularities.      Right Coronary Artery   The vessel is moderate in size. There is moderate diffuse disease throughout the vessel.      Intervention     No  interventions have been documented.              Eucrisa Counseling: Patient may experience a mild burning sensation during topical application. Eucrisa is not approved in children less than 2 years of age.

## 2023-09-08 NOTE — TRANSFER OF CARE
"Anesthesia Transfer of Care Note    Patient: Santiago Khan    Procedure(s) Performed: Procedure(s) (LRB):  EXPLORATION, ORBIT (Right)    Patient location: PACU    Anesthesia Type: general    Transport from OR: Transported from OR on room air with adequate spontaneous ventilation    Post pain: adequate analgesia    Post assessment: no apparent anesthetic complications    Post vital signs: stable    Level of consciousness: sedated    Nausea/Vomiting: no nausea/vomiting    Complications: none    Transfer of care protocol was followedComments: Emergence delirium noted, precedex 20mcg given.       Last vitals:   Visit Vitals  /70 (BP Location: Right arm, Patient Position: Sitting)   Pulse 75   Temp 36.5 °C (97.7 °F) (Temporal)   Resp 20   Ht 5' 7" (1.702 m)   Wt 99.8 kg (220 lb 0.3 oz)   SpO2 (!) 92%   BMI 34.46 kg/m²     "

## 2023-09-08 NOTE — PLAN OF CARE
Dr. Crespo came by to check on patient. Oxygen level still dropping on 3L. He is ok with patient on 3L. Trying to not go much higher unless I have to due to COPD. Patient periodically doing incentive spirometer. Patient is very sleepy, but arousable and oriented. No new orders at this time. Just monitor patient and low supplemental oxygen when possible.

## 2023-09-08 NOTE — OP NOTE
Date of service: 9/8/2023    Pre-operative Diagnosis:   Orbital mass    Post-operative Diagnosis:  Same    Procedures Performed:  Orbital biopsy    Surgeon: Junaid Bartholomew MD    Assistant(s):  None    Anesthesia: General Endotracheal    EBL:  25 cc    Specimens:  Number of specimens: 3 Name of specimens: 1. Right Orbital Mass - frozen/perm 2. Right Orbital Mass- Lymphoma flow cytometry 3. Right Orbital Mass- perm    Findings:  Well-circumscribed mass in the right orbit that appeared to be outside of periorbita.  Several biopsies taken with 1 portion sent for flow cytometry.    Indications for Procedure:  Mr. Khan is a 72-year-old male seen in clinic with 1 year history of orbital mass that had been referred to Oculoplastics before but refused to be evaluated.  I scheduled him for orbital biopsy.    Procedure in Detail:  After informed consent was verified with patient in holding area the risks and benefits reviewed patient was taken back to OR 4.  Anesthesia proceeded with general anesthetic via LMA.  Patient was turned 90° and time-out was undertaken verification of patient and correct procedure and site which was marked.  Medial upper eyelid was marked out for a 1.5-2 cm incision with this infiltrated with 0.5% lidocaine with [id number] of epinephrine.  Right periorbital area was prepped and draped usual sterile fashion.      Incision was made through the upper eyelid at marked area and then blunt dissection was undertaken with separation of supraorbital fat as well as orbicularis with exposure of palpable mass that was along the roof of the orbit.  Dissection around this showed well-circumscribed mass with dense adherent capsule.  This was grasped with Allis and then bipolar was used to take small piece which was sent off for frozen section.  Frozen section biopsy showed some concern for round blue cells and portion of this was sent for flow cytometry with additional 1 x 1 cm sent for permanent pathology.   Hemostasis was obtained with bipolar cautery.  Surgicel was placed and the upper eyelid was closed with deep 4-0 Monocryl and skin was closed with 5 0 fast-absorbing gut running locking.  Patient was turned over to Anesthesia awakened and taken to recovery in stable condition.    Complications:  None    Attestation:  I was present for the entire procedure    DISCLAIMER: This note was prepared with [employer] voice recognition transcription software. Garbled syntax, mangled pronouns, and other bizarre constructions may be attributed to that software system. While efforts were made to correct any mistakes made by this voice recognition program, some errors and/or omissions may remain in the note that were missed when the note was originally created.

## 2023-09-08 NOTE — ANESTHESIA POSTPROCEDURE EVALUATION
Anesthesia Post Evaluation    Patient: Santiago Khan    Procedure(s) Performed: Procedure(s) (LRB):  EXPLORATION, ORBIT (Right)    Final Anesthesia Type: general      Patient location during evaluation: PACU  Patient participation: Yes- Able to Participate  Level of consciousness: awake and alert  Post-procedure vital signs: reviewed and stable  Pain management: adequate  Airway patency: patent  BRADLEY mitigation strategies: Multimodal analgesia  PONV status at discharge: No PONV  Anesthetic complications: no      Cardiovascular status: hemodynamically stable  Respiratory status: spontaneous ventilation and nasal cannula  Hydration status: euvolemic  Follow-up not needed.          Vitals Value Taken Time   /62 09/08/23 1100   Temp 36.5 °C (97.7 °F) 09/08/23 0955   Pulse 88 09/08/23 1102   Resp 15 09/08/23 1102   SpO2 89 % 09/08/23 1102   Vitals shown include unvalidated device data.      No case tracking events are documented in the log.      Pain/Jeaneth Score: Jeaneth Score: 5 (9/8/2023 10:45 AM)

## 2023-09-09 ENCOUNTER — PATIENT MESSAGE (OUTPATIENT)
Dept: OTOLARYNGOLOGY | Facility: CLINIC | Age: 73
End: 2023-09-09
Payer: MEDICARE

## 2023-09-11 ENCOUNTER — PATIENT MESSAGE (OUTPATIENT)
Dept: OTOLARYNGOLOGY | Facility: CLINIC | Age: 73
End: 2023-09-11
Payer: MEDICARE

## 2023-09-11 VITALS
OXYGEN SATURATION: 95 % | TEMPERATURE: 98 F | WEIGHT: 220 LBS | DIASTOLIC BLOOD PRESSURE: 73 MMHG | RESPIRATION RATE: 16 BRPM | HEART RATE: 87 BPM | SYSTOLIC BLOOD PRESSURE: 120 MMHG | HEIGHT: 67 IN | BODY MASS INDEX: 34.53 KG/M2

## 2023-09-11 LAB
FLOW CYTOMETRY ANTIBODIES ANALYZED - TISSUE: NORMAL
FLOW CYTOMETRY COMMENT - TISSUE: NORMAL
FLOW CYTOMETRY INTERPRETATION - TISSUE: NORMAL
SPECIMEN TYPE - TISSUE: NORMAL

## 2023-09-20 ENCOUNTER — PATIENT MESSAGE (OUTPATIENT)
Dept: SMOKING CESSATION | Facility: CLINIC | Age: 73
End: 2023-09-20
Payer: MEDICARE

## 2023-09-20 ENCOUNTER — TELEPHONE (OUTPATIENT)
Dept: SMOKING CESSATION | Facility: CLINIC | Age: 73
End: 2023-09-20
Payer: MEDICARE

## 2023-09-20 ENCOUNTER — CLINICAL SUPPORT (OUTPATIENT)
Dept: SMOKING CESSATION | Facility: CLINIC | Age: 73
End: 2023-09-20
Payer: COMMERCIAL

## 2023-09-20 DIAGNOSIS — F17.200 NICOTINE DEPENDENCE: Primary | ICD-10-CM

## 2023-09-20 PROCEDURE — 99404 PR PREVENT COUNSEL,INDIV,60 MIN: ICD-10-PCS | Mod: S$GLB,,, | Performed by: SPEECH-LANGUAGE PATHOLOGIST

## 2023-09-20 PROCEDURE — 99404 PREV MED CNSL INDIV APPRX 60: CPT | Mod: S$GLB,,, | Performed by: SPEECH-LANGUAGE PATHOLOGIST

## 2023-09-20 PROCEDURE — 99999 PR PBB SHADOW E&M-EST. PATIENT-LVL II: ICD-10-PCS | Mod: PBBFAC,,, | Performed by: SPEECH-LANGUAGE PATHOLOGIST

## 2023-09-20 PROCEDURE — 99999 PR PBB SHADOW E&M-EST. PATIENT-LVL II: CPT | Mod: PBBFAC,,, | Performed by: SPEECH-LANGUAGE PATHOLOGIST

## 2023-09-20 RX ORDER — VARENICLINE TARTRATE 1 MG/1
TABLET, FILM COATED ORAL
Qty: 60 TABLET | Refills: 0 | Status: SHIPPED | OUTPATIENT
Start: 2023-09-20 | End: 2023-11-02 | Stop reason: SDUPTHER

## 2023-09-20 NOTE — TELEPHONE ENCOUNTER
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 Phone goes straight to voicemail. [facility] message also sent

## 2023-09-20 NOTE — PROGRESS NOTES
Individual Follow-Up Form    9/20/2023    Quit Date: 9/15/2023    Clinical Status of Patient: Outpatient    Continuing Medication: yes  Chantix    Other Medications:      Target Symptoms: Withdrawal and medication side effects. The following were  rated moderate (3) to severe (4) on TCRS:  Moderate (3): anger/irritability/frustration  Severe (4): none    Comments: Telephone visit due to CTTS having to work from home due to a pet emergency. Patient reports smoking his last cigarette on 9/14/2023 making his 24 hour quit on 9/15/2023. Congratulated & celebrated the patient on his gains & accomplishment. He remains on his NRT of Varenicline 1 mg twice daily & denies any negative side effects or mood changes. Administered TCRS with patient reporting slight symptoms increased appetite/hunger, depressed mood/sadness, difficulty concentrating, anxious/nervous, restless/can't sit still/impatient, mild symptoms of desire/crave & moderate symptoms of anger/irritability/frustration. Discussed high risk situations which includes stress & after a meal & when doing nothing. Discussed with patient utilizing strategies for relaxation such as calming & relaxing music as well as exploring hobbies/area of interest and/or a new hobby/area of interest when he is not doing home tasks to establish healthier coping strategies & activities. Patient verbalized willingness to apply. Provided patient with information via text to include ambient music as well as music streaming sources. Patient states his goal is to remain smoke free. Follow up set up for 10/16/2023 at 3:00 pm.     Diagnosis: F17.200    Next Visit: 4 weeks

## 2023-09-20 NOTE — TELEPHONE ENCOUNTER
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

## 2023-09-27 DIAGNOSIS — H05.111: Primary | ICD-10-CM

## 2023-09-27 LAB
FINAL PATHOLOGIC DIAGNOSIS: NORMAL
FROZEN SECTION DIAGNOSIS: NORMAL
FROZEN SECTION FOOTNOTE: NORMAL
GROSS: NORMAL
Lab: NORMAL
MICROSCOPIC EXAM: NORMAL

## 2023-09-29 ENCOUNTER — TELEPHONE (OUTPATIENT)
Dept: OPHTHALMOLOGY | Facility: CLINIC | Age: 73
End: 2023-09-29
Payer: MEDICARE

## 2023-09-29 DIAGNOSIS — E83.42 HYPOMAGNESEMIA: ICD-10-CM

## 2023-09-29 DIAGNOSIS — N18.32 STAGE 3B CHRONIC KIDNEY DISEASE: ICD-10-CM

## 2023-09-29 NOTE — TELEPHONE ENCOUNTER
----- Message from Mable Taylor sent at 9/29/2023 11:28 AM CDT -----    ----- Message -----  From: Franny Rodriguez MA  Sent: 9/29/2023  11:24 AM CDT  To: [facility] Staff    Hi,    Pt referred to Dr Cuevas.    Thanks,  Franny

## 2023-10-02 RX ORDER — LANOLIN ALCOHOL/MO/W.PET/CERES
CREAM (GRAM) TOPICAL
Qty: 120 TABLET | Refills: 6 | Status: SHIPPED | OUTPATIENT
Start: 2023-10-02

## 2023-10-06 DIAGNOSIS — K21.9 GASTROESOPHAGEAL REFLUX DISEASE WITHOUT ESOPHAGITIS: ICD-10-CM

## 2023-10-06 RX ORDER — OMEPRAZOLE 40 MG/1
CAPSULE, DELAYED RELEASE ORAL
Qty: 90 CAPSULE | Refills: 2 | Status: SHIPPED | OUTPATIENT
Start: 2023-10-06

## 2023-10-06 NOTE — TELEPHONE ENCOUNTER
Refill Decision Note   Santiago Khan  is requesting a refill authorization.  Brief Assessment and Rationale for Refill:  Approve     Medication Therapy Plan:         Comments:     Note composed:11:03 AM 10/06/2023             Appointments     Last Visit   6/12/2023 Kashif Acosta MD   Next Visit   11/16/2023 Kashif Acosta MD

## 2023-10-06 NOTE — TELEPHONE ENCOUNTER
No care due was identified.  [facility] Embedded Care Due Messages. Reference number: [id number].   10/06/2023 8:04:18 AM CDT

## 2023-10-10 ENCOUNTER — PATIENT MESSAGE (OUTPATIENT)
Dept: OTOLARYNGOLOGY | Facility: CLINIC | Age: 73
End: 2023-10-10
Payer: MEDICARE

## 2023-10-11 DIAGNOSIS — H05.111: Primary | ICD-10-CM

## 2023-10-11 RX ORDER — METHYLPREDNISOLONE 4 MG/1
TABLET ORAL
Qty: 21 EACH | Refills: 0 | Status: SHIPPED | OUTPATIENT
Start: 2023-10-11 | End: 2023-11-16

## 2023-10-16 ENCOUNTER — PATIENT OUTREACH (OUTPATIENT)
Dept: PULMONOLOGY | Facility: CLINIC | Age: 73
End: 2023-10-16
Payer: MEDICARE

## 2023-10-16 ENCOUNTER — CLINICAL SUPPORT (OUTPATIENT)
Dept: SMOKING CESSATION | Facility: CLINIC | Age: 73
End: 2023-10-16
Payer: COMMERCIAL

## 2023-10-16 DIAGNOSIS — F17.200 NICOTINE DEPENDENCE: Primary | ICD-10-CM

## 2023-10-16 PROCEDURE — 99404 PREV MED CNSL INDIV APPRX 60: CPT | Mod: S$GLB,,, | Performed by: SPEECH-LANGUAGE PATHOLOGIST

## 2023-10-16 PROCEDURE — 99999 PR PBB SHADOW E&M-EST. PATIENT-LVL II: ICD-10-PCS | Mod: PBBFAC,,, | Performed by: SPEECH-LANGUAGE PATHOLOGIST

## 2023-10-16 PROCEDURE — 99404 PR PREVENT COUNSEL,INDIV,60 MIN: ICD-10-PCS | Mod: S$GLB,,, | Performed by: SPEECH-LANGUAGE PATHOLOGIST

## 2023-10-16 PROCEDURE — 99999 PR PBB SHADOW E&M-EST. PATIENT-LVL II: CPT | Mod: PBBFAC,,, | Performed by: SPEECH-LANGUAGE PATHOLOGIST

## 2023-10-16 NOTE — PROGRESS NOTES
"Individual Follow-Up Form    10/16/2023    Quit Date: 9/15/2023     Clinical Status of Patient: Outpatient     Continuing Medication: yes  Chantix     Other Medications:                  Target Symptoms: Withdrawal and medication side effects. The following were   rated moderate (3) to severe (4) on TCRS:  Moderate (3):   Severe (4):      Comments: Patient seen in clinic. Patient states he has remained smoke free since his 9/15/2023 quit date. He remains on Varenicline 1 mg twice daily. Assessed CO. CO 3. Patient reports he continues to deal with stressful circumstances with family; however he has remained smoke free despite those circumstances. He reports enjoying improved blood oxygen level, not coughing, breathing better & increased financial gain since his quit. He reports he does experience urges/craves during stressful moments; but also states he is remaining committed to not returning to smoking no matter the stress with his family. Utilizing written literature from the book "Nicotine Confesses. It's Not You. It's Me" by Dr Edmund Porter, discussed with patient the decrease in his intensity of urges & craves over time in addition to withdrawals & psychological dependency identifiers. Informed the patient on resources to access the book for the patient to read during his down time which can provide further insight into the impact of nicotine.  Goal is to remain smoke free. Follow up set for 11/8/2023 at 2:00 pm    Diagnosis: F17.200    Next Visit: 3 week    "

## 2023-10-19 ENCOUNTER — CLINICAL SUPPORT (OUTPATIENT)
Dept: PULMONOLOGY | Facility: CLINIC | Age: 73
End: 2023-10-19
Payer: MEDICARE

## 2023-10-19 ENCOUNTER — OFFICE VISIT (OUTPATIENT)
Dept: CARDIOLOGY | Facility: CLINIC | Age: 73
End: 2023-10-19
Payer: MEDICARE

## 2023-10-19 VITALS
RESPIRATION RATE: 16 BRPM | WEIGHT: 230.19 LBS | BODY MASS INDEX: 35.98 KG/M2 | BODY MASS INDEX: 36.13 KG/M2 | HEIGHT: 67 IN | HEART RATE: 100 BPM | WEIGHT: 229.25 LBS | SYSTOLIC BLOOD PRESSURE: 118 MMHG | OXYGEN SATURATION: 94 % | HEIGHT: 67 IN | HEART RATE: 70 BPM | DIASTOLIC BLOOD PRESSURE: 64 MMHG | OXYGEN SATURATION: 94 %

## 2023-10-19 DIAGNOSIS — J44.9 CHRONIC OBSTRUCTIVE PULMONARY DISEASE, UNSPECIFIED COPD TYPE: ICD-10-CM

## 2023-10-19 DIAGNOSIS — I42.8 NICM (NONISCHEMIC CARDIOMYOPATHY): ICD-10-CM

## 2023-10-19 DIAGNOSIS — J44.9 CHRONIC OBSTRUCTIVE PULMONARY DISEASE, UNSPECIFIED COPD TYPE: Primary | ICD-10-CM

## 2023-10-19 DIAGNOSIS — I25.118 CORONARY ARTERY DISEASE OF NATIVE ARTERY OF NATIVE HEART WITH STABLE ANGINA PECTORIS: Chronic | ICD-10-CM

## 2023-10-19 DIAGNOSIS — I50.42 CHRONIC COMBINED SYSTOLIC AND DIASTOLIC CONGESTIVE HEART FAILURE: Primary | Chronic | ICD-10-CM

## 2023-10-19 DIAGNOSIS — E11.22 HYPERTENSION ASSOCIATED WITH STAGE 3 CHRONIC KIDNEY DISEASE DUE TO TYPE 2 DIABETES MELLITUS: ICD-10-CM

## 2023-10-19 DIAGNOSIS — N18.30 HYPERTENSION ASSOCIATED WITH STAGE 3 CHRONIC KIDNEY DISEASE DUE TO TYPE 2 DIABETES MELLITUS: ICD-10-CM

## 2023-10-19 DIAGNOSIS — E66.01 CLASS 2 SEVERE OBESITY DUE TO EXCESS CALORIES WITH SERIOUS COMORBIDITY AND BODY MASS INDEX (BMI) OF 35.0 TO 35.9 IN ADULT: ICD-10-CM

## 2023-10-19 DIAGNOSIS — I73.9 PVD (PERIPHERAL VASCULAR DISEASE): ICD-10-CM

## 2023-10-19 DIAGNOSIS — E11.69 HYPERLIPIDEMIA ASSOCIATED WITH TYPE 2 DIABETES MELLITUS: ICD-10-CM

## 2023-10-19 DIAGNOSIS — E78.5 HYPERLIPIDEMIA ASSOCIATED WITH TYPE 2 DIABETES MELLITUS: ICD-10-CM

## 2023-10-19 DIAGNOSIS — I12.9 HYPERTENSION ASSOCIATED WITH STAGE 3 CHRONIC KIDNEY DISEASE DUE TO TYPE 2 DIABETES MELLITUS: ICD-10-CM

## 2023-10-19 DIAGNOSIS — E11.8 DM (DIABETES MELLITUS) WITH COMPLICATIONS: ICD-10-CM

## 2023-10-19 PROCEDURE — 3008F BODY MASS INDEX DOCD: CPT | Mod: CPTII,S$GLB,, | Performed by: INTERNAL MEDICINE

## 2023-10-19 PROCEDURE — 3062F POS MACROALBUMINURIA REV: CPT | Mod: CPTII,S$GLB,, | Performed by: INTERNAL MEDICINE

## 2023-10-19 PROCEDURE — 99214 PR OFFICE/OUTPT VISIT, EST, LEVL IV, 30-39 MIN: ICD-10-PCS | Mod: S$GLB,,, | Performed by: INTERNAL MEDICINE

## 2023-10-19 PROCEDURE — 99999 PR PBB SHADOW E&M-EST. PATIENT-LVL III: ICD-10-PCS | Mod: PBBFAC,,,

## 2023-10-19 PROCEDURE — 1100F PTFALLS ASSESS-DOCD GE2>/YR: CPT | Mod: CPTII,S$GLB,, | Performed by: INTERNAL MEDICINE

## 2023-10-19 PROCEDURE — 99999 PR PBB SHADOW E&M-EST. PATIENT-LVL IV: CPT | Mod: PBBFAC,,, | Performed by: INTERNAL MEDICINE

## 2023-10-19 PROCEDURE — 3044F PR MOST RECENT HEMOGLOBIN A1C LEVEL <7.0%: ICD-10-PCS | Mod: CPTII,S$GLB,, | Performed by: INTERNAL MEDICINE

## 2023-10-19 PROCEDURE — 3074F SYST BP LT 130 MM HG: CPT | Mod: CPTII,S$GLB,, | Performed by: INTERNAL MEDICINE

## 2023-10-19 PROCEDURE — 99999 PR PBB SHADOW E&M-EST. PATIENT-LVL IV: ICD-10-PCS | Mod: PBBFAC,,, | Performed by: INTERNAL MEDICINE

## 2023-10-19 PROCEDURE — 1159F PR MEDICATION LIST DOCUMENTED IN MEDICAL RECORD: ICD-10-PCS | Mod: CPTII,S$GLB,, | Performed by: INTERNAL MEDICINE

## 2023-10-19 PROCEDURE — 3044F HG A1C LEVEL LT 7.0%: CPT | Mod: CPTII,S$GLB,, | Performed by: INTERNAL MEDICINE

## 2023-10-19 PROCEDURE — 3288F FALL RISK ASSESSMENT DOCD: CPT | Mod: CPTII,S$GLB,, | Performed by: INTERNAL MEDICINE

## 2023-10-19 PROCEDURE — 3078F PR MOST RECENT DIASTOLIC BLOOD PRESSURE < 80 MM HG: ICD-10-PCS | Mod: CPTII,S$GLB,, | Performed by: INTERNAL MEDICINE

## 2023-10-19 PROCEDURE — 4010F PR ACE/ARB THEARPY RXD/TAKEN: ICD-10-PCS | Mod: CPTII,S$GLB,, | Performed by: INTERNAL MEDICINE

## 2023-10-19 PROCEDURE — 1159F MED LIST DOCD IN RCRD: CPT | Mod: CPTII,S$GLB,, | Performed by: INTERNAL MEDICINE

## 2023-10-19 PROCEDURE — 3066F NEPHROPATHY DOC TX: CPT | Mod: CPTII,S$GLB,, | Performed by: INTERNAL MEDICINE

## 2023-10-19 PROCEDURE — 3074F PR MOST RECENT SYSTOLIC BLOOD PRESSURE < 130 MM HG: ICD-10-PCS | Mod: CPTII,S$GLB,, | Performed by: INTERNAL MEDICINE

## 2023-10-19 PROCEDURE — 99999 PR PBB SHADOW E&M-EST. PATIENT-LVL III: CPT | Mod: PBBFAC,,,

## 2023-10-19 PROCEDURE — 1160F PR REVIEW ALL MEDS BY PRESCRIBER/CLIN PHARMACIST DOCUMENTED: ICD-10-PCS | Mod: CPTII,S$GLB,, | Performed by: INTERNAL MEDICINE

## 2023-10-19 PROCEDURE — 3078F DIAST BP <80 MM HG: CPT | Mod: CPTII,S$GLB,, | Performed by: INTERNAL MEDICINE

## 2023-10-19 PROCEDURE — 3288F PR FALLS RISK ASSESSMENT DOCUMENTED: ICD-10-PCS | Mod: CPTII,S$GLB,, | Performed by: INTERNAL MEDICINE

## 2023-10-19 PROCEDURE — 1160F RVW MEDS BY RX/DR IN RCRD: CPT | Mod: CPTII,S$GLB,, | Performed by: INTERNAL MEDICINE

## 2023-10-19 PROCEDURE — 4010F ACE/ARB THERAPY RXD/TAKEN: CPT | Mod: CPTII,S$GLB,, | Performed by: INTERNAL MEDICINE

## 2023-10-19 PROCEDURE — 3062F PR POS MACROALBUMINURIA RESULT DOCUMENTED/REVIEW: ICD-10-PCS | Mod: CPTII,S$GLB,, | Performed by: INTERNAL MEDICINE

## 2023-10-19 PROCEDURE — 99214 OFFICE O/P EST MOD 30 MIN: CPT | Mod: S$GLB,,, | Performed by: INTERNAL MEDICINE

## 2023-10-19 PROCEDURE — 1100F PR PT FALLS ASSESS DOC 2+ FALLS/FALL W/INJURY/YR: ICD-10-PCS | Mod: CPTII,S$GLB,, | Performed by: INTERNAL MEDICINE

## 2023-10-19 PROCEDURE — 3066F PR DOCUMENTATION OF TREATMENT FOR NEPHROPATHY: ICD-10-PCS | Mod: CPTII,S$GLB,, | Performed by: INTERNAL MEDICINE

## 2023-10-19 PROCEDURE — 3008F PR BODY MASS INDEX (BMI) DOCUMENTED: ICD-10-PCS | Mod: CPTII,S$GLB,, | Performed by: INTERNAL MEDICINE

## 2023-10-19 RX ORDER — FLUTICASONE PROPIONATE AND SALMETEROL 250; 50 UG/1; UG/1
1 POWDER RESPIRATORY (INHALATION) 2 TIMES DAILY
Qty: 60 EACH | Refills: 11 | Status: CANCELLED | OUTPATIENT
Start: 2023-10-19 | End: 2024-10-18

## 2023-10-19 RX ORDER — FLUTICASONE PROPIONATE AND SALMETEROL 250; 50 UG/1; UG/1
1 POWDER RESPIRATORY (INHALATION) 2 TIMES DAILY
Qty: 60 EACH | Refills: 11 | Status: SHIPPED | OUTPATIENT
Start: 2023-10-19

## 2023-10-19 RX ORDER — LOSARTAN POTASSIUM 25 MG/1
25 TABLET ORAL DAILY
Qty: 90 TABLET | Refills: 2 | Status: SHIPPED | OUTPATIENT
Start: 2023-10-19

## 2023-10-19 RX ORDER — FLUTICASONE PROPIONATE AND SALMETEROL 250; 50 UG/1; UG/1
1 POWDER RESPIRATORY (INHALATION) 2 TIMES DAILY
COMMUNITY
Start: 2023-09-30 | End: 2023-10-19 | Stop reason: SDUPTHER

## 2023-10-19 NOTE — PROGRESS NOTES
Subjective:   Patient ID:  Santiago Khan is a 73 y.o. male who presents for follow up of No chief complaint on file.      71 yo male, came in for 1 yr f/u  PMH CAD s/p LHC in  D1 70%, no PCi, CHFmrEF 45%, normal LVEDP, DM 4 yrs, life long smoker, quit in ,  HTN, HLD, CKD III, prostates Ca s/p TURP in 2011, no h/o chemo RX and XRT. Gout. No h/o stroke and heart attack. Social drinker.  Remote LHC showed miminal blockage by Dr. Mayra CHAPPELL,    admitted for [facility] due to chest tightness. Had low K and Mg. Troponin x2 negative and EKG showed NSR, RBBB, and LVH. Echo showed EF 45% global HK and moderate MR. Had K and Mg supplement.  States that gained weight for 30 pounds since 3/202 after held Metolazone. Even he was taking Bumex 1.5 mg bid. In  BNP 15 and Cr 1.4  Still has GOVEA. No orthopnea, sleeps with CPAP. No chest pain, faint  NUKE stress done in  showed inferior ischemia with scar. EF 45%  LHC done in  D1 70% no PCI. Normal filling pressure  No chest pain . Left radial access ok  SOB, weight gain  Sleeps on 1 pillow. No PND  Taking Bumex 2 mg bid and DIamox   Pt c/o weight gain 30 pounds after held his metolazone in the past 4 months  C/o abd distanesion SOB.     11/23/2021 visit  SOB for few months, positive orthopnea. Sleeps on CPAP. + leg swelling and left leg pain and redness  The last seen was   On Bumex 2 mg bid. Held metolazone. Quit smoking  H/o prostate cancer and PSA recurrently elevated. CXR in  negative     12/2021   Leg swelling and erythema. At last visit, D/c bumex and added torsemide 40 mg bid. Good urination. BNP negative and Cr up to 1.6. Decreased torsemide to 20 mg bid  Still leg swelling and SOB. Quit smoking 9 months ago.      visit  Resumed metolazone 3 times a week about 2 weeks ago and BMP done two days ago showed elevated Cr.   Felt neck tightness and improved breathing after the head tilts back and stretched up.  Not f/u with pulm yet  On compresion socks.   Still SOB. Serial BNPs wnl and h/o LHC showed normal filling pressure suggested CHF controlled     visit  Quit smoking 1 yr and on breathing rx  No chest pain dizziness and faint. BP controlled  BNP < 10. Cr 1.7    echo  · Mild concentric left ventricular hypertrophy.  · Mildly decreased left ventricular systolic function. The estimated ejection fraction is 45%.  · Grade I (mild) left ventricular diastolic dysfunction consistent with impaired relaxation.  · Low normal right ventricular systolic function.  · Moderate mitral regurgitation.  · Normal central venous pressure (3 mmHg).  · The estimated PA systolic pressure is 23 mmHg.     visit  H/o 6MWT in : 250 meters, and peal VO 11 calculated  Cane dependent due to lower back pain  Resumed smoking. On breathing tx and f/u with Dr. Escudero  On torsemide 40 mg bid for PVD    Interval history  Quit smoking again and improved breathing, not like CPAP. F/u at pulm service. Inhaler helped for tightness  No PND chest pain palpitation dizziness. Remains mild leg swelling                        Past Medical History:   Diagnosis Date    Chronic diastolic congestive heart failure 04/07/2020    Chronic kidney disease, stage 3     Chronic systolic congestive heart failure 07/09/2020    Chronic venous insufficiency     COPD (chronic obstructive pulmonary disease)     DDD (degenerative disc disease), lumbar     DM (diabetes mellitus) with complications     History of gout     Hyperlipidemia associated with type 2 diabetes mellitus     Hypertension associated with stage 3 chronic kidney disease due to type 2 diabetes mellitus     BRADLEY on CPAP     Prostate cancer     prostatectomy in 2010, rising PSA that started in 2021    Tobacco abuse        Past Surgical History:   Procedure Laterality Date    BICEPS TENDON REPAIR      COLONOSCOPY N/A 03/27/2019    Procedure: COLONOSCOPY;  Surgeon: James Roger MD;   Location: [facility] ENDO;  Service: Endoscopy;  Laterality: N/A;    EXPLORATION OF ORBIT Right 2023    Procedure: EXPLORATION, ORBIT;  Surgeon: Junaid Bartholomew MD;  Location: [facility] OR;  Service: ENT;  Laterality: Right;  possibly need frozen    HEMORRHOID SURGERY      INGUINAL HERNIA REPAIR Left     KNEE ARTHROSCOPY Right     LEFT HEART CATHETERIZATION Left 2020    Procedure: CATHETERIZATION, HEART, LEFT;  Surgeon: Cheli Wilson MD;  Location: [facility] CATH LAB;  Service: Cardiology;  Laterality: Left;    LUMBAR LAMINECTOMY      PROSTATECTOMY      TONSILLECTOMY         Social History     Tobacco Use    Smoking status: Former     Current packs/day: 0.00     Average packs/day: 1.5 packs/day for 52.7 years (79.1 ttl pk-yrs)     Types: Cigarettes     Start date: 1971     Quit date: 9/15/2023     Years since quittin.0    Smokeless tobacco: Former   Substance Use Topics    Alcohol use: Not Currently    Drug use: Never       Family History   Problem Relation Age of Onset    Prostate cancer Father     Coronary artery disease Father 90    Alzheimer's disease Father     Hyperlipidemia Mother          ROS    Objective:   Physical Exam  HENT:      Head: Normocephalic.   Eyes:      Pupils: Pupils are equal, round, and reactive to light.   Neck:      Thyroid: No thyromegaly.      Vascular: Normal carotid pulses. No carotid bruit or JVD.   Cardiovascular:      Rate and Rhythm: Normal rate and regular rhythm. No extrasystoles are present.     Chest Wall: PMI is not displaced.      Pulses: Normal pulses.      Heart sounds: Normal heart sounds. No murmur heard.     No gallop. No S3 sounds.   Pulmonary:      Effort: No respiratory distress.      Breath sounds: Normal breath sounds. No stridor.   Abdominal:      General: Bowel sounds are normal.      Palpations: Abdomen is soft.      Tenderness: There is no abdominal tenderness. There is no rebound.   Musculoskeletal:         General: Swelling present. Normal range of motion.    Skin:     Findings: No rash.   Neurological:      Mental Status: He is alert and oriented to person, place, and time.   Psychiatric:         Behavior: Behavior normal.         Lab Results   Component Value Date    CHOL 129 05/09/2023    CHOL 132 10/31/2022    CHOL 150 04/29/2022     Lab Results   Component Value Date    HDL 38 (L) 05/09/2023    HDL 41 10/31/2022    HDL 49 04/29/2022     Lab Results   Component Value Date    LDLCALC 65.2 05/09/2023    LDLCALC 61.2 (L) 10/31/2022    LDLCALC 74.4 04/29/2022     Lab Results   Component Value Date    TRIG 129 05/09/2023    TRIG 149 10/31/2022    TRIG 133 04/29/2022     Lab Results   Component Value Date    CHOLHDL 29.5 05/09/2023    CHOLHDL 31.1 10/31/2022    CHOLHDL 32.7 04/29/2022       Chemistry        Component Value Date/Time     09/05/2023 1320    K 5.3 (H) 09/05/2023 1320     09/05/2023 1320    CO2 30 (H) 09/05/2023 1320    BUN 10 09/05/2023 1320    CREATININE 1.8 (H) 09/05/2023 1320     (H) 09/05/2023 1320        Component Value Date/Time    CALCIUM 9.7 09/05/2023 1320    ALKPHOS 97 09/05/2023 1320    AST 27 09/05/2023 1320    ALT 22 09/05/2023 1320    BILITOT 0.4 09/05/2023 1320    ESTGFRAFRICA 43 (A) 07/18/2022 0941    EGFRNONAA 37 (A) 07/18/2022 0941          Lab Results   Component Value Date    HGBA1C 5.8 (H) 05/09/2023     Lab Results   Component Value Date    TSH 2.045 05/09/2023     Lab Results   Component Value Date    INR 1.0 06/26/2020    INR 1.0 03/10/2020     Lab Results   Component Value Date    WBC 11.60 09/05/2023    HGB 15.3 09/05/2023    HCT 48.8 09/05/2023     (H) 09/05/2023     09/05/2023     BMP  Sodium   Date Value Ref Range Status   09/05/2023 143 136 - 145 mmol/L Final     Potassium   Date Value Ref Range Status   09/05/2023 5.3 (H) 3.5 - 5.1 mmol/L Final     Chloride   Date Value Ref Range Status   09/05/2023 100 95 - 110 mmol/L Final     CO2   Date Value Ref Range Status   09/05/2023 30 (H) 23 - 29  mmol/L Final     BUN   Date Value Ref Range Status   09/05/2023 10 8 - 23 mg/dL Final     Creatinine   Date Value Ref Range Status   09/05/2023 1.8 (H) 0.5 - 1.4 mg/dL Final     Calcium   Date Value Ref Range Status   09/05/2023 9.7 8.7 - 10.5 mg/dL Final     Anion Gap   Date Value Ref Range Status   09/05/2023 13 8 - 16 mmol/L Final     eGFR if    Date Value Ref Range Status   07/18/2022 43 (A) >60 mL/min/1.73 m^2 Final     eGFR if non    Date Value Ref Range Status   07/18/2022 37 (A) >60 mL/min/1.73 m^2 Final     Comment:     Calculation used to obtain the estimated glomerular filtration  rate (eGFR) is the CKD-EPI equation.        BNP  @LABRCNTIP(BNP,BNPTRIAGEBLO)@  @LABRCNTIP(troponini)@  CrCl cannot be calculated (Patient's most recent lab result is older than the maximum 7 days allowed.).  No results found in the last 24 hours.  No results found in the last 24 hours.  No results found in the last 24 hours.    Assessment:      1. Chronic combined systolic and diastolic congestive heart failure    2. Hypertension associated with stage 3 chronic kidney disease due to type 2 diabetes mellitus    3. Hyperlipidemia associated with type 2 diabetes mellitus    4. Coronary artery disease of native artery of native heart with stable angina pectoris    5. PVD (peripheral vascular disease)    6. NICM (nonischemic cardiomyopathy)    7. Class 2 severe obesity due to excess calories with serious comorbidity and body mass index (BMI) of 35.0 to 35.9 in adult    8. DM (diabetes mellitus) with complications        Plan:   Continue ASA statin bystolic losartan and Torsemide  Echo for EF eval    DM Rx per PCP  Counseled DASH  Check Lipid profile with PCP in 6 months  Recommend heart-healthy diet, weight control   Mahsa. Risk modification.   I have reviewed all pertinent labs and cardiac studies independently. Plans and recommendations have been formulated under my direct supervision. All questions  answered and patient voiced understanding.   If symptoms persist go to the ED  RTC in 6 months

## 2023-10-19 NOTE — PROGRESS NOTES
Pulmonary Disease Management  [facility]  Follow up Visit  Chronic Care Management    Santiago Khan  was seen 10/19/2023  11:00 AM in the Pulmonary Disease Management Clinic for evaluation, education, reinforcement of self management techniques and exacerbation action plan.    Chuck Dumont    Past Medical History:   Diagnosis Date    Chronic diastolic congestive heart failure 04/07/2020    Chronic kidney disease, stage 3     Chronic systolic congestive heart failure 07/09/2020    Chronic venous insufficiency     COPD (chronic obstructive pulmonary disease)     DDD (degenerative disc disease), lumbar     DM (diabetes mellitus) with complications     History of gout     Hyperlipidemia associated with type 2 diabetes mellitus     Hypertension associated with stage 3 chronic kidney disease due to type 2 diabetes mellitus     BRADLEY on CPAP     Prostate cancer     prostatectomy in 2010, rising PSA that started in 2021    Tobacco abuse        Patient's Medications   New Prescriptions    No medications on file   Previous Medications    ALBUTEROL (PROVENTIL/VENTOLIN HFA) 90 MCG/ACTUATION INHALER    INHALE 2 PUFFS INTO THE LUNGS EVERY 4 HOURS AS NEEDED FOR WHEEZING    ALLOPURINOL (ZYLOPRIM) 300 MG TABLET    Take 1 tablet (300 mg total) by mouth once daily.    ASPIRIN (ECOTRIN) 81 MG EC TABLET    Take 81 mg by mouth once daily.    BACLOFEN (LIORESAL) 10 MG TABLET    Take 1 tablet by mouth every evening.    BISOPROLOL (ZEBETA) 5 MG TABLET    TAKE 1/2 TABLET BY MOUTH EVERY DAY    COLCHICINE (COLCRYS) 0.6 MG TABLET    TAKE 1 TABLET(0.6 MG) BY MOUTH DAILY AS NEEDED    DIPHENHYDRAMINE-ACETAMINOPHEN (TYLENOL PM)  MG TAB    Take 1 tablet by mouth nightly as needed.    FLUTICASONE PROPIONATE (FLONASE) 50 MCG/ACTUATION NASAL SPRAY    SHAKE LIQUID AND USE 2 SPRAYS(100 MCG) IN EACH NOSTRIL EVERY DAY Strength: 50 mcg/actuation    GLIMEPIRIDE (AMARYL) 1 MG TABLET    TAKE 1 TABLET BY MOUTH EVERY DAY    GUAIFENESIN  100 MG/5 ML (ROBITUSSIN) 100 MG/5 ML SYRUP    Take 200 mg by mouth every evening.    HYDROCODONE-ACETAMINOPHEN (NORCO) 7.5-325 MG PER TABLET    Take 1 tablet by mouth every 4 (four) hours as needed (for chronic pain).    LOSARTAN (COZAAR) 50 MG TABLET    TAKE 1 TABLET(50 MG) BY MOUTH ONCE DAILY    MAGNESIUM OXIDE (MAG-OX) 400 MG (241.3 MG MAGNESIUM) TABLET    TAKE 2 TABLETS(800 MG) BY MOUTH TWICE DAILY    METFORMIN (GLUCOPHAGE) 500 MG TABLET    TAKE 1 TABLET(500 MG) BY MOUTH DAILY WITH BREAKFAST    METHYLPREDNISOLONE (MEDROL DOSEPACK) 4 MG TABLET    use as directed    MULTIVITAMIN-MINERALS-LUTEIN TAB    Take 1 tablet by mouth Daily.    OMEPRAZOLE (PRILOSEC) 40 MG CAPSULE    TAKE 1 CAPSULE BY MOUTH EVERY DAY    POTASSIUM CHLORIDE SA (K-DUR,KLOR-CON) 20 MEQ TABLET    TAKE 3 TABLETS BY MOUTH TWICE DAILY    SHINGRIX, PF, 50 MCG/0.5 ML INJECTION        SIMVASTATIN (ZOCOR) 40 MG TABLET    Take 1 tablet (40 mg total) by mouth every evening.    TIOTROPIUM BROMIDE (SPIRIVA RESPIMAT) 2.5 MCG/ACTUATION INHALER    Inhale 2 puffs into the lungs once daily.    TORSEMIDE (DEMADEX) 20 MG TAB    Take 2 tablets (40 mg total) by mouth 2 (two) times a day.    VARENICLINE (CHANTIX) 1 MG TAB    take 1 tablet twice daily. Take with full glass of water & with food to avoid nausea   Modified Medications    No medications on file   Discontinued Medications    No medications on file             Educational assessment:   [x]            Good  []            Fair  []            Poor    Readiness to learn:   [x]            Good  []            Fair  []            Poor    Vision Status:   [x]            Good  []            Fair  []            Poor    Reading Ability:  []            Good  []            Fair  []            Poor    Knowledge of condition:   [x]            Good  []            Fair  []            Poor    Language Barriers:   []            Good  []            Fair  []            Poor  [x]            None    Cognitive/ Physical Barriers:    []            Good  []            Fair  []            Poor  [x]            None    Learning best by:                       [x]            Seeing  []            Hearing  []            Reading                         [x]            Doing    Describe any barrier /Limitation or financial implications of care choices identified     []            Financial  []            Emotional  []            Education  []            Vision/Hearing  []            Physical  [x]            None  []                TOPIC /CONTENT FOR TODAY:    [x]            MDI with or without spacer  [x]            Dry powder inhaler  []            Acapella   []           Peak Flow meter  [x]            COPD action plan  []            Nebulizer use  []            Oxygen use safety  [x]            Breathing and cough techniques  [x]            Energy conservation  [x]            Infection prevention  []           OTHER________________________        Learner:    [x]            Patient   []            Caregiver    Method:    [x]            Verbal explanation  [x]            Audio visual    [x]            Literature  [x]            Teach back      Evaluation:    [x]            Teach back  [x]            Demonstrate  [x]            Follow up phone call    []            2 weeks     [x]            4 weeks   [x]            PRN    Additional comments:   Patient was seen today to review respiratory medication purpose and proper technique for use of inhalers. Patient practiced proper technique using MDI with valved holding chamber (spacer) and DPI inhalers. Patient demonstrated understanding. Literature was given to patient.    Discussed therapeutic goals for positive airway pressure therapy(CPAP or BiPAP): Ideal is usage 100% of nights for 6 - 8 hours per night. Minimum usage is 70% of night for at least 4 hours per night used. Reviewed CPAP habituation plan with patient. Patient expressed understanding.      Discussed complications of untreated sleep apnea with  patient, including but not limited to high blood pressure, heart attack, stroke, obesity, diabetes and worsening heart failure. Patient voiced understanding.       Asthma trigger checklist was verbally reviewed and literature given to patient.     Infection prevention was discussed. Patient was reminded to get influenza vaccine. Hand hygiene and cleaning of respiratory equipment was also discussed. Patient verbalized understanding.      COPD action plan was reviewed and literature was given to patient. Patient verbalized understanding.      Plan:  Monthly Pulmonary Disease Management Questionnaire  Follow-up PDM appointment scheduled for 4/23/24   Reinforce education  Meds: Vladimir Alvaub   DME Needs: n/a  Action Plan: COPD   Immunization: Pneumococcal- current, Flu-current , Covid 19- current   Next Provider Visit: 1/25/24  Next Spirometry/CPFT: 1/25/24  Approximate time spent with patient: 35 mins

## 2023-10-19 NOTE — TELEPHONE ENCOUNTER
Requested Prescriptions     Signed Prescriptions Disp Refills    WIXELA INHUB 250-50 mcg/dose diskus inhaler 60 each 11     Sig: Inhale 1 puff into the lungs 2 (two) times daily.     Authorizing Provider: JACOBO MILLS     1. Chronic obstructive pulmonary disease, unspecified COPD type  WIXELA INHUB 250-50 mcg/dose diskus inhaler

## 2023-10-23 ENCOUNTER — HOSPITAL ENCOUNTER (OUTPATIENT)
Dept: CARDIOLOGY | Facility: HOSPITAL | Age: 73
Discharge: HOME | End: 2023-10-23
Attending: INTERNAL MEDICINE
Payer: MEDICARE

## 2023-10-23 VITALS
WEIGHT: 230 LBS | DIASTOLIC BLOOD PRESSURE: 64 MMHG | BODY MASS INDEX: 36.1 KG/M2 | HEIGHT: 67 IN | SYSTOLIC BLOOD PRESSURE: 118 MMHG

## 2023-10-23 DIAGNOSIS — I50.42 CHRONIC COMBINED SYSTOLIC AND DIASTOLIC CONGESTIVE HEART FAILURE: Chronic | ICD-10-CM

## 2023-10-23 LAB
AORTIC ROOT ANNULUS: 3.48 CM
ASCENDING AORTA: 3.33 CM
AV INDEX (PROSTH): 0.71
AV MEAN GRADIENT: 7 MMHG
AV PEAK GRADIENT: 13 MMHG
AV VALVE AREA BY VELOCITY RATIO: 2.27 CM²
AV VALVE AREA: 2.69 CM²
AV VELOCITY RATIO: 0.6
BSA FOR ECHO PROCEDURE: 2.22 M2
CV ECHO LV RWT: 0.49 CM
DOP CALC AO PEAK VEL: 1.81 M/S
DOP CALC AO VTI: 34.6 CM
DOP CALC LVOT AREA: 3.8 CM2
DOP CALC LVOT DIAMETER: 2.2 CM
DOP CALC LVOT PEAK VEL: 1.08 M/S
DOP CALC LVOT STROKE VOLUME: 93.09 CM3
DOP CALC RVOT PEAK VEL: 0.85 M/S
DOP CALC RVOT VTI: 16.3 CM
DOP CALCLVOT PEAK VEL VTI: 24.5 CM
ECHO LV POSTERIOR WALL: 1.29 CM (ref 0.6–1.1)
FRACTIONAL SHORTENING: 23 % (ref 28–44)
INTERVENTRICULAR SEPTUM: 1.52 CM (ref 0.6–1.1)
IVC DIAMETER: 2.02 CM
LA MAJOR: 6.11 CM
LA MINOR: 5.83 CM
LA WIDTH: 4.1 CM
LEFT ATRIUM SIZE: 3.71 CM
LEFT ATRIUM VOLUME INDEX MOD: 35.7 ML/M2
LEFT ATRIUM VOLUME INDEX: 35.9 ML/M2
LEFT ATRIUM VOLUME MOD: 76.8 CM3
LEFT ATRIUM VOLUME: 77.15 CM3
LEFT INTERNAL DIMENSION IN SYSTOLE: 4.06 CM (ref 2.1–4)
LEFT VENTRICLE DIASTOLIC VOLUME INDEX: 61.46 ML/M2
LEFT VENTRICLE DIASTOLIC VOLUME: 132.13 ML
LEFT VENTRICLE MASS INDEX: 147 G/M2
LEFT VENTRICLE SYSTOLIC VOLUME INDEX: 33.7 ML/M2
LEFT VENTRICLE SYSTOLIC VOLUME: 72.54 ML
LEFT VENTRICULAR INTERNAL DIMENSION IN DIASTOLE: 5.25 CM (ref 3.5–6)
LEFT VENTRICULAR MASS: 315.85 G
LVOT MG: 2.7 MMHG
LVOT MV: 0.79 CM/S
OHS LV EJECTION FRACTION SIMPSONS BIPLANE MOD: 49 %
PISA TR MAX VEL: 3.41 M/S
PV MEAN GRADIENT: 1 MMHG
PV MV: 0.68 M/S
PV PEAK GRADIENT: 4 MMHG
PV PEAK VELOCITY: 1 M/S
RA MAJOR: 4.56 CM
RA PRESSURE ESTIMATED: 3 MMHG
RA WIDTH: 3.72 CM
RIGHT VENTRICULAR END-DIASTOLIC DIMENSION: 3.56 CM
RV TB RVSP: 6 MMHG
SINUS: 3.4 CM
STJ: 3.11 CM
TDI LATERAL: 0.08 M/S
TR MAX PG: 47 MMHG
TRICUSPID ANNULAR PLANE SYSTOLIC EXCURSION: 2.27 CM
TV REST PULMONARY ARTERY PRESSURE: 50 MMHG
Z-SCORE OF LEFT VENTRICULAR DIMENSION IN END DIASTOLE: -2.85
Z-SCORE OF LEFT VENTRICULAR DIMENSION IN END SYSTOLE: -0.37

## 2023-10-23 PROCEDURE — 93306 TTE W/DOPPLER COMPLETE: CPT

## 2023-10-23 PROCEDURE — 93306 ECHO (CUPID ONLY): ICD-10-PCS | Mod: 26,,, | Performed by: INTERNAL MEDICINE

## 2023-10-23 PROCEDURE — 93306 TTE W/DOPPLER COMPLETE: CPT | Mod: 26,,, | Performed by: INTERNAL MEDICINE

## 2023-10-24 ENCOUNTER — TELEPHONE (OUTPATIENT)
Dept: CARDIOLOGY | Facility: CLINIC | Age: 73
End: 2023-10-24
Payer: MEDICARE

## 2023-10-24 NOTE — TELEPHONE ENCOUNTER
Called pt to go over echo results. No [email or web address]      ----- Message from Sabino Cr MD sent at 10/24/2023  3:55 PM CDT -----  Echo showed borderline normal function and mild valvular leaking  Continue current Rx  F/U as scheduled

## 2023-10-27 RX ORDER — TORSEMIDE 20 MG/1
TABLET ORAL
Qty: 180 TABLET | Refills: 2 | Status: SHIPPED | OUTPATIENT
Start: 2023-10-27 | End: 2024-03-08

## 2023-11-02 DIAGNOSIS — F17.200 NICOTINE DEPENDENCE: ICD-10-CM

## 2023-11-02 RX ORDER — VARENICLINE TARTRATE 1 MG/1
TABLET, FILM COATED ORAL
Qty: 60 TABLET | Refills: 0 | Status: SHIPPED | OUTPATIENT
Start: 2023-11-02 | End: 2023-12-20 | Stop reason: SDUPTHER

## 2023-11-08 ENCOUNTER — TELEPHONE (OUTPATIENT)
Dept: SMOKING CESSATION | Facility: CLINIC | Age: 73
End: 2023-11-08
Payer: MEDICARE

## 2023-11-08 ENCOUNTER — CLINICAL SUPPORT (OUTPATIENT)
Dept: SMOKING CESSATION | Facility: CLINIC | Age: 73
End: 2023-11-08
Payer: COMMERCIAL

## 2023-11-08 DIAGNOSIS — F17.200 NICOTINE DEPENDENCE: Primary | ICD-10-CM

## 2023-11-08 PROCEDURE — 99402 PR PREVENT COUNSEL,INDIV,30 MIN: ICD-10-PCS | Mod: S$GLB,,, | Performed by: SPEECH-LANGUAGE PATHOLOGIST

## 2023-11-08 PROCEDURE — 99999 PR PBB SHADOW E&M-EST. PATIENT-LVL II: ICD-10-PCS | Mod: PBBFAC,,, | Performed by: SPEECH-LANGUAGE PATHOLOGIST

## 2023-11-08 PROCEDURE — 99402 PREV MED CNSL INDIV APPRX 30: CPT | Mod: S$GLB,,, | Performed by: SPEECH-LANGUAGE PATHOLOGIST

## 2023-11-08 PROCEDURE — 99999 PR PBB SHADOW E&M-EST. PATIENT-LVL II: CPT | Mod: PBBFAC,,, | Performed by: SPEECH-LANGUAGE PATHOLOGIST

## 2023-11-08 RX ORDER — GLIMEPIRIDE 1 MG/1
TABLET ORAL
Qty: 90 TABLET | Refills: 2 | Status: SHIPPED | OUTPATIENT
Start: 2023-11-08

## 2023-11-08 NOTE — TELEPHONE ENCOUNTER
Call to patient for scheduled 2 pm appointment to offer a phone session due to the patient's voicemail. Voicemail left

## 2023-11-08 NOTE — PROGRESS NOTES
Individual Follow-Up Form    11/8/2023    Quit Date: 9/15/2023     Clinical Status of Patient: Outpatient     Continuing Medication: yes  Chantix     Other Medications:                  Target Symptoms: Withdrawal and medication side effects. The following were   rated moderate (3) to severe (4) on TCRS:  Moderate (3): insomnia  Severe (4): none       Comments: Telephone visit later than his scheduled 2 pm appointment due to the patient had to take care of something with his mother & wasn't able to make it to the clinic. Patient called to say he was able to do a phone visit. He reports he has remained smoke free since his 9/15/2023 quit date & remains on Varenicline 1 mg twice daily. Administered TCRS with patient reporting slight symptoms of desire/crave, anxious/nervous, constipation, mild symptoms of anger/irritability/frustration, increased appetite/hunger, depressed mood/sadness, restless/can't sit still/impatient, difficulty concentrating & moderate symptom of insomnia. Discussed adjusting his Varenicline dosage to 1 mg once daily to see if this would help with his sleep. Patient is willing to apply. Discussed high risk situations which include stress. Patient reports stressful living situation with his family & that when the thought of a cigarette comes to mind he uses self talk & counting to deter him from smoking. Commended patient on his decision making on using other strategies to manage his stress & not turn to smoking to cope. Patient states his goal is to remain smoke free. Follow up set for 12/20/2023 at 2:00 pm.      Diagnosis: F17.200    Next Visit: 6 weeks

## 2023-11-08 NOTE — TELEPHONE ENCOUNTER
Refill Routing Note   Medication(s) are not appropriate for processing by [facility] for the following reason(s):      Required labs outdated  Required labs abnormal    [facility] action(s):  Defer Care Due:  None identified     Medication Therapy Plan: FLOS      Appointments  past 12m or future 3m with PCP    Date Provider   Last Visit   6/12/2023 Kashif Acosta MD   Next Visit   11/16/2023 Kashif Acosta MD   ED visits in past 90 days: 0        Note composed:11:44 AM 11/08/2023

## 2023-11-08 NOTE — TELEPHONE ENCOUNTER
Care Due:                  Date            Visit Type   Department     Provider  --------------------------------------------------------------------------------                                SAME DAY -                              ESTABLISHED   [facility] INTERNAL  Last Visit: 06-      PATIENT      MEDICINE       Kashif Acosta                               -                              PRIMARY      [facility] INTERNAL  Next Visit: 11-      CARE ([facility])   MEDICINE       Kashif Acosta                                                            Last  Test          Frequency    Reason                     Performed    Due Date  --------------------------------------------------------------------------------    HBA1C.......  6 months...  glimepiride, metFORMIN...  05-   11-    [facility] Embedded Care Due Messages. Reference number: [id number].   11/08/2023 3:32:53 AM CST

## 2023-11-08 NOTE — TELEPHONE ENCOUNTER
Received a voicemail from patient who states he is having to do something for his mother so he may be late or may not be able to make it at all & states he will be in touch.

## 2023-11-08 NOTE — TELEPHONE ENCOUNTER
Text sent to patient to offer a phone session if he is not able to come in person due to the task he states he has to do for his mother

## 2023-11-13 ENCOUNTER — LAB VISIT (OUTPATIENT)
Dept: LAB | Facility: HOSPITAL | Age: 73
End: 2023-11-13
Attending: INTERNAL MEDICINE
Payer: MEDICARE

## 2023-11-13 DIAGNOSIS — E11.8 DM (DIABETES MELLITUS) WITH COMPLICATIONS: ICD-10-CM

## 2023-11-13 DIAGNOSIS — Z87.39 HISTORY OF GOUT: ICD-10-CM

## 2023-11-13 DIAGNOSIS — C61 PROSTATE CANCER: ICD-10-CM

## 2023-11-13 LAB
ALBUMIN SERPL BCP-MCNC: 3.7 G/DL (ref 3.5–5.2)
ALP SERPL-CCNC: 77 U/L (ref 55–135)
ALT SERPL W/O P-5'-P-CCNC: 22 U/L (ref 10–44)
ANION GAP SERPL CALC-SCNC: 8 MMOL/L (ref 8–16)
AST SERPL-CCNC: 23 U/L (ref 10–40)
BASOPHILS # BLD AUTO: 0.08 K/UL (ref 0–0.2)
BASOPHILS NFR BLD: 0.9 % (ref 0–1.9)
BILIRUB SERPL-MCNC: 0.6 MG/DL (ref 0.1–1)
BUN SERPL-MCNC: 16 MG/DL (ref 8–23)
CALCIUM SERPL-MCNC: 9.5 MG/DL (ref 8.7–10.5)
CHLORIDE SERPL-SCNC: 98 MMOL/L (ref 95–110)
CHOLEST SERPL-MCNC: 142 MG/DL (ref 120–199)
CHOLEST/HDLC SERPL: 3 {RATIO} (ref 2–5)
CO2 SERPL-SCNC: 35 MMOL/L (ref 23–29)
COMPLEXED PSA SERPL-MCNC: 1.4 NG/ML (ref 0–4)
CREAT SERPL-MCNC: 1.7 MG/DL (ref 0.5–1.4)
DIFFERENTIAL METHOD: ABNORMAL
EOSINOPHIL # BLD AUTO: 0.5 K/UL (ref 0–0.5)
EOSINOPHIL NFR BLD: 5.9 % (ref 0–8)
ERYTHROCYTE [DISTWIDTH] IN BLOOD BY AUTOMATED COUNT: 15.6 % (ref 11.5–14.5)
EST. GFR  (NO RACE VARIABLE): 42.3 ML/MIN/1.73 M^2
ESTIMATED AVG GLUCOSE: 157 MG/DL (ref 68–131)
GLUCOSE SERPL-MCNC: 139 MG/DL (ref 70–110)
HBA1C MFR BLD: 7.1 % (ref 4–5.6)
HCT VFR BLD AUTO: 40.8 % (ref 40–54)
HDLC SERPL-MCNC: 48 MG/DL (ref 40–75)
HDLC SERPL: 33.8 % (ref 20–50)
HGB BLD-MCNC: 13.1 G/DL (ref 14–18)
IMM GRANULOCYTES # BLD AUTO: 0.14 K/UL (ref 0–0.04)
IMM GRANULOCYTES NFR BLD AUTO: 1.6 % (ref 0–0.5)
LDLC SERPL CALC-MCNC: 72.6 MG/DL (ref 63–159)
LYMPHOCYTES # BLD AUTO: 1.7 K/UL (ref 1–4.8)
LYMPHOCYTES NFR BLD: 19.5 % (ref 18–48)
MCH RBC QN AUTO: 32.3 PG (ref 27–31)
MCHC RBC AUTO-ENTMCNC: 32.1 G/DL (ref 32–36)
MCV RBC AUTO: 101 FL (ref 82–98)
MONOCYTES # BLD AUTO: 0.6 K/UL (ref 0.3–1)
MONOCYTES NFR BLD: 6.8 % (ref 4–15)
NEUTROPHILS # BLD AUTO: 5.7 K/UL (ref 1.8–7.7)
NEUTROPHILS NFR BLD: 65.3 % (ref 38–73)
NONHDLC SERPL-MCNC: 94 MG/DL
NRBC BLD-RTO: 0 /100 WBC
PLATELET # BLD AUTO: 170 K/UL (ref 150–450)
PMV BLD AUTO: 11.2 FL (ref 9.2–12.9)
POTASSIUM SERPL-SCNC: 4.9 MMOL/L (ref 3.5–5.1)
PROT SERPL-MCNC: 6.7 G/DL (ref 6–8.4)
RBC # BLD AUTO: 4.05 M/UL (ref 4.6–6.2)
SODIUM SERPL-SCNC: 141 MMOL/L (ref 136–145)
TRIGL SERPL-MCNC: 107 MG/DL (ref 30–150)
TSH SERPL DL<=0.005 MIU/L-ACNC: 1.99 UIU/ML (ref 0.4–4)
URATE SERPL-MCNC: 6.2 MG/DL (ref 3.4–7)
WBC # BLD AUTO: 8.77 K/UL (ref 3.9–12.7)

## 2023-11-13 PROCEDURE — 80061 LIPID PANEL: CPT | Performed by: INTERNAL MEDICINE

## 2023-11-13 PROCEDURE — 84153 ASSAY OF PSA TOTAL: CPT | Performed by: INTERNAL MEDICINE

## 2023-11-13 PROCEDURE — 36415 COLL VENOUS BLD VENIPUNCTURE: CPT | Mod: PO | Performed by: INTERNAL MEDICINE

## 2023-11-13 PROCEDURE — 80053 COMPREHEN METABOLIC PANEL: CPT | Performed by: INTERNAL MEDICINE

## 2023-11-13 PROCEDURE — 85025 COMPLETE CBC W/AUTO DIFF WBC: CPT | Performed by: INTERNAL MEDICINE

## 2023-11-13 PROCEDURE — 84550 ASSAY OF BLOOD/URIC ACID: CPT | Performed by: INTERNAL MEDICINE

## 2023-11-13 PROCEDURE — 84443 ASSAY THYROID STIM HORMONE: CPT | Performed by: INTERNAL MEDICINE

## 2023-11-13 PROCEDURE — 83036 HEMOGLOBIN GLYCOSYLATED A1C: CPT | Performed by: INTERNAL MEDICINE

## 2023-11-16 ENCOUNTER — OFFICE VISIT (OUTPATIENT)
Dept: INTERNAL MEDICINE | Facility: CLINIC | Age: 73
End: 2023-11-16
Payer: MEDICARE

## 2023-11-16 VITALS
SYSTOLIC BLOOD PRESSURE: 114 MMHG | BODY MASS INDEX: 37.23 KG/M2 | DIASTOLIC BLOOD PRESSURE: 70 MMHG | OXYGEN SATURATION: 95 % | HEIGHT: 67 IN | WEIGHT: 237.19 LBS | HEART RATE: 95 BPM

## 2023-11-16 DIAGNOSIS — C61 PROSTATE CANCER: ICD-10-CM

## 2023-11-16 DIAGNOSIS — N18.32 STAGE 3B CHRONIC KIDNEY DISEASE: Chronic | ICD-10-CM

## 2023-11-16 DIAGNOSIS — E11.8 DM (DIABETES MELLITUS) WITH COMPLICATIONS: ICD-10-CM

## 2023-11-16 DIAGNOSIS — I12.9 HYPERTENSION ASSOCIATED WITH STAGE 3 CHRONIC KIDNEY DISEASE DUE TO TYPE 2 DIABETES MELLITUS: Primary | ICD-10-CM

## 2023-11-16 DIAGNOSIS — E78.5 HYPERLIPIDEMIA ASSOCIATED WITH TYPE 2 DIABETES MELLITUS: ICD-10-CM

## 2023-11-16 DIAGNOSIS — E11.69 HYPERLIPIDEMIA ASSOCIATED WITH TYPE 2 DIABETES MELLITUS: ICD-10-CM

## 2023-11-16 DIAGNOSIS — E66.2 MORBID OBESITY WITH ALVEOLAR HYPOVENTILATION: ICD-10-CM

## 2023-11-16 DIAGNOSIS — G47.33 OSA ON CPAP: ICD-10-CM

## 2023-11-16 DIAGNOSIS — Z87.39 HISTORY OF GOUT: ICD-10-CM

## 2023-11-16 DIAGNOSIS — I25.118 CORONARY ARTERY DISEASE OF NATIVE ARTERY OF NATIVE HEART WITH STABLE ANGINA PECTORIS: Chronic | ICD-10-CM

## 2023-11-16 DIAGNOSIS — E11.22 HYPERTENSION ASSOCIATED WITH STAGE 3 CHRONIC KIDNEY DISEASE DUE TO TYPE 2 DIABETES MELLITUS: Primary | ICD-10-CM

## 2023-11-16 DIAGNOSIS — N18.30 HYPERTENSION ASSOCIATED WITH STAGE 3 CHRONIC KIDNEY DISEASE DUE TO TYPE 2 DIABETES MELLITUS: Primary | ICD-10-CM

## 2023-11-16 DIAGNOSIS — I50.42 CHRONIC COMBINED SYSTOLIC AND DIASTOLIC CONGESTIVE HEART FAILURE: Chronic | ICD-10-CM

## 2023-11-16 PROCEDURE — 3074F PR MOST RECENT SYSTOLIC BLOOD PRESSURE < 130 MM HG: ICD-10-PCS | Mod: CPTII,S$GLB,, | Performed by: INTERNAL MEDICINE

## 2023-11-16 PROCEDURE — 3062F POS MACROALBUMINURIA REV: CPT | Mod: CPTII,S$GLB,, | Performed by: INTERNAL MEDICINE

## 2023-11-16 PROCEDURE — 1159F PR MEDICATION LIST DOCUMENTED IN MEDICAL RECORD: ICD-10-PCS | Mod: CPTII,S$GLB,, | Performed by: INTERNAL MEDICINE

## 2023-11-16 PROCEDURE — 3051F HG A1C>EQUAL 7.0%<8.0%: CPT | Mod: CPTII,S$GLB,, | Performed by: INTERNAL MEDICINE

## 2023-11-16 PROCEDURE — 1160F RVW MEDS BY RX/DR IN RCRD: CPT | Mod: CPTII,S$GLB,, | Performed by: INTERNAL MEDICINE

## 2023-11-16 PROCEDURE — 1126F AMNT PAIN NOTED NONE PRSNT: CPT | Mod: CPTII,S$GLB,, | Performed by: INTERNAL MEDICINE

## 2023-11-16 PROCEDURE — 1101F PR PT FALLS ASSESS DOC 0-1 FALLS W/OUT INJ PAST YR: ICD-10-PCS | Mod: CPTII,S$GLB,, | Performed by: INTERNAL MEDICINE

## 2023-11-16 PROCEDURE — 1101F PT FALLS ASSESS-DOCD LE1/YR: CPT | Mod: CPTII,S$GLB,, | Performed by: INTERNAL MEDICINE

## 2023-11-16 PROCEDURE — 3078F DIAST BP <80 MM HG: CPT | Mod: CPTII,S$GLB,, | Performed by: INTERNAL MEDICINE

## 2023-11-16 PROCEDURE — 3062F PR POS MACROALBUMINURIA RESULT DOCUMENTED/REVIEW: ICD-10-PCS | Mod: CPTII,S$GLB,, | Performed by: INTERNAL MEDICINE

## 2023-11-16 PROCEDURE — 3066F NEPHROPATHY DOC TX: CPT | Mod: CPTII,S$GLB,, | Performed by: INTERNAL MEDICINE

## 2023-11-16 PROCEDURE — 99214 PR OFFICE/OUTPT VISIT, EST, LEVL IV, 30-39 MIN: ICD-10-PCS | Mod: 25,S$GLB,, | Performed by: INTERNAL MEDICINE

## 2023-11-16 PROCEDURE — 3288F FALL RISK ASSESSMENT DOCD: CPT | Mod: CPTII,S$GLB,, | Performed by: INTERNAL MEDICINE

## 2023-11-16 PROCEDURE — 4010F ACE/ARB THERAPY RXD/TAKEN: CPT | Mod: CPTII,S$GLB,, | Performed by: INTERNAL MEDICINE

## 2023-11-16 PROCEDURE — 99999 PR PBB SHADOW E&M-EST. PATIENT-LVL IV: ICD-10-PCS | Mod: PBBFAC,,, | Performed by: INTERNAL MEDICINE

## 2023-11-16 PROCEDURE — 4010F PR ACE/ARB THEARPY RXD/TAKEN: ICD-10-PCS | Mod: CPTII,S$GLB,, | Performed by: INTERNAL MEDICINE

## 2023-11-16 PROCEDURE — 1160F PR REVIEW ALL MEDS BY PRESCRIBER/CLIN PHARMACIST DOCUMENTED: ICD-10-PCS | Mod: CPTII,S$GLB,, | Performed by: INTERNAL MEDICINE

## 2023-11-16 PROCEDURE — 3051F PR MOST RECENT HEMOGLOBIN A1C LEVEL 7.0 - < 8.0%: ICD-10-PCS | Mod: CPTII,S$GLB,, | Performed by: INTERNAL MEDICINE

## 2023-11-16 PROCEDURE — 1126F PR PAIN SEVERITY QUANTIFIED, NO PAIN PRESENT: ICD-10-PCS | Mod: CPTII,S$GLB,, | Performed by: INTERNAL MEDICINE

## 2023-11-16 PROCEDURE — 3074F SYST BP LT 130 MM HG: CPT | Mod: CPTII,S$GLB,, | Performed by: INTERNAL MEDICINE

## 2023-11-16 PROCEDURE — 3008F PR BODY MASS INDEX (BMI) DOCUMENTED: ICD-10-PCS | Mod: CPTII,S$GLB,, | Performed by: INTERNAL MEDICINE

## 2023-11-16 PROCEDURE — 3066F PR DOCUMENTATION OF TREATMENT FOR NEPHROPATHY: ICD-10-PCS | Mod: CPTII,S$GLB,, | Performed by: INTERNAL MEDICINE

## 2023-11-16 PROCEDURE — 1159F MED LIST DOCD IN RCRD: CPT | Mod: CPTII,S$GLB,, | Performed by: INTERNAL MEDICINE

## 2023-11-16 PROCEDURE — 3288F PR FALLS RISK ASSESSMENT DOCUMENTED: ICD-10-PCS | Mod: CPTII,S$GLB,, | Performed by: INTERNAL MEDICINE

## 2023-11-16 PROCEDURE — 3008F BODY MASS INDEX DOCD: CPT | Mod: CPTII,S$GLB,, | Performed by: INTERNAL MEDICINE

## 2023-11-16 PROCEDURE — 99214 OFFICE O/P EST MOD 30 MIN: CPT | Mod: 25,S$GLB,, | Performed by: INTERNAL MEDICINE

## 2023-11-16 PROCEDURE — 99999 PR PBB SHADOW E&M-EST. PATIENT-LVL IV: CPT | Mod: PBBFAC,,, | Performed by: INTERNAL MEDICINE

## 2023-11-16 PROCEDURE — 3078F PR MOST RECENT DIASTOLIC BLOOD PRESSURE < 80 MM HG: ICD-10-PCS | Mod: CPTII,S$GLB,, | Performed by: INTERNAL MEDICINE

## 2023-11-16 RX ORDER — TIRZEPATIDE 2.5 MG/.5ML
2.5 INJECTION, SOLUTION SUBCUTANEOUS
Qty: 4 PEN | Refills: 1 | Status: SHIPPED | OUTPATIENT
Start: 2023-11-16 | End: 2023-11-22 | Stop reason: SDUPTHER

## 2023-11-16 NOTE — PROGRESS NOTES
"HPI:  Patient is a 73-year-old man comes today for follow-up of his diabetes, hypertension, lipids, chronic kidney disease, history of prostate cancer and coronary artery disease.  Patient denies any chest pains or shortness a breath.  Patient does not check his blood sugar on a regular basis.  He denies any hypoglycemic problems.  He states he recently quit smoking again which unfortunately has caused him to eat more.  His blood pressures have been controlled at home.    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  Turned out to be benign.  He has an appointment to see the rheumatologist for that in January.    Current meds have been verified and updated per the EMR  Exam:/70 (BP Location: Left arm)   Pulse 95   Ht 5' 7" (1.702 m)   Wt 107.6 kg (237 lb 3.4 oz)   SpO2 95%   BMI 37.15 kg/m²   Carotids 2+ equal without bruits  Chest clear  Cardiovascular regular rate and rhythm without murmur gallop or rub    Lab Results   Component Value Date    WBC 8.77 11/13/2023    HGB 13.1 (L) 11/13/2023    HCT 40.8 11/13/2023     11/13/2023    CHOL 142 11/13/2023    TRIG 107 11/13/2023    HDL 48 11/13/2023    ALT 22 11/13/2023    AST 23 11/13/2023     11/13/2023    K 4.9 11/13/2023    CL 98 11/13/2023    CREATININE 1.7 (H) 11/13/2023    BUN 16 11/13/2023    CO2 35 (H) 11/13/2023    TSH 1.989 11/13/2023    PSA 0.15 02/01/2021    INR 1.0 06/26/2020    HGBA1C 7.1 (H) 11/13/2023    PSADIAG 1.4 11/13/2023    BNP 22 04/29/2022    URICACID 6.2 11/13/2023    SEDRATE 38 (H) 03/11/2022    CRP 10.3 (H) 03/11/2022       Impression:  Diabetes, not as well controlled as it had been in the past and not where we would like it to be  Obesity  Hypertension and lipids both controlled  Chronic kidney disease, stable GFR  Coronary artery disease/CHF, stable, asymptomatic  History prostate cancer, stable PSA  Patient Active Problem List   Diagnosis    BRADLEY on CPAP    History of gout    DDD (degenerative " disc disease), lumbar    Cigarette nicotine dependence without complication    Hypertension associated with stage 3 chronic kidney disease due to type 2 diabetes mellitus    Hyperlipidemia associated with type 2 diabetes mellitus    Chronic kidney disease, stage 3    Class 2 severe obesity due to excess calories with serious comorbidity and body mass index (BMI) of 35.0 to 35.9 in adult    Coronary artery disease of native artery of native heart with stable angina pectoris    Chronic combined systolic and diastolic congestive heart failure    Prostate cancer    PVD (peripheral vascular disease)    NICM (nonischemic cardiomyopathy)    Closed fracture of tuft of distal phalanx of finger    Chronic venous insufficiency    Third nerve palsy, right    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    Morbid obesity with alveolar hypoventilation    DM (diabetes mellitus) with complications    Chronic obstructive pulmonary disease    Opioid dependence, uncomplicated    Other nonthrombocytopenic purpura    Other chronic pain    Other reduced mobility    Abnormality of gait and mobility    Nicotine dependence    Orbital mass       Plan:  Orders Placed This Encounter    Hemoglobin A1C    Lipid Panel    Basic Metabolic Panel    CBC Auto Differential    Prostate Specific Antigen, Diagnostic    Microalbumin/Creatinine Ratio, Urine    TSH    tirzepatide (MOUNJARO) 2.5 mg/0.5 mL PnIj     Patient will be started on Mounjaro 2.5 mg weekly.  He will be titrated monthly.  Patient will see me again in 6 months with above lab work.  He will see me otherwise as needed.  We discussed the appropriate diet.  He was given high-dose influenza vaccine today    This note is generated with speech recognition software and is subject to transcription error and sound alike phrases that may be missed by proofreading.

## 2023-11-20 ENCOUNTER — TELEPHONE (OUTPATIENT)
Dept: INTERNAL MEDICINE | Facility: CLINIC | Age: 73
End: 2023-11-20
Payer: MEDICARE

## 2023-11-22 RX ORDER — TIRZEPATIDE 2.5 MG/.5ML
2.5 INJECTION, SOLUTION SUBCUTANEOUS
Qty: 4 PEN | Refills: 1 | Status: SHIPPED | OUTPATIENT
Start: 2023-11-22 | End: 2023-12-29

## 2023-11-23 DIAGNOSIS — N18.32 STAGE 3B CHRONIC KIDNEY DISEASE: ICD-10-CM

## 2023-11-23 DIAGNOSIS — M10.9 GOUT, UNSPECIFIED CAUSE, UNSPECIFIED CHRONICITY, UNSPECIFIED SITE: ICD-10-CM

## 2023-11-24 RX ORDER — ALLOPURINOL 100 MG/1
100 TABLET ORAL
Qty: 30 TABLET | Refills: 11 | Status: SHIPPED | OUTPATIENT
Start: 2023-11-24

## 2023-11-29 ENCOUNTER — CLINICAL SUPPORT (OUTPATIENT)
Dept: SMOKING CESSATION | Facility: CLINIC | Age: 73
End: 2023-11-29
Payer: COMMERCIAL

## 2023-11-29 ENCOUNTER — TELEPHONE (OUTPATIENT)
Dept: SMOKING CESSATION | Facility: CLINIC | Age: 73
End: 2023-11-29
Payer: MEDICARE

## 2023-11-29 DIAGNOSIS — F17.200 NICOTINE DEPENDENCE: Primary | ICD-10-CM

## 2023-11-29 PROCEDURE — 99999 PR PBB SHADOW E&M-EST. PATIENT-LVL I: ICD-10-PCS | Mod: PBBFAC,,,

## 2023-11-29 PROCEDURE — 99999 PR PBB SHADOW E&M-EST. PATIENT-LVL I: CPT | Mod: PBBFAC,,,

## 2023-11-29 PROCEDURE — 99407 PR TOBACCO USE CESSATION INTENSIVE >10 MINUTES: ICD-10-PCS | Mod: S$GLB,,,

## 2023-11-29 PROCEDURE — 99407 BEHAV CHNG SMOKING > 10 MIN: CPT | Mod: S$GLB,,,

## 2023-11-29 NOTE — PROGRESS NOTES
Called pt to f/u on his 3 month smoking cessation quit status. Pt stated he remains tobacco free since 9/14. Congratulated him on his hard work and success.Pt reports Shant helped in his quit journey. Pt is still actively enrolled in program.Pt reports being a 2 ppd smoker.  Informed him of benefit period, phone follow ups, and contact information. Will complete smart form and will continue to follow up on quit #3 episode.

## 2023-12-13 ENCOUNTER — PATIENT OUTREACH (OUTPATIENT)
Dept: PULMONOLOGY | Facility: CLINIC | Age: 73
End: 2023-12-13
Payer: MEDICARE

## 2023-12-20 ENCOUNTER — CLINICAL SUPPORT (OUTPATIENT)
Dept: SMOKING CESSATION | Facility: CLINIC | Age: 73
End: 2023-12-20
Payer: COMMERCIAL

## 2023-12-20 DIAGNOSIS — F17.200 NICOTINE DEPENDENCE: Primary | ICD-10-CM

## 2023-12-20 PROCEDURE — 99999 PR PBB SHADOW E&M-EST. PATIENT-LVL II: ICD-10-PCS | Mod: PBBFAC,,, | Performed by: SPEECH-LANGUAGE PATHOLOGIST

## 2023-12-20 PROCEDURE — 99999 PR PBB SHADOW E&M-EST. PATIENT-LVL II: CPT | Mod: PBBFAC,,, | Performed by: SPEECH-LANGUAGE PATHOLOGIST

## 2023-12-20 PROCEDURE — 99404 PREV MED CNSL INDIV APPRX 60: CPT | Mod: S$GLB,,, | Performed by: SPEECH-LANGUAGE PATHOLOGIST

## 2023-12-20 PROCEDURE — 99404 PR PREVENT COUNSEL,INDIV,60 MIN: ICD-10-PCS | Mod: S$GLB,,, | Performed by: SPEECH-LANGUAGE PATHOLOGIST

## 2023-12-20 RX ORDER — VARENICLINE TARTRATE 1 MG/1
TABLET, FILM COATED ORAL
Qty: 60 TABLET | Refills: 0 | Status: SHIPPED | OUTPATIENT
Start: 2023-12-20 | End: 2024-01-24 | Stop reason: SDUPTHER

## 2023-12-20 NOTE — PROGRESS NOTES
Individual Follow-Up Form    12/20/2023    Quit Date: 9/15/2023    Clinical Status of Patient: Outpatient    Continuing Medication: yes  Chantix    Other Medications:      Target Symptoms: Withdrawal and medication side effects. The following were  rated moderate (3) to severe (4) on TCRS:  Moderate (3): insomnia  Severe (4): none    Comments: Patient seen in clinic. He reports he has remained smoke free since his 9/15/2023 quit date & continues to take his 1 mg Varenicline twice daily. He denies any negative side effects or mood changes. Celebrated with the patient on his continued gains & dedication to living smoke free. Administered TCRS with patient reports slight symptoms desire/crave, difficulty concentrating, mild anger/irritability/frustration, increased appetite/hunger, depressed mood/sadness, restless/can't sit still/impatient & moderate symptoms of insomnia. Informed patient that he could go down to 1 mg once daily if his sleep is impacted by the Varenicline. Patient reports he does not feel the insomnia is connected to the Varenicline. Discussed high risk situations to include stress. Patient reports his stress has been manageable & he is aware of the people in his life that trigger extra stress. Patient keeps space for himself which allows him to re-group. Discussed exploring a hobby/area of interest to invest in his life with the money that he has saved since his quit which patient reports is around $1,500. He also reports according to his tracking kathy, he has not smoked over 3,300 cigarettes since his quit. He reports having drs visits since his quit & that some things have improved such as his 02 levels on the pulse ox meter. Patient reports his goal is to remain smoke free. Follow up set for 1/24/2024 at 11:00 am.     Diagnosis: F17.200    Next Visit: 5 weeks

## 2023-12-29 ENCOUNTER — PATIENT MESSAGE (OUTPATIENT)
Dept: INTERNAL MEDICINE | Facility: CLINIC | Age: 73
End: 2023-12-29
Payer: MEDICARE

## 2023-12-29 RX ORDER — TIRZEPATIDE 5 MG/.5ML
5 INJECTION, SOLUTION SUBCUTANEOUS
Qty: 4 PEN | Refills: 1 | Status: SHIPPED | OUTPATIENT
Start: 2023-12-29

## 2024-01-11 DIAGNOSIS — J43.9 COPD WITH CHRONIC BRONCHITIS AND EMPHYSEMA: ICD-10-CM

## 2024-01-11 DIAGNOSIS — J44.89 COPD WITH CHRONIC BRONCHITIS AND EMPHYSEMA: ICD-10-CM

## 2024-01-11 RX ORDER — ALBUTEROL SULFATE 90 UG/1
AEROSOL, METERED RESPIRATORY (INHALATION)
Qty: 8.5 G | Refills: 3 | Status: SHIPPED | OUTPATIENT
Start: 2024-01-11

## 2024-01-17 ENCOUNTER — OFFICE VISIT (OUTPATIENT)
Dept: RHEUMATOLOGY | Facility: CLINIC | Age: 74
End: 2024-01-17
Payer: MEDICARE

## 2024-01-17 VITALS
HEIGHT: 67 IN | DIASTOLIC BLOOD PRESSURE: 66 MMHG | HEART RATE: 93 BPM | WEIGHT: 223.75 LBS | SYSTOLIC BLOOD PRESSURE: 136 MMHG | BODY MASS INDEX: 35.12 KG/M2

## 2024-01-17 DIAGNOSIS — H05.111: Primary | ICD-10-CM

## 2024-01-17 DIAGNOSIS — Z79.52 LONG TERM (CURRENT) USE OF SYSTEMIC STEROIDS: ICD-10-CM

## 2024-01-17 DIAGNOSIS — H05.89 ORBITAL MASS: ICD-10-CM

## 2024-01-17 PROCEDURE — 99204 OFFICE O/P NEW MOD 45 MIN: CPT | Mod: S$GLB,,, | Performed by: STUDENT IN AN ORGANIZED HEALTH CARE EDUCATION/TRAINING PROGRAM

## 2024-01-17 PROCEDURE — 99999 PR PBB SHADOW E&M-EST. PATIENT-LVL V: CPT | Mod: PBBFAC,,, | Performed by: STUDENT IN AN ORGANIZED HEALTH CARE EDUCATION/TRAINING PROGRAM

## 2024-01-17 RX ORDER — SIMVASTATIN 40 MG/1
40 TABLET, FILM COATED ORAL NIGHTLY
COMMUNITY
Start: 2023-12-17 | End: 2024-03-22

## 2024-01-17 RX ORDER — SULFAMETHOXAZOLE AND TRIMETHOPRIM 800; 160 MG/1; MG/1
1 TABLET ORAL
Qty: 12 TABLET | Refills: 1 | Status: SHIPPED | OUTPATIENT
Start: 2024-01-17 | End: 2024-03-17

## 2024-01-17 RX ORDER — PREDNISONE 10 MG/1
TABLET ORAL
Qty: 147 TABLET | Refills: 0 | Status: SHIPPED | OUTPATIENT
Start: 2024-01-17 | End: 2024-02-28

## 2024-01-17 NOTE — PATIENT INSTRUCTIONS
Check blood sugar few times per day to make sure high dose steroids not elevating blood sugar above 300

## 2024-01-24 ENCOUNTER — CLINICAL SUPPORT (OUTPATIENT)
Dept: SMOKING CESSATION | Facility: CLINIC | Age: 74
End: 2024-01-24
Payer: COMMERCIAL

## 2024-01-24 DIAGNOSIS — F17.200 NICOTINE DEPENDENCE: Primary | ICD-10-CM

## 2024-01-24 PROCEDURE — 99999 PR PBB SHADOW E&M-EST. PATIENT-LVL II: CPT | Mod: PBBFAC,,, | Performed by: SPEECH-LANGUAGE PATHOLOGIST

## 2024-01-24 PROCEDURE — 99404 PREV MED CNSL INDIV APPRX 60: CPT | Mod: S$GLB,,, | Performed by: SPEECH-LANGUAGE PATHOLOGIST

## 2024-01-24 RX ORDER — VARENICLINE TARTRATE 1 MG/1
TABLET, FILM COATED ORAL
Qty: 60 TABLET | Refills: 0 | Status: SHIPPED | OUTPATIENT
Start: 2024-01-24 | End: 2024-02-19 | Stop reason: SDUPTHER

## 2024-01-24 NOTE — PROGRESS NOTES
Individual Follow-Up Form    1/24/2024    Quit Date: 9/15/2023    Clinical Status of Patient: Outpatient    Continuing Medication: yes  Chantix    Other Medications:      Target Symptoms: Withdrawal and medication side effects. The following were  rated moderate (3) to severe (4) on TCRS:  Moderate (3):   Severe (4):     Comments: Patient seen in clinic. He reports he has remained smoke free since his 9/15/2023 quit date & remains on Varenicline 1 mg twice daily. He denies any negative side effects or mood changes. 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 He reports having a stressful moment with his family that he wanted a cigarette; but he chose to call his girlfriend instead. Commended the patient on his decision making & not turning to smoking to cope as this has been the trigger to his relapses over the course of his quit attempts the last few years. Patient states he is enjoying his financial gains & how he feels since his quit.  Goal is to remain smoke free. Follow up set for 2/19/2024 at 1:00 pm     Diagnosis: F17.200    Next Visit: 4 weeks

## 2024-01-25 ENCOUNTER — TELEPHONE (OUTPATIENT)
Dept: PULMONOLOGY | Facility: CLINIC | Age: 74
End: 2024-01-25
Payer: MEDICARE

## 2024-01-25 ENCOUNTER — HOSPITAL ENCOUNTER (OUTPATIENT)
Dept: RADIOLOGY | Facility: HOSPITAL | Age: 74
Discharge: HOME | End: 2024-01-25
Attending: NURSE PRACTITIONER
Payer: MEDICARE

## 2024-01-25 DIAGNOSIS — F17.210 CIGARETTE NICOTINE DEPENDENCE WITHOUT COMPLICATION: ICD-10-CM

## 2024-01-25 DIAGNOSIS — R91.1 SOLITARY PULMONARY NODULE: ICD-10-CM

## 2024-01-25 PROCEDURE — 71271 CT THORAX LUNG CANCER SCR C-: CPT | Mod: TC

## 2024-01-25 PROCEDURE — 71271 CT THORAX LUNG CANCER SCR C-: CPT | Mod: 26,,, | Performed by: RADIOLOGY

## 2024-01-25 NOTE — TELEPHONE ENCOUNTER
Called pt twice concerning appt with Dr. Springer this morning.  Call went straight to .  Pt not present in lobby.

## 2024-02-02 NOTE — PROGRESS NOTES
RHEUMATOLOGY CLINIC INITIAL VISIT    Reason for consult:- inflammatory pseudotumor    Chief complaints, HPI, ROS, EXAM, Assessment & Plans:-    Santiago Khan is a 73 y.o. pleasant male who presents to be evaluated for management of inflammatory pseudotumor of right orbit.  This was demonstrated on biopsy.  He has not been started on steroids as of yet.  Does have some visual changes due to this as well as some involvement of the extraocular muscles.  Denies any significant arthralgias.  No family history of autoimmune disease.  No rashes.  Denies oral ulcers.  Rheumatologic review of systems otherwise negative.  Slight proptosis of right eye.  Dysconjugate movement of eyes.  No synovitis, dactylitis or enthesitis.    Reviewed all available old and outside pertinent medical records.    All lab results personally reviewed and interpreted by me.    1. Orbital mass    2. Inflammatory pseudotumor of orbit, right    3. Long term (current) use of systemic steroids        Problem List Items Addressed This Visit          Ophtho    Orbital mass - Primary    Relevant Medications    predniSONE (DELTASONE) 10 MG tablet     Other Visit Diagnoses       Inflammatory pseudotumor of orbit, right        Relevant Medications    predniSONE (DELTASONE) 10 MG tablet    Long term (current) use of systemic steroids        Relevant Medications    sulfamethoxazole-trimethoprim 800-160mg (BACTRIM DS) 800-160 mg Tab            Patient presenting to be evaluated for management of inflammatory pseudotumor of right orbit  Biopsy-proven  No concerning features for other associated autoimmune diseases  We will plan to start on prednisone taper  Bactrim prophylaxis while on high-dose steroids    # Follow up in about 3 months (around 4/17/2024).    Chronic comorbid conditions affecting medical decision making today:    Past Medical History:   Diagnosis Date    Chronic diastolic congestive heart failure 04/07/2020    Chronic kidney disease,  stage 3     Chronic systolic congestive heart failure 2020    Chronic venous insufficiency     COPD (chronic obstructive pulmonary disease)     DDD (degenerative disc disease), lumbar     DM (diabetes mellitus) with complications     History of gout     Hyperlipidemia associated with type 2 diabetes mellitus     Hypertension associated with stage 3 chronic kidney disease due to type 2 diabetes mellitus     BRADLEY on CPAP     Prostate cancer     prostatectomy in , rising PSA that started in     Tobacco abuse        Past Surgical History:   Procedure Laterality Date    BICEPS TENDON REPAIR      COLONOSCOPY N/A 2019    Procedure: COLONOSCOPY;  Surgeon: James Roger MD;  Location: [facility] ENDO;  Service: Endoscopy;  Laterality: N/A;    EXPLORATION OF ORBIT Right 2023    Procedure: EXPLORATION, ORBIT;  Surgeon: Junaid Bartholomew MD;  Location: [facility] OR;  Service: ENT;  Laterality: Right;  possibly need frozen    HEMORRHOID SURGERY      INGUINAL HERNIA REPAIR Left     KNEE ARTHROSCOPY Right     LEFT HEART CATHETERIZATION Left 2020    Procedure: CATHETERIZATION, HEART, LEFT;  Surgeon: Cheli Wilson MD;  Location: [facility] CATH LAB;  Service: Cardiology;  Laterality: Left;    LUMBAR LAMINECTOMY      PROSTATECTOMY      TONSILLECTOMY          Social History     Tobacco Use    Smoking status: Former     Current packs/day: 0.00     Average packs/day: 1.5 packs/day for 52.7 years (79.1 ttl pk-yrs)     Types: Cigarettes     Start date: 1971     Quit date: 9/15/2023     Years since quittin.3    Smokeless tobacco: Former   Substance Use Topics    Alcohol use: Not Currently    Drug use: Never       Family History   Problem Relation Age of Onset    Prostate cancer Father     Coronary artery disease Father 90    Alzheimer's disease Father     Hyperlipidemia Mother        Review of patient's allergies indicates:   Allergen Reactions    Amitriptyline Rash    Celecoxib Rash       Medication List with  Changes/Refills   New Medications    PREDNISONE (DELTASONE) 10 MG TABLET    Take 6 tablets (60 mg total) by mouth once daily for 7 days, THEN 5 tablets (50 mg total) once daily for 7 days, THEN 4 tablets (40 mg total) once daily for 7 days, THEN 3 tablets (30 mg total) once daily for 7 days, THEN 2 tablets (20 mg total) once daily for 7 days, THEN 1 tablet (10 mg total) once daily for 7 days.    SULFAMETHOXAZOLE-TRIMETHOPRIM 800-160MG (BACTRIM DS) 800-160 MG TAB    Take 1 tablet by mouth every Mon, Wed, Fri.   Current Medications    ALBUTEROL (PROVENTIL/VENTOLIN HFA) 90 MCG/ACTUATION INHALER    INHALE 2 PUFFS INTO THE LUNGS EVERY 4 HOURS AS NEEDED FOR WHEEZING    ALLOPURINOL (ZYLOPRIM) 100 MG TABLET    TAKE 1 TABLET(100 MG) BY MOUTH EVERY DAY    ASPIRIN (ECOTRIN) 81 MG EC TABLET    Take 81 mg by mouth once daily.    BACLOFEN (LIORESAL) 10 MG TABLET    Take 1 tablet by mouth every evening.    BISOPROLOL (ZEBETA) 5 MG TABLET    TAKE 1/2 TABLET BY MOUTH EVERY DAY    COLCHICINE (COLCRYS) 0.6 MG TABLET    TAKE 1 TABLET(0.6 MG) BY MOUTH DAILY AS NEEDED    FLUTICASONE PROPIONATE (FLONASE) 50 MCG/ACTUATION NASAL SPRAY    SHAKE LIQUID AND USE 2 SPRAYS(100 MCG) IN EACH NOSTRIL EVERY DAY Strength: 50 mcg/actuation    GLIMEPIRIDE (AMARYL) 1 MG TABLET    TAKE 1 TABLET BY MOUTH EVERY DAY    GUAIFENESIN 100 MG/5 ML (ROBITUSSIN) 100 MG/5 ML SYRUP    Take 200 mg by mouth every evening.    HYDROCODONE-ACETAMINOPHEN (NORCO) 7.5-325 MG PER TABLET    Take 1 tablet by mouth every 4 (four) hours as needed (for chronic pain).    LOSARTAN (COZAAR) 25 MG TABLET    Take 1 tablet (25 mg total) by mouth once daily.    MAGNESIUM OXIDE (MAG-OX) 400 MG (241.3 MG MAGNESIUM) TABLET    TAKE 2 TABLETS(800 MG) BY MOUTH TWICE DAILY    METFORMIN (GLUCOPHAGE) 500 MG TABLET    TAKE 1 TABLET(500 MG) BY MOUTH DAILY WITH BREAKFAST    MULTIVITAMIN-MINERALS-LUTEIN TAB    Take 1 tablet by mouth Daily.    OMEPRAZOLE (PRILOSEC) 40 MG CAPSULE    TAKE 1 CAPSULE BY  MOUTH EVERY DAY    POTASSIUM CHLORIDE SA (K-DUR,KLOR-CON) 20 MEQ TABLET    TAKE 3 TABLETS BY MOUTH TWICE DAILY    SIMVASTATIN (ZOCOR) 40 MG TABLET    Take 40 mg by mouth every evening.    TIRZEPATIDE (MOUNJARO) 5 MG/0.5 ML PNIJ    Inject 5 mg into the skin every 7 days.    TORSEMIDE (DEMADEX) 20 MG TAB    TAKE 2 TABLETS(40 MG) BY MOUTH TWICE DAILY    WIXELA INHUB 250-50 MCG/DOSE DISKUS INHALER    Inhale 1 puff into the lungs 2 (two) times daily.   Changed and/or Refilled Medications    Modified Medication Previous Medication    VARENICLINE (CHANTIX) 1 MG TAB varenicline (CHANTIX) 1 mg Tab       take 1 tablet twice daily. Take with full glass of water & with food to avoid nausea    take 1 tablet twice daily. Take with full glass of water & with food to avoid nausea         Disclaimer: This note was prepared using voice recognition system and is likely to have sound alike errors and is not proofread.  Please message me with any questions.    45 minutes of total time spent on the encounter, which includes face to face time and non-face to face time preparing to see the patient (eg, review of tests), Obtaining and/or reviewing separately obtained history, Documenting clinical information in the electronic or other health record, Independently interpreting results (not separately reported) and communicating results to the patient/family/caregiver, or Care coordination (not separately reported).     Thank you for allowing me to participate in the care of Santiago Khan.    Justice Salazar MD

## 2024-02-06 DIAGNOSIS — R91.1 SOLITARY PULMONARY NODULE: ICD-10-CM

## 2024-02-06 DIAGNOSIS — F17.210 CIGARETTE NICOTINE DEPENDENCE WITHOUT COMPLICATION: Primary | ICD-10-CM

## 2024-02-06 NOTE — PROGRESS NOTES
Call pt. Inform normal/stable CT of chest  Needs follow up CT in one year  Lung cancer screening, >= 30 pk-yr current smoker (Age 55-80y);Lung nodule, 6-8mm;6 month fu new 7mm pulmonary nodule Left upper lobe. 77 pack year current smoker.; Solitary pulmonary nodule

## 2024-02-19 ENCOUNTER — CLINICAL SUPPORT (OUTPATIENT)
Dept: SMOKING CESSATION | Facility: CLINIC | Age: 74
End: 2024-02-19
Payer: COMMERCIAL

## 2024-02-19 DIAGNOSIS — F17.200 NICOTINE DEPENDENCE: Primary | ICD-10-CM

## 2024-02-19 PROCEDURE — 99999 PR PBB SHADOW E&M-EST. PATIENT-LVL II: CPT | Mod: PBBFAC,,, | Performed by: SPEECH-LANGUAGE PATHOLOGIST

## 2024-02-19 PROCEDURE — 99404 PREV MED CNSL INDIV APPRX 60: CPT | Mod: S$GLB,,, | Performed by: SPEECH-LANGUAGE PATHOLOGIST

## 2024-02-19 RX ORDER — VARENICLINE TARTRATE 1 MG/1
TABLET, FILM COATED ORAL
Qty: 60 TABLET | Refills: 0 | Status: SHIPPED | OUTPATIENT
Start: 2024-02-19 | End: 2024-03-12 | Stop reason: SDUPTHER

## 2024-02-19 NOTE — PROGRESS NOTES
Individual Follow-Up Form    2/19/2024    Quit Date: 9/15/2023     Clinical Status of Patient: Outpatient     Continuing Medication: yes  Chantix     Other Medications:                  Target Symptoms: Withdrawal and medication side effects. The following were   rated moderate (3) to severe (4) on TCRS:  Moderate (3):   Severe (4):      Comments: Patient seen in clinic. He reports he has remained smoke free since his 9/15/2023. Celebrated with the patient his 5 month smoke free anniversary. He remains on Varenicline 1 mg twice daily. He denies any negative side effects or mood changes. Patient reports he was able to pay off some bills using his financial gains from his quit. Patient reports his stress level remains the same due to his environmental stressors with his family. Commended the patient on not returning to smoking as a form of coping with his ongoing situation. Discussed the option of physical exercise such as a gym with a pool due to the patient's physical limitations & COPD. Provided the patient with the name of a fitness club that is near his home that does have an indoor pool. Patient reports continued support from his girlfriend & family. Goal is to remain smoke free. Follow up set for 3/12/2024 at 1:00 pm.     Diagnosis: F17.200    Next Visit: 3 weeks

## 2024-02-28 ENCOUNTER — TELEPHONE (OUTPATIENT)
Dept: SMOKING CESSATION | Facility: CLINIC | Age: 74
End: 2024-02-28
Payer: MEDICARE

## 2024-02-28 ENCOUNTER — CLINICAL SUPPORT (OUTPATIENT)
Dept: SMOKING CESSATION | Facility: CLINIC | Age: 74
End: 2024-02-28
Payer: COMMERCIAL

## 2024-02-28 DIAGNOSIS — F17.200 NICOTINE DEPENDENCE: Primary | ICD-10-CM

## 2024-02-28 PROCEDURE — 99407 BEHAV CHNG SMOKING > 10 MIN: CPT | Mod: S$GLB,,,

## 2024-02-28 NOTE — PROGRESS NOTES
Spoke with patient today in regard to smoking cessation progress for 6 month telephone follow up, he states tobacco free for over 5 months. Commended patient on the accomplishment thus far. Patient is currently enrolled in the program with a scheduled follow  up visit. Informed patient of benefit period, future follow ups, and contact information if any further help or support is needed. Will complete smart form for 6 month follow up on Quit attempt #3.

## 2024-03-06 RX ORDER — FLUTICASONE PROPIONATE 50 MCG
SPRAY, SUSPENSION (ML) NASAL
Qty: 48 G | Refills: 3 | Status: SHIPPED | OUTPATIENT
Start: 2024-03-06

## 2024-03-08 RX ORDER — TORSEMIDE 20 MG/1
TABLET ORAL
Qty: 180 TABLET | Refills: 2 | Status: SHIPPED | OUTPATIENT
Start: 2024-03-08 | End: 2024-04-30 | Stop reason: SDUPTHER

## 2024-03-12 ENCOUNTER — CLINICAL SUPPORT (OUTPATIENT)
Dept: SMOKING CESSATION | Facility: CLINIC | Age: 74
End: 2024-03-12
Payer: COMMERCIAL

## 2024-03-12 DIAGNOSIS — F17.200 NICOTINE DEPENDENCE: Primary | ICD-10-CM

## 2024-03-12 PROCEDURE — 99402 PREV MED CNSL INDIV APPRX 30: CPT | Mod: S$GLB,,, | Performed by: SPEECH-LANGUAGE PATHOLOGIST

## 2024-03-12 PROCEDURE — 99999 PR PBB SHADOW E&M-EST. PATIENT-LVL II: CPT | Mod: PBBFAC,,, | Performed by: SPEECH-LANGUAGE PATHOLOGIST

## 2024-03-12 RX ORDER — VARENICLINE TARTRATE 1 MG/1
TABLET, FILM COATED ORAL
Qty: 60 TABLET | Refills: 0 | Status: SHIPPED | OUTPATIENT
Start: 2024-03-12 | End: 2024-04-03 | Stop reason: SDUPTHER

## 2024-03-12 NOTE — PROGRESS NOTES
Individual Follow-Up Form    3/12/2024    Quit Date: 9/15/2023     Clinical Status of Patient: Outpatient    Length of Service: 30 minutes    Continuing Medication: yes  Chantix    Other Medications:      Target Symptoms: Withdrawal and medication side effects. The following were  rated moderate (3) to severe (4) on TCRS:  Moderate (3): none  Severe (4): anger/irritability/frustration, depressed mood/sadness & insomnia.    Comments: Patient seen in clinic. He reports he has remained smoke free since his 9/15/2023 quit date & remains on his Varenicline 1 mg twice daily. Administered TCRS with patient reporting slight symptoms of desire/crave, increased appetite/hunger, difficulty concentrating, anxious/nervous, restless/can't sit still/impatient, nausea & moderate symptoms of anger/irritability/frustration, depressed mood/sadness & insomnia. He denies any negative side effects or mood changes. Discussed high risk situations & triggers which include stress. He reports his stress level can be anywhere from a level 2 to a level 12 on a scale of 1-10 depending on his home situation. He reports he has been managing stress by staying in his room away from others. Patient shared his quit smoking kathy findings of how much he has saved, how many he hasn't smoked, his quit status & how much time he has saved since his quit. Celebrated with the patient his 6 month quit anniversary. He reports not having a good night last night which has him not feeling his best today therefore shorter session held. Patient's goal is to remain smoke free. Follow up set for 4/3/2024 at 2:00 pm.     Diagnosis: F17.200    Next Visit: 3 weeks

## 2024-03-13 DIAGNOSIS — J44.89 COPD WITH CHRONIC BRONCHITIS AND EMPHYSEMA: ICD-10-CM

## 2024-03-13 DIAGNOSIS — J43.9 COPD WITH CHRONIC BRONCHITIS AND EMPHYSEMA: ICD-10-CM

## 2024-03-13 RX ORDER — ALBUTEROL SULFATE 90 UG/1
AEROSOL, METERED RESPIRATORY (INHALATION)
Qty: 54 G | Refills: 3 | Status: SHIPPED | OUTPATIENT
Start: 2024-03-13 | End: 2024-04-01 | Stop reason: SDUPTHER

## 2024-03-19 ENCOUNTER — TELEPHONE (OUTPATIENT)
Dept: PULMONOLOGY | Facility: CLINIC | Age: 74
End: 2024-03-19
Payer: MEDICARE

## 2024-03-22 RX ORDER — SIMVASTATIN 40 MG/1
40 TABLET, FILM COATED ORAL NIGHTLY
Qty: 90 TABLET | Refills: 3 | Status: SHIPPED | OUTPATIENT
Start: 2024-03-22

## 2024-03-22 NOTE — TELEPHONE ENCOUNTER
Requested Prescriptions     Pending Prescriptions Disp Refills    simvastatin (ZOCOR) 40 MG tablet [Pharmacy Med Name: SIMVASTATIN 40MG TABLETS] 90 tablet      Sig: TAKE 1 TABLET(40 MG) BY MOUTH EVERY EVENING     LV 11/16/2023  [place] 05/21/2024  LF 12/17/2023

## 2024-04-01 DIAGNOSIS — J43.9 COPD WITH CHRONIC BRONCHITIS AND EMPHYSEMA: ICD-10-CM

## 2024-04-01 DIAGNOSIS — J44.89 COPD WITH CHRONIC BRONCHITIS AND EMPHYSEMA: ICD-10-CM

## 2024-04-01 RX ORDER — ALBUTEROL SULFATE 90 UG/1
2 AEROSOL, METERED RESPIRATORY (INHALATION) EVERY 4 HOURS PRN
Qty: 54 G | Refills: 3 | Status: SHIPPED | OUTPATIENT
Start: 2024-04-01

## 2024-04-03 ENCOUNTER — CLINICAL SUPPORT (OUTPATIENT)
Dept: SMOKING CESSATION | Facility: CLINIC | Age: 74
End: 2024-04-03
Payer: COMMERCIAL

## 2024-04-03 ENCOUNTER — PATIENT MESSAGE (OUTPATIENT)
Dept: PULMONOLOGY | Facility: CLINIC | Age: 74
End: 2024-04-03
Payer: MEDICARE

## 2024-04-03 DIAGNOSIS — F17.200 NICOTINE DEPENDENCE: Primary | ICD-10-CM

## 2024-04-03 PROCEDURE — 99999 PR PBB SHADOW E&M-EST. PATIENT-LVL II: CPT | Mod: PBBFAC,,, | Performed by: SPEECH-LANGUAGE PATHOLOGIST

## 2024-04-03 PROCEDURE — 99404 PREV MED CNSL INDIV APPRX 60: CPT | Mod: S$GLB,,, | Performed by: SPEECH-LANGUAGE PATHOLOGIST

## 2024-04-03 RX ORDER — VARENICLINE TARTRATE 1 MG/1
TABLET, FILM COATED ORAL
Qty: 60 TABLET | Refills: 0 | Status: SHIPPED | OUTPATIENT
Start: 2024-04-03

## 2024-04-03 NOTE — PROGRESS NOTES
Individual Follow-Up Form    4/3/2024    Quit Date: 9/15/2023      Clinical Status of Patient: Outpatient     Length of Service: 60 minutes     Continuing Medication: yes  Chantix     Other Medications:        Target Symptoms: Withdrawal and medication side effects. The following were  rated moderate (3) to severe (4) on TCRS:  Moderate (3): anger/irritability/frustration, insomnia, restless/can't sit still/impatient.   Severe (4): none    Comments: Patient seen in clinic. Patient reports he has remained smoke free since his 9/15/2023 quit date. He reports he would like to do one more month of Varenicline & he plans on taking 1 pill per day until the prescription is done. Administered TCRS with patient reporting slight symptoms of desire/crave, difficulty concentration, mild symptoms of increased appetite/hunger, depressed mood/sadness & moderate symptoms of anger/irritability/frustration, insomnia, restless/can't sit still/impatient. Discussed patient's stress levels which he reports are the same & he continues to experience the same stressors with his family. He states he has spoken up to his family to state he doesn't want to discuss specific topics. He reports he just keeps to himself & stays in his room. Patient reports he continues to enjoy his time with his girlfriend & reading books. Patient's goal is to remain smoke free. Follow up set for 5/1/2024 at 1:00 pm.     Diagnosis: F17.200    Next Visit: 5 weeks

## 2024-04-12 ENCOUNTER — TELEPHONE (OUTPATIENT)
Dept: OPHTHALMOLOGY | Facility: CLINIC | Age: 74
End: 2024-04-12
Payer: MEDICARE

## 2024-04-12 NOTE — TELEPHONE ENCOUNTER
Going through waitlist I see that this patient was added to waitlist by mistake for orbital mass OD. I called the pt but no answer so LVM. Explain in msg that there is limited options for orbital oncology in the state and that if he has not got into to see someone yet that I would be happy to give him info for orbital oncologists in [place] and [place].     Fwd this encounter to management.

## 2024-04-15 RX ORDER — LOSARTAN POTASSIUM 25 MG/1
25 TABLET ORAL
Qty: 90 TABLET | Refills: 3 | Status: SHIPPED | OUTPATIENT
Start: 2024-04-15

## 2024-04-16 ENCOUNTER — TELEPHONE (OUTPATIENT)
Dept: RHEUMATOLOGY | Facility: CLINIC | Age: 74
End: 2024-04-16
Payer: MEDICARE

## 2024-04-16 NOTE — TELEPHONE ENCOUNTER
Called to speak with [facility] patient's friend to let her know that the patient's appt would need to be cancelled due to the provider being out of office and that he can call back to r/s appt. Pt's friend would let the pt know. -DD

## 2024-04-18 DIAGNOSIS — Z00.00 ROUTINE HEALTH MAINTENANCE: ICD-10-CM

## 2024-04-18 DIAGNOSIS — E11.22 HYPERTENSION ASSOCIATED WITH STAGE 3 CHRONIC KIDNEY DISEASE DUE TO TYPE 2 DIABETES MELLITUS: Primary | ICD-10-CM

## 2024-04-18 DIAGNOSIS — N18.30 HYPERTENSION ASSOCIATED WITH STAGE 3 CHRONIC KIDNEY DISEASE DUE TO TYPE 2 DIABETES MELLITUS: Primary | ICD-10-CM

## 2024-04-18 DIAGNOSIS — I12.9 HYPERTENSION ASSOCIATED WITH STAGE 3 CHRONIC KIDNEY DISEASE DUE TO TYPE 2 DIABETES MELLITUS: Primary | ICD-10-CM

## 2024-04-19 ENCOUNTER — OFFICE VISIT (OUTPATIENT)
Dept: RHEUMATOLOGY | Facility: CLINIC | Age: 74
End: 2024-04-19
Payer: MEDICARE

## 2024-04-19 VITALS — WEIGHT: 220 LBS | HEIGHT: 67 IN | BODY MASS INDEX: 34.53 KG/M2

## 2024-04-19 DIAGNOSIS — Z79.52 LONG TERM (CURRENT) USE OF SYSTEMIC STEROIDS: Primary | ICD-10-CM

## 2024-04-19 DIAGNOSIS — H05.111: ICD-10-CM

## 2024-04-19 PROCEDURE — 1159F MED LIST DOCD IN RCRD: CPT | Mod: CPTII,S$GLB,, | Performed by: STUDENT IN AN ORGANIZED HEALTH CARE EDUCATION/TRAINING PROGRAM

## 2024-04-19 PROCEDURE — 3072F LOW RISK FOR RETINOPATHY: CPT | Mod: CPTII,S$GLB,, | Performed by: STUDENT IN AN ORGANIZED HEALTH CARE EDUCATION/TRAINING PROGRAM

## 2024-04-19 PROCEDURE — 1125F AMNT PAIN NOTED PAIN PRSNT: CPT | Mod: CPTII,S$GLB,, | Performed by: STUDENT IN AN ORGANIZED HEALTH CARE EDUCATION/TRAINING PROGRAM

## 2024-04-19 PROCEDURE — 99214 OFFICE O/P EST MOD 30 MIN: CPT | Mod: S$GLB,,, | Performed by: STUDENT IN AN ORGANIZED HEALTH CARE EDUCATION/TRAINING PROGRAM

## 2024-04-19 PROCEDURE — 99999 PR PBB SHADOW E&M-EST. PATIENT-LVL V: CPT | Mod: PBBFAC,,, | Performed by: STUDENT IN AN ORGANIZED HEALTH CARE EDUCATION/TRAINING PROGRAM

## 2024-04-19 PROCEDURE — 1160F RVW MEDS BY RX/DR IN RCRD: CPT | Mod: CPTII,S$GLB,, | Performed by: STUDENT IN AN ORGANIZED HEALTH CARE EDUCATION/TRAINING PROGRAM

## 2024-04-19 PROCEDURE — 1100F PTFALLS ASSESS-DOCD GE2>/YR: CPT | Mod: CPTII,S$GLB,, | Performed by: STUDENT IN AN ORGANIZED HEALTH CARE EDUCATION/TRAINING PROGRAM

## 2024-04-19 PROCEDURE — 3008F BODY MASS INDEX DOCD: CPT | Mod: CPTII,S$GLB,, | Performed by: STUDENT IN AN ORGANIZED HEALTH CARE EDUCATION/TRAINING PROGRAM

## 2024-04-19 PROCEDURE — 4010F ACE/ARB THERAPY RXD/TAKEN: CPT | Mod: CPTII,S$GLB,, | Performed by: STUDENT IN AN ORGANIZED HEALTH CARE EDUCATION/TRAINING PROGRAM

## 2024-04-19 PROCEDURE — 3288F FALL RISK ASSESSMENT DOCD: CPT | Mod: CPTII,S$GLB,, | Performed by: STUDENT IN AN ORGANIZED HEALTH CARE EDUCATION/TRAINING PROGRAM

## 2024-04-19 RX ORDER — PREDNISONE 5 MG/1
TABLET ORAL
Qty: 70 TABLET | Refills: 0 | Status: SHIPPED | OUTPATIENT
Start: 2024-04-19 | End: 2024-06-14

## 2024-04-19 RX ORDER — ALLOPURINOL 300 MG/1
300 TABLET ORAL
COMMUNITY
Start: 2024-02-06

## 2024-04-22 RX ORDER — POTASSIUM CHLORIDE 20 MEQ/1
TABLET, EXTENDED RELEASE ORAL
Qty: 557 TABLET | Refills: 1 | Status: SHIPPED | OUTPATIENT
Start: 2024-04-22 | End: 2024-04-25

## 2024-04-22 RX ORDER — METFORMIN HYDROCHLORIDE 500 MG/1
TABLET ORAL
Qty: 90 TABLET | Refills: 3 | Status: SHIPPED | OUTPATIENT
Start: 2024-04-22

## 2024-04-22 NOTE — TELEPHONE ENCOUNTER
Requested Prescriptions     Pending Prescriptions Disp Refills    metFORMIN (GLUCOPHAGE) 500 MG tablet [Pharmacy Med Name: METFORMIN 500MG TABLETS] 90 tablet 3     Sig: TAKE 1 TABLET(500 MG) BY MOUTH DAILY WITH BREAKFAST     LV 11/16/2023   NV 05/21/2024  LF 04/27/2023

## 2024-04-23 ENCOUNTER — CLINICAL SUPPORT (OUTPATIENT)
Dept: PULMONOLOGY | Facility: CLINIC | Age: 74
End: 2024-04-23
Payer: MEDICARE

## 2024-04-23 ENCOUNTER — HOSPITAL ENCOUNTER (OUTPATIENT)
Dept: CARDIOLOGY | Facility: HOSPITAL | Age: 74
Discharge: HOME | End: 2024-04-23
Attending: INTERNAL MEDICINE
Payer: MEDICARE

## 2024-04-23 ENCOUNTER — OFFICE VISIT (OUTPATIENT)
Dept: CARDIOLOGY | Facility: CLINIC | Age: 74
End: 2024-04-23
Payer: MEDICARE

## 2024-04-23 VITALS
DIASTOLIC BLOOD PRESSURE: 60 MMHG | BODY MASS INDEX: 35.54 KG/M2 | WEIGHT: 226.44 LBS | HEART RATE: 81 BPM | OXYGEN SATURATION: 94 % | SYSTOLIC BLOOD PRESSURE: 102 MMHG | HEIGHT: 67 IN

## 2024-04-23 VITALS
WEIGHT: 226.44 LBS | HEIGHT: 67 IN | OXYGEN SATURATION: 91 % | BODY MASS INDEX: 35.54 KG/M2 | HEART RATE: 81 BPM | RESPIRATION RATE: 20 BRPM

## 2024-04-23 DIAGNOSIS — I12.9 HYPERTENSION ASSOCIATED WITH STAGE 3 CHRONIC KIDNEY DISEASE DUE TO TYPE 2 DIABETES MELLITUS: ICD-10-CM

## 2024-04-23 DIAGNOSIS — J44.89 COPD WITH CHRONIC BRONCHITIS AND EMPHYSEMA: Primary | ICD-10-CM

## 2024-04-23 DIAGNOSIS — E66.2 MORBID OBESITY WITH ALVEOLAR HYPOVENTILATION: ICD-10-CM

## 2024-04-23 DIAGNOSIS — I50.42 CHRONIC COMBINED SYSTOLIC AND DIASTOLIC CONGESTIVE HEART FAILURE: ICD-10-CM

## 2024-04-23 DIAGNOSIS — E78.5 HYPERLIPIDEMIA ASSOCIATED WITH TYPE 2 DIABETES MELLITUS: ICD-10-CM

## 2024-04-23 DIAGNOSIS — I73.9 PVD (PERIPHERAL VASCULAR DISEASE): Primary | ICD-10-CM

## 2024-04-23 DIAGNOSIS — Z00.00 ROUTINE HEALTH MAINTENANCE: ICD-10-CM

## 2024-04-23 DIAGNOSIS — I42.8 NICM (NONISCHEMIC CARDIOMYOPATHY): ICD-10-CM

## 2024-04-23 DIAGNOSIS — E66.01 CLASS 2 SEVERE OBESITY DUE TO EXCESS CALORIES WITH SERIOUS COMORBIDITY AND BODY MASS INDEX (BMI) OF 35.0 TO 35.9 IN ADULT: ICD-10-CM

## 2024-04-23 DIAGNOSIS — E11.69 HYPERLIPIDEMIA ASSOCIATED WITH TYPE 2 DIABETES MELLITUS: ICD-10-CM

## 2024-04-23 DIAGNOSIS — E11.22 HYPERTENSION ASSOCIATED WITH STAGE 3 CHRONIC KIDNEY DISEASE DUE TO TYPE 2 DIABETES MELLITUS: ICD-10-CM

## 2024-04-23 DIAGNOSIS — J43.9 COPD WITH CHRONIC BRONCHITIS AND EMPHYSEMA: Primary | ICD-10-CM

## 2024-04-23 DIAGNOSIS — I27.20 PULMONARY HTN: ICD-10-CM

## 2024-04-23 DIAGNOSIS — N18.30 HYPERTENSION ASSOCIATED WITH STAGE 3 CHRONIC KIDNEY DISEASE DUE TO TYPE 2 DIABETES MELLITUS: ICD-10-CM

## 2024-04-23 DIAGNOSIS — I25.118 CORONARY ARTERY DISEASE OF NATIVE ARTERY OF NATIVE HEART WITH STABLE ANGINA PECTORIS: ICD-10-CM

## 2024-04-23 LAB
OHS QRS DURATION: 142 MS
OHS QTC CALCULATION: 502 MS

## 2024-04-23 PROCEDURE — 99214 OFFICE O/P EST MOD 30 MIN: CPT | Mod: S$GLB,,, | Performed by: INTERNAL MEDICINE

## 2024-04-23 PROCEDURE — 1126F AMNT PAIN NOTED NONE PRSNT: CPT | Mod: CPTII,S$GLB,, | Performed by: INTERNAL MEDICINE

## 2024-04-23 PROCEDURE — 93005 ELECTROCARDIOGRAM TRACING: CPT

## 2024-04-23 PROCEDURE — 3008F BODY MASS INDEX DOCD: CPT | Mod: CPTII,S$GLB,, | Performed by: INTERNAL MEDICINE

## 2024-04-23 PROCEDURE — 1101F PT FALLS ASSESS-DOCD LE1/YR: CPT | Mod: CPTII,S$GLB,, | Performed by: INTERNAL MEDICINE

## 2024-04-23 PROCEDURE — 3078F DIAST BP <80 MM HG: CPT | Mod: CPTII,S$GLB,, | Performed by: INTERNAL MEDICINE

## 2024-04-23 PROCEDURE — 1160F RVW MEDS BY RX/DR IN RCRD: CPT | Mod: CPTII,S$GLB,, | Performed by: INTERNAL MEDICINE

## 2024-04-23 PROCEDURE — 1159F MED LIST DOCD IN RCRD: CPT | Mod: CPTII,S$GLB,, | Performed by: INTERNAL MEDICINE

## 2024-04-23 PROCEDURE — 93010 ELECTROCARDIOGRAM REPORT: CPT | Mod: ,,, | Performed by: INTERNAL MEDICINE

## 2024-04-23 PROCEDURE — 99999 PR PBB SHADOW E&M-EST. PATIENT-LVL II: CPT | Mod: PBBFAC,,,

## 2024-04-23 PROCEDURE — 99999 PR PBB SHADOW E&M-EST. PATIENT-LVL V: CPT | Mod: PBBFAC,,, | Performed by: INTERNAL MEDICINE

## 2024-04-23 PROCEDURE — 4010F ACE/ARB THERAPY RXD/TAKEN: CPT | Mod: CPTII,S$GLB,, | Performed by: INTERNAL MEDICINE

## 2024-04-23 PROCEDURE — 3072F LOW RISK FOR RETINOPATHY: CPT | Mod: CPTII,S$GLB,, | Performed by: INTERNAL MEDICINE

## 2024-04-23 PROCEDURE — 3074F SYST BP LT 130 MM HG: CPT | Mod: CPTII,S$GLB,, | Performed by: INTERNAL MEDICINE

## 2024-04-23 PROCEDURE — 3288F FALL RISK ASSESSMENT DOCD: CPT | Mod: CPTII,S$GLB,, | Performed by: INTERNAL MEDICINE

## 2024-04-23 NOTE — PROGRESS NOTES
Subjective:   Patient ID:  Santiago Khan is a 73 y.o. male who presents for follow up of Follow-up      74 yo male, came in for 1 yr f/u  PMH CAD s/p LHC in  D1 70%, no PCi, CHFmrEF 45%, normal LVEDP, DM 4 yrs, pulm HTN life long smoker, quit in ,  HTN, HLD, CKD III, prostates Ca s/p TURP in 2011, no h/o chemo RX and XRT. Gout. No h/o stroke and heart attack. Social drinker.  Remote LHC showed miminal blockage by Dr. Mayra CHAPPELL,    admitted for [facility] due to chest tightness. Had low K and Mg. Troponin x2 negative and EKG showed NSR, RBBB, and LVH. Echo showed EF 45% global HK and moderate MR. Had K and Mg supplement.  States that gained weight for 30 pounds since 3/202 after held Metolazone. Even he was taking Bumex 1.5 mg bid. In  BNP 15 and Cr 1.4  Still has GOVEA. No orthopnea, sleeps with CPAP. No chest pain, faint  NUKE stress done in  showed inferior ischemia with scar. EF 45%  LHC done in  D1 70% no PCI. Normal filling pressure  No chest pain . Left radial access ok  SOB, weight gain  Sleeps on 1 pillow. No PND  Taking Bumex 2 mg bid and DIamox   Pt c/o weight gain 30 pounds after held his metolazone in the past 4 months  C/o abd distanesion SOB.     11/23/2021 visit  SOB for few months, positive orthopnea. Sleeps on CPAP. + leg swelling and left leg pain and redness  The last seen was   On Bumex 2 mg bid. Held metolazone. Quit smoking  H/o prostate cancer and PSA recurrently elevated. CXR in  negative     12/2021   Leg swelling and erythema. At last visit, D/c bumex and added torsemide 40 mg bid. Good urination. BNP negative and Cr up to 1.6. Decreased torsemide to 20 mg bid  Still leg swelling and SOB. Quit smoking 9 months ago.      visit  Resumed metolazone 3 times a week about 2 weeks ago and BMP done two days ago showed elevated Cr.   Felt neck tightness and improved breathing after the head tilts back and stretched up. Not f/u  with pulm yet  On compresion socks.   Still SOB. Serial BNPs wnl and h/o LHC showed normal filling pressure suggested CHF controlled     visit  Quit smoking 1 yr and on breathing rx  No chest pain dizziness and faint. BP controlled  BNP < 10. Cr 1.7    echo  · Mild concentric left ventricular hypertrophy.  · Mildly decreased left ventricular systolic function. The estimated ejection fraction is 45%.  · Grade I (mild) left ventricular diastolic dysfunction consistent with impaired relaxation.  · Low normal right ventricular systolic function.  · Moderate mitral regurgitation.  · Normal central venous pressure (3 mmHg).  · The estimated PA systolic pressure is 23 mmHg.     visit  H/o 6MWT in : 250 meters, and peal VO 11 calculated  Cane dependent due to lower back pain  Resumed smoking. On breathing tx and f/u with Dr. Escudero  On torsemide 40 mg bid for PVD    10/23 visit  Quit smoking again and improved breathing, not like CPAP. F/u at pulm service. Inhaler helped for tightness  No PND chest pain palpitation dizziness. Remains mild leg swelling    Interval history  Weight stable. Quit smoking, chronic SOB. On cpap.   Leg swelling chronic  EKG reviewed by myself today reveals NSR nonspecific STT change, RBBB LAFB LVH PACs                         Past Medical History:   Diagnosis Date    Chronic diastolic congestive heart failure 04/07/2020    Chronic kidney disease, stage 3     Chronic systolic congestive heart failure 07/09/2020    Chronic venous insufficiency     COPD (chronic obstructive pulmonary disease)     DDD (degenerative disc disease), lumbar     DM (diabetes mellitus) with complications     History of gout     Hyperlipidemia associated with type 2 diabetes mellitus     Hypertension associated with stage 3 chronic kidney disease due to type 2 diabetes mellitus     BRADLEY on CPAP     Prostate cancer     prostatectomy in 2010, rising PSA that started in 2021    Tobacco abuse        Past  Surgical History:   Procedure Laterality Date    BICEPS TENDON REPAIR      COLONOSCOPY N/A 2019    Procedure: COLONOSCOPY;  Surgeon: James Roger MD;  Location: [facility] ENDO;  Service: Endoscopy;  Laterality: N/A;    EXPLORATION OF ORBIT Right 2023    Procedure: EXPLORATION, ORBIT;  Surgeon: Junaid Bartholomew MD;  Location: [facility] OR;  Service: ENT;  Laterality: Right;  possibly need frozen    HEMORRHOID SURGERY      INGUINAL HERNIA REPAIR Left     KNEE ARTHROSCOPY Right     LEFT HEART CATHETERIZATION Left 2020    Procedure: CATHETERIZATION, HEART, LEFT;  Surgeon: Cheli Wilson MD;  Location: [facility] CATH LAB;  Service: Cardiology;  Laterality: Left;    LUMBAR LAMINECTOMY      PROSTATECTOMY      TONSILLECTOMY         Social History     Tobacco Use    Smoking status: Former     Current packs/day: 0.00     Average packs/day: 1.5 packs/day for 52.7 years (79.1 ttl pk-yrs)     Types: Cigarettes     Start date: 1971     Quit date: 9/15/2023     Years since quittin.6    Smokeless tobacco: Former   Substance Use Topics    Alcohol use: Not Currently    Drug use: Never       Family History   Problem Relation Name Age of Onset    Prostate cancer Father      Coronary artery disease Father  90    Alzheimer's disease Father      Hyperlipidemia Mother           ROS    Objective:   Physical Exam  HENT:      Head: Normocephalic.   Eyes:      Pupils: Pupils are equal, round, and reactive to light.   Neck:      Thyroid: No thyromegaly.      Vascular: Normal carotid pulses. No carotid bruit or JVD.   Cardiovascular:      Rate and Rhythm: Normal rate and regular rhythm. No extrasystoles are present.     Chest Wall: PMI is not displaced.      Pulses: Normal pulses.      Heart sounds: Normal heart sounds. No murmur heard.     No gallop. No S3 sounds.   Pulmonary:      Effort: No respiratory distress.      Breath sounds: Normal breath sounds. No stridor.   Abdominal:      General: Bowel sounds are normal.       Palpations: Abdomen is soft.      Tenderness: There is no abdominal tenderness. There is no rebound.   Musculoskeletal:         General: Swelling present. Normal range of motion.   Skin:     Findings: No rash.   Neurological:      Mental Status: He is alert and oriented to person, place, and time.   Psychiatric:         Behavior: Behavior normal.         Lab Results   Component Value Date    CHOL 142 11/13/2023    CHOL 129 05/09/2023    CHOL 132 10/31/2022     Lab Results   Component Value Date    HDL 48 11/13/2023    HDL 38 (L) 05/09/2023    HDL 41 10/31/2022     Lab Results   Component Value Date    LDLCALC 72.6 11/13/2023    LDLCALC 65.2 05/09/2023    LDLCALC 61.2 (L) 10/31/2022     Lab Results   Component Value Date    TRIG 107 11/13/2023    TRIG 129 05/09/2023    TRIG 149 10/31/2022     Lab Results   Component Value Date    CHOLHDL 33.8 11/13/2023    CHOLHDL 29.5 05/09/2023    CHOLHDL 31.1 10/31/2022       Chemistry        Component Value Date/Time     11/13/2023 0732    K 4.9 11/13/2023 0732    CL 98 11/13/2023 0732    CO2 35 (H) 11/13/2023 0732    BUN 16 11/13/2023 0732    CREATININE 1.7 (H) 11/13/2023 0732     (H) 11/13/2023 0732        Component Value Date/Time    CALCIUM 9.5 11/13/2023 0732    ALKPHOS 77 11/13/2023 0732    AST 23 11/13/2023 0732    ALT 22 11/13/2023 0732    BILITOT 0.6 11/13/2023 0732    ESTGFRAFRICA 43 (A) 07/18/2022 0941    EGFRNONAA 37 (A) 07/18/2022 0941          Lab Results   Component Value Date    HGBA1C 7.1 (H) 11/13/2023     Lab Results   Component Value Date    TSH 1.989 11/13/2023     Lab Results   Component Value Date    INR 1.0 06/26/2020    INR 1.0 03/10/2020     Lab Results   Component Value Date    WBC 8.77 11/13/2023    HGB 13.1 (L) 11/13/2023    HCT 40.8 11/13/2023     (H) 11/13/2023     11/13/2023     BMP  Sodium   Date Value Ref Range Status   11/13/2023 141 136 - 145 mmol/L Final     Potassium   Date Value Ref Range Status   11/13/2023 4.9  3.5 - 5.1 mmol/L Final     Chloride   Date Value Ref Range Status   11/13/2023 98 95 - 110 mmol/L Final     CO2   Date Value Ref Range Status   11/13/2023 35 (H) 23 - 29 mmol/L Final     BUN   Date Value Ref Range Status   11/13/2023 16 8 - 23 mg/dL Final     Creatinine   Date Value Ref Range Status   11/13/2023 1.7 (H) 0.5 - 1.4 mg/dL Final     Calcium   Date Value Ref Range Status   11/13/2023 9.5 8.7 - 10.5 mg/dL Final     Anion Gap   Date Value Ref Range Status   11/13/2023 8 8 - 16 mmol/L Final     eGFR if    Date Value Ref Range Status   07/18/2022 43 (A) >60 mL/min/1.73 m^2 Final     eGFR if non    Date Value Ref Range Status   07/18/2022 37 (A) >60 mL/min/1.73 m^2 Final     Comment:     Calculation used to obtain the estimated glomerular filtration  rate (eGFR) is the CKD-EPI equation.        BNP  @LABRCNTIP(BNP,BNPTRIAGEBLO)@  @LABRCNTIP(troponini)@  CrCl cannot be calculated (Patient's most recent lab result is older than the maximum 7 days allowed.).  No results found in the last 24 hours.  No results found in the last 24 hours.  No results found in the last 24 hours.    Assessment:      1. PVD (peripheral vascular disease)    2. Chronic combined systolic and diastolic congestive heart failure    3. Morbid obesity with alveolar hypoventilation    4. Coronary artery disease of native artery of native heart with stable angina pectoris    5. Hyperlipidemia associated with type 2 diabetes mellitus    6. Hypertension associated with stage 3 chronic kidney disease due to type 2 diabetes mellitus    7. NICM (nonischemic cardiomyopathy)    8. Pulmonary HTN    9. Class 2 severe obesity due to excess calories with serious comorbidity and body mass index (BMI) of 35.0 to 35.9 in adult        Plan:   Increase torsemide from 40 mg bid to 60 mg in AM and 40 mg in PM for PVD AND SOB  CONTINUE COMPRESSION SOCKS  BNP AND BMP IN 1 WEEK   Continue asa bisoprolol losartan statin  DM Rx per  PCP  Counseled DASH  Check Lipid profile with PCP in 6 months  Recommend heart-healthy diet, weight control.  Mahsa. Risk modification.   I have reviewed all pertinent labs and cardiac studies independently. Plans and recommendations have been formulated under my direct supervision. All questions answered and patient voiced understanding.   If symptoms persist go to the ED  RTC in 3 months

## 2024-04-23 NOTE — PROGRESS NOTES
Pulmonary Disease Management  [facility]  Follow up Visit  Chronic Care Management    Santiago Khan  was seen 4/23/2024  10:40 AM in the Pulmonary [facility] Management [facility] for evaluation, education, reinforcement of self management techniques and exacerbation action plan.    Chuck Dumont    Past Medical History:   Diagnosis Date    Chronic diastolic congestive heart failure 04/07/2020    Chronic kidney disease, stage 3     Chronic systolic congestive heart failure 07/09/2020    Chronic venous insufficiency     COPD (chronic obstructive pulmonary disease)     DDD (degenerative disc disease), lumbar     DM (diabetes mellitus) with complications     History of gout     Hyperlipidemia associated with type 2 diabetes mellitus     Hypertension associated with stage 3 chronic kidney disease due to type 2 diabetes mellitus     BRADLEY on CPAP     Prostate cancer     prostatectomy in 2010, rising PSA that started in 2021    Tobacco abuse        Patient's Medications   New Prescriptions    No medications on file   Previous Medications    ALBUTEROL (PROVENTIL/VENTOLIN HFA) 90 MCG/ACTUATION INHALER    Inhale 2 puffs into the lungs every 4 (four) hours as needed. Rescue    ALLOPURINOL (ZYLOPRIM) 100 MG TABLET    TAKE 1 TABLET(100 MG) BY MOUTH EVERY DAY    ALLOPURINOL (ZYLOPRIM) 300 MG TABLET    Take 300 mg by mouth.    ASPIRIN (ECOTRIN) 81 MG EC TABLET    Take 81 mg by mouth once daily.    BACLOFEN (LIORESAL) 10 MG TABLET    Take 1 tablet by mouth every evening.    BISOPROLOL (ZEBETA) 5 MG TABLET    TAKE 1/2 TABLET BY MOUTH EVERY DAY    COLCHICINE (COLCRYS) 0.6 MG TABLET    TAKE 1 TABLET(0.6 MG) BY MOUTH DAILY AS NEEDED    FLUTICASONE PROPIONATE (FLONASE) 50 MCG/ACTUATION NASAL SPRAY    SHAKE LIQUID AND USE 2 SPRAYS(100 MCG) IN EACH NOSTRIL EVERY DAY Strength: 50 mcg/actuation    GLIMEPIRIDE (AMARYL) 1 MG TABLET    TAKE 1 TABLET BY MOUTH EVERY DAY    GUAIFENESIN 100 MG/5 ML (ROBITUSSIN) 100 MG/5 ML SYRUP     Take 200 mg by mouth every evening.    HYDROCODONE-ACETAMINOPHEN (NORCO) 7.5-325 MG PER TABLET    Take 1 tablet by mouth every 4 (four) hours as needed (for chronic pain).    LOSARTAN (COZAAR) 25 MG TABLET    TAKE 1 TABLET(25 MG) BY MOUTH EVERY DAY    MAGNESIUM OXIDE (MAG-OX) 400 MG (241.3 MG MAGNESIUM) TABLET    TAKE 2 TABLETS(800 MG) BY MOUTH TWICE DAILY    METFORMIN (GLUCOPHAGE) 500 MG TABLET    TAKE 1 TABLET(500 MG) BY MOUTH DAILY WITH BREAKFAST    MULTIVITAMIN-MINERALS-LUTEIN TAB    Take 1 tablet by mouth Daily.    OMEPRAZOLE (PRILOSEC) 40 MG CAPSULE    TAKE 1 CAPSULE BY MOUTH EVERY DAY    POTASSIUM CHLORIDE SA (K-DUR,KLOR-CON) 20 MEQ TABLET    TAKE 3 TABLETS BY MOUTH TWICE DAILY    PREDNISONE (DELTASONE) 5 MG TABLET    Take 2 tablets (10 mg total) by mouth once daily for 14 days, THEN 1.5 tablets (7.5 mg total) once daily for 14 days, THEN 1 tablet (5 mg total) once daily for 14 days, THEN 0.5 tablets (2.5 mg total) once daily for 14 days.    SIMVASTATIN (ZOCOR) 40 MG TABLET    TAKE 1 TABLET(40 MG) BY MOUTH EVERY EVENING    TIRZEPATIDE (MOUNJARO) 5 MG/0.5 ML PNIJ    Inject 5 mg into the skin every 7 days.    TORSEMIDE (DEMADEX) 20 MG TAB    TAKE 2 TABLETS(40 MG) BY MOUTH TWICE DAILY    VARENICLINE (CHANTIX) 1 MG TAB    take 1 tablet by mouth twice daily. Take with full glass of water & with food to avoid nausea    WIXELA INHUB 250-50 MCG/DOSE DISKUS INHALER    Inhale 1 puff into the lungs 2 (two) times daily.   Modified Medications    No medications on file   Discontinued Medications    No medications on file             Educational assessment:   [x]            Good  []            Fair  []            Poor    Readiness to learn:   [x]            Good  []            Fair  []            Poor    Vision Status:   [x]            Good  []            Fair  []            Poor    Reading Ability:  [x]            Good  []            Fair  []            Poor    Knowledge of condition:   [x]            Good  []             Fair  []            Poor    Language Barriers:   []            Good  []            Fair  []            Poor  [x]            None    Cognitive/ Physical Barriers:   []            Good  []            Fair  []            Poor  [x]            None    Learning best by:                       [x]            Seeing  []            Hearing  []            Reading                         [x]            Doing    Describe any barrier /Limitation or financial implications of care choices identified     []            Financial  []            Emotional  []            Education  []            Vision/Hearing  []            Physical  [x]            None  []                TOPIC /CONTENT FOR TODAY:    [x]            MDI with or without spacer  [x]            Dry powder inhaler  []            Acapella   []           Peak Flow meter  [x]            COPD action plan  []            Nebulizer use  []            Oxygen use safety  [x]            Breathing and cough techniques  [x]            Energy conservation  [x]            Infection prevention  []           OTHER________________________        Learner:    [x]            Patient   []            Caregiver    Method:    [x]            Verbal explanation  [x]            Audio visual    [x]            Literature  [x]            Teach back      Evaluation:    [x]            Teach back  [x]            Demonstrate  [x]            Follow up phone call    []            2 weeks     []            4 weeks   [x]            PRN    Patient Concerns:  Patient's SpO2 was 89% upon initial assessment. SpO2 quickly increased with rest. Recommend 6MWT to determine need for supplemental oxygen.   Patient agrees.   Message sent to have testing scheduled. Patient stated understanding.     Additional comments:   Patient was seen today to review respiratory medication purpose and proper technique for use of inhalers. Patient practiced proper technique using MDI with valved holding chamber (spacer) and DPI inhalers.  Patient demonstrated understanding. Literature was given to patient.    Patient has been tobacco free for 7 months. Congratulated patient on this accomplishment.     Discussed therapeutic goals for positive airway pressure therapy(CPAP or BiPAP): Ideal is usage 100% of nights for 6 - 8 hours per night. Minimum usage is 70% of night for at least 4 hours per night used. Reviewed CPAP habituation plan with patient. Patient expressed understanding.      Discussed complications of untreated sleep apnea with patient, including but not limited to high blood pressure, heart attack, stroke, obesity, diabetes and worsening heart failure. Patient voiced understanding.      Asthma trigger checklist was verbally reviewed and literature given to patient.     Infection prevention was discussed. Patient was reminded to get RSV vaccine. Hand hygiene and cleaning of respiratory equipment was also discussed. Patient verbalized understanding.      COPD action plan was reviewed and literature was given to patient. Patient verbalized understanding.     Plan:  Monthly Pulmonary Disease Management Questionnaire  Follow-up PDM appointment scheduled   Reinforce education  Meds: Wixela, albuterol   DME Needs: OHME   Action Plan: COPD   Immunization: Pneumococcal- current, Flu-current , Covid 19- current, RSV- due   Next Provider Visit: 1/2025  Next Spirometry/CPFT: 1/2025  Approximate time spent with patient: 35 mins

## 2024-04-23 NOTE — PATIENT INSTRUCTIONS
Patient was seen today to review respiratory medication purpose and proper technique for use of inhalers. Patient practiced proper technique using MDI with valved holding chamber (spacer) and DPI inhalers. Patient demonstrated understanding. Literature was given to patient.     Asthma trigger checklist was verbally reviewed and literature given to patient.     Infection prevention was discussed. Patient was reminded to get influenza vaccine. Hand hygiene and cleaning of respiratory equipment was also discussed. Patient verbalized understanding.      COPD action plan was reviewed and literature was given to patient. Patient verbalized understanding. Using an Inhaler with a Spacer  To control asthma, you need to use your medicines the right way. Some medicines are inhaled using a device called a metered-dose inhaler (MDI). Metered-dose inhalers deliver medicine with a fine spray. You may be asked to use a spacer (holding tube) with your inhaler. The spacer helps make sure all the medicine you need goes into your lungs.   Steps for using an inhaler with a spacer  Step 1:  Remove the caps from the inhaler and spacer.  Shake the inhaler well and attach the spacer. If the inhaler is being used for the first time or has not been used for a while, prime it as directed by the product maker.  Step 2:  Breathe out normally.  Put the spacer between your teeth. Close your lips tightly around it.  Keep your chin up.  Step 3:  Spray 1 puff into the spacer by pressing down on the inhaler.  Then breathe in through your mouth as slowly and deeply as you can. This should take about 5-10 seconds. If you breathe too quickly, you may hear a whistling sound in certain spacers.  Step 4:  Take the spacer out of your mouth.  Hold your breath for a count of 10.  Then hold your lips together and slowly breathe out through your mouth  Using Dry-Powder Inhalers (DPIs)  Some asthma medications are inhaled using inhalers. Dry-powder inhalers  (DPIs) dispense measured doses of powdered medicine into your lungs. DPIs often have counters to track the number of doses used. Keep in mind that DPIs don't all work the same way. So be sure you know how to use yours properly.  Using a DPI  Load the prescribed dose of medicine by following the instructions that came with the inhaler.  Breathe out normally, holding the inhaler away from your mouth. Hold your chin up.  Put the mouthpiece between your lips. Breathe in deeply through the inhaler--not through your nose. You may not feel or taste the medicine as you breathe in. This is normal.  Take the mouthpiece out of your mouth. Hold your breath for a count of 10, or as long as you can comfortably.  Breathe out slowly--but do not breathe out through the inhaler. Moisture from your breath can make the powder stick inside the inhaler.  Be sure to close the inhaler and store it in a dry place.  Rinse your mouth with water and spit it out, don't swallow it.  Do not wash your DPI with soap and water. To clean the mouthpiece, wipe with a dry cloth as needed.    © 1380-2215 [employer]. [address] [place] [address], [place] [address]. All rights reserved. This information is not intended as a substitute for professional medical care. Always follow your healthcare professional's instructions.             Using Dry-Powder Inhalers (DPIs)  Some asthma medications are inhaled using inhalers. Dry-powder inhalers (DPIs) dispense measured doses of powdered medicine into your lungs. DPIs often have counters to track the number of doses used. Keep in mind that DPIs don't all work the same way. So be sure you know how to use yours properly.  Using a DPI  Load the prescribed dose of medicine by following the instructions that came with the inhaler.  Breathe out normally, holding the inhaler away from your mouth. Hold your chin up.  Put the mouthpiece between your lips. Breathe in deeply through the inhaler--not through your  nose. You may not feel or taste the medicine as you breathe in. This is normal.  Take the mouthpiece out of your mouth. Hold your breath for a count of 10, or as long as you can comfortably.  Breathe out slowly--but do not breathe out through the inhaler. Moisture from your breath can make the powder stick inside the inhaler.  Be sure to close the inhaler and store it in a dry place.  Rinse your mouth with water and spit it out, don't swallow it.  Do not wash your DPI with soap and water. To clean the mouthpiece, wipe with a dry cloth as needed.    © 5889-6202 [employer]. [address] [place] [address], [place] [address]. All rights reserved. This information is not intended as a substitute for professional medical care. Always follow your healthcare professional's instructions.             Using Dry-Powder Inhalers (DPIs)  Some asthma medications are inhaled using inhalers. Dry-powder inhalers (DPIs) dispense measured doses of powdered medicine into your lungs. DPIs often have counters to track the number of doses used. Keep in mind that DPIs don't all work the same way. So be sure you know how to use yours properly.  Using a DPI  Load the prescribed dose of medicine by following the instructions that came with the inhaler.  Breathe out normally, holding the inhaler away from your mouth. Hold your chin up.  Put the mouthpiece between your lips. Breathe in deeply through the inhaler--not through your nose. You may not feel or taste the medicine as you breathe in. This is normal.  Take the mouthpiece out of your mouth. Hold your breath for a count of 10, or as long as you can comfortably.  Breathe out slowly--but do not breathe out through the inhaler. Moisture from your breath can make the powder stick inside the inhaler.  Be sure to close the inhaler and store it in a dry place.  Rinse your mouth with water and spit it out, don't swallow it.  Do not wash your DPI with soap and water. To clean the mouthpiece,  wipe with a dry cloth as needed.    © 2582-9724 [employer]. [address] [facility], [place] [address]. All rights reserved. This information is not intended as a substitute for professional medical care. Always follow your healthcare professional's instructions.           14716  Shortness of Breath: Maximizing Your Energy    Fear of shortness of breath may stop you from being as active as you once were. You don't have to live this way. Managing your time and pacing yourself can help you conserve energy and do more. It's even OK if you're short of breath sometimes. You can learn to work through this without limiting your activities.  Manage your time  Shortness of breath can make everyday tasks take longer. This means there's less time to do the things you enjoy. You can help prevent this by managing your time. Try these tips:  Plan ahead so your tasks are spaced throughout the day. As you plan, keep in mind the times of day you tend to have the most energy.  Do only one thing at a time. Finish one task before starting another.  Gather everything you need before you start a task. This cuts out unneeded steps while you're working.  Think about what you really need to do. Be realistic about what you can get done in a day.      Balance activity and rest  When you're tired, your activities will take longer. Fatigue also makes you more likely to get an infection. Plan your day so that your tasks are spaced throughout the day. To have more energy:  Stop and rest when you need to. Don't wait until you're overtired.  Switch back and forth between hard tasks and easy ones.  Give yourself plenty of time for each task, so you don't have to hurry.  Take 20- to 30-minute rest breaks after meals and throughout the day.  If an activity takes a lot of energy, break it into smaller parts. For instance, fold the laundry first. Then take a break before putting it away.  Try not to exert yourself in extreme cold or heat.        Find ways to conserve energy  Conserving your energy can help you stay active and breathe better. The way you use your body during a task can help you conserve energy. Think of ways to make things easier, and take your time to ease shortness of breath.  For some tasks, you can also use special aids designed to reduce the amount of energy needed. Here are some tips:  Sit whenever possible, and keep your arms close to your body. Use slow, smooth motions.  Sit to dress and undress, shave, brush your teeth, and comb your hair. Use a long-handled reacher to pull on socks and shoes, and long-handled items like shoe horns.  Sit on a bench to shower. Use warm water, not hot. (Steam can make it harder to breathe.) Dry off by wrapping yourself in a terrycloth robe.  Use energy-saving appliances, such as an electric can opener, a power toothbrush, and a .  Use a cart with wheels to move groceries, laundry, dishes, and other items around the house. Some carts have seats so you can rest when you need to.  Use lightweight, nonstick pots and pans to cook. Soak dirty dishes instead of scrubbing them. Air-dry dishes, or use a .  Mix, cook, serve, and store foods in the same dish.  Keep the things you use most at waist level, so you can get them without reaching or bending.  Use steps slowly, pausing at each step. If you have steps outside or in your home, think about adding ramps or stair lifts.  Think about ways that others can help you. You might get help from friends, family members, or home health aides.      Remember to breathe  Sometimes people with an illness or condition that affects the lungs try to rush through tasks so they won't get short of breath. This uses more energy and can actually increase shortness of breath. Instead, slow down and pace your breathing. These tips may help:  Move slowly during tasks that take a lot of effort, such as climbing stairs or pushing a shopping cart.  Use  pursed-lip and diaphragmatic breathing while you go about a task.  Breathe out (exhale) when you exert effort. For example, breathe out as you lift up a grocery bag. Once you're holding the bag, breathe in. Ask the  at the [facility] to pack your grocery bags so they are light and easy to carry.  Focus on taking slow, deep breaths. If your breathing is shallow, you don't take in as much air.  Remember that it's OK to be short of breath. Just pace yourself and do pursed-lip breathing.      Talk with your healthcare provider about:  If you should use supplemental oxygen  If you need a referral to occupational and physical therapy. Therapists can help you with exercise and daily activities, and how to make things easier.      Pursed-lip breathing  This type of breathing helps you exhale better. Breathing this way during activity will help you reduce shortness of breath:  Relax your neck and shoulder muscles. Breathe in (inhale) slowly through your nose for 2 counts or more.  Pucker your lips as if you are going to blow out a candle. Breathe out slowly and gently through your lips for at least twice as long as you inhaled.ACTION PLAN    GREEN ZONE  My sputum is clear/white/usual color and easily cleared.  My breathing is no harder than usual.  I can do my usual activities.  I can think clearly.   Take your usual medicines, including oxygen, as you are told to do so by your health care provider.   YELLOW ZONE  My sputum has change (color, thickness, amount).  I am more short of breath than usual.  I cough or wheeze more.  I weigh more and my legs/feet swell.  I cannot do my usual activities without resting.   Call your health care provider. You will probably be told to begin taking an antibiotic and prednisone. Have your pharmacy phone number available.   RED ZONE  I cannot cough out my sputum.  I am much more short of breath than normal.  I need to sit up to breathe  I cannot do my usual activities.  I am unable  to speak more than one or two words at a time.  I am confused.   Call your health care provider. You may be asked to come in to be seen, told to go to the emergency room, or told to call 9-1-1.

## 2024-04-24 NOTE — PROGRESS NOTES
RHEUMATOLOGY CLINIC ESTABLISHED PATIENT VISIT    Reason for consult:- orbital inflammatory pseudotumor    Chief complaints, HPI, ROS, EXAM, Assessment & Plans:-    Santiago Khan is a 73 y.o. pleasant male who presents to follow-up from his previous visit in January for management of inflammatory pseudotumor of right orbit.  This was demonstrated on biopsy.  Patient states that mass quickly decreased in size with steroid taper however he has had some return in the mass since being off of prednisone.  No new symptoms otherwise.  Rheumatologic review of systems otherwise negative.  Slight proptosis of right eye.  Dysconjugate movement of eyes.  No synovitis, dactylitis or enthesitis.      Reviewed all available old and outside pertinent medical records.    All lab results personally reviewed and interpreted by me.    1. Long term (current) use of systemic steroids    2. Pseudotumor, orbit inflammatory, right        Problem List Items Addressed This Visit    None  Visit Diagnoses       Long term (current) use of systemic steroids    -  Primary    Pseudotumor, orbit inflammatory, right        Relevant Medications    predniSONE (DELTASONE) 5 MG tablet            Patient following up today for management of inflammatory pseudotumor of right orbit biopsy-proven  Had near complete resolution with high-dose steroid taper but has had some return of mass since being off of prednisone  We will resume prednisone taper but may need to consider further immunosuppression  Possible agents would include methotrexate or TNF inhibitors    # Follow up in about 3 months (around 7/19/2024).    Chronic comorbid conditions affecting medical decision making today:    Past Medical History:   Diagnosis Date    Chronic diastolic congestive heart failure 04/07/2020    Chronic kidney disease, stage 3     Chronic systolic congestive heart failure 07/09/2020    Chronic venous insufficiency     COPD (chronic obstructive pulmonary disease)      DDD (degenerative disc disease), lumbar     DM (diabetes mellitus) with complications     History of gout     Hyperlipidemia associated with type 2 diabetes mellitus     Hypertension associated with stage 3 chronic kidney disease due to type 2 diabetes mellitus     BRADLEY on CPAP     Prostate cancer     prostatectomy in , rising PSA that started in     Tobacco abuse        Past Surgical History:   Procedure Laterality Date    BICEPS TENDON REPAIR      COLONOSCOPY N/A 2019    Procedure: COLONOSCOPY;  Surgeon: James Roger MD;  Location: [facility] ENDO;  Service: Endoscopy;  Laterality: N/A;    EXPLORATION OF ORBIT Right 2023    Procedure: EXPLORATION, ORBIT;  Surgeon: Junaid Bartholomew MD;  Location: [facility] OR;  Service: ENT;  Laterality: Right;  possibly need frozen    HEMORRHOID SURGERY      INGUINAL HERNIA REPAIR Left     KNEE ARTHROSCOPY Right     LEFT HEART CATHETERIZATION Left 2020    Procedure: CATHETERIZATION, HEART, LEFT;  Surgeon: Cheli Wilson MD;  Location: [facility] CATH LAB;  Service: Cardiology;  Laterality: Left;    LUMBAR LAMINECTOMY      PROSTATECTOMY      TONSILLECTOMY          Social History     Tobacco Use    Smoking status: Former     Current packs/day: 0.00     Average packs/day: 1.5 packs/day for 52.7 years (79.1 ttl pk-yrs)     Types: Cigarettes     Start date: 1971     Quit date: 9/15/2023     Years since quittin.6    Smokeless tobacco: Former   Substance Use Topics    Alcohol use: Not Currently    Drug use: Never       Family History   Problem Relation Name Age of Onset    Prostate cancer Father      Coronary artery disease Father  90    Alzheimer's disease Father      Hyperlipidemia Mother         Review of patient's allergies indicates:   Allergen Reactions    Amitriptyline Rash    Celecoxib Rash       Medication List with Changes/Refills   New Medications    PREDNISONE (DELTASONE) 5 MG TABLET    Take 2 tablets (10 mg total) by mouth once daily for 14 days, THEN  1.5 tablets (7.5 mg total) once daily for 14 days, THEN 1 tablet (5 mg total) once daily for 14 days, THEN 0.5 tablets (2.5 mg total) once daily for 14 days.   Current Medications    ALBUTEROL (PROVENTIL/VENTOLIN HFA) 90 MCG/ACTUATION INHALER    Inhale 2 puffs into the lungs every 4 (four) hours as needed. Rescue    ALLOPURINOL (ZYLOPRIM) 100 MG TABLET    TAKE 1 TABLET(100 MG) BY MOUTH EVERY DAY    ALLOPURINOL (ZYLOPRIM) 300 MG TABLET    Take 300 mg by mouth.    ASPIRIN (ECOTRIN) 81 MG EC TABLET    Take 81 mg by mouth once daily.    BACLOFEN (LIORESAL) 10 MG TABLET    Take 1 tablet by mouth every evening.    BISOPROLOL (ZEBETA) 5 MG TABLET    TAKE 1/2 TABLET BY MOUTH EVERY DAY    COLCHICINE (COLCRYS) 0.6 MG TABLET    TAKE 1 TABLET(0.6 MG) BY MOUTH DAILY AS NEEDED    FLUTICASONE PROPIONATE (FLONASE) 50 MCG/ACTUATION NASAL SPRAY    SHAKE LIQUID AND USE 2 SPRAYS(100 MCG) IN EACH NOSTRIL EVERY DAY Strength: 50 mcg/actuation    GLIMEPIRIDE (AMARYL) 1 MG TABLET    TAKE 1 TABLET BY MOUTH EVERY DAY    GUAIFENESIN 100 MG/5 ML (ROBITUSSIN) 100 MG/5 ML SYRUP    Take 200 mg by mouth every evening.    HYDROCODONE-ACETAMINOPHEN (NORCO) 7.5-325 MG PER TABLET    Take 1 tablet by mouth every 4 (four) hours as needed (for chronic pain).    LOSARTAN (COZAAR) 25 MG TABLET    TAKE 1 TABLET(25 MG) BY MOUTH EVERY DAY    MAGNESIUM OXIDE (MAG-OX) 400 MG (241.3 MG MAGNESIUM) TABLET    TAKE 2 TABLETS(800 MG) BY MOUTH TWICE DAILY    MULTIVITAMIN-MINERALS-LUTEIN TAB    Take 1 tablet by mouth Daily.    OMEPRAZOLE (PRILOSEC) 40 MG CAPSULE    TAKE 1 CAPSULE BY MOUTH EVERY DAY    SIMVASTATIN (ZOCOR) 40 MG TABLET    TAKE 1 TABLET(40 MG) BY MOUTH EVERY EVENING    TIRZEPATIDE (MOUNJARO) 5 MG/0.5 ML PNIJ    Inject 5 mg into the skin every 7 days.    TORSEMIDE (DEMADEX) 20 MG TAB    TAKE 2 TABLETS(40 MG) BY MOUTH TWICE DAILY    VARENICLINE (CHANTIX) 1 MG TAB    take 1 tablet by mouth twice daily. Take with full glass of water & with food to avoid nausea     WIXELA INHUB 250-50 MCG/DOSE DISKUS INHALER    Inhale 1 puff into the lungs 2 (two) times daily.   Changed and/or Refilled Medications    Modified Medication Previous Medication    METFORMIN (GLUCOPHAGE) 500 MG TABLET metFORMIN (GLUCOPHAGE) 500 MG tablet       TAKE 1 TABLET(500 MG) BY MOUTH DAILY WITH BREAKFAST    TAKE 1 TABLET(500 MG) BY MOUTH DAILY WITH BREAKFAST    POTASSIUM CHLORIDE SA (K-DUR,KLOR-CON) 20 MEQ TABLET potassium chloride SA (K-DUR,KLOR-CON) 20 MEQ tablet       TAKE 3 TABLETS BY MOUTH TWICE DAILY    TAKE 3 TABLETS BY MOUTH TWICE DAILY         Disclaimer: This note was prepared using voice recognition system and is likely to have sound alike errors and is not proofread.  Please message me with any questions.    32 minutes of total time spent on the encounter, which includes face to face time and non-face to face time preparing to see the patient (eg, review of tests), Obtaining and/or reviewing separately obtained history, Documenting clinical information in the electronic or other health record, Independently interpreting results (not separately reported) and communicating results to the patient/family/caregiver, or Care coordination (not separately reported).     Thank you for allowing me to participate in the care of Santiago Khan.    Justice Salazar MD

## 2024-04-25 RX ORDER — POTASSIUM CHLORIDE 20 MEQ/1
TABLET, EXTENDED RELEASE ORAL
Qty: 720 TABLET | Refills: 2 | Status: SHIPPED | OUTPATIENT
Start: 2024-04-25

## 2024-04-29 ENCOUNTER — OFFICE VISIT (OUTPATIENT)
Dept: PULMONOLOGY | Facility: CLINIC | Age: 74
End: 2024-04-29
Payer: MEDICARE

## 2024-04-29 ENCOUNTER — CLINICAL SUPPORT (OUTPATIENT)
Dept: PULMONOLOGY | Facility: CLINIC | Age: 74
End: 2024-04-29
Payer: MEDICARE

## 2024-04-29 VITALS
OXYGEN SATURATION: 94 % | WEIGHT: 226.44 LBS | HEIGHT: 67 IN | DIASTOLIC BLOOD PRESSURE: 72 MMHG | WEIGHT: 226.44 LBS | BODY MASS INDEX: 35.54 KG/M2 | RESPIRATION RATE: 18 BRPM | BODY MASS INDEX: 35.54 KG/M2 | SYSTOLIC BLOOD PRESSURE: 137 MMHG | HEART RATE: 91 BPM | HEIGHT: 67 IN

## 2024-04-29 DIAGNOSIS — J44.9 CHRONIC OBSTRUCTIVE PULMONARY DISEASE, UNSPECIFIED COPD TYPE: ICD-10-CM

## 2024-04-29 DIAGNOSIS — G47.33 OSA ON CPAP: Primary | ICD-10-CM

## 2024-04-29 DIAGNOSIS — I27.20 PULMONARY HTN: ICD-10-CM

## 2024-04-29 DIAGNOSIS — F17.211 CIGARETTE NICOTINE DEPENDENCE IN REMISSION: ICD-10-CM

## 2024-04-29 DIAGNOSIS — J44.89 COPD WITH CHRONIC BRONCHITIS AND EMPHYSEMA: ICD-10-CM

## 2024-04-29 DIAGNOSIS — J43.9 COPD WITH CHRONIC BRONCHITIS AND EMPHYSEMA: ICD-10-CM

## 2024-04-29 PROCEDURE — 99999 PR PBB SHADOW E&M-EST. PATIENT-LVL V: CPT | Mod: PBBFAC,,, | Performed by: HOSPITALIST

## 2024-04-29 PROCEDURE — 3072F LOW RISK FOR RETINOPATHY: CPT | Mod: CPTII,S$GLB,, | Performed by: HOSPITALIST

## 2024-04-29 PROCEDURE — 3288F FALL RISK ASSESSMENT DOCD: CPT | Mod: CPTII,S$GLB,, | Performed by: HOSPITALIST

## 2024-04-29 PROCEDURE — 1159F MED LIST DOCD IN RCRD: CPT | Mod: CPTII,S$GLB,, | Performed by: HOSPITALIST

## 2024-04-29 PROCEDURE — 3075F SYST BP GE 130 - 139MM HG: CPT | Mod: CPTII,S$GLB,, | Performed by: HOSPITALIST

## 2024-04-29 PROCEDURE — 94618 PULMONARY STRESS TESTING: CPT | Mod: S$GLB,,, | Performed by: INTERNAL MEDICINE

## 2024-04-29 PROCEDURE — 1160F RVW MEDS BY RX/DR IN RCRD: CPT | Mod: CPTII,S$GLB,, | Performed by: HOSPITALIST

## 2024-04-29 PROCEDURE — 3078F DIAST BP <80 MM HG: CPT | Mod: CPTII,S$GLB,, | Performed by: HOSPITALIST

## 2024-04-29 PROCEDURE — 1100F PTFALLS ASSESS-DOCD GE2>/YR: CPT | Mod: CPTII,S$GLB,, | Performed by: HOSPITALIST

## 2024-04-29 PROCEDURE — 4010F ACE/ARB THERAPY RXD/TAKEN: CPT | Mod: CPTII,S$GLB,, | Performed by: HOSPITALIST

## 2024-04-29 PROCEDURE — 3008F BODY MASS INDEX DOCD: CPT | Mod: CPTII,S$GLB,, | Performed by: HOSPITALIST

## 2024-04-29 PROCEDURE — 99999 PR PBB SHADOW E&M-EST. PATIENT-LVL I: CPT | Mod: PBBFAC,,,

## 2024-04-29 PROCEDURE — 99214 OFFICE O/P EST MOD 30 MIN: CPT | Mod: S$GLB,,, | Performed by: HOSPITALIST

## 2024-04-29 NOTE — PROCEDURES
"[facility] - Pulmonary Function  Six Minute Walk     SUMMARY     Ordering Provider: Gian HERNANDEZ   Interpreting Provider: Gian HERNANDEZ  Performing nurse/tech/RT: LS RRT  Diagnosis: COPD  Height: 5' 7" (170.2 cm)  Weight: 102.7 kg (226 lb 6.6 oz)  BMI (Calculated): 35.5   Patient Race:             Phase Oxygen Assessment Supplemental O2 Heart   Rate Blood Pressure Murphy Dyspnea Scale Rating   Resting 94 % Room Air 101 bpm 130/67 3   Exercise        Minute        1 93 % Room Air 100 bpm     2 94 % Room Air 101 bpm     3 93 % Room Air 104 bpm     4 93 % Room Air 105 bpm     5 89 % Room Air 104 bpm     6  92 % Room Air 105 bpm 131/67 5-6   Recovery        Minute        1 95 % Room Air 98 bpm     2 93 % Room Air 89 bpm     3 93 % Room Air 90 bpm     4 94 % Room Air 91 bpm 137/72 5-6     Six Minute Walk Summary  6MWT Status: completed with stops  Patient Reported:  (Back pain)     Interpretation:  Did the patient stop or pause?: Yes  How many times did the patient stop or pause?: 1  Stop Time 1: 120  Restart Time 1: 180  Pause Time 1: 60 seconds                             Total Time Walked (Calculated): 300 seconds  Final Partial Lap Distance (feet): 150 feet  Total Distance Meters (Calculated): 228.6 meters  Predicted Distance Meters (Calculated): 437.22 meters  Percentage of Predicted (Calculated): 52.28  Peak VO2 (Calculated): 10.84  Mets: 3.1  Has The Patient Had a Previous Six Minute Walk Test?: Yes       Previous 6MWT Results  Has The Patient Had a Previous Six Minute Walk Test?: Yes  Date of Previous Test: 07/25/23  Total Time Walked: 273 seconds  Total Distance (meters): 251  Predicted Distance (meters): 390 meters  Percentage of Predicted: 64  Percent Change (Calculated): 0.09    Interpretation:  Total distance walked in six minutes is moderately reduced indicating a reduction in overall  functional capacity. Oxygen desaturation did not meet criteria for supplemental oxygen prescription.    Clinical correlation " suggested.    [] Mild exercise-induced hypoxemia described as an arterial oxygen saturation of 93-95% (or 3-4% less than at rest),  [x] Moderate exercise-induced hypoxemia as 89-93%  [] Severe exercise induced hypoxemia as < 89% O2 saturation.  Medicare Criteria for oxygen prescription comments:   When arterial oxygen saturation is at or below 88% during exercise on room air (severe exercise induced hypoxemia) then the patient falls under Medicare Group 1 criteria for supplemental oxygen prescription.  Details about Medicare Group Criteria coverage can be found at http://www.cms.hhs.gov/manuals/downloads/   David Springer MD

## 2024-04-29 NOTE — ASSESSMENT & PLAN NOTE
- discussed titration study, pt going to think about it  - going to try and use while in recliner  - discussed that compliance with this may improve his exercise stamina and daytime wakefulness

## 2024-04-29 NOTE — PROGRESS NOTES
Subjective:      Patient ID: Santiago Khan is a 73 y.o. male.    Chief Complaint: Pulmonary hypertension    HPI 4/29/2024:    73 year old male with history of CAD, CHF, HTN, HLD, pHTN, CKD, obesity, BRADLEY, prior tobacco use who presents to Pulmonary clinic for oxygen qualification. He was last seen in clinic 7/2023 by Laura Mccabe NP- at that visit he was encouraged to restart CPAP use, spiriva was added to ICS-LABA. He was evaluated by PDM last week and noted to have SpO2 89%, walk ordered for further evaluation.     Mr. Khan reports no acute change in respiratory status, but feels that his dyspnea limits his activity level. He reports having to take breaks at times when grocery shopping (uses cart). He does have a pulse ox at home. Is not compliant with CPAP machine, but interested in trying again.    Pertinent Work Up:  - [place] SS 4/29/24- 18  - 6MW 4/29/24- Resting 94%, range 89-95%, 228m, total walk time 300 seconds (23 m less than 7/2023), Murphy 3-6 limited by back pain  - [place] 7/2023- Restrictive pattern, no LVs, possible obstruction on flow loop  - 6MW 7/2023- Resting sat 93%, range 91-93% on RA, 251m, total walk time 273sec, murphy 3-6  - LDCT 1/2024- Lung-rads 2  - ECHO 10/2023- Normal EF, normal LV diastolic function, mild-mod MR, mild TR, ePASP 50mmHg    Pulmonary Interventions:   Wixela- compliant BID  Spiriva- too expensive  Albuterol HFA- a few times everyday  Albuterol neb- not everyday  CPAP- trying to get back on it    Smoking hx: Quit 9/14/2023    Review of Systems   Respiratory:  Positive for snoring, orthopnea, dyspnea on extertion and somnolence.      Objective:     Physical Exam   Constitutional: He is oriented to person, place, and time. He appears well-developed and well-nourished. He is obese.   Cardiovascular: Normal rate and regular rhythm.   Pulmonary/Chest: Normal expansion, effort normal and breath sounds normal.   Musculoskeletal:         General: No edema.  "  Neurological: He is alert and oriented to person, place, and time.   Skin: Skin is warm and dry.     Personal Diagnostic Review  As Above      4/23/2024    10:37 AM 4/23/2024     9:51 AM 4/19/2024     2:35 PM 1/17/2024    11:00 AM 11/16/2023    11:04 AM 10/23/2023    12:58 PM 10/19/2023    11:49 AM   Pulmonary Function Tests   SpO2 91 % 94 %   95 %  94 %   Height 5' 7" (1.702 m) 5' 7" (1.702 m) 5' 7" (1.702 m) 5' 7" (1.702 m) 5' 7" (1.702 m) 5' 7" (1.702 m) 5' 7" (1.702 m)   Weight 102.7 kg (226 lb 6.6 oz) 102.7 kg (226 lb 6.6 oz) 99.8 kg (220 lb) 101.5 kg (223 lb 12.3 oz) 107.6 kg (237 lb 3.4 oz) 104.3 kg (230 lb) 104.4 kg (230 lb 2.6 oz)   BMI (Calculated) 35.5 35.5 34.4 35 37.1 36 36        Assessment:     No diagnosis found.     Outpatient Encounter Medications as of 4/29/2024   Medication Sig Dispense Refill    albuterol (PROVENTIL/VENTOLIN HFA) 90 mcg/actuation inhaler Inhale 2 puffs into the lungs every 4 (four) hours as needed. Rescue 54 g 3    allopurinoL (ZYLOPRIM) 100 MG tablet TAKE 1 TABLET(100 MG) BY MOUTH EVERY DAY 30 tablet 11    allopurinoL (ZYLOPRIM) 300 MG tablet Take 300 mg by mouth.      aspirin (ECOTRIN) 81 MG EC tablet Take 81 mg by mouth once daily.      baclofen (LIORESAL) 10 MG tablet Take 1 tablet by mouth every evening.  2    bisoprolol (ZEBETA) 5 MG tablet TAKE 1/2 TABLET BY MOUTH EVERY DAY 90 tablet 3    colchicine (COLCRYS) 0.6 mg tablet TAKE 1 TABLET(0.6 MG) BY MOUTH DAILY AS NEEDED 30 tablet 1    fluticasone propionate (FLONASE) 50 mcg/actuation nasal spray SHAKE LIQUID AND USE 2 SPRAYS(100 MCG) IN EACH NOSTRIL EVERY DAY Strength: 50 mcg/actuation 48 g 3    glimepiride (AMARYL) 1 MG tablet TAKE 1 TABLET BY MOUTH EVERY DAY 90 tablet 2    guaiFENesin 100 mg/5 ml (ROBITUSSIN) 100 mg/5 mL syrup Take 200 mg by mouth every evening.      HYDROcodone-acetaminophen (NORCO) 7.5-325 mg per tablet Take 1 tablet by mouth every 4 (four) hours as needed (for chronic pain).  0    losartan (COZAAR) " 25 MG tablet TAKE 1 TABLET(25 MG) BY MOUTH EVERY DAY 90 tablet 3    magnesium oxide (MAG-OX) 400 mg (241.3 mg magnesium) tablet TAKE 2 TABLETS(800 MG) BY MOUTH TWICE DAILY 120 tablet 6    metFORMIN (GLUCOPHAGE) 500 MG tablet TAKE 1 TABLET(500 MG) BY MOUTH DAILY WITH BREAKFAST 90 tablet 3    multivitamin-minerals-lutein Tab Take 1 tablet by mouth Daily.      omeprazole (PRILOSEC) 40 MG capsule TAKE 1 CAPSULE BY MOUTH EVERY DAY 90 capsule 2    potassium chloride SA (K-DUR,KLOR-CON) 20 MEQ tablet TAKE 3 TABLETS BY MOUTH TWICE DAILY 720 tablet 2    predniSONE (DELTASONE) 5 MG tablet Take 2 tablets (10 mg total) by mouth once daily for 14 days, THEN 1.5 tablets (7.5 mg total) once daily for 14 days, THEN 1 tablet (5 mg total) once daily for 14 days, THEN 0.5 tablets (2.5 mg total) once daily for 14 days. 70 tablet 0    simvastatin (ZOCOR) 40 MG tablet TAKE 1 TABLET(40 MG) BY MOUTH EVERY EVENING 90 tablet 3    tirzepatide (MOUNJARO) 5 mg/0.5 mL PnIj Inject 5 mg into the skin every 7 days. 4 Pen 1    torsemide (DEMADEX) 20 MG Tab TAKE 2 TABLETS(40 MG) BY MOUTH TWICE DAILY 180 tablet 2    varenicline (CHANTIX) 1 mg Tab take 1 tablet by mouth twice daily. Take with full glass of water & with food to avoid nausea 60 tablet 0    WIXELA INHUB 250-50 mcg/dose diskus inhaler Inhale 1 puff into the lungs 2 (two) times daily. 60 each 11    [DISCONTINUED] potassium chloride SA (K-DUR,KLOR-CON) 20 MEQ tablet TAKE 3 TABLETS BY MOUTH TWICE DAILY 557 tablet 1     No facility-administered encounter medications on file as of 4/29/2024.     No orders of the defined types were placed in this encounter.        Plan:     Problem List Items Addressed This Visit          Pulmonary    Chronic obstructive pulmonary disease     - continue wixela, albuterol as needed            Cardiac/Vascular    Pulmonary HTN     - ECHO 10/2023- Normal EF, normal LV diastolic function, mild-mod MR, mild TR, ePASP 50mmHg  - encouraged compliance with CPAP  -  reviewed walk- did not qualify for oxygen with activity            Other    BRADLEY on CPAP - Primary     - discussed titration study, pt going to think about it  - going to try and use while in recliner  - discussed that compliance with this may improve his exercise stamina and daytime wakefulness          Nicotine dependence     - congratulated on quitting 9/2023  - next LDCT 1/2025  - LDCT 1/2024- lung-rads 2          Plan discussed with pt and he expressed understanding, all questions answered. RTC as scheduled in January or sooner as needed.

## 2024-04-29 NOTE — ASSESSMENT & PLAN NOTE
- ECHO 10/2023- Normal EF, normal LV diastolic function, mild-mod MR, mild TR, ePASP 50mmHg  - encouraged compliance with CPAP  - reviewed walk- did not qualify for oxygen with activity

## 2024-04-30 ENCOUNTER — TELEPHONE (OUTPATIENT)
Dept: CARDIOLOGY | Facility: CLINIC | Age: 74
End: 2024-04-30
Payer: MEDICARE

## 2024-04-30 ENCOUNTER — LAB VISIT (OUTPATIENT)
Dept: LAB | Facility: HOSPITAL | Age: 74
End: 2024-04-30
Attending: INTERNAL MEDICINE
Payer: MEDICARE

## 2024-04-30 ENCOUNTER — PATIENT MESSAGE (OUTPATIENT)
Dept: CARDIOLOGY | Facility: CLINIC | Age: 74
End: 2024-04-30
Payer: MEDICARE

## 2024-04-30 DIAGNOSIS — Z00.00 ENCOUNTER FOR MEDICARE ANNUAL WELLNESS EXAM: ICD-10-CM

## 2024-04-30 DIAGNOSIS — I50.42 CHRONIC COMBINED SYSTOLIC AND DIASTOLIC CONGESTIVE HEART FAILURE: Primary | Chronic | ICD-10-CM

## 2024-04-30 DIAGNOSIS — E83.42 HYPOMAGNESEMIA: ICD-10-CM

## 2024-04-30 DIAGNOSIS — N18.32 STAGE 3B CHRONIC KIDNEY DISEASE: ICD-10-CM

## 2024-04-30 DIAGNOSIS — I73.9 PVD (PERIPHERAL VASCULAR DISEASE): ICD-10-CM

## 2024-04-30 LAB
ANION GAP SERPL CALC-SCNC: 9 MMOL/L (ref 8–16)
BNP SERPL-MCNC: 77 PG/ML (ref 0–99)
BUN SERPL-MCNC: 17 MG/DL (ref 8–23)
CALCIUM SERPL-MCNC: 10.4 MG/DL (ref 8.7–10.5)
CHLORIDE SERPL-SCNC: 86 MMOL/L (ref 95–110)
CO2 SERPL-SCNC: 33 MMOL/L (ref 23–29)
CREAT SERPL-MCNC: 2.5 MG/DL (ref 0.5–1.4)
EST. GFR  (NO RACE VARIABLE): 26.6 ML/MIN/1.73 M^2
GLUCOSE SERPL-MCNC: 640 MG/DL (ref 70–110)
POTASSIUM SERPL-SCNC: 5.6 MMOL/L (ref 3.5–5.1)
SODIUM SERPL-SCNC: 128 MMOL/L (ref 136–145)

## 2024-04-30 PROCEDURE — 83880 ASSAY OF NATRIURETIC PEPTIDE: CPT | Performed by: INTERNAL MEDICINE

## 2024-04-30 PROCEDURE — 80048 BASIC METABOLIC PNL TOTAL CA: CPT | Performed by: INTERNAL MEDICINE

## 2024-04-30 PROCEDURE — 36415 COLL VENOUS BLD VENIPUNCTURE: CPT | Performed by: INTERNAL MEDICINE

## 2024-04-30 RX ORDER — TORSEMIDE 20 MG/1
20 TABLET ORAL DAILY
Qty: 90 TABLET | Refills: 3 | Status: SHIPPED | OUTPATIENT
Start: 2024-04-30 | End: 2024-06-05

## 2024-04-30 RX ORDER — TORSEMIDE 20 MG/1
20 TABLET ORAL DAILY
Start: 2024-04-30 | End: 2024-04-30 | Stop reason: SDUPTHER

## 2024-04-30 RX ORDER — LANOLIN ALCOHOL/MO/W.PET/CERES
CREAM (GRAM) TOPICAL
Qty: 120 TABLET | Refills: 6 | Status: SHIPPED | OUTPATIENT
Start: 2024-04-30

## 2024-04-30 NOTE — TELEPHONE ENCOUNTER
Dr franco    got it. ok to tell pt to hold torsemide for 2 days. resume on 05/03 with 20 mg daily     12 mins  ZZ  repeat BMP in 1 week       Left a message on  pb

## 2024-04-30 NOTE — TELEPHONE ENCOUNTER
The lab showed elevated Cr and K> Na 128  Hold torsemide for 2 days  Then 20 mg daily  Repeat BMP in 1 week

## 2024-05-01 ENCOUNTER — CLINICAL SUPPORT (OUTPATIENT)
Dept: SMOKING CESSATION | Facility: CLINIC | Age: 74
End: 2024-05-01
Payer: COMMERCIAL

## 2024-05-01 DIAGNOSIS — F17.200 NICOTINE DEPENDENCE: Primary | ICD-10-CM

## 2024-05-01 PROCEDURE — 99999 PR PBB SHADOW E&M-EST. PATIENT-LVL II: CPT | Mod: PBBFAC,,, | Performed by: SPEECH-LANGUAGE PATHOLOGIST

## 2024-05-01 PROCEDURE — 99404 PREV MED CNSL INDIV APPRX 60: CPT | Mod: S$GLB,,, | Performed by: SPEECH-LANGUAGE PATHOLOGIST

## 2024-05-01 NOTE — PROGRESS NOTES
Individual Follow-Up Form    5/1/2024    Quit Date: 9/15/2023      Clinical Status of Patient: Outpatient     Length of Service: 60 minutes     Continuing Medication: yes  Chantix (taking 1 pill per day)     Other Medications:                   Target Symptoms: Withdrawal and medication side effects. The following were   rated moderate (3) to severe (4) on TCRS:  Moderate (3): anger/irritability/frustration, depressed mood/sadness, difficulty concentrating, insomnia, anxious/nervous, restless/can't sit still/impatient  Severe (4): none     Comments: Patient seen in clinic. Patient reports he has remained smoke free since last session &  his 9/15/2023 quit date. He reports he is taking Varenicline 1 mg once daily. Assessed CO. CO 7.  Patient reports he lives in the home with a brother who smokes in his own room which may be contributing to this reading. Patient reports he met with the pulmonologist & he didn't need oxygen. He reports his cardiologist had him adjust his fluid pill dosage for the next few day & not sure why. Administered TCRS with patient reporting mild symptoms of desire/crave, increased appetite/hunger & moderate symptoms of anger/irritability/frustration, depressed mood/sadness, difficulty concentrating, insomnia, anxious/nervous, restless/can't sit still/impatient. Patient reports high stress levels due to his family situation & he resides in the home with them. He reports he has thought of smoking & it crosses his mind when he is at the gas station. Discussed with patient the normalcy of this experience due to the psychological cues & dependency & praised the patient for not turning to smoking to cope. Explored with patient ways that he can manage stress & get some space to decompress. Patient is limited with physical exercise due to decreased endurance & getting short winded. He reports now that the weather is nice, he is considering driving to the park to read. Discussed options such as going to  the library & visiting others & the possibility of staying overnight to allow the patient time to decompress from his family & living situation to decrease stress & deter him from turning to smoking to cope. Active listening & emotional support provided. Goal is to remain smoke free & be off of Varenicline. Follow up set for 6/3/2024 at 1:00 pm.       Diagnosis: F17.200    Next Visit: 5 weeks     Labs/Medications

## 2024-05-13 ENCOUNTER — PATIENT OUTREACH (OUTPATIENT)
Dept: ADMINISTRATIVE | Facility: HOSPITAL | Age: 74
End: 2024-05-13
Payer: MEDICARE

## 2024-05-15 ENCOUNTER — LAB VISIT (OUTPATIENT)
Dept: LAB | Facility: HOSPITAL | Age: 74
End: 2024-05-15
Attending: INTERNAL MEDICINE
Payer: MEDICARE

## 2024-05-15 DIAGNOSIS — C61 PROSTATE CANCER: ICD-10-CM

## 2024-05-15 DIAGNOSIS — E11.8 DM (DIABETES MELLITUS) WITH COMPLICATIONS: ICD-10-CM

## 2024-05-15 LAB
ANION GAP SERPL CALC-SCNC: 9 MMOL/L (ref 8–16)
BASOPHILS # BLD AUTO: 0.08 K/UL (ref 0–0.2)
BASOPHILS NFR BLD: 0.8 % (ref 0–1.9)
BUN SERPL-MCNC: 13 MG/DL (ref 8–23)
CALCIUM SERPL-MCNC: 9.4 MG/DL (ref 8.7–10.5)
CHLORIDE SERPL-SCNC: 99 MMOL/L (ref 95–110)
CHOLEST SERPL-MCNC: 144 MG/DL (ref 120–199)
CHOLEST/HDLC SERPL: 2.7 {RATIO} (ref 2–5)
CO2 SERPL-SCNC: 26 MMOL/L (ref 23–29)
COMPLEXED PSA SERPL-MCNC: 0.89 NG/ML (ref 0–4)
CREAT SERPL-MCNC: 1.5 MG/DL (ref 0.5–1.4)
DIFFERENTIAL METHOD BLD: ABNORMAL
EOSINOPHIL # BLD AUTO: 0.2 K/UL (ref 0–0.5)
EOSINOPHIL NFR BLD: 1.9 % (ref 0–8)
ERYTHROCYTE [DISTWIDTH] IN BLOOD BY AUTOMATED COUNT: 14.7 % (ref 11.5–14.5)
EST. GFR  (NO RACE VARIABLE): 49.2 ML/MIN/1.73 M^2
ESTIMATED AVG GLUCOSE: 321 MG/DL (ref 68–131)
GLUCOSE SERPL-MCNC: 277 MG/DL (ref 70–110)
HBA1C MFR BLD: 12.8 % (ref 4–5.6)
HCT VFR BLD AUTO: 40.3 % (ref 40–54)
HDLC SERPL-MCNC: 54 MG/DL (ref 40–75)
HDLC SERPL: 37.5 % (ref 20–50)
HGB BLD-MCNC: 12.6 G/DL (ref 14–18)
IMM GRANULOCYTES # BLD AUTO: 0.13 K/UL (ref 0–0.04)
IMM GRANULOCYTES NFR BLD AUTO: 1.3 % (ref 0–0.5)
LDLC SERPL CALC-MCNC: 63.6 MG/DL (ref 63–159)
LYMPHOCYTES # BLD AUTO: 2 K/UL (ref 1–4.8)
LYMPHOCYTES NFR BLD: 20.3 % (ref 18–48)
MCH RBC QN AUTO: 31.8 PG (ref 27–31)
MCHC RBC AUTO-ENTMCNC: 31.3 G/DL (ref 32–36)
MCV RBC AUTO: 102 FL (ref 82–98)
MONOCYTES # BLD AUTO: 0.5 K/UL (ref 0.3–1)
MONOCYTES NFR BLD: 5 % (ref 4–15)
NEUTROPHILS # BLD AUTO: 6.8 K/UL (ref 1.8–7.7)
NEUTROPHILS NFR BLD: 70.7 % (ref 38–73)
NONHDLC SERPL-MCNC: 90 MG/DL
NRBC BLD-RTO: 0 /100 WBC
PLATELET # BLD AUTO: 181 K/UL (ref 150–450)
PMV BLD AUTO: 11.3 FL (ref 9.2–12.9)
POTASSIUM SERPL-SCNC: 5.1 MMOL/L (ref 3.5–5.1)
RBC # BLD AUTO: 3.96 M/UL (ref 4.6–6.2)
SODIUM SERPL-SCNC: 134 MMOL/L (ref 136–145)
TRIGL SERPL-MCNC: 132 MG/DL (ref 30–150)
TSH SERPL DL<=0.005 MIU/L-ACNC: 1.21 UIU/ML (ref 0.4–4)
WBC # BLD AUTO: 9.64 K/UL (ref 3.9–12.7)

## 2024-05-15 PROCEDURE — 80061 LIPID PANEL: CPT | Performed by: INTERNAL MEDICINE

## 2024-05-15 PROCEDURE — 36415 COLL VENOUS BLD VENIPUNCTURE: CPT | Mod: PO | Performed by: INTERNAL MEDICINE

## 2024-05-15 PROCEDURE — 84153 ASSAY OF PSA TOTAL: CPT | Performed by: INTERNAL MEDICINE

## 2024-05-15 PROCEDURE — 83036 HEMOGLOBIN GLYCOSYLATED A1C: CPT | Performed by: INTERNAL MEDICINE

## 2024-05-15 PROCEDURE — 85025 COMPLETE CBC W/AUTO DIFF WBC: CPT | Performed by: INTERNAL MEDICINE

## 2024-05-15 PROCEDURE — 84443 ASSAY THYROID STIM HORMONE: CPT | Performed by: INTERNAL MEDICINE

## 2024-05-15 PROCEDURE — 80048 BASIC METABOLIC PNL TOTAL CA: CPT | Performed by: INTERNAL MEDICINE

## 2024-05-16 ENCOUNTER — PATIENT MESSAGE (OUTPATIENT)
Dept: CARDIOLOGY | Facility: CLINIC | Age: 74
End: 2024-05-16
Payer: MEDICARE

## 2024-05-21 ENCOUNTER — PATIENT MESSAGE (OUTPATIENT)
Dept: INTERNAL MEDICINE | Facility: CLINIC | Age: 74
End: 2024-05-21

## 2024-05-21 ENCOUNTER — OFFICE VISIT (OUTPATIENT)
Dept: INTERNAL MEDICINE | Facility: CLINIC | Age: 74
End: 2024-05-21
Payer: MEDICARE

## 2024-05-21 VITALS
TEMPERATURE: 98 F | DIASTOLIC BLOOD PRESSURE: 70 MMHG | BODY MASS INDEX: 35.08 KG/M2 | HEART RATE: 94 BPM | WEIGHT: 224 LBS | SYSTOLIC BLOOD PRESSURE: 128 MMHG | OXYGEN SATURATION: 94 %

## 2024-05-21 DIAGNOSIS — I42.8 NICM (NONISCHEMIC CARDIOMYOPATHY): ICD-10-CM

## 2024-05-21 DIAGNOSIS — I50.42 CHRONIC COMBINED SYSTOLIC AND DIASTOLIC CONGESTIVE HEART FAILURE: Chronic | ICD-10-CM

## 2024-05-21 DIAGNOSIS — N18.30 HYPERTENSION ASSOCIATED WITH STAGE 3 CHRONIC KIDNEY DISEASE DUE TO TYPE 2 DIABETES MELLITUS: ICD-10-CM

## 2024-05-21 DIAGNOSIS — F17.210 CIGARETTE NICOTINE DEPENDENCE WITHOUT COMPLICATION: ICD-10-CM

## 2024-05-21 DIAGNOSIS — E66.2 MORBID OBESITY WITH ALVEOLAR HYPOVENTILATION: ICD-10-CM

## 2024-05-21 DIAGNOSIS — I73.9 PVD (PERIPHERAL VASCULAR DISEASE): ICD-10-CM

## 2024-05-21 DIAGNOSIS — Z00.00 ROUTINE GENERAL MEDICAL EXAMINATION AT A HEALTH CARE FACILITY: Primary | ICD-10-CM

## 2024-05-21 DIAGNOSIS — H49.01 THIRD NERVE PALSY, RIGHT: ICD-10-CM

## 2024-05-21 DIAGNOSIS — E78.5 HYPERLIPIDEMIA ASSOCIATED WITH TYPE 2 DIABETES MELLITUS: ICD-10-CM

## 2024-05-21 DIAGNOSIS — E11.69 HYPERLIPIDEMIA ASSOCIATED WITH TYPE 2 DIABETES MELLITUS: ICD-10-CM

## 2024-05-21 DIAGNOSIS — N18.32 STAGE 3B CHRONIC KIDNEY DISEASE: Chronic | ICD-10-CM

## 2024-05-21 DIAGNOSIS — I25.118 CORONARY ARTERY DISEASE OF NATIVE ARTERY OF NATIVE HEART WITH STABLE ANGINA PECTORIS: Chronic | ICD-10-CM

## 2024-05-21 DIAGNOSIS — I87.2 CHRONIC VENOUS INSUFFICIENCY: ICD-10-CM

## 2024-05-21 DIAGNOSIS — E11.22 HYPERTENSION ASSOCIATED WITH STAGE 3 CHRONIC KIDNEY DISEASE DUE TO TYPE 2 DIABETES MELLITUS: ICD-10-CM

## 2024-05-21 DIAGNOSIS — G47.33 OSA ON CPAP: ICD-10-CM

## 2024-05-21 DIAGNOSIS — I12.9 HYPERTENSION ASSOCIATED WITH STAGE 3 CHRONIC KIDNEY DISEASE DUE TO TYPE 2 DIABETES MELLITUS: ICD-10-CM

## 2024-05-21 DIAGNOSIS — C61 PROSTATE CANCER: ICD-10-CM

## 2024-05-21 DIAGNOSIS — Z87.39 HISTORY OF GOUT: ICD-10-CM

## 2024-05-21 DIAGNOSIS — E66.01 CLASS 2 SEVERE OBESITY DUE TO EXCESS CALORIES WITH SERIOUS COMORBIDITY AND BODY MASS INDEX (BMI) OF 35.0 TO 35.9 IN ADULT: ICD-10-CM

## 2024-05-21 DIAGNOSIS — E11.8 DM (DIABETES MELLITUS) WITH COMPLICATIONS: ICD-10-CM

## 2024-05-21 PROCEDURE — 1125F AMNT PAIN NOTED PAIN PRSNT: CPT | Mod: CPTII,S$GLB,, | Performed by: INTERNAL MEDICINE

## 2024-05-21 PROCEDURE — 1101F PT FALLS ASSESS-DOCD LE1/YR: CPT | Mod: CPTII,S$GLB,, | Performed by: INTERNAL MEDICINE

## 2024-05-21 PROCEDURE — 3074F SYST BP LT 130 MM HG: CPT | Mod: CPTII,S$GLB,, | Performed by: INTERNAL MEDICINE

## 2024-05-21 PROCEDURE — 3072F LOW RISK FOR RETINOPATHY: CPT | Mod: CPTII,S$GLB,, | Performed by: INTERNAL MEDICINE

## 2024-05-21 PROCEDURE — G2211 COMPLEX E/M VISIT ADD ON: HCPCS | Mod: S$GLB,,, | Performed by: INTERNAL MEDICINE

## 2024-05-21 PROCEDURE — 3062F POS MACROALBUMINURIA REV: CPT | Mod: CPTII,S$GLB,, | Performed by: INTERNAL MEDICINE

## 2024-05-21 PROCEDURE — 1159F MED LIST DOCD IN RCRD: CPT | Mod: CPTII,S$GLB,, | Performed by: INTERNAL MEDICINE

## 2024-05-21 PROCEDURE — 3078F DIAST BP <80 MM HG: CPT | Mod: CPTII,S$GLB,, | Performed by: INTERNAL MEDICINE

## 2024-05-21 PROCEDURE — 3008F BODY MASS INDEX DOCD: CPT | Mod: CPTII,S$GLB,, | Performed by: INTERNAL MEDICINE

## 2024-05-21 PROCEDURE — 3066F NEPHROPATHY DOC TX: CPT | Mod: CPTII,S$GLB,, | Performed by: INTERNAL MEDICINE

## 2024-05-21 PROCEDURE — 3288F FALL RISK ASSESSMENT DOCD: CPT | Mod: CPTII,S$GLB,, | Performed by: INTERNAL MEDICINE

## 2024-05-21 PROCEDURE — 3046F HEMOGLOBIN A1C LEVEL >9.0%: CPT | Mod: CPTII,S$GLB,, | Performed by: INTERNAL MEDICINE

## 2024-05-21 PROCEDURE — 99999 PR PBB SHADOW E&M-EST. PATIENT-LVL V: CPT | Mod: PBBFAC,,, | Performed by: INTERNAL MEDICINE

## 2024-05-21 PROCEDURE — 99215 OFFICE O/P EST HI 40 MIN: CPT | Mod: S$GLB,,, | Performed by: INTERNAL MEDICINE

## 2024-05-21 PROCEDURE — 4010F ACE/ARB THERAPY RXD/TAKEN: CPT | Mod: CPTII,S$GLB,, | Performed by: INTERNAL MEDICINE

## 2024-05-21 RX ORDER — INSULIN PUMP SYRINGE, 3 ML
EACH MISCELLANEOUS
Qty: 1 EACH | Refills: 0 | Status: SHIPPED | OUTPATIENT
Start: 2024-05-21 | End: 2025-05-21

## 2024-05-21 RX ORDER — GLIMEPIRIDE 4 MG/1
4 TABLET ORAL
Qty: 90 TABLET | Refills: 3 | Status: SHIPPED | OUTPATIENT
Start: 2024-05-21 | End: 2025-05-21

## 2024-05-21 NOTE — PROGRESS NOTES
HPI:  Patient is a 73-year-old man who comes in today follow-up his diabetes, hypertension, lipids, chronic kidney disease, combined systolic and diastolic heart failure, COPD, sleep apnea and for his annual physical.  Patient also has developed a pseudo tumor in his right eye for which he has been on high-dose steroids for many months.  He has not been checking his blood sugar at home.  His A1c 6 months ago was 7.1 and now it is currently 12.8.  Has been off of his Amaryl for many months but otherwise he has been taking in his other medications.  I suspect the cause of his very poorly controlled diabetes is high-dose steroids.  Unfortunately, he will need to always be on steroids although at a much lower doses.  Patient denies any hypoglycemic events.  He denies any chest pains or shortness a breath.    Current MEDS: medcard review, verified and update  Allergies: Per the electronic medical record    Past Medical History:   Diagnosis Date    Chronic combined systolic and diastolic congestive heart failure 07/09/2020    Chronic kidney disease, stage 3     Chronic obstructive pulmonary disease 03/20/2023    Wixela 250 mcg, Spiriva respimat. Continue Albuterol inhaler and albuterol nebs   Referral pul dis management, education and assistance with medication  Patient preference to only see a doctor, request follow up with Dr. Springer             Chronic venous insufficiency     DDD (degenerative disc disease), lumbar     DM (diabetes mellitus) with complications     History of gout     Hyperlipidemia associated with type 2 diabetes mellitus     Hypertension associated with stage 3 chronic kidney disease due to type 2 diabetes mellitus     BRADLEY on CPAP     Prostate cancer     prostatectomy in 2010, rising PSA that started in 2021       Past Surgical History:   Procedure Laterality Date    BICEPS TENDON REPAIR      COLONOSCOPY N/A 03/27/2019    Procedure: COLONOSCOPY;  Surgeon: James Roger MD;  Location: [facility];   Service: Endoscopy;  Laterality: N/A;    EXPLORATION OF ORBIT Right 9/8/2023    Procedure: EXPLORATION, ORBIT;  Surgeon: Junaid Batrholomew MD;  Location: [facility] OR;  Service: ENT;  Laterality: Right;  possibly need frozen    HEMORRHOID SURGERY      INGUINAL HERNIA REPAIR Left     KNEE ARTHROSCOPY Right     LEFT HEART CATHETERIZATION Left 06/29/2020    Procedure: CATHETERIZATION, HEART, LEFT;  Surgeon: Cheli Wilson MD;  Location: [facility] CATH LAB;  Service: Cardiology;  Laterality: Left;    LUMBAR LAMINECTOMY      PROSTATECTOMY      TONSILLECTOMY         SHx: per the electronic medical record    FHx: recorded in the electronic medical record    ROS:    denies any chest pains or shortness of breath. Denies any nausea, vomiting or diarrhea. Denies any fever, chills or sweats. Denies any change in weight, voice, stool, skin or hair. Denies any dysuria, dyspepsia or dysphagia. Denies any change in vision, hearing or headaches. Denies any swollen lymph nodes or loss of memory.    PE:  /70 (BP Location: Right arm, Patient Position: Sitting, BP Method: Large (Manual))   Pulse 94   Temp 98.1 °F (36.7 °C) (Tympanic)   Wt 101.6 kg (223 lb 15.8 oz)   SpO2 (!) 94%   BMI 35.08 kg/m²   Gen: Well-developed, well-nourished, male, in no acute distress, oriented x3  HEENT: neck is supple, no adenopathy, carotids 2+ equal without bruits, thyroid exam normal size without nodules.  CHEST: clear to auscultation and percussion  CVS: regular rate and rhythm without significant murmur, gallop, or rubs  ABD: soft, benign, no rebound no guarding, no distention.  Bowel sounds are normal.     nontender.  No palpable masses.  No organomegaly and no audible bruits.  RECTAL:  Deferred.  EXT: no clubbing, cyanosis, or edema  LYMPH: no cervical, inguinal, or axillary adenopathy      Lab Results   Component Value Date    WBC 9.64 05/15/2024    HGB 12.6 (L) 05/15/2024    HCT 40.3 05/15/2024     05/15/2024    CHOL 144 05/15/2024    TRIG 132  05/15/2024    HDL 54 05/15/2024    ALT 22 11/13/2023    AST 23 11/13/2023     (L) 05/15/2024    K 5.1 05/15/2024    CL 99 05/15/2024    CREATININE 1.5 (H) 05/15/2024    BUN 13 05/15/2024    CO2 26 05/15/2024    TSH 1.212 05/15/2024    PSA 0.15 02/01/2021    INR 1.0 06/26/2020    HGBA1C 12.8 (H) 05/15/2024    PSADIAG 0.89 05/15/2024    BNP 77 04/30/2024    URICACID 6.2 11/13/2023    SEDRATE 38 (H) 03/11/2022    CRP 10.3 (H) 03/11/2022       Impression:  Diabetes, very poorly controlled primarily due to the high-dose steroids  Hypertension, well controlled  Hyperlipidemia, stable  Chronic kidney disease, GFR unchanged  Combined systolic diastolic heart failure, followed by Cardiology  Coronary artery disease, asymptomatic  Periorbital mass, followed by Ophthalmology and Rheumatology  COPD, stable on current medical therapy  Patient Active Problem List   Diagnosis    BRADLEY on CPAP    History of gout    DDD (degenerative disc disease), lumbar    Cigarette nicotine dependence without complication    Hypertension associated with stage 3 chronic kidney disease due to type 2 diabetes mellitus    Hyperlipidemia associated with type 2 diabetes mellitus    Chronic kidney disease, stage 3    Class 2 severe obesity due to excess calories with serious comorbidity and body mass index (BMI) of 35.0 to 35.9 in adult    Coronary artery disease of native artery of native heart with stable angina pectoris    Chronic combined systolic and diastolic congestive heart failure    Prostate cancer    PVD (peripheral vascular disease)    NICM (nonischemic cardiomyopathy)    Closed fracture of tuft of distal phalanx of finger    Chronic venous insufficiency    Third nerve palsy, right    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  He will increase his Amaryl to 4 mg per day.  I would love to put him on a GLP 1 agonist.  He will research his insurance company to find out if it is something he can afford.  Patient will be checking his blood sugars twice a day he needs to send me those results in 2-3 weeks.  We will then decided that point whether not to start on insulin, I highly suspect it will be necessary.  He will see me in 3 months with the above lab work.    This note is generated with speech recognition software and is subject to transcription error and sound alike phrases that may be missed by proofreading.

## 2024-05-27 ENCOUNTER — PATIENT MESSAGE (OUTPATIENT)
Dept: INTERNAL MEDICINE | Facility: CLINIC | Age: 74
End: 2024-05-27
Payer: MEDICARE

## 2024-05-27 RX ORDER — LANCETS
EACH MISCELLANEOUS
Qty: 100 EACH | Refills: 3 | Status: SHIPPED | OUTPATIENT
Start: 2024-05-27

## 2024-05-27 NOTE — TELEPHONE ENCOUNTER
Please see patient's mychart message and advise. Pt stated he needs lancets called in for his accucheck.

## 2024-06-03 ENCOUNTER — CLINICAL SUPPORT (OUTPATIENT)
Dept: SMOKING CESSATION | Facility: CLINIC | Age: 74
End: 2024-06-03
Payer: COMMERCIAL

## 2024-06-03 DIAGNOSIS — F17.200 NICOTINE DEPENDENCE: Primary | ICD-10-CM

## 2024-06-03 PROCEDURE — 99404 PREV MED CNSL INDIV APPRX 60: CPT | Mod: S$GLB,,, | Performed by: SPEECH-LANGUAGE PATHOLOGIST

## 2024-06-03 PROCEDURE — 99999 PR PBB SHADOW E&M-EST. PATIENT-LVL II: CPT | Mod: PBBFAC,,, | Performed by: SPEECH-LANGUAGE PATHOLOGIST
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
## 2024-06-04 ENCOUNTER — PATIENT MESSAGE (OUTPATIENT)
Dept: INTERNAL MEDICINE | Facility: CLINIC | Age: 74
End: 2024-06-04
Payer: MEDICARE

## 2024-06-05 RX ORDER — BISOPROLOL FUMARATE 5 MG/1
TABLET, FILM COATED ORAL
Qty: 90 TABLET | Refills: 3 | Status: SHIPPED | OUTPATIENT
Start: 2024-06-05

## 2024-06-05 RX ORDER — TORSEMIDE 20 MG/1
TABLET ORAL
Qty: 360 TABLET | Refills: 2 | Status: SHIPPED | OUTPATIENT
Start: 2024-06-05

## 2024-06-18 ENCOUNTER — OFFICE VISIT (OUTPATIENT)
Dept: OPHTHALMOLOGY | Facility: CLINIC | Age: 74
End: 2024-06-18
Payer: MEDICARE

## 2024-06-18 DIAGNOSIS — E11.9 DIABETES MELLITUS TYPE 2 WITHOUT RETINOPATHY: Primary | ICD-10-CM

## 2024-06-18 DIAGNOSIS — H52.7 REFRACTIVE ERROR: ICD-10-CM

## 2024-06-18 DIAGNOSIS — H05.111 INFLAMMATORY PSEUDOTUMOR OF RIGHT ORBIT: ICD-10-CM

## 2024-06-18 DIAGNOSIS — H26.9 CORTICAL CATARACT OF BOTH EYES: ICD-10-CM

## 2024-06-18 DIAGNOSIS — H40.51X4 SECONDARY GLAUCOMA OF RIGHT EYE, INDETERMINATE STAGE: ICD-10-CM

## 2024-06-18 PROCEDURE — 1159F MED LIST DOCD IN RCRD: CPT | Mod: CPTII,S$GLB,, | Performed by: OPHTHALMOLOGY

## 2024-06-18 PROCEDURE — 92015 DETERMINE REFRACTIVE STATE: CPT | Mod: S$GLB,,, | Performed by: OPHTHALMOLOGY

## 2024-06-18 PROCEDURE — 1160F RVW MEDS BY RX/DR IN RCRD: CPT | Mod: CPTII,S$GLB,, | Performed by: OPHTHALMOLOGY

## 2024-06-18 PROCEDURE — 3046F HEMOGLOBIN A1C LEVEL >9.0%: CPT | Mod: CPTII,S$GLB,, | Performed by: OPHTHALMOLOGY

## 2024-06-18 PROCEDURE — 3066F NEPHROPATHY DOC TX: CPT | Mod: CPTII,S$GLB,, | Performed by: OPHTHALMOLOGY

## 2024-06-18 PROCEDURE — 4010F ACE/ARB THERAPY RXD/TAKEN: CPT | Mod: CPTII,S$GLB,, | Performed by: OPHTHALMOLOGY

## 2024-06-18 PROCEDURE — 92014 COMPRE OPH EXAM EST PT 1/>: CPT | Mod: S$GLB,,, | Performed by: OPHTHALMOLOGY

## 2024-06-18 PROCEDURE — 3062F POS MACROALBUMINURIA REV: CPT | Mod: CPTII,S$GLB,, | Performed by: OPHTHALMOLOGY

## 2024-06-18 PROCEDURE — 92133 CPTRZD OPH DX IMG PST SGM ON: CPT | Mod: S$GLB,,, | Performed by: OPHTHALMOLOGY

## 2024-06-18 PROCEDURE — 99999 PR PBB SHADOW E&M-EST. PATIENT-LVL III: CPT | Mod: PBBFAC,,, | Performed by: OPHTHALMOLOGY

## 2024-06-18 RX ORDER — LATANOPROST 50 UG/ML
1 SOLUTION/ DROPS OPHTHALMIC NIGHTLY
Qty: 2.5 ML | Refills: 12 | Status: SHIPPED | OUTPATIENT
Start: 2024-06-18

## 2024-06-18 NOTE — PROGRESS NOTES
SUBJECTIVE  Santiago Del Khan is 73 y.o. male  Corrected distance visual acuity was 20/30 -2 in the right eye and 20/20 -1 in the left eye.   Chief Complaint   Patient presents with    Blurred Vision     Pt here for blurry vision consult. Pt says he has been seen for the orbital mass in his right upper orbit. He was told it is non cancerous and he is able to treat it with steroids. He is currently being followed by Rheamatology. Pt says his vision has been blurry for a while, he is not sure if it is because his prescription has changed. He also still does not have full motion in his right eye, so some images are either doubled, or he is looking at two different images completely.           HPI     Blurred Vision     Additional comments: Pt here for blurry vision consult. Pt says he has   been seen for the orbital mass in his right upper orbit. He was told it is   non cancerous and he is able to treat it with steroids. He is currently   being followed by Rheamatology. Pt says his vision has been blurry for a   while, he is not sure if it is because his prescription has changed. He   also still does not have full motion in his right eye, so some images are   either doubled, or he is looking at two different images completely.            Comments    1.Type 2 Diabetic x 2016  Diabetic Cataract  2.Ptosis of Eyelid OU  Dermatochalasis both upper lids  3. Orbital mass Right upper orbit  Non-cancerous   Followed by Dr. Salazar (Rheumatology)    Prednisolone PO off and on             Last edited by Ramírez Cabrera MA on 6/18/2024  8:56 AM.         Assessment /Plan :  1. Diabetes mellitus type 2 without retinopathy No diabetic retinopathy at this time. Reviewed diabetic eye precautions including avoiding tobacco use,  Good glucose control, and importance of regular follow up.      2. Cortical cataract of both eyes - monitor for now   3. Inflammatory pseudotumor of right orbit - followed by Dr. Salazar   4. Secondary  glaucoma of right eye - newly diagnosed today  Intraocular pressure (IOP) OD not within acceptable range relative to target IOP with risk of irreversible visual loss. Better IOP control is recommended. Treatment options may include change or additional medications, Selective Laser Trabeculoplasty (SLT laser), and/or incisional glaucoma surgery. Reviewed importance of continued compliance with treatment and follow up.     Patient chooses  to add Latanoprost one drop in the right eye every night    Return to clinic in  4-5 weeks   or as needed.  With IOP Check and HVF 24-2     5.      Refractive Error - bifocal and PAL Rx

## 2024-06-19 ENCOUNTER — PATIENT MESSAGE (OUTPATIENT)
Dept: RHEUMATOLOGY | Facility: CLINIC | Age: 74
End: 2024-06-19
Payer: MEDICARE

## 2024-07-02 ENCOUNTER — CLINICAL SUPPORT (OUTPATIENT)
Dept: SMOKING CESSATION | Facility: CLINIC | Age: 74
End: 2024-07-02
Payer: COMMERCIAL

## 2024-07-02 DIAGNOSIS — F17.200 NICOTINE DEPENDENCE: Primary | ICD-10-CM

## 2024-07-02 PROCEDURE — 99404 PREV MED CNSL INDIV APPRX 60: CPT | Mod: S$GLB,,, | Performed by: SPEECH-LANGUAGE PATHOLOGIST

## 2024-07-02 PROCEDURE — 99999 PR PBB SHADOW E&M-EST. PATIENT-LVL II: CPT | Mod: PBBFAC,,, | Performed by: SPEECH-LANGUAGE PATHOLOGIST

## 2024-07-02 RX ORDER — VARENICLINE TARTRATE 1 MG/1
TABLET, FILM COATED ORAL
Qty: 60 TABLET | Refills: 0 | Status: SHIPPED | OUTPATIENT
Start: 2024-07-02

## 2024-07-02 NOTE — PROGRESS NOTES
Individual Follow-Up Form    7/2/2024    Quit Date: 9/15/2024    Clinical Status of Patient: Outpatient    Length of Service: 60 minutes    Continuing Medication: yes  Chantix (1 pill per day)    Other Medications:      Target Symptoms: Withdrawal and medication side effects. The following were  rated moderate (3) to severe (4) on TCRS:  Moderate (3):   Severe (4):     Comments: Patient seen in clinic. He reports he has remained smoke free since his last session & his 9/15/2024 quit date; but he hasn't stopped taking Varenicline & reports he takes 1 mg once daily. He reports he chose to continue taking it due to the stress that he has to deal with in regards to his living situation.  He reports his current stress level is a level 4 with the highest exceeding 10 on a scale of 1-10.  Discussed high risk situations for the patient which he reports are his family dynamics/living situation. He reports he has had moments of wanting/needing a cigarette & he feels the Varenicline helps. Discussed with patient the cognitive behavioral gains that he has made in addition to his behavioral changes & reactions that contribute toward his success. Discussed incorporating stress management tasks & activities to include moderate exercise for 150 min per week outside of the home to allow the patient an option to step away from his environment & to work off the intensity of his stress. He reports he hasn't looked in to a gym just yet; but that is his plan to address that next. Discussed options of walking at a moderate pace until he can seek out a gym. He reports he continues on his healthy life journey by not only remaining smoke free; but also includes monitoring his blood sugars for stable, normal levels & reports those to his dr as well as he has healthy eating & mindful eating efforts which has allowed him to achieve at least a 10 pound weight loss. Goal is to remain smoke free & work toward being off of Varenicline. Follow up  set for 8/5/2024 at 1:00 pm.       Diagnosis: F17.200    Next Visit: 5 weeks

## 2024-07-16 ENCOUNTER — OFFICE VISIT (OUTPATIENT)
Dept: OPHTHALMOLOGY | Facility: CLINIC | Age: 74
End: 2024-07-16
Payer: MEDICARE

## 2024-07-16 DIAGNOSIS — H40.51X1 SECONDARY GLAUCOMA OF RIGHT EYE, MILD STAGE: Primary | ICD-10-CM

## 2024-07-16 DIAGNOSIS — H26.9 CORTICAL CATARACT OF BOTH EYES: ICD-10-CM

## 2024-07-16 DIAGNOSIS — H05.111 INFLAMMATORY PSEUDOTUMOR OF RIGHT ORBIT: ICD-10-CM

## 2024-07-16 PROCEDURE — 3046F HEMOGLOBIN A1C LEVEL >9.0%: CPT | Mod: CPTII,S$GLB,, | Performed by: OPHTHALMOLOGY

## 2024-07-16 PROCEDURE — 99999 PR PBB SHADOW E&M-EST. PATIENT-LVL III: CPT | Mod: PBBFAC,,, | Performed by: OPHTHALMOLOGY

## 2024-07-16 PROCEDURE — 1159F MED LIST DOCD IN RCRD: CPT | Mod: CPTII,S$GLB,, | Performed by: OPHTHALMOLOGY

## 2024-07-16 PROCEDURE — 4010F ACE/ARB THERAPY RXD/TAKEN: CPT | Mod: CPTII,S$GLB,, | Performed by: OPHTHALMOLOGY

## 2024-07-16 PROCEDURE — 92083 EXTENDED VISUAL FIELD XM: CPT | Mod: S$GLB,,, | Performed by: OPHTHALMOLOGY

## 2024-07-16 PROCEDURE — 3066F NEPHROPATHY DOC TX: CPT | Mod: CPTII,S$GLB,, | Performed by: OPHTHALMOLOGY

## 2024-07-16 PROCEDURE — 1160F RVW MEDS BY RX/DR IN RCRD: CPT | Mod: CPTII,S$GLB,, | Performed by: OPHTHALMOLOGY

## 2024-07-16 PROCEDURE — 3062F POS MACROALBUMINURIA REV: CPT | Mod: CPTII,S$GLB,, | Performed by: OPHTHALMOLOGY

## 2024-07-16 PROCEDURE — 99214 OFFICE O/P EST MOD 30 MIN: CPT | Mod: S$GLB,,, | Performed by: OPHTHALMOLOGY

## 2024-07-16 NOTE — PROGRESS NOTES
SUBJECTIVE  Santiago Khan is 73 y.o. male  Corrected distance visual acuity was 20/70 -1 in the right eye and 20/25 -2 in the left eye.   Chief Complaint   Patient presents with    Glaucoma     Pt reports for 4-5wk IOP and HVF. Denies any pain or irritation. Va stable. No drops.           HPI     Glaucoma     Additional comments: Pt reports for 4-5wk IOP and HVF. Denies any pain or   irritation. Va stable. No drops.            Comments    1.Type 2 Diabetic x 2016  Diabetic Cataract  2.Ptosis of Eyelid OU  Dermatochalasis both upper lids  3. Orbital mass right upper orbit  Non-cancerous  Pseudotumor- dx 9/23 by Dr. Fairchild   Followed by Dr. Salazar (Rheumatology) for steroids     Prednisolone PO off and on    Latanoprost QHS OU             Last edited by Jd Good on 7/16/2024 10:39 AM.         Assessment /Plan :  1. Secondary glaucoma of right eye, mild stage Doing well, intraocular pressure (IOP) within acceptable range relative to target IOP and no evidence of progression. Continue current treatment. Reviewed importance of continued compliance with treatment and follow up.      Patient instructed to continue using the following glaucoma medication as follows:  Latanoprost one drop in the right eye nightly    Return to clinic in 4 months with Dr. Gray or as needed.  With IOP Check     2. Cortical cataract of both eyes  -- Condition stable, no therapeutic change required. Monitoring routinely.     3. Inflammatory pseudotumor of right orbit  -- Condition stable, no therapeutic change required. Monitoring routinely.

## 2024-07-31 RX ORDER — GLIMEPIRIDE 1 MG/1
TABLET ORAL
Qty: 90 TABLET | Refills: 2 | OUTPATIENT
Start: 2024-07-31

## 2024-08-02 ENCOUNTER — LAB VISIT (OUTPATIENT)
Dept: LAB | Facility: HOSPITAL | Age: 74
End: 2024-08-02
Attending: STUDENT IN AN ORGANIZED HEALTH CARE EDUCATION/TRAINING PROGRAM
Payer: MEDICARE

## 2024-08-02 ENCOUNTER — OFFICE VISIT (OUTPATIENT)
Dept: RHEUMATOLOGY | Facility: CLINIC | Age: 74
End: 2024-08-02
Payer: MEDICARE

## 2024-08-02 VITALS
SYSTOLIC BLOOD PRESSURE: 119 MMHG | HEART RATE: 86 BPM | BODY MASS INDEX: 34.03 KG/M2 | HEIGHT: 67 IN | WEIGHT: 216.81 LBS | DIASTOLIC BLOOD PRESSURE: 70 MMHG

## 2024-08-02 DIAGNOSIS — H05.111: ICD-10-CM

## 2024-08-02 DIAGNOSIS — Z79.899 IMMUNODEFICIENCY DUE TO DRUGS: ICD-10-CM

## 2024-08-02 DIAGNOSIS — D84.821 IMMUNODEFICIENCY DUE TO DRUGS: ICD-10-CM

## 2024-08-02 DIAGNOSIS — H05.111: Primary | ICD-10-CM

## 2024-08-02 LAB
HBV CORE AB SERPL QL IA: NORMAL
HBV SURFACE AB SER-ACNC: <3 MIU/ML
HBV SURFACE AB SER-ACNC: NORMAL M[IU]/ML
HBV SURFACE AG SERPL QL IA: NORMAL
HCV AB SERPL QL IA: NORMAL
HIV 1+2 AB+HIV1 P24 AG SERPL QL IA: NORMAL
TREPONEMA PALLIDUM IGG+IGM AB [PRESENCE] IN SERUM OR PLASMA BY IMMUNOASSAY: NONREACTIVE

## 2024-08-02 PROCEDURE — 86682 HELMINTH ANTIBODY: CPT | Performed by: STUDENT IN AN ORGANIZED HEALTH CARE EDUCATION/TRAINING PROGRAM

## 2024-08-02 PROCEDURE — 86480 TB TEST CELL IMMUN MEASURE: CPT | Performed by: STUDENT IN AN ORGANIZED HEALTH CARE EDUCATION/TRAINING PROGRAM

## 2024-08-02 PROCEDURE — 86704 HEP B CORE ANTIBODY TOTAL: CPT | Performed by: STUDENT IN AN ORGANIZED HEALTH CARE EDUCATION/TRAINING PROGRAM

## 2024-08-02 PROCEDURE — 87389 HIV-1 AG W/HIV-1&-2 AB AG IA: CPT | Performed by: STUDENT IN AN ORGANIZED HEALTH CARE EDUCATION/TRAINING PROGRAM

## 2024-08-02 PROCEDURE — 86593 SYPHILIS TEST NON-TREP QUANT: CPT | Performed by: STUDENT IN AN ORGANIZED HEALTH CARE EDUCATION/TRAINING PROGRAM

## 2024-08-02 PROCEDURE — 99999 PR PBB SHADOW E&M-EST. PATIENT-LVL IV: CPT | Mod: PBBFAC,,, | Performed by: STUDENT IN AN ORGANIZED HEALTH CARE EDUCATION/TRAINING PROGRAM

## 2024-08-02 PROCEDURE — 36415 COLL VENOUS BLD VENIPUNCTURE: CPT | Performed by: STUDENT IN AN ORGANIZED HEALTH CARE EDUCATION/TRAINING PROGRAM

## 2024-08-02 PROCEDURE — 87340 HEPATITIS B SURFACE AG IA: CPT | Performed by: STUDENT IN AN ORGANIZED HEALTH CARE EDUCATION/TRAINING PROGRAM

## 2024-08-02 PROCEDURE — 86706 HEP B SURFACE ANTIBODY: CPT | Mod: 91 | Performed by: STUDENT IN AN ORGANIZED HEALTH CARE EDUCATION/TRAINING PROGRAM

## 2024-08-02 PROCEDURE — 86803 HEPATITIS C AB TEST: CPT | Performed by: STUDENT IN AN ORGANIZED HEALTH CARE EDUCATION/TRAINING PROGRAM

## 2024-08-02 RX ORDER — ADALIMUMAB 40MG/0.4ML
40 KIT SUBCUTANEOUS
Qty: 2 PEN | Refills: 11 | Status: SHIPPED | OUTPATIENT
Start: 2024-08-02

## 2024-08-02 NOTE — PROGRESS NOTES
RHEUMATOLOGY CLINIC ESTABLISHED PATIENT VISIT    Reason for consult:- pseudotumor orbit    Chief complaints, HPI, ROS, EXAM, Assessment & Plans:-    Santiago Khan is a 73 y.o. pleasant male who presents to follow-up from his previous visit for management of orbital pseudotumor.  Did again have near complete resolution with steroid taper but however his symptoms have again returned.  Unfortunately steroids significantly raise his blood sugar.  Has worked with his primary care on medication regimen for this.  No new symptoms otherwise.  Rheumatologic review of systems negative.  Some mild proptosis of right eye.     Reviewed all available old and outside pertinent medical records.    All lab results personally reviewed and interpreted by me.    1. Pseudotumor, orbit inflammatory, right    2. Inflammatory pseudotumor of orbit, right    3. Immunodeficiency due to drugs        Problem List Items Addressed This Visit    None  Visit Diagnoses       Pseudotumor, orbit inflammatory, right    -  Primary    Relevant Medications    adalimumab (HUMIRA,CF, PEN) 40 mg/0.4 mL PnKt    Other Relevant Orders    HIV 1/2 Ag/Ab (4th Gen)    Hepatitis B surface antigen    HBcAB    Hepatitis B surface antibody    Hepatitis C antibody    Strongyloides IgG Antibodies    Quantiferon Gold TB    Treponema Pallidium Antibodies IgG, IgM    Inflammatory pseudotumor of orbit, right        Relevant Medications    adalimumab (HUMIRA,CF, PEN) 40 mg/0.4 mL PnKt    Other Relevant Orders    HIV 1/2 Ag/Ab (4th Gen)    Hepatitis B surface antigen    HBcAB    Hepatitis B surface antibody    Hepatitis C antibody    Strongyloides IgG Antibodies    Quantiferon Gold TB    Treponema Pallidium Antibodies IgG, IgM    Immunodeficiency due to drugs        Relevant Orders    HIV 1/2 Ag/Ab (4th Gen)    Hepatitis B surface antigen    HBcAB    Hepatitis B surface antibody    Hepatitis C antibody    Strongyloides IgG Antibodies    Quantiferon Gold TB     Treponema Pallidium Antibodies IgG, IgM            Patient following up today for management of inflammatory pseudotumor of right orbit that is biopsy-proven  Has had good control of symptoms with prednisone however significant blood sugar derangements while on this and his symptoms return when off steroids  Therefore would like to start on Humira  This would be the best agent in light of his CKD and diabetes  Check pre DMARDs    # Follow up in about 4 months (around 12/2/2024).    Chronic comorbid conditions affecting medical decision making today:    Past Medical History:   Diagnosis Date    Chronic combined systolic and diastolic congestive heart failure 07/09/2020    Chronic kidney disease, stage 3     Chronic obstructive pulmonary disease 03/20/2023    Wixela 250 mcg, Spiriva respimat. Continue Albuterol inhaler and albuterol nebs   Referral pul dis management, education and assistance with medication  Patient preference to only see a doctor, request follow up with Dr. Springer             Chronic venous insufficiency     DDD (degenerative disc disease), lumbar     DM (diabetes mellitus) with complications     History of gout     Hyperlipidemia associated with type 2 diabetes mellitus     Hypertension associated with stage 3 chronic kidney disease due to type 2 diabetes mellitus     BRADLEY on CPAP     Prostate cancer     prostatectomy in 2010, rising PSA that started in 2021       Past Surgical History:   Procedure Laterality Date    BICEPS TENDON REPAIR      COLONOSCOPY N/A 03/27/2019    Procedure: COLONOSCOPY;  Surgeon: James Roger MD;  Location: [facility] ENDO;  Service: Endoscopy;  Laterality: N/A;    EXPLORATION OF ORBIT Right 9/8/2023    Procedure: EXPLORATION, ORBIT;  Surgeon: Junaid Bartholomew MD;  Location: [facility] OR;  Service: ENT;  Laterality: Right;  possibly need frozen    HEMORRHOID SURGERY      INGUINAL HERNIA REPAIR Left     KNEE ARTHROSCOPY Right     LEFT HEART CATHETERIZATION Left 06/29/2020     Procedure: CATHETERIZATION, HEART, LEFT;  Surgeon: Cheli Wilson MD;  Location: [facility];  Service: Cardiology;  Laterality: Left;    LUMBAR LAMINECTOMY      PROSTATECTOMY      TONSILLECTOMY          Social History     Tobacco Use    Smoking status: Former     Current packs/day: 0.00     Average packs/day: 1.5 packs/day for 52.7 years (79.1 ttl pk-yrs)     Types: Cigarettes     Start date: 1971     Quit date: 9/15/2023     Years since quittin.8    Smokeless tobacco: Former   Substance Use Topics    Alcohol use: Not Currently    Drug use: Never       Family History   Problem Relation Name Age of Onset    Prostate cancer Father      Coronary artery disease Father  90    Alzheimer's disease Father      Hyperlipidemia Mother         Review of patient's allergies indicates:   Allergen Reactions    Amitriptyline Rash    Celecoxib Rash       Medication List with Changes/Refills   New Medications    ADALIMUMAB (HUMIRA,CF, PEN) 40 MG/0.4 ML PNKT    Inject 0.4 mLs (40 mg total) into the skin every 14 (fourteen) days.   Current Medications    ALBUTEROL (PROVENTIL/VENTOLIN HFA) 90 MCG/ACTUATION INHALER    Inhale 2 puffs into the lungs every 4 (four) hours as needed. Rescue    ALLOPURINOL (ZYLOPRIM) 100 MG TABLET    TAKE 1 TABLET(100 MG) BY MOUTH EVERY DAY    ALLOPURINOL (ZYLOPRIM) 300 MG TABLET    Take 300 mg by mouth.    ASPIRIN (ECOTRIN) 81 MG EC TABLET    Take 81 mg by mouth once daily.    BACLOFEN (LIORESAL) 10 MG TABLET    Take 1 tablet by mouth every evening.    BISOPROLOL (ZEBETA) 5 MG TABLET    TAKE 1/2 TABLET BY MOUTH EVERY DAY    BLOOD SUGAR DIAGNOSTIC STRP    Check blood glucose twice per day    BLOOD-GLUCOSE METER KIT    Use as instructed    COLCHICINE (COLCRYS) 0.6 MG TABLET    TAKE 1 TABLET(0.6 MG) BY MOUTH DAILY AS NEEDED    EMPAGLIFLOZIN (JARDIANCE) 10 MG TABLET    Take 1 tablet (10 mg total) by mouth once daily.    FLUTICASONE PROPIONATE (FLONASE) 50 MCG/ACTUATION NASAL SPRAY    SHAKE LIQUID AND  USE 2 SPRAYS(100 MCG) IN EACH NOSTRIL EVERY DAY Strength: 50 mcg/actuation    GLIMEPIRIDE (AMARYL) 4 MG TABLET    Take 1 tablet (4 mg total) by mouth before breakfast.    GUAIFENESIN 100 MG/5 ML (ROBITUSSIN) 100 MG/5 ML SYRUP    Take 200 mg by mouth every evening.    HYDROCODONE-ACETAMINOPHEN (NORCO) 7.5-325 MG PER TABLET    Take 1 tablet by mouth every 4 (four) hours as needed (for chronic pain).    LANCETS (ACCU-CHEK SOFTCLIX LANCETS) MISC    Check BG daily    LATANOPROST 0.005 % OPHTHALMIC SOLUTION    Place 1 drop into the right eye every evening.    LOSARTAN (COZAAR) 25 MG TABLET    TAKE 1 TABLET(25 MG) BY MOUTH EVERY DAY    MAGNESIUM OXIDE (MAG-OX) 400 MG (241.3 MG MAGNESIUM) TABLET    TAKE 2 TABLETS(800 MG) BY MOUTH TWICE DAILY    METFORMIN (GLUCOPHAGE) 500 MG TABLET    TAKE 1 TABLET(500 MG) BY MOUTH DAILY WITH BREAKFAST    MULTIVITAMIN-MINERALS-LUTEIN TAB    Take 1 tablet by mouth Daily.    OMEPRAZOLE (PRILOSEC) 40 MG CAPSULE    TAKE 1 CAPSULE BY MOUTH EVERY DAY    POTASSIUM CHLORIDE SA (K-DUR,KLOR-CON) 20 MEQ TABLET    TAKE 3 TABLETS BY MOUTH TWICE DAILY    SIMVASTATIN (ZOCOR) 40 MG TABLET    TAKE 1 TABLET(40 MG) BY MOUTH EVERY EVENING    TORSEMIDE (DEMADEX) 20 MG TAB    TAKE 2 TABLETS(40 MG) BY MOUTH TWICE DAILY    VARENICLINE (CHANTIX) 1 MG TAB    take 1 tablet by mouth twice daily. Take with full glass of water & with food to avoid nausea    WIXELA INHUB 250-50 MCG/DOSE DISKUS INHALER    Inhale 1 puff into the lungs 2 (two) times daily.         Disclaimer: This note was prepared using voice recognition system and is likely to have sound alike errors and is not proofread.  Please message me with any questions.    32 minutes of total time spent on the encounter, which includes face to face time and non-face to face time preparing to see the patient (eg, review of tests), Obtaining and/or reviewing separately obtained history, Documenting clinical information in the electronic or other health record,  Independently interpreting results (not separately reported) and communicating results to the patient/family/caregiver, or Care coordination (not separately reported).     Thank you for allowing me to participate in the care of Santiago Khan.    Justice Salazar MD

## 2024-08-03 LAB
GAMMA INTERFERON BACKGROUND BLD IA-ACNC: 0.02 IU/ML
M TB IFN-G CD4+ BCKGRND COR BLD-ACNC: -0.01 IU/ML
M TB IFN-G CD4+ BCKGRND COR BLD-ACNC: 0 IU/ML
MITOGEN IGNF BCKGRD COR BLD-ACNC: 9.98 IU/ML
TB GOLD PLUS: NEGATIVE

## 2024-08-05 ENCOUNTER — CLINICAL SUPPORT (OUTPATIENT)
Dept: SMOKING CESSATION | Facility: CLINIC | Age: 74
End: 2024-08-05
Payer: COMMERCIAL

## 2024-08-05 DIAGNOSIS — F17.200 NICOTINE DEPENDENCE: Primary | ICD-10-CM

## 2024-08-05 PROCEDURE — 99999 PR PBB SHADOW E&M-EST. PATIENT-LVL II: CPT | Mod: PBBFAC,,, | Performed by: SPEECH-LANGUAGE PATHOLOGIST

## 2024-08-05 PROCEDURE — 99404 PREV MED CNSL INDIV APPRX 60: CPT | Mod: S$GLB,,, | Performed by: SPEECH-LANGUAGE PATHOLOGIST

## 2024-08-05 RX ORDER — ALLOPURINOL 300 MG/1
300 TABLET ORAL
Qty: 90 TABLET | Refills: 3 | Status: SHIPPED | OUTPATIENT
Start: 2024-08-05

## 2024-08-07 ENCOUNTER — TELEPHONE (OUTPATIENT)
Dept: SMOKING CESSATION | Facility: CLINIC | Age: 74
End: 2024-08-07
Payer: MEDICARE

## 2024-08-07 ENCOUNTER — CLINICAL SUPPORT (OUTPATIENT)
Dept: SMOKING CESSATION | Facility: CLINIC | Age: 74
End: 2024-08-07
Payer: COMMERCIAL

## 2024-08-07 DIAGNOSIS — F17.200 NICOTINE DEPENDENCE: Primary | ICD-10-CM

## 2024-08-07 LAB — STRONGYLOIDES ANTIBODY IGG: NEGATIVE

## 2024-08-07 PROCEDURE — 99407 BEHAV CHNG SMOKING > 10 MIN: CPT | Mod: S$GLB,,, | Performed by: GENERAL PRACTICE

## 2024-08-09 PROBLEM — H05.111: Status: ACTIVE | Noted: 2024-08-09

## 2024-08-14 ENCOUNTER — LAB VISIT (OUTPATIENT)
Dept: LAB | Facility: HOSPITAL | Age: 74
End: 2024-08-14
Attending: INTERNAL MEDICINE
Payer: MEDICARE

## 2024-08-14 ENCOUNTER — PATIENT MESSAGE (OUTPATIENT)
Dept: INTERNAL MEDICINE | Facility: CLINIC | Age: 74
End: 2024-08-14
Payer: MEDICARE

## 2024-08-14 DIAGNOSIS — Z87.39 HISTORY OF GOUT: ICD-10-CM

## 2024-08-14 DIAGNOSIS — E11.8 DM (DIABETES MELLITUS) WITH COMPLICATIONS: ICD-10-CM

## 2024-08-14 DIAGNOSIS — I50.42 CHRONIC COMBINED SYSTOLIC AND DIASTOLIC CONGESTIVE HEART FAILURE: Chronic | ICD-10-CM

## 2024-08-14 LAB
ANION GAP SERPL CALC-SCNC: 9 MMOL/L (ref 8–16)
BNP SERPL-MCNC: 178 PG/ML (ref 0–99)
BUN SERPL-MCNC: 19 MG/DL (ref 8–23)
CALCIUM SERPL-MCNC: 10.6 MG/DL (ref 8.7–10.5)
CHLORIDE SERPL-SCNC: 101 MMOL/L (ref 95–110)
CHOLEST SERPL-MCNC: 138 MG/DL (ref 120–199)
CHOLEST/HDLC SERPL: 3.1 {RATIO} (ref 2–5)
CO2 SERPL-SCNC: 30 MMOL/L (ref 23–29)
CREAT SERPL-MCNC: 1.7 MG/DL (ref 0.5–1.4)
EST. GFR  (NO RACE VARIABLE): 42.3 ML/MIN/1.73 M^2
ESTIMATED AVG GLUCOSE: 114 MG/DL (ref 68–131)
GLUCOSE SERPL-MCNC: 104 MG/DL (ref 70–110)
HBA1C MFR BLD: 5.6 % (ref 4–5.6)
HDLC SERPL-MCNC: 45 MG/DL (ref 40–75)
HDLC SERPL: 32.6 % (ref 20–50)
LDLC SERPL CALC-MCNC: 69.4 MG/DL (ref 63–159)
NONHDLC SERPL-MCNC: 93 MG/DL
POTASSIUM SERPL-SCNC: 6.2 MMOL/L (ref 3.5–5.1)
SODIUM SERPL-SCNC: 140 MMOL/L (ref 136–145)
TRIGL SERPL-MCNC: 118 MG/DL (ref 30–150)
URATE SERPL-MCNC: 4.9 MG/DL (ref 3.4–7)

## 2024-08-14 PROCEDURE — 80048 BASIC METABOLIC PNL TOTAL CA: CPT | Performed by: INTERNAL MEDICINE

## 2024-08-14 PROCEDURE — 83036 HEMOGLOBIN GLYCOSYLATED A1C: CPT | Performed by: INTERNAL MEDICINE

## 2024-08-14 PROCEDURE — 80061 LIPID PANEL: CPT | Performed by: INTERNAL MEDICINE

## 2024-08-14 PROCEDURE — 84550 ASSAY OF BLOOD/URIC ACID: CPT | Performed by: INTERNAL MEDICINE

## 2024-08-14 PROCEDURE — 83880 ASSAY OF NATRIURETIC PEPTIDE: CPT | Performed by: INTERNAL MEDICINE

## 2024-08-14 PROCEDURE — 36415 COLL VENOUS BLD VENIPUNCTURE: CPT | Performed by: INTERNAL MEDICINE

## 2024-08-14 NOTE — TELEPHONE ENCOUNTER
Call pt and tell him his potassium is elevated. He needs to stop his losartan and to avoid high potassium foods, eg OJ/bananas. He can google a list of high potassium foods for complete list.

## 2024-08-14 NOTE — TELEPHONE ENCOUNTER
I called pt on primary number twice to discuss message from MD but pt did not answer on both attempts.

## 2024-08-15 NOTE — TELEPHONE ENCOUNTER
Attempted to call primary number. It went straight to voicemail. LVM for patient to call us back.

## 2024-08-21 ENCOUNTER — HOSPITAL ENCOUNTER (OUTPATIENT)
Dept: RADIOLOGY | Facility: HOSPITAL | Age: 74
Discharge: HOME | End: 2024-08-21
Attending: INTERNAL MEDICINE
Payer: MEDICARE

## 2024-08-21 ENCOUNTER — OFFICE VISIT (OUTPATIENT)
Dept: INTERNAL MEDICINE | Facility: CLINIC | Age: 74
End: 2024-08-21
Payer: MEDICARE

## 2024-08-21 VITALS
HEART RATE: 73 BPM | BODY MASS INDEX: 33.15 KG/M2 | OXYGEN SATURATION: 94 % | DIASTOLIC BLOOD PRESSURE: 76 MMHG | TEMPERATURE: 97 F | WEIGHT: 211.63 LBS | SYSTOLIC BLOOD PRESSURE: 122 MMHG

## 2024-08-21 DIAGNOSIS — J44.9 CHRONIC OBSTRUCTIVE PULMONARY DISEASE, UNSPECIFIED COPD TYPE: ICD-10-CM

## 2024-08-21 DIAGNOSIS — I12.9 HYPERTENSION ASSOCIATED WITH STAGE 3 CHRONIC KIDNEY DISEASE DUE TO TYPE 2 DIABETES MELLITUS: ICD-10-CM

## 2024-08-21 DIAGNOSIS — I50.42 CHRONIC COMBINED SYSTOLIC AND DIASTOLIC CONGESTIVE HEART FAILURE: Chronic | ICD-10-CM

## 2024-08-21 DIAGNOSIS — N18.32 STAGE 3B CHRONIC KIDNEY DISEASE: Chronic | ICD-10-CM

## 2024-08-21 DIAGNOSIS — E78.5 HYPERLIPIDEMIA ASSOCIATED WITH TYPE 2 DIABETES MELLITUS: ICD-10-CM

## 2024-08-21 DIAGNOSIS — E11.69 HYPERLIPIDEMIA ASSOCIATED WITH TYPE 2 DIABETES MELLITUS: ICD-10-CM

## 2024-08-21 DIAGNOSIS — E66.2 MORBID OBESITY WITH ALVEOLAR HYPOVENTILATION: ICD-10-CM

## 2024-08-21 DIAGNOSIS — E11.22 HYPERTENSION ASSOCIATED WITH STAGE 3 CHRONIC KIDNEY DISEASE DUE TO TYPE 2 DIABETES MELLITUS: ICD-10-CM

## 2024-08-21 DIAGNOSIS — I25.118 CORONARY ARTERY DISEASE OF NATIVE ARTERY OF NATIVE HEART WITH STABLE ANGINA PECTORIS: Chronic | ICD-10-CM

## 2024-08-21 DIAGNOSIS — C61 PROSTATE CANCER: ICD-10-CM

## 2024-08-21 DIAGNOSIS — I87.2 CHRONIC VENOUS INSUFFICIENCY: ICD-10-CM

## 2024-08-21 DIAGNOSIS — N25.81 SECONDARY HYPERPARATHYROIDISM OF RENAL ORIGIN: Primary | ICD-10-CM

## 2024-08-21 DIAGNOSIS — M25.539 PAIN IN WRIST, UNSPECIFIED LATERALITY: ICD-10-CM

## 2024-08-21 DIAGNOSIS — F11.20 OPIOID DEPENDENCE, UNCOMPLICATED: ICD-10-CM

## 2024-08-21 DIAGNOSIS — E11.8 DM (DIABETES MELLITUS) WITH COMPLICATIONS: ICD-10-CM

## 2024-08-21 DIAGNOSIS — G47.33 OSA ON CPAP: ICD-10-CM

## 2024-08-21 DIAGNOSIS — N18.30 HYPERTENSION ASSOCIATED WITH STAGE 3 CHRONIC KIDNEY DISEASE DUE TO TYPE 2 DIABETES MELLITUS: ICD-10-CM

## 2024-08-21 PROCEDURE — 3044F HG A1C LEVEL LT 7.0%: CPT | Mod: CPTII,S$GLB,, | Performed by: INTERNAL MEDICINE

## 2024-08-21 PROCEDURE — 3288F FALL RISK ASSESSMENT DOCD: CPT | Mod: CPTII,S$GLB,, | Performed by: INTERNAL MEDICINE

## 2024-08-21 PROCEDURE — 3078F DIAST BP <80 MM HG: CPT | Mod: CPTII,S$GLB,, | Performed by: INTERNAL MEDICINE

## 2024-08-21 PROCEDURE — 99999 PR PBB SHADOW E&M-EST. PATIENT-LVL V: CPT | Mod: PBBFAC,,, | Performed by: INTERNAL MEDICINE

## 2024-08-21 PROCEDURE — 1100F PTFALLS ASSESS-DOCD GE2>/YR: CPT | Mod: CPTII,S$GLB,, | Performed by: INTERNAL MEDICINE

## 2024-08-21 PROCEDURE — 99214 OFFICE O/P EST MOD 30 MIN: CPT | Mod: S$GLB,,, | Performed by: INTERNAL MEDICINE

## 2024-08-21 PROCEDURE — 1160F RVW MEDS BY RX/DR IN RCRD: CPT | Mod: CPTII,S$GLB,, | Performed by: INTERNAL MEDICINE

## 2024-08-21 PROCEDURE — 4010F ACE/ARB THERAPY RXD/TAKEN: CPT | Mod: CPTII,S$GLB,, | Performed by: INTERNAL MEDICINE

## 2024-08-21 PROCEDURE — 3074F SYST BP LT 130 MM HG: CPT | Mod: CPTII,S$GLB,, | Performed by: INTERNAL MEDICINE

## 2024-08-21 PROCEDURE — G2211 COMPLEX E/M VISIT ADD ON: HCPCS | Mod: S$GLB,,, | Performed by: INTERNAL MEDICINE

## 2024-08-21 PROCEDURE — 3062F POS MACROALBUMINURIA REV: CPT | Mod: CPTII,S$GLB,, | Performed by: INTERNAL MEDICINE

## 2024-08-21 PROCEDURE — 1125F AMNT PAIN NOTED PAIN PRSNT: CPT | Mod: CPTII,S$GLB,, | Performed by: INTERNAL MEDICINE

## 2024-08-21 PROCEDURE — 3066F NEPHROPATHY DOC TX: CPT | Mod: CPTII,S$GLB,, | Performed by: INTERNAL MEDICINE

## 2024-08-21 PROCEDURE — 3008F BODY MASS INDEX DOCD: CPT | Mod: CPTII,S$GLB,, | Performed by: INTERNAL MEDICINE

## 2024-08-21 PROCEDURE — 73110 X-RAY EXAM OF WRIST: CPT | Mod: TC,PO,LT

## 2024-08-21 PROCEDURE — 1159F MED LIST DOCD IN RCRD: CPT | Mod: CPTII,S$GLB,, | Performed by: INTERNAL MEDICINE

## 2024-08-21 PROCEDURE — 73110 X-RAY EXAM OF WRIST: CPT | Mod: 26,LT,, | Performed by: RADIOLOGY

## 2024-08-21 NOTE — PROGRESS NOTES
HPI:  Patient is a 73-year-old man who comes in today for follow-up of his diabetes, hypertension, lipids, chronic kidney disease and coronary artery disease.  Patient has had a remarkable improvement in his diabetes.  His A1c has gone from 12.8 down to 5.5 and just 3 months.  He has been very good it his diet.  He has been taking his medications consistently.  His blood pressures been doing excellent.  He denies any hypoglycemic problems.  He denies any chest pains or shortness a breath.  He unfortunately fell last week while coming out of the grocery store and it was raining.  He landed on his left wrist.  It is still quite painful.    Unfortunately, his insurance company denied the Humira to treat his pseudo tumor cerebri.  He is followed by Rheumatology.    Current meds have been verified and updated per the EMR  Exam:/76 (BP Location: Left arm, Patient Position: Sitting, BP Method: Large (Manual))   Pulse 73   Temp 96.9 °F (36.1 °C) (Tympanic)   Wt 96 kg (211 lb 10.3 oz)   SpO2 (!) 94%   BMI 33.15 kg/m²   Carotids 2+ equal without bruits, neck is supple without adenopathy  Chest is clear  Cardiovascular regular rate and rhythm without murmur gallop or rub  The left wrist is slightly swollen in painful to motion.    Lab Results   Component Value Date    WBC 9.64 05/15/2024    HGB 12.6 (L) 05/15/2024    HCT 40.3 05/15/2024     05/15/2024    CHOL 138 08/14/2024    TRIG 118 08/14/2024    HDL 45 08/14/2024    ALT 22 11/13/2023    AST 23 11/13/2023     08/14/2024    K 6.2 (H) 08/14/2024     08/14/2024    CREATININE 1.7 (H) 08/14/2024    BUN 19 08/14/2024    CO2 30 (H) 08/14/2024    TSH 1.212 05/15/2024    PSA 0.15 02/01/2021    INR 1.0 06/26/2020    HGBA1C 5.6 08/14/2024    PSADIAG 0.89 05/15/2024     (H) 08/14/2024    URICACID 4.9 08/14/2024    SEDRATE 38 (H) 03/11/2022    CRP 10.3 (H) 03/11/2022   X-rays of the left wrist showed no signs of fracture    Impression:  Diabetes,  markedly improved controlled  Hyperkalemia, patient stopped his losartan 3 days ago.  We will need to reassess.  Chronic kidney disease, improved GFR  Hyperlipidemia, much better controlled  Left wrist pain, soft tissue injury  Coronary artery disease, asymptomatic  Congestive heart failure, asymptomatic, stable on current meds, followed by Cardiology  Pseudotumor cerebri, followed by Rheumatology  Patient Active Problem List   Diagnosis    BRADLEY on CPAP    History of gout    DDD (degenerative disc disease), lumbar    Nicotine dependence    Hypertension associated with stage 3 chronic kidney disease due to type 2 diabetes mellitus    Hyperlipidemia associated with type 2 diabetes mellitus    Chronic kidney disease, stage 3    Class 2 severe obesity due to excess calories with serious comorbidity and body mass index (BMI) of 35.0 to 35.9 in adult    Coronary artery disease of native artery of native heart with stable angina pectoris    Chronic combined systolic and diastolic congestive heart failure    Prostate cancer    PVD (peripheral vascular disease)    NICM (nonischemic cardiomyopathy)    Closed fracture of tuft of distal phalanx of finger    Chronic venous insufficiency    Third nerve palsy, right    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  He will have a BMP done in 1 week.  The other lab work be done in 3 months.  He will see me in 3 months.    This note is generated  with speech recognition software and is subject to transcription error and sound alike phrases that may be missed by proofreading.

## 2024-08-27 ENCOUNTER — CLINICAL SUPPORT (OUTPATIENT)
Dept: SMOKING CESSATION | Facility: CLINIC | Age: 74
End: 2024-08-27
Payer: COMMERCIAL

## 2024-08-27 DIAGNOSIS — F17.200 NICOTINE DEPENDENCE: Primary | ICD-10-CM

## 2024-08-27 PROCEDURE — 99999 PR PBB SHADOW E&M-EST. PATIENT-LVL II: CPT | Mod: PBBFAC,,, | Performed by: SPEECH-LANGUAGE PATHOLOGIST

## 2024-08-27 PROCEDURE — 99404 PREV MED CNSL INDIV APPRX 60: CPT | Mod: S$GLB,,, | Performed by: SPEECH-LANGUAGE PATHOLOGIST

## 2024-08-27 RX ORDER — VARENICLINE TARTRATE 1 MG/1
TABLET, FILM COATED ORAL
Qty: 56 TABLET | Refills: 0 | Status: SHIPPED | OUTPATIENT
Start: 2024-08-27

## 2024-08-27 NOTE — PROGRESS NOTES
"Individual Follow-Up Form    8/27/2024    Quit Date: 9/15/2024     Clinical Status of Patient: Outpatient     Length of Service: 60 minutes     Continuing Medication: yes  Chantix (1 pill per day)     Other Medications:                  Target Symptoms: Withdrawal and medication side effects. The following were   rated moderate (3) to severe (4) on TCRS:  Moderate (3):   Severe (4):      Comments: Patient seen in clinic. He reports he has remained smoke free since his last session & his 9/15/2023 quit date. He reports he has continued to take Varenicline 1 mg once daily & wants 1 additional refill to have "just in case". No negative side effects or mood changes reported. Assessed CO. CO 3.  He reports his dr took him off of Losartan medication & that his recent lab work showed his potassium was elevated. He reports other than that, things are going well. He reports his stress level is the same as he deals with his family dynamics & living with them. Patient reports he has saved over $6,000 since his quit. Explored the patient's attachments & concerns about not having access to Varenicline. Re-educated patient on the impact of the cognitive behavioral changes & decision making that he has made which has contributed to his success with his quit & maintaining his quit. Patient indicates he is aware the the Varenicline is not in control of him & that it's role was to assist with navigating withdrawal in the initial stage of the quit. He reports his fear is that when he doesn't have access to it that the moment of need may occur. Patient reports he is looking forward to his smoke free birthday on 9/14/2023 which will melvin his 1 year quit anniversary. Goal is to remain smoke free for a smoke free birthday. Follow up set for 9/30/2024 at 1:00 pm.       Diagnosis: F17.200    Next Visit: 5 weeks    "

## 2024-08-28 ENCOUNTER — LAB VISIT (OUTPATIENT)
Dept: LAB | Facility: HOSPITAL | Age: 74
End: 2024-08-28
Attending: INTERNAL MEDICINE
Payer: MEDICARE

## 2024-08-28 DIAGNOSIS — E11.8 DM (DIABETES MELLITUS) WITH COMPLICATIONS: ICD-10-CM

## 2024-08-28 LAB
ANION GAP SERPL CALC-SCNC: 9 MMOL/L (ref 8–16)
BUN SERPL-MCNC: 14 MG/DL (ref 8–23)
CALCIUM SERPL-MCNC: 9.6 MG/DL (ref 8.7–10.5)
CHLORIDE SERPL-SCNC: 102 MMOL/L (ref 95–110)
CO2 SERPL-SCNC: 28 MMOL/L (ref 23–29)
CREAT SERPL-MCNC: 1.6 MG/DL (ref 0.5–1.4)
EST. GFR  (NO RACE VARIABLE): 45.5 ML/MIN/1.73 M^2
GLUCOSE SERPL-MCNC: 110 MG/DL (ref 70–110)
POTASSIUM SERPL-SCNC: 5.2 MMOL/L (ref 3.5–5.1)
SODIUM SERPL-SCNC: 139 MMOL/L (ref 136–145)

## 2024-08-28 PROCEDURE — 80048 BASIC METABOLIC PNL TOTAL CA: CPT | Performed by: INTERNAL MEDICINE

## 2024-08-28 PROCEDURE — 36415 COLL VENOUS BLD VENIPUNCTURE: CPT | Mod: PO | Performed by: INTERNAL MEDICINE

## 2024-08-29 ENCOUNTER — PATIENT MESSAGE (OUTPATIENT)
Dept: INTERNAL MEDICINE | Facility: CLINIC | Age: 74
End: 2024-08-29
Payer: MEDICARE

## 2024-09-05 ENCOUNTER — OFFICE VISIT (OUTPATIENT)
Dept: CARDIOLOGY | Facility: CLINIC | Age: 74
End: 2024-09-05
Payer: MEDICARE

## 2024-09-05 ENCOUNTER — TELEPHONE (OUTPATIENT)
Dept: RHEUMATOLOGY | Facility: CLINIC | Age: 74
End: 2024-09-05
Payer: MEDICARE

## 2024-09-05 VITALS
OXYGEN SATURATION: 96 % | DIASTOLIC BLOOD PRESSURE: 68 MMHG | HEART RATE: 80 BPM | HEIGHT: 67 IN | WEIGHT: 208.31 LBS | BODY MASS INDEX: 32.7 KG/M2 | SYSTOLIC BLOOD PRESSURE: 114 MMHG

## 2024-09-05 DIAGNOSIS — I73.9 PVD (PERIPHERAL VASCULAR DISEASE): ICD-10-CM

## 2024-09-05 DIAGNOSIS — E11.22 HYPERTENSION ASSOCIATED WITH STAGE 3 CHRONIC KIDNEY DISEASE DUE TO TYPE 2 DIABETES MELLITUS: ICD-10-CM

## 2024-09-05 DIAGNOSIS — J44.9 CHRONIC OBSTRUCTIVE PULMONARY DISEASE, UNSPECIFIED COPD TYPE: ICD-10-CM

## 2024-09-05 DIAGNOSIS — I12.9 HYPERTENSION ASSOCIATED WITH STAGE 3 CHRONIC KIDNEY DISEASE DUE TO TYPE 2 DIABETES MELLITUS: ICD-10-CM

## 2024-09-05 DIAGNOSIS — I50.42 CHRONIC COMBINED SYSTOLIC AND DIASTOLIC CONGESTIVE HEART FAILURE: Primary | Chronic | ICD-10-CM

## 2024-09-05 DIAGNOSIS — I42.8 NICM (NONISCHEMIC CARDIOMYOPATHY): ICD-10-CM

## 2024-09-05 DIAGNOSIS — N18.32 STAGE 3B CHRONIC KIDNEY DISEASE: Chronic | ICD-10-CM

## 2024-09-05 DIAGNOSIS — I27.20 PULMONARY HTN: ICD-10-CM

## 2024-09-05 DIAGNOSIS — E11.69 HYPERLIPIDEMIA ASSOCIATED WITH TYPE 2 DIABETES MELLITUS: ICD-10-CM

## 2024-09-05 DIAGNOSIS — E11.8 DM (DIABETES MELLITUS) WITH COMPLICATIONS: ICD-10-CM

## 2024-09-05 DIAGNOSIS — I87.2 CHRONIC VENOUS INSUFFICIENCY: ICD-10-CM

## 2024-09-05 DIAGNOSIS — I25.118 CORONARY ARTERY DISEASE OF NATIVE ARTERY OF NATIVE HEART WITH STABLE ANGINA PECTORIS: Chronic | ICD-10-CM

## 2024-09-05 DIAGNOSIS — E66.01 CLASS 2 SEVERE OBESITY DUE TO EXCESS CALORIES WITH SERIOUS COMORBIDITY AND BODY MASS INDEX (BMI) OF 35.0 TO 35.9 IN ADULT: ICD-10-CM

## 2024-09-05 DIAGNOSIS — N18.30 HYPERTENSION ASSOCIATED WITH STAGE 3 CHRONIC KIDNEY DISEASE DUE TO TYPE 2 DIABETES MELLITUS: ICD-10-CM

## 2024-09-05 DIAGNOSIS — E78.5 HYPERLIPIDEMIA ASSOCIATED WITH TYPE 2 DIABETES MELLITUS: ICD-10-CM

## 2024-09-05 PROCEDURE — 1101F PT FALLS ASSESS-DOCD LE1/YR: CPT | Mod: CPTII,S$GLB,, | Performed by: INTERNAL MEDICINE

## 2024-09-05 PROCEDURE — 3008F BODY MASS INDEX DOCD: CPT | Mod: CPTII,S$GLB,, | Performed by: INTERNAL MEDICINE

## 2024-09-05 PROCEDURE — 1160F RVW MEDS BY RX/DR IN RCRD: CPT | Mod: CPTII,S$GLB,, | Performed by: INTERNAL MEDICINE

## 2024-09-05 PROCEDURE — 1126F AMNT PAIN NOTED NONE PRSNT: CPT | Mod: CPTII,S$GLB,, | Performed by: INTERNAL MEDICINE

## 2024-09-05 PROCEDURE — 3066F NEPHROPATHY DOC TX: CPT | Mod: CPTII,S$GLB,, | Performed by: INTERNAL MEDICINE

## 2024-09-05 PROCEDURE — 3074F SYST BP LT 130 MM HG: CPT | Mod: CPTII,S$GLB,, | Performed by: INTERNAL MEDICINE

## 2024-09-05 PROCEDURE — 3044F HG A1C LEVEL LT 7.0%: CPT | Mod: CPTII,S$GLB,, | Performed by: INTERNAL MEDICINE

## 2024-09-05 PROCEDURE — 4010F ACE/ARB THERAPY RXD/TAKEN: CPT | Mod: CPTII,S$GLB,, | Performed by: INTERNAL MEDICINE

## 2024-09-05 PROCEDURE — 99214 OFFICE O/P EST MOD 30 MIN: CPT | Mod: S$GLB,,, | Performed by: INTERNAL MEDICINE

## 2024-09-05 PROCEDURE — 99999 PR PBB SHADOW E&M-EST. PATIENT-LVL V: CPT | Mod: PBBFAC,,, | Performed by: INTERNAL MEDICINE

## 2024-09-05 PROCEDURE — 1159F MED LIST DOCD IN RCRD: CPT | Mod: CPTII,S$GLB,, | Performed by: INTERNAL MEDICINE

## 2024-09-05 PROCEDURE — 3078F DIAST BP <80 MM HG: CPT | Mod: CPTII,S$GLB,, | Performed by: INTERNAL MEDICINE

## 2024-09-05 PROCEDURE — 3062F POS MACROALBUMINURIA REV: CPT | Mod: CPTII,S$GLB,, | Performed by: INTERNAL MEDICINE

## 2024-09-05 PROCEDURE — 3288F FALL RISK ASSESSMENT DOCD: CPT | Mod: CPTII,S$GLB,, | Performed by: INTERNAL MEDICINE

## 2024-09-05 RX ORDER — TORSEMIDE 20 MG/1
40 TABLET ORAL DAILY
Qty: 180 TABLET | Refills: 2 | Status: SHIPPED | OUTPATIENT
Start: 2024-09-05

## 2024-09-05 NOTE — PROGRESS NOTES
Subjective:   Patient ID:  Santiago Khan is a 73 y.o. male who presents for follow up of Shortness of Breath      72 yo male, came in for 6 m f/u  PMH CAD s/p LHC in  D1 70%, no PCi, CHFmrEF 45% to 50%, normal LVEDP, DM 4 yrs, pulm HTN life long smoker, quit in ,  HTN, HLD, CKD III, prostates Ca s/p TURP in 2011, no h/o chemo RX and XRT. Gout. No h/o stroke and heart attack. Social drinker.  Remote LHC showed miminal blockage by Dr. Mayra CHAPPELL,    admitted for [facility] due to chest tightness. Had low K and Mg. Troponin x2 negative and EKG showed NSR, RBBB, and LVH. Echo showed EF 45% global HK and moderate MR. Had K and Mg supplement.  States that gained weight for 30 pounds since 3/202 after held Metolazone. Even he was taking Bumex 1.5 mg bid. In  BNP 15 and Cr 1.4  Still has GOVEA. No orthopnea, sleeps with CPAP. No chest pain, faint  NUKE stress done in  showed inferior ischemia with scar. EF 45%  LHC done in  D1 70% no PCI. Normal filling pressure  No chest pain . Left radial access ok  SOB, weight gain  Sleeps on 1 pillow. No PND  Taking Bumex 2 mg bid and DIamox   Pt c/o weight gain 30 pounds after held his metolazone in the past 4 months  C/o abd distanesion SOB.     11/23/2021 visit  SOB for few months, positive orthopnea. Sleeps on CPAP. + leg swelling and left leg pain and redness  The last seen was   On Bumex 2 mg bid. Held metolazone. Quit smoking  H/o prostate cancer and PSA recurrently elevated. CXR in  negative     12/2021   Leg swelling and erythema. At last visit, D/c bumex and added torsemide 40 mg bid. Good urination. BNP negative and Cr up to 1.6. Decreased torsemide to 20 mg bid  Still leg swelling and SOB. Quit smoking 9 months ago.      visit  Resumed metolazone 3 times a week about 2 weeks ago and BMP done two days ago showed elevated Cr.   Felt neck tightness and improved breathing after the head tilts back and  stretched up. Not f/u with pulm yet  On compresion socks.   Still SOB. Serial BNPs wnl and h/o LHC showed normal filling pressure suggested CHF controlled     visit  Quit smoking 1 yr and on breathing rx  No chest pain dizziness and faint. BP controlled  BNP < 10. Cr 1.7    echo  · Mild concentric left ventricular hypertrophy.  · Mildly decreased left ventricular systolic function. The estimated ejection fraction is 45%.  · Grade I (mild) left ventricular diastolic dysfunction consistent with impaired relaxation.  · Low normal right ventricular systolic function.  · Moderate mitral regurgitation.  · Normal central venous pressure (3 mmHg).  · The estimated PA systolic pressure is 23 mmHg.     visit  H/o 6MWT in : 250 meters, and peal VO 11 calculated  Cane dependent due to lower back pain  Resumed smoking. On breathing tx and f/u with Dr. Escudero  On torsemide 40 mg bid for PVD    10/23 visit  Quit smoking again and improved breathing, not like CPAP. F/u at pulm service. Inhaler helped for tightness  No PND chest pain palpitation dizziness. Remains mild leg swelling    04/24 visit  Weight stable. Quit smoking, chronic SOB. On cpap.   Leg swelling chronic  EKG reviewed by myself today reveals NSR nonspecific STT change, RBBB LAFB LVH PACs    Interval history  In 05/24 decreased torsemide from 40 mg bid to 20 mg daily due to elevation of Cr to 2.5   Repeat Cr in 08/24, improved to 1.6 BNP. Slightly elevated to 178  C/o leg swelling  SOB is the same and on breathing Rx. No orthpnea   Weight loss 15 lbs in 6 m   Quit smoking for a yr                         Past Medical History:   Diagnosis Date    Chronic combined systolic and diastolic congestive heart failure 07/09/2020    Chronic kidney disease, stage 3     Chronic obstructive pulmonary disease 03/20/2023    Wixela 250 mcg, Spiriva respimat. Continue Albuterol inhaler and albuterol nebs   Referral pul dis management, education and  assistance with medication  Patient preference to only see a doctor, request follow up with Dr. Springer             Chronic venous insufficiency     DDD (degenerative disc disease), lumbar     DM (diabetes mellitus) with complications     History of gout     Hyperlipidemia associated with type 2 diabetes mellitus     Hypertension associated with stage 3 chronic kidney disease due to type 2 diabetes mellitus     BRADLEY on CPAP     Prostate cancer     prostatectomy in , rising PSA that started in        Past Surgical History:   Procedure Laterality Date    BICEPS TENDON REPAIR      COLONOSCOPY N/A 2019    Procedure: COLONOSCOPY;  Surgeon: James Roger MD;  Location: [facility] ENDO;  Service: Endoscopy;  Laterality: N/A;    EXPLORATION OF ORBIT Right 2023    Procedure: EXPLORATION, ORBIT;  Surgeon: Junaid Bartholomew MD;  Location: [facility] OR;  Service: ENT;  Laterality: Right;  possibly need frozen    HEMORRHOID SURGERY      INGUINAL HERNIA REPAIR Left     KNEE ARTHROSCOPY Right     LEFT HEART CATHETERIZATION Left 2020    Procedure: CATHETERIZATION, HEART, LEFT;  Surgeon: Cheli Wilson MD;  Location: [facility] CATH LAB;  Service: Cardiology;  Laterality: Left;    LUMBAR LAMINECTOMY      PROSTATECTOMY      TONSILLECTOMY         Social History     Tobacco Use    Smoking status: Former     Current packs/day: 0.00     Average packs/day: 1.5 packs/day for 52.7 years (79.1 ttl pk-yrs)     Types: Cigarettes     Start date: 1971     Quit date: 9/15/2023     Years since quittin.9    Smokeless tobacco: Former   Substance Use Topics    Alcohol use: Not Currently    Drug use: Never       Family History   Problem Relation Name Age of Onset    Prostate cancer Father      Coronary artery disease Father  90    Alzheimer's disease Father      Hyperlipidemia Mother           ROS    Objective:   Physical Exam  HENT:      Head: Normocephalic.   Eyes:      Pupils: Pupils are equal, round, and reactive to light.   Neck:       Thyroid: No thyromegaly.      Vascular: Normal carotid pulses. No carotid bruit or JVD.   Cardiovascular:      Rate and Rhythm: Normal rate and regular rhythm. No extrasystoles are present.     Chest Wall: PMI is not displaced.      Pulses: Normal pulses.      Heart sounds: Normal heart sounds. No murmur heard.     No gallop. No S3 sounds.   Pulmonary:      Effort: No respiratory distress.      Breath sounds: Normal breath sounds. No stridor.   Abdominal:      General: Bowel sounds are normal.      Palpations: Abdomen is soft.      Tenderness: There is no abdominal tenderness. There is no rebound.   Musculoskeletal:         General: Swelling present. Normal range of motion.   Skin:     Findings: No rash.   Neurological:      Mental Status: He is alert and oriented to person, place, and time.   Psychiatric:         Behavior: Behavior normal.         Lab Results   Component Value Date    CHOL 138 08/14/2024    CHOL 144 05/15/2024    CHOL 142 11/13/2023     Lab Results   Component Value Date    HDL 45 08/14/2024    HDL 54 05/15/2024    HDL 48 11/13/2023     Lab Results   Component Value Date    LDLCALC 69.4 08/14/2024    LDLCALC 63.6 05/15/2024    LDLCALC 72.6 11/13/2023     Lab Results   Component Value Date    TRIG 118 08/14/2024    TRIG 132 05/15/2024    TRIG 107 11/13/2023     Lab Results   Component Value Date    CHOLHDL 32.6 08/14/2024    CHOLHDL 37.5 05/15/2024    CHOLHDL 33.8 11/13/2023       Chemistry        Component Value Date/Time     08/28/2024 1100    K 5.2 (H) 08/28/2024 1100     08/28/2024 1100    CO2 28 08/28/2024 1100    BUN 14 08/28/2024 1100    CREATININE 1.6 (H) 08/28/2024 1100     08/28/2024 1100        Component Value Date/Time    CALCIUM 9.6 08/28/2024 1100    ALKPHOS 77 11/13/2023 0732    AST 23 11/13/2023 0732    ALT 22 11/13/2023 0732    BILITOT 0.6 11/13/2023 0732    ESTGFRAFRICA 43 (A) 07/18/2022 0941    EGFRNONAA 37 (A) 07/18/2022 0941          Lab Results    Component Value Date    HGBA1C 5.6 08/14/2024     Lab Results   Component Value Date    TSH 1.212 05/15/2024     Lab Results   Component Value Date    INR 1.0 06/26/2020    INR 1.0 03/10/2020     Lab Results   Component Value Date    WBC 9.64 05/15/2024    HGB 12.6 (L) 05/15/2024    HCT 40.3 05/15/2024     (H) 05/15/2024     05/15/2024     BMP  Sodium   Date Value Ref Range Status   08/28/2024 139 136 - 145 mmol/L Final     Potassium   Date Value Ref Range Status   08/28/2024 5.2 (H) 3.5 - 5.1 mmol/L Final     Chloride   Date Value Ref Range Status   08/28/2024 102 95 - 110 mmol/L Final     CO2   Date Value Ref Range Status   08/28/2024 28 23 - 29 mmol/L Final     BUN   Date Value Ref Range Status   08/28/2024 14 8 - 23 mg/dL Final     Creatinine   Date Value Ref Range Status   08/28/2024 1.6 (H) 0.5 - 1.4 mg/dL Final     Calcium   Date Value Ref Range Status   08/28/2024 9.6 8.7 - 10.5 mg/dL Final     Anion Gap   Date Value Ref Range Status   08/28/2024 9 8 - 16 mmol/L Final     eGFR if    Date Value Ref Range Status   07/18/2022 43 (A) >60 mL/min/1.73 m^2 Final     eGFR if non    Date Value Ref Range Status   07/18/2022 37 (A) >60 mL/min/1.73 m^2 Final     Comment:     Calculation used to obtain the estimated glomerular filtration  rate (eGFR) is the CKD-EPI equation.        BNP  @LABRCNTIP(BNP,BNPTRIAGEBLO)@  @LABRCNTIP(troponini)@  CrCl cannot be calculated (Patient's most recent lab result is older than the maximum 7 days allowed.).  No results found in the last 24 hours.  No results found in the last 24 hours.  No results found in the last 24 hours.    Assessment:      1. Chronic combined systolic and diastolic congestive heart failure    2. Chronic obstructive pulmonary disease, unspecified COPD type    3. Chronic venous insufficiency    4. Coronary artery disease of native artery of native heart with stable angina pectoris    5. Hyperlipidemia associated with  type 2 diabetes mellitus    6. Hypertension associated with stage 3 chronic kidney disease due to type 2 diabetes mellitus    7. NICM (nonischemic cardiomyopathy)    8. Pulmonary HTN    9. PVD (peripheral vascular disease)    10. Stage 3b chronic kidney disease    11. Class 2 severe obesity due to excess calories with serious comorbidity and body mass index (BMI) of 35.0 to 35.9 in adult    12. DM (diabetes mellitus) with complications      Chf compensated after weight control  PVD    Plan:   Increase torsemide to 40 mg prn daily for PVD  Continue asa bisoprolol jardiance and statin  Add ARB once kidney fuinction stable  Continue weight loss  Daily weight. Please call the office if gain 3 pounds in 1 day or 5 pounds in 1 week.  Fluid restriction 1.5 liters a day  Na< 2 gm  RTC in 6m

## 2024-09-05 NOTE — TELEPHONE ENCOUNTER
Need to establish care with one of the remaining rheumatologists asap. Routing to team so that we can establish provider for Humira PAP enrollment.

## 2024-09-09 ENCOUNTER — TELEPHONE (OUTPATIENT)
Dept: RHEUMATOLOGY | Facility: CLINIC | Age: 74
End: 2024-09-09
Payer: MEDICARE

## 2024-09-09 ENCOUNTER — PATIENT MESSAGE (OUTPATIENT)
Dept: RHEUMATOLOGY | Facility: CLINIC | Age: 74
End: 2024-09-09
Payer: MEDICARE

## 2024-09-09 NOTE — TELEPHONE ENCOUNTER
----- Message from Lulu Benitez sent at 9/9/2024  1:07 PM CDT -----  Contact: Jd Castellanos is calling in regards to him needing an apt in November or December.please call back at  [phone]        Thanks  J

## 2024-09-10 ENCOUNTER — CLINICAL SUPPORT (OUTPATIENT)
Dept: PULMONOLOGY | Facility: CLINIC | Age: 74
End: 2024-09-10
Payer: MEDICARE

## 2024-09-10 VITALS — WEIGHT: 214.5 LBS | RESPIRATION RATE: 16 BRPM | HEIGHT: 67 IN | BODY MASS INDEX: 33.67 KG/M2

## 2024-09-10 DIAGNOSIS — J44.9 CHRONIC OBSTRUCTIVE PULMONARY DISEASE, UNSPECIFIED COPD TYPE: Primary | ICD-10-CM

## 2024-09-10 PROCEDURE — 99999 PR PBB SHADOW E&M-EST. PATIENT-LVL III: CPT | Mod: PBBFAC,,,

## 2024-09-10 NOTE — PROGRESS NOTES
Pulmonary Disease Management  [facility]  Final Follow up Visit  Chronic Care Management    Santiago Khan  was seen 9/10/2024  1:30 PM in the Pulmonary Disease Management Clinic for evaluation, education, reinforcement of self management techniques and exacerbation action plan.    Chuck Dumont    Past Medical History:   Diagnosis Date    Chronic combined systolic and diastolic congestive heart failure 07/09/2020    Chronic kidney disease, stage 3     Chronic obstructive pulmonary disease 03/20/2023    Wixela 250 mcg, Spiriva respimat. Continue Albuterol inhaler and albuterol nebs   Referral pul dis management, education and assistance with medication  Patient preference to only see a doctor, request follow up with Dr. Springer             Chronic venous insufficiency     DDD (degenerative disc disease), lumbar     DM (diabetes mellitus) with complications     History of gout     Hyperlipidemia associated with type 2 diabetes mellitus     Hypertension associated with stage 3 chronic kidney disease due to type 2 diabetes mellitus     BRADLEY on CPAP     Prostate cancer     prostatectomy in 2010, rising PSA that started in 2021       Patient's Medications   New Prescriptions    No medications on file   Previous Medications    ADALIMUMAB (HUMIRA,CF, PEN) 40 MG/0.4 ML PNKT    Inject 0.4 mLs (40 mg total) into the skin every 14 (fourteen) days.    ALBUTEROL (PROVENTIL/VENTOLIN HFA) 90 MCG/ACTUATION INHALER    Inhale 2 puffs into the lungs every 4 (four) hours as needed. Rescue    ALLOPURINOL (ZYLOPRIM) 100 MG TABLET    TAKE 1 TABLET(100 MG) BY MOUTH EVERY DAY    ALLOPURINOL (ZYLOPRIM) 300 MG TABLET    TAKE 1 TABLET(300 MG) BY MOUTH EVERY DAY    ASPIRIN (ECOTRIN) 81 MG EC TABLET    Take 81 mg by mouth once daily.    BACLOFEN (LIORESAL) 10 MG TABLET    Take 1 tablet by mouth every evening.    BISOPROLOL (ZEBETA) 5 MG TABLET    TAKE 1/2 TABLET BY MOUTH EVERY DAY    BLOOD SUGAR DIAGNOSTIC STRP    Check blood  glucose twice per day    BLOOD-GLUCOSE METER KIT    Use as instructed    COLCHICINE (COLCRYS) 0.6 MG TABLET    TAKE 1 TABLET(0.6 MG) BY MOUTH DAILY AS NEEDED    EMPAGLIFLOZIN (JARDIANCE) 10 MG TABLET    Take 1 tablet (10 mg total) by mouth once daily.    FLUTICASONE PROPIONATE (FLONASE) 50 MCG/ACTUATION NASAL SPRAY    SHAKE LIQUID AND USE 2 SPRAYS(100 MCG) IN EACH NOSTRIL EVERY DAY Strength: 50 mcg/actuation    GLIMEPIRIDE (AMARYL) 4 MG TABLET    Take 1 tablet (4 mg total) by mouth before breakfast.    GUAIFENESIN 100 MG/5 ML (ROBITUSSIN) 100 MG/5 ML SYRUP    Take 200 mg by mouth every evening.    HYDROCODONE-ACETAMINOPHEN (NORCO) 7.5-325 MG PER TABLET    Take 1 tablet by mouth every 4 (four) hours as needed (for chronic pain).    LANCETS (ACCU-CHEK SOFTCLIX LANCETS) MISC    Check BG daily    LATANOPROST 0.005 % OPHTHALMIC SOLUTION    Place 1 drop into the right eye every evening.    MAGNESIUM OXIDE (MAG-OX) 400 MG (241.3 MG MAGNESIUM) TABLET    TAKE 2 TABLETS(800 MG) BY MOUTH TWICE DAILY    METFORMIN (GLUCOPHAGE) 500 MG TABLET    TAKE 1 TABLET(500 MG) BY MOUTH DAILY WITH BREAKFAST    MULTIVITAMIN-MINERALS-LUTEIN TAB    Take 1 tablet by mouth Daily.    OMEPRAZOLE (PRILOSEC) 40 MG CAPSULE    TAKE 1 CAPSULE BY MOUTH EVERY DAY    POTASSIUM CHLORIDE SA (K-DUR,KLOR-CON) 20 MEQ TABLET    TAKE 3 TABLETS BY MOUTH TWICE DAILY    SIMVASTATIN (ZOCOR) 40 MG TABLET    TAKE 1 TABLET(40 MG) BY MOUTH EVERY EVENING    TORSEMIDE (DEMADEX) 20 MG TAB    Take 2 tablets (40 mg total) by mouth once daily.    VARENICLINE (CHANTIX) 1 MG TAB    take 1 tablet by mouth twice daily. Take with full glass of water & with food to avoid nausea    WIXELA INHUB 250-50 MCG/DOSE DISKUS INHALER    Inhale 1 puff into the lungs 2 (two) times daily.   Modified Medications    No medications on file   Discontinued Medications    No medications on file             Educational assessment:   [x]            Good  []            Fair  []             Poor    Readiness to learn:   [x]            Good  []            Fair  []            Poor    Vision Status:   [x]            Good  []            Fair  []            Poor    Reading Ability:  [x]            Good  []            Fair  []            Poor    Knowledge of condition:   [x]            Good  []            Fair  []            Poor    Language Barriers:   []            Good  []            Fair  []            Poor  [x]            None    Cognitive/ Physical Barriers:   []            Good  []            Fair  []            Poor  [x]            None    Learning best by:                       [x]            Seeing  []            Hearing  []            Reading                         [x]            Doing    Describe any barrier /Limitation or financial implications of care choices identified     []            Financial  []            Emotional  []            Education  []            Vision/Hearing  []            Physical  [x]            None  []                TOPIC /CONTENT FOR TODAY:    [x]            MDI with or without spacer  [x]            Dry powder inhaler  []            Acapella   []           Peak Flow meter  [x]            COPD action plan  [x]            Nebulizer use  []            Oxygen use safety  [x]            Breathing and cough techniques  [x]            Energy conservation  [x]            Infection prevention  [x]           CPAP use        Learner:    [x]            Patient   []            Caregiver    Method:    [x]            Verbal explanation  [x]            Audio visual    [x]            Literature  [x]            Teach back      Evaluation:    [x]            Teach back  [x]            Demonstrate  [x]            Follow up phone call    []            2 weeks     [x]            4 weeks   [x]            PRN    Additional comments:   Patient was seen today to review respiratory medication purpose and proper technique for use of inhalers. Patient practiced proper technique using MDI with  valved holding chamber (spacer) and DPI inhalers. Patient demonstrated understanding. Literature was given to patient.    Discussed therapeutic goals for positive airway pressure therapy(CPAP or BiPAP): Ideal is usage 100% of nights for 6 - 8 hours per night. Minimum usage is 70% of night for at least 4 hours per night used. Reviewed CPAP habituation plan with patient. Patient expressed understanding.      Discussed complications of untreated sleep apnea with patient, including but not limited to high blood pressure, heart attack, stroke, obesity, diabetes and worsening heart failure. Patient voiced understanding.      Asthma trigger checklist was verbally reviewed and literature given to patient.     Infection prevention was discussed. Patient was reminded to get influenza and RSV vaccines. Hand hygiene and cleaning of respiratory equipment was also discussed. Patient verbalized understanding.      COPD action plan was reviewed and literature was given to patient. Patient verbalized understanding.     Plan:  Monthly Pulmonary Disease Management Questionnaire  Follow-up PDM appointment as needed   Reinforce education  Meds: Wixela, albuterol   DME Needs: [facility]   Action Plan: COPD   Immunization: Pneumococcal- current, Flu-DUE , Covid 19- DUE  Next Provider Visit: 1/6/25  Next Spirometry/CPFT: 1/6/25  Approximate time spent with patient: 30 mins

## 2024-09-18 ENCOUNTER — TELEPHONE (OUTPATIENT)
Dept: RHEUMATOLOGY | Facility: CLINIC | Age: 74
End: 2024-09-18
Payer: MEDICARE

## 2024-09-27 DIAGNOSIS — K21.9 GASTROESOPHAGEAL REFLUX DISEASE WITHOUT ESOPHAGITIS: ICD-10-CM

## 2024-09-27 RX ORDER — OMEPRAZOLE 40 MG/1
CAPSULE, DELAYED RELEASE ORAL
Qty: 90 CAPSULE | Refills: 3 | Status: SHIPPED | OUTPATIENT
Start: 2024-09-27

## 2024-09-30 ENCOUNTER — CLINICAL SUPPORT (OUTPATIENT)
Dept: SMOKING CESSATION | Facility: CLINIC | Age: 74
End: 2024-09-30
Payer: COMMERCIAL

## 2024-09-30 DIAGNOSIS — F17.200 NICOTINE DEPENDENCE: Primary | ICD-10-CM

## 2024-09-30 PROCEDURE — 99404 PREV MED CNSL INDIV APPRX 60: CPT | Mod: S$GLB,,, | Performed by: SPEECH-LANGUAGE PATHOLOGIST

## 2024-09-30 PROCEDURE — 99999 PR PBB SHADOW E&M-EST. PATIENT-LVL II: CPT | Mod: PBBFAC,,, | Performed by: SPEECH-LANGUAGE PATHOLOGIST

## 2024-09-30 NOTE — PROGRESS NOTES
Individual Follow-Up Form    2024    Quit Date: 9/15/2024     Clinical Status of Patient: Outpatient     Length of Service: 60 minutes     Continuing Medication: no     Other Medications:                  Target Symptoms: Withdrawal and medication side effects. The following were   rated moderate (3) to severe (4) on TCRS:  Moderate (3): none  Severe (4): none     Comments: Patient seen in clinic. He reports he has remained smoke free since his last session & his 9/15/2023 quit date. Patient reports he has been off of the Varenicline for 2-3 weeks now & reports no changes at this time. Patient reports he has seen cardiology & pulmonology since last session & had to increase in his fluid pills to 2 per day due to leg swelling; but that he may increase to 3 per day based on his symptoms. Discussed patient's stress levels which he states are the same due to his living situation with his family dynamics. The patient reports he gets space by going in his room or leaving the home. He states moving is not an option at this time for him. Discussed with patient regarding his benefits will  10/1/2024. Patient requests an extension be requested to ensure he remains smoke free due to the patient's stress with his family dynamic is a risk factor for a slip/relapse for the patient. Patient's goal is to remain smoke free & off Varenicline. Follow up set for 10/28/2024 at 1:00 pm.       Diagnosis: F17.200    Next Visit: 4 weeks

## 2024-10-12 DIAGNOSIS — J44.89 COPD WITH CHRONIC BRONCHITIS AND EMPHYSEMA: ICD-10-CM

## 2024-10-12 DIAGNOSIS — J43.9 COPD WITH CHRONIC BRONCHITIS AND EMPHYSEMA: ICD-10-CM

## 2024-10-14 RX ORDER — ALBUTEROL SULFATE 90 UG/1
2 INHALANT RESPIRATORY (INHALATION) EVERY 4 HOURS PRN
Qty: 25.5 G | Refills: 3 | Status: SHIPPED | OUTPATIENT
Start: 2024-10-14

## 2024-10-27 ENCOUNTER — OFFICE VISIT (OUTPATIENT)
Dept: URGENT CARE | Facility: CLINIC | Age: 74
End: 2024-10-27
Payer: MEDICARE

## 2024-10-27 VITALS
HEART RATE: 97 BPM | OXYGEN SATURATION: 95 % | BODY MASS INDEX: 33.59 KG/M2 | RESPIRATION RATE: 18 BRPM | TEMPERATURE: 98 F | SYSTOLIC BLOOD PRESSURE: 122 MMHG | WEIGHT: 214 LBS | HEIGHT: 67 IN | DIASTOLIC BLOOD PRESSURE: 72 MMHG

## 2024-10-27 DIAGNOSIS — J44.9 CHRONIC OBSTRUCTIVE PULMONARY DISEASE, UNSPECIFIED COPD TYPE: ICD-10-CM

## 2024-10-27 DIAGNOSIS — M10.9 ACUTE GOUT OF RIGHT HAND, UNSPECIFIED CAUSE: Primary | ICD-10-CM

## 2024-10-27 PROCEDURE — 99213 OFFICE O/P EST LOW 20 MIN: CPT | Mod: S$GLB,,, | Performed by: NURSE PRACTITIONER

## 2024-10-27 RX ORDER — COLCHICINE 0.6 MG/1
0.6 TABLET ORAL DAILY PRN
Qty: 30 TABLET | Refills: 0 | Status: SHIPPED | OUTPATIENT
Start: 2024-10-27 | End: 2024-11-26

## 2024-10-27 RX ORDER — PREDNISONE 20 MG/1
20 TABLET ORAL DAILY
Qty: 3 TABLET | Refills: 0 | Status: SHIPPED | OUTPATIENT
Start: 2024-10-27 | End: 2024-10-30

## 2024-10-28 ENCOUNTER — CLINICAL SUPPORT (OUTPATIENT)
Dept: SMOKING CESSATION | Facility: CLINIC | Age: 74
End: 2024-10-28
Payer: COMMERCIAL

## 2024-10-28 DIAGNOSIS — F17.200 NICOTINE DEPENDENCE: Primary | ICD-10-CM

## 2024-10-28 PROCEDURE — 99404 PREV MED CNSL INDIV APPRX 60: CPT | Mod: S$GLB,,, | Performed by: SPEECH-LANGUAGE PATHOLOGIST

## 2024-10-28 PROCEDURE — 99999 PR PBB SHADOW E&M-EST. PATIENT-LVL II: CPT | Mod: PBBFAC,,, | Performed by: SPEECH-LANGUAGE PATHOLOGIST

## 2024-10-28 RX ORDER — FLUTICASONE PROPIONATE AND SALMETEROL 250; 50 UG/1; UG/1
1 POWDER RESPIRATORY (INHALATION) 2 TIMES DAILY
Qty: 60 EACH | Refills: 11 | Status: SHIPPED | OUTPATIENT
Start: 2024-10-28

## 2024-10-29 RX ORDER — BLOOD-GLUCOSE METER
EACH MISCELLANEOUS
Qty: 1 EACH | Refills: 0 | Status: SHIPPED | OUTPATIENT
Start: 2024-10-29

## 2024-11-02 ENCOUNTER — HOSPITAL ENCOUNTER (INPATIENT)
Facility: HOSPITAL | Age: 74
LOS: 9 days | Discharge: HOSPICE | DRG: 871 | End: 2024-11-12
Attending: EMERGENCY MEDICINE | Admitting: HOSPITALIST
Payer: MEDICARE

## 2024-11-02 DIAGNOSIS — R53.1 WEAKNESS: ICD-10-CM

## 2024-11-02 DIAGNOSIS — R09.02 HYPOXEMIA: Primary | ICD-10-CM

## 2024-11-02 DIAGNOSIS — E16.2 HYPOGLYCEMIA: ICD-10-CM

## 2024-11-02 DIAGNOSIS — L03.115 CELLULITIS OF RIGHT LOWER EXTREMITY: ICD-10-CM

## 2024-11-02 DIAGNOSIS — L02.415 ABSCESS OF RIGHT LEG: ICD-10-CM

## 2024-11-02 DIAGNOSIS — J84.9 INTERSTITIAL PNEUMONIA: ICD-10-CM

## 2024-11-02 DIAGNOSIS — R07.9 CHEST PAIN: ICD-10-CM

## 2024-11-02 DIAGNOSIS — N28.9 RENAL DYSFUNCTION: ICD-10-CM

## 2024-11-02 DIAGNOSIS — R79.89 ELEVATED TROPONIN: ICD-10-CM

## 2024-11-02 DIAGNOSIS — D72.829 LEUKOCYTOSIS, UNSPECIFIED TYPE: ICD-10-CM

## 2024-11-02 DIAGNOSIS — I50.9 CHF EXACERBATION: ICD-10-CM

## 2024-11-02 LAB
ALBUMIN SERPL BCP-MCNC: 2.7 G/DL (ref 3.5–5.2)
ALP SERPL-CCNC: 256 U/L (ref 40–150)
ALT SERPL W/O P-5'-P-CCNC: 35 U/L (ref 10–44)
ANION GAP SERPL CALC-SCNC: 12 MMOL/L (ref 8–16)
AST SERPL-CCNC: 23 U/L (ref 10–40)
BASOPHILS # BLD AUTO: 0.05 K/UL (ref 0–0.2)
BASOPHILS NFR BLD: 0.3 % (ref 0–1.9)
BILIRUB SERPL-MCNC: 1.2 MG/DL (ref 0.1–1)
BILIRUB UR QL STRIP: NEGATIVE
BNP SERPL-MCNC: 397 PG/ML (ref 0–99)
BUN SERPL-MCNC: 45 MG/DL (ref 8–23)
CALCIUM SERPL-MCNC: 9.6 MG/DL (ref 8.7–10.5)
CHLORIDE SERPL-SCNC: 98 MMOL/L (ref 95–110)
CLARITY UR: CLEAR
CO2 SERPL-SCNC: 24 MMOL/L (ref 23–29)
COLOR UR: YELLOW
CREAT SERPL-MCNC: 2.2 MG/DL (ref 0.5–1.4)
DIFFERENTIAL METHOD BLD: ABNORMAL
EOSINOPHIL # BLD AUTO: 0.1 K/UL (ref 0–0.5)
EOSINOPHIL NFR BLD: 0.4 % (ref 0–8)
ERYTHROCYTE [DISTWIDTH] IN BLOOD BY AUTOMATED COUNT: 18.6 % (ref 11.5–14.5)
EST. GFR  (NO RACE VARIABLE): 31 ML/MIN/1.73 M^2
GLUCOSE SERPL-MCNC: 156 MG/DL (ref 70–110)
GLUCOSE UR QL STRIP: ABNORMAL
HCT VFR BLD AUTO: 40.4 % (ref 40–54)
HGB BLD-MCNC: 13.2 G/DL (ref 14–18)
HGB UR QL STRIP: NEGATIVE
IMM GRANULOCYTES # BLD AUTO: 0.47 K/UL (ref 0–0.04)
IMM GRANULOCYTES NFR BLD AUTO: 2.4 % (ref 0–0.5)
KETONES UR QL STRIP: NEGATIVE
LEUKOCYTE ESTERASE UR QL STRIP: NEGATIVE
LYMPHOCYTES # BLD AUTO: 0.9 K/UL (ref 1–4.8)
LYMPHOCYTES NFR BLD: 4.6 % (ref 18–48)
MCH RBC QN AUTO: 29.7 PG (ref 27–31)
MCHC RBC AUTO-ENTMCNC: 32.7 G/DL (ref 32–36)
MCV RBC AUTO: 91 FL (ref 82–98)
MONOCYTES # BLD AUTO: 0.8 K/UL (ref 0.3–1)
MONOCYTES NFR BLD: 4.1 % (ref 4–15)
NEUTROPHILS # BLD AUTO: 17 K/UL (ref 1.8–7.7)
NEUTROPHILS NFR BLD: 88.2 % (ref 38–73)
NITRITE UR QL STRIP: NEGATIVE
NRBC BLD-RTO: 0 /100 WBC
PH UR STRIP: 6 [PH] (ref 5–8)
PLATELET # BLD AUTO: 272 K/UL (ref 150–450)
PMV BLD AUTO: 9.5 FL (ref 9.2–12.9)
POCT GLUCOSE: 150 MG/DL (ref 70–110)
POTASSIUM SERPL-SCNC: 4.9 MMOL/L (ref 3.5–5.1)
PROT SERPL-MCNC: 7.3 G/DL (ref 6–8.4)
PROT UR QL STRIP: ABNORMAL
RBC # BLD AUTO: 4.45 M/UL (ref 4.6–6.2)
SODIUM SERPL-SCNC: 134 MMOL/L (ref 136–145)
SP GR UR STRIP: 1.01 (ref 1–1.03)
TROPONIN I SERPL DL<=0.01 NG/ML-MCNC: 0.03 NG/ML (ref 0–0.03)
URN SPEC COLLECT METH UR: ABNORMAL
UROBILINOGEN UR STRIP-ACNC: NEGATIVE EU/DL
WBC # BLD AUTO: 19.25 K/UL (ref 3.9–12.7)

## 2024-11-02 PROCEDURE — 96375 TX/PRO/DX INJ NEW DRUG ADDON: CPT

## 2024-11-02 PROCEDURE — 81003 URINALYSIS AUTO W/O SCOPE: CPT | Performed by: EMERGENCY MEDICINE

## 2024-11-02 PROCEDURE — 83880 ASSAY OF NATRIURETIC PEPTIDE: CPT | Performed by: EMERGENCY MEDICINE

## 2024-11-02 PROCEDURE — 93005 ELECTROCARDIOGRAM TRACING: CPT

## 2024-11-02 PROCEDURE — 93010 ELECTROCARDIOGRAM REPORT: CPT | Mod: ,,, | Performed by: INTERNAL MEDICINE

## 2024-11-02 PROCEDURE — 84484 ASSAY OF TROPONIN QUANT: CPT | Performed by: EMERGENCY MEDICINE

## 2024-11-02 PROCEDURE — 85025 COMPLETE CBC W/AUTO DIFF WBC: CPT | Performed by: EMERGENCY MEDICINE

## 2024-11-02 PROCEDURE — 63600175 PHARM REV CODE 636 W HCPCS: Performed by: EMERGENCY MEDICINE

## 2024-11-02 PROCEDURE — 80053 COMPREHEN METABOLIC PANEL: CPT | Performed by: EMERGENCY MEDICINE

## 2024-11-02 PROCEDURE — 96376 TX/PRO/DX INJ SAME DRUG ADON: CPT

## 2024-11-02 PROCEDURE — 25000003 PHARM REV CODE 250: Performed by: EMERGENCY MEDICINE

## 2024-11-02 PROCEDURE — 82962 GLUCOSE BLOOD TEST: CPT

## 2024-11-02 RX ORDER — HYDROMORPHONE HYDROCHLORIDE 1 MG/ML
1 INJECTION, SOLUTION INTRAMUSCULAR; INTRAVENOUS; SUBCUTANEOUS
Status: COMPLETED | OUTPATIENT
Start: 2024-11-02 | End: 2024-11-02

## 2024-11-02 RX ORDER — LORAZEPAM 2 MG/ML
1 INJECTION INTRAMUSCULAR
Status: COMPLETED | OUTPATIENT
Start: 2024-11-02 | End: 2024-11-02

## 2024-11-02 RX ORDER — LEVOFLOXACIN 5 MG/ML
750 INJECTION, SOLUTION INTRAVENOUS
Status: COMPLETED | OUTPATIENT
Start: 2024-11-03 | End: 2024-11-03

## 2024-11-02 RX ORDER — LORAZEPAM 1 MG/1
1 TABLET ORAL
Status: COMPLETED | OUTPATIENT
Start: 2024-11-02 | End: 2024-11-02

## 2024-11-02 RX ORDER — LORAZEPAM 1 MG/1
2 TABLET ORAL
Status: DISCONTINUED | OUTPATIENT
Start: 2024-11-02 | End: 2024-11-02

## 2024-11-02 RX ORDER — ONDANSETRON HYDROCHLORIDE 2 MG/ML
4 INJECTION, SOLUTION INTRAVENOUS
Status: COMPLETED | OUTPATIENT
Start: 2024-11-02 | End: 2024-11-02

## 2024-11-02 RX ADMIN — ONDANSETRON 4 MG: 2 INJECTION INTRAMUSCULAR; INTRAVENOUS at 03:11

## 2024-11-02 RX ADMIN — LORAZEPAM 1 MG: 2 INJECTION INTRAMUSCULAR; INTRAVENOUS at 11:11

## 2024-11-02 RX ADMIN — LORAZEPAM 1 MG: 2 INJECTION INTRAMUSCULAR; INTRAVENOUS at 04:11

## 2024-11-02 RX ADMIN — LORAZEPAM 1 MG: 1 TABLET ORAL at 05:11

## 2024-11-02 RX ADMIN — HYDROMORPHONE HYDROCHLORIDE 1 MG: 1 INJECTION, SOLUTION INTRAMUSCULAR; INTRAVENOUS; SUBCUTANEOUS at 03:11

## 2024-11-02 RX ADMIN — LORAZEPAM 1 MG: 2 INJECTION INTRAMUSCULAR; INTRAVENOUS at 10:11

## 2024-11-02 NOTE — ED PROVIDER NOTES
SCRIBE #1 NOTE: I, Vincent Dalton, am scribing for, and in the presence of, Enio Masters Jr., MD. I have scribed the HPI, Hero SIMON.     SCRIBE #2 NOTE: I, Edelmira Rodriguez, am scribing for, and in the presence of,  Caden Frazier MD. I have scribed the remaining portions of the note not scribed by Scribe #1.     SCRIBE #3 NOTE: I, Aurora Borden, am scribing for, and in the presence of,  Cristiano Lawson Jr., MD. I have scribed the remaining portions of the note not scribed by Scribe #1 and #2.      History     Chief Complaint   Patient presents with    Numbness     Numbness and weakness to both legs started 3-4 days ago. Denies trauma, pain from bilateral hips down to feet. Hx of COPD and DM. Hx of back surgery in 1982 and takes pain meds for pain.     Review of patient's allergies indicates:   Allergen Reactions    Amitriptyline Rash    Celecoxib Rash         History of Present Illness     [facility]    11/2/2024, 2:50 PM  History obtained from the patient      History of Present Illness: Santiago Khan is a 74 y.o. male patient who presents to the Emergency Department for evaluation of BLE weakness and numbness which onset a few days ago. Pt reports associated abd pain and back pain. Pt reports his baseline is walking, however, pt cannot walk now due to pain and weakness in BLE. Pt denies any chills, diaphoresis, cough, diarrhea, nausea, dizziness, and headaches. No further complaints or concerns at this time.       Arrival mode: Personal Transportation    PCP: Kashif Acosta MD        Past Medical History:  Past Medical History:   Diagnosis Date    Chronic combined systolic and diastolic congestive heart failure 07/09/2020    Chronic kidney disease, stage 3     Chronic obstructive pulmonary disease 03/20/2023    Wixela 250 mcg, Spiriva respimat. Continue Albuterol inhaler and albuterol nebs   Referral pul dis management, education and assistance with medication  Patient preference to only see a  doctor, request follow up with Dr. Springer             Chronic venous insufficiency     DDD (degenerative disc disease), lumbar     DM (diabetes mellitus) with complications     History of gout     Hyperlipidemia associated with type 2 diabetes mellitus     Hypertension associated with stage 3 chronic kidney disease due to type 2 diabetes mellitus     BRADLEY on CPAP     Prostate cancer     prostatectomy in , rising PSA that started in        Past Surgical History:  Past Surgical History:   Procedure Laterality Date    BICEPS TENDON REPAIR      COLONOSCOPY N/A 2019    Procedure: COLONOSCOPY;  Surgeon: James Roger MD;  Location: [facility] ENDO;  Service: Endoscopy;  Laterality: N/A;    EXPLORATION OF ORBIT Right 2023    Procedure: EXPLORATION, ORBIT;  Surgeon: Junaid Bartholomew MD;  Location: [facility] OR;  Service: ENT;  Laterality: Right;  possibly need frozen    HEMORRHOID SURGERY      INGUINAL HERNIA REPAIR Left     KNEE ARTHROSCOPY Right     LEFT HEART CATHETERIZATION Left 2020    Procedure: CATHETERIZATION, HEART, LEFT;  Surgeon: Cheli Wilson MD;  Location: [facility] CATH LAB;  Service: Cardiology;  Laterality: Left;    LUMBAR LAMINECTOMY      PROSTATECTOMY      TONSILLECTOMY           Family History:  Family History   Problem Relation Name Age of Onset    Prostate cancer Father      Coronary artery disease Father  90    Alzheimer's disease Father      Hyperlipidemia Mother         Social History:  Social History     Tobacco Use    Smoking status: Former     Current packs/day: 0.00     Average packs/day: 1.5 packs/day for 52.7 years (79.1 ttl pk-yrs)     Types: Cigarettes     Start date: 1971     Quit date: 9/15/2023     Years since quittin.1    Smokeless tobacco: Former   Substance and Sexual Activity    Alcohol use: Not Currently    Drug use: Never    Sexual activity: Not Currently     Partners: Female        Review of Systems     Review of Systems   Constitutional:  Negative for chills,  diaphoresis and fever.   HENT:  Negative for congestion.    Respiratory:  Negative for cough and shortness of breath.    Cardiovascular:  Negative for chest pain.   Gastrointestinal:  Positive for abdominal pain. Negative for diarrhea, nausea and vomiting.   Genitourinary:  Negative for dysuria.   Musculoskeletal:  Positive for back pain and myalgias (BLE).   Skin:  Negative for rash.   Neurological:  Positive for weakness (BLE) and numbness (BLE). Negative for dizziness and headaches.   All other systems reviewed and are negative.       Physical Exam     Initial Vitals [11/02/24 1416]   BP Pulse Resp Temp SpO2   137/62 (!) 117 20 98.5 °F (36.9 °C) (!) 90 %      MAP       --          Physical Exam  Nursing Notes and Vital Signs Reviewed.  Constitutional: Patient is in no acute distress. Well-developed and well-nourished.  Head: Atraumatic. Normocephalic.  Eyes:  EOM intact.  No scleral icterus.  ENT: Mucous membranes are moist.  Nares clear   Neck:  Full ROM. No JVD.  Cardiovascular: Regular rate. Regular rhythm No murmurs, rubs, or gallops. Distal pulses are 2+ and symmetric  Pulmonary/Chest: No respiratory distress. Clear to auscultation bilaterally. No wheezing or rales.  Equal chest wall rise bilaterally  Abdominal: Soft and non-distended.  There is no tenderness.  No rebound, guarding, or rigidity. Good bowel sounds.  Genitourinary: No CVA tenderness.  No suprapubic tenderness  Musculoskeletal: Moves all extremities. No obvious deformities.  5 x 5 strength in all extremities   Skin: Warm and dry.  Neurological:  Alert, awake, and appropriate.  Normal speech.  No acute focal neurological deficits are appreciated.  Two through 12 intact bilaterally.  Psychiatric: Normal affect. Good eye contact. Appropriate in content.       ED Course   Critical Care    Date/Time: 11/3/2024 2:25 AM    Performed by: Renny Quinonez Jr., MD  Authorized by: Renny Quinonez Jr., MD  Direct patient critical care time: 25  minutes  Additional history critical care time: 20 minutes  Ordering / reviewing critical care time: 12 minutes  Documentation critical care time: 10 minutes  Consulting other physicians critical care time: 8 minutes  Total critical care time (exclusive of procedural time) : 75 minutes  Critical care time was exclusive of separately billable procedures and treating other patients and teaching time.  Critical care was necessary to treat or prevent imminent or life-threatening deterioration of the following conditions: hypoxemia.  Critical care was time spent personally by me on the following activities: blood draw for specimens, development of treatment plan with patient or surrogate, discussions with consultants, interpretation of cardiac output measurements, evaluation of patient's response to treatment, obtaining history from patient or surrogate, examination of patient, ordering and review of laboratory studies, ordering and performing treatments and interventions, ordering and review of radiographic studies, pulse oximetry, re-evaluation of patient's condition and review of old charts.        ED Vital Signs:  Vitals:    11/04/24 0045 11/04/24 0100 11/04/24 0115 11/04/24 0130   BP:  (!) 98/54     Pulse: 93 92 95 93   Resp: (!) 28 (!) 27 (!) 25 (!) 21   Temp: 99.7 °F (37.6 °C) 99.7 °F (37.6 °C) 99.7 °F (37.6 °C) 99.7 °F (37.6 °C)   TempSrc:       SpO2: 100% 100% 100% 100%   Weight:       Height:        11/04/24 0145 11/04/24 0200 11/04/24 0215 11/04/24 0230   BP:  123/73     Pulse: 88 94 95 96   Resp: (!) 23 20 (!) 29 (!) 22   Temp: 99.7 °F (37.6 °C) 99.7 °F (37.6 °C) 99.9 °F (37.7 °C) 99.9 °F (37.7 °C)   TempSrc:       SpO2: 100% 100% 100% 100%   Weight:       Height:        11/04/24 0245 11/04/24 0300 11/04/24 0304 11/04/24 0315   BP:   121/68    Pulse: 95 100 100 97   Resp: 12 (!) 22 (!) 24 (!) 25   Temp: 99.9 °F (37.7 °C) 99.9 °F (37.7 °C) 99.9 °F (37.7 °C) 100 °F (37.8 °C)   TempSrc:       SpO2: 100% 100%  100% 100%   Weight:       Height:        11/04/24 0330 11/04/24 0345 11/04/24 0400   BP:   (!) 98/50   Pulse: 99 103 99   Resp: 18 15 (!) 33   Temp: 100 °F (37.8 °C) 100 °F (37.8 °C) 100 °F (37.8 °C)   TempSrc:      SpO2: 100% (!) 92% 97%   Weight:      Height:          Abnormal Lab Results:  Labs Reviewed   CBC W/ AUTO DIFFERENTIAL - Abnormal       Result Value    WBC 19.25 (*)     RBC 4.45 (*)     Hemoglobin 13.2 (*)     Hematocrit 40.4      MCV 91      MCH 29.7      MCHC 32.7      RDW 18.6 (*)     Platelets 272      MPV 9.5      Immature Granulocytes 2.4 (*)     Gran # (ANC) 17.0 (*)     Immature Grans (Abs) 0.47 (*)     Lymph # 0.9 (*)     Mono # 0.8      Eos # 0.1      Baso # 0.05      nRBC 0      Gran % 88.2 (*)     Lymph % 4.6 (*)     Mono % 4.1      Eosinophil % 0.4      Basophil % 0.3      Differential Method Automated     COMPREHENSIVE METABOLIC PANEL - Abnormal    Sodium 134 (*)     Potassium 4.9      Chloride 98      CO2 24      Glucose 156 (*)     BUN 45 (*)     Creatinine 2.2 (*)     Calcium 9.6      Total Protein 7.3      Albumin 2.7 (*)     Total Bilirubin 1.2 (*)     Alkaline Phosphatase 256 (*)     AST 23      ALT 35      eGFR 31 (*)     Anion Gap 12     URINALYSIS, REFLEX TO URINE CULTURE - Abnormal    Specimen UA Urine, Clean Catch      Color, UA Yellow      Appearance, UA Clear      pH, UA 6.0      Specific Gravity, UA 1.010      Protein, UA Trace (*)     Glucose, UA 1+ (*)     Ketones, UA Negative      Bilirubin (UA) Negative      Occult Blood UA Negative      Nitrite, UA Negative      Urobilinogen, UA Negative      Leukocytes, UA Negative      Narrative:     Specimen Source->Urine   TROPONIN I - Abnormal    Troponin I 0.032 (*)    B-TYPE NATRIURETIC PEPTIDE - Abnormal     (*)    POCT GLUCOSE - Abnormal    POCT Glucose 150 (*)    POCT GLUCOSE - Abnormal    POCT Glucose 33 (*)    POCT GLUCOSE - Abnormal    POCT Glucose 155 (*)    POCT GLUCOSE - Abnormal    POCT Glucose 182 (*)    ISTAT  PROCEDURE - Abnormal    POC PH 7.301 (*)     POC PCO2 63.8 (*)     POC PO2 69 (*)     POC HCO3 31.5 (*)     POC BE 5 (*)     POC SATURATED O2 91      Sample ARTERIAL      Site LR      Allens Test Pass      DelSys Nasal Can      Mode SPONT      Flow 2      Sp02 95     URINALYSIS, REFLEX TO URINE CULTURE   LACTIC ACID, PLASMA    Lactate (Lactic Acid) 0.6          All Lab Results:  Results for orders placed or performed during the hospital encounter of 11/02/24   POCT glucose    Collection Time: 11/02/24  2:23 PM   Result Value Ref Range    POCT Glucose 150 (H) 70 - 110 mg/dL   EKG 12-lead    Collection Time: 11/02/24  2:25 PM   Result Value Ref Range    QRS Duration 140 ms    [facility] QTC Calculation 495 ms   CBC auto differential    Collection Time: 11/02/24  3:11 PM   Result Value Ref Range    WBC 19.25 (H) 3.90 - 12.70 K/uL    RBC 4.45 (L) 4.60 - 6.20 M/uL    Hemoglobin 13.2 (L) 14.0 - 18.0 g/dL    Hematocrit 40.4 40.0 - 54.0 %    MCV 91 82 - 98 fL    MCH 29.7 27.0 - 31.0 pg    MCHC 32.7 32.0 - 36.0 g/dL    RDW 18.6 (H) 11.5 - 14.5 %    Platelets 272 150 - 450 K/uL    MPV 9.5 9.2 - 12.9 fL    Immature Granulocytes 2.4 (H) 0.0 - 0.5 %    Gran # (ANC) 17.0 (H) 1.8 - 7.7 K/uL    Immature Grans (Abs) 0.47 (H) 0.00 - 0.04 K/uL    Lymph # 0.9 (L) 1.0 - 4.8 K/uL    Mono # 0.8 0.3 - 1.0 K/uL    Eos # 0.1 0.0 - 0.5 K/uL    Baso # 0.05 0.00 - 0.20 K/uL    nRBC 0 0 /100 WBC    Gran % 88.2 (H) 38.0 - 73.0 %    Lymph % 4.6 (L) 18.0 - 48.0 %    Mono % 4.1 4.0 - 15.0 %    Eosinophil % 0.4 0.0 - 8.0 %    Basophil % 0.3 0.0 - 1.9 %    Differential Method Automated    Comprehensive metabolic panel    Collection Time: 11/02/24  3:11 PM   Result Value Ref Range    Sodium 134 (L) 136 - 145 mmol/L    Potassium 4.9 3.5 - 5.1 mmol/L    Chloride 98 95 - 110 mmol/L    CO2 24 23 - 29 mmol/L    Glucose 156 (H) 70 - 110 mg/dL    BUN 45 (H) 8 - 23 mg/dL    Creatinine 2.2 (H) 0.5 - 1.4 mg/dL    Calcium 9.6 8.7 - 10.5 mg/dL    Total Protein 7.3 6.0 -  8.4 g/dL    Albumin 2.7 (L) 3.5 - 5.2 g/dL    Total Bilirubin 1.2 (H) 0.1 - 1.0 mg/dL    Alkaline Phosphatase 256 (H) 40 - 150 U/L    AST 23 10 - 40 U/L    ALT 35 10 - 44 U/L    eGFR 31 (A) >60 mL/min/1.73 m^2    Anion Gap 12 8 - 16 mmol/L   Urinalysis, Reflex to Urine Culture Urine, Clean Catch    Collection Time: 11/02/24  3:11 PM    Specimen: Urine, Clean Catch   Result Value Ref Range    Specimen UA Urine, Clean Catch     Color, UA Yellow Yellow, Straw, Martine    Appearance, UA Clear Clear    pH, UA 6.0 5.0 - 8.0    Specific Gravity, UA 1.010 1.005 - 1.030    Protein, UA Trace (A) Negative    Glucose, UA 1+ (A) Negative    Ketones, UA Negative Negative    Bilirubin (UA) Negative Negative    Occult Blood UA Negative Negative    Nitrite, UA Negative Negative    Urobilinogen, UA Negative <2.0 EU/dL    Leukocytes, UA Negative Negative   Troponin I    Collection Time: 11/02/24  8:45 PM   Result Value Ref Range    Troponin I 0.032 (H) 0.000 - 0.026 ng/mL   Brain natriuretic peptide    Collection Time: 11/02/24  8:45 PM   Result Value Ref Range     (H) 0 - 99 pg/mL   POCT glucose    Collection Time: 11/03/24 12:21 AM   Result Value Ref Range    POCT Glucose 33 (LL) 70 - 110 mg/dL   Lactic acid, plasma    Collection Time: 11/03/24 12:25 AM   Result Value Ref Range    Lactate (Lactic Acid) 0.6 0.5 - 2.2 mmol/L   POCT glucose    Collection Time: 11/03/24 12:33 AM   Result Value Ref Range    POCT Glucose 155 (H) 70 - 110 mg/dL   POCT glucose    Collection Time: 11/03/24  1:22 AM   Result Value Ref Range    POCT Glucose 182 (H) 70 - 110 mg/dL   Blood culture    Collection Time: 11/03/24  1:25 AM    Specimen: Peripheral, Hand, Right; Blood   Result Value Ref Range    Blood Culture, Routine Gram stain dao bottle: Gram negative rods     Blood Culture, Routine       Results called to and read back by:Sharifa Poewr RN 11/04/2024  00:22   Blood culture    Collection Time: 11/03/24  1:25 AM    Specimen: Peripheral,  Antecubital, Left; Blood   Result Value Ref Range    Blood Culture, Routine No Growth to date    Rapid Organism ID by PCR (from Blood culture)    Collection Time: 11/03/24  1:25 AM   Result Value Ref Range    Enterococcus faecalis Not Detected Not Detected    Enterococcus faecium Not Detected Not Detected    Listeria monocytogenes Not Detected Not Detected    Staphylococcus spp. Not Detected Not Detected    Staphylococcus aureus Not Detected Not Detected    Staphylococcus epidermidis Not Detected Not Detected    Staphylococcus lugdunensis Not Detected Not Detected    Streptococcus species Not Detected Not Detected    Streptococcus agalactiae Not Detected Not Detected    Streptococcus pneumoniae Not Detected Not Detected    Streptococcus pyogenes Not Detected Not Detected    Acinetobacter calcoaceticus/baumannii complex Not Detected Not Detected    Bacteroides fragilis Not Detected Not Detected    Enterobacterales See species for ID (A) Not Detected    Enterobacter cloacae complex Not Detected Not Detected    Escherichia coli Detected (A) Not Detected    Klebsiella aerogenes Not Detected Not Detected    Klebsiella oxytoca Not Detected Not Detected    Klebsiella pneumoniae group Not Detected Not Detected    Proteus Not Detected Not Detected    Salmonella sp Not Detected Not Detected    Serratia marcescens Not Detected Not Detected    Haemophilus influenzae Not Detected Not Detected    Neisseria meningtidis Not Detected Not Detected    Pseudomonas aeruginosa Not Detected Not Detected    Stenotrophomonas maltophilia Not Detected Not Detected    Candida albicans Not Detected Not Detected    Candida auris Not Detected Not Detected    Candida glabrata Not Detected Not Detected    Candida krusei Not Detected Not Detected    Candida parapsilosis Not Detected Not Detected    Candida tropicalis Not Detected Not Detected    Cryptococcus neoformans/gattii Not Detected Not Detected    CTX-M (ESBL ) Not Detected Not  Detected    IMP (Carbapenem resistant) Not Detected Not Detected    KPC resistance gene (Carbapenem resistant) Not Detected Not Detected    mcr-1  Not Detected Not Detected    mec A/C  Test Not Applicable Not Detected    mec A/C and MREJ (MRSA) gene Test Not Applicable Not Detected    NDM (Carbapenem resistant) Not Detected Not Detected    OXA-48-like (Carbapenem resistant) Not Detected Not Detected    van A/B (VRE gene) Test Not Applicable Not Detected    VIM (Carbapenem resistant) Not Detected Not Detected   ISTAT PROCEDURE    Collection Time: 11/03/24  2:11 AM   Result Value Ref Range    POC PH 7.301 (L) 7.35 - 7.45    POC PCO2 63.8 (HH) 35 - 45 mmHg    POC PO2 69 (L) 80 - 100 mmHg    POC HCO3 31.5 (H) 24 - 28 mmol/L    POC BE 5 (H) -2 to 2 mmol/L    POC SATURATED O2 91 95 - 100 %    Sample ARTERIAL     Site LR     Allens Test Pass     DelSys Nasal Can     Mode SPONT     Flow 2     Sp02 95    ISTAT PROCEDURE    Collection Time: 11/03/24  4:54 AM   Result Value Ref Range    POC PH 7.255 (LL) 7.35 - 7.45    POC PCO2 69.0 (HH) 35 - 45 mmHg    POC PO2 87 80 - 100 mmHg    POC HCO3 30.6 (H) 24 - 28 mmol/L    POC BE 3 (H) -2 to 2 mmol/L    POC SATURATED O2 94 95 - 100 %    Sample ARTERIAL     Site RR     Allens Test Pass     DelSys Nasal Can     Mode SPONT     Flow 4     FiO2 36    POCT glucose    Collection Time: 11/03/24  5:06 AM   Result Value Ref Range    POCT Glucose 32 (LL) 70 - 110 mg/dL   CBC auto differential    Collection Time: 11/03/24  5:08 AM   Result Value Ref Range    WBC 18.28 (H) 3.90 - 12.70 K/uL    RBC 4.29 (L) 4.60 - 6.20 M/uL    Hemoglobin 12.5 (L) 14.0 - 18.0 g/dL    Hematocrit 40.4 40.0 - 54.0 %    MCV 94 82 - 98 fL    MCH 29.1 27.0 - 31.0 pg    MCHC 30.9 (L) 32.0 - 36.0 g/dL    RDW 18.5 (H) 11.5 - 14.5 %    Platelets 292 150 - 450 K/uL    MPV 9.8 9.2 - 12.9 fL    Immature Granulocytes 2.7 (H) 0.0 - 0.5 %    Gran # (ANC) 16.2 (H) 1.8 - 7.7 K/uL    Immature Grans (Abs) 0.49 (H) 0.00 - 0.04 K/uL     Lymph # 0.7 (L) 1.0 - 4.8 K/uL    Mono # 0.8 0.3 - 1.0 K/uL    Eos # 0.0 0.0 - 0.5 K/uL    Baso # 0.05 0.00 - 0.20 K/uL    nRBC 0 0 /100 WBC    Gran % 88.7 (H) 38.0 - 73.0 %    Lymph % 3.6 (L) 18.0 - 48.0 %    Mono % 4.5 4.0 - 15.0 %    Eosinophil % 0.2 0.0 - 8.0 %    Basophil % 0.3 0.0 - 1.9 %    Differential Method Automated    Comprehensive metabolic panel    Collection Time: 11/03/24  5:08 AM   Result Value Ref Range    Sodium 136 136 - 145 mmol/L    Potassium 4.5 3.5 - 5.1 mmol/L    Chloride 99 95 - 110 mmol/L    CO2 26 23 - 29 mmol/L    Glucose 71 70 - 110 mg/dL    BUN 48 (H) 8 - 23 mg/dL    Creatinine 2.3 (H) 0.5 - 1.4 mg/dL    Calcium 9.6 8.7 - 10.5 mg/dL    Total Protein 7.0 6.0 - 8.4 g/dL    Albumin 2.5 (L) 3.5 - 5.2 g/dL    Total Bilirubin 1.3 (H) 0.1 - 1.0 mg/dL    Alkaline Phosphatase 249 (H) 40 - 150 U/L    AST 21 10 - 40 U/L    ALT 32 10 - 44 U/L    eGFR 29 (A) >60 mL/min/1.73 m^2    Anion Gap 11 8 - 16 mmol/L   Procalcitonin    Collection Time: 11/03/24  5:08 AM   Result Value Ref Range    Procalcitonin 0.40 (H) <0.25 ng/mL   POCT glucose    Collection Time: 11/03/24  5:16 AM   Result Value Ref Range    POCT Glucose 94 70 - 110 mg/dL   POCT glucose    Collection Time: 11/03/24  5:23 AM   Result Value Ref Range    POCT Glucose 123 (H) 70 - 110 mg/dL   Influenza A & B by Molecular    Collection Time: 11/03/24  6:07 AM    Specimen: Nasopharyngeal Swab   Result Value Ref Range    Influenza A, Molecular Negative Negative    Influenza B, Molecular Negative Negative    Flu A & B Source Nasal swab    COVID-19 Rapid Screening    Collection Time: 11/03/24  6:07 AM   Result Value Ref Range    SARS-CoV-2 RNA, Amplification, Qual Negative Negative   POCT glucose    Collection Time: 11/03/24  7:16 AM   Result Value Ref Range    POCT Glucose 48 (LL) 70 - 110 mg/dL   POCT glucose    Collection Time: 11/03/24  7:54 AM   Result Value Ref Range    POCT Glucose 92 70 - 110 mg/dL   POCT glucose    Collection Time:  11/03/24  8:19 AM   Result Value Ref Range    POCT Glucose 88 70 - 110 mg/dL   Lactic acid, plasma    Collection Time: 11/03/24  8:42 AM   Result Value Ref Range    Lactate (Lactic Acid) 0.8 0.5 - 2.2 mmol/L   POCT glucose    Collection Time: 11/03/24  8:49 AM   Result Value Ref Range    POCT Glucose 72 70 - 110 mg/dL   POCT glucose    Collection Time: 11/03/24  9:11 AM   Result Value Ref Range    POCT Glucose 64 (L) 70 - 110 mg/dL   ISTAT PROCEDURE    Collection Time: 11/03/24  9:11 AM   Result Value Ref Range    POC PH 7.332 (L) 7.35 - 7.45    POC PCO2 57.9 (HH) 35 - 45 mmHg    POC PO2 115 (H) 80 - 100 mmHg    POC HCO3 30.6 (H) 24 - 28 mmol/L    POC BE 5 (H) -2 to 2 mmol/L    POC SATURATED O2 98 95 - 100 %    Provider Credentials: MD     Rate 22     Sample ARTERIAL     Site RR     Allens Test Pass     DelSys CPAP/BiPAP     Mode BiPAP     FiO2 30     Spont Rate 25     Min Vol 11     Sp02 99     IP 16     EP 8    TSH    Collection Time: 11/03/24 10:16 AM   Result Value Ref Range    TSH 0.490 0.400 - 4.000 uIU/mL   Lipase    Collection Time: 11/03/24 10:16 AM   Result Value Ref Range    Lipase 22 4 - 60 U/L   POCT glucose    Collection Time: 11/03/24 10:19 AM   Result Value Ref Range    POCT Glucose 107 70 - 110 mg/dL   Respiratory Infection Panel (PCR), Nasopharyngeal    Collection Time: 11/03/24 10:45 AM    Specimen: Nasopharyngeal Swab   Result Value Ref Range    Respiratory Infection Panel Source NP Swab Not Detected    Adenovirus Not Detected Not Detected    Coronavirus 229E, Common Cold Virus Not Detected Not Detected    Coronavirus HKU1, Common Cold Virus Not Detected Not Detected    Coronavirus NL63, Common Cold Virus Not Detected Not Detected    Coronavirus OC43, Common Cold Virus Not Detected Not Detected    SARS-CoV2 (COVID-19) Qualitative PCR Not Detected Not Detected    Human Metapneumovirus Not Detected Not Detected    Human Rhinovirus/Enterovirus Not Detected Not Detected    Influenza A (subtypes H1,  H1-2009,H3) Not Detected Not Detected    Influenza B Not Detected Not Detected    Parainfluenza Virus 1 Not Detected Not Detected    Parainfluenza Virus 2 Not Detected Not Detected    Parainfluenza Virus 3 Not Detected Not Detected    Parainfluenza Virus 4 Not Detected Not Detected    Respiratory Syncytial Virus Not Detected Not Detected    Bordetella Parapertussis ([id number]) Not Detected Not Detected    Bordetella pertussis (ptxP) Not Detected Not Detected    Chlamydia pneumoniae Not Detected Not Detected    Mycoplasma pneumoniae Not Detected Not Detected   Echo    Collection Time: 11/03/24 11:03 AM   Result Value Ref Range    BSA 2.1 m2    LVOT stroke volume 90.4 cm3    LVIDd 6.8 (A) 3.5 - 6.0 cm    LV Systolic Volume 169.50 mL    LV Systolic Volume Index 82.7 mL/m2    LVIDs 5.8 (A) 2.1 - 4.0 cm    LV ESV A2C 77.42 mL    LV Diastolic Volume 238.36 mL    LV ESV A4C 66.59 mL    LV Diastolic Volume Index 116.27 mL/m2    Left Ventricular End Systolic Volume by Teichholz Method 169.50 mL    Left Ventricular End Diastolic Volume by Teichholz Method 238.36 mL    IVS 0.9 0.6 - 1.1 cm    LVOT diameter 2.4 cm    LVOT area 4.5 cm2    FS 14.7 (A) 28 - 44 %    Left Ventricle Relative Wall Thickness 0.26 cm    PW 0.9 0.6 - 1.1 cm    LV mass 268.2 g    LV Mass Index 130.8 g/m2    MV Peak E Bassam 0.98 m/s    TDI LATERAL 0.10 m/s    TDI SEPTAL 0.07 m/s    E/E' ratio 11.53 m/s    MV Peak A Bassam 0.85 m/s    TR Max Bassam 3.02 m/s    E/A ratio 1.15     IVRT 121.79 msec    E wave deceleration time 173.57 msec    LV SEPTAL E/E' RATIO 14.00 m/s    LV LATERAL E/E' RATIO 9.80 m/s    LVOT peak bassam 1.0 m/s    Left Ventricular Outflow Tract Mean Velocity 0.71 cm/s    Left Ventricular Outflow Tract Mean Gradient 2.33 mmHg    RVOT peak VTI 9.8 cm    LA size 3.94 cm    Left Atrium Minor Axis 6.23 cm    Left Atrium Major Axis 6.39 cm    LA Vol (MOD) 74.58 mL    VALERIE (MOD) 36.4 mL/m2    RA Major Axis 4.62 cm    Vn Nyquist MS 0.28 m/s    AV mean gradient  4.0 mmHg    AV peak gradient 7.8 mmHg    Ao peak sri 1.4 m/s    Ao VTI 26.5 cm    LVOT peak VTI 20.0 cm    AV valve area 3.4 cm²    AV Velocity Ratio 0.71     AV index (prosthetic) 0.75     KADI by Velocity Ratio 3.2 cm²    Radius 0.85 cm    Vn Nyquist 0.28 m/s    Mr max sri 4.62 m/s    MR PISA EROA 0.27 cm2    MV stenosis pressure 1/2 time 50.33 ms    MV valve area p 1/2 method 4.37 cm2    Triscuspid Valve Regurgitation Peak Gradient 36 mmHg    PV mean gradient 1 mmHg    PV PEAK VELOCITY 1.16 m/s    PV peak gradient 5 mmHg    Pulmonary Valve Mean Velocity 0.70 m/s    RVOT peak sri 0.58 m/s    Ao root annulus 3.86 cm    STJ 2.79 cm    Ascending aorta 3.03 cm    Mean e' 0.09 m/s    ZLVIDS 3.41     ZLVIDD 1.01     LA area A4C 23.01 cm2    LA area A2C 23.93 cm2    RVDD 3.76 cm    VALERIE 44.3 mL/m2    LA Vol 90.85 cm3    LA WIDTH 4.3 cm    RA Width 3.4 cm    TAPSE 1.60 cm    TV resting pulmonary artery pressure 44 mmHg    RV TB RVSP 11 mmHg    Est. RA pres 8 mmHg    Mondragon's Biplane MOD Ejection Fraction 38 %   POCT glucose    Collection Time: 11/03/24 11:27 AM   Result Value Ref Range    POCT Glucose 143 (H) 70 - 110 mg/dL   POCT glucose    Collection Time: 11/03/24 12:30 PM   Result Value Ref Range    POCT Glucose 115 (H) 70 - 110 mg/dL   POCT glucose    Collection Time: 11/03/24  1:03 PM   Result Value Ref Range    POCT Glucose 110 70 - 110 mg/dL   ISTAT PROCEDURE    Collection Time: 11/03/24  1:16 PM   Result Value Ref Range    POC PH 7.335 (L) 7.35 - 7.45    POC PCO2 56.0 (HH) 35 - 45 mmHg    POC PO2 123 (H) 80 - 100 mmHg    POC HCO3 29.8 (H) 24 - 28 mmol/L    POC BE 4 (H) -2 to 2 mmol/L    POC SATURATED O2 98 95 - 100 %    Rate 22     Sample ARTERIAL     Site LB     Allens Test Pass     DelSys CPAP/BiPAP     Mode BiPAP     FiO2 40     Spont Rate 22     Min Vol 10.5     Sp02 100     IP 16     EP 8    POCT glucose    Collection Time: 11/03/24  2:08 PM   Result Value Ref Range    POCT Glucose 101 70 - 110 mg/dL   POCT  glucose    Collection Time: 11/03/24  3:05 PM   Result Value Ref Range    POCT Glucose 98 70 - 110 mg/dL   POCT glucose    Collection Time: 11/03/24  4:03 PM   Result Value Ref Range    POCT Glucose 83 70 - 110 mg/dL   POCT glucose    Collection Time: 11/03/24  5:11 PM   Result Value Ref Range    POCT Glucose 70 70 - 110 mg/dL   POCT glucose    Collection Time: 11/03/24  6:12 PM   Result Value Ref Range    POCT Glucose 125 (H) 70 - 110 mg/dL   POCT glucose    Collection Time: 11/03/24  7:50 PM   Result Value Ref Range    POCT Glucose 94 70 - 110 mg/dL   POCT glucose    Collection Time: 11/03/24 10:05 PM   Result Value Ref Range    POCT Glucose 72 70 - 110 mg/dL   POCT glucose    Collection Time: 11/04/24 12:08 AM   Result Value Ref Range    POCT Glucose 62 (L) 70 - 110 mg/dL   POCT glucose    Collection Time: 11/04/24 12:30 AM   Result Value Ref Range    POCT Glucose 94 70 - 110 mg/dL   POCT glucose    Collection Time: 11/04/24  2:22 AM   Result Value Ref Range    POCT Glucose 101 70 - 110 mg/dL   POCT glucose    Collection Time: 11/04/24  4:03 AM   Result Value Ref Range    POCT Glucose 87 70 - 110 mg/dL     *Note: Due to a large number of results and/or encounters for the requested time period, some results have not been displayed. A complete set of results can be found in Results Review.         Imaging Results:  Imaging Results              MRI Lumbar Spine Without Contrast (Final result)  Result time 11/03/24 09:46:08      Final result by Suresh KennedyEleazarMD parveen (11/03/24 09:46:08)                   Impression:      Multilevel degenerative disc changes.  Moderate to severe foraminal stenosis at L4-5 and L5-S1 bilaterally      Electronically signed by: Suresh Kennedy MD  Date:    11/03/2024  Time:    09:46               Narrative:    EXAMINATION:  MRI LUMBAR SPINE WITHOUT CONTRAST    CLINICAL HISTORY:  Low back pain, cauda equina syndrome suspected;    TECHNIQUE:  Multiplanar, multisequence MR images were  acquired from the thoracolumbar junction to the sacrum without the administration of contrast.    COMPARISON:  L1-2: Unremarkable.  No significant disc disease.  No stenosis    FINDINGS:  L1-2: Unremarkable.  No significant disc disease.  No stenosis.    L2-3: Moderate broad base disc bulge with superimposed central disc protrusion this indents on the thecal sac.  No significant central canal stenosis.  No significant foraminal stenosis.    L3-4: Unremarkable.  No significant disc disease or stenosis.    L4-5:  Broad-based disc bulge impresses on the ventral thecal sac without central canal stenosis.  Facet arthropathy and ligament flavum hypertrophy contribute to bilateral moderate neural foraminal encroachment.    L5-S1: Mild to moderate broad-based posterior disc bulge impresses on the thecal sac without central canal stenosis.  Facet arthropathy and ligament flavum hypertrophy contribute to severe bilateral neural foraminal stenosis.                                       X-Ray Chest AP Portable (Final result)  Result time 11/02/24 18:13:15      Final result by Justice Garcia MD (11/02/24 18:13:15)                   Impression:      1.  Low lung volumes with vascular crowding or atelectasis in the lung bases.  Cardiac silhouette size enlargement.  Mild interstitial pulmonary edema or interstitial infectious process difficult to exclude in the right clinical setting.    2.  Stable findings as noted above.      Electronically signed by: Justice Garcia MD  Date:    11/02/2024  Time:    18:13               Narrative:    EXAMINATION:  XR CHEST AP PORTABLE    CLINICAL HISTORY:  leukocytosis;    COMPARISON:  Studies dating back to March 11, 2022    FINDINGS:  The study is lordotic in position.  Moderately low lung volumes with vascular crowding or atelectasis in the lung bases.  The lungs are otherwise clear.  The cardiac silhouette size is enlarged.  The trachea is midline and the mediastinal width is normal. Negative  for focal infiltrate, effusion or pneumothorax. Pulmonary vasculature is normal. Negative for osseous abnormalities. Tortuous aorta.  There are degenerative changes of the spine and both shoulder girdles.                                  1:39 AM: Per [facility] radiology, pt's MR Lumbar Spine without IV Contrast results: Multilevel degenerative changes with several levels of neural foraminal narrowing, as discussed in the body of this report.   Radiologist: Jd York MD         The EKG was ordered, reviewed, and independently interpreted by the ED provider.  Interpretation time: 14:25  Rate: 111 BPM  Rhythm: sinus tachycardia  Interpretation: Right bundle branch block. Left anterior fascicular block. Bifascicular block. LVH with repolarization abnormality (R in aVL, Romhilt-gagnon). No STEMI.           The Emergency Provider reviewed the vital signs and test results, which are outlined above.     ED Discussion     4:00 PM: Dr. Masters transfers care of patient to Dr. Frazier pending imaging results.    2:00 AM: Dr. Frazier transfers care of patient to Dr. Quinonez pending lab and imaging results.    1:20 AM: Dr. Quinonez re-evaluated pt and agrees with Dr. Frazier's assessment.     2:00 AM: Discussed case with Dr. Taylor ([facility]). Dr. Taylor agrees with current care and management of pt and accepts admission.   Admitting Service: Hospital Medicine  Admitting Physician: Dr. Taylor  Admit to: inpt med/tele     73yo M c/o worsening bilateral LE weakness and pain as well as sob. in working pt up, wbc: 19, cxr: interstitial edema, bnp:397, lactic: 0.6, g given d10, glc currently is 182. given levaquin. MRI L spine: multilevel degenerateiv changes with several levels of neuroal foraminal narrowing, no epidural abscess or cauda equina. admission for interstitial pneumonia, hypoglycemia, hypoxemia, sob, elevated troponin, leukocytosis     Medical Decision Making  Amount and/or Complexity of Data  Reviewed  Labs: ordered. Decision-making details documented in ED Course.  Radiology: ordered and independent interpretation performed. Decision-making details documented in ED Course.  ECG/medicine tests: ordered and independent interpretation performed. Decision-making details documented in ED Course.    Risk  OTC drugs.  Prescription drug management.  Parenteral controlled substances.  Decision regarding hospitalization.  Risk Details: OTC drugs, prescription drugs and controlled substances considered.  Due to patient's symptoms improving and pain controlled pain medications ordered appropriately.  Differential diagnoses: TIA, CVA, intracranial hemorrhage, electrolyte imbalance, embolic CVA, trauma, neuropathy, herniated disc, encephalopathy  Differential diagnoses:  Pneumonia, Congestive heart failure, Acute Myocardial infarction, Pleural effusion, Pulmonary edema, Pulmonary embolism, Electrolyte imbalance, Infectious etiology, COPD exacerbation, Asthma exacerbation, Pneumothorax,  Cardiac tamponade, Anemia, Deconditioning, bronchospasm, upper airway obstruction such: Aspiration, Foreign body                    ED Medication(s):  Medications   sodium chloride 0.9% flush 3 mL (has no administration in time range)   ondansetron injection 4 mg (has no administration in time range)   promethazine tablet 25 mg (has no administration in time range)   polyethylene glycol packet 17 g (has no administration in time range)   acetaminophen tablet 650 mg (has no administration in time range)   simethicone chewable tablet 80 mg (has no administration in time range)   aluminum-magnesium hydroxide-simethicone 200-200-20 mg/5 mL suspension 30 mL (has no administration in time range)   acetaminophen suppository 650 mg (650 mg Rectal Given 11/3/24 0401)   naloxone 0.4 mg/mL injection 0.02 mg (has no administration in time range)   glucose chewable tablet 16 g (has no administration in time range)   glucose chewable tablet 24 g  (has no administration in time range)   glucagon (human recombinant) injection 1 mg (has no administration in time range)   enoxaparin injection 40 mg (40 mg Subcutaneous Given 11/3/24 1709)   albuterol-ipratropium 2.5 mg-0.5 mg/3 mL nebulizer solution 3 mL (has no administration in time range)   dextrose 10% bolus 125 mL 125 mL (0 mLs Intravenous Stopped 11/4/24 0023)   dextrose 10% bolus 250 mL 250 mL (0 mLs Intravenous Stopped 11/3/24 0653)   vancomycin - pharmacy to dose (has no administration in time range)   piperacillin-tazobactam (ZOSYN) 4.5 g in D5W 100 mL IVPB (MB+) ( Intravenous Verify Only 11/4/24 0400)   ondansetron injection 4 mg (4 mg Intravenous Given 11/2/24 1504)   HYDROmorphone injection 1 mg (1 mg Intravenous Given 11/2/24 1504)   LORazepam injection 1 mg (1 mg Intravenous Given 11/2/24 1620)   LORazepam tablet 1 mg (1 mg Oral Given 11/2/24 1734)   LORazepam injection 1 mg (1 mg Intravenous Given 11/2/24 2245)   LORazepam injection 1 mg (1 mg Intravenous Given 11/2/24 2315)   levoFLOXacin 750 mg/150 mL IVPB 750 mg (0 mg Intravenous Stopped 11/3/24 0211)   dextrose 10% bolus 250 mL 250 mL (0 mLs Intravenous Stopped 11/3/24 0130)   dextrose 50 % in water (D50W) injection 25 g (50 mLs Intravenous Given 11/3/24 0030)   bumetanide injection 2 mg (2 mg Intravenous Given 11/3/24 0620)   vancomycin 1.75 g in 5 % dextrose 500 mL IVPB (0 mg Intravenous Stopped 11/3/24 1125)   bisacodyL suppository 10 mg (10 mg Rectal Given 11/3/24 1021)   sodium chloride 0.9% bolus 250 mL 250 mL (0 mLs Intravenous Stopped 11/3/24 1243)       Current Discharge Medication List                  Scribe Attestation:   Scribe #1: I performed the above scribed service and the documentation accurately describes the services I performed. I attest to the accuracy of the note.     Attending:   Physician Attestation Statement for Scribe #1: Sonam VEGA Rodney W. Jr., MD, personally performed the services described in this  documentation, as scribed by Vincent Dalton, in my presence, and it is both accurate and complete.       Scribe Attestation:   Scribe #2: I performed the above scribed service and the documentation accurately describes the services I performed. I attest to the accuracy of the note.  Scribe #3: I performed the above scribed service and the documentation accurately describes the services I performed. I attest to the accuracy of the note.    Attending Attestation:           Physician Attestation for Scribe:    Physician Attestation Statement for Scribe #2: I, Caden Frazier MD, reviewed documentation, as scribed by Edelmira Rodriguez in my presence, and it is both accurate and complete. I also acknowledge and confirm the content of the note done by Scribe #1.      Physician Attestation Statement for Scribe #3: I, Renny Quinonez Jr., MD, reviewed documentation, as scribed by Aurora Borden in my presence, and it is both accurate and complete. I also acknowledge and confirm the content of the note done by Scribe #1 and #2.     Clinical Impression       ICD-10-CM ICD-9-CM   1. Hypoxemia  R09.02 799.02   2. Weakness  R53.1 780.79   3. Interstitial pneumonia  J84.9 516.8   4. Leukocytosis, unspecified type  D72.829 288.60   5. Renal dysfunction  N28.9 593.9   6. Hypoglycemia  E16.2 251.2   7. Elevated troponin  R79.89 790.6   8. Chest pain  R07.9 786.50   9. CHF exacerbation  I50.9 428.0       Disposition:   Disposition: Admitted  Condition: Renny Naidu Jr., MD  11/04/24 [id number]

## 2024-11-02 NOTE — Clinical Note
Diagnosis: Interstitial pneumonia [646363]   Reason for IP Medical Treatment  (Clinical interventions that can only be accomplished in the IP setting? ) :: pneumonia   Plans for Post-Acute care--if anticipated (pick the single best option):: A. No post acute care anticipated at this time

## 2024-11-02 NOTE — LETTER
November 12, 2024    {enter consultant's name}  {enter consultant's address}                [id number] [place] [id number]  Phone: [phone]  Fax: [phone]   Patient: Santiago Khan   MR Number: [id number]   YOB: 1950   Date of Visit: 11/2/2024       Dear ***:    I am referring my patient, Santiago Khan, to you for evaluation of ***.    He  has a past medical history of Chronic combined systolic and diastolic congestive heart failure (07/09/2020), Chronic kidney disease, stage 3, Chronic obstructive pulmonary disease (03/20/2023), Chronic venous insufficiency, DDD (degenerative disc disease), lumbar, DM (diabetes mellitus) with complications, Drug-induced constipation (11/03/2024), History of gout, Hyperlipidemia associated with type 2 diabetes mellitus, Hypertension associated with stage 3 chronic kidney disease due to type 2 diabetes mellitus, BRADLEY on CPAP, and Prostate cancer. His  has a past surgical history that includes Lumbar laminectomy; Prostatectomy; Knee arthroscopy (Right); Hemorrhoid surgery; Biceps tendon repair; Colonoscopy (N/A, 03/27/2019); Inguinal hernia repair (Left); Left heart catheterization (Left, 06/29/2020); Tonsillectomy; Exploration of orbit (Right, 9/8/2023); and Incision and drainage of abscess (Right, 11/8/2024). He  reports that he quit smoking about 13 months ago. His smoking use included cigarettes. He started smoking about 53 years ago. He has a 79.1 pack-year smoking history. He has quit using smokeless tobacco. He reports that he does not currently use alcohol. He reports that he does not use drugs.    He has a current medication list which includes the following prescription(s): humira(cf) pen, albuterol, allopurinol, allopurinol, aspirin, baclofen, bisoprolol, blood sugar diagnostic, accu-chek guide me glucose mtr, ceftriaxone, colchicine, empagliflozin, fluticasone propionate, glimepiride, guaifenesin 100 mg/5 ml,  hydrocodone-acetaminophen, lancets, latanoprost, linezolid, magnesium oxide, metformin, multivitamin-minerals-lutein, omeprazole, potassium chloride sa, simvastatin, torsemide, varenicline, and wixela inhub, and the following Facility-Administered Medications: acetaminophen, acetaminophen, albuterol-ipratropium, aluminum-magnesium hydroxide-simethicone, aspirin, benzonatate, ceftriaxone, chlorhexidine, cholecalciferol (vitamin d3), dextrose 10%, dextrose 10%, dextrose 10%, dextrose 10%, dextrose 10%, dextrose 10%, enoxaparin, folic acid-vit b6-vit b12 2.5-25-2 mg, glucagon (human recombinant), glucose, glucose, guaifenesin, hydrocodone-acetaminophen, hydromorphone (pf), latanoprost, linezolid, metoprolol tartrate, multivitamin, naloxone, ondansetron, polyethylene glycol, promethazine, silver nitrate applicators, simethicone, sodium chloride 0.9%, thiamine, and torsemide. He is allergic to amitriptyline and celecoxib.    I appreciate your assistance in his care and look forward to your findings and recommendations.    Sincerely,                           No name on file

## 2024-11-03 PROBLEM — N18.9 ACUTE KIDNEY INJURY SUPERIMPOSED ON CHRONIC KIDNEY DISEASE: Status: ACTIVE | Noted: 2024-11-03

## 2024-11-03 PROBLEM — J96.01 ACUTE RESPIRATORY FAILURE WITH HYPOXIA AND HYPERCARBIA: Status: ACTIVE | Noted: 2024-11-03

## 2024-11-03 PROBLEM — R29.898 WEAKNESS OF BOTH LOWER EXTREMITIES: Chronic | Status: ACTIVE | Noted: 2024-11-03

## 2024-11-03 PROBLEM — E16.2 HYPOGLYCEMIA: Status: ACTIVE | Noted: 2024-11-03

## 2024-11-03 PROBLEM — E66.09 CLASS 1 OBESITY DUE TO EXCESS CALORIES WITH SERIOUS COMORBIDITY AND BODY MASS INDEX (BMI) OF 33.0 TO 33.9 IN ADULT: Status: ACTIVE | Noted: 2024-11-03

## 2024-11-03 PROBLEM — E66.811 CLASS 1 OBESITY DUE TO EXCESS CALORIES WITH SERIOUS COMORBIDITY AND BODY MASS INDEX (BMI) OF 33.0 TO 33.9 IN ADULT: Chronic | Status: ACTIVE | Noted: 2024-11-03

## 2024-11-03 PROBLEM — E66.09 CLASS 1 OBESITY DUE TO EXCESS CALORIES WITH SERIOUS COMORBIDITY AND BODY MASS INDEX (BMI) OF 33.0 TO 33.9 IN ADULT: Chronic | Status: ACTIVE | Noted: 2024-11-03

## 2024-11-03 PROBLEM — E66.811 CLASS 1 OBESITY DUE TO EXCESS CALORIES WITH SERIOUS COMORBIDITY AND BODY MASS INDEX (BMI) OF 33.0 TO 33.9 IN ADULT: Status: ACTIVE | Noted: 2024-11-03

## 2024-11-03 PROBLEM — L03.115 CELLULITIS OF RIGHT LOWER EXTREMITY: Status: ACTIVE | Noted: 2024-11-03

## 2024-11-03 PROBLEM — J96.02 ACUTE RESPIRATORY FAILURE WITH HYPOXIA AND HYPERCARBIA: Status: ACTIVE | Noted: 2024-11-03

## 2024-11-03 PROBLEM — R29.898 WEAKNESS OF BOTH LOWER EXTREMITIES: Status: ACTIVE | Noted: 2024-11-03

## 2024-11-03 PROBLEM — A41.9 SEVERE SEPSIS: Status: ACTIVE | Noted: 2024-11-03

## 2024-11-03 PROBLEM — M54.50 LOWER BACK PAIN: Status: ACTIVE | Noted: 2024-11-03

## 2024-11-03 PROBLEM — G93.40 ACUTE ENCEPHALOPATHY: Status: ACTIVE | Noted: 2024-11-03

## 2024-11-03 PROBLEM — E11.9 DIABETES: Chronic | Status: ACTIVE | Noted: 2024-11-03

## 2024-11-03 PROBLEM — R29.898 WEAKNESS OF BOTH LOWER EXTREMITIES: Status: RESOLVED | Noted: 2024-11-03 | Resolved: 2024-11-03

## 2024-11-03 PROBLEM — I50.43 ACUTE ON CHRONIC COMBINED SYSTOLIC AND DIASTOLIC CONGESTIVE HEART FAILURE: Status: ACTIVE | Noted: 2024-11-03

## 2024-11-03 PROBLEM — K59.03 DRUG-INDUCED CONSTIPATION: Status: ACTIVE | Noted: 2024-11-03

## 2024-11-03 PROBLEM — R65.20 SEVERE SEPSIS: Status: ACTIVE | Noted: 2024-11-03

## 2024-11-03 PROBLEM — N17.9 ACUTE KIDNEY INJURY SUPERIMPOSED ON CHRONIC KIDNEY DISEASE: Status: ACTIVE | Noted: 2024-11-03

## 2024-11-03 PROBLEM — E11.9 DIABETES: Status: ACTIVE | Noted: 2024-11-03

## 2024-11-03 PROBLEM — J44.9 CHRONIC OBSTRUCTIVE PULMONARY DISEASE: Chronic | Status: ACTIVE | Noted: 2023-03-20

## 2024-11-03 LAB
ADENOVIRUS: NOT DETECTED
ALBUMIN SERPL BCP-MCNC: 2.5 G/DL (ref 3.5–5.2)
ALLENS TEST: ABNORMAL
ALP SERPL-CCNC: 249 U/L (ref 40–150)
ALT SERPL W/O P-5'-P-CCNC: 32 U/L (ref 10–44)
ANION GAP SERPL CALC-SCNC: 11 MMOL/L (ref 8–16)
AORTIC ROOT ANNULUS: 3.86 CM
ASCENDING AORTA: 3.03 CM
AST SERPL-CCNC: 21 U/L (ref 10–40)
AV INDEX (PROSTH): 0.75
AV MEAN GRADIENT: 4 MMHG
AV PEAK GRADIENT: 7.8 MMHG
AV VALVE AREA BY VELOCITY RATIO: 3.2 CM²
AV VALVE AREA: 3.4 CM²
AV VELOCITY RATIO: 0.71
BASOPHILS # BLD AUTO: 0.05 K/UL (ref 0–0.2)
BASOPHILS NFR BLD: 0.3 % (ref 0–1.9)
BILIRUB SERPL-MCNC: 1.3 MG/DL (ref 0.1–1)
BORDETELLA PARAPERTUSSIS (IS1001): NOT DETECTED
BORDETELLA PERTUSSIS (PTXP): NOT DETECTED
BSA FOR ECHO PROCEDURE: 2.1 M2
BUN SERPL-MCNC: 48 MG/DL (ref 8–23)
CALCIUM SERPL-MCNC: 9.6 MG/DL (ref 8.7–10.5)
CHLAMYDIA PNEUMONIAE: NOT DETECTED
CHLORIDE SERPL-SCNC: 99 MMOL/L (ref 95–110)
CO2 SERPL-SCNC: 26 MMOL/L (ref 23–29)
CORONAVIRUS 229E, COMMON COLD VIRUS: NOT DETECTED
CORONAVIRUS HKU1, COMMON COLD VIRUS: NOT DETECTED
CORONAVIRUS NL63, COMMON COLD VIRUS: NOT DETECTED
CORONAVIRUS OC43, COMMON COLD VIRUS: NOT DETECTED
CREAT SERPL-MCNC: 2.3 MG/DL (ref 0.5–1.4)
CV ECHO LV RWT: 0.26 CM
DELSYS: ABNORMAL
DIFFERENTIAL METHOD BLD: ABNORMAL
DOP CALC AO PEAK VEL: 1.4 M/S
DOP CALC AO VTI: 26.5 CM
DOP CALC LVOT AREA: 4.5 CM2
DOP CALC LVOT DIAMETER: 2.4 CM
DOP CALC LVOT PEAK VEL: 1 M/S
DOP CALC LVOT STROKE VOLUME: 90.4 CM3
DOP CALC RVOT PEAK VEL: 0.58 M/S
DOP CALC RVOT VTI: 9.8 CM
DOP CALCLVOT PEAK VEL VTI: 20 CM
E WAVE DECELERATION TIME: 173.57 MSEC
E/A RATIO: 1.15
E/E' RATIO: 11.53 M/S
ECHO LV POSTERIOR WALL: 0.9 CM (ref 0.6–1.1)
EOSINOPHIL # BLD AUTO: 0 K/UL (ref 0–0.5)
EOSINOPHIL NFR BLD: 0.2 % (ref 0–8)
EP: 8
EP: 8
ERYTHROCYTE [DISTWIDTH] IN BLOOD BY AUTOMATED COUNT: 18.5 % (ref 11.5–14.5)
ERYTHROCYTE [SEDIMENTATION RATE] IN BLOOD BY WESTERGREN METHOD: 22 MM/H
ERYTHROCYTE [SEDIMENTATION RATE] IN BLOOD BY WESTERGREN METHOD: 22 MM/H
EST. GFR  (NO RACE VARIABLE): 29 ML/MIN/1.73 M^2
FIO2: 30
FIO2: 36
FIO2: 40
FLOW: 2
FLOW: 4
FLUBV RNA NPH QL NAA+NON-PROBE: NOT DETECTED
FRACTIONAL SHORTENING: 14.7 % (ref 28–44)
GLUCOSE SERPL-MCNC: 71 MG/DL (ref 70–110)
HCO3 UR-SCNC: 29.8 MMOL/L (ref 24–28)
HCO3 UR-SCNC: 30.6 MMOL/L (ref 24–28)
HCO3 UR-SCNC: 30.6 MMOL/L (ref 24–28)
HCO3 UR-SCNC: 31.5 MMOL/L (ref 24–28)
HCT VFR BLD AUTO: 40.4 % (ref 40–54)
HGB BLD-MCNC: 12.5 G/DL (ref 14–18)
HPIV1 RNA NPH QL NAA+NON-PROBE: NOT DETECTED
HPIV2 RNA NPH QL NAA+NON-PROBE: NOT DETECTED
HPIV3 RNA NPH QL NAA+NON-PROBE: NOT DETECTED
HPIV4 RNA NPH QL NAA+NON-PROBE: NOT DETECTED
HUMAN METAPNEUMOVIRUS: NOT DETECTED
IMM GRANULOCYTES # BLD AUTO: 0.49 K/UL (ref 0–0.04)
IMM GRANULOCYTES NFR BLD AUTO: 2.7 % (ref 0–0.5)
INFLUENZA A (SUBTYPES H1,H1-2009,H3): NOT DETECTED
INFLUENZA A, MOLECULAR: NEGATIVE
INFLUENZA B, MOLECULAR: NEGATIVE
INTERVENTRICULAR SEPTUM: 0.9 CM (ref 0.6–1.1)
IP: 16
IP: 16
IVRT: 121.79 MSEC
LA MAJOR: 6.39 CM
LA MINOR: 6.23 CM
LA WIDTH: 4.3 CM
LACTATE SERPL-SCNC: 0.6 MMOL/L (ref 0.5–2.2)
LACTATE SERPL-SCNC: 0.8 MMOL/L (ref 0.5–2.2)
LEFT ATRIUM AREA SYSTOLIC (APICAL 2 CHAMBER): 23.93 CM2
LEFT ATRIUM AREA SYSTOLIC (APICAL 4 CHAMBER): 23.01 CM2
LEFT ATRIUM SIZE: 3.94 CM
LEFT ATRIUM VOLUME INDEX MOD: 36.4 ML/M2
LEFT ATRIUM VOLUME INDEX: 44.3 ML/M2
LEFT ATRIUM VOLUME MOD: 74.58 ML
LEFT ATRIUM VOLUME: 90.85 CM3
LEFT INTERNAL DIMENSION IN SYSTOLE: 5.8 CM (ref 2.1–4)
LEFT VENTRICLE DIASTOLIC VOLUME INDEX: 116.27 ML/M2
LEFT VENTRICLE DIASTOLIC VOLUME: 238.36 ML
LEFT VENTRICLE END SYSTOLIC VOLUME APICAL 2 CHAMBER: 77.42 ML
LEFT VENTRICLE END SYSTOLIC VOLUME APICAL 4 CHAMBER: 66.59 ML
LEFT VENTRICLE MASS INDEX: 130.8 G/M2
LEFT VENTRICLE SYSTOLIC VOLUME INDEX: 82.7 ML/M2
LEFT VENTRICLE SYSTOLIC VOLUME: 169.5 ML
LEFT VENTRICULAR INTERNAL DIMENSION IN DIASTOLE: 6.8 CM (ref 3.5–6)
LEFT VENTRICULAR MASS: 268.2 G
LIPASE SERPL-CCNC: 22 U/L (ref 4–60)
LV LATERAL E/E' RATIO: 9.8 M/S
LV SEPTAL E/E' RATIO: 14 M/S
LVED V (TEICH): 238.36 ML
LVES V (TEICH): 169.5 ML
LVOT MG: 2.33 MMHG
LVOT MV: 0.71 CM/S
LYMPHOCYTES # BLD AUTO: 0.7 K/UL (ref 1–4.8)
LYMPHOCYTES NFR BLD: 3.6 % (ref 18–48)
MCH RBC QN AUTO: 29.1 PG (ref 27–31)
MCHC RBC AUTO-ENTMCNC: 30.9 G/DL (ref 32–36)
MCV RBC AUTO: 94 FL (ref 82–98)
MIN VOL: 10.5
MIN VOL: 11
MODE: ABNORMAL
MONOCYTES # BLD AUTO: 0.8 K/UL (ref 0.3–1)
MONOCYTES NFR BLD: 4.5 % (ref 4–15)
MR PISA EROA: 0.27 CM2
MV PEAK A VEL: 0.85 M/S
MV PEAK E VEL: 0.98 M/S
MV STENOSIS PRESSURE HALF TIME: 50.33 MS
MV VALVE AREA P 1/2 METHOD: 4.37 CM2
MYCOPLASMA PNEUMONIAE: NOT DETECTED
NEUTROPHILS # BLD AUTO: 16.2 K/UL (ref 1.8–7.7)
NEUTROPHILS NFR BLD: 88.7 % (ref 38–73)
NRBC BLD-RTO: 0 /100 WBC
OHS LV EJECTION FRACTION SIMPSONS BIPLANE MOD: 38 %
OHS QRS DURATION: 140 MS
OHS QTC CALCULATION: 495 MS
PCO2 BLDA: 56 MMHG (ref 35–45)
PCO2 BLDA: 57.9 MMHG (ref 35–45)
PCO2 BLDA: 63.8 MMHG (ref 35–45)
PCO2 BLDA: 69 MMHG (ref 35–45)
PH SMN: 7.25 [PH] (ref 7.35–7.45)
PH SMN: 7.3 [PH] (ref 7.35–7.45)
PH SMN: 7.33 [PH] (ref 7.35–7.45)
PH SMN: 7.33 [PH] (ref 7.35–7.45)
PISA MRMAX VEL: 4.62 M/S
PISA RADIUS: 0.85 CM
PISA TR MAX VEL: 3.02 M/S
PISA VN NYQUIST MS: 0.28 M/S
PISA VN NYQUIST: 0.28 M/S
PLATELET # BLD AUTO: 292 K/UL (ref 150–450)
PMV BLD AUTO: 9.8 FL (ref 9.2–12.9)
PO2 BLDA: 115 MMHG (ref 80–100)
PO2 BLDA: 123 MMHG (ref 80–100)
PO2 BLDA: 69 MMHG (ref 80–100)
PO2 BLDA: 87 MMHG (ref 80–100)
POC BE: 3 MMOL/L
POC BE: 4 MMOL/L
POC BE: 5 MMOL/L
POC BE: 5 MMOL/L
POC SATURATED O2: 91 % (ref 95–100)
POC SATURATED O2: 94 % (ref 95–100)
POC SATURATED O2: 98 % (ref 95–100)
POC SATURATED O2: 98 % (ref 95–100)
POCT GLUCOSE: 101 MG/DL (ref 70–110)
POCT GLUCOSE: 107 MG/DL (ref 70–110)
POCT GLUCOSE: 110 MG/DL (ref 70–110)
POCT GLUCOSE: 115 MG/DL (ref 70–110)
POCT GLUCOSE: 123 MG/DL (ref 70–110)
POCT GLUCOSE: 125 MG/DL (ref 70–110)
POCT GLUCOSE: 143 MG/DL (ref 70–110)
POCT GLUCOSE: 155 MG/DL (ref 70–110)
POCT GLUCOSE: 182 MG/DL (ref 70–110)
POCT GLUCOSE: 32 MG/DL (ref 70–110)
POCT GLUCOSE: 33 MG/DL (ref 70–110)
POCT GLUCOSE: 48 MG/DL (ref 70–110)
POCT GLUCOSE: 64 MG/DL (ref 70–110)
POCT GLUCOSE: 70 MG/DL (ref 70–110)
POCT GLUCOSE: 72 MG/DL (ref 70–110)
POCT GLUCOSE: 72 MG/DL (ref 70–110)
POCT GLUCOSE: 83 MG/DL (ref 70–110)
POCT GLUCOSE: 88 MG/DL (ref 70–110)
POCT GLUCOSE: 92 MG/DL (ref 70–110)
POCT GLUCOSE: 94 MG/DL (ref 70–110)
POCT GLUCOSE: 94 MG/DL (ref 70–110)
POCT GLUCOSE: 98 MG/DL (ref 70–110)
POTASSIUM SERPL-SCNC: 4.5 MMOL/L (ref 3.5–5.1)
PROCALCITONIN SERPL IA-MCNC: 0.4 NG/ML
PROT SERPL-MCNC: 7 G/DL (ref 6–8.4)
PROVIDER CREDENTIALS: ABNORMAL
PV MEAN GRADIENT: 1 MMHG
PV MV: 0.7 M/S
PV PEAK GRADIENT: 5 MMHG
PV PEAK VELOCITY: 1.16 M/S
RA MAJOR: 4.62 CM
RA PRESSURE ESTIMATED: 8 MMHG
RA WIDTH: 3.4 CM
RBC # BLD AUTO: 4.29 M/UL (ref 4.6–6.2)
RESPIRATORY INFECTION PANEL SOURCE: NORMAL
RIGHT VENTRICULAR END-DIASTOLIC DIMENSION: 3.76 CM
RSV RNA NPH QL NAA+NON-PROBE: NOT DETECTED
RV TB RVSP: 11 MMHG
RV+EV RNA NPH QL NAA+NON-PROBE: NOT DETECTED
SAMPLE: ABNORMAL
SARS-COV-2 RDRP RESP QL NAA+PROBE: NEGATIVE
SARS-COV-2 RNA RESP QL NAA+PROBE: NOT DETECTED
SITE: ABNORMAL
SODIUM SERPL-SCNC: 136 MMOL/L (ref 136–145)
SP02: 100
SP02: 95
SP02: 99
SPECIMEN SOURCE: NORMAL
SPONT RATE: 22
SPONT RATE: 25
STJ: 2.79 CM
TDI LATERAL: 0.1 M/S
TDI SEPTAL: 0.07 M/S
TDI: 0.09 M/S
TR MAX PG: 36 MMHG
TRICUSPID ANNULAR PLANE SYSTOLIC EXCURSION: 1.6 CM
TSH SERPL DL<=0.005 MIU/L-ACNC: 0.49 UIU/ML (ref 0.4–4)
TV REST PULMONARY ARTERY PRESSURE: 44 MMHG
WBC # BLD AUTO: 18.28 K/UL (ref 3.9–12.7)
Z-SCORE OF LEFT VENTRICULAR DIMENSION IN END DIASTOLE: 1.01
Z-SCORE OF LEFT VENTRICULAR DIMENSION IN END SYSTOLE: 3.41

## 2024-11-03 PROCEDURE — 82803 BLOOD GASES ANY COMBINATION: CPT

## 2024-11-03 PROCEDURE — 27100171 HC OXYGEN HIGH FLOW UP TO 24 HOURS

## 2024-11-03 PROCEDURE — 36415 COLL VENOUS BLD VENIPUNCTURE: CPT | Mod: XB | Performed by: NURSE PRACTITIONER

## 2024-11-03 PROCEDURE — 87040 BLOOD CULTURE FOR BACTERIA: CPT | Mod: 59 | Performed by: EMERGENCY MEDICINE

## 2024-11-03 PROCEDURE — 84145 PROCALCITONIN (PCT): CPT | Performed by: HOSPITALIST

## 2024-11-03 PROCEDURE — 25000003 PHARM REV CODE 250: Performed by: NURSE PRACTITIONER

## 2024-11-03 PROCEDURE — 25000003 PHARM REV CODE 250: Performed by: INTERNAL MEDICINE

## 2024-11-03 PROCEDURE — 63600175 PHARM REV CODE 636 W HCPCS: Performed by: EMERGENCY MEDICINE

## 2024-11-03 PROCEDURE — 87635 SARS-COV-2 COVID-19 AMP PRB: CPT | Performed by: HOSPITALIST

## 2024-11-03 PROCEDURE — 99900035 HC TECH TIME PER 15 MIN (STAT)

## 2024-11-03 PROCEDURE — 96365 THER/PROPH/DIAG IV INF INIT: CPT

## 2024-11-03 PROCEDURE — 5A09357 ASSISTANCE WITH RESPIRATORY VENTILATION, LESS THAN 24 CONSECUTIVE HOURS, CONTINUOUS POSITIVE AIRWAY PRESSURE: ICD-10-PCS | Performed by: INTERNAL MEDICINE

## 2024-11-03 PROCEDURE — 80053 COMPREHEN METABOLIC PANEL: CPT | Performed by: HOSPITALIST

## 2024-11-03 PROCEDURE — 82962 GLUCOSE BLOOD TEST: CPT

## 2024-11-03 PROCEDURE — 82330 ASSAY OF CALCIUM: CPT

## 2024-11-03 PROCEDURE — 84132 ASSAY OF SERUM POTASSIUM: CPT

## 2024-11-03 PROCEDURE — 27000190 HC CPAP FULL FACE MASK W/VALVE

## 2024-11-03 PROCEDURE — 51702 INSERT TEMP BLADDER CATH: CPT

## 2024-11-03 PROCEDURE — 63600175 PHARM REV CODE 636 W HCPCS: Performed by: HOSPITALIST

## 2024-11-03 PROCEDURE — 63600175 PHARM REV CODE 636 W HCPCS: Performed by: INTERNAL MEDICINE

## 2024-11-03 PROCEDURE — 25000003 PHARM REV CODE 250: Performed by: EMERGENCY MEDICINE

## 2024-11-03 PROCEDURE — 87798 DETECT AGENT NOS DNA AMP: CPT | Mod: 59 | Performed by: NURSE PRACTITIONER

## 2024-11-03 PROCEDURE — 63600175 PHARM REV CODE 636 W HCPCS: Performed by: NURSE PRACTITIONER

## 2024-11-03 PROCEDURE — 96366 THER/PROPH/DIAG IV INF ADDON: CPT

## 2024-11-03 PROCEDURE — 20000000 HC ICU ROOM

## 2024-11-03 PROCEDURE — 96375 TX/PRO/DX INJ NEW DRUG ADDON: CPT

## 2024-11-03 PROCEDURE — 36600 WITHDRAWAL OF ARTERIAL BLOOD: CPT

## 2024-11-03 PROCEDURE — 31720 CLEARANCE OF AIRWAYS: CPT

## 2024-11-03 PROCEDURE — 85025 COMPLETE CBC W/AUTO DIFF WBC: CPT | Performed by: HOSPITALIST

## 2024-11-03 PROCEDURE — 87502 INFLUENZA DNA AMP PROBE: CPT | Performed by: HOSPITALIST

## 2024-11-03 PROCEDURE — 87186 SC STD MICRODIL/AGAR DIL: CPT | Performed by: EMERGENCY MEDICINE

## 2024-11-03 PROCEDURE — 84295 ASSAY OF SERUM SODIUM: CPT

## 2024-11-03 PROCEDURE — 87077 CULTURE AEROBIC IDENTIFY: CPT | Performed by: EMERGENCY MEDICINE

## 2024-11-03 PROCEDURE — 83605 ASSAY OF LACTIC ACID: CPT | Performed by: EMERGENCY MEDICINE

## 2024-11-03 PROCEDURE — 87154 CUL TYP ID BLD PTHGN 6+ TRGT: CPT | Performed by: EMERGENCY MEDICINE

## 2024-11-03 PROCEDURE — 94761 N-INVAS EAR/PLS OXIMETRY MLT: CPT | Mod: XB

## 2024-11-03 PROCEDURE — 85014 HEMATOCRIT: CPT

## 2024-11-03 PROCEDURE — 83690 ASSAY OF LIPASE: CPT | Performed by: NURSE PRACTITIONER

## 2024-11-03 PROCEDURE — 84443 ASSAY THYROID STIM HORMONE: CPT | Performed by: NURSE PRACTITIONER

## 2024-11-03 PROCEDURE — 36415 COLL VENOUS BLD VENIPUNCTURE: CPT | Performed by: HOSPITALIST

## 2024-11-03 PROCEDURE — 25000003 PHARM REV CODE 250

## 2024-11-03 PROCEDURE — 99285 EMERGENCY DEPT VISIT HI MDM: CPT | Mod: 25

## 2024-11-03 PROCEDURE — 94660 CPAP INITIATION&MGMT: CPT

## 2024-11-03 PROCEDURE — 83605 ASSAY OF LACTIC ACID: CPT | Mod: 91 | Performed by: NURSE PRACTITIONER

## 2024-11-03 RX ORDER — POLYETHYLENE GLYCOL 3350 17 G/17G
17 POWDER, FOR SOLUTION ORAL DAILY PRN
Status: DISCONTINUED | OUTPATIENT
Start: 2024-11-03 | End: 2024-11-12 | Stop reason: HOSPADM

## 2024-11-03 RX ORDER — SIMETHICONE 80 MG
1 TABLET,CHEWABLE ORAL 4 TIMES DAILY PRN
Status: DISCONTINUED | OUTPATIENT
Start: 2024-11-03 | End: 2024-11-12 | Stop reason: HOSPADM

## 2024-11-03 RX ORDER — IBUPROFEN 200 MG
24 TABLET ORAL
Status: DISCONTINUED | OUTPATIENT
Start: 2024-11-03 | End: 2024-11-12 | Stop reason: HOSPADM

## 2024-11-03 RX ORDER — ACETAMINOPHEN 650 MG/1
650 SUPPOSITORY RECTAL EVERY 6 HOURS PRN
Status: DISCONTINUED | OUTPATIENT
Start: 2024-11-03 | End: 2024-11-12 | Stop reason: HOSPADM

## 2024-11-03 RX ORDER — SODIUM CHLORIDE 0.9 % (FLUSH) 0.9 %
3 SYRINGE (ML) INJECTION EVERY 12 HOURS PRN
Status: DISCONTINUED | OUTPATIENT
Start: 2024-11-03 | End: 2024-11-12 | Stop reason: HOSPADM

## 2024-11-03 RX ORDER — BISACODYL 10 MG/1
10 SUPPOSITORY RECTAL ONCE
Status: COMPLETED | OUTPATIENT
Start: 2024-11-03 | End: 2024-11-03

## 2024-11-03 RX ORDER — IBUPROFEN 200 MG
16 TABLET ORAL
Status: DISCONTINUED | OUTPATIENT
Start: 2024-11-03 | End: 2024-11-12 | Stop reason: HOSPADM

## 2024-11-03 RX ORDER — GLUCAGON 1 MG
1 KIT INJECTION
Status: DISCONTINUED | OUTPATIENT
Start: 2024-11-03 | End: 2024-11-12 | Stop reason: HOSPADM

## 2024-11-03 RX ORDER — PROMETHAZINE HYDROCHLORIDE 25 MG/1
25 TABLET ORAL EVERY 6 HOURS PRN
Status: DISCONTINUED | OUTPATIENT
Start: 2024-11-03 | End: 2024-11-12 | Stop reason: HOSPADM

## 2024-11-03 RX ORDER — NOREPINEPHRINE BITARTRATE/D5W 4MG/250ML
0-.2 PLASTIC BAG, INJECTION (ML) INTRAVENOUS CONTINUOUS
Status: DISCONTINUED | OUTPATIENT
Start: 2024-11-03 | End: 2024-11-03

## 2024-11-03 RX ORDER — ENOXAPARIN SODIUM 100 MG/ML
40 INJECTION SUBCUTANEOUS EVERY 24 HOURS
Status: DISCONTINUED | OUTPATIENT
Start: 2024-11-03 | End: 2024-11-08

## 2024-11-03 RX ORDER — ALUMINUM HYDROXIDE, MAGNESIUM HYDROXIDE, AND SIMETHICONE 1200; 120; 1200 MG/30ML; MG/30ML; MG/30ML
30 SUSPENSION ORAL 4 TIMES DAILY PRN
Status: DISCONTINUED | OUTPATIENT
Start: 2024-11-03 | End: 2024-11-12 | Stop reason: HOSPADM

## 2024-11-03 RX ORDER — INSULIN ASPART 100 [IU]/ML
0-5 INJECTION, SOLUTION INTRAVENOUS; SUBCUTANEOUS
Status: DISCONTINUED | OUTPATIENT
Start: 2024-11-03 | End: 2024-11-03

## 2024-11-03 RX ORDER — DEXTROSE MONOHYDRATE 100 MG/ML
INJECTION, SOLUTION INTRAVENOUS
Status: DISCONTINUED | OUTPATIENT
Start: 2024-11-03 | End: 2024-11-03

## 2024-11-03 RX ORDER — TALC
6 POWDER (GRAM) TOPICAL NIGHTLY PRN
Status: DISCONTINUED | OUTPATIENT
Start: 2024-11-03 | End: 2024-11-03

## 2024-11-03 RX ORDER — LEVOFLOXACIN 5 MG/ML
750 INJECTION, SOLUTION INTRAVENOUS
Status: DISCONTINUED | OUTPATIENT
Start: 2024-11-05 | End: 2024-11-03

## 2024-11-03 RX ORDER — DEXTROSE MONOHYDRATE 100 MG/ML
INJECTION, SOLUTION INTRAVENOUS CONTINUOUS
Status: DISCONTINUED | OUTPATIENT
Start: 2024-11-03 | End: 2024-11-03

## 2024-11-03 RX ORDER — ONDANSETRON HYDROCHLORIDE 2 MG/ML
4 INJECTION, SOLUTION INTRAVENOUS EVERY 8 HOURS PRN
Status: DISCONTINUED | OUTPATIENT
Start: 2024-11-03 | End: 2024-11-12 | Stop reason: HOSPADM

## 2024-11-03 RX ORDER — NALOXONE HCL 0.4 MG/ML
0.02 VIAL (ML) INJECTION
Status: DISCONTINUED | OUTPATIENT
Start: 2024-11-03 | End: 2024-11-12 | Stop reason: HOSPADM

## 2024-11-03 RX ORDER — ACETAMINOPHEN 325 MG/1
650 TABLET ORAL EVERY 6 HOURS PRN
Status: DISCONTINUED | OUTPATIENT
Start: 2024-11-03 | End: 2024-11-12 | Stop reason: HOSPADM

## 2024-11-03 RX ORDER — BUMETANIDE 0.25 MG/ML
2 INJECTION, SOLUTION INTRAMUSCULAR; INTRAVENOUS ONCE
Status: COMPLETED | OUTPATIENT
Start: 2024-11-03 | End: 2024-11-03

## 2024-11-03 RX ORDER — IPRATROPIUM BROMIDE AND ALBUTEROL SULFATE 2.5; .5 MG/3ML; MG/3ML
3 SOLUTION RESPIRATORY (INHALATION) EVERY 4 HOURS PRN
Status: DISCONTINUED | OUTPATIENT
Start: 2024-11-03 | End: 2024-11-12 | Stop reason: HOSPADM

## 2024-11-03 RX ORDER — DEXTROSE 50 % IN WATER (D50W) INTRAVENOUS SYRINGE
25
Status: COMPLETED | OUTPATIENT
Start: 2024-11-03 | End: 2024-11-03

## 2024-11-03 RX ADMIN — VANCOMYCIN HYDROCHLORIDE 1750 MG: 10 INJECTION, POWDER, LYOPHILIZED, FOR SOLUTION INTRAVENOUS at 09:11

## 2024-11-03 RX ADMIN — BISACODYL 10 MG: 10 SUPPOSITORY RECTAL at 10:11

## 2024-11-03 RX ADMIN — ACETAMINOPHEN 650 MG: 650 SUPPOSITORY RECTAL at 04:11

## 2024-11-03 RX ADMIN — DEXTROSE MONOHYDRATE 125 ML: 100 INJECTION, SOLUTION INTRAVENOUS at 04:11

## 2024-11-03 RX ADMIN — DEXTROSE MONOHYDRATE 250 ML: 100 INJECTION, SOLUTION INTRAVENOUS at 12:11

## 2024-11-03 RX ADMIN — DEXTROSE MONOHYDRATE 250 ML: 100 INJECTION, SOLUTION INTRAVENOUS at 06:11

## 2024-11-03 RX ADMIN — ENOXAPARIN SODIUM 40 MG: 40 INJECTION SUBCUTANEOUS at 05:11

## 2024-11-03 RX ADMIN — LEVOFLOXACIN 750 MG: 750 INJECTION, SOLUTION INTRAVENOUS at 12:11

## 2024-11-03 RX ADMIN — PIPERACILLIN SODIUM AND TAZOBACTAM SODIUM 4.5 G: 4; .5 INJECTION, POWDER, FOR SOLUTION INTRAVENOUS at 05:11

## 2024-11-03 RX ADMIN — DEXTROSE MONOHYDRATE: 100 INJECTION, SOLUTION INTRAVENOUS at 09:11

## 2024-11-03 RX ADMIN — DEXTROSE 50 % IN WATER (D50W) INTRAVENOUS SYRINGE 50 ML: at 12:11

## 2024-11-03 RX ADMIN — DEXTROSE MONOHYDRATE 50 ML: 25 INJECTION, SOLUTION INTRAVENOUS at 12:11

## 2024-11-03 RX ADMIN — SODIUM CHLORIDE 250 ML: 9 INJECTION, SOLUTION INTRAVENOUS at 11:11

## 2024-11-03 RX ADMIN — PIPERACILLIN SODIUM AND TAZOBACTAM SODIUM 4.5 G: 4; .5 INJECTION, POWDER, FOR SOLUTION INTRAVENOUS at 08:11

## 2024-11-03 RX ADMIN — BUMETANIDE 2 MG: 0.25 INJECTION INTRAMUSCULAR; INTRAVENOUS at 06:11

## 2024-11-03 RX ADMIN — NOREPINEPHRINE BITARTRATE 0.02 MCG/KG/MIN: 4 INJECTION, SOLUTION INTRAVENOUS at 08:11

## 2024-11-03 NOTE — ASSESSMENT & PLAN NOTE
"  * Viral Syndrome (Child)  A virus is the most common cause of illness among children. This may cause a number of different symptoms, depending on what part of the body is affected. If the virus settles in the nose, throat, and lungs, it causes cough, congestion, and sometimes headache. If it settles in the stomach and intestinal tract, it causes vomiting and diarrhea. Sometimes it causes vague symptoms of \"feeling bad all over,\" with fussiness, poor appetite, poor sleeping, and lots of crying. A light rash may also appear for the first few days, then fade away.  A viral illness usually lasts 1-2 weeks, sometimes longer. Home measures are all that is needed to treat a viral illness. Antibiotics are not helpful. Occasionally, a more serious bacterial infection can look like a viral syndrome in the first few days of the illness. Therefore, it is important to watch for the warning signs listed below.  Home Care    Fluids. Fever increases water loss from the body. For infants under 1 year old, continue regular feedings (formula or breast). Infants with fever may prefer smaller, more frequent feedings. Between feedings offer Oral Rehydration Solution (such as Pedialyte, Infalyte, or Rehydralyte, which are available from grocery and drug stores without a prescription). For children over 1 year old, give plenty of fluids like water, juice, Jell-O water, 7-Up, ginger-gina, lemonade, Edgar-Aid or popsicles.    Food. If your child doesn't want to eat solid foods, it's okay for a few days, as long as he or she drinks lots of fluid.    Activity. Keep children with fever at home resting or playing quietly. Encourage frequent naps. Your child may return to day care or school when the fever is gone and he or she is eating well and feeling better.    Sleep. Periods of sleeplessness and irritability are common. A congested child will sleep best with the head and upper body propped up on pillows or with the head of the bed frame " MRI results pending  PT/OT ordered   raised on a 6 inch block. An infant may sleep in a car-seat placed in the crib or in a baby swing.    Cough. Coughing is a normal part of this illness. A cool mist humidifier at the bedside may be helpful. Over-the-counter cough and cold medicine are not helpful in young children, but they can produce serious side effects, especially in infants under 2 years of age. Therefore, do not give over-the-counter cough and cold medicines tochildren under 6 years unless your doctor has specifically advised you to do so. Also, don t expose your child to cigarette smoke. It can make the cough worse.    Nasal congestion. Suction the nose of infants with a rubber bulb syringe. You may put 2-3 drops of saltwater (saline) nose drops in each nostril before suctioning to help remove secretions. Saline nose drops are available without a prescription. You can make it by adding 1/4 teaspoon table salt in 1 cup of water.    Fever. You may use acetaminophen (Tylenol) or ibuprofen (Motrin, Advil) to control pain and fever. [NOTE: If your child has chronic liver or kidney disease or ever had a stomach ulcer or GI bleeding, talk with your doctor before using these medicines.] (Aspirin should never be used in anyone under 18 years of age who is ill with a fever. It may cause severe liver damage.)    Prevention. Washing your hands after touching your sick child will help prevent the spread of this viral illness to yourself and to other children.  Follow-up care  Follow up as directed by our staff.  When to seek medical care  Call your doctor or get prompt medical attention for your child if any of the following occur:    Fever reaches 105.0 F (40.5  C)     Fever remains over 102.0  F (38.9  C) rectal, or 101.0  F (38.3  C) oral, for three days    Fast breathing (birth to 6 wks: over 60 breaths/min; 6 wk - 2 yr: over 45 breaths/min; 3-6 yr: over 35 breaths/min; 7-10 yrs: over 30 breaths/min; more than 10 yrs old: over 25  "breaths/min    Wheezing or difficulty breathing    Earache, sinus pain, stiff or painful neck, headache    Increasing abdominal pain or pain that is not getting better after 8 hours    Repeated diarrhea or vomiting    Unusual fussiness, drowsiness or confusion, weakness or dizziness    Appearance of a new rash    No tears when crying, \"sunken\" eyes or dry mouth; no wet diapers for 8 hours in infants, reduced urine output in older children    Burning when urinating    [id number] [employer]. [address] [place] [id number]. All rights reserved. This information is not intended as a substitute for professional medical care. Always follow your healthcare professional's instructions.        "

## 2024-11-03 NOTE — ASSESSMENT & PLAN NOTE
Patient is chronically on statin.will continue for now. Last Lipid Panel:   Lab Results   Component Value Date    CHOL 138 08/14/2024    HDL 45 08/14/2024    LDLCALC 69.4 08/14/2024    TRIG 118 08/14/2024    CHOLHDL 32.6 08/14/2024   Plan:  -Continue home medication  -low fat/low calorie diet

## 2024-11-03 NOTE — ASSESSMENT & PLAN NOTE
Recently treated with colchicine and prednisone.   Plan:  -continue to monitor and treat as needed

## 2024-11-03 NOTE — ASSESSMENT & PLAN NOTE
Patient presented with worsening lower back pain and bilateral lower extremity weakness in setting of chronic back pain and prior surgery.  MRI L-spine obtained in the ED showed multilevel degenerative changes with several levels of neural foraminal narrowing however without epidural abscess requiring equina noted.  Patient awaiting evaluation by PT/OT. Neurosurgery/Spine consulted as well.   Plan:  -Optimize pain control, titrate as needed  -Bowel regimen  -Bedrest  -Antiemetics prn  -Tylenol as needed for fever   -f/u NSGY/Spine  -PT/OT

## 2024-11-03 NOTE — ASSESSMENT & PLAN NOTE
Patient with known CAD, which is controlled Will continue  home medications  and monitor for S/Sx of angina/ACS. Continue to monitor on telemetry.

## 2024-11-03 NOTE — ASSESSMENT & PLAN NOTE
Chronic, now with BLE weakness  MRI L-spine results pending  Holding sedating medications at this time due to respiratory status/sepsis

## 2024-11-03 NOTE — PROGRESS NOTES
Pharmacist Renal Dose Adjustment Note    Santiago Khan is a 74 y.o. male being treated with the medication levofloxacin.    Patient Data:    Vital Signs (Most Recent):  Temp: 98.7 °F (37.1 °C) (11/03/24 0011)  Pulse: 99 (11/03/24 0302)  Resp: (!) 31 (11/03/24 0231)  BP: 99/67 (11/03/24 0302)  SpO2: (!) 94 % (11/03/24 0302) Vital Signs (72h Range):  Temp:  [98.2 °F (36.8 °C)-98.7 °F (37.1 °C)]   Pulse:  []   Resp:  [20-33]   BP: ()/(54-70)   SpO2:  [90 %-97 %]      Recent Labs   Lab 11/02/24  1511   CREATININE 2.2*     Serum creatinine: 2.2 mg/dL (H) 11/02/24 1511  Estimated creatinine clearance: 33.2 mL/min (A)    Levofloxacin 750 mg IV every 24 hours will be changed to levofloxacin 750 mg IV every 48 hours for CrCl 20-49 ml/min.    Pharmacist's Name: Oscar Can  Pharmacist's Extension: [phone]

## 2024-11-03 NOTE — CONSULTS
"Pharmacokinetic Initial Assessment: IV Vancomycin    Assessment/Plan:    Initiate intravenous vancomycin with loading dose of 1750 mg once with subsequent doses when random concentrations are less than 20 mcg/mL  Desired empiric serum trough concentration is 15 to 20 mcg/mL  Draw vancomycin random level on 11/4 at 0500.  Pharmacy will continue to follow and monitor vancomycin.      Please contact pharmacy at extension [phone] with any questions regarding this assessment.     Thank you for the consult,   Alice Leo, PharmD 11/3/2024 1:39 PM         Patient brief summary:  Santiago Khan is a 74 y.o. male initiated on antimicrobial therapy with IV Vancomycin for treatment of suspected bacteremia    Drug Allergies:   Review of patient's allergies indicates:   Allergen Reactions    Amitriptyline Rash    Celecoxib Rash       Actual Body Weight:   93.4 kg    Renal Function:   Estimated Creatinine Clearance: 31.2 mL/min (A) (based on SCr of 2.3 mg/dL (H)).,     Dialysis Method (if applicable):  N/A    CBC (last 72 hours):  Recent Labs   Lab Result Units 11/02/24  1511 11/03/24  0508   WBC K/uL 19.25* 18.28*   Hemoglobin g/dL 13.2* 12.5*   Hematocrit % 40.4 40.4   Platelets K/uL 272 292   Gran % % 88.2* 88.7*   Lymph % % 4.6* 3.6*   Mono % % 4.1 4.5   Eosinophil % % 0.4 0.2   Basophil % % 0.3 0.3   Differential Method  Automated Automated       Metabolic Panel (last 72 hours):  Recent Labs   Lab Result Units 11/02/24  1511 11/03/24  0508   Sodium mmol/L 134* 136   Potassium mmol/L 4.9 4.5   Chloride mmol/L 98 99   CO2 mmol/L 24 26   Glucose mg/dL 156* 71   Glucose, UA  1+*  --    BUN mg/dL 45* 48*   Creatinine mg/dL 2.2* 2.3*   Albumin g/dL 2.7* 2.5*   Total Bilirubin mg/dL 1.2* 1.3*   Alkaline Phosphatase U/L 256* 249*   AST U/L 23 21   ALT U/L 35 32       Drug levels (last 3 results):  No results for input(s): "VANCOMYCINRA", "VANCORANDOM", "VANCOMYCINPE", "VANCOPEAK", "VANCOMYCINTR", "VANCOTROUGH" in the last " 72 hours.    Microbiologic Results:  Microbiology Results (last 7 days)       Procedure Component Value Units Date/Time    Respiratory Infection Panel (PCR), Nasopharyngeal [[id number]] Collected: 11/03/24 1045    Order Status: Completed Specimen: Nasopharyngeal Swab Updated: 11/03/24 1201     Respiratory Infection Panel Source NP Swab     Adenovirus Not Detected     Coronavirus 229E, Common Cold Virus Not Detected     Coronavirus HKU1, Common Cold Virus Not Detected     Coronavirus NL63, Common Cold Virus Not Detected     Coronavirus OC43, Common Cold Virus Not Detected     Comment: The Coronavirus strains detected in this test cause the common cold.  These strains are not the COVID-19 (novel Coronavirus)strain   associated with the respiratory disease outbreak.          SARS-CoV2 (COVID-19) Qualitative PCR Not Detected     Human Metapneumovirus Not Detected     Human Rhinovirus/Enterovirus Not Detected     Influenza A (subtypes H1, H1-2009,H3) Not Detected     Influenza B Not Detected     Parainfluenza Virus 1 Not Detected     Parainfluenza Virus 2 Not Detected     Parainfluenza Virus 3 Not Detected     Parainfluenza Virus 4 Not Detected     Respiratory Syncytial Virus Not Detected     Bordetella Parapertussis (OP6909) Not Detected     Bordetella pertussis (ptxP) Not Detected     Chlamydia pneumoniae Not Detected     Mycoplasma pneumoniae Not Detected     Comment: Respiratory Infection Panel testing performed by Multiplex PCR.       Narrative:      Assay not valid for lower respiratory specimens, alternate  testing required.    Blood culture [[id number]] Collected: 11/03/24 0125    Order Status: Sent Specimen: Blood from Peripheral, Antecubital, Left Updated: 11/03/24 1118    Blood culture [[id number]] Collected: 11/03/24 0125    Order Status: Sent Specimen: Blood from Peripheral, Hand, Right Updated: 11/03/24 1118    Respiratory Infection Panel (PCR), Nasopharyngeal [[id number]] Collected: 11/03/24 1009    Order  Status: Canceled Specimen: Nasopharyngeal Swab     Influenza A & B by Molecular [[id number]] Collected: 11/03/24 0607    Order Status: Completed Specimen: Nasopharyngeal Swab Updated: 11/03/24 0839     Influenza A, Molecular Negative     Influenza B, Molecular Negative     Flu A & B Source Nasal swab

## 2024-11-03 NOTE — ASSESSMENT & PLAN NOTE
Patient's COPD is with exacerbation noted by continued dyspnea and worsening of baseline hypoxia currently.  Patient is currently off COPD Pathway. Continue scheduled inhalers  BiPAP, Steroids, Antibiotics, and Supplemental oxygen and monitor respiratory status closely.

## 2024-11-03 NOTE — HPI
Santiago Khan is a 74 y.o. male with a PMH of Chronic combined systolic and diastolic congestive heart failure (07/09/2020), Chronic kidney disease, stage 3, Chronic obstructive pulmonary disease (03/20/2023), Chronic venous insufficiency, DDD (degenerative disc disease), lumbar, DM (diabetes mellitus) with complications, History of gout, Hyperlipidemia associated with type 2 diabetes mellitus, Hypertension associated with stage 3 chronic kidney disease due to type 2 diabetes mellitus, BRADLEY on CPAP, and Prostate cancer who presented to the ER on 11/2 for further evaluation of worsening bilateral lower extremity weakness, acute on chronic lumbar back pain, and abdominal pain over the past 3-4 days. Patient reported being constipated with last bowel movement 11/1 which was normal and reported his back pain has caused his legs to become weak resulting in increased difficulty walking. He reports taking chronic pain medications and had back surgery in 1982. Patient was seen at urgent care back on 10/27 for treatment of acute gout flare in his right hand/wrist and was provided prednisone after home allopurinol and colchicine provided little to no relief.  Patient denied any recent falls or trauma and was initially somnolent following administration of lorazepam in the ED to obtain the MRI of his lumbar spine. Upon evaluation of patient, patient had increased respiratory distress requiring up titration of supplemental oxygen in addition to bilateral crackles on lung auscultation, lower extremity edema, and right lower extremity erythremia. Family at bedside reported patient had difficulty taking his home medications, had productive cough, and unsure what his dietary/fluid intake has been like. Patient was noted to be diaphoretic, restless, and becoming more lethargic during bedside assessment.  Stat ABG revealed worsening respiratory status and was placed on BiPAP and provided 2 mg IV Bumex.  Patient also noted to  be become hypoglycemic again requiring dextrose infusion. Patient admitted to Hospital Medicine inpatient for continued medical management. Neurosurgery/Spine as well as PT/OT consulted regarding lower extremity weakness.       Patient was admitted to hospital medicine. Became lethargic and hypercapnic on the floor requiring continuous bipap, and hypoglycemic requiring continuous D10 drip. Critical care was consulted upon transfer to ICU.

## 2024-11-03 NOTE — ASSESSMENT & PLAN NOTE
Patient with Hypercapnic Respiratory failure which is Acute on chronic.  he is not on home oxygen. Supplemental oxygen was provided and noted- Oxygen Concentration (%):  [30] 30    .   Signs/symptoms of respiratory failure include- lethargy. Contributing diagnoses includes - CHF and COPD Labs and images were reviewed. Patient Has recent ABG, which has been reviewed. Will treat underlying causes and adjust management of respiratory failure as follows-     Patient received sedatives for MRI exam, could have contributed to hypercapnia  Continue BIPAP, will monitor for improvement in mentation  ABG prn  Cardiac monitoring  Cont pulse oximetry

## 2024-11-03 NOTE — PROGRESS NOTES
Pharmacist Renal Dose Adjustment Note    Santiago Khan is a 74 y.o. male being treated with the medication Zosyn.    Patient Data:    Vital Signs (Most Recent):  Temp: 96.6 °F (35.9 °C) (11/03/24 0745)  Pulse: 64 (11/03/24 0745)  Resp: (!) 25 (11/03/24 0745)  BP: (!) 95/51 (11/03/24 0745)  SpO2: (!) 86 % (11/03/24 0745) Vital Signs (72h Range):  Temp:  [96.6 °F (35.9 °C)-100.6 °F (38.1 °C)]   Pulse:  []   Resp:  [18-50]   BP: ()/(51-72)   SpO2:  [86 %-99 %]      Recent Labs   Lab 11/02/24  1511 11/03/24  0508   CREATININE 2.2* 2.3*     Serum creatinine: 2.3 mg/dL (H) 11/03/24 0508  Estimated creatinine clearance: 31.2 mL/min (A)    Zosyn 4.5 g every 12 hours was adjusted to every 8 hours according to [facility]'s renal dose adjustment policy.    Pharmacist's Name: Alice Leo PharmLUBA 11/3/2024 8:26 AM  Pharmacist's Extension: [phone]

## 2024-11-03 NOTE — ASSESSMENT & PLAN NOTE
ROSARIO is likely due to pre-renal azotemia due to intravascular volume depletion. Baseline creatinine is  1.5-1.7 . Most recent creatinine and eGFR are listed below.  Recent Labs     11/02/24  1511   CREATININE 2.2*   EGFRNORACEVR 31*   Plan  -ROSARIO is  currently undergoing medical managment  -Avoid nephrotoxins and renally dose meds for GFR listed above  -Monitor urine output, serial BMP, and adjust therapy as needed  -Continue home medications and treatment of CHF

## 2024-11-03 NOTE — PLAN OF CARE
Pt AAOx4. NSR on monitor with PACs. Afebrile. BP stable off levo at this time. 2L O2 NC. BG monitored Q1H, D10 gtt off. Perez intact. Pt turned/repositioned with pillows & wedge. Heel boots in place. PIVs intact. Bed low, wheels locked, alarms audible. POC reviewed.

## 2024-11-03 NOTE — PT/OT/SLP PROGRESS
Physical Therapy      Patient Name:  Santiago Khan   MRN:  [id number]    Patient not seen today secondary to Other (Comment). Chart review performed. PT arrived to floor to see pt, pt transferring to ICU d/t need for continual BiPAP and lethargy. Will hold  PT evaluation per discussion with nursing and follow-up at next available time.

## 2024-11-03 NOTE — ASSESSMENT & PLAN NOTE
Patient has Combined Systolic and Diastolic heart failure that is Acute on chronic. On presentation their CHF was decompensated. Evidence of decompensated CHF on presentation includes: crackles on lung auscultation, dyspnea on exertion (GOVEA), and shortness of breath. Most recent BNP and echo results are listed below.  Recent Labs     11/02/24 2045   *     Latest ECHO  Results for orders placed during the hospital encounter of 10/23/23    Echo    Interpretation Summary    Left Ventricle: The left ventricle is normal in size. Normal wall thickness. There is mild concentric hypertrophy. regional wall motion abnormalities present. There is low normal systolic function with a visually estimated ejection fraction of 50 - 55%. Biplane (2D) method of discs ejection fraction is 49%. There is normal diastolic function.    Left Atrium: Left atrium is mildly dilated.    Right Ventricle: Normal right ventricular cavity size. Wall thickness is normal. Right ventricle wall motion  is normal. Systolic function is normal.    Mitral Valve: There is mild to moderate regurgitation.    Tricuspid Valve: There is mild regurgitation.    Pulmonary Artery: There is moderate pulmonary hypertension. The estimated pulmonary artery systolic pressure is 50 mmHg.    IVC/SVC: Normal venous pressure at 3 mmHg.    Current Heart Failure Medications  NORepinephrine 4 mg in dextrose 5% 250 mL infusion (premix), Continuous, Intravenous    Plan  - Monitor strict I&Os and daily weights.    - Place on telemetry  - NPO  - Cardiology has not been consulted  - Patient with decreased intake prior to admit, diuresed with bumex for resp failure, now on cont BIPAP. However, requiring Levophed to maintain BP.  Will consider small fluid bolus.

## 2024-11-03 NOTE — NURSING
Pt transferred to ICU bed #4 by myself and respiratory, along with Bipap. Pt's friend accompanied us with pt's belongings..    Heart monitor removed and given to monitor room

## 2024-11-03 NOTE — ASSESSMENT & PLAN NOTE
"This patient does have evidence of infective focus  My overall impression is septic shock due to MAP < 65.  Source: Unknown  Antibiotics given-   Antibiotics (72h ago, onward)      Start     Stop Route Frequency Ordered    11/03/24 0930  vancomycin 1.75 g in 5 % dextrose 500 mL IVPB         -- IV Once 11/03/24 0825 11/03/24 0930  piperacillin-tazobactam (ZOSYN) 4.5 g in D5W 100 mL IVPB (MB+)         -- IV Every 8 hours (non-standard times) 11/03/24 0825 11/03/24 0921  vancomycin - pharmacy to dose  (vancomycin IVPB (PEDS and ADULTS))        Placed in "And" Linked Group    -- IV pharmacy to manage frequency 11/03/24 0821          Latest lactate reviewed-  Recent Labs   Lab 11/03/24 0842   LACTATE 0.8     Organ dysfunction indicated by Acute kidney injury, Acute respiratory failure, Acute heart failure, and Encephalopathy    Fluid challenge Contraindicated- Fluid bolus is contraindicated in this patient due to Congestive Heart Failure     Will Start Pressors- Levophed for MAP of 65    Procal elevated  Vanc and zosyn started  Cultures pending  Resp viral panel sent  If increasing pressor requirements will need a line  "

## 2024-11-03 NOTE — ASSESSMENT & PLAN NOTE
Patient has Combined Systolic and Diastolic heart failure that is Acute on chronic. On presentation their CHF was decompensated. Evidence of decompensated CHF on presentation includes: edema, orthopnea, paroxysmal nocturnal dyspnea (PND), dyspnea on exertion (GOVEA), and shortness of breath. The etiology of their decompensation is likely dietary indiscretion, increased fluid intake, and infection . Most recent BNP and echo results are listed below.  Recent Labs     11/02/24 2045   *     Latest ECHO  Results for orders placed during the hospital encounter of 10/23/23    Echo    Interpretation Summary    Left Ventricle: The left ventricle is normal in size. Normal wall thickness. There is mild concentric hypertrophy. regional wall motion abnormalities present. There is low normal systolic function with a visually estimated ejection fraction of 50 - 55%. Biplane (2D) method of discs ejection fraction is 49%. There is normal diastolic function.    Left Atrium: Left atrium is mildly dilated.    Right Ventricle: Normal right ventricular cavity size. Wall thickness is normal. Right ventricle wall motion  is normal. Systolic function is normal.    Mitral Valve: There is mild to moderate regurgitation.    Tricuspid Valve: There is mild regurgitation.    Pulmonary Artery: There is moderate pulmonary hypertension. The estimated pulmonary artery systolic pressure is 50 mmHg.    IVC/SVC: Normal venous pressure at 3 mmHg.    Current Heart Failure Medications       Plan  -Monitor strict I&Os and daily weights.    -Place on telemetry  -Low sodium diet  -Place on fluid restriction of 1.5 L.   -Cardiology has not been consulted  -The patient's volume status is stable but not at their baseline as indicated by edema, orthopnea, paroxysmal nocturnal dyspnea (PND), and shortness of breath  -Continue home medications

## 2024-11-03 NOTE — ASSESSMENT & PLAN NOTE
Patient's FSGs are uncontrolled due to hypoglycemia on current medication regimen.  Last A1c reviewed-   Lab Results   Component Value Date    HGBA1C 5.6 08/14/2024     Most recent fingerstick glucose reviewed-   Recent Labs   Lab 11/02/24  1423 11/03/24  0021 11/03/24  0033 11/03/24  0122   POCTGLUCOSE 150* 33* 155* 182*     Current correctional scale  Low  Titrate as needed  anti-hyperglycemic dose as follows-   Antihyperglycemics (From admission, onward)      Start     Stop Route Frequency Ordered    11/03/24 0408  insulin aspart U-100 pen 0-5 Units         -- SubQ Before meals & nightly PRN 11/03/24 0309        Plan:  -SSI  -A1c  -Accu-checks  -Hold oral hypoglycemics while patient is in the hospital  -Hypoglycemic protocol

## 2024-11-03 NOTE — ASSESSMENT & PLAN NOTE
Body mass index is 32.25 kg/m². Morbid obesity complicates all aspects of disease management from diagnostic modalities to treatment. Weight loss encouraged and health benefits explained to patient.

## 2024-11-03 NOTE — ASSESSMENT & PLAN NOTE
Chronic, controlled.  Latest blood pressure and vitals reviewed-   Temp:  [98.2 °F (36.8 °C)-98.7 °F (37.1 °C)]   Pulse:  []   Resp:  [20-50]   BP: ()/(54-70)   SpO2:  [90 %-99 %] .   Home meds for hypertension were reviewed and noted below.   Hypertension Medications               bisoprolol (ZEBETA) 5 MG tablet TAKE 1/2 TABLET BY MOUTH EVERY DAY    torsemide (DEMADEX) 20 MG Tab Take 2 tablets (40 mg total) by mouth once daily.     While in the hospital, will manage blood pressure as follows; Continue home antihypertensive regimen    Will utilize p.r.n. blood pressure medication only if patient's blood pressure greater than  180/110 and he develops symptoms such as worsening chest pain or shortness of breath.

## 2024-11-03 NOTE — H&P
[facility] ([facility])  Critical Care Medicine  History & Physical    Patient Name: Santiago Khan  MRN: [id number]  Admission Date: 11/2/2024  Hospital Length of Stay: 0 days  Code Status: Full Code  Attending Physician: Mohan Shukla MD   Primary Care Provider: Kashif Acosta MD   Principal Problem: Weakness of both lower extremities    Subjective:     HPI:  Santiago Khan is a 74 y.o. male with a PMH of Chronic combined systolic and diastolic congestive heart failure (07/09/2020), Chronic kidney disease, stage 3, Chronic obstructive pulmonary disease (03/20/2023), Chronic venous insufficiency, DDD (degenerative disc disease), lumbar, DM (diabetes mellitus) with complications, History of gout, Hyperlipidemia associated with type 2 diabetes mellitus, Hypertension associated with stage 3 chronic kidney disease due to type 2 diabetes mellitus, BRADLEY on CPAP, and Prostate cancer who presented to the ER on 11/2 for further evaluation of worsening bilateral lower extremity weakness, acute on chronic lumbar back pain, and abdominal pain over the past 3-4 days. Patient reported being constipated with last bowel movement 11/1 which was normal and reported his back pain has caused his legs to become weak resulting in increased difficulty walking. He reports taking chronic pain medications and had back surgery in 1982. Patient was seen at urgent care back on 10/27 for treatment of acute gout flare in his right hand/wrist and was provided prednisone after home allopurinol and colchicine provided little to no relief.  Patient denied any recent falls or trauma and was initially somnolent following administration of lorazepam in the ED to obtain the MRI of his lumbar spine. Upon evaluation of patient, patient had increased respiratory distress requiring up titration of supplemental oxygen in addition to bilateral crackles on lung auscultation, lower extremity edema, and right lower extremity  erythremia. Family at bedside reported patient had difficulty taking his home medications, had productive cough, and unsure what his dietary/fluid intake has been like. Patient was noted to be diaphoretic, restless, and becoming more lethargic during bedside assessment.  Stat ABG revealed worsening respiratory status and was placed on BiPAP and provided 2 mg IV Bumex.  Patient also noted to be become hypoglycemic again requiring dextrose infusion. Patient admitted to Hospital Medicine inpatient for continued medical management. Neurosurgery/Spine as well as PT/OT consulted regarding lower extremity weakness.       Patient was admitted to hospital medicine. Became lethargic and hypercapnic on the floor requiring continuous bipap, and hypoglycemic requiring continuous D10 drip. Critical care was consulted upon transfer to ICU.    Hospital/ICU Course:  No notes on file     Past Medical History:   Diagnosis Date    Chronic combined systolic and diastolic congestive heart failure 07/09/2020    Chronic kidney disease, stage 3     Chronic obstructive pulmonary disease 03/20/2023    Wixela 250 mcg, Spiriva respimat. Continue Albuterol inhaler and albuterol nebs   Referral pul dis management, education and assistance with medication  Patient preference to only see a doctor, request follow up with Dr. Springer             Chronic venous insufficiency     DDD (degenerative disc disease), lumbar     DM (diabetes mellitus) with complications     History of gout     Hyperlipidemia associated with type 2 diabetes mellitus     Hypertension associated with stage 3 chronic kidney disease due to type 2 diabetes mellitus     BRADLEY on CPAP     Prostate cancer     prostatectomy in 2010, rising PSA that started in 2021       Past Surgical History:   Procedure Laterality Date    BICEPS TENDON REPAIR      COLONOSCOPY N/A 03/27/2019    Procedure: COLONOSCOPY;  Surgeon: James Roger MD;  Location: [facility];  Service: Endoscopy;  Laterality:  N/A;    EXPLORATION OF ORBIT Right 2023    Procedure: EXPLORATION, ORBIT;  Surgeon: Junaid Bartholomew MD;  Location: [facility] OR;  Service: ENT;  Laterality: Right;  possibly need frozen    HEMORRHOID SURGERY      INGUINAL HERNIA REPAIR Left     KNEE ARTHROSCOPY Right     LEFT HEART CATHETERIZATION Left 2020    Procedure: CATHETERIZATION, HEART, LEFT;  Surgeon: Cheli Wilson MD;  Location: [facility] CATH LAB;  Service: Cardiology;  Laterality: Left;    LUMBAR LAMINECTOMY      PROSTATECTOMY      TONSILLECTOMY         Review of patient's allergies indicates:   Allergen Reactions    Amitriptyline Rash    Celecoxib Rash       Family History       Problem Relation (Age of Onset)    Alzheimer's disease Father    Coronary artery disease Father (90)    Hyperlipidemia Mother    Prostate cancer Father          Tobacco Use    Smoking status: Former     Current packs/day: 0.00     Average packs/day: 1.5 packs/day for 52.7 years (79.1 ttl pk-yrs)     Types: Cigarettes     Start date: 1971     Quit date: 9/15/2023     Years since quittin.1    Smokeless tobacco: Former   Substance and Sexual Activity    Alcohol use: Not Currently    Drug use: Never    Sexual activity: Not Currently     Partners: Female         Review of Systems   Unable to perform ROS: Acuity of condition   Continuous BIPAP, encephalopathy    Objective:     Vital Signs (Most Recent):  Temp: 99.6 °F (37.6 °C) (24 0503)  Pulse: 97 (24 050)  Resp: (!) 33 (24)  BP: 109/61 (map 77) (24 0620)  SpO2: (!) 94 % (24 050) Vital Signs (24h Range):  Temp:  [98.2 °F (36.8 °C)-100.6 °F (38.1 °C)] 99.6 °F (37.6 °C)  Pulse:  [] 97  Resp:  [18-50] 33  SpO2:  [90 %-99 %] 94 %  BP: ()/(52-72) 109/61     Weight: 93.4 kg (205 lb 14.6 oz)  Body mass index is 32.25 kg/m².      Intake/Output Summary (Last 24 hours) at 11/3/2024 0710  Last data filed at 11/3/2024 0211  Gross per 24 hour   Intake 400 ml   Output 250 ml   Net 150 ml         Physical Exam  Constitutional:       General: He is in acute distress.      Appearance: He is ill-appearing.   HENT:      Head: Normocephalic.      Nose: Nose normal.      Mouth/Throat:      Mouth: Mucous membranes are moist.   Eyes:      Pupils: Pupils are equal, round, and reactive to light.   Cardiovascular:      Rate and Rhythm: Normal rate and regular rhythm.   Pulmonary:      Effort: No respiratory distress.      Breath sounds: Rhonchi present.      Comments: BIPAP mask in place  Abdominal:      General: There is no distension.      Palpations: Abdomen is soft.      Tenderness: There is abdominal tenderness.   Skin:     Capillary Refill: Capillary refill takes 2 to 3 seconds.      Comments: RLE erythema    Neurological:      Mental Status: He is lethargic.      GCS: GCS eye subscore is 3. GCS verbal subscore is 4. GCS motor subscore is 6.      Motor: Weakness present.      Comments: Wakens to voice  Follows commands, falls back asleep  BUE antigravity  BLE weakness, able to lift antigravity with effort, unable to hold antigravity- drifts, can bend at the knee easily            Vents:  Oxygen Concentration (%): 30 (11/03/24 0508)    Lines/Drains/Airways       Peripheral Intravenous Line  Duration                  Peripheral IV - Single Lumen 11/02/24 1504 20 G Left Antecubital <1 day                    Significant Labs:    CBC/Anemia Profile:  Recent Labs   Lab 11/02/24  1511 11/03/24  0508   WBC 19.25* 18.28*   HGB 13.2* 12.5*   HCT 40.4 40.4    292   MCV 91 94   RDW 18.6* 18.5*        Chemistries:  Recent Labs   Lab 11/02/24  1511 11/03/24  0508   * 136   K 4.9 4.5   CL 98 99   CO2 24 26   BUN 45* 48*   CREATININE 2.2* 2.3*   CALCIUM 9.6 9.6   ALBUMIN 2.7* 2.5*   PROT 7.3 7.0   BILITOT 1.2* 1.3*   ALKPHOS 256* 249*   ALT 35 32   AST 23 21       All pertinent labs within the past 24 hours have been reviewed.    Significant Imaging:   I have reviewed all pertinent imaging results/findings  within the past 24 hours.  Assessment/Plan:     Neuro  Acute encephalopathy  Due to hypercapnia    Pulmonary  Acute respiratory failure with hypoxia and hypercarbia  Patient with Hypercapnic Respiratory failure which is Acute on chronic.  he is not on home oxygen. Supplemental oxygen was provided and noted- Oxygen Concentration (%):  [30] 30    .   Signs/symptoms of respiratory failure include- lethargy. Contributing diagnoses includes - CHF and COPD Labs and images were reviewed. Patient Has recent ABG, which has been reviewed. Will treat underlying causes and adjust management of respiratory failure as follows-     Patient received sedatives for MRI exam, could have contributed to hypercapnia  Continue BIPAP, will monitor for improvement in mentation  ABG prn  Cardiac monitoring  Cont pulse oximetry    Chronic obstructive pulmonary disease  Duonebs ordered  On BIPAP due to hypercapnia  spO2 goal > 88%    Cardiac/Vascular  Acute on chronic combined systolic and diastolic congestive heart failure  Patient has Combined Systolic and Diastolic heart failure that is Acute on chronic. On presentation their CHF was decompensated. Evidence of decompensated CHF on presentation includes: crackles on lung auscultation, dyspnea on exertion (GOVEA), and shortness of breath. Most recent BNP and echo results are listed below.  Recent Labs     11/02/24 2045   *     Latest ECHO  Results for orders placed during the hospital encounter of 10/23/23    Echo    Interpretation Summary    Left Ventricle: The left ventricle is normal in size. Normal wall thickness. There is mild concentric hypertrophy. regional wall motion abnormalities present. There is low normal systolic function with a visually estimated ejection fraction of 50 - 55%. Biplane (2D) method of discs ejection fraction is 49%. There is normal diastolic function.    Left Atrium: Left atrium is mildly dilated.    Right Ventricle: Normal right ventricular cavity size.  "Wall thickness is normal. Right ventricle wall motion  is normal. Systolic function is normal.    Mitral Valve: There is mild to moderate regurgitation.    Tricuspid Valve: There is mild regurgitation.    Pulmonary Artery: There is moderate pulmonary hypertension. The estimated pulmonary artery systolic pressure is 50 mmHg.    IVC/SVC: Normal venous pressure at 3 mmHg.    Current Heart Failure Medications  NORepinephrine 4 mg in dextrose 5% 250 mL infusion (premix), Continuous, Intravenous    Plan  - Monitor strict I&Os and daily weights.    - Place on telemetry  - NPO  - Cardiology has not been consulted  - Patient with decreased intake prior to admit, diuresed with bumex for resp failure, now on cont BIPAP. However, requiring Levophed to maintain BP.  Will consider small fluid bolus.     Renal/  Acute kidney injury superimposed on chronic kidney disease  ROSARIO is likely due to pre-renal azotemia due to intravascular volume depletion. Baseline creatinine is  1.6 . Most recent creatinine and eGFR are listed below.  Recent Labs     11/02/24  1511 11/03/24  0508   CREATININE 2.2* 2.3*   EGFRNORACEVR 31* 29*      Plan  - Avoid nephrotoxins and renally dose meds for GFR listed above  - Monitor urine output, serial BMP, and adjust therapy as needed  - Decreased intake per family, may need additional fluid  - Maintain Perez cath    ID  Severe sepsis  This patient does have evidence of infective focus  My overall impression is septic shock due to MAP < 65.  Source: Unknown  Antibiotics given-   Antibiotics (72h ago, onward)      Start     Stop Route Frequency Ordered    11/03/24 0930  vancomycin 1.75 g in 5 % dextrose 500 mL IVPB         -- IV Once 11/03/24 0825    11/03/24 0930  piperacillin-tazobactam (ZOSYN) 4.5 g in D5W 100 mL IVPB (MB+)         -- IV Every 8 hours (non-standard times) 11/03/24 0825    11/03/24 0921  vancomycin - pharmacy to dose  (vancomycin IVPB (PEDS and ADULTS))        Placed in "And" Linked Group "    -- IV pharmacy to manage frequency 11/03/24 0821          Latest lactate reviewed-  Recent Labs   Lab 11/03/24  0842   LACTATE 0.8     Organ dysfunction indicated by Acute kidney injury, Acute respiratory failure, Acute heart failure, and Encephalopathy    Fluid challenge Contraindicated- Fluid bolus is contraindicated in this patient due to Congestive Heart Failure     Will Start Pressors- Levophed for MAP of 65    Procal elevated  Vanc and zosyn started  Cultures pending  Resp viral panel sent  If increasing pressor requirements will need a line    Cellulitis of right lower extremity  Antibiotics started for possible sepsis, will narrow once cultures result    Endocrine  Hypoglycemia  Hypoglycemic requiring D10 gtt  POCT BG q1hr    Class 1 obesity due to excess calories with serious comorbidity and body mass index (BMI) of 33.0 to 33.9 in adult  Body mass index is 32.25 kg/m². Morbid obesity complicates all aspects of disease management from diagnostic modalities to treatment. Weight loss encouraged and health benefits explained to patient.         GI  Drug-induced constipation  Patient with chronic pain, on pain regimen at home  Presented with constipation x 3-4 days, abdominal pain/tenderness  KUB to rule out obstruction  Will start bowel regimen when appropriate    Orthopedic  * Weakness of both lower extremities  MRI results pending  PT/OT ordered    Lower back pain  Chronic, now with BLE weakness  MRI L-spine results pending  Holding sedating medications at this time due to respiratory status/sepsis       PUP: Famotidine  DVT PPX: Subq heparin    Critical Care Time: 42 minutes  Critical secondary to Patient has a condition that poses threat to life and bodily function: Acute Renal Failure and Acute respiratory failure, Sepsis    Critical care was time spent personally by me on the following activities: development of treatment plan with patient or surrogate and bedside caregivers, discussions with  consultants, evaluation of patient's response to treatment, examination of patient, ordering and performing treatments and interventions, ordering and review of laboratory studies, ordering and review of radiographic studies, pulse oximetry, re-evaluation of patient's condition. This critical care time did not overlap with that of any other provider or involve time for any procedures.     Guadalupe Hunt NP  Critical Care Medicine  [facility] - Intensive Care ([facility])

## 2024-11-03 NOTE — ASSESSMENT & PLAN NOTE
Body mass index is 33.63 kg/m². Morbid obesity complicates all aspects of disease management from diagnostic modalities to treatment. Weight loss encouraged and health benefits explained to patient.

## 2024-11-03 NOTE — SUBJECTIVE & OBJECTIVE
Past Medical History:   Diagnosis Date    Chronic combined systolic and diastolic congestive heart failure 07/09/2020    Chronic kidney disease, stage 3     Chronic obstructive pulmonary disease 03/20/2023    Wixela 250 mcg, Spiriva respimat. Continue Albuterol inhaler and albuterol nebs   Referral pul dis management, education and assistance with medication  Patient preference to only see a doctor, request follow up with Dr. Springer             Chronic venous insufficiency     DDD (degenerative disc disease), lumbar     DM (diabetes mellitus) with complications     History of gout     Hyperlipidemia associated with type 2 diabetes mellitus     Hypertension associated with stage 3 chronic kidney disease due to type 2 diabetes mellitus     BRADLEY on CPAP     Prostate cancer     prostatectomy in 2010, rising PSA that started in 2021       Past Surgical History:   Procedure Laterality Date    BICEPS TENDON REPAIR      COLONOSCOPY N/A 03/27/2019    Procedure: COLONOSCOPY;  Surgeon: James Roger MD;  Location: [facility] ENDO;  Service: Endoscopy;  Laterality: N/A;    EXPLORATION OF ORBIT Right 9/8/2023    Procedure: EXPLORATION, ORBIT;  Surgeon: Junaid Bartholomew MD;  Location: [facility] OR;  Service: ENT;  Laterality: Right;  possibly need frozen    HEMORRHOID SURGERY      INGUINAL HERNIA REPAIR Left     KNEE ARTHROSCOPY Right     LEFT HEART CATHETERIZATION Left 06/29/2020    Procedure: CATHETERIZATION, HEART, LEFT;  Surgeon: Cheli Wilson MD;  Location: [facility] CATH LAB;  Service: Cardiology;  Laterality: Left;    LUMBAR LAMINECTOMY      PROSTATECTOMY      TONSILLECTOMY         Review of patient's allergies indicates:   Allergen Reactions    Amitriptyline Rash    Celecoxib Rash       No current facility-administered medications on file prior to encounter.     Current Outpatient Medications on File Prior to Encounter   Medication Sig    adalimumab (HUMIRA,CF, PEN) 40 mg/0.4 mL PnKt Inject 0.4 mLs (40 mg total) into the skin every 14  (fourteen) days.    albuterol (PROVENTIL/VENTOLIN HFA) 90 mcg/actuation inhaler INHALE 2 PUFFS INTO THE LUNGS EVERY 4 HOURS AS NEEDED    allopurinoL (ZYLOPRIM) 100 MG tablet TAKE 1 TABLET(100 MG) BY MOUTH EVERY DAY    allopurinoL (ZYLOPRIM) 300 MG tablet TAKE 1 TABLET(300 MG) BY MOUTH EVERY DAY    aspirin (ECOTRIN) 81 MG EC tablet Take 81 mg by mouth once daily.    baclofen (LIORESAL) 10 MG tablet Take 1 tablet by mouth every evening.    bisoprolol (ZEBETA) 5 MG tablet TAKE 1/2 TABLET BY MOUTH EVERY DAY    blood sugar diagnostic Strp Check blood glucose twice per day    blood-glucose meter (ACCU-CHEK GUIDE ME GLUCOSE MTR) Misc USE AS INSTRUCTED    colchicine (COLCRYS) 0.6 mg tablet Take 1 tablet (0.6 mg total) by mouth daily as needed (gout flare).    empagliflozin (JARDIANCE) 10 mg tablet Take 1 tablet (10 mg total) by mouth once daily.    fluticasone propionate (FLONASE) 50 mcg/actuation nasal spray SHAKE LIQUID AND USE 2 SPRAYS(100 MCG) IN EACH NOSTRIL EVERY DAY Strength: 50 mcg/actuation    glimepiride (AMARYL) 4 MG tablet Take 1 tablet (4 mg total) by mouth before breakfast.    guaiFENesin 100 mg/5 ml (ROBITUSSIN) 100 mg/5 mL syrup Take 200 mg by mouth every evening.    HYDROcodone-acetaminophen (NORCO) 7.5-325 mg per tablet Take 1 tablet by mouth every 4 (four) hours as needed (for chronic pain).    lancets (ACCU-CHEK SOFTCLIX LANCETS) Misc Check BG daily    latanoprost 0.005 % ophthalmic solution Place 1 drop into the right eye every evening.    magnesium oxide (MAG-OX) 400 mg (241.3 mg magnesium) tablet TAKE 2 TABLETS(800 MG) BY MOUTH TWICE DAILY    metFORMIN (GLUCOPHAGE) 500 MG tablet TAKE 1 TABLET(500 MG) BY MOUTH DAILY WITH BREAKFAST    multivitamin-minerals-lutein Tab Take 1 tablet by mouth Daily.    omeprazole (PRILOSEC) 40 MG capsule TAKE 1 CAPSULE BY MOUTH EVERY DAY    potassium chloride SA (K-DUR,KLOR-CON) 20 MEQ tablet TAKE 3 TABLETS BY MOUTH TWICE DAILY    simvastatin (ZOCOR) 40 MG tablet TAKE 1  TABLET(40 MG) BY MOUTH EVERY EVENING    torsemide (DEMADEX) 20 MG Tab Take 2 tablets (40 mg total) by mouth once daily.    varenicline (CHANTIX) 1 mg Tab take 1 tablet by mouth twice daily. Take with full glass of water & with food to avoid nausea (Patient not taking: Reported on 10/28/2024)    WIXELA INHUB 250-50 mcg/dose diskus inhaler INHALE 1 PUFF INTO THE LUNGS TWICE DAILY     Family History       Problem Relation (Age of Onset)    Alzheimer's disease Father    Coronary artery disease Father (90)    Hyperlipidemia Mother    Prostate cancer Father          Tobacco Use    Smoking status: Former     Current packs/day: 0.00     Average packs/day: 1.5 packs/day for 52.7 years (79.1 ttl pk-yrs)     Types: Cigarettes     Start date: 1971     Quit date: 9/15/2023     Years since quittin.1    Smokeless tobacco: Former   Substance and Sexual Activity    Alcohol use: Not Currently    Drug use: Never    Sexual activity: Not Currently     Partners: Female     Review of Systems   All other systems reviewed and are negative.    Objective:     Vital Signs (Most Recent):  Temp: 99.6 °F (37.6 °C) (24 0503)  Pulse: 97 (24 0508)  Resp: (!) 33 (24 0508)  BP: 109/61 (map 77) (24 0620)  SpO2: (!) 94 % (24 0508) Vital Signs (24h Range):  Temp:  [98.2 °F (36.8 °C)-100.6 °F (38.1 °C)] 99.6 °F (37.6 °C)  Pulse:  [] 97  Resp:  [18-50] 33  SpO2:  [90 %-99 %] 94 %  BP: ()/(52-72) 109/61     Weight: 93.4 kg (205 lb 14.6 oz)  Body mass index is 32.25 kg/m².     Physical Exam  Vitals reviewed.   Constitutional:       General: He is in acute distress.      Appearance: He is obese. He is ill-appearing and diaphoretic. He is not toxic-appearing.   HENT:      Head: Normocephalic and atraumatic.      Right Ear: External ear normal.      Left Ear: External ear normal.      Nose: Nose normal. No congestion or rhinorrhea.      Mouth/Throat:      Mouth: Mucous membranes are moist.      Pharynx:  Oropharynx is clear. No oropharyngeal exudate or posterior oropharyngeal erythema.   Eyes:      General: No scleral icterus.     Extraocular Movements: Extraocular movements intact.      Conjunctiva/sclera: Conjunctivae normal.      Pupils: Pupils are equal, round, and reactive to light.   Neck:      Vascular: No carotid bruit.   Cardiovascular:      Rate and Rhythm: Normal rate and regular rhythm.      Pulses: Normal pulses.      Heart sounds: Normal heart sounds. No murmur heard.     No friction rub. No gallop.   Pulmonary:      Effort: Respiratory distress present.      Breath sounds: No stridor. Wheezing and rales present. No rhonchi.   Chest:      Chest wall: No tenderness.   Abdominal:      General: Abdomen is flat. Bowel sounds are normal. There is no distension.      Palpations: Abdomen is soft. There is no mass.      Tenderness: There is no abdominal tenderness. There is no guarding or rebound.      Hernia: No hernia is present.   Musculoskeletal:         General: Swelling present. No tenderness, deformity or signs of injury. Normal range of motion.      Cervical back: Normal range of motion and neck supple. No rigidity or tenderness.      Right lower leg: Edema present.      Left lower leg: Edema present.   Lymphadenopathy:      Cervical: No cervical adenopathy.   Skin:     General: Skin is warm.      Capillary Refill: Capillary refill takes less than 2 seconds.      Coloration: Skin is not jaundiced or pale.      Findings: Erythema and rash present. No bruising or lesion.      Comments: (see media for visual depiction)    Neurological:      Mental Status: He is alert and oriented to person, place, and time. Mental status is at baseline.      Cranial Nerves: No cranial nerve deficit.      Sensory: No sensory deficit.      Motor: No weakness.      Coordination: Coordination normal.   Psychiatric:         Mood and Affect: Mood normal.         Behavior: Behavior normal.         Thought Content: Thought  content normal.         Judgment: Judgment normal.              CRANIAL NERVES     CN III, IV, VI   Pupils are equal, round, and reactive to light.       Significant Labs: All pertinent labs within the past 24 hours have been reviewed.    Significant Imaging: I have reviewed all pertinent imaging results/findings within the past 24 hours.    LABS:  Recent Results (from the past 24 hours)   POCT glucose    Collection Time: 11/02/24  2:23 PM   Result Value Ref Range    POCT Glucose 150 (H) 70 - 110 mg/dL   EKG 12-lead    Collection Time: 11/02/24  2:25 PM   Result Value Ref Range    QRS Duration 140 ms    [facility] QTC Calculation 495 ms   CBC auto differential    Collection Time: 11/02/24  3:11 PM   Result Value Ref Range    WBC 19.25 (H) 3.90 - 12.70 K/uL    RBC 4.45 (L) 4.60 - 6.20 M/uL    Hemoglobin 13.2 (L) 14.0 - 18.0 g/dL    Hematocrit 40.4 40.0 - 54.0 %    MCV 91 82 - 98 fL    MCH 29.7 27.0 - 31.0 pg    MCHC 32.7 32.0 - 36.0 g/dL    RDW 18.6 (H) 11.5 - 14.5 %    Platelets 272 150 - 450 K/uL    MPV 9.5 9.2 - 12.9 fL    Immature Granulocytes 2.4 (H) 0.0 - 0.5 %    Gran # (ANC) 17.0 (H) 1.8 - 7.7 K/uL    Immature Grans (Abs) 0.47 (H) 0.00 - 0.04 K/uL    Lymph # 0.9 (L) 1.0 - 4.8 K/uL    Mono # 0.8 0.3 - 1.0 K/uL    Eos # 0.1 0.0 - 0.5 K/uL    Baso # 0.05 0.00 - 0.20 K/uL    nRBC 0 0 /100 WBC    Gran % 88.2 (H) 38.0 - 73.0 %    Lymph % 4.6 (L) 18.0 - 48.0 %    Mono % 4.1 4.0 - 15.0 %    Eosinophil % 0.4 0.0 - 8.0 %    Basophil % 0.3 0.0 - 1.9 %    Differential Method Automated    Comprehensive metabolic panel    Collection Time: 11/02/24  3:11 PM   Result Value Ref Range    Sodium 134 (L) 136 - 145 mmol/L    Potassium 4.9 3.5 - 5.1 mmol/L    Chloride 98 95 - 110 mmol/L    CO2 24 23 - 29 mmol/L    Glucose 156 (H) 70 - 110 mg/dL    BUN 45 (H) 8 - 23 mg/dL    Creatinine 2.2 (H) 0.5 - 1.4 mg/dL    Calcium 9.6 8.7 - 10.5 mg/dL    Total Protein 7.3 6.0 - 8.4 g/dL    Albumin 2.7 (L) 3.5 - 5.2 g/dL    Total Bilirubin 1.2 (H)  0.1 - 1.0 mg/dL    Alkaline Phosphatase 256 (H) 40 - 150 U/L    AST 23 10 - 40 U/L    ALT 35 10 - 44 U/L    eGFR 31 (A) >60 mL/min/1.73 m^2    Anion Gap 12 8 - 16 mmol/L   Urinalysis, Reflex to Urine Culture Urine, Clean Catch    Collection Time: 11/02/24  3:11 PM    Specimen: Urine, Clean Catch   Result Value Ref Range    Specimen UA Urine, Clean Catch     Color, UA Yellow Yellow, Straw, Martine    Appearance, UA Clear Clear    pH, UA 6.0 5.0 - 8.0    Specific Gravity, UA 1.010 1.005 - 1.030    Protein, UA Trace (A) Negative    Glucose, UA 1+ (A) Negative    Ketones, UA Negative Negative    Bilirubin (UA) Negative Negative    Occult Blood UA Negative Negative    Nitrite, UA Negative Negative    Urobilinogen, UA Negative <2.0 EU/dL    Leukocytes, UA Negative Negative   Troponin I    Collection Time: 11/02/24  8:45 PM   Result Value Ref Range    Troponin I 0.032 (H) 0.000 - 0.026 ng/mL   Brain natriuretic peptide    Collection Time: 11/02/24  8:45 PM   Result Value Ref Range     (H) 0 - 99 pg/mL   POCT glucose    Collection Time: 11/03/24 12:21 AM   Result Value Ref Range    POCT Glucose 33 (LL) 70 - 110 mg/dL   Lactic acid, plasma    Collection Time: 11/03/24 12:25 AM   Result Value Ref Range    Lactate (Lactic Acid) 0.6 0.5 - 2.2 mmol/L   POCT glucose    Collection Time: 11/03/24 12:33 AM   Result Value Ref Range    POCT Glucose 155 (H) 70 - 110 mg/dL   POCT glucose    Collection Time: 11/03/24  1:22 AM   Result Value Ref Range    POCT Glucose 182 (H) 70 - 110 mg/dL   ISTAT PROCEDURE    Collection Time: 11/03/24  2:11 AM   Result Value Ref Range    POC PH 7.301 (L) 7.35 - 7.45    POC PCO2 63.8 (HH) 35 - 45 mmHg    POC PO2 69 (L) 80 - 100 mmHg    POC HCO3 31.5 (H) 24 - 28 mmol/L    POC BE 5 (H) -2 to 2 mmol/L    POC SATURATED O2 91 95 - 100 %    Sample ARTERIAL     Site LR     Allens Test Pass     DelSys Nasal Can     Mode SPONT     Flow 2     Sp02 95    ISTAT PROCEDURE    Collection Time: 11/03/24  4:54 AM    Result Value Ref Range    POC PH 7.255 (LL) 7.35 - 7.45    POC PCO2 69.0 (HH) 35 - 45 mmHg    POC PO2 87 80 - 100 mmHg    POC HCO3 30.6 (H) 24 - 28 mmol/L    POC BE 3 (H) -2 to 2 mmol/L    POC SATURATED O2 94 95 - 100 %    Sample ARTERIAL     Site RR     Allens Test Pass     DelSys Nasal Can     Mode SPONT     Flow 4     FiO2 36    POCT glucose    Collection Time: 11/03/24  5:06 AM   Result Value Ref Range    POCT Glucose 32 (LL) 70 - 110 mg/dL   CBC auto differential    Collection Time: 11/03/24  5:08 AM   Result Value Ref Range    WBC 18.28 (H) 3.90 - 12.70 K/uL    RBC 4.29 (L) 4.60 - 6.20 M/uL    Hemoglobin 12.5 (L) 14.0 - 18.0 g/dL    Hematocrit 40.4 40.0 - 54.0 %    MCV 94 82 - 98 fL    MCH 29.1 27.0 - 31.0 pg    MCHC 30.9 (L) 32.0 - 36.0 g/dL    RDW 18.5 (H) 11.5 - 14.5 %    Platelets 292 150 - 450 K/uL    MPV 9.8 9.2 - 12.9 fL    Immature Granulocytes 2.7 (H) 0.0 - 0.5 %    Gran # (ANC) 16.2 (H) 1.8 - 7.7 K/uL    Immature Grans (Abs) 0.49 (H) 0.00 - 0.04 K/uL    Lymph # 0.7 (L) 1.0 - 4.8 K/uL    Mono # 0.8 0.3 - 1.0 K/uL    Eos # 0.0 0.0 - 0.5 K/uL    Baso # 0.05 0.00 - 0.20 K/uL    nRBC 0 0 /100 WBC    Gran % 88.7 (H) 38.0 - 73.0 %    Lymph % 3.6 (L) 18.0 - 48.0 %    Mono % 4.5 4.0 - 15.0 %    Eosinophil % 0.2 0.0 - 8.0 %    Basophil % 0.3 0.0 - 1.9 %    Differential Method Automated    Comprehensive metabolic panel    Collection Time: 11/03/24  5:08 AM   Result Value Ref Range    Sodium 136 136 - 145 mmol/L    Potassium 4.5 3.5 - 5.1 mmol/L    Chloride 99 95 - 110 mmol/L    CO2 26 23 - 29 mmol/L    Glucose 71 70 - 110 mg/dL    BUN 48 (H) 8 - 23 mg/dL    Creatinine 2.3 (H) 0.5 - 1.4 mg/dL    Calcium 9.6 8.7 - 10.5 mg/dL    Total Protein 7.0 6.0 - 8.4 g/dL    Albumin 2.5 (L) 3.5 - 5.2 g/dL    Total Bilirubin 1.3 (H) 0.1 - 1.0 mg/dL    Alkaline Phosphatase 249 (H) 40 - 150 U/L    AST 21 10 - 40 U/L    ALT 32 10 - 44 U/L    eGFR 29 (A) >60 mL/min/1.73 m^2    Anion Gap 11 8 - 16 mmol/L   POCT glucose     Collection Time: 11/03/24  5:16 AM   Result Value Ref Range    POCT Glucose 94 70 - 110 mg/dL   POCT glucose    Collection Time: 11/03/24  5:23 AM   Result Value Ref Range    POCT Glucose 123 (H) 70 - 110 mg/dL   POCT glucose    Collection Time: 11/03/24  7:16 AM   Result Value Ref Range    POCT Glucose 48 (LL) 70 - 110 mg/dL       RADIOLOGY  X-Ray Chest AP Portable    Result Date: 11/2/2024  EXAMINATION: XR CHEST AP PORTABLE CLINICAL HISTORY: leukocytosis; COMPARISON: Studies dating back to March 11, 2022 FINDINGS: The study is lordotic in position.  Moderately low lung volumes with vascular crowding or atelectasis in the lung bases.  The lungs are otherwise clear.  The cardiac silhouette size is enlarged.  The trachea is midline and the mediastinal width is normal. Negative for focal infiltrate, effusion or pneumothorax. Pulmonary vasculature is normal. Negative for osseous abnormalities. Tortuous aorta.  There are degenerative changes of the spine and both shoulder girdles.     1.  Low lung volumes with vascular crowding or atelectasis in the lung bases.  Cardiac silhouette size enlargement.  Mild interstitial pulmonary edema or interstitial infectious process difficult to exclude in the right clinical setting. 2.  Stable findings as noted above. Electronically signed by: Justice Garcia MD Date:    11/02/2024 Time:    18:13      EKG    MICROBIOLOGY    MDM

## 2024-11-03 NOTE — ASSESSMENT & PLAN NOTE
ROSARIO is likely due to pre-renal azotemia due to intravascular volume depletion. Baseline creatinine is  1.6 . Most recent creatinine and eGFR are listed below.  Recent Labs     11/02/24  1511 11/03/24  0508   CREATININE 2.2* 2.3*   EGFRNORACEVR 31* 29*      Plan  - Avoid nephrotoxins and renally dose meds for GFR listed above  - Monitor urine output, serial BMP, and adjust therapy as needed  - Decreased intake per family, may need additional fluid  - Maintain Perez cath

## 2024-11-03 NOTE — ASSESSMENT & PLAN NOTE
Patient with Hypercapnic and Hypoxic Respiratory failure which is Acute.  he is not on home oxygen. Supplemental oxygen was provided and noted-      Signs/symptoms of respiratory failure include- tachypnea, increased work of breathing, respiratory distress, and lethargy. Contributing diagnoses includes - CHF, COPD, Interstitial lung disease, Obesity Hypoventilation, Pleural effusion, Pneumonia, and recent ativan administration in the ED  Labs and images were reviewed. Patient Has recent ABG, which has been reviewed. Will treat underlying causes and adjust management of respiratory failure as follows-   Plan:  -Continue home medications, titrate as needed  -Titrate oxygen requirements as needed  -Incentive spirometry   -Monitor pulse oximetry  -Farhan prn  -Continue treatment of CHF and pneumonia  -f/u Flu/COVID  -f/u ABG  -Continue treatment of hypoglycemia  -low threshold for ICU transfer

## 2024-11-03 NOTE — H&P
[facility] - Emergency Dept.  [facility]  History & Physical    Patient Name: Santiago Khan  MRN: [id number]  Patient Class: IP- Inpatient  Admission Date: 11/2/2024  Attending Physician: Naila att. providers found   Primary Care Provider: Kashif Acosta MD         Patient information was obtained from patient, past medical records, and ER records.     Subjective:     Principal Problem:Weakness of both lower extremities    Chief Complaint:   Chief Complaint   Patient presents with    Numbness     Numbness and weakness to both legs started 3-4 days ago. Denies trauma, pain from bilateral hips down to feet. Hx of COPD and DM. Hx of back surgery in 1982 and takes pain meds for pain.        HPI: Santiago Khan is a 74 y.o. male with a PMH  has a past medical history of Chronic combined systolic and diastolic congestive heart failure (07/09/2020), Chronic kidney disease, stage 3, Chronic obstructive pulmonary disease (03/20/2023), Chronic venous insufficiency, DDD (degenerative disc disease), lumbar, DM (diabetes mellitus) with complications, History of gout, Hyperlipidemia associated with type 2 diabetes mellitus, Hypertension associated with stage 3 chronic kidney disease due to type 2 diabetes mellitus, BRADLEY on CPAP, and Prostate cancer. who presented to the ER for further evaluation of worsening bilateral lower extremity weakness, acute on chronic lumbar back pain, and abdominal pain over the past 3-4 days. Patient reported being constipated with last bowel movement yesterday which was normal and reported his back pain has caused his legs to become weak resulting in increased difficulty walking. He reports taking chronic pain medications and had back surgery in 1982. Patient was seen at urgent care back on 10/27 for treatment of acute gout flare in his right hand/wrist and was provided prednisone after home allopurinol and colchicine provided little to no relief.  Patient denied any recent falls or  trauma and was initially somnolent following administration of lorazepam in the ED to obtain the MRI of his lumbar spine. Upon evaluation of patient, patient had increased respiratory distress requiring up titration of supplemental oxygen in addition to bilateral crackles on lung auscultation, lower extremity edema, and right lower extremity erythremia. Family at bedside reported patient had difficulty taking his home medications, had productive cough, and unsure what his dietary/fluid intake has been like. Patient was noted to be diaphoretic, restless, and becoming more lethargic during bedside assessment.  Stat ABG revealed worsening respiratory status and was placed on BiPAP and provided 2 mg IV Bumex.  Patient also noted to be become hypoglycemic again requiring dextrose infusion.  Patient admitted to Hospital Medicine inpatient for continued medical management. ICU notified regarding low threshold for transfer and agree with current treatment plan. Neurosurgery/Spine as well as PT/OT consulted regarding lower extremity weakness.       PCP: Kashif Acosta       Past Medical History:   Diagnosis Date    Chronic combined systolic and diastolic congestive heart failure 07/09/2020    Chronic kidney disease, stage 3     Chronic obstructive pulmonary disease 03/20/2023    Wixela 250 mcg, Spiriva respimat. Continue Albuterol inhaler and albuterol nebs   Referral pul dis management, education and assistance with medication  Patient preference to only see a doctor, request follow up with Dr. Springer             Chronic venous insufficiency     DDD (degenerative disc disease), lumbar     DM (diabetes mellitus) with complications     History of gout     Hyperlipidemia associated with type 2 diabetes mellitus     Hypertension associated with stage 3 chronic kidney disease due to type 2 diabetes mellitus     BRADLEY on CPAP     Prostate cancer     prostatectomy in 2010, rising PSA that started in 2021       Past Surgical  History:   Procedure Laterality Date    BICEPS TENDON REPAIR      COLONOSCOPY N/A 03/27/2019    Procedure: COLONOSCOPY;  Surgeon: James Roger MD;  Location: [facility] ENDO;  Service: Endoscopy;  Laterality: N/A;    EXPLORATION OF ORBIT Right 9/8/2023    Procedure: EXPLORATION, ORBIT;  Surgeon: Junaid Bartholomew MD;  Location: [facility] OR;  Service: ENT;  Laterality: Right;  possibly need frozen    HEMORRHOID SURGERY      INGUINAL HERNIA REPAIR Left     KNEE ARTHROSCOPY Right     LEFT HEART CATHETERIZATION Left 06/29/2020    Procedure: CATHETERIZATION, HEART, LEFT;  Surgeon: Cheli Wilson MD;  Location: [facility] CATH LAB;  Service: Cardiology;  Laterality: Left;    LUMBAR LAMINECTOMY      PROSTATECTOMY      TONSILLECTOMY         Review of patient's allergies indicates:   Allergen Reactions    Amitriptyline Rash    Celecoxib Rash       No current facility-administered medications on file prior to encounter.     Current Outpatient Medications on File Prior to Encounter   Medication Sig    adalimumab (HUMIRA,CF, PEN) 40 mg/0.4 mL PnKt Inject 0.4 mLs (40 mg total) into the skin every 14 (fourteen) days.    albuterol (PROVENTIL/VENTOLIN HFA) 90 mcg/actuation inhaler INHALE 2 PUFFS INTO THE LUNGS EVERY 4 HOURS AS NEEDED    allopurinoL (ZYLOPRIM) 100 MG tablet TAKE 1 TABLET(100 MG) BY MOUTH EVERY DAY    allopurinoL (ZYLOPRIM) 300 MG tablet TAKE 1 TABLET(300 MG) BY MOUTH EVERY DAY    aspirin (ECOTRIN) 81 MG EC tablet Take 81 mg by mouth once daily.    baclofen (LIORESAL) 10 MG tablet Take 1 tablet by mouth every evening.    bisoprolol (ZEBETA) 5 MG tablet TAKE 1/2 TABLET BY MOUTH EVERY DAY    blood sugar diagnostic Strp Check blood glucose twice per day    blood-glucose meter (ACCU-CHEK GUIDE ME GLUCOSE MTR) Misc USE AS INSTRUCTED    colchicine (COLCRYS) 0.6 mg tablet Take 1 tablet (0.6 mg total) by mouth daily as needed (gout flare).    empagliflozin (JARDIANCE) 10 mg tablet Take 1 tablet (10 mg total) by mouth once daily.     fluticasone propionate (FLONASE) 50 mcg/actuation nasal spray SHAKE LIQUID AND USE 2 SPRAYS(100 MCG) IN EACH NOSTRIL EVERY DAY Strength: 50 mcg/actuation    glimepiride (AMARYL) 4 MG tablet Take 1 tablet (4 mg total) by mouth before breakfast.    guaiFENesin 100 mg/5 ml (ROBITUSSIN) 100 mg/5 mL syrup Take 200 mg by mouth every evening.    HYDROcodone-acetaminophen (NORCO) 7.5-325 mg per tablet Take 1 tablet by mouth every 4 (four) hours as needed (for chronic pain).    lancets (ACCU-CHEK SOFTCLIX LANCETS) Misc Check BG daily    latanoprost 0.005 % ophthalmic solution Place 1 drop into the right eye every evening.    magnesium oxide (MAG-OX) 400 mg (241.3 mg magnesium) tablet TAKE 2 TABLETS(800 MG) BY MOUTH TWICE DAILY    metFORMIN (GLUCOPHAGE) 500 MG tablet TAKE 1 TABLET(500 MG) BY MOUTH DAILY WITH BREAKFAST    multivitamin-minerals-lutein Tab Take 1 tablet by mouth Daily.    omeprazole (PRILOSEC) 40 MG capsule TAKE 1 CAPSULE BY MOUTH EVERY DAY    potassium chloride SA (K-DUR,KLOR-CON) 20 MEQ tablet TAKE 3 TABLETS BY MOUTH TWICE DAILY    simvastatin (ZOCOR) 40 MG tablet TAKE 1 TABLET(40 MG) BY MOUTH EVERY EVENING    torsemide (DEMADEX) 20 MG Tab Take 2 tablets (40 mg total) by mouth once daily.    varenicline (CHANTIX) 1 mg Tab take 1 tablet by mouth twice daily. Take with full glass of water & with food to avoid nausea (Patient not taking: Reported on 10/28/2024)    WIXELA INHUB 250-50 mcg/dose diskus inhaler INHALE 1 PUFF INTO THE LUNGS TWICE DAILY     Family History       Problem Relation (Age of Onset)    Alzheimer's disease Father    Coronary artery disease Father (90)    Hyperlipidemia Mother    Prostate cancer Father          Tobacco Use    Smoking status: Former     Current packs/day: 0.00     Average packs/day: 1.5 packs/day for 52.7 years (79.1 ttl pk-yrs)     Types: Cigarettes     Start date: 1971     Quit date: 9/15/2023     Years since quittin.1    Smokeless tobacco: Former   Substance and  Sexual Activity    Alcohol use: Not Currently    Drug use: Never    Sexual activity: Not Currently     Partners: Female     Review of Systems   All other systems reviewed and are negative.    Objective:     Vital Signs (Most Recent):  Temp: 99.6 °F (37.6 °C) (11/03/24 0503)  Pulse: 97 (11/03/24 0508)  Resp: (!) 33 (11/03/24 0508)  BP: 109/61 (map 77) (11/03/24 0620)  SpO2: (!) 94 % (11/03/24 0508) Vital Signs (24h Range):  Temp:  [98.2 °F (36.8 °C)-100.6 °F (38.1 °C)] 99.6 °F (37.6 °C)  Pulse:  [] 97  Resp:  [18-50] 33  SpO2:  [90 %-99 %] 94 %  BP: ()/(52-72) 109/61     Weight: 93.4 kg (205 lb 14.6 oz)  Body mass index is 32.25 kg/m².     Physical Exam  Vitals reviewed.   Constitutional:       General: He is in acute distress.      Appearance: He is obese. He is ill-appearing and diaphoretic. He is not toxic-appearing.   HENT:      Head: Normocephalic and atraumatic.      Right Ear: External ear normal.      Left Ear: External ear normal.      Nose: Nose normal. No congestion or rhinorrhea.      Mouth/Throat:      Mouth: Mucous membranes are moist.      Pharynx: Oropharynx is clear. No oropharyngeal exudate or posterior oropharyngeal erythema.   Eyes:      General: No scleral icterus.     Extraocular Movements: Extraocular movements intact.      Conjunctiva/sclera: Conjunctivae normal.      Pupils: Pupils are equal, round, and reactive to light.   Neck:      Vascular: No carotid bruit.   Cardiovascular:      Rate and Rhythm: Normal rate and regular rhythm.      Pulses: Normal pulses.      Heart sounds: Normal heart sounds. No murmur heard.     No friction rub. No gallop.   Pulmonary:      Effort: Respiratory distress present.      Breath sounds: No stridor. Wheezing and rales present. No rhonchi.   Chest:      Chest wall: No tenderness.   Abdominal:      General: Abdomen is flat. Bowel sounds are normal. There is no distension.      Palpations: Abdomen is soft. There is no mass.      Tenderness: There is  no abdominal tenderness. There is no guarding or rebound.      Hernia: No hernia is present.   Musculoskeletal:         General: Swelling present. No tenderness, deformity or signs of injury. Normal range of motion.      Cervical back: Normal range of motion and neck supple. No rigidity or tenderness.      Right lower leg: Edema present.      Left lower leg: Edema present.   Lymphadenopathy:      Cervical: No cervical adenopathy.   Skin:     General: Skin is warm.      Capillary Refill: Capillary refill takes less than 2 seconds.      Coloration: Skin is not jaundiced or pale.      Findings: Erythema and rash present. No bruising or lesion.      Comments: (see media for visual depiction)    Neurological:      Mental Status: He is alert and oriented to person, place, and time. Mental status is at baseline.      Cranial Nerves: No cranial nerve deficit.      Sensory: No sensory deficit.      Motor: No weakness.      Coordination: Coordination normal.   Psychiatric:         Mood and Affect: Mood normal.         Behavior: Behavior normal.         Thought Content: Thought content normal.         Judgment: Judgment normal.              CRANIAL NERVES     CN III, IV, VI   Pupils are equal, round, and reactive to light.       Significant Labs: All pertinent labs within the past 24 hours have been reviewed.    Significant Imaging: I have reviewed all pertinent imaging results/findings within the past 24 hours.    LABS:  Recent Results (from the past 24 hours)   POCT glucose    Collection Time: 11/02/24  2:23 PM   Result Value Ref Range    POCT Glucose 150 (H) 70 - 110 mg/dL   EKG 12-lead    Collection Time: 11/02/24  2:25 PM   Result Value Ref Range    QRS Duration 140 ms    [facility] QTC Calculation 495 ms   CBC auto differential    Collection Time: 11/02/24  3:11 PM   Result Value Ref Range    WBC 19.25 (H) 3.90 - 12.70 K/uL    RBC 4.45 (L) 4.60 - 6.20 M/uL    Hemoglobin 13.2 (L) 14.0 - 18.0 g/dL    Hematocrit 40.4 40.0 - 54.0 %     MCV 91 82 - 98 fL    MCH 29.7 27.0 - 31.0 pg    MCHC 32.7 32.0 - 36.0 g/dL    RDW 18.6 (H) 11.5 - 14.5 %    Platelets 272 150 - 450 K/uL    MPV 9.5 9.2 - 12.9 fL    Immature Granulocytes 2.4 (H) 0.0 - 0.5 %    Gran # (ANC) 17.0 (H) 1.8 - 7.7 K/uL    Immature Grans (Abs) 0.47 (H) 0.00 - 0.04 K/uL    Lymph # 0.9 (L) 1.0 - 4.8 K/uL    Mono # 0.8 0.3 - 1.0 K/uL    Eos # 0.1 0.0 - 0.5 K/uL    Baso # 0.05 0.00 - 0.20 K/uL    nRBC 0 0 /100 WBC    Gran % 88.2 (H) 38.0 - 73.0 %    Lymph % 4.6 (L) 18.0 - 48.0 %    Mono % 4.1 4.0 - 15.0 %    Eosinophil % 0.4 0.0 - 8.0 %    Basophil % 0.3 0.0 - 1.9 %    Differential Method Automated    Comprehensive metabolic panel    Collection Time: 11/02/24  3:11 PM   Result Value Ref Range    Sodium 134 (L) 136 - 145 mmol/L    Potassium 4.9 3.5 - 5.1 mmol/L    Chloride 98 95 - 110 mmol/L    CO2 24 23 - 29 mmol/L    Glucose 156 (H) 70 - 110 mg/dL    BUN 45 (H) 8 - 23 mg/dL    Creatinine 2.2 (H) 0.5 - 1.4 mg/dL    Calcium 9.6 8.7 - 10.5 mg/dL    Total Protein 7.3 6.0 - 8.4 g/dL    Albumin 2.7 (L) 3.5 - 5.2 g/dL    Total Bilirubin 1.2 (H) 0.1 - 1.0 mg/dL    Alkaline Phosphatase 256 (H) 40 - 150 U/L    AST 23 10 - 40 U/L    ALT 35 10 - 44 U/L    eGFR 31 (A) >60 mL/min/1.73 m^2    Anion Gap 12 8 - 16 mmol/L   Urinalysis, Reflex to Urine Culture Urine, Clean Catch    Collection Time: 11/02/24  3:11 PM    Specimen: Urine, Clean Catch   Result Value Ref Range    Specimen UA Urine, Clean Catch     Color, UA Yellow Yellow, Straw, Martine    Appearance, UA Clear Clear    pH, UA 6.0 5.0 - 8.0    Specific Gravity, UA 1.010 1.005 - 1.030    Protein, UA Trace (A) Negative    Glucose, UA 1+ (A) Negative    Ketones, UA Negative Negative    Bilirubin (UA) Negative Negative    Occult Blood UA Negative Negative    Nitrite, UA Negative Negative    Urobilinogen, UA Negative <2.0 EU/dL    Leukocytes, UA Negative Negative   Troponin I    Collection Time: 11/02/24  8:45 PM   Result Value Ref Range    Troponin I  0.032 (H) 0.000 - 0.026 ng/mL   Brain natriuretic peptide    Collection Time: 11/02/24  8:45 PM   Result Value Ref Range     (H) 0 - 99 pg/mL   POCT glucose    Collection Time: 11/03/24 12:21 AM   Result Value Ref Range    POCT Glucose 33 (LL) 70 - 110 mg/dL   Lactic acid, plasma    Collection Time: 11/03/24 12:25 AM   Result Value Ref Range    Lactate (Lactic Acid) 0.6 0.5 - 2.2 mmol/L   POCT glucose    Collection Time: 11/03/24 12:33 AM   Result Value Ref Range    POCT Glucose 155 (H) 70 - 110 mg/dL   POCT glucose    Collection Time: 11/03/24  1:22 AM   Result Value Ref Range    POCT Glucose 182 (H) 70 - 110 mg/dL   ISTAT PROCEDURE    Collection Time: 11/03/24  2:11 AM   Result Value Ref Range    POC PH 7.301 (L) 7.35 - 7.45    POC PCO2 63.8 (HH) 35 - 45 mmHg    POC PO2 69 (L) 80 - 100 mmHg    POC HCO3 31.5 (H) 24 - 28 mmol/L    POC BE 5 (H) -2 to 2 mmol/L    POC SATURATED O2 91 95 - 100 %    Sample ARTERIAL     Site LR     Allens Test Pass     DelSys Nasal Can     Mode SPONT     Flow 2     Sp02 95    ISTAT PROCEDURE    Collection Time: 11/03/24  4:54 AM   Result Value Ref Range    POC PH 7.255 (LL) 7.35 - 7.45    POC PCO2 69.0 (HH) 35 - 45 mmHg    POC PO2 87 80 - 100 mmHg    POC HCO3 30.6 (H) 24 - 28 mmol/L    POC BE 3 (H) -2 to 2 mmol/L    POC SATURATED O2 94 95 - 100 %    Sample ARTERIAL     Site RR     Allens Test Pass     DelSys Nasal Can     Mode SPONT     Flow 4     FiO2 36    POCT glucose    Collection Time: 11/03/24  5:06 AM   Result Value Ref Range    POCT Glucose 32 (LL) 70 - 110 mg/dL   CBC auto differential    Collection Time: 11/03/24  5:08 AM   Result Value Ref Range    WBC 18.28 (H) 3.90 - 12.70 K/uL    RBC 4.29 (L) 4.60 - 6.20 M/uL    Hemoglobin 12.5 (L) 14.0 - 18.0 g/dL    Hematocrit 40.4 40.0 - 54.0 %    MCV 94 82 - 98 fL    MCH 29.1 27.0 - 31.0 pg    MCHC 30.9 (L) 32.0 - 36.0 g/dL    RDW 18.5 (H) 11.5 - 14.5 %    Platelets 292 150 - 450 K/uL    MPV 9.8 9.2 - 12.9 fL    Immature  Granulocytes 2.7 (H) 0.0 - 0.5 %    Gran # (ANC) 16.2 (H) 1.8 - 7.7 K/uL    Immature Grans (Abs) 0.49 (H) 0.00 - 0.04 K/uL    Lymph # 0.7 (L) 1.0 - 4.8 K/uL    Mono # 0.8 0.3 - 1.0 K/uL    Eos # 0.0 0.0 - 0.5 K/uL    Baso # 0.05 0.00 - 0.20 K/uL    nRBC 0 0 /100 WBC    Gran % 88.7 (H) 38.0 - 73.0 %    Lymph % 3.6 (L) 18.0 - 48.0 %    Mono % 4.5 4.0 - 15.0 %    Eosinophil % 0.2 0.0 - 8.0 %    Basophil % 0.3 0.0 - 1.9 %    Differential Method Automated    Comprehensive metabolic panel    Collection Time: 11/03/24  5:08 AM   Result Value Ref Range    Sodium 136 136 - 145 mmol/L    Potassium 4.5 3.5 - 5.1 mmol/L    Chloride 99 95 - 110 mmol/L    CO2 26 23 - 29 mmol/L    Glucose 71 70 - 110 mg/dL    BUN 48 (H) 8 - 23 mg/dL    Creatinine 2.3 (H) 0.5 - 1.4 mg/dL    Calcium 9.6 8.7 - 10.5 mg/dL    Total Protein 7.0 6.0 - 8.4 g/dL    Albumin 2.5 (L) 3.5 - 5.2 g/dL    Total Bilirubin 1.3 (H) 0.1 - 1.0 mg/dL    Alkaline Phosphatase 249 (H) 40 - 150 U/L    AST 21 10 - 40 U/L    ALT 32 10 - 44 U/L    eGFR 29 (A) >60 mL/min/1.73 m^2    Anion Gap 11 8 - 16 mmol/L   POCT glucose    Collection Time: 11/03/24  5:16 AM   Result Value Ref Range    POCT Glucose 94 70 - 110 mg/dL   POCT glucose    Collection Time: 11/03/24  5:23 AM   Result Value Ref Range    POCT Glucose 123 (H) 70 - 110 mg/dL   POCT glucose    Collection Time: 11/03/24  7:16 AM   Result Value Ref Range    POCT Glucose 48 (LL) 70 - 110 mg/dL       RADIOLOGY  X-Ray Chest AP Portable    Result Date: 11/2/2024  EXAMINATION: XR CHEST AP PORTABLE CLINICAL HISTORY: leukocytosis; COMPARISON: Studies dating back to March 11, 2022 FINDINGS: The study is lordotic in position.  Moderately low lung volumes with vascular crowding or atelectasis in the lung bases.  The lungs are otherwise clear.  The cardiac silhouette size is enlarged.  The trachea is midline and the mediastinal width is normal. Negative for focal infiltrate, effusion or pneumothorax. Pulmonary vasculature is  normal. Negative for osseous abnormalities. Tortuous aorta.  There are degenerative changes of the spine and both shoulder girdles.     1.  Low lung volumes with vascular crowding or atelectasis in the lung bases.  Cardiac silhouette size enlargement.  Mild interstitial pulmonary edema or interstitial infectious process difficult to exclude in the right clinical setting. 2.  Stable findings as noted above. Electronically signed by: Justice Garcia MD Date:    11/02/2024 Time:    18:13      EKG    MICROBIOLOGY    MDM    Assessment/Plan:     * Weakness of both lower extremities    Lower back pain  Patient presented with worsening lower back pain and bilateral lower extremity weakness in setting of chronic back pain and prior surgery.  MRI L-spine obtained in the ED showed multilevel degenerative changes with several levels of neural foraminal narrowing however without epidural abscess requiring equina noted.  Patient awaiting evaluation by PT/OT. Neurosurgery/Spine consulted as well.   Plan:  -Optimize pain control, titrate as needed  -Bowel regimen  -Bedrest  -Antiemetics prn  -Tylenol as needed for fever   -f/u NSGY/Spine  -PT/OT       Acute respiratory failure with hypoxia and hypercarbia  Patient with Hypercapnic and Hypoxic Respiratory failure which is Acute.  he is not on home oxygen. Supplemental oxygen was provided and noted-      Signs/symptoms of respiratory failure include- tachypnea, increased work of breathing, respiratory distress, and lethargy. Contributing diagnoses includes - CHF, COPD, Interstitial lung disease, Obesity Hypoventilation, Pleural effusion, Pneumonia, and recent ativan administration in the ED  Labs and images were reviewed. Patient Has recent ABG, which has been reviewed. Will treat underlying causes and adjust management of respiratory failure as follows-   Plan:  -Continue home medications, titrate as needed  -Titrate oxygen requirements as needed  -Incentive spirometry   -Monitor  pulse oximetry  -Duonebs prn  -Continue treatment of CHF and pneumonia  -f/u Flu/COVID  -f/u ABG  -Continue treatment of hypoglycemia  -low threshold for ICU transfer       Acute on chronic combined systolic and diastolic congestive heart failure  Patient has Combined Systolic and Diastolic heart failure that is Acute on chronic. On presentation their CHF was decompensated. Evidence of decompensated CHF on presentation includes: edema, orthopnea, paroxysmal nocturnal dyspnea (PND), dyspnea on exertion (GOVEA), and shortness of breath. The etiology of their decompensation is likely dietary indiscretion, increased fluid intake, and infection . Most recent BNP and echo results are listed below.  Recent Labs     11/02/24 2045   *     Latest ECHO  Results for orders placed during the hospital encounter of 10/23/23    Echo    Interpretation Summary    Left Ventricle: The left ventricle is normal in size. Normal wall thickness. There is mild concentric hypertrophy. regional wall motion abnormalities present. There is low normal systolic function with a visually estimated ejection fraction of 50 - 55%. Biplane (2D) method of discs ejection fraction is 49%. There is normal diastolic function.    Left Atrium: Left atrium is mildly dilated.    Right Ventricle: Normal right ventricular cavity size. Wall thickness is normal. Right ventricle wall motion  is normal. Systolic function is normal.    Mitral Valve: There is mild to moderate regurgitation.    Tricuspid Valve: There is mild regurgitation.    Pulmonary Artery: There is moderate pulmonary hypertension. The estimated pulmonary artery systolic pressure is 50 mmHg.    IVC/SVC: Normal venous pressure at 3 mmHg.    Current Heart Failure Medications       Plan  -Monitor strict I&Os and daily weights.    -Place on telemetry  -Low sodium diet  -Place on fluid restriction of 1.5 L.   -Cardiology has not been consulted  -The patient's volume status is stable but not at their  baseline as indicated by edema, orthopnea, paroxysmal nocturnal dyspnea (PND), and shortness of breath  -Continue home medications      Acute kidney injury superimposed on chronic kidney disease  ROSARIO is likely due to pre-renal azotemia due to intravascular volume depletion. Baseline creatinine is  1.5-1.7 . Most recent creatinine and eGFR are listed below.  Recent Labs     11/02/24  1511   CREATININE 2.2*   EGFRNORACEVR 31*   Plan  -ROSARIO is  currently undergoing medical managment  -Avoid nephrotoxins and renally dose meds for GFR listed above  -Monitor urine output, serial BMP, and adjust therapy as needed  -Continue home medications and treatment of CHF      Chronic obstructive pulmonary disease  Patient's COPD is with exacerbation noted by continued dyspnea and worsening of baseline hypoxia currently.  Patient is currently off COPD Pathway. Continue scheduled inhalers  BiPAP, Steroids, Antibiotics, and Supplemental oxygen and monitor respiratory status closely.       Coronary artery disease of native artery of native heart with stable angina pectoris  Patient with known CAD, which is controlled Will continue  home medications  and monitor for S/Sx of angina/ACS. Continue to monitor on telemetry.       Hypertension associated with stage 3 chronic kidney disease due to type 2 diabetes mellitus  Chronic, controlled.  Latest blood pressure and vitals reviewed-   Temp:  [98.2 °F (36.8 °C)-98.7 °F (37.1 °C)]   Pulse:  []   Resp:  [20-50]   BP: ()/(54-70)   SpO2:  [90 %-99 %] .   Home meds for hypertension were reviewed and noted below.   Hypertension Medications               bisoprolol (ZEBETA) 5 MG tablet TAKE 1/2 TABLET BY MOUTH EVERY DAY    torsemide (DEMADEX) 20 MG Tab Take 2 tablets (40 mg total) by mouth once daily.     While in the hospital, will manage blood pressure as follows; Continue home antihypertensive regimen    Will utilize p.r.n. blood pressure medication only if patient's blood pressure  greater than  180/110 and he develops symptoms such as worsening chest pain or shortness of breath.      Hyperlipidemia associated with type 2 diabetes mellitus  Patient is chronically on statin.will continue for now. Last Lipid Panel:   Lab Results   Component Value Date    CHOL 138 08/14/2024    HDL 45 08/14/2024    LDLCALC 69.4 08/14/2024    TRIG 118 08/14/2024    CHOLHDL 32.6 08/14/2024   Plan:  -Continue home medication  -low fat/low calorie diet      Diabetes  Patient's FSGs are uncontrolled due to hypoglycemia on current medication regimen.  Last A1c reviewed-   Lab Results   Component Value Date    HGBA1C 5.6 08/14/2024     Most recent fingerstick glucose reviewed-   Recent Labs   Lab 11/02/24  1423 11/03/24  0021 11/03/24  0033 11/03/24  0122   POCTGLUCOSE 150* 33* 155* 182*     Current correctional scale  Low  Titrate as needed  anti-hyperglycemic dose as follows-   Antihyperglycemics (From admission, onward)      Start     Stop Route Frequency Ordered    11/03/24 0408  insulin aspart U-100 pen 0-5 Units         -- SubQ Before meals & nightly PRN 11/03/24 0309     Plan:  -SSI  -A1c  -Accu-checks  -Hold oral hypoglycemics while patient is in the hospital  -Hypoglycemic protocol        History of gout  Recently treated with colchicine and prednisone.   Plan:  -continue to monitor and treat as needed      BRADLEY on CPAP  Currently on CPAP outpatient.  Plan:  -continue CPAP q.h.s.       Class 1 obesity due to excess calories with serious comorbidity and body mass index (BMI) of 33.0 to 33.9 in adult  Body mass index is 33.63 kg/m². Morbid obesity complicates all aspects of disease management from diagnostic modalities to treatment. Weight loss encouraged and health benefits explained to patient.         VTE Risk Mitigation (From admission, onward)           Ordered     enoxaparin injection 40 mg  Daily         11/03/24 0309     IP VTE HIGH RISK PATIENT  Once         11/03/24 0309     Place sequential compression  device  Until discontinued         11/03/24 0309                  //Core Measures   -DVT proph: SCDs, Lovenox   -Code status: Full    -Surrogate: none provided       Components of this note were documented using a voice recognition system and are subject to errors not corrected at the time the document was proof read. Please contact the author for any clarifications.       Pharmacist Renal Dose Adjustment Note    Santiago Khan is a 74 y.o. male being treated with the medication levofloxacin.    Patient Data:    Vital Signs (Most Recent):  Temp: 98.7 °F (37.1 °C) (11/03/24 0011)  Pulse: 99 (11/03/24 0302)  Resp: (!) 31 (11/03/24 0231)  BP: 99/67 (11/03/24 0302)  SpO2: (!) 94 % (11/03/24 0302) Vital Signs (72h Range):  Temp:  [98.2 °F (36.8 °C)-98.7 °F (37.1 °C)]   Pulse:  []   Resp:  [20-33]   BP: ()/(54-70)   SpO2:  [90 %-97 %]      Recent Labs   Lab 11/02/24  1511   CREATININE 2.2*     Serum creatinine: 2.2 mg/dL (H) 11/02/24 1511  Estimated creatinine clearance: 33.2 mL/min (A)    Levofloxacin 750 mg IV every 24 hours will be changed to levofloxacin 750 mg IV every 48 hours for CrCl 20-49 ml/min.    Pharmacist's Name: Oscar Can  Pharmacist's Extension: [phone]      Maulik Taylor MD  Department of Hospital Medicine  '[facility] - Emergency Dept.

## 2024-11-03 NOTE — ASSESSMENT & PLAN NOTE
Patient with chronic pain, on pain regimen at home  Presented with constipation x 3-4 days, abdominal pain/tenderness  KUB to rule out obstruction  Will start bowel regimen when appropriate    11/4 ct still some abd pain

## 2024-11-03 NOTE — HPI
Santiago Khan is a 74 y.o. male with a PMH  has a past medical history of Chronic combined systolic and diastolic congestive heart failure (07/09/2020), Chronic kidney disease, stage 3, Chronic obstructive pulmonary disease (03/20/2023), Chronic venous insufficiency, DDD (degenerative disc disease), lumbar, DM (diabetes mellitus) with complications, History of gout, Hyperlipidemia associated with type 2 diabetes mellitus, Hypertension associated with stage 3 chronic kidney disease due to type 2 diabetes mellitus, BRADLEY on CPAP, and Prostate cancer. who presented to the ER for further evaluation of worsening bilateral lower extremity weakness, acute on chronic lumbar back pain, and abdominal pain over the past 3-4 days. Patient reported being constipated with last bowel movement yesterday which was normal and reported his back pain has caused his legs to become weak resulting in increased difficulty walking. He reports taking chronic pain medications and had back surgery in 1982. Patient was seen at urgent care back on 10/27 for treatment of acute gout flare in his right hand/wrist and was provided prednisone after home allopurinol and colchicine provided little to no relief.  Patient denied any recent falls or trauma and was initially somnolent following administration of lorazepam in the ED to obtain the MRI of his lumbar spine. Upon evaluation of patient, patient had increased respiratory distress requiring up titration of supplemental oxygen in addition to bilateral crackles on lung auscultation, lower extremity edema, and right lower extremity erythremia. Family at bedside reported patient had difficulty taking his home medications, had productive cough, and unsure what his dietary/fluid intake has been like. Patient was noted to be diaphoretic, restless, and becoming more lethargic during bedside assessment.  Stat ABG revealed worsening respiratory status and was placed on BiPAP and provided 2 mg IV  Bumex.  Patient also noted to be become hypoglycemic again requiring dextrose infusion.  Patient admitted to Hospital Medicine inpatient for continued medical management. ICU notified regarding low threshold for transfer and agree with current treatment plan. Neurosurgery/Spine as well as PT/OT consulted regarding lower extremity weakness.       PCP: Kashif Acosta

## 2024-11-03 NOTE — SUBJECTIVE & OBJECTIVE
Past Medical History:   Diagnosis Date    Chronic combined systolic and diastolic congestive heart failure 07/09/2020    Chronic kidney disease, stage 3     Chronic obstructive pulmonary disease 03/20/2023    Wixela 250 mcg, Spiriva respimat. Continue Albuterol inhaler and albuterol nebs   Referral pul dis management, education and assistance with medication  Patient preference to only see a doctor, request follow up with Dr. Springer             Chronic venous insufficiency     DDD (degenerative disc disease), lumbar     DM (diabetes mellitus) with complications     History of gout     Hyperlipidemia associated with type 2 diabetes mellitus     Hypertension associated with stage 3 chronic kidney disease due to type 2 diabetes mellitus     BRADLEY on CPAP     Prostate cancer     prostatectomy in 2010, rising PSA that started in 2021       Past Surgical History:   Procedure Laterality Date    BICEPS TENDON REPAIR      COLONOSCOPY N/A 03/27/2019    Procedure: COLONOSCOPY;  Surgeon: James Roger MD;  Location: [facility] ENDO;  Service: Endoscopy;  Laterality: N/A;    EXPLORATION OF ORBIT Right 9/8/2023    Procedure: EXPLORATION, ORBIT;  Surgeon: Junaid Bartholomew MD;  Location: [facility] OR;  Service: ENT;  Laterality: Right;  possibly need frozen    HEMORRHOID SURGERY      INGUINAL HERNIA REPAIR Left     KNEE ARTHROSCOPY Right     LEFT HEART CATHETERIZATION Left 06/29/2020    Procedure: CATHETERIZATION, HEART, LEFT;  Surgeon: Cheli Wilson MD;  Location: [facility] CATH LAB;  Service: Cardiology;  Laterality: Left;    LUMBAR LAMINECTOMY      PROSTATECTOMY      TONSILLECTOMY         Review of patient's allergies indicates:   Allergen Reactions    Amitriptyline Rash    Celecoxib Rash       Family History       Problem Relation (Age of Onset)    Alzheimer's disease Father    Coronary artery disease Father (90)    Hyperlipidemia Mother    Prostate cancer Father          Tobacco Use    Smoking status: Former     Current packs/day: 0.00      Average packs/day: 1.5 packs/day for 52.7 years (79.1 ttl pk-yrs)     Types: Cigarettes     Start date: 1971     Quit date: 9/15/2023     Years since quittin.1    Smokeless tobacco: Former   Substance and Sexual Activity    Alcohol use: Not Currently    Drug use: Never    Sexual activity: Not Currently     Partners: Female         Review of Systems   Unable to perform ROS: Acuity of condition   Continuous BIPAP, encephalopathy    Objective:     Vital Signs (Most Recent):  Temp: 99.6 °F (37.6 °C) (24 0503)  Pulse: 97 (24 050)  Resp: (!) 33 (24 050)  BP: 109/61 (map 77) (24 06)  SpO2: (!) 94 % (24) Vital Signs (24h Range):  Temp:  [98.2 °F (36.8 °C)-100.6 °F (38.1 °C)] 99.6 °F (37.6 °C)  Pulse:  [] 97  Resp:  [18-50] 33  SpO2:  [90 %-99 %] 94 %  BP: ()/(52-72) 109/61     Weight: 93.4 kg (205 lb 14.6 oz)  Body mass index is 32.25 kg/m².      Intake/Output Summary (Last 24 hours) at 11/3/2024 0779  Last data filed at 11/3/2024 0211  Gross per 24 hour   Intake 400 ml   Output 250 ml   Net 150 ml        Physical Exam  Constitutional:       General: He is in acute distress.      Appearance: He is ill-appearing.   HENT:      Head: Normocephalic.      Nose: Nose normal.      Mouth/Throat:      Mouth: Mucous membranes are moist.   Eyes:      Pupils: Pupils are equal, round, and reactive to light.   Cardiovascular:      Rate and Rhythm: Normal rate and regular rhythm.   Pulmonary:      Effort: No respiratory distress.      Breath sounds: Rhonchi present.      Comments: BIPAP mask in place  Abdominal:      General: There is no distension.      Palpations: Abdomen is soft.      Tenderness: There is abdominal tenderness.   Skin:     Capillary Refill: Capillary refill takes 2 to 3 seconds.      Comments: RLE erythema    Neurological:      Mental Status: He is lethargic.      GCS: GCS eye subscore is 3. GCS verbal subscore is 4. GCS motor subscore is 6.      Motor: Weakness  present.      Comments: Wakens to voice  Follows commands, falls back asleep  BUE antigravity  BLE weakness, able to lift antigravity with effort, unable to hold antigravity- drifts, can bend at the knee easily            Vents:  Oxygen Concentration (%): 30 (11/03/24 0508)    Lines/Drains/Airways       Peripheral Intravenous Line  Duration                  Peripheral IV - Single Lumen 11/02/24 1504 20 G Left Antecubital <1 day                    Significant Labs:    CBC/Anemia Profile:  Recent Labs   Lab 11/02/24  1511 11/03/24  0508   WBC 19.25* 18.28*   HGB 13.2* 12.5*   HCT 40.4 40.4    292   MCV 91 94   RDW 18.6* 18.5*        Chemistries:  Recent Labs   Lab 11/02/24  1511 11/03/24  0508   * 136   K 4.9 4.5   CL 98 99   CO2 24 26   BUN 45* 48*   CREATININE 2.2* 2.3*   CALCIUM 9.6 9.6   ALBUMIN 2.7* 2.5*   PROT 7.3 7.0   BILITOT 1.2* 1.3*   ALKPHOS 256* 249*   ALT 35 32   AST 23 21       All pertinent labs within the past 24 hours have been reviewed.    Significant Imaging:   I have reviewed all pertinent imaging results/findings within the past 24 hours.

## 2024-11-03 NOTE — PT/OT/SLP PROGRESS
Occupational Therapy      Patient Name:  Santiago Khan   MRN:  [id number]    OT eval orders received. Chart review completed. Presented to room at 0725 and patient transferring to ICU. Will continue efforts.    Renetta Florentino OT  11/3/2024

## 2024-11-04 PROBLEM — R78.81 BACTEREMIA: Status: ACTIVE | Noted: 2024-11-04

## 2024-11-04 LAB
ACINETOBACTER CALCOACETICUS/BAUMANNII COMPLEX: NOT DETECTED
ALBUMIN SERPL BCP-MCNC: 2.2 G/DL (ref 3.5–5.2)
ALP SERPL-CCNC: 294 U/L (ref 40–150)
ALT SERPL W/O P-5'-P-CCNC: 30 U/L (ref 10–44)
ANION GAP SERPL CALC-SCNC: 11 MMOL/L (ref 8–16)
AST SERPL-CCNC: 21 U/L (ref 10–40)
BACTEROIDES FRAGILIS: NOT DETECTED
BASOPHILS # BLD AUTO: 0.07 K/UL (ref 0–0.2)
BASOPHILS NFR BLD: 0.4 % (ref 0–1.9)
BILIRUB SERPL-MCNC: 1.3 MG/DL (ref 0.1–1)
BUN SERPL-MCNC: 43 MG/DL (ref 8–23)
CALCIUM SERPL-MCNC: 9.1 MG/DL (ref 8.7–10.5)
CANDIDA ALBICANS: NOT DETECTED
CANDIDA AURIS: NOT DETECTED
CANDIDA GLABRATA: NOT DETECTED
CANDIDA KRUSEI: NOT DETECTED
CANDIDA PARAPSILOSIS: NOT DETECTED
CANDIDA TROPICALIS: NOT DETECTED
CHLORIDE SERPL-SCNC: 97 MMOL/L (ref 95–110)
CO2 SERPL-SCNC: 27 MMOL/L (ref 23–29)
CREAT SERPL-MCNC: 2.1 MG/DL (ref 0.5–1.4)
CRYPTOCOCCUS NEOFORMANS/GATTII: NOT DETECTED
CTX-M GENE (ESBL PRODUCER): NOT DETECTED
DIFFERENTIAL METHOD BLD: ABNORMAL
ENTEROBACTER CLOACAE COMPLEX: NOT DETECTED
ENTEROBACTERALES: ABNORMAL
ENTEROCOCCUS FAECALIS: NOT DETECTED
ENTEROCOCCUS FAECIUM: NOT DETECTED
EOSINOPHIL # BLD AUTO: 0.3 K/UL (ref 0–0.5)
EOSINOPHIL NFR BLD: 1.5 % (ref 0–8)
ERYTHROCYTE [DISTWIDTH] IN BLOOD BY AUTOMATED COUNT: 18.1 % (ref 11.5–14.5)
ESCHERICHIA COLI: DETECTED
EST. GFR  (NO RACE VARIABLE): 33 ML/MIN/1.73 M^2
GLUCOSE SERPL-MCNC: 72 MG/DL (ref 70–110)
HAEMOPHILUS INFLUENZAE: NOT DETECTED
HCT VFR BLD AUTO: 38.8 % (ref 40–54)
HGB BLD-MCNC: 12.2 G/DL (ref 14–18)
IMM GRANULOCYTES # BLD AUTO: 0.55 K/UL (ref 0–0.04)
IMM GRANULOCYTES NFR BLD AUTO: 3.1 % (ref 0–0.5)
IMP GENE (CARBAPENEM RESISTANT): NOT DETECTED
KLEBSIELLA AEROGENES: NOT DETECTED
KLEBSIELLA OXYTOCA: NOT DETECTED
KLEBSIELLA PNEUMONIAE GROUP: NOT DETECTED
KPC RESISTANCE GENE (CARBAPENEM): NOT DETECTED
LISTERIA MONOCYTOGENES: NOT DETECTED
LYMPHOCYTES # BLD AUTO: 1.1 K/UL (ref 1–4.8)
LYMPHOCYTES NFR BLD: 6 % (ref 18–48)
MAGNESIUM SERPL-MCNC: 2 MG/DL (ref 1.6–2.6)
MCH RBC QN AUTO: 29.5 PG (ref 27–31)
MCHC RBC AUTO-ENTMCNC: 31.4 G/DL (ref 32–36)
MCR-1: NOT DETECTED
MCV RBC AUTO: 94 FL (ref 82–98)
MEC A/C AND MREJ (MRSA): ABNORMAL
MEC A/C: ABNORMAL
MONOCYTES # BLD AUTO: 0.8 K/UL (ref 0.3–1)
MONOCYTES NFR BLD: 4.6 % (ref 4–15)
NDM GENE (CARBAPENEM RESISTANT): NOT DETECTED
NEISSERIA MENINGITIDIS: NOT DETECTED
NEUTROPHILS # BLD AUTO: 15.1 K/UL (ref 1.8–7.7)
NEUTROPHILS NFR BLD: 84.4 % (ref 38–73)
NRBC BLD-RTO: 0 /100 WBC
OXA-48-LIKE (CARBAPENEM RESISTANT): NOT DETECTED
PHOSPHATE SERPL-MCNC: 4.1 MG/DL (ref 2.7–4.5)
PLATELET # BLD AUTO: 285 K/UL (ref 150–450)
PMV BLD AUTO: 9.4 FL (ref 9.2–12.9)
POCT GLUCOSE: 101 MG/DL (ref 70–110)
POCT GLUCOSE: 123 MG/DL (ref 70–110)
POCT GLUCOSE: 125 MG/DL (ref 70–110)
POCT GLUCOSE: 138 MG/DL (ref 70–110)
POCT GLUCOSE: 146 MG/DL (ref 70–110)
POCT GLUCOSE: 62 MG/DL (ref 70–110)
POCT GLUCOSE: 71 MG/DL (ref 70–110)
POCT GLUCOSE: 87 MG/DL (ref 70–110)
POCT GLUCOSE: 94 MG/DL (ref 70–110)
POTASSIUM SERPL-SCNC: 4.5 MMOL/L (ref 3.5–5.1)
PROT SERPL-MCNC: 6.8 G/DL (ref 6–8.4)
PROTEUS SPECIES: NOT DETECTED
PSEUDOMONAS AERUGINOSA: NOT DETECTED
RBC # BLD AUTO: 4.13 M/UL (ref 4.6–6.2)
SALMONELLA SP: NOT DETECTED
SERRATIA MARCESCENS: NOT DETECTED
SODIUM SERPL-SCNC: 135 MMOL/L (ref 136–145)
STAPHYLOCOCCUS AUREUS: NOT DETECTED
STAPHYLOCOCCUS EPIDERMIDIS: NOT DETECTED
STAPHYLOCOCCUS LUGDUNESIS: NOT DETECTED
STAPHYLOCOCCUS SPECIES: NOT DETECTED
STENOTROPHOMONAS MALTOPHILIA: NOT DETECTED
STREPTOCOCCUS AGALACTIAE: NOT DETECTED
STREPTOCOCCUS PNEUMONIAE: NOT DETECTED
STREPTOCOCCUS PYOGENES: NOT DETECTED
STREPTOCOCCUS SPECIES: NOT DETECTED
TROPONIN I SERPL DL<=0.01 NG/ML-MCNC: 0.03 NG/ML (ref 0–0.03)
VAN A/B (VRE GENE): ABNORMAL
VANCOMYCIN SERPL-MCNC: 13.8 UG/ML
VIM GENE (CARBAPENEM RESISTANT): NOT DETECTED
WBC # BLD AUTO: 17.9 K/UL (ref 3.9–12.7)

## 2024-11-04 PROCEDURE — 25000003 PHARM REV CODE 250: Performed by: INTERNAL MEDICINE

## 2024-11-04 PROCEDURE — 94761 N-INVAS EAR/PLS OXIMETRY MLT: CPT

## 2024-11-04 PROCEDURE — 25000003 PHARM REV CODE 250: Performed by: NURSE PRACTITIONER

## 2024-11-04 PROCEDURE — 97530 THERAPEUTIC ACTIVITIES: CPT

## 2024-11-04 PROCEDURE — 97166 OT EVAL MOD COMPLEX 45 MIN: CPT

## 2024-11-04 PROCEDURE — 94660 CPAP INITIATION&MGMT: CPT

## 2024-11-04 PROCEDURE — 99900035 HC TECH TIME PER 15 MIN (STAT)

## 2024-11-04 PROCEDURE — 84100 ASSAY OF PHOSPHORUS: CPT | Performed by: NURSE PRACTITIONER

## 2024-11-04 PROCEDURE — 97162 PT EVAL MOD COMPLEX 30 MIN: CPT

## 2024-11-04 PROCEDURE — 36415 COLL VENOUS BLD VENIPUNCTURE: CPT | Performed by: INTERNAL MEDICINE

## 2024-11-04 PROCEDURE — 27100171 HC OXYGEN HIGH FLOW UP TO 24 HOURS

## 2024-11-04 PROCEDURE — 80202 ASSAY OF VANCOMYCIN: CPT | Performed by: INTERNAL MEDICINE

## 2024-11-04 PROCEDURE — 21400001 HC TELEMETRY ROOM

## 2024-11-04 PROCEDURE — 94799 UNLISTED PULMONARY SVC/PX: CPT

## 2024-11-04 PROCEDURE — 63600175 PHARM REV CODE 636 W HCPCS: Performed by: NURSE PRACTITIONER

## 2024-11-04 PROCEDURE — 87081 CULTURE SCREEN ONLY: CPT | Performed by: INTERNAL MEDICINE

## 2024-11-04 PROCEDURE — 84484 ASSAY OF TROPONIN QUANT: CPT | Performed by: NURSE PRACTITIONER

## 2024-11-04 PROCEDURE — 80053 COMPREHEN METABOLIC PANEL: CPT | Performed by: NURSE PRACTITIONER

## 2024-11-04 PROCEDURE — 85025 COMPLETE CBC W/AUTO DIFF WBC: CPT | Performed by: NURSE PRACTITIONER

## 2024-11-04 PROCEDURE — 83735 ASSAY OF MAGNESIUM: CPT | Performed by: NURSE PRACTITIONER

## 2024-11-04 PROCEDURE — 63600175 PHARM REV CODE 636 W HCPCS: Performed by: INTERNAL MEDICINE

## 2024-11-04 RX ORDER — HYDROCODONE BITARTRATE AND ACETAMINOPHEN 7.5; 325 MG/1; MG/1
1 TABLET ORAL EVERY 4 HOURS PRN
Status: DISCONTINUED | OUTPATIENT
Start: 2024-11-04 | End: 2024-11-08 | Stop reason: SDUPTHER

## 2024-11-04 RX ORDER — BENZONATATE 100 MG/1
100 CAPSULE ORAL 3 TIMES DAILY PRN
Status: DISCONTINUED | OUTPATIENT
Start: 2024-11-04 | End: 2024-11-12 | Stop reason: HOSPADM

## 2024-11-04 RX ORDER — LATANOPROST 50 UG/ML
1 SOLUTION/ DROPS OPHTHALMIC NIGHTLY
Status: DISCONTINUED | OUTPATIENT
Start: 2024-11-04 | End: 2024-11-12 | Stop reason: HOSPADM

## 2024-11-04 RX ORDER — GUAIFENESIN 600 MG/1
600 TABLET, EXTENDED RELEASE ORAL 2 TIMES DAILY
Status: DISCONTINUED | OUTPATIENT
Start: 2024-11-04 | End: 2024-11-12 | Stop reason: HOSPADM

## 2024-11-04 RX ORDER — MUPIROCIN 20 MG/G
OINTMENT TOPICAL 2 TIMES DAILY
Status: COMPLETED | OUTPATIENT
Start: 2024-11-04 | End: 2024-11-08

## 2024-11-04 RX ADMIN — PIPERACILLIN SODIUM AND TAZOBACTAM SODIUM 4.5 G: 4; .5 INJECTION, POWDER, FOR SOLUTION INTRAVENOUS at 02:11

## 2024-11-04 RX ADMIN — PIPERACILLIN SODIUM AND TAZOBACTAM SODIUM 4.5 G: 4; .5 INJECTION, POWDER, FOR SOLUTION INTRAVENOUS at 05:11

## 2024-11-04 RX ADMIN — LATANOPROST 1 DROP: 50 SOLUTION OPHTHALMIC at 08:11

## 2024-11-04 RX ADMIN — MUPIROCIN: 20 OINTMENT TOPICAL at 08:11

## 2024-11-04 RX ADMIN — MUPIROCIN: 20 OINTMENT TOPICAL at 09:11

## 2024-11-04 RX ADMIN — ENOXAPARIN SODIUM 40 MG: 40 INJECTION SUBCUTANEOUS at 05:11

## 2024-11-04 RX ADMIN — GUAIFENESIN 600 MG: 600 TABLET, EXTENDED RELEASE ORAL at 08:11

## 2024-11-04 RX ADMIN — DEXTROSE MONOHYDRATE 125 ML: 100 INJECTION, SOLUTION INTRAVENOUS at 12:11

## 2024-11-04 RX ADMIN — BENZONATATE 100 MG: 100 CAPSULE ORAL at 08:11

## 2024-11-04 RX ADMIN — PIPERACILLIN SODIUM AND TAZOBACTAM SODIUM 4.5 G: 4; .5 INJECTION, POWDER, FOR SOLUTION INTRAVENOUS at 09:11

## 2024-11-04 RX ADMIN — VANCOMYCIN HYDROCHLORIDE 1500 MG: 1.5 INJECTION, POWDER, LYOPHILIZED, FOR SOLUTION INTRAVENOUS at 05:11

## 2024-11-04 RX ADMIN — HYDROCODONE BITARTRATE AND ACETAMINOPHEN 1 TABLET: 7.5; 325 TABLET ORAL at 10:11

## 2024-11-04 RX ADMIN — ACETAMINOPHEN 650 MG: 325 TABLET ORAL at 03:11

## 2024-11-04 NOTE — ASSESSMENT & PLAN NOTE
Patient with Hypercapnic Respiratory failure which is Acute on chronic.  he is not on home oxygen. Supplemental oxygen was provided and noted- Oxygen Concentration (%):  [40] 40    .   Signs/symptoms of respiratory failure include- lethargy. Contributing diagnoses includes - CHF and COPD Labs and images were reviewed. Patient Has recent ABG, which has been reviewed. Will treat underlying causes and adjust management of respiratory failure as follows-     Patient received sedatives for MRI exam, could have contributed to hypercapnia  Continue BIPAP, will monitor for improvement in mentation  ABG prn  Cardiac monitoring  Cont pulse oximetry    11/4 resolved

## 2024-11-04 NOTE — ASSESSMENT & PLAN NOTE
Chronic, now with BLE weakness  MRI L-spine results pending  Holding sedating medications at this time due to respiratory status/sepsis    11/4 mri

## 2024-11-04 NOTE — PROGRESS NOTES
[facility] - Intensive Care ([facility])  Critical Care Medicine  Progress Note    Patient Name: Santiago Khan  MRN: [id number]  Admission Date: 11/2/2024  Hospital Length of Stay: 1 days  Code Status: Full Code  Attending Provider: Mohan Shukla MD  Primary Care Provider: Kashif Acosta MD   Principal Problem: Weakness of both lower extremities    Subjective:     HPI:  Santiago Khan is a 74 y.o. male with a PMH of Chronic combined systolic and diastolic congestive heart failure (07/09/2020), Chronic kidney disease, stage 3, Chronic obstructive pulmonary disease (03/20/2023), Chronic venous insufficiency, DDD (degenerative disc disease), lumbar, DM (diabetes mellitus) with complications, History of gout, Hyperlipidemia associated with type 2 diabetes mellitus, Hypertension associated with stage 3 chronic kidney disease due to type 2 diabetes mellitus, BRADLEY on CPAP, and Prostate cancer who presented to the ER on 11/2 for further evaluation of worsening bilateral lower extremity weakness, acute on chronic lumbar back pain, and abdominal pain over the past 3-4 days. Patient reported being constipated with last bowel movement 11/1 which was normal and reported his back pain has caused his legs to become weak resulting in increased difficulty walking. He reports taking chronic pain medications and had back surgery in 1982. Patient was seen at urgent care back on 10/27 for treatment of acute gout flare in his right hand/wrist and was provided prednisone after home allopurinol and colchicine provided little to no relief.  Patient denied any recent falls or trauma and was initially somnolent following administration of lorazepam in the ED to obtain the MRI of his lumbar spine. Upon evaluation of patient, patient had increased respiratory distress requiring up titration of supplemental oxygen in addition to bilateral crackles on lung auscultation, lower extremity edema, and right lower extremity erythremia.  Family at bedside reported patient had difficulty taking his home medications, had productive cough, and unsure what his dietary/fluid intake has been like. Patient was noted to be diaphoretic, restless, and becoming more lethargic during bedside assessment.  Stat ABG revealed worsening respiratory status and was placed on BiPAP and provided 2 mg IV Bumex.  Patient also noted to be become hypoglycemic again requiring dextrose infusion. Patient admitted to Hospital Medicine inpatient for continued medical management. Neurosurgery/Spine as well as PT/OT consulted regarding lower extremity weakness.       Patient was admitted to hospital medicine. Became lethargic and hypercapnic on the floor requiring continuous bipap, and hypoglycemic requiring continuous D10 drip. Critical care was consulted upon transfer to ICU.    Hospital/ICU Course:  11/4 - no acute events  Off dextrose drip  Had bm       Interval History: no acute events  Off drip  Bm       Objective:     Vital Signs (Most Recent):  Temp: 99.7 °F (37.6 °C) (11/04/24 1115)  Pulse: 98 (11/04/24 1100)  Resp: (!) 25 (11/04/24 1100)  BP: (!) 107/57 (11/04/24 1100)  SpO2: 99 % (11/04/24 1100) Vital Signs (24h Range):  Temp:  [98.8 °F (37.1 °C)-100 °F (37.8 °C)] 99.7 °F (37.6 °C)  Pulse:  [] 98  Resp:  [12-37] 25  SpO2:  [89 %-100 %] 99 %  BP: ()/(50-73) 107/57     Weight: 93.4 kg (205 lb 14.6 oz)  Body mass index is 32.25 kg/m².      Intake/Output Summary (Last 24 hours) at 11/4/2024 1637  Last data filed at 11/4/2024 1200  Gross per 24 hour   Intake 1155.64 ml   Output 1375 ml   Net -219.36 ml        Physical Exam  Vitals and nursing note reviewed.   Constitutional:       General: He is not in acute distress.  HENT:      Head: Normocephalic and atraumatic.   Eyes:      General:         Right eye: No discharge.         Left eye: No discharge.   Abdominal:      General: There is no distension.      Comments: Slight diffuse tender   Musculoskeletal:          General: Deformity present.      Cervical back: Neck supple.      Comments: Slight swelling and erythema > right   Neurological:      General: No focal deficit present.      Mental Status: He is alert and oriented to person, place, and time.           Review of Systems   Constitutional:  Positive for activity change.   HENT: Negative.     Respiratory:  Positive for cough. Negative for shortness of breath.    Cardiovascular:  Positive for leg swelling.   Gastrointestinal:  Positive for abdominal pain and constipation.   Genitourinary: Negative.    Neurological:  Positive for weakness.        Ble weakness       Vents:  Oxygen Concentration (%): 40 (11/04/24 0304)    Lines/Drains/Airways       Drain  Duration                  Urethral Catheter 11/03/24 0745 Temperature probe 16 Fr. 1 day              Peripheral Intravenous Line  Duration                  Peripheral IV - Single Lumen 11/02/24 1504 20 G Left Antecubital 2 days         Peripheral IV - Single Lumen 11/03/24 0820 20 G Anterior;Distal;Right Forearm 1 day         Peripheral IV - Single Lumen 11/03/24 0856 18 G No Right Antecubital 1 day                    Significant Labs:    CBC/Anemia Profile:  Recent Labs   Lab 11/03/24  0508 11/04/24  0427   WBC 18.28* 17.90*   HGB 12.5* 12.2*   HCT 40.4 38.8*    285   MCV 94 94   RDW 18.5* 18.1*        Chemistries:  Recent Labs   Lab 11/03/24  0508 11/04/24  0427    135*   K 4.5 4.5   CL 99 97   CO2 26 27   BUN 48* 43*   CREATININE 2.3* 2.1*   CALCIUM 9.6 9.1   ALBUMIN 2.5* 2.2*   PROT 7.0 6.8   BILITOT 1.3* 1.3*   ALKPHOS 249* 294*   ALT 32 30   AST 21 21   MG  --  2.0   PHOS  --  4.1       All pertinent labs within the past 24 hours have been reviewed.    Significant Imaging:  I have reviewed all pertinent imaging results/findings within the past 24 hours.    ABG  Recent Labs   Lab 11/03/24  1316   PH 7.335*   PO2 123*   PCO2 56.0*   HCO3 29.8*   BE 4*     Assessment/Plan:     Neuro  Acute  encephalopathy  Due to hypercapnia    11/4 resolved     Pulmonary  Acute respiratory failure with hypoxia and hypercarbia  Patient with Hypercapnic Respiratory failure which is Acute on chronic.  he is not on home oxygen. Supplemental oxygen was provided and noted- Oxygen Concentration (%):  [40] 40    .   Signs/symptoms of respiratory failure include- lethargy. Contributing diagnoses includes - CHF and COPD Labs and images were reviewed. Patient Has recent ABG, which has been reviewed. Will treat underlying causes and adjust management of respiratory failure as follows-     Patient received sedatives for MRI exam, could have contributed to hypercapnia  Continue BIPAP, will monitor for improvement in mentation  ABG prn  Cardiac monitoring  Cont pulse oximetry    11/4 resolved    Chronic obstructive pulmonary disease  Duonebs ordered  On BIPAP due to hypercapnia  spO2 goal > 88%    11/4 - on room air   nebs    Cardiac/Vascular  Acute on chronic combined systolic and diastolic congestive heart failure  Patient has Combined Systolic and Diastolic heart failure that is Acute on chronic. On presentation their CHF was decompensated. Evidence of decompensated CHF on presentation includes: crackles on lung auscultation, dyspnea on exertion (GOVEA), and shortness of breath. Most recent BNP and echo results are listed below.  Recent Labs     11/02/24 2045   *     Latest ECHO  Results for orders placed during the hospital encounter of 10/23/23    Echo    Interpretation Summary    Left Ventricle: The left ventricle is normal in size. Normal wall thickness. There is mild concentric hypertrophy. regional wall motion abnormalities present. There is low normal systolic function with a visually estimated ejection fraction of 50 - 55%. Biplane (2D) method of discs ejection fraction is 49%. There is normal diastolic function.    Left Atrium: Left atrium is mildly dilated.    Right Ventricle: Normal right ventricular cavity size.  Wall thickness is normal. Right ventricle wall motion  is normal. Systolic function is normal.    Mitral Valve: There is mild to moderate regurgitation.    Tricuspid Valve: There is mild regurgitation.    Pulmonary Artery: There is moderate pulmonary hypertension. The estimated pulmonary artery systolic pressure is 50 mmHg.    IVC/SVC: Normal venous pressure at 3 mmHg.    Current Heart Failure Medications  NORepinephrine 4 mg in dextrose 5% 250 mL infusion (premix), Continuous, Intravenous    Plan  - Monitor strict I&Os and daily weights.    - Place on telemetry  - NPO  - Cardiology has not been consulted  - Patient with decreased intake prior to admit, diuresed with bumex for resp failure, now on cont BIPAP. However, requiring Levophed to maintain BP.  Will consider small fluid bolus.     Renal/  Acute kidney injury superimposed on chronic kidney disease  ROSARIO is likely due to pre-renal azotemia due to intravascular volume depletion. Baseline creatinine is  1.6 . Most recent creatinine and eGFR are listed below.  Recent Labs     11/02/24  1511 11/03/24  0508   CREATININE 2.2* 2.3*   EGFRNORACEVR 31* 29*      Plan  - Avoid nephrotoxins and renally dose meds for GFR listed above  - Monitor urine output, serial BMP, and adjust therapy as needed  - Decreased intake per family, may need additional fluid  - Maintain Perez cath    ID  Bacteremia  Ecoli - unsure of source  F/u blood cultures  Ct   Further w/u if needed    Severe sepsis  This patient does have evidence of infective focus  My overall impression is septic shock due to MAP < 65.  Source: Unknown  Antibiotics given-   Antibiotics (72h ago, onward)      Start     Stop Route Frequency Ordered    11/04/24 0900  mupirocin 2 % ointment         11/09/24 0859 Nasl 2 times daily 11/04/24 0523    11/03/24 0930  piperacillin-tazobactam (ZOSYN) 4.5 g in D5W 100 mL IVPB (MB+)         -- IV Every 8 hours (non-standard times) 11/03/24 0825    11/03/24 0921  vancomycin -  "pharmacy to dose  (vancomycin IVPB (PEDS and ADULTS))        Placed in "And" Linked Group    -- IV pharmacy to manage frequency 11/03/24 0821          Latest lactate reviewed-  Recent Labs   Lab 11/03/24  0842   LACTATE 0.8       Organ dysfunction indicated by Acute kidney injury, Acute respiratory failure, Acute heart failure, and Encephalopathy    Fluid challenge Contraindicated- Fluid bolus is contraindicated in this patient due to Congestive Heart Failure     Will Start Pressors- Levophed for MAP of 65    Procal elevated  Vanc and zosyn started  Cultures pending  Resp viral panel sent  If increasing pressor requirements will need a line    11/4 see bacteremia     Cellulitis of right lower extremity  Antibiotics started for possible sepsis, will narrow once cultures result  11/4 antibiotics    Endocrine  Hypoglycemia  Hypoglycemic requiring D10 gtt  POCT BG q1hr    Class 1 obesity due to excess calories with serious comorbidity and body mass index (BMI) of 33.0 to 33.9 in adult  Body mass index is 32.25 kg/m². Morbid obesity complicates all aspects of disease management from diagnostic modalities to treatment. Weight loss encouraged and health benefits explained to patient.         Diabetes  Hypoglycemia  Now improved  Cont accuchecks  Sliding scale if needed     GI  Drug-induced constipation  Patient with chronic pain, on pain regimen at home  Presented with constipation x 3-4 days, abdominal pain/tenderness  KUB to rule out obstruction  Will start bowel regimen when appropriate    11/4 ct still some abd pain     Orthopedic  * Weakness of both lower extremities  MRI results pending  PT/OT ordered    11/4 - mri done  Further w/u if needed  Pt/ot     Lower back pain  Chronic, now with BLE weakness  MRI L-spine results pending  Holding sedating medications at this time due to respiratory status/sepsis    11/4 mri     Transfer to floor  Ask med to see   Melanie Moss MD  Critical Care Medicine  [facility] - " Intensive Care ([facility])

## 2024-11-04 NOTE — SUBJECTIVE & OBJECTIVE
Interval History: no acute events  Off drip  Bm       Objective:     Vital Signs (Most Recent):  Temp: 99.7 °F (37.6 °C) (11/04/24 1115)  Pulse: 98 (11/04/24 1100)  Resp: (!) 25 (11/04/24 1100)  BP: (!) 107/57 (11/04/24 1100)  SpO2: 99 % (11/04/24 1100) Vital Signs (24h Range):  Temp:  [98.8 °F (37.1 °C)-100 °F (37.8 °C)] 99.7 °F (37.6 °C)  Pulse:  [] 98  Resp:  [12-37] 25  SpO2:  [89 %-100 %] 99 %  BP: ()/(50-73) 107/57     Weight: 93.4 kg (205 lb 14.6 oz)  Body mass index is 32.25 kg/m².      Intake/Output Summary (Last 24 hours) at 11/4/2024 1637  Last data filed at 11/4/2024 1200  Gross per 24 hour   Intake 1155.64 ml   Output 1375 ml   Net -219.36 ml        Physical Exam  Vitals and nursing note reviewed.   Constitutional:       General: He is not in acute distress.  HENT:      Head: Normocephalic and atraumatic.   Eyes:      General:         Right eye: No discharge.         Left eye: No discharge.   Abdominal:      General: There is no distension.      Comments: Slight diffuse tender   Musculoskeletal:         General: Deformity present.      Cervical back: Neck supple.      Comments: Slight swelling and erythema > right   Neurological:      General: No focal deficit present.      Mental Status: He is alert and oriented to person, place, and time.           Review of Systems   Constitutional:  Positive for activity change.   HENT: Negative.     Respiratory:  Positive for cough. Negative for shortness of breath.    Cardiovascular:  Positive for leg swelling.   Gastrointestinal:  Positive for abdominal pain and constipation.   Genitourinary: Negative.    Neurological:  Positive for weakness.        Ble weakness       Vents:  Oxygen Concentration (%): 40 (11/04/24 0304)    Lines/Drains/Airways       Drain  Duration                  Urethral Catheter 11/03/24 0745 Temperature probe 16 Fr. 1 day              Peripheral Intravenous Line  Duration                  Peripheral IV - Single Lumen 11/02/24  1504 20 G Left Antecubital 2 days         Peripheral IV - Single Lumen 11/03/24 0820 20 G Anterior;Distal;Right Forearm 1 day         Peripheral IV - Single Lumen 11/03/24 0856 18 G No Right Antecubital 1 day                    Significant Labs:    CBC/Anemia Profile:  Recent Labs   Lab 11/03/24  0508 11/04/24  0427   WBC 18.28* 17.90*   HGB 12.5* 12.2*   HCT 40.4 38.8*    285   MCV 94 94   RDW 18.5* 18.1*        Chemistries:  Recent Labs   Lab 11/03/24  0508 11/04/24 0427    135*   K 4.5 4.5   CL 99 97   CO2 26 27   BUN 48* 43*   CREATININE 2.3* 2.1*   CALCIUM 9.6 9.1   ALBUMIN 2.5* 2.2*   PROT 7.0 6.8   BILITOT 1.3* 1.3*   ALKPHOS 249* 294*   ALT 32 30   AST 21 21   MG  --  2.0   PHOS  --  4.1       All pertinent labs within the past 24 hours have been reviewed.    Significant Imaging:  I have reviewed all pertinent imaging results/findings within the past 24 hours.

## 2024-11-04 NOTE — PT/OT/SLP EVAL
Physical Therapy Evaluation and Treatment    Patient Name:  Santiago Khan   MRN:  [id number]    Recommendations:     Discharge Recommendations:  (TBD)   Discharge Equipment Recommendations: walker, rolling   Barriers to discharge: Decreased caregiver support    Assessment:     Santiago Khan is a 74 y.o. male admitted with a medical diagnosis of Weakness of both lower extremities.  He presents with the following impairments/functional limitations: weakness, impaired endurance, impaired skin, edema, impaired functional mobility, gait instability, impaired balance, pain, decreased safety awareness, decreased lower extremity function, decreased coordination, decreased upper extremity function.    Rehab Prognosis: Good; patient would benefit from acute skilled PT services to address these deficits and reach maximum level of function.    Recent Surgery: * No surgery found *     The mobility limitation cannot be sufficiently resolved by the use of a cane.   Patient's functional mobility deficit can be sufficiently resolved with the use of a rolling walker. Patient's mobility limitation significantly impairs their ability to participate in one of more activities of daily living. The use of a rolling walker will significantly improve the patient's ability to participate in MRADLS and the patient will use it on regular basis in the home.     This patient has a mobility limitation that   Prevents them entirely  from accomplishing MRADL's in customary locations in the home   Places them at reasonable determined heightened risk of mobility or mortality secondary to attempts to perform an MRADL   Prevents them from completing MRADL's in a reasonable time frame    Plan:     During this hospitalization, patient to be seen 3 x/week to address the identified rehab impairments via gait training, therapeutic activities, therapeutic exercises and progress toward the following goals:    Plan of Care Expires:   11/18/24    Subjective     Chief Complaint: RLE PAIN  Patient/Family Comments/goals: TO GET STRONGER  Pain/Comfort:  Pain Rating 1: 10/10  Location - Side 1: Right  Location - Orientation 1: lower  Location 1: leg  Pain Addressed 1: Reposition  Pain Rating 2: 8/10  Location - Side 2: Left  Location - Orientation 2: lower  Location 2: leg (PT ALSO REPORTS GOUT TO R HAND)    Patients cultural, spiritual, Religion conflicts given the current situation:      Living Environment:  PT LIVES WITH BROTHER AND MOTHER WHO ARE BOTH UNABLE TO ASSIST HIM, LIVES IN 1 STORY HOUSE NO STEPS TO ENTER, PT AMB WITH [facility], DOES HIS OWN GROCERY SHOPPING, DRIVES, RETIRED, INDEP WITH ADL'S  Prior to admission, patients level of function was GABRIEL.  Equipment used at home: CPAP, cane, straight, grab bar, raised toilet (UPRIGHT ROLLATOR).  DME owned (not currently used): none.  Upon discharge, patient will have assistance from ?.    Objective:     Communicated with NURSE WILSON prior to session.  Patient found supine with telemetry, peripheral IV, blood pressure cuff, pulse ox (continuous), panchal catheter  upon PT entry to room.    General Precautions: Standard, fall  Orthopedic Precautions:N/A   Braces: N/A  Respiratory Status: Room air    Exams:  Cognitive Exam:  Patient is oriented to Person, Place, Time, and Situation  Postural Exam:  Patient presented with the following abnormalities:    -       Rounded shoulders  Sensation:    -       Impaired  BLE FROM FEET TO CALVES  Skin Integrity/Edema:      -       Skin integrity: RED TO B LOWER LEGS SANDRA. RLE  -       Edema: Mild BLE  RLE ROM: WFL  RLE Strength: GROSSLY 4-/5  LLE ROM: WFL  LLE Strength: GROSSLY 4-/5    Functional Mobility:  Bed Mobility:   PT SLOW MOVING WITH MOBILITY BUT GOOD EFFORT DESPITE BLE PAIN  Rolling Left:  minimum assistance  Scooting: stand by assistance-SEATED FWD SCOOT TO EOB  Supine to Sit: minimum assistance-ENCOURAGED LOG ROLLING FOR BED  "MOBILITY  Transfers:     Sit to Stand:  minimum assistance with rolling walker  Bed to Chair: minimum assistance with  rolling walker  using  Step Transfer  Gait: PT TOOK APPROX. 5 SMALL STEPS WITH RW AND MULUGETA TO TF FROM BED TO CHAIR, CUES FOR UPRIGHT POSTURE AND RW SAFETY, UNABLE TO PROGRESS FROM BED AT THIS VISIT  Balance: FAIR SITTING BALANCE, POOR+ DYNAMIC BALANCE DURING TF  PT EDUCATED IN AND PERFORMED SEATED BLE THEREX X 10 REPS AROM WITH REST: HIP FLEX/EXT, LAQ, QUAD SET, AP'S    AM-PAC 6 CLICK MOBILITY  Total Score:14     Treatment & Education:  PT EDUCATION:  - ROLE OF P.T. AND POC IN ACUTE CARE HOSPITAL SETTING  - RW USE AND SAFETY DURING TF'S AND GAIT  - ENCOURAGED TO INCREASE TIME OOB IN CHAIR TO TOLERANCE   - TO CONTINUE THERAPUETIC EXERCISES THROUGHOUT THE DAY TO INCREASE ACTIVITY TOLERANCE AND DECREASE RISK FOR PNEUMONIA AND BLOOD CLOTS: HIP FLEX/EXT, HIP ABD/ADD, QUAD SET, HEEL SLIDE, AP  - RISK FOR FALLS DUE TO GENERALIZED WEAKNESS, EDUCATED ON "CALL DON'T FALL", ENCOURAGED TO CALL FOR ASSISTANCE WITH ALL NEEDS SUCH AS BED<>CHAIR TRANSFERS OR TRIPS TO BATHROOM, PT AGREEABLE TO SAFETY PRECAUTIONS    Patient left up in chair with all lines intact, call button in reach, chair alarm on, and NURSE notified.    GOALS:   Multidisciplinary Problems       Physical Therapy Goals          Problem: Physical Therapy    Goal Priority Disciplines Outcome Interventions   Physical Therapy Goal     PT, PT/OT     Description: LTG'S TO BE MET IN 14 DAYS (11-18-24)  PT WILL REQUIRE SBA FOR BED MOBILITY  PT WILL REQUIRE SBA FOR BED<>CHAIR TF'S  PT WILL  FEET WITH RW AND CGA  PT WILL INC AMPAC SCORE BY 2 POINTS TO PROGRESS GROSS FUNC MOBILITY                         History:     Past Medical History:   Diagnosis Date    Chronic combined systolic and diastolic congestive heart failure 07/09/2020    Chronic kidney disease, stage 3     Chronic obstructive pulmonary disease 03/20/2023    Wixela 250 mcg, Spiriva " respimat. Continue Albuterol inhaler and albuterol nebs   Referral pul dis management, education and assistance with medication  Patient preference to only see a doctor, request follow up with Dr. Springer             Chronic venous insufficiency     DDD (degenerative disc disease), lumbar     DM (diabetes mellitus) with complications     History of gout     Hyperlipidemia associated with type 2 diabetes mellitus     Hypertension associated with stage 3 chronic kidney disease due to type 2 diabetes mellitus     BRADLEY on CPAP     Prostate cancer     prostatectomy in 2010, rising PSA that started in 2021       Past Surgical History:   Procedure Laterality Date    BICEPS TENDON REPAIR      COLONOSCOPY N/A 03/27/2019    Procedure: COLONOSCOPY;  Surgeon: James Roger MD;  Location: [facility] ENDO;  Service: Endoscopy;  Laterality: N/A;    EXPLORATION OF ORBIT Right 9/8/2023    Procedure: EXPLORATION, ORBIT;  Surgeon: Junaid Bartholomew MD;  Location: [facility] OR;  Service: ENT;  Laterality: Right;  possibly need frozen    HEMORRHOID SURGERY      INGUINAL HERNIA REPAIR Left     KNEE ARTHROSCOPY Right     LEFT HEART CATHETERIZATION Left 06/29/2020    Procedure: CATHETERIZATION, HEART, LEFT;  Surgeon: Cheli Wilson MD;  Location: [facility] CATH LAB;  Service: Cardiology;  Laterality: Left;    LUMBAR LAMINECTOMY      PROSTATECTOMY      TONSILLECTOMY         Time Tracking:     PT Received On: 11/04/24  PT Start Time: 0700     PT Stop Time: 0730  PT Total Time (min): 30 min     Billable Minutes: Evaluation 15 and Therapeutic Activity 15    11/04/2024

## 2024-11-04 NOTE — PLAN OF CARE
OT matty completed. Recommendation TBD with pt participation and progress.  Min A for bed mobility and t/fs with RW.

## 2024-11-04 NOTE — PROGRESS NOTES
Pharmacokinetic Assessment Follow Up: IV Vancomycin    Vancomycin serum concentration assessment(s):    The random level was drawn correctly and can be used to guide therapy at this time. The measurement is within the desired definitive target range of 10 to 20 mcg/mL.    Vancomycin Regimen Plan:    Administer vancomycin 1500 mg IV once and obtain a random vancomycin level at 0000 on 11/5/24.    Drug levels (last 3 results):  Recent Labs   Lab Result Units 11/04/24  0427   Vancomycin, Random ug/mL 13.8       Pharmacy will continue to follow and monitor vancomycin.    Please contact pharmacy at extension [phone] for questions regarding this assessment.    Thank you for the consult,   Oscar Schultzry       Patient brief summary:  Santiago Khan is a 74 y.o. male initiated on antimicrobial therapy with IV Vancomycin for treatment of bacteremia    The patient's current regimen is pulse dosing.    Drug Allergies:   Review of patient's allergies indicates:   Allergen Reactions    Amitriptyline Rash    Celecoxib Rash       Actual Body Weight:   93.4 kg    Renal Function:   Estimated Creatinine Clearance: 34.1 mL/min (A) (based on SCr of 2.1 mg/dL (H)).,     Dialysis Method (if applicable):  N/A    CBC (last 72 hours):  Recent Labs   Lab Result Units 11/02/24  1511 11/03/24  0508 11/04/24  0427   WBC K/uL 19.25* 18.28* 17.90*   Hemoglobin g/dL 13.2* 12.5* 12.2*   Hematocrit % 40.4 40.4 38.8*   Platelets K/uL 272 292 285   Gran % % 88.2* 88.7* 84.4*   Lymph % % 4.6* 3.6* 6.0*   Mono % % 4.1 4.5 4.6   Eosinophil % % 0.4 0.2 1.5   Basophil % % 0.3 0.3 0.4   Differential Method  Automated Automated Automated       Metabolic Panel (last 72 hours):  Recent Labs   Lab Result Units 11/02/24  1511 11/03/24  0508 11/04/24  0427   Sodium mmol/L 134* 136 135*   Potassium mmol/L 4.9 4.5 4.5   Chloride mmol/L 98 99 97   CO2 mmol/L 24 26 27   Glucose mg/dL 156* 71 72   Glucose, UA  1+*  --   --    BUN mg/dL 45* 48* 43*   Creatinine  mg/dL 2.2* 2.3* 2.1*   Albumin g/dL 2.7* 2.5* 2.2*   Total Bilirubin mg/dL 1.2* 1.3* 1.3*   Alkaline Phosphatase U/L 256* 249* 294*   AST U/L 23 21 21   ALT U/L 35 32 30   Magnesium mg/dL  --   --  2.0   Phosphorus mg/dL  --   --  4.1       Vancomycin Administrations:  vancomycin given in the last 96 hours                     vancomycin 1.75 g in 5 % dextrose 500 mL IVPB ()  Restarted 11/03/24 1002     1,750 mg New Bag  0918                    Microbiologic Results:  Microbiology Results (last 7 days)       Procedure Component Value Units Date/Time    Culture, MRSA [[id number]]     Order Status: No result Specimen: MRSA source from Nares, Left     Rapid Organism ID by PCR (from Blood culture) [[id number]]  (Abnormal) Collected: 11/03/24 0125    Order Status: Completed Updated: 11/04/24 0141     Enterococcus faecalis Not Detected     Enterococcus faecium Not Detected     Listeria monocytogenes Not Detected     Staphylococcus spp. Not Detected     Staphylococcus aureus Not Detected     Staphylococcus epidermidis Not Detected     Staphylococcus lugdunensis Not Detected     Streptococcus species Not Detected     Streptococcus agalactiae Not Detected     Streptococcus pneumoniae Not Detected     Streptococcus pyogenes Not Detected     Acinetobacter calcoaceticus/baumannii complex Not Detected     Bacteroides fragilis Not Detected     Enterobacterales See species for ID     Enterobacter cloacae complex Not Detected     Escherichia coli Detected     Klebsiella aerogenes Not Detected     Klebsiella oxytoca Not Detected     Klebsiella pneumoniae group Not Detected     Proteus Not Detected     Salmonella sp Not Detected     Serratia marcescens Not Detected     Haemophilus influenzae Not Detected     Neisseria meningtidis Not Detected     Pseudomonas aeruginosa Not Detected     Stenotrophomonas maltophilia Not Detected     Candida albicans Not Detected     Candida auris Not Detected     Candida glabrata Not Detected      Shelly krusei Not Detected     Candida parapsilosis Not Detected     Candida tropicalis Not Detected     Cryptococcus neoformans/gattii Not Detected     CTX-M (ESBL ) Not Detected     IMP (Carbapenem resistant) Not Detected     KPC resistance gene (Carbapenem resistant) Not Detected     mcr-1  Not Detected     mec A/C  Test Not Applicable     mec A/C and MREJ (MRSA) gene Test Not Applicable     NDM (Carbapenem resistant) Not Detected     OXA-48-like (Carbapenem resistant) Not Detected     van A/B (VRE gene) Test Not Applicable     VIM (Carbapenem resistant) Not Detected    Blood culture [[id number]] Collected: 11/03/24 0125    Order Status: Completed Specimen: Blood from Peripheral, Hand, Right Updated: 11/04/24 0023     Blood Culture, Routine Gram stain dao bottle: Gram negative rods      Results called to and read back by:hSarifa Power RN 11/04/2024  00:22    Blood culture [[id number]] Collected: 11/03/24 0125    Order Status: Completed Specimen: Blood from Peripheral, Antecubital, Left Updated: 11/03/24 2115     Blood Culture, Routine No Growth to date    Respiratory Infection Panel (PCR), Nasopharyngeal [[id number]] Collected: 11/03/24 1045    Order Status: Completed Specimen: Nasopharyngeal Swab Updated: 11/03/24 1201     Respiratory Infection Panel Source NP Swab     Adenovirus Not Detected     Coronavirus 229E, Common Cold Virus Not Detected     Coronavirus HKU1, Common Cold Virus Not Detected     Coronavirus NL63, Common Cold Virus Not Detected     Coronavirus OC43, Common Cold Virus Not Detected     Comment: The Coronavirus strains detected in this test cause the common cold.  These strains are not the COVID-19 (novel Coronavirus)strain   associated with the respiratory disease outbreak.          SARS-CoV2 (COVID-19) Qualitative PCR Not Detected     Human Metapneumovirus Not Detected     Human Rhinovirus/Enterovirus Not Detected     Influenza A (subtypes H1, H1-2009,H3) Not Detected     Influenza  B Not Detected     Parainfluenza Virus 1 Not Detected     Parainfluenza Virus 2 Not Detected     Parainfluenza Virus 3 Not Detected     Parainfluenza Virus 4 Not Detected     Respiratory Syncytial Virus Not Detected     Bordetella Parapertussis ([id number]) Not Detected     Bordetella pertussis (ptxP) Not Detected     Chlamydia pneumoniae Not Detected     Mycoplasma pneumoniae Not Detected     Comment: Respiratory Infection Panel testing performed by Multiplex PCR.       Narrative:      Assay not valid for lower respiratory specimens, alternate  testing required.    Respiratory Infection Panel (PCR), Nasopharyngeal [[id number]] Collected: 11/03/24 1009    Order Status: Canceled Specimen: Nasopharyngeal Swab     Influenza A & B by Molecular [[id number]] Collected: 11/03/24 0607    Order Status: Completed Specimen: Nasopharyngeal Swab Updated: 11/03/24 0839     Influenza A, Molecular Negative     Influenza B, Molecular Negative     Flu A & B Source Nasal swab

## 2024-11-04 NOTE — ASSESSMENT & PLAN NOTE
"This patient does have evidence of infective focus  My overall impression is septic shock due to MAP < 65.  Source: Unknown  Antibiotics given-   Antibiotics (72h ago, onward)      Start     Stop Route Frequency Ordered    11/04/24 0900  mupirocin 2 % ointment         11/09/24 0859 Nasl 2 times daily 11/04/24 0523    11/03/24 0930  piperacillin-tazobactam (ZOSYN) 4.5 g in D5W 100 mL IVPB (MB+)         -- IV Every 8 hours (non-standard times) 11/03/24 0825    11/03/24 0921  vancomycin - pharmacy to dose  (vancomycin IVPB (PEDS and ADULTS))        Placed in "And" Linked Group    -- IV pharmacy to manage frequency 11/03/24 0821          Latest lactate reviewed-  Recent Labs   Lab 11/03/24  0842   LACTATE 0.8       Organ dysfunction indicated by Acute kidney injury, Acute respiratory failure, Acute heart failure, and Encephalopathy    Fluid challenge Contraindicated- Fluid bolus is contraindicated in this patient due to Congestive Heart Failure     Will Start Pressors- Levophed for MAP of 65    Procal elevated  Vanc and zosyn started  Cultures pending  Resp viral panel sent  If increasing pressor requirements will need a line    11/4 see bacteremia   "

## 2024-11-04 NOTE — PT/OT/SLP EVAL
Occupational Therapy   Evaluation    Name: Santiago Khan  MRN: [id number]  Admitting Diagnosis: Weakness of both lower extremities  Recent Surgery: * No surgery found *      Recommendations:     Discharge Recommendations:  (TBD with pt progress)  Discharge Equipment Recommendations:  walker, rolling, shower chair  Barriers to discharge:  Decreased caregiver support    Assessment:     Santiago Khan is a 74 y.o. male with a medical diagnosis of Weakness of both lower extremities.  He presents with the following performance deficits affecting function: weakness, impaired endurance, impaired sensation, impaired self care skills, impaired functional mobility, gait instability, impaired balance, decreased coordination, decreased upper extremity function, decreased lower extremity function, decreased safety awareness, pain, impaired skin, edema, impaired cardiopulmonary response to activity.      Rehab Prognosis: Good; patient would benefit from acute skilled OT services to address these deficits and reach maximum level of function.       Plan:     Patient to be seen 2 x/week to address the above listed problems via self-care/home management, therapeutic activities, therapeutic exercises  Plan of Care Expires: 11/18/24  Plan of Care Reviewed with: patient    Subjective     Chief Complaint: BLE pain and weakness, R hand/wrist gout  Patient/Family Comments/goals: improve strength and mobility    Occupational Profile:  Living Environment: lives with mother and brother in a 1 story house with no steps to enter.   Previous level of function: Pt Mod (I) with ADLs and functional mobility using SPC community distances.  Roles and Routines: drives and grocery shops. Pt is retired.  Equipment Used at Home: CPAP, cane, straight, grab bar, raised toilet, other (see comments) (upright rollator)  Assistance upon Discharge: Pt's family unable to physically assist pt.    Pain/Comfort:  Pain Rating 1: 10/10  Location - Side  1: Right  Location - Orientation 1: lower  Location 1: leg  Pain Addressed 1: Reposition, Distraction  Pain Rating Post-Intervention 1: 10/10  Pain Rating 2: 8/10  Location - Side 2: Left  Location - Orientation 2: lower  Location 2: leg  Pain Addressed 2: Reposition, Distraction  Pain Rating Post-Intervention 2: 8/10    Objective:     Communicated with: Nurse and epic chart review prior to session.  Patient found supine with peripheral IV, telemetry, panchal catheter, pulse ox (continuous), blood pressure cuff upon OT entry to room.    General Precautions: Standard, fall  Orthopedic Precautions: N/A  Braces: N/A  Respiratory Status: Room air    Occupational Performance:    Bed Mobility:    Patient completed Rolling/Turning to Left with  minimum assistance  Patient completed Supine to Sit with minimum assistance  Forward scoot to EOB with SBA.    Functional Mobility/Transfers:  Patient completed Sit <> Stand Transfer with minimum assistance  with  rolling walker   Patient completed Bed <> Chair Transfer using Stand Pivot technique with minimum assistance with rolling walker    Activities of Daily Living:  Grooming: Setup A. Pt washed face and combed hair while sitting in chair  Lower Body Dressing: total assistance maria c socks EOB    Cognitive/Visual Perceptual:  Cognitive/Psychosocial Skills:     -       Oriented to: Person, Place, Time, and Situation   -       Follows Commands/attention:Follows two-step commands  -       Communication: clear/fluent  -       Memory: No Deficits noted  -       Safety awareness/insight to disability: impaired     Physical Exam:  Skin integrity: Redness to BLE, RLE>LLE  Sensation:    -       Impaired  pt reports numbness/tingling in B feet up through B calves  Dominant hand:    -       right  Upper Extremity Range of Motion:     -       Right Upper Extremity: WFL  -       Left Upper Extremity: WFL  Upper Extremity Strength:    -       Right Upper Extremity: 4/5 grossly  -       Left  Upper Extremity: 4/5 grossly   Strength:    -       Right Upper Extremity: fair/poor (pt with gout in R hand/wrist at this time)  -       Left Upper Extremity: WFL    [facility] 6 Click ADL:  [facility] Total Score: 15    Treatment & Education:  Patient educated on role of OT in acute setting and benefits of participation. Educated on techniques to use to increase independence and decrease fall risk with functional transfers. Educated on importance of OOB activity and calling for A to transfer back to bed and meet needs. Encouraged completion of B UE AROM therex throughout the day to tolerance to increase functional strength and activity tolerance. Educated patient on importance of increased tolerance to upright position and direct impact on CV endurance and strength. Patient encouraged to sit up in chair for a minimum of 2 consecutive hours per day.  Patient stated understanding and in agreement with POC.     Patient left up in chair with all lines intact and call button in reach    GOALS:   Multidisciplinary Problems       Occupational Therapy Goals          Problem: Occupational Therapy    Goal Priority Disciplines Outcome Interventions   Occupational Therapy Goal     OT, PT/OT     Description: Goals to be met by: 11/18/24     Patient will increase functional independence with ADLs by performing:    LE Dressing with Contact Guard Assistance.  Grooming while standing at sink with Set-up Assistance.  Toileting from toilet with Set-up Assistance for hygiene and clothing management.   Toilet transfer to toilet with Supervision with RW.  Upper extremity exercise program x15 reps per handout, with independence.                         History:     Past Medical History:   Diagnosis Date    Chronic combined systolic and diastolic congestive heart failure 07/09/2020    Chronic kidney disease, stage 3     Chronic obstructive pulmonary disease 03/20/2023    Wixela 250 mcg, Spiriva respimat. Continue Albuterol inhaler and  albuterol nebs   Referral pul dis management, education and assistance with medication  Patient preference to only see a doctor, request follow up with Dr. Springer             Chronic venous insufficiency     DDD (degenerative disc disease), lumbar     DM (diabetes mellitus) with complications     History of gout     Hyperlipidemia associated with type 2 diabetes mellitus     Hypertension associated with stage 3 chronic kidney disease due to type 2 diabetes mellitus     BRADLEY on CPAP     Prostate cancer     prostatectomy in 2010, rising PSA that started in 2021         Past Surgical History:   Procedure Laterality Date    BICEPS TENDON REPAIR      COLONOSCOPY N/A 03/27/2019    Procedure: COLONOSCOPY;  Surgeon: James Roger MD;  Location: [facility] ENDO;  Service: Endoscopy;  Laterality: N/A;    EXPLORATION OF ORBIT Right 9/8/2023    Procedure: EXPLORATION, ORBIT;  Surgeon: Junaid Bartholomew MD;  Location: [facility] OR;  Service: ENT;  Laterality: Right;  possibly need frozen    HEMORRHOID SURGERY      INGUINAL HERNIA REPAIR Left     KNEE ARTHROSCOPY Right     LEFT HEART CATHETERIZATION Left 06/29/2020    Procedure: CATHETERIZATION, HEART, LEFT;  Surgeon: Cheli Wilson MD;  Location: [facility] CATH LAB;  Service: Cardiology;  Laterality: Left;    LUMBAR LAMINECTOMY      PROSTATECTOMY      TONSILLECTOMY         Time Tracking:     OT Date of Treatment: 11/04/24  OT Start Time: 0730  OT Stop Time: 0755  OT Total Time (min): 25 min    Billable Minutes:Evaluation 15  Therapeutic Activity 10    11/4/2024  Alysia Headley OT

## 2024-11-04 NOTE — PLAN OF CARE
P.T. EVAL COMPLETE.  PT CURRENTLY REQUIRES MULUGETA FOR BED MOBILITY AND BED>CHAIR TF USING RW.  P.T. DC REC: TBD PENDING FURTHER ASSESSMENT

## 2024-11-04 NOTE — PLAN OF CARE
[facility] - Intensive Care (Hospital)  Initial Discharge Assessment       Primary Care Provider: Kashif Acosta MD    Admission Diagnosis: Hypoxemia [R09.02]  Weakness [R53.1]  Hypoglycemia [E16.2]  Renal dysfunction [N28.9]  Elevated troponin [R79.89]  Interstitial pneumonia [J84.9]  Chest pain [R07.9]  Leukocytosis, unspecified type [D72.829]    Admission Date: 11/2/2024  Expected Discharge Date:     Transition of Care Barriers: None    Payor: [employer] [facility] / Plan: [employer] CHOICES / Product Type: Medicare Advantage /     Extended Emergency Contact Information  Primary Emergency Contact: Aurora Fish  Mobile Phone: [phone]  Relation: Friend  Preferred language: English   needed? No    Discharge Plan A: Home with family         [facility] - [place] - [address]  [address]  [place] [address]  Phone: [phone] Fax: [phone]    [id number] - [place] - [address] AT [facility] & [employer] [place]  [address] [place] [address]  Phone: [phone] Fax: [phone]    [facility]  [address] [place] [address]  [place] [address]  Phone: [phone] Fax: [phone]    [id number] - [place] - [address] AT [facility]  [address] [facility]  [place] [address]  Phone: [phone] Fax: [phone]      Initial Assessment (most recent)       Adult Discharge Assessment - 11/04/24 1008          Discharge Assessment    Assessment Type Discharge Planning Assessment     Confirmed/corrected address, phone number and insurance Yes     Confirmed Demographics Correct on Facesheet     Source of Information patient     Communicated ANTHONY with patient/caregiver Date not available/Unable to determine     Reason For Admission Weakness of both lower extremities     People in Home parent(s);sibling(s)      Facility Arrived From: home     Do you expect to return to your current living situation? Yes     Do you have help at home or someone to help you manage your care at home? Yes     Who are your caregiver(s) and their phone number(s)? Aurora yanez     Prior to hospitilization cognitive status: Alert/Oriented     Current cognitive status: Alert/Oriented     Walking or Climbing Stairs Difficulty yes     Walking or Climbing Stairs ambulation difficulty, requires equipment     Mobility Management cane for stability, has a rollator but does not use it     Dressing/Bathing Difficulty no     Home Accessibility wheelchair accessible     Home Layout Able to live on 1st floor     Equipment Currently Used at Home cane, straight;rollator;CPAP;glucometer     Readmission within 30 days? No     Patient currently being followed by outpatient case management? No     Do you currently have service(s) that help you manage your care at home? No     Do you take prescription medications? Yes     Do you have prescription coverage? Yes     Coverage MCR     Do you have any problems affording any of your prescribed medications? No     Is the patient taking medications as prescribed? yes     Who is going to help you get home at discharge? Aurora Yanez     How do you get to doctors appointments? car, drives self     Are you on dialysis? No     Do you take coumadin? No     Discharge Plan A Home with family     DME Needed Upon Discharge  none     Discharge Plan discussed with: Patient     Transition of Care Barriers None                   Anticipated DC dispo: home with family   Prior Level of Function: indepednent with ADLs   People in home: 96 yr old nj4fazy and 73 yr old brother       Comments:  CM met with patient at bedside to introduce role and discuss discharge planning. uArora Yanez will be help at home and can provide transport at time of discharge. Confirmed demographics, insurance, and emergency contacts. CM discharge needs depends  on hospital progress. CM will continue following to assist with other needs. Discharge plan has been determined by review of patient's clinical status, future medical and therapeutic needs, and coverage/benefits for post-acute care in coordination with multidisciplinary team members.

## 2024-11-04 NOTE — PLAN OF CARE
Pt AAOx4. On BIPAP throughout shift. NSR on the monitor. BP stable. Accuchecks q2; one D10 bolus given. Perez in place with adequate UOP. PIV's intact. Bed locked and in lowest position, alarms audible, heels in green boots, pt repositined with pillows. POC reviewed with pt.

## 2024-11-04 NOTE — PLAN OF CARE
VSS this shift. PRN pain meds given- see MAR. Pt up to chair for majority of shift. Up to toilet with BM 3X. Perez catheter removed, pt now using urinal. Call light within reach. Fall and safety precautions in place.

## 2024-11-05 PROBLEM — B96.20 E COLI BACTEREMIA: Status: ACTIVE | Noted: 2024-11-04

## 2024-11-05 LAB
ALBUMIN SERPL BCP-MCNC: 2.3 G/DL (ref 3.5–5.2)
ALBUMIN SERPL BCP-MCNC: 2.3 G/DL (ref 3.5–5.2)
ALP SERPL-CCNC: 396 U/L (ref 40–150)
ALP SERPL-CCNC: 396 U/L (ref 40–150)
ALT SERPL W/O P-5'-P-CCNC: 37 U/L (ref 10–44)
ALT SERPL W/O P-5'-P-CCNC: 37 U/L (ref 10–44)
ANION GAP SERPL CALC-SCNC: 11 MMOL/L (ref 8–16)
ANION GAP SERPL CALC-SCNC: 11 MMOL/L (ref 8–16)
AST SERPL-CCNC: 30 U/L (ref 10–40)
AST SERPL-CCNC: 30 U/L (ref 10–40)
BASOPHILS # BLD AUTO: 0.09 K/UL (ref 0–0.2)
BASOPHILS # BLD AUTO: 0.09 K/UL (ref 0–0.2)
BASOPHILS NFR BLD: 0.5 % (ref 0–1.9)
BASOPHILS NFR BLD: 0.5 % (ref 0–1.9)
BILIRUB SERPL-MCNC: 1.5 MG/DL (ref 0.1–1)
BILIRUB SERPL-MCNC: 1.5 MG/DL (ref 0.1–1)
BUN SERPL-MCNC: 44 MG/DL (ref 8–23)
BUN SERPL-MCNC: 44 MG/DL (ref 8–23)
CALCIUM SERPL-MCNC: 8.9 MG/DL (ref 8.7–10.5)
CALCIUM SERPL-MCNC: 8.9 MG/DL (ref 8.7–10.5)
CHLORIDE SERPL-SCNC: 96 MMOL/L (ref 95–110)
CHLORIDE SERPL-SCNC: 96 MMOL/L (ref 95–110)
CO2 SERPL-SCNC: 27 MMOL/L (ref 23–29)
CO2 SERPL-SCNC: 27 MMOL/L (ref 23–29)
CREAT SERPL-MCNC: 1.9 MG/DL (ref 0.5–1.4)
CREAT SERPL-MCNC: 1.9 MG/DL (ref 0.5–1.4)
DIFFERENTIAL METHOD BLD: ABNORMAL
DIFFERENTIAL METHOD BLD: ABNORMAL
EOSINOPHIL # BLD AUTO: 0.3 K/UL (ref 0–0.5)
EOSINOPHIL # BLD AUTO: 0.3 K/UL (ref 0–0.5)
EOSINOPHIL NFR BLD: 1.4 % (ref 0–8)
EOSINOPHIL NFR BLD: 1.4 % (ref 0–8)
ERYTHROCYTE [DISTWIDTH] IN BLOOD BY AUTOMATED COUNT: 17.5 % (ref 11.5–14.5)
ERYTHROCYTE [DISTWIDTH] IN BLOOD BY AUTOMATED COUNT: 17.5 % (ref 11.5–14.5)
EST. GFR  (NO RACE VARIABLE): 37 ML/MIN/1.73 M^2
EST. GFR  (NO RACE VARIABLE): 37 ML/MIN/1.73 M^2
GLUCOSE SERPL-MCNC: 95 MG/DL (ref 70–110)
GLUCOSE SERPL-MCNC: 95 MG/DL (ref 70–110)
HCT VFR BLD AUTO: 38.9 % (ref 40–54)
HCT VFR BLD AUTO: 38.9 % (ref 40–54)
HGB BLD-MCNC: 12.7 G/DL (ref 14–18)
HGB BLD-MCNC: 12.7 G/DL (ref 14–18)
IMM GRANULOCYTES # BLD AUTO: 0.83 K/UL (ref 0–0.04)
IMM GRANULOCYTES # BLD AUTO: 0.83 K/UL (ref 0–0.04)
IMM GRANULOCYTES NFR BLD AUTO: 4.6 % (ref 0–0.5)
IMM GRANULOCYTES NFR BLD AUTO: 4.6 % (ref 0–0.5)
LYMPHOCYTES # BLD AUTO: 1.1 K/UL (ref 1–4.8)
LYMPHOCYTES # BLD AUTO: 1.1 K/UL (ref 1–4.8)
LYMPHOCYTES NFR BLD: 6 % (ref 18–48)
LYMPHOCYTES NFR BLD: 6 % (ref 18–48)
MAGNESIUM SERPL-MCNC: 2 MG/DL (ref 1.6–2.6)
MAGNESIUM SERPL-MCNC: 2 MG/DL (ref 1.6–2.6)
MCH RBC QN AUTO: 29.3 PG (ref 27–31)
MCH RBC QN AUTO: 29.3 PG (ref 27–31)
MCHC RBC AUTO-ENTMCNC: 32.6 G/DL (ref 32–36)
MCHC RBC AUTO-ENTMCNC: 32.6 G/DL (ref 32–36)
MCV RBC AUTO: 90 FL (ref 82–98)
MCV RBC AUTO: 90 FL (ref 82–98)
MONOCYTES # BLD AUTO: 0.8 K/UL (ref 0.3–1)
MONOCYTES # BLD AUTO: 0.8 K/UL (ref 0.3–1)
MONOCYTES NFR BLD: 4.4 % (ref 4–15)
MONOCYTES NFR BLD: 4.4 % (ref 4–15)
NEUTROPHILS # BLD AUTO: 15.1 K/UL (ref 1.8–7.7)
NEUTROPHILS # BLD AUTO: 15.1 K/UL (ref 1.8–7.7)
NEUTROPHILS NFR BLD: 83.1 % (ref 38–73)
NEUTROPHILS NFR BLD: 83.1 % (ref 38–73)
NRBC BLD-RTO: 0 /100 WBC
NRBC BLD-RTO: 0 /100 WBC
PHOSPHATE SERPL-MCNC: 3.1 MG/DL (ref 2.7–4.5)
PHOSPHATE SERPL-MCNC: 3.1 MG/DL (ref 2.7–4.5)
PLATELET # BLD AUTO: 299 K/UL (ref 150–450)
PLATELET # BLD AUTO: 299 K/UL (ref 150–450)
PMV BLD AUTO: 9.4 FL (ref 9.2–12.9)
PMV BLD AUTO: 9.4 FL (ref 9.2–12.9)
POCT GLUCOSE: 100 MG/DL (ref 70–110)
POCT GLUCOSE: 103 MG/DL (ref 70–110)
POCT GLUCOSE: 124 MG/DL (ref 70–110)
POCT GLUCOSE: 128 MG/DL (ref 70–110)
POCT GLUCOSE: 160 MG/DL (ref 70–110)
POTASSIUM SERPL-SCNC: 3.9 MMOL/L (ref 3.5–5.1)
POTASSIUM SERPL-SCNC: 3.9 MMOL/L (ref 3.5–5.1)
PROT SERPL-MCNC: 6.9 G/DL (ref 6–8.4)
PROT SERPL-MCNC: 6.9 G/DL (ref 6–8.4)
RBC # BLD AUTO: 4.33 M/UL (ref 4.6–6.2)
RBC # BLD AUTO: 4.33 M/UL (ref 4.6–6.2)
SODIUM SERPL-SCNC: 134 MMOL/L (ref 136–145)
SODIUM SERPL-SCNC: 134 MMOL/L (ref 136–145)
VANCOMYCIN SERPL-MCNC: 20.5 UG/ML
WBC # BLD AUTO: 18.2 K/UL (ref 3.9–12.7)
WBC # BLD AUTO: 18.2 K/UL (ref 3.9–12.7)

## 2024-11-05 PROCEDURE — 25000003 PHARM REV CODE 250: Performed by: EMERGENCY MEDICINE

## 2024-11-05 PROCEDURE — 94660 CPAP INITIATION&MGMT: CPT

## 2024-11-05 PROCEDURE — 21400001 HC TELEMETRY ROOM

## 2024-11-05 PROCEDURE — 99900035 HC TECH TIME PER 15 MIN (STAT)

## 2024-11-05 PROCEDURE — 97530 THERAPEUTIC ACTIVITIES: CPT

## 2024-11-05 PROCEDURE — 83735 ASSAY OF MAGNESIUM: CPT | Performed by: NURSE PRACTITIONER

## 2024-11-05 PROCEDURE — 25000003 PHARM REV CODE 250: Performed by: INTERNAL MEDICINE

## 2024-11-05 PROCEDURE — 36415 COLL VENOUS BLD VENIPUNCTURE: CPT | Performed by: NURSE PRACTITIONER

## 2024-11-05 PROCEDURE — 80202 ASSAY OF VANCOMYCIN: CPT | Performed by: INTERNAL MEDICINE

## 2024-11-05 PROCEDURE — 84100 ASSAY OF PHOSPHORUS: CPT | Performed by: NURSE PRACTITIONER

## 2024-11-05 PROCEDURE — 63600175 PHARM REV CODE 636 W HCPCS: Performed by: INTERNAL MEDICINE

## 2024-11-05 PROCEDURE — 63600175 PHARM REV CODE 636 W HCPCS: Performed by: EMERGENCY MEDICINE

## 2024-11-05 PROCEDURE — 80053 COMPREHEN METABOLIC PANEL: CPT | Performed by: NURSE PRACTITIONER

## 2024-11-05 PROCEDURE — 27000221 HC OXYGEN, UP TO 24 HOURS

## 2024-11-05 PROCEDURE — 25000003 PHARM REV CODE 250: Performed by: NURSE PRACTITIONER

## 2024-11-05 PROCEDURE — 36415 COLL VENOUS BLD VENIPUNCTURE: CPT | Performed by: INTERNAL MEDICINE

## 2024-11-05 PROCEDURE — 94799 UNLISTED PULMONARY SVC/PX: CPT

## 2024-11-05 PROCEDURE — 85025 COMPLETE CBC W/AUTO DIFF WBC: CPT | Performed by: NURSE PRACTITIONER

## 2024-11-05 PROCEDURE — 63600175 PHARM REV CODE 636 W HCPCS: Performed by: NURSE PRACTITIONER

## 2024-11-05 PROCEDURE — 94761 N-INVAS EAR/PLS OXIMETRY MLT: CPT

## 2024-11-05 PROCEDURE — 99223 1ST HOSP IP/OBS HIGH 75: CPT | Mod: ,,, | Performed by: PHYSICIAN ASSISTANT

## 2024-11-05 RX ORDER — THIAMINE HCL 100 MG
100 TABLET ORAL DAILY
Status: DISCONTINUED | OUTPATIENT
Start: 2024-11-05 | End: 2024-11-12 | Stop reason: HOSPADM

## 2024-11-05 RX ORDER — ACETAMINOPHEN 500 MG
5000 TABLET ORAL DAILY
Status: DISCONTINUED | OUTPATIENT
Start: 2024-11-05 | End: 2024-11-12 | Stop reason: HOSPADM

## 2024-11-05 RX ORDER — CYANOCOBALAMIN 1000 UG/ML
1000 INJECTION, SOLUTION INTRAMUSCULAR; SUBCUTANEOUS DAILY
Status: COMPLETED | OUTPATIENT
Start: 2024-11-05 | End: 2024-11-06

## 2024-11-05 RX ADMIN — PIPERACILLIN SODIUM AND TAZOBACTAM SODIUM 4.5 G: 4; .5 INJECTION, POWDER, FOR SOLUTION INTRAVENOUS at 09:11

## 2024-11-05 RX ADMIN — GUAIFENESIN 600 MG: 600 TABLET, EXTENDED RELEASE ORAL at 09:11

## 2024-11-05 RX ADMIN — HYDROCODONE BITARTRATE AND ACETAMINOPHEN 1 TABLET: 7.5; 325 TABLET ORAL at 03:11

## 2024-11-05 RX ADMIN — CHOLECALCIFEROL TAB 125 MCG (5000 UNIT) 5000 UNITS: 125 TAB at 02:11

## 2024-11-05 RX ADMIN — Medication 100 MG: at 02:11

## 2024-11-05 RX ADMIN — CYANOCOBALAMIN 1000 MCG: 1000 INJECTION INTRAMUSCULAR; SUBCUTANEOUS at 02:11

## 2024-11-05 RX ADMIN — LATANOPROST 1 DROP: 50 SOLUTION OPHTHALMIC at 08:11

## 2024-11-05 RX ADMIN — MUPIROCIN: 20 OINTMENT TOPICAL at 08:11

## 2024-11-05 RX ADMIN — PIPERACILLIN SODIUM AND TAZOBACTAM SODIUM 4.5 G: 4; .5 INJECTION, POWDER, FOR SOLUTION INTRAVENOUS at 12:11

## 2024-11-05 RX ADMIN — HYDROCODONE BITARTRATE AND ACETAMINOPHEN 1 TABLET: 7.5; 325 TABLET ORAL at 08:11

## 2024-11-05 RX ADMIN — Medication 1 TABLET: at 02:11

## 2024-11-05 RX ADMIN — PIPERACILLIN SODIUM AND TAZOBACTAM SODIUM 4.5 G: 4; .5 INJECTION, POWDER, FOR SOLUTION INTRAVENOUS at 06:11

## 2024-11-05 RX ADMIN — ENOXAPARIN SODIUM 40 MG: 40 INJECTION SUBCUTANEOUS at 06:11

## 2024-11-05 RX ADMIN — MUPIROCIN: 20 OINTMENT TOPICAL at 09:11

## 2024-11-05 RX ADMIN — GUAIFENESIN 600 MG: 600 TABLET, EXTENDED RELEASE ORAL at 08:11

## 2024-11-05 RX ADMIN — HYDROCODONE BITARTRATE AND ACETAMINOPHEN 1 TABLET: 7.5; 325 TABLET ORAL at 09:11

## 2024-11-05 RX ADMIN — THERA TABS 1 TABLET: TAB at 02:11

## 2024-11-05 RX ADMIN — VANCOMYCIN HYDROCHLORIDE 1500 MG: 1.5 INJECTION, POWDER, LYOPHILIZED, FOR SOLUTION INTRAVENOUS at 06:11

## 2024-11-05 RX ADMIN — BENZONATATE 100 MG: 100 CAPSULE ORAL at 08:11

## 2024-11-05 NOTE — ASSESSMENT & PLAN NOTE
ROSARIO is likely due to pre-renal azotemia due to intravascular volume depletion. Baseline creatinine is  1.5-1.7 . Most recent creatinine and eGFR are listed below.  Recent Labs     11/03/24  0508 11/04/24  0427 11/05/24  0514   CREATININE 2.3* 2.1* 1.9*  1.9*   EGFRNORACEVR 29* 33* 37*  37*     Plan  -ROSARIO is  currently undergoing medical managment  -Avoid nephrotoxins and renally dose meds for GFR listed above  -Monitor urine output, serial BMP, and adjust therapy as needed  -Continue home medications and treatment of CHF    Cr coming down

## 2024-11-05 NOTE — ASSESSMENT & PLAN NOTE
Patient has Combined Systolic and Diastolic heart failure that is Acute on chronic. On presentation their CHF was decompensated. Evidence of decompensated CHF on presentation includes: edema, orthopnea, paroxysmal nocturnal dyspnea (PND), dyspnea on exertion (GOVEA), and shortness of breath. The etiology of their decompensation is likely dietary indiscretion, increased fluid intake, and infection . Most recent BNP and echo results are listed below.  Recent Labs     11/02/24 2045   *       Latest ECHO  Results for orders placed during the hospital encounter of 10/23/23    Echo    Interpretation Summary    Left Ventricle: The left ventricle is normal in size. Normal wall thickness. There is mild concentric hypertrophy. regional wall motion abnormalities present. There is low normal systolic function with a visually estimated ejection fraction of 50 - 55%. Biplane (2D) method of discs ejection fraction is 49%. There is normal diastolic function.    Left Atrium: Left atrium is mildly dilated.    Right Ventricle: Normal right ventricular cavity size. Wall thickness is normal. Right ventricle wall motion  is normal. Systolic function is normal.    Mitral Valve: There is mild to moderate regurgitation.    Tricuspid Valve: There is mild regurgitation.    Pulmonary Artery: There is moderate pulmonary hypertension. The estimated pulmonary artery systolic pressure is 50 mmHg.    IVC/SVC: Normal venous pressure at 3 mmHg.    Current Heart Failure Medications       Plan  -Monitor strict I&Os and daily weights.    -Place on telemetry  -Low sodium diet  -Place on fluid restriction of 1.5 L.   -Cardiology has not been consulted  -The patient's volume status is stable but not at their baseline as indicated by edema, orthopnea, paroxysmal nocturnal dyspnea (PND), and shortness of breath  -Continue home medications    Better, diuresed upon admit  Cont same Tx

## 2024-11-05 NOTE — PROGRESS NOTES
Pharmacokinetic Assessment Follow Up: IV Vancomycin    Vancomycin serum concentration assessment(s):    The random level was drawn correctly and can be used to guide therapy at this time. The measurement is above the desired definitive target range of 10 to 20 mcg/mL.    Vancomycin Regimen Plan:    Administer vancomycin 1500 mg IV once after a few hours and obtain a vancomycin random level at 0600 on 11/6/24, with routine AM labs.    Drug levels (last 3 results):  Recent Labs   Lab Result Units 11/04/24  0427 11/05/24  0026   Vancomycin, Random ug/mL 13.8 20.5       Pharmacy will continue to follow and monitor vancomycin.    Please contact pharmacy at extension [phone] for questions regarding this assessment.    Thank you for the consult,   Oscar Can       Patient brief summary:  Santiago Khan is a 74 y.o. male initiated on antimicrobial therapy with IV Vancomycin for treatment of sepsis    The patient's current regimen is pulse dosing.    Drug Allergies:   Review of patient's allergies indicates:   Allergen Reactions    Amitriptyline Rash    Celecoxib Rash       Actual Body Weight:   96.3 kg    Renal Function:   Estimated Creatinine Clearance: 34.7 mL/min (A) (based on SCr of 2.1 mg/dL (H)).,     Dialysis Method (if applicable):  N/A    CBC (last 72 hours):  Recent Labs   Lab Result Units 11/02/24  1511 11/03/24  0508 11/04/24 0427   WBC K/uL 19.25* 18.28* 17.90*   Hemoglobin g/dL 13.2* 12.5* 12.2*   Hematocrit % 40.4 40.4 38.8*   Platelets K/uL 272 292 285   Gran % % 88.2* 88.7* 84.4*   Lymph % % 4.6* 3.6* 6.0*   Mono % % 4.1 4.5 4.6   Eosinophil % % 0.4 0.2 1.5   Basophil % % 0.3 0.3 0.4   Differential Method  Automated Automated Automated       Metabolic Panel (last 72 hours):  Recent Labs   Lab Result Units 11/02/24  1511 11/03/24  0508 11/04/24 0427   Sodium mmol/L 134* 136 135*   Potassium mmol/L 4.9 4.5 4.5   Chloride mmol/L 98 99 97   CO2 mmol/L 24 26 27   Glucose mg/dL 156* 71 72   Glucose,  UA  1+*  --   --    BUN mg/dL 45* 48* 43*   Creatinine mg/dL 2.2* 2.3* 2.1*   Albumin g/dL 2.7* 2.5* 2.2*   Total Bilirubin mg/dL 1.2* 1.3* 1.3*   Alkaline Phosphatase U/L 256* 249* 294*   AST U/L 23 21 21   ALT U/L 35 32 30   Magnesium mg/dL  --   --  2.0   Phosphorus mg/dL  --   --  4.1       Vancomycin Administrations:  vancomycin given in the last 96 hours                     vancomycin 1,500 mg in D5W 250 mL IVPB (admixture device) (mg) 1,500 mg New Bag 11/04/24 0554    vancomycin 1.75 g in 5 % dextrose 500 mL IVPB ()  Restarted 11/03/24 1002     1,750 mg New Bag  0918                    Microbiologic Results:  Microbiology Results (last 7 days)       Procedure Component Value Units Date/Time    Blood culture [[id number]] Collected: 11/03/24 0125    Order Status: Completed Specimen: Blood from Peripheral, Antecubital, Left Updated: 11/04/24 1412     Blood Culture, Routine No Growth to date      No Growth to date    Culture, MRSA [[id number]] Collected: 11/04/24 0557    Order Status: Sent Specimen: MRSA source from Nares, Left Updated: 11/04/24 1036    Rapid Organism ID by PCR (from Blood culture) [[id number]]  (Abnormal) Collected: 11/03/24 0125    Order Status: Completed Updated: 11/04/24 0141     Enterococcus faecalis Not Detected     Enterococcus faecium Not Detected     Listeria monocytogenes Not Detected     Staphylococcus spp. Not Detected     Staphylococcus aureus Not Detected     Staphylococcus epidermidis Not Detected     Staphylococcus lugdunensis Not Detected     Streptococcus species Not Detected     Streptococcus agalactiae Not Detected     Streptococcus pneumoniae Not Detected     Streptococcus pyogenes Not Detected     Acinetobacter calcoaceticus/baumannii complex Not Detected     Bacteroides fragilis Not Detected     Enterobacterales See species for ID     Enterobacter cloacae complex Not Detected     Escherichia coli Detected     Klebsiella aerogenes Not Detected     Klebsiella oxytoca Not  Detected     Klebsiella pneumoniae group Not Detected     Proteus Not Detected     Salmonella sp Not Detected     Serratia marcescens Not Detected     Haemophilus influenzae Not Detected     Neisseria meningtidis Not Detected     Pseudomonas aeruginosa Not Detected     Stenotrophomonas maltophilia Not Detected     Candida albicans Not Detected     Candida auris Not Detected     Candida glabrata Not Detected     Candida krusei Not Detected     Candida parapsilosis Not Detected     Candida tropicalis Not Detected     Cryptococcus neoformans/gattii Not Detected     CTX-M (ESBL ) Not Detected     IMP (Carbapenem resistant) Not Detected     KPC resistance gene (Carbapenem resistant) Not Detected     mcr-1  Not Detected     mec A/C  Test Not Applicable     mec A/C and MREJ (MRSA) gene Test Not Applicable     NDM (Carbapenem resistant) Not Detected     OXA-48-like (Carbapenem resistant) Not Detected     van A/B (VRE gene) Test Not Applicable     VIM (Carbapenem resistant) Not Detected    Blood culture [[id number]] Collected: 11/03/24 0125    Order Status: Completed Specimen: Blood from Peripheral, Hand, Right Updated: 11/04/24 0023     Blood Culture, Routine Gram stain dao bottle: Gram negative rods      Results called to and read back by:Sharifa Power RN 11/04/2024  00:22    Respiratory Infection Panel (PCR), Nasopharyngeal [[id number]] Collected: 11/03/24 1045    Order Status: Completed Specimen: Nasopharyngeal Swab Updated: 11/03/24 1201     Respiratory Infection Panel Source NP Swab     Adenovirus Not Detected     Coronavirus 229E, Common Cold Virus Not Detected     Coronavirus HKU1, Common Cold Virus Not Detected     Coronavirus NL63, Common Cold Virus Not Detected     Coronavirus OC43, Common Cold Virus Not Detected     Comment: The Coronavirus strains detected in this test cause the common cold.  These strains are not the COVID-19 (novel Coronavirus)strain   associated with the respiratory disease  outbreak.          SARS-CoV2 (COVID-19) Qualitative PCR Not Detected     Human Metapneumovirus Not Detected     Human Rhinovirus/Enterovirus Not Detected     Influenza A (subtypes H1, H1-2009,H3) Not Detected     Influenza B Not Detected     Parainfluenza Virus 1 Not Detected     Parainfluenza Virus 2 Not Detected     Parainfluenza Virus 3 Not Detected     Parainfluenza Virus 4 Not Detected     Respiratory Syncytial Virus Not Detected     Bordetella Parapertussis (SR4315) Not Detected     Bordetella pertussis (ptxP) Not Detected     Chlamydia pneumoniae Not Detected     Mycoplasma pneumoniae Not Detected     Comment: Respiratory Infection Panel testing performed by Multiplex PCR.       Narrative:      Assay not valid for lower respiratory specimens, alternate  testing required.    Respiratory Infection Panel (PCR), Nasopharyngeal [[id number]] Collected: 11/03/24 1009    Order Status: Canceled Specimen: Nasopharyngeal Swab     Influenza A & B by Molecular [[id number]] Collected: 11/03/24 0607    Order Status: Completed Specimen: Nasopharyngeal Swab Updated: 11/03/24 0839     Influenza A, Molecular Negative     Influenza B, Molecular Negative     Flu A & B Source Nasal swab

## 2024-11-05 NOTE — NURSING TRANSFER
Nursing Transfer Note      11/4/2024   6:29 PM    Nurse giving handoff:JAJA Minaya  Nurse receiving handoff:Rudy Deleon RN    Reason patient is being transferred: Downgrade    Transfer From: ICU 4    Transfer via wheelchair    Transfer with cardiac monitoring    Transported by JAJA Minaya    Transfer Vital Signs:  Blood Pressure:118/59  Heart Rate:94  O2:96  Temperature:99.5  Respirations:22    Telemetry: Rhythm NSR  Order for Tele Monitor? Yes    Additional Lines: N/A    Medicines sent: n/a    Any special needs or follow-up needed: Neurosurgery consult    Patient belongings transferred with patient: Yes    Chart send with patient: Yes    Notified: Aurora, girlfrienfrancisco

## 2024-11-05 NOTE — CONSULTS
Neurosurgery consult note            Jd cordova is a 74-year-old male with medical history of COPD CHF dm polyneuropathy CKD stage 3 venous insufficiency chronic back pain with radiculopathy gout and prostate cancer who presented to the ER few days ago for severe abdominal pain and generalized weakness.  Patient was seen this morning in he is the primary historian significant other in the room provide supplemental HPI.  Patient reports that he noted sudden worsening of low back pain hip pain and leg pain over the past week which progressed to the point where he is unable to ambulate states he has a longstanding history of low back pain and has been seeing a pain management specialist Dr. Burden who prescribes [place] 7.5.  States he has tried BENNETT in the past without significant relief.  He endorses shortness of breath today in generalized weakness he has been started on empiric antibiotics given inflammatory markers are elevated and also with evidence of bilateral lower extremity cellulitis.  He endorses numbness in his feet which progresses up to his hips and anterior thighs.  He denies difficulty with bowel bladder functioning and severe back pain.  He states he also has abdominal pain and was found to have hepatomegaly on the CT abdomen.  Neurosurgery has been consulted for generalized weakness an MRI L-spine showing severe L4-5 5 1 bilateral foraminal stenosis there is also spondylolisthesis of L4 on L5.  I discussed at length the findings of the MRI with the patient and significant other.  His pain in presentation appear to be multifactorial secondary to medical decline as well as causing an exacerbation of his generalized weakness.       He is able to move his lower extremities well without any focal neurological deficits on exam           Pertinent positive and negative ROS documented above in HPI, all other systems reviewed and found to be negative.               Physical Exam:  Nursing note and  vitals reviewed.     Constitutional: he appears well-nourished. he is not diaphoretic. No distress.  Some increased WOB noted on room air     Eyes: Pupils are equal, round, and reactive to light. EOM are normal.      Cardiovascular: Normal rate and regular rhythm.      Psych/Behavior: he is alert. he is oriented to person, place, and time. he has a normal mood and affect.      Musculoskeletal:        Back: Range of motion is limited. There is tenderness. Muscle strength is 5/5.       Right Lower Extremities: Range of motion is full. There is no tenderness. Muscle strength is 5/5. Tone is normal.        Left Lower Extremities: There is no tenderness. Muscle strength is 5/5. Tone is normal.      Neurological:        Sensory: There is no sensory deficit in the trunk. There is no sensory deficit in the extremities.        Cranial nerves: Cranial nerve(s) II, III, IV, V, VI, VII, VIII, IX, X, XI and XII are intact.      General     Nursing note and vitals reviewed.  Constitutional: he is oriented to person, place, and time. he appears well-nourished. No distress.   Eyes: EOM are normal. Pupils are equal, round, and reactive to light.   Cardiovascular:  Normal rate and regular rhythm.            Neurological: he is alert and oriented to person, place, and time.   Psychiatric: he has a normal mood and affect.         Patient is a 74-year-old male with multiple medical comorbidities who presented to the ER a few days ago for generalized weakness worsening back pain as well as severe pain in distal lower extremities.  He is being treated for cellulitis and empirically on antibiotics for systemic infection.         Impression:     Multilevel degenerative disc changes.  Moderate to severe foraminal stenosis at L4-5 and L5-S1 bilaterally        Electronically signed by:Suresh Kennedy MD  Date:                                            11/03/2024  Time:                                           09:46  I personally reviewed the  MRI L-spine as well as with Dr. Victorino driver there is no acute neurosurgical intervention warranted at this time he needs to be medically optimized and treated and we will see him back in clinic in about a month to discuss next steps for back pain management.      I also reviewed the CT abdomen to evaluate the lumbar spine for acute fractures or spinal deformity. No acute abnl noted      I explained that there is severe neural foraminal narrowing at l4 L5 and L5 S1      However there is no central stenosis warranting acute neurosurgical intervention at this time.  His medical conditions take priority.    I will leave it up to the hospital service to help manage his pain as I am sure Tylenol is contraindicated.  Also potential benefit from a steroid pack if cleared by medicine team.        Will arrange follow up for patient in about 1 month in our neurosurgery clinic          Attestation:  IMarly PA-C have obtained HPI, performed Physical Examination on the above patient, reviewed the pertinent labs, tests, imaging, other relevant data and recorded my findings in this clinic note.

## 2024-11-05 NOTE — PT/OT/SLP PROGRESS
Physical Therapy Treatment    Patient Name:  Santiago Khan   MRN:  [id number]    Recommendations:     Discharge Recommendations: Moderate Intensity Therapy  Discharge Equipment Recommendations: to be determined by next level of care  Barriers to discharge: None    Assessment:     Santiago Khan is a 74 y.o. male admitted with a medical diagnosis of Weakness of both lower extremities.  He presents with the following impairments/functional limitations: weakness, impaired endurance, impaired functional mobility, impaired balance, gait instability, pain, decreased safety awareness, decreased lower extremity function, decreased coordination, edema.    Rehab Prognosis: Fair; patient would benefit from acute skilled PT services to address these deficits and reach maximum level of function.    Recent Surgery: * No surgery found *     Plan:     During this hospitalization, patient to be seen 3 x/week to address the identified rehab impairments via gait training, therapeutic activities, therapeutic exercises and progress toward the following goals:    Plan of Care Expires:  11/18/24    Subjective     Chief Complaint: PAIN  Patient/Family Comments/goals:   Pain/Comfort:  Pain Rating 1: 8/10  Location 1: back  Pain Addressed 1: Reposition  Pain Rating 2: 8/10  Location - Side 2: Bilateral  Location - Orientation 2: generalized  Location 2: leg (PT ALSO C/O L SIDED LOWER ABD. PAIN)  Pain Addressed 2: Reposition      Objective:     Communicated with NURSE MARIA DEL ROSARIO PAULSON prior to session.  Patient found sitting edge of bed with telemetry, peripheral IV, panchal catheter upon PT entry to room.     General Precautions: Standard, fall  Orthopedic Precautions: N/A  Braces: N/A  Respiratory Status: Room air     Functional Mobility:  Bed Mobility:   PT REPORTS HE HAS BEEN NEEDING THE ASSIST OF STAFF FOR SUP<>SIT TF'S-MULUGETA  Scooting: stand by assistance-SEATED SCOOT TO EOB  Transfers:     Sit to Stand:  minimum assistance with  "rolling walker  Bed to Chair: minimum assistance with  rolling walker  using  Step Transfer  Gait: PT AMB 4' WITH RW AND MULUGETA, SLOW UNSTEADY STEPS DUE TO PAIN, VERY QUICK TO FATIGUE, CHAIR IN TOW FOR SAFETY, PT UNABLE TO TOLERATE FURTHER DISTANCE  Balance: FAIR SITTING BALANCE, POOR DYNAMIC BALANCE DURING GAIT  PT EDUCATED IN AND PERFORMED SEATED BLE THEREX X 10 REPS AROM: HIP FLEX/EXT, LAQ, QUAD SETS, HEEL SLIDES, AP'S.  PT WITH MUCH DIFFICULTY WITH HIP FLEX DUE TO PAINFUL LOWER ABD.    AM-PAC 6 CLICK MOBILITY  Turning over in bed (including adjusting bedclothes, sheets and blankets)?: 3  Sitting down on and standing up from a chair with arms (e.g., wheelchair, bedside commode, etc.): 3  Moving from lying on back to sitting on the side of the bed?: 3  Moving to and from a bed to a chair (including a wheelchair)?: 3  Need to walk in hospital room?: 3  Climbing 3-5 steps with a railing?: 1  Basic Mobility Total Score: 16     Treatment & Education:  PT EDUCATION:  - ROLE OF P.T. AND POC IN ACUTE CARE HOSPITAL SETTING  - RW USE AND SAFETY DURING TF'S AND GAIT  - ENCOURAGED TO INCREASE TIME OOB IN CHAIR TO TOLERANCE   - TO CONTINUE THERAPUETIC EXERCISES THROUGHOUT THE DAY TO INCREASE ACTIVITY TOLERANCE AND DECREASE RISK FOR PNEUMONIA AND BLOOD CLOTS: HIP FLEX/EXT, HIP ABD/ADD, QUAD SET, HEEL SLIDE, AP  - RISK FOR FALLS DUE TO GENERALIZED WEAKNESS, EDUCATED ON "CALL DON'T FALL", ENCOURAGED TO CALL FOR ASSISTANCE WITH ALL NEEDS SUCH AS BED<>CHAIR TRANSFERS OR TRIPS TO BATHROOM, PT AGREEABLE TO SAFETY PRECAUTIONS    Patient left up in chair with all lines intact, call button in reach, chair alarm on, NURSE notified, and FAMILY present..    GOALS:   Multidisciplinary Problems       Physical Therapy Goals          Problem: Physical Therapy    Goal Priority Disciplines Outcome Interventions   Physical Therapy Goal     PT, PT/OT Progressing    Description: LTG'S TO BE MET IN 14 DAYS (11-18-24)  PT WILL REQUIRE SBA FOR BED " MOBILITY  PT WILL REQUIRE SBA FOR BED<>CHAIR TF'S  PT WILL  FEET WITH RW AND CGA  PT WILL INC AMPAC SCORE BY 2 POINTS TO PROGRESS GROSS FUNC MOBILITY                         Time Tracking:     PT Received On: 11/05/24  PT Start Time: 0910     PT Stop Time: 0935  PT Total Time (min): 25 min     Billable Minutes: Therapeutic Activity 25    Treatment Type: Treatment  PT/PTA: PT     Number of PTA visits since last PT visit: 0     11/05/2024

## 2024-11-05 NOTE — PLAN OF CARE
POC reviewed with pt. Pt verbalizes understanding of POC. No questions at this time.  AAOx4. NADN.  NSR on cardiac monitor.  Pt remains free of falls.  Pt complaingin of back pain and increased swelling and pain in RLE.  Safety measures in place. Will continue to monitor.  Informed pt to call for assistance before getting up. Pt verbalizes understanding.  Hourly rounding and chart check complete.   Family at the bedside. Pt family stated pt is compliant with home medications for clarification of previously written note.

## 2024-11-05 NOTE — ASSESSMENT & PLAN NOTE
Patient's FSGs are uncontrolled due to hypoglycemia on current medication regimen.  Last A1c reviewed-   Lab Results   Component Value Date    HGBA1C 5.6 08/14/2024     Most recent fingerstick glucose reviewed-   Recent Labs   Lab 11/04/24  2116 11/05/24  0455 11/05/24  0759 11/05/24  1135   POCTGLUCOSE 125* 103 100 160*       Current correctional scale  Low  Titrate as needed  anti-hyperglycemic dose as follows-   Antihyperglycemics (From admission, onward)      None        Plan:  -SSI  -A1c  -Accu-checks  -Hold oral hypoglycemics while patient is in the hospital  -Hypoglycemic protocol      No further hypoglycemia

## 2024-11-05 NOTE — HOSPITAL COURSE
Hospital/ICU Course:  11/4 - no acute events. Off dextrose drip. Had bm, no acute events    Hosp Med:  pt transferred out of ICU today, medically stable. Briefly, 74 y.o. male with Hx of HTN, HLP, Chronic combined systolic and diastolic CHF,  DM 2 with CKD 3 and Polyneuropathy, COPD, Chronic venous insufficiency, DDD Lumbar, Gout, BRADLEY on CPAP, and Prostate Ca, presented as Abd pain and LLE Cellulitis and admitted to ICU on 11/3 with worsening respiratory status and was placed on BiPAP and provided 2 mg IV Bumex. Patient also noted to be Hypoglycemic again requiring D10, hence admitted to ICU. He did well overnite and BS improved, came off D10 and was transferred out to Tele under Hosp Med today. Blood Cx x 1 just reported now as GNR and started on Zosyn.     11/6/24- Per   ID following, antibiotics adjusted to ceftriaxone, plan for total 14 day course  PT/OT with reccs for YASMIN, CM consulted for SNF placement    11/7/24- Per Dr.Iqbal RUEDA, patient denies new issues  SNF placement pending  IV abx till 11/20/24 11/08/2024  Pt noted to have area of fluctuance below R knee on my exam, WBC rising. Pt reports pain to R knee and L abd.   Gen Surg consulted and planning for debridement of RLE abscess today.     11/09/2024  Pt seen and examined with spouse at bedside. Reports R leg feels better today, less pain. Abd pain is also improved.   Discussed with ID Dr. Swanson- PO Zyvox added, aspirate cultures pending     11/10  MOHIT. Pt seen and examined, no family at bedside during rounds. Reports R leg redness and pain continue to improve. Denies CP, SOB.   11/11 patient reported right hand pain. Recently treated for gout. Obtained x-ray no acute findings. Gave dose of colchicine.   Hand pain and ROM improved. Patient accepted to skilled facility. Patient seen and examined, stable for discharge.

## 2024-11-05 NOTE — CONSULTS
[facility]  Consult Note    Patient Name: Santiago Khan  MRN: [id number]  Admission Date: 11/2/2024  Hospital Length of Stay: 2 days  Attending Physician: Carmen Barney MD   Primary Care Provider: Kashif Acosta MD           Patient information was obtained from patient, relative(s), past medical records, ER records, and primary team.     Consults  Subjective:     Principal Problem: Severe sepsis    Chief Complaint:   Chief Complaint   Patient presents with    Numbness     Numbness and weakness to both legs started 3-4 days ago. Denies trauma, pain from bilateral hips down to feet. Hx of COPD and DM. Hx of back surgery in 1982 and takes pain meds for pain.        HPI: Santiago Khan is a 74 y.o. male with a PMH  has a past medical history of Chronic combined systolic and diastolic congestive heart failure (07/09/2020), Chronic kidney disease, stage 3, Chronic obstructive pulmonary disease (03/20/2023), Chronic venous insufficiency, DDD (degenerative disc disease), lumbar, DM (diabetes mellitus) with complications, History of gout, Hyperlipidemia associated with type 2 diabetes mellitus, Hypertension associated with stage 3 chronic kidney disease due to type 2 diabetes mellitus, BRADLEY on CPAP, and Prostate cancer. who presented to the ER for further evaluation of worsening bilateral lower extremity weakness, acute on chronic lumbar back pain, and abdominal pain over the past 3-4 days. Patient reported being constipated with last bowel movement yesterday which was normal and reported his back pain has caused his legs to become weak resulting in increased difficulty walking. He reports taking chronic pain medications and had back surgery in 1982. Patient was seen at urgent care back on 10/27 for treatment of acute gout flare in his right hand/wrist and was provided prednisone after home allopurinol and colchicine provided little to no relief.  Patient denied any  recent falls or trauma and was initially somnolent following administration of lorazepam in the ED to obtain the MRI of his lumbar spine. Upon evaluation of patient, patient had increased respiratory distress requiring up titration of supplemental oxygen in addition to bilateral crackles on lung auscultation, lower extremity edema, and right lower extremity erythremia. Family at bedside reported patient had difficulty taking his home medications, had productive cough, and unsure what his dietary/fluid intake has been like. Patient was noted to be diaphoretic, restless, and becoming more lethargic during bedside assessment.  Stat ABG revealed worsening respiratory status and was placed on BiPAP and provided 2 mg IV Bumex.  Patient also noted to be become hypoglycemic again requiring dextrose infusion.  Patient admitted to Hospital Medicine inpatient for continued medical management. ICU notified regarding low threshold for transfer and agree with current treatment plan. Neurosurgery/Spine as well as PT/OT consulted regarding lower extremity weakness.       PCP: Kashif Acosta       Past Medical History:   Diagnosis Date    Chronic combined systolic and diastolic congestive heart failure 07/09/2020    Chronic kidney disease, stage 3     Chronic obstructive pulmonary disease 03/20/2023    Wixela 250 mcg, Spiriva respimat. Continue Albuterol inhaler and albuterol nebs   Referral pul dis management, education and assistance with medication  Patient preference to only see a doctor, request follow up with Dr. Springer             Chronic venous insufficiency     DDD (degenerative disc disease), lumbar     DM (diabetes mellitus) with complications     History of gout     Hyperlipidemia associated with type 2 diabetes mellitus     Hypertension associated with stage 3 chronic kidney disease due to type 2 diabetes mellitus     BRADLEY on CPAP     Prostate cancer     prostatectomy in 2010, rising PSA that started in 2021       Past  Surgical History:   Procedure Laterality Date    BICEPS TENDON REPAIR      COLONOSCOPY N/A 03/27/2019    Procedure: COLONOSCOPY;  Surgeon: James Roger MD;  Location: [facility] ENDO;  Service: Endoscopy;  Laterality: N/A;    EXPLORATION OF ORBIT Right 9/8/2023    Procedure: EXPLORATION, ORBIT;  Surgeon: Junaid Bartholomew MD;  Location: [facility] OR;  Service: ENT;  Laterality: Right;  possibly need frozen    HEMORRHOID SURGERY      INGUINAL HERNIA REPAIR Left     KNEE ARTHROSCOPY Right     LEFT HEART CATHETERIZATION Left 06/29/2020    Procedure: CATHETERIZATION, HEART, LEFT;  Surgeon: Cheli Wilson MD;  Location: [facility] CATH LAB;  Service: Cardiology;  Laterality: Left;    LUMBAR LAMINECTOMY      PROSTATECTOMY      TONSILLECTOMY         Review of patient's allergies indicates:   Allergen Reactions    Amitriptyline Rash    Celecoxib Rash       No current facility-administered medications on file prior to encounter.     Current Outpatient Medications on File Prior to Encounter   Medication Sig    adalimumab (HUMIRA,CF, PEN) 40 mg/0.4 mL PnKt Inject 0.4 mLs (40 mg total) into the skin every 14 (fourteen) days.    albuterol (PROVENTIL/VENTOLIN HFA) 90 mcg/actuation inhaler INHALE 2 PUFFS INTO THE LUNGS EVERY 4 HOURS AS NEEDED    allopurinoL (ZYLOPRIM) 100 MG tablet TAKE 1 TABLET(100 MG) BY MOUTH EVERY DAY    allopurinoL (ZYLOPRIM) 300 MG tablet TAKE 1 TABLET(300 MG) BY MOUTH EVERY DAY    aspirin (ECOTRIN) 81 MG EC tablet Take 81 mg by mouth once daily.    baclofen (LIORESAL) 10 MG tablet Take 1 tablet by mouth every evening.    bisoprolol (ZEBETA) 5 MG tablet TAKE 1/2 TABLET BY MOUTH EVERY DAY    blood sugar diagnostic Strp Check blood glucose twice per day    blood-glucose meter (ACCU-CHEK GUIDE ME GLUCOSE MTR) Misc USE AS INSTRUCTED    colchicine (COLCRYS) 0.6 mg tablet Take 1 tablet (0.6 mg total) by mouth daily as needed (gout flare).    empagliflozin (JARDIANCE) 10 mg tablet Take 1 tablet (10 mg total) by mouth once  daily.    fluticasone propionate (FLONASE) 50 mcg/actuation nasal spray SHAKE LIQUID AND USE 2 SPRAYS(100 MCG) IN EACH NOSTRIL EVERY DAY Strength: 50 mcg/actuation    glimepiride (AMARYL) 4 MG tablet Take 1 tablet (4 mg total) by mouth before breakfast.    guaiFENesin 100 mg/5 ml (ROBITUSSIN) 100 mg/5 mL syrup Take 200 mg by mouth every evening.    HYDROcodone-acetaminophen (NORCO) 7.5-325 mg per tablet Take 1 tablet by mouth every 4 (four) hours as needed (for chronic pain).    lancets (ACCU-CHEK SOFTCLIX LANCETS) Misc Check BG daily    latanoprost 0.005 % ophthalmic solution Place 1 drop into the right eye every evening.    magnesium oxide (MAG-OX) 400 mg (241.3 mg magnesium) tablet TAKE 2 TABLETS(800 MG) BY MOUTH TWICE DAILY    metFORMIN (GLUCOPHAGE) 500 MG tablet TAKE 1 TABLET(500 MG) BY MOUTH DAILY WITH BREAKFAST    multivitamin-minerals-lutein Tab Take 1 tablet by mouth Daily.    omeprazole (PRILOSEC) 40 MG capsule TAKE 1 CAPSULE BY MOUTH EVERY DAY    potassium chloride SA (K-DUR,KLOR-CON) 20 MEQ tablet TAKE 3 TABLETS BY MOUTH TWICE DAILY    simvastatin (ZOCOR) 40 MG tablet TAKE 1 TABLET(40 MG) BY MOUTH EVERY EVENING    torsemide (DEMADEX) 20 MG Tab Take 2 tablets (40 mg total) by mouth once daily.    varenicline (CHANTIX) 1 mg Tab take 1 tablet by mouth twice daily. Take with full glass of water & with food to avoid nausea (Patient not taking: Reported on 10/28/2024)    WIXELA INHUB 250-50 mcg/dose diskus inhaler INHALE 1 PUFF INTO THE LUNGS TWICE DAILY     Family History       Problem Relation (Age of Onset)    Alzheimer's disease Father    Coronary artery disease Father (90)    Hyperlipidemia Mother    Prostate cancer Father          Tobacco Use    Smoking status: Former     Current packs/day: 0.00     Average packs/day: 1.5 packs/day for 52.7 years (79.1 ttl pk-yrs)     Types: Cigarettes     Start date: 1971     Quit date: 9/15/2023     Years since quittin.1    Smokeless tobacco: Former   Substance  and Sexual Activity    Alcohol use: Not Currently    Drug use: Never    Sexual activity: Not Currently     Partners: Female     Review of Systems   Constitutional:  Positive for activity change, appetite change and fatigue. Negative for chills, diaphoresis and fever.   HENT: Negative.     Respiratory:  Positive for cough. Negative for shortness of breath.    Cardiovascular:  Positive for leg swelling.   Gastrointestinal:  Positive for abdominal pain and constipation.   Genitourinary: Negative.    Neurological:  Positive for weakness.        Ble weakness     Objective:     Vital Signs (Most Recent):  Temp: 97.8 °F (36.6 °C) (11/05/24 1203)  Pulse: 91 (11/05/24 1203)  Resp: 16 (11/05/24 1203)  BP: 112/68 (11/05/24 1203)  SpO2: 95 % (11/05/24 1203) Vital Signs (24h Range):  Temp:  [97.8 °F (36.6 °C)-99.5 °F (37.5 °C)] 97.8 °F (36.6 °C)  Pulse:  [] 91  Resp:  [15-26] 16  SpO2:  [92 %-98 %] 95 %  BP: ()/(58-73) 112/68     Weight: 96.3 kg (212 lb 4.9 oz)  Body mass index is 33.25 kg/m².     Physical Exam  Vitals and nursing note reviewed.   Constitutional:       General: He is not in acute distress.     Appearance: He is ill-appearing. He is not toxic-appearing or diaphoretic.      Comments: Frail elderly man, appears weak and sick   HENT:      Head: Normocephalic and atraumatic.      Right Ear: External ear normal.      Left Ear: External ear normal.      Mouth/Throat:      Mouth: Mucous membranes are moist.      Pharynx: Oropharynx is clear. No oropharyngeal exudate or posterior oropharyngeal erythema.   Eyes:      General:         Right eye: No discharge.         Left eye: No discharge.      Extraocular Movements: Extraocular movements intact.      Conjunctiva/sclera: Conjunctivae normal.      Pupils: Pupils are equal, round, and reactive to light.   Cardiovascular:      Rate and Rhythm: Normal rate and regular rhythm.      Pulses: Normal pulses.      Heart sounds: Normal heart sounds.   Pulmonary:       Effort: Pulmonary effort is normal. No respiratory distress.      Breath sounds: Normal breath sounds. No wheezing or rales.   Abdominal:      General: Abdomen is flat. Bowel sounds are normal. There is no distension.      Palpations: Abdomen is soft. There is no mass.      Tenderness: There is no abdominal tenderness. There is guarding.      Comments: Slight diffuse tender   Musculoskeletal:         General: Tenderness and deformity present.      Cervical back: Neck supple.      Comments: Slight swelling and erythema > right   Skin:     Capillary Refill: Capillary refill takes 2 to 3 seconds.      Findings: Erythema present.   Neurological:      General: No focal deficit present.      Mental Status: He is alert and oriented to person, place, and time.   Psychiatric:         Mood and Affect: Mood normal.         Behavior: Behavior normal.       Results for orders placed or performed during the hospital encounter of 11/02/24   POCT glucose    Collection Time: 11/02/24  2:23 PM   Result Value Ref Range    POCT Glucose 150 (H) 70 - 110 mg/dL   EKG 12-lead    Collection Time: 11/02/24  2:25 PM   Result Value Ref Range    QRS Duration 140 ms    [facility] QTC Calculation 495 ms   CBC auto differential    Collection Time: 11/02/24  3:11 PM   Result Value Ref Range    WBC 19.25 (H) 3.90 - 12.70 K/uL    RBC 4.45 (L) 4.60 - 6.20 M/uL    Hemoglobin 13.2 (L) 14.0 - 18.0 g/dL    Hematocrit 40.4 40.0 - 54.0 %    MCV 91 82 - 98 fL    MCH 29.7 27.0 - 31.0 pg    MCHC 32.7 32.0 - 36.0 g/dL    RDW 18.6 (H) 11.5 - 14.5 %    Platelets 272 150 - 450 K/uL    MPV 9.5 9.2 - 12.9 fL    Immature Granulocytes 2.4 (H) 0.0 - 0.5 %    Gran # (ANC) 17.0 (H) 1.8 - 7.7 K/uL    Immature Grans (Abs) 0.47 (H) 0.00 - 0.04 K/uL    Lymph # 0.9 (L) 1.0 - 4.8 K/uL    Mono # 0.8 0.3 - 1.0 K/uL    Eos # 0.1 0.0 - 0.5 K/uL    Baso # 0.05 0.00 - 0.20 K/uL    nRBC 0 0 /100 WBC    Gran % 88.2 (H) 38.0 - 73.0 %    Lymph % 4.6 (L) 18.0 - 48.0 %    Mono % 4.1 4.0 - 15.0 %     Eosinophil % 0.4 0.0 - 8.0 %    Basophil % 0.3 0.0 - 1.9 %    Differential Method Automated    Comprehensive metabolic panel    Collection Time: 11/02/24  3:11 PM   Result Value Ref Range    Sodium 134 (L) 136 - 145 mmol/L    Potassium 4.9 3.5 - 5.1 mmol/L    Chloride 98 95 - 110 mmol/L    CO2 24 23 - 29 mmol/L    Glucose 156 (H) 70 - 110 mg/dL    BUN 45 (H) 8 - 23 mg/dL    Creatinine 2.2 (H) 0.5 - 1.4 mg/dL    Calcium 9.6 8.7 - 10.5 mg/dL    Total Protein 7.3 6.0 - 8.4 g/dL    Albumin 2.7 (L) 3.5 - 5.2 g/dL    Total Bilirubin 1.2 (H) 0.1 - 1.0 mg/dL    Alkaline Phosphatase 256 (H) 40 - 150 U/L    AST 23 10 - 40 U/L    ALT 35 10 - 44 U/L    eGFR 31 (A) >60 mL/min/1.73 m^2    Anion Gap 12 8 - 16 mmol/L   Urinalysis, Reflex to Urine Culture Urine, Clean Catch    Collection Time: 11/02/24  3:11 PM    Specimen: Urine, Clean Catch   Result Value Ref Range    Specimen UA Urine, Clean Catch     Color, UA Yellow Yellow, Straw, Martine    Appearance, UA Clear Clear    pH, UA 6.0 5.0 - 8.0    Specific Gravity, UA 1.010 1.005 - 1.030    Protein, UA Trace (A) Negative    Glucose, UA 1+ (A) Negative    Ketones, UA Negative Negative    Bilirubin (UA) Negative Negative    Occult Blood UA Negative Negative    Nitrite, UA Negative Negative    Urobilinogen, UA Negative <2.0 EU/dL    Leukocytes, UA Negative Negative   Troponin I    Collection Time: 11/02/24  8:45 PM   Result Value Ref Range    Troponin I 0.032 (H) 0.000 - 0.026 ng/mL   Brain natriuretic peptide    Collection Time: 11/02/24  8:45 PM   Result Value Ref Range     (H) 0 - 99 pg/mL   POCT glucose    Collection Time: 11/03/24 12:21 AM   Result Value Ref Range    POCT Glucose 33 (LL) 70 - 110 mg/dL   Lactic acid, plasma    Collection Time: 11/03/24 12:25 AM   Result Value Ref Range    Lactate (Lactic Acid) 0.6 0.5 - 2.2 mmol/L   POCT glucose    Collection Time: 11/03/24 12:33 AM   Result Value Ref Range    POCT Glucose 155 (H) 70 - 110 mg/dL   POCT glucose     Collection Time: 11/03/24  1:22 AM   Result Value Ref Range    POCT Glucose 182 (H) 70 - 110 mg/dL   Blood culture    Collection Time: 11/03/24  1:25 AM    Specimen: Peripheral, Hand, Right; Blood   Result Value Ref Range    Blood Culture, Routine Gram stain dao bottle: Gram negative rods     Blood Culture, Routine       Results called to and read back by:Sharifa Power RN 11/04/2024  00:22    Blood Culture, Routine (A)      GRAM NEGATIVE NAHOMI  Identification and susceptibility pending     Blood culture    Collection Time: 11/03/24  1:25 AM    Specimen: Peripheral, Antecubital, Left; Blood   Result Value Ref Range    Blood Culture, Routine No Growth to date     Blood Culture, Routine No Growth to date    Rapid Organism ID by PCR (from Blood culture)    Collection Time: 11/03/24  1:25 AM   Result Value Ref Range    Enterococcus faecalis Not Detected Not Detected    Enterococcus faecium Not Detected Not Detected    Listeria monocytogenes Not Detected Not Detected    Staphylococcus spp. Not Detected Not Detected    Staphylococcus aureus Not Detected Not Detected    Staphylococcus epidermidis Not Detected Not Detected    Staphylococcus lugdunensis Not Detected Not Detected    Streptococcus species Not Detected Not Detected    Streptococcus agalactiae Not Detected Not Detected    Streptococcus pneumoniae Not Detected Not Detected    Streptococcus pyogenes Not Detected Not Detected    Acinetobacter calcoaceticus/baumannii complex Not Detected Not Detected    Bacteroides fragilis Not Detected Not Detected    Enterobacterales See species for ID (A) Not Detected    Enterobacter cloacae complex Not Detected Not Detected    Escherichia coli Detected (A) Not Detected    Klebsiella aerogenes Not Detected Not Detected    Klebsiella oxytoca Not Detected Not Detected    Klebsiella pneumoniae group Not Detected Not Detected    Proteus Not Detected Not Detected    Salmonella sp Not Detected Not Detected    Serratia marcescens Not  Detected Not Detected    Haemophilus influenzae Not Detected Not Detected    Neisseria meningtidis Not Detected Not Detected    Pseudomonas aeruginosa Not Detected Not Detected    Stenotrophomonas maltophilia Not Detected Not Detected    Candida albicans Not Detected Not Detected    Candida auris Not Detected Not Detected    Candida glabrata Not Detected Not Detected    Candida krusei Not Detected Not Detected    Candida parapsilosis Not Detected Not Detected    Candida tropicalis Not Detected Not Detected    Cryptococcus neoformans/gattii Not Detected Not Detected    CTX-M (ESBL ) Not Detected Not Detected    IMP (Carbapenem resistant) Not Detected Not Detected    KPC resistance gene (Carbapenem resistant) Not Detected Not Detected    mcr-1  Not Detected Not Detected    mec A/C  Test Not Applicable Not Detected    mec A/C and MREJ (MRSA) gene Test Not Applicable Not Detected    NDM (Carbapenem resistant) Not Detected Not Detected    OXA-48-like (Carbapenem resistant) Not Detected Not Detected    van A/B (VRE gene) Test Not Applicable Not Detected    VIM (Carbapenem resistant) Not Detected Not Detected   ISTAT PROCEDURE    Collection Time: 11/03/24  2:11 AM   Result Value Ref Range    POC PH 7.301 (L) 7.35 - 7.45    POC PCO2 63.8 (HH) 35 - 45 mmHg    POC PO2 69 (L) 80 - 100 mmHg    POC HCO3 31.5 (H) 24 - 28 mmol/L    POC BE 5 (H) -2 to 2 mmol/L    POC SATURATED O2 91 95 - 100 %    Sample ARTERIAL     Site LR     Allens Test Pass     DelSys Nasal Can     Mode SPONT     Flow 2     Sp02 95    ISTAT PROCEDURE    Collection Time: 11/03/24  4:54 AM   Result Value Ref Range    POC PH 7.255 (LL) 7.35 - 7.45    POC PCO2 69.0 (HH) 35 - 45 mmHg    POC PO2 87 80 - 100 mmHg    POC HCO3 30.6 (H) 24 - 28 mmol/L    POC BE 3 (H) -2 to 2 mmol/L    POC SATURATED O2 94 95 - 100 %    Sample ARTERIAL     Site RR     Allens Test Pass     DelSys Nasal Can     Mode SPONT     Flow 4     FiO2 36    POCT glucose    Collection Time:  11/03/24  5:06 AM   Result Value Ref Range    POCT Glucose 32 (LL) 70 - 110 mg/dL   CBC auto differential    Collection Time: 11/03/24  5:08 AM   Result Value Ref Range    WBC 18.28 (H) 3.90 - 12.70 K/uL    RBC 4.29 (L) 4.60 - 6.20 M/uL    Hemoglobin 12.5 (L) 14.0 - 18.0 g/dL    Hematocrit 40.4 40.0 - 54.0 %    MCV 94 82 - 98 fL    MCH 29.1 27.0 - 31.0 pg    MCHC 30.9 (L) 32.0 - 36.0 g/dL    RDW 18.5 (H) 11.5 - 14.5 %    Platelets 292 150 - 450 K/uL    MPV 9.8 9.2 - 12.9 fL    Immature Granulocytes 2.7 (H) 0.0 - 0.5 %    Gran # (ANC) 16.2 (H) 1.8 - 7.7 K/uL    Immature Grans (Abs) 0.49 (H) 0.00 - 0.04 K/uL    Lymph # 0.7 (L) 1.0 - 4.8 K/uL    Mono # 0.8 0.3 - 1.0 K/uL    Eos # 0.0 0.0 - 0.5 K/uL    Baso # 0.05 0.00 - 0.20 K/uL    nRBC 0 0 /100 WBC    Gran % 88.7 (H) 38.0 - 73.0 %    Lymph % 3.6 (L) 18.0 - 48.0 %    Mono % 4.5 4.0 - 15.0 %    Eosinophil % 0.2 0.0 - 8.0 %    Basophil % 0.3 0.0 - 1.9 %    Differential Method Automated    Comprehensive metabolic panel    Collection Time: 11/03/24  5:08 AM   Result Value Ref Range    Sodium 136 136 - 145 mmol/L    Potassium 4.5 3.5 - 5.1 mmol/L    Chloride 99 95 - 110 mmol/L    CO2 26 23 - 29 mmol/L    Glucose 71 70 - 110 mg/dL    BUN 48 (H) 8 - 23 mg/dL    Creatinine 2.3 (H) 0.5 - 1.4 mg/dL    Calcium 9.6 8.7 - 10.5 mg/dL    Total Protein 7.0 6.0 - 8.4 g/dL    Albumin 2.5 (L) 3.5 - 5.2 g/dL    Total Bilirubin 1.3 (H) 0.1 - 1.0 mg/dL    Alkaline Phosphatase 249 (H) 40 - 150 U/L    AST 21 10 - 40 U/L    ALT 32 10 - 44 U/L    eGFR 29 (A) >60 mL/min/1.73 m^2    Anion Gap 11 8 - 16 mmol/L   Procalcitonin    Collection Time: 11/03/24  5:08 AM   Result Value Ref Range    Procalcitonin 0.40 (H) <0.25 ng/mL   POCT glucose    Collection Time: 11/03/24  5:16 AM   Result Value Ref Range    POCT Glucose 94 70 - 110 mg/dL   POCT glucose    Collection Time: 11/03/24  5:23 AM   Result Value Ref Range    POCT Glucose 123 (H) 70 - 110 mg/dL   Influenza A & B by Molecular    Collection  Time: 11/03/24  6:07 AM    Specimen: Nasopharyngeal Swab   Result Value Ref Range    Influenza A, Molecular Negative Negative    Influenza B, Molecular Negative Negative    Flu A & B Source Nasal swab    COVID-19 Rapid Screening    Collection Time: 11/03/24  6:07 AM   Result Value Ref Range    SARS-CoV-2 RNA, Amplification, Qual Negative Negative   POCT glucose    Collection Time: 11/03/24  7:16 AM   Result Value Ref Range    POCT Glucose 48 (LL) 70 - 110 mg/dL   POCT glucose    Collection Time: 11/03/24  7:54 AM   Result Value Ref Range    POCT Glucose 92 70 - 110 mg/dL   POCT glucose    Collection Time: 11/03/24  8:19 AM   Result Value Ref Range    POCT Glucose 88 70 - 110 mg/dL   Lactic acid, plasma    Collection Time: 11/03/24  8:42 AM   Result Value Ref Range    Lactate (Lactic Acid) 0.8 0.5 - 2.2 mmol/L   POCT glucose    Collection Time: 11/03/24  8:49 AM   Result Value Ref Range    POCT Glucose 72 70 - 110 mg/dL   POCT glucose    Collection Time: 11/03/24  9:11 AM   Result Value Ref Range    POCT Glucose 64 (L) 70 - 110 mg/dL   ISTAT PROCEDURE    Collection Time: 11/03/24  9:11 AM   Result Value Ref Range    POC PH 7.332 (L) 7.35 - 7.45    POC PCO2 57.9 (HH) 35 - 45 mmHg    POC PO2 115 (H) 80 - 100 mmHg    POC HCO3 30.6 (H) 24 - 28 mmol/L    POC BE 5 (H) -2 to 2 mmol/L    POC SATURATED O2 98 95 - 100 %    Provider Credentials: MD     Rate 22     Sample ARTERIAL     Site RR     Allens Test Pass     DelSys CPAP/BiPAP     Mode BiPAP     FiO2 30     Spont Rate 25     Min Vol 11     Sp02 99     IP 16     EP 8    TSH    Collection Time: 11/03/24 10:16 AM   Result Value Ref Range    TSH 0.490 0.400 - 4.000 uIU/mL   Lipase    Collection Time: 11/03/24 10:16 AM   Result Value Ref Range    Lipase 22 4 - 60 U/L   POCT glucose    Collection Time: 11/03/24 10:19 AM   Result Value Ref Range    POCT Glucose 107 70 - 110 mg/dL   Respiratory Infection Panel (PCR), Nasopharyngeal    Collection Time: 11/03/24 10:45 AM     Specimen: Nasopharyngeal Swab   Result Value Ref Range    Respiratory Infection Panel Source NP Swab Not Detected    Adenovirus Not Detected Not Detected    Coronavirus 229E, Common Cold Virus Not Detected Not Detected    Coronavirus HKU1, Common Cold Virus Not Detected Not Detected    Coronavirus NL63, Common Cold Virus Not Detected Not Detected    Coronavirus OC43, Common Cold Virus Not Detected Not Detected    SARS-CoV2 (COVID-19) Qualitative PCR Not Detected Not Detected    Human Metapneumovirus Not Detected Not Detected    Human Rhinovirus/Enterovirus Not Detected Not Detected    Influenza A (subtypes H1, H1-2009,H3) Not Detected Not Detected    Influenza B Not Detected Not Detected    Parainfluenza Virus 1 Not Detected Not Detected    Parainfluenza Virus 2 Not Detected Not Detected    Parainfluenza Virus 3 Not Detected Not Detected    Parainfluenza Virus 4 Not Detected Not Detected    Respiratory Syncytial Virus Not Detected Not Detected    Bordetella Parapertussis ([id number]) Not Detected Not Detected    Bordetella pertussis (ptxP) Not Detected Not Detected    Chlamydia pneumoniae Not Detected Not Detected    Mycoplasma pneumoniae Not Detected Not Detected   Echo    Collection Time: 11/03/24 11:03 AM   Result Value Ref Range    BSA 2.1 m2    LVOT stroke volume 90.4 cm3    LVIDd 6.8 (A) 3.5 - 6.0 cm    LV Systolic Volume 169.50 mL    LV Systolic Volume Index 82.7 mL/m2    LVIDs 5.8 (A) 2.1 - 4.0 cm    LV ESV A2C 77.42 mL    LV Diastolic Volume 238.36 mL    LV ESV A4C 66.59 mL    LV Diastolic Volume Index 116.27 mL/m2    Left Ventricular End Systolic Volume by Teichholz Method 169.50 mL    Left Ventricular End Diastolic Volume by Teichholz Method 238.36 mL    IVS 0.9 0.6 - 1.1 cm    LVOT diameter 2.4 cm    LVOT area 4.5 cm2    FS 14.7 (A) 28 - 44 %    Left Ventricle Relative Wall Thickness 0.26 cm    PW 0.9 0.6 - 1.1 cm    LV mass 268.2 g    LV Mass Index 130.8 g/m2    MV Peak E Bassam 0.98 m/s    TDI LATERAL 0.10  m/s    TDI SEPTAL 0.07 m/s    E/E' ratio 11.53 m/s    MV Peak A Bassam 0.85 m/s    TR Max Bassam 3.02 m/s    E/A ratio 1.15     IVRT 121.79 msec    E wave deceleration time 173.57 msec    LV SEPTAL E/E' RATIO 14.00 m/s    LV LATERAL E/E' RATIO 9.80 m/s    LVOT peak bassam 1.0 m/s    Left Ventricular Outflow Tract Mean Velocity 0.71 cm/s    Left Ventricular Outflow Tract Mean Gradient 2.33 mmHg    RVOT peak VTI 9.8 cm    LA size 3.94 cm    Left Atrium Minor Axis 6.23 cm    Left Atrium Major Axis 6.39 cm    LA Vol (MOD) 74.58 mL    VALERIE (MOD) 36.4 mL/m2    RA Major Axis 4.62 cm    Vn Nyquist MS 0.28 m/s    AV mean gradient 4.0 mmHg    AV peak gradient 7.8 mmHg    Ao peak bassam 1.4 m/s    Ao VTI 26.5 cm    LVOT peak VTI 20.0 cm    AV valve area 3.4 cm²    AV Velocity Ratio 0.71     AV index (prosthetic) 0.75     KADI by Velocity Ratio 3.2 cm²    Radius 0.85 cm    Vn Nyquist 0.28 m/s    Mr max bassam 4.62 m/s    MR PISA EROA 0.27 cm2    MV stenosis pressure 1/2 time 50.33 ms    MV valve area p 1/2 method 4.37 cm2    Triscuspid Valve Regurgitation Peak Gradient 36 mmHg    PV mean gradient 1 mmHg    PV PEAK VELOCITY 1.16 m/s    PV peak gradient 5 mmHg    Pulmonary Valve Mean Velocity 0.70 m/s    RVOT peak bassam 0.58 m/s    Ao root annulus 3.86 cm    STJ 2.79 cm    Ascending aorta 3.03 cm    Mean e' 0.09 m/s    ZLVIDS 3.41     ZLVIDD 1.01     LA area A4C 23.01 cm2    LA area A2C 23.93 cm2    RVDD 3.76 cm    VALERIE 44.3 mL/m2    LA Vol 90.85 cm3    LA WIDTH 4.3 cm    RA Width 3.4 cm    TAPSE 1.60 cm    TV resting pulmonary artery pressure 44 mmHg    RV TB RVSP 11 mmHg    Est. RA pres 8 mmHg    Mondragon's Biplane MOD Ejection Fraction 38 %   POCT glucose    Collection Time: 11/03/24 11:27 AM   Result Value Ref Range    POCT Glucose 143 (H) 70 - 110 mg/dL   POCT glucose    Collection Time: 11/03/24 12:30 PM   Result Value Ref Range    POCT Glucose 115 (H) 70 - 110 mg/dL   POCT glucose    Collection Time: 11/03/24  1:03 PM   Result Value Ref  Range    POCT Glucose 110 70 - 110 mg/dL   ISTAT PROCEDURE    Collection Time: 11/03/24  1:16 PM   Result Value Ref Range    POC PH 7.335 (L) 7.35 - 7.45    POC PCO2 56.0 (HH) 35 - 45 mmHg    POC PO2 123 (H) 80 - 100 mmHg    POC HCO3 29.8 (H) 24 - 28 mmol/L    POC BE 4 (H) -2 to 2 mmol/L    POC SATURATED O2 98 95 - 100 %    Rate 22     Sample ARTERIAL     Site LB     Allens Test Pass     DelSys CPAP/BiPAP     Mode BiPAP     FiO2 40     Spont Rate 22     Min Vol 10.5     Sp02 100     IP 16     EP 8    POCT glucose    Collection Time: 11/03/24  2:08 PM   Result Value Ref Range    POCT Glucose 101 70 - 110 mg/dL   POCT glucose    Collection Time: 11/03/24  3:05 PM   Result Value Ref Range    POCT Glucose 98 70 - 110 mg/dL   POCT glucose    Collection Time: 11/03/24  4:03 PM   Result Value Ref Range    POCT Glucose 83 70 - 110 mg/dL   POCT glucose    Collection Time: 11/03/24  5:11 PM   Result Value Ref Range    POCT Glucose 70 70 - 110 mg/dL   POCT glucose    Collection Time: 11/03/24  6:12 PM   Result Value Ref Range    POCT Glucose 125 (H) 70 - 110 mg/dL   POCT glucose    Collection Time: 11/03/24  7:50 PM   Result Value Ref Range    POCT Glucose 94 70 - 110 mg/dL   POCT glucose    Collection Time: 11/03/24 10:05 PM   Result Value Ref Range    POCT Glucose 72 70 - 110 mg/dL   POCT glucose    Collection Time: 11/04/24 12:08 AM   Result Value Ref Range    POCT Glucose 62 (L) 70 - 110 mg/dL   POCT glucose    Collection Time: 11/04/24 12:30 AM   Result Value Ref Range    POCT Glucose 94 70 - 110 mg/dL   POCT glucose    Collection Time: 11/04/24  2:22 AM   Result Value Ref Range    POCT Glucose 101 70 - 110 mg/dL   POCT glucose    Collection Time: 11/04/24  4:03 AM   Result Value Ref Range    POCT Glucose 87 70 - 110 mg/dL   Comprehensive metabolic panel    Collection Time: 11/04/24  4:27 AM   Result Value Ref Range    Sodium 135 (L) 136 - 145 mmol/L    Potassium 4.5 3.5 - 5.1 mmol/L    Chloride 97 95 - 110 mmol/L    CO2  27 23 - 29 mmol/L    Glucose 72 70 - 110 mg/dL    BUN 43 (H) 8 - 23 mg/dL    Creatinine 2.1 (H) 0.5 - 1.4 mg/dL    Calcium 9.1 8.7 - 10.5 mg/dL    Total Protein 6.8 6.0 - 8.4 g/dL    Albumin 2.2 (L) 3.5 - 5.2 g/dL    Total Bilirubin 1.3 (H) 0.1 - 1.0 mg/dL    Alkaline Phosphatase 294 (H) 40 - 150 U/L    AST 21 10 - 40 U/L    ALT 30 10 - 44 U/L    eGFR 33 (A) >60 mL/min/1.73 m^2    Anion Gap 11 8 - 16 mmol/L   CBC auto differential    Collection Time: 11/04/24  4:27 AM   Result Value Ref Range    WBC 17.90 (H) 3.90 - 12.70 K/uL    RBC 4.13 (L) 4.60 - 6.20 M/uL    Hemoglobin 12.2 (L) 14.0 - 18.0 g/dL    Hematocrit 38.8 (L) 40.0 - 54.0 %    MCV 94 82 - 98 fL    MCH 29.5 27.0 - 31.0 pg    MCHC 31.4 (L) 32.0 - 36.0 g/dL    RDW 18.1 (H) 11.5 - 14.5 %    Platelets 285 150 - 450 K/uL    MPV 9.4 9.2 - 12.9 fL    Immature Granulocytes 3.1 (H) 0.0 - 0.5 %    Gran # (ANC) 15.1 (H) 1.8 - 7.7 K/uL    Immature Grans (Abs) 0.55 (H) 0.00 - 0.04 K/uL    Lymph # 1.1 1.0 - 4.8 K/uL    Mono # 0.8 0.3 - 1.0 K/uL    Eos # 0.3 0.0 - 0.5 K/uL    Baso # 0.07 0.00 - 0.20 K/uL    nRBC 0 0 /100 WBC    Gran % 84.4 (H) 38.0 - 73.0 %    Lymph % 6.0 (L) 18.0 - 48.0 %    Mono % 4.6 4.0 - 15.0 %    Eosinophil % 1.5 0.0 - 8.0 %    Basophil % 0.4 0.0 - 1.9 %    Differential Method Automated    Phosphorus    Collection Time: 11/04/24  4:27 AM   Result Value Ref Range    Phosphorus 4.1 2.7 - 4.5 mg/dL   Magnesium    Collection Time: 11/04/24  4:27 AM   Result Value Ref Range    Magnesium 2.0 1.6 - 2.6 mg/dL   Vancomycin, random    Collection Time: 11/04/24  4:27 AM   Result Value Ref Range    Vancomycin, Random 13.8 Not established ug/mL   Troponin I    Collection Time: 11/04/24  4:27 AM   Result Value Ref Range    Troponin I 0.026 0.000 - 0.026 ng/mL   POCT glucose    Collection Time: 11/04/24  5:59 AM   Result Value Ref Range    POCT Glucose 71 70 - 110 mg/dL   POCT glucose    Collection Time: 11/04/24  8:28 AM   Result Value Ref Range    POCT Glucose  123 (H) 70 - 110 mg/dL   POCT glucose    Collection Time: 11/04/24 10:34 AM   Result Value Ref Range    POCT Glucose 146 (H) 70 - 110 mg/dL   POCT glucose    Collection Time: 11/04/24  5:21 PM   Result Value Ref Range    POCT Glucose 138 (H) 70 - 110 mg/dL   POCT glucose    Collection Time: 11/04/24  9:16 PM   Result Value Ref Range    POCT Glucose 125 (H) 70 - 110 mg/dL   Vancomycin, random    Collection Time: 11/05/24 12:26 AM   Result Value Ref Range    Vancomycin, Random 20.5 Not established ug/mL   POCT glucose    Collection Time: 11/05/24  4:55 AM   Result Value Ref Range    POCT Glucose 103 70 - 110 mg/dL   Comprehensive metabolic panel    Collection Time: 11/05/24  5:14 AM   Result Value Ref Range    Sodium 134 (L) 136 - 145 mmol/L    Potassium 3.9 3.5 - 5.1 mmol/L    Chloride 96 95 - 110 mmol/L    CO2 27 23 - 29 mmol/L    Glucose 95 70 - 110 mg/dL    BUN 44 (H) 8 - 23 mg/dL    Creatinine 1.9 (H) 0.5 - 1.4 mg/dL    Calcium 8.9 8.7 - 10.5 mg/dL    Total Protein 6.9 6.0 - 8.4 g/dL    Albumin 2.3 (L) 3.5 - 5.2 g/dL    Total Bilirubin 1.5 (H) 0.1 - 1.0 mg/dL    Alkaline Phosphatase 396 (H) 40 - 150 U/L    AST 30 10 - 40 U/L    ALT 37 10 - 44 U/L    eGFR 37 (A) >60 mL/min/1.73 m^2    Anion Gap 11 8 - 16 mmol/L   CBC auto differential    Collection Time: 11/05/24  5:14 AM   Result Value Ref Range    WBC 18.20 (H) 3.90 - 12.70 K/uL    RBC 4.33 (L) 4.60 - 6.20 M/uL    Hemoglobin 12.7 (L) 14.0 - 18.0 g/dL    Hematocrit 38.9 (L) 40.0 - 54.0 %    MCV 90 82 - 98 fL    MCH 29.3 27.0 - 31.0 pg    MCHC 32.6 32.0 - 36.0 g/dL    RDW 17.5 (H) 11.5 - 14.5 %    Platelets 299 150 - 450 K/uL    MPV 9.4 9.2 - 12.9 fL    Immature Granulocytes 4.6 (H) 0.0 - 0.5 %    Gran # (ANC) 15.1 (H) 1.8 - 7.7 K/uL    Immature Grans (Abs) 0.83 (H) 0.00 - 0.04 K/uL    Lymph # 1.1 1.0 - 4.8 K/uL    Mono # 0.8 0.3 - 1.0 K/uL    Eos # 0.3 0.0 - 0.5 K/uL    Baso # 0.09 0.00 - 0.20 K/uL    nRBC 0 0 /100 WBC    Gran % 83.1 (H) 38.0 - 73.0 %    Lymph %  6.0 (L) 18.0 - 48.0 %    Mono % 4.4 4.0 - 15.0 %    Eosinophil % 1.4 0.0 - 8.0 %    Basophil % 0.5 0.0 - 1.9 %    Differential Method Automated    Phosphorus    Collection Time: 11/05/24  5:14 AM   Result Value Ref Range    Phosphorus 3.1 2.7 - 4.5 mg/dL   Magnesium    Collection Time: 11/05/24  5:14 AM   Result Value Ref Range    Magnesium 2.0 1.6 - 2.6 mg/dL   CBC Auto Differential    Collection Time: 11/05/24  5:14 AM   Result Value Ref Range    WBC 18.20 (H) 3.90 - 12.70 K/uL    RBC 4.33 (L) 4.60 - 6.20 M/uL    Hemoglobin 12.7 (L) 14.0 - 18.0 g/dL    Hematocrit 38.9 (L) 40.0 - 54.0 %    MCV 90 82 - 98 fL    MCH 29.3 27.0 - 31.0 pg    MCHC 32.6 32.0 - 36.0 g/dL    RDW 17.5 (H) 11.5 - 14.5 %    Platelets 299 150 - 450 K/uL    MPV 9.4 9.2 - 12.9 fL    Immature Granulocytes 4.6 (H) 0.0 - 0.5 %    Gran # (ANC) 15.1 (H) 1.8 - 7.7 K/uL    Immature Grans (Abs) 0.83 (H) 0.00 - 0.04 K/uL    Lymph # 1.1 1.0 - 4.8 K/uL    Mono # 0.8 0.3 - 1.0 K/uL    Eos # 0.3 0.0 - 0.5 K/uL    Baso # 0.09 0.00 - 0.20 K/uL    nRBC 0 0 /100 WBC    Gran % 83.1 (H) 38.0 - 73.0 %    Lymph % 6.0 (L) 18.0 - 48.0 %    Mono % 4.4 4.0 - 15.0 %    Eosinophil % 1.4 0.0 - 8.0 %    Basophil % 0.5 0.0 - 1.9 %    Differential Method Automated    Comprehensive Metabolic Panel    Collection Time: 11/05/24  5:14 AM   Result Value Ref Range    Sodium 134 (L) 136 - 145 mmol/L    Potassium 3.9 3.5 - 5.1 mmol/L    Chloride 96 95 - 110 mmol/L    CO2 27 23 - 29 mmol/L    Glucose 95 70 - 110 mg/dL    BUN 44 (H) 8 - 23 mg/dL    Creatinine 1.9 (H) 0.5 - 1.4 mg/dL    Calcium 8.9 8.7 - 10.5 mg/dL    Total Protein 6.9 6.0 - 8.4 g/dL    Albumin 2.3 (L) 3.5 - 5.2 g/dL    Total Bilirubin 1.5 (H) 0.1 - 1.0 mg/dL    Alkaline Phosphatase 396 (H) 40 - 150 U/L    AST 30 10 - 40 U/L    ALT 37 10 - 44 U/L    eGFR 37 (A) >60 mL/min/1.73 m^2    Anion Gap 11 8 - 16 mmol/L   Magnesium    Collection Time: 11/05/24  5:14 AM   Result Value Ref Range    Magnesium 2.0 1.6 - 2.6 mg/dL    Phosphorus    Collection Time: 11/05/24  5:14 AM   Result Value Ref Range    Phosphorus 3.1 2.7 - 4.5 mg/dL   POCT glucose    Collection Time: 11/05/24  7:59 AM   Result Value Ref Range    POCT Glucose 100 70 - 110 mg/dL   POCT glucose    Collection Time: 11/05/24 11:35 AM   Result Value Ref Range    POCT Glucose 160 (H) 70 - 110 mg/dL     *Note: Due to a large number of results and/or encounters for the requested time period, some results have not been displayed. A complete set of results can be found in Results Review.         Significant Labs: All pertinent labs within the past 24 hours have been reviewed.    Imaging Results              MRI Lumbar Spine Without Contrast (Final result)  Result time 11/03/24 09:46:08      Final result by Suresh KennedyEleazar), MD (11/03/24 09:46:08)                   Impression:      Multilevel degenerative disc changes.  Moderate to severe foraminal stenosis at L4-5 and L5-S1 bilaterally      Electronically signed by: Suresh Kennedy MD  Date:    11/03/2024  Time:    09:46               Narrative:    EXAMINATION:  MRI LUMBAR SPINE WITHOUT CONTRAST    CLINICAL HISTORY:  Low back pain, cauda equina syndrome suspected;    TECHNIQUE:  Multiplanar, multisequence MR images were acquired from the thoracolumbar junction to the sacrum without the administration of contrast.    COMPARISON:  L1-2: Unremarkable.  No significant disc disease.  No stenosis    FINDINGS:  L1-2: Unremarkable.  No significant disc disease.  No stenosis.    L2-3: Moderate broad base disc bulge with superimposed central disc protrusion this indents on the thecal sac.  No significant central canal stenosis.  No significant foraminal stenosis.    L3-4: Unremarkable.  No significant disc disease or stenosis.    L4-5:  Broad-based disc bulge impresses on the ventral thecal sac without central canal stenosis.  Facet arthropathy and ligament flavum hypertrophy contribute to bilateral moderate neural foraminal  encroachment.    L5-S1: Mild to moderate broad-based posterior disc bulge impresses on the thecal sac without central canal stenosis.  Facet arthropathy and ligament flavum hypertrophy contribute to severe bilateral neural foraminal stenosis.                                       X-Ray Chest AP Portable (Final result)  Result time 11/02/24 18:13:15      Final result by Justice Garcia MD (11/02/24 18:13:15)                   Impression:      1.  Low lung volumes with vascular crowding or atelectasis in the lung bases.  Cardiac silhouette size enlargement.  Mild interstitial pulmonary edema or interstitial infectious process difficult to exclude in the right clinical setting.    2.  Stable findings as noted above.      Electronically signed by: Justice Garcia MD  Date:    11/02/2024  Time:    18:13               Narrative:    EXAMINATION:  XR CHEST AP PORTABLE    CLINICAL HISTORY:  leukocytosis;    COMPARISON:  Studies dating back to March 11, 2022    FINDINGS:  The study is lordotic in position.  Moderately low lung volumes with vascular crowding or atelectasis in the lung bases.  The lungs are otherwise clear.  The cardiac silhouette size is enlarged.  The trachea is midline and the mediastinal width is normal. Negative for focal infiltrate, effusion or pneumothorax. Pulmonary vasculature is normal. Negative for osseous abnormalities. Tortuous aorta.  There are degenerative changes of the spine and both shoulder girdles.                                       Radiology (Final result)  Result time 11/04/24 17:03:27      Final result by Unknown User (11/04/24 17:03:27)                                       Significant Imaging: I have reviewed all pertinent imaging results/findings within the past 24 hours.  Assessment/Plan:     * Severe sepsis sec to Cellulitis Legs complicated by E coli Bacteremia  This patient does have evidence of infective focus  My overall impression is sepsis.  Source: Skin and Soft Tissue  (location cellulitis LLE)  Antibiotics given-   Antibiotics (72h ago, onward)      Start     Stop Route Frequency Ordered    11/04/24 0900  mupirocin 2 % ointment         11/09/24 0859 Nasl 2 times daily 11/04/24 0523    11/03/24 0930  piperacillin-tazobactam (ZOSYN) 4.5 g in D5W 100 mL IVPB (MB+)         -- IV Every 8 hours (non-standard times) 11/03/24 0825          Latest lactate reviewed-  Recent Labs   Lab 11/03/24  0842   LACTATE 0.8     Organ dysfunction indicated by Acute kidney injury, Acute respiratory failure, and Encephalopathy    Fluid challenge Other- Patient to receive D10 volume other than 30cc/kg due to Congestive Heart Failure     Post- resuscitation assessment No - Post resuscitation assessment not needed       Will Not start Pressors- Levophed for MAP of 65  Source control achieved by: IV Abx- Zosyn    E coli bacteremia        Cellulitis of right lower extremity  Rufino LLE- getting Zosyn  Keep LLE elevated    Acute on chronic combined systolic and diastolic congestive heart failure  Patient has Combined Systolic and Diastolic heart failure that is Acute on chronic. On presentation their CHF was decompensated. Evidence of decompensated CHF on presentation includes: edema, orthopnea, paroxysmal nocturnal dyspnea (PND), dyspnea on exertion (GOVEA), and shortness of breath. The etiology of their decompensation is likely dietary indiscretion, increased fluid intake, and infection . Most recent BNP and echo results are listed below.  Recent Labs     11/02/24 2045   *       Latest ECHO  Results for orders placed during the hospital encounter of 10/23/23    Echo    Interpretation Summary    Left Ventricle: The left ventricle is normal in size. Normal wall thickness. There is mild concentric hypertrophy. regional wall motion abnormalities present. There is low normal systolic function with a visually estimated ejection fraction of 50 - 55%. Biplane (2D) method of discs ejection fraction is 49%. There  is normal diastolic function.    Left Atrium: Left atrium is mildly dilated.    Right Ventricle: Normal right ventricular cavity size. Wall thickness is normal. Right ventricle wall motion  is normal. Systolic function is normal.    Mitral Valve: There is mild to moderate regurgitation.    Tricuspid Valve: There is mild regurgitation.    Pulmonary Artery: There is moderate pulmonary hypertension. The estimated pulmonary artery systolic pressure is 50 mmHg.    IVC/SVC: Normal venous pressure at 3 mmHg.    Current Heart Failure Medications       Plan  -Monitor strict I&Os and daily weights.    -Place on telemetry  -Low sodium diet  -Place on fluid restriction of 1.5 L.   -Cardiology has not been consulted  -The patient's volume status is stable but not at their baseline as indicated by edema, orthopnea, paroxysmal nocturnal dyspnea (PND), and shortness of breath  -Continue home medications    Better, diuresed upon admit  Cont same Tx    Acute respiratory failure with hypoxia and hypercarbia  Patient with Hypercapnic and Hypoxic Respiratory failure which is Acute.  he is not on home oxygen. Supplemental oxygen was provided and noted- Oxygen Concentration (%):  [21] 21    Signs/symptoms of respiratory failure include- tachypnea, increased work of breathing, respiratory distress, and lethargy. Contributing diagnoses includes - CHF, COPD, Interstitial lung disease, Obesity Hypoventilation, Pleural effusion, Pneumonia, and recent ativan administration in the ED  Labs and images were reviewed. Patient Has recent ABG, which has been reviewed. Will treat underlying causes and adjust management of respiratory failure as follows-   Plan:  -Continue home medications, titrate as needed  -Titrate oxygen requirements as needed  -Incentive spirometry   -Monitor pulse oximetry  -Farhan prn  -Continue treatment of CHF and pneumonia  -f/u Flu/COVID  -f/u ABG  -Continue treatment of hypoglycemia  -low threshold for ICU transfer      Responded well to Bipap, diuresis  Appears at baseline    Acute kidney injury superimposed on chronic kidney disease  ROSARIO is likely due to pre-renal azotemia due to intravascular volume depletion. Baseline creatinine is  1.5-1.7 . Most recent creatinine and eGFR are listed below.  Recent Labs     11/03/24  0508 11/04/24  0427 11/05/24  0514   CREATININE 2.3* 2.1* 1.9*  1.9*   EGFRNORACEVR 29* 33* 37*  37*     Plan  -ROSARIO is  currently undergoing medical managment  -Avoid nephrotoxins and renally dose meds for GFR listed above  -Monitor urine output, serial BMP, and adjust therapy as needed  -Continue home medications and treatment of CHF    Cr coming down    Hypoglycemia  Treated with D10W in the ICU initially  resolved      Lower back pain  Patient presented with worsening lower back pain and bilateral lower extremity weakness in setting of chronic back pain and prior surgery.  MRI L-spine obtained in the ED showed multilevel degenerative changes with several levels of neural foraminal narrowing however without epidural abscess requiring equina noted.  Patient awaiting evaluation by PT/OT. Neurosurgery/Spine consulted as well.   Plan:  -Optimize pain control, titrate as needed  -Bowel regimen  -Bedrest  -Antiemetics prn  -Tylenol as needed for fever   -f/u NSGY/Spine  -PT/OT     S/p NSGY eval- not a surgical candidate  Ch and stable, sec lumber DDD- Neural Foraminal narrowing at L4 and L5 but no Central disc herniation    Weakness of both lower extremities  Weakness persists  PT/OT ordered  Start Inj B12 IM plus triple Vitamins and Vit D      Diabetes  Patient's FSGs are uncontrolled due to hypoglycemia on current medication regimen.  Last A1c reviewed-   Lab Results   Component Value Date    HGBA1C 5.6 08/14/2024     Most recent fingerstick glucose reviewed-   Recent Labs   Lab 11/04/24  2116 11/05/24  0455 11/05/24  0759 11/05/24  1135   POCTGLUCOSE 125* 103 100 160*       Current correctional scale   Low  Titrate as needed  anti-hyperglycemic dose as follows-   Antihyperglycemics (From admission, onward)      None        Plan:  -SSI  -A1c  -Accu-checks  -Hold oral hypoglycemics while patient is in the hospital  -Hypoglycemic protocol      No further hypoglycemia    Drug-induced constipation  Resolved, had 2 BMs in the ICU      Acute encephalopathy  Sec to hypoglycemia, possibly sepsis as later Blood Cx came back Positive for GNR      Class 1 obesity due to excess calories with serious comorbidity and body mass index (BMI) of 33.0 to 33.9 in adult  Body mass index is 33.63 kg/m². Morbid obesity complicates all aspects of disease management from diagnostic modalities to treatment. Weight loss encouraged and health benefits explained to patient.       Chronic obstructive pulmonary disease  Patient's COPD is with exacerbation noted by continued dyspnea and worsening of baseline hypoxia currently.  Patient is currently off COPD Pathway. Continue scheduled inhalers  BiPAP, Steroids, Antibiotics, and Supplemental oxygen and monitor respiratory status closely.       Coronary artery disease of native artery of native heart with stable angina pectoris  Patient with known CAD, which is controlled Will continue  home medications  and monitor for S/Sx of angina/ACS. Continue to monitor on telemetry.       Hyperlipidemia associated with type 2 diabetes mellitus  Patient is chronically on statin.will continue for now. Last Lipid Panel:   Lab Results   Component Value Date    CHOL 138 08/14/2024    HDL 45 08/14/2024    LDLCALC 69.4 08/14/2024    TRIG 118 08/14/2024    CHOLHDL 32.6 08/14/2024   Plan:  -Continue home medication  -low fat/low calorie diet      Hypertension associated with stage 3 chronic kidney disease due to type 2 diabetes mellitus  Chronic, controlled.  Latest blood pressure and vitals reviewed-   Temp:  [97.8 °F (36.6 °C)-99.5 °F (37.5 °C)]   Pulse:  []   Resp:  [15-26]   BP: ()/(58-73)   SpO2:   [92 %-98 %] .   Home meds for hypertension were reviewed and noted below.   Hypertension Medications               bisoprolol (ZEBETA) 5 MG tablet TAKE 1/2 TABLET BY MOUTH EVERY DAY    torsemide (DEMADEX) 20 MG Tab Take 2 tablets (40 mg total) by mouth once daily.     While in the hospital, will manage blood pressure as follows; Continue home antihypertensive regimen    Will utilize p.r.n. blood pressure medication only if patient's blood pressure greater than  180/110 and he develops symptoms such as worsening chest pain or shortness of breath.    ROSARIO better    History of gout  Recently treated with colchicine and prednisone.   Plan:  -continue to monitor and treat as needed      BRADLEY on CPAP  Currently on CPAP outpatient.  Plan:  -continue CPAP q.h.s.         VTE Risk Mitigation (From admission, onward)           Ordered     enoxaparin injection 40 mg  Daily         11/03/24 0309     IP VTE HIGH RISK PATIENT  Once         11/03/24 0309     Place sequential compression device  Until discontinued         11/03/24 0309                    Thank you for your consult. I will follow-up with patient. Please contact us if you have any additional questions.    Carmen Barney MD  Department of Hospital Medicine   [facility] - Telemetry ([facility])

## 2024-11-05 NOTE — PT/OT/SLP PROGRESS
"Occupational Therapy   Treatment    Name: Santiago Khan  MRN: [id number]  Admitting Diagnosis:  Weakness of both lower extremities       Recommendations:     Discharge Recommendations: Moderate Intensity Therapy  Discharge Equipment Recommendations:  to be determined by next level of care  Barriers to discharge:  Decreased caregiver support    Assessment:     Santiago Khan is a 74 y.o. male with a medical diagnosis of Weakness of both lower extremities.  He presents with the following performance deficits affecting function are weakness, impaired endurance, impaired self care skills, impaired functional mobility, impaired balance, impaired cardiopulmonary response to activity, pain, decreased safety awareness.     Rehab Prognosis:  Fair; patient would benefit from acute skilled OT services to address these deficits and reach maximum level of function.       Plan:     Patient to be seen 2 x/week to address the above listed problems via self-care/home management, therapeutic activities, therapeutic exercises  Plan of Care Expires: 11/18/24  Plan of Care Reviewed with: patient    Subjective     Chief Complaint: Reported "My back is killing me."  Patient/Family Comments/goals: none reported  Pain/Comfort:  Pain Rating 1: 8/10  Location 1: L lower abdomen  Pain Addressed 1: activity pacing & nurse aware     Objective:     Communicated with: Nurse, Rudy Dleeon, prior to session.  Patient found sitting edge of bed with peripheral IV, telemetry, panchal catheter upon OT entry to room.    General Precautions: Standard, fall    Orthopedic Precautions:N/A  Braces: N/A  Respiratory Status: Room air     Occupational Performance:     Bed Mobility:    Seated anterior scooting with SBA     Functional Mobility/Transfers:  Patient completed Sit <> Stand Transfer with contact guard assistance  with  rolling walker   Patient completed Bed <> Chair Transfer using Step Transfer technique with minimum assistance with rolling " walker  Functional Mobility: Patient completed x4ft functional mobility with RW and min A to increase dynamic standing balance and activity tolerance needed for ADL completion.  Provided with v/c for technique with transfers to increase safety and independence with completion  Very poor tolerance for upright activity, pain exacerbates    AMPAC 6 Click ADL: 16    Treatment & Education:  Encouraged completion of B UE AROM therex throughout the day to increase functional strength and activity tolerance needed for ADL completion. Educated on benefits of OOB activity and importance of calling for A to transfer back to bed. Patient stated understanding and in agreement with POC.    Patient left up in chair with all lines intact, call button in reach, and chair alarm on    GOALS:   Multidisciplinary Problems       Occupational Therapy Goals          Problem: Occupational Therapy    Goal Priority Disciplines Outcome Interventions   Occupational Therapy Goal     OT, PT/OT Progressing    Description: Goals to be met by: 11/18/24     Patient will increase functional independence with ADLs by performing:    LE Dressing with Contact Guard Assistance.  Grooming while standing at sink with Set-up Assistance.  Toileting from toilet with Set-up Assistance for hygiene and clothing management.   Toilet transfer to toilet with Supervision with RW.  Upper extremity exercise program x15 reps per handout, with independence.                         Time Tracking:     OT Date of Treatment: 11/05/24  OT Start Time: 0920  OT Stop Time: 0945  OT Total Time (min): 25 min    Billable Minutes:Therapeutic Activity 25    Renetta Florentino OT  11/5/2024

## 2024-11-05 NOTE — ASSESSMENT & PLAN NOTE
Patient with Hypercapnic and Hypoxic Respiratory failure which is Acute.  he is not on home oxygen. Supplemental oxygen was provided and noted- Oxygen Concentration (%):  [21] 21    Signs/symptoms of respiratory failure include- tachypnea, increased work of breathing, respiratory distress, and lethargy. Contributing diagnoses includes - CHF, COPD, Interstitial lung disease, Obesity Hypoventilation, Pleural effusion, Pneumonia, and recent ativan administration in the ED  Labs and images were reviewed. Patient Has recent ABG, which has been reviewed. Will treat underlying causes and adjust management of respiratory failure as follows-   Plan:  -Continue home medications, titrate as needed  -Titrate oxygen requirements as needed  -Incentive spirometry   -Monitor pulse oximetry  -Farhan prn  -Continue treatment of CHF and pneumonia  -f/u Flu/COVID  -f/u ABG  -Continue treatment of hypoglycemia  -low threshold for ICU transfer     Responded well to Bipap, diuresis  Appears at baseline

## 2024-11-05 NOTE — ASSESSMENT & PLAN NOTE
Patient presented with worsening lower back pain and bilateral lower extremity weakness in setting of chronic back pain and prior surgery.  MRI L-spine obtained in the ED showed multilevel degenerative changes with several levels of neural foraminal narrowing however without epidural abscess requiring equina noted.  Patient awaiting evaluation by PT/OT. Neurosurgery/Spine consulted as well.   Plan:  -Optimize pain control, titrate as needed  -Bowel regimen  -Bedrest  -Antiemetics prn  -Tylenol as needed for fever   -f/u NSGY/Spine  -PT/OT     S/p NSGY eval- not a surgical candidate  Ch and stable, sec lumber DDD- Neural Foraminal narrowing at L4 and L5 but no Central disc herniation

## 2024-11-05 NOTE — PLAN OF CARE
FAIR TOLERANCE TO TX., MULUGETA FOR TF'S, MULUGETA FOR GAIT WITH RW APPROX. 4' ONLY.   P.T. DC REC: MODERATE INTENSITY THERAPY

## 2024-11-05 NOTE — PROGRESS NOTES
Pharmacokinetic Assessment Sign Off: IV Vancomycin     Therapy with Vancomycin complete and/or consult discontinued by provider.  Pharmacy will sign off, please re-consult as needed.     Thank you for allowing us to participate in this patient's care.      Amarilis Loera PharmD 11/05/2024 12:44 PM

## 2024-11-05 NOTE — ASSESSMENT & PLAN NOTE
Chronic, controlled.  Latest blood pressure and vitals reviewed-   Temp:  [97.8 °F (36.6 °C)-99.5 °F (37.5 °C)]   Pulse:  []   Resp:  [15-26]   BP: ()/(58-73)   SpO2:  [92 %-98 %] .   Home meds for hypertension were reviewed and noted below.   Hypertension Medications               bisoprolol (ZEBETA) 5 MG tablet TAKE 1/2 TABLET BY MOUTH EVERY DAY    torsemide (DEMADEX) 20 MG Tab Take 2 tablets (40 mg total) by mouth once daily.     While in the hospital, will manage blood pressure as follows; Continue home antihypertensive regimen    Will utilize p.r.n. blood pressure medication only if patient's blood pressure greater than  180/110 and he develops symptoms such as worsening chest pain or shortness of breath.    ROSARIO better

## 2024-11-05 NOTE — ASSESSMENT & PLAN NOTE
This patient does have evidence of infective focus  My overall impression is sepsis.  Source: Skin and Soft Tissue (location cellulitis LLE)  Antibiotics given-   Antibiotics (72h ago, onward)      Start     Stop Route Frequency Ordered    11/04/24 0900  mupirocin 2 % ointment         11/09/24 0859 Nasl 2 times daily 11/04/24 0523    11/03/24 0930  piperacillin-tazobactam (ZOSYN) 4.5 g in D5W 100 mL IVPB (MB+)         -- IV Every 8 hours (non-standard times) 11/03/24 0825          Latest lactate reviewed-  Recent Labs   Lab 11/03/24  0842   LACTATE 0.8     Organ dysfunction indicated by Acute kidney injury, Acute respiratory failure, and Encephalopathy    Fluid challenge Other- Patient to receive D10 volume other than 30cc/kg due to Congestive Heart Failure     Post- resuscitation assessment No - Post resuscitation assessment not needed       Will Not start Pressors- Levophed for MAP of 65  Source control achieved by: IV Abx- Zosyn

## 2024-11-05 NOTE — PLAN OF CARE
11/05/24 1530   Post-Acute Status   Post-Acute Authorization Placement  (SNF)   Post-Acute Placement Status Referrals Sent   Coverage [employer]   Discharge Delays None known at this time   Discharge Plan   Discharge Plan A Skilled Nursing Facility       SW meet with Patient and family at bedside to discuss therapy recommendation for Moderate Intense Therapy, via a Skilled Nursing Facility (SNF). SW explained SNF placement process and provided list of in-network SNF's. SW inquired if she could send referrals to facilities, to inquire about bed placement and see if they can accommodate Patient's needs. Patient and family agreeable to SNF referrals being sent. SW stated she would follow up with Patient and family once SNF's gave responses. Patient and family verbalized understanding. SNF referrals sent via [facility].     PASRR/142 needs to be completed for Patient. Due to Election day, State offices closes and PASRR unable to be completed today.  SW will complete PASRR/ 142 tomorrow.

## 2024-11-05 NOTE — SUBJECTIVE & OBJECTIVE
Past Medical History:   Diagnosis Date    Chronic combined systolic and diastolic congestive heart failure 07/09/2020    Chronic kidney disease, stage 3     Chronic obstructive pulmonary disease 03/20/2023    Wixela 250 mcg, Spiriva respimat. Continue Albuterol inhaler and albuterol nebs   Referral pul dis management, education and assistance with medication  Patient preference to only see a doctor, request follow up with Dr. Springer             Chronic venous insufficiency     DDD (degenerative disc disease), lumbar     DM (diabetes mellitus) with complications     History of gout     Hyperlipidemia associated with type 2 diabetes mellitus     Hypertension associated with stage 3 chronic kidney disease due to type 2 diabetes mellitus     BRADLEY on CPAP     Prostate cancer     prostatectomy in 2010, rising PSA that started in 2021       Past Surgical History:   Procedure Laterality Date    BICEPS TENDON REPAIR      COLONOSCOPY N/A 03/27/2019    Procedure: COLONOSCOPY;  Surgeon: James Roger MD;  Location: [facility] ENDO;  Service: Endoscopy;  Laterality: N/A;    EXPLORATION OF ORBIT Right 9/8/2023    Procedure: EXPLORATION, ORBIT;  Surgeon: Junaid Bartholomew MD;  Location: [facility] OR;  Service: ENT;  Laterality: Right;  possibly need frozen    HEMORRHOID SURGERY      INGUINAL HERNIA REPAIR Left     KNEE ARTHROSCOPY Right     LEFT HEART CATHETERIZATION Left 06/29/2020    Procedure: CATHETERIZATION, HEART, LEFT;  Surgeon: Cheli Wilson MD;  Location: [facility] CATH LAB;  Service: Cardiology;  Laterality: Left;    LUMBAR LAMINECTOMY      PROSTATECTOMY      TONSILLECTOMY         Review of patient's allergies indicates:   Allergen Reactions    Amitriptyline Rash    Celecoxib Rash       No current facility-administered medications on file prior to encounter.     Current Outpatient Medications on File Prior to Encounter   Medication Sig    adalimumab (HUMIRA,CF, PEN) 40 mg/0.4 mL PnKt Inject 0.4 mLs (40 mg total) into the skin every 14  (fourteen) days.    albuterol (PROVENTIL/VENTOLIN HFA) 90 mcg/actuation inhaler INHALE 2 PUFFS INTO THE LUNGS EVERY 4 HOURS AS NEEDED    allopurinoL (ZYLOPRIM) 100 MG tablet TAKE 1 TABLET(100 MG) BY MOUTH EVERY DAY    allopurinoL (ZYLOPRIM) 300 MG tablet TAKE 1 TABLET(300 MG) BY MOUTH EVERY DAY    aspirin (ECOTRIN) 81 MG EC tablet Take 81 mg by mouth once daily.    baclofen (LIORESAL) 10 MG tablet Take 1 tablet by mouth every evening.    bisoprolol (ZEBETA) 5 MG tablet TAKE 1/2 TABLET BY MOUTH EVERY DAY    blood sugar diagnostic Strp Check blood glucose twice per day    blood-glucose meter (ACCU-CHEK GUIDE ME GLUCOSE MTR) Misc USE AS INSTRUCTED    colchicine (COLCRYS) 0.6 mg tablet Take 1 tablet (0.6 mg total) by mouth daily as needed (gout flare).    empagliflozin (JARDIANCE) 10 mg tablet Take 1 tablet (10 mg total) by mouth once daily.    fluticasone propionate (FLONASE) 50 mcg/actuation nasal spray SHAKE LIQUID AND USE 2 SPRAYS(100 MCG) IN EACH NOSTRIL EVERY DAY Strength: 50 mcg/actuation    glimepiride (AMARYL) 4 MG tablet Take 1 tablet (4 mg total) by mouth before breakfast.    guaiFENesin 100 mg/5 ml (ROBITUSSIN) 100 mg/5 mL syrup Take 200 mg by mouth every evening.    HYDROcodone-acetaminophen (NORCO) 7.5-325 mg per tablet Take 1 tablet by mouth every 4 (four) hours as needed (for chronic pain).    lancets (ACCU-CHEK SOFTCLIX LANCETS) Misc Check BG daily    latanoprost 0.005 % ophthalmic solution Place 1 drop into the right eye every evening.    magnesium oxide (MAG-OX) 400 mg (241.3 mg magnesium) tablet TAKE 2 TABLETS(800 MG) BY MOUTH TWICE DAILY    metFORMIN (GLUCOPHAGE) 500 MG tablet TAKE 1 TABLET(500 MG) BY MOUTH DAILY WITH BREAKFAST    multivitamin-minerals-lutein Tab Take 1 tablet by mouth Daily.    omeprazole (PRILOSEC) 40 MG capsule TAKE 1 CAPSULE BY MOUTH EVERY DAY    potassium chloride SA (K-DUR,KLOR-CON) 20 MEQ tablet TAKE 3 TABLETS BY MOUTH TWICE DAILY    simvastatin (ZOCOR) 40 MG tablet TAKE 1  TABLET(40 MG) BY MOUTH EVERY EVENING    torsemide (DEMADEX) 20 MG Tab Take 2 tablets (40 mg total) by mouth once daily.    varenicline (CHANTIX) 1 mg Tab take 1 tablet by mouth twice daily. Take with full glass of water & with food to avoid nausea (Patient not taking: Reported on 10/28/2024)    WIXELA INHUB 250-50 mcg/dose diskus inhaler INHALE 1 PUFF INTO THE LUNGS TWICE DAILY     Family History       Problem Relation (Age of Onset)    Alzheimer's disease Father    Coronary artery disease Father (90)    Hyperlipidemia Mother    Prostate cancer Father          Tobacco Use    Smoking status: Former     Current packs/day: 0.00     Average packs/day: 1.5 packs/day for 52.7 years (79.1 ttl pk-yrs)     Types: Cigarettes     Start date: 1971     Quit date: 9/15/2023     Years since quittin.1    Smokeless tobacco: Former   Substance and Sexual Activity    Alcohol use: Not Currently    Drug use: Never    Sexual activity: Not Currently     Partners: Female     Review of Systems   Constitutional:  Positive for activity change, appetite change and fatigue. Negative for chills, diaphoresis and fever.   HENT: Negative.     Respiratory:  Positive for cough. Negative for shortness of breath.    Cardiovascular:  Positive for leg swelling.   Gastrointestinal:  Positive for abdominal pain and constipation.   Genitourinary: Negative.    Neurological:  Positive for weakness.        Ble weakness     Objective:     Vital Signs (Most Recent):  Temp: 97.8 °F (36.6 °C) (24 1203)  Pulse: 91 (24 1203)  Resp: 16 (24 1203)  BP: 112/68 (24 1203)  SpO2: 95 % (24 1203) Vital Signs (24h Range):  Temp:  [97.8 °F (36.6 °C)-99.5 °F (37.5 °C)] 97.8 °F (36.6 °C)  Pulse:  [] 91  Resp:  [15-26] 16  SpO2:  [92 %-98 %] 95 %  BP: ()/(58-73) 112/68     Weight: 96.3 kg (212 lb 4.9 oz)  Body mass index is 33.25 kg/m².     Physical Exam  Vitals and nursing note reviewed.   Constitutional:       General: He is not  in acute distress.     Appearance: He is ill-appearing. He is not toxic-appearing or diaphoretic.      Comments: Frail elderly man, appears weak and sick   HENT:      Head: Normocephalic and atraumatic.      Right Ear: External ear normal.      Left Ear: External ear normal.      Mouth/Throat:      Mouth: Mucous membranes are moist.      Pharynx: Oropharynx is clear. No oropharyngeal exudate or posterior oropharyngeal erythema.   Eyes:      General:         Right eye: No discharge.         Left eye: No discharge.      Extraocular Movements: Extraocular movements intact.      Conjunctiva/sclera: Conjunctivae normal.      Pupils: Pupils are equal, round, and reactive to light.   Cardiovascular:      Rate and Rhythm: Normal rate and regular rhythm.      Pulses: Normal pulses.      Heart sounds: Normal heart sounds.   Pulmonary:      Effort: Pulmonary effort is normal. No respiratory distress.      Breath sounds: Normal breath sounds. No wheezing or rales.   Abdominal:      General: Abdomen is flat. Bowel sounds are normal. There is no distension.      Palpations: Abdomen is soft. There is no mass.      Tenderness: There is no abdominal tenderness. There is guarding.      Comments: Slight diffuse tender   Musculoskeletal:         General: Tenderness and deformity present.      Cervical back: Neck supple.      Comments: Slight swelling and erythema > right   Skin:     Capillary Refill: Capillary refill takes 2 to 3 seconds.      Findings: Erythema present.   Neurological:      General: No focal deficit present.      Mental Status: He is alert and oriented to person, place, and time.   Psychiatric:         Mood and Affect: Mood normal.         Behavior: Behavior normal.       Results for orders placed or performed during the hospital encounter of 11/02/24   POCT glucose    Collection Time: 11/02/24  2:23 PM   Result Value Ref Range    POCT Glucose 150 (H) 70 - 110 mg/dL   EKG 12-lead    Collection Time: 11/02/24  2:25 PM    Result Value Ref Range    QRS Duration 140 ms    [facility] QTC Calculation 495 ms   CBC auto differential    Collection Time: 11/02/24  3:11 PM   Result Value Ref Range    WBC 19.25 (H) 3.90 - 12.70 K/uL    RBC 4.45 (L) 4.60 - 6.20 M/uL    Hemoglobin 13.2 (L) 14.0 - 18.0 g/dL    Hematocrit 40.4 40.0 - 54.0 %    MCV 91 82 - 98 fL    MCH 29.7 27.0 - 31.0 pg    MCHC 32.7 32.0 - 36.0 g/dL    RDW 18.6 (H) 11.5 - 14.5 %    Platelets 272 150 - 450 K/uL    MPV 9.5 9.2 - 12.9 fL    Immature Granulocytes 2.4 (H) 0.0 - 0.5 %    Gran # (ANC) 17.0 (H) 1.8 - 7.7 K/uL    Immature Grans (Abs) 0.47 (H) 0.00 - 0.04 K/uL    Lymph # 0.9 (L) 1.0 - 4.8 K/uL    Mono # 0.8 0.3 - 1.0 K/uL    Eos # 0.1 0.0 - 0.5 K/uL    Baso # 0.05 0.00 - 0.20 K/uL    nRBC 0 0 /100 WBC    Gran % 88.2 (H) 38.0 - 73.0 %    Lymph % 4.6 (L) 18.0 - 48.0 %    Mono % 4.1 4.0 - 15.0 %    Eosinophil % 0.4 0.0 - 8.0 %    Basophil % 0.3 0.0 - 1.9 %    Differential Method Automated    Comprehensive metabolic panel    Collection Time: 11/02/24  3:11 PM   Result Value Ref Range    Sodium 134 (L) 136 - 145 mmol/L    Potassium 4.9 3.5 - 5.1 mmol/L    Chloride 98 95 - 110 mmol/L    CO2 24 23 - 29 mmol/L    Glucose 156 (H) 70 - 110 mg/dL    BUN 45 (H) 8 - 23 mg/dL    Creatinine 2.2 (H) 0.5 - 1.4 mg/dL    Calcium 9.6 8.7 - 10.5 mg/dL    Total Protein 7.3 6.0 - 8.4 g/dL    Albumin 2.7 (L) 3.5 - 5.2 g/dL    Total Bilirubin 1.2 (H) 0.1 - 1.0 mg/dL    Alkaline Phosphatase 256 (H) 40 - 150 U/L    AST 23 10 - 40 U/L    ALT 35 10 - 44 U/L    eGFR 31 (A) >60 mL/min/1.73 m^2    Anion Gap 12 8 - 16 mmol/L   Urinalysis, Reflex to Urine Culture Urine, Clean Catch    Collection Time: 11/02/24  3:11 PM    Specimen: Urine, Clean Catch   Result Value Ref Range    Specimen UA Urine, Clean Catch     Color, UA Yellow Yellow, Straw, Martine    Appearance, UA Clear Clear    pH, UA 6.0 5.0 - 8.0    Specific Gravity, UA 1.010 1.005 - 1.030    Protein, UA Trace (A) Negative    Glucose, UA 1+ (A) Negative     Ketones, UA Negative Negative    Bilirubin (UA) Negative Negative    Occult Blood UA Negative Negative    Nitrite, UA Negative Negative    Urobilinogen, UA Negative <2.0 EU/dL    Leukocytes, UA Negative Negative   Troponin I    Collection Time: 11/02/24  8:45 PM   Result Value Ref Range    Troponin I 0.032 (H) 0.000 - 0.026 ng/mL   Brain natriuretic peptide    Collection Time: 11/02/24  8:45 PM   Result Value Ref Range     (H) 0 - 99 pg/mL   POCT glucose    Collection Time: 11/03/24 12:21 AM   Result Value Ref Range    POCT Glucose 33 (LL) 70 - 110 mg/dL   Lactic acid, plasma    Collection Time: 11/03/24 12:25 AM   Result Value Ref Range    Lactate (Lactic Acid) 0.6 0.5 - 2.2 mmol/L   POCT glucose    Collection Time: 11/03/24 12:33 AM   Result Value Ref Range    POCT Glucose 155 (H) 70 - 110 mg/dL   POCT glucose    Collection Time: 11/03/24  1:22 AM   Result Value Ref Range    POCT Glucose 182 (H) 70 - 110 mg/dL   Blood culture    Collection Time: 11/03/24  1:25 AM    Specimen: Peripheral, Hand, Right; Blood   Result Value Ref Range    Blood Culture, Routine Gram stain dao bottle: Gram negative rods     Blood Culture, Routine       Results called to and read back by:Sharifa Power RN 11/04/2024  00:22    Blood Culture, Routine (A)      GRAM NEGATIVE NAHOMI  Identification and susceptibility pending     Blood culture    Collection Time: 11/03/24  1:25 AM    Specimen: Peripheral, Antecubital, Left; Blood   Result Value Ref Range    Blood Culture, Routine No Growth to date     Blood Culture, Routine No Growth to date    Rapid Organism ID by PCR (from Blood culture)    Collection Time: 11/03/24  1:25 AM   Result Value Ref Range    Enterococcus faecalis Not Detected Not Detected    Enterococcus faecium Not Detected Not Detected    Listeria monocytogenes Not Detected Not Detected    Staphylococcus spp. Not Detected Not Detected    Staphylococcus aureus Not Detected Not Detected    Staphylococcus epidermidis Not  Detected Not Detected    Staphylococcus lugdunensis Not Detected Not Detected    Streptococcus species Not Detected Not Detected    Streptococcus agalactiae Not Detected Not Detected    Streptococcus pneumoniae Not Detected Not Detected    Streptococcus pyogenes Not Detected Not Detected    Acinetobacter calcoaceticus/baumannii complex Not Detected Not Detected    Bacteroides fragilis Not Detected Not Detected    Enterobacterales See species for ID (A) Not Detected    Enterobacter cloacae complex Not Detected Not Detected    Escherichia coli Detected (A) Not Detected    Klebsiella aerogenes Not Detected Not Detected    Klebsiella oxytoca Not Detected Not Detected    Klebsiella pneumoniae group Not Detected Not Detected    Proteus Not Detected Not Detected    Salmonella sp Not Detected Not Detected    Serratia marcescens Not Detected Not Detected    Haemophilus influenzae Not Detected Not Detected    Neisseria meningtidis Not Detected Not Detected    Pseudomonas aeruginosa Not Detected Not Detected    Stenotrophomonas maltophilia Not Detected Not Detected    Candida albicans Not Detected Not Detected    Candida auris Not Detected Not Detected    Candida glabrata Not Detected Not Detected    Candida krusei Not Detected Not Detected    Candida parapsilosis Not Detected Not Detected    Candida tropicalis Not Detected Not Detected    Cryptococcus neoformans/gattii Not Detected Not Detected    CTX-M (ESBL ) Not Detected Not Detected    IMP (Carbapenem resistant) Not Detected Not Detected    KPC resistance gene (Carbapenem resistant) Not Detected Not Detected    mcr-1  Not Detected Not Detected    mec A/C  Test Not Applicable Not Detected    mec A/C and MREJ (MRSA) gene Test Not Applicable Not Detected    NDM (Carbapenem resistant) Not Detected Not Detected    OXA-48-like (Carbapenem resistant) Not Detected Not Detected    van A/B (VRE gene) Test Not Applicable Not Detected    VIM (Carbapenem resistant) Not  Detected Not Detected   ISTAT PROCEDURE    Collection Time: 11/03/24  2:11 AM   Result Value Ref Range    POC PH 7.301 (L) 7.35 - 7.45    POC PCO2 63.8 (HH) 35 - 45 mmHg    POC PO2 69 (L) 80 - 100 mmHg    POC HCO3 31.5 (H) 24 - 28 mmol/L    POC BE 5 (H) -2 to 2 mmol/L    POC SATURATED O2 91 95 - 100 %    Sample ARTERIAL     Site LR     Allens Test Pass     DelSys Nasal Can     Mode SPONT     Flow 2     Sp02 95    ISTAT PROCEDURE    Collection Time: 11/03/24  4:54 AM   Result Value Ref Range    POC PH 7.255 (LL) 7.35 - 7.45    POC PCO2 69.0 (HH) 35 - 45 mmHg    POC PO2 87 80 - 100 mmHg    POC HCO3 30.6 (H) 24 - 28 mmol/L    POC BE 3 (H) -2 to 2 mmol/L    POC SATURATED O2 94 95 - 100 %    Sample ARTERIAL     Site RR     Allens Test Pass     DelSys Nasal Can     Mode SPONT     Flow 4     FiO2 36    POCT glucose    Collection Time: 11/03/24  5:06 AM   Result Value Ref Range    POCT Glucose 32 (LL) 70 - 110 mg/dL   CBC auto differential    Collection Time: 11/03/24  5:08 AM   Result Value Ref Range    WBC 18.28 (H) 3.90 - 12.70 K/uL    RBC 4.29 (L) 4.60 - 6.20 M/uL    Hemoglobin 12.5 (L) 14.0 - 18.0 g/dL    Hematocrit 40.4 40.0 - 54.0 %    MCV 94 82 - 98 fL    MCH 29.1 27.0 - 31.0 pg    MCHC 30.9 (L) 32.0 - 36.0 g/dL    RDW 18.5 (H) 11.5 - 14.5 %    Platelets 292 150 - 450 K/uL    MPV 9.8 9.2 - 12.9 fL    Immature Granulocytes 2.7 (H) 0.0 - 0.5 %    Gran # (ANC) 16.2 (H) 1.8 - 7.7 K/uL    Immature Grans (Abs) 0.49 (H) 0.00 - 0.04 K/uL    Lymph # 0.7 (L) 1.0 - 4.8 K/uL    Mono # 0.8 0.3 - 1.0 K/uL    Eos # 0.0 0.0 - 0.5 K/uL    Baso # 0.05 0.00 - 0.20 K/uL    nRBC 0 0 /100 WBC    Gran % 88.7 (H) 38.0 - 73.0 %    Lymph % 3.6 (L) 18.0 - 48.0 %    Mono % 4.5 4.0 - 15.0 %    Eosinophil % 0.2 0.0 - 8.0 %    Basophil % 0.3 0.0 - 1.9 %    Differential Method Automated    Comprehensive metabolic panel    Collection Time: 11/03/24  5:08 AM   Result Value Ref Range    Sodium 136 136 - 145 mmol/L    Potassium 4.5 3.5 - 5.1 mmol/L     Chloride 99 95 - 110 mmol/L    CO2 26 23 - 29 mmol/L    Glucose 71 70 - 110 mg/dL    BUN 48 (H) 8 - 23 mg/dL    Creatinine 2.3 (H) 0.5 - 1.4 mg/dL    Calcium 9.6 8.7 - 10.5 mg/dL    Total Protein 7.0 6.0 - 8.4 g/dL    Albumin 2.5 (L) 3.5 - 5.2 g/dL    Total Bilirubin 1.3 (H) 0.1 - 1.0 mg/dL    Alkaline Phosphatase 249 (H) 40 - 150 U/L    AST 21 10 - 40 U/L    ALT 32 10 - 44 U/L    eGFR 29 (A) >60 mL/min/1.73 m^2    Anion Gap 11 8 - 16 mmol/L   Procalcitonin    Collection Time: 11/03/24  5:08 AM   Result Value Ref Range    Procalcitonin 0.40 (H) <0.25 ng/mL   POCT glucose    Collection Time: 11/03/24  5:16 AM   Result Value Ref Range    POCT Glucose 94 70 - 110 mg/dL   POCT glucose    Collection Time: 11/03/24  5:23 AM   Result Value Ref Range    POCT Glucose 123 (H) 70 - 110 mg/dL   Influenza A & B by Molecular    Collection Time: 11/03/24  6:07 AM    Specimen: Nasopharyngeal Swab   Result Value Ref Range    Influenza A, Molecular Negative Negative    Influenza B, Molecular Negative Negative    Flu A & B Source Nasal swab    COVID-19 Rapid Screening    Collection Time: 11/03/24  6:07 AM   Result Value Ref Range    SARS-CoV-2 RNA, Amplification, Qual Negative Negative   POCT glucose    Collection Time: 11/03/24  7:16 AM   Result Value Ref Range    POCT Glucose 48 (LL) 70 - 110 mg/dL   POCT glucose    Collection Time: 11/03/24  7:54 AM   Result Value Ref Range    POCT Glucose 92 70 - 110 mg/dL   POCT glucose    Collection Time: 11/03/24  8:19 AM   Result Value Ref Range    POCT Glucose 88 70 - 110 mg/dL   Lactic acid, plasma    Collection Time: 11/03/24  8:42 AM   Result Value Ref Range    Lactate (Lactic Acid) 0.8 0.5 - 2.2 mmol/L   POCT glucose    Collection Time: 11/03/24  8:49 AM   Result Value Ref Range    POCT Glucose 72 70 - 110 mg/dL   POCT glucose    Collection Time: 11/03/24  9:11 AM   Result Value Ref Range    POCT Glucose 64 (L) 70 - 110 mg/dL   ISTAT PROCEDURE    Collection Time: 11/03/24  9:11 AM    Result Value Ref Range    POC PH 7.332 (L) 7.35 - 7.45    POC PCO2 57.9 (HH) 35 - 45 mmHg    POC PO2 115 (H) 80 - 100 mmHg    POC HCO3 30.6 (H) 24 - 28 mmol/L    POC BE 5 (H) -2 to 2 mmol/L    POC SATURATED O2 98 95 - 100 %    Provider Credentials: MD     Rate 22     Sample ARTERIAL     Site RR     Allens Test Pass     DelSys CPAP/BiPAP     Mode BiPAP     FiO2 30     Spont Rate 25     Min Vol 11     Sp02 99     IP 16     EP 8    TSH    Collection Time: 11/03/24 10:16 AM   Result Value Ref Range    TSH 0.490 0.400 - 4.000 uIU/mL   Lipase    Collection Time: 11/03/24 10:16 AM   Result Value Ref Range    Lipase 22 4 - 60 U/L   POCT glucose    Collection Time: 11/03/24 10:19 AM   Result Value Ref Range    POCT Glucose 107 70 - 110 mg/dL   Respiratory Infection Panel (PCR), Nasopharyngeal    Collection Time: 11/03/24 10:45 AM    Specimen: Nasopharyngeal Swab   Result Value Ref Range    Respiratory Infection Panel Source NP Swab Not Detected    Adenovirus Not Detected Not Detected    Coronavirus 229E, Common Cold Virus Not Detected Not Detected    Coronavirus HKU1, Common Cold Virus Not Detected Not Detected    Coronavirus NL63, Common Cold Virus Not Detected Not Detected    Coronavirus OC43, Common Cold Virus Not Detected Not Detected    SARS-CoV2 (COVID-19) Qualitative PCR Not Detected Not Detected    Human Metapneumovirus Not Detected Not Detected    Human Rhinovirus/Enterovirus Not Detected Not Detected    Influenza A (subtypes H1, H1-2009,H3) Not Detected Not Detected    Influenza B Not Detected Not Detected    Parainfluenza Virus 1 Not Detected Not Detected    Parainfluenza Virus 2 Not Detected Not Detected    Parainfluenza Virus 3 Not Detected Not Detected    Parainfluenza Virus 4 Not Detected Not Detected    Respiratory Syncytial Virus Not Detected Not Detected    Bordetella Parapertussis (YP4545) Not Detected Not Detected    Bordetella pertussis (ptxP) Not Detected Not Detected    Chlamydia pneumoniae Not  Detected Not Detected    Mycoplasma pneumoniae Not Detected Not Detected   Echo    Collection Time: 11/03/24 11:03 AM   Result Value Ref Range    BSA 2.1 m2    LVOT stroke volume 90.4 cm3    LVIDd 6.8 (A) 3.5 - 6.0 cm    LV Systolic Volume 169.50 mL    LV Systolic Volume Index 82.7 mL/m2    LVIDs 5.8 (A) 2.1 - 4.0 cm    LV ESV A2C 77.42 mL    LV Diastolic Volume 238.36 mL    LV ESV A4C 66.59 mL    LV Diastolic Volume Index 116.27 mL/m2    Left Ventricular End Systolic Volume by Teichholz Method 169.50 mL    Left Ventricular End Diastolic Volume by Teichholz Method 238.36 mL    IVS 0.9 0.6 - 1.1 cm    LVOT diameter 2.4 cm    LVOT area 4.5 cm2    FS 14.7 (A) 28 - 44 %    Left Ventricle Relative Wall Thickness 0.26 cm    PW 0.9 0.6 - 1.1 cm    LV mass 268.2 g    LV Mass Index 130.8 g/m2    MV Peak E Bassam 0.98 m/s    TDI LATERAL 0.10 m/s    TDI SEPTAL 0.07 m/s    E/E' ratio 11.53 m/s    MV Peak A Bassam 0.85 m/s    TR Max Bassam 3.02 m/s    E/A ratio 1.15     IVRT 121.79 msec    E wave deceleration time 173.57 msec    LV SEPTAL E/E' RATIO 14.00 m/s    LV LATERAL E/E' RATIO 9.80 m/s    LVOT peak bassam 1.0 m/s    Left Ventricular Outflow Tract Mean Velocity 0.71 cm/s    Left Ventricular Outflow Tract Mean Gradient 2.33 mmHg    RVOT peak VTI 9.8 cm    LA size 3.94 cm    Left Atrium Minor Axis 6.23 cm    Left Atrium Major Axis 6.39 cm    LA Vol (MOD) 74.58 mL    VALERIE (MOD) 36.4 mL/m2    RA Major Axis 4.62 cm    Vn Nyquist MS 0.28 m/s    AV mean gradient 4.0 mmHg    AV peak gradient 7.8 mmHg    Ao peak bassam 1.4 m/s    Ao VTI 26.5 cm    LVOT peak VTI 20.0 cm    AV valve area 3.4 cm²    AV Velocity Ratio 0.71     AV index (prosthetic) 0.75     KADI by Velocity Ratio 3.2 cm²    Radius 0.85 cm    Vn Nyquist 0.28 m/s    Mr max bassam 4.62 m/s    MR PISA EROA 0.27 cm2    MV stenosis pressure 1/2 time 50.33 ms    MV valve area p 1/2 method 4.37 cm2    Triscuspid Valve Regurgitation Peak Gradient 36 mmHg    PV mean gradient 1 mmHg    PV PEAK  VELOCITY 1.16 m/s    PV peak gradient 5 mmHg    Pulmonary Valve Mean Velocity 0.70 m/s    RVOT peak sri 0.58 m/s    Ao root annulus 3.86 cm    STJ 2.79 cm    Ascending aorta 3.03 cm    Mean e' 0.09 m/s    ZLVIDS 3.41     ZLVIDD 1.01     LA area A4C 23.01 cm2    LA area A2C 23.93 cm2    RVDD 3.76 cm    VALERIE 44.3 mL/m2    LA Vol 90.85 cm3    LA WIDTH 4.3 cm    RA Width 3.4 cm    TAPSE 1.60 cm    TV resting pulmonary artery pressure 44 mmHg    RV TB RVSP 11 mmHg    Est. RA pres 8 mmHg    Mondragon's Biplane MOD Ejection Fraction 38 %   POCT glucose    Collection Time: 11/03/24 11:27 AM   Result Value Ref Range    POCT Glucose 143 (H) 70 - 110 mg/dL   POCT glucose    Collection Time: 11/03/24 12:30 PM   Result Value Ref Range    POCT Glucose 115 (H) 70 - 110 mg/dL   POCT glucose    Collection Time: 11/03/24  1:03 PM   Result Value Ref Range    POCT Glucose 110 70 - 110 mg/dL   ISTAT PROCEDURE    Collection Time: 11/03/24  1:16 PM   Result Value Ref Range    POC PH 7.335 (L) 7.35 - 7.45    POC PCO2 56.0 (HH) 35 - 45 mmHg    POC PO2 123 (H) 80 - 100 mmHg    POC HCO3 29.8 (H) 24 - 28 mmol/L    POC BE 4 (H) -2 to 2 mmol/L    POC SATURATED O2 98 95 - 100 %    Rate 22     Sample ARTERIAL     Site LB     Allens Test Pass     DelSys CPAP/BiPAP     Mode BiPAP     FiO2 40     Spont Rate 22     Min Vol 10.5     Sp02 100     IP 16     EP 8    POCT glucose    Collection Time: 11/03/24  2:08 PM   Result Value Ref Range    POCT Glucose 101 70 - 110 mg/dL   POCT glucose    Collection Time: 11/03/24  3:05 PM   Result Value Ref Range    POCT Glucose 98 70 - 110 mg/dL   POCT glucose    Collection Time: 11/03/24  4:03 PM   Result Value Ref Range    POCT Glucose 83 70 - 110 mg/dL   POCT glucose    Collection Time: 11/03/24  5:11 PM   Result Value Ref Range    POCT Glucose 70 70 - 110 mg/dL   POCT glucose    Collection Time: 11/03/24  6:12 PM   Result Value Ref Range    POCT Glucose 125 (H) 70 - 110 mg/dL   POCT glucose    Collection Time:  11/03/24  7:50 PM   Result Value Ref Range    POCT Glucose 94 70 - 110 mg/dL   POCT glucose    Collection Time: 11/03/24 10:05 PM   Result Value Ref Range    POCT Glucose 72 70 - 110 mg/dL   POCT glucose    Collection Time: 11/04/24 12:08 AM   Result Value Ref Range    POCT Glucose 62 (L) 70 - 110 mg/dL   POCT glucose    Collection Time: 11/04/24 12:30 AM   Result Value Ref Range    POCT Glucose 94 70 - 110 mg/dL   POCT glucose    Collection Time: 11/04/24  2:22 AM   Result Value Ref Range    POCT Glucose 101 70 - 110 mg/dL   POCT glucose    Collection Time: 11/04/24  4:03 AM   Result Value Ref Range    POCT Glucose 87 70 - 110 mg/dL   Comprehensive metabolic panel    Collection Time: 11/04/24  4:27 AM   Result Value Ref Range    Sodium 135 (L) 136 - 145 mmol/L    Potassium 4.5 3.5 - 5.1 mmol/L    Chloride 97 95 - 110 mmol/L    CO2 27 23 - 29 mmol/L    Glucose 72 70 - 110 mg/dL    BUN 43 (H) 8 - 23 mg/dL    Creatinine 2.1 (H) 0.5 - 1.4 mg/dL    Calcium 9.1 8.7 - 10.5 mg/dL    Total Protein 6.8 6.0 - 8.4 g/dL    Albumin 2.2 (L) 3.5 - 5.2 g/dL    Total Bilirubin 1.3 (H) 0.1 - 1.0 mg/dL    Alkaline Phosphatase 294 (H) 40 - 150 U/L    AST 21 10 - 40 U/L    ALT 30 10 - 44 U/L    eGFR 33 (A) >60 mL/min/1.73 m^2    Anion Gap 11 8 - 16 mmol/L   CBC auto differential    Collection Time: 11/04/24  4:27 AM   Result Value Ref Range    WBC 17.90 (H) 3.90 - 12.70 K/uL    RBC 4.13 (L) 4.60 - 6.20 M/uL    Hemoglobin 12.2 (L) 14.0 - 18.0 g/dL    Hematocrit 38.8 (L) 40.0 - 54.0 %    MCV 94 82 - 98 fL    MCH 29.5 27.0 - 31.0 pg    MCHC 31.4 (L) 32.0 - 36.0 g/dL    RDW 18.1 (H) 11.5 - 14.5 %    Platelets 285 150 - 450 K/uL    MPV 9.4 9.2 - 12.9 fL    Immature Granulocytes 3.1 (H) 0.0 - 0.5 %    Gran # (ANC) 15.1 (H) 1.8 - 7.7 K/uL    Immature Grans (Abs) 0.55 (H) 0.00 - 0.04 K/uL    Lymph # 1.1 1.0 - 4.8 K/uL    Mono # 0.8 0.3 - 1.0 K/uL    Eos # 0.3 0.0 - 0.5 K/uL    Baso # 0.07 0.00 - 0.20 K/uL    nRBC 0 0 /100 WBC    Gran % 84.4 (H)  38.0 - 73.0 %    Lymph % 6.0 (L) 18.0 - 48.0 %    Mono % 4.6 4.0 - 15.0 %    Eosinophil % 1.5 0.0 - 8.0 %    Basophil % 0.4 0.0 - 1.9 %    Differential Method Automated    Phosphorus    Collection Time: 11/04/24  4:27 AM   Result Value Ref Range    Phosphorus 4.1 2.7 - 4.5 mg/dL   Magnesium    Collection Time: 11/04/24  4:27 AM   Result Value Ref Range    Magnesium 2.0 1.6 - 2.6 mg/dL   Vancomycin, random    Collection Time: 11/04/24  4:27 AM   Result Value Ref Range    Vancomycin, Random 13.8 Not established ug/mL   Troponin I    Collection Time: 11/04/24  4:27 AM   Result Value Ref Range    Troponin I 0.026 0.000 - 0.026 ng/mL   POCT glucose    Collection Time: 11/04/24  5:59 AM   Result Value Ref Range    POCT Glucose 71 70 - 110 mg/dL   POCT glucose    Collection Time: 11/04/24  8:28 AM   Result Value Ref Range    POCT Glucose 123 (H) 70 - 110 mg/dL   POCT glucose    Collection Time: 11/04/24 10:34 AM   Result Value Ref Range    POCT Glucose 146 (H) 70 - 110 mg/dL   POCT glucose    Collection Time: 11/04/24  5:21 PM   Result Value Ref Range    POCT Glucose 138 (H) 70 - 110 mg/dL   POCT glucose    Collection Time: 11/04/24  9:16 PM   Result Value Ref Range    POCT Glucose 125 (H) 70 - 110 mg/dL   Vancomycin, random    Collection Time: 11/05/24 12:26 AM   Result Value Ref Range    Vancomycin, Random 20.5 Not established ug/mL   POCT glucose    Collection Time: 11/05/24  4:55 AM   Result Value Ref Range    POCT Glucose 103 70 - 110 mg/dL   Comprehensive metabolic panel    Collection Time: 11/05/24  5:14 AM   Result Value Ref Range    Sodium 134 (L) 136 - 145 mmol/L    Potassium 3.9 3.5 - 5.1 mmol/L    Chloride 96 95 - 110 mmol/L    CO2 27 23 - 29 mmol/L    Glucose 95 70 - 110 mg/dL    BUN 44 (H) 8 - 23 mg/dL    Creatinine 1.9 (H) 0.5 - 1.4 mg/dL    Calcium 8.9 8.7 - 10.5 mg/dL    Total Protein 6.9 6.0 - 8.4 g/dL    Albumin 2.3 (L) 3.5 - 5.2 g/dL    Total Bilirubin 1.5 (H) 0.1 - 1.0 mg/dL    Alkaline Phosphatase 396  (H) 40 - 150 U/L    AST 30 10 - 40 U/L    ALT 37 10 - 44 U/L    eGFR 37 (A) >60 mL/min/1.73 m^2    Anion Gap 11 8 - 16 mmol/L   CBC auto differential    Collection Time: 11/05/24  5:14 AM   Result Value Ref Range    WBC 18.20 (H) 3.90 - 12.70 K/uL    RBC 4.33 (L) 4.60 - 6.20 M/uL    Hemoglobin 12.7 (L) 14.0 - 18.0 g/dL    Hematocrit 38.9 (L) 40.0 - 54.0 %    MCV 90 82 - 98 fL    MCH 29.3 27.0 - 31.0 pg    MCHC 32.6 32.0 - 36.0 g/dL    RDW 17.5 (H) 11.5 - 14.5 %    Platelets 299 150 - 450 K/uL    MPV 9.4 9.2 - 12.9 fL    Immature Granulocytes 4.6 (H) 0.0 - 0.5 %    Gran # (ANC) 15.1 (H) 1.8 - 7.7 K/uL    Immature Grans (Abs) 0.83 (H) 0.00 - 0.04 K/uL    Lymph # 1.1 1.0 - 4.8 K/uL    Mono # 0.8 0.3 - 1.0 K/uL    Eos # 0.3 0.0 - 0.5 K/uL    Baso # 0.09 0.00 - 0.20 K/uL    nRBC 0 0 /100 WBC    Gran % 83.1 (H) 38.0 - 73.0 %    Lymph % 6.0 (L) 18.0 - 48.0 %    Mono % 4.4 4.0 - 15.0 %    Eosinophil % 1.4 0.0 - 8.0 %    Basophil % 0.5 0.0 - 1.9 %    Differential Method Automated    Phosphorus    Collection Time: 11/05/24  5:14 AM   Result Value Ref Range    Phosphorus 3.1 2.7 - 4.5 mg/dL   Magnesium    Collection Time: 11/05/24  5:14 AM   Result Value Ref Range    Magnesium 2.0 1.6 - 2.6 mg/dL   CBC Auto Differential    Collection Time: 11/05/24  5:14 AM   Result Value Ref Range    WBC 18.20 (H) 3.90 - 12.70 K/uL    RBC 4.33 (L) 4.60 - 6.20 M/uL    Hemoglobin 12.7 (L) 14.0 - 18.0 g/dL    Hematocrit 38.9 (L) 40.0 - 54.0 %    MCV 90 82 - 98 fL    MCH 29.3 27.0 - 31.0 pg    MCHC 32.6 32.0 - 36.0 g/dL    RDW 17.5 (H) 11.5 - 14.5 %    Platelets 299 150 - 450 K/uL    MPV 9.4 9.2 - 12.9 fL    Immature Granulocytes 4.6 (H) 0.0 - 0.5 %    Gran # (ANC) 15.1 (H) 1.8 - 7.7 K/uL    Immature Grans (Abs) 0.83 (H) 0.00 - 0.04 K/uL    Lymph # 1.1 1.0 - 4.8 K/uL    Mono # 0.8 0.3 - 1.0 K/uL    Eos # 0.3 0.0 - 0.5 K/uL    Baso # 0.09 0.00 - 0.20 K/uL    nRBC 0 0 /100 WBC    Gran % 83.1 (H) 38.0 - 73.0 %    Lymph % 6.0 (L) 18.0 - 48.0 %     Mono % 4.4 4.0 - 15.0 %    Eosinophil % 1.4 0.0 - 8.0 %    Basophil % 0.5 0.0 - 1.9 %    Differential Method Automated    Comprehensive Metabolic Panel    Collection Time: 11/05/24  5:14 AM   Result Value Ref Range    Sodium 134 (L) 136 - 145 mmol/L    Potassium 3.9 3.5 - 5.1 mmol/L    Chloride 96 95 - 110 mmol/L    CO2 27 23 - 29 mmol/L    Glucose 95 70 - 110 mg/dL    BUN 44 (H) 8 - 23 mg/dL    Creatinine 1.9 (H) 0.5 - 1.4 mg/dL    Calcium 8.9 8.7 - 10.5 mg/dL    Total Protein 6.9 6.0 - 8.4 g/dL    Albumin 2.3 (L) 3.5 - 5.2 g/dL    Total Bilirubin 1.5 (H) 0.1 - 1.0 mg/dL    Alkaline Phosphatase 396 (H) 40 - 150 U/L    AST 30 10 - 40 U/L    ALT 37 10 - 44 U/L    eGFR 37 (A) >60 mL/min/1.73 m^2    Anion Gap 11 8 - 16 mmol/L   Magnesium    Collection Time: 11/05/24  5:14 AM   Result Value Ref Range    Magnesium 2.0 1.6 - 2.6 mg/dL   Phosphorus    Collection Time: 11/05/24  5:14 AM   Result Value Ref Range    Phosphorus 3.1 2.7 - 4.5 mg/dL   POCT glucose    Collection Time: 11/05/24  7:59 AM   Result Value Ref Range    POCT Glucose 100 70 - 110 mg/dL   POCT glucose    Collection Time: 11/05/24 11:35 AM   Result Value Ref Range    POCT Glucose 160 (H) 70 - 110 mg/dL     *Note: Due to a large number of results and/or encounters for the requested time period, some results have not been displayed. A complete set of results can be found in Results Review.         Significant Labs: All pertinent labs within the past 24 hours have been reviewed.    Imaging Results              MRI Lumbar Spine Without Contrast (Final result)  Result time 11/03/24 09:46:08      Final result by Suresh Kennedy MD (Timothy) (11/03/24 09:46:08)                   Impression:      Multilevel degenerative disc changes.  Moderate to severe foraminal stenosis at L4-5 and L5-S1 bilaterally      Electronically signed by: Suresh Kennedy MD  Date:    11/03/2024  Time:    09:46               Narrative:    EXAMINATION:  MRI LUMBAR SPINE WITHOUT  CONTRAST    CLINICAL HISTORY:  Low back pain, cauda equina syndrome suspected;    TECHNIQUE:  Multiplanar, multisequence MR images were acquired from the thoracolumbar junction to the sacrum without the administration of contrast.    COMPARISON:  L1-2: Unremarkable.  No significant disc disease.  No stenosis    FINDINGS:  L1-2: Unremarkable.  No significant disc disease.  No stenosis.    L2-3: Moderate broad base disc bulge with superimposed central disc protrusion this indents on the thecal sac.  No significant central canal stenosis.  No significant foraminal stenosis.    L3-4: Unremarkable.  No significant disc disease or stenosis.    L4-5:  Broad-based disc bulge impresses on the ventral thecal sac without central canal stenosis.  Facet arthropathy and ligament flavum hypertrophy contribute to bilateral moderate neural foraminal encroachment.    L5-S1: Mild to moderate broad-based posterior disc bulge impresses on the thecal sac without central canal stenosis.  Facet arthropathy and ligament flavum hypertrophy contribute to severe bilateral neural foraminal stenosis.                                       X-Ray Chest AP Portable (Final result)  Result time 11/02/24 18:13:15      Final result by Justice Garcia MD (11/02/24 18:13:15)                   Impression:      1.  Low lung volumes with vascular crowding or atelectasis in the lung bases.  Cardiac silhouette size enlargement.  Mild interstitial pulmonary edema or interstitial infectious process difficult to exclude in the right clinical setting.    2.  Stable findings as noted above.      Electronically signed by: Justice Garcia MD  Date:    11/02/2024  Time:    18:13               Narrative:    EXAMINATION:  XR CHEST AP PORTABLE    CLINICAL HISTORY:  leukocytosis;    COMPARISON:  Studies dating back to March 11, 2022    FINDINGS:  The study is lordotic in position.  Moderately low lung volumes with vascular crowding or atelectasis in the lung bases.  The  lungs are otherwise clear.  The cardiac silhouette size is enlarged.  The trachea is midline and the mediastinal width is normal. Negative for focal infiltrate, effusion or pneumothorax. Pulmonary vasculature is normal. Negative for osseous abnormalities. Tortuous aorta.  There are degenerative changes of the spine and both shoulder girdles.                                       Radiology (Final result)  Result time 11/04/24 17:03:27      Final result by Unknown User (11/04/24 17:03:27)                                       Significant Imaging: I have reviewed all pertinent imaging results/findings within the past 24 hours.

## 2024-11-06 LAB
ALBUMIN SERPL BCP-MCNC: 2.5 G/DL (ref 3.5–5.2)
ALP SERPL-CCNC: 397 U/L (ref 40–150)
ALT SERPL W/O P-5'-P-CCNC: 33 U/L (ref 10–44)
ANION GAP SERPL CALC-SCNC: 10 MMOL/L (ref 8–16)
AST SERPL-CCNC: 22 U/L (ref 10–40)
BACTERIA BLD CULT: ABNORMAL
BASOPHILS # BLD AUTO: ABNORMAL K/UL (ref 0–0.2)
BASOPHILS NFR BLD: 0 % (ref 0–1.9)
BILIRUB SERPL-MCNC: 1.3 MG/DL (ref 0.1–1)
BUN SERPL-MCNC: 44 MG/DL (ref 8–23)
CALCIUM SERPL-MCNC: 9.2 MG/DL (ref 8.7–10.5)
CHLORIDE SERPL-SCNC: 97 MMOL/L (ref 95–110)
CO2 SERPL-SCNC: 28 MMOL/L (ref 23–29)
CREAT SERPL-MCNC: 1.9 MG/DL (ref 0.5–1.4)
DIFFERENTIAL METHOD BLD: ABNORMAL
EOSINOPHIL # BLD AUTO: ABNORMAL K/UL (ref 0–0.5)
EOSINOPHIL NFR BLD: 1 % (ref 0–8)
ERYTHROCYTE [DISTWIDTH] IN BLOOD BY AUTOMATED COUNT: 18 % (ref 11.5–14.5)
EST. GFR  (NO RACE VARIABLE): 37 ML/MIN/1.73 M^2
GLUCOSE SERPL-MCNC: 101 MG/DL (ref 70–110)
HCT VFR BLD AUTO: 37.9 % (ref 40–54)
HGB BLD-MCNC: 12.4 G/DL (ref 14–18)
IMM GRANULOCYTES # BLD AUTO: ABNORMAL K/UL (ref 0–0.04)
IMM GRANULOCYTES NFR BLD AUTO: ABNORMAL % (ref 0–0.5)
LYMPHOCYTES # BLD AUTO: ABNORMAL K/UL (ref 1–4.8)
LYMPHOCYTES NFR BLD: 10 % (ref 18–48)
MAGNESIUM SERPL-MCNC: 2.2 MG/DL (ref 1.6–2.6)
MCH RBC QN AUTO: 29.3 PG (ref 27–31)
MCHC RBC AUTO-ENTMCNC: 32.7 G/DL (ref 32–36)
MCV RBC AUTO: 90 FL (ref 82–98)
METAMYELOCYTES NFR BLD MANUAL: 1 %
MONOCYTES # BLD AUTO: ABNORMAL K/UL (ref 0.3–1)
MONOCYTES NFR BLD: 4 % (ref 4–15)
MYELOCYTES NFR BLD MANUAL: 1 %
NEUTROPHILS # BLD AUTO: ABNORMAL K/UL (ref 1.8–7.7)
NEUTROPHILS NFR BLD: 78 % (ref 38–73)
NEUTS BAND NFR BLD MANUAL: 5 %
NRBC BLD-RTO: 0 /100 WBC
OVALOCYTES BLD QL SMEAR: ABNORMAL
PHOSPHATE SERPL-MCNC: 4 MG/DL (ref 2.7–4.5)
PLATELET # BLD AUTO: 296 K/UL (ref 150–450)
PLATELET BLD QL SMEAR: ABNORMAL
PMV BLD AUTO: 9.3 FL (ref 9.2–12.9)
POCT GLUCOSE: 104 MG/DL (ref 70–110)
POCT GLUCOSE: 109 MG/DL (ref 70–110)
POCT GLUCOSE: 110 MG/DL (ref 70–110)
POCT GLUCOSE: 117 MG/DL (ref 70–110)
POTASSIUM SERPL-SCNC: 3.7 MMOL/L (ref 3.5–5.1)
PROT SERPL-MCNC: 7.5 G/DL (ref 6–8.4)
RBC # BLD AUTO: 4.23 M/UL (ref 4.6–6.2)
SODIUM SERPL-SCNC: 135 MMOL/L (ref 136–145)
WBC # BLD AUTO: 19.43 K/UL (ref 3.9–12.7)

## 2024-11-06 PROCEDURE — 25000003 PHARM REV CODE 250: Performed by: INTERNAL MEDICINE

## 2024-11-06 PROCEDURE — 63600175 PHARM REV CODE 636 W HCPCS: Performed by: STUDENT IN AN ORGANIZED HEALTH CARE EDUCATION/TRAINING PROGRAM

## 2024-11-06 PROCEDURE — 36415 COLL VENOUS BLD VENIPUNCTURE: CPT | Performed by: STUDENT IN AN ORGANIZED HEALTH CARE EDUCATION/TRAINING PROGRAM

## 2024-11-06 PROCEDURE — 63600175 PHARM REV CODE 636 W HCPCS: Performed by: EMERGENCY MEDICINE

## 2024-11-06 PROCEDURE — 63600175 PHARM REV CODE 636 W HCPCS: Performed by: NURSE PRACTITIONER

## 2024-11-06 PROCEDURE — 85027 COMPLETE CBC AUTOMATED: CPT | Performed by: NURSE PRACTITIONER

## 2024-11-06 PROCEDURE — 85007 BL SMEAR W/DIFF WBC COUNT: CPT | Performed by: NURSE PRACTITIONER

## 2024-11-06 PROCEDURE — 99900035 HC TECH TIME PER 15 MIN (STAT)

## 2024-11-06 PROCEDURE — 63600175 PHARM REV CODE 636 W HCPCS: Performed by: INTERNAL MEDICINE

## 2024-11-06 PROCEDURE — 25000003 PHARM REV CODE 250: Performed by: EMERGENCY MEDICINE

## 2024-11-06 PROCEDURE — 87040 BLOOD CULTURE FOR BACTERIA: CPT | Mod: 59 | Performed by: STUDENT IN AN ORGANIZED HEALTH CARE EDUCATION/TRAINING PROGRAM

## 2024-11-06 PROCEDURE — 36415 COLL VENOUS BLD VENIPUNCTURE: CPT | Performed by: NURSE PRACTITIONER

## 2024-11-06 PROCEDURE — 80053 COMPREHEN METABOLIC PANEL: CPT | Performed by: NURSE PRACTITIONER

## 2024-11-06 PROCEDURE — 97530 THERAPEUTIC ACTIVITIES: CPT

## 2024-11-06 PROCEDURE — 21400001 HC TELEMETRY ROOM

## 2024-11-06 PROCEDURE — 84100 ASSAY OF PHOSPHORUS: CPT | Performed by: NURSE PRACTITIONER

## 2024-11-06 PROCEDURE — 27100171 HC OXYGEN HIGH FLOW UP TO 24 HOURS

## 2024-11-06 PROCEDURE — 94660 CPAP INITIATION&MGMT: CPT

## 2024-11-06 PROCEDURE — 83735 ASSAY OF MAGNESIUM: CPT | Performed by: NURSE PRACTITIONER

## 2024-11-06 PROCEDURE — 99223 1ST HOSP IP/OBS HIGH 75: CPT | Mod: ,,, | Performed by: STUDENT IN AN ORGANIZED HEALTH CARE EDUCATION/TRAINING PROGRAM

## 2024-11-06 PROCEDURE — 97110 THERAPEUTIC EXERCISES: CPT

## 2024-11-06 PROCEDURE — 25000003 PHARM REV CODE 250: Performed by: NURSE PRACTITIONER

## 2024-11-06 RX ORDER — CEFTRIAXONE 2 G/1
2 INJECTION, POWDER, FOR SOLUTION INTRAMUSCULAR; INTRAVENOUS
Status: DISCONTINUED | OUTPATIENT
Start: 2024-11-06 | End: 2024-11-12 | Stop reason: HOSPADM

## 2024-11-06 RX ADMIN — MUPIROCIN: 20 OINTMENT TOPICAL at 10:11

## 2024-11-06 RX ADMIN — Medication 100 MG: at 08:11

## 2024-11-06 RX ADMIN — PIPERACILLIN SODIUM AND TAZOBACTAM SODIUM 4.5 G: 4; .5 INJECTION, POWDER, FOR SOLUTION INTRAVENOUS at 12:11

## 2024-11-06 RX ADMIN — Medication 1 TABLET: at 08:11

## 2024-11-06 RX ADMIN — LATANOPROST 1 DROP: 50 SOLUTION OPHTHALMIC at 10:11

## 2024-11-06 RX ADMIN — PIPERACILLIN SODIUM AND TAZOBACTAM SODIUM 4.5 G: 4; .5 INJECTION, POWDER, FOR SOLUTION INTRAVENOUS at 08:11

## 2024-11-06 RX ADMIN — CYANOCOBALAMIN 1000 MCG: 1000 INJECTION INTRAMUSCULAR; SUBCUTANEOUS at 08:11

## 2024-11-06 RX ADMIN — CEFTRIAXONE 2 G: 2 INJECTION, POWDER, FOR SOLUTION INTRAMUSCULAR; INTRAVENOUS at 05:11

## 2024-11-06 RX ADMIN — MUPIROCIN: 20 OINTMENT TOPICAL at 08:11

## 2024-11-06 RX ADMIN — CHOLECALCIFEROL TAB 125 MCG (5000 UNIT) 5000 UNITS: 125 TAB at 08:11

## 2024-11-06 RX ADMIN — THERA TABS 1 TABLET: TAB at 08:11

## 2024-11-06 RX ADMIN — GUAIFENESIN 600 MG: 600 TABLET, EXTENDED RELEASE ORAL at 10:11

## 2024-11-06 RX ADMIN — ENOXAPARIN SODIUM 40 MG: 40 INJECTION SUBCUTANEOUS at 05:11

## 2024-11-06 RX ADMIN — GUAIFENESIN 600 MG: 600 TABLET, EXTENDED RELEASE ORAL at 08:11

## 2024-11-06 NOTE — PLAN OF CARE
POC reviewed with pt. Pt verbalizes understanding of POC. No questions at this time.  AAOx4.  NADN.  NSR on cardiac monitor. IV abx, glucose monitored.  Pt remains free of falls.  No complaints at this time.  Safety measures in place. Will continue to monitor.  Informed pt to call for assistance before getting up. Pt verbalizes understanding.  Hourly rounding and chart check complete.

## 2024-11-06 NOTE — SUBJECTIVE & OBJECTIVE
Past Medical History:   Diagnosis Date    Chronic combined systolic and diastolic congestive heart failure 07/09/2020    Chronic kidney disease, stage 3     Chronic obstructive pulmonary disease 03/20/2023    Wixela 250 mcg, Spiriva respimat. Continue Albuterol inhaler and albuterol nebs   Referral pul dis management, education and assistance with medication  Patient preference to only see a doctor, request follow up with Dr. Springer             Chronic venous insufficiency     DDD (degenerative disc disease), lumbar     DM (diabetes mellitus) with complications     History of gout     Hyperlipidemia associated with type 2 diabetes mellitus     Hypertension associated with stage 3 chronic kidney disease due to type 2 diabetes mellitus     BRADLEY on CPAP     Prostate cancer     prostatectomy in 2010, rising PSA that started in 2021       Past Surgical History:   Procedure Laterality Date    BICEPS TENDON REPAIR      COLONOSCOPY N/A 03/27/2019    Procedure: COLONOSCOPY;  Surgeon: James Roger MD;  Location: [facility] ENDO;  Service: Endoscopy;  Laterality: N/A;    EXPLORATION OF ORBIT Right 9/8/2023    Procedure: EXPLORATION, ORBIT;  Surgeon: Junaid Bartholomew MD;  Location: [facility] OR;  Service: ENT;  Laterality: Right;  possibly need frozen    HEMORRHOID SURGERY      INGUINAL HERNIA REPAIR Left     KNEE ARTHROSCOPY Right     LEFT HEART CATHETERIZATION Left 06/29/2020    Procedure: CATHETERIZATION, HEART, LEFT;  Surgeon: Cheli Wilson MD;  Location: [facility] CATH LAB;  Service: Cardiology;  Laterality: Left;    LUMBAR LAMINECTOMY      PROSTATECTOMY      TONSILLECTOMY         Review of patient's allergies indicates:   Allergen Reactions    Amitriptyline Rash    Celecoxib Rash       Medications:  Medications Prior to Admission   Medication Sig    adalimumab (HUMIRA,CF, PEN) 40 mg/0.4 mL PnKt Inject 0.4 mLs (40 mg total) into the skin every 14 (fourteen) days.    albuterol (PROVENTIL/VENTOLIN HFA) 90 mcg/actuation inhaler INHALE 2  PUFFS INTO THE LUNGS EVERY 4 HOURS AS NEEDED    allopurinoL (ZYLOPRIM) 100 MG tablet TAKE 1 TABLET(100 MG) BY MOUTH EVERY DAY    allopurinoL (ZYLOPRIM) 300 MG tablet TAKE 1 TABLET(300 MG) BY MOUTH EVERY DAY    aspirin (ECOTRIN) 81 MG EC tablet Take 81 mg by mouth once daily.    baclofen (LIORESAL) 10 MG tablet Take 1 tablet by mouth every evening.    bisoprolol (ZEBETA) 5 MG tablet TAKE 1/2 TABLET BY MOUTH EVERY DAY    blood sugar diagnostic Strp Check blood glucose twice per day    blood-glucose meter (ACCU-CHEK GUIDE ME GLUCOSE MTR) Misc USE AS INSTRUCTED    colchicine (COLCRYS) 0.6 mg tablet Take 1 tablet (0.6 mg total) by mouth daily as needed (gout flare).    empagliflozin (JARDIANCE) 10 mg tablet Take 1 tablet (10 mg total) by mouth once daily.    fluticasone propionate (FLONASE) 50 mcg/actuation nasal spray SHAKE LIQUID AND USE 2 SPRAYS(100 MCG) IN EACH NOSTRIL EVERY DAY Strength: 50 mcg/actuation    glimepiride (AMARYL) 4 MG tablet Take 1 tablet (4 mg total) by mouth before breakfast.    guaiFENesin 100 mg/5 ml (ROBITUSSIN) 100 mg/5 mL syrup Take 200 mg by mouth every evening.    HYDROcodone-acetaminophen (NORCO) 7.5-325 mg per tablet Take 1 tablet by mouth every 4 (four) hours as needed (for chronic pain).    lancets (ACCU-CHEK SOFTCLIX LANCETS) Misc Check BG daily    latanoprost 0.005 % ophthalmic solution Place 1 drop into the right eye every evening.    magnesium oxide (MAG-OX) 400 mg (241.3 mg magnesium) tablet TAKE 2 TABLETS(800 MG) BY MOUTH TWICE DAILY    metFORMIN (GLUCOPHAGE) 500 MG tablet TAKE 1 TABLET(500 MG) BY MOUTH DAILY WITH BREAKFAST    multivitamin-minerals-lutein Tab Take 1 tablet by mouth Daily.    omeprazole (PRILOSEC) 40 MG capsule TAKE 1 CAPSULE BY MOUTH EVERY DAY    potassium chloride SA (K-DUR,KLOR-CON) 20 MEQ tablet TAKE 3 TABLETS BY MOUTH TWICE DAILY    simvastatin (ZOCOR) 40 MG tablet TAKE 1 TABLET(40 MG) BY MOUTH EVERY EVENING    torsemide (DEMADEX) 20 MG Tab Take 2 tablets (40 mg  total) by mouth once daily.    varenicline (CHANTIX) 1 mg Tab take 1 tablet by mouth twice daily. Take with full glass of water & with food to avoid nausea (Patient not taking: Reported on 10/28/2024)    WIXELA INHUB 250-50 mcg/dose diskus inhaler INHALE 1 PUFF INTO THE LUNGS TWICE DAILY     Antibiotics (From admission, onward)      Start     Stop Route Frequency Ordered    11/06/24 1145  cefTRIAXone injection 2 g         -- IV Every 24 hours (non-standard times) 11/06/24 1036    11/04/24 0900  mupirocin 2 % ointment         11/09/24 0859 Nasl 2 times daily 11/04/24 0523          Antifungals (From admission, onward)      None          Antivirals (From admission, onward)      None             Immunization History   Administered Date(s) Administered    COVID-19, MRNA, LN-S, PF (Pfizer) (Purple Cap) 01/24/2021, 02/14/2021, 12/10/2021    Influenza (FLUAD) - Quadrivalent - Adjuvanted - PF *Preferred* (65+) 10/08/2020, 10/07/2021, 10/10/2022    Influenza - Quadrivalent - High Dose - PF (65 years and older) 08/16/2023    Influenza - Trivalent - Fluad - Adjuvanted - PF (65 years and older 09/28/2017    Influenza - Trivalent - Fluarix, Flulaval, Fluzone, Afluria - PF 12/04/2012, 09/24/2015    Influenza - Trivalent - Fluzone High Dose - PF (65 years and older) 10/12/2016, 09/20/2018, 10/05/2019    Pneumococcal Conjugate - 13 Valent 10/12/2016    Pneumococcal Conjugate - 20 Valent 11/03/2022    Tdap 12/04/2021    Zoster 05/23/2012    Zoster Recombinant 05/25/2023, 07/24/2023       Family History       Problem Relation (Age of Onset)    Alzheimer's disease Father    Coronary artery disease Father (90)    Hyperlipidemia Mother    Prostate cancer Father          Social History     Socioeconomic History    Marital status:    Tobacco Use    Smoking status: Former     Current packs/day: 0.00     Average packs/day: 1.5 packs/day for 52.7 years (79.1 ttl pk-yrs)     Types: Cigarettes     Start date: 1/1/1971     Quit date:  9/15/2023     Years since quittin.1    Smokeless tobacco: Former   Substance and Sexual Activity    Alcohol use: Not Currently    Drug use: Never    Sexual activity: Not Currently     Partners: Female     Social Drivers of Health     Financial Resource Strain: Medium Risk (2024)    Overall Financial Resource Strain (CARDIA)     Difficulty of Paying Living Expenses: Somewhat hard   Food Insecurity: No Food Insecurity (2024)    Hunger Vital Sign     Worried About Running Out of Food in the Last Year: Never true     Ran Out of Food in the Last Year: Never true   Transportation Needs: No Transportation Needs (2024)    PRAPARE - Transportation     Lack of Transportation (Medical): No     Lack of Transportation (Non-Medical): No   Physical Activity: Inactive (2024)    Exercise Vital Sign     Days of Exercise per Week: 0 days     Minutes of Exercise per Session: 0 min   Stress: Stress Concern Present (2024)    [employer] [place] [employer] - Occupational Stress Questionnaire     Feeling of Stress : To some extent   Housing Stability: Unknown (2024)    Housing Stability Vital Sign     Unable to Pay for Housing in the Last Year: Patient declined     Review of Systems   Gastrointestinal:  Positive for abdominal pain.   Musculoskeletal:  Positive for arthralgias, back pain and myalgias.   All other systems reviewed and are negative.    Objective:     Vital Signs (Most Recent):  Temp: 97.8 °F (36.6 °C) (24 0739)  Pulse: 103 (24 0900)  Resp: 19 (24 0739)  BP: 131/76 (24 0739)  SpO2: 95 % (24) Vital Signs (24h Range):  Temp:  [97.8 °F (36.6 °C)-98.6 °F (37 °C)] 97.8 °F (36.6 °C)  Pulse:  [] 103  Resp:  [15-25] 19  SpO2:  [91 %-98 %] 95 %  BP: (112-131)/(67-85) 131/76     Weight: 96.3 kg (212 lb 4.9 oz)  Body mass index is 33.25 kg/m².    Estimated Creatinine Clearance: 38.3 mL/min (A) (based on SCr of 1.9 mg/dL (H)).     Physical  Exam  Constitutional:       General: He is not in acute distress.     Appearance: Normal appearance. He is not ill-appearing.   Cardiovascular:      Rate and Rhythm: Normal rate and regular rhythm.      Pulses: Normal pulses.      Heart sounds: Normal heart sounds. No murmur heard.     No friction rub. No gallop.   Pulmonary:      Effort: Pulmonary effort is normal. No respiratory distress.      Breath sounds: Normal breath sounds.   Abdominal:      General: Abdomen is flat. Bowel sounds are normal. There is no distension.      Palpations: Abdomen is soft.      Tenderness: There is no abdominal tenderness.   Skin:     General: Skin is warm and dry.   Neurological:      Mental Status: He is alert.          Significant Labs: Blood Culture:   Recent Labs   Lab 11/03/24 0125   LABBLOO No Growth to date  No Growth to date  No Growth to date  Gram stain dao bottle: Gram negative rods  Results called to and read back by:Sharifa Power RN 11/04/2024  00:22  ESCHERICHIA COLI  Susceptibility pending  *     CBC:   Recent Labs   Lab 11/05/24 0514 11/06/24 0519   WBC 18.20*  18.20* 19.43*   HGB 12.7*  12.7* 12.4*   HCT 38.9*  38.9* 37.9*     299 296     CMP:   Recent Labs   Lab 11/05/24 0514 11/06/24  0519   *  134* 135*   K 3.9  3.9 3.7   CL 96  96 97   CO2 27  27 28   GLU 95  95 101   BUN 44*  44* 44*   CREATININE 1.9*  1.9* 1.9*   CALCIUM 8.9  8.9 9.2   PROT 6.9  6.9 7.5   ALBUMIN 2.3*  2.3* 2.5*   BILITOT 1.5*  1.5* 1.3*   ALKPHOS 396*  396* 397*   AST 30  30 22   ALT 37  37 33   ANIONGAP 11  11 10     Microbiology Results (last 7 days)       Procedure Component Value Units Date/Time    Blood culture [[id number]]     Order Status: Sent Specimen: Blood     Blood culture [[id number]]     Order Status: Sent Specimen: Blood     Blood culture [[id number]] Collected: 11/03/24 0125    Order Status: Completed Specimen: Blood from Peripheral, Antecubital, Left Updated: 11/05/24 1412     Blood  Culture, Routine No Growth to date      No Growth to date      No Growth to date    Blood culture [[id number]]  (Abnormal) Collected: 11/03/24 0125    Order Status: Completed Specimen: Blood from Peripheral, Hand, Right Updated: 11/05/24 1336     Blood Culture, Routine Gram stain dao bottle: Gram negative rods      Results called to and read back by:Sharifa Power RN 11/04/2024  00:22      ESCHERICHIA COLI  Susceptibility pending      Culture, MRSA [[id number]] Collected: 11/04/24 0557    Order Status: Sent Specimen: MRSA source from Nares, Left Updated: 11/04/24 1036    Rapid Organism ID by PCR (from Blood culture) [[id number]]  (Abnormal) Collected: 11/03/24 0125    Order Status: Completed Updated: 11/04/24 0141     Enterococcus faecalis Not Detected     Enterococcus faecium Not Detected     Listeria monocytogenes Not Detected     Staphylococcus spp. Not Detected     Staphylococcus aureus Not Detected     Staphylococcus epidermidis Not Detected     Staphylococcus lugdunensis Not Detected     Streptococcus species Not Detected     Streptococcus agalactiae Not Detected     Streptococcus pneumoniae Not Detected     Streptococcus pyogenes Not Detected     Acinetobacter calcoaceticus/baumannii complex Not Detected     Bacteroides fragilis Not Detected     Enterobacterales See species for ID     Enterobacter cloacae complex Not Detected     Escherichia coli Detected     Klebsiella aerogenes Not Detected     Klebsiella oxytoca Not Detected     Klebsiella pneumoniae group Not Detected     Proteus Not Detected     Salmonella sp Not Detected     Serratia marcescens Not Detected     Haemophilus influenzae Not Detected     Neisseria meningtidis Not Detected     Pseudomonas aeruginosa Not Detected     Stenotrophomonas maltophilia Not Detected     Candida albicans Not Detected     Candida auris Not Detected     Candida glabrata Not Detected     Candida krusei Not Detected     Candida parapsilosis Not Detected     Candida  tropicalis Not Detected     Cryptococcus neoformans/gattii Not Detected     CTX-M (ESBL ) Not Detected     IMP (Carbapenem resistant) Not Detected     KPC resistance gene (Carbapenem resistant) Not Detected     mcr-1  Not Detected     mec A/C  Test Not Applicable     mec A/C and MREJ (MRSA) gene Test Not Applicable     NDM (Carbapenem resistant) Not Detected     OXA-48-like (Carbapenem resistant) Not Detected     van A/B (VRE gene) Test Not Applicable     VIM (Carbapenem resistant) Not Detected    Respiratory Infection Panel (PCR), Nasopharyngeal [[id number]] Collected: 11/03/24 1045    Order Status: Completed Specimen: Nasopharyngeal Swab Updated: 11/03/24 1201     Respiratory Infection Panel Source NP Swab     Adenovirus Not Detected     Coronavirus 229E, Common Cold Virus Not Detected     Coronavirus HKU1, Common Cold Virus Not Detected     Coronavirus NL63, Common Cold Virus Not Detected     Coronavirus OC43, Common Cold Virus Not Detected     Comment: The Coronavirus strains detected in this test cause the common cold.  These strains are not the COVID-19 (novel Coronavirus)strain   associated with the respiratory disease outbreak.          SARS-CoV2 (COVID-19) Qualitative PCR Not Detected     Human Metapneumovirus Not Detected     Human Rhinovirus/Enterovirus Not Detected     Influenza A (subtypes H1, H1-2009,H3) Not Detected     Influenza B Not Detected     Parainfluenza Virus 1 Not Detected     Parainfluenza Virus 2 Not Detected     Parainfluenza Virus 3 Not Detected     Parainfluenza Virus 4 Not Detected     Respiratory Syncytial Virus Not Detected     Bordetella Parapertussis (HB9885) Not Detected     Bordetella pertussis (ptxP) Not Detected     Chlamydia pneumoniae Not Detected     Mycoplasma pneumoniae Not Detected     Comment: Respiratory Infection Panel testing performed by Multiplex PCR.       Narrative:      Assay not valid for lower respiratory specimens, alternate  testing required.     Respiratory Infection Panel (PCR), Nasopharyngeal [[id number]] Collected: 11/03/24 1009    Order Status: Canceled Specimen: Nasopharyngeal Swab     Influenza A & B by Molecular [[id number]] Collected: 11/03/24 0607    Order Status: Completed Specimen: Nasopharyngeal Swab Updated: 11/03/24 0839     Influenza A, Molecular Negative     Influenza B, Molecular Negative     Flu A & B Source Nasal swab          All pertinent labs within the past 24 hours have been reviewed.    Significant Imaging: I have reviewed all pertinent imaging results/findings within the past 24 hours.

## 2024-11-06 NOTE — CONSULTS
[facility] ([facility])  Infectious Disease  Consult Note    Patient Name: Santiago Khan  MRN: [id number]  Admission Date: 11/2/2024  Hospital Length of Stay: 3 days  Attending Physician: Jeffy Velazquez MD  Primary Care Provider: Kashif Acosta MD     Isolation Status: No active isolations    Patient information was obtained from patient, ER records, and primary team.      Consults  Assessment/Plan:     Cardiac/Vascular  Hyperlipidemia associated with type 2 diabetes mellitus  Continue current medications per primary      Hypertension associated with stage 3 chronic kidney disease due to type 2 diabetes mellitus  Continue current medications per primary      ID  E coli bacteremia  --All cases of bacteremia pose risk of life threatening sepsis and metastatic infection  --GN bacteremia not always with obvious source   --Unclear in this case  --CT a/p shows thickening and enlargement of the left krystal abdominal wall. Possible infected hematoma.   --Possible early pneumonia as well   --Would be cautious and treat with longer course of antibiotics  --Will switch pip/tazo to ceftriaxone 2 g daily as E coli isolate is pan susceptible   --Repeat blood cx for clearance   --Anticipate 14 day IV abx course from negative blood cx  --Above d/w primary team.      Endocrine  Diabetes  Continue current medications per primary      Orthopedic  History of gout  Continue current medications per primary      Other  BRADLEY on CPAP  CPAP as tolerated        Thank you for your consult. I will follow-up with patient. Please contact us if you have any additional questions.    Alexis Swanson, DO  Infectious Disease  [facility] ([facility])    Subjective:     Principal Problem: Severe sepsis    HPI: This is a 75 yo M with hx of HF, CKD, COPD, DM, HLD, HTN, and BRADLEY who presented to the ER for worsening bilateral lower extremity weakness, acute on chronic lumbar pain, and abdominal pain for about four days PTA. Following  admission patient became progressively lethargic and hypercapnic on the floor requiring continuous bipap, and hypoglycemic requiring continuous D10 drip. Transferred to ICU. Sent back to floor on 11/5. Febrile to 100.6 F on admission with leukocytosis to almost 20K. Blood cx growing E coli. Has been on empiric Zosyn. CT a/p reports thickening and enlargement of the left krystal abdominal wall. Correlate clinically for rectus sheath hematoma. Mild bibasilar opacities right greater than left may relate to early pneumonia. Trace right-sided pleural effusion. MRI lumbar spine shows DDD. No evidence of infection. ID consulted for E coli bacteremia.     Past Medical History:   Diagnosis Date    Chronic combined systolic and diastolic congestive heart failure 07/09/2020    Chronic kidney disease, stage 3     Chronic obstructive pulmonary disease 03/20/2023    Wixela 250 mcg, Spiriva respimat. Continue Albuterol inhaler and albuterol nebs   Referral pul dis management, education and assistance with medication  Patient preference to only see a doctor, request follow up with Dr. Springer             Chronic venous insufficiency     DDD (degenerative disc disease), lumbar     DM (diabetes mellitus) with complications     History of gout     Hyperlipidemia associated with type 2 diabetes mellitus     Hypertension associated with stage 3 chronic kidney disease due to type 2 diabetes mellitus     BRADLEY on CPAP     Prostate cancer     prostatectomy in 2010, rising PSA that started in 2021       Past Surgical History:   Procedure Laterality Date    BICEPS TENDON REPAIR      COLONOSCOPY N/A 03/27/2019    Procedure: COLONOSCOPY;  Surgeon: James Roger MD;  Location: [facility] ENDO;  Service: Endoscopy;  Laterality: N/A;    EXPLORATION OF ORBIT Right 9/8/2023    Procedure: EXPLORATION, ORBIT;  Surgeon: Junaid Bartholomew MD;  Location: [facility] OR;  Service: ENT;  Laterality: Right;  possibly need frozen    HEMORRHOID SURGERY      INGUINAL HERNIA  REPAIR Left     KNEE ARTHROSCOPY Right     LEFT HEART CATHETERIZATION Left 06/29/2020    Procedure: CATHETERIZATION, HEART, LEFT;  Surgeon: Cheli Wilson MD;  Location: [facility] CATH LAB;  Service: Cardiology;  Laterality: Left;    LUMBAR LAMINECTOMY      PROSTATECTOMY      TONSILLECTOMY         Review of patient's allergies indicates:   Allergen Reactions    Amitriptyline Rash    Celecoxib Rash       Medications:  Medications Prior to Admission   Medication Sig    adalimumab (HUMIRA,CF, PEN) 40 mg/0.4 mL PnKt Inject 0.4 mLs (40 mg total) into the skin every 14 (fourteen) days.    albuterol (PROVENTIL/VENTOLIN HFA) 90 mcg/actuation inhaler INHALE 2 PUFFS INTO THE LUNGS EVERY 4 HOURS AS NEEDED    allopurinoL (ZYLOPRIM) 100 MG tablet TAKE 1 TABLET(100 MG) BY MOUTH EVERY DAY    allopurinoL (ZYLOPRIM) 300 MG tablet TAKE 1 TABLET(300 MG) BY MOUTH EVERY DAY    aspirin (ECOTRIN) 81 MG EC tablet Take 81 mg by mouth once daily.    baclofen (LIORESAL) 10 MG tablet Take 1 tablet by mouth every evening.    bisoprolol (ZEBETA) 5 MG tablet TAKE 1/2 TABLET BY MOUTH EVERY DAY    blood sugar diagnostic Strp Check blood glucose twice per day    blood-glucose meter (ACCU-CHEK GUIDE ME GLUCOSE MTR) Misc USE AS INSTRUCTED    colchicine (COLCRYS) 0.6 mg tablet Take 1 tablet (0.6 mg total) by mouth daily as needed (gout flare).    empagliflozin (JARDIANCE) 10 mg tablet Take 1 tablet (10 mg total) by mouth once daily.    fluticasone propionate (FLONASE) 50 mcg/actuation nasal spray SHAKE LIQUID AND USE 2 SPRAYS(100 MCG) IN EACH NOSTRIL EVERY DAY Strength: 50 mcg/actuation    glimepiride (AMARYL) 4 MG tablet Take 1 tablet (4 mg total) by mouth before breakfast.    guaiFENesin 100 mg/5 ml (ROBITUSSIN) 100 mg/5 mL syrup Take 200 mg by mouth every evening.    HYDROcodone-acetaminophen (NORCO) 7.5-325 mg per tablet Take 1 tablet by mouth every 4 (four) hours as needed (for chronic pain).    lancets (ACCU-CHEK SOFTCLIX LANCETS) Misc Check BG daily     latanoprost 0.005 % ophthalmic solution Place 1 drop into the right eye every evening.    magnesium oxide (MAG-OX) 400 mg (241.3 mg magnesium) tablet TAKE 2 TABLETS(800 MG) BY MOUTH TWICE DAILY    metFORMIN (GLUCOPHAGE) 500 MG tablet TAKE 1 TABLET(500 MG) BY MOUTH DAILY WITH BREAKFAST    multivitamin-minerals-lutein Tab Take 1 tablet by mouth Daily.    omeprazole (PRILOSEC) 40 MG capsule TAKE 1 CAPSULE BY MOUTH EVERY DAY    potassium chloride SA (K-DUR,KLOR-CON) 20 MEQ tablet TAKE 3 TABLETS BY MOUTH TWICE DAILY    simvastatin (ZOCOR) 40 MG tablet TAKE 1 TABLET(40 MG) BY MOUTH EVERY EVENING    torsemide (DEMADEX) 20 MG Tab Take 2 tablets (40 mg total) by mouth once daily.    varenicline (CHANTIX) 1 mg Tab take 1 tablet by mouth twice daily. Take with full glass of water & with food to avoid nausea (Patient not taking: Reported on 10/28/2024)    WIXELA INHUB 250-50 mcg/dose diskus inhaler INHALE 1 PUFF INTO THE LUNGS TWICE DAILY     Antibiotics (From admission, onward)      Start     Stop Route Frequency Ordered    11/06/24 1145  cefTRIAXone injection 2 g         -- IV Every 24 hours (non-standard times) 11/06/24 1036    11/04/24 0900  mupirocin 2 % ointment         11/09/24 0859 Nasl 2 times daily 11/04/24 0523          Antifungals (From admission, onward)      None          Antivirals (From admission, onward)      None             Immunization History   Administered Date(s) Administered    COVID-19, MRNA, LN-S, PF (Pfizer) (Purple Cap) 01/24/2021, 02/14/2021, 12/10/2021    Influenza (FLUAD) - Quadrivalent - Adjuvanted - PF *Preferred* (65+) 10/08/2020, 10/07/2021, 10/10/2022    Influenza - Quadrivalent - High Dose - PF (65 years and older) 08/16/2023    Influenza - Trivalent - Fluad - Adjuvanted - PF (65 years and older 09/28/2017    Influenza - Trivalent - Fluarix, Flulaval, Fluzone, Afluria - PF 12/04/2012, 09/24/2015    Influenza - Trivalent - Fluzone High Dose - PF (65 years and older) 10/12/2016, 09/20/2018,  10/05/2019    Pneumococcal Conjugate - 13 Valent 10/12/2016    Pneumococcal Conjugate - 20 Valent 2022    Tdap 2021    Zoster 2012    Zoster Recombinant 2023, 2023       Family History       Problem Relation (Age of Onset)    Alzheimer's disease Father    Coronary artery disease Father (90)    Hyperlipidemia Mother    Prostate cancer Father          Social History     Socioeconomic History    Marital status:    Tobacco Use    Smoking status: Former     Current packs/day: 0.00     Average packs/day: 1.5 packs/day for 52.7 years (79.1 ttl pk-yrs)     Types: Cigarettes     Start date: 1971     Quit date: 9/15/2023     Years since quittin.1    Smokeless tobacco: Former   Substance and Sexual Activity    Alcohol use: Not Currently    Drug use: Never    Sexual activity: Not Currently     Partners: Female     Social Drivers of Health     Financial Resource Strain: Medium Risk (2024)    Overall Financial Resource Strain (CARDIA)     Difficulty of Paying Living Expenses: Somewhat hard   Food Insecurity: No Food Insecurity (2024)    Hunger Vital Sign     Worried About Running Out of Food in the Last Year: Never true     Ran Out of Food in the Last Year: Never true   Transportation Needs: No Transportation Needs (2024)    PRAPARE - Transportation     Lack of Transportation (Medical): No     Lack of Transportation (Non-Medical): No   Physical Activity: Inactive (2024)    Exercise Vital Sign     Days of Exercise per Week: 0 days     Minutes of Exercise per Session: 0 min   Stress: Stress Concern Present (2024)    [employer] [place] [employer] - Occupational Stress Questionnaire     Feeling of Stress : To some extent   Housing Stability: Unknown (2024)    Housing Stability Vital Sign     Unable to Pay for Housing in the Last Year: Patient declined     Review of Systems   Gastrointestinal:  Positive for abdominal pain.   Musculoskeletal:   Positive for arthralgias, back pain and myalgias.   All other systems reviewed and are negative.    Objective:     Vital Signs (Most Recent):  Temp: 97.8 °F (36.6 °C) (11/06/24 0739)  Pulse: 103 (11/06/24 0900)  Resp: 19 (11/06/24 0739)  BP: 131/76 (11/06/24 0739)  SpO2: 95 % (11/06/24 0739) Vital Signs (24h Range):  Temp:  [97.8 °F (36.6 °C)-98.6 °F (37 °C)] 97.8 °F (36.6 °C)  Pulse:  [] 103  Resp:  [15-25] 19  SpO2:  [91 %-98 %] 95 %  BP: (112-131)/(67-85) 131/76     Weight: 96.3 kg (212 lb 4.9 oz)  Body mass index is 33.25 kg/m².    Estimated Creatinine Clearance: 38.3 mL/min (A) (based on SCr of 1.9 mg/dL (H)).     Physical Exam  Constitutional:       General: He is not in acute distress.     Appearance: Normal appearance. He is not ill-appearing.   Cardiovascular:      Rate and Rhythm: Normal rate and regular rhythm.      Pulses: Normal pulses.      Heart sounds: Normal heart sounds. No murmur heard.     No friction rub. No gallop.   Pulmonary:      Effort: Pulmonary effort is normal. No respiratory distress.      Breath sounds: Normal breath sounds.   Abdominal:      General: Abdomen is flat. Bowel sounds are normal. There is no distension.      Palpations: Abdomen is soft.      Tenderness: There is no abdominal tenderness.   Skin:     General: Skin is warm and dry.   Neurological:      Mental Status: He is alert.          Significant Labs: Blood Culture:   Recent Labs   Lab 11/03/24  0125   LABBLOO No Growth to date  No Growth to date  No Growth to date  Gram stain dao bottle: Gram negative rods  Results called to and read back by:Sharifa Power RN 11/04/2024  00:22  ESCHERICHIA COLI  Susceptibility pending  *     CBC:   Recent Labs   Lab 11/05/24  0514 11/06/24  0519   WBC 18.20*  18.20* 19.43*   HGB 12.7*  12.7* 12.4*   HCT 38.9*  38.9* 37.9*     299 296     CMP:   Recent Labs   Lab 11/05/24  0514 11/06/24  0519   *  134* 135*   K 3.9  3.9 3.7   CL 96  96 97   CO2 27  27 28    GLU 95  95 101   BUN 44*  44* 44*   CREATININE 1.9*  1.9* 1.9*   CALCIUM 8.9  8.9 9.2   PROT 6.9  6.9 7.5   ALBUMIN 2.3*  2.3* 2.5*   BILITOT 1.5*  1.5* 1.3*   ALKPHOS 396*  396* 397*   AST 30  30 22   ALT 37  37 33   ANIONGAP 11  11 10     Microbiology Results (last 7 days)       Procedure Component Value Units Date/Time    Blood culture [[id number]]     Order Status: Sent Specimen: Blood     Blood culture [[id number]]     Order Status: Sent Specimen: Blood     Blood culture [[id number]] Collected: 11/03/24 0125    Order Status: Completed Specimen: Blood from Peripheral, Antecubital, Left Updated: 11/05/24 1412     Blood Culture, Routine No Growth to date      No Growth to date      No Growth to date    Blood culture [[id number]]  (Abnormal) Collected: 11/03/24 0125    Order Status: Completed Specimen: Blood from Peripheral, Hand, Right Updated: 11/05/24 1336     Blood Culture, Routine Gram stain dao bottle: Gram negative rods      Results called to and read back by:Sharifa Power RN 11/04/2024  00:22      ESCHERICHIA COLI  Susceptibility pending      Culture, MRSA [[id number]] Collected: 11/04/24 0557    Order Status: Sent Specimen: MRSA source from Nares, Left Updated: 11/04/24 1036    Rapid Organism ID by PCR (from Blood culture) [[id number]]  (Abnormal) Collected: 11/03/24 0125    Order Status: Completed Updated: 11/04/24 0141     Enterococcus faecalis Not Detected     Enterococcus faecium Not Detected     Listeria monocytogenes Not Detected     Staphylococcus spp. Not Detected     Staphylococcus aureus Not Detected     Staphylococcus epidermidis Not Detected     Staphylococcus lugdunensis Not Detected     Streptococcus species Not Detected     Streptococcus agalactiae Not Detected     Streptococcus pneumoniae Not Detected     Streptococcus pyogenes Not Detected     Acinetobacter calcoaceticus/baumannii complex Not Detected     Bacteroides fragilis Not Detected     Enterobacterales See species for  ID     Enterobacter cloacae complex Not Detected     Escherichia coli Detected     Klebsiella aerogenes Not Detected     Klebsiella oxytoca Not Detected     Klebsiella pneumoniae group Not Detected     Proteus Not Detected     Salmonella sp Not Detected     Serratia marcescens Not Detected     Haemophilus influenzae Not Detected     Neisseria meningtidis Not Detected     Pseudomonas aeruginosa Not Detected     Stenotrophomonas maltophilia Not Detected     Candida albicans Not Detected     Candida auris Not Detected     Candida glabrata Not Detected     Candida krusei Not Detected     Candida parapsilosis Not Detected     Candida tropicalis Not Detected     Cryptococcus neoformans/gattii Not Detected     CTX-M (ESBL ) Not Detected     IMP (Carbapenem resistant) Not Detected     KPC resistance gene (Carbapenem resistant) Not Detected     mcr-1  Not Detected     mec A/C  Test Not Applicable     mec A/C and MREJ (MRSA) gene Test Not Applicable     NDM (Carbapenem resistant) Not Detected     OXA-48-like (Carbapenem resistant) Not Detected     van A/B (VRE gene) Test Not Applicable     VIM (Carbapenem resistant) Not Detected    Respiratory Infection Panel (PCR), Nasopharyngeal [[id number]] Collected: 11/03/24 1045    Order Status: Completed Specimen: Nasopharyngeal Swab Updated: 11/03/24 1201     Respiratory Infection Panel Source NP Swab     Adenovirus Not Detected     Coronavirus 229E, Common Cold Virus Not Detected     Coronavirus HKU1, Common Cold Virus Not Detected     Coronavirus NL63, Common Cold Virus Not Detected     Coronavirus OC43, Common Cold Virus Not Detected     Comment: The Coronavirus strains detected in this test cause the common cold.  These strains are not the COVID-19 (novel Coronavirus)strain   associated with the respiratory disease outbreak.          SARS-CoV2 (COVID-19) Qualitative PCR Not Detected     Human Metapneumovirus Not Detected     Human Rhinovirus/Enterovirus Not Detected      Influenza A (subtypes H1, H1-2009,H3) Not Detected     Influenza B Not Detected     Parainfluenza Virus 1 Not Detected     Parainfluenza Virus 2 Not Detected     Parainfluenza Virus 3 Not Detected     Parainfluenza Virus 4 Not Detected     Respiratory Syncytial Virus Not Detected     Bordetella Parapertussis (QH8749) Not Detected     Bordetella pertussis (ptxP) Not Detected     Chlamydia pneumoniae Not Detected     Mycoplasma pneumoniae Not Detected     Comment: Respiratory Infection Panel testing performed by Multiplex PCR.       Narrative:      Assay not valid for lower respiratory specimens, alternate  testing required.    Respiratory Infection Panel (PCR), Nasopharyngeal [[id number]] Collected: 11/03/24 1009    Order Status: Canceled Specimen: Nasopharyngeal Swab     Influenza A & B by Molecular [[id number]] Collected: 11/03/24 0607    Order Status: Completed Specimen: Nasopharyngeal Swab Updated: 11/03/24 0839     Influenza A, Molecular Negative     Influenza B, Molecular Negative     Flu A & B Source Nasal swab          All pertinent labs within the past 24 hours have been reviewed.    Significant Imaging: I have reviewed all pertinent imaging results/findings within the past 24 hours.

## 2024-11-06 NOTE — PLAN OF CARE
PATIENT FRIEND EXPRESSED CONCERN RE: PATIENT WANTING TO SIT IN B/S CHAIR X24 HOURS.  PATIENT WAS EDUCATED RE: RISK OF INCREASED BLE EDEMA AND RISK OF PRESSURE AREAS WITH SITTING IN ONE SPOT TOO LONG.  PATIENT AND FRIEND ACKNOWLEDGED UNDERSTANDING OF EDUCATION PROVIDED.

## 2024-11-06 NOTE — ASSESSMENT & PLAN NOTE
--All cases of bacteremia pose risk of life threatening sepsis and metastatic infection  --GN bacteremia not always with obvious source   --Unclear in this case  --CT a/p shows thickening and enlargement of the left krystal abdominal wall. Possible infected hematoma.   --Possible early pneumonia as well   --Would be cautious and treat with longer course of antibiotics  --Will switch pip/tazo to ceftriaxone 2 g daily as E coli isolate is pan susceptible   --Repeat blood cx for clearance   --Anticipate 14 day IV abx course from negative blood cx  --Above d/w primary team.

## 2024-11-06 NOTE — PT/OT/SLP PROGRESS
Occupational Therapy  Treatment    Santiago Khan   MRN: [id number]   Admitting Diagnosis: Severe sepsis    OT Date of Treatment: 11/06/24   OT Start Time: 0955  OT Stop Time: 1018  OT Total Time (min): 23 min    Billable Minutes:  Therapeutic Activity 13 and Therapeutic Exercise 10    OT/TEODORO: OT          General Precautions: Standard, fall  Orthopedic Precautions: N/A  Braces: N/A  Respiratory Status: Room air         Subjective:  Communicated with NURSE prior to session.      Pain/Comfort  Pain Rating 1: 8/10  Location - Side 1: Bilateral  Location 1: leg  Pain Addressed 1: Reposition, Distraction, Cessation of Activity  Pain Rating Post-Intervention 1: 8/10    Objective:  Patient found with: peripheral IV, telemetry     Functional Mobility:      Transfers:  SBA WITH EOB > STAND WITH RW, SBA STAND PIVOT T/F EOB > B/S CHAIR WITH RW        Functional Ambulation: PATIENT AMBULATED APPROX 20 FT WITH RW    Activities of Daily Living:     LE adaptive equipment: PATIENT DECLINED PRACTICE OF FOOTWEAR MANAGEMENT TO DON/DOFF SOCKS WITH C/O UNABLE TO PERFORM TASK IN HOSPITAL WITH C/O SETUP DIFFERENT FROM HIS HOME SET UP.           Balance:   Static Sit: FAIR: Maintains without assist, but unable to take any challenges   Dynamic Sit: FAIR: Cannot move trunk without losing balance  Static Stand: FAIR: Maintains without assist but unable to take challenges  Dynamic stand: FAIR: Needs CONTACT GUARD during gait    Therapeutic Activities and Exercises:  PATIENT PRACTICED FUNC MOBILITY WITH FOCUS ON EOB > STAND > STAND PIVOT TO B/S CHAIR WITH RW.  PATIENT PERFORMED BUE AROM SEATED EOB WITH X20 REPS OF SHLD FLEX AND ELBOW FLEX/EXTEN WITH GOAL OF IMPROVED BALANCE AND IMPROVED ENDURANCE. PATIENT DECLINED PRACTICE OF FOOTWEAR MANAGEMENT TO DON/DOFF SOCKS WITH C/O UNABLE TO PERFORM TASK IN HOSPITAL WITH C/O SETUP DIFFERENT FROM HIS HOME SET UP.    AM-PAC 6 CLICK ADL   How much help from another person does this patient currently  "need?   1 = Unable, Total/Dependent Assistance  2 = A lot, Maximum/Moderate Assistance  3 = A little, Minimum/Contact Guard/Supervision  4 = None, Modified [place]/Independent    Putting on and taking off regular lower body clothing? : 2  Bathing (including washing, rinsing, drying)?: 2  Toileting, which includes using toilet, bedpan, or urinal? : 2  Putting on and taking off regular upper body clothing?: 3  Taking care of personal grooming such as brushing teeth?: 3  Eating meals?: 4  Daily Activity Total Score: 16     AM-PAC Raw Score CMS "G-Code Modifier Level of Impairment Assistance   6 % Total / Unable   7 - 8 CM 80 - 100% Maximal Assist   9-13 CL 60 - 80% Moderate Assist   14 - 19 CK 40 - 60% Moderate Assist   20 - 22 CJ 20 - 40% Minimal Assist   23 CI 1-20% SBA / CGA   24 CH 0% Independent/ Mod I       Patient left up in chair with all lines intact, call button in reach, chair alarm on, NURSE notified, and FRIEND present    ASSESSMENT:  Santiago Khan is a 74 y.o. male with a medical diagnosis of Severe sepsis and presents with SELF CARE DEBILITY .    Rehab identified problem list/impairments:  weakness, impaired endurance, impaired self care skills, impaired functional mobility, gait instability, impaired balance, decreased lower extremity function, decreased safety awareness, pain, decreased ROM, edema, impaired cardiopulmonary response to activity    Rehab potential is good.    Activity tolerance: Fair    Discharge recommendations: Moderate Intensity Therapy   Barriers to discharge: Barriers to Discharge: Decreased caregiver support    Equipment recommendations: to be determined by next level of care    GOALS:   Multidisciplinary Problems       Occupational Therapy Goals          Problem: Occupational Therapy    Goal Priority Disciplines Outcome Interventions   Occupational Therapy Goal     OT, PT/OT Progressing    Description: Goals to be met by: 11/18/24     Patient will increase " functional independence with ADLs by performing:    LE Dressing with Contact Guard Assistance.  Grooming while standing at sink with Set-up Assistance.  Toileting from toilet with Set-up Assistance for hygiene and clothing management.   Toilet transfer to toilet with Supervision with RW.  Upper extremity exercise program x15 reps per handout, with independence.                         Plan:  Patient to be seen 2 x/week to address the above listed problems via therapeutic activities, self-care/home management  Plan of Care expires: 11/18/24  Plan of Care reviewed with: patient         11/06/2024

## 2024-11-06 NOTE — PLAN OF CARE
Pt was sitting on the edge of the bed on PT arrival. Pt transferred sit to stand with SBA and ambulated with RW for 20 ft with SBA. Pt performed LE LAQ and ankle pumps 1x 10. Pt is easily SOB and that limited him as well as overall fatigue. Pt in chair with chair alarm on.

## 2024-11-06 NOTE — HPI
This is a 75 yo M with hx of HF, CKD, COPD, DM, HLD, HTN, and BARDLEY who presented to the ER for worsening bilateral lower extremity weakness, acute on chronic lumbar pain, and abdominal pain for about four days PTA. Following admission patient became progressively lethargic and hypercapnic on the floor requiring continuous bipap, and hypoglycemic requiring continuous D10 drip. Transferred to ICU. Sent back to floor on 11/5. Febrile to 100.6 F on admission with leukocytosis to almost 20K. Blood cx growing E coli. Has been on empiric Zosyn. CT a/p reports thickening and enlargement of the left krystal abdominal wall. Correlate clinically for rectus sheath hematoma. Mild bibasilar opacities right greater than left may relate to early pneumonia. Trace right-sided pleural effusion. MRI lumbar spine shows DDD. No evidence of infection. ID consulted for E coli bacteremia.

## 2024-11-06 NOTE — PT/OT/SLP PROGRESS
Physical Therapy Treatment    Patient Name:  Santiago Khan   MRN:  [id number]    Recommendations:     Discharge Recommendations: Moderate Intensity Therapy  Discharge Equipment Recommendations: to be determined by next level of care  Barriers to discharge: None    Assessment:     Santiago Khan is a 74 y.o. male admitted with a medical diagnosis of Severe sepsis.  He presents with the following impairments/functional limitations: weakness, impaired balance, decreased safety awareness, decreased ROM, impaired endurance, impaired functional mobility, decreased lower extremity function, pain, gait instability .    Rehab Prognosis: Good; patient would benefit from acute skilled PT services to address these deficits and reach maximum level of function.    Recent Surgery: * No surgery found *      Plan:     During this hospitalization, patient to be seen 3 x/week to address the identified rehab impairments via gait training, therapeutic activities, therapeutic exercises, neuromuscular re-education and progress toward the following goals:    Plan of Care Expires:  11/18/24    Subjective     Chief Complaint: weak  Patient/Family Comments/goals: wants to get stronger so he can be more able to get around  Pain/Comfort:  Pain Rating 1: 8/10 (LE's)  Pain Addressed 1: Cessation of Activity, Reposition  Pain Rating Post-Intervention 1: 8/10      Objective:     Communicated with nurse Geovani prior to session.  Patient found sitting edge of bed with peripheral IV, telemetry upon PT entry to room.     General Precautions: Standard, fall  Orthopedic Precautions: N/A  Braces: N/A  Respiratory Status: Room air     Functional Mobility:  Transfers:     Sit to Stand:  stand by assistance with rolling walker  Bed to Chair: stand by assistance with  rolling walker  using  Step Transfer  Gait: pt ambulated with RW for 20 ft with SBA for safety      AM-PAC 6 CLICK MOBILITY  Turning over in bed (including adjusting bedclothes,  sheets and blankets)?:  (pt seated at EOB on PT arrival so not performed today)  Sitting down on and standing up from a chair with arms (e.g., wheelchair, bedside commode, etc.): 3  Moving from lying on back to sitting on the side of the bed?:  (not assessed today)  Moving to and from a bed to a chair (including a wheelchair)?: 3  Need to walk in hospital room?: 3  Climbing 3-5 steps with a railing?: 1       Treatment & Education:       Pt was sitting on the edge of the bed on PT arrival. Pt transferred sit to stand with SBA and ambulated with RW for 20 ft with SBA. Pt performed LE LAQ and ankle pumps 1x 10. Pt is easily SOB and that limited him as well as overall fatigue. Pt in chair with chair alarm on.           Patient left up in chair with all lines intact, call button in reach, chair alarm on, nurse notified, and friend present..    GOALS:   Multidisciplinary Problems       Physical Therapy Goals          Problem: Physical Therapy    Goal Priority Disciplines Outcome Interventions   Physical Therapy Goal     PT, PT/OT Progressing    Description: LTG'S TO BE MET IN 14 DAYS (11-18-24)  PT WILL REQUIRE SBA FOR BED MOBILITY  PT WILL REQUIRE SBA FOR BED<>CHAIR TF'S  PT WILL  FEET WITH RW AND CGA  PT WILL INC AMPAC SCORE BY 2 POINTS TO PROGRESS GROSS FUNC MOBILITY                         Time Tracking:     PT Received On: 11/06/24  PT Start Time: 0934     PT Stop Time: 0953  PT Total Time (min): 19 min     Billable Minutes: Therapeutic Activity 19    Treatment Type: Treatment  PT/PTA: PT     Number of PTA visits since last PT visit: 0     11/06/2024

## 2024-11-06 NOTE — PLAN OF CARE
SW meet with patient and family at bedside to discuss SNF placement. SW indicated which facilities were accepting Patient on the handout, list from yesterday. SW stated she would give Patient and family time to review and return for a decision for SNF placement.     Patient's PASRR called in and completed, pending 142.     12 08 SW meet with patient and daughter at bedside, regarding SNF placement. Patient and daughter were agreeable to [facility] for placement. SW reached out to Caleb, their liaison to see if they can submit for authorization.     12 33 Per Caleb, with [facility], they cannot give patient his Humira while at SNF and they would needed final recommendations if Patient is going to be on IV abx while at SNF. SW sent secure chat to Attending inquiring about medications, pending response. Per Caleb, she cannot submit for insurance authorization until the medications are rectified.     15 00 Per Dr. Swanson, Patient will need a 14 day course of IV abx and pending cultures for final recommendations. Per Caleb with [facility], they would have to know final recommendations prior to officially accepting and submitting for authorization. VY stated as soon as they get final recs she will let them know.     SW will continue to follow and assist as needed.

## 2024-11-07 ENCOUNTER — TELEPHONE (OUTPATIENT)
Dept: INFECTIOUS DISEASES | Facility: CLINIC | Age: 74
End: 2024-11-07
Payer: MEDICARE

## 2024-11-07 PROBLEM — G93.40 ACUTE ENCEPHALOPATHY: Status: RESOLVED | Noted: 2024-11-03 | Resolved: 2024-11-07

## 2024-11-07 PROBLEM — E16.2 HYPOGLYCEMIA: Status: RESOLVED | Noted: 2024-11-03 | Resolved: 2024-11-07

## 2024-11-07 LAB
ALBUMIN SERPL BCP-MCNC: 2.6 G/DL (ref 3.5–5.2)
ALP SERPL-CCNC: 437 U/L (ref 40–150)
ALT SERPL W/O P-5'-P-CCNC: 40 U/L (ref 10–44)
ANION GAP SERPL CALC-SCNC: 10 MMOL/L (ref 8–16)
AST SERPL-CCNC: 32 U/L (ref 10–40)
BASOPHILS NFR BLD: 0 % (ref 0–1.9)
BILIRUB SERPL-MCNC: 0.8 MG/DL (ref 0.1–1)
BUN SERPL-MCNC: 36 MG/DL (ref 8–23)
CALCIUM SERPL-MCNC: 9.2 MG/DL (ref 8.7–10.5)
CHLORIDE SERPL-SCNC: 101 MMOL/L (ref 95–110)
CO2 SERPL-SCNC: 26 MMOL/L (ref 23–29)
CREAT SERPL-MCNC: 1.5 MG/DL (ref 0.5–1.4)
DIFFERENTIAL METHOD BLD: ABNORMAL
EOSINOPHIL NFR BLD: 1 % (ref 0–8)
ERYTHROCYTE [DISTWIDTH] IN BLOOD BY AUTOMATED COUNT: 17.9 % (ref 11.5–14.5)
EST. GFR  (NO RACE VARIABLE): 49 ML/MIN/1.73 M^2
GLUCOSE SERPL-MCNC: 95 MG/DL (ref 70–110)
HCT VFR BLD AUTO: 42.6 % (ref 40–54)
HGB BLD-MCNC: 13.4 G/DL (ref 14–18)
IMM GRANULOCYTES # BLD AUTO: ABNORMAL K/UL (ref 0–0.04)
IMM GRANULOCYTES NFR BLD AUTO: ABNORMAL % (ref 0–0.5)
LYMPHOCYTES NFR BLD: 11 % (ref 18–48)
MAGNESIUM SERPL-MCNC: 2.2 MG/DL (ref 1.6–2.6)
MCH RBC QN AUTO: 28.9 PG (ref 27–31)
MCHC RBC AUTO-ENTMCNC: 31.5 G/DL (ref 32–36)
MCV RBC AUTO: 92 FL (ref 82–98)
MONOCYTES NFR BLD: 5 % (ref 4–15)
MYELOCYTES NFR BLD MANUAL: 4 %
NEUTROPHILS NFR BLD: 74 % (ref 38–73)
NEUTS BAND NFR BLD MANUAL: 5 %
NRBC BLD-RTO: 0 /100 WBC
PHOSPHATE SERPL-MCNC: 3.5 MG/DL (ref 2.7–4.5)
PLATELET # BLD AUTO: 289 K/UL (ref 150–450)
PLATELET BLD QL SMEAR: ABNORMAL
PMV BLD AUTO: 8.9 FL (ref 9.2–12.9)
POCT GLUCOSE: 101 MG/DL (ref 70–110)
POCT GLUCOSE: 124 MG/DL (ref 70–110)
POCT GLUCOSE: 126 MG/DL (ref 70–110)
POCT GLUCOSE: 140 MG/DL (ref 70–110)
POTASSIUM SERPL-SCNC: 3.7 MMOL/L (ref 3.5–5.1)
PROT SERPL-MCNC: 7.4 G/DL (ref 6–8.4)
RBC # BLD AUTO: 4.64 M/UL (ref 4.6–6.2)
SMUDGE CELLS BLD QL SMEAR: PRESENT
SODIUM SERPL-SCNC: 137 MMOL/L (ref 136–145)
WBC # BLD AUTO: 18.07 K/UL (ref 3.9–12.7)

## 2024-11-07 PROCEDURE — 25000003 PHARM REV CODE 250: Performed by: NURSE PRACTITIONER

## 2024-11-07 PROCEDURE — 80053 COMPREHEN METABOLIC PANEL: CPT | Performed by: NURSE PRACTITIONER

## 2024-11-07 PROCEDURE — 85027 COMPLETE CBC AUTOMATED: CPT | Performed by: NURSE PRACTITIONER

## 2024-11-07 PROCEDURE — 84100 ASSAY OF PHOSPHORUS: CPT | Performed by: NURSE PRACTITIONER

## 2024-11-07 PROCEDURE — 25000003 PHARM REV CODE 250: Performed by: EMERGENCY MEDICINE

## 2024-11-07 PROCEDURE — 97530 THERAPEUTIC ACTIVITIES: CPT | Mod: CQ

## 2024-11-07 PROCEDURE — 83735 ASSAY OF MAGNESIUM: CPT | Performed by: NURSE PRACTITIONER

## 2024-11-07 PROCEDURE — 99900035 HC TECH TIME PER 15 MIN (STAT)

## 2024-11-07 PROCEDURE — 97110 THERAPEUTIC EXERCISES: CPT

## 2024-11-07 PROCEDURE — 27000190 HC CPAP FULL FACE MASK W/VALVE

## 2024-11-07 PROCEDURE — 85007 BL SMEAR W/DIFF WBC COUNT: CPT | Performed by: NURSE PRACTITIONER

## 2024-11-07 PROCEDURE — 97116 GAIT TRAINING THERAPY: CPT | Mod: CQ

## 2024-11-07 PROCEDURE — 63600175 PHARM REV CODE 636 W HCPCS: Performed by: NURSE PRACTITIONER

## 2024-11-07 PROCEDURE — 97530 THERAPEUTIC ACTIVITIES: CPT

## 2024-11-07 PROCEDURE — 36415 COLL VENOUS BLD VENIPUNCTURE: CPT | Performed by: NURSE PRACTITIONER

## 2024-11-07 PROCEDURE — 21400001 HC TELEMETRY ROOM

## 2024-11-07 PROCEDURE — 27100171 HC OXYGEN HIGH FLOW UP TO 24 HOURS

## 2024-11-07 PROCEDURE — 63600175 PHARM REV CODE 636 W HCPCS: Performed by: STUDENT IN AN ORGANIZED HEALTH CARE EDUCATION/TRAINING PROGRAM

## 2024-11-07 PROCEDURE — 99233 SBSQ HOSP IP/OBS HIGH 50: CPT | Mod: GT,,, | Performed by: STUDENT IN AN ORGANIZED HEALTH CARE EDUCATION/TRAINING PROGRAM

## 2024-11-07 RX ADMIN — LATANOPROST 1 DROP: 50 SOLUTION OPHTHALMIC at 09:11

## 2024-11-07 RX ADMIN — THERA TABS 1 TABLET: TAB at 10:11

## 2024-11-07 RX ADMIN — GUAIFENESIN 600 MG: 600 TABLET, EXTENDED RELEASE ORAL at 10:11

## 2024-11-07 RX ADMIN — MUPIROCIN: 20 OINTMENT TOPICAL at 09:11

## 2024-11-07 RX ADMIN — Medication 100 MG: at 09:11

## 2024-11-07 RX ADMIN — Medication 1 TABLET: at 10:11

## 2024-11-07 RX ADMIN — CEFTRIAXONE 2 G: 2 INJECTION, POWDER, FOR SOLUTION INTRAMUSCULAR; INTRAVENOUS at 05:11

## 2024-11-07 RX ADMIN — CHOLECALCIFEROL TAB 125 MCG (5000 UNIT) 5000 UNITS: 125 TAB at 10:11

## 2024-11-07 RX ADMIN — MUPIROCIN: 20 OINTMENT TOPICAL at 10:11

## 2024-11-07 RX ADMIN — GUAIFENESIN 600 MG: 600 TABLET, EXTENDED RELEASE ORAL at 09:11

## 2024-11-07 RX ADMIN — ENOXAPARIN SODIUM 40 MG: 40 INJECTION SUBCUTANEOUS at 05:11

## 2024-11-07 NOTE — SUBJECTIVE & OBJECTIVE
Review of Systems   All other systems reviewed and are negative.    Objective:     Vital Signs (Most Recent):  Temp: 98.4 °F (36.9 °C) (11/07/24 1530)  Pulse: 98 (11/07/24 1530)  Resp: 19 (11/07/24 1530)  BP: 120/67 (11/07/24 1530)  SpO2: (!) 92 % (11/07/24 1530) Vital Signs (24h Range):  Temp:  [97.5 °F (36.4 °C)-98.4 °F (36.9 °C)] 98.4 °F (36.9 °C)  Pulse:  [] 98  Resp:  [16-32] 19  SpO2:  [92 %-97 %] 92 %  BP: (120-142)/(67-79) 120/67     Weight: 96.3 kg (212 lb 4.9 oz)  Body mass index is 33.25 kg/m².    Intake/Output Summary (Last 24 hours) at 11/7/2024 1715  Last data filed at 11/7/2024 0534  Gross per 24 hour   Intake 590 ml   Output 175 ml   Net 415 ml         Physical Exam  Constitutional:       General: He is not in acute distress.     Appearance: Normal appearance. He is ill-appearing.   Cardiovascular:      Rate and Rhythm: Normal rate and regular rhythm.      Heart sounds: No murmur heard.  Pulmonary:      Effort: Pulmonary effort is normal. No respiratory distress.      Breath sounds: Normal breath sounds. No wheezing.   Abdominal:      General: There is no distension.      Palpations: Abdomen is soft.      Tenderness: There is no abdominal tenderness.   Skin:     Coloration: Skin is pale.   Neurological:      Mental Status: He is alert.             Significant Labs: All pertinent labs within the past 24 hours have been reviewed.  Recent Lab Results         11/07/24  1113   11/07/24  0553   11/07/24  0536   11/06/24  2216        Albumin   2.6           ALP   437           ALT   40           Anion Gap   10           AST   32           Bands   5.0           Basophil %   0.0           BILIRUBIN TOTAL   0.8  Comment: For infants and newborns, interpretation of results should be based  on gestational age, weight and in agreement with clinical  observations.    Premature Infant recommended reference ranges:  Up to 24 hours.............<8.0 mg/dL  Up to 48 hours............<12.0 mg/dL  3-5  days..................<15.0 mg/dL  6-29 days.................<15.0 mg/dL             BUN   36           Calcium   9.2           Chloride   101           CO2   26           Creatinine   1.5           Differential Method   Manual           eGFR   49           Eos %   1.0           Glucose   95           Gran %   74.0           Hematocrit   42.6           Hemoglobin   13.4           Immature Grans (Abs)   CANCELED  Comment: Mild elevation in immature granulocytes is non specific and   can be seen in a variety of conditions including stress response,   acute inflammation, trauma and pregnancy. Correlation with other   laboratory and clinical findings is essential.    Result canceled by the ancillary.             Immature Granulocytes   CANCELED  Comment: Result canceled by the ancillary.           Lymph %   11.0           Magnesium    2.2           MCH   28.9           MCHC   31.5           MCV   92           Mono %   5.0           MPV   8.9           Myelocytes   4.0           nRBC   0           Phosphorus Level   3.5           Platelet Estimate   Appears normal           Platelet Count   289           POCT Glucose 124     101   110       Potassium   3.7           PROTEIN TOTAL   7.4           RBC   4.64           RDW   17.9           Smudge Cells   Present  Comment: Smudge cells present;Substantial numbers may affect the   accuracy of the differential.             Sodium   137           WBC   18.07                   Significant Imaging: I have reviewed all pertinent imaging results/findings within the past 24 hours.    CT Abdomen Pelvis  Without Contrast   Final Result      Thickening and enlargement of the left krystal abdominal wall.  Correlate clinically for rectus sheath hematoma.      Mild bibasilar opacities right greater than left may relate to early pneumonia. Trace right-sided pleural effusion      Mild motion artifacts.      All CT scans   are performed using dose optimization techniques including the  following: automated exposure control; adjustment of the mA and/or kV; use of iterative reconstruction technique.  Dose modulation was employed for ALARA by means of: Automated exposure control; adjustment of the mA and/or kV according to patient size (this includes techniques or standardized protocols for targeted exams where dose is matched to indication/reason for exam; i.e. extremities or head); and/or use of iterative reconstructive technique.         Electronically signed by: Manjeet Cadena   Date:    11/04/2024   Time:    19:33      X-Ray Abdomen AP 1 View   Final Result      Unremarkable study         Electronically signed by: Suresh Kennedy MD   Date:    11/03/2024   Time:    08:41      MRI Lumbar Spine Without Contrast   Final Result      Multilevel degenerative disc changes.  Moderate to severe foraminal stenosis at L4-5 and L5-S1 bilaterally         Electronically signed by: Suresh Kennedy MD   Date:    11/03/2024   Time:    09:46      X-Ray Chest AP Portable   Final Result      1.  Low lung volumes with vascular crowding or atelectasis in the lung bases.  Cardiac silhouette size enlargement.  Mild interstitial pulmonary edema or interstitial infectious process difficult to exclude in the right clinical setting.      2.  Stable findings as noted above.         Electronically signed by: Justice Garcia MD   Date:    11/02/2024   Time:    18:13      Radiology   Final Result

## 2024-11-07 NOTE — PLAN OF CARE
A224/A224 BRUNO Khan is a 74 y.o.male admitted on 11/2/2024 for Severe sepsis   Code Status: Full Code MRN: [id number]   Review of patient's allergies indicates:   Allergen Reactions    Amitriptyline Rash    Celecoxib Rash     Past Medical History:   Diagnosis Date    Chronic combined systolic and diastolic congestive heart failure 07/09/2020    Chronic kidney disease, stage 3     Chronic obstructive pulmonary disease 03/20/2023    Wixela 250 mcg, Spiriva respimat. Continue Albuterol inhaler and albuterol nebs   Referral pul dis management, education and assistance with medication  Patient preference to only see a doctor, request follow up with Dr. Springer             Chronic venous insufficiency     DDD (degenerative disc disease), lumbar     DM (diabetes mellitus) with complications     History of gout     Hyperlipidemia associated with type 2 diabetes mellitus     Hypertension associated with stage 3 chronic kidney disease due to type 2 diabetes mellitus     BRADLEY on CPAP     Prostate cancer     prostatectomy in 2010, rising PSA that started in 2021      PRN meds    acetaminophen, 650 mg, Q6H PRN  acetaminophen, 650 mg, Q6H PRN  albuterol-ipratropium, 3 mL, Q4H PRN  aluminum-magnesium hydroxide-simethicone, 30 mL, QID PRN  benzonatate, 100 mg, TID PRN  dextrose 10%, 12.5 g, PRN  dextrose 10%, 12.5 g, PRN  dextrose 10%, 12.5 g, PRN  dextrose 10%, 25 g, PRN  dextrose 10%, 25 g, PRN  dextrose 10%, 25 g, PRN  glucagon (human recombinant), 1 mg, PRN  glucose, 16 g, PRN  glucose, 24 g, PRN  HYDROcodone-acetaminophen, 1 tablet, Q4H PRN  naloxone, 0.02 mg, PRN  ondansetron, 4 mg, Q8H PRN  polyethylene glycol, 17 g, Daily PRN  promethazine, 25 mg, Q6H PRN  simethicone, 1 tablet, QID PRN  sodium chloride 0.9%, 3 mL, Q12H PRN      Chart check completed. Will continue plan of care.      Orientation: oriented x 4  Adam Coma Scale Score: 15     Lead Monitored: Lead II Rhythm: normal sinus rhythm Frequency/Ectopy:  PACs  Cardiac/Telemetry Box Number: [id number]  VTE Core Measure: Pharmacological prophylaxis initiated/maintained Last Bowel Movement: 11/06/24  Diet diabetic 1500 Calories (up to 45 gm per meal)  Voiding Characteristics: urethral catheter (bladder)  Олег Score: 17  Fall Risk Score: 17  Accucheck [x]   Freq?      Lines/Drains/Airways       Peripheral Intravenous Line  Duration                  Peripheral IV - Single Lumen 11/03/24 0820 20 G Anterior;Distal;Right Forearm 3 days         Peripheral IV - Single Lumen 11/03/24 0856 18 G No Right Antecubital 3 days

## 2024-11-07 NOTE — ASSESSMENT & PLAN NOTE
Chronic, controlled.  Latest blood pressure and vitals reviewed-   Temp:  [97.5 °F (36.4 °C)-98.4 °F (36.9 °C)]   Pulse:  []   Resp:  [16-32]   BP: (120-142)/(67-79)   SpO2:  [92 %-97 %] .   Home meds for hypertension were reviewed and noted below.   Hypertension Medications               bisoprolol (ZEBETA) 5 MG tablet TAKE 1/2 TABLET BY MOUTH EVERY DAY    torsemide (DEMADEX) 20 MG Tab Take 2 tablets (40 mg total) by mouth once daily.     While in the hospital, will manage blood pressure as follows; Continue home antihypertensive regimen    Will utilize p.r.n. blood pressure medication only if patient's blood pressure greater than  180/110 and he develops symptoms such as worsening chest pain or shortness of breath.

## 2024-11-07 NOTE — PT/OT/SLP PROGRESS
Occupational Therapy   Treatment    Name: Santaigo Khan  MRN: [id number]  Admitting Diagnosis:  Severe sepsis       Recommendations:     Discharge Recommendations: Moderate Intensity Therapy  Discharge Equipment Recommendations:  to be determined by next level of care  Barriers to discharge:  Decreased caregiver support    Assessment:     Santiago Khan is a 74 y.o. male with a medical diagnosis of Severe sepsis.  Performance deficits affecting function are weakness, gait instability, decreased upper extremity function, decreased lower extremity function, impaired balance, impaired endurance, pain, impaired self care skills, impaired functional mobility, decreased safety awareness.     Rehab Prognosis:  Good; patient would benefit from acute skilled OT services to address these deficits and reach maximum level of function.       Plan:     Patient to be seen 2 x/week to address the above listed problems via self-care/home management, therapeutic activities, therapeutic exercises  Plan of Care Expires: 11/18/24  Plan of Care Reviewed with: patient    Subjective     Chief Complaint: LBP with increased ambulation  Patient/Family Comments/goals: Increase independence  Pain/Comfort:  Pain Rating 1: 0/10  Pain Rating Post-Intervention 1: 0/10    Objective:     Communicated with: Nurse prior to session.  Patient found up in chair with telemetry, peripheral IV upon OT entry to room.    General Precautions: Standard, fall    Orthopedic Precautions:N/A  Braces: N/A  Respiratory Status: Room air     Occupational Performance:     Functional Mobility/Transfers:  Patient completed Sit <> Stand Transfer with contact guard assistance  with  rolling walker   Functional Mobility: Ambulated with RW SBA 2x40 feet with slow pace    [facility] 6 Click ADL: 17    Treatment & Education:  Pt stood from bedside chair with RW CGA. Ambulated 2x40 feet with RW SBA with slow pace and reports of increase low back pain.   Pt transferred to  bedside chair with RW SBA and completed seated BUE AROM exercises x10 reps in all planes to increase BUE functional strength needed for daily activities.   Pt encouraged to perform these exercises throughout the day to further improve functional strength. Pt also encouraged to continue with ambulation with staff members throughout the day to further improve functional endurance. Pt verbalized understanding.    Patient left up in chair with all lines intact, call button in reach, chair alarm on, and family member present    GOALS:   Multidisciplinary Problems       Occupational Therapy Goals          Problem: Occupational Therapy    Goal Priority Disciplines Outcome Interventions   Occupational Therapy Goal     OT, PT/OT Progressing    Description: Goals to be met by: 11/18/24     Patient will increase functional independence with ADLs by performing:    LE Dressing with Contact Guard Assistance.  Grooming while standing at sink with Set-up Assistance.  Toileting from toilet with Set-up Assistance for hygiene and clothing management.   Toilet transfer to toilet with Supervision with RW.  Upper extremity exercise program x15 reps per handout, with independence.                         Time Tracking:     OT Date of Treatment: 11/07/24  OT Start Time: 1015  OT Stop Time: 1040  OT Total Time (min): 25 min    Billable Minutes:Therapeutic Activity 15  Therapeutic Exercise 10    LIBBY Argueta  OT/TEODORO: OT          11/7/2024

## 2024-11-07 NOTE — PLAN OF CARE
Found seated up in bedside chair at start of therapy session.   Sit to stand with RW CGA  Ambulated with RW SBA 2x40 feet with slow pace  Transfer to bedside chair with RW SBA  BUE exercises x10 reps    Recommend moderate intensity therapy at DC

## 2024-11-07 NOTE — ASSESSMENT & PLAN NOTE
"This patient does have evidence of infective focus  My overall impression is sepsis.  Source: Skin and Soft Tissue (location cellulitis LLE)  Antibiotics given-   Antibiotics (72h ago, onward)      Start     Stop Route Frequency Ordered    11/06/24 1700  cefTRIAXone injection 2 g         -- IV Every 24 hours (non-standard times) 11/06/24 1036    11/04/24 0900  mupirocin 2 % ointment         11/09/24 0859 Nasl 2 times daily 11/04/24 0523          Latest lactate reviewed-  No results for input(s): "LACTATE", "POCLAC" in the last 72 hours.    Organ dysfunction indicated by Acute kidney injury, Acute respiratory failure, and Encephalopathy    Fluid challenge Other- Patient to receive D10 volume other than 30cc/kg due to Congestive Heart Failure     Post- resuscitation assessment No - Post resuscitation assessment not needed       Will Not start Pressors- Levophed for MAP of 65  Source control achieved by: IV Abx    On ceftriaxone  PRESTON 11/20/24  ID following  "

## 2024-11-07 NOTE — ASSESSMENT & PLAN NOTE
ROSARIO is likely due to pre-renal azotemia due to intravascular volume depletion. Baseline creatinine is  1.5-1.7 . Most recent creatinine and eGFR are listed below.  Recent Labs     11/05/24  0514 11/06/24  0519 11/07/24  0553   CREATININE 1.9*  1.9* 1.9* 1.5*   EGFRNORACEVR 37*  37* 37* 49*     Plan  -ROSARIO is  currently undergoing medical managment  -Avoid nephrotoxins and renally dose meds for GFR listed above  -Monitor urine output, serial BMP, and adjust therapy as needed  -Continue home medications and treatment of CHF  -Improving

## 2024-11-07 NOTE — PLAN OF CARE
Problem: Adult Inpatient Plan of Care  Goal: Plan of Care Review  Outcome: Progressing  Goal: Patient-Specific Goal (Individualized)  Outcome: Progressing  Goal: Absence of Hospital-Acquired Illness or Injury  Outcome: Progressing  Goal: Optimal Comfort and Wellbeing  Outcome: Progressing  Goal: Readiness for Transition of Care  Outcome: Progressing     Problem: Diabetes Comorbidity  Goal: Blood Glucose Level Within Targeted Range  Outcome: Progressing     Problem: Acute Kidney Injury/Impairment  Goal: Fluid and Electrolyte Balance  Outcome: Progressing  Goal: Improved Oral Intake  Outcome: Progressing  Goal: Effective Renal Function  Outcome: Progressing     Problem: Skin Injury Risk Increased  Goal: Skin Health and Integrity  Outcome: Progressing     Problem: Infection  Goal: Absence of Infection Signs and Symptoms  Outcome: Progressing     Problem: Sepsis/Septic Shock  Goal: Optimal Coping  Outcome: Progressing  Goal: Absence of Bleeding  Outcome: Progressing  Goal: Blood Glucose Level Within Targeted Range  Outcome: Progressing  Goal: Absence of Infection Signs and Symptoms  Outcome: Progressing  Goal: Optimal Nutrition Intake  Outcome: Progressing

## 2024-11-07 NOTE — SUBJECTIVE & OBJECTIVE
Review of Systems   All other systems reviewed and are negative.    Objective:     Vital Signs (Most Recent):  Temp: 98.1 °F (36.7 °C) (11/06/24 1602)  Pulse: 91 (11/06/24 1602)  Resp: 19 (11/06/24 1602)  BP: 129/87 (11/06/24 1602)  SpO2: (!) 92 % (11/06/24 1602) Vital Signs (24h Range):  Temp:  [97.8 °F (36.6 °C)-98.2 °F (36.8 °C)] 98.1 °F (36.7 °C)  Pulse:  [] 91  Resp:  [15-25] 19  SpO2:  [91 %-98 %] 92 %  BP: (117-131)/(72-87) 129/87     Weight: 96.3 kg (212 lb 4.9 oz)  Body mass index is 33.25 kg/m².    Intake/Output Summary (Last 24 hours) at 11/6/2024 1811  Last data filed at 11/6/2024 0900  Gross per 24 hour   Intake 240 ml   Output 325 ml   Net -85 ml         Physical Exam  Constitutional:       General: He is not in acute distress.     Appearance: Normal appearance. He is ill-appearing.   Cardiovascular:      Rate and Rhythm: Normal rate and regular rhythm.      Heart sounds: No murmur heard.  Pulmonary:      Effort: Pulmonary effort is normal. No respiratory distress.      Breath sounds: Normal breath sounds. No wheezing.   Abdominal:      General: There is no distension.      Palpations: Abdomen is soft.      Tenderness: There is no abdominal tenderness.   Skin:     Coloration: Skin is pale.   Neurological:      Mental Status: He is alert.             Significant Labs: All pertinent labs within the past 24 hours have been reviewed.  Recent Lab Results  (Last 5 results in the past 24 hours)        11/06/24  1711   11/06/24  1152   11/06/24  0612   11/06/24  0519   11/05/24 2033        Albumin       2.5         ALP       397         ALT       33         Anion Gap       10         AST       22         Bands       5.0         Baso #       Test Not Performed  Comment: CORRECTED RESULT; previously reported as 0.16 on 11/06/2024 at 06:50.  [C]         Basophil %       0.0  Comment: CORRECTED RESULT; previously reported as 0.8 on 11/06/2024 at 06:50.  [C]         BILIRUBIN TOTAL       1.3  Comment: For  infants and newborns, interpretation of results should be based  on gestational age, weight and in agreement with clinical  observations.    Premature Infant recommended reference ranges:  Up to 24 hours.............<8.0 mg/dL  Up to 48 hours............<12.0 mg/dL  3-5 days..................<15.0 mg/dL  6-29 days.................<15.0 mg/dL           BUN       44         Calcium       9.2         Chloride       97         CO2       28         Creatinine       1.9         Differential Method       Manual         eGFR       37         Eos #       Test Not Performed  Comment: CORRECTED RESULT; previously reported as 0.3 on 11/06/2024 at 06:50.  [C]         Eos %       1.0  Comment: CORRECTED RESULT; previously reported as 1.7 on 11/06/2024 at 06:50.  [C]         Glucose       101         Gran # (ANC)       Test Not Performed  Comment: CORRECTED RESULT; previously reported as 16.0 on 11/06/2024 at 06:50.  [C]         Gran %       78.0  Comment: CORRECTED RESULT; previously reported as 82.2 on 11/06/2024 at 06:50.  [C]         Hematocrit       37.9         Hemoglobin       12.4         Immature Grans (Abs)       Test Not Performed  Comment: Mild elevation in immature granulocytes is non specific and   can be seen in a variety of conditions including stress response,   acute inflammation, trauma and pregnancy. Correlation with other   laboratory and clinical findings is essential.  CORRECTED RESULT; previously reported as 0.97 on 11/06/2024 at 06:50.    [C]         Immature Granulocytes       Test Not Performed  Comment: CORRECTED RESULT; previously reported as 5.0 on 11/06/2024 at 06:50.  [C]         Lymph #       Test Not Performed  Comment: CORRECTED RESULT; previously reported as 1.3 on 11/06/2024 at 06:50.  [C]         Lymph %       10.0  Comment: CORRECTED RESULT; previously reported as 6.5 on 11/06/2024 at 06:50.  [C]         Magnesium        2.2         MCH       29.3         MCHC       32.7         MCV        90         Metamyelocytes       1.0         Mono #       Test Not Performed  Comment: CORRECTED RESULT; previously reported as 0.7 on 11/06/2024 at 06:50.  [C]         Mono %       4.0  Comment: CORRECTED RESULT; previously reported as 3.8 on 11/06/2024 at 06:50.  [C]         MPV       9.3         Myelocytes       1.0         nRBC       0         Ovalocytes       Occasional         Phosphorus Level       4.0         Platelet Estimate       Appears normal         Platelet Count       296         POCT Glucose 104   117   109     128       Potassium       3.7         PROTEIN TOTAL       7.5         RBC       4.23         RDW       18.0         Sodium       135         WBC       19.43                                 [C] - Corrected Result               Significant Imaging: I have reviewed all pertinent imaging results/findings within the past 24 hours.    CT Abdomen Pelvis  Without Contrast   Final Result      Thickening and enlargement of the left krystal abdominal wall.  Correlate clinically for rectus sheath hematoma.      Mild bibasilar opacities right greater than left may relate to early pneumonia. Trace right-sided pleural effusion      Mild motion artifacts.      All CT scans   are performed using dose optimization techniques including the following: automated exposure control; adjustment of the mA and/or kV; use of iterative reconstruction technique.  Dose modulation was employed for ALARA by means of: Automated exposure control; adjustment of the mA and/or kV according to patient size (this includes techniques or standardized protocols for targeted exams where dose is matched to indication/reason for exam; i.e. extremities or head); and/or use of iterative reconstructive technique.         Electronically signed by: Manjeet Cadena   Date:    11/04/2024   Time:    19:33      X-Ray Abdomen AP 1 View   Final Result      Unremarkable study         Electronically signed by: Suresh Kennedy MD   Date:    11/03/2024    Time:    08:41      MRI Lumbar Spine Without Contrast   Final Result      Multilevel degenerative disc changes.  Moderate to severe foraminal stenosis at L4-5 and L5-S1 bilaterally         Electronically signed by: Suresh Kennedy MD   Date:    11/03/2024   Time:    09:46      X-Ray Chest AP Portable   Final Result      1.  Low lung volumes with vascular crowding or atelectasis in the lung bases.  Cardiac silhouette size enlargement.  Mild interstitial pulmonary edema or interstitial infectious process difficult to exclude in the right clinical setting.      2.  Stable findings as noted above.         Electronically signed by: Justice Garcia MD   Date:    11/02/2024   Time:    18:13      Radiology   Final Result

## 2024-11-07 NOTE — PROGRESS NOTES
[facility] Tele[facility] ([facility])  Infectious Disease  Progress Note    Patient Name: Santiago Khan  MRN: [id number]  Admission Date: 11/2/2024  Length of Stay: 4 days  Attending Physician: Jeffy Velazquez MD  Primary Care Provider: Kashif Acosta MD    Isolation Status: No active isolations  Assessment/Plan:      Cardiac/Vascular  Hyperlipidemia associated with type 2 diabetes mellitus  Continue current medications per primary      Hypertension associated with stage 3 chronic kidney disease due to type 2 diabetes mellitus  Continue current medications per primary      ID  E coli bacteremia  --All cases of bacteremia pose risk of life threatening sepsis and metastatic infection  --GN bacteremia not always with obvious source   --Unclear in this case  --CT a/p shows thickening and enlargement of the left krystal abdominal wall. Possible infected hematoma.   --Possible early pneumonia as well   --Would be cautious and treat with longer course of antibiotics  --Continue IV ceftriaxone 2 g daily as E coli isolate is pan susceptible   --Requires drug toxicity monitoring   --Follow repeat blood cx for clearance; NGTD    --Will plan for 14 day IV abx course from negative blood cx  --Above d/w primary team.      Outpatient Antibiotic Therapy Plan:    1) Infection: E coli bacteremia     2) Discharge Antibiotics:    Intravenous antibiotics:  IV ceftriaxone 2 g daily     3) Therapy Duration:  14 days    Estimated end date of IV antibiotics: 11/20/24    4) Outpatient Weekly Labs:    Order the following labs to be drawn on Mondays:   CBC  CMP     Perform labs at [facility] facility     5) Fax Lab Results to Infectious Diseases Provider: Dr. Swanson     ID Clinic Fax Number: [phone]       Endocrine  Diabetes  Continue current medications per primary      Orthopedic  History of gout  Continue current medications per primary      Other  BRADLEY on CPAP  CPAP as tolerated      Thank you for your consult. I will follow peripherally.      Alexis Swanson, DO  Infectious Disease  [facility] Tele[facility] ([facility])    Subjective:     Principal Problem:Severe sepsis    HPI: This is a 73 yo M with hx of HF, CKD, COPD, DM, HLD, HTN, and BRADLEY who presented to the ER for worsening bilateral lower extremity weakness, acute on chronic lumbar pain, and abdominal pain for about four days PTA. Following admission patient became progressively lethargic and hypercapnic on the floor requiring continuous bipap, and hypoglycemic requiring continuous D10 drip. Transferred to ICU. Sent back to floor on 11/5. Febrile to 100.6 F on admission with leukocytosis to almost 20K. Blood cx growing E coli. Has been on empiric Zosyn. CT a/p reports thickening and enlargement of the left krystal abdominal wall. Correlate clinically for rectus sheath hematoma. Mild bibasilar opacities right greater than left may relate to early pneumonia. Trace right-sided pleural effusion. MRI lumbar spine shows DDD. No evidence of infection. ID consulted for E coli bacteremia.   Interval History: Patient afebrile with improving leukocytosis. On ceftriaxone. Will plan to continue for 2 weeks.     Review of Systems   Gastrointestinal:  Positive for abdominal pain.   Musculoskeletal:  Positive for arthralgias, back pain and myalgias.   All other systems reviewed and are negative.    Objective:     Vital Signs (Most Recent):  Temp: 97.7 °F (36.5 °C) (11/07/24 0010)  Pulse: 100 (11/07/24 0429)  Resp: (!) 32 (11/07/24 0429)  BP: (!) 142/79 (11/07/24 0010)  SpO2: 96 % (11/07/24 0429) Vital Signs (24h Range):  Temp:  [97.6 °F (36.4 °C)-98.1 °F (36.7 °C)] 97.7 °F (36.5 °C)  Pulse:  [] 100  Resp:  [16-32] 32  SpO2:  [92 %-97 %] 96 %  BP: (117-142)/(73-87) 142/79     Weight: 96.3 kg (212 lb 4.9 oz)  Body mass index is 33.25 kg/m².    Estimated Creatinine Clearance: 48.5 mL/min (A) (based on SCr of 1.5 mg/dL (H)).     Physical Exam  Constitutional:       General: He is not in acute distress.     Appearance:  Normal appearance. He is not ill-appearing.   Cardiovascular:      Rate and Rhythm: Normal rate and regular rhythm.      Pulses: Normal pulses.      Heart sounds: Normal heart sounds. No murmur heard.     No friction rub. No gallop.   Pulmonary:      Effort: Pulmonary effort is normal. No respiratory distress.      Breath sounds: Normal breath sounds.   Abdominal:      General: Abdomen is flat. Bowel sounds are normal. There is no distension.      Palpations: Abdomen is soft.      Tenderness: There is no abdominal tenderness.   Skin:     General: Skin is warm and dry.   Neurological:      Mental Status: He is alert.          Significant Labs: Blood Culture:   Recent Labs   Lab 11/03/24 0125 11/06/24 1100   LABBLOO No Growth to date  No Growth to date  No Growth to date  No Growth to date  Gram stain dao bottle: Gram negative rods  Results called to and read back by:Sharifa Power RN 11/04/2024  00:22  ESCHERICHIA COLI* No Growth to date  No Growth to date     CBC:   Recent Labs   Lab 11/06/24  0519 11/07/24  0553   WBC 19.43* 18.07*   HGB 12.4* 13.4*   HCT 37.9* 42.6    289     CMP:   Recent Labs   Lab 11/06/24 0519 11/07/24  0553   * 137   K 3.7 3.7   CL 97 101   CO2 28 26    95   BUN 44* 36*   CREATININE 1.9* 1.5*   CALCIUM 9.2 9.2   PROT 7.5 7.4   ALBUMIN 2.5* 2.6*   BILITOT 1.3* 0.8   ALKPHOS 397* 437*   AST 22 32   ALT 33 40   ANIONGAP 10 10     Microbiology Results (last 7 days)       Procedure Component Value Units Date/Time    Blood culture [[id number]] Collected: 11/06/24 1100    Order Status: Completed Specimen: Blood from Peripheral, Forearm, Left Updated: 11/07/24 0315     Blood Culture, Routine No Growth to date    Blood culture [[id number]] Collected: 11/06/24 1100    Order Status: Completed Specimen: Blood from Peripheral, Hand, Left Updated: 11/07/24 0315     Blood Culture, Routine No Growth to date    Blood culture [[id number]] Collected: 11/03/24 0125    Order Status:  Completed Specimen: Blood from Peripheral, Antecubital, Left Updated: 11/06/24 1412     Blood Culture, Routine No Growth to date      No Growth to date      No Growth to date      No Growth to date    Blood culture [[id number]]  (Abnormal)  (Susceptibility) Collected: 11/03/24 0125    Order Status: Completed Specimen: Blood from Peripheral, Hand, Right Updated: 11/06/24 1051     Blood Culture, Routine Gram stain dao bottle: Gram negative rods      Results called to and read back by:Sharifa Power RN 11/04/2024  00:22      ESCHERICHIA COLI    Culture, MRSA [[id number]] Collected: 11/04/24 0557    Order Status: Sent Specimen: MRSA source from Nares, Left Updated: 11/04/24 1036    Rapid Organism ID by PCR (from Blood culture) [[id number]]  (Abnormal) Collected: 11/03/24 0125    Order Status: Completed Updated: 11/04/24 0141     Enterococcus faecalis Not Detected     Enterococcus faecium Not Detected     Listeria monocytogenes Not Detected     Staphylococcus spp. Not Detected     Staphylococcus aureus Not Detected     Staphylococcus epidermidis Not Detected     Staphylococcus lugdunensis Not Detected     Streptococcus species Not Detected     Streptococcus agalactiae Not Detected     Streptococcus pneumoniae Not Detected     Streptococcus pyogenes Not Detected     Acinetobacter calcoaceticus/baumannii complex Not Detected     Bacteroides fragilis Not Detected     Enterobacterales See species for ID     Enterobacter cloacae complex Not Detected     Escherichia coli Detected     Klebsiella aerogenes Not Detected     Klebsiella oxytoca Not Detected     Klebsiella pneumoniae group Not Detected     Proteus Not Detected     Salmonella sp Not Detected     Serratia marcescens Not Detected     Haemophilus influenzae Not Detected     Neisseria meningtidis Not Detected     Pseudomonas aeruginosa Not Detected     Stenotrophomonas maltophilia Not Detected     Candida albicans Not Detected     Candida auris Not Detected     Candida  glabrata Not Detected     Candida krusei Not Detected     Candida parapsilosis Not Detected     Candida tropicalis Not Detected     Cryptococcus neoformans/gattii Not Detected     CTX-M (ESBL ) Not Detected     IMP (Carbapenem resistant) Not Detected     KPC resistance gene (Carbapenem resistant) Not Detected     mcr-1  Not Detected     mec A/C  Test Not Applicable     mec A/C and MREJ (MRSA) gene Test Not Applicable     NDM (Carbapenem resistant) Not Detected     OXA-48-like (Carbapenem resistant) Not Detected     van A/B (VRE gene) Test Not Applicable     VIM (Carbapenem resistant) Not Detected    Respiratory Infection Panel (PCR), Nasopharyngeal [[id number]] Collected: 11/03/24 1045    Order Status: Completed Specimen: Nasopharyngeal Swab Updated: 11/03/24 1201     Respiratory Infection Panel Source NP Swab     Adenovirus Not Detected     Coronavirus 229E, Common Cold Virus Not Detected     Coronavirus HKU1, Common Cold Virus Not Detected     Coronavirus NL63, Common Cold Virus Not Detected     Coronavirus OC43, Common Cold Virus Not Detected     Comment: The Coronavirus strains detected in this test cause the common cold.  These strains are not the COVID-19 (novel Coronavirus)strain   associated with the respiratory disease outbreak.          SARS-CoV2 (COVID-19) Qualitative PCR Not Detected     Human Metapneumovirus Not Detected     Human Rhinovirus/Enterovirus Not Detected     Influenza A (subtypes H1, H1-2009,H3) Not Detected     Influenza B Not Detected     Parainfluenza Virus 1 Not Detected     Parainfluenza Virus 2 Not Detected     Parainfluenza Virus 3 Not Detected     Parainfluenza Virus 4 Not Detected     Respiratory Syncytial Virus Not Detected     Bordetella Parapertussis (WP7697) Not Detected     Bordetella pertussis (ptxP) Not Detected     Chlamydia pneumoniae Not Detected     Mycoplasma pneumoniae Not Detected     Comment: Respiratory Infection Panel testing performed by Multiplex PCR.        Narrative:      Assay not valid for lower respiratory specimens, alternate  testing required.    Respiratory Infection Panel (PCR), Nasopharyngeal [[id number]] Collected: 11/03/24 1009    Order Status: Canceled Specimen: Nasopharyngeal Swab     Influenza A & B by Molecular [[id number]] Collected: 11/03/24 0607    Order Status: Completed Specimen: Nasopharyngeal Swab Updated: 11/03/24 0839     Influenza A, Molecular Negative     Influenza B, Molecular Negative     Flu A & B Source Nasal swab          All pertinent labs within the past 24 hours have been reviewed.    Significant Imaging: None   PRINCIPAL PROCEDURE  Procedure: Intramedullary nailing of left tibia  Findings and Treatment: 66 y/o emapuma presents to [facility] for orthopedic surgery. Patient s/p Left tibia IMN with Dr. Duenas on 7/2/21. Patient tolerated the procedure well without any intraoperative complications. Patient tolerated physical therapy well, pain is controlled. Pt is weight bearing as tolerated left lower extremity with cane/walker as needed. Seen by medical attending for continuity of care and management and cleared for safe discharge. Keep dressing/incision clean, dry and intact. Any suture/staples to be removed on post-op day #14 at office visit. Pt is on  Aspirin 325mg  BID  for DVT prophylaxis, please take for 6 weeks unless otherwise instructed by your surgeon. Please follow up with Dr. Duenas in 1-2 weeks, call office to make appointment, [phone]. Please follow up with your PMD for continuity of care and management as medications may have changed.       PRINCIPAL PROCEDURE  Procedure: Intramedullary nailing of left tibia  Findings and Treatment: dictated operative note  weight bearing as tolerated to Left leg  call Surgeon's office to make appointment for 1-2 weeks from date of surgery Dr. Duenas [phone]

## 2024-11-07 NOTE — PLAN OF CARE
11/07/24 1355   Post-Acute Status   Post-Acute Authorization Placement  (SNF)   Post-Acute Placement Status Pending payor review/awaiting authorization (if required)   Coverage [employer]   Discharge Delays None known at this time   Discharge Plan   Discharge Plan A Skilled Nursing Facility       Per Caleb, with [facility], with ID recommendations of [facility] for 14 days they can accept Patient and submitted for authorization.   Per [facility], Patient would need to bring his home Humira and has not been able to contact any family/ friends to let them know.  SW meet with Patient and friend at bedside regarding bringing home medications. Patient and friend were agreeable to bringing medications.   SW stated [facility] has submitted for insurance authorization and once that comes back, we would discharge patient to [facility]. Patient and friend verbalized understanding.     SW will continue to follow and assist as needed.

## 2024-11-07 NOTE — PROGRESS NOTES
[facility]  Progress Note    Patient Name: Santiago Khan  MRN: [id number]  Patient Class: IP- Inpatient   Admission Date: 11/2/2024  Length of Stay: 3 days  Attending Physician: Jeffy Velazquez MD  Primary Care Provider: Kashif Acosta MD        Subjective:     Principal Problem:Severe sepsis        HPI:  Santiago Khan is a 74 y.o. male with a PMH  has a past medical history of Chronic combined systolic and diastolic congestive heart failure (07/09/2020), Chronic kidney disease, stage 3, Chronic obstructive pulmonary disease (03/20/2023), Chronic venous insufficiency, DDD (degenerative disc disease), lumbar, DM (diabetes mellitus) with complications, History of gout, Hyperlipidemia associated with type 2 diabetes mellitus, Hypertension associated with stage 3 chronic kidney disease due to type 2 diabetes mellitus, BRADLEY on CPAP, and Prostate cancer. who presented to the ER for further evaluation of worsening bilateral lower extremity weakness, acute on chronic lumbar back pain, and abdominal pain over the past 3-4 days. Patient reported being constipated with last bowel movement yesterday which was normal and reported his back pain has caused his legs to become weak resulting in increased difficulty walking. He reports taking chronic pain medications and had back surgery in 1982. Patient was seen at urgent care back on 10/27 for treatment of acute gout flare in his right hand/wrist and was provided prednisone after home allopurinol and colchicine provided little to no relief.  Patient denied any recent falls or trauma and was initially somnolent following administration of lorazepam in the ED to obtain the MRI of his lumbar spine. Upon evaluation of patient, patient had increased respiratory distress requiring up titration of supplemental oxygen in addition to bilateral crackles on lung auscultation, lower extremity edema, and right lower extremity erythremia.  Family at bedside reported patient had difficulty taking his home medications, had productive cough, and unsure what his dietary/fluid intake has been like. Patient was noted to be diaphoretic, restless, and becoming more lethargic during bedside assessment.  Stat ABG revealed worsening respiratory status and was placed on BiPAP and provided 2 mg IV Bumex.  Patient also noted to be become hypoglycemic again requiring dextrose infusion.  Patient admitted to Hospital Medicine inpatient for continued medical management. ICU notified regarding low threshold for transfer and agree with current treatment plan. Neurosurgery/Spine as well as PT/OT consulted regarding lower extremity weakness.       PCP: Kashif Acosta       Overview/Hospital Course:  Hospital/ICU Course:  11/4 - no acute events. Off dextrose drip. Had bm, no acute events    Hosp Med:  pt transferred out of ICU today, medically stable. Briefly, 74 y.o. male with Hx of HTN, HLP, Chronic combined systolic and diastolic CHF,  DM 2 with CKD 3 and Polyneuropathy, COPD, Chronic venous insufficiency, DDD Lumbar, Gout, BRADLEY on CPAP, and Prostate Ca, presented as Abd pain and LLE Cellulitis and admitted to ICU on 11/3 with worsening respiratory status and was placed on BiPAP and provided 2 mg IV Bumex. Patient also noted to be Hypoglycemic again requiring D10, hence admitted to ICU. He did well overnite and BS improved, came off D10 and was transferred out to Tele under Hosp Med today. Blood Cx x 1 just reported now as GNR and started on Zosyn.     11/6/24  ID following, antibiotics adjusted to ceftriaxone, plan for total 14 day course  PT/OT with reccs for YASMIN, CM consulted for SNF placement      Review of Systems   All other systems reviewed and are negative.    Objective:     Vital Signs (Most Recent):  Temp: 98.1 °F (36.7 °C) (11/06/24 1602)  Pulse: 91 (11/06/24 1602)  Resp: 19 (11/06/24 1602)  BP: 129/87 (11/06/24 1602)  SpO2: (!) 92 % (11/06/24 1602) Vital  Signs (24h Range):  Temp:  [97.8 °F (36.6 °C)-98.2 °F (36.8 °C)] 98.1 °F (36.7 °C)  Pulse:  [] 91  Resp:  [15-25] 19  SpO2:  [91 %-98 %] 92 %  BP: (117-131)/(72-87) 129/87     Weight: 96.3 kg (212 lb 4.9 oz)  Body mass index is 33.25 kg/m².    Intake/Output Summary (Last 24 hours) at 11/6/2024 1811  Last data filed at 11/6/2024 0900  Gross per 24 hour   Intake 240 ml   Output 325 ml   Net -85 ml         Physical Exam  Constitutional:       General: He is not in acute distress.     Appearance: Normal appearance. He is ill-appearing.   Cardiovascular:      Rate and Rhythm: Normal rate and regular rhythm.      Heart sounds: No murmur heard.  Pulmonary:      Effort: Pulmonary effort is normal. No respiratory distress.      Breath sounds: Normal breath sounds. No wheezing.   Abdominal:      General: There is no distension.      Palpations: Abdomen is soft.      Tenderness: There is no abdominal tenderness.   Skin:     Coloration: Skin is pale.   Neurological:      Mental Status: He is alert.             Significant Labs: All pertinent labs within the past 24 hours have been reviewed.  Recent Lab Results  (Last 5 results in the past 24 hours)        11/06/24  1711   11/06/24  1152   11/06/24  0612   11/06/24  0519   11/05/24  2033        Albumin       2.5         ALP       397         ALT       33         Anion Gap       10         AST       22         Bands       5.0         Baso #       Test Not Performed  Comment: CORRECTED RESULT; previously reported as 0.16 on 11/06/2024 at 06:50.  [C]         Basophil %       0.0  Comment: CORRECTED RESULT; previously reported as 0.8 on 11/06/2024 at 06:50.  [C]         BILIRUBIN TOTAL       1.3  Comment: For infants and newborns, interpretation of results should be based  on gestational age, weight and in agreement with clinical  observations.    Premature Infant recommended reference ranges:  Up to 24 hours.............<8.0 mg/dL  Up to 48 hours............<12.0 mg/dL  3-5  days..................<15.0 mg/dL  6-29 days.................<15.0 mg/dL           BUN       44         Calcium       9.2         Chloride       97         CO2       28         Creatinine       1.9         Differential Method       Manual         eGFR       37         Eos #       Test Not Performed  Comment: CORRECTED RESULT; previously reported as 0.3 on 11/06/2024 at 06:50.  [C]         Eos %       1.0  Comment: CORRECTED RESULT; previously reported as 1.7 on 11/06/2024 at 06:50.  [C]         Glucose       101         Gran # (ANC)       Test Not Performed  Comment: CORRECTED RESULT; previously reported as 16.0 on 11/06/2024 at 06:50.  [C]         Gran %       78.0  Comment: CORRECTED RESULT; previously reported as 82.2 on 11/06/2024 at 06:50.  [C]         Hematocrit       37.9         Hemoglobin       12.4         Immature Grans (Abs)       Test Not Performed  Comment: Mild elevation in immature granulocytes is non specific and   can be seen in a variety of conditions including stress response,   acute inflammation, trauma and pregnancy. Correlation with other   laboratory and clinical findings is essential.  CORRECTED RESULT; previously reported as 0.97 on 11/06/2024 at 06:50.    [C]         Immature Granulocytes       Test Not Performed  Comment: CORRECTED RESULT; previously reported as 5.0 on 11/06/2024 at 06:50.  [C]         Lymph #       Test Not Performed  Comment: CORRECTED RESULT; previously reported as 1.3 on 11/06/2024 at 06:50.  [C]         Lymph %       10.0  Comment: CORRECTED RESULT; previously reported as 6.5 on 11/06/2024 at 06:50.  [C]         Magnesium        2.2         MCH       29.3         MCHC       32.7         MCV       90         Metamyelocytes       1.0         Mono #       Test Not Performed  Comment: CORRECTED RESULT; previously reported as 0.7 on 11/06/2024 at 06:50.  [C]         Mono %       4.0  Comment: CORRECTED RESULT; previously reported as 3.8 on 11/06/2024 at 06:50.  [C]          MPV       9.3         Myelocytes       1.0         nRBC       0         Ovalocytes       Occasional         Phosphorus Level       4.0         Platelet Estimate       Appears normal         Platelet Count       296         POCT Glucose 104   117   109     128       Potassium       3.7         PROTEIN TOTAL       7.5         RBC       4.23         RDW       18.0         Sodium       135         WBC       19.43                                 [C] - Corrected Result               Significant Imaging: I have reviewed all pertinent imaging results/findings within the past 24 hours.    CT Abdomen Pelvis  Without Contrast   Final Result      Thickening and enlargement of the left krystal abdominal wall.  Correlate clinically for rectus sheath hematoma.      Mild bibasilar opacities right greater than left may relate to early pneumonia. Trace right-sided pleural effusion      Mild motion artifacts.      All CT scans   are performed using dose optimization techniques including the following: automated exposure control; adjustment of the mA and/or kV; use of iterative reconstruction technique.  Dose modulation was employed for ALARA by means of: Automated exposure control; adjustment of the mA and/or kV according to patient size (this includes techniques or standardized protocols for targeted exams where dose is matched to indication/reason for exam; i.e. extremities or head); and/or use of iterative reconstructive technique.         Electronically signed by: Manjeet Cadena   Date:    11/04/2024   Time:    19:33      X-Ray Abdomen AP 1 View   Final Result      Unremarkable study         Electronically signed by: Suresh Kennedy MD   Date:    11/03/2024   Time:    08:41      MRI Lumbar Spine Without Contrast   Final Result      Multilevel degenerative disc changes.  Moderate to severe foraminal stenosis at L4-5 and L5-S1 bilaterally         Electronically signed by: Suresh Kennedy MD   Date:    11/03/2024   Time:    09:46       X-Ray Chest AP Portable   Final Result      1.  Low lung volumes with vascular crowding or atelectasis in the lung bases.  Cardiac silhouette size enlargement.  Mild interstitial pulmonary edema or interstitial infectious process difficult to exclude in the right clinical setting.      2.  Stable findings as noted above.         Electronically signed by: Justice Garcia MD   Date:    11/02/2024   Time:    18:13      Radiology   Final Result            Assessment/Plan:      * Severe sepsis sec to Cellulitis Legs complicated by E coli Bacteremia  This patient does have evidence of infective focus  My overall impression is sepsis.  Source: Skin and Soft Tissue (location cellulitis LLE)  Antibiotics given-   Antibiotics (72h ago, onward)      Start     Stop Route Frequency Ordered    11/04/24 0900  mupirocin 2 % ointment         11/09/24 0859 Nasl 2 times daily 11/04/24 0523    11/03/24 0930  piperacillin-tazobactam (ZOSYN) 4.5 g in D5W 100 mL IVPB (MB+)         -- IV Every 8 hours (non-standard times) 11/03/24 0825          Latest lactate reviewed-  Recent Labs   Lab 11/03/24  0842   LACTATE 0.8     Organ dysfunction indicated by Acute kidney injury, Acute respiratory failure, and Encephalopathy    Fluid challenge Other- Patient to receive D10 volume other than 30cc/kg due to Congestive Heart Failure     Post- resuscitation assessment No - Post resuscitation assessment not needed       Will Not start Pressors- Levophed for MAP of 65  Source control achieved by: IV Abx- Zosyn    E coli bacteremia        Drug-induced constipation  Resolved, had 2 BMs in the ICU      Cellulitis of right lower extremity  Rufino LLE- getting Zosyn  Keep LLE elevated    Acute encephalopathy  Sec to hypoglycemia, possibly sepsis as later Blood Cx came back Positive for GNR      Hypoglycemia  Treated with D10W in the ICU initially  resolved      Class 1 obesity due to excess calories with serious comorbidity and body mass index (BMI) of 33.0 to  33.9 in adult  Body mass index is 33.63 kg/m². Morbid obesity complicates all aspects of disease management from diagnostic modalities to treatment. Weight loss encouraged and health benefits explained to patient.       Diabetes  Patient's FSGs are uncontrolled due to hypoglycemia on current medication regimen.  Last A1c reviewed-   Lab Results   Component Value Date    HGBA1C 5.6 08/14/2024     Most recent fingerstick glucose reviewed-   Recent Labs   Lab 11/04/24  2116 11/05/24  0455 11/05/24  0759 11/05/24  1135   POCTGLUCOSE 125* 103 100 160*       Current correctional scale  Low  Titrate as needed  anti-hyperglycemic dose as follows-   Antihyperglycemics (From admission, onward)      None        Plan:  -SSI  -A1c  -Accu-checks  -Hold oral hypoglycemics while patient is in the hospital  -Hypoglycemic protocol      No further hypoglycemia    Acute respiratory failure with hypoxia and hypercarbia  Patient with Hypercapnic and Hypoxic Respiratory failure which is Acute.  he is not on home oxygen. Supplemental oxygen was provided and noted- Oxygen Concentration (%):  [21] 21    Signs/symptoms of respiratory failure include- tachypnea, increased work of breathing, respiratory distress, and lethargy. Contributing diagnoses includes - CHF, COPD, Interstitial lung disease, Obesity Hypoventilation, Pleural effusion, Pneumonia, and recent ativan administration in the ED  Labs and images were reviewed. Patient Has recent ABG, which has been reviewed. Will treat underlying causes and adjust management of respiratory failure as follows-   Plan:  -Continue home medications, titrate as needed  -Titrate oxygen requirements as needed  -Incentive spirometry   -Monitor pulse oximetry  -Farhan prn  -Continue treatment of CHF and pneumonia  -f/u Flu/COVID  -f/u ABG  -Continue treatment of hypoglycemia  -low threshold for ICU transfer     Responded well to Bipap, diuresis  Appears at baseline    Acute kidney injury superimposed on  chronic kidney disease  ROSARIO is likely due to pre-renal azotemia due to intravascular volume depletion. Baseline creatinine is  1.5-1.7 . Most recent creatinine and eGFR are listed below.  Recent Labs     11/03/24  0508 11/04/24  0427 11/05/24  0514   CREATININE 2.3* 2.1* 1.9*  1.9*   EGFRNORACEVR 29* 33* 37*  37*     Plan  -ROSARIO is  currently undergoing medical managment  -Avoid nephrotoxins and renally dose meds for GFR listed above  -Monitor urine output, serial BMP, and adjust therapy as needed  -Continue home medications and treatment of CHF    Cr coming down    Acute on chronic combined systolic and diastolic congestive heart failure  Patient has Combined Systolic and Diastolic heart failure that is Acute on chronic. On presentation their CHF was decompensated. Evidence of decompensated CHF on presentation includes: edema, orthopnea, paroxysmal nocturnal dyspnea (PND), dyspnea on exertion (GOVEA), and shortness of breath. The etiology of their decompensation is likely dietary indiscretion, increased fluid intake, and infection . Most recent BNP and echo results are listed below.  Recent Labs     11/02/24 2045   *       Latest ECHO  Results for orders placed during the hospital encounter of 10/23/23    Echo    Interpretation Summary    Left Ventricle: The left ventricle is normal in size. Normal wall thickness. There is mild concentric hypertrophy. regional wall motion abnormalities present. There is low normal systolic function with a visually estimated ejection fraction of 50 - 55%. Biplane (2D) method of discs ejection fraction is 49%. There is normal diastolic function.    Left Atrium: Left atrium is mildly dilated.    Right Ventricle: Normal right ventricular cavity size. Wall thickness is normal. Right ventricle wall motion  is normal. Systolic function is normal.    Mitral Valve: There is mild to moderate regurgitation.    Tricuspid Valve: There is mild regurgitation.    Pulmonary Artery: There is  moderate pulmonary hypertension. The estimated pulmonary artery systolic pressure is 50 mmHg.    IVC/SVC: Normal venous pressure at 3 mmHg.    Current Heart Failure Medications       Plan  -Monitor strict I&Os and daily weights.    -Place on telemetry  -Low sodium diet  -Place on fluid restriction of 1.5 L.   -Cardiology has not been consulted  -The patient's volume status is stable but not at their baseline as indicated by edema, orthopnea, paroxysmal nocturnal dyspnea (PND), and shortness of breath  -Continue home medications    Better, diuresed upon admit  Cont same Tx    Lower back pain  Patient presented with worsening lower back pain and bilateral lower extremity weakness in setting of chronic back pain and prior surgery.  MRI L-spine obtained in the ED showed multilevel degenerative changes with several levels of neural foraminal narrowing however without epidural abscess requiring equina noted.  Patient awaiting evaluation by PT/OT. Neurosurgery/Spine consulted as well.   Plan:  -Optimize pain control, titrate as needed  -Bowel regimen  -Bedrest  -Antiemetics prn  -Tylenol as needed for fever   -f/u NSGY/Spine  -PT/OT     S/p NSGY eval- not a surgical candidate  Ch and stable, sec lumber DDD- Neural Foraminal narrowing at L4 and L5 but no Central disc herniation    Weakness of both lower extremities  Weakness persists  PT/OT ordered  Start Inj B12 IM plus triple Vitamins and Vit D      Chronic obstructive pulmonary disease  Patient's COPD is with exacerbation noted by continued dyspnea and worsening of baseline hypoxia currently.  Patient is currently off COPD Pathway. Continue scheduled inhalers  BiPAP, Steroids, Antibiotics, and Supplemental oxygen and monitor respiratory status closely.       Coronary artery disease of native artery of native heart with stable angina pectoris  Patient with known CAD, which is controlled Will continue  home medications  and monitor for S/Sx of angina/ACS. Continue to  monitor on telemetry.       Hyperlipidemia associated with type 2 diabetes mellitus  Patient is chronically on statin.will continue for now. Last Lipid Panel:   Lab Results   Component Value Date    CHOL 138 08/14/2024    HDL 45 08/14/2024    LDLCALC 69.4 08/14/2024    TRIG 118 08/14/2024    CHOLHDL 32.6 08/14/2024   Plan:  -Continue home medication  -low fat/low calorie diet      Hypertension associated with stage 3 chronic kidney disease due to type 2 diabetes mellitus  Chronic, controlled.  Latest blood pressure and vitals reviewed-   Temp:  [97.8 °F (36.6 °C)-99.5 °F (37.5 °C)]   Pulse:  []   Resp:  [15-26]   BP: ()/(58-73)   SpO2:  [92 %-98 %] .   Home meds for hypertension were reviewed and noted below.   Hypertension Medications               bisoprolol (ZEBETA) 5 MG tablet TAKE 1/2 TABLET BY MOUTH EVERY DAY    torsemide (DEMADEX) 20 MG Tab Take 2 tablets (40 mg total) by mouth once daily.     While in the hospital, will manage blood pressure as follows; Continue home antihypertensive regimen    Will utilize p.r.n. blood pressure medication only if patient's blood pressure greater than  180/110 and he develops symptoms such as worsening chest pain or shortness of breath.    ROSARIO better    History of gout  Recently treated with colchicine and prednisone.   Plan:  -continue to monitor and treat as needed      BRADLEY on CPAP  Currently on CPAP outpatient.  Plan:  -continue CPAP q.h.s.         VTE Risk Mitigation (From admission, onward)           Ordered     enoxaparin injection 40 mg  Daily         11/03/24 0309     IP VTE HIGH RISK PATIENT  Once         11/03/24 0309     Place sequential compression device  Until discontinued         11/03/24 0309                    Discharge Planning   ANTHONY:      Code Status: Full Code   Is the patient medically ready for discharge?:     Reason for patient still in hospital (select all that apply): Patient trending condition, Laboratory test, Treatment, Consult  recommendations, PT / OT recommendations, and Pending disposition  Discharge Plan A: Skilled Nursing Facility   Discharge Delays: None known at this time              Jeffy Velazquez MD  Department of Hospital Medicine   [facility] ([facility])

## 2024-11-07 NOTE — ASSESSMENT & PLAN NOTE
Recently treated with colchicine and prednisone.   Plan:  -continue to monitor and treat as needed

## 2024-11-07 NOTE — SUBJECTIVE & OBJECTIVE
Interval History: Patient afebrile with improving leukocytosis. On ceftriaxone. Will plan to continue for 2 weeks.     Review of Systems   Gastrointestinal:  Positive for abdominal pain.   Musculoskeletal:  Positive for arthralgias, back pain and myalgias.   All other systems reviewed and are negative.    Objective:     Vital Signs (Most Recent):  Temp: 97.7 °F (36.5 °C) (11/07/24 0010)  Pulse: 100 (11/07/24 0429)  Resp: (!) 32 (11/07/24 0429)  BP: (!) 142/79 (11/07/24 0010)  SpO2: 96 % (11/07/24 0429) Vital Signs (24h Range):  Temp:  [97.6 °F (36.4 °C)-98.1 °F (36.7 °C)] 97.7 °F (36.5 °C)  Pulse:  [] 100  Resp:  [16-32] 32  SpO2:  [92 %-97 %] 96 %  BP: (117-142)/(73-87) 142/79     Weight: 96.3 kg (212 lb 4.9 oz)  Body mass index is 33.25 kg/m².    Estimated Creatinine Clearance: 48.5 mL/min (A) (based on SCr of 1.5 mg/dL (H)).     Physical Exam  Constitutional:       General: He is not in acute distress.     Appearance: Normal appearance. He is not ill-appearing.   Cardiovascular:      Rate and Rhythm: Normal rate and regular rhythm.      Pulses: Normal pulses.      Heart sounds: Normal heart sounds. No murmur heard.     No friction rub. No gallop.   Pulmonary:      Effort: Pulmonary effort is normal. No respiratory distress.      Breath sounds: Normal breath sounds.   Abdominal:      General: Abdomen is flat. Bowel sounds are normal. There is no distension.      Palpations: Abdomen is soft.      Tenderness: There is no abdominal tenderness.   Skin:     General: Skin is warm and dry.   Neurological:      Mental Status: He is alert.          Significant Labs: Blood Culture:   Recent Labs   Lab 11/03/24  0125 11/06/24  1100   LABBLOO No Growth to date  No Growth to date  No Growth to date  No Growth to date  Gram stain dao bottle: Gram negative rods  Results called to and read back by:Sharifa Power RN 11/04/2024  00:22  ESCHERICHIA COLI* No Growth to date  No Growth to date     CBC:   Recent Labs   Lab  11/06/24 0519 11/07/24  0553   WBC 19.43* 18.07*   HGB 12.4* 13.4*   HCT 37.9* 42.6    289     CMP:   Recent Labs   Lab 11/06/24 0519 11/07/24  0553   * 137   K 3.7 3.7   CL 97 101   CO2 28 26    95   BUN 44* 36*   CREATININE 1.9* 1.5*   CALCIUM 9.2 9.2   PROT 7.5 7.4   ALBUMIN 2.5* 2.6*   BILITOT 1.3* 0.8   ALKPHOS 397* 437*   AST 22 32   ALT 33 40   ANIONGAP 10 10     Microbiology Results (last 7 days)       Procedure Component Value Units Date/Time    Blood culture [[id number]] Collected: 11/06/24 1100    Order Status: Completed Specimen: Blood from Peripheral, Forearm, Left Updated: 11/07/24 0315     Blood Culture, Routine No Growth to date    Blood culture [[id number]] Collected: 11/06/24 1100    Order Status: Completed Specimen: Blood from Peripheral, Hand, Left Updated: 11/07/24 0315     Blood Culture, Routine No Growth to date    Blood culture [[id number]] Collected: 11/03/24 0125    Order Status: Completed Specimen: Blood from Peripheral, Antecubital, Left Updated: 11/06/24 1412     Blood Culture, Routine No Growth to date      No Growth to date      No Growth to date      No Growth to date    Blood culture [[id number]]  (Abnormal)  (Susceptibility) Collected: 11/03/24 0125    Order Status: Completed Specimen: Blood from Peripheral, Hand, Right Updated: 11/06/24 1051     Blood Culture, Routine Gram stain dao bottle: Gram negative rods      Results called to and read back by:Sharifa Power RN 11/04/2024  00:22      ESCHERICHIA COLI    Culture, MRSA [[id number]] Collected: 11/04/24 0557    Order Status: Sent Specimen: MRSA source from Nares, Left Updated: 11/04/24 1036    Rapid Organism ID by PCR (from Blood culture) [[id number]]  (Abnormal) Collected: 11/03/24 0125    Order Status: Completed Updated: 11/04/24 0141     Enterococcus faecalis Not Detected     Enterococcus faecium Not Detected     Listeria monocytogenes Not Detected     Staphylococcus spp. Not Detected     Staphylococcus  aureus Not Detected     Staphylococcus epidermidis Not Detected     Staphylococcus lugdunensis Not Detected     Streptococcus species Not Detected     Streptococcus agalactiae Not Detected     Streptococcus pneumoniae Not Detected     Streptococcus pyogenes Not Detected     Acinetobacter calcoaceticus/baumannii complex Not Detected     Bacteroides fragilis Not Detected     Enterobacterales See species for ID     Enterobacter cloacae complex Not Detected     Escherichia coli Detected     Klebsiella aerogenes Not Detected     Klebsiella oxytoca Not Detected     Klebsiella pneumoniae group Not Detected     Proteus Not Detected     Salmonella sp Not Detected     Serratia marcescens Not Detected     Haemophilus influenzae Not Detected     Neisseria meningtidis Not Detected     Pseudomonas aeruginosa Not Detected     Stenotrophomonas maltophilia Not Detected     Candida albicans Not Detected     Candida auris Not Detected     Candida glabrata Not Detected     Candida krusei Not Detected     Candida parapsilosis Not Detected     Candida tropicalis Not Detected     Cryptococcus neoformans/gattii Not Detected     CTX-M (ESBL ) Not Detected     IMP (Carbapenem resistant) Not Detected     KPC resistance gene (Carbapenem resistant) Not Detected     mcr-1  Not Detected     mec A/C  Test Not Applicable     mec A/C and MREJ (MRSA) gene Test Not Applicable     NDM (Carbapenem resistant) Not Detected     OXA-48-like (Carbapenem resistant) Not Detected     van A/B (VRE gene) Test Not Applicable     VIM (Carbapenem resistant) Not Detected    Respiratory Infection Panel (PCR), Nasopharyngeal [[id number]] Collected: 11/03/24 1045    Order Status: Completed Specimen: Nasopharyngeal Swab Updated: 11/03/24 1201     Respiratory Infection Panel Source NP Swab     Adenovirus Not Detected     Coronavirus 229E, Common Cold Virus Not Detected     Coronavirus HKU1, Common Cold Virus Not Detected     Coronavirus NL63, Common Cold Virus Not  Detected     Coronavirus OC43, Common Cold Virus Not Detected     Comment: The Coronavirus strains detected in this test cause the common cold.  These strains are not the COVID-19 (novel Coronavirus)strain   associated with the respiratory disease outbreak.          SARS-CoV2 (COVID-19) Qualitative PCR Not Detected     Human Metapneumovirus Not Detected     Human Rhinovirus/Enterovirus Not Detected     Influenza A (subtypes H1, H1-2009,H3) Not Detected     Influenza B Not Detected     Parainfluenza Virus 1 Not Detected     Parainfluenza Virus 2 Not Detected     Parainfluenza Virus 3 Not Detected     Parainfluenza Virus 4 Not Detected     Respiratory Syncytial Virus Not Detected     Bordetella Parapertussis (QH6538) Not Detected     Bordetella pertussis (ptxP) Not Detected     Chlamydia pneumoniae Not Detected     Mycoplasma pneumoniae Not Detected     Comment: Respiratory Infection Panel testing performed by Multiplex PCR.       Narrative:      Assay not valid for lower respiratory specimens, alternate  testing required.    Respiratory Infection Panel (PCR), Nasopharyngeal [[id number]] Collected: 11/03/24 1009    Order Status: Canceled Specimen: Nasopharyngeal Swab     Influenza A & B by Molecular [[id number]] Collected: 11/03/24 0607    Order Status: Completed Specimen: Nasopharyngeal Swab Updated: 11/03/24 0839     Influenza A, Molecular Negative     Influenza B, Molecular Negative     Flu A & B Source Nasal swab          All pertinent labs within the past 24 hours have been reviewed.    Significant Imaging: None

## 2024-11-07 NOTE — ASSESSMENT & PLAN NOTE
--All cases of bacteremia pose risk of life threatening sepsis and metastatic infection  --GN bacteremia not always with obvious source   --Unclear in this case  --CT a/p shows thickening and enlargement of the left krystal abdominal wall. Possible infected hematoma.   --Possible early pneumonia as well   --Would be cautious and treat with longer course of antibiotics  --Continue IV ceftriaxone 2 g daily as E coli isolate is pan susceptible   --Requires drug toxicity monitoring   --Follow repeat blood cx for clearance; NGTD    --Will plan for 14 day IV abx course from negative blood cx  --Above d/w primary team.      Outpatient Antibiotic Therapy Plan:    1) Infection: E coli bacteremia     2) Discharge Antibiotics:    Intravenous antibiotics:  IV ceftriaxone 2 g daily     3) Therapy Duration:  14 days    Estimated end date of IV antibiotics: 11/20/24    4) Outpatient Weekly Labs:    Order the following labs to be drawn on Mondays:   CBC  CMP     Perform labs at [facility] facility     5) Fax Lab Results to Infectious Diseases Provider: Dr. Swanson     [facility] Fax Number: [phone]

## 2024-11-07 NOTE — PROGRESS NOTES
[facility] Medicine  Progress Note    Patient Name: Santiago Khan  MRN: [id number]  Patient Class: IP- Inpatient   Admission Date: 11/2/2024  Length of Stay: 4 days  Attending Physician: Jeffy Velazquez MD  Primary Care Provider: Kashif Acosta MD        Subjective:     Principal Problem:Severe sepsis        HPI:  Santiago Khan is a 74 y.o. male with a PMH  has a past medical history of Chronic combined systolic and diastolic congestive heart failure (07/09/2020), Chronic kidney disease, stage 3, Chronic obstructive pulmonary disease (03/20/2023), Chronic venous insufficiency, DDD (degenerative disc disease), lumbar, DM (diabetes mellitus) with complications, History of gout, Hyperlipidemia associated with type 2 diabetes mellitus, Hypertension associated with stage 3 chronic kidney disease due to type 2 diabetes mellitus, BRADLEY on CPAP, and Prostate cancer. who presented to the ER for further evaluation of worsening bilateral lower extremity weakness, acute on chronic lumbar back pain, and abdominal pain over the past 3-4 days. Patient reported being constipated with last bowel movement yesterday which was normal and reported his back pain has caused his legs to become weak resulting in increased difficulty walking. He reports taking chronic pain medications and had back surgery in 1982. Patient was seen at urgent care back on 10/27 for treatment of acute gout flare in his right hand/wrist and was provided prednisone after home allopurinol and colchicine provided little to no relief.  Patient denied any recent falls or trauma and was initially somnolent following administration of lorazepam in the ED to obtain the MRI of his lumbar spine. Upon evaluation of patient, patient had increased respiratory distress requiring up titration of supplemental oxygen in addition to bilateral crackles on lung auscultation, lower extremity edema, and right lower extremity erythremia.  Family at bedside reported patient had difficulty taking his home medications, had productive cough, and unsure what his dietary/fluid intake has been like. Patient was noted to be diaphoretic, restless, and becoming more lethargic during bedside assessment.  Stat ABG revealed worsening respiratory status and was placed on BiPAP and provided 2 mg IV Bumex.  Patient also noted to be become hypoglycemic again requiring dextrose infusion.  Patient admitted to Hospital Medicine inpatient for continued medical management. ICU notified regarding low threshold for transfer and agree with current treatment plan. Neurosurgery/Spine as well as PT/OT consulted regarding lower extremity weakness.       PCP: Kashif Acosta       Overview/Hospital Course:  Hospital/ICU Course:  11/4 - no acute events. Off dextrose drip. Had bm, no acute events    Hosp Med:  pt transferred out of ICU today, medically stable. Briefly, 74 y.o. male with Hx of HTN, HLP, Chronic combined systolic and diastolic CHF,  DM 2 with CKD 3 and Polyneuropathy, COPD, Chronic venous insufficiency, DDD Lumbar, Gout, BRADLEY on CPAP, and Prostate Ca, presented as Abd pain and LLE Cellulitis and admitted to ICU on 11/3 with worsening respiratory status and was placed on BiPAP and provided 2 mg IV Bumex. Patient also noted to be Hypoglycemic again requiring D10, hence admitted to ICU. He did well overnite and BS improved, came off D10 and was transferred out to Tele under Hosp Med today. Blood Cx x 1 just reported now as GNR and started on Zosyn.     11/6/24  ID following, antibiotics adjusted to ceftriaxone, plan for total 14 day course  PT/OT with reccs for YASMIN, CM consulted for SNF placement    11/7/24  NAEON, patient denies new issues  SNF placement pending  IV abx till 11/20/24      Review of Systems   All other systems reviewed and are negative.    Objective:     Vital Signs (Most Recent):  Temp: 98.4 °F (36.9 °C) (11/07/24 1530)  Pulse: 98 (11/07/24  1530)  Resp: 19 (11/07/24 1530)  BP: 120/67 (11/07/24 1530)  SpO2: (!) 92 % (11/07/24 1530) Vital Signs (24h Range):  Temp:  [97.5 °F (36.4 °C)-98.4 °F (36.9 °C)] 98.4 °F (36.9 °C)  Pulse:  [] 98  Resp:  [16-32] 19  SpO2:  [92 %-97 %] 92 %  BP: (120-142)/(67-79) 120/67     Weight: 96.3 kg (212 lb 4.9 oz)  Body mass index is 33.25 kg/m².    Intake/Output Summary (Last 24 hours) at 11/7/2024 1715  Last data filed at 11/7/2024 0534  Gross per 24 hour   Intake 590 ml   Output 175 ml   Net 415 ml         Physical Exam  Constitutional:       General: He is not in acute distress.     Appearance: Normal appearance. He is ill-appearing.   Cardiovascular:      Rate and Rhythm: Normal rate and regular rhythm.      Heart sounds: No murmur heard.  Pulmonary:      Effort: Pulmonary effort is normal. No respiratory distress.      Breath sounds: Normal breath sounds. No wheezing.   Abdominal:      General: There is no distension.      Palpations: Abdomen is soft.      Tenderness: There is no abdominal tenderness.   Skin:     Coloration: Skin is pale.   Neurological:      Mental Status: He is alert.             Significant Labs: All pertinent labs within the past 24 hours have been reviewed.  Recent Lab Results         11/07/24  1113   11/07/24  0553   11/07/24  0536   11/06/24  2216        Albumin   2.6           ALP   437           ALT   40           Anion Gap   10           AST   32           Bands   5.0           Basophil %   0.0           BILIRUBIN TOTAL   0.8  Comment: For infants and newborns, interpretation of results should be based  on gestational age, weight and in agreement with clinical  observations.    Premature Infant recommended reference ranges:  Up to 24 hours.............<8.0 mg/dL  Up to 48 hours............<12.0 mg/dL  3-5 days..................<15.0 mg/dL  6-29 days.................<15.0 mg/dL             BUN   36           Calcium   9.2           Chloride   101           CO2   26           Creatinine    1.5           Differential Method   Manual           eGFR   49           Eos %   1.0           Glucose   95           Gran %   74.0           Hematocrit   42.6           Hemoglobin   13.4           Immature Grans (Abs)   CANCELED  Comment: Mild elevation in immature granulocytes is non specific and   can be seen in a variety of conditions including stress response,   acute inflammation, trauma and pregnancy. Correlation with other   laboratory and clinical findings is essential.    Result canceled by the ancillary.             Immature Granulocytes   CANCELED  Comment: Result canceled by the ancillary.           Lymph %   11.0           Magnesium    2.2           MCH   28.9           MCHC   31.5           MCV   92           Mono %   5.0           MPV   8.9           Myelocytes   4.0           nRBC   0           Phosphorus Level   3.5           Platelet Estimate   Appears normal           Platelet Count   289           POCT Glucose 124     101   110       Potassium   3.7           PROTEIN TOTAL   7.4           RBC   4.64           RDW   17.9           Smudge Cells   Present  Comment: Smudge cells present;Substantial numbers may affect the   accuracy of the differential.             Sodium   137           WBC   18.07                   Significant Imaging: I have reviewed all pertinent imaging results/findings within the past 24 hours.    CT Abdomen Pelvis  Without Contrast   Final Result      Thickening and enlargement of the left krystal abdominal wall.  Correlate clinically for rectus sheath hematoma.      Mild bibasilar opacities right greater than left may relate to early pneumonia. Trace right-sided pleural effusion      Mild motion artifacts.      All CT scans   are performed using dose optimization techniques including the following: automated exposure control; adjustment of the mA and/or kV; use of iterative reconstruction technique.  Dose modulation was employed for ALACLYDE by means of: Automated exposure control;  "adjustment of the mA and/or kV according to patient size (this includes techniques or standardized protocols for targeted exams where dose is matched to indication/reason for exam; i.e. extremities or head); and/or use of iterative reconstructive technique.         Electronically signed by: Manjeet Cadena   Date:    11/04/2024   Time:    19:33      X-Ray Abdomen AP 1 View   Final Result      Unremarkable study         Electronically signed by: Suresh Kennedy MD   Date:    11/03/2024   Time:    08:41      MRI Lumbar Spine Without Contrast   Final Result      Multilevel degenerative disc changes.  Moderate to severe foraminal stenosis at L4-5 and L5-S1 bilaterally         Electronically signed by: Suresh Kennedy MD   Date:    11/03/2024   Time:    09:46      X-Ray Chest AP Portable   Final Result      1.  Low lung volumes with vascular crowding or atelectasis in the lung bases.  Cardiac silhouette size enlargement.  Mild interstitial pulmonary edema or interstitial infectious process difficult to exclude in the right clinical setting.      2.  Stable findings as noted above.         Electronically signed by: Justice Garcia MD   Date:    11/02/2024   Time:    18:13      Radiology   Final Result            Assessment/Plan:      * Severe sepsis sec to Cellulitis Legs complicated by E coli Bacteremia  This patient does have evidence of infective focus  My overall impression is sepsis.  Source: Skin and Soft Tissue (location cellulitis LLE)  Antibiotics given-   Antibiotics (72h ago, onward)      Start     Stop Route Frequency Ordered    11/06/24 1700  cefTRIAXone injection 2 g         -- IV Every 24 hours (non-standard times) 11/06/24 1036    11/04/24 0900  mupirocin 2 % ointment         11/09/24 0859 Nasl 2 times daily 11/04/24 0523          Latest lactate reviewed-  No results for input(s): "LACTATE", "POCLAC" in the last 72 hours.    Organ dysfunction indicated by Acute kidney injury, Acute respiratory failure, and " "Encephalopathy    Fluid challenge Other- Patient to receive D10 volume other than 30cc/kg due to Congestive Heart Failure     Post- resuscitation assessment No - Post resuscitation assessment not needed       Will Not start Pressors- Levophed for MAP of 65  Source control achieved by: IV Abx    On ceftriaxone  PRESTON 11/20/24  ID following    Cellulitis of right lower extremity  Rufino LLE- getting Zosyn  Keep LLE elevated    Acute on chronic combined systolic and diastolic congestive heart failure  Patient has Combined Systolic and Diastolic heart failure that is Acute on chronic. On presentation their CHF was decompensated. Evidence of decompensated CHF on presentation includes: edema, orthopnea, paroxysmal nocturnal dyspnea (PND), dyspnea on exertion (GOVEA), and shortness of breath. The etiology of their decompensation is likely dietary indiscretion, increased fluid intake, and infection . Most recent BNP and echo results are listed below.  No results for input(s): "BNP" in the last 72 hours.    Latest ECHO  Results for orders placed during the hospital encounter of 10/23/23    Echo    Interpretation Summary    Left Ventricle: The left ventricle is normal in size. Normal wall thickness. There is mild concentric hypertrophy. regional wall motion abnormalities present. There is low normal systolic function with a visually estimated ejection fraction of 50 - 55%. Biplane (2D) method of discs ejection fraction is 49%. There is normal diastolic function.    Left Atrium: Left atrium is mildly dilated.    Right Ventricle: Normal right ventricular cavity size. Wall thickness is normal. Right ventricle wall motion  is normal. Systolic function is normal.    Mitral Valve: There is mild to moderate regurgitation.    Tricuspid Valve: There is mild regurgitation.    Pulmonary Artery: There is moderate pulmonary hypertension. The estimated pulmonary artery systolic pressure is 50 mmHg.    IVC/SVC: Normal venous pressure at 3 " mmHg.    Current Heart Failure Medications       Plan  -Monitor strict I&Os and daily weights.    -Place on telemetry  -Low sodium diet  -Place on fluid restriction of 1.5 L.   -Cardiology has not been consulted  -The patient's volume status is stable but not at their baseline as indicated by edema, orthopnea, paroxysmal nocturnal dyspnea (PND), and shortness of breath  -Continue home medications    Better, diuresed upon admit  Cont same Tx    Acute respiratory failure with hypoxia and hypercarbia  Patient with Hypercapnic and Hypoxic Respiratory failure which is Acute.  he is not on home oxygen. Supplemental oxygen was provided and noted- Oxygen Concentration (%):  [21] 21    Signs/symptoms of respiratory failure include- tachypnea, increased work of breathing, respiratory distress, and lethargy. Contributing diagnoses includes - CHF, COPD, Interstitial lung disease, Obesity Hypoventilation, Pleural effusion, Pneumonia, and recent ativan administration in the ED  Labs and images were reviewed. Patient Has recent ABG, which has been reviewed. Will treat underlying causes and adjust management of respiratory failure as follows-   Plan:  -Continue home medications, titrate as needed  -Titrate oxygen requirements as needed  -Incentive spirometry   -Monitor pulse oximetry  -Duonebs prn  -Continue treatment of CHF and pneumonia  -f/u Flu/COVID  -f/u ABG  -Continue treatment of hypoglycemia  -low threshold for ICU transfer     Responded well to Bipap, diuresis  Appears at baseline    Weakness of both lower extremities  Weakness persists  PT/OT ordered  Start Inj B12 IM plus triple Vitamins and Vit D      Lower back pain  Patient presented with worsening lower back pain and bilateral lower extremity weakness in setting of chronic back pain and prior surgery.  MRI L-spine obtained in the ED showed multilevel degenerative changes with several levels of neural foraminal narrowing however without epidural abscess requiring  equina noted.  Patient awaiting evaluation by PT/OT. Neurosurgery/Spine consulted as well.   Plan:  -Optimize pain control, titrate as needed  -Bowel regimen  -Bedrest  -Antiemetics prn  -Tylenol as needed for fever   -f/u NSGY/Spine  -PT/OT     S/p NSGY eval- not a surgical candidate  Ch and stable, sec lumber DDD- Neural Foraminal narrowing at L4 and L5 but no Central disc herniation    Coronary artery disease of native artery of native heart with stable angina pectoris  Patient with known CAD, which is controlled Will continue  home medications  and monitor for S/Sx of angina/ACS. Continue to monitor on telemetry.       Chronic obstructive pulmonary disease  Patient's COPD is with exacerbation noted by continued dyspnea and worsening of baseline hypoxia currently.  Patient is currently off COPD Pathway. Continue scheduled inhalers  BiPAP, Steroids, Antibiotics, and Supplemental oxygen and monitor respiratory status closely.       Acute kidney injury superimposed on chronic kidney disease  ROSARIO is likely due to pre-renal azotemia due to intravascular volume depletion. Baseline creatinine is  1.5-1.7 . Most recent creatinine and eGFR are listed below.  Recent Labs     11/05/24  0514 11/06/24  0519 11/07/24  0553   CREATININE 1.9*  1.9* 1.9* 1.5*   EGFRNORACEVR 37*  37* 37* 49*     Plan  -ROSARIO is  currently undergoing medical managment  -Avoid nephrotoxins and renally dose meds for GFR listed above  -Monitor urine output, serial BMP, and adjust therapy as needed  -Continue home medications and treatment of CHF  -Improving    Diabetes  Patient's FSGs are uncontrolled due to hypoglycemia on current medication regimen.  Last A1c reviewed-   Lab Results   Component Value Date    HGBA1C 5.6 08/14/2024     Most recent fingerstick glucose reviewed-   Recent Labs   Lab 11/04/24  2116 11/05/24  0455 11/05/24  0759 11/05/24  1135   POCTGLUCOSE 125* 103 100 160*       Current correctional scale  Low  Titrate as needed   anti-hyperglycemic dose as follows-   Antihyperglycemics (From admission, onward)      None        Plan:  -SSI  -A1c  -Accu-checks  -Hold oral hypoglycemics while patient is in the hospital  -Hypoglycemic protocol      No further hypoglycemia    Hypertension associated with stage 3 chronic kidney disease due to type 2 diabetes mellitus  Chronic, controlled.  Latest blood pressure and vitals reviewed-   Temp:  [97.5 °F (36.4 °C)-98.4 °F (36.9 °C)]   Pulse:  []   Resp:  [16-32]   BP: (120-142)/(67-79)   SpO2:  [92 %-97 %] .   Home meds for hypertension were reviewed and noted below.   Hypertension Medications               bisoprolol (ZEBETA) 5 MG tablet TAKE 1/2 TABLET BY MOUTH EVERY DAY    torsemide (DEMADEX) 20 MG Tab Take 2 tablets (40 mg total) by mouth once daily.     While in the hospital, will manage blood pressure as follows; Continue home antihypertensive regimen    Will utilize p.r.n. blood pressure medication only if patient's blood pressure greater than  180/110 and he develops symptoms such as worsening chest pain or shortness of breath.    History of gout  Recently treated with colchicine and prednisone.   Plan:  -continue to monitor and treat as needed    Hyperlipidemia associated with type 2 diabetes mellitus  Patient is chronically on statin.will continue for now. Last Lipid Panel:   Lab Results   Component Value Date    CHOL 138 08/14/2024    HDL 45 08/14/2024    LDLCALC 69.4 08/14/2024    TRIG 118 08/14/2024    CHOLHDL 32.6 08/14/2024   Plan:  -Continue home medication  -low fat/low calorie diet      BRADLEY on CPAP  Currently on CPAP outpatient.  Plan:  -continue CPAP q.h.s.       Class 1 obesity due to excess calories with serious comorbidity and body mass index (BMI) of 33.0 to 33.9 in adult  Body mass index is 33.63 kg/m². Morbid obesity complicates all aspects of disease management from diagnostic modalities to treatment. Weight loss encouraged and health benefits explained to  patient.       E coli bacteremia        Drug-induced constipation  Resolved, had 2 BMs in the ICU        VTE Risk Mitigation (From admission, onward)           Ordered     enoxaparin injection 40 mg  Daily         11/03/24 0309     IP VTE HIGH RISK PATIENT  Once         11/03/24 0309     Place sequential compression device  Until discontinued         11/03/24 0309                    Discharge Planning   ANTHONY:      Code Status: Full Code   Is the patient medically ready for discharge?:     Reason for patient still in hospital (select all that apply): Patient trending condition, Laboratory test, and Treatment  Discharge Plan A: Skilled Nursing Facility   Discharge Delays: None known at this time              Jeffy Velazquez MD  Department of Hospital Medicine   [facility] [facility] ([facility])

## 2024-11-07 NOTE — ASSESSMENT & PLAN NOTE
"Patient has Combined Systolic and Diastolic heart failure that is Acute on chronic. On presentation their CHF was decompensated. Evidence of decompensated CHF on presentation includes: edema, orthopnea, paroxysmal nocturnal dyspnea (PND), dyspnea on exertion (GOVEA), and shortness of breath. The etiology of their decompensation is likely dietary indiscretion, increased fluid intake, and infection . Most recent BNP and echo results are listed below.  No results for input(s): "BNP" in the last 72 hours.    Latest ECHO  Results for orders placed during the hospital encounter of 10/23/23    Echo    Interpretation Summary    Left Ventricle: The left ventricle is normal in size. Normal wall thickness. There is mild concentric hypertrophy. regional wall motion abnormalities present. There is low normal systolic function with a visually estimated ejection fraction of 50 - 55%. Biplane (2D) method of discs ejection fraction is 49%. There is normal diastolic function.    Left Atrium: Left atrium is mildly dilated.    Right Ventricle: Normal right ventricular cavity size. Wall thickness is normal. Right ventricle wall motion  is normal. Systolic function is normal.    Mitral Valve: There is mild to moderate regurgitation.    Tricuspid Valve: There is mild regurgitation.    Pulmonary Artery: There is moderate pulmonary hypertension. The estimated pulmonary artery systolic pressure is 50 mmHg.    IVC/SVC: Normal venous pressure at 3 mmHg.    Current Heart Failure Medications       Plan  -Monitor strict I&Os and daily weights.    -Place on telemetry  -Low sodium diet  -Place on fluid restriction of 1.5 L.   -Cardiology has not been consulted  -The patient's volume status is stable but not at their baseline as indicated by edema, orthopnea, paroxysmal nocturnal dyspnea (PND), and shortness of breath  -Continue home medications    Better, diuresed upon admit  Cont same Tx  "

## 2024-11-07 NOTE — PT/OT/SLP PROGRESS
Physical Therapy  Treatment    Santiago Khan   MRN: [id number]   Admitting Diagnosis: Severe sepsis    PT Received On: 11/07/24  PT Start Time: 1020     PT Stop Time: 1045    PT Total Time (min): 25 min       Billable Minutes:  Gait Training 15 and Therapeutic Activity 10    Treatment Type: Treatment  PT/PTA: PTA     Number of PTA visits since last PT visit: 1       General Precautions: Standard, fall  Orthopedic Precautions: N/A  Braces: N/A  Respiratory Status: Room air    Spiritual, Cultural Beliefs, Worship Practices, Values that Affect Care: yes    Subjective:  Communicated with patient's nurse, Geovani, and completed Epic chart review prior to session.  Patient agreed to PT session.     Pain/Comfort  Pain Rating 1: 0/10  Pain Rating Post-Intervention 1: 0/10    Objective:   Patient found with: peripheral IV, telemetry    Patient found sitting up in chair.     STS from chair > RW: CGA (VC for hand placement)    40ft x2 trials w/ RW SBA (moderate length standing rest break between trials)    Stand pivot T/F to chair w/ RW: SBA    Educated patient on importance of increased tolerance to upright position and direct impact on CV endurance and strength. Patient encouraged to sit up in chair/ EOB, for a minimum of 2 consecutive hours, 3x per day. Encouraged patient to perform AROM TE to BLE throughout the day within all available planes of motion. Re enforced importance of utilizing call light to meet needs in room and not attempt to get up without staff assistance. Patient verbalized understanding and agreed to comply.      AM-PAC 6 CLICK MOBILITY  How much help from another person does this patient currently need?   1 = Unable, Total/Dependent Assistance  2 = A lot, Maximum/Moderate Assistance  3 = A little, Minimum/Contact Guard/Supervision  4 = None, Modified [place]/Independent    Turning over in bed (including adjusting bedclothes, sheets and blankets)?: 1 (NT)  Sitting down on and standing up from  a chair with arms (e.g., wheelchair, bedside commode, etc.): 3  Moving from lying on back to sitting on the side of the bed?: 1 (NT)  Moving to and from a bed to a chair (including a wheelchair)?: 3  Need to walk in hospital room?: 3  Climbing 3-5 steps with a railing?: 1 (NT)  Basic Mobility Total Score: 12    AM-PAC Raw Score CMS G-Code Modifier Level of Impairment Assistance   6 % Total / Unable   7 - 9 CM 80 - 100% Maximal Assist   10 - 14 CL 60 - 80% Moderate Assist   15 - 19 CK 40 - 60% Moderate Assist   20 - 22 CJ 20 - 40% Minimal Assist   23 CI 1-20% SBA / CGA   24 CH 0% Independent/ Mod I     Patient left up in chair with call button in reach and visitor present.    Assessment:  Santiago Khan is a 74 y.o. male with a medical diagnosis of Severe sepsis and presents with overall decline in functional mobility. Patient would continue to benefit from skilled PT to address functional limitations listed below in order to return to PLOF/decrease caregiver burden.     Rehab identified problem list/impairments: weakness, impaired endurance, impaired self care skills, impaired functional mobility, gait instability, impaired balance, pain, decreased safety awareness, decreased lower extremity function, decreased ROM, impaired cardiopulmonary response to activity, edema    Rehab potential is fair.    Activity tolerance: Fair    Discharge recommendations: Moderate Intensity Therapy      Barriers to discharge:      Equipment recommendations: to be determined by next level of care     GOALS:   Multidisciplinary Problems       Physical Therapy Goals          Problem: Physical Therapy    Goal Priority Disciplines Outcome Interventions   Physical Therapy Goal     PT, PT/OT Progressing    Description: LTG'S TO BE MET IN 14 DAYS (11-18-24)  PT WILL REQUIRE SBA FOR BED MOBILITY  PT WILL REQUIRE SBA FOR BED<>CHAIR TF'S  PT WILL  FEET WITH RW AND CGA  PT WILL INC AMPAC SCORE BY 2 POINTS TO PROGRESS GROSS  [facility] MOBILITY                         PLAN:    Patient to be seen 3 x/week to address the above listed problems via gait training, therapeutic activities, therapeutic exercises  Plan of Care expires: 11/18/24  Plan of Care reviewed with: patient         11/07/2024

## 2024-11-08 ENCOUNTER — ANESTHESIA (OUTPATIENT)
Dept: SURGERY | Facility: HOSPITAL | Age: 74
DRG: 871 | End: 2024-11-08
Payer: MEDICARE

## 2024-11-08 ENCOUNTER — ANESTHESIA EVENT (OUTPATIENT)
Dept: SURGERY | Facility: HOSPITAL | Age: 74
DRG: 871 | End: 2024-11-08
Payer: MEDICARE

## 2024-11-08 PROBLEM — K59.03 DRUG-INDUCED CONSTIPATION: Status: RESOLVED | Noted: 2024-11-03 | Resolved: 2024-11-08

## 2024-11-08 PROBLEM — L02.419 ABSCESS OF LOWER LEG: Status: ACTIVE | Noted: 2024-11-08

## 2024-11-08 PROBLEM — L02.415 ABSCESS OF RIGHT LEG: Status: ACTIVE | Noted: 2024-11-08

## 2024-11-08 LAB
ALBUMIN SERPL BCP-MCNC: 2.6 G/DL (ref 3.5–5.2)
ALP SERPL-CCNC: 359 U/L (ref 40–150)
ALT SERPL W/O P-5'-P-CCNC: 51 U/L (ref 10–44)
ANION GAP SERPL CALC-SCNC: 13 MMOL/L (ref 8–16)
ANISOCYTOSIS BLD QL SMEAR: SLIGHT
AST SERPL-CCNC: 41 U/L (ref 10–40)
BACTERIA BLD CULT: NORMAL
BASOPHILS NFR BLD: 0 % (ref 0–1.9)
BILIRUB SERPL-MCNC: 0.7 MG/DL (ref 0.1–1)
BUN SERPL-MCNC: 28 MG/DL (ref 8–23)
CALCIUM SERPL-MCNC: 9.4 MG/DL (ref 8.7–10.5)
CHLORIDE SERPL-SCNC: 100 MMOL/L (ref 95–110)
CO2 SERPL-SCNC: 23 MMOL/L (ref 23–29)
CREAT SERPL-MCNC: 1.3 MG/DL (ref 0.5–1.4)
DACRYOCYTES BLD QL SMEAR: ABNORMAL
DIFFERENTIAL METHOD BLD: ABNORMAL
EOSINOPHIL NFR BLD: 0 % (ref 0–8)
ERYTHROCYTE [DISTWIDTH] IN BLOOD BY AUTOMATED COUNT: 17.7 % (ref 11.5–14.5)
EST. GFR  (NO RACE VARIABLE): 58 ML/MIN/1.73 M^2
GLUCOSE SERPL-MCNC: 99 MG/DL (ref 70–110)
HCT VFR BLD AUTO: 39.3 % (ref 40–54)
HGB BLD-MCNC: 12.7 G/DL (ref 14–18)
IMM GRANULOCYTES # BLD AUTO: ABNORMAL K/UL (ref 0–0.04)
IMM GRANULOCYTES NFR BLD AUTO: ABNORMAL % (ref 0–0.5)
LYMPHOCYTES NFR BLD: 10 % (ref 18–48)
MAGNESIUM SERPL-MCNC: 1.9 MG/DL (ref 1.6–2.6)
MCH RBC QN AUTO: 29.1 PG (ref 27–31)
MCHC RBC AUTO-ENTMCNC: 32.3 G/DL (ref 32–36)
MCV RBC AUTO: 90 FL (ref 82–98)
MONOCYTES NFR BLD: 3 % (ref 4–15)
MYELOCYTES NFR BLD MANUAL: 3 %
NEUTROPHILS NFR BLD: 77 % (ref 38–73)
NEUTS BAND NFR BLD MANUAL: 7 %
NRBC BLD-RTO: 0 /100 WBC
OVALOCYTES BLD QL SMEAR: ABNORMAL
PHOSPHATE SERPL-MCNC: 3.3 MG/DL (ref 2.7–4.5)
PLATELET # BLD AUTO: 295 K/UL (ref 150–450)
PLATELET BLD QL SMEAR: ABNORMAL
PMV BLD AUTO: 9.7 FL (ref 9.2–12.9)
POCT GLUCOSE: 105 MG/DL (ref 70–110)
POCT GLUCOSE: 140 MG/DL (ref 70–110)
POCT GLUCOSE: 162 MG/DL (ref 70–110)
POCT GLUCOSE: 99 MG/DL (ref 70–110)
POTASSIUM SERPL-SCNC: 3.9 MMOL/L (ref 3.5–5.1)
PROT SERPL-MCNC: 7.5 G/DL (ref 6–8.4)
RBC # BLD AUTO: 4.36 M/UL (ref 4.6–6.2)
SODIUM SERPL-SCNC: 136 MMOL/L (ref 136–145)
STOMATOCYTES BLD QL SMEAR: PRESENT
WBC # BLD AUTO: 19.02 K/UL (ref 3.9–12.7)

## 2024-11-08 PROCEDURE — C1751 CATH, INF, PER/CENT/MIDLINE: HCPCS

## 2024-11-08 PROCEDURE — 83735 ASSAY OF MAGNESIUM: CPT | Performed by: NURSE PRACTITIONER

## 2024-11-08 PROCEDURE — 25000003 PHARM REV CODE 250: Performed by: SURGERY

## 2024-11-08 PROCEDURE — 21400001 HC TELEMETRY ROOM

## 2024-11-08 PROCEDURE — 94761 N-INVAS EAR/PLS OXIMETRY MLT: CPT

## 2024-11-08 PROCEDURE — 36569 INSJ PICC 5 YR+ W/O IMAGING: CPT

## 2024-11-08 PROCEDURE — 25000003 PHARM REV CODE 250: Performed by: NURSE PRACTITIONER

## 2024-11-08 PROCEDURE — 63600175 PHARM REV CODE 636 W HCPCS

## 2024-11-08 PROCEDURE — 99233 SBSQ HOSP IP/OBS HIGH 50: CPT | Mod: GT,,, | Performed by: STUDENT IN AN ORGANIZED HEALTH CARE EDUCATION/TRAINING PROGRAM

## 2024-11-08 PROCEDURE — 25000003 PHARM REV CODE 250: Performed by: INTERNAL MEDICINE

## 2024-11-08 PROCEDURE — 25000003 PHARM REV CODE 250: Performed by: EMERGENCY MEDICINE

## 2024-11-08 PROCEDURE — 37000008 HC ANESTHESIA 1ST 15 MINUTES: Performed by: SURGERY

## 2024-11-08 PROCEDURE — 80053 COMPREHEN METABOLIC PANEL: CPT | Performed by: NURSE PRACTITIONER

## 2024-11-08 PROCEDURE — 87070 CULTURE OTHR SPECIMN AEROBIC: CPT | Performed by: SURGERY

## 2024-11-08 PROCEDURE — 84100 ASSAY OF PHOSPHORUS: CPT | Performed by: NURSE PRACTITIONER

## 2024-11-08 PROCEDURE — 02HV33Z INSERTION OF INFUSION DEVICE INTO SUPERIOR VENA CAVA, PERCUTANEOUS APPROACH: ICD-10-PCS | Performed by: INTERNAL MEDICINE

## 2024-11-08 PROCEDURE — 87205 SMEAR GRAM STAIN: CPT | Performed by: SURGERY

## 2024-11-08 PROCEDURE — 99900035 HC TECH TIME PER 15 MIN (STAT)

## 2024-11-08 PROCEDURE — 97530 THERAPEUTIC ACTIVITIES: CPT

## 2024-11-08 PROCEDURE — 63600175 PHARM REV CODE 636 W HCPCS: Performed by: STUDENT IN AN ORGANIZED HEALTH CARE EDUCATION/TRAINING PROGRAM

## 2024-11-08 PROCEDURE — 25000003 PHARM REV CODE 250

## 2024-11-08 PROCEDURE — 87102 FUNGUS ISOLATION CULTURE: CPT | Performed by: SURGERY

## 2024-11-08 PROCEDURE — 63600175 PHARM REV CODE 636 W HCPCS: Mod: JZ,JG | Performed by: SURGERY

## 2024-11-08 PROCEDURE — 36000704 HC OR TIME LEV I 1ST 15 MIN: Performed by: SURGERY

## 2024-11-08 PROCEDURE — 85027 COMPLETE CBC AUTOMATED: CPT | Performed by: NURSE PRACTITIONER

## 2024-11-08 PROCEDURE — 99223 1ST HOSP IP/OBS HIGH 75: CPT | Mod: 57,,, | Performed by: SURGERY

## 2024-11-08 PROCEDURE — 0H9KXZZ DRAINAGE OF RIGHT LOWER LEG SKIN, EXTERNAL APPROACH: ICD-10-PCS | Performed by: SURGERY

## 2024-11-08 PROCEDURE — 36415 COLL VENOUS BLD VENIPUNCTURE: CPT | Performed by: NURSE PRACTITIONER

## 2024-11-08 PROCEDURE — 85007 BL SMEAR W/DIFF WBC COUNT: CPT | Performed by: NURSE PRACTITIONER

## 2024-11-08 PROCEDURE — 63600175 PHARM REV CODE 636 W HCPCS: Performed by: SURGERY

## 2024-11-08 PROCEDURE — 37000009 HC ANESTHESIA EA ADD 15 MINS: Performed by: SURGERY

## 2024-11-08 PROCEDURE — 71000033 HC RECOVERY, INTIAL HOUR: Performed by: SURGERY

## 2024-11-08 PROCEDURE — 36000705 HC OR TIME LEV I EA ADD 15 MIN: Performed by: SURGERY

## 2024-11-08 PROCEDURE — 87075 CULTR BACTERIA EXCEPT BLOOD: CPT | Performed by: SURGERY

## 2024-11-08 RX ORDER — METOPROLOL TARTRATE 25 MG/1
12.5 TABLET ORAL 2 TIMES DAILY
Status: DISCONTINUED | OUTPATIENT
Start: 2024-11-08 | End: 2024-11-12 | Stop reason: HOSPADM

## 2024-11-08 RX ORDER — FENTANYL CITRATE 50 UG/ML
INJECTION, SOLUTION INTRAMUSCULAR; INTRAVENOUS
Status: DISCONTINUED | OUTPATIENT
Start: 2024-11-08 | End: 2024-11-08

## 2024-11-08 RX ORDER — OXYCODONE AND ACETAMINOPHEN 5; 325 MG/1; MG/1
1 TABLET ORAL
Status: DISCONTINUED | OUTPATIENT
Start: 2024-11-08 | End: 2024-11-08 | Stop reason: HOSPADM

## 2024-11-08 RX ORDER — KETAMINE HCL IN 0.9 % NACL 50 MG/5 ML
SYRINGE (ML) INTRAVENOUS
Status: DISCONTINUED | OUTPATIENT
Start: 2024-11-08 | End: 2024-11-08

## 2024-11-08 RX ORDER — ATORVASTATIN CALCIUM 10 MG/1
20 TABLET, FILM COATED ORAL DAILY
Status: DISCONTINUED | OUTPATIENT
Start: 2024-11-08 | End: 2024-11-10

## 2024-11-08 RX ORDER — LIDOCAINE HYDROCHLORIDE 20 MG/ML
INJECTION, SOLUTION EPIDURAL; INFILTRATION; INTRACAUDAL; PERINEURAL
Status: DISCONTINUED | OUTPATIENT
Start: 2024-11-08 | End: 2024-11-08

## 2024-11-08 RX ORDER — ONDANSETRON HYDROCHLORIDE 2 MG/ML
4 INJECTION, SOLUTION INTRAVENOUS DAILY PRN
Status: DISCONTINUED | OUTPATIENT
Start: 2024-11-08 | End: 2024-11-08 | Stop reason: HOSPADM

## 2024-11-08 RX ORDER — TORSEMIDE 10 MG/1
40 TABLET ORAL DAILY
Status: DISCONTINUED | OUTPATIENT
Start: 2024-11-09 | End: 2024-11-12 | Stop reason: HOSPADM

## 2024-11-08 RX ORDER — ASPIRIN 81 MG/1
81 TABLET ORAL DAILY
Status: DISCONTINUED | OUTPATIENT
Start: 2024-11-08 | End: 2024-11-12 | Stop reason: HOSPADM

## 2024-11-08 RX ORDER — LIDOCAINE HYDROCHLORIDE 10 MG/ML
INJECTION, SOLUTION INFILTRATION; PERINEURAL
Status: DISCONTINUED | OUTPATIENT
Start: 2024-11-08 | End: 2024-11-08 | Stop reason: HOSPADM

## 2024-11-08 RX ORDER — LINEZOLID 600 MG/1
600 TABLET, FILM COATED ORAL EVERY 12 HOURS
Status: DISCONTINUED | OUTPATIENT
Start: 2024-11-08 | End: 2024-11-12 | Stop reason: HOSPADM

## 2024-11-08 RX ORDER — CHLORHEXIDINE GLUCONATE ORAL RINSE 1.2 MG/ML
10 SOLUTION DENTAL 2 TIMES DAILY
Status: DISCONTINUED | OUTPATIENT
Start: 2024-11-08 | End: 2024-11-12 | Stop reason: HOSPADM

## 2024-11-08 RX ORDER — PROPOFOL 10 MG/ML
VIAL (ML) INTRAVENOUS
Status: DISCONTINUED | OUTPATIENT
Start: 2024-11-08 | End: 2024-11-08

## 2024-11-08 RX ORDER — HYDROMORPHONE HYDROCHLORIDE 1 MG/ML
0.2 INJECTION, SOLUTION INTRAMUSCULAR; INTRAVENOUS; SUBCUTANEOUS EVERY 5 MIN PRN
Status: DISCONTINUED | OUTPATIENT
Start: 2024-11-08 | End: 2024-11-08 | Stop reason: HOSPADM

## 2024-11-08 RX ORDER — BUPIVACAINE HYDROCHLORIDE 2.5 MG/ML
INJECTION, SOLUTION EPIDURAL; INFILTRATION; INTRACAUDAL
Status: DISCONTINUED | OUTPATIENT
Start: 2024-11-08 | End: 2024-11-08 | Stop reason: HOSPADM

## 2024-11-08 RX ORDER — SODIUM CHLORIDE, SODIUM LACTATE, POTASSIUM CHLORIDE, CALCIUM CHLORIDE 600; 310; 30; 20 MG/100ML; MG/100ML; MG/100ML; MG/100ML
INJECTION, SOLUTION INTRAVENOUS CONTINUOUS
Status: DISCONTINUED | OUTPATIENT
Start: 2024-11-08 | End: 2024-11-09

## 2024-11-08 RX ORDER — ENOXAPARIN SODIUM 100 MG/ML
40 INJECTION SUBCUTANEOUS EVERY 24 HOURS
Status: DISCONTINUED | OUTPATIENT
Start: 2024-11-09 | End: 2024-11-12 | Stop reason: HOSPADM

## 2024-11-08 RX ORDER — HYDROMORPHONE HYDROCHLORIDE 1 MG/ML
0.5 INJECTION, SOLUTION INTRAMUSCULAR; INTRAVENOUS; SUBCUTANEOUS EVERY 4 HOURS PRN
Status: DISCONTINUED | OUTPATIENT
Start: 2024-11-08 | End: 2024-11-11

## 2024-11-08 RX ORDER — HYDROCODONE BITARTRATE AND ACETAMINOPHEN 5; 325 MG/1; MG/1
1 TABLET ORAL EVERY 4 HOURS PRN
Status: DISCONTINUED | OUTPATIENT
Start: 2024-11-08 | End: 2024-11-12 | Stop reason: HOSPADM

## 2024-11-08 RX ORDER — GLUCAGON 1 MG
1 KIT INJECTION
Status: DISCONTINUED | OUTPATIENT
Start: 2024-11-08 | End: 2024-11-08 | Stop reason: HOSPADM

## 2024-11-08 RX ADMIN — Medication 100 MG: at 09:11

## 2024-11-08 RX ADMIN — SODIUM CHLORIDE, POTASSIUM CHLORIDE, SODIUM LACTATE AND CALCIUM CHLORIDE: 600; 310; 30; 20 INJECTION, SOLUTION INTRAVENOUS at 07:11

## 2024-11-08 RX ADMIN — PROPOFOL 30 MG: 10 INJECTION, EMULSION INTRAVENOUS at 04:11

## 2024-11-08 RX ADMIN — CHOLECALCIFEROL TAB 125 MCG (5000 UNIT) 5000 UNITS: 125 TAB at 09:11

## 2024-11-08 RX ADMIN — PROPOFOL 20 MG: 10 INJECTION, EMULSION INTRAVENOUS at 05:11

## 2024-11-08 RX ADMIN — HYDROMORPHONE HYDROCHLORIDE 0.5 MG: 1 INJECTION, SOLUTION INTRAMUSCULAR; INTRAVENOUS; SUBCUTANEOUS at 10:11

## 2024-11-08 RX ADMIN — GUAIFENESIN 600 MG: 600 TABLET, EXTENDED RELEASE ORAL at 09:11

## 2024-11-08 RX ADMIN — GUAIFENESIN 600 MG: 600 TABLET, EXTENDED RELEASE ORAL at 08:11

## 2024-11-08 RX ADMIN — LATANOPROST 1 DROP: 50 SOLUTION OPHTHALMIC at 08:11

## 2024-11-08 RX ADMIN — LINEZOLID 600 MG: 600 TABLET, FILM COATED ORAL at 08:11

## 2024-11-08 RX ADMIN — PROPOFOL 20 MG: 10 INJECTION, EMULSION INTRAVENOUS at 04:11

## 2024-11-08 RX ADMIN — CEFTRIAXONE 2 G: 2 INJECTION, POWDER, FOR SOLUTION INTRAMUSCULAR; INTRAVENOUS at 05:11

## 2024-11-08 RX ADMIN — Medication 15 MG: at 04:11

## 2024-11-08 RX ADMIN — CHLORHEXIDINE GLUCONATE 0.12% ORAL RINSE 10 ML: 1.2 LIQUID ORAL at 08:11

## 2024-11-08 RX ADMIN — Medication 1 TABLET: at 09:11

## 2024-11-08 RX ADMIN — ATORVASTATIN CALCIUM 20 MG: 10 TABLET, FILM COATED ORAL at 02:11

## 2024-11-08 RX ADMIN — MUPIROCIN: 20 OINTMENT TOPICAL at 08:11

## 2024-11-08 RX ADMIN — MUPIROCIN: 20 OINTMENT TOPICAL at 09:11

## 2024-11-08 RX ADMIN — HYDROMORPHONE HYDROCHLORIDE 0.5 MG: 1 INJECTION, SOLUTION INTRAMUSCULAR; INTRAVENOUS; SUBCUTANEOUS at 06:11

## 2024-11-08 RX ADMIN — METOPROLOL TARTRATE 12.5 MG: 25 TABLET, FILM COATED ORAL at 08:11

## 2024-11-08 RX ADMIN — LIDOCAINE HYDROCHLORIDE 20 MG: 20 INJECTION, SOLUTION EPIDURAL; INFILTRATION; INTRACAUDAL; PERINEURAL at 04:11

## 2024-11-08 RX ADMIN — THERA TABS 1 TABLET: TAB at 09:11

## 2024-11-08 RX ADMIN — FENTANYL CITRATE 25 MCG: 50 INJECTION, SOLUTION INTRAMUSCULAR; INTRAVENOUS at 04:11

## 2024-11-08 NOTE — ASSESSMENT & PLAN NOTE
Notably worsening. Leukocytosis trending up. Will await surgical evaluation. If aspirated recommend aerobic and anaerobic cultures with grams stain. Plan to add GP coverage using PO linezolid 600 mg BID after intervention.

## 2024-11-08 NOTE — ASSESSMENT & PLAN NOTE
>>ASSESSMENT AND PLAN FOR ABSCESS OF LOWER LEG WRITTEN ON 11/8/2024 11:00 AM BY ANA MARIA ARCE MD    The patient has developed an abscess of the right lower leg.    He would benefit from incision and drainage.      The patient had a regular diet for breakfast which she finished at 8:30 a.m..      Will schedule a incision and drainage of the right lower leg abscess for 4:30 p.m., 8 hours after his last oral intake.  The procedure will be done with sedation and local anesthetic.      The risks of surgery include infection, bleeding, recurrence, need to create a large wound.  The wound would then be left open to heal by secondary intention.      Consent will be obtained

## 2024-11-08 NOTE — SUBJECTIVE & OBJECTIVE
Interval History: Patient with erythema below right knee. Signs of infection. Also with leukocytosis. Will not escalate therapy pending surgical evaluation. Would send sample for cultures.     Review of Systems   Gastrointestinal:  Positive for abdominal pain.   Musculoskeletal:  Positive for arthralgias, back pain and myalgias.   All other systems reviewed and are negative.    Objective:     Vital Signs (Most Recent):  Temp: 98.6 °F (37 °C) (11/08/24 0820)  Pulse: 102 (11/08/24 0820)  Resp: 20 (11/08/24 0820)  BP: (!) 149/78 (11/08/24 0820)  SpO2: (!) 93 % (11/08/24 0820) Vital Signs (24h Range):  Temp:  [97.5 °F (36.4 °C)-98.6 °F (37 °C)] 98.6 °F (37 °C)  Pulse:  [] 102  Resp:  [18-20] 20  SpO2:  [92 %-97 %] 93 %  BP: (120-149)/(67-78) 149/78     Weight: 96.3 kg (212 lb 4.9 oz)  Body mass index is 33.25 kg/m².    Estimated Creatinine Clearance: 56 mL/min (based on SCr of 1.3 mg/dL).     Physical Exam  Constitutional:       General: He is not in acute distress.     Appearance: Normal appearance. He is not ill-appearing.   Cardiovascular:      Rate and Rhythm: Normal rate and regular rhythm.      Pulses: Normal pulses.      Heart sounds: Normal heart sounds. No murmur heard.     No friction rub. No gallop.   Pulmonary:      Effort: Pulmonary effort is normal. No respiratory distress.      Breath sounds: Normal breath sounds.   Abdominal:      General: Abdomen is flat. Bowel sounds are normal. There is no distension.      Palpations: Abdomen is soft.      Tenderness: There is no abdominal tenderness.   Musculoskeletal:      Comments: Right knee erythematous; fluctuance palpated   Skin:     General: Skin is warm and dry.   Neurological:      Mental Status: He is alert.          Significant Labs: Blood Culture:   Recent Labs   Lab 11/03/24  0125 11/06/24  1100   LABBLOO No Growth to date  No Growth to date  No Growth to date  No Growth to date  No Growth to date  Gram stain dao bottle: Gram negative rods   Results called to and read back by:Sharifa Power RN 11/04/2024  00:22  ESCHERICHIA COLI* No Growth to date  No Growth to date  No Growth to date  No Growth to date     CBC:   Recent Labs   Lab 11/07/24  0553 11/08/24  0508   WBC 18.07* 19.02*   HGB 13.4* 12.7*   HCT 42.6 39.3*    295     CMP:   Recent Labs   Lab 11/07/24  0553 11/08/24  0508    136   K 3.7 3.9    100   CO2 26 23   GLU 95 99   BUN 36* 28*   CREATININE 1.5* 1.3   CALCIUM 9.2 9.4   PROT 7.4 7.5   ALBUMIN 2.6* 2.6*   BILITOT 0.8 0.7   ALKPHOS 437* 359*   AST 32 41*   ALT 40 51*   ANIONGAP 10 13     Microbiology Results (last 7 days)       Procedure Component Value Units Date/Time    Blood culture [[id number]] Collected: 11/06/24 1100    Order Status: Completed Specimen: Blood from Peripheral, Forearm, Left Updated: 11/07/24 2022     Blood Culture, Routine No Growth to date      No Growth to date    Blood culture [[id number]] Collected: 11/06/24 1100    Order Status: Completed Specimen: Blood from Peripheral, Hand, Left Updated: 11/07/24 2022     Blood Culture, Routine No Growth to date      No Growth to date    Blood culture [[id number]] Collected: 11/03/24 0125    Order Status: Completed Specimen: Blood from Peripheral, Antecubital, Left Updated: 11/07/24 1412     Blood Culture, Routine No Growth to date      No Growth to date      No Growth to date      No Growth to date      No Growth to date    Blood culture [[id number]]  (Abnormal)  (Susceptibility) Collected: 11/03/24 0125    Order Status: Completed Specimen: Blood from Peripheral, Hand, Right Updated: 11/06/24 1051     Blood Culture, Routine Gram stain dao bottle: Gram negative rods      Results called to and read back by:Sharifa Power RN 11/04/2024  00:22      ESCHERICHIA COLI    Culture, MRSA [[id number]] Collected: 11/04/24 0557    Order Status: Sent Specimen: MRSA source from Nares, Left Updated: 11/04/24 1036    Rapid Organism ID by PCR (from Blood culture)  [[id number]]  (Abnormal) Collected: 11/03/24 0125    Order Status: Completed Updated: 11/04/24 0141     Enterococcus faecalis Not Detected     Enterococcus faecium Not Detected     Listeria monocytogenes Not Detected     Staphylococcus spp. Not Detected     Staphylococcus aureus Not Detected     Staphylococcus epidermidis Not Detected     Staphylococcus lugdunensis Not Detected     Streptococcus species Not Detected     Streptococcus agalactiae Not Detected     Streptococcus pneumoniae Not Detected     Streptococcus pyogenes Not Detected     Acinetobacter calcoaceticus/baumannii complex Not Detected     Bacteroides fragilis Not Detected     Enterobacterales See species for ID     Enterobacter cloacae complex Not Detected     Escherichia coli Detected     Klebsiella aerogenes Not Detected     Klebsiella oxytoca Not Detected     Klebsiella pneumoniae group Not Detected     Proteus Not Detected     Salmonella sp Not Detected     Serratia marcescens Not Detected     Haemophilus influenzae Not Detected     Neisseria meningtidis Not Detected     Pseudomonas aeruginosa Not Detected     Stenotrophomonas maltophilia Not Detected     Candida albicans Not Detected     Candida auris Not Detected     Candida glabrata Not Detected     Candida krusei Not Detected     Candida parapsilosis Not Detected     Candida tropicalis Not Detected     Cryptococcus neoformans/gattii Not Detected     CTX-M (ESBL ) Not Detected     IMP (Carbapenem resistant) Not Detected     KPC resistance gene (Carbapenem resistant) Not Detected     mcr-1  Not Detected     mec A/C  Test Not Applicable     mec A/C and MREJ (MRSA) gene Test Not Applicable     NDM (Carbapenem resistant) Not Detected     OXA-48-like (Carbapenem resistant) Not Detected     van A/B (VRE gene) Test Not Applicable     VIM (Carbapenem resistant) Not Detected    Respiratory Infection Panel (PCR), Nasopharyngeal [[id number]] Collected: 11/03/24 1045    Order Status: Completed  Specimen: Nasopharyngeal Swab Updated: 11/03/24 1201     Respiratory Infection Panel Source NP Swab     Adenovirus Not Detected     Coronavirus 229E, Common Cold Virus Not Detected     Coronavirus HKU1, Common Cold Virus Not Detected     Coronavirus NL63, Common Cold Virus Not Detected     Coronavirus OC43, Common Cold Virus Not Detected     Comment: The Coronavirus strains detected in this test cause the common cold.  These strains are not the COVID-19 (novel Coronavirus)strain   associated with the respiratory disease outbreak.          SARS-CoV2 (COVID-19) Qualitative PCR Not Detected     Human Metapneumovirus Not Detected     Human Rhinovirus/Enterovirus Not Detected     Influenza A (subtypes H1, H1-2009,H3) Not Detected     Influenza B Not Detected     Parainfluenza Virus 1 Not Detected     Parainfluenza Virus 2 Not Detected     Parainfluenza Virus 3 Not Detected     Parainfluenza Virus 4 Not Detected     Respiratory Syncytial Virus Not Detected     Bordetella Parapertussis (SK6847) Not Detected     Bordetella pertussis (ptxP) Not Detected     Chlamydia pneumoniae Not Detected     Mycoplasma pneumoniae Not Detected     Comment: Respiratory Infection Panel testing performed by Multiplex PCR.       Narrative:      Assay not valid for lower respiratory specimens, alternate  testing required.    Respiratory Infection Panel (PCR), Nasopharyngeal [[id number]] Collected: 11/03/24 1009    Order Status: Canceled Specimen: Nasopharyngeal Swab     Influenza A & B by Molecular [[id number]] Collected: 11/03/24 0607    Order Status: Completed Specimen: Nasopharyngeal Swab Updated: 11/03/24 0839     Influenza A, Molecular Negative     Influenza B, Molecular Negative     Flu A & B Source Nasal swab          All pertinent labs within the past 24 hours have been reviewed.    Significant Imaging: I have reviewed all pertinent imaging results/findings within the past 24 hours.

## 2024-11-08 NOTE — CONSULTS
[facility] - Telemetry ([facility])  General Surgery  Consult Note    Patient Name: Santiago Khan  MRN: [id number]  Code Status: Full Code  Admission Date: 11/2/2024  Hospital Length of Stay: 5 days  Attending Physician: Grace Monk MD  Primary Care Provider: Kashif Acosta MD    Patient information was obtained from patient, spouse/SO, past medical records, and ER records.     Inpatient consult to General Surgery  Consult performed by: Torres Magallanes MD  Consult ordered by: Grace Monk MD  Reason for consult: Abscess of the right lower leg  Assessment/Recommendations: Incision drainage in the operating room today.      Surgery at 4:30 P.m. as the patient had a full breakfast finishing at 8:30 a.m.        Subjective:     Principal Problem: Severe sepsis    History of Present Illness: 74-year-old male who was admitted to the hospital for lethargy and respiratory failure.  He also had low glucose.  He was treated in the ICU and subsequently transferred to floor.      The patient had cellulitis of his right lower extremity that was being treated with antibiotics.      A surgery consult was obtained due to an area of swelling fluctuance and tenderness    No current facility-administered medications on file prior to encounter.     Current Outpatient Medications on File Prior to Encounter   Medication Sig    adalimumab (HUMIRA,CF, PEN) 40 mg/0.4 mL PnKt Inject 0.4 mLs (40 mg total) into the skin every 14 (fourteen) days.    albuterol (PROVENTIL/VENTOLIN HFA) 90 mcg/actuation inhaler INHALE 2 PUFFS INTO THE LUNGS EVERY 4 HOURS AS NEEDED    allopurinoL (ZYLOPRIM) 100 MG tablet TAKE 1 TABLET(100 MG) BY MOUTH EVERY DAY    allopurinoL (ZYLOPRIM) 300 MG tablet TAKE 1 TABLET(300 MG) BY MOUTH EVERY DAY    aspirin (ECOTRIN) 81 MG EC tablet Take 81 mg by mouth once daily.    baclofen (LIORESAL) 10 MG tablet Take 1 tablet by mouth every evening.    bisoprolol (ZEBETA) 5 MG tablet TAKE 1/2 TABLET BY MOUTH EVERY  DAY    blood sugar diagnostic Strp Check blood glucose twice per day    blood-glucose meter (ACCU-CHEK GUIDE ME GLUCOSE MTR) Misc USE AS INSTRUCTED    colchicine (COLCRYS) 0.6 mg tablet Take 1 tablet (0.6 mg total) by mouth daily as needed (gout flare).    empagliflozin (JARDIANCE) 10 mg tablet Take 1 tablet (10 mg total) by mouth once daily.    fluticasone propionate (FLONASE) 50 mcg/actuation nasal spray SHAKE LIQUID AND USE 2 SPRAYS(100 MCG) IN EACH NOSTRIL EVERY DAY Strength: 50 mcg/actuation    glimepiride (AMARYL) 4 MG tablet Take 1 tablet (4 mg total) by mouth before breakfast.    guaiFENesin 100 mg/5 ml (ROBITUSSIN) 100 mg/5 mL syrup Take 200 mg by mouth every evening.    HYDROcodone-acetaminophen (NORCO) 7.5-325 mg per tablet Take 1 tablet by mouth every 4 (four) hours as needed (for chronic pain).    lancets (ACCU-CHEK SOFTCLIX LANCETS) Misc Check BG daily    latanoprost 0.005 % ophthalmic solution Place 1 drop into the right eye every evening.    magnesium oxide (MAG-OX) 400 mg (241.3 mg magnesium) tablet TAKE 2 TABLETS(800 MG) BY MOUTH TWICE DAILY    metFORMIN (GLUCOPHAGE) 500 MG tablet TAKE 1 TABLET(500 MG) BY MOUTH DAILY WITH BREAKFAST    multivitamin-minerals-lutein Tab Take 1 tablet by mouth Daily.    omeprazole (PRILOSEC) 40 MG capsule TAKE 1 CAPSULE BY MOUTH EVERY DAY    potassium chloride SA (K-DUR,KLOR-CON) 20 MEQ tablet TAKE 3 TABLETS BY MOUTH TWICE DAILY    simvastatin (ZOCOR) 40 MG tablet TAKE 1 TABLET(40 MG) BY MOUTH EVERY EVENING    torsemide (DEMADEX) 20 MG Tab Take 2 tablets (40 mg total) by mouth once daily.    varenicline (CHANTIX) 1 mg Tab take 1 tablet by mouth twice daily. Take with full glass of water & with food to avoid nausea (Patient not taking: Reported on 10/28/2024)    WIXELA INHUB 250-50 mcg/dose diskus inhaler INHALE 1 PUFF INTO THE LUNGS TWICE DAILY       Review of patient's allergies indicates:   Allergen Reactions    Amitriptyline Rash    Celecoxib Rash       Past Medical  History:   Diagnosis Date    Chronic combined systolic and diastolic congestive heart failure 07/09/2020    Chronic kidney disease, stage 3     Chronic obstructive pulmonary disease 03/20/2023    Wixela 250 mcg, Spiriva respimat. Continue Albuterol inhaler and albuterol nebs   Referral pul dis management, education and assistance with medication  Patient preference to only see a doctor, request follow up with Dr. Springer             Chronic venous insufficiency     DDD (degenerative disc disease), lumbar     DM (diabetes mellitus) with complications     History of gout     Hyperlipidemia associated with type 2 diabetes mellitus     Hypertension associated with stage 3 chronic kidney disease due to type 2 diabetes mellitus     BRADLEY on CPAP     Prostate cancer     prostatectomy in 2010, rising PSA that started in 2021     Past Surgical History:   Procedure Laterality Date    BICEPS TENDON REPAIR      COLONOSCOPY N/A 03/27/2019    Procedure: COLONOSCOPY;  Surgeon: James Roger MD;  Location: [facility] ENDO;  Service: Endoscopy;  Laterality: N/A;    EXPLORATION OF ORBIT Right 9/8/2023    Procedure: EXPLORATION, ORBIT;  Surgeon: Junaid Bartholomew MD;  Location: [facility] OR;  Service: ENT;  Laterality: Right;  possibly need frozen    HEMORRHOID SURGERY      INGUINAL HERNIA REPAIR Left     KNEE ARTHROSCOPY Right     LEFT HEART CATHETERIZATION Left 06/29/2020    Procedure: CATHETERIZATION, HEART, LEFT;  Surgeon: Cheli Wilson MD;  Location: [facility] CATH LAB;  Service: Cardiology;  Laterality: Left;    LUMBAR LAMINECTOMY      PROSTATECTOMY      TONSILLECTOMY       Family History       Problem Relation (Age of Onset)    Alzheimer's disease Father    Coronary artery disease Father (90)    Hyperlipidemia Mother    Prostate cancer Father          Tobacco Use    Smoking status: Former     Current packs/day: 0.00     Average packs/day: 1.5 packs/day for 52.7 years (79.1 ttl pk-yrs)     Types: Cigarettes     Start date: 1/1/1971     Quit  date: 9/15/2023     Years since quittin.1    Smokeless tobacco: Former   Substance and Sexual Activity    Alcohol use: Not Currently    Drug use: Never    Sexual activity: Not Currently     Partners: Female     Review of Systems   Constitutional: Negative.    HENT: Negative.     Eyes: Negative.    Respiratory:  Positive for shortness of breath.    Cardiovascular: Negative.    Gastrointestinal: Negative.    Endocrine: Negative.    Genitourinary: Negative.    Musculoskeletal: Negative.    Skin:  Positive for color change (erythema of the right lower leg).   Allergic/Immunologic: Negative.    Neurological: Negative.    Hematological: Negative.    Psychiatric/Behavioral: Negative.       Objective:     Vital Signs (Most Recent):  Temp: 98.6 °F (37 °C) (24)  Pulse: 102 (24)  Resp: 20 (24)  BP: (!) 149/78 (24)  SpO2: (!) 93 % (24) Vital Signs (24h Range):  Temp:  [97.5 °F (36.4 °C)-98.6 °F (37 °C)] 98.6 °F (37 °C)  Pulse:  [] 102  Resp:  [18-20] 20  SpO2:  [92 %-97 %] 93 %  BP: (120-149)/(67-78) 149/78     Weight: 96.3 kg (212 lb 4.9 oz)  Body mass index is 33.25 kg/m².     Physical Exam  Vitals reviewed.   Constitutional:       Appearance: He is ill-appearing.   Cardiovascular:      Rate and Rhythm: Tachycardia present.   Pulmonary:      Effort: Pulmonary effort is normal.      Breath sounds: Rales present.   Abdominal:      Palpations: Abdomen is soft.      Comments: Obese   Skin:     Comments: There is erythema of the right lower extremity below the knee.  A proximally 5 cm below the knee it was a 4 cm area of fluctuance tenderness and erythema   Psychiatric:         Mood and Affect: Mood normal.         Thought Content: Thought content normal.         Judgment: Judgment normal.            I have reviewed all pertinent lab results within the past 24 hours.  CBC:   Recent Labs   Lab 24  0508   WBC 19.02*   RBC 4.36*   HGB 12.7*   HCT 39.3*       MCV 90   MCH 29.1   MCHC 32.3     BMP:   Recent Labs   Lab 11/08/24  0508   GLU 99      K 3.9      CO2 23   BUN 28*   CREATININE 1.3   CALCIUM 9.4   MG 1.9     CMP:   Recent Labs   Lab 11/08/24  0508   GLU 99   CALCIUM 9.4   ALBUMIN 2.6*   PROT 7.5      K 3.9   CO2 23      BUN 28*   CREATININE 1.3   ALKPHOS 359*   ALT 51*   AST 41*   BILITOT 0.7       Significant Diagnostics:  I have reviewed all pertinent imaging results/findings within the past 24 hours.  CT: I have reviewed all pertinent results/findings within the past 24 hours and my personal findings are:  Abdomen and pelvis    Reading Physician Reading Date Result Priority   Manjeet Cadena MD  [phone] 11/4/2024 ASAP     Narrative & Impression  EXAMINATION:  CT ABDOMEN PELVIS WITHOUT CONTRAST     CLINICAL HISTORY:  abd pain / bacteremia /constipation;     TECHNIQUE:  Low dose axial images, sagittal and coronal reformations were obtained from the lung bases to the pubic symphysis.     COMPARISON:  None     FINDINGS:  Thickening and enlargement of the left krystal abdominal wall.  Mild motion artifacts.  Bosniak class 1 right renal cyst.  Atherosclerotic changes of the aorta iliac vasculature.  No bowel obstruction.  Mild bladder wall thickening.  Fat containing bilateral right greater than left inguinal hernia.  Postsurgical changes adjacent to the left hemiabdomen.  Hepatomegaly is noted.  Moderate atherosclerotic plaque of the aorta iliac vasculature without evidence for aneurysm.  Small splenic granuloma.  Mild bibasilar opacities right greater than left may relate to early pneumonia.  Trace right-sided pleural effusion.  Suspected mild cardiomegaly.  No degenerative joint disease of the spine without evidence for vertebral body compression deformity.  Pancreas adrenal glands grossly unremarkable.  Small hypodense lesions of the left lobe of the liver incompletely evaluated.  Small retroperitoneal lymph nodes may be reactive.   Mild bladder wall thickening.     Impression:     Thickening and enlargement of the left krystal abdominal wall.  Correlate clinically for rectus sheath hematoma.     Mild bibasilar opacities right greater than left may relate to early pneumonia. Trace right-sided pleural effusion     Mild motion artifacts.     All CT scans   are performed using dose optimization techniques including the following: automated exposure control; adjustment of the mA and/or kV; use of iterative reconstruction technique.  Dose modulation was employed for ALARA by means of: Automated exposure control; adjustment of the mA and/or kV according to patient size (this includes techniques or standardized protocols for targeted exams where dose is matched to indication/reason for exam; i.e. extremities or head); and/or use of iterative reconstructive technique.     Assessment/Plan:     * Severe sepsis sec to Cellulitis Legs complicated by E coli Bacteremia  Continue antibiotics with the cellulitis Infectious Disease.    Abscess of lower leg  The patient has developed an abscess of the right lower leg.    He would benefit from incision and drainage.      The patient had a regular diet for breakfast which she finished at 8:30 a.m..      Will schedule a incision and drainage of the right lower leg abscess for 4:30 p.m., 8 hours after his last oral intake.  The procedure will be done with sedation and local anesthetic.      The risks of surgery include infection, bleeding, recurrence, need to create a large wound.  The wound would then be left open to heal by secondary intention.      Consent will be obtained    E coli bacteremia  Management per Medicine/Infectious Disease    Drug-induced constipation  Management per Medicine    Hyperlipidemia associated with type 2 diabetes mellitus  Management per Medicine    Hypertension associated with stage 3 chronic kidney disease due to type 2 diabetes mellitus  Management per hospital Medicine    BRADLEY on  CPAP  Management per hospital Medicine      VTE Risk Mitigation (From admission, onward)           Ordered     IP VTE HIGH RISK PATIENT  Once         11/03/24 0309     Place sequential compression device  Until discontinued         11/03/24 0309                    Thank you for your consult. I will follow-up with patient. Please contact us if you have any additional questions.    Torres Magallanes MD  General Surgery  [facility] - Telemetry ([facility])

## 2024-11-08 NOTE — SUBJECTIVE & OBJECTIVE
No current facility-administered medications on file prior to encounter.     Current Outpatient Medications on File Prior to Encounter   Medication Sig    adalimumab (HUMIRA,CF, PEN) 40 mg/0.4 mL PnKt Inject 0.4 mLs (40 mg total) into the skin every 14 (fourteen) days.    albuterol (PROVENTIL/VENTOLIN HFA) 90 mcg/actuation inhaler INHALE 2 PUFFS INTO THE LUNGS EVERY 4 HOURS AS NEEDED    allopurinoL (ZYLOPRIM) 100 MG tablet TAKE 1 TABLET(100 MG) BY MOUTH EVERY DAY    allopurinoL (ZYLOPRIM) 300 MG tablet TAKE 1 TABLET(300 MG) BY MOUTH EVERY DAY    aspirin (ECOTRIN) 81 MG EC tablet Take 81 mg by mouth once daily.    baclofen (LIORESAL) 10 MG tablet Take 1 tablet by mouth every evening.    bisoprolol (ZEBETA) 5 MG tablet TAKE 1/2 TABLET BY MOUTH EVERY DAY    blood sugar diagnostic Strp Check blood glucose twice per day    blood-glucose meter (ACCU-CHEK GUIDE ME GLUCOSE MTR) Misc USE AS INSTRUCTED    colchicine (COLCRYS) 0.6 mg tablet Take 1 tablet (0.6 mg total) by mouth daily as needed (gout flare).    empagliflozin (JARDIANCE) 10 mg tablet Take 1 tablet (10 mg total) by mouth once daily.    fluticasone propionate (FLONASE) 50 mcg/actuation nasal spray SHAKE LIQUID AND USE 2 SPRAYS(100 MCG) IN EACH NOSTRIL EVERY DAY Strength: 50 mcg/actuation    glimepiride (AMARYL) 4 MG tablet Take 1 tablet (4 mg total) by mouth before breakfast.    guaiFENesin 100 mg/5 ml (ROBITUSSIN) 100 mg/5 mL syrup Take 200 mg by mouth every evening.    HYDROcodone-acetaminophen (NORCO) 7.5-325 mg per tablet Take 1 tablet by mouth every 4 (four) hours as needed (for chronic pain).    lancets (ACCU-CHEK SOFTCLIX LANCETS) Misc Check BG daily    latanoprost 0.005 % ophthalmic solution Place 1 drop into the right eye every evening.    magnesium oxide (MAG-OX) 400 mg (241.3 mg magnesium) tablet TAKE 2 TABLETS(800 MG) BY MOUTH TWICE DAILY    metFORMIN (GLUCOPHAGE) 500 MG tablet TAKE 1 TABLET(500 MG) BY MOUTH DAILY WITH BREAKFAST     multivitamin-minerals-lutein Tab Take 1 tablet by mouth Daily.    omeprazole (PRILOSEC) 40 MG capsule TAKE 1 CAPSULE BY MOUTH EVERY DAY    potassium chloride SA (K-DUR,KLOR-CON) 20 MEQ tablet TAKE 3 TABLETS BY MOUTH TWICE DAILY    simvastatin (ZOCOR) 40 MG tablet TAKE 1 TABLET(40 MG) BY MOUTH EVERY EVENING    torsemide (DEMADEX) 20 MG Tab Take 2 tablets (40 mg total) by mouth once daily.    varenicline (CHANTIX) 1 mg Tab take 1 tablet by mouth twice daily. Take with full glass of water & with food to avoid nausea (Patient not taking: Reported on 10/28/2024)    WIXELA INHUB 250-50 mcg/dose diskus inhaler INHALE 1 PUFF INTO THE LUNGS TWICE DAILY       Review of patient's allergies indicates:   Allergen Reactions    Amitriptyline Rash    Celecoxib Rash       Past Medical History:   Diagnosis Date    Chronic combined systolic and diastolic congestive heart failure 07/09/2020    Chronic kidney disease, stage 3     Chronic obstructive pulmonary disease 03/20/2023    Wixela 250 mcg, Spiriva respimat. Continue Albuterol inhaler and albuterol nebs   Referral pul dis management, education and assistance with medication  Patient preference to only see a doctor, request follow up with Dr. Springer             Chronic venous insufficiency     DDD (degenerative disc disease), lumbar     DM (diabetes mellitus) with complications     History of gout     Hyperlipidemia associated with type 2 diabetes mellitus     Hypertension associated with stage 3 chronic kidney disease due to type 2 diabetes mellitus     BRADLEY on CPAP     Prostate cancer     prostatectomy in 2010, rising PSA that started in 2021     Past Surgical History:   Procedure Laterality Date    BICEPS TENDON REPAIR      COLONOSCOPY N/A 03/27/2019    Procedure: COLONOSCOPY;  Surgeon: James Roger MD;  Location: [facility];  Service: Endoscopy;  Laterality: N/A;    EXPLORATION OF ORBIT Right 9/8/2023    Procedure: EXPLORATION, ORBIT;  Surgeon: Junaid Bartholomew MD;  Location:  [facility] OR;  Service: ENT;  Laterality: Right;  possibly need frozen    HEMORRHOID SURGERY      INGUINAL HERNIA REPAIR Left     KNEE ARTHROSCOPY Right     LEFT HEART CATHETERIZATION Left 2020    Procedure: CATHETERIZATION, HEART, LEFT;  Surgeon: Cheli Wilson MD;  Location: [facility] CATH LAB;  Service: Cardiology;  Laterality: Left;    LUMBAR LAMINECTOMY      PROSTATECTOMY      TONSILLECTOMY       Family History       Problem Relation (Age of Onset)    Alzheimer's disease Father    Coronary artery disease Father (90)    Hyperlipidemia Mother    Prostate cancer Father          Tobacco Use    Smoking status: Former     Current packs/day: 0.00     Average packs/day: 1.5 packs/day for 52.7 years (79.1 ttl pk-yrs)     Types: Cigarettes     Start date: 1971     Quit date: 9/15/2023     Years since quittin.1    Smokeless tobacco: Former   Substance and Sexual Activity    Alcohol use: Not Currently    Drug use: Never    Sexual activity: Not Currently     Partners: Female     Review of Systems   Constitutional: Negative.    HENT: Negative.     Eyes: Negative.    Respiratory:  Positive for shortness of breath.    Cardiovascular: Negative.    Gastrointestinal: Negative.    Endocrine: Negative.    Genitourinary: Negative.    Musculoskeletal: Negative.    Skin:  Positive for color change (erythema of the right lower leg).   Allergic/Immunologic: Negative.    Neurological: Negative.    Hematological: Negative.    Psychiatric/Behavioral: Negative.       Objective:     Vital Signs (Most Recent):  Temp: 98.6 °F (37 °C) (24)  Pulse: 102 (24)  Resp: 20 (24)  BP: (!) 149/78 (24)  SpO2: (!) 93 % (24) Vital Signs (24h Range):  Temp:  [97.5 °F (36.4 °C)-98.6 °F (37 °C)] 98.6 °F (37 °C)  Pulse:  [] 102  Resp:  [18-20] 20  SpO2:  [92 %-97 %] 93 %  BP: (120-149)/(67-78) 149/78     Weight: 96.3 kg (212 lb 4.9 oz)  Body mass index is 33.25 kg/m².     Physical Exam  Vitals  reviewed.   Constitutional:       Appearance: He is ill-appearing.   Cardiovascular:      Rate and Rhythm: Tachycardia present.   Pulmonary:      Effort: Pulmonary effort is normal.      Breath sounds: Rales present.   Abdominal:      Palpations: Abdomen is soft.      Comments: Obese   Skin:     Comments: There is erythema of the right lower extremity below the knee.  A proximally 5 cm below the knee it was a 4 cm area of fluctuance tenderness and erythema   Psychiatric:         Mood and Affect: Mood normal.         Thought Content: Thought content normal.         Judgment: Judgment normal.            I have reviewed all pertinent lab results within the past 24 hours.  CBC:   Recent Labs   Lab 11/08/24  0508   WBC 19.02*   RBC 4.36*   HGB 12.7*   HCT 39.3*      MCV 90   MCH 29.1   MCHC 32.3     BMP:   Recent Labs   Lab 11/08/24  0508   GLU 99      K 3.9      CO2 23   BUN 28*   CREATININE 1.3   CALCIUM 9.4   MG 1.9     CMP:   Recent Labs   Lab 11/08/24  0508   GLU 99   CALCIUM 9.4   ALBUMIN 2.6*   PROT 7.5      K 3.9   CO2 23      BUN 28*   CREATININE 1.3   ALKPHOS 359*   ALT 51*   AST 41*   BILITOT 0.7       Significant Diagnostics:  I have reviewed all pertinent imaging results/findings within the past 24 hours.  CT: I have reviewed all pertinent results/findings within the past 24 hours and my personal findings are:  Abdomen and pelvis    Reading Physician Reading Date Result Priority   Manjeet Cadena MD  [phone] 11/4/2024 ASAP     Narrative & Impression  EXAMINATION:  CT ABDOMEN PELVIS WITHOUT CONTRAST     CLINICAL HISTORY:  abd pain / bacteremia /constipation;     TECHNIQUE:  Low dose axial images, sagittal and coronal reformations were obtained from the lung bases to the pubic symphysis.     COMPARISON:  None     FINDINGS:  Thickening and enlargement of the left krystal abdominal wall.  Mild motion artifacts.  Bosniak class 1 right renal cyst.  Atherosclerotic changes of the  aorta iliac vasculature.  No bowel obstruction.  Mild bladder wall thickening.  Fat containing bilateral right greater than left inguinal hernia.  Postsurgical changes adjacent to the left hemiabdomen.  Hepatomegaly is noted.  Moderate atherosclerotic plaque of the aorta iliac vasculature without evidence for aneurysm.  Small splenic granuloma.  Mild bibasilar opacities right greater than left may relate to early pneumonia.  Trace right-sided pleural effusion.  Suspected mild cardiomegaly.  No degenerative joint disease of the spine without evidence for vertebral body compression deformity.  Pancreas adrenal glands grossly unremarkable.  Small hypodense lesions of the left lobe of the liver incompletely evaluated.  Small retroperitoneal lymph nodes may be reactive.  Mild bladder wall thickening.     Impression:     Thickening and enlargement of the left krystal abdominal wall.  Correlate clinically for rectus sheath hematoma.     Mild bibasilar opacities right greater than left may relate to early pneumonia. Trace right-sided pleural effusion     Mild motion artifacts.     All CT scans   are performed using dose optimization techniques including the following: automated exposure control; adjustment of the mA and/or kV; use of iterative reconstruction technique.  Dose modulation was employed for ALARA by means of: Automated exposure control; adjustment of the mA and/or kV according to patient size (this includes techniques or standardized protocols for targeted exams where dose is matched to indication/reason for exam; i.e. extremities or head); and/or use of iterative reconstructive technique.

## 2024-11-08 NOTE — ASSESSMENT & PLAN NOTE
Patient's COPD is controlled currently.  Patient is currently off COPD Pathway. Continue scheduled inhalers  and monitor respiratory status closely.

## 2024-11-08 NOTE — ASSESSMENT & PLAN NOTE
Chronic, controlled.  Latest blood pressure and vitals reviewed-   Temp:  [97.5 °F (36.4 °C)-98.6 °F (37 °C)]   Pulse:  []   Resp:  [18-20]   BP: (120-149)/(67-78)   SpO2:  [92 %-97 %] .   Home meds for hypertension were reviewed and noted below.   Hypertension Medications               bisoprolol (ZEBETA) 5 MG tablet TAKE 1/2 TABLET BY MOUTH EVERY DAY    torsemide (DEMADEX) 20 MG Tab Take 2 tablets (40 mg total) by mouth once daily.     While in the hospital, will manage blood pressure as follows; resume B Blocker, continue to monitor BP     Will utilize p.r.n. blood pressure medication only if patient's blood pressure greater than  180/110 and he develops symptoms such as worsening chest pain or shortness of breath.

## 2024-11-08 NOTE — PLAN OF CARE
A224/A224 BRUNO Khan is a 74 y.o.male admitted on 11/2/2024 for Severe sepsis   Code Status: Full Code MRN: [id number]   Review of patient's allergies indicates:   Allergen Reactions    Amitriptyline Rash    Celecoxib Rash     Past Medical History:   Diagnosis Date    Chronic combined systolic and diastolic congestive heart failure 07/09/2020    Chronic kidney disease, stage 3     Chronic obstructive pulmonary disease 03/20/2023    Wixela 250 mcg, Spiriva respimat. Continue Albuterol inhaler and albuterol nebs   Referral pul dis management, education and assistance with medication  Patient preference to only see a doctor, request follow up with Dr. Springer             Chronic venous insufficiency     DDD (degenerative disc disease), lumbar     DM (diabetes mellitus) with complications     History of gout     Hyperlipidemia associated with type 2 diabetes mellitus     Hypertension associated with stage 3 chronic kidney disease due to type 2 diabetes mellitus     BRADLEY on CPAP     Prostate cancer     prostatectomy in 2010, rising PSA that started in 2021      PRN meds    acetaminophen, 650 mg, Q6H PRN  acetaminophen, 650 mg, Q6H PRN  albuterol-ipratropium, 3 mL, Q4H PRN  aluminum-magnesium hydroxide-simethicone, 30 mL, QID PRN  benzonatate, 100 mg, TID PRN  dextrose 10%, 12.5 g, PRN  dextrose 10%, 12.5 g, PRN  dextrose 10%, 12.5 g, PRN  dextrose 10%, 25 g, PRN  dextrose 10%, 25 g, PRN  dextrose 10%, 25 g, PRN  glucagon (human recombinant), 1 mg, PRN  glucose, 16 g, PRN  glucose, 24 g, PRN  HYDROcodone-acetaminophen, 1 tablet, Q4H PRN  naloxone, 0.02 mg, PRN  ondansetron, 4 mg, Q8H PRN  polyethylene glycol, 17 g, Daily PRN  promethazine, 25 mg, Q6H PRN  simethicone, 1 tablet, QID PRN  sodium chloride 0.9%, 3 mL, Q12H PRN      Chart check completed. Will continue plan of care.      Orientation: oriented x 4  Adam Coma Scale Score: 14     Lead Monitored: Lead II Rhythm: normal sinus rhythm Frequency/Ectopy:  PACs  Cardiac/Telemetry Box Number: [id number]  VTE Core Measure: (TEDs) Compression stocking therapy initiated/maintained Last Bowel Movement: 11/06/24  Diet diabetic 1500 Calories (up to 45 gm per meal)  Voiding Characteristics: urethral catheter (bladder)  Олег Score: 19  Fall Risk Score: 19  Accucheck [x]   Freq?      Lines/Drains/Airways       Peripheral Intravenous Line  Duration                  Peripheral IV - Single Lumen 11/07/24 0440 22 G Anterior;Left;Proximal Forearm <1 day

## 2024-11-08 NOTE — PT/OT/SLP PROGRESS
"Physical Therapy Treatment    Patient Name:  Santiago Khan   MRN:  [id number]    Recommendations:     Discharge Recommendations: Moderate Intensity Therapy  Discharge Equipment Recommendations: to be determined by next level of care  Barriers to discharge: None    Assessment:     Santiago Khan is a 74 y.o. male admitted with a medical diagnosis of Severe sepsis.  He presents with the following impairments/functional limitations: weakness, impaired endurance, impaired functional mobility, gait instability, impaired balance, decreased safety awareness, decreased lower extremity function, decreased coordination.    Rehab Prognosis: Fair; patient would benefit from acute skilled PT services to address these deficits and reach maximum level of function.    Recent Surgery: Procedure(s) (LRB):  INCISION AND DRAINAGE, ABSCESS (Right)     Plan:     During this hospitalization, patient to be seen 3 x/week to address the identified rehab impairments via gait training, therapeutic activities, therapeutic exercises and progress toward the following goals:    Plan of Care Expires:  11/18/24    Subjective     Chief Complaint: "I'M BEAT" "I JUST CAN'T DO IT TODAY"  Patient/Family Comments/goals: C/O NO REST  Pain/Comfort:  Pain Rating 1: 0/10      Objective:     Communicated with NURSE BIGGS prior to session.  Patient found supine with telemetry, peripheral IV upon PT entry to room.     General Precautions: Standard, fall  Orthopedic Precautions: N/A  Braces: N/A  Respiratory Status: Room air     Functional Mobility:  PT DECLINED ALL EOB AND OOB MOBILITY  PT ENCOURAGED TO GET OOB LATER WHEN FEELING BETTER TO CHAIR  REVIEW BLE THEREX TO PERFORM WHILE SUPINE: HIP FLEX/EXT, HIP ABD/ADD, QUAD SET, AP'S-WITH TEACH BACK    AM-PAC 6 CLICK MOBILITY  Turning over in bed (including adjusting bedclothes, sheets and blankets)?: 1  Sitting down on and standing up from a chair with arms (e.g., wheelchair, bedside commode, etc.): " "1  Moving from lying on back to sitting on the side of the bed?: 1  Moving to and from a bed to a chair (including a wheelchair)?: 1  Need to walk in hospital room?: 1  Climbing 3-5 steps with a railing?: 1  Basic Mobility Total Score: 6     Treatment & Education:  PT AND FAMILY EDUCATION:  - ROLE OF P.T. AND POC IN ACUTE CARE HOSPITAL SETTING  - ENCOURAGED TO INCREASE TIME OOB IN CHAIR TO TOLERANCE   - TO CONTINUE THERAPUETIC EXERCISES THROUGHOUT THE DAY TO INCREASE ACTIVITY TOLERANCE AND DECREASE RISK FOR PNEUMONIA AND BLOOD CLOTS: HIP FLEX/EXT, HIP ABD/ADD, QUAD SET, HEEL SLIDE, AP  - RISK FOR FALLS DUE TO GENERALIZED WEAKNESS, EDUCATED ON "CALL DON'T FALL", ENCOURAGED TO CALL FOR ASSISTANCE WITH ALL NEEDS SUCH AS BED<>CHAIR TRANSFERS OR TRIPS TO BATHROOM, PT AGREEABLE TO SAFETY PRECAUTIONS    Patient left HOB elevated with all lines intact, call button in reach, NURSE notified, and FAMILY present..    GOALS:   Multidisciplinary Problems       Physical Therapy Goals          Problem: Physical Therapy    Goal Priority Disciplines Outcome Interventions   Physical Therapy Goal     PT, PT/OT Progressing    Description: LTG'S TO BE MET IN 14 DAYS (11-18-24)  PT WILL REQUIRE SBA FOR BED MOBILITY  PT WILL REQUIRE SBA FOR BED<>CHAIR TF'S  PT WILL  FEET WITH RW AND CGA  PT WILL INC AMPAC SCORE BY 2 POINTS TO PROGRESS GROSS FUNC MOBILITY                         Time Tracking:     PT Received On: 11/08/24  PT Start Time: 1030     PT Stop Time: 1038  PT Total Time (min): 8 min     Billable Minutes: Therapeutic Activity 8    Treatment Type: Treatment  PT/PTA: PT     Number of PTA visits since last PT visit: 0     11/08/2024  "

## 2024-11-08 NOTE — NURSING TRANSFER
Nursing Transfer Note      11/8/2024   5:59 PM    Nurse giving handoff:JAJA Ledezma  Nurse receiving handoff:RADHA Huynh    Reason patient is being transferred: Returning to inpatient room    Transfer From: PACU    Transfer via bed    Transfer with cardiac monitoring    Transported by María    Transfer Vital Signs:  Blood Pressure:132/84  Heart Rate:104  O2:95  Temperature:98.5  Respirations:20    Telemetry: Reapplied to patient   Order for Tele Monitor? Yes    Patient belongings transferred with patient: Patient's dentures    Chart send with patient: Yes, placed in nursing station rack    Notified: friend, Aurora Fish present in room

## 2024-11-08 NOTE — ASSESSMENT & PLAN NOTE
"This patient does have evidence of infective focus  My overall impression is sepsis.  Source: Skin and Soft Tissue (location cellulitis RLE)  Antibiotics given-   Antibiotics (72h ago, onward)      Start     Stop Route Frequency Ordered    11/06/24 1700  cefTRIAXone injection 2 g         -- IV Every 24 hours (non-standard times) 11/06/24 1036    11/04/24 0900  mupirocin 2 % ointment         11/09/24 0859 Nasl 2 times daily 11/04/24 0523          Latest lactate reviewed-  No results for input(s): "LACTATE", "POCLAC" in the last 72 hours.    Organ dysfunction indicated by Acute kidney injury, Acute respiratory failure, and Encephalopathy    Fluid challenge Other- Patient to receive D10 volume other than 30cc/kg due to Congestive Heart Failure     Post- resuscitation assessment No - Post resuscitation assessment not needed       Will Not start Pressors- Levophed for MAP of 65  Source control achieved by: IV Abx    On ceftriaxone - PRESTON 11/20/24  ID following  Gen Surg consulted 11/08 due to concern for RLE abscess; planning I &D - NPO, lovenox held   "

## 2024-11-08 NOTE — HOSPITAL COURSE
Initially admitted to the ICU transferred to the floor treated with antibiotics for cellulitis of the lower leg on the right.  Infectious Disease consulted.  Surgery consult for erythema and an area of fluctuance just below the knee.      The patient had a breakfast of [place] and eggs which she finished at 8:30 a.m..  He was made NPO at 10:00 a.m    11/09/2024 incision and drainage postop day 1.  Leg feels better    11/10/2024.  Postop day 2 incision and drainage.  Leg feels no worse.    11/11/2024.  Postop day 3.  Leg feels a little better.  Awaiting skilled nursing.  Less bleeding with dressing change    11/12/2024.  Postop day 4.  Still feeling better.  States he should be leaving for skilled nursing

## 2024-11-08 NOTE — PROCEDURES
"Santiago Khan is a 74 y.o. male patient.    Temp: 98.6 °F (37 °C) (11/08/24 0820)  Pulse: 102 (11/08/24 0820)  Resp: 20 (11/08/24 0820)  BP: (!) 149/78 (11/08/24 0820)  SpO2: (!) 93 % (11/08/24 0820)  Weight: 96.3 kg (212 lb 4.9 oz) (11/04/24 2310)  Height: 5' 7" (170.2 cm) (11/03/24 1055)    PICC  Date/Time: 11/8/2024 10:21 AM  Performed by: Conrad Mckeon RN  Consent Done: Yes  Time out: Immediately prior to procedure a time out was called to verify the correct patient, procedure, equipment, support staff and site/side marked as required  Indications: med administration and vascular access  Anesthesia: local infiltration  Local anesthetic: lidocaine 1% without epinephrine  Anesthetic Total (mL): 3  Preparation: skin prepped with ChloraPrep  Skin prep agent dried: skin prep agent completely dried prior to procedure  Sterile barriers: all five maximum sterile barriers used - cap, mask, sterile gown, sterile gloves, and large sterile sheet  Hand hygiene: hand hygiene performed prior to central venous catheter insertion  Location details: right basilic  Catheter type: double lumen  Catheter size: 4 Fr  Catheter Length: 38cm    Ultrasound guidance: yes  Vessel Caliber: medium and patent, compressibility normal  Vascular Doppler: not done  Needle advanced into vessel with real time Ultrasound guidance.  Image recorded and saved.  Sterile sheath used.  no esophageal manometryNumber of attempts: 1  Post-procedure: blood return through all ports, chlorhexidine patch and sterile dressing applied  Estimated blood loss (mL): 0  Specimens: No  Implants: No  Assessment: placement verified by x-ray          Name Conrad Mckeon RN  11/8/2024    "

## 2024-11-08 NOTE — ASSESSMENT & PLAN NOTE
--All cases of bacteremia pose risk of life threatening sepsis and metastatic infection  --GN bacteremia not always with obvious source   --Unclear in this case  --CT a/p shows thickening and enlargement of the left krystal abdominal wall. Possible infected hematoma.   --Possible early pneumonia as well   --Would be cautious and treat with longer course of antibiotics  --Continue IV ceftriaxone 2 g daily as E coli isolate is pan susceptible   --Requires drug toxicity monitoring   --Follow repeat blood cx for clearance; NGTD    --Will plan for 14 day IV abx course from negative blood cx  --Above d/w primary team.      Outpatient Antibiotic Therapy Plan:    1) Infection: E coli bacteremia     2) Discharge Antibiotics:    Intravenous antibiotics:  IV ceftriaxone 2 g daily     3) Therapy Duration:  14 days    Estimated end date of IV antibiotics: 11/20/24    4) Outpatient Weekly Labs:    Order the following labs to be drawn on Mondays:   CBC  CMP     Perform labs at [facility] facility     5) Fax Lab Results to Infectious Diseases Provider: Dr. Swanson     [facility] Fax Number: [phone]

## 2024-11-08 NOTE — ASSESSMENT & PLAN NOTE
Patient with known CAD, which is controlled Will continue  home medications  and monitor for S/Sx of angina/ACS. Continue to monitor on telemetry.   Resume home Asa, statin, B blocker

## 2024-11-08 NOTE — OP NOTE
[facility] - Surgery ([facility])  Surgery Department  Operative Note    SUMMARY     Date of Procedure: 11/8/2024     Procedure: Procedure(s) (LRB):  INCISION AND DRAINAGE, ABSCESS (Right)     Surgeons and Role:     * Torres Magallanes MD - Primary    Assisting Surgeon: None    Pre-Operative Diagnosis: Cellulitis of right lower extremity [L03.115]  Abscess of right leg [L02.415]    Post-Operative Diagnosis: Post-Op Diagnosis Codes:     * Cellulitis of right lower extremity [L03.115]     * Abscess of right leg [L02.415]    Anesthesia: Choice    Operative Findings (including complications, if any):     4 x 3 x 1 cm abscess cavity of the right lower leg    Description of Technical Procedures:     The patient was brought into the operative room placed on the operative table in the supine position.  Sedation was provided by the anesthesia service.  He was receiving antibiotics.  Pneumatic compression devices on the left lower extremity.  The right lower leg was prepped and draped in the standard manner.      A time-out was performed.      A mixture of lidocaine and Marcaine was infiltrated over the area of the abscess cavity.  After confirming that adequate local anesthetic was achieved the transverse incision was made.  purulent material was obtained.      Cultures were obtained    The abscess cavity was entered and     The skin incision was extended to the extent of the abscess cavity.  There was a single abscess cavity with no loculations.      Hemostasis was achieved.  Additional Marcaine and lidocaine was infiltrated.      Hemostasis was assured.  The wound was packed with gauze.  Dry sterile dressings were placed.      Final counts were correct    Significant Surgical Tasks Conducted by the Assistant(s), if Applicable:  None    Estimated Blood Loss (EBL): 10 mL  IV fluids 100 mL   Marcaine 0.25% 10 mL   Lidocaine 1% 10 mL           Implants: * No implants in log *    Specimens:   Specimen (24h ago, onward)      None                     Condition: Stable    Disposition: PACU - hemodynamically stable.    Attestation: Op Note Attestation: I performed the procedure.

## 2024-11-08 NOTE — HPI
74-year-old male who was admitted to the hospital for lethargy and respiratory failure.  He also had low glucose.  He was treated in the ICU and subsequently transferred to floor.      The patient had cellulitis of his right lower extremity that was being treated with antibiotics.      A surgery consult was obtained due to an area of swelling fluctuance and tenderness

## 2024-11-08 NOTE — ASSESSMENT & PLAN NOTE
"Patient has Combined Systolic and Diastolic heart failure that is Acute on chronic. On presentation their CHF was decompensated. Evidence of decompensated CHF on presentation includes: edema, orthopnea, paroxysmal nocturnal dyspnea (PND), dyspnea on exertion (GOVEA), and shortness of breath. The etiology of their decompensation is likely dietary indiscretion, increased fluid intake, and infection . Most recent BNP and echo results are listed below.  No results for input(s): "BNP" in the last 72 hours.    Latest ECHO  Results for orders placed during the hospital encounter of 10/23/23    Echo    Interpretation Summary    Left Ventricle: The left ventricle is normal in size. Normal wall thickness. There is mild concentric hypertrophy. regional wall motion abnormalities present. There is low normal systolic function with a visually estimated ejection fraction of 50 - 55%. Biplane (2D) method of discs ejection fraction is 49%. There is normal diastolic function.    Left Atrium: Left atrium is mildly dilated.    Right Ventricle: Normal right ventricular cavity size. Wall thickness is normal. Right ventricle wall motion  is normal. Systolic function is normal.    Mitral Valve: There is mild to moderate regurgitation.    Tricuspid Valve: There is mild regurgitation.    Pulmonary Artery: There is moderate pulmonary hypertension. The estimated pulmonary artery systolic pressure is 50 mmHg.    IVC/SVC: Normal venous pressure at 3 mmHg.    Current Heart Failure Medications  torsemide tablet 40 mg, Daily, Oral    Plan  -Monitor strict I&Os and daily weights.    -Place on telemetry  -Low sodium diet  -Place on fluid restriction of 1.5 L.   -Cardiology has not been consulted  - s/p IV diuresis   - resume home Torsemide   "

## 2024-11-08 NOTE — ASSESSMENT & PLAN NOTE
Patient presented with worsening lower back pain and bilateral lower extremity weakness in setting of chronic back pain and prior surgery.  MRI L-spine obtained in the ED showed multilevel degenerative changes with several levels of neural foraminal narrowing   Patient awaiting evaluation by PT/OT. Neurosurgery/Spine consulted as well.   S/p NSGY eval- not a surgical candidate  Ch and stable, sec lumber DDD- Neural Foraminal narrowing at L4 and L5 but no Central disc herniation  PT/OT   Outpatient NSGY followup on discharge

## 2024-11-08 NOTE — ASSESSMENT & PLAN NOTE
Noted on exam 11/08  Gen Surg consulted and planning I&D 11/08  Continue IV Rocephin   Monitor exam

## 2024-11-08 NOTE — ASSESSMENT & PLAN NOTE
The patient has developed an abscess of the right lower leg.    He would benefit from incision and drainage.      The patient had a regular diet for breakfast which she finished at 8:30 a.m..      Will schedule a incision and drainage of the right lower leg abscess for 4:30 p.m., 8 hours after his last oral intake.  The procedure will be done with sedation and local anesthetic.      The risks of surgery include infection, bleeding, recurrence, need to create a large wound.  The wound would then be left open to heal by secondary intention.      Consent will be obtained

## 2024-11-08 NOTE — PROGRESS NOTES
[facility] Medicine  Progress Note    Patient Name: Santiago Khan  MRN: [id number]  Patient Class: IP- Inpatient   Admission Date: 11/2/2024  Length of Stay: 5 days  Attending Physician: Grace Monk MD  Primary Care Provider: Kashif Acosta MD        Subjective:     Principal Problem:Severe sepsis        HPI:  Santiago Khan is a 74 y.o. male with a PMH  has a past medical history of Chronic combined systolic and diastolic congestive heart failure (07/09/2020), Chronic kidney disease, stage 3, Chronic obstructive pulmonary disease (03/20/2023), Chronic venous insufficiency, DDD (degenerative disc disease), lumbar, DM (diabetes mellitus) with complications, History of gout, Hyperlipidemia associated with type 2 diabetes mellitus, Hypertension associated with stage 3 chronic kidney disease due to type 2 diabetes mellitus, BRADLEY on CPAP, and Prostate cancer. who presented to the ER for further evaluation of worsening bilateral lower extremity weakness, acute on chronic lumbar back pain, and abdominal pain over the past 3-4 days. Patient reported being constipated with last bowel movement yesterday which was normal and reported his back pain has caused his legs to become weak resulting in increased difficulty walking. He reports taking chronic pain medications and had back surgery in 1982. Patient was seen at urgent care back on 10/27 for treatment of acute gout flare in his right hand/wrist and was provided prednisone after home allopurinol and colchicine provided little to no relief.  Patient denied any recent falls or trauma and was initially somnolent following administration of lorazepam in the ED to obtain the MRI of his lumbar spine. Upon evaluation of patient, patient had increased respiratory distress requiring up titration of supplemental oxygen in addition to bilateral crackles on lung auscultation, lower extremity edema, and right lower extremity erythremia.  Family at bedside reported patient had difficulty taking his home medications, had productive cough, and unsure what his dietary/fluid intake has been like. Patient was noted to be diaphoretic, restless, and becoming more lethargic during bedside assessment.  Stat ABG revealed worsening respiratory status and was placed on BiPAP and provided 2 mg IV Bumex.  Patient also noted to be become hypoglycemic again requiring dextrose infusion.  Patient admitted to Hospital Medicine inpatient for continued medical management. ICU notified regarding low threshold for transfer and agree with current treatment plan. Neurosurgery/Spine as well as PT/OT consulted regarding lower extremity weakness.       PCP: Kashif Acosta       Overview/Hospital Course:  Hospital/ICU Course:  11/4 - no acute events. Off dextrose drip. Had bm, no acute events    Hosp Med:  pt transferred out of ICU today, medically stable. Briefly, 74 y.o. male with Hx of HTN, HLP, Chronic combined systolic and diastolic CHF,  DM 2 with CKD 3 and Polyneuropathy, COPD, Chronic venous insufficiency, DDD Lumbar, Gout, BRADLEY on CPAP, and Prostate Ca, presented as Abd pain and LLE Cellulitis and admitted to ICU on 11/3 with worsening respiratory status and was placed on BiPAP and provided 2 mg IV Bumex. Patient also noted to be Hypoglycemic again requiring D10, hence admitted to ICU. He did well overnite and BS improved, came off D10 and was transferred out to Tele under Hosp Med today. Blood Cx x 1 just reported now as GNR and started on Zosyn.     11/6/24- Per   ID following, antibiotics adjusted to ceftriaxone, plan for total 14 day course  PT/OT with reccs for YASMIN, CM consulted for SNF placement    11/7/24- Per Dr.Iqbal RUEDA, patient denies new issues  SNF placement pending  IV abx till 11/20/24 11/08/2024  Pt noted to have area of fluctuance below R knee on my exam, WBC rising. Pt reports pain to R knee and L abd.   Gen Surg consulted and planning  for debridement of RLE abscess today.       Interval History: See Hospital Course     Review of Systems  Objective:     Vital Signs (Most Recent):  Temp: 98.5 °F (36.9 °C) (11/08/24 1121)  Pulse: 102 (11/08/24 1121)  Resp: 18 (11/08/24 1121)  BP: 120/74 (11/08/24 1121)  SpO2: 95 % (11/08/24 1121) Vital Signs (24h Range):  Temp:  [97.5 °F (36.4 °C)-98.6 °F (37 °C)] 98.5 °F (36.9 °C)  Pulse:  [] 102  Resp:  [18-20] 18  SpO2:  [92 %-97 %] 95 %  BP: (120-149)/(67-78) 120/74     Weight: 96.3 kg (212 lb 4.9 oz)  Body mass index is 33.25 kg/m².    Intake/Output Summary (Last 24 hours) at 11/8/2024 1307  Last data filed at 11/8/2024 0821  Gross per 24 hour   Intake 720 ml   Output --   Net 720 ml         Physical Exam  Vitals and nursing note reviewed.   Constitutional:       General: He is not in acute distress.     Appearance: He is obese. He is ill-appearing.   Cardiovascular:      Rate and Rhythm: Normal rate and regular rhythm.      Heart sounds: No murmur heard.  Pulmonary:      Effort: Pulmonary effort is normal.      Comments: Diminished breath sounds bilaterally, on room air   Abdominal:      General: Bowel sounds are normal. There is no distension.      Palpations: Abdomen is soft.      Tenderness: Tenderness: mild TTP to LLQ.      Comments: Ecchymosis to L abd wall   Musculoskeletal:      Comments: RLE with erythema, fluctuance and tenderness noted to area below R knee--tender to palpation   Neurological:      Mental Status: He is alert and oriented to person, place, and time. Mental status is at baseline.             Significant Labs: All pertinent labs within the past 24 hours have been reviewed.    Significant Imaging: I have reviewed all pertinent imaging results/findings within the past 24 hours.    Assessment/Plan:      * Severe sepsis sec to Cellulitis Legs complicated by E coli Bacteremia  This patient does have evidence of infective focus  My overall impression is sepsis.  Source: Skin and Soft  "Tissue (location cellulitis RLE)  Antibiotics given-   Antibiotics (72h ago, onward)      Start     Stop Route Frequency Ordered    11/06/24 1700  cefTRIAXone injection 2 g         -- IV Every 24 hours (non-standard times) 11/06/24 1036    11/04/24 0900  mupirocin 2 % ointment         11/09/24 0859 Nasl 2 times daily 11/04/24 0523          Latest lactate reviewed-  No results for input(s): "LACTATE", "POCLAC" in the last 72 hours.    Organ dysfunction indicated by Acute kidney injury, Acute respiratory failure, and Encephalopathy    Fluid challenge Other- Patient to receive D10 volume other than 30cc/kg due to Congestive Heart Failure     Post- resuscitation assessment No - Post resuscitation assessment not needed       Will Not start Pressors- Levophed for MAP of 65  Source control achieved by: IV Abx    On ceftriaxone - PRESTON 11/20/24  ID following  Gen Surg consulted 11/08 due to concern for RLE abscess; planning I &D - NPO, lovenox held     Cellulitis of right lower extremity  Continue IV Rocephin per ID   Monitor exam     E coli bacteremia  Likely SSTI source   ID consulted and following   Continue IV Rocephin 2g daily x 14 days (EOT 11/20/2024)   PICC line ordered     Abscess of right leg  Noted on exam 11/08  Gen Surg consulted and planning I&D 11/08  Continue IV Rocephin   Monitor exam     Diabetes  Patient's FSGs are controlled on current medication regimen.  Last A1c reviewed-   Lab Results   Component Value Date    HGBA1C 5.6 08/14/2024     Most recent fingerstick glucose reviewed-   Recent Labs   Lab 11/07/24  1742 11/07/24  2132 11/08/24  0517 11/08/24  1239   POCTGLUCOSE 140* 126* 99 105       Current correctional scale  None    Monitor BG; hypoglycemia protocol     Acute respiratory failure with hypoxia and hypercarbia  Patient with Hypercapnic and Hypoxic Respiratory failure which is Acute.  he is not on home oxygen. Supplemental oxygen was provided and noted-    Responded well to Bipap, " "diuresis  Appears at baseline, currently on room air     Acute kidney injury superimposed on chronic kidney disease  ROSARIO is likely due to pre-renal azotemia due to intravascular volume depletion. Baseline creatinine is  1.5-1.7 . Most recent creatinine and eGFR are listed below.  Recent Labs     11/06/24  0519 11/07/24  0553 11/08/24  0508   CREATININE 1.9* 1.5* 1.3   EGFRNORACEVR 37* 49* 58*     Plan  -ROSARIO is resolved   -Avoid nephrotoxins and renally dose meds for GFR listed above  -Monitor urine output, serial BMP, and adjust therapy as needed  -Monitor Cr with resumption of toRsemide     Acute on chronic combined systolic and diastolic congestive heart failure  Patient has Combined Systolic and Diastolic heart failure that is Acute on chronic. On presentation their CHF was decompensated. Evidence of decompensated CHF on presentation includes: edema, orthopnea, paroxysmal nocturnal dyspnea (PND), dyspnea on exertion (GOVEA), and shortness of breath. The etiology of their decompensation is likely dietary indiscretion, increased fluid intake, and infection . Most recent BNP and echo results are listed below.  No results for input(s): "BNP" in the last 72 hours.    Latest ECHO  Results for orders placed during the hospital encounter of 10/23/23    Echo    Interpretation Summary    Left Ventricle: The left ventricle is normal in size. Normal wall thickness. There is mild concentric hypertrophy. regional wall motion abnormalities present. There is low normal systolic function with a visually estimated ejection fraction of 50 - 55%. Biplane (2D) method of discs ejection fraction is 49%. There is normal diastolic function.    Left Atrium: Left atrium is mildly dilated.    Right Ventricle: Normal right ventricular cavity size. Wall thickness is normal. Right ventricle wall motion  is normal. Systolic function is normal.    Mitral Valve: There is mild to moderate regurgitation.    Tricuspid Valve: There is mild " regurgitation.    Pulmonary Artery: There is moderate pulmonary hypertension. The estimated pulmonary artery systolic pressure is 50 mmHg.    IVC/SVC: Normal venous pressure at 3 mmHg.    Current Heart Failure Medications  torsemide tablet 40 mg, Daily, Oral    Plan  -Monitor strict I&Os and daily weights.    -Place on telemetry  -Low sodium diet  -Place on fluid restriction of 1.5 L.   -Cardiology has not been consulted  - s/p IV diuresis   - resume home Torsemide     Lower back pain  Patient presented with worsening lower back pain and bilateral lower extremity weakness in setting of chronic back pain and prior surgery.  MRI L-spine obtained in the ED showed multilevel degenerative changes with several levels of neural foraminal narrowing   Patient awaiting evaluation by PT/OT. Neurosurgery/Spine consulted as well.   S/p NSGY eval- not a surgical candidate  Ch and stable, sec lumber DDD- Neural Foraminal narrowing at L4 and L5 but no Central disc herniation  PT/OT   Outpatient NSGY followup on discharge     Weakness of both lower extremities  Weakness persists  PT/OT following- pending snf placement     Chronic obstructive pulmonary disease  Patient's COPD is controlled currently.  Patient is currently off COPD Pathway. Continue scheduled inhalers  and monitor respiratory status closely.       Coronary artery disease of native artery of native heart with stable angina pectoris  Patient with known CAD, which is controlled Will continue  home medications  and monitor for S/Sx of angina/ACS. Continue to monitor on telemetry.   Resume home Asa, statin, B blocker     Hypertension associated with stage 3 chronic kidney disease due to type 2 diabetes mellitus  Chronic, controlled.  Latest blood pressure and vitals reviewed-   Temp:  [97.5 °F (36.4 °C)-98.6 °F (37 °C)]   Pulse:  []   Resp:  [18-20]   BP: (120-149)/(67-78)   SpO2:  [92 %-97 %] .   Home meds for hypertension were reviewed and noted below.    Hypertension Medications               bisoprolol (ZEBETA) 5 MG tablet TAKE 1/2 TABLET BY MOUTH EVERY DAY    torsemide (DEMADEX) 20 MG Tab Take 2 tablets (40 mg total) by mouth once daily.     While in the hospital, will manage blood pressure as follows; resume B Blocker, continue to monitor BP     Will utilize p.r.n. blood pressure medication only if patient's blood pressure greater than  180/110 and he develops symptoms such as worsening chest pain or shortness of breath.    BRADLEY on CPAP  Currently on CPAP outpatient.  Plan:  -continue CPAP q.h.s.         VTE Risk Mitigation (From admission, onward)           Ordered     IP VTE HIGH RISK PATIENT  Once         11/03/24 0309     Place sequential compression device  Until discontinued         11/03/24 0309                    Discharge Planning   ANTHONY:      Code Status: Full Code   Is the patient medically ready for discharge?:     Reason for patient still in hospital (select all that apply): Patient new problem, Patient trending condition, Treatment, Consult recommendations, and Pending disposition  Discharge Plan A: Skilled Nursing Facility   Discharge Delays: (!) Procedure Scheduling (IR, OR, Labs, Echo, Cath, Echo, EEG)              Grace Monk MD  Department of Hospital Medicine   [facility] - Telemetry ([facility])

## 2024-11-08 NOTE — ASSESSMENT & PLAN NOTE
Patient's FSGs are controlled on current medication regimen.  Last A1c reviewed-   Lab Results   Component Value Date    HGBA1C 5.6 08/14/2024     Most recent fingerstick glucose reviewed-   Recent Labs   Lab 11/07/24  1742 11/07/24  2132 11/08/24  0517 11/08/24  1239   POCTGLUCOSE 140* 126* 99 105       Current correctional scale  None    Monitor BG; hypoglycemia protocol

## 2024-11-08 NOTE — PROGRESS NOTES
[facility] ([facility])  Infectious Disease  Progress Note    Patient Name: Santiago Khan  MRN: [id number]  Admission Date: 11/2/2024  Length of Stay: 5 days  Attending Physician: Grace Monk MD  Primary Care Provider: Kashif Acosta MD    Isolation Status: No active isolations  Assessment/Plan:      Cardiac/Vascular  Acute on chronic combined systolic and diastolic congestive heart failure  Latest ECHO  Results for orders placed during the hospital encounter of 11/02/24    Echo    Interpretation Summary    Left Ventricle: The left ventricle is mildly dilated. Mildly increased ventricular mass. Normal wall thickness. There is eccentric hypertrophy. There is moderately reduced systolic function with a visually estimated ejection fraction of 35 - 40%. There is normal diastolic function.    Right Ventricle: Normal right ventricular cavity size. Wall thickness is normal. Systolic function is mildly reduced.    Left Atrium: Left atrium is mildly dilated.    Right Atrium: Right atrium is mildly dilated.    Aortic Valve: There is mild aortic valve sclerosis.    Mitral Valve: There is moderate regurgitation.    Tricuspid Valve: There is mild regurgitation.    Pulmonic Valve: There is mild regurgitation.    Pulmonary Artery: There is mild pulmonary hypertension. The estimated pulmonary artery systolic pressure is 44 mmHg.    IVC/SVC: Intermediate venous pressure at 8 mmHg.    Continue current medications per primary     Hyperlipidemia associated with type 2 diabetes mellitus  Continue current medications per primary      Hypertension associated with stage 3 chronic kidney disease due to type 2 diabetes mellitus  Continue current medications per primary      ID  E coli bacteremia  --All cases of bacteremia pose risk of life threatening sepsis and metastatic infection  --GN bacteremia not always with obvious source   --Unclear in this case  --CT a/p shows thickening and enlargement of the left krystal  abdominal wall. Possible infected hematoma.   --Possible early pneumonia as well   --Would be cautious and treat with longer course of antibiotics  --Continue IV ceftriaxone 2 g daily as E coli isolate is pan susceptible   --Requires drug toxicity monitoring   --Follow repeat blood cx for clearance; NGTD    --Will plan for 14 day IV abx course from negative blood cx  --Above d/w primary team.      Outpatient Antibiotic Therapy Plan:    1) Infection: E coli bacteremia     2) Discharge Antibiotics:    Intravenous antibiotics:  IV ceftriaxone 2 g daily     3) Therapy Duration:  14 days    Estimated end date of IV antibiotics: 11/20/24    4) Outpatient Weekly Labs:    Order the following labs to be drawn on Mondays:   CBC  CMP     Perform labs at [facility] facility     5) Fax Lab Results to Infectious Diseases Provider: Dr. Swanson     ID Clinic Fax Number: [phone]       Cellulitis of right lower extremity  Notably worsening. Leukocytosis trending up. Will await surgical evaluation. If aspirated recommend aerobic and anaerobic cultures with grams stain. Plan to add GP coverage using PO linezolid 600 mg BID after intervention.     Endocrine  Diabetes  Continue current medications per primary      Orthopedic  History of gout  Continue current medications per primary      Other  BRADLEY on CPAP  CPAP as tolerated      Thank you for your consult. I will follow-up with patient. Please contact us if you have any additional questions.    Alexis Swanson, DO  Infectious Disease  [facility] ([facility])    Subjective:     Principal Problem:Severe sepsis    HPI: This is a 75 yo M with hx of HF, CKD, COPD, DM, HLD, HTN, and BRADLEY who presented to the ER for worsening bilateral lower extremity weakness, acute on chronic lumbar pain, and abdominal pain for about four days PTA. Following admission patient became progressively lethargic and hypercapnic on the floor requiring continuous bipap, and hypoglycemic requiring continuous  D10 drip. Transferred to ICU. Sent back to floor on 11/5. Febrile to 100.6 F on admission with leukocytosis to almost 20K. Blood cx growing E coli. Has been on empiric Zosyn. CT a/p reports thickening and enlargement of the left krystal abdominal wall. Correlate clinically for rectus sheath hematoma. Mild bibasilar opacities right greater than left may relate to early pneumonia. Trace right-sided pleural effusion. MRI lumbar spine shows DDD. No evidence of infection. ID consulted for E coli bacteremia.   Interval History: Patient with erythema below right knee. Signs of infection. Also with leukocytosis. Will not escalate therapy pending surgical evaluation. Would send sample for cultures.     Review of Systems   Gastrointestinal:  Positive for abdominal pain.   Musculoskeletal:  Positive for arthralgias, back pain and myalgias.   All other systems reviewed and are negative.    Objective:     Vital Signs (Most Recent):  Temp: 98.6 °F (37 °C) (11/08/24 0820)  Pulse: 102 (11/08/24 0820)  Resp: 20 (11/08/24 0820)  BP: (!) 149/78 (11/08/24 0820)  SpO2: (!) 93 % (11/08/24 0820) Vital Signs (24h Range):  Temp:  [97.5 °F (36.4 °C)-98.6 °F (37 °C)] 98.6 °F (37 °C)  Pulse:  [] 102  Resp:  [18-20] 20  SpO2:  [92 %-97 %] 93 %  BP: (120-149)/(67-78) 149/78     Weight: 96.3 kg (212 lb 4.9 oz)  Body mass index is 33.25 kg/m².    Estimated Creatinine Clearance: 56 mL/min (based on SCr of 1.3 mg/dL).     Physical Exam  Constitutional:       General: He is not in acute distress.     Appearance: Normal appearance. He is not ill-appearing.   Cardiovascular:      Rate and Rhythm: Normal rate and regular rhythm.      Pulses: Normal pulses.      Heart sounds: Normal heart sounds. No murmur heard.     No friction rub. No gallop.   Pulmonary:      Effort: Pulmonary effort is normal. No respiratory distress.      Breath sounds: Normal breath sounds.   Abdominal:      General: Abdomen is flat. Bowel sounds are normal. There is no  distension.      Palpations: Abdomen is soft.      Tenderness: There is no abdominal tenderness.   Musculoskeletal:      Comments: Right knee erythematous; fluctuance palpated   Skin:     General: Skin is warm and dry.   Neurological:      Mental Status: He is alert.          Significant Labs: Blood Culture:   Recent Labs   Lab 11/03/24 0125 11/06/24 1100   LABBLOO No Growth to date  No Growth to date  No Growth to date  No Growth to date  No Growth to date  Gram stain dao bottle: Gram negative rods  Results called to and read back by:Sharifa Power RN 11/04/2024  00:22  ESCHERICHIA COLI* No Growth to date  No Growth to date  No Growth to date  No Growth to date     CBC:   Recent Labs   Lab 11/07/24  0553 11/08/24  0508   WBC 18.07* 19.02*   HGB 13.4* 12.7*   HCT 42.6 39.3*    295     CMP:   Recent Labs   Lab 11/07/24  0553 11/08/24  0508    136   K 3.7 3.9    100   CO2 26 23   GLU 95 99   BUN 36* 28*   CREATININE 1.5* 1.3   CALCIUM 9.2 9.4   PROT 7.4 7.5   ALBUMIN 2.6* 2.6*   BILITOT 0.8 0.7   ALKPHOS 437* 359*   AST 32 41*   ALT 40 51*   ANIONGAP 10 13     Microbiology Results (last 7 days)       Procedure Component Value Units Date/Time    Blood culture [[id number]] Collected: 11/06/24 1100    Order Status: Completed Specimen: Blood from Peripheral, Forearm, Left Updated: 11/07/24 2022     Blood Culture, Routine No Growth to date      No Growth to date    Blood culture [[id number]] Collected: 11/06/24 1100    Order Status: Completed Specimen: Blood from Peripheral, Hand, Left Updated: 11/07/24 2022     Blood Culture, Routine No Growth to date      No Growth to date    Blood culture [[id number]] Collected: 11/03/24 0125    Order Status: Completed Specimen: Blood from Peripheral, Antecubital, Left Updated: 11/07/24 1412     Blood Culture, Routine No Growth to date      No Growth to date      No Growth to date      No Growth to date      No Growth to date    Blood culture [[id number]]   (Abnormal)  (Susceptibility) Collected: 11/03/24 0125    Order Status: Completed Specimen: Blood from Peripheral, Hand, Right Updated: 11/06/24 1051     Blood Culture, Routine Gram stain dao bottle: Gram negative rods      Results called to and read back by:Sharifa Power RN 11/04/2024  00:22      ESCHERICHIA COLI    Culture, MRSA [[id number]] Collected: 11/04/24 0557    Order Status: Sent Specimen: MRSA source from Nares, Left Updated: 11/04/24 1036    Rapid Organism ID by PCR (from Blood culture) [[id number]]  (Abnormal) Collected: 11/03/24 0125    Order Status: Completed Updated: 11/04/24 0141     Enterococcus faecalis Not Detected     Enterococcus faecium Not Detected     Listeria monocytogenes Not Detected     Staphylococcus spp. Not Detected     Staphylococcus aureus Not Detected     Staphylococcus epidermidis Not Detected     Staphylococcus lugdunensis Not Detected     Streptococcus species Not Detected     Streptococcus agalactiae Not Detected     Streptococcus pneumoniae Not Detected     Streptococcus pyogenes Not Detected     Acinetobacter calcoaceticus/baumannii complex Not Detected     Bacteroides fragilis Not Detected     Enterobacterales See species for ID     Enterobacter cloacae complex Not Detected     Escherichia coli Detected     Klebsiella aerogenes Not Detected     Klebsiella oxytoca Not Detected     Klebsiella pneumoniae group Not Detected     Proteus Not Detected     Salmonella sp Not Detected     Serratia marcescens Not Detected     Haemophilus influenzae Not Detected     Neisseria meningtidis Not Detected     Pseudomonas aeruginosa Not Detected     Stenotrophomonas maltophilia Not Detected     Candida albicans Not Detected     Candida auris Not Detected     Candida glabrata Not Detected     Candida krusei Not Detected     Candida parapsilosis Not Detected     Candida tropicalis Not Detected     Cryptococcus neoformans/gattii Not Detected     CTX-M (ESBL ) Not Detected     IMP  (Carbapenem resistant) Not Detected     KPC resistance gene (Carbapenem resistant) Not Detected     mcr-1  Not Detected     mec A/C  Test Not Applicable     mec A/C and MREJ (MRSA) gene Test Not Applicable     NDM (Carbapenem resistant) Not Detected     OXA-48-like (Carbapenem resistant) Not Detected     van A/B (VRE gene) Test Not Applicable     VIM (Carbapenem resistant) Not Detected    Respiratory Infection Panel (PCR), Nasopharyngeal [[id number]] Collected: 11/03/24 1045    Order Status: Completed Specimen: Nasopharyngeal Swab Updated: 11/03/24 1201     Respiratory Infection Panel Source NP Swab     Adenovirus Not Detected     Coronavirus 229E, Common Cold Virus Not Detected     Coronavirus HKU1, Common Cold Virus Not Detected     Coronavirus NL63, Common Cold Virus Not Detected     Coronavirus OC43, Common Cold Virus Not Detected     Comment: The Coronavirus strains detected in this test cause the common cold.  These strains are not the COVID-19 (novel Coronavirus)strain   associated with the respiratory disease outbreak.          SARS-CoV2 (COVID-19) Qualitative PCR Not Detected     Human Metapneumovirus Not Detected     Human Rhinovirus/Enterovirus Not Detected     Influenza A (subtypes H1, H1-2009,H3) Not Detected     Influenza B Not Detected     Parainfluenza Virus 1 Not Detected     Parainfluenza Virus 2 Not Detected     Parainfluenza Virus 3 Not Detected     Parainfluenza Virus 4 Not Detected     Respiratory Syncytial Virus Not Detected     Bordetella Parapertussis (EP4198) Not Detected     Bordetella pertussis (ptxP) Not Detected     Chlamydia pneumoniae Not Detected     Mycoplasma pneumoniae Not Detected     Comment: Respiratory Infection Panel testing performed by Multiplex PCR.       Narrative:      Assay not valid for lower respiratory specimens, alternate  testing required.    Respiratory Infection Panel (PCR), Nasopharyngeal [[id number]] Collected: 11/03/24 1009    Order Status: Canceled Specimen:  Nasopharyngeal Swab     Influenza A & B by Molecular [[id number]] Collected: 11/03/24 0607    Order Status: Completed Specimen: Nasopharyngeal Swab Updated: 11/03/24 0839     Influenza A, Molecular Negative     Influenza B, Molecular Negative     Flu A & B Source Nasal swab          All pertinent labs within the past 24 hours have been reviewed.    Significant Imaging: I have reviewed all pertinent imaging results/findings within the past 24 hours.

## 2024-11-08 NOTE — ANESTHESIA PREPROCEDURE EVALUATION
11/08/2024  Santiago Khan is a 74 y.o., male.    Patient Active Problem List   Diagnosis    BRADLEY on CPAP    History of gout    DDD (degenerative disc disease), lumbar    Nicotine dependence    Hypertension associated with stage 3 chronic kidney disease due to type 2 diabetes mellitus    Hyperlipidemia associated with type 2 diabetes mellitus    Chronic kidney disease, stage 3    Class 2 severe obesity due to excess calories with serious comorbidity and body mass index (BMI) of 35.0 to 35.9 in adult    Coronary artery disease of native artery of native heart with stable angina pectoris    Chronic combined systolic and diastolic congestive heart failure    Prostate cancer    PVD (peripheral vascular disease)    NICM (nonischemic cardiomyopathy)    Closed fracture of tuft of distal phalanx of finger    Chronic venous insufficiency    Third nerve palsy, right    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    Morbid obesity with alveolar hypoventilation    DM (diabetes mellitus) with complications    Chronic obstructive pulmonary disease    Opioid dependence, uncomplicated    Orbital mass    Pulmonary HTN    Pseudotumor, inflammatory, orbital, right    Secondary hyperparathyroidism of renal origin    Weakness of both lower extremities    Lower back pain    Acute on chronic combined systolic and diastolic congestive heart failure    Acute kidney injury superimposed on chronic kidney disease    Acute respiratory failure with hypoxia and hypercarbia    Diabetes    Class 1 obesity due to excess calories with serious comorbidity and body mass index (BMI) of 33.0 to 33.9 in adult    Cellulitis of right lower extremity    Severe sepsis sec to Cellulitis Legs complicated by E coli Bacteremia    E coli bacteremia    Abscess of right leg     Past Medical History:   Diagnosis Date     Chronic combined systolic and diastolic congestive heart failure 07/09/2020    Chronic kidney disease, stage 3     Chronic obstructive pulmonary disease 03/20/2023    Wixela 250 mcg, Spiriva respimat. Continue Albuterol inhaler and albuterol nebs   Referral pul dis management, education and assistance with medication  Patient preference to only see a doctor, request follow up with Dr. Springer             Chronic venous insufficiency     DDD (degenerative disc disease), lumbar     DM (diabetes mellitus) with complications     Drug-induced constipation 11/03/2024    History of gout     Hyperlipidemia associated with type 2 diabetes mellitus     Hypertension associated with stage 3 chronic kidney disease due to type 2 diabetes mellitus     BRADLEY on CPAP     Prostate cancer     prostatectomy in 2010, rising PSA that started in 2021     Past Surgical History:   Procedure Laterality Date    BICEPS TENDON REPAIR      COLONOSCOPY N/A 03/27/2019    Procedure: COLONOSCOPY;  Surgeon: James Roger MD;  Location: [facility] ENDO;  Service: Endoscopy;  Laterality: N/A;    EXPLORATION OF ORBIT Right 9/8/2023    Procedure: EXPLORATION, ORBIT;  Surgeon: Junaid Bartholomew MD;  Location: [facility] OR;  Service: ENT;  Laterality: Right;  possibly need frozen    HEMORRHOID SURGERY      INGUINAL HERNIA REPAIR Left     KNEE ARTHROSCOPY Right     LEFT HEART CATHETERIZATION Left 06/29/2020    Procedure: CATHETERIZATION, HEART, LEFT;  Surgeon: Cheli Wilson MD;  Location: [facility] CATH LAB;  Service: Cardiology;  Laterality: Left;    LUMBAR LAMINECTOMY      PROSTATECTOMY      TONSILLECTOMY         Pre-op Assessment    I have reviewed the Patient Summary Reports.    I have reviewed the NPO Status.   I have reviewed the Medications.     Review of Systems  Anesthesia Hx:  No problems with previous Anesthesia   History of prior surgery of interest to airway management or planning:  Previous anesthesia: General, MAC          Denies Personal Hx of Anesthesia  complications.                    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 Sinus tachycardia with PACs   Right bundle branch block   Left anterior fascicular block    Bifascicular block   LVH with repolarization abnormality ( R in aVL , Romhilt-Fernando )   Abnormal ECG   When compared with ECG of 23-APR-2024 09:48,   Nonspecific T wave abnormality no longer evident in Inferior leads   Inverted T waves have replaced nonspecific T wave abnormality in Lateral   leads   Confirmed by MD EFRAÍN, JAYNE ([id number]) on 11/3/2024 7:48:45 PM                              Pulmonary:   COPD     Sleep Apnea, CPAP                Renal/:  Chronic Renal Disease, CKD                Hepatic/GI:  Hepatic/GI Normal                    Musculoskeletal:  Arthritis   DDD            Neurological:  Neurology Normal          Third nerve palsy                             Endocrine:  Diabetes, type 2   BMI 33      Obesity / BMI > 30      Physical Exam  General: Well nourished, Cooperative, Alert and Oriented    Airway:  Mallampati: III   Mouth Opening: Normal  TM Distance: Normal  Neck ROM: Normal ROM    Dental:  Edentulous    Chest/Lungs:  SOB; 91% O2 Sat in pre-op on RA      Anesthesia Plan  Type of Anesthesia, risks & benefits discussed:    Anesthesia Type: MAC  Intra-op Monitoring Plan: Standard ASA Monitors  Post Op Pain Control Plan: multimodal analgesia and IV/PO Opioids PRN  Induction:  IV  Informed Consent: Informed consent signed with the Patient and all parties understand the risks and agree with anesthesia plan.  All questions answered.   ASA Score: 3  Day of Surgery Review of History & Physical: H&P Update referred to the surgeon/provider.    Ready For Surgery From Anesthesia  Perspective.     .    Chemistry        Component Value Date/Time     11/08/2024 0508    K 3.9 11/08/2024 0508     11/08/2024 0508    CO2 23 11/08/2024 0508    BUN 28 (H) 11/08/2024 0508    CREATININE 1.3 11/08/2024 0508    GLU 99 11/08/2024 0508        Component Value Date/Time    CALCIUM 9.4 11/08/2024 0508    ALKPHOS 359 (H) 11/08/2024 0508    AST 41 (H) 11/08/2024 0508    ALT 51 (H) 11/08/2024 0508    BILITOT 0.7 11/08/2024 0508    ESTGFRAFRICA 43 (A) 07/18/2022 0941    EGFRNONAA 37 (A) 07/18/2022 0941        Lab Results   Component Value Date    WBC 19.02 (H) 11/08/2024    HGB 12.7 (L) 11/08/2024    HCT 39.3 (L) 11/08/2024    MCV 90 11/08/2024     11/08/2024       ECHO (11/3/24):  Summary         Left Ventricle: The left ventricle is mildly dilated. Mildly increased ventricular mass. Normal wall thickness. There is eccentric hypertrophy. There is moderately reduced systolic function with a visually estimated ejection fraction of 35 - 40%. There is normal diastolic function.    Right Ventricle: Normal right ventricular cavity size. Wall thickness is normal. Systolic function is mildly reduced.    Left Atrium: Left atrium is mildly dilated.    Right Atrium: Right atrium is mildly dilated.    Aortic Valve: There is mild aortic valve sclerosis.    Mitral Valve: There is moderate regurgitation.    Tricuspid Valve: There is mild regurgitation.    Pulmonic Valve: There is mild regurgitation.    Pulmonary Artery: There is mild pulmonary hypertension. The estimated pulmonary artery systolic pressure is 44 mmHg.    IVC/SVC: Intermediate venous pressure at 8 mmHg.

## 2024-11-08 NOTE — ASSESSMENT & PLAN NOTE
Likely SSTI source   ID consulted and following   Continue IV Rocephin 2g daily x 14 days (EOT 11/20/2024)   PICC line ordered

## 2024-11-08 NOTE — TRANSFER OF CARE
"Anesthesia Transfer of Care Note    Patient: Santiago Khan    Procedure(s) Performed: Procedure(s) (LRB):  INCISION AND DRAINAGE, ABSCESS (Right)    Patient location: PACU    Anesthesia Type: MAC    Transport from OR: Transported from OR on room air with adequate spontaneous ventilation    Post pain: adequate analgesia    Post assessment: no apparent anesthetic complications and tolerated procedure well    Post vital signs: stable    Level of consciousness: responds to stimulation    Nausea/Vomiting: no nausea/vomiting    Complications: none    Transfer of care protocol was followed      Last vitals: Visit Vitals  /78 (BP Location: Left arm)   Pulse 96   Temp 36.5 °C (97.7 °F) (Oral)   Resp 16   Ht 5' 7" (1.702 m)   Wt 96.3 kg (212 lb 4.9 oz)   SpO2 95%   BMI 33.25 kg/m²     "

## 2024-11-08 NOTE — ASSESSMENT & PLAN NOTE
Patient with Hypercapnic and Hypoxic Respiratory failure which is Acute.  he is not on home oxygen. Supplemental oxygen was provided and noted-    Responded well to Bipap, diuresis  Appears at baseline, currently on room air

## 2024-11-08 NOTE — ASSESSMENT & PLAN NOTE
Latest ECHO  Results for orders placed during the hospital encounter of 11/02/24    Echo    Interpretation Summary    Left Ventricle: The left ventricle is mildly dilated. Mildly increased ventricular mass. Normal wall thickness. There is eccentric hypertrophy. There is moderately reduced systolic function with a visually estimated ejection fraction of 35 - 40%. There is normal diastolic function.    Right Ventricle: Normal right ventricular cavity size. Wall thickness is normal. Systolic function is mildly reduced.    Left Atrium: Left atrium is mildly dilated.    Right Atrium: Right atrium is mildly dilated.    Aortic Valve: There is mild aortic valve sclerosis.    Mitral Valve: There is moderate regurgitation.    Tricuspid Valve: There is mild regurgitation.    Pulmonic Valve: There is mild regurgitation.    Pulmonary Artery: There is mild pulmonary hypertension. The estimated pulmonary artery systolic pressure is 44 mmHg.    IVC/SVC: Intermediate venous pressure at 8 mmHg.    Continue current medications per primary

## 2024-11-08 NOTE — SUBJECTIVE & OBJECTIVE
Interval History: See Hospital Course     Review of Systems  Objective:     Vital Signs (Most Recent):  Temp: 98.5 °F (36.9 °C) (11/08/24 1121)  Pulse: 102 (11/08/24 1121)  Resp: 18 (11/08/24 1121)  BP: 120/74 (11/08/24 1121)  SpO2: 95 % (11/08/24 1121) Vital Signs (24h Range):  Temp:  [97.5 °F (36.4 °C)-98.6 °F (37 °C)] 98.5 °F (36.9 °C)  Pulse:  [] 102  Resp:  [18-20] 18  SpO2:  [92 %-97 %] 95 %  BP: (120-149)/(67-78) 120/74     Weight: 96.3 kg (212 lb 4.9 oz)  Body mass index is 33.25 kg/m².    Intake/Output Summary (Last 24 hours) at 11/8/2024 1307  Last data filed at 11/8/2024 0821  Gross per 24 hour   Intake 720 ml   Output --   Net 720 ml         Physical Exam  Vitals and nursing note reviewed.   Constitutional:       General: He is not in acute distress.     Appearance: He is obese. He is ill-appearing.   Cardiovascular:      Rate and Rhythm: Normal rate and regular rhythm.      Heart sounds: No murmur heard.  Pulmonary:      Effort: Pulmonary effort is normal.      Comments: Diminished breath sounds bilaterally, on room air   Abdominal:      General: Bowel sounds are normal. There is no distension.      Palpations: Abdomen is soft.      Tenderness: Tenderness: mild TTP to LLQ.      Comments: Ecchymosis to L abd wall   Musculoskeletal:      Comments: RLE with erythema, fluctuance and tenderness noted to area below R knee--tender to palpation   Neurological:      Mental Status: He is alert and oriented to person, place, and time. Mental status is at baseline.             Significant Labs: All pertinent labs within the past 24 hours have been reviewed.    Significant Imaging: I have reviewed all pertinent imaging results/findings within the past 24 hours.

## 2024-11-08 NOTE — ASSESSMENT & PLAN NOTE
ROSARIO is likely due to pre-renal azotemia due to intravascular volume depletion. Baseline creatinine is  1.5-1.7 . Most recent creatinine and eGFR are listed below.  Recent Labs     11/06/24  0519 11/07/24  0553 11/08/24  0508   CREATININE 1.9* 1.5* 1.3   EGFRNORACEVR 37* 49* 58*     Plan  -ROSARIO is resolved   -Avoid nephrotoxins and renally dose meds for GFR listed above  -Monitor urine output, serial BMP, and adjust therapy as needed  -Monitor Cr with resumption of toRsemide

## 2024-11-09 PROBLEM — L03.115 CELLULITIS OF RIGHT LOWER EXTREMITY: Status: RESOLVED | Noted: 2024-11-03 | Resolved: 2024-11-09

## 2024-11-09 LAB
ALBUMIN SERPL BCP-MCNC: 2.4 G/DL (ref 3.5–5.2)
ALP SERPL-CCNC: 314 U/L (ref 40–150)
ALT SERPL W/O P-5'-P-CCNC: 68 U/L (ref 10–44)
ANION GAP SERPL CALC-SCNC: 8 MMOL/L (ref 8–16)
ANISOCYTOSIS BLD QL SMEAR: SLIGHT
AST SERPL-CCNC: 48 U/L (ref 10–40)
BASOPHILS # BLD AUTO: ABNORMAL K/UL (ref 0–0.2)
BASOPHILS NFR BLD: 0 % (ref 0–1.9)
BILIRUB SERPL-MCNC: 0.7 MG/DL (ref 0.1–1)
BUN SERPL-MCNC: 21 MG/DL (ref 8–23)
CALCIUM SERPL-MCNC: 8.7 MG/DL (ref 8.7–10.5)
CHLORIDE SERPL-SCNC: 103 MMOL/L (ref 95–110)
CO2 SERPL-SCNC: 26 MMOL/L (ref 23–29)
CREAT SERPL-MCNC: 1.2 MG/DL (ref 0.5–1.4)
DACRYOCYTES BLD QL SMEAR: ABNORMAL
DIFFERENTIAL METHOD BLD: ABNORMAL
EOSINOPHIL # BLD AUTO: ABNORMAL K/UL (ref 0–0.5)
EOSINOPHIL NFR BLD: 0 % (ref 0–8)
ERYTHROCYTE [DISTWIDTH] IN BLOOD BY AUTOMATED COUNT: 17.6 % (ref 11.5–14.5)
EST. GFR  (NO RACE VARIABLE): >60 ML/MIN/1.73 M^2
GLUCOSE SERPL-MCNC: 106 MG/DL (ref 70–110)
GRAM STN SPEC: NORMAL
GRAM STN SPEC: NORMAL
HCT VFR BLD AUTO: 37 % (ref 40–54)
HGB BLD-MCNC: 12 G/DL (ref 14–18)
IMM GRANULOCYTES # BLD AUTO: ABNORMAL K/UL (ref 0–0.04)
IMM GRANULOCYTES NFR BLD AUTO: ABNORMAL % (ref 0–0.5)
LYMPHOCYTES # BLD AUTO: ABNORMAL K/UL (ref 1–4.8)
LYMPHOCYTES NFR BLD: 12 % (ref 18–48)
MAGNESIUM SERPL-MCNC: 1.6 MG/DL (ref 1.6–2.6)
MCH RBC QN AUTO: 29.1 PG (ref 27–31)
MCHC RBC AUTO-ENTMCNC: 32.4 G/DL (ref 32–36)
MCV RBC AUTO: 90 FL (ref 82–98)
METAMYELOCYTES NFR BLD MANUAL: 1 %
MONOCYTES # BLD AUTO: ABNORMAL K/UL (ref 0.3–1)
MONOCYTES NFR BLD: 3 % (ref 4–15)
MRSA SPEC QL CULT: NORMAL
MYELOCYTES NFR BLD MANUAL: 3 %
NEUTROPHILS NFR BLD: 78 % (ref 38–73)
NEUTS BAND NFR BLD MANUAL: 2 %
NRBC BLD-RTO: 0 /100 WBC
OVALOCYTES BLD QL SMEAR: ABNORMAL
PHOSPHATE SERPL-MCNC: 3.1 MG/DL (ref 2.7–4.5)
PLATELET # BLD AUTO: 257 K/UL (ref 150–450)
PLATELET BLD QL SMEAR: ABNORMAL
PMV BLD AUTO: 9.6 FL (ref 9.2–12.9)
POCT GLUCOSE: 104 MG/DL (ref 70–110)
POCT GLUCOSE: 119 MG/DL (ref 70–110)
POCT GLUCOSE: 128 MG/DL (ref 70–110)
POCT GLUCOSE: 131 MG/DL (ref 70–110)
POTASSIUM SERPL-SCNC: 4 MMOL/L (ref 3.5–5.1)
PROMYELOCYTES NFR BLD MANUAL: 1 %
PROT SERPL-MCNC: 6.8 G/DL (ref 6–8.4)
RBC # BLD AUTO: 4.13 M/UL (ref 4.6–6.2)
SODIUM SERPL-SCNC: 137 MMOL/L (ref 136–145)
WBC # BLD AUTO: 14.47 K/UL (ref 3.9–12.7)

## 2024-11-09 PROCEDURE — 97530 THERAPEUTIC ACTIVITIES: CPT | Mod: CQ

## 2024-11-09 PROCEDURE — 21400001 HC TELEMETRY ROOM

## 2024-11-09 PROCEDURE — 63600175 PHARM REV CODE 636 W HCPCS: Performed by: SURGERY

## 2024-11-09 PROCEDURE — 80053 COMPREHEN METABOLIC PANEL: CPT | Performed by: SURGERY

## 2024-11-09 PROCEDURE — 84100 ASSAY OF PHOSPHORUS: CPT | Performed by: SURGERY

## 2024-11-09 PROCEDURE — 25000003 PHARM REV CODE 250: Performed by: SURGERY

## 2024-11-09 PROCEDURE — 63600175 PHARM REV CODE 636 W HCPCS: Performed by: INTERNAL MEDICINE

## 2024-11-09 PROCEDURE — 85007 BL SMEAR W/DIFF WBC COUNT: CPT | Performed by: SURGERY

## 2024-11-09 PROCEDURE — 99233 SBSQ HOSP IP/OBS HIGH 50: CPT | Mod: GT,,, | Performed by: STUDENT IN AN ORGANIZED HEALTH CARE EDUCATION/TRAINING PROGRAM

## 2024-11-09 PROCEDURE — 83735 ASSAY OF MAGNESIUM: CPT | Performed by: SURGERY

## 2024-11-09 PROCEDURE — 85027 COMPLETE CBC AUTOMATED: CPT | Performed by: SURGERY

## 2024-11-09 PROCEDURE — 97116 GAIT TRAINING THERAPY: CPT | Mod: CQ

## 2024-11-09 PROCEDURE — 94799 UNLISTED PULMONARY SVC/PX: CPT

## 2024-11-09 RX ORDER — MAGNESIUM SULFATE HEPTAHYDRATE 40 MG/ML
2 INJECTION, SOLUTION INTRAVENOUS ONCE
Status: COMPLETED | OUTPATIENT
Start: 2024-11-09 | End: 2024-11-09

## 2024-11-09 RX ADMIN — CHLORHEXIDINE GLUCONATE 0.12% ORAL RINSE 10 ML: 1.2 LIQUID ORAL at 08:11

## 2024-11-09 RX ADMIN — CHOLECALCIFEROL TAB 125 MCG (5000 UNIT) 5000 UNITS: 125 TAB at 08:11

## 2024-11-09 RX ADMIN — ATORVASTATIN CALCIUM 20 MG: 10 TABLET, FILM COATED ORAL at 08:11

## 2024-11-09 RX ADMIN — HYDROMORPHONE HYDROCHLORIDE 0.5 MG: 1 INJECTION, SOLUTION INTRAMUSCULAR; INTRAVENOUS; SUBCUTANEOUS at 08:11

## 2024-11-09 RX ADMIN — LINEZOLID 600 MG: 600 TABLET, FILM COATED ORAL at 08:11

## 2024-11-09 RX ADMIN — Medication 100 MG: at 08:11

## 2024-11-09 RX ADMIN — MAGNESIUM SULFATE HEPTAHYDRATE 2 G: 40 INJECTION, SOLUTION INTRAVENOUS at 08:11

## 2024-11-09 RX ADMIN — GUAIFENESIN 600 MG: 600 TABLET, EXTENDED RELEASE ORAL at 08:11

## 2024-11-09 RX ADMIN — METOPROLOL TARTRATE 12.5 MG: 25 TABLET, FILM COATED ORAL at 08:11

## 2024-11-09 RX ADMIN — CEFTRIAXONE 2 G: 2 INJECTION, POWDER, FOR SOLUTION INTRAMUSCULAR; INTRAVENOUS at 06:11

## 2024-11-09 RX ADMIN — LATANOPROST 1 DROP: 50 SOLUTION OPHTHALMIC at 08:11

## 2024-11-09 RX ADMIN — HYDROMORPHONE HYDROCHLORIDE 0.5 MG: 1 INJECTION, SOLUTION INTRAMUSCULAR; INTRAVENOUS; SUBCUTANEOUS at 07:11

## 2024-11-09 RX ADMIN — ASPIRIN 81 MG: 81 TABLET, COATED ORAL at 08:11

## 2024-11-09 RX ADMIN — Medication 1 TABLET: at 08:11

## 2024-11-09 RX ADMIN — ENOXAPARIN SODIUM 40 MG: 40 INJECTION SUBCUTANEOUS at 11:11

## 2024-11-09 RX ADMIN — TORSEMIDE 40 MG: 10 TABLET ORAL at 08:11

## 2024-11-09 RX ADMIN — THERA TABS 1 TABLET: TAB at 08:11

## 2024-11-09 RX ADMIN — HYDROCODONE BITARTRATE AND ACETAMINOPHEN 1 TABLET: 5; 325 TABLET ORAL at 03:11

## 2024-11-09 NOTE — SUBJECTIVE & OBJECTIVE
Interval History: S/p washout of right knee. Abscess appreciated. Cultures sent. Will follow. Patient doing well.     Review of Systems   Gastrointestinal:  Negative for abdominal pain.   Musculoskeletal:  Positive for arthralgias, back pain and myalgias.   All other systems reviewed and are negative.    Objective:     Vital Signs (Most Recent):  Temp: 97.9 °F (36.6 °C) (11/09/24 0809)  Pulse: 90 (11/09/24 0809)  Resp: 18 (11/09/24 0839)  BP: 130/73 (11/09/24 0809)  SpO2: (!) 92 % (11/09/24 0809) Vital Signs (24h Range):  Temp:  [97.7 °F (36.5 °C)-98.6 °F (37 °C)] 97.9 °F (36.6 °C)  Pulse:  [] 90  Resp:  [14-26] 18  SpO2:  [92 %-95 %] 92 %  BP: (112-139)/(71-84) 130/73     Weight: 96.3 kg (212 lb 4.9 oz)  Body mass index is 33.25 kg/m².    Estimated Creatinine Clearance: 60.6 mL/min (based on SCr of 1.2 mg/dL).     Physical Exam  Constitutional:       General: He is not in acute distress.     Appearance: Normal appearance. He is not ill-appearing.   Cardiovascular:      Rate and Rhythm: Normal rate and regular rhythm.      Pulses: Normal pulses.      Heart sounds: Normal heart sounds. No murmur heard.     No friction rub. No gallop.   Pulmonary:      Effort: Pulmonary effort is normal. No respiratory distress.      Breath sounds: Normal breath sounds.   Abdominal:      General: Abdomen is flat. Bowel sounds are normal. There is no distension.      Palpations: Abdomen is soft.      Tenderness: There is no abdominal tenderness.   Musculoskeletal:      Comments: Right knee bandaged post op    Skin:     General: Skin is warm and dry.   Neurological:      Mental Status: He is alert.          Significant Labs: Blood Culture:   Recent Labs   Lab 11/03/24  0125 11/06/24  1100   LABBLOO No growth after 5 days.  Gram stain dao bottle: Gram negative rods  Results called to and read back by:Sharifa Power RN 11/04/2024  00:22  ESCHERICHIA COLI* No Growth to date  No Growth to date  No Growth to date  No Growth to  date  No Growth to date  No Growth to date     CBC:   Recent Labs   Lab 11/08/24  0508 11/09/24  0451   WBC 19.02* 14.47*   HGB 12.7* 12.0*   HCT 39.3* 37.0*    257     CMP:   Recent Labs   Lab 11/08/24  0508 11/09/24  0451    137   K 3.9 4.0    103   CO2 23 26   GLU 99 106   BUN 28* 21   CREATININE 1.3 1.2   CALCIUM 9.4 8.7   PROT 7.5 6.8   ALBUMIN 2.6* 2.4*   BILITOT 0.7 0.7   ALKPHOS 359* 314*   AST 41* 48*   ALT 51* 68*   ANIONGAP 13 8     Microbiology Results (last 7 days)       Procedure Component Value Units Date/Time    Culture, MRSA [[id number]] Collected: 11/04/24 0557    Order Status: Completed Specimen: MRSA source from Nares, Left Updated: 11/09/24 0720     MRSA Surveillance Screen No MRSA isolated    Gram stain [[id number]] Collected: 11/08/24 1700    Order Status: Completed Specimen: Wound from Leg, Right Updated: 11/09/24 0454     Gram Stain Result Rare WBC's      No organisms seen    Culture, Anaerobe [[id number]] Collected: 11/08/24 1700    Order Status: Sent Specimen: Wound from Leg, Right Updated: 11/09/24 0126    Aerobic culture [[id number]] Collected: 11/08/24 1700    Order Status: Sent Specimen: Wound from Leg, Right Updated: 11/09/24 0126    Fungus culture [[id number]] Collected: 11/08/24 1700    Order Status: Sent Specimen: Wound from Leg, Right Updated: 11/09/24 0126    Blood culture [[id number]] Collected: 11/06/24 1100    Order Status: Completed Specimen: Blood from Peripheral, Forearm, Left Updated: 11/08/24 2022     Blood Culture, Routine No Growth to date      No Growth to date      No Growth to date    Blood culture [[id number]] Collected: 11/06/24 1100    Order Status: Completed Specimen: Blood from Peripheral, Hand, Left Updated: 11/08/24 2022     Blood Culture, Routine No Growth to date      No Growth to date      No Growth to date    Blood culture [[id number]] Collected: 11/03/24 0125    Order Status: Completed Specimen: Blood from Peripheral, Antecubital,  Left Updated: 11/08/24 1412     Blood Culture, Routine No growth after 5 days.    Blood culture [[id number]]  (Abnormal)  (Susceptibility) Collected: 11/03/24 0125    Order Status: Completed Specimen: Blood from Peripheral, Hand, Right Updated: 11/06/24 1051     Blood Culture, Routine Gram stain dao bottle: Gram negative rods      Results called to and read back by:Sharifa Power RN 11/04/2024  00:22      ESCHERICHIA COLI    Rapid Organism ID by PCR (from Blood culture) [[id number]]  (Abnormal) Collected: 11/03/24 0125    Order Status: Completed Updated: 11/04/24 0141     Enterococcus faecalis Not Detected     Enterococcus faecium Not Detected     Listeria monocytogenes Not Detected     Staphylococcus spp. Not Detected     Staphylococcus aureus Not Detected     Staphylococcus epidermidis Not Detected     Staphylococcus lugdunensis Not Detected     Streptococcus species Not Detected     Streptococcus agalactiae Not Detected     Streptococcus pneumoniae Not Detected     Streptococcus pyogenes Not Detected     Acinetobacter calcoaceticus/baumannii complex Not Detected     Bacteroides fragilis Not Detected     Enterobacterales See species for ID     Enterobacter cloacae complex Not Detected     Escherichia coli Detected     Klebsiella aerogenes Not Detected     Klebsiella oxytoca Not Detected     Klebsiella pneumoniae group Not Detected     Proteus Not Detected     Salmonella sp Not Detected     Serratia marcescens Not Detected     Haemophilus influenzae Not Detected     Neisseria meningtidis Not Detected     Pseudomonas aeruginosa Not Detected     Stenotrophomonas maltophilia Not Detected     Candida albicans Not Detected     Candida auris Not Detected     Candida glabrata Not Detected     Candida krusei Not Detected     Candida parapsilosis Not Detected     Candida tropicalis Not Detected     Cryptococcus neoformans/gattii Not Detected     CTX-M (ESBL ) Not Detected     IMP (Carbapenem resistant) Not Detected      KPC resistance gene (Carbapenem resistant) Not Detected     mcr-1  Not Detected     mec A/C  Test Not Applicable     mec A/C and MREJ (MRSA) gene Test Not Applicable     NDM (Carbapenem resistant) Not Detected     OXA-48-like (Carbapenem resistant) Not Detected     van A/B (VRE gene) Test Not Applicable     VIM (Carbapenem resistant) Not Detected    Respiratory Infection Panel (PCR), Nasopharyngeal [[id number]] Collected: 11/03/24 1045    Order Status: Completed Specimen: Nasopharyngeal Swab Updated: 11/03/24 1201     Respiratory Infection Panel Source NP Swab     Adenovirus Not Detected     Coronavirus 229E, Common Cold Virus Not Detected     Coronavirus HKU1, Common Cold Virus Not Detected     Coronavirus NL63, Common Cold Virus Not Detected     Coronavirus OC43, Common Cold Virus Not Detected     Comment: The Coronavirus strains detected in this test cause the common cold.  These strains are not the COVID-19 (novel Coronavirus)strain   associated with the respiratory disease outbreak.          SARS-CoV2 (COVID-19) Qualitative PCR Not Detected     Human Metapneumovirus Not Detected     Human Rhinovirus/Enterovirus Not Detected     Influenza A (subtypes H1, H1-2009,H3) Not Detected     Influenza B Not Detected     Parainfluenza Virus 1 Not Detected     Parainfluenza Virus 2 Not Detected     Parainfluenza Virus 3 Not Detected     Parainfluenza Virus 4 Not Detected     Respiratory Syncytial Virus Not Detected     Bordetella Parapertussis (XF3659) Not Detected     Bordetella pertussis (ptxP) Not Detected     Chlamydia pneumoniae Not Detected     Mycoplasma pneumoniae Not Detected     Comment: Respiratory Infection Panel testing performed by Multiplex PCR.       Narrative:      Assay not valid for lower respiratory specimens, alternate  testing required.    Respiratory Infection Panel (PCR), Nasopharyngeal [[id number]] Collected: 11/03/24 1009    Order Status: Canceled Specimen: Nasopharyngeal Swab     Influenza A  & B by Molecular [[id number]] Collected: 11/03/24 0607    Order Status: Completed Specimen: Nasopharyngeal Swab Updated: 11/03/24 0839     Influenza A, Molecular Negative     Influenza B, Molecular Negative     Flu A & B Source Nasal swab          All pertinent labs within the past 24 hours have been reviewed.    Significant Imaging: I have reviewed all pertinent imaging results/findings within the past 24 hours.

## 2024-11-09 NOTE — ASSESSMENT & PLAN NOTE
--All cases of bacteremia pose risk of life threatening sepsis and metastatic infection  --GN bacteremia not always with obvious source   --Unclear in this case   --CT a/p shows thickening and enlargement of the left krystal abdominal wall. Possible infected hematoma.   --Possible early pneumonia as well   --Would be cautious and treat with longer course of antibiotics   --Continue IV ceftriaxone 2 g daily as E coli isolate is pan susceptible   --Requires drug toxicity monitoring   --Follow repeat blood cx for clearance; NGTD    --Will complete 14 day IV abx course from negative blood cx  --Above d/w primary team.      Outpatient Antibiotic Therapy Plan:    1) Infection: E coli bacteremia     2) Discharge Antibiotics:    Intravenous antibiotics:  IV ceftriaxone 2 g daily     3) Therapy Duration:  14 days    Estimated end date of IV antibiotics: 11/20/24    4) Outpatient Weekly Labs:    Order the following labs to be drawn on Mondays:   CBC  CMP     Perform labs at [facility] facility     5) Fax Lab Results to Infectious Diseases Provider: Dr. Swanson     [facility] Fax Number: [phone]

## 2024-11-09 NOTE — PLAN OF CARE
SITTING IN CHAIR UPON ARRIVAL USING INSPIRATOR     HE DEMONSTRATED GOOD SITTING BALANCE WITH [facility]    SIT<-->STAND SBA    AMBULATED WITH RW 2 X 60' WITH INCREASED KALANI AS HE PROGRESSED. PATIENT WAS ABLE TO MANAGE HOSPITAL DOOR WHILE WALKING    PATIENT AMBULATED INCREASED DISTANCE TODAY WITH GOOD QUALITY

## 2024-11-09 NOTE — ASSESSMENT & PLAN NOTE
"This patient does have evidence of infective focus  My overall impression is sepsis.  Source: Skin and Soft Tissue (location cellulitis RLE)  Antibiotics given-   Antibiotics (72h ago, onward)      Start     Stop Route Frequency Ordered    11/08/24 2000  linezolid tablet 600 mg         -- Oral Every 12 hours 11/08/24 1332    11/06/24 1700  cefTRIAXone injection 2 g         -- IV Every 24 hours (non-standard times) 11/06/24 1036          Latest lactate reviewed-  No results for input(s): "LACTATE", "POCLAC" in the last 72 hours.    Organ dysfunction indicated by Acute kidney injury, Acute respiratory failure, and Encephalopathy    Fluid challenge Other- Patient to receive D10 volume other than 30cc/kg due to Congestive Heart Failure     Post- resuscitation assessment No - Post resuscitation assessment not needed       Will Not start Pressors- Levophed for MAP of 65  Source control achieved by: IV Abx    On ceftriaxone - PRESTON 11/20/24  ID following  Gen Surg consulted 11/08 due to concern for RLE abscess; s/p I&D with Dr. Magallanes 11/08, cultures pending \  Zyvox added per ID   "

## 2024-11-09 NOTE — PLAN OF CARE
Problem: Pain Acute  Goal: Optimal Pain Control and Function  Reactivated  Intervention: Develop Pain Management Plan  Flowsheets (Taken 11/9/2024 0256)  Pain Management Interventions:   around-the-clock dosing utilized   position adjusted  Intervention: Prevent or Manage Pain  Flowsheets (Taken 11/9/2024 0256)  Sensory Stimulation Regulation: care clustered  Bowel Elimination Promotion: adequate fluid intake promoted  Medication Review/Management: medications reviewed

## 2024-11-09 NOTE — SUBJECTIVE & OBJECTIVE
Interval History: see Hospital Course     Review of Systems  Objective:     Vital Signs (Most Recent):  Temp: 97.9 °F (36.6 °C) (11/09/24 0809)  Pulse: 90 (11/09/24 0809)  Resp: 18 (11/09/24 0839)  BP: 130/73 (11/09/24 0809)  SpO2: (!) 92 % (11/09/24 0809) Vital Signs (24h Range):  Temp:  [97.7 °F (36.5 °C)-98.6 °F (37 °C)] 97.9 °F (36.6 °C)  Pulse:  [] 90  Resp:  [14-26] 18  SpO2:  [92 %-95 %] 92 %  BP: (112-139)/(71-84) 130/73     Weight: 96.3 kg (212 lb 4.9 oz)  Body mass index is 33.25 kg/m².    Intake/Output Summary (Last 24 hours) at 11/9/2024 1124  Last data filed at 11/9/2024 0510  Gross per 24 hour   Intake 858.99 ml   Output 235 ml   Net 623.99 ml         Physical Exam  Vitals and nursing note reviewed.   Constitutional:       General: He is not in acute distress.     Appearance: He is obese. Ill appearance: chronic.   Cardiovascular:      Rate and Rhythm: Normal rate and regular rhythm.      Heart sounds: No murmur heard.  Pulmonary:      Effort: Pulmonary effort is normal.      Breath sounds: Normal breath sounds.      Comments: RA  Abdominal:      General: Bowel sounds are normal. There is no distension.      Palpations: Abdomen is soft.      Tenderness: There is no abdominal tenderness.      Comments: L flank/abd bruising stable    Musculoskeletal:      Comments: R mid leg dressing in place    Neurological:      Mental Status: He is alert and oriented to person, place, and time.             Significant Labs: All pertinent labs within the past 24 hours have been reviewed.    Significant Imaging: I have reviewed all pertinent imaging results/findings within the past 24 hours.

## 2024-11-09 NOTE — PT/OT/SLP PROGRESS
Physical Therapy  Treatment    Santiago Khan   MRN: [id number]   Admitting Diagnosis: Severe sepsis       PT Start Time: 0850     PT Stop Time: 0915    PT Total Time (min): 25 min       Billable Minutes:  Gait Training 15 and Therapeutic Activity 10    Treatment Type: Treatment  PT/PTA: PTA     Number of PTA visits since last PT visit: 1       General Precautions: Standard, fall  Orthopedic Precautions: N/A  Braces: N/A  Respiratory Status: Room air    Spiritual, Cultural Beliefs, Buddhism Practices, Values that Affect Care: yes    Subjective:  Communicated with MARTEEN prior to session.      Pain/Comfort  Pain Rating 1: 0/10  Pain Rating Post-Intervention 1: 0/10    Treatment and Education:    SITTING IN CHAIR UPON ARRIVAL USING INSPIRATOR     HE DEMONSTRATED GOOD SITTING BALANCE WITH [facility]    SIT<-->STAND SBA    AMBULATED WITH RW 2 X 60' WITH INCREASED KALANI AS HE PROGRESSED. PATIENT WAS ABLE TO MANAGE HOSPITAL DOOR WHILE WALKING    PATIENT AMBULATED INCREASED DISTANCE TODAY WITH GOOD QUALITY     AM-PAC 6 CLICK MOBILITY  How much help from another person does this patient currently need?   1 = Unable, Total/Dependent Assistance  2 = A lot, Maximum/Moderate Assistance  3 = A little, Minimum/Contact Guard/Supervision  4 = None, Modified [place]/Independent    Turning over in bed (including adjusting bedclothes, sheets and blankets)?:  (NA)  Sitting down on and standing up from a chair with arms (e.g., wheelchair, bedside commode, etc.): 4  Moving from lying on back to sitting on the side of the bed?:  (NA)  Moving to and from a bed to a chair (including a wheelchair)?:  (NA)  Need to walk in hospital room?: 3  Climbing 3-5 steps with a railing?: 1    AM-PAC Raw Score CMS G-Code Modifier Level of Impairment Assistance   6 % Total / Unable   7 - 9 CM 80 - 100% Maximal Assist   10 - 14 CL 60 - 80% Moderate Assist   15 - 19 CK 40 - 60% Moderate Assist   20 - 22 CJ 20 - 40% Minimal  Assist   23 CI 1-20% SBA / CGA   24 CH 0% Independent/ Mod I     Patient left up in chair with all lines intact and call button in reach.    Assessment:  Santiago Khan is a 74 y.o. male with a medical diagnosis of Severe sepsis and presents with .    Rehab identified problem list/impairments: weakness, impaired self care skills, decreased upper extremity function, decreased lower extremity function, impaired functional mobility, gait instability, impaired cardiopulmonary response to activity    Rehab potential is good.    Activity tolerance: Good    Discharge recommendations: Moderate Intensity Therapy      Barriers to discharge:      Equipment recommendations: to be determined by next level of care     GOALS:   Multidisciplinary Problems       Physical Therapy Goals          Problem: Physical Therapy    Goal Priority Disciplines Outcome Interventions   Physical Therapy Goal     PT, PT/OT Progressing    Description: LTG'S TO BE MET IN 14 DAYS (11-18-24)  PT WILL REQUIRE SBA FOR BED MOBILITY  PT WILL REQUIRE SBA FOR BED<>CHAIR TF'S  PT WILL  FEET WITH RW AND CGA  PT WILL INC AMPAC SCORE BY 2 POINTS TO PROGRESS GROSS FUNC MOBILITY                         PLAN:    Patient to be seen 3 x/week to address the above listed problems via therapeutic exercises, therapeutic activities, gait training  Plan of Care expires: 11/18/24  Plan of Care reviewed with: patient, daughter         11/09/2024

## 2024-11-09 NOTE — ASSESSMENT & PLAN NOTE
Patient with known CAD, which is controlled Will continue  home medications  and monitor for S/Sx of angina/ACS. Continue to monitor on telemetry.   Continue home Asa, statin, B blocker

## 2024-11-09 NOTE — PLAN OF CARE
Problem: Adult Inpatient Plan of Care  Goal: Plan of Care Review  Outcome: Progressing  Goal: Patient-Specific Goal (Individualized)  Outcome: Progressing  Goal: Absence of Hospital-Acquired Illness or Injury  Outcome: Progressing  Goal: Optimal Comfort and Wellbeing  Outcome: Progressing  Goal: Readiness for Transition of Care  Outcome: Progressing     Problem: Diabetes Comorbidity  Goal: Blood Glucose Level Within Targeted Range  Outcome: Progressing     Problem: Acute Kidney Injury/Impairment  Goal: Fluid and Electrolyte Balance  Outcome: Progressing  Goal: Improved Oral Intake  Outcome: Progressing  Goal: Effective Renal Function  Outcome: Progressing     Problem: Skin Injury Risk Increased  Goal: Skin Health and Integrity  Outcome: Progressing     Problem: Infection  Goal: Absence of Infection Signs and Symptoms  Outcome: Progressing     Problem: Sepsis/Septic Shock  Goal: Optimal Coping  Outcome: Progressing  Goal: Absence of Bleeding  Outcome: Progressing  Goal: Blood Glucose Level Within Targeted Range  Outcome: Progressing  Goal: Absence of Infection Signs and Symptoms  Outcome: Progressing  Goal: Optimal Nutrition Intake  Outcome: Progressing     Problem: Wound  Goal: Optimal Coping  Outcome: Progressing  Goal: Optimal Functional Ability  Outcome: Progressing  Goal: Absence of Infection Signs and Symptoms  Outcome: Progressing  Goal: Improved Oral Intake  Outcome: Progressing  Goal: Optimal Pain Control and Function  Outcome: Progressing  Goal: Skin Health and Integrity  Outcome: Progressing  Goal: Optimal Wound Healing  Outcome: Progressing

## 2024-11-09 NOTE — SUBJECTIVE & OBJECTIVE
Interval History:  Patient was feeling better.  Wound care was fairly well tolerated.      Await cultures.      Antibiotics per Infectious Disease    Medications:  Continuous Infusions:  Scheduled Meds:   aspirin  81 mg Oral Daily    atorvastatin  20 mg Oral Daily    cefTRIAXone (Rocephin) IV (PEDS and ADULTS)  2 g Intravenous Q24H    chlorhexidine  10 mL Mouth/Throat BID    cholecalciferol (vitamin D3)  5,000 Units Oral Daily    enoxparin  40 mg Subcutaneous Daily    folic acid-vit B6-vit B12 2.5-25-2 mg  1 tablet Oral Daily    guaiFENesin  600 mg Oral BID    latanoprost  1 drop Right Eye QHS    linezolid  600 mg Oral Q12H    magnesium sulfate IVPB  2 g Intravenous Once    metoprolol tartrate  12.5 mg Oral BID    multivitamin  1 tablet Oral Daily    thiamine  100 mg Oral Daily    torsemide  40 mg Oral Daily     PRN Meds:  Current Facility-Administered Medications:     acetaminophen, 650 mg, Rectal, Q6H PRN    acetaminophen, 650 mg, Oral, Q6H PRN    albuterol-ipratropium, 3 mL, Nebulization, Q4H PRN    aluminum-magnesium hydroxide-simethicone, 30 mL, Oral, QID PRN    benzonatate, 100 mg, Oral, TID PRN    dextrose 10%, 12.5 g, Intravenous, PRN    dextrose 10%, 12.5 g, Intravenous, PRN    dextrose 10%, 12.5 g, Intravenous, PRN    dextrose 10%, 25 g, Intravenous, PRN    dextrose 10%, 25 g, Intravenous, PRN    dextrose 10%, 25 g, Intravenous, PRN    glucagon (human recombinant), 1 mg, Intramuscular, PRN    glucose, 16 g, Oral, PRN    glucose, 24 g, Oral, PRN    HYDROcodone-acetaminophen, 1 tablet, Oral, Q4H PRN    HYDROmorphone, 0.5 mg, Intravenous, Q4H PRN    naloxone, 0.02 mg, Intravenous, PRN    ondansetron, 4 mg, Intravenous, Q8H PRN    polyethylene glycol, 17 g, Oral, Daily PRN    promethazine, 25 mg, Oral, Q6H PRN    simethicone, 1 tablet, Oral, QID PRN    sodium chloride 0.9%, 3 mL, Intravenous, Q12H PRN     Review of patient's allergies indicates:   Allergen Reactions    Amitriptyline Rash    Celecoxib Rash      Objective:     Vital Signs (Most Recent):  Temp: 97.9 °F (36.6 °C) (11/09/24 0809)  Pulse: 90 (11/09/24 0809)  Resp: 18 (11/09/24 0839)  BP: 130/73 (11/09/24 0809)  SpO2: (!) 92 % (11/09/24 0809) Vital Signs (24h Range):  Temp:  [97.7 °F (36.5 °C)-98.6 °F (37 °C)] 97.9 °F (36.6 °C)  Pulse:  [] 90  Resp:  [14-26] 18  SpO2:  [92 %-95 %] 92 %  BP: (112-139)/(71-84) 130/73     Weight: 96.3 kg (212 lb 4.9 oz)  Body mass index is 33.25 kg/m².    Intake/Output - Last 3 Shifts         11/07 0700  11/08 0659 11/08 0700  11/09 0659 11/09 0700  11/10 0659    P.O. 240 840     I.V. (mL/kg)  499 (5.2)     Total Intake(mL/kg) 240 (2.5) 1339 (13.9)     Urine (mL/kg/hr)  225 (0.1)     Stool       Blood  10     Total Output  235     Net +240 +1104            Urine Occurrence 3 x 4 x     Stool Occurrence 2 x 2 x              Physical Exam  Vitals reviewed.   Constitutional:       General: He is not in acute distress.     Appearance: He is well-developed. He is ill-appearing.   HENT:      Head: Normocephalic and atraumatic.   Eyes:      General: No scleral icterus.     Pupils: Pupils are equal, round, and reactive to light.   Neck:      Thyroid: No thyromegaly.      Vascular: No JVD.      Trachea: No tracheal deviation.   Cardiovascular:      Rate and Rhythm: Normal rate and regular rhythm.      Heart sounds: Normal heart sounds.   Pulmonary:      Effort: Pulmonary effort is normal.      Breath sounds: Normal breath sounds.   Abdominal:      General: Bowel sounds are normal. There is no distension.      Palpations: Abdomen is soft. There is no mass.      Tenderness: There is no abdominal tenderness. There is no guarding or rebound.   Musculoskeletal:         General: Normal range of motion.      Cervical back: Normal range of motion and neck supple.   Lymphadenopathy:      Cervical: No cervical adenopathy.   Skin:     General: Skin is warm and dry.      Comments: Right lower leg wound is clean.  Right lower leg is still  edematous but there appears to be decreased erythema   Neurological:      Mental Status: He is alert and oriented to person, place, and time.   Psychiatric:         Behavior: Behavior normal.         Thought Content: Thought content normal.         Judgment: Judgment normal.          Significant Labs:  I have reviewed all pertinent lab results within the past 24 hours.  CBC:   Recent Labs   Lab 11/09/24 0451   WBC 14.47*   RBC 4.13*   HGB 12.0*   HCT 37.0*      MCV 90   MCH 29.1   MCHC 32.4     BMP:   Recent Labs   Lab 11/09/24 0451         K 4.0      CO2 26   BUN 21   CREATININE 1.2   CALCIUM 8.7   MG 1.6     Microbiology Results (last 7 days)       Procedure Component Value Units Date/Time    Culture, MRSA [[id number]] Collected: 11/04/24 0557    Order Status: Completed Specimen: MRSA source from Nares, Left Updated: 11/09/24 0720     MRSA Surveillance Screen No MRSA isolated    Gram stain [[id number]] Collected: 11/08/24 1700    Order Status: Completed Specimen: Wound from Leg, Right Updated: 11/09/24 0454     Gram Stain Result Rare WBC's      No organisms seen    Culture, Anaerobe [[id number]] Collected: 11/08/24 1700    Order Status: Sent Specimen: Wound from Leg, Right Updated: 11/09/24 0126    Aerobic culture [[id number]] Collected: 11/08/24 1700    Order Status: Sent Specimen: Wound from Leg, Right Updated: 11/09/24 0126    Fungus culture [[id number]] Collected: 11/08/24 1700    Order Status: Sent Specimen: Wound from Leg, Right Updated: 11/09/24 0126    Blood culture [[id number]] Collected: 11/06/24 1100    Order Status: Completed Specimen: Blood from Peripheral, Forearm, Left Updated: 11/08/24 2022     Blood Culture, Routine No Growth to date      No Growth to date      No Growth to date    Blood culture [[id number]] Collected: 11/06/24 1100    Order Status: Completed Specimen: Blood from Peripheral, Hand, Left Updated: 11/08/24 2022     Blood Culture, Routine No Growth to date       No Growth to date      No Growth to date    Blood culture [[id number]] Collected: 11/03/24 0125    Order Status: Completed Specimen: Blood from Peripheral, Antecubital, Left Updated: 11/08/24 1412     Blood Culture, Routine No growth after 5 days.    Blood culture [[id number]]  (Abnormal)  (Susceptibility) Collected: 11/03/24 0125    Order Status: Completed Specimen: Blood from Peripheral, Hand, Right Updated: 11/06/24 1051     Blood Culture, Routine Gram stain dao bottle: Gram negative rods      Results called to and read back by:Sharifa Power RN 11/04/2024  00:22      ESCHERICHIA COLI    Rapid Organism ID by PCR (from Blood culture) [[id number]]  (Abnormal) Collected: 11/03/24 0125    Order Status: Completed Updated: 11/04/24 0141     Enterococcus faecalis Not Detected     Enterococcus faecium Not Detected     Listeria monocytogenes Not Detected     Staphylococcus spp. Not Detected     Staphylococcus aureus Not Detected     Staphylococcus epidermidis Not Detected     Staphylococcus lugdunensis Not Detected     Streptococcus species Not Detected     Streptococcus agalactiae Not Detected     Streptococcus pneumoniae Not Detected     Streptococcus pyogenes Not Detected     Acinetobacter calcoaceticus/baumannii complex Not Detected     Bacteroides fragilis Not Detected     Enterobacterales See species for ID     Enterobacter cloacae complex Not Detected     Escherichia coli Detected     Klebsiella aerogenes Not Detected     Klebsiella oxytoca Not Detected     Klebsiella pneumoniae group Not Detected     Proteus Not Detected     Salmonella sp Not Detected     Serratia marcescens Not Detected     Haemophilus influenzae Not Detected     Neisseria meningtidis Not Detected     Pseudomonas aeruginosa Not Detected     Stenotrophomonas maltophilia Not Detected     Candida albicans Not Detected     Candida auris Not Detected     Candida glabrata Not Detected     Candida krusei Not Detected     Candida parapsilosis Not  Detected     Candida tropicalis Not Detected     Cryptococcus neoformans/gattii Not Detected     CTX-M (ESBL ) Not Detected     IMP (Carbapenem resistant) Not Detected     KPC resistance gene (Carbapenem resistant) Not Detected     mcr-1  Not Detected     mec A/C  Test Not Applicable     mec A/C and MREJ (MRSA) gene Test Not Applicable     NDM (Carbapenem resistant) Not Detected     OXA-48-like (Carbapenem resistant) Not Detected     van A/B (VRE gene) Test Not Applicable     VIM (Carbapenem resistant) Not Detected    Respiratory Infection Panel (PCR), Nasopharyngeal [[id number]] Collected: 11/03/24 1045    Order Status: Completed Specimen: Nasopharyngeal Swab Updated: 11/03/24 1201     Respiratory Infection Panel Source NP Swab     Adenovirus Not Detected     Coronavirus 229E, Common Cold Virus Not Detected     Coronavirus HKU1, Common Cold Virus Not Detected     Coronavirus NL63, Common Cold Virus Not Detected     Coronavirus OC43, Common Cold Virus Not Detected     Comment: The Coronavirus strains detected in this test cause the common cold.  These strains are not the COVID-19 (novel Coronavirus)strain   associated with the respiratory disease outbreak.          SARS-CoV2 (COVID-19) Qualitative PCR Not Detected     Human Metapneumovirus Not Detected     Human Rhinovirus/Enterovirus Not Detected     Influenza A (subtypes H1, H1-2009,H3) Not Detected     Influenza B Not Detected     Parainfluenza Virus 1 Not Detected     Parainfluenza Virus 2 Not Detected     Parainfluenza Virus 3 Not Detected     Parainfluenza Virus 4 Not Detected     Respiratory Syncytial Virus Not Detected     Bordetella Parapertussis (WL3651) Not Detected     Bordetella pertussis (ptxP) Not Detected     Chlamydia pneumoniae Not Detected     Mycoplasma pneumoniae Not Detected     Comment: Respiratory Infection Panel testing performed by Multiplex PCR.       Narrative:      Assay not valid for lower respiratory specimens,  alternate  testing required.    Respiratory Infection Panel (PCR), Nasopharyngeal [[id number]] Collected: 11/03/24 1009    Order Status: Canceled Specimen: Nasopharyngeal Swab     Influenza A & B by Molecular [[id number]] Collected: 11/03/24 0607    Order Status: Completed Specimen: Nasopharyngeal Swab Updated: 11/03/24 0839     Influenza A, Molecular Negative     Influenza B, Molecular Negative     Flu A & B Source Nasal swab            Significant Diagnostics:  I have reviewed all pertinent imaging results/findings within the past 24 hours.  No new

## 2024-11-09 NOTE — ASSESSMENT & PLAN NOTE
Likely SSTI source   ID consulted and following   Continue IV Rocephin 2g daily x 14 days (EOT 11/20/2024)   PICC line placed

## 2024-11-09 NOTE — ASSESSMENT & PLAN NOTE
Chronic, controlled.  Latest blood pressure and vitals reviewed-   Temp:  [97.7 °F (36.5 °C)-98.6 °F (37 °C)]   Pulse:  []   Resp:  [14-26]   BP: (112-139)/(71-84)   SpO2:  [92 %-95 %] .   Home meds for hypertension were reviewed and noted below.   Hypertension Medications               bisoprolol (ZEBETA) 5 MG tablet TAKE 1/2 TABLET BY MOUTH EVERY DAY    torsemide (DEMADEX) 20 MG Tab Take 2 tablets (40 mg total) by mouth once daily.     While in the hospital, will manage blood pressure as follows; cont B blocker, continue to monitor BP     Will utilize p.r.n. blood pressure medication only if patient's blood pressure greater than  180/110 and he develops symptoms such as worsening chest pain or shortness of breath.

## 2024-11-09 NOTE — ASSESSMENT & PLAN NOTE
"Patient has Combined Systolic and Diastolic heart failure that is Acute on chronic. On presentation their CHF was decompensated. Evidence of decompensated CHF on presentation includes: edema, orthopnea, paroxysmal nocturnal dyspnea (PND), dyspnea on exertion (GOVEA), and shortness of breath. The etiology of their decompensation is likely dietary indiscretion, increased fluid intake, and infection . Most recent BNP and echo results are listed below.  No results for input(s): "BNP" in the last 72 hours.    Latest ECHO  Results for orders placed during the hospital encounter of 10/23/23    Echo    Interpretation Summary    Left Ventricle: The left ventricle is normal in size. Normal wall thickness. There is mild concentric hypertrophy. regional wall motion abnormalities present. There is low normal systolic function with a visually estimated ejection fraction of 50 - 55%. Biplane (2D) method of discs ejection fraction is 49%. There is normal diastolic function.    Left Atrium: Left atrium is mildly dilated.    Right Ventricle: Normal right ventricular cavity size. Wall thickness is normal. Right ventricle wall motion  is normal. Systolic function is normal.    Mitral Valve: There is mild to moderate regurgitation.    Tricuspid Valve: There is mild regurgitation.    Pulmonary Artery: There is moderate pulmonary hypertension. The estimated pulmonary artery systolic pressure is 50 mmHg.    IVC/SVC: Normal venous pressure at 3 mmHg.    Current Heart Failure Medications  torsemide tablet 40 mg, Daily, Oral    Plan  -Monitor strict I&Os and daily weights.    -Place on telemetry  -Low sodium diet  -Place on fluid restriction of 1.5 L.   -Cardiology has not been consulted  - s/p IV diuresis   - continue home Torsemide   "

## 2024-11-09 NOTE — ASSESSMENT & PLAN NOTE
S/p drainage of abscess. Leukocytosis improving. Cultures in process. Gram stain negative. Continue PO linezolid 600 mg BID along with ceftriaxone.

## 2024-11-09 NOTE — ASSESSMENT & PLAN NOTE
Noted on exam 11/08  Gen Surg consulted and underwent I&D 11/08 Dr. Magallanes   Aspirate cultures pending   Continue IV Rocephin + Zyvox   Monitor exam

## 2024-11-09 NOTE — ASSESSMENT & PLAN NOTE
ROSARIO is likely due to pre-renal azotemia due to intravascular volume depletion. Baseline creatinine is  1.5-1.7 . Most recent creatinine and eGFR are listed below.  Recent Labs     11/07/24  0553 11/08/24  0508 11/09/24  0451   CREATININE 1.5* 1.3 1.2   EGFRNORACEVR 49* 58* >60     Plan  -ROSARIO is resolved   -Avoid nephrotoxins and renally dose meds for GFR listed above  -Monitor urine output, serial BMP, and adjust therapy as needed  -Monitor Cr with resumption of torsemide

## 2024-11-09 NOTE — PROGRESS NOTES
[facility])  General Surgery  Progress Note    Subjective:     History of Present Illness:  74-year-old male who was admitted to the hospital for lethargy and respiratory failure.  He also had low glucose.  He was treated in the ICU and subsequently transferred to floor.      The patient had cellulitis of his right lower extremity that was being treated with antibiotics.      A surgery consult was obtained due to an area of swelling fluctuance and tenderness    Post-Op Info:  Procedure(s) (LRB):  INCISION AND DRAINAGE, ABSCESS (Right)   1 Day Post-Op     Interval History:  Patient was feeling better.  Wound care was fairly well tolerated.      Await cultures.      Antibiotics per Infectious Disease    Medications:  Continuous Infusions:  Scheduled Meds:   aspirin  81 mg Oral Daily    atorvastatin  20 mg Oral Daily    cefTRIAXone (Rocephin) IV (PEDS and ADULTS)  2 g Intravenous Q24H    chlorhexidine  10 mL Mouth/Throat BID    cholecalciferol (vitamin D3)  5,000 Units Oral Daily    enoxparin  40 mg Subcutaneous Daily    folic acid-vit B6-vit B12 2.5-25-2 mg  1 tablet Oral Daily    guaiFENesin  600 mg Oral BID    latanoprost  1 drop Right Eye QHS    linezolid  600 mg Oral Q12H    magnesium sulfate IVPB  2 g Intravenous Once    metoprolol tartrate  12.5 mg Oral BID    multivitamin  1 tablet Oral Daily    thiamine  100 mg Oral Daily    torsemide  40 mg Oral Daily     PRN Meds:  Current Facility-Administered Medications:     acetaminophen, 650 mg, Rectal, Q6H PRN    acetaminophen, 650 mg, Oral, Q6H PRN    albuterol-ipratropium, 3 mL, Nebulization, Q4H PRN    aluminum-magnesium hydroxide-simethicone, 30 mL, Oral, QID PRN    benzonatate, 100 mg, Oral, TID PRN    dextrose 10%, 12.5 g, Intravenous, PRN    dextrose 10%, 12.5 g, Intravenous, PRN    dextrose 10%, 12.5 g, Intravenous, PRN    dextrose 10%, 25 g, Intravenous, PRN    dextrose 10%, 25 g, Intravenous, PRN    dextrose 10%, 25 g, Intravenous, PRN     glucagon (human recombinant), 1 mg, Intramuscular, PRN    glucose, 16 g, Oral, PRN    glucose, 24 g, Oral, PRN    HYDROcodone-acetaminophen, 1 tablet, Oral, Q4H PRN    HYDROmorphone, 0.5 mg, Intravenous, Q4H PRN    naloxone, 0.02 mg, Intravenous, PRN    ondansetron, 4 mg, Intravenous, Q8H PRN    polyethylene glycol, 17 g, Oral, Daily PRN    promethazine, 25 mg, Oral, Q6H PRN    simethicone, 1 tablet, Oral, QID PRN    sodium chloride 0.9%, 3 mL, Intravenous, Q12H PRN     Review of patient's allergies indicates:   Allergen Reactions    Amitriptyline Rash    Celecoxib Rash     Objective:     Vital Signs (Most Recent):  Temp: 97.9 °F (36.6 °C) (11/09/24 0809)  Pulse: 90 (11/09/24 0809)  Resp: 18 (11/09/24 0839)  BP: 130/73 (11/09/24 0809)  SpO2: (!) 92 % (11/09/24 0809) Vital Signs (24h Range):  Temp:  [97.7 °F (36.5 °C)-98.6 °F (37 °C)] 97.9 °F (36.6 °C)  Pulse:  [] 90  Resp:  [14-26] 18  SpO2:  [92 %-95 %] 92 %  BP: (112-139)/(71-84) 130/73     Weight: 96.3 kg (212 lb 4.9 oz)  Body mass index is 33.25 kg/m².    Intake/Output - Last 3 Shifts         11/07 0700  11/08 0659 11/08 0700  11/09 0659 11/09 0700  11/10 0659    P.O. 240 840     I.V. (mL/kg)  499 (5.2)     Total Intake(mL/kg) 240 (2.5) 1339 (13.9)     Urine (mL/kg/hr)  225 (0.1)     Stool       Blood  10     Total Output  235     Net +240 +1104            Urine Occurrence 3 x 4 x     Stool Occurrence 2 x 2 x              Physical Exam  Vitals reviewed.   Constitutional:       General: He is not in acute distress.     Appearance: He is well-developed. He is ill-appearing.   HENT:      Head: Normocephalic and atraumatic.   Eyes:      General: No scleral icterus.     Pupils: Pupils are equal, round, and reactive to light.   Neck:      Thyroid: No thyromegaly.      Vascular: No JVD.      Trachea: No tracheal deviation.   Cardiovascular:      Rate and Rhythm: Normal rate and regular rhythm.      Heart sounds: Normal heart sounds.   Pulmonary:      Effort:  Pulmonary effort is normal.      Breath sounds: Normal breath sounds.   Abdominal:      General: Bowel sounds are normal. There is no distension.      Palpations: Abdomen is soft. There is no mass.      Tenderness: There is no abdominal tenderness. There is no guarding or rebound.   Musculoskeletal:         General: Normal range of motion.      Cervical back: Normal range of motion and neck supple.   Lymphadenopathy:      Cervical: No cervical adenopathy.   Skin:     General: Skin is warm and dry.      Comments: Right lower leg wound is clean.  Right lower leg is still edematous but there appears to be decreased erythema   Neurological:      Mental Status: He is alert and oriented to person, place, and time.   Psychiatric:         Behavior: Behavior normal.         Thought Content: Thought content normal.         Judgment: Judgment normal.          Significant Labs:  I have reviewed all pertinent lab results within the past 24 hours.  CBC:   Recent Labs   Lab 11/09/24 0451   WBC 14.47*   RBC 4.13*   HGB 12.0*   HCT 37.0*      MCV 90   MCH 29.1   MCHC 32.4     BMP:   Recent Labs   Lab 11/09/24 0451         K 4.0      CO2 26   BUN 21   CREATININE 1.2   CALCIUM 8.7   MG 1.6     Microbiology Results (last 7 days)       Procedure Component Value Units Date/Time    Culture, MRSA [[id number]] Collected: 11/04/24 0557    Order Status: Completed Specimen: MRSA source from Nares, Left Updated: 11/09/24 0720     MRSA Surveillance Screen No MRSA isolated    Gram stain [[id number]] Collected: 11/08/24 1700    Order Status: Completed Specimen: Wound from Leg, Right Updated: 11/09/24 0454     Gram Stain Result Rare WBC's      No organisms seen    Culture, Anaerobe [[id number]] Collected: 11/08/24 1700    Order Status: Sent Specimen: Wound from Leg, Right Updated: 11/09/24 0126    Aerobic culture [[id number]] Collected: 11/08/24 1700    Order Status: Sent Specimen: Wound from Leg, Right Updated: 11/09/24  0126    Fungus culture [[id number]] Collected: 11/08/24 1700    Order Status: Sent Specimen: Wound from Leg, Right Updated: 11/09/24 0126    Blood culture [[id number]] Collected: 11/06/24 1100    Order Status: Completed Specimen: Blood from Peripheral, Forearm, Left Updated: 11/08/24 2022     Blood Culture, Routine No Growth to date      No Growth to date      No Growth to date    Blood culture [[id number]] Collected: 11/06/24 1100    Order Status: Completed Specimen: Blood from Peripheral, Hand, Left Updated: 11/08/24 2022     Blood Culture, Routine No Growth to date      No Growth to date      No Growth to date    Blood culture [[id number]] Collected: 11/03/24 0125    Order Status: Completed Specimen: Blood from Peripheral, Antecubital, Left Updated: 11/08/24 1412     Blood Culture, Routine No growth after 5 days.    Blood culture [[id number]]  (Abnormal)  (Susceptibility) Collected: 11/03/24 0125    Order Status: Completed Specimen: Blood from Peripheral, Hand, Right Updated: 11/06/24 1051     Blood Culture, Routine Gram stain dao bottle: Gram negative rods      Results called to and read back by:Sharifa Power RN 11/04/2024  00:22      ESCHERICHIA COLI    Rapid Organism ID by PCR (from Blood culture) [[id number]]  (Abnormal) Collected: 11/03/24 0125    Order Status: Completed Updated: 11/04/24 0141     Enterococcus faecalis Not Detected     Enterococcus faecium Not Detected     Listeria monocytogenes Not Detected     Staphylococcus spp. Not Detected     Staphylococcus aureus Not Detected     Staphylococcus epidermidis Not Detected     Staphylococcus lugdunensis Not Detected     Streptococcus species Not Detected     Streptococcus agalactiae Not Detected     Streptococcus pneumoniae Not Detected     Streptococcus pyogenes Not Detected     Acinetobacter calcoaceticus/baumannii complex Not Detected     Bacteroides fragilis Not Detected     Enterobacterales See species for ID     Enterobacter cloacae complex Not  Detected     Escherichia coli Detected     Klebsiella aerogenes Not Detected     Klebsiella oxytoca Not Detected     Klebsiella pneumoniae group Not Detected     Proteus Not Detected     Salmonella sp Not Detected     Serratia marcescens Not Detected     Haemophilus influenzae Not Detected     Neisseria meningtidis Not Detected     Pseudomonas aeruginosa Not Detected     Stenotrophomonas maltophilia Not Detected     Candida albicans Not Detected     Candida auris Not Detected     Candida glabrata Not Detected     Candida krusei Not Detected     Candida parapsilosis Not Detected     Candida tropicalis Not Detected     Cryptococcus neoformans/gattii Not Detected     CTX-M (ESBL ) Not Detected     IMP (Carbapenem resistant) Not Detected     KPC resistance gene (Carbapenem resistant) Not Detected     mcr-1  Not Detected     mec A/C  Test Not Applicable     mec A/C and MREJ (MRSA) gene Test Not Applicable     NDM (Carbapenem resistant) Not Detected     OXA-48-like (Carbapenem resistant) Not Detected     van A/B (VRE gene) Test Not Applicable     VIM (Carbapenem resistant) Not Detected    Respiratory Infection Panel (PCR), Nasopharyngeal [[id number]] Collected: 11/03/24 1045    Order Status: Completed Specimen: Nasopharyngeal Swab Updated: 11/03/24 1201     Respiratory Infection Panel Source NP Swab     Adenovirus Not Detected     Coronavirus 229E, Common Cold Virus Not Detected     Coronavirus HKU1, Common Cold Virus Not Detected     Coronavirus NL63, Common Cold Virus Not Detected     Coronavirus OC43, Common Cold Virus Not Detected     Comment: The Coronavirus strains detected in this test cause the common cold.  These strains are not the COVID-19 (novel Coronavirus)strain   associated with the respiratory disease outbreak.          SARS-CoV2 (COVID-19) Qualitative PCR Not Detected     Human Metapneumovirus Not Detected     Human Rhinovirus/Enterovirus Not Detected     Influenza A (subtypes H1, H1-2009,H3) Not  Detected     Influenza B Not Detected     Parainfluenza Virus 1 Not Detected     Parainfluenza Virus 2 Not Detected     Parainfluenza Virus 3 Not Detected     Parainfluenza Virus 4 Not Detected     Respiratory Syncytial Virus Not Detected     Bordetella Parapertussis (MW9845) Not Detected     Bordetella pertussis (ptxP) Not Detected     Chlamydia pneumoniae Not Detected     Mycoplasma pneumoniae Not Detected     Comment: Respiratory Infection Panel testing performed by Multiplex PCR.       Narrative:      Assay not valid for lower respiratory specimens, alternate  testing required.    Respiratory Infection Panel (PCR), Nasopharyngeal [[id number]] Collected: 11/03/24 1009    Order Status: Canceled Specimen: Nasopharyngeal Swab     Influenza A & B by Molecular [[id number]] Collected: 11/03/24 0607    Order Status: Completed Specimen: Nasopharyngeal Swab Updated: 11/03/24 0839     Influenza A, Molecular Negative     Influenza B, Molecular Negative     Flu A & B Source Nasal swab            Significant Diagnostics:  I have reviewed all pertinent imaging results/findings within the past 24 hours.  No new  Assessment/Plan:     * Severe sepsis sec to Cellulitis Legs complicated by E coli Bacteremia  Continue antibiotics with the cellulitis Infectious Disease.    Abscess of right leg  Incision and drainage 08/11/2024   Wound is clean   Erythema appears to be improved   Continue local wound care  Antibiotics per Infectious Disease    E coli bacteremia  Management per Medicine/Infectious Disease    Hyperlipidemia associated with type 2 diabetes mellitus  Management per Medicine    Hypertension associated with stage 3 chronic kidney disease due to type 2 diabetes mellitus  Management per hospital Medicine    BRADLEY on CPAP  Management per hospital Medicine        Torres Magallanes MD  General Surgery  [facility] - Telemetry ([facility])

## 2024-11-09 NOTE — ASSESSMENT & PLAN NOTE
Latest ECHO  Results for orders placed during the hospital encounter of 11/02/24    Echo    Interpretation Summary    Left Ventricle: The left ventricle is mildly dilated. Mildly increased ventricular mass. Normal wall thickness. There is eccentric hypertrophy. There is moderately reduced systolic function with a visually estimated ejection fraction of 35 - 40%. There is normal diastolic function.    Right Ventricle: Normal right ventricular cavity size. Wall thickness is normal. Systolic function is mildly reduced.    Left Atrium: Left atrium is mildly dilated.    Right Atrium: Right atrium is mildly dilated.    Aortic Valve: There is mild aortic valve sclerosis.    Mitral Valve: There is moderate regurgitation.    Tricuspid Valve: There is mild regurgitation.    Pulmonic Valve: There is mild regurgitation.    Pulmonary Artery: There is mild pulmonary hypertension. The estimated pulmonary artery systolic pressure is 44 mmHg.    IVC/SVC: Intermediate venous pressure at 8 mmHg.    Continue current medications per primary

## 2024-11-09 NOTE — PROGRESS NOTES
[facility] Medicine  Progress Note    Patient Name: Santiago Khan  MRN: [id number]  Patient Class: IP- Inpatient   Admission Date: 11/2/2024  Length of Stay: 6 days  Attending Physician: Grace Monk MD  Primary Care Provider: Kashif Acosta MD        Subjective:     Principal Problem:Severe sepsis        HPI:  Santiago Khan is a 74 y.o. male with a PMH  has a past medical history of Chronic combined systolic and diastolic congestive heart failure (07/09/2020), Chronic kidney disease, stage 3, Chronic obstructive pulmonary disease (03/20/2023), Chronic venous insufficiency, DDD (degenerative disc disease), lumbar, DM (diabetes mellitus) with complications, History of gout, Hyperlipidemia associated with type 2 diabetes mellitus, Hypertension associated with stage 3 chronic kidney disease due to type 2 diabetes mellitus, BRADLEY on CPAP, and Prostate cancer. who presented to the ER for further evaluation of worsening bilateral lower extremity weakness, acute on chronic lumbar back pain, and abdominal pain over the past 3-4 days. Patient reported being constipated with last bowel movement yesterday which was normal and reported his back pain has caused his legs to become weak resulting in increased difficulty walking. He reports taking chronic pain medications and had back surgery in 1982. Patient was seen at urgent care back on 10/27 for treatment of acute gout flare in his right hand/wrist and was provided prednisone after home allopurinol and colchicine provided little to no relief.  Patient denied any recent falls or trauma and was initially somnolent following administration of lorazepam in the ED to obtain the MRI of his lumbar spine. Upon evaluation of patient, patient had increased respiratory distress requiring up titration of supplemental oxygen in addition to bilateral crackles on lung auscultation, lower extremity edema, and right lower extremity erythremia.  Family at bedside reported patient had difficulty taking his home medications, had productive cough, and unsure what his dietary/fluid intake has been like. Patient was noted to be diaphoretic, restless, and becoming more lethargic during bedside assessment.  Stat ABG revealed worsening respiratory status and was placed on BiPAP and provided 2 mg IV Bumex.  Patient also noted to be become hypoglycemic again requiring dextrose infusion.  Patient admitted to Hospital Medicine inpatient for continued medical management. ICU notified regarding low threshold for transfer and agree with current treatment plan. Neurosurgery/Spine as well as PT/OT consulted regarding lower extremity weakness.       PCP: Kashif Acosta       Overview/Hospital Course:  Hospital/ICU Course:  11/4 - no acute events. Off dextrose drip. Had bm, no acute events    Hosp Med:  pt transferred out of ICU today, medically stable. Briefly, 74 y.o. male with Hx of HTN, HLP, Chronic combined systolic and diastolic CHF,  DM 2 with CKD 3 and Polyneuropathy, COPD, Chronic venous insufficiency, DDD Lumbar, Gout, BRADLEY on CPAP, and Prostate Ca, presented as Abd pain and LLE Cellulitis and admitted to ICU on 11/3 with worsening respiratory status and was placed on BiPAP and provided 2 mg IV Bumex. Patient also noted to be Hypoglycemic again requiring D10, hence admitted to ICU. He did well overnite and BS improved, came off D10 and was transferred out to Tele under Hosp Med today. Blood Cx x 1 just reported now as GNR and started on Zosyn.     11/6/24- Per   ID following, antibiotics adjusted to ceftriaxone, plan for total 14 day course  PT/OT with reccs for YASMIN, CM consulted for SNF placement    11/7/24- Per Dr.Iqbal RUEDA, patient denies new issues  SNF placement pending  IV abx till 11/20/24 11/08/2024  Pt noted to have area of fluctuance below R knee on my exam, WBC rising. Pt reports pain to R knee and L abd.   Gen Surg consulted and planning  for debridement of RLE abscess today.     11/09/2024  Pt seen and examined with spouse at bedside. Reports R leg feels better today, less pain. Abd pain is also improved.   Discussed with ID Dr. Swanson- MAXWELL Lynch added, aspirate cultures pending       Interval History: see Hospital Course     Review of Systems  Objective:     Vital Signs (Most Recent):  Temp: 97.9 °F (36.6 °C) (11/09/24 0809)  Pulse: 90 (11/09/24 0809)  Resp: 18 (11/09/24 0839)  BP: 130/73 (11/09/24 0809)  SpO2: (!) 92 % (11/09/24 0809) Vital Signs (24h Range):  Temp:  [97.7 °F (36.5 °C)-98.6 °F (37 °C)] 97.9 °F (36.6 °C)  Pulse:  [] 90  Resp:  [14-26] 18  SpO2:  [92 %-95 %] 92 %  BP: (112-139)/(71-84) 130/73     Weight: 96.3 kg (212 lb 4.9 oz)  Body mass index is 33.25 kg/m².    Intake/Output Summary (Last 24 hours) at 11/9/2024 1124  Last data filed at 11/9/2024 0510  Gross per 24 hour   Intake 858.99 ml   Output 235 ml   Net 623.99 ml         Physical Exam  Vitals and nursing note reviewed.   Constitutional:       General: He is not in acute distress.     Appearance: He is obese. Ill appearance: chronic.   Cardiovascular:      Rate and Rhythm: Normal rate and regular rhythm.      Heart sounds: No murmur heard.  Pulmonary:      Effort: Pulmonary effort is normal.      Breath sounds: Normal breath sounds.      Comments: RA  Abdominal:      General: Bowel sounds are normal. There is no distension.      Palpations: Abdomen is soft.      Tenderness: There is no abdominal tenderness.      Comments: L flank/abd bruising stable    Musculoskeletal:      Comments: R mid leg dressing in place    Neurological:      Mental Status: He is alert and oriented to person, place, and time.             Significant Labs: All pertinent labs within the past 24 hours have been reviewed.    Significant Imaging: I have reviewed all pertinent imaging results/findings within the past 24 hours.    Assessment/Plan:      * Severe sepsis sec to Cellulitis Legs complicated  "by E coli Bacteremia  This patient does have evidence of infective focus  My overall impression is sepsis.  Source: Skin and Soft Tissue (location cellulitis RLE)  Antibiotics given-   Antibiotics (72h ago, onward)      Start     Stop Route Frequency Ordered    11/08/24 2000  linezolid tablet 600 mg         -- Oral Every 12 hours 11/08/24 1332    11/06/24 1700  cefTRIAXone injection 2 g         -- IV Every 24 hours (non-standard times) 11/06/24 1036          Latest lactate reviewed-  No results for input(s): "LACTATE", "POCLAC" in the last 72 hours.    Organ dysfunction indicated by Acute kidney injury, Acute respiratory failure, and Encephalopathy    Fluid challenge Other- Patient to receive D10 volume other than 30cc/kg due to Congestive Heart Failure     Post- resuscitation assessment No - Post resuscitation assessment not needed       Will Not start Pressors- Levophed for MAP of 65  Source control achieved by: IV Abx    On ceftriaxone - PRESTON 11/20/24  ID following  Gen Surg consulted 11/08 due to concern for RLE abscess; s/p I&D with Dr. Magallanes 11/08, cultures pending \  Zyvox added per ID     E coli bacteremia  Likely SSTI source   ID consulted and following   Continue IV Rocephin 2g daily x 14 days (EOT 11/20/2024)   PICC line placed          Abscess of right leg  Noted on exam 11/08  Gen Surg consulted and underwent I&D 11/08 Dr. Magallanes   Aspirate cultures pending   Continue IV Rocephin + Zyvox   Monitor exam       Class 1 obesity due to excess calories with serious comorbidity and body mass index (BMI) of 33.0 to 33.9 in adult  Body mass index is 33.63 kg/m². Morbid obesity complicates all aspects of disease management from diagnostic modalities to treatment. Weight loss encouraged and health benefits explained to patient.       Diabetes  Patient's FSGs are controlled on current medication regimen.  Last A1c reviewed-   Lab Results   Component Value Date    HGBA1C 5.6 08/14/2024     Most recent " "fingerstick glucose reviewed-   Recent Labs   Lab 11/07/24  1742 11/07/24  2132 11/08/24  0517 11/08/24  1239   POCTGLUCOSE 140* 126* 99 105       Current correctional scale  None    Monitor BG; hypoglycemia protocol     Acute respiratory failure with hypoxia and hypercarbia  Patient with Hypercapnic and Hypoxic Respiratory failure which is Acute.  he is not on home oxygen. Supplemental oxygen was provided and noted-    Responded well to Bipap, diuresis  Appears at baseline, currently on room air     Acute kidney injury superimposed on chronic kidney disease  ROSARIO is likely due to pre-renal azotemia due to intravascular volume depletion. Baseline creatinine is  1.5-1.7 . Most recent creatinine and eGFR are listed below.  Recent Labs     11/07/24  0553 11/08/24  0508 11/09/24  0451   CREATININE 1.5* 1.3 1.2   EGFRNORACEVR 49* 58* >60     Plan  -ROASRIO is resolved   -Avoid nephrotoxins and renally dose meds for GFR listed above  -Monitor urine output, serial BMP, and adjust therapy as needed  -Monitor Cr with resumption of torsemide     Acute on chronic combined systolic and diastolic congestive heart failure  Patient has Combined Systolic and Diastolic heart failure that is Acute on chronic. On presentation their CHF was decompensated. Evidence of decompensated CHF on presentation includes: edema, orthopnea, paroxysmal nocturnal dyspnea (PND), dyspnea on exertion (GOVEA), and shortness of breath. The etiology of their decompensation is likely dietary indiscretion, increased fluid intake, and infection . Most recent BNP and echo results are listed below.  No results for input(s): "BNP" in the last 72 hours.    Latest ECHO  Results for orders placed during the hospital encounter of 10/23/23    Echo    Interpretation Summary    Left Ventricle: The left ventricle is normal in size. Normal wall thickness. There is mild concentric hypertrophy. regional wall motion abnormalities present. There is low normal systolic function with " a visually estimated ejection fraction of 50 - 55%. Biplane (2D) method of discs ejection fraction is 49%. There is normal diastolic function.    Left Atrium: Left atrium is mildly dilated.    Right Ventricle: Normal right ventricular cavity size. Wall thickness is normal. Right ventricle wall motion  is normal. Systolic function is normal.    Mitral Valve: There is mild to moderate regurgitation.    Tricuspid Valve: There is mild regurgitation.    Pulmonary Artery: There is moderate pulmonary hypertension. The estimated pulmonary artery systolic pressure is 50 mmHg.    IVC/SVC: Normal venous pressure at 3 mmHg.    Current Heart Failure Medications  torsemide tablet 40 mg, Daily, Oral    Plan  -Monitor strict I&Os and daily weights.    -Place on telemetry  -Low sodium diet  -Place on fluid restriction of 1.5 L.   -Cardiology has not been consulted  - s/p IV diuresis   - continue home Torsemide     Lower back pain  Patient presented with worsening lower back pain and bilateral lower extremity weakness in setting of chronic back pain and prior surgery.  MRI L-spine obtained in the ED showed multilevel degenerative changes with several levels of neural foraminal narrowing   Patient awaiting evaluation by PT/OT. Neurosurgery/Spine consulted as well.   S/p NSGY eval- not a surgical candidate  Ch and stable, sec lumber DDD- Neural Foraminal narrowing at L4 and L5 but no Central disc herniation  PT/OT   Outpatient NSGY followup on discharge     Weakness of both lower extremities  Weakness persists  PT/OT following- pending snf placement     Chronic obstructive pulmonary disease  Patient's COPD is controlled currently.  Patient is currently off COPD Pathway. Continue scheduled inhalers  and monitor respiratory status closely.       Coronary artery disease of native artery of native heart with stable angina pectoris  Patient with known CAD, which is controlled Will continue  home medications  and monitor for S/Sx of  angina/ACS. Continue to monitor on telemetry.   Continue home Asa, statin, B blocker       Hyperlipidemia associated with type 2 diabetes mellitus  Patient is chronically on statin.will continue for now. Last Lipid Panel:   Lab Results   Component Value Date    CHOL 138 08/14/2024    HDL 45 08/14/2024    LDLCALC 69.4 08/14/2024    TRIG 118 08/14/2024    CHOLHDL 32.6 08/14/2024   Plan:  -Continue home medication  -low fat/low calorie diet      Hypertension associated with stage 3 chronic kidney disease due to type 2 diabetes mellitus  Chronic, controlled.  Latest blood pressure and vitals reviewed-   Temp:  [97.7 °F (36.5 °C)-98.6 °F (37 °C)]   Pulse:  []   Resp:  [14-26]   BP: (112-139)/(71-84)   SpO2:  [92 %-95 %] .   Home meds for hypertension were reviewed and noted below.   Hypertension Medications               bisoprolol (ZEBETA) 5 MG tablet TAKE 1/2 TABLET BY MOUTH EVERY DAY    torsemide (DEMADEX) 20 MG Tab Take 2 tablets (40 mg total) by mouth once daily.     While in the hospital, will manage blood pressure as follows; cont B blocker, continue to monitor BP     Will utilize p.r.n. blood pressure medication only if patient's blood pressure greater than  180/110 and he develops symptoms such as worsening chest pain or shortness of breath.    History of gout  Recently treated with colchicine and prednisone.   Plan:  -continue to monitor and treat as needed    BRADLEY on CPAP  Currently on CPAP outpatient.  Plan:  -continue CPAP q.h.s.         VTE Risk Mitigation (From admission, onward)           Ordered     enoxaparin injection 40 mg  Daily         11/08/24 1807     IP VTE HIGH RISK PATIENT  Once         11/08/24 1807     Place sequential compression device  Until discontinued         11/08/24 1807     Place sequential compression device  Until discontinued         11/03/24 0309                    Discharge Planning   ANTHONY:      Code Status: Full Code   Is the patient medically ready for discharge?:      Reason for patient still in hospital (select all that apply): Laboratory test, Consult recommendations, and Pending disposition  Discharge Plan A: Skilled Nursing Facility   Discharge Delays: (!) Procedure Scheduling (IR, OR, Labs, Echo, Cath, Echo, EEG)              Grace Monk MD  Department of Hospital Medicine   '[facility] Telemetry ([facility])

## 2024-11-09 NOTE — PROGRESS NOTES
[facility] Tele[facility] ([facility])  Infectious Disease  Progress Note    Patient Name: Santiago Khan  MRN: [id number]  Admission Date: 11/2/2024  Length of Stay: 6 days  Attending Physician: Grace Monk MD  Primary Care Provider: Kashif Acosta MD    Isolation Status: No active isolations  Assessment/Plan:      Cardiac/Vascular  Acute on chronic combined systolic and diastolic congestive heart failure  Latest ECHO  Results for orders placed during the hospital encounter of 11/02/24    Echo    Interpretation Summary    Left Ventricle: The left ventricle is mildly dilated. Mildly increased ventricular mass. Normal wall thickness. There is eccentric hypertrophy. There is moderately reduced systolic function with a visually estimated ejection fraction of 35 - 40%. There is normal diastolic function.    Right Ventricle: Normal right ventricular cavity size. Wall thickness is normal. Systolic function is mildly reduced.    Left Atrium: Left atrium is mildly dilated.    Right Atrium: Right atrium is mildly dilated.    Aortic Valve: There is mild aortic valve sclerosis.    Mitral Valve: There is moderate regurgitation.    Tricuspid Valve: There is mild regurgitation.    Pulmonic Valve: There is mild regurgitation.    Pulmonary Artery: There is mild pulmonary hypertension. The estimated pulmonary artery systolic pressure is 44 mmHg.    IVC/SVC: Intermediate venous pressure at 8 mmHg.    Continue current medications per primary     Hypertension associated with stage 3 chronic kidney disease due to type 2 diabetes mellitus  Continue current medications per primary      ID  Abscess of right leg  S/p drainage of abscess. Leukocytosis improving. Cultures in process. Gram stain negative. Continue PO linezolid 600 mg BID along with ceftriaxone.     E coli bacteremia  --All cases of bacteremia pose risk of life threatening sepsis and metastatic infection  --GN bacteremia not always with obvious source   --Unclear in this  case   --CT a/p shows thickening and enlargement of the left krystal abdominal wall. Possible infected hematoma.   --Possible early pneumonia as well   --Would be cautious and treat with longer course of antibiotics   --Continue IV ceftriaxone 2 g daily as E coli isolate is pan susceptible   --Requires drug toxicity monitoring   --Follow repeat blood cx for clearance; NGTD    --Will complete 14 day IV abx course from negative blood cx  --Above d/w primary team.      Outpatient Antibiotic Therapy Plan:    1) Infection: E coli bacteremia     2) Discharge Antibiotics:    Intravenous antibiotics:  IV ceftriaxone 2 g daily     3) Therapy Duration:  14 days    Estimated end date of IV antibiotics: 11/20/24    4) Outpatient Weekly Labs:    Order the following labs to be drawn on Mondays:   CBC  CMP     Perform labs at [facility] facility     5) Fax Lab Results to Infectious Diseases Provider: Dr. Swanson     ID Clinic Fax Number: [phone]       Endocrine  Diabetes  Continue current medications per primary      Other  BRADLEY on CPAP  CPAP as tolerated      Thank you for your consult. I will follow-up with patient. Please contact us if you have any additional questions.    Alexis Swanson, DO  Infectious Disease  [facility] (Hospital)    Subjective:     Principal Problem:Severe sepsis    HPI: This is a 73 yo M with hx of HF, CKD, COPD, DM, HLD, HTN, and BRADLEY who presented to the ER for worsening bilateral lower extremity weakness, acute on chronic lumbar pain, and abdominal pain for about four days PTA. Following admission patient became progressively lethargic and hypercapnic on the floor requiring continuous bipap, and hypoglycemic requiring continuous D10 drip. Transferred to ICU. Sent back to floor on 11/5. Febrile to 100.6 F on admission with leukocytosis to almost 20K. Blood cx growing E coli. Has been on empiric Zosyn. CT a/p reports thickening and enlargement of the left krystal abdominal wall. Correlate clinically for  rectus sheath hematoma. Mild bibasilar opacities right greater than left may relate to early pneumonia. Trace right-sided pleural effusion. MRI lumbar spine shows DDD. No evidence of infection. ID consulted for E coli bacteremia.   Interval History: S/p washout of right knee. Abscess appreciated. Cultures sent. Will follow. Patient doing well.     Review of Systems   Gastrointestinal:  Negative for abdominal pain.   Musculoskeletal:  Positive for arthralgias, back pain and myalgias.   All other systems reviewed and are negative.    Objective:     Vital Signs (Most Recent):  Temp: 97.9 °F (36.6 °C) (11/09/24 0809)  Pulse: 90 (11/09/24 0809)  Resp: 18 (11/09/24 0839)  BP: 130/73 (11/09/24 0809)  SpO2: (!) 92 % (11/09/24 0809) Vital Signs (24h Range):  Temp:  [97.7 °F (36.5 °C)-98.6 °F (37 °C)] 97.9 °F (36.6 °C)  Pulse:  [] 90  Resp:  [14-26] 18  SpO2:  [92 %-95 %] 92 %  BP: (112-139)/(71-84) 130/73     Weight: 96.3 kg (212 lb 4.9 oz)  Body mass index is 33.25 kg/m².    Estimated Creatinine Clearance: 60.6 mL/min (based on SCr of 1.2 mg/dL).     Physical Exam  Constitutional:       General: He is not in acute distress.     Appearance: Normal appearance. He is not ill-appearing.   Cardiovascular:      Rate and Rhythm: Normal rate and regular rhythm.      Pulses: Normal pulses.      Heart sounds: Normal heart sounds. No murmur heard.     No friction rub. No gallop.   Pulmonary:      Effort: Pulmonary effort is normal. No respiratory distress.      Breath sounds: Normal breath sounds.   Abdominal:      General: Abdomen is flat. Bowel sounds are normal. There is no distension.      Palpations: Abdomen is soft.      Tenderness: There is no abdominal tenderness.   Musculoskeletal:      Comments: Right knee bandaged post op    Skin:     General: Skin is warm and dry.   Neurological:      Mental Status: He is alert.          Significant Labs: Blood Culture:   Recent Labs   Lab 11/03/24  0125 11/06/24  1100   LABBLOO No  growth after 5 days.  Gram stain dao bottle: Gram negative rods  Results called to and read back by:Sharifa Power RN 11/04/2024  00:22  ESCHERICHIA COLI* No Growth to date  No Growth to date  No Growth to date  No Growth to date  No Growth to date  No Growth to date     CBC:   Recent Labs   Lab 11/08/24  0508 11/09/24  0451   WBC 19.02* 14.47*   HGB 12.7* 12.0*   HCT 39.3* 37.0*    257     CMP:   Recent Labs   Lab 11/08/24  0508 11/09/24  0451    137   K 3.9 4.0    103   CO2 23 26   GLU 99 106   BUN 28* 21   CREATININE 1.3 1.2   CALCIUM 9.4 8.7   PROT 7.5 6.8   ALBUMIN 2.6* 2.4*   BILITOT 0.7 0.7   ALKPHOS 359* 314*   AST 41* 48*   ALT 51* 68*   ANIONGAP 13 8     Microbiology Results (last 7 days)       Procedure Component Value Units Date/Time    Culture, MRSA [[id number]] Collected: 11/04/24 0557    Order Status: Completed Specimen: MRSA source from Nares, Left Updated: 11/09/24 0720     MRSA Surveillance Screen No MRSA isolated    Gram stain [[id number]] Collected: 11/08/24 1700    Order Status: Completed Specimen: Wound from Leg, Right Updated: 11/09/24 0454     Gram Stain Result Rare WBC's      No organisms seen    Culture, Anaerobe [[id number]] Collected: 11/08/24 1700    Order Status: Sent Specimen: Wound from Leg, Right Updated: 11/09/24 0126    Aerobic culture [[id number]] Collected: 11/08/24 1700    Order Status: Sent Specimen: Wound from Leg, Right Updated: 11/09/24 0126    Fungus culture [[id number]] Collected: 11/08/24 1700    Order Status: Sent Specimen: Wound from Leg, Right Updated: 11/09/24 0126    Blood culture [[id number]] Collected: 11/06/24 1100    Order Status: Completed Specimen: Blood from Peripheral, Forearm, Left Updated: 11/08/24 2022     Blood Culture, Routine No Growth to date      No Growth to date      No Growth to date    Blood culture [[id number]] Collected: 11/06/24 1100    Order Status: Completed Specimen: Blood from Peripheral, Hand, Left Updated:  11/08/24 2022     Blood Culture, Routine No Growth to date      No Growth to date      No Growth to date    Blood culture [[id number]] Collected: 11/03/24 0125    Order Status: Completed Specimen: Blood from Peripheral, Antecubital, Left Updated: 11/08/24 1412     Blood Culture, Routine No growth after 5 days.    Blood culture [[id number]]  (Abnormal)  (Susceptibility) Collected: 11/03/24 0125    Order Status: Completed Specimen: Blood from Peripheral, Hand, Right Updated: 11/06/24 1051     Blood Culture, Routine Gram stain dao bottle: Gram negative rods      Results called to and read back by:Sharifa Power RN 11/04/2024  00:22      ESCHERICHIA COLI    Rapid Organism ID by PCR (from Blood culture) [[id number]]  (Abnormal) Collected: 11/03/24 0125    Order Status: Completed Updated: 11/04/24 0141     Enterococcus faecalis Not Detected     Enterococcus faecium Not Detected     Listeria monocytogenes Not Detected     Staphylococcus spp. Not Detected     Staphylococcus aureus Not Detected     Staphylococcus epidermidis Not Detected     Staphylococcus lugdunensis Not Detected     Streptococcus species Not Detected     Streptococcus agalactiae Not Detected     Streptococcus pneumoniae Not Detected     Streptococcus pyogenes Not Detected     Acinetobacter calcoaceticus/baumannii complex Not Detected     Bacteroides fragilis Not Detected     Enterobacterales See species for ID     Enterobacter cloacae complex Not Detected     Escherichia coli Detected     Klebsiella aerogenes Not Detected     Klebsiella oxytoca Not Detected     Klebsiella pneumoniae group Not Detected     Proteus Not Detected     Salmonella sp Not Detected     Serratia marcescens Not Detected     Haemophilus influenzae Not Detected     Neisseria meningtidis Not Detected     Pseudomonas aeruginosa Not Detected     Stenotrophomonas maltophilia Not Detected     Candida albicans Not Detected     Candida auris Not Detected     Candida glabrata Not Detected      Shelly krusei Not Detected     Candida parapsilosis Not Detected     Candida tropicalis Not Detected     Cryptococcus neoformans/gattii Not Detected     CTX-M (ESBL ) Not Detected     IMP (Carbapenem resistant) Not Detected     KPC resistance gene (Carbapenem resistant) Not Detected     mcr-1  Not Detected     mec A/C  Test Not Applicable     mec A/C and MREJ (MRSA) gene Test Not Applicable     NDM (Carbapenem resistant) Not Detected     OXA-48-like (Carbapenem resistant) Not Detected     van A/B (VRE gene) Test Not Applicable     VIM (Carbapenem resistant) Not Detected    Respiratory Infection Panel (PCR), Nasopharyngeal [[id number]] Collected: 11/03/24 1045    Order Status: Completed Specimen: Nasopharyngeal Swab Updated: 11/03/24 1201     Respiratory Infection Panel Source NP Swab     Adenovirus Not Detected     Coronavirus 229E, Common Cold Virus Not Detected     Coronavirus HKU1, Common Cold Virus Not Detected     Coronavirus NL63, Common Cold Virus Not Detected     Coronavirus OC43, Common Cold Virus Not Detected     Comment: The Coronavirus strains detected in this test cause the common cold.  These strains are not the COVID-19 (novel Coronavirus)strain   associated with the respiratory disease outbreak.          SARS-CoV2 (COVID-19) Qualitative PCR Not Detected     Human Metapneumovirus Not Detected     Human Rhinovirus/Enterovirus Not Detected     Influenza A (subtypes H1, H1-2009,H3) Not Detected     Influenza B Not Detected     Parainfluenza Virus 1 Not Detected     Parainfluenza Virus 2 Not Detected     Parainfluenza Virus 3 Not Detected     Parainfluenza Virus 4 Not Detected     Respiratory Syncytial Virus Not Detected     Bordetella Parapertussis (VO1214) Not Detected     Bordetella pertussis (ptxP) Not Detected     Chlamydia pneumoniae Not Detected     Mycoplasma pneumoniae Not Detected     Comment: Respiratory Infection Panel testing performed by Multiplex PCR.       Narrative:      Assay  not valid for lower respiratory specimens, alternate  testing required.    Respiratory Infection Panel (PCR), Nasopharyngeal [[id number]] Collected: 11/03/24 1009    Order Status: Canceled Specimen: Nasopharyngeal Swab     Influenza A & B by Molecular [[id number]] Collected: 11/03/24 0607    Order Status: Completed Specimen: Nasopharyngeal Swab Updated: 11/03/24 0839     Influenza A, Molecular Negative     Influenza B, Molecular Negative     Flu A & B Source Nasal swab          All pertinent labs within the past 24 hours have been reviewed.    Significant Imaging: I have reviewed all pertinent imaging results/findings within the past 24 hours.

## 2024-11-09 NOTE — ASSESSMENT & PLAN NOTE
Incision and drainage 08/11/2024   Wound is clean   Erythema appears to be improved   Continue local wound care  Antibiotics per Infectious Disease

## 2024-11-10 LAB
ALBUMIN SERPL BCP-MCNC: 2.4 G/DL (ref 3.5–5.2)
ALP SERPL-CCNC: 307 U/L (ref 40–150)
ALT SERPL W/O P-5'-P-CCNC: 76 U/L (ref 10–44)
ANION GAP SERPL CALC-SCNC: 10 MMOL/L (ref 8–16)
AST SERPL-CCNC: 51 U/L (ref 10–40)
BASOPHILS # BLD AUTO: 0.13 K/UL (ref 0–0.2)
BASOPHILS NFR BLD: 1 % (ref 0–1.9)
BILIRUB SERPL-MCNC: 0.6 MG/DL (ref 0.1–1)
BUN SERPL-MCNC: 19 MG/DL (ref 8–23)
CALCIUM SERPL-MCNC: 8.6 MG/DL (ref 8.7–10.5)
CHLORIDE SERPL-SCNC: 101 MMOL/L (ref 95–110)
CO2 SERPL-SCNC: 27 MMOL/L (ref 23–29)
CREAT SERPL-MCNC: 1.3 MG/DL (ref 0.5–1.4)
DIFFERENTIAL METHOD BLD: ABNORMAL
EOSINOPHIL # BLD AUTO: 0.3 K/UL (ref 0–0.5)
EOSINOPHIL NFR BLD: 1.9 % (ref 0–8)
ERYTHROCYTE [DISTWIDTH] IN BLOOD BY AUTOMATED COUNT: 17.4 % (ref 11.5–14.5)
EST. GFR  (NO RACE VARIABLE): 58 ML/MIN/1.73 M^2
GLUCOSE SERPL-MCNC: 97 MG/DL (ref 70–110)
HCT VFR BLD AUTO: 37.6 % (ref 40–54)
HGB BLD-MCNC: 12 G/DL (ref 14–18)
IMM GRANULOCYTES # BLD AUTO: 0.66 K/UL (ref 0–0.04)
IMM GRANULOCYTES NFR BLD AUTO: 4.9 % (ref 0–0.5)
LYMPHOCYTES # BLD AUTO: 1.4 K/UL (ref 1–4.8)
LYMPHOCYTES NFR BLD: 10.3 % (ref 18–48)
MAGNESIUM SERPL-MCNC: 1.7 MG/DL (ref 1.6–2.6)
MCH RBC QN AUTO: 29 PG (ref 27–31)
MCHC RBC AUTO-ENTMCNC: 31.9 G/DL (ref 32–36)
MCV RBC AUTO: 91 FL (ref 82–98)
MONOCYTES # BLD AUTO: 0.7 K/UL (ref 0.3–1)
MONOCYTES NFR BLD: 5.3 % (ref 4–15)
NEUTROPHILS # BLD AUTO: 10.3 K/UL (ref 1.8–7.7)
NEUTROPHILS NFR BLD: 76.6 % (ref 38–73)
NRBC BLD-RTO: 0 /100 WBC
PHOSPHATE SERPL-MCNC: 3.3 MG/DL (ref 2.7–4.5)
PLATELET # BLD AUTO: 250 K/UL (ref 150–450)
PMV BLD AUTO: 9.9 FL (ref 9.2–12.9)
POCT GLUCOSE: 111 MG/DL (ref 70–110)
POCT GLUCOSE: 133 MG/DL (ref 70–110)
POCT GLUCOSE: 134 MG/DL (ref 70–110)
POCT GLUCOSE: 139 MG/DL (ref 70–110)
POTASSIUM SERPL-SCNC: 3.6 MMOL/L (ref 3.5–5.1)
PROT SERPL-MCNC: 6.7 G/DL (ref 6–8.4)
RBC # BLD AUTO: 4.14 M/UL (ref 4.6–6.2)
SODIUM SERPL-SCNC: 138 MMOL/L (ref 136–145)
WBC # BLD AUTO: 13.43 K/UL (ref 3.9–12.7)

## 2024-11-10 PROCEDURE — 25000003 PHARM REV CODE 250: Performed by: SURGERY

## 2024-11-10 PROCEDURE — 21400001 HC TELEMETRY ROOM

## 2024-11-10 PROCEDURE — 63600175 PHARM REV CODE 636 W HCPCS: Performed by: SURGERY

## 2024-11-10 PROCEDURE — 85025 COMPLETE CBC W/AUTO DIFF WBC: CPT | Performed by: SURGERY

## 2024-11-10 PROCEDURE — 83735 ASSAY OF MAGNESIUM: CPT | Performed by: SURGERY

## 2024-11-10 PROCEDURE — 80053 COMPREHEN METABOLIC PANEL: CPT | Performed by: SURGERY

## 2024-11-10 PROCEDURE — 99900035 HC TECH TIME PER 15 MIN (STAT)

## 2024-11-10 PROCEDURE — 84100 ASSAY OF PHOSPHORUS: CPT | Performed by: SURGERY

## 2024-11-10 RX ADMIN — LATANOPROST 1 DROP: 50 SOLUTION OPHTHALMIC at 09:11

## 2024-11-10 RX ADMIN — THERA TABS 1 TABLET: TAB at 09:11

## 2024-11-10 RX ADMIN — CEFTRIAXONE 2 G: 2 INJECTION, POWDER, FOR SOLUTION INTRAMUSCULAR; INTRAVENOUS at 05:11

## 2024-11-10 RX ADMIN — LINEZOLID 600 MG: 600 TABLET, FILM COATED ORAL at 09:11

## 2024-11-10 RX ADMIN — CHOLECALCIFEROL TAB 125 MCG (5000 UNIT) 5000 UNITS: 125 TAB at 09:11

## 2024-11-10 RX ADMIN — ENOXAPARIN SODIUM 40 MG: 40 INJECTION SUBCUTANEOUS at 05:11

## 2024-11-10 RX ADMIN — HYDROMORPHONE HYDROCHLORIDE 0.5 MG: 1 INJECTION, SOLUTION INTRAMUSCULAR; INTRAVENOUS; SUBCUTANEOUS at 05:11

## 2024-11-10 RX ADMIN — Medication 100 MG: at 09:11

## 2024-11-10 RX ADMIN — GUAIFENESIN 600 MG: 600 TABLET, EXTENDED RELEASE ORAL at 09:11

## 2024-11-10 RX ADMIN — Medication 1 TABLET: at 09:11

## 2024-11-10 RX ADMIN — METOPROLOL TARTRATE 12.5 MG: 25 TABLET, FILM COATED ORAL at 09:11

## 2024-11-10 RX ADMIN — HYDROMORPHONE HYDROCHLORIDE 0.5 MG: 1 INJECTION, SOLUTION INTRAMUSCULAR; INTRAVENOUS; SUBCUTANEOUS at 01:11

## 2024-11-10 RX ADMIN — HYDROMORPHONE HYDROCHLORIDE 0.5 MG: 1 INJECTION, SOLUTION INTRAMUSCULAR; INTRAVENOUS; SUBCUTANEOUS at 02:11

## 2024-11-10 RX ADMIN — ASPIRIN 81 MG: 81 TABLET, COATED ORAL at 09:11

## 2024-11-10 RX ADMIN — CHLORHEXIDINE GLUCONATE 0.12% ORAL RINSE 10 ML: 1.2 LIQUID ORAL at 09:11

## 2024-11-10 RX ADMIN — TORSEMIDE 40 MG: 10 TABLET ORAL at 09:11

## 2024-11-10 RX ADMIN — HYDROMORPHONE HYDROCHLORIDE 0.5 MG: 1 INJECTION, SOLUTION INTRAMUSCULAR; INTRAVENOUS; SUBCUTANEOUS at 10:11

## 2024-11-10 NOTE — SUBJECTIVE & OBJECTIVE
Interval History:  Patient feels leg is no worse.  Some bleeding with dressing changes.    Medications:  Continuous Infusions:  Scheduled Meds:   aspirin  81 mg Oral Daily    cefTRIAXone (Rocephin) IV (PEDS and ADULTS)  2 g Intravenous Q24H    chlorhexidine  10 mL Mouth/Throat BID    cholecalciferol (vitamin D3)  5,000 Units Oral Daily    enoxparin  40 mg Subcutaneous Daily    folic acid-vit B6-vit B12 2.5-25-2 mg  1 tablet Oral Daily    guaiFENesin  600 mg Oral BID    latanoprost  1 drop Right Eye QHS    linezolid  600 mg Oral Q12H    metoprolol tartrate  12.5 mg Oral BID    multivitamin  1 tablet Oral Daily    thiamine  100 mg Oral Daily    torsemide  40 mg Oral Daily     PRN Meds:  Current Facility-Administered Medications:     acetaminophen, 650 mg, Rectal, Q6H PRN    acetaminophen, 650 mg, Oral, Q6H PRN    albuterol-ipratropium, 3 mL, Nebulization, Q4H PRN    aluminum-magnesium hydroxide-simethicone, 30 mL, Oral, QID PRN    benzonatate, 100 mg, Oral, TID PRN    dextrose 10%, 12.5 g, Intravenous, PRN    dextrose 10%, 12.5 g, Intravenous, PRN    dextrose 10%, 12.5 g, Intravenous, PRN    dextrose 10%, 25 g, Intravenous, PRN    dextrose 10%, 25 g, Intravenous, PRN    dextrose 10%, 25 g, Intravenous, PRN    glucagon (human recombinant), 1 mg, Intramuscular, PRN    glucose, 16 g, Oral, PRN    glucose, 24 g, Oral, PRN    HYDROcodone-acetaminophen, 1 tablet, Oral, Q4H PRN    HYDROmorphone, 0.5 mg, Intravenous, Q4H PRN    naloxone, 0.02 mg, Intravenous, PRN    ondansetron, 4 mg, Intravenous, Q8H PRN    polyethylene glycol, 17 g, Oral, Daily PRN    promethazine, 25 mg, Oral, Q6H PRN    simethicone, 1 tablet, Oral, QID PRN    sodium chloride 0.9%, 3 mL, Intravenous, Q12H PRN     Review of patient's allergies indicates:   Allergen Reactions    Amitriptyline Rash    Celecoxib Rash     Objective:     Vital Signs (Most Recent):  Temp: 98.1 °F (36.7 °C) (11/10/24 0733)  Pulse: 99 (11/10/24 0902)  Resp: 19 (11/10/24 0733)  BP:  134/81 (11/10/24 0733)  SpO2: 95 % (11/10/24 0733) Vital Signs (24h Range):  Temp:  [97.9 °F (36.6 °C)-98.8 °F (37.1 °C)] 98.1 °F (36.7 °C)  Pulse:  [] 99  Resp:  [16-20] 19  SpO2:  [93 %-96 %] 95 %  BP: (113-140)/(65-81) 134/81     Weight: 96.3 kg (212 lb 4.9 oz)  Body mass index is 33.25 kg/m².    Intake/Output - Last 3 Shifts         11/08 0700  11/09 0659 11/09 0700  11/10 0659 11/10 0700  11/11 0659    P.O. 840      I.V. (mL/kg) 499 (5.2)      Total Intake(mL/kg) 1339 (13.9)      Urine (mL/kg/hr) 225 (0.1)      Blood 10      Total Output 235      Net +1104             Urine Occurrence 4 x      Stool Occurrence 2 x               Physical Exam  Constitutional:       General: He is not in acute distress.     Appearance: He is well-developed. He is ill-appearing.   HENT:      Head: Normocephalic and atraumatic.   Eyes:      General: No scleral icterus.     Pupils: Pupils are equal, round, and reactive to light.   Neck:      Thyroid: No thyromegaly.      Vascular: No JVD.      Trachea: No tracheal deviation.   Cardiovascular:      Rate and Rhythm: Normal rate and regular rhythm.      Heart sounds: Normal heart sounds.   Pulmonary:      Effort: Pulmonary effort is normal.      Breath sounds: Rales present.   Abdominal:      General: Bowel sounds are normal. There is no distension.      Palpations: Abdomen is soft. There is no mass.      Tenderness: There is no abdominal tenderness. There is no guarding or rebound.      Comments: Obese   Musculoskeletal:         General: Normal range of motion.      Cervical back: Normal range of motion and neck supple.   Lymphadenopathy:      Cervical: No cervical adenopathy.   Skin:     General: Skin is warm and dry.      Comments: There is decreasing erythema of the right lower leg.  The wound is clean.  There was some oozing with dressing changes.     Neurological:      Mental Status: He is alert and oriented to person, place, and time.   Psychiatric:         Behavior:  Behavior normal.         Thought Content: Thought content normal.         Judgment: Judgment normal.          Significant Labs:  I have reviewed all pertinent lab results within the past 24 hours.  CBC:   Recent Labs   Lab 11/10/24  0516   WBC 13.43*   RBC 4.14*   HGB 12.0*   HCT 37.6*      MCV 91   MCH 29.0   MCHC 31.9*     BMP:   Recent Labs   Lab 11/10/24  0516   GLU 97      K 3.6      CO2 27   BUN 19   CREATININE 1.3   CALCIUM 8.6*   MG 1.7       Significant Diagnostics:  I have reviewed all pertinent imaging results/findings within the past 24 hours.  No new

## 2024-11-10 NOTE — ANESTHESIA POSTPROCEDURE EVALUATION
Anesthesia Post Evaluation    Patient: Santiago Khan    Procedure(s) Performed: Procedure(s) (LRB):  INCISION AND DRAINAGE, ABSCESS (Right)    Final Anesthesia Type: MAC      Patient location during evaluation: PACU  Patient participation: Yes- Able to Participate  Level of consciousness: awake and alert and oriented  Post-procedure vital signs: reviewed and stable  Pain management: adequate  Airway patency: patent  BRADLEY mitigation strategies: Multimodal analgesia  PONV status at discharge: No PONV  Anesthetic complications: no      Cardiovascular status: blood pressure returned to baseline and hemodynamically stable  Respiratory status: unassisted  Hydration status: euvolemic  Follow-up not needed.              Vitals Value Taken Time   /81 11/08/24 0733   Temp 36.7 °C (98.1 °F) 11/08/24 0733   Pulse 98 11/08/24 0733   Resp 19 11/08/24 0733   SpO2 95 % 11/08/24 0733         Event Time   Out of Recovery 11/08/2024 17:50:00         Pain/Jeaneth Score: Pain Rating Prior to Med Admin: 8 (11/10/2024  5:31 AM)  Pain Rating Post Med Admin: 5 (11/10/2024  1:48 AM)

## 2024-11-10 NOTE — ASSESSMENT & PLAN NOTE
Chronic, controlled.  Latest blood pressure and vitals reviewed-   Temp:  [97.9 °F (36.6 °C)-98.8 °F (37.1 °C)]   Pulse:  []   Resp:  [16-20]   BP: (113-140)/(65-81)   SpO2:  [93 %-96 %] .   Home meds for hypertension were reviewed and noted below.   Hypertension Medications               bisoprolol (ZEBETA) 5 MG tablet TAKE 1/2 TABLET BY MOUTH EVERY DAY    torsemide (DEMADEX) 20 MG Tab Take 2 tablets (40 mg total) by mouth once daily.     While in the hospital, will manage blood pressure as follows; cont B blocker, continue to monitor BP     Will utilize p.r.n. blood pressure medication only if patient's blood pressure greater than  180/110 and he develops symptoms such as worsening chest pain or shortness of breath.

## 2024-11-10 NOTE — PLAN OF CARE
Problem: Adult Inpatient Plan of Care  Goal: Plan of Care Review  11/10/2024 0443 by Emma Bravo RN  Outcome: Progressing  11/10/2024 0443 by Emma Bravo RN  Outcome: Progressing  Goal: Patient-Specific Goal (Individualized)  11/10/2024 0443 by Emma Bravo RN  Outcome: Progressing  11/10/2024 0443 by Emma Bravo RN  Outcome: Progressing  Goal: Absence of Hospital-Acquired Illness or Injury  11/10/2024 0443 by Emma Bravo RN  Outcome: Progressing  11/10/2024 0443 by Emma Bravo RN  Outcome: Progressing  Goal: Optimal Comfort and Wellbeing  11/10/2024 0443 by Emma Bravo RN  Outcome: Progressing  11/10/2024 0443 by Emma Bravo RN  Outcome: Progressing  Goal: Readiness for Transition of Care  11/10/2024 0443 by Emma Bravo RN  Outcome: Progressing  11/10/2024 0443 by Emma Bravo RN  Outcome: Progressing     Problem: Diabetes Comorbidity  Goal: Blood Glucose Level Within Targeted Range  11/10/2024 0443 by Emma Bravo RN  Outcome: Progressing  11/10/2024 0443 by Emma Bravo RN  Outcome: Progressing     Problem: Acute Kidney Injury/Impairment  Goal: Fluid and Electrolyte Balance  11/10/2024 0443 by Emma Bravo RN  Outcome: Progressing  11/10/2024 0443 by Emma Bravo RN  Outcome: Progressing  Goal: Improved Oral Intake  11/10/2024 0443 by Emma Bravo RN  Outcome: Progressing  11/10/2024 0443 by Emma Bravo RN  Outcome: Progressing  Goal: Effective Renal Function  11/10/2024 0443 by Emma Bravo RN  Outcome: Progressing  11/10/2024 0443 by Emma Bravo RN  Outcome: Progressing     Problem: Skin Injury Risk Increased  Goal: Skin Health and Integrity  11/10/2024 0443 by Emma Bravo RN  Outcome: Progressing  11/10/2024 0443 by Emma Bravo RN  Outcome: Progressing     Problem: Infection  Goal: Absence of Infection Signs and Symptoms  11/10/2024 0443 by Emma Bravo RN  Outcome: Progressing  11/10/2024 0443 by Emma Bravo RN  Outcome: Progressing     Problem: Sepsis/Septic Shock  Goal:  Optimal Coping  11/10/2024 0443 by Emma Bravo RN  Outcome: Progressing  11/10/2024 0443 by Emma Bravo, RN  Outcome: Progressing  Goal: Absence of Bleeding  11/10/2024 0443 by Emma Bravo RN  Outcome: Progressing  11/10/2024 0443 by Emma Bravo, RN  Outcome: Progressing  Goal: Blood Glucose Level Within Targeted Range  11/10/2024 0443 by Emma Bravo, RN  Outcome: Progressing  11/10/2024 0443 by Emma Bravo, RN  Outcome: Progressing  Goal: Absence of Infection Signs and Symptoms  11/10/2024 0443 by Emma Bravo, RN  Outcome: Progressing  11/10/2024 0443 by Emma Bravo RN  Outcome: Progressing  Goal: Optimal Nutrition Intake  11/10/2024 0443 by Emma Bravo RN  Outcome: Progressing  11/10/2024 0443 by Emma Bravo, RN  Outcome: Progressing     Problem: Wound  Goal: Optimal Coping  11/10/2024 0443 by Emma Bravo RN  Outcome: Progressing  11/10/2024 0443 by Emma Bravo, RN  Outcome: Progressing  Goal: Optimal Functional Ability  11/10/2024 0443 by Emma Bravo, RN  Outcome: Progressing  11/10/2024 0443 by Emma Bravo, RN  Outcome: Progressing  Goal: Absence of Infection Signs and Symptoms  11/10/2024 0443 by Emma Bravo, RN  Outcome: Progressing  11/10/2024 0443 by Emma Bravo, RN  Outcome: Progressing  Goal: Improved Oral Intake  11/10/2024 0443 by Emma Bravo RN  Outcome: Progressing  11/10/2024 0443 by Emma Bravo, RN  Outcome: Progressing  Goal: Optimal Pain Control and Function  11/10/2024 0443 by Emma Bravo, RN  Outcome: Progressing  11/10/2024 0443 by Emma Bravo, RN  Outcome: Progressing  Goal: Skin Health and Integrity  11/10/2024 0443 by Emma Bravo, RN  Outcome: Progressing  11/10/2024 0443 by Emma Bravo, RN  Outcome: Progressing  Goal: Optimal Wound Healing  11/10/2024 0443 by Emma Bravo RN  Outcome: Progressing  11/10/2024 0443 by Emma Bravo RN  Outcome: Progressing     Problem: Pain Acute  Goal: Optimal Pain Control and Function  11/10/2024 0443 by Emma Bravo,  RN  Outcome: Progressing  11/10/2024 0443 by Emma Bravo RN  Outcome: Progressing

## 2024-11-10 NOTE — PROGRESS NOTES
[facility])  General Surgery  Progress Note    Subjective:     History of Present Illness:  74-year-old male who was admitted to the hospital for lethargy and respiratory failure.  He also had low glucose.  He was treated in the ICU and subsequently transferred to floor.      The patient had cellulitis of his right lower extremity that was being treated with antibiotics.      A surgery consult was obtained due to an area of swelling fluctuance and tenderness    Post-Op Info:  Procedure(s) (LRB):  INCISION AND DRAINAGE, ABSCESS (Right)   2 Days Post-Op     Interval History:  Patient feels leg is no worse.  Some bleeding with dressing changes.    Medications:  Continuous Infusions:  Scheduled Meds:   aspirin  81 mg Oral Daily    cefTRIAXone (Rocephin) IV (PEDS and ADULTS)  2 g Intravenous Q24H    chlorhexidine  10 mL Mouth/Throat BID    cholecalciferol (vitamin D3)  5,000 Units Oral Daily    enoxparin  40 mg Subcutaneous Daily    folic acid-vit B6-vit B12 2.5-25-2 mg  1 tablet Oral Daily    guaiFENesin  600 mg Oral BID    latanoprost  1 drop Right Eye QHS    linezolid  600 mg Oral Q12H    metoprolol tartrate  12.5 mg Oral BID    multivitamin  1 tablet Oral Daily    thiamine  100 mg Oral Daily    torsemide  40 mg Oral Daily     PRN Meds:  Current Facility-Administered Medications:     acetaminophen, 650 mg, Rectal, Q6H PRN    acetaminophen, 650 mg, Oral, Q6H PRN    albuterol-ipratropium, 3 mL, Nebulization, Q4H PRN    aluminum-magnesium hydroxide-simethicone, 30 mL, Oral, QID PRN    benzonatate, 100 mg, Oral, TID PRN    dextrose 10%, 12.5 g, Intravenous, PRN    dextrose 10%, 12.5 g, Intravenous, PRN    dextrose 10%, 12.5 g, Intravenous, PRN    dextrose 10%, 25 g, Intravenous, PRN    dextrose 10%, 25 g, Intravenous, PRN    dextrose 10%, 25 g, Intravenous, PRN    glucagon (human recombinant), 1 mg, Intramuscular, PRN    glucose, 16 g, Oral, PRN    glucose, 24 g, Oral, PRN    HYDROcodone-acetaminophen, 1  tablet, Oral, Q4H PRN    HYDROmorphone, 0.5 mg, Intravenous, Q4H PRN    naloxone, 0.02 mg, Intravenous, PRN    ondansetron, 4 mg, Intravenous, Q8H PRN    polyethylene glycol, 17 g, Oral, Daily PRN    promethazine, 25 mg, Oral, Q6H PRN    simethicone, 1 tablet, Oral, QID PRN    sodium chloride 0.9%, 3 mL, Intravenous, Q12H PRN     Review of patient's allergies indicates:   Allergen Reactions    Amitriptyline Rash    Celecoxib Rash     Objective:     Vital Signs (Most Recent):  Temp: 98.1 °F (36.7 °C) (11/10/24 0733)  Pulse: 99 (11/10/24 0902)  Resp: 19 (11/10/24 0733)  BP: 134/81 (11/10/24 0733)  SpO2: 95 % (11/10/24 0733) Vital Signs (24h Range):  Temp:  [97.9 °F (36.6 °C)-98.8 °F (37.1 °C)] 98.1 °F (36.7 °C)  Pulse:  [] 99  Resp:  [16-20] 19  SpO2:  [93 %-96 %] 95 %  BP: (113-140)/(65-81) 134/81     Weight: 96.3 kg (212 lb 4.9 oz)  Body mass index is 33.25 kg/m².    Intake/Output - Last 3 Shifts         11/08 0700  11/09 0659 11/09 0700  11/10 0659 11/10 0700  11/11 0659    P.O. 840      I.V. (mL/kg) 499 (5.2)      Total Intake(mL/kg) 1339 (13.9)      Urine (mL/kg/hr) 225 (0.1)      Blood 10      Total Output 235      Net +1104             Urine Occurrence 4 x      Stool Occurrence 2 x               Physical Exam  Constitutional:       General: He is not in acute distress.     Appearance: He is well-developed. He is ill-appearing.   HENT:      Head: Normocephalic and atraumatic.   Eyes:      General: No scleral icterus.     Pupils: Pupils are equal, round, and reactive to light.   Neck:      Thyroid: No thyromegaly.      Vascular: No JVD.      Trachea: No tracheal deviation.   Cardiovascular:      Rate and Rhythm: Normal rate and regular rhythm.      Heart sounds: Normal heart sounds.   Pulmonary:      Effort: Pulmonary effort is normal.      Breath sounds: Rales present.   Abdominal:      General: Bowel sounds are normal. There is no distension.      Palpations: Abdomen is soft. There is no mass.       Tenderness: There is no abdominal tenderness. There is no guarding or rebound.      Comments: Obese   Musculoskeletal:         General: Normal range of motion.      Cervical back: Normal range of motion and neck supple.   Lymphadenopathy:      Cervical: No cervical adenopathy.   Skin:     General: Skin is warm and dry.      Comments: There is decreasing erythema of the right lower leg.  The wound is clean.  There was some oozing with dressing changes.     Neurological:      Mental Status: He is alert and oriented to person, place, and time.   Psychiatric:         Behavior: Behavior normal.         Thought Content: Thought content normal.         Judgment: Judgment normal.          Significant Labs:  I have reviewed all pertinent lab results within the past 24 hours.  CBC:   Recent Labs   Lab 11/10/24  0516   WBC 13.43*   RBC 4.14*   HGB 12.0*   HCT 37.6*      MCV 91   MCH 29.0   MCHC 31.9*     BMP:   Recent Labs   Lab 11/10/24  0516   GLU 97      K 3.6      CO2 27   BUN 19   CREATININE 1.3   CALCIUM 8.6*   MG 1.7       Significant Diagnostics:  I have reviewed all pertinent imaging results/findings within the past 24 hours.  No new  Assessment/Plan:     * Severe sepsis sec to Cellulitis Legs complicated by E coli Bacteremia  Continue antibiotics with the cellulitis Infectious Disease.    Abscess of right leg  I    Incision and drainage 11/08/2024   Some bleeding dressing changes will address tomorrow   Continue and care.      Antibiotics per Infectious Disease    E coli bacteremia  Management per Medicine/Infectious Disease    Hyperlipidemia associated with type 2 diabetes mellitus  Management per Medicine    Hypertension associated with stage 3 chronic kidney disease due to type 2 diabetes mellitus  Management per hospital Medicine    BRADLEY on CPAP  Management per hospital Medicine        Torres Magallanes MD  General Surgery  [facility] - Telemetry ([facility])

## 2024-11-10 NOTE — SUBJECTIVE & OBJECTIVE
Interval History: see hospital course     Review of Systems  Objective:     Vital Signs (Most Recent):  Temp: 98.1 °F (36.7 °C) (11/10/24 0733)  Pulse: 99 (11/10/24 0902)  Resp: 19 (11/10/24 0733)  BP: 134/81 (11/10/24 0733)  SpO2: 95 % (11/10/24 0733) Vital Signs (24h Range):  Temp:  [97.9 °F (36.6 °C)-98.8 °F (37.1 °C)] 98.1 °F (36.7 °C)  Pulse:  [] 99  Resp:  [16-20] 19  SpO2:  [93 %-96 %] 95 %  BP: (113-140)/(65-81) 134/81     Weight: 96.3 kg (212 lb 4.9 oz)  Body mass index is 33.25 kg/m².  No intake or output data in the 24 hours ending 11/10/24 1033      Physical Exam  Vitals and nursing note reviewed.   Constitutional:       General: He is not in acute distress.     Appearance: He is obese. Ill appearance: chronic.   Cardiovascular:      Rate and Rhythm: Normal rate and regular rhythm.      Heart sounds: No murmur heard.  Pulmonary:      Effort: Pulmonary effort is normal.      Breath sounds: No wheezing, rhonchi or rales.   Abdominal:      General: Bowel sounds are normal. There is no distension.      Palpations: Abdomen is soft.      Tenderness: There is no abdominal tenderness.      Comments: L abd ecchymosis stable    Musculoskeletal:      Comments: RLE erythema persistent but improving, dressing below R knee in place    Neurological:      Mental Status: He is alert and oriented to person, place, and time.             Significant Labs: All pertinent labs within the past 24 hours have been reviewed.    Significant Imaging: I have reviewed all pertinent imaging results/findings within the past 24 hours.

## 2024-11-10 NOTE — ASSESSMENT & PLAN NOTE
Patient's FSGs are controlled on current medication regimen.  Last A1c reviewed-   Lab Results   Component Value Date    HGBA1C 5.6 08/14/2024     Most recent fingerstick glucose reviewed-   Recent Labs   Lab 11/09/24  1145 11/09/24  1604 11/09/24  2314 11/10/24  0506   POCTGLUCOSE 128* 131* 119* 111*       Current correctional scale  None    Monitor BG; hypoglycemia protocol

## 2024-11-10 NOTE — ASSESSMENT & PLAN NOTE
Patient with known CAD, which is controlled Will continue  home medications  and monitor for S/Sx of angina/ACS. Continue to monitor on telemetry.   Continue home Asa,  B blocker   Statin held while on zyvox

## 2024-11-10 NOTE — ASSESSMENT & PLAN NOTE
I    Incision and drainage 11/08/2024   Some bleeding dressing changes will address tomorrow   Continue and care.      Antibiotics per Infectious Disease

## 2024-11-10 NOTE — PROGRESS NOTES
[facility] Medicine  Progress Note    Patient Name: Santiago Khan  MRN: [id number]  Patient Class: IP- Inpatient   Admission Date: 11/2/2024  Length of Stay: 7 days  Attending Physician: Grace Monk MD  Primary Care Provider: Kashif Acosta MD        Subjective:     Principal Problem:Severe sepsis        HPI:  Santiago Khan is a 74 y.o. male with a PMH  has a past medical history of Chronic combined systolic and diastolic congestive heart failure (07/09/2020), Chronic kidney disease, stage 3, Chronic obstructive pulmonary disease (03/20/2023), Chronic venous insufficiency, DDD (degenerative disc disease), lumbar, DM (diabetes mellitus) with complications, History of gout, Hyperlipidemia associated with type 2 diabetes mellitus, Hypertension associated with stage 3 chronic kidney disease due to type 2 diabetes mellitus, BRADLEY on CPAP, and Prostate cancer. who presented to the ER for further evaluation of worsening bilateral lower extremity weakness, acute on chronic lumbar back pain, and abdominal pain over the past 3-4 days. Patient reported being constipated with last bowel movement yesterday which was normal and reported his back pain has caused his legs to become weak resulting in increased difficulty walking. He reports taking chronic pain medications and had back surgery in 1982. Patient was seen at urgent care back on 10/27 for treatment of acute gout flare in his right hand/wrist and was provided prednisone after home allopurinol and colchicine provided little to no relief.  Patient denied any recent falls or trauma and was initially somnolent following administration of lorazepam in the ED to obtain the MRI of his lumbar spine. Upon evaluation of patient, patient had increased respiratory distress requiring up titration of supplemental oxygen in addition to bilateral crackles on lung auscultation, lower extremity edema, and right lower extremity erythremia.  Family at bedside reported patient had difficulty taking his home medications, had productive cough, and unsure what his dietary/fluid intake has been like. Patient was noted to be diaphoretic, restless, and becoming more lethargic during bedside assessment.  Stat ABG revealed worsening respiratory status and was placed on BiPAP and provided 2 mg IV Bumex.  Patient also noted to be become hypoglycemic again requiring dextrose infusion.  Patient admitted to Hospital Medicine inpatient for continued medical management. ICU notified regarding low threshold for transfer and agree with current treatment plan. Neurosurgery/Spine as well as PT/OT consulted regarding lower extremity weakness.       PCP: Kashif Acosta       Overview/Hospital Course:  Hospital/ICU Course:  11/4 - no acute events. Off dextrose drip. Had bm, no acute events    Hosp Med:  pt transferred out of ICU today, medically stable. Briefly, 74 y.o. male with Hx of HTN, HLP, Chronic combined systolic and diastolic CHF,  DM 2 with CKD 3 and Polyneuropathy, COPD, Chronic venous insufficiency, DDD Lumbar, Gout, BRADLEY on CPAP, and Prostate Ca, presented as Abd pain and LLE Cellulitis and admitted to ICU on 11/3 with worsening respiratory status and was placed on BiPAP and provided 2 mg IV Bumex. Patient also noted to be Hypoglycemic again requiring D10, hence admitted to ICU. He did well overnite and BS improved, came off D10 and was transferred out to Tele under Hosp Med today. Blood Cx x 1 just reported now as GNR and started on Zosyn.     11/6/24- Per   ID following, antibiotics adjusted to ceftriaxone, plan for total 14 day course  PT/OT with reccs for YASMIN, CM consulted for SNF placement    11/7/24- Per Dr.Iqbal RUEDA, patient denies new issues  SNF placement pending  IV abx till 11/20/24 11/08/2024  Pt noted to have area of fluctuance below R knee on my exam, WBC rising. Pt reports pain to R knee and L abd.   Gen Surg consulted and planning  for debridement of RLE abscess today.     11/09/2024  Pt seen and examined with spouse at bedside. Reports R leg feels better today, less pain. Abd pain is also improved.   Discussed with ID Dr. Swanson- MAXWELL Lynch added, aspirate cultures pending     11/10  NAEON. Pt seen and examined, no family at bedside during rounds. Reports R leg redness and pain continue to improve. Denies CP, SOB.     Dispo: anticipate discharge to SNF in 24 hrs if cont to improve       Interval History: see hospital course     Review of Systems  Objective:     Vital Signs (Most Recent):  Temp: 98.1 °F (36.7 °C) (11/10/24 0733)  Pulse: 99 (11/10/24 0902)  Resp: 19 (11/10/24 0733)  BP: 134/81 (11/10/24 0733)  SpO2: 95 % (11/10/24 0733) Vital Signs (24h Range):  Temp:  [97.9 °F (36.6 °C)-98.8 °F (37.1 °C)] 98.1 °F (36.7 °C)  Pulse:  [] 99  Resp:  [16-20] 19  SpO2:  [93 %-96 %] 95 %  BP: (113-140)/(65-81) 134/81     Weight: 96.3 kg (212 lb 4.9 oz)  Body mass index is 33.25 kg/m².  No intake or output data in the 24 hours ending 11/10/24 1033      Physical Exam  Vitals and nursing note reviewed.   Constitutional:       General: He is not in acute distress.     Appearance: He is obese. Ill appearance: chronic.   Cardiovascular:      Rate and Rhythm: Normal rate and regular rhythm.      Heart sounds: No murmur heard.  Pulmonary:      Effort: Pulmonary effort is normal.      Breath sounds: No wheezing, rhonchi or rales.   Abdominal:      General: Bowel sounds are normal. There is no distension.      Palpations: Abdomen is soft.      Tenderness: There is no abdominal tenderness.      Comments: L abd ecchymosis stable    Musculoskeletal:      Comments: RLE erythema persistent but improving, dressing below R knee in place    Neurological:      Mental Status: He is alert and oriented to person, place, and time.             Significant Labs: All pertinent labs within the past 24 hours have been reviewed.    Significant Imaging: I have reviewed all  "pertinent imaging results/findings within the past 24 hours.    Assessment/Plan:      * Severe sepsis sec to Cellulitis Legs complicated by E coli Bacteremia  This patient does have evidence of infective focus  My overall impression is sepsis.  Source: Skin and Soft Tissue (location cellulitis RLE)  Antibiotics given-   Antibiotics (72h ago, onward)      Start     Stop Route Frequency Ordered    11/08/24 2000  linezolid tablet 600 mg         -- Oral Every 12 hours 11/08/24 1332    11/06/24 1700  cefTRIAXone injection 2 g         -- IV Every 24 hours (non-standard times) 11/06/24 1036          Latest lactate reviewed-  No results for input(s): "LACTATE", "POCLAC" in the last 72 hours.    Organ dysfunction indicated by Acute kidney injury, Acute respiratory failure, and Encephalopathy    Fluid challenge Other- Patient to receive D10 volume other than 30cc/kg due to Congestive Heart Failure     Post- resuscitation assessment No - Post resuscitation assessment not needed       Will Not start Pressors- Levophed for MAP of 65  Source control achieved by: IV Abx    On ceftriaxone - PRESTON 11/20/24  ID following  Gen Surg consulted 11/08 due to concern for RLE abscess; s/p I&D with Dr. Magallanes 11/08, cultures pending   Zyvox added per ID     E coli bacteremia  Likely SSTI source   ID consulted and following   Continue IV Rocephin 2g daily x 14 days (EOT 11/20/2024)   PICC line placed          Abscess of right leg  Noted on exam 11/08  Gen Surg consulted and underwent I&D 11/08 Dr. Magallanes   Aspirate cultures NGTD    Continue IV Rocephin + Zyvox   Monitor exam       Class 1 obesity due to excess calories with serious comorbidity and body mass index (BMI) of 33.0 to 33.9 in adult  Body mass index is 33.63 kg/m². Morbid obesity complicates all aspects of disease management from diagnostic modalities to treatment. Weight loss encouraged and health benefits explained to patient.       Diabetes  Patient's FSGs are controlled on " "current medication regimen.  Last A1c reviewed-   Lab Results   Component Value Date    HGBA1C 5.6 08/14/2024     Most recent fingerstick glucose reviewed-   Recent Labs   Lab 11/09/24  1145 11/09/24  1604 11/09/24  2314 11/10/24  0506   POCTGLUCOSE 128* 131* 119* 111*       Current correctional scale  None    Monitor BG; hypoglycemia protocol     Acute respiratory failure with hypoxia and hypercarbia  Patient with Hypercapnic and Hypoxic Respiratory failure which is Acute.  he is not on home oxygen. Supplemental oxygen was provided and noted-    Responded well to Bipap, diuresis  Appears at baseline, currently on room air     Acute kidney injury superimposed on chronic kidney disease  ROSARIO is likely due to pre-renal azotemia due to intravascular volume depletion. Baseline creatinine is  1.5-1.7 . Most recent creatinine and eGFR are listed below.  Recent Labs     11/07/24  0553 11/08/24  0508 11/09/24  0451   CREATININE 1.5* 1.3 1.2   EGFRNORACEVR 49* 58* >60     Plan  -ROSARIO is resolved   -Avoid nephrotoxins and renally dose meds for GFR listed above  -Monitor urine output, serial BMP, and adjust therapy as needed  -Monitor Cr with resumption of torsemide     Acute on chronic combined systolic and diastolic congestive heart failure  Patient has Combined Systolic and Diastolic heart failure that is Acute on chronic. On presentation their CHF was decompensated. Evidence of decompensated CHF on presentation includes: edema, orthopnea, paroxysmal nocturnal dyspnea (PND), dyspnea on exertion (GOVEA), and shortness of breath. The etiology of their decompensation is likely dietary indiscretion, increased fluid intake, and infection . Most recent BNP and echo results are listed below.  No results for input(s): "BNP" in the last 72 hours.    Latest ECHO  Results for orders placed during the hospital encounter of 10/23/23    Echo    Interpretation Summary    Left Ventricle: The left ventricle is normal in size. Normal wall " thickness. There is mild concentric hypertrophy. regional wall motion abnormalities present. There is low normal systolic function with a visually estimated ejection fraction of 50 - 55%. Biplane (2D) method of discs ejection fraction is 49%. There is normal diastolic function.    Left Atrium: Left atrium is mildly dilated.    Right Ventricle: Normal right ventricular cavity size. Wall thickness is normal. Right ventricle wall motion  is normal. Systolic function is normal.    Mitral Valve: There is mild to moderate regurgitation.    Tricuspid Valve: There is mild regurgitation.    Pulmonary Artery: There is moderate pulmonary hypertension. The estimated pulmonary artery systolic pressure is 50 mmHg.    IVC/SVC: Normal venous pressure at 3 mmHg.    Current Heart Failure Medications  torsemide tablet 40 mg, Daily, Oral    Plan  -Monitor strict I&Os and daily weights.    -Place on telemetry  -Low sodium diet  -Place on fluid restriction of 1.5 L.   -Cardiology has not been consulted  - s/p IV diuresis   - continue home Torsemide     Lower back pain  Patient presented with worsening lower back pain and bilateral lower extremity weakness in setting of chronic back pain and prior surgery.  MRI L-spine obtained in the ED showed multilevel degenerative changes with several levels of neural foraminal narrowing   Patient awaiting evaluation by PT/OT. Neurosurgery/Spine consulted as well.   S/p NSGY eval- not a surgical candidate  Ch and stable, sec lumber DDD- Neural Foraminal narrowing at L4 and L5 but no Central disc herniation  PT/OT   Outpatient NSGY followup on discharge     Weakness of both lower extremities  Weakness persists  PT/OT following- pending snf placement     Chronic obstructive pulmonary disease  Patient's COPD is controlled currently.  Patient is currently off COPD Pathway. Continue scheduled inhalers  and monitor respiratory status closely.       Coronary artery disease of native artery of native heart  with stable angina pectoris  Patient with known CAD, which is controlled Will continue  home medications  and monitor for S/Sx of angina/ACS. Continue to monitor on telemetry.   Continue home Asa,  B blocker   Statin held while on zyvox       Hyperlipidemia associated with type 2 diabetes mellitus  Patient is chronically on statin.will continue for now. Last Lipid Panel:   Lab Results   Component Value Date    CHOL 138 08/14/2024    HDL 45 08/14/2024    LDLCALC 69.4 08/14/2024    TRIG 118 08/14/2024    CHOLHDL 32.6 08/14/2024   Plan:  -Continue home medication  -low fat/low calorie diet      Hypertension associated with stage 3 chronic kidney disease due to type 2 diabetes mellitus  Chronic, controlled.  Latest blood pressure and vitals reviewed-   Temp:  [97.9 °F (36.6 °C)-98.8 °F (37.1 °C)]   Pulse:  []   Resp:  [16-20]   BP: (113-140)/(65-81)   SpO2:  [93 %-96 %] .   Home meds for hypertension were reviewed and noted below.   Hypertension Medications               bisoprolol (ZEBETA) 5 MG tablet TAKE 1/2 TABLET BY MOUTH EVERY DAY    torsemide (DEMADEX) 20 MG Tab Take 2 tablets (40 mg total) by mouth once daily.     While in the hospital, will manage blood pressure as follows; cont B blocker, continue to monitor BP     Will utilize p.r.n. blood pressure medication only if patient's blood pressure greater than  180/110 and he develops symptoms such as worsening chest pain or shortness of breath.    History of gout  Recently treated with colchicine and prednisone.   Plan:  -continue to monitor and treat as needed    BRADLEY on CPAP  Currently on CPAP outpatient.  Plan:  -continue CPAP q.h.s.         VTE Risk Mitigation (From admission, onward)           Ordered     enoxaparin injection 40 mg  Daily         11/08/24 1807     IP VTE HIGH RISK PATIENT  Once         11/08/24 1807     Place sequential compression device  Until discontinued         11/08/24 1807     Place sequential compression device  Until  discontinued         11/03/24 0309                    Discharge Planning   ANTHONY:      Code Status: Full Code   Is the patient medically ready for discharge?:     Reason for patient still in hospital (select all that apply): Consult recommendations and Pending disposition  Discharge Plan A: Skilled Nursing Facility   Discharge Delays: (!) Procedure Scheduling (IR, OR, Labs, Echo, Cath, Echo, EEG)              Grace Monk MD  Department of Hospital Medicine   [facility] - Telemetry ([facility])

## 2024-11-10 NOTE — ASSESSMENT & PLAN NOTE
Noted on exam 11/08  Gen Surg consulted and underwent I&D 11/08 Dr. Magallanes   Aspirate cultures NGTD    Continue IV Rocephin + Zyvox   Monitor exam

## 2024-11-10 NOTE — ASSESSMENT & PLAN NOTE
"This patient does have evidence of infective focus  My overall impression is sepsis.  Source: Skin and Soft Tissue (location cellulitis RLE)  Antibiotics given-   Antibiotics (72h ago, onward)      Start     Stop Route Frequency Ordered    11/08/24 2000  linezolid tablet 600 mg         -- Oral Every 12 hours 11/08/24 1332    11/06/24 1700  cefTRIAXone injection 2 g         -- IV Every 24 hours (non-standard times) 11/06/24 1036          Latest lactate reviewed-  No results for input(s): "LACTATE", "POCLAC" in the last 72 hours.    Organ dysfunction indicated by Acute kidney injury, Acute respiratory failure, and Encephalopathy    Fluid challenge Other- Patient to receive D10 volume other than 30cc/kg due to Congestive Heart Failure     Post- resuscitation assessment No - Post resuscitation assessment not needed       Will Not start Pressors- Levophed for MAP of 65  Source control achieved by: IV Abx    On ceftriaxone - PRESTON 11/20/24  ID following  Gen Surg consulted 11/08 due to concern for RLE abscess; s/p I&D with Dr. Magallanes 11/08, cultures pending   Zyvox added per ID   "

## 2024-11-11 ENCOUNTER — PATIENT MESSAGE (OUTPATIENT)
Dept: INTERNAL MEDICINE | Facility: CLINIC | Age: 74
End: 2024-11-11
Payer: MEDICARE

## 2024-11-11 LAB
ALBUMIN SERPL BCP-MCNC: 2.6 G/DL (ref 3.5–5.2)
ALP SERPL-CCNC: 290 U/L (ref 40–150)
ALT SERPL W/O P-5'-P-CCNC: 96 U/L (ref 10–44)
ANION GAP SERPL CALC-SCNC: 12 MMOL/L (ref 8–16)
AST SERPL-CCNC: 58 U/L (ref 10–40)
BACTERIA BLD CULT: NORMAL
BACTERIA BLD CULT: NORMAL
BASOPHILS # BLD AUTO: 0.09 K/UL (ref 0–0.2)
BASOPHILS NFR BLD: 0.7 % (ref 0–1.9)
BILIRUB SERPL-MCNC: 0.6 MG/DL (ref 0.1–1)
BUN SERPL-MCNC: 17 MG/DL (ref 8–23)
CALCIUM SERPL-MCNC: 8.9 MG/DL (ref 8.7–10.5)
CHLORIDE SERPL-SCNC: 99 MMOL/L (ref 95–110)
CO2 SERPL-SCNC: 27 MMOL/L (ref 23–29)
CREAT SERPL-MCNC: 1.3 MG/DL (ref 0.5–1.4)
DIFFERENTIAL METHOD BLD: ABNORMAL
EOSINOPHIL # BLD AUTO: 0.2 K/UL (ref 0–0.5)
EOSINOPHIL NFR BLD: 1.2 % (ref 0–8)
ERYTHROCYTE [DISTWIDTH] IN BLOOD BY AUTOMATED COUNT: 17.5 % (ref 11.5–14.5)
EST. GFR  (NO RACE VARIABLE): 58 ML/MIN/1.73 M^2
GLUCOSE SERPL-MCNC: 101 MG/DL (ref 70–110)
HCT VFR BLD AUTO: 37.6 % (ref 40–54)
HGB BLD-MCNC: 12.3 G/DL (ref 14–18)
IMM GRANULOCYTES # BLD AUTO: 0.57 K/UL (ref 0–0.04)
IMM GRANULOCYTES NFR BLD AUTO: 4.2 % (ref 0–0.5)
LYMPHOCYTES # BLD AUTO: 1.4 K/UL (ref 1–4.8)
LYMPHOCYTES NFR BLD: 10 % (ref 18–48)
MAGNESIUM SERPL-MCNC: 1.5 MG/DL (ref 1.6–2.6)
MCH RBC QN AUTO: 29.1 PG (ref 27–31)
MCHC RBC AUTO-ENTMCNC: 32.7 G/DL (ref 32–36)
MCV RBC AUTO: 89 FL (ref 82–98)
MONOCYTES # BLD AUTO: 0.7 K/UL (ref 0.3–1)
MONOCYTES NFR BLD: 4.8 % (ref 4–15)
NEUTROPHILS # BLD AUTO: 10.8 K/UL (ref 1.8–7.7)
NEUTROPHILS NFR BLD: 79.1 % (ref 38–73)
NRBC BLD-RTO: 0 /100 WBC
PHOSPHATE SERPL-MCNC: 3.3 MG/DL (ref 2.7–4.5)
PLATELET # BLD AUTO: 271 K/UL (ref 150–450)
PMV BLD AUTO: 10.2 FL (ref 9.2–12.9)
POCT GLUCOSE: 134 MG/DL (ref 70–110)
POCT GLUCOSE: 134 MG/DL (ref 70–110)
POCT GLUCOSE: 142 MG/DL (ref 70–110)
POTASSIUM SERPL-SCNC: 3.4 MMOL/L (ref 3.5–5.1)
PROT SERPL-MCNC: 7.2 G/DL (ref 6–8.4)
RBC # BLD AUTO: 4.22 M/UL (ref 4.6–6.2)
SODIUM SERPL-SCNC: 138 MMOL/L (ref 136–145)
WBC # BLD AUTO: 13.65 K/UL (ref 3.9–12.7)

## 2024-11-11 PROCEDURE — 63600175 PHARM REV CODE 636 W HCPCS: Performed by: SURGERY

## 2024-11-11 PROCEDURE — 25000003 PHARM REV CODE 250: Performed by: SURGERY

## 2024-11-11 PROCEDURE — 97530 THERAPEUTIC ACTIVITIES: CPT | Mod: CQ

## 2024-11-11 PROCEDURE — 63600175 PHARM REV CODE 636 W HCPCS: Performed by: INTERNAL MEDICINE

## 2024-11-11 PROCEDURE — 99900035 HC TECH TIME PER 15 MIN (STAT)

## 2024-11-11 PROCEDURE — 25000003 PHARM REV CODE 250: Performed by: INTERNAL MEDICINE

## 2024-11-11 PROCEDURE — 85025 COMPLETE CBC W/AUTO DIFF WBC: CPT | Performed by: SURGERY

## 2024-11-11 PROCEDURE — 80053 COMPREHEN METABOLIC PANEL: CPT | Performed by: SURGERY

## 2024-11-11 PROCEDURE — 83735 ASSAY OF MAGNESIUM: CPT | Performed by: SURGERY

## 2024-11-11 PROCEDURE — 99233 SBSQ HOSP IP/OBS HIGH 50: CPT | Mod: GT,,, | Performed by: STUDENT IN AN ORGANIZED HEALTH CARE EDUCATION/TRAINING PROGRAM

## 2024-11-11 PROCEDURE — 94761 N-INVAS EAR/PLS OXIMETRY MLT: CPT

## 2024-11-11 PROCEDURE — 84100 ASSAY OF PHOSPHORUS: CPT | Performed by: SURGERY

## 2024-11-11 PROCEDURE — 21400001 HC TELEMETRY ROOM

## 2024-11-11 RX ORDER — LINEZOLID 600 MG/1
600 TABLET, FILM COATED ORAL EVERY 12 HOURS
Qty: 18 TABLET | Refills: 0 | Status: SHIPPED | OUTPATIENT
Start: 2024-11-11 | End: 2024-11-20

## 2024-11-11 RX ORDER — SILVER NITRATE 38.21; 12.74 MG/1; MG/1
2 STICK TOPICAL ONCE
Status: DISCONTINUED | OUTPATIENT
Start: 2024-11-11 | End: 2024-11-12 | Stop reason: HOSPADM

## 2024-11-11 RX ORDER — HYDROMORPHONE HYDROCHLORIDE 2 MG/ML
0.5 INJECTION, SOLUTION INTRAMUSCULAR; INTRAVENOUS; SUBCUTANEOUS EVERY 4 HOURS PRN
Status: DISCONTINUED | OUTPATIENT
Start: 2024-11-11 | End: 2024-11-12 | Stop reason: HOSPADM

## 2024-11-11 RX ORDER — COLCHICINE 0.6 MG/1
0.6 TABLET, FILM COATED ORAL ONCE
Status: COMPLETED | OUTPATIENT
Start: 2024-11-11 | End: 2024-11-11

## 2024-11-11 RX ORDER — CEFTRIAXONE 2 G/1
2 INJECTION, POWDER, FOR SOLUTION INTRAMUSCULAR; INTRAVENOUS DAILY
Qty: 18 G | Refills: 0 | Status: SHIPPED | OUTPATIENT
Start: 2024-11-11 | End: 2024-11-20

## 2024-11-11 RX ORDER — HYDROCODONE BITARTRATE AND ACETAMINOPHEN 7.5; 325 MG/1; MG/1
1 TABLET ORAL EVERY 4 HOURS PRN
Qty: 20 TABLET | Refills: 0 | Status: SHIPPED | OUTPATIENT
Start: 2024-11-11 | End: 2024-11-12

## 2024-11-11 RX ADMIN — GUAIFENESIN 600 MG: 600 TABLET, EXTENDED RELEASE ORAL at 09:11

## 2024-11-11 RX ADMIN — LINEZOLID 600 MG: 600 TABLET, FILM COATED ORAL at 09:11

## 2024-11-11 RX ADMIN — CHLORHEXIDINE GLUCONATE 0.12% ORAL RINSE 10 ML: 1.2 LIQUID ORAL at 09:11

## 2024-11-11 RX ADMIN — CEFTRIAXONE 2 G: 2 INJECTION, POWDER, FOR SOLUTION INTRAMUSCULAR; INTRAVENOUS at 05:11

## 2024-11-11 RX ADMIN — THERA TABS 1 TABLET: TAB at 09:11

## 2024-11-11 RX ADMIN — HYDROMORPHONE HYDROCHLORIDE 0.5 MG: 1 INJECTION, SOLUTION INTRAMUSCULAR; INTRAVENOUS; SUBCUTANEOUS at 04:11

## 2024-11-11 RX ADMIN — LATANOPROST 1 DROP: 50 SOLUTION OPHTHALMIC at 09:11

## 2024-11-11 RX ADMIN — CHOLECALCIFEROL TAB 125 MCG (5000 UNIT) 5000 UNITS: 125 TAB at 09:11

## 2024-11-11 RX ADMIN — Medication 100 MG: at 09:11

## 2024-11-11 RX ADMIN — Medication 1 TABLET: at 09:11

## 2024-11-11 RX ADMIN — HYDROMORPHONE HYDROCHLORIDE 0.5 MG: 2 INJECTION INTRAMUSCULAR; INTRAVENOUS; SUBCUTANEOUS at 05:11

## 2024-11-11 RX ADMIN — HYDROMORPHONE HYDROCHLORIDE 0.5 MG: 1 INJECTION, SOLUTION INTRAMUSCULAR; INTRAVENOUS; SUBCUTANEOUS at 09:11

## 2024-11-11 RX ADMIN — ENOXAPARIN SODIUM 40 MG: 40 INJECTION SUBCUTANEOUS at 05:11

## 2024-11-11 RX ADMIN — HYDROMORPHONE HYDROCHLORIDE 0.5 MG: 2 INJECTION INTRAMUSCULAR; INTRAVENOUS; SUBCUTANEOUS at 10:11

## 2024-11-11 RX ADMIN — METOPROLOL TARTRATE 12.5 MG: 25 TABLET, FILM COATED ORAL at 09:11

## 2024-11-11 RX ADMIN — COLCHICINE 0.6 MG: 0.6 TABLET, FILM COATED ORAL at 01:11

## 2024-11-11 RX ADMIN — TORSEMIDE 40 MG: 10 TABLET ORAL at 09:11

## 2024-11-11 RX ADMIN — ASPIRIN 81 MG: 81 TABLET, COATED ORAL at 09:11

## 2024-11-11 NOTE — SUBJECTIVE & OBJECTIVE
Interval History: f/u right hand pain / right leg cellulitis  patient reported right hand. X-rays with arthritis. Discussed discharging today. Initially facility was going to take him but then stated tomorrow    Review of Systems  Objective:     Vital Signs (Most Recent):  Temp: 98 °F (36.7 °C) (11/11/24 1211)  Pulse: 91 (11/11/24 1520)  Resp: 20 (11/11/24 1211)  BP: 129/67 (11/11/24 1211)  SpO2: 95 % (11/11/24 1211) Vital Signs (24h Range):  Temp:  [98 °F (36.7 °C)-98.8 °F (37.1 °C)] 98 °F (36.7 °C)  Pulse:  [] 91  Resp:  [16-20] 20  SpO2:  [92 %-96 %] 95 %  BP: (109-129)/(61-74) 129/67     Weight: 96.3 kg (212 lb 4.9 oz)  Body mass index is 33.25 kg/m².    Intake/Output Summary (Last 24 hours) at 11/11/2024 1534  Last data filed at 11/11/2024 1441  Gross per 24 hour   Intake 660 ml   Output 1100 ml   Net -440 ml         Physical Exam  HENT:      Head: Normocephalic and atraumatic.   Cardiovascular:      Rate and Rhythm: Normal rate and regular rhythm.      Heart sounds: No murmur heard.  Pulmonary:      Effort: Pulmonary effort is normal. No respiratory distress.      Breath sounds: Normal breath sounds. No wheezing.   Abdominal:      General: Bowel sounds are normal. There is no distension.      Palpations: Abdomen is soft.      Tenderness: There is no abdominal tenderness.   Musculoskeletal:         General: No swelling.   Skin:     General: Skin is warm and dry.      Findings: Erythema present.   Neurological:      Mental Status: He is alert and oriented to person, place, and time. Mental status is at baseline.             Significant Labs: All pertinent labs within the past 24 hours have been reviewed.  Recent Lab Results         11/11/24  1717   11/11/24  1132   11/11/24  0505   11/10/24  2207        Albumin     2.6         ALP     290         ALT     96         Anion Gap     12         AST     58         Baso #     0.09         Basophil %     0.7         BILIRUBIN TOTAL     0.6  Comment: For infants  and newborns, interpretation of results should be based  on gestational age, weight and in agreement with clinical  observations.    Premature Infant recommended reference ranges:  Up to 24 hours.............<8.0 mg/dL  Up to 48 hours............<12.0 mg/dL  3-5 days..................<15.0 mg/dL  6-29 days.................<15.0 mg/dL           BUN     17         Calcium     8.9         Chloride     99         CO2     27         Creatinine     1.3         Differential Method     Automated         eGFR     58         Eos #     0.2         Eos %     1.2         Glucose     101         Gran # (ANC)     10.8         Gran %     79.1         Hematocrit     37.6         Hemoglobin     12.3         Immature Grans (Abs)     0.57  Comment: Mild elevation in immature granulocytes is non specific and   can be seen in a variety of conditions including stress response,   acute inflammation, trauma and pregnancy. Correlation with other   laboratory and clinical findings is essential.           Immature Granulocytes     4.2         Lymph #     1.4         Lymph %     10.0         Magnesium      1.5         MCH     29.1         MCHC     32.7         MCV     89         Mono #     0.7         Mono %     4.8         MPV     10.2         nRBC     0         Phosphorus Level     3.3         Platelet Count     271         POCT Glucose 134   142     139       Potassium     3.4         PROTEIN TOTAL     7.2         RBC     4.22         RDW     17.5         Sodium     138         WBC     13.65                 Significant Imaging: I have reviewed all pertinent imaging results/findings within the past 24 hours.

## 2024-11-11 NOTE — SUBJECTIVE & OBJECTIVE
Interval History:  Patient was states he was better.  Less leg pain.  Awaiting skilled nursing placement    Medications:  Continuous Infusions:  Scheduled Meds:   aspirin  81 mg Oral Daily    cefTRIAXone (Rocephin) IV (PEDS and ADULTS)  2 g Intravenous Q24H    chlorhexidine  10 mL Mouth/Throat BID    cholecalciferol (vitamin D3)  5,000 Units Oral Daily    enoxparin  40 mg Subcutaneous Daily    folic acid-vit B6-vit B12 2.5-25-2 mg  1 tablet Oral Daily    guaiFENesin  600 mg Oral BID    latanoprost  1 drop Right Eye QHS    linezolid  600 mg Oral Q12H    metoprolol tartrate  12.5 mg Oral BID    multivitamin  1 tablet Oral Daily    thiamine  100 mg Oral Daily    torsemide  40 mg Oral Daily     PRN Meds:  Current Facility-Administered Medications:     acetaminophen, 650 mg, Rectal, Q6H PRN    acetaminophen, 650 mg, Oral, Q6H PRN    albuterol-ipratropium, 3 mL, Nebulization, Q4H PRN    aluminum-magnesium hydroxide-simethicone, 30 mL, Oral, QID PRN    benzonatate, 100 mg, Oral, TID PRN    dextrose 10%, 12.5 g, Intravenous, PRN    dextrose 10%, 12.5 g, Intravenous, PRN    dextrose 10%, 12.5 g, Intravenous, PRN    dextrose 10%, 25 g, Intravenous, PRN    dextrose 10%, 25 g, Intravenous, PRN    dextrose 10%, 25 g, Intravenous, PRN    glucagon (human recombinant), 1 mg, Intramuscular, PRN    glucose, 16 g, Oral, PRN    glucose, 24 g, Oral, PRN    HYDROcodone-acetaminophen, 1 tablet, Oral, Q4H PRN    HYDROmorphone, 0.5 mg, Intravenous, Q4H PRN    naloxone, 0.02 mg, Intravenous, PRN    ondansetron, 4 mg, Intravenous, Q8H PRN    polyethylene glycol, 17 g, Oral, Daily PRN    promethazine, 25 mg, Oral, Q6H PRN    simethicone, 1 tablet, Oral, QID PRN    sodium chloride 0.9%, 3 mL, Intravenous, Q12H PRN     Review of patient's allergies indicates:   Allergen Reactions    Amitriptyline Rash    Celecoxib Rash     Objective:     Vital Signs (Most Recent):  Temp: 98.3 °F (36.8 °C) (11/11/24 0725)  Pulse: 100 (11/11/24 0725)  Resp: 18  (11/11/24 0725)  BP: 127/67 (11/11/24 0725)  SpO2: 95 % (11/11/24 0725) Vital Signs (24h Range):  Temp:  [97.7 °F (36.5 °C)-98.8 °F (37.1 °C)] 98.3 °F (36.8 °C)  Pulse:  [] 100  Resp:  [16-20] 18  SpO2:  [92 %-95 %] 95 %  BP: (109-128)/(61-74) 127/67     Weight: 96.3 kg (212 lb 4.9 oz)  Body mass index is 33.25 kg/m².    Intake/Output - Last 3 Shifts         11/09 0700  11/10 0659 11/10 0700  11/11 0659 11/11 0700  11/12 0659    P.O.   240    I.V. (mL/kg) 49.7 (0.5)      Total Intake(mL/kg) 49.7 (0.5)  240 (2.5)    Urine (mL/kg/hr)  1800 (0.8) 125 (0.9)    Blood       Total Output  1800 125    Net +49.7 -1800 +115                    Physical Exam  Constitutional:       General: He is not in acute distress.     Appearance: He is well-developed. He is ill-appearing.   HENT:      Head: Normocephalic and atraumatic.   Eyes:      General: No scleral icterus.     Pupils: Pupils are equal, round, and reactive to light.   Neck:      Thyroid: No thyromegaly.      Vascular: No JVD.      Trachea: No tracheal deviation.   Cardiovascular:      Rate and Rhythm: Normal rate and regular rhythm.      Heart sounds: Normal heart sounds.   Pulmonary:      Effort: Pulmonary effort is normal.      Breath sounds: Rales present.   Abdominal:      General: Bowel sounds are normal. There is no distension.      Palpations: Abdomen is soft. There is no mass.      Tenderness: There is no abdominal tenderness. There is no guarding or rebound.   Musculoskeletal:         General: Normal range of motion.      Cervical back: Normal range of motion and neck supple.   Lymphadenopathy:      Cervical: No cervical adenopathy.   Skin:     General: Skin is warm and dry.      Comments: Decreased erythema.      The wound is clean.  There is some bleeding from the medial aspect but less so   Neurological:      Mental Status: He is alert and oriented to person, place, and time.   Psychiatric:         Behavior: Behavior normal.         Thought Content:  "Thought content normal.         Judgment: Judgment normal.          Significant Labs:  I have reviewed all pertinent lab results within the past 24 hours.  CBC:   Recent Labs   Lab 11/11/24  0505   WBC 13.65*   RBC 4.22*   HGB 12.3*   HCT 37.6*      MCV 89   MCH 29.1   MCHC 32.7     Coagulation: No results for input(s): "LABPROT", "INR", "APTT" in the last 168 hours.  Microbiology Results (last 7 days)       Procedure Component Value Units Date/Time    Blood culture [[id number]] Collected: 11/06/24 1100    Order Status: Completed Specimen: Blood from Peripheral, Forearm, Left Updated: 11/10/24 2022     Blood Culture, Routine No Growth to date      No Growth to date      No Growth to date      No Growth to date      No Growth to date    Blood culture [[id number]] Collected: 11/06/24 1100    Order Status: Completed Specimen: Blood from Peripheral, Hand, Left Updated: 11/10/24 2022     Blood Culture, Routine No Growth to date      No Growth to date      No Growth to date      No Growth to date      No Growth to date    Culture, Anaerobe [[id number]] Collected: 11/08/24 1700    Order Status: Completed Specimen: Wound from Leg, Right Updated: 11/10/24 1118     Anaerobic Culture Culture in progress    Aerobic culture [[id number]] Collected: 11/08/24 1700    Order Status: Completed Specimen: Wound from Leg, Right Updated: 11/10/24 0556     Aerobic Bacterial Culture No growth    Culture, MRSA [[id number]] Collected: 11/04/24 0557    Order Status: Completed Specimen: MRSA source from Nares, Left Updated: 11/09/24 0720     MRSA Surveillance Screen No MRSA isolated    Gram stain [[id number]] Collected: 11/08/24 1700    Order Status: Completed Specimen: Wound from Leg, Right Updated: 11/09/24 0454     Gram Stain Result Rare WBC's      No organisms seen    Fungus culture [[id number]] Collected: 11/08/24 1700    Order Status: Sent Specimen: Wound from Leg, Right Updated: 11/09/24 0126    Blood culture [[id number]] " Collected: 11/03/24 0125    Order Status: Completed Specimen: Blood from Peripheral, Antecubital, Left Updated: 11/08/24 1412     Blood Culture, Routine No growth after 5 days.    Blood culture [[id number]]  (Abnormal)  (Susceptibility) Collected: 11/03/24 0125    Order Status: Completed Specimen: Blood from Peripheral, Hand, Right Updated: 11/06/24 1051     Blood Culture, Routine Gram stain dao bottle: Gram negative rods      Results called to and read back by:Sharifa Power RN 11/04/2024  00:22      ESCHERICHIA COLI            Significant Diagnostics:  I have reviewed all pertinent imaging results/findings within the past 24 hours.  No new

## 2024-11-11 NOTE — PROGRESS NOTES
[facility] - Telemetry ([facility])  Infectious Disease  Progress Note    Patient Name: Santiago Khan  MRN: [id number]  Admission Date: 11/2/2024  Length of Stay: 8 days  Attending Physician: Jovanny Sanchez MD  Primary Care Provider: Kashif Acosta MD    Isolation Status: No active isolations  Assessment/Plan:      Cardiac/Vascular  Acute on chronic combined systolic and diastolic congestive heart failure  Latest ECHO  Results for orders placed during the hospital encounter of 11/02/24    Echo    Interpretation Summary    Left Ventricle: The left ventricle is mildly dilated. Mildly increased ventricular mass. Normal wall thickness. There is eccentric hypertrophy. There is moderately reduced systolic function with a visually estimated ejection fraction of 35 - 40%. There is normal diastolic function.    Right Ventricle: Normal right ventricular cavity size. Wall thickness is normal. Systolic function is mildly reduced.    Left Atrium: Left atrium is mildly dilated.    Right Atrium: Right atrium is mildly dilated.    Aortic Valve: There is mild aortic valve sclerosis.    Mitral Valve: There is moderate regurgitation.    Tricuspid Valve: There is mild regurgitation.    Pulmonic Valve: There is mild regurgitation.    Pulmonary Artery: There is mild pulmonary hypertension. The estimated pulmonary artery systolic pressure is 44 mmHg.    IVC/SVC: Intermediate venous pressure at 8 mmHg.    Continue current medications per primary     Hypertension associated with stage 3 chronic kidney disease due to type 2 diabetes mellitus  Continue current medications per primary      ID  Abscess of right leg  S/p drainage of abscess. Leukocytosis improving. Cultures in process; no growth so far. Gram stain negative. Continue PO linezolid 600 mg BID along with ceftriaxone. Will complete 14 day post of course with both. Stop date 11/21/24.     E coli bacteremia  --All cases of bacteremia pose risk of life threatening sepsis and  metastatic infection  --GN bacteremia not always with obvious source   --Unclear in this case   --CT a/p shows thickening and enlargement of the left krystal abdominal wall. Possible infected hematoma.   --Possible early pneumonia as well   --Would be cautious and treat with longer course of antibiotics   --Continue IV ceftriaxone 2 g daily as E coli isolate is pan susceptible   --Requires drug toxicity monitoring   --Follow repeat blood cx for clearance; NGTD    --Will complete 14 day IV abx course from date of right LE washout   --Will schedule ID clinic follow up   --Above d/w primary team.      Outpatient Antibiotic Therapy Plan:    1) Infection: E coli bacteremia     2) Discharge Antibiotics:    Intravenous antibiotics:  IV ceftriaxone 2 g daily     3) Therapy Duration:  14 days    Estimated end date of IV antibiotics: 11/21/24    4) Outpatient Weekly Labs:    Order the following labs to be drawn on Mondays:   CBC  CMP     Perform labs at [facility] facility     5) Fax Lab Results to Infectious Diseases Provider: Dr. Swanson     ID Clinic Fax Number: [phone]       Endocrine  Diabetes  Continue current medications per primary      Other  BRADLEY on CPAP  CPAP as tolerated      Thank you for your consult. I will sign off. Please contact us if you have any additional questions.    Alexis Swanson, DO  Infectious Disease  [facility] - Telemetry (Hospital)    Subjective:     Principal Problem:Severe sepsis    HPI: This is a 75 yo M with hx of HF, CKD, COPD, DM, HLD, HTN, and BRADLEY who presented to the ER for worsening bilateral lower extremity weakness, acute on chronic lumbar pain, and abdominal pain for about four days PTA. Following admission patient became progressively lethargic and hypercapnic on the floor requiring continuous bipap, and hypoglycemic requiring continuous D10 drip. Transferred to ICU. Sent back to floor on 11/5. Febrile to 100.6 F on admission with leukocytosis to almost 20K. Blood cx growing E coli. Has  been on empiric Zosyn. CT a/p reports thickening and enlargement of the left krystal abdominal wall. Correlate clinically for rectus sheath hematoma. Mild bibasilar opacities right greater than left may relate to early pneumonia. Trace right-sided pleural effusion. MRI lumbar spine shows DDD. No evidence of infection. ID consulted for E coli bacteremia.   Interval History: Cultures from right LE remain no growth. Has been tolerating Rocephin and Zyvox. Will complete course of abx for both abscess and bacteremia.     Review of Systems   Gastrointestinal:  Negative for abdominal pain.   Musculoskeletal:  Positive for arthralgias, back pain and myalgias.   All other systems reviewed and are negative.    Objective:     Vital Signs (Most Recent):  Temp: 98.3 °F (36.8 °C) (11/11/24 0725)  Pulse: 100 (11/11/24 0725)  Resp: 18 (11/11/24 0725)  BP: 127/67 (11/11/24 0725)  SpO2: 95 % (11/11/24 0725) Vital Signs (24h Range):  Temp:  [97.7 °F (36.5 °C)-98.8 °F (37.1 °C)] 98.3 °F (36.8 °C)  Pulse:  [] 100  Resp:  [16-20] 18  SpO2:  [92 %-95 %] 95 %  BP: (109-128)/(61-74) 127/67     Weight: 96.3 kg (212 lb 4.9 oz)  Body mass index is 33.25 kg/m².    Estimated Creatinine Clearance: 56 mL/min (based on SCr of 1.3 mg/dL).     Physical Exam  Constitutional:       General: He is not in acute distress.     Appearance: Normal appearance. He is not ill-appearing.   Cardiovascular:      Rate and Rhythm: Normal rate and regular rhythm.      Pulses: Normal pulses.      Heart sounds: Normal heart sounds. No murmur heard.     No friction rub. No gallop.   Pulmonary:      Effort: Pulmonary effort is normal. No respiratory distress.      Breath sounds: Normal breath sounds.   Abdominal:      General: Abdomen is flat. Bowel sounds are normal. There is no distension.      Palpations: Abdomen is soft.      Tenderness: There is no abdominal tenderness.   Musculoskeletal:      Comments: Right knee bandaged post op    Skin:     General: Skin is  warm and dry.   Neurological:      Mental Status: He is alert.          Significant Labs: Blood Culture:   Recent Labs   Lab 11/03/24  0125 11/06/24  1100   LABBLOO No growth after 5 days.  Gram stain dao bottle: Gram negative rods  Results called to and read back by:Sharifa Power RN 11/04/2024  00:22  ESCHERICHIA COLI* No Growth to date  No Growth to date  No Growth to date  No Growth to date  No Growth to date  No Growth to date  No Growth to date  No Growth to date  No Growth to date  No Growth to date     CBC:   Recent Labs   Lab 11/10/24  0516 11/11/24  0505   WBC 13.43* 13.65*   HGB 12.0* 12.3*   HCT 37.6* 37.6*    271     CMP:   Recent Labs   Lab 11/10/24  0516 11/11/24  0505    138   K 3.6 3.4*    99   CO2 27 27   GLU 97 101   BUN 19 17   CREATININE 1.3 1.3   CALCIUM 8.6* 8.9   PROT 6.7 7.2   ALBUMIN 2.4* 2.6*   BILITOT 0.6 0.6   ALKPHOS 307* 290*   AST 51* 58*   ALT 76* 96*   ANIONGAP 10 12     Microbiology Results (last 7 days)       Procedure Component Value Units Date/Time    Blood culture [[id number]] Collected: 11/06/24 1100    Order Status: Completed Specimen: Blood from Peripheral, Forearm, Left Updated: 11/10/24 2022     Blood Culture, Routine No Growth to date      No Growth to date      No Growth to date      No Growth to date      No Growth to date    Blood culture [[id number]] Collected: 11/06/24 1100    Order Status: Completed Specimen: Blood from Peripheral, Hand, Left Updated: 11/10/24 2022     Blood Culture, Routine No Growth to date      No Growth to date      No Growth to date      No Growth to date      No Growth to date    Culture, Anaerobe [[id number]] Collected: 11/08/24 1700    Order Status: Completed Specimen: Wound from Leg, Right Updated: 11/10/24 1118     Anaerobic Culture Culture in progress    Aerobic culture [[id number]] Collected: 11/08/24 1700    Order Status: Completed Specimen: Wound from Leg, Right Updated: 11/10/24 0556     Aerobic  Bacterial Culture No growth    Culture, MRSA [[id number]] Collected: 11/04/24 0557    Order Status: Completed Specimen: MRSA source from Nares, Left Updated: 11/09/24 0720     MRSA Surveillance Screen No MRSA isolated    Gram stain [[id number]] Collected: 11/08/24 1700    Order Status: Completed Specimen: Wound from Leg, Right Updated: 11/09/24 0454     Gram Stain Result Rare WBC's      No organisms seen    Fungus culture [[id number]] Collected: 11/08/24 1700    Order Status: Sent Specimen: Wound from Leg, Right Updated: 11/09/24 0126    Blood culture [[id number]] Collected: 11/03/24 0125    Order Status: Completed Specimen: Blood from Peripheral, Antecubital, Left Updated: 11/08/24 1412     Blood Culture, Routine No growth after 5 days.    Blood culture [[id number]]  (Abnormal)  (Susceptibility) Collected: 11/03/24 0125    Order Status: Completed Specimen: Blood from Peripheral, Hand, Right Updated: 11/06/24 1051     Blood Culture, Routine Gram stain dao bottle: Gram negative rods      Results called to and read back by:Sharifa Power RN 11/04/2024  00:22      ESCHERICHIA COLI          All pertinent labs within the past 24 hours have been reviewed.    Significant Imaging: I have reviewed all pertinent imaging results/findings within the past 24 hours.

## 2024-11-11 NOTE — ASSESSMENT & PLAN NOTE
I    Incision and drainage 11/08/2024   Some bleeding dressing changes will address tomorrow   Continue and care.      Antibiotics per Infectious Disease    We will apply silver nitrate with dressing change tomorrow if they are still oozing

## 2024-11-11 NOTE — PLAN OF CARE
11/11/24 1447   Post-Acute Status   Post-Acute Authorization Placement   Post-Acute Placement Status Set-up Complete/Auth obtained   Coverage [employer]   Discharge Delays None known at this time   Discharge Plan   Discharge Plan A Skilled Nursing Facility       Per Caleb, with [facility], patient's insurance authorization is good until tomorrow and they can accept tomorrow morning.  MD is agreeable to Patient admitting tomorrow, as long as the orders and AVS is still accepted by Facility.  Per Caleb, they can use DC orders and AVS from today for admission tomorrow.    SW will meet with Patient and friend at bedside and let them know DC tomorrow.    SW will continue to follow and assist as needed.

## 2024-11-11 NOTE — PT/OT/SLP PROGRESS
"Physical Therapy  Treatment    Santiago Khan   MRN: [id number]   Admitting Diagnosis: Severe sepsis    PT Received On: 11/11/24  PT Start Time: 0740     PT Stop Time: 0750    PT Total Time (min): 10 min       Billable Minutes:  Therapeutic Activity 10    Treatment Type: Treatment  PT/PTA: PTA     Number of PTA visits since last PT visit: 2       General Precautions: Standard, fall  Orthopedic Precautions: N/A  Braces: N/A  Respiratory Status: Room air    Spiritual, Cultural Beliefs, Oriental orthodox Practices, Values that Affect Care: yes    Subjective:  Communicated with patient's nurse, Kayleen, and completed Epic chart review prior to session.  Patient declined to participate in PT session due to complaints of increased pain. Reports having gotten medication earlier but pain remains. Patient states nurse is aware.   "I just can't right now."    Pain/Comfort  Pain Rating 1: 7/10 (BACK AND BLE)  Pain Addressed 1: Other (see comments) (NURSE AWARE)  Pain Rating Post-Intervention 1: 7/10    Objective:   Patient found with: telemetry, peripheral IV    Patient found sitting EOB. Offered to assist patient to chair for back support in attempt to relieve some pain but patient declined and reported he was ultimately more comfortable sitting EOB.     Educated patient on importance of full and active participation in functional mobility training to prevent further loss of ROM and strength.   Encouraged patient to request assistance from nursing staff to get OOB at least 3x/day with all meals in addition to ambulating to/from the bathroom to promote recovery of CV endurance. Also encouraged patient to perform AROM TE to BLE throughout the day within all available planes of motion. Re enforced importance of utilizing call light to meet needs in room and not attempt to get up without staff assistance. Patient verbalized understanding of all education given and agreed to comply.      AM-PAC 6 CLICK MOBILITY  How much help from " another person does this patient currently need?   1 = Unable, Total/Dependent Assistance  2 = A lot, Maximum/Moderate Assistance  3 = A little, Minimum/Contact Guard/Supervision  4 = None, Modified Mallard/Independent    Turning over in bed (including adjusting bedclothes, sheets and blankets)?: 1 (REF)  Sitting down on and standing up from a chair with arms (e.g., wheelchair, bedside commode, etc.): 1 (REF)  Moving from lying on back to sitting on the side of the bed?: 1 (REF)  Moving to and from a bed to a chair (including a wheelchair)?: 1 (REF)  Need to walk in hospital room?: 1 (REF)  Climbing 3-5 steps with a railing?: 1 (REF)  Basic Mobility Total Score: 6    AM-PAC Raw Score CMS G-Code Modifier Level of Impairment Assistance   6 % Total / Unable   7 - 9 CM 80 - 100% Maximal Assist   10 - 14 CL 60 - 80% Moderate Assist   15 - 19 CK 40 - 60% Moderate Assist   20 - 22 CJ 20 - 40% Minimal Assist   23 CI 1-20% SBA / CGA   24 CH 0% Independent/ Mod I     Patient left sitting edge of bed with call button in reach and visitor & MD present.    Assessment:  Santiago Khan is a 74 y.o. male with a medical diagnosis of Severe sepsis and presents with overall decline in functional mobility. Patient would continue to benefit from skilled PT to address functional limitations listed below in order to return to PLOF/decrease caregiver burden.     Rehab identified problem list/impairments: weakness, impaired endurance, impaired self care skills, impaired functional mobility, gait instability, impaired balance, pain, decreased safety awareness, decreased lower extremity function, decreased ROM, edema, impaired cardiopulmonary response to activity    Rehab potential is fair.    Activity tolerance: unable to determine    Discharge recommendations: Moderate Intensity Therapy      Barriers to discharge:      Equipment recommendations: to be determined by next level of care     GOALS:   Multidisciplinary  Problems       Physical Therapy Goals          Problem: Physical Therapy    Goal Priority Disciplines Outcome Interventions   Physical Therapy Goal     PT, PT/OT Progressing    Description: LTG'S TO BE MET IN 14 DAYS (11-18-24)  PT WILL REQUIRE SBA FOR BED MOBILITY  PT WILL REQUIRE SBA FOR BED<>CHAIR TF'S  PT WILL  FEET WITH RW AND CGA  PT WILL INC AMPAC SCORE BY 2 POINTS TO PROGRESS GROSS FUNC MOBILITY                         PLAN:    Patient to be seen 3 x/week to address the above listed problems via gait training, therapeutic activities, therapeutic exercises  Plan of Care expires: 11/18/24  Plan of Care reviewed with: patient         11/11/2024

## 2024-11-11 NOTE — PLAN OF CARE
A224/A224 BRUNO Khan is a 74 y.o.male admitted on 11/2/2024 for Severe sepsis   Code Status: Full Code MRN: [id number]   Review of patient's allergies indicates:   Allergen Reactions    Amitriptyline Rash    Celecoxib Rash     Past Medical History:   Diagnosis Date    Chronic combined systolic and diastolic congestive heart failure 07/09/2020    Chronic kidney disease, stage 3     Chronic obstructive pulmonary disease 03/20/2023    Wixela 250 mcg, Spiriva respimat. Continue Albuterol inhaler and albuterol nebs   Referral pul dis management, education and assistance with medication  Patient preference to only see a doctor, request follow up with Dr. Springer             Chronic venous insufficiency     DDD (degenerative disc disease), lumbar     DM (diabetes mellitus) with complications     Drug-induced constipation 11/03/2024    History of gout     Hyperlipidemia associated with type 2 diabetes mellitus     Hypertension associated with stage 3 chronic kidney disease due to type 2 diabetes mellitus     BRADLEY on CPAP     Prostate cancer     prostatectomy in 2010, rising PSA that started in 2021      PRN meds    acetaminophen, 650 mg, Q6H PRN  acetaminophen, 650 mg, Q6H PRN  albuterol-ipratropium, 3 mL, Q4H PRN  aluminum-magnesium hydroxide-simethicone, 30 mL, QID PRN  benzonatate, 100 mg, TID PRN  dextrose 10%, 12.5 g, PRN  dextrose 10%, 12.5 g, PRN  dextrose 10%, 12.5 g, PRN  dextrose 10%, 25 g, PRN  dextrose 10%, 25 g, PRN  dextrose 10%, 25 g, PRN  glucagon (human recombinant), 1 mg, PRN  glucose, 16 g, PRN  glucose, 24 g, PRN  HYDROcodone-acetaminophen, 1 tablet, Q4H PRN  HYDROmorphone, 0.5 mg, Q4H PRN  naloxone, 0.02 mg, PRN  ondansetron, 4 mg, Q8H PRN  polyethylene glycol, 17 g, Daily PRN  promethazine, 25 mg, Q6H PRN  simethicone, 1 tablet, QID PRN  sodium chloride 0.9%, 3 mL, Q12H PRN      Chart check completed. Will continue plan of care.      Orientation: oriented x 4  Adam Coma Scale Score: 15      Lead Monitored: Lead II Rhythm: normal sinus rhythm Frequency/Ectopy: PVCs  Cardiac/Telemetry Box Number: [id number]  VTE Core Measure: Pharmacological prophylaxis initiated/maintained Last Bowel Movement: 11/10/24  Diet diabetic 2000 Calories (up to 75 gm per meal)  Voiding Characteristics: voids spontaneously without difficulty  Олег Score: 20  Fall Risk Score: 15  Accucheck [x]   Freq? achs     Lines/Drains/Airways       Peripherally Inserted Central Catheter Line  Duration             PICC Double Lumen 11/08/24 1021 right basilic 2 days

## 2024-11-11 NOTE — ASSESSMENT & PLAN NOTE
--All cases of bacteremia pose risk of life threatening sepsis and metastatic infection  --GN bacteremia not always with obvious source   --Unclear in this case   --CT a/p shows thickening and enlargement of the left krystal abdominal wall. Possible infected hematoma.   --Possible early pneumonia as well   --Would be cautious and treat with longer course of antibiotics   --Continue IV ceftriaxone 2 g daily as E coli isolate is pan susceptible   --Requires drug toxicity monitoring   --Follow repeat blood cx for clearance; NGTD    --Will complete 14 day IV abx course from date of right LE washout   --Will schedule ID clinic follow up   --Above d/w primary team.      Outpatient Antibiotic Therapy Plan:    1) Infection: E coli bacteremia     2) Discharge Antibiotics:    Intravenous antibiotics:  IV ceftriaxone 2 g daily     3) Therapy Duration:  14 days    Estimated end date of IV antibiotics: 11/21/24    4) Outpatient Weekly Labs:    Order the following labs to be drawn on Mondays:   CBC  CMP     Perform labs at [facility] facility     5) Fax Lab Results to Infectious Diseases Provider: Dr. Swanson     [facility] Fax Number: [phone]

## 2024-11-11 NOTE — PROGRESS NOTES
[facility])  General Surgery  Progress Note    Subjective:     History of Present Illness:  74-year-old male who was admitted to the hospital for lethargy and respiratory failure.  He also had low glucose.  He was treated in the ICU and subsequently transferred to floor.      The patient had cellulitis of his right lower extremity that was being treated with antibiotics.      A surgery consult was obtained due to an area of swelling fluctuance and tenderness    Post-Op Info:  Procedure(s) (LRB):  INCISION AND DRAINAGE, ABSCESS (Right)   3 Days Post-Op     Interval History:  Patient was states he was better.  Less leg pain.  Awaiting skilled nursing placement    Medications:  Continuous Infusions:  Scheduled Meds:   aspirin  81 mg Oral Daily    cefTRIAXone (Rocephin) IV (PEDS and ADULTS)  2 g Intravenous Q24H    chlorhexidine  10 mL Mouth/Throat BID    cholecalciferol (vitamin D3)  5,000 Units Oral Daily    enoxparin  40 mg Subcutaneous Daily    folic acid-vit B6-vit B12 2.5-25-2 mg  1 tablet Oral Daily    guaiFENesin  600 mg Oral BID    latanoprost  1 drop Right Eye QHS    linezolid  600 mg Oral Q12H    metoprolol tartrate  12.5 mg Oral BID    multivitamin  1 tablet Oral Daily    thiamine  100 mg Oral Daily    torsemide  40 mg Oral Daily     PRN Meds:  Current Facility-Administered Medications:     acetaminophen, 650 mg, Rectal, Q6H PRN    acetaminophen, 650 mg, Oral, Q6H PRN    albuterol-ipratropium, 3 mL, Nebulization, Q4H PRN    aluminum-magnesium hydroxide-simethicone, 30 mL, Oral, QID PRN    benzonatate, 100 mg, Oral, TID PRN    dextrose 10%, 12.5 g, Intravenous, PRN    dextrose 10%, 12.5 g, Intravenous, PRN    dextrose 10%, 12.5 g, Intravenous, PRN    dextrose 10%, 25 g, Intravenous, PRN    dextrose 10%, 25 g, Intravenous, PRN    dextrose 10%, 25 g, Intravenous, PRN    glucagon (human recombinant), 1 mg, Intramuscular, PRN    glucose, 16 g, Oral, PRN    glucose, 24 g, Oral, PRN     HYDROcodone-acetaminophen, 1 tablet, Oral, Q4H PRN    HYDROmorphone, 0.5 mg, Intravenous, Q4H PRN    naloxone, 0.02 mg, Intravenous, PRN    ondansetron, 4 mg, Intravenous, Q8H PRN    polyethylene glycol, 17 g, Oral, Daily PRN    promethazine, 25 mg, Oral, Q6H PRN    simethicone, 1 tablet, Oral, QID PRN    sodium chloride 0.9%, 3 mL, Intravenous, Q12H PRN     Review of patient's allergies indicates:   Allergen Reactions    Amitriptyline Rash    Celecoxib Rash     Objective:     Vital Signs (Most Recent):  Temp: 98.3 °F (36.8 °C) (11/11/24 0725)  Pulse: 100 (11/11/24 0725)  Resp: 18 (11/11/24 0725)  BP: 127/67 (11/11/24 0725)  SpO2: 95 % (11/11/24 0725) Vital Signs (24h Range):  Temp:  [97.7 °F (36.5 °C)-98.8 °F (37.1 °C)] 98.3 °F (36.8 °C)  Pulse:  [] 100  Resp:  [16-20] 18  SpO2:  [92 %-95 %] 95 %  BP: (109-128)/(61-74) 127/67     Weight: 96.3 kg (212 lb 4.9 oz)  Body mass index is 33.25 kg/m².    Intake/Output - Last 3 Shifts         11/09 0700  11/10 0659 11/10 0700  11/11 0659 11/11 0700  11/12 0659    P.O.   240    I.V. (mL/kg) 49.7 (0.5)      Total Intake(mL/kg) 49.7 (0.5)  240 (2.5)    Urine (mL/kg/hr)  1800 (0.8) 125 (0.9)    Blood       Total Output  1800 125    Net +49.7 -1800 +115                    Physical Exam  Constitutional:       General: He is not in acute distress.     Appearance: He is well-developed. He is ill-appearing.   HENT:      Head: Normocephalic and atraumatic.   Eyes:      General: No scleral icterus.     Pupils: Pupils are equal, round, and reactive to light.   Neck:      Thyroid: No thyromegaly.      Vascular: No JVD.      Trachea: No tracheal deviation.   Cardiovascular:      Rate and Rhythm: Normal rate and regular rhythm.      Heart sounds: Normal heart sounds.   Pulmonary:      Effort: Pulmonary effort is normal.      Breath sounds: Rales present.   Abdominal:      General: Bowel sounds are normal. There is no distension.      Palpations: Abdomen is soft. There is no mass.     "  Tenderness: There is no abdominal tenderness. There is no guarding or rebound.   Musculoskeletal:         General: Normal range of motion.      Cervical back: Normal range of motion and neck supple.   Lymphadenopathy:      Cervical: No cervical adenopathy.   Skin:     General: Skin is warm and dry.      Comments: Decreased erythema.      The wound is clean.  There is some bleeding from the medial aspect but less so   Neurological:      Mental Status: He is alert and oriented to person, place, and time.   Psychiatric:         Behavior: Behavior normal.         Thought Content: Thought content normal.         Judgment: Judgment normal.          Significant Labs:  I have reviewed all pertinent lab results within the past 24 hours.  CBC:   Recent Labs   Lab 11/11/24  0505   WBC 13.65*   RBC 4.22*   HGB 12.3*   HCT 37.6*      MCV 89   MCH 29.1   MCHC 32.7     Coagulation: No results for input(s): "LABPROT", "INR", "APTT" in the last 168 hours.  Microbiology Results (last 7 days)       Procedure Component Value Units Date/Time    Blood culture [[id number]] Collected: 11/06/24 1100    Order Status: Completed Specimen: Blood from Peripheral, Forearm, Left Updated: 11/10/24 2022     Blood Culture, Routine No Growth to date      No Growth to date      No Growth to date      No Growth to date      No Growth to date    Blood culture [[id number]] Collected: 11/06/24 1100    Order Status: Completed Specimen: Blood from Peripheral, Hand, Left Updated: 11/10/24 2022     Blood Culture, Routine No Growth to date      No Growth to date      No Growth to date      No Growth to date      No Growth to date    Culture, Anaerobe [[id number]] Collected: 11/08/24 1700    Order Status: Completed Specimen: Wound from Leg, Right Updated: 11/10/24 1118     Anaerobic Culture Culture in progress    Aerobic culture [[id number]] Collected: 11/08/24 1700    Order Status: Completed Specimen: Wound from Leg, Right Updated: 11/10/24 0556     " Aerobic Bacterial Culture No growth    Culture, MRSA [[id number]] Collected: 11/04/24 0557    Order Status: Completed Specimen: MRSA source from Nares, Left Updated: 11/09/24 0720     MRSA Surveillance Screen No MRSA isolated    Gram stain [[id number]] Collected: 11/08/24 1700    Order Status: Completed Specimen: Wound from Leg, Right Updated: 11/09/24 0454     Gram Stain Result Rare WBC's      No organisms seen    Fungus culture [[id number]] Collected: 11/08/24 1700    Order Status: Sent Specimen: Wound from Leg, Right Updated: 11/09/24 0126    Blood culture [[id number]] Collected: 11/03/24 0125    Order Status: Completed Specimen: Blood from Peripheral, Antecubital, Left Updated: 11/08/24 1412     Blood Culture, Routine No growth after 5 days.    Blood culture [[id number]]  (Abnormal)  (Susceptibility) Collected: 11/03/24 0125    Order Status: Completed Specimen: Blood from Peripheral, Hand, Right Updated: 11/06/24 1051     Blood Culture, Routine Gram stain dao bottle: Gram negative rods      Results called to and read back by:Sharifa Power RN 11/04/2024  00:22      ESCHERICHIA COLI            Significant Diagnostics:  I have reviewed all pertinent imaging results/findings within the past 24 hours.  No new  Assessment/Plan:     * Severe sepsis sec to Cellulitis Legs complicated by E coli Bacteremia  Continue antibiotics with the cellulitis Infectious Disease.    Abscess of right leg  I    Incision and drainage 11/08/2024   Some bleeding dressing changes will address tomorrow   Continue and care.      Antibiotics per Infectious Disease    We will apply silver nitrate with dressing change tomorrow if they are still oozing    E coli bacteremia  Management per Medicine/Infectious Disease    Hyperlipidemia associated with type 2 diabetes mellitus  Management per Medicine    Hypertension associated with stage 3 chronic kidney disease due to type 2 diabetes mellitus  Management per hospital Medicine    BRADLEY on  CPAP  Management per hospital Medicine        Torres Magallanes MD  General Surgery  [facility] - Telemetry ([facility])

## 2024-11-11 NOTE — SUBJECTIVE & OBJECTIVE
Interval History: Cultures from right LE remain no growth. Has been tolerating Rocephin and Zyvox. Will complete course of abx for both abscess and bacteremia.     Review of Systems   Gastrointestinal:  Negative for abdominal pain.   Musculoskeletal:  Positive for arthralgias, back pain and myalgias.   All other systems reviewed and are negative.    Objective:     Vital Signs (Most Recent):  Temp: 98.3 °F (36.8 °C) (11/11/24 0725)  Pulse: 100 (11/11/24 0725)  Resp: 18 (11/11/24 0725)  BP: 127/67 (11/11/24 0725)  SpO2: 95 % (11/11/24 0725) Vital Signs (24h Range):  Temp:  [97.7 °F (36.5 °C)-98.8 °F (37.1 °C)] 98.3 °F (36.8 °C)  Pulse:  [] 100  Resp:  [16-20] 18  SpO2:  [92 %-95 %] 95 %  BP: (109-128)/(61-74) 127/67     Weight: 96.3 kg (212 lb 4.9 oz)  Body mass index is 33.25 kg/m².    Estimated Creatinine Clearance: 56 mL/min (based on SCr of 1.3 mg/dL).     Physical Exam  Constitutional:       General: He is not in acute distress.     Appearance: Normal appearance. He is not ill-appearing.   Cardiovascular:      Rate and Rhythm: Normal rate and regular rhythm.      Pulses: Normal pulses.      Heart sounds: Normal heart sounds. No murmur heard.     No friction rub. No gallop.   Pulmonary:      Effort: Pulmonary effort is normal. No respiratory distress.      Breath sounds: Normal breath sounds.   Abdominal:      General: Abdomen is flat. Bowel sounds are normal. There is no distension.      Palpations: Abdomen is soft.      Tenderness: There is no abdominal tenderness.   Musculoskeletal:      Comments: Right knee bandaged post op    Skin:     General: Skin is warm and dry.   Neurological:      Mental Status: He is alert.          Significant Labs: Blood Culture:   Recent Labs   Lab 11/03/24  0125 11/06/24  1100   LABBLOO No growth after 5 days.  Gram stain dao bottle: Gram negative rods  Results called to and read back by:Sharifa Power RN 11/04/2024  00:22  ESCHERICHIA COLI* No Growth to date  No Growth  to date  No Growth to date  No Growth to date  No Growth to date  No Growth to date  No Growth to date  No Growth to date  No Growth to date  No Growth to date     CBC:   Recent Labs   Lab 11/10/24  0516 11/11/24  0505   WBC 13.43* 13.65*   HGB 12.0* 12.3*   HCT 37.6* 37.6*    271     CMP:   Recent Labs   Lab 11/10/24  0516 11/11/24  0505    138   K 3.6 3.4*    99   CO2 27 27   GLU 97 101   BUN 19 17   CREATININE 1.3 1.3   CALCIUM 8.6* 8.9   PROT 6.7 7.2   ALBUMIN 2.4* 2.6*   BILITOT 0.6 0.6   ALKPHOS 307* 290*   AST 51* 58*   ALT 76* 96*   ANIONGAP 10 12     Microbiology Results (last 7 days)       Procedure Component Value Units Date/Time    Blood culture [[id number]] Collected: 11/06/24 1100    Order Status: Completed Specimen: Blood from Peripheral, Forearm, Left Updated: 11/10/24 2022     Blood Culture, Routine No Growth to date      No Growth to date      No Growth to date      No Growth to date      No Growth to date    Blood culture [[id number]] Collected: 11/06/24 1100    Order Status: Completed Specimen: Blood from Peripheral, Hand, Left Updated: 11/10/24 2022     Blood Culture, Routine No Growth to date      No Growth to date      No Growth to date      No Growth to date      No Growth to date    Culture, Anaerobe [[id number]] Collected: 11/08/24 1700    Order Status: Completed Specimen: Wound from Leg, Right Updated: 11/10/24 1118     Anaerobic Culture Culture in progress    Aerobic culture [[id number]] Collected: 11/08/24 1700    Order Status: Completed Specimen: Wound from Leg, Right Updated: 11/10/24 0556     Aerobic Bacterial Culture No growth    Culture, MRSA [[id number]] Collected: 11/04/24 0557    Order Status: Completed Specimen: MRSA source from Nares, Left Updated: 11/09/24 0720     MRSA Surveillance Screen No MRSA isolated    Gram stain [[id number]] Collected: 11/08/24 1700    Order Status: Completed Specimen: Wound from Leg, Right Updated: 11/09/24 0454     Gram  Stain Result Rare WBC's      No organisms seen    Fungus culture [[id number]] Collected: 11/08/24 1700    Order Status: Sent Specimen: Wound from Leg, Right Updated: 11/09/24 0126    Blood culture [[id number]] Collected: 11/03/24 0125    Order Status: Completed Specimen: Blood from Peripheral, Antecubital, Left Updated: 11/08/24 1412     Blood Culture, Routine No growth after 5 days.    Blood culture [[id number]]  (Abnormal)  (Susceptibility) Collected: 11/03/24 0125    Order Status: Completed Specimen: Blood from Peripheral, Hand, Right Updated: 11/06/24 1051     Blood Culture, Routine Gram stain dao bottle: Gram negative rods      Results called to and read back by:Sharifa Power RN 11/04/2024  00:22      ESCHERICHIA COLI          All pertinent labs within the past 24 hours have been reviewed.    Significant Imaging: I have reviewed all pertinent imaging results/findings within the past 24 hours.

## 2024-11-12 ENCOUNTER — TELEPHONE (OUTPATIENT)
Dept: SURGERY | Facility: CLINIC | Age: 74
End: 2024-11-12
Payer: MEDICARE

## 2024-11-12 VITALS
TEMPERATURE: 98 F | DIASTOLIC BLOOD PRESSURE: 68 MMHG | HEIGHT: 67 IN | RESPIRATION RATE: 16 BRPM | BODY MASS INDEX: 31.42 KG/M2 | SYSTOLIC BLOOD PRESSURE: 134 MMHG | OXYGEN SATURATION: 93 % | HEART RATE: 95 BPM | WEIGHT: 200.19 LBS

## 2024-11-12 LAB
ALBUMIN SERPL BCP-MCNC: 2.6 G/DL (ref 3.5–5.2)
ALP SERPL-CCNC: 262 U/L (ref 40–150)
ALT SERPL W/O P-5'-P-CCNC: 92 U/L (ref 10–44)
ANION GAP SERPL CALC-SCNC: 12 MMOL/L (ref 8–16)
AST SERPL-CCNC: 51 U/L (ref 10–40)
BACTERIA SPEC AEROBE CULT: NO GROWTH
BASOPHILS # BLD AUTO: 0.09 K/UL (ref 0–0.2)
BASOPHILS NFR BLD: 0.7 % (ref 0–1.9)
BILIRUB SERPL-MCNC: 0.6 MG/DL (ref 0.1–1)
BUN SERPL-MCNC: 16 MG/DL (ref 8–23)
CALCIUM SERPL-MCNC: 8.8 MG/DL (ref 8.7–10.5)
CHLORIDE SERPL-SCNC: 98 MMOL/L (ref 95–110)
CO2 SERPL-SCNC: 27 MMOL/L (ref 23–29)
CREAT SERPL-MCNC: 1.3 MG/DL (ref 0.5–1.4)
DIFFERENTIAL METHOD BLD: ABNORMAL
EOSINOPHIL # BLD AUTO: 0.2 K/UL (ref 0–0.5)
EOSINOPHIL NFR BLD: 1.4 % (ref 0–8)
ERYTHROCYTE [DISTWIDTH] IN BLOOD BY AUTOMATED COUNT: 17.3 % (ref 11.5–14.5)
EST. GFR  (NO RACE VARIABLE): 58 ML/MIN/1.73 M^2
GLUCOSE SERPL-MCNC: 116 MG/DL (ref 70–110)
HCT VFR BLD AUTO: 36.8 % (ref 40–54)
HGB BLD-MCNC: 12 G/DL (ref 14–18)
IMM GRANULOCYTES # BLD AUTO: 0.35 K/UL (ref 0–0.04)
IMM GRANULOCYTES NFR BLD AUTO: 2.7 % (ref 0–0.5)
LYMPHOCYTES # BLD AUTO: 1.3 K/UL (ref 1–4.8)
LYMPHOCYTES NFR BLD: 10.2 % (ref 18–48)
MAGNESIUM SERPL-MCNC: 1.4 MG/DL (ref 1.6–2.6)
MCH RBC QN AUTO: 29.5 PG (ref 27–31)
MCHC RBC AUTO-ENTMCNC: 32.6 G/DL (ref 32–36)
MCV RBC AUTO: 90 FL (ref 82–98)
MONOCYTES # BLD AUTO: 0.6 K/UL (ref 0.3–1)
MONOCYTES NFR BLD: 5 % (ref 4–15)
NEUTROPHILS # BLD AUTO: 10.2 K/UL (ref 1.8–7.7)
NEUTROPHILS NFR BLD: 80 % (ref 38–73)
NRBC BLD-RTO: 0 /100 WBC
PHOSPHATE SERPL-MCNC: 3.1 MG/DL (ref 2.7–4.5)
PLATELET # BLD AUTO: 274 K/UL (ref 150–450)
PMV BLD AUTO: 10.2 FL (ref 9.2–12.9)
POCT GLUCOSE: 113 MG/DL (ref 70–110)
POTASSIUM SERPL-SCNC: 3.3 MMOL/L (ref 3.5–5.1)
PROT SERPL-MCNC: 7 G/DL (ref 6–8.4)
RBC # BLD AUTO: 4.07 M/UL (ref 4.6–6.2)
SODIUM SERPL-SCNC: 137 MMOL/L (ref 136–145)
WBC # BLD AUTO: 12.75 K/UL (ref 3.9–12.7)

## 2024-11-12 PROCEDURE — 25000003 PHARM REV CODE 250: Performed by: SURGERY

## 2024-11-12 PROCEDURE — 83735 ASSAY OF MAGNESIUM: CPT | Performed by: SURGERY

## 2024-11-12 PROCEDURE — 80053 COMPREHEN METABOLIC PANEL: CPT | Performed by: SURGERY

## 2024-11-12 PROCEDURE — 84100 ASSAY OF PHOSPHORUS: CPT | Performed by: SURGERY

## 2024-11-12 PROCEDURE — 85025 COMPLETE CBC W/AUTO DIFF WBC: CPT | Performed by: SURGERY

## 2024-11-12 PROCEDURE — 63600175 PHARM REV CODE 636 W HCPCS: Performed by: INTERNAL MEDICINE

## 2024-11-12 RX ORDER — HYDROCODONE BITARTRATE AND ACETAMINOPHEN 7.5; 325 MG/1; MG/1
1 TABLET ORAL EVERY 4 HOURS PRN
Qty: 20 TABLET | Refills: 0 | Status: SHIPPED | OUTPATIENT
Start: 2024-11-12

## 2024-11-12 RX ADMIN — Medication 100 MG: at 08:11

## 2024-11-12 RX ADMIN — TORSEMIDE 40 MG: 10 TABLET ORAL at 08:11

## 2024-11-12 RX ADMIN — CHOLECALCIFEROL TAB 125 MCG (5000 UNIT) 5000 UNITS: 125 TAB at 08:11

## 2024-11-12 RX ADMIN — HYDROMORPHONE HYDROCHLORIDE 0.5 MG: 2 INJECTION INTRAMUSCULAR; INTRAVENOUS; SUBCUTANEOUS at 08:11

## 2024-11-12 RX ADMIN — METOPROLOL TARTRATE 12.5 MG: 25 TABLET, FILM COATED ORAL at 08:11

## 2024-11-12 RX ADMIN — Medication 1 TABLET: at 08:11

## 2024-11-12 RX ADMIN — ASPIRIN 81 MG: 81 TABLET, COATED ORAL at 08:11

## 2024-11-12 RX ADMIN — LINEZOLID 600 MG: 600 TABLET, FILM COATED ORAL at 08:11

## 2024-11-12 RX ADMIN — CHLORHEXIDINE GLUCONATE 0.12% ORAL RINSE 10 ML: 1.2 LIQUID ORAL at 08:11

## 2024-11-12 RX ADMIN — GUAIFENESIN 600 MG: 600 TABLET, EXTENDED RELEASE ORAL at 08:11

## 2024-11-12 RX ADMIN — HYDROMORPHONE HYDROCHLORIDE 0.5 MG: 2 INJECTION INTRAMUSCULAR; INTRAVENOUS; SUBCUTANEOUS at 03:11

## 2024-11-12 RX ADMIN — THERA TABS 1 TABLET: TAB at 08:11

## 2024-11-12 NOTE — PROGRESS NOTES
'[facility])  General Surgery  Progress Note    Subjective:     History of Present Illness:  74-year-old male who was admitted to the hospital for lethargy and respiratory failure.  He also had low glucose.  He was treated in the ICU and subsequently transferred to floor.      The patient had cellulitis of his right lower extremity that was being treated with antibiotics.      A surgery consult was obtained due to an area of swelling fluctuance and tenderness    Post-Op Info:  Procedure(s) (LRB):  INCISION AND DRAINAGE, ABSCESS (Right)   4 Days Post-Op     Interval History:  Patient continues to feel better.  States that it was going to skilled nursing    Medications:  Continuous Infusions:  Scheduled Meds:   aspirin  81 mg Oral Daily    cefTRIAXone (Rocephin) IV (PEDS and ADULTS)  2 g Intravenous Q24H    chlorhexidine  10 mL Mouth/Throat BID    cholecalciferol (vitamin D3)  5,000 Units Oral Daily    enoxparin  40 mg Subcutaneous Daily    folic acid-vit B6-vit B12 2.5-25-2 mg  1 tablet Oral Daily    guaiFENesin  600 mg Oral BID    latanoprost  1 drop Right Eye QHS    linezolid  600 mg Oral Q12H    metoprolol tartrate  12.5 mg Oral BID    multivitamin  1 tablet Oral Daily    silver nitrate applicators  2 applicator Topical (Top) Once    thiamine  100 mg Oral Daily    torsemide  40 mg Oral Daily     PRN Meds:  Current Facility-Administered Medications:     acetaminophen, 650 mg, Rectal, Q6H PRN    acetaminophen, 650 mg, Oral, Q6H PRN    albuterol-ipratropium, 3 mL, Nebulization, Q4H PRN    aluminum-magnesium hydroxide-simethicone, 30 mL, Oral, QID PRN    benzonatate, 100 mg, Oral, TID PRN    dextrose 10%, 12.5 g, Intravenous, PRN    dextrose 10%, 12.5 g, Intravenous, PRN    dextrose 10%, 12.5 g, Intravenous, PRN    dextrose 10%, 25 g, Intravenous, PRN    dextrose 10%, 25 g, Intravenous, PRN    dextrose 10%, 25 g, Intravenous, PRN    glucagon (human recombinant), 1 mg, Intramuscular, PRN    glucose, 16  g, Oral, PRN    glucose, 24 g, Oral, PRN    HYDROcodone-acetaminophen, 1 tablet, Oral, Q4H PRN    HYDROmorphone, 0.5 mg, Intravenous, Q4H PRN    naloxone, 0.02 mg, Intravenous, PRN    ondansetron, 4 mg, Intravenous, Q8H PRN    polyethylene glycol, 17 g, Oral, Daily PRN    promethazine, 25 mg, Oral, Q6H PRN    simethicone, 1 tablet, Oral, QID PRN    sodium chloride 0.9%, 3 mL, Intravenous, Q12H PRN     Review of patient's allergies indicates:   Allergen Reactions    Amitriptyline Rash    Celecoxib Rash     Objective:     Vital Signs (Most Recent):  Temp: 98.4 °F (36.9 °C) (11/12/24 0752)  Pulse: 95 (11/12/24 0752)  Resp: 16 (11/12/24 0752)  BP: 134/68 (11/12/24 0752)  SpO2: (!) 93 % (11/12/24 0752) Vital Signs (24h Range):  Temp:  [97.5 °F (36.4 °C)-98.5 °F (36.9 °C)] 98.4 °F (36.9 °C)  Pulse:  [] 95  Resp:  [14-20] 16  SpO2:  [93 %-96 %] 93 %  BP: (124-138)/(63-74) 134/68     Weight: 90.8 kg (200 lb 2.8 oz)  Body mass index is 31.35 kg/m².    Intake/Output - Last 3 Shifts         11/10 0700  11/11 0659 11/11 0700  11/12 0659 11/12 0700  11/13 0659    P.O.  1100     I.V. (mL/kg)       Total Intake(mL/kg)  1100 (12.1)     Urine (mL/kg/hr) 1800 (0.8) 1275 (0.6)     Stool  0     Total Output 1800 1275     Net -1800 -175            Urine Occurrence  1 x     Stool Occurrence  2 x              Physical Exam  Vitals reviewed.   Constitutional:       Appearance: Ill appearance: Mild acute and chronic.   Skin:     Comments: There is a slight amount of purulent exudate in the wound cleaned off.  The wound is otherwise clean.  No bleeding          Significant Labs:  I have reviewed all pertinent lab results within the past 24 hours.  CBC:   Recent Labs   Lab 11/12/24  0337   WBC 12.75*   RBC 4.07*   HGB 12.0*   HCT 36.8*      MCV 90   MCH 29.5   MCHC 32.6     Microbiology Results (last 7 days)       Procedure Component Value Units Date/Time    Blood culture [[id number]] Collected: 11/06/24 1100    Order Status:  Completed Specimen: Blood from Peripheral, Forearm, Left Updated: 11/11/24 2022     Blood Culture, Routine No growth after 5 days.    Blood culture [[id number]] Collected: 11/06/24 1100    Order Status: Completed Specimen: Blood from Peripheral, Hand, Left Updated: 11/11/24 2022     Blood Culture, Routine No growth after 5 days.    Aerobic culture [[id number]] Collected: 11/08/24 1700    Order Status: Completed Specimen: Wound from Leg, Right Updated: 11/11/24 1304     Aerobic Bacterial Culture No growth    Culture, Anaerobe [[id number]] Collected: 11/08/24 1700    Order Status: Completed Specimen: Wound from Leg, Right Updated: 11/10/24 1118     Anaerobic Culture Culture in progress    Culture, MRSA [[id number]] Collected: 11/04/24 0557    Order Status: Completed Specimen: MRSA source from Nares, Left Updated: 11/09/24 0720     MRSA Surveillance Screen No MRSA isolated    Gram stain [[id number]] Collected: 11/08/24 1700    Order Status: Completed Specimen: Wound from Leg, Right Updated: 11/09/24 0454     Gram Stain Result Rare WBC's      No organisms seen    Fungus culture [[id number]] Collected: 11/08/24 1700    Order Status: Sent Specimen: Wound from Leg, Right Updated: 11/09/24 0126    Blood culture [[id number]] Collected: 11/03/24 0125    Order Status: Completed Specimen: Blood from Peripheral, Antecubital, Left Updated: 11/08/24 1412     Blood Culture, Routine No growth after 5 days.    Blood culture [[id number]]  (Abnormal)  (Susceptibility) Collected: 11/03/24 0125    Order Status: Completed Specimen: Blood from Peripheral, Hand, Right Updated: 11/06/24 1051     Blood Culture, Routine Gram stain dao bottle: Gram negative rods      Results called to and read back by:Sharifa Power RN 11/04/2024  00:22      ESCHERICHIA COLI          Intraoperative culture no growth to date    Significant Diagnostics:  I have reviewed all pertinent imaging results/findings within the past 24 hours.  No new  Assessment/Plan:      * Severe sepsis sec to Cellulitis Legs complicated by E coli Bacteremia  Continue antibiotics with the cellulitis Infectious Disease.    Abscess of right leg  I    Incision and drainage 11/08/2024   Wound is essentially clean  No bleeding   Continue and care.      Antibiotics per Infectious Disease    Follow up with surgery in about 4 weeks    E coli bacteremia  Management per Medicine/Infectious Disease    Hyperlipidemia associated with type 2 diabetes mellitus  Management per Medicine    Hypertension associated with stage 3 chronic kidney disease due to type 2 diabetes mellitus  Management per hospital Medicine    BRADLEY on CPAP  Management per hospital Medicine        Torres Magallanes MD  General Surgery  [facility] - Telemetry ([facility])

## 2024-11-12 NOTE — SUBJECTIVE & OBJECTIVE
Interval History:  Patient continues to feel better.  States that it was going to skilled nursing    Medications:  Continuous Infusions:  Scheduled Meds:   aspirin  81 mg Oral Daily    cefTRIAXone (Rocephin) IV (PEDS and ADULTS)  2 g Intravenous Q24H    chlorhexidine  10 mL Mouth/Throat BID    cholecalciferol (vitamin D3)  5,000 Units Oral Daily    enoxparin  40 mg Subcutaneous Daily    folic acid-vit B6-vit B12 2.5-25-2 mg  1 tablet Oral Daily    guaiFENesin  600 mg Oral BID    latanoprost  1 drop Right Eye QHS    linezolid  600 mg Oral Q12H    metoprolol tartrate  12.5 mg Oral BID    multivitamin  1 tablet Oral Daily    silver nitrate applicators  2 applicator Topical (Top) Once    thiamine  100 mg Oral Daily    torsemide  40 mg Oral Daily     PRN Meds:  Current Facility-Administered Medications:     acetaminophen, 650 mg, Rectal, Q6H PRN    acetaminophen, 650 mg, Oral, Q6H PRN    albuterol-ipratropium, 3 mL, Nebulization, Q4H PRN    aluminum-magnesium hydroxide-simethicone, 30 mL, Oral, QID PRN    benzonatate, 100 mg, Oral, TID PRN    dextrose 10%, 12.5 g, Intravenous, PRN    dextrose 10%, 12.5 g, Intravenous, PRN    dextrose 10%, 12.5 g, Intravenous, PRN    dextrose 10%, 25 g, Intravenous, PRN    dextrose 10%, 25 g, Intravenous, PRN    dextrose 10%, 25 g, Intravenous, PRN    glucagon (human recombinant), 1 mg, Intramuscular, PRN    glucose, 16 g, Oral, PRN    glucose, 24 g, Oral, PRN    HYDROcodone-acetaminophen, 1 tablet, Oral, Q4H PRN    HYDROmorphone, 0.5 mg, Intravenous, Q4H PRN    naloxone, 0.02 mg, Intravenous, PRN    ondansetron, 4 mg, Intravenous, Q8H PRN    polyethylene glycol, 17 g, Oral, Daily PRN    promethazine, 25 mg, Oral, Q6H PRN    simethicone, 1 tablet, Oral, QID PRN    sodium chloride 0.9%, 3 mL, Intravenous, Q12H PRN     Review of patient's allergies indicates:   Allergen Reactions    Amitriptyline Rash    Celecoxib Rash     Objective:     Vital Signs (Most Recent):  Temp: 98.4 °F (36.9 °C)  (11/12/24 0752)  Pulse: 95 (11/12/24 0752)  Resp: 16 (11/12/24 0752)  BP: 134/68 (11/12/24 0752)  SpO2: (!) 93 % (11/12/24 0752) Vital Signs (24h Range):  Temp:  [97.5 °F (36.4 °C)-98.5 °F (36.9 °C)] 98.4 °F (36.9 °C)  Pulse:  [] 95  Resp:  [14-20] 16  SpO2:  [93 %-96 %] 93 %  BP: (124-138)/(63-74) 134/68     Weight: 90.8 kg (200 lb 2.8 oz)  Body mass index is 31.35 kg/m².    Intake/Output - Last 3 Shifts         11/10 0700 11/11 0659 11/11 0700  11/12 0659 11/12 0700 11/13 0659    P.O.  1100     I.V. (mL/kg)       Total Intake(mL/kg)  1100 (12.1)     Urine (mL/kg/hr) 1800 (0.8) 1275 (0.6)     Stool  0     Total Output 1800 1275     Net -1800 -175            Urine Occurrence  1 x     Stool Occurrence  2 x              Physical Exam  Vitals reviewed.   Constitutional:       Appearance: Ill appearance: Mild acute and chronic.   Skin:     Comments: There is a slight amount of purulent exudate in the wound cleaned off.  The wound is otherwise clean.  No bleeding          Significant Labs:  I have reviewed all pertinent lab results within the past 24 hours.  CBC:   Recent Labs   Lab 11/12/24  0337   WBC 12.75*   RBC 4.07*   HGB 12.0*   HCT 36.8*      MCV 90   MCH 29.5   MCHC 32.6     Microbiology Results (last 7 days)       Procedure Component Value Units Date/Time    Blood culture [[id number]] Collected: 11/06/24 1100    Order Status: Completed Specimen: Blood from Peripheral, Forearm, Left Updated: 11/11/24 2022     Blood Culture, Routine No growth after 5 days.    Blood culture [[id number]] Collected: 11/06/24 1100    Order Status: Completed Specimen: Blood from Peripheral, Hand, Left Updated: 11/11/24 2022     Blood Culture, Routine No growth after 5 days.    Aerobic culture [[id number]] Collected: 11/08/24 1700    Order Status: Completed Specimen: Wound from Leg, Right Updated: 11/11/24 1304     Aerobic Bacterial Culture No growth    Culture, Anaerobe [[id number]] Collected: 11/08/24 1700    Order  Status: Completed Specimen: Wound from Leg, Right Updated: 11/10/24 1118     Anaerobic Culture Culture in progress    Culture, MRSA [[id number]] Collected: 11/04/24 0557    Order Status: Completed Specimen: MRSA source from Nares, Left Updated: 11/09/24 0720     MRSA Surveillance Screen No MRSA isolated    Gram stain [[id number]] Collected: 11/08/24 1700    Order Status: Completed Specimen: Wound from Leg, Right Updated: 11/09/24 0454     Gram Stain Result Rare WBC's      No organisms seen    Fungus culture [[id number]] Collected: 11/08/24 1700    Order Status: Sent Specimen: Wound from Leg, Right Updated: 11/09/24 0126    Blood culture [[id number]] Collected: 11/03/24 0125    Order Status: Completed Specimen: Blood from Peripheral, Antecubital, Left Updated: 11/08/24 1412     Blood Culture, Routine No growth after 5 days.    Blood culture [[id number]]  (Abnormal)  (Susceptibility) Collected: 11/03/24 0125    Order Status: Completed Specimen: Blood from Peripheral, Hand, Right Updated: 11/06/24 1051     Blood Culture, Routine Gram stain dao bottle: Gram negative rods      Results called to and read back by:Sharifa Power RN 11/04/2024  00:22      ESCHERICHIA COLI          Intraoperative culture no growth to date    Significant Diagnostics:  I have reviewed all pertinent imaging results/findings within the past 24 hours.  No new

## 2024-11-12 NOTE — PLAN OF CARE
A224/A224 BRUNO Khan is a 74 y.o.male admitted on 11/2/2024 for Severe sepsis   Code Status: Full Code MRN: [id number]   Review of patient's allergies indicates:   Allergen Reactions    Amitriptyline Rash    Celecoxib Rash     Past Medical History:   Diagnosis Date    Chronic combined systolic and diastolic congestive heart failure 07/09/2020    Chronic kidney disease, stage 3     Chronic obstructive pulmonary disease 03/20/2023    Wixela 250 mcg, Spiriva respimat. Continue Albuterol inhaler and albuterol nebs   Referral pul dis management, education and assistance with medication  Patient preference to only see a doctor, request follow up with Dr. Springer             Chronic venous insufficiency     DDD (degenerative disc disease), lumbar     DM (diabetes mellitus) with complications     Drug-induced constipation 11/03/2024    History of gout     Hyperlipidemia associated with type 2 diabetes mellitus     Hypertension associated with stage 3 chronic kidney disease due to type 2 diabetes mellitus     BRADLEY on CPAP     Prostate cancer     prostatectomy in 2010, rising PSA that started in 2021      PRN meds    acetaminophen, 650 mg, Q6H PRN  acetaminophen, 650 mg, Q6H PRN  albuterol-ipratropium, 3 mL, Q4H PRN  aluminum-magnesium hydroxide-simethicone, 30 mL, QID PRN  benzonatate, 100 mg, TID PRN  dextrose 10%, 12.5 g, PRN  dextrose 10%, 12.5 g, PRN  dextrose 10%, 12.5 g, PRN  dextrose 10%, 25 g, PRN  dextrose 10%, 25 g, PRN  dextrose 10%, 25 g, PRN  glucagon (human recombinant), 1 mg, PRN  glucose, 16 g, PRN  glucose, 24 g, PRN  HYDROcodone-acetaminophen, 1 tablet, Q4H PRN  HYDROmorphone, 0.5 mg, Q4H PRN  naloxone, 0.02 mg, PRN  ondansetron, 4 mg, Q8H PRN  polyethylene glycol, 17 g, Daily PRN  promethazine, 25 mg, Q6H PRN  simethicone, 1 tablet, QID PRN  sodium chloride 0.9%, 3 mL, Q12H PRN           Pt oriented x4.  VSS.  Pt remained afebrile throughout this shift.   All meds administered per order.   Pt  remained free of falls this shift.   Plan of care reviewed. Patient verbalizes understanding.   Pt moving/turning independently.   Bed low, side rails up x 2, wheels locked, call light in reach.   Patient instructed to call for assistance.  Patient education provided.    Cardiac monitor returned. Pt to dc with picc line.                Orientation: oriented x 4  [place] Coma Scale Score: 15     Lead Monitored: Lead II Rhythm: normal sinus rhythm Frequency/Ectopy: PVCs  Cardiac/Telemetry Box Number: [id number]  VTE Core Measure: Pharmacological prophylaxis initiated/maintained Last Bowel Movement: 11/10/24  Diet diabetic 2000 Calories (up to 75 gm per meal)  Diet diabetic  Voiding Characteristics: voids spontaneously without difficulty  Олег Score: 20  Fall Risk Score: 13  Accucheck []   Freq?      Lines/Drains/Airways       Peripherally Inserted Central Catheter Line  Duration             PICC Double Lumen 11/08/24 1021 right basilic 4 days

## 2024-11-12 NOTE — ASSESSMENT & PLAN NOTE
Chronic, controlled.  Latest blood pressure and vitals reviewed-   Temp:  [98 °F (36.7 °C)-98.8 °F (37.1 °C)]   Pulse:  []   Resp:  [16-20]   BP: (109-130)/(61-74)   SpO2:  [92 %-96 %] .   Home meds for hypertension were reviewed and noted below.   Hypertension Medications               bisoprolol (ZEBETA) 5 MG tablet TAKE 1/2 TABLET BY MOUTH EVERY DAY    torsemide (DEMADEX) 20 MG Tab Take 2 tablets (40 mg total) by mouth once daily.     While in the hospital, will manage blood pressure as follows; cont B blocker, continue to monitor BP     Will utilize p.r.n. blood pressure medication only if patient's blood pressure greater than  180/110 and he develops symptoms such as worsening chest pain or shortness of breath.

## 2024-11-12 NOTE — PLAN OF CARE
[facility] - Telemetry (Hospital)  Discharge Final Note    Primary Care Provider: Kashif Acosta MD    Expected Discharge Date: 11/11/2024    Final Discharge Note (most recent)       Final Note - 11/12/24 1144          Final Note    Assessment Type Final Discharge Note     Anticipated Discharge Disposition Skilled Nursing Facility        Post-Acute Status    Post-Acute Authorization Placement     Post-Acute Placement Status Set-up Complete/Auth obtained                     Important Message from Medicare              Follow-up providers       Kashif Acosta MD   Specialty: Internal Medicine   Relationship: PCP - General    [address] [place] [address]  [address]  [facility] [address]   Phone: [phone]       Next Steps: Follow up    Torres Magallanes MD   Specialty: General Surgery    [id number] [place] [address] Drive  [facility] [address]   Phone: [phone]       Next Steps: Follow up in 4 week(s)    Instructions: post op appt drainage of a right leg abscess              After-discharge care                Destination       [facility]   Service: Skilled Nursing    [address] [facility] [address]   Phone: [phone]                             DC Dispo: [facility]  [facility]    All orders sent to [facility] and nurse report has been called.

## 2024-11-12 NOTE — TELEPHONE ENCOUNTER
Attempted to contact the patient about the need for a post op appointment date a and time with Dr. Magallanes, no answer, left voice message to return my call.

## 2024-11-12 NOTE — ASSESSMENT & PLAN NOTE
I    Incision and drainage 11/08/2024   Wound is essentially clean  No bleeding   Continue and care.      Antibiotics per Infectious Disease    Follow up with surgery in about 4 weeks

## 2024-11-12 NOTE — DISCHARGE SUMMARY
[facility]  Discharge Summary      Patient Name: Santiago Khan  MRN: [id number]  [facility]: [id number]  Patient Class: IP- Inpatient  Admission Date: 11/2/2024  Hospital Length of Stay: 9 days  Discharge Date and Time:  11/12/2024 12:53 PM  Attending Physician: Jovanny Snachez MD   Discharging Provider: Jovanny Sanchez MD  Primary Care Provider: Kashif Acosta MD    Primary Care Team: Networked reference to record PCT     HPI:   Santiago Khan is a 74 y.o. male with a PMH  has a past medical history of Chronic combined systolic and diastolic congestive heart failure (07/09/2020), Chronic kidney disease, stage 3, Chronic obstructive pulmonary disease (03/20/2023), Chronic venous insufficiency, DDD (degenerative disc disease), lumbar, DM (diabetes mellitus) with complications, History of gout, Hyperlipidemia associated with type 2 diabetes mellitus, Hypertension associated with stage 3 chronic kidney disease due to type 2 diabetes mellitus, BRADLEY on CPAP, and Prostate cancer. who presented to the ER for further evaluation of worsening bilateral lower extremity weakness, acute on chronic lumbar back pain, and abdominal pain over the past 3-4 days. Patient reported being constipated with last bowel movement yesterday which was normal and reported his back pain has caused his legs to become weak resulting in increased difficulty walking. He reports taking chronic pain medications and had back surgery in 1982. Patient was seen at urgent care back on 10/27 for treatment of acute gout flare in his right hand/wrist and was provided prednisone after home allopurinol and colchicine provided little to no relief.  Patient denied any recent falls or trauma and was initially somnolent following administration of lorazepam in the ED to obtain the MRI of his lumbar spine. Upon evaluation of patient, patient had increased respiratory distress requiring up titration of supplemental  oxygen in addition to bilateral crackles on lung auscultation, lower extremity edema, and right lower extremity erythremia. Family at bedside reported patient had difficulty taking his home medications, had productive cough, and unsure what his dietary/fluid intake has been like. Patient was noted to be diaphoretic, restless, and becoming more lethargic during bedside assessment.  Stat ABG revealed worsening respiratory status and was placed on BiPAP and provided 2 mg IV Bumex.  Patient also noted to be become hypoglycemic again requiring dextrose infusion.  Patient admitted to Hospital Medicine inpatient for continued medical management. ICU notified regarding low threshold for transfer and agree with current treatment plan. Neurosurgery/Spine as well as PT/OT consulted regarding lower extremity weakness.       PCP: Kashif Acosta       Procedure(s) (LRB):  INCISION AND DRAINAGE, ABSCESS (Right)      Hospital Course:   Hospital/ICU Course:  11/4 - no acute events. Off dextrose drip. Had bm, no acute events    Hosp Med:  pt transferred out of ICU today, medically stable. Briefly, 74 y.o. male with Hx of HTN, HLP, Chronic combined systolic and diastolic CHF,  DM 2 with CKD 3 and Polyneuropathy, COPD, Chronic venous insufficiency, DDD Lumbar, Gout, BRADLEY on CPAP, and Prostate Ca, presented as Abd pain and LLE Cellulitis and admitted to ICU on 11/3 with worsening respiratory status and was placed on BiPAP and provided 2 mg IV Bumex. Patient also noted to be Hypoglycemic again requiring D10, hence admitted to ICU. He did well overnite and BS improved, came off D10 and was transferred out to Tele under Hosp Med today. Blood Cx x 1 just reported now as GNR and started on Zosyn.     11/6/24- Per   ID following, antibiotics adjusted to ceftriaxone, plan for total 14 day course  PT/OT with reccs for YASMIN, CM consulted for SNF placement    11/7/24- Per Dr.Iqbal RUEDA, patient denies new issues  SNF placement  pending  IV abx till 11/20/24 11/08/2024  Pt noted to have area of fluctuance below R knee on my exam, WBC rising. Pt reports pain to R knee and L abd.   Gen Surg consulted and planning for debridement of RLE abscess today.     11/09/2024  Pt seen and examined with spouse at bedside. Reports R leg feels better today, less pain. Abd pain is also improved.   Discussed with ID Dr. Swanson- MAXWELL Lynch added, aspirate cultures pending     11/10  NAEON. Pt seen and examined, no family at bedside during rounds. Reports R leg redness and pain continue to improve. Denies CP, SOB.   11/11 patient reported right hand pain. Recently treated for gout. Obtained x-ray no acute findings. Gave dose of colchicine.   Hand pain and ROM improved. Patient accepted to skilled facility. Patient seen and examined, stable for discharge.       Goals of Care Treatment Preferences:  Code Status: Full Code      SDOH Screening:  The patient was screened for utility difficulties, food insecurity, transport difficulties, housing insecurity, and interpersonal safety and there were no concerns identified this admission.     Consults:   Consults (From admission, onward)          Status Ordering Provider     Inpatient consult to General Surgery  Once        Provider:  Ana Maria Arce MD    Completed ABEBA HERRING     [facility]  Once        Provider:  (Not yet assigned)    Acknowledged ANA MARIA ARCE     Inpatient consult to Social Work  Once        Provider:  (Not yet assigned)    Completed CHELY CORREIA     Inpatient consult to Hospitalist  Once        Provider:  (Not yet assigned)    Acknowledged ANA MARIA ARCE     Inpatient consult to Critical Care Medicine  Once        Provider:  Mohan Shukla MD    Acknowledged ANA MARIA ARCE     Inpatient consult to Neurosurgery  Once        Provider:  Robert Shah MD    Completed JACEY SALCEDO                Final Active Diagnoses:    Diagnosis Date  Noted POA    PRINCIPAL PROBLEM:  Severe sepsis sec to Cellulitis Legs complicated by E coli Bacteremia [A41.9, R65.20] 11/03/2024 Yes    Abscess of right leg [L02.415] 11/08/2024 No    E coli bacteremia [R78.81, B96.20] 11/04/2024 Yes    Weakness of both lower extremities [R29.898] 11/03/2024 Yes    Lower back pain [M54.50] 11/03/2024 Yes    Acute on chronic combined systolic and diastolic congestive heart failure [I50.43] 11/03/2024 Yes    Acute kidney injury superimposed on chronic kidney disease [N17.9, N18.9] 11/03/2024 Yes    Acute respiratory failure with hypoxia and hypercarbia [J96.01, J96.02] 11/03/2024 Yes    Diabetes [E11.9] 11/03/2024 Yes     Chronic    Chronic obstructive pulmonary disease [J44.9] 03/20/2023 Yes     Chronic    Coronary artery disease of native artery of native heart with stable angina pectoris [I25.118] 06/25/2020 Yes     Chronic    Hypertension associated with stage 3 chronic kidney disease due to type 2 diabetes mellitus [E11.22, I12.9, N18.30]  Yes     Chronic    BRADLEY on CPAP [G47.33]  Yes     Chronic      Problems Resolved During this Admission:    Diagnosis Date Noted Date Resolved POA    Weakness of both lower extremities [R29.898] 11/03/2024 11/03/2024 Yes    Hypoglycemia [E16.2] 11/03/2024 11/07/2024 Yes    Acute encephalopathy [G93.40] 11/03/2024 11/07/2024 Yes    Cellulitis of right lower extremity [L03.115] 11/03/2024 11/09/2024 Yes    Drug-induced constipation [K59.03] 11/03/2024 11/08/2024 Yes       Discharged Condition: stable    Disposition: Home or Self Care    Follow Up:   Contact information for follow-up providers       Kashif Acosta MD Follow up.    Specialty: Internal Medicine  Contact information:  [address]   [address]  [facility] [address]  [phone]               Torres Magallanes MD Follow up in 4 week(s).    Specialty: General Surgery  Why: post op appt drainage of a right leg abscess  Contact information:  [address]  [place]  [place] [address]  [phone]                       Contact information for after-discharge care       Destination       [facility] .    Service: Skilled Nursing  Contact information:  [address] [facility] [address] [facility] [address] [phone]                                 Patient Instructions:      Diet diabetic     Activity as tolerated       Significant Diagnostic Studies: Microbiology: Blood Culture   Lab Results   Component Value Date    LABBLOO No growth after 5 days. 11/06/2024    LABBLOO No growth after 5 days. 11/06/2024     Radiology:   Imaging Results              MRI Lumbar Spine Without Contrast (Final result)  Result time 11/03/24 09:46:08      Final result by Suresh KennedyEleazarMD parveen (11/03/24 09:46:08)                   Impression:      Multilevel degenerative disc changes.  Moderate to severe foraminal stenosis at L4-5 and L5-S1 bilaterally      Electronically signed by: Suresh Kennedy MD  Date:    11/03/2024  Time:    09:46               Narrative:    EXAMINATION:  MRI LUMBAR SPINE WITHOUT CONTRAST    CLINICAL HISTORY:  Low back pain, cauda equina syndrome suspected;    TECHNIQUE:  Multiplanar, multisequence MR images were acquired from the thoracolumbar junction to the sacrum without the administration of contrast.    COMPARISON:  L1-2: Unremarkable.  No significant disc disease.  No stenosis    FINDINGS:  L1-2: Unremarkable.  No significant disc disease.  No stenosis.    L2-3: Moderate broad base disc bulge with superimposed central disc protrusion this indents on the thecal sac.  No significant central canal stenosis.  No significant foraminal stenosis.    L3-4: Unremarkable.  No significant disc disease or stenosis.    L4-5:  Broad-based disc bulge impresses on the ventral thecal sac without central canal stenosis.  Facet arthropathy and ligament flavum hypertrophy contribute to bilateral moderate neural foraminal encroachment.    L5-S1: Mild to moderate broad-based  posterior disc bulge impresses on the thecal sac without central canal stenosis.  Facet arthropathy and ligament flavum hypertrophy contribute to severe bilateral neural foraminal stenosis.                                       X-Ray Chest AP Portable (Final result)  Result time 11/02/24 18:13:15      Final result by Justice Garcia MD (11/02/24 18:13:15)                   Impression:      1.  Low lung volumes with vascular crowding or atelectasis in the lung bases.  Cardiac silhouette size enlargement.  Mild interstitial pulmonary edema or interstitial infectious process difficult to exclude in the right clinical setting.    2.  Stable findings as noted above.      Electronically signed by: Justice Garcia MD  Date:    11/02/2024  Time:    18:13               Narrative:    EXAMINATION:  XR CHEST AP PORTABLE    CLINICAL HISTORY:  leukocytosis;    COMPARISON:  Studies dating back to March 11, 2022    FINDINGS:  The study is lordotic in position.  Moderately low lung volumes with vascular crowding or atelectasis in the lung bases.  The lungs are otherwise clear.  The cardiac silhouette size is enlarged.  The trachea is midline and the mediastinal width is normal. Negative for focal infiltrate, effusion or pneumothorax. Pulmonary vasculature is normal. Negative for osseous abnormalities. Tortuous aorta.  There are degenerative changes of the spine and both shoulder girdles.                                       Radiology (Final result)  Result time 11/04/24 17:03:27      Final result by Unknown User (11/04/24 17:03:27)                                        Cardiac Graphics: Echocardiogram: Transthoracic echo (TTE) complete (Cupid Only):   Results for orders placed or performed during the hospital encounter of 11/02/24   Echo   Result Value Ref Range    BSA 2.1 m2    LVOT stroke volume 90.4 cm3    LVIDd 6.8 (A) 3.5 - 6.0 cm    LV Systolic Volume 169.50 mL    LV Systolic Volume Index 82.7 mL/m2    LVIDs 5.8 (A) 2.1 - 4.0  cm    LV ESV A2C 77.42 mL    LV Diastolic Volume 238.36 mL    LV ESV A4C 66.59 mL    LV Diastolic Volume Index 116.27 mL/m2    Left Ventricular End Systolic Volume by Teichholz Method 169.50 mL    Left Ventricular End Diastolic Volume by Teichholz Method 238.36 mL    IVS 0.9 0.6 - 1.1 cm    LVOT diameter 2.4 cm    LVOT area 4.5 cm2    FS 14.7 (A) 28 - 44 %    Left Ventricle Relative Wall Thickness 0.26 cm    PW 0.9 0.6 - 1.1 cm    LV mass 268.2 g    LV Mass Index 130.8 g/m2    MV Peak E Bassam 0.98 m/s    TDI LATERAL 0.10 m/s    TDI SEPTAL 0.07 m/s    E/E' ratio 11.53 m/s    MV Peak A Bassam 0.85 m/s    TR Max Bassam 3.02 m/s    E/A ratio 1.15     IVRT 121.79 msec    E wave deceleration time 173.57 msec    LV SEPTAL E/E' RATIO 14.00 m/s    LV LATERAL E/E' RATIO 9.80 m/s    LVOT peak bassam 1.0 m/s    Left Ventricular Outflow Tract Mean Velocity 0.71 cm/s    Left Ventricular Outflow Tract Mean Gradient 2.33 mmHg    RVOT peak VTI 9.8 cm    LA size 3.94 cm    Left Atrium Minor Axis 6.23 cm    Left Atrium Major Axis 6.39 cm    LA Vol (MOD) 74.58 mL    VALERIE (MOD) 36.4 mL/m2    RA Major Axis 4.62 cm    Vn Nyquist MS 0.28 m/s    AV mean gradient 4.0 mmHg    AV peak gradient 7.8 mmHg    Ao peak bassam 1.4 m/s    Ao VTI 26.5 cm    LVOT peak VTI 20.0 cm    AV valve area 3.4 cm²    AV Velocity Ratio 0.71     AV index (prosthetic) 0.75     KADI by Velocity Ratio 3.2 cm²    Radius 0.85 cm    Vn Nyquist 0.28 m/s    Mr max bassam 4.62 m/s    MR PISA EROA 0.27 cm2    MV stenosis pressure 1/2 time 50.33 ms    MV valve area p 1/2 method 4.37 cm2    Triscuspid Valve Regurgitation Peak Gradient 36 mmHg    PV mean gradient 1 mmHg    PV PEAK VELOCITY 1.16 m/s    PV peak gradient 5 mmHg    Pulmonary Valve Mean Velocity 0.70 m/s    RVOT peak bassam 0.58 m/s    Ao root annulus 3.86 cm    STJ 2.79 cm    Ascending aorta 3.03 cm    Mean e' 0.09 m/s    ZLVIDS 3.41     ZLVIDD 1.01     LA area A4C 23.01 cm2    LA area A2C 23.93 cm2    RVDD 3.76 cm    VALERIE 44.3 mL/m2     LA Vol 90.85 cm3    LA WIDTH 4.3 cm    RA Width 3.4 cm    TAPSE 1.60 cm    TV resting pulmonary artery pressure 44 mmHg    RV TB RVSP 11 mmHg    Est. RA pres 8 mmHg    Mondragon's Biplane MOD Ejection Fraction 38 %    Narrative      Left Ventricle: The left ventricle is mildly dilated. Mildly increased   ventricular mass. Normal wall thickness. There is eccentric hypertrophy.   There is moderately reduced systolic function with a visually estimated   ejection fraction of 35 - 40%. There is normal diastolic function.    Right Ventricle: Normal right ventricular cavity size. Wall thickness   is normal. Systolic function is mildly reduced.    Left Atrium: Left atrium is mildly dilated.    Right Atrium: Right atrium is mildly dilated.    Aortic Valve: There is mild aortic valve sclerosis.    Mitral Valve: There is moderate regurgitation.    Tricuspid Valve: There is mild regurgitation.    Pulmonic Valve: There is mild regurgitation.    Pulmonary Artery: There is mild pulmonary hypertension. The estimated   pulmonary artery systolic pressure is 44 mmHg.    IVC/SVC: Intermediate venous pressure at 8 mmHg.         Pending Diagnostic Studies:       None           Medications:  Reconciled Home Medications:      Medication List        START taking these medications      cefTRIAXone 2 gram injection  Commonly known as: ROCEPHIN  Inject 2 g into the vein Daily. for 9 days     linezolid 600 mg Tab  Commonly known as: ZYVOX  Take 1 tablet (600 mg total) by mouth every 12 (twelve) hours. for 9 days            CONTINUE taking these medications      ACCU-CHEK GUIDE ME GLUCOSE MTR Misc  Generic drug: blood-glucose meter  USE AS INSTRUCTED     albuterol 90 mcg/actuation inhaler  Commonly known as: PROVENTIL/VENTOLIN HFA  INHALE 2 PUFFS INTO THE LUNGS EVERY 4 HOURS AS NEEDED     * allopurinoL 100 MG tablet  Commonly known as: ZYLOPRIM  TAKE 1 TABLET(100 MG) BY MOUTH EVERY DAY     * allopurinoL 300 MG tablet  Commonly known as:  ZYLOPRIM  TAKE 1 TABLET(300 MG) BY MOUTH EVERY DAY     aspirin 81 MG EC tablet  Commonly known as: ECOTRIN  Take 81 mg by mouth once daily.     baclofen 10 MG tablet  Commonly known as: LIORESAL  Take 1 tablet by mouth every evening.     bisoprolol 5 MG tablet  Commonly known as: ZEBETA  TAKE 1/2 TABLET BY MOUTH EVERY DAY     blood sugar diagnostic Strp  Check blood glucose twice per day     colchicine 0.6 mg tablet  Commonly known as: COLCRYS  Take 1 tablet (0.6 mg total) by mouth daily as needed (gout flare).     empagliflozin 10 mg tablet  Commonly known as: JARDIANCE  Take 1 tablet (10 mg total) by mouth once daily.     fluticasone propionate 50 mcg/actuation nasal spray  Commonly known as: FLONASE  SHAKE LIQUID AND USE 2 SPRAYS(100 MCG) IN EACH NOSTRIL EVERY DAY Strength: 50 mcg/actuation     glimepiride 4 MG tablet  Commonly known as: AMARYL  Take 1 tablet (4 mg total) by mouth before breakfast.     guaiFENesin 100 mg/5 ml 100 mg/5 mL syrup  Commonly known as: ROBITUSSIN  Take 200 mg by mouth every evening.     HUMIRA(CF) PEN 40 mg/0.4 mL Pnkt  Generic drug: adalimumab  Inject 0.4 mLs (40 mg total) into the skin every 14 (fourteen) days.     HYDROcodone-acetaminophen 7.5-325 mg per tablet  Commonly known as: NORCO  Take 1 tablet by mouth every 4 (four) hours as needed (for chronic pain).     lancets Misc  Commonly known as: ACCU-CHEK SOFTCLIX LANCETS  Check BG daily     latanoprost 0.005 % ophthalmic solution  Place 1 drop into the right eye every evening.     magnesium oxide 400 mg (241.3 mg magnesium) tablet  Commonly known as: MAG-OX  TAKE 2 TABLETS(800 MG) BY MOUTH TWICE DAILY     metFORMIN 500 MG tablet  Commonly known as: GLUCOPHAGE  TAKE 1 TABLET(500 MG) BY MOUTH DAILY WITH BREAKFAST     multivitamin-minerals-lutein Tab  Take 1 tablet by mouth Daily.     omeprazole 40 MG capsule  Commonly known as: PRILOSEC  TAKE 1 CAPSULE BY MOUTH EVERY DAY     potassium chloride SA 20 MEQ tablet  Commonly known as:  K-DUR,KLOR-CON  TAKE 3 TABLETS BY MOUTH TWICE DAILY     simvastatin 40 MG tablet  Commonly known as: ZOCOR  TAKE 1 TABLET(40 MG) BY MOUTH EVERY EVENING     torsemide 20 MG Tab  Commonly known as: DEMADEX  Take 2 tablets (40 mg total) by mouth once daily.     WIXELA INHUB 250-50 mcg/dose diskus inhaler  Generic drug: fluticasone-salmeterol 250-50 mcg/dose  INHALE 1 PUFF INTO THE LUNGS TWICE DAILY           * This list has 2 medication(s) that are the same as other medications prescribed for you. Read the directions carefully, and ask your doctor or other care provider to review them with you.                ASK your doctor about these medications      varenicline 1 mg Tab  Commonly known as: CHANTIX  take 1 tablet by mouth twice daily. Take with full glass of water & with food to avoid nausea              Indwelling Lines/Drains at time of discharge:   Lines/Drains/Airways       Peripherally Inserted Central Catheter Line  Duration             PICC Double Lumen 11/08/24 1021 right basilic 4 days                    Time spent on the discharge of patient: 31 minutes         Jovanny Sanchez MD  Department of Hospital Medicine  [facility] - Telemetry ([facility])

## 2024-11-12 NOTE — DISCHARGE INSTRUCTIONS
We need to see you back in the office in about 4 weeks.      If they do not make an appointment for you when you leave the hospital then call the office to schedule an appointment after your discharge from skilled nursing      Our office phone numbers are  [phone] and

## 2024-11-12 NOTE — ASSESSMENT & PLAN NOTE
"Patient has Combined Systolic and Diastolic heart failure that is Acute on chronic. On presentation their CHF was decompensated. Evidence of decompensated CHF on presentation includes: edema, orthopnea, paroxysmal nocturnal dyspnea (PND), dyspnea on exertion (GOVEA), and shortness of breath. The etiology of their decompensation is likely dietary indiscretion, increased fluid intake, and infection . Most recent BNP and echo results are listed below.  No results for input(s): "BNP" in the last 72 hours.    Latest ECHO  Results for orders placed during the hospital encounter of 10/23/23    Echo    Interpretation Summary    Left Ventricle: The left ventricle is normal in size. Normal wall thickness. There is mild concentric hypertrophy. regional wall motion abnormalities present. There is low normal systolic function with a visually estimated ejection fraction of 50 - 55%. Biplane (2D) method of discs ejection fraction is 49%. There is normal diastolic function.    Left Atrium: Left atrium is mildly dilated.    Right Ventricle: Normal right ventricular cavity size. Wall thickness is normal. Right ventricle wall motion  is normal. Systolic function is normal.    Mitral Valve: There is mild to moderate regurgitation.    Tricuspid Valve: There is mild regurgitation.    Pulmonary Artery: There is moderate pulmonary hypertension. The estimated pulmonary artery systolic pressure is 50 mmHg.    IVC/SVC: Normal venous pressure at 3 mmHg.    Current Heart Failure Medications  torsemide tablet 40 mg, Daily, Oral    Plan  -Monitor strict I&Os and daily weights.    -Place on telemetry  -Low sodium diet  -Place on fluid restriction of 1.5 L.   -Cardiology has not been consulted  - s/p IV diuresis   - continue home Torsemide   "

## 2024-11-12 NOTE — PLAN OF CARE
A224/A224 BRUNO Khan is a 74 y.o.male admitted on 11/2/2024 for Severe sepsis   Code Status: Full Code MRN: [id number]   Review of patient's allergies indicates:   Allergen Reactions    Amitriptyline Rash    Celecoxib Rash     Past Medical History:   Diagnosis Date    Chronic combined systolic and diastolic congestive heart failure 07/09/2020    Chronic kidney disease, stage 3     Chronic obstructive pulmonary disease 03/20/2023    Wixela 250 mcg, Spiriva respimat. Continue Albuterol inhaler and albuterol nebs   Referral pul dis management, education and assistance with medication  Patient preference to only see a doctor, request follow up with Dr. Springer             Chronic venous insufficiency     DDD (degenerative disc disease), lumbar     DM (diabetes mellitus) with complications     Drug-induced constipation 11/03/2024    History of gout     Hyperlipidemia associated with type 2 diabetes mellitus     Hypertension associated with stage 3 chronic kidney disease due to type 2 diabetes mellitus     BRADLEY on CPAP     Prostate cancer     prostatectomy in 2010, rising PSA that started in 2021      PRN meds    acetaminophen, 650 mg, Q6H PRN  acetaminophen, 650 mg, Q6H PRN  albuterol-ipratropium, 3 mL, Q4H PRN  aluminum-magnesium hydroxide-simethicone, 30 mL, QID PRN  benzonatate, 100 mg, TID PRN  dextrose 10%, 12.5 g, PRN  dextrose 10%, 12.5 g, PRN  dextrose 10%, 12.5 g, PRN  dextrose 10%, 25 g, PRN  dextrose 10%, 25 g, PRN  dextrose 10%, 25 g, PRN  glucagon (human recombinant), 1 mg, PRN  glucose, 16 g, PRN  glucose, 24 g, PRN  HYDROcodone-acetaminophen, 1 tablet, Q4H PRN  HYDROmorphone, 0.5 mg, Q4H PRN  naloxone, 0.02 mg, PRN  ondansetron, 4 mg, Q8H PRN  polyethylene glycol, 17 g, Daily PRN  promethazine, 25 mg, Q6H PRN  simethicone, 1 tablet, QID PRN  sodium chloride 0.9%, 3 mL, Q12H PRN      Chart check completed. Will continue plan of care. Dc TOMORROW TO [facility]     Orientation: oriented x  4  [place] Coma Scale Score: 15     Lead Monitored: Lead II Rhythm: sinus tachycardia Frequency/Ectopy: PVCs  Cardiac/Telemetry Box Number: [id number]  VTE Core Measure: Pharmacological prophylaxis initiated/maintained Last Bowel Movement: 11/11/24  Diet diabetic 2000 Calories (up to 75 gm per meal)  Diet diabetic  Voiding Characteristics: voids spontaneously without difficulty  Олег Score: 20  Fall Risk Score: 15  Accucheck []   Freq?      Lines/Drains/Airways       Peripherally Inserted Central Catheter Line  Duration             PICC Double Lumen 11/08/24 1021 right basilic 3 days                       Problem: Adult Inpatient Plan of Care  Goal: Plan of Care Review  Outcome: Progressing  Goal: Patient-Specific Goal (Individualized)  Outcome: Progressing  Goal: Absence of Hospital-Acquired Illness or Injury  Outcome: Progressing  Goal: Optimal Comfort and Wellbeing  Outcome: Progressing  Goal: Readiness for Transition of Care  Outcome: Progressing     Problem: Diabetes Comorbidity  Goal: Blood Glucose Level Within Targeted Range  Outcome: Progressing     Problem: Acute Kidney Injury/Impairment  Goal: Fluid and Electrolyte Balance  Outcome: Progressing  Goal: Improved Oral Intake  Outcome: Progressing  Goal: Effective Renal Function  Outcome: Progressing     Problem: Skin Injury Risk Increased  Goal: Skin Health and Integrity  Outcome: Progressing     Problem: Infection  Goal: Absence of Infection Signs and Symptoms  Outcome: Progressing     Problem: Sepsis/Septic Shock  Goal: Optimal Coping  Outcome: Progressing  Goal: Absence of Bleeding  Outcome: Progressing  Goal: Blood Glucose Level Within Targeted Range  Outcome: Progressing  Goal: Absence of Infection Signs and Symptoms  Outcome: Progressing  Goal: Optimal Nutrition Intake  Outcome: Progressing     Problem: Wound  Goal: Optimal Coping  Outcome: Progressing  Goal: Optimal Functional Ability  Outcome: Progressing  Goal: Absence of Infection Signs and  Symptoms  Outcome: Progressing  Goal: Improved Oral Intake  Outcome: Progressing  Goal: Optimal Pain Control and Function  Outcome: Progressing  Goal: Skin Health and Integrity  Outcome: Progressing  Goal: Optimal Wound Healing  Outcome: Progressing     Problem: Pain Acute  Goal: Optimal Pain Control and Function  Outcome: Progressing     Problem: Fall Injury Risk  Goal: Absence of Fall and Fall-Related Injury  Outcome: Progressing

## 2024-11-12 NOTE — ASSESSMENT & PLAN NOTE
ROSARIO is likely due to pre-renal azotemia due to intravascular volume depletion. Baseline creatinine is  1.5-1.7 . Most recent creatinine and eGFR are listed below.  Recent Labs     11/09/24  0451 11/10/24  0516 11/11/24  0505   CREATININE 1.2 1.3 1.3   EGFRNORACEVR >60 58* 58*     Plan  -ROSARIO is resolved   -Avoid nephrotoxins and renally dose meds for GFR listed above  -Monitor urine output, serial BMP, and adjust therapy as needed  -Monitor Cr with resumption of torsemide

## 2024-11-12 NOTE — PLAN OF CARE
A224/A224 BRUNO Khan is a 74 y.o.male admitted on 11/2/2024 for Severe sepsis   Code Status: Full Code MRN: [id number]   Review of patient's allergies indicates:   Allergen Reactions    Amitriptyline Rash    Celecoxib Rash     Past Medical History:   Diagnosis Date    Chronic combined systolic and diastolic congestive heart failure 07/09/2020    Chronic kidney disease, stage 3     Chronic obstructive pulmonary disease 03/20/2023    Wixela 250 mcg, Spiriva respimat. Continue Albuterol inhaler and albuterol nebs   Referral pul dis management, education and assistance with medication  Patient preference to only see a doctor, request follow up with Dr. Springer             Chronic venous insufficiency     DDD (degenerative disc disease), lumbar     DM (diabetes mellitus) with complications     Drug-induced constipation 11/03/2024    History of gout     Hyperlipidemia associated with type 2 diabetes mellitus     Hypertension associated with stage 3 chronic kidney disease due to type 2 diabetes mellitus     BRADLEY on CPAP     Prostate cancer     prostatectomy in 2010, rising PSA that started in 2021      PRN meds    acetaminophen, 650 mg, Q6H PRN  acetaminophen, 650 mg, Q6H PRN  albuterol-ipratropium, 3 mL, Q4H PRN  aluminum-magnesium hydroxide-simethicone, 30 mL, QID PRN  benzonatate, 100 mg, TID PRN  dextrose 10%, 12.5 g, PRN  dextrose 10%, 12.5 g, PRN  dextrose 10%, 12.5 g, PRN  dextrose 10%, 25 g, PRN  dextrose 10%, 25 g, PRN  dextrose 10%, 25 g, PRN  glucagon (human recombinant), 1 mg, PRN  glucose, 16 g, PRN  glucose, 24 g, PRN  HYDROcodone-acetaminophen, 1 tablet, Q4H PRN  HYDROmorphone, 0.5 mg, Q4H PRN  naloxone, 0.02 mg, PRN  ondansetron, 4 mg, Q8H PRN  polyethylene glycol, 17 g, Daily PRN  promethazine, 25 mg, Q6H PRN  simethicone, 1 tablet, QID PRN  sodium chloride 0.9%, 3 mL, Q12H PRN      Chart check completed. Will continue plan of care.      Orientation: oriented x 4  [place] Coma Scale Score: 15      Lead Monitored: Lead II Rhythm: normal sinus rhythm Frequency/Ectopy: PVCs  Cardiac/Telemetry Box Number: [id number]  VTE Core Measure: Pharmacological prophylaxis initiated/maintained Last Bowel Movement: 11/10/24  Diet diabetic 2000 Calories (up to 75 gm per meal)  Diet diabetic  Voiding Characteristics: voids spontaneously without difficulty  Олег Score: 20  Fall Risk Score: 13  Accucheck [x]   Freq? achs     Lines/Drains/Airways       Peripherally Inserted Central Catheter Line  Duration             PICC Double Lumen 11/08/24 1021 right basilic 3 days

## 2024-11-12 NOTE — PLAN OF CARE
Spoke with Caleb from [facility], awaiting wheelchair van transport and number for nurse report.

## 2024-11-12 NOTE — ASSESSMENT & PLAN NOTE
Patient's FSGs are controlled on current medication regimen.  Last A1c reviewed-   Lab Results   Component Value Date    HGBA1C 5.6 08/14/2024     Most recent fingerstick glucose reviewed-   Recent Labs   Lab 11/10/24  2207 11/11/24  1132 11/11/24  1717   POCTGLUCOSE 139* 142* 134*       Current correctional scale  None    Monitor BG; hypoglycemia protocol

## 2024-11-12 NOTE — ASSESSMENT & PLAN NOTE
Patient with Hypercapnic and Hypoxic Respiratory failure which is Acute.  he is not on home oxygen.   -resolved

## 2024-11-12 NOTE — PROGRESS NOTES
[facility] Medicine  Progress Note    Patient Name: Santiago Khan  MRN: [id number]  Patient Class: IP- Inpatient   Admission Date: 11/2/2024  Length of Stay: 8 days  Attending Physician: Jovanny Sanchez MD  Primary Care Provider: Kashif Acosta MD        Subjective:     Principal Problem:Severe sepsis        HPI:  Santiago Khan is a 74 y.o. male with a PMH  has a past medical history of Chronic combined systolic and diastolic congestive heart failure (07/09/2020), Chronic kidney disease, stage 3, Chronic obstructive pulmonary disease (03/20/2023), Chronic venous insufficiency, DDD (degenerative disc disease), lumbar, DM (diabetes mellitus) with complications, History of gout, Hyperlipidemia associated with type 2 diabetes mellitus, Hypertension associated with stage 3 chronic kidney disease due to type 2 diabetes mellitus, BRADLEY on CPAP, and Prostate cancer. who presented to the ER for further evaluation of worsening bilateral lower extremity weakness, acute on chronic lumbar back pain, and abdominal pain over the past 3-4 days. Patient reported being constipated with last bowel movement yesterday which was normal and reported his back pain has caused his legs to become weak resulting in increased difficulty walking. He reports taking chronic pain medications and had back surgery in 1982. Patient was seen at urgent care back on 10/27 for treatment of acute gout flare in his right hand/wrist and was provided prednisone after home allopurinol and colchicine provided little to no relief.  Patient denied any recent falls or trauma and was initially somnolent following administration of lorazepam in the ED to obtain the MRI of his lumbar spine. Upon evaluation of patient, patient had increased respiratory distress requiring up titration of supplemental oxygen in addition to bilateral crackles on lung auscultation, lower extremity edema, and right lower extremity erythremia.  Family at bedside reported patient had difficulty taking his home medications, had productive cough, and unsure what his dietary/fluid intake has been like. Patient was noted to be diaphoretic, restless, and becoming more lethargic during bedside assessment.  Stat ABG revealed worsening respiratory status and was placed on BiPAP and provided 2 mg IV Bumex.  Patient also noted to be become hypoglycemic again requiring dextrose infusion.  Patient admitted to Hospital Medicine inpatient for continued medical management. ICU notified regarding low threshold for transfer and agree with current treatment plan. Neurosurgery/Spine as well as PT/OT consulted regarding lower extremity weakness.       PCP: Kashif Acosta       Overview/Hospital Course:  Hospital/ICU Course:  11/4 - no acute events. Off dextrose drip. Had bm, no acute events    Hosp Med:  pt transferred out of ICU today, medically stable. Briefly, 74 y.o. male with Hx of HTN, HLP, Chronic combined systolic and diastolic CHF,  DM 2 with CKD 3 and Polyneuropathy, COPD, Chronic venous insufficiency, DDD Lumbar, Gout, BRADLEY on CPAP, and Prostate Ca, presented as Abd pain and LLE Cellulitis and admitted to ICU on 11/3 with worsening respiratory status and was placed on BiPAP and provided 2 mg IV Bumex. Patient also noted to be Hypoglycemic again requiring D10, hence admitted to ICU. He did well overnite and BS improved, came off D10 and was transferred out to Tele under Hosp Med today. Blood Cx x 1 just reported now as GNR and started on Zosyn.     11/6/24- Per   ID following, antibiotics adjusted to ceftriaxone, plan for total 14 day course  PT/OT with reccs for YASMIN, CM consulted for SNF placement    11/7/24- Per Dr.Iqbal RUEDA, patient denies new issues  SNF placement pending  IV abx till 11/20/24 11/08/2024  Pt noted to have area of fluctuance below R knee on my exam, WBC rising. Pt reports pain to R knee and L abd.   Gen Surg consulted and planning  for debridement of RLE abscess today.     11/09/2024  Pt seen and examined with spouse at bedside. Reports R leg feels better today, less pain. Abd pain is also improved.   Discussed with ID Dr. Swanson- MAXWELL Lynch added, aspirate cultures pending     11/10  MOHIT. Pt seen and examined, no family at bedside during rounds. Reports R leg redness and pain continue to improve. Denies CP, SOB.   11/11 patient reported right hand pain. Recently treated for gout. Obtained x-ray no acute findings. Gave dose of colchicine.   Dispo: anticipate discharge to SNF in am      Interval History: f/u right hand pain / right leg cellulitis  patient reported right hand. X-rays with arthritis. Discussed discharging today. Initially facility was going to take him but then stated tomorrow    Review of Systems  Objective:     Vital Signs (Most Recent):  Temp: 98 °F (36.7 °C) (11/11/24 1211)  Pulse: 91 (11/11/24 1520)  Resp: 20 (11/11/24 1211)  BP: 129/67 (11/11/24 1211)  SpO2: 95 % (11/11/24 1211) Vital Signs (24h Range):  Temp:  [98 °F (36.7 °C)-98.8 °F (37.1 °C)] 98 °F (36.7 °C)  Pulse:  [] 91  Resp:  [16-20] 20  SpO2:  [92 %-96 %] 95 %  BP: (109-129)/(61-74) 129/67     Weight: 96.3 kg (212 lb 4.9 oz)  Body mass index is 33.25 kg/m².    Intake/Output Summary (Last 24 hours) at 11/11/2024 1534  Last data filed at 11/11/2024 1441  Gross per 24 hour   Intake 660 ml   Output 1100 ml   Net -440 ml         Physical Exam  HENT:      Head: Normocephalic and atraumatic.   Cardiovascular:      Rate and Rhythm: Normal rate and regular rhythm.      Heart sounds: No murmur heard.  Pulmonary:      Effort: Pulmonary effort is normal. No respiratory distress.      Breath sounds: Normal breath sounds. No wheezing.   Abdominal:      General: Bowel sounds are normal. There is no distension.      Palpations: Abdomen is soft.      Tenderness: There is no abdominal tenderness.   Musculoskeletal:         General: No swelling.   Skin:     General: Skin is  warm and dry.      Findings: Erythema present.   Neurological:      Mental Status: He is alert and oriented to person, place, and time. Mental status is at baseline.             Significant Labs: All pertinent labs within the past 24 hours have been reviewed.  Recent Lab Results         11/11/24  1717   11/11/24  1132   11/11/24  0505   11/10/24  2207        Albumin     2.6         ALP     290         ALT     96         Anion Gap     12         AST     58         Baso #     0.09         Basophil %     0.7         BILIRUBIN TOTAL     0.6  Comment: For infants and newborns, interpretation of results should be based  on gestational age, weight and in agreement with clinical  observations.    Premature Infant recommended reference ranges:  Up to 24 hours.............<8.0 mg/dL  Up to 48 hours............<12.0 mg/dL  3-5 days..................<15.0 mg/dL  6-29 days.................<15.0 mg/dL           BUN     17         Calcium     8.9         Chloride     99         CO2     27         Creatinine     1.3         Differential Method     Automated         eGFR     58         Eos #     0.2         Eos %     1.2         Glucose     101         Gran # (ANC)     10.8         Gran %     79.1         Hematocrit     37.6         Hemoglobin     12.3         Immature Grans (Abs)     0.57  Comment: Mild elevation in immature granulocytes is non specific and   can be seen in a variety of conditions including stress response,   acute inflammation, trauma and pregnancy. Correlation with other   laboratory and clinical findings is essential.           Immature Granulocytes     4.2         Lymph #     1.4         Lymph %     10.0         Magnesium      1.5         MCH     29.1         MCHC     32.7         MCV     89         Mono #     0.7         Mono %     4.8         MPV     10.2         nRBC     0         Phosphorus Level     3.3         Platelet Count     271         POCT Glucose 134   142     139       Potassium     3.4          "PROTEIN TOTAL     7.2         RBC     4.22         RDW     17.5         Sodium     138         WBC     13.65                 Significant Imaging: I have reviewed all pertinent imaging results/findings within the past 24 hours.    Assessment/Plan:      * Severe sepsis sec to Cellulitis Legs complicated by E coli Bacteremia  This patient does have evidence of infective focus  My overall impression is sepsis.  Source: Skin and Soft Tissue (location cellulitis RLE)  Antibiotics given-   Antibiotics (72h ago, onward)      Start     Stop Route Frequency Ordered    11/08/24 2000  linezolid tablet 600 mg         -- Oral Every 12 hours 11/08/24 1332    11/06/24 1700  cefTRIAXone injection 2 g         -- IV Every 24 hours (non-standard times) 11/06/24 1036          Latest lactate reviewed-  No results for input(s): "LACTATE", "POCLAC" in the last 72 hours.    Organ dysfunction indicated by Acute kidney injury, Acute respiratory failure, and Encephalopathy    Fluid challenge Other- Patient to receive D10 volume other than 30cc/kg due to Congestive Heart Failure     Post- resuscitation assessment No - Post resuscitation assessment not needed       Will Not start Pressors- Levophed for MAP of 65  Source control achieved by: IV Abx    On ceftriaxone - PRESTON 11/20/24  ID following  Gen Surg consulted 11/08 due to concern for RLE abscess; s/p I&D with Dr. Magallanes 11/08, cultures pending   Zyvox added per ID     Abscess of right leg  Noted on exam 11/08  Gen Surg consulted and underwent I&D 11/08 Dr. Magallanes   Aspirate cultures NGTD    Continue IV Rocephin + Zyvox   Monitor exam       E coli bacteremia  Appreciate ID  Continue IV Rocephin 2g daily x 14 days (EOT 11/20/2024)   PICC line placed          Class 1 obesity due to excess calories with serious comorbidity and body mass index (BMI) of 33.0 to 33.9 in adult  Body mass index is 33.25 kg/m². Morbid obesity complicates all aspects of disease management from diagnostic " "modalities to treatment. Weight loss encouraged and health benefits explained to patient.       Diabetes  Patient's FSGs are controlled on current medication regimen.  Last A1c reviewed-   Lab Results   Component Value Date    HGBA1C 5.6 08/14/2024     Most recent fingerstick glucose reviewed-   Recent Labs   Lab 11/10/24  2207 11/11/24  1132 11/11/24  1717   POCTGLUCOSE 139* 142* 134*       Current correctional scale  None    Monitor BG; hypoglycemia protocol     Acute respiratory failure with hypoxia and hypercarbia  Patient with Hypercapnic and Hypoxic Respiratory failure which is Acute.  he is not on home oxygen.   -resolved    Acute kidney injury superimposed on chronic kidney disease  ROSARIO is likely due to pre-renal azotemia due to intravascular volume depletion. Baseline creatinine is  1.5-1.7 . Most recent creatinine and eGFR are listed below.  Recent Labs     11/09/24  0451 11/10/24  0516 11/11/24  0505   CREATININE 1.2 1.3 1.3   EGFRNORACEVR >60 58* 58*     Plan  -ROSARIO is resolved   -Avoid nephrotoxins and renally dose meds for GFR listed above  -Monitor urine output, serial BMP, and adjust therapy as needed  -Monitor Cr with resumption of torsemide     Acute on chronic combined systolic and diastolic congestive heart failure  Patient has Combined Systolic and Diastolic heart failure that is Acute on chronic. On presentation their CHF was decompensated. Evidence of decompensated CHF on presentation includes: edema, orthopnea, paroxysmal nocturnal dyspnea (PND), dyspnea on exertion (GOVEA), and shortness of breath. The etiology of their decompensation is likely dietary indiscretion, increased fluid intake, and infection . Most recent BNP and echo results are listed below.  No results for input(s): "BNP" in the last 72 hours.    Latest ECHO  Results for orders placed during the hospital encounter of 10/23/23    Echo    Interpretation Summary    Left Ventricle: The left ventricle is normal in size. Normal wall " thickness. There is mild concentric hypertrophy. regional wall motion abnormalities present. There is low normal systolic function with a visually estimated ejection fraction of 50 - 55%. Biplane (2D) method of discs ejection fraction is 49%. There is normal diastolic function.    Left Atrium: Left atrium is mildly dilated.    Right Ventricle: Normal right ventricular cavity size. Wall thickness is normal. Right ventricle wall motion  is normal. Systolic function is normal.    Mitral Valve: There is mild to moderate regurgitation.    Tricuspid Valve: There is mild regurgitation.    Pulmonary Artery: There is moderate pulmonary hypertension. The estimated pulmonary artery systolic pressure is 50 mmHg.    IVC/SVC: Normal venous pressure at 3 mmHg.    Current Heart Failure Medications  torsemide tablet 40 mg, Daily, Oral    Plan  -Monitor strict I&Os and daily weights.    -Place on telemetry  -Low sodium diet  -Place on fluid restriction of 1.5 L.   -Cardiology has not been consulted  - s/p IV diuresis   - continue home Torsemide     Lower back pain  -S/p NSGY eval- not a surgical candidate  -PT/OT   -Outpatient NSGY followup on discharge     Weakness of both lower extremities  Weakness persists  PT/OT following- pending snf placement     Chronic obstructive pulmonary disease  Patient's COPD is controlled currently.  Patient is currently off COPD Pathway. Continue scheduled inhalers  and monitor respiratory status closely.       Coronary artery disease of native artery of native heart with stable angina pectoris  Patient with known CAD, which is controlled Will continue  home medications  and monitor for S/Sx of angina/ACS. Continue to monitor on telemetry.   Continue home Asa,  B blocker   Statin held while on zyvox       Hyperlipidemia associated with type 2 diabetes mellitus  Patient is chronically on statin.will continue for now. Last Lipid Panel:   Lab Results   Component Value Date    CHOL 138 08/14/2024    HDL  45 08/14/2024    LDLCALC 69.4 08/14/2024    TRIG 118 08/14/2024    CHOLHDL 32.6 08/14/2024   Plan:  -Continue home medication  -low fat/low calorie diet      Hypertension associated with stage 3 chronic kidney disease due to type 2 diabetes mellitus  Chronic, controlled.  Latest blood pressure and vitals reviewed-   Temp:  [98 °F (36.7 °C)-98.8 °F (37.1 °C)]   Pulse:  []   Resp:  [16-20]   BP: (109-130)/(61-74)   SpO2:  [92 %-96 %] .   Home meds for hypertension were reviewed and noted below.   Hypertension Medications               bisoprolol (ZEBETA) 5 MG tablet TAKE 1/2 TABLET BY MOUTH EVERY DAY    torsemide (DEMADEX) 20 MG Tab Take 2 tablets (40 mg total) by mouth once daily.     While in the hospital, will manage blood pressure as follows; cont B blocker, continue to monitor BP     Will utilize p.r.n. blood pressure medication only if patient's blood pressure greater than  180/110 and he develops symptoms such as worsening chest pain or shortness of breath.    History of gout  Recently treated with colchicine and prednisone.   Plan:  -continue to monitor and treat as needed    BRADLEY on CPAP  -continue CPAP q.h.s.         VTE Risk Mitigation (From admission, onward)           Ordered     enoxaparin injection 40 mg  Daily         11/08/24 1807     IP VTE HIGH RISK PATIENT  Once         11/08/24 1807     Place sequential compression device  Until discontinued         11/08/24 1807     Place sequential compression device  Until discontinued         11/03/24 0309                    Discharge Planning   ANTHONY: 11/11/2024     Code Status: Full Code   Is the patient medically ready for discharge?:     Reason for patient still in hospital (select all that apply): Patient trending condition and Pending disposition  Discharge Plan A: Skilled Nursing Facility   Discharge Delays: None known at this time              Jovanny Sanchez MD  Department of Hospital Medicine   [facility])

## 2024-11-13 LAB
BACTERIA SPEC ANAEROBE CULT: NORMAL
FUNGUS SPEC CULT: NORMAL

## 2024-11-22 NOTE — ASSESSMENT & PLAN NOTE
S/p drainage of abscess. Leukocytosis improving. Cultures in process; no growth so far. Gram stain negative. Continue PO linezolid 600 mg BID along with ceftriaxone. Will complete 14 day post of course with both.    22-Nov-2024 22:08

## 2024-12-03 ENCOUNTER — TELEPHONE (OUTPATIENT)
Dept: SMOKING CESSATION | Facility: CLINIC | Age: 74
End: 2024-12-03
Payer: MEDICARE

## 2024-12-03 RX ORDER — FLUTICASONE PROPIONATE 50 MCG
SPRAY, SUSPENSION (ML) NASAL
Qty: 48 G | Refills: 3 | Status: SHIPPED | OUTPATIENT
Start: 2024-12-03

## 2024-12-03 NOTE — TELEPHONE ENCOUNTER
Call to patient for scheduled 1 pm appointment. Phone goes straight to voicemail. Voicemail left & text sent

## 2024-12-03 NOTE — TELEPHONE ENCOUNTER
Text sent to patient to see if he wants to reschedule due to it is 1:37 pm & the patient has not arrived & there is a confirmed 2 pm patient. Awaiting response.

## 2024-12-09 ENCOUNTER — OFFICE VISIT (OUTPATIENT)
Dept: SURGERY | Facility: CLINIC | Age: 74
End: 2024-12-09
Payer: MEDICARE

## 2024-12-09 VITALS
HEART RATE: 97 BPM | WEIGHT: 207.44 LBS | HEIGHT: 67 IN | SYSTOLIC BLOOD PRESSURE: 104 MMHG | BODY MASS INDEX: 32.56 KG/M2 | TEMPERATURE: 99 F | DIASTOLIC BLOOD PRESSURE: 65 MMHG

## 2024-12-09 DIAGNOSIS — L02.419 LEG ABSCESS: Primary | ICD-10-CM

## 2024-12-09 PROBLEM — L02.415 ABSCESS OF RIGHT LEG: Status: RESOLVED | Noted: 2024-11-08 | Resolved: 2024-12-09

## 2024-12-09 PROCEDURE — 1159F MED LIST DOCD IN RCRD: CPT | Mod: CPTII,S$GLB,, | Performed by: SURGERY

## 2024-12-09 PROCEDURE — 3078F DIAST BP <80 MM HG: CPT | Mod: CPTII,S$GLB,, | Performed by: SURGERY

## 2024-12-09 PROCEDURE — 4010F ACE/ARB THERAPY RXD/TAKEN: CPT | Mod: CPTII,S$GLB,, | Performed by: SURGERY

## 2024-12-09 PROCEDURE — 99024 POSTOP FOLLOW-UP VISIT: CPT | Mod: S$GLB,,, | Performed by: SURGERY

## 2024-12-09 PROCEDURE — 1126F AMNT PAIN NOTED NONE PRSNT: CPT | Mod: CPTII,S$GLB,, | Performed by: SURGERY

## 2024-12-09 PROCEDURE — 3066F NEPHROPATHY DOC TX: CPT | Mod: CPTII,S$GLB,, | Performed by: SURGERY

## 2024-12-09 PROCEDURE — 99999 PR PBB SHADOW E&M-EST. PATIENT-LVL V: CPT | Mod: PBBFAC,,, | Performed by: SURGERY

## 2024-12-09 PROCEDURE — 3044F HG A1C LEVEL LT 7.0%: CPT | Mod: CPTII,S$GLB,, | Performed by: SURGERY

## 2024-12-09 PROCEDURE — 1160F RVW MEDS BY RX/DR IN RCRD: CPT | Mod: CPTII,S$GLB,, | Performed by: SURGERY

## 2024-12-09 PROCEDURE — 3074F SYST BP LT 130 MM HG: CPT | Mod: CPTII,S$GLB,, | Performed by: SURGERY

## 2024-12-09 PROCEDURE — 3062F POS MACROALBUMINURIA REV: CPT | Mod: CPTII,S$GLB,, | Performed by: SURGERY

## 2024-12-09 NOTE — PROGRESS NOTES
Subjective:       Patient ID: Santiago Khan is a 74 y.o. male.    Chief Complaint: Post-op Evaluation (Wound check)    74-year-old male follows up after incision drainage of a right anterior lower leg abscess.  He states he is doing much better.  He states he has leg is much less swollen and red.  He is getting wound care by home health with silver Sorb dressing  Review of Systems   Skin:         Right leg is improving     Objective:      Physical Exam  Skin:     Comments: The right lower leg was examined.  There is less edema.  There is no erythema.  The wound itself is clean with granulation tissue.  The silver Sorb was reapplied and a Band-Aid was placed   Neurological:      Mental Status: He is alert.       Assessment:    Right lower leg abscess, improving significantly    Plan:       Continue local wound care.  Follow up in 3-4 weeks.  May require silver nitrate treatment at that time

## 2024-12-10 ENCOUNTER — TELEPHONE (OUTPATIENT)
Dept: PULMONOLOGY | Facility: CLINIC | Age: 74
End: 2024-12-10
Payer: MEDICARE

## 2024-12-10 ENCOUNTER — PATIENT MESSAGE (OUTPATIENT)
Dept: INTERNAL MEDICINE | Facility: CLINIC | Age: 74
End: 2024-12-10
Payer: MEDICARE

## 2024-12-12 ENCOUNTER — OFFICE VISIT (OUTPATIENT)
Dept: INFECTIOUS DISEASES | Facility: CLINIC | Age: 74
End: 2024-12-12
Payer: MEDICARE

## 2024-12-12 VITALS
BODY MASS INDEX: 30.45 KG/M2 | HEART RATE: 90 BPM | RESPIRATION RATE: 20 BRPM | DIASTOLIC BLOOD PRESSURE: 79 MMHG | HEIGHT: 67 IN | WEIGHT: 194 LBS | SYSTOLIC BLOOD PRESSURE: 115 MMHG

## 2024-12-12 DIAGNOSIS — R65.20 SEVERE SEPSIS: ICD-10-CM

## 2024-12-12 DIAGNOSIS — I12.9 HYPERTENSION ASSOCIATED WITH STAGE 3 CHRONIC KIDNEY DISEASE DUE TO TYPE 2 DIABETES MELLITUS: Chronic | ICD-10-CM

## 2024-12-12 DIAGNOSIS — E11.22 HYPERTENSION ASSOCIATED WITH STAGE 3 CHRONIC KIDNEY DISEASE DUE TO TYPE 2 DIABETES MELLITUS: Chronic | ICD-10-CM

## 2024-12-12 DIAGNOSIS — E11.69 HYPERLIPIDEMIA ASSOCIATED WITH TYPE 2 DIABETES MELLITUS: Chronic | ICD-10-CM

## 2024-12-12 DIAGNOSIS — E78.5 HYPERLIPIDEMIA ASSOCIATED WITH TYPE 2 DIABETES MELLITUS: Chronic | ICD-10-CM

## 2024-12-12 DIAGNOSIS — E11.8 DM (DIABETES MELLITUS) WITH COMPLICATIONS: ICD-10-CM

## 2024-12-12 DIAGNOSIS — A41.9 SEVERE SEPSIS: ICD-10-CM

## 2024-12-12 DIAGNOSIS — B96.20 E COLI BACTEREMIA: Primary | ICD-10-CM

## 2024-12-12 DIAGNOSIS — R78.81 E COLI BACTEREMIA: Primary | ICD-10-CM

## 2024-12-12 DIAGNOSIS — N18.30 HYPERTENSION ASSOCIATED WITH STAGE 3 CHRONIC KIDNEY DISEASE DUE TO TYPE 2 DIABETES MELLITUS: Chronic | ICD-10-CM

## 2024-12-12 PROCEDURE — 99999 PR PBB SHADOW E&M-EST. PATIENT-LVL IV: CPT | Mod: PBBFAC,,, | Performed by: STUDENT IN AN ORGANIZED HEALTH CARE EDUCATION/TRAINING PROGRAM

## 2024-12-12 NOTE — PROGRESS NOTES
Infectious Disease Clinic Note    Patient Name: Santiago Khan  YOB: 1950    PRESENTING HISTORY       History of Present Illness:  Mr. Santiago Khan is a 74 y.o. male w/ significant PMHx of HF, CKD, COPD, DM, HLD, HTN, and BRADLEY who presented to the ER for worsening bilateral lower extremity weakness, acute on chronic lumbar pain, and abdominal pain for about four days PTA. Following admission patient became progressively lethargic and hypercapnic on the floor requiring continuous bipap, and hypoglycemic requiring continuous D10 drip. Transferred to ICU. Sent back to floor on 11/5. Febrile to 100.6 F on admission with leukocytosis to almost 20K. Blood cx growing E coli. Has been on empiric Zosyn. CT a/p reports thickening and enlargement of the left krystal abdominal wall. Correlate clinically for rectus sheath hematoma. Mild bibasilar opacities right greater than left may relate to early pneumonia. Trace right-sided pleural effusion. MRI lumbar spine shows DDD. No evidence of infection. ID consulted for E coli bacteremia. During course of admission patient also found to have red swollen right leg. Seen by general surgery and taken to OR for washout of abscess. Cultures finalized no growth. Was discharged on Rocephin for bacteremia and Zyvox for added SSTI coverage. Sent to SNF.     12/12: Hospital follow up. Saw surgery on noted to have no more erythema and less edema. Wound care instructions were provided. Patient reports bad experience at [facility]. Was not happy with their care and most aids did not seem to be open to assisting him. Also felt the PT was insufficient. He is able to walk using cane and the redness in his right leg has resolved. Has some swelling but pain is at baseline. Does not take much pain medication. Midline was removed. Completed antibiotics without incident. Says he will be following up with surgical team to see if right knee site needs closure. Otherwise doing  well.     EXAMINATION:  CT ABDOMEN PELVIS WITHOUT CONTRAST     CLINICAL HISTORY:  abd pain / bacteremia /constipation;     TECHNIQUE:  Low dose axial images, sagittal and coronal reformations were obtained from the lung bases to the pubic symphysis.     COMPARISON:  None     FINDINGS:  Thickening and enlargement of the left krystal abdominal wall.  Mild motion artifacts.  Bosniak class 1 right renal cyst.  Atherosclerotic changes of the aorta iliac vasculature.  No bowel obstruction.  Mild bladder wall thickening.  Fat containing bilateral right greater than left inguinal hernia.  Postsurgical changes adjacent to the left hemiabdomen.  Hepatomegaly is noted.  Moderate atherosclerotic plaque of the aorta iliac vasculature without evidence for aneurysm.  Small splenic granuloma.  Mild bibasilar opacities right greater than left may relate to early pneumonia.  Trace right-sided pleural effusion.  Suspected mild cardiomegaly.  No degenerative joint disease of the spine without evidence for vertebral body compression deformity.  Pancreas adrenal glands grossly unremarkable.  Small hypodense lesions of the left lobe of the liver incompletely evaluated.  Small retroperitoneal lymph nodes may be reactive.  Mild bladder wall thickening.     Impression:     Thickening and enlargement of the left krystal abdominal wall.  Correlate clinically for rectus sheath hematoma.     Mild bibasilar opacities right greater than left may relate to early pneumonia. Trace right-sided pleural effusion     Mild motion artifacts.     All CT scans   are performed using dose optimization techniques including the following: automated exposure control; adjustment of the mA and/or kV; use of iterative reconstruction technique.  Dose modulation was employed for ALARA by means of: Automated exposure control; adjustment of the mA and/or kV according to patient size (this includes techniques or standardized protocols for targeted exams where dose is matched  to indication/reason for exam; i.e. extremities or head); and/or use of iterative reconstructive technique.        Electronically signed by:Manjeet Cadena  Date:                                            11/04/2024  Time:                                           19:33        Exam Ended: 11/04/24 19:22 CST Last Resulted: 11/04/24 19:33 CST       Blood Culture, Routine Gram stain dao bottle: Gram negative rods   Blood Culture, Routine Results called to and read back by:Sharifa Power RN 11/04/2024  00:22   Blood Culture, Routine ESCHERICHIA COLI Abnormal    Resulting Agency [facility]        Susceptibility     Escherichia coli     CULTURE, BLOOD     Amox/K Clav'ate <=8/4 mcg/mL Sensitive     Amp/Sulbactam <=4/2 mcg/mL Sensitive     Ampicillin <=8 mcg/mL Sensitive     Cefazolin <=2 mcg/mL Sensitive     Cefepime <=2 mcg/mL Sensitive     Ceftriaxone <=1 mcg/mL Sensitive     Ciprofloxacin <=0.25 mcg/mL Sensitive     Ertapenem <=0.5 mcg/mL Sensitive     Gentamicin <=2 mcg/mL Sensitive     Levofloxacin <=0.5 mcg/mL Sensitive     Meropenem <=1 mcg/mL Sensitive     Piperacillin/Tazo <=8 mcg/mL Sensitive     Tetracycline >8 mcg/mL Resistant     Tobramycin <=2 mcg/mL Sensitive     Trimeth/Sulfa <=0.5/9.5 m... Sensitive               Linear View        Specimen Collected: 11/03/24 01:25 CDT Last Resulted: 11/06/24 10:51 CST         Review of Systems:  Constitutional: no fever or chills  Eyes: no visual changes  ENT: no nasal congestion or sore throat  Respiratory: no cough or shortness of breath  Cardiovascular: no chest pain  Gastrointestinal: no nausea or vomiting, no abdominal pain, no constipation, no diarrhea  Genitourinary: no hematuria or dysuria  Musculoskeletal: baseline back and knee pain; ambulates with cane   Skin: no rash  Neurological: no headaches, numbness, or paresthesias    The following portions of the patient's history were reviewed and updated as appropriate: allergies, current medications, past family history,  past medical history, past social history, past surgical history, and problem list.    PAST HISTORY:     Immunization History   Administered Date(s) Administered    COVID-19, MRNA, LN-S, PF (Pfizer) (Purple Cap) 01/24/2021, 02/14/2021, 12/10/2021    Influenza (FLUAD) - Quadrivalent - Adjuvanted - PF *Preferred* (65+) 10/08/2020, 10/07/2021, 10/10/2022    Influenza - Quadrivalent - High Dose - PF (65 years and older) 08/16/2023    Influenza - Trivalent - Fluad - Adjuvanted - PF (65 years and older 09/28/2017    Influenza - Trivalent - Fluarix, Flulaval, Fluzone, Afluria - PF 12/04/2012, 09/24/2015    Influenza - Trivalent - Fluzone High Dose - PF (65 years and older) 10/12/2016, 09/20/2018, 10/05/2019    Pneumococcal Conjugate - 13 Valent 10/12/2016    Pneumococcal Conjugate - 20 Valent 11/03/2022    Tdap 12/04/2021    Zoster 05/23/2012    Zoster Recombinant 05/25/2023, 07/24/2023       Past Medical History:   Diagnosis Date    Chronic combined systolic and diastolic congestive heart failure 07/09/2020    Chronic kidney disease, stage 3     Chronic obstructive pulmonary disease 03/20/2023    Wixela 250 mcg, Spiriva respimat. Continue Albuterol inhaler and albuterol nebs   Referral pul dis management, education and assistance with medication  Patient preference to only see a doctor, request follow up with Dr. Springer             Chronic venous insufficiency     DDD (degenerative disc disease), lumbar     DM (diabetes mellitus) with complications     Drug-induced constipation 11/03/2024    History of gout     Hyperlipidemia associated with type 2 diabetes mellitus     Hypertension associated with stage 3 chronic kidney disease due to type 2 diabetes mellitus     BRADLEY on CPAP     Prostate cancer     prostatectomy in 2010, rising PSA that started in 2021       Past Surgical History:   Procedure Laterality Date    BICEPS TENDON REPAIR      COLONOSCOPY N/A 03/27/2019    Procedure: COLONOSCOPY;  Surgeon: James Roger MD;   Location: [facility] ENDO;  Service: Endoscopy;  Laterality: N/A;    EXPLORATION OF ORBIT Right 2023    Procedure: EXPLORATION, ORBIT;  Surgeon: Junaid Bartholomew MD;  Location: [facility] OR;  Service: ENT;  Laterality: Right;  possibly need frozen    HEMORRHOID SURGERY      INCISION AND DRAINAGE OF ABSCESS Right 2024    Procedure: INCISION AND DRAINAGE, ABSCESS;  Surgeon: Torres Magallanes MD;  Location: [facility] OR;  Service: General;  Laterality: Right;  4:30, patient ate    INGUINAL HERNIA REPAIR Left     KNEE ARTHROSCOPY Right     LEFT HEART CATHETERIZATION Left 2020    Procedure: CATHETERIZATION, HEART, LEFT;  Surgeon: Cheli Wilson MD;  Location: [facility] CATH LAB;  Service: Cardiology;  Laterality: Left;    LUMBAR LAMINECTOMY      PROSTATECTOMY      TONSILLECTOMY         Family History   Problem Relation Name Age of Onset    Prostate cancer Father      Coronary artery disease Father  90    Alzheimer's disease Father      Hyperlipidemia Mother         Social History     Socioeconomic History    Marital status:    Tobacco Use    Smoking status: Former     Current packs/day: 0.00     Average packs/day: 1.5 packs/day for 52.7 years (79.1 ttl pk-yrs)     Types: Cigarettes     Start date: 1971     Quit date: 9/15/2023     Years since quittin.2    Smokeless tobacco: Former   Substance and Sexual Activity    Alcohol use: Not Currently    Drug use: Never    Sexual activity: Not Currently     Partners: Female     Social Drivers of Health     Financial Resource Strain: Medium Risk (2024)    Overall Financial Resource Strain (CARDIA)     Difficulty of Paying Living Expenses: Somewhat hard   Food Insecurity: No Food Insecurity (2024)    Hunger Vital Sign     Worried About Running Out of Food in the Last Year: Never true     Ran Out of Food in the Last Year: Never true   Transportation Needs: No Transportation Needs (2024)    TRANSPORTATION NEEDS     Transportation : No   Physical Activity:  Inactive (4/20/2024)    Exercise Vital Sign     Days of Exercise per Week: 0 days     Minutes of Exercise per Session: 0 min   Stress: Stress Concern Present (11/7/2024)    [employer] - Occupational Stress Questionnaire     Feeling of Stress : To some extent   Housing Stability: Low Risk  (11/7/2024)    Housing Stability Vital Sign     Unable to Pay for Housing in the Last Year: No     Homeless in the Last Year: No       MEDICATIONS & ALLERGIES:     Current Outpatient Medications on File Prior to Visit   Medication Sig    adalimumab (HUMIRA,CF, PEN) 40 mg/0.4 mL PnKt Inject 0.4 mLs (40 mg total) into the skin every 14 (fourteen) days.    albuterol (PROVENTIL/VENTOLIN HFA) 90 mcg/actuation inhaler INHALE 2 PUFFS INTO THE LUNGS EVERY 4 HOURS AS NEEDED    allopurinoL (ZYLOPRIM) 300 MG tablet TAKE 1 TABLET(300 MG) BY MOUTH EVERY DAY    aspirin (ECOTRIN) 81 MG EC tablet Take 81 mg by mouth once daily.    baclofen (LIORESAL) 10 MG tablet Take 1 tablet by mouth every evening.    bisoprolol (ZEBETA) 5 MG tablet TAKE 1/2 TABLET BY MOUTH EVERY DAY    blood sugar diagnostic Strp Check blood glucose twice per day    blood-glucose meter (ACCU-CHEK GUIDE ME GLUCOSE MTR) Misc USE AS INSTRUCTED    empagliflozin (JARDIANCE) 10 mg tablet Take 1 tablet (10 mg total) by mouth once daily.    fluticasone propionate (FLONASE) 50 mcg/actuation nasal spray SHAKE AND SPRAY TWICE IN EACH NOSTRIL EVERY DAY    glimepiride (AMARYL) 4 MG tablet Take 1 tablet (4 mg total) by mouth before breakfast.    guaiFENesin 100 mg/5 ml (ROBITUSSIN) 100 mg/5 mL syrup Take 200 mg by mouth every evening.    HYDROcodone-acetaminophen (NORCO) 7.5-325 mg per tablet Take 1 tablet by mouth every 4 (four) hours as needed (for chronic pain).    lancets (ACCU-CHEK SOFTCLIX LANCETS) Misc Check BG daily    latanoprost 0.005 % ophthalmic solution Place 1 drop into the right eye every evening.    magnesium oxide (MAG-OX) 400 mg (241.3 mg  "magnesium) tablet TAKE 2 TABLETS(800 MG) BY MOUTH TWICE DAILY    metFORMIN (GLUCOPHAGE) 500 MG tablet TAKE 1 TABLET(500 MG) BY MOUTH DAILY WITH BREAKFAST    multivitamin-minerals-lutein Tab Take 1 tablet by mouth Daily.    omeprazole (PRILOSEC) 40 MG capsule TAKE 1 CAPSULE BY MOUTH EVERY DAY    potassium chloride SA (K-DUR,KLOR-CON) 20 MEQ tablet TAKE 3 TABLETS BY MOUTH TWICE DAILY    simvastatin (ZOCOR) 40 MG tablet TAKE 1 TABLET(40 MG) BY MOUTH EVERY EVENING    torsemide (DEMADEX) 20 MG Tab Take 2 tablets (40 mg total) by mouth once daily.    varenicline (CHANTIX) 1 mg Tab take 1 tablet by mouth twice daily. Take with full glass of water & with food to avoid nausea    WIXELA INHUB 250-50 mcg/dose diskus inhaler INHALE 1 PUFF INTO THE LUNGS TWICE DAILY    colchicine (COLCRYS) 0.6 mg tablet Take 1 tablet (0.6 mg total) by mouth daily as needed (gout flare).     No current facility-administered medications on file prior to visit.       Review of patient's allergies indicates:   Allergen Reactions    Amitriptyline Rash    Celecoxib Rash       OBJECTIVE:   Vital Signs:  Vitals:    12/12/24 0819   BP: 115/79   Pulse: 90   Resp: 20   Weight: 88 kg (194 lb 0.1 oz)   Height: 5' 7" (1.702 m)       No results found for this or any previous visit (from the past 24 hours).      Physical Exam:   General:  Well developed, well nourished, no acute distress  HEENT:  Normocephalic, atraumatic  CVS:  RRR, S1 and S2 normal, no murmurs, rubs, gallops  Resp:  Lungs clear to auscultation, no wheezes, rales, rhonchi  GI:  Abdomen soft, non-tender, non-distended, normoactive bowel sounds, no masses  MSK:  No muscle atrophy, mild edema of right LE   Skin:  right knee post op site clean, no drainage, no erythema   Psych:  Alert and oriented to person, place, and time    ASSESSMENT:     Acute on chronic combined systolic and diastolic congestive heart failure  Latest ECHO  Results for orders placed during the hospital encounter of 11/02/24   "   Echo     Interpretation Summary    Left Ventricle: The left ventricle is mildly dilated. Mildly increased ventricular mass. Normal wall thickness. There is eccentric hypertrophy. There is moderately reduced systolic function with a visually estimated ejection fraction of 35 - 40%. There is normal diastolic function.    Right Ventricle: Normal right ventricular cavity size. Wall thickness is normal. Systolic function is mildly reduced.    Left Atrium: Left atrium is mildly dilated.    Right Atrium: Right atrium is mildly dilated.    Aortic Valve: There is mild aortic valve sclerosis.    Mitral Valve: There is moderate regurgitation.    Tricuspid Valve: There is mild regurgitation.    Pulmonic Valve: There is mild regurgitation.    Pulmonary Artery: There is mild pulmonary hypertension. The estimated pulmonary artery systolic pressure is 44 mmHg.    IVC/SVC: Intermediate venous pressure at 8 mmHg.     Continue current medications and follow up with PCP       Hypertension associated with stage 3 chronic kidney disease due to type 2 diabetes mellitus  Continue current medications and follow up with PCP       Abscess of right leg  S/p drainage of abscess. Cultures finalized no growth. Gram stain negative. Completed 14 day post op course of PO linezolid 600 mg BID along with ceftriaxone. Stop date 11/21/24.      E coli bacteremia  --All cases of bacteremia pose risk of life threatening sepsis and metastatic infection  --GN bacteremia not always with obvious source   --Unclear in this case   --CT a/p shows thickening and enlargement of the left krystal abdominal wall. Possible infected hematoma.   --Possible early pneumonia as well   --Completed 14 day course of IV ceftriaxone 2 g daily as E coli isolate is pan susceptible   --Afebrile and clinically doing well  --Midline already removed   --Follow up with ID as needed        Outpatient Antibiotic Therapy Plan:     1) Infection: E coli bacteremia      2) Discharge  Antibiotics:     Intravenous antibiotics:  IV ceftriaxone 2 g daily      3) Therapy Duration:  14 days     Estimated end date of IV antibiotics: 11/21/24     4) Outpatient Weekly Labs:     Order the following labs to be drawn on Mondays:   CBC  CMP      Perform labs at [facility] facility      5) Fax Lab Results to Infectious Diseases Provider: Dr. Swanson      [facility] Clinic Fax Number: [phone]     Diabetes  Continue current medications and follow up with PCP      BRADLEY on CPAP  CPAP as tolerated         PLAN:     Santiago was seen today for new patient for gi.    Diagnoses and all orders for this visit:    E coli bacteremia    Severe sepsis sec to Cellulitis Legs complicated by E coli Bacteremia    DM (diabetes mellitus) with complications    Hypertension associated with stage 3 chronic kidney disease due to type 2 diabetes mellitus    Hyperlipidemia associated with type 2 diabetes mellitus          The total time for evaluation and management services performed on 12/12/24 was greater than 30 minutes.        Darrel Swanson, DO   Infectious Diseases

## 2024-12-26 ENCOUNTER — PATIENT MESSAGE (OUTPATIENT)
Dept: INTERNAL MEDICINE | Facility: CLINIC | Age: 74
End: 2024-12-26
Payer: MEDICARE

## 2024-12-26 DIAGNOSIS — E83.42 HYPOMAGNESEMIA: ICD-10-CM

## 2024-12-26 DIAGNOSIS — N18.32 STAGE 3B CHRONIC KIDNEY DISEASE: ICD-10-CM

## 2024-12-26 RX ORDER — LANOLIN ALCOHOL/MO/W.PET/CERES
CREAM (GRAM) TOPICAL
Qty: 120 TABLET | Refills: 6 | Status: SHIPPED | OUTPATIENT
Start: 2024-12-26

## 2025-01-08 ENCOUNTER — CLINICAL SUPPORT (OUTPATIENT)
Dept: SMOKING CESSATION | Facility: CLINIC | Age: 75
End: 2025-01-08
Payer: COMMERCIAL

## 2025-01-08 DIAGNOSIS — F17.200 NICOTINE DEPENDENCE: Primary | ICD-10-CM

## 2025-01-08 PROCEDURE — 99404 PREV MED CNSL INDIV APPRX 60: CPT | Mod: S$GLB,,, | Performed by: SPEECH-LANGUAGE PATHOLOGIST

## 2025-01-08 PROCEDURE — 99999 PR PBB SHADOW E&M-EST. PATIENT-LVL II: CPT | Mod: PBBFAC,,, | Performed by: SPEECH-LANGUAGE PATHOLOGIST

## 2025-01-08 NOTE — PROGRESS NOTES
Individual Follow-Up Form    1/8/2025    Quit Date: 9/15/2024     Clinical Status of Patient: Outpatient    Length of Service: 60 minutes    Continuing Medication: no    Other Medications:      Target Symptoms: Withdrawal and medication side effects. The following were  rated moderate (3) to severe (4) on TCRS:  Moderate (3):   Severe (4):     Comments: Patient seen in clinic. Patient reports he has remained smoke free since last session & not utilizing any NRT. He reports significant life events that have occurred since last session that includes being hospitalized & septic, rehab following the hospitalization for wound care & most recently, his brother being diagnosed with cancer & is on in home hospice. Active listening & emotional support provided. Patient reports he has not turned to tobacco during any of these challenging times. Praised the patient on his continued accomplishment & dedication to his smoke free life. Goal is to remain smoke free. Follow up set for 2/3/2025 at 1:00 pm.       Diagnosis: F17.200    Next Visit: 4 weeks

## 2025-01-27 ENCOUNTER — TELEPHONE (OUTPATIENT)
Dept: RHEUMATOLOGY | Facility: CLINIC | Age: 75
End: 2025-01-27
Payer: MEDICARE

## 2025-01-27 ENCOUNTER — OFFICE VISIT (OUTPATIENT)
Dept: PULMONOLOGY | Facility: CLINIC | Age: 75
End: 2025-01-27
Attending: INTERNAL MEDICINE
Payer: MEDICARE

## 2025-01-27 ENCOUNTER — HOSPITAL ENCOUNTER (OUTPATIENT)
Dept: RADIOLOGY | Facility: HOSPITAL | Age: 75
Discharge: HOME | End: 2025-01-27
Attending: INTERNAL MEDICINE
Payer: MEDICARE

## 2025-01-27 VITALS
WEIGHT: 209.44 LBS | BODY MASS INDEX: 32.87 KG/M2 | RESPIRATION RATE: 18 BRPM | OXYGEN SATURATION: 98 % | DIASTOLIC BLOOD PRESSURE: 60 MMHG | SYSTOLIC BLOOD PRESSURE: 118 MMHG | HEIGHT: 67 IN | HEART RATE: 85 BPM

## 2025-01-27 DIAGNOSIS — G47.33 OSA ON CPAP: Primary | Chronic | ICD-10-CM

## 2025-01-27 DIAGNOSIS — E66.811 CLASS 1 OBESITY DUE TO EXCESS CALORIES WITH SERIOUS COMORBIDITY AND BODY MASS INDEX (BMI) OF 33.0 TO 33.9 IN ADULT: Chronic | ICD-10-CM

## 2025-01-27 DIAGNOSIS — R91.1 SOLITARY PULMONARY NODULE: ICD-10-CM

## 2025-01-27 DIAGNOSIS — J44.89 COPD WITH CHRONIC BRONCHITIS AND EMPHYSEMA: ICD-10-CM

## 2025-01-27 DIAGNOSIS — F17.210 CIGARETTE NICOTINE DEPENDENCE WITHOUT COMPLICATION: ICD-10-CM

## 2025-01-27 DIAGNOSIS — R09.02 EXERCISE HYPOXEMIA: ICD-10-CM

## 2025-01-27 DIAGNOSIS — J44.9 CHRONIC OBSTRUCTIVE PULMONARY DISEASE, UNSPECIFIED COPD TYPE: Chronic | ICD-10-CM

## 2025-01-27 DIAGNOSIS — J43.9 COPD WITH CHRONIC BRONCHITIS AND EMPHYSEMA: ICD-10-CM

## 2025-01-27 DIAGNOSIS — F17.211 NICOTINE DEPENDENCE, CIGARETTES, IN REMISSION: ICD-10-CM

## 2025-01-27 DIAGNOSIS — E66.09 CLASS 1 OBESITY DUE TO EXCESS CALORIES WITH SERIOUS COMORBIDITY AND BODY MASS INDEX (BMI) OF 33.0 TO 33.9 IN ADULT: Chronic | ICD-10-CM

## 2025-01-27 DIAGNOSIS — E66.811 CLASS 1 OBESITY DUE TO EXCESS CALORIES WITH SERIOUS COMORBIDITY AND BODY MASS INDEX (BMI) OF 32.0 TO 32.9 IN ADULT: ICD-10-CM

## 2025-01-27 DIAGNOSIS — E66.09 CLASS 1 OBESITY DUE TO EXCESS CALORIES WITH SERIOUS COMORBIDITY AND BODY MASS INDEX (BMI) OF 32.0 TO 32.9 IN ADULT: ICD-10-CM

## 2025-01-27 PROBLEM — E66.2 MORBID OBESITY WITH ALVEOLAR HYPOVENTILATION: Status: RESOLVED | Noted: 2022-03-12 | Resolved: 2025-01-27

## 2025-01-27 PROCEDURE — 1100F PTFALLS ASSESS-DOCD GE2>/YR: CPT | Mod: CPTII,S$GLB,, | Performed by: INTERNAL MEDICINE

## 2025-01-27 PROCEDURE — 1159F MED LIST DOCD IN RCRD: CPT | Mod: CPTII,S$GLB,, | Performed by: INTERNAL MEDICINE

## 2025-01-27 PROCEDURE — 1160F RVW MEDS BY RX/DR IN RCRD: CPT | Mod: CPTII,S$GLB,, | Performed by: INTERNAL MEDICINE

## 2025-01-27 PROCEDURE — 99214 OFFICE O/P EST MOD 30 MIN: CPT | Mod: S$GLB,,, | Performed by: INTERNAL MEDICINE

## 2025-01-27 PROCEDURE — 99999 PR PBB SHADOW E&M-EST. PATIENT-LVL V: CPT | Mod: PBBFAC,,, | Performed by: INTERNAL MEDICINE

## 2025-01-27 PROCEDURE — 71271 CT THORAX LUNG CANCER SCR C-: CPT | Mod: 26,,, | Performed by: RADIOLOGY

## 2025-01-27 PROCEDURE — 3074F SYST BP LT 130 MM HG: CPT | Mod: CPTII,S$GLB,, | Performed by: INTERNAL MEDICINE

## 2025-01-27 PROCEDURE — 3078F DIAST BP <80 MM HG: CPT | Mod: CPTII,S$GLB,, | Performed by: INTERNAL MEDICINE

## 2025-01-27 PROCEDURE — 3008F BODY MASS INDEX DOCD: CPT | Mod: CPTII,S$GLB,, | Performed by: INTERNAL MEDICINE

## 2025-01-27 PROCEDURE — 71271 CT THORAX LUNG CANCER SCR C-: CPT | Mod: TC

## 2025-01-27 PROCEDURE — 1125F AMNT PAIN NOTED PAIN PRSNT: CPT | Mod: CPTII,S$GLB,, | Performed by: INTERNAL MEDICINE

## 2025-01-27 PROCEDURE — 4010F ACE/ARB THERAPY RXD/TAKEN: CPT | Mod: CPTII,S$GLB,, | Performed by: INTERNAL MEDICINE

## 2025-01-27 PROCEDURE — 3288F FALL RISK ASSESSMENT DOCD: CPT | Mod: CPTII,S$GLB,, | Performed by: INTERNAL MEDICINE

## 2025-01-27 RX ORDER — FLUTICASONE PROPIONATE AND SALMETEROL 250; 50 UG/1; UG/1
1 POWDER RESPIRATORY (INHALATION) 2 TIMES DAILY
Qty: 180 EACH | Refills: 3 | Status: SHIPPED | OUTPATIENT
Start: 2025-01-27

## 2025-01-27 RX ORDER — ALBUTEROL SULFATE 90 UG/1
2 INHALANT RESPIRATORY (INHALATION) EVERY 4 HOURS PRN
Qty: 25.5 G | Refills: 3 | Status: SHIPPED | OUTPATIENT
Start: 2025-01-27

## 2025-01-27 NOTE — PROGRESS NOTES
Subjective:     Patient ID: Santiago Khan is a 74 y.o. male.    Chief Complaint:      HPI 74 y.o. reports he was hospitalized for 2 weeks and had rehab for two weeks following episode of sepsis- multifactorial      He   presents for evaluation and treatment of sepsis  after being discharged from the hospital  3  months ago. Since discharge symptoms have gradually improving course since that time. Patient denies fever or chills. Symptoms are aggravated by any walking and other activity. Symptoms improve with rest.  Respiratory: positive for cough, dyspnea on exertion, and sputum; Cardiovascular: no chest pain or palpitations.  Patient currently is not on home oxygen therapy..    MEDICAL RECORDS FROM THE HOSPITAL REVIEWED:  [facility] [facility]  [facility]  Discharge Summary        Patient Name: Santiago Khan  MRN: [id number]  RAINA: [id number]  Patient Class: IP- Inpatient  Admission Date: 11/2/2024  Hospital Length of Stay: 9 days  Discharge Date and Time:  11/12/2024 12:53 PM  Attending Physician: Jovanny Sanchez MD   Discharging Provider: Jovanny Sanchez MD  Primary Care Provider: Kashif Acosta MD     Primary Care Team: Networked reference to record PCT      HPI:   Santiago Khan is a 74 y.o. male with a PMH  has a past medical history of Chronic combined systolic and diastolic congestive heart failure (07/09/2020), Chronic kidney disease, stage 3, Chronic obstructive pulmonary disease (03/20/2023), Chronic venous insufficiency, DDD (degenerative disc disease), lumbar, DM (diabetes mellitus) with complications, History of gout, Hyperlipidemia associated with type 2 diabetes mellitus, Hypertension associated with stage 3 chronic kidney disease due to type 2 diabetes mellitus, BRADLEY on CPAP, and Prostate cancer. who presented to the ER for further evaluation of worsening bilateral lower extremity weakness, acute on chronic lumbar back pain, and abdominal pain over the  past 3-4 days. Patient reported being constipated with last bowel movement yesterday which was normal and reported his back pain has caused his legs to become weak resulting in increased difficulty walking. He reports taking chronic pain medications and had back surgery in 1982. Patient was seen at urgent care back on 10/27 for treatment of acute gout flare in his right hand/wrist and was provided prednisone after home allopurinol and colchicine provided little to no relief.  Patient denied any recent falls or trauma and was initially somnolent following administration of lorazepam in the ED to obtain the MRI of his lumbar spine. Upon evaluation of patient, patient had increased respiratory distress requiring up titration of supplemental oxygen in addition to bilateral crackles on lung auscultation, lower extremity edema, and right lower extremity erythremia. Family at bedside reported patient had difficulty taking his home medications, had productive cough, and unsure what his dietary/fluid intake has been like. Patient was noted to be diaphoretic, restless, and becoming more lethargic during bedside assessment.  Stat ABG revealed worsening respiratory status and was placed on BiPAP and provided 2 mg IV Bumex.  Patient also noted to be become hypoglycemic again requiring dextrose infusion.  Patient admitted to Hospital Medicine inpatient for continued medical management. ICU notified regarding low threshold for transfer and agree with current treatment plan. Neurosurgery/Spine as well as PT/OT consulted regarding lower extremity weakness.        PCP: Kashif Acosta        Procedure(s) (LRB):  INCISION AND DRAINAGE, ABSCESS (Right)       Hospital Course:   Hospital/ICU Course:  11/4 - no acute events. Off dextrose drip. Had bm, no acute events     Hosp Med:  pt transferred out of ICU today, medically stable. Briefly, 74 y.o. male with Hx of HTN, HLP, Chronic combined systolic and diastolic CHF,  DM 2 with CKD 3  and Polyneuropathy, COPD, Chronic venous insufficiency, DDD Lumbar, Gout, BRADLEY on CPAP, and Prostate Ca, presented as Abd pain and LLE Cellulitis and admitted to ICU on 11/3 with worsening respiratory status and was placed on BiPAP and provided 2 mg IV Bumex. Patient also noted to be Hypoglycemic again requiring D10, hence admitted to ICU. He did well overnite and BS improved, came off D10 and was transferred out to [facility] under Hosp Med today. Blood Cx x 1 just reported now as GNR and started on Zosyn.      11/6/24- Per   ID following, antibiotics adjusted to ceftriaxone, plan for total 14 day course  PT/OT with reccs for YASMIN, CM consulted for SNF placement     11/7/24- Per Dr.Iqbal RUEDA, patient denies new issues  SNF placement pending  IV abx till 11/20/24 11/08/2024  Pt noted to have area of fluctuance below R knee on my exam, WBC rising. Pt reports pain to R knee and L abd.   Gen Surg consulted and planning for debridement of RLE abscess today.      11/09/2024  Pt seen and examined with spouse at bedside. Reports R leg feels better today, less pain. Abd pain is also improved.   Discussed with ID Dr. Swanson- PO Zyvox added, aspirate cultures pending      11/10  MOHIT. Pt seen and examined, no family at bedside during rounds. Reports R leg redness and pain continue to improve. Denies CP, SOB.   11/11 patient reported right hand pain. Recently treated for gout. Obtained x-ray no acute findings. Gave dose of colchicine.   Hand pain and ROM improved. Patient accepted to skilled facility. Patient seen and examined, stable for discharge.        Goals of Care Treatment Preferences:  Code Status: Full Code        SDOH Screening:  The patient was screened for utility difficulties, food insecurity, transport difficulties, housing insecurity, and interpersonal safety and there were no concerns identified this admission.     Consults:   Consults (From admission, onward)            Status Ordering Provider        Inpatient consult to General Surgery  Once        Provider:  Ana Maria Arce MD    Completed ABEBA HERRING       [facility]  Once        Provider:  (Not yet assigned)    Acknowledged ANA MARIA ARCE       Inpatient consult to Social Work  Once        Provider:  (Not yet assigned)    Completed CHELY CORREIA       Inpatient consult to Hospitalist  Once        Provider:  (Not yet assigned)    Acknowledged ANA MARIA ARCE       Inpatient consult to Critical Care Medicine  Once        Provider:  Mohan Shukla MD    Acknowledged ANA MARIA ARCE       Inpatient consult to Neurosurgery  Once        Provider:  Robert Shah MD    Completed JACEY SALCEDO                              Final Active Diagnoses:     Diagnosis Date Noted POA    PRINCIPAL PROBLEM:  Severe sepsis sec to Cellulitis Legs complicated by E coli Bacteremia [A41.9, R65.20] 11/03/2024 Yes    Abscess of right leg [L02.415] 11/08/2024 No    E coli bacteremia [R78.81, B96.20] 11/04/2024 Yes    Weakness of both lower extremities [R29.898] 11/03/2024 Yes    Lower back pain [M54.50] 11/03/2024 Yes    Acute on chronic combined systolic and diastolic congestive heart failure [I50.43] 11/03/2024 Yes    Acute kidney injury superimposed on chronic kidney disease [N17.9, N18.9] 11/03/2024 Yes    Acute respiratory failure with hypoxia and hypercarbia [J96.01, J96.02] 11/03/2024 Yes    Diabetes [E11.9] 11/03/2024 Yes       Chronic    Chronic obstructive pulmonary disease [J44.9] 03/20/2023 Yes       Chronic    Coronary artery disease of native artery of native heart with stable angina pectoris [I25.118] 06/25/2020 Yes       Chronic    Hypertension associated with stage 3 chronic kidney disease due to type 2 diabetes mellitus [E11.22, I12.9, N18.30]   Yes       Chronic    BRADLEY on CPAP [G47.33]   Yes       Chronic       Problems Resolved During this Admission:     Diagnosis Date Noted Date Resolved POA    Weakness of  both lower extremities [R29.898] 11/03/2024 11/03/2024 Yes    Hypoglycemia [E16.2] 11/03/2024 11/07/2024 Yes    Acute encephalopathy [G93.40] 11/03/2024 11/07/2024 Yes    Cellulitis of right lower extremity [L03.115] 11/03/2024 11/09/2024 Yes    Drug-induced constipation [K59.03] 11/03/2024 11/08/2024 Yes         Discharged Condition: stable     Disposition: Home or Self Care     Follow Up:    Contact information for follow-up providers         Kashif Acosta MD Follow up.    Specialty: Internal Medicine  Contact information:  [address]  [address]  [place] [facility] [address] [phone]                    Torres Magallanes MD Follow up in 4 week(s).    Specialty: General Surgery  Why: post op appt drainage of a right leg abscess  Contact information:  [address]  [facility] [id number] [phone]                              Contact information for after-discharge care         Destination         [facility] .    Service: Skilled Nursing  Contact information:  [address] [id number] [phone]                                          Patient Instructions:       Diet diabetic      Activity as tolerated         Significant Diagnostic Studies: Microbiology: Blood Culture         Lab Results   Component Value Date     LABBLOO No growth after 5 days. 11/06/2024     LABBLOO No growth after 5 days. 11/06/2024      Radiology:   Imaging Results                  MRI Lumbar Spine Without Contrast (Final result)  Result time 11/03/24 09:46:08            Final result by Suresh KennedyEleazar), MD (11/03/24 09:46:08)                           Impression:        Multilevel degenerative disc changes.  Moderate to severe foraminal stenosis at L4-5 and L5-S1 bilaterally        Electronically signed by:Suresh Kennedy MD  Date:                                            11/03/2024  Time:                                            09:46                      Narrative:     EXAMINATION:  MRI LUMBAR SPINE WITHOUT CONTRAST     CLINICAL HISTORY:  Low back pain, cauda equina syndrome suspected;     TECHNIQUE:  Multiplanar, multisequence MR images were acquired from the thoracolumbar junction to the sacrum without the administration of contrast.     COMPARISON:  L1-2: Unremarkable.  No significant disc disease.  No stenosis     FINDINGS:  L1-2: Unremarkable.  No significant disc disease.  No stenosis.     L2-3: Moderate broad base disc bulge with superimposed central disc protrusion this indents on the thecal sac.  No significant central canal stenosis.  No significant foraminal stenosis.     L3-4: Unremarkable.  No significant disc disease or stenosis.     L4-5:  Broad-based disc bulge impresses on the ventral thecal sac without central canal stenosis.  Facet arthropathy and ligament flavum hypertrophy contribute to bilateral moderate neural foraminal encroachment.     L5-S1: Mild to moderate broad-based posterior disc bulge impresses on the thecal sac without central canal stenosis.  Facet arthropathy and ligament flavum hypertrophy contribute to severe bilateral neural foraminal stenosis.                                                X-Ray Chest AP Portable (Final result)  Result time 11/02/24 18:13:15            Final result by Justice Garcia MD (11/02/24 18:13:15)                           Impression:        1.  Low lung volumes with vascular crowding or atelectasis in the lung bases.  Cardiac silhouette size enlargement.  Mild interstitial pulmonary edema or interstitial infectious process difficult to exclude in the right clinical setting.     2.  Stable findings as noted above.        Electronically signed by:Justice Garcia MD  Date:                                            11/02/2024  Time:                                            18:13                     Narrative:     EXAMINATION:  XR CHEST AP PORTABLE     CLINICAL HISTORY:  leukocytosis;      COMPARISON:  Studies dating back to March 11, 2022     FINDINGS:  The study is lordotic in position.  Moderately low lung volumes with vascular crowding or atelectasis in the lung bases.  The lungs are otherwise clear.  The cardiac silhouette size is enlarged.  The trachea is midline and the mediastinal width is normal. Negative for focal infiltrate, effusion or pneumothorax. Pulmonary vasculature is normal. Negative for osseous abnormalities. Tortuous aorta.  There are degenerative changes of the spine and both shoulder girdles.                                                Radiology (Final result)  Result time 11/04/24 17:03:27            Final result by Unknown User (11/04/24 17:03:27)                                                  Cardiac Graphics: Echocardiogram: Transthoracic echo (TTE) complete (Cupid Only):         Results for orders placed or performed during the hospital encounter of 11/02/24   Echo   Result Value Ref Range     BSA 2.1 m2     LVOT stroke volume 90.4 cm3     LVIDd 6.8 (A) 3.5 - 6.0 cm     LV Systolic Volume 169.50 mL     LV Systolic Volume Index 82.7 mL/m2     LVIDs 5.8 (A) 2.1 - 4.0 cm     LV ESV A2C 77.42 mL     LV Diastolic Volume 238.36 mL     LV ESV A4C 66.59 mL     LV Diastolic Volume Index 116.27 mL/m2     Left Ventricular End Systolic Volume by Teichholz Method 169.50 mL     Left Ventricular End Diastolic Volume by Teichholz Method 238.36 mL     IVS 0.9 0.6 - 1.1 cm     LVOT diameter 2.4 cm     LVOT area 4.5 cm2     FS 14.7 (A) 28 - 44 %     Left Ventricle Relative Wall Thickness 0.26 cm     PW 0.9 0.6 - 1.1 cm     LV mass 268.2 g     LV Mass Index 130.8 g/m2     MV Peak E Bassam 0.98 m/s     TDI LATERAL 0.10 m/s     TDI SEPTAL 0.07 m/s     E/E' ratio 11.53 m/s     MV Peak A Bassam 0.85 m/s     TR Max Bassam 3.02 m/s     E/A ratio 1.15       IVRT 121.79 msec     E wave deceleration time 173.57 msec     LV SEPTAL E/E' RATIO 14.00 m/s     LV LATERAL E/E' RATIO 9.80 m/s     LVOT peak bassam  1.0 m/s     Left Ventricular Outflow Tract Mean Velocity 0.71 cm/s     Left Ventricular Outflow Tract Mean Gradient 2.33 mmHg     RVOT peak VTI 9.8 cm     LA size 3.94 cm     Left Atrium Minor Axis 6.23 cm     Left Atrium Major Axis 6.39 cm     LA Vol (MOD) 74.58 mL     VALERIE (MOD) 36.4 mL/m2     RA Major Axis 4.62 cm     Vn Nyquist MS 0.28 m/s     AV mean gradient 4.0 mmHg     AV peak gradient 7.8 mmHg     Ao peak sri 1.4 m/s     Ao VTI 26.5 cm     LVOT peak VTI 20.0 cm     AV valve area 3.4 cm²     AV Velocity Ratio 0.71       AV index (prosthetic) 0.75       KADI by Velocity Ratio 3.2 cm²     Radius 0.85 cm     Vn Nyquist 0.28 m/s     Mr max sri 4.62 m/s     MR PISA EROA 0.27 cm2     MV stenosis pressure 1/2 time 50.33 ms     MV valve area p 1/2 method 4.37 cm2     Triscuspid Valve Regurgitation Peak Gradient 36 mmHg     PV mean gradient 1 mmHg     PV PEAK VELOCITY 1.16 m/s     PV peak gradient 5 mmHg     Pulmonary Valve Mean Velocity 0.70 m/s     RVOT peak sri 0.58 m/s     Ao root annulus 3.86 cm     STJ 2.79 cm     Ascending aorta 3.03 cm     Mean e' 0.09 m/s     ZLVIDS 3.41       ZLVIDD 1.01       LA area A4C 23.01 cm2     LA area A2C 23.93 cm2     RVDD 3.76 cm     VALERIE 44.3 mL/m2     LA Vol 90.85 cm3     LA WIDTH 4.3 cm     RA Width 3.4 cm     TAPSE 1.60 cm     TV resting pulmonary artery pressure 44 mmHg     RV TB RVSP 11 mmHg     Est. RA pres 8 mmHg     Mondragon's Biplane MOD Ejection Fraction 38 %     Narrative       Left Ventricle: The left ventricle is mildly dilated. Mildly increased   ventricular mass. Normal wall thickness. There is eccentric hypertrophy.   There is moderately reduced systolic function with a visually estimated   ejection fraction of 35 - 40%. There is normal diastolic function.    Right Ventricle: Normal right ventricular cavity size. Wall thickness   is normal. Systolic function is mildly reduced.    Left Atrium: Left atrium is mildly dilated.    Right Atrium: Right atrium is mildly  dilated.    Aortic Valve: There is mild aortic valve sclerosis.    Mitral Valve: There is moderate regurgitation.    Tricuspid Valve: There is mild regurgitation.    Pulmonic Valve: There is mild regurgitation.    Pulmonary Artery: There is mild pulmonary hypertension. The estimated   pulmonary artery systolic pressure is 44 mmHg.    IVC/SVC: Intermediate venous pressure at 8 mmHg.            Pending Diagnostic Studies:         None             Medications:  Reconciled Home Medications:       Medication List          START taking these medications       cefTRIAXone 2 gram injection  Commonly known as: ROCEPHIN  Inject 2 g into the vein Daily. for 9 days      linezolid 600 mg Tab  Commonly known as: ZYVOX  Take 1 tablet (600 mg total) by mouth every 12 (twelve) hours. for 9 days                CONTINUE taking these medications       ACCU-CHEK GUIDE ME GLUCOSE MTR Misc  Generic drug: blood-glucose meter  USE AS INSTRUCTED      albuterol 90 mcg/actuation inhaler  Commonly known as: PROVENTIL/VENTOLIN HFA  INHALE 2 PUFFS INTO THE LUNGS EVERY 4 HOURS AS NEEDED      * allopurinoL 100 MG tablet  Commonly known as: ZYLOPRIM  TAKE 1 TABLET(100 MG) BY MOUTH EVERY DAY      * allopurinoL 300 MG tablet  Commonly known as: ZYLOPRIM  TAKE 1 TABLET(300 MG) BY MOUTH EVERY DAY      aspirin 81 MG EC tablet  Commonly known as: ECOTRIN  Take 81 mg by mouth once daily.      baclofen 10 MG tablet  Commonly known as: LIORESAL  Take 1 tablet by mouth every evening.      bisoprolol 5 MG tablet  Commonly known as: ZEBETA  TAKE 1/2 TABLET BY MOUTH EVERY DAY      blood sugar diagnostic Strp  Check blood glucose twice per day      colchicine 0.6 mg tablet  Commonly known as: COLCRYS  Take 1 tablet (0.6 mg total) by mouth daily as needed (gout flare).      empagliflozin 10 mg tablet  Commonly known as: JARDIANCE  Take 1 tablet (10 mg total) by mouth once daily.      fluticasone propionate 50 mcg/actuation nasal spray  Commonly known as:  FLONASE  SHAKE LIQUID AND USE 2 SPRAYS(100 MCG) IN EACH NOSTRIL EVERY DAY Strength: 50 mcg/actuation      glimepiride 4 MG tablet  Commonly known as: AMARYL  Take 1 tablet (4 mg total) by mouth before breakfast.      guaiFENesin 100 mg/5 ml 100 mg/5 mL syrup  Commonly known as: ROBITUSSIN  Take 200 mg by mouth every evening.      HUMIRA(CF) PEN 40 mg/0.4 mL Pnkt  Generic drug: adalimumab  Inject 0.4 mLs (40 mg total) into the skin every 14 (fourteen) days.      HYDROcodone-acetaminophen 7.5-325 mg per tablet  Commonly known as: NORCO  Take 1 tablet by mouth every 4 (four) hours as needed (for chronic pain).      lancets Misc  Commonly known as: ACCU-CHEK SOFTCLIX LANCETS  Check BG daily      latanoprost 0.005 % ophthalmic solution  Place 1 drop into the right eye every evening.      magnesium oxide 400 mg (241.3 mg magnesium) tablet  Commonly known as: MAG-OX  TAKE 2 TABLETS(800 MG) BY MOUTH TWICE DAILY      metFORMIN 500 MG tablet  Commonly known as: GLUCOPHAGE  TAKE 1 TABLET(500 MG) BY MOUTH DAILY WITH BREAKFAST      multivitamin-minerals-lutein Tab  Take 1 tablet by mouth Daily.      omeprazole 40 MG capsule  Commonly known as: PRILOSEC  TAKE 1 CAPSULE BY MOUTH EVERY DAY      potassium chloride SA 20 MEQ tablet  Commonly known as: K-DUR,KLOR-CON  TAKE 3 TABLETS BY MOUTH TWICE DAILY      simvastatin 40 MG tablet  Commonly known as: ZOCOR  TAKE 1 TABLET(40 MG) BY MOUTH EVERY EVENING      torsemide 20 MG Tab  Commonly known as: DEMADEX  Take 2 tablets (40 mg total) by mouth once daily.      WIXELA INHUB 250-50 mcg/dose diskus inhaler  Generic drug: fluticasone-salmeterol 250-50 mcg/dose  INHALE 1 PUFF INTO THE LUNGS TWICE DAILY              * This list has 2 medication(s) that are the same as other medications prescribed for you. Read the directions carefully, and ask your doctor or other care provider to review them with you.                    ASK your doctor about these medications       varenicline 1 mg  Tab  Commonly known as: CHANTIX  take 1 tablet by mouth twice daily. Take with full glass of water & with food to avoid nausea                   Indwelling Lines/Drains at time of discharge:   Lines/Drains/Airways         Peripherally Inserted Central Catheter Line  Duration                PICC Double Lumen 11/08/24 1021 right basilic 4 days                          Time spent on the discharge of patient: 31 minutes           Jovanny Sanchez MD  Department of Hospital Medicine  [facility])     Past Medical History:   Diagnosis Date    Chronic combined systolic and diastolic congestive heart failure 07/09/2020    Chronic kidney disease, stage 3     Chronic obstructive pulmonary disease 03/20/2023    Wixela 250 mcg, Spiriva respimat. Continue Albuterol inhaler and albuterol nebs   Referral pul dis management, education and assistance with medication  Patient preference to only see a doctor, request follow up with Dr. Springer             Chronic venous insufficiency     DDD (degenerative disc disease), lumbar     DM (diabetes mellitus) with complications     Drug-induced constipation 11/03/2024    History of gout     Hyperlipidemia associated with type 2 diabetes mellitus     Hypertension associated with stage 3 chronic kidney disease due to type 2 diabetes mellitus     BRADLEY on CPAP     Prostate cancer     prostatectomy in 2010, rising PSA that started in 2021     Past Surgical History:   Procedure Laterality Date    BICEPS TENDON REPAIR      COLONOSCOPY N/A 03/27/2019    Procedure: COLONOSCOPY;  Surgeon: James Roger MD;  Location: [facility] ENDO;  Service: Endoscopy;  Laterality: N/A;    EXPLORATION OF ORBIT Right 9/8/2023    Procedure: EXPLORATION, ORBIT;  Surgeon: Junaid Bartholomew MD;  Location: [facility] OR;  Service: ENT;  Laterality: Right;  possibly need frozen    HEMORRHOID SURGERY      INCISION AND DRAINAGE OF ABSCESS Right 11/8/2024    Procedure: INCISION AND DRAINAGE, ABSCESS;  Surgeon:  Torres Magallanes MD;  Location: [facility] OR;  Service: General;  Laterality: Right;  4:30, patient ate    INGUINAL HERNIA REPAIR Left     KNEE ARTHROSCOPY Right     LEFT HEART CATHETERIZATION Left 06/29/2020    Procedure: CATHETERIZATION, HEART, LEFT;  Surgeon: Cheli Wilson MD;  Location: [facility] CATH LAB;  Service: Cardiology;  Laterality: Left;    LUMBAR LAMINECTOMY      PROSTATECTOMY      TONSILLECTOMY       Review of patient's allergies indicates:   Allergen Reactions    Amitriptyline Rash    Celecoxib Rash     Current Outpatient Medications on File Prior to Visit   Medication Sig Dispense Refill    adalimumab (HUMIRA,CF, PEN) 40 mg/0.4 mL PnKt Inject 0.4 mLs (40 mg total) into the skin every 14 (fourteen) days. 2 pen 11    allopurinoL (ZYLOPRIM) 300 MG tablet TAKE 1 TABLET(300 MG) BY MOUTH EVERY DAY 90 tablet 3    aspirin (ECOTRIN) 81 MG EC tablet Take 81 mg by mouth once daily.      baclofen (LIORESAL) 10 MG tablet Take 1 tablet by mouth every evening.  2    bisoprolol (ZEBETA) 5 MG tablet TAKE 1/2 TABLET BY MOUTH EVERY DAY 90 tablet 3    blood sugar diagnostic Strp Check blood glucose twice per day 200 strip 3    blood-glucose meter (ACCU-CHEK GUIDE ME GLUCOSE MTR) Misc USE AS INSTRUCTED 1 each 0    empagliflozin (JARDIANCE) 10 mg tablet Take 1 tablet (10 mg total) by mouth once daily. 90 tablet 3    fluticasone propionate (FLONASE) 50 mcg/actuation nasal spray SHAKE AND SPRAY TWICE IN EACH NOSTRIL EVERY DAY 48 g 3    guaiFENesin 100 mg/5 ml (ROBITUSSIN) 100 mg/5 mL syrup Take 200 mg by mouth every evening.      HYDROcodone-acetaminophen (NORCO) 7.5-325 mg per tablet Take 1 tablet by mouth every 4 (four) hours as needed (for chronic pain). 20 tablet 0    lancets (ACCU-CHEK SOFTCLIX LANCETS) Misc Check BG daily 100 each 3    latanoprost 0.005 % ophthalmic solution Place 1 drop into the right eye every evening. 2.5 mL 12    magnesium oxide (MAG-OX) 400 mg (241.3 mg magnesium) tablet TAKE 2 TABLETS(800 MG) BY  MOUTH TWICE DAILY 120 tablet 6    multivitamin-minerals-lutein Tab Take 1 tablet by mouth Daily.      omeprazole (PRILOSEC) 40 MG capsule TAKE 1 CAPSULE BY MOUTH EVERY DAY 90 capsule 3    potassium chloride SA (K-DUR,KLOR-CON) 20 MEQ tablet TAKE 3 TABLETS BY MOUTH TWICE DAILY 720 tablet 2    simvastatin (ZOCOR) 40 MG tablet TAKE 1 TABLET(40 MG) BY MOUTH EVERY EVENING 90 tablet 3    torsemide (DEMADEX) 20 MG Tab Take 2 tablets (40 mg total) by mouth once daily. 180 tablet 2    [DISCONTINUED] albuterol (PROVENTIL/VENTOLIN HFA) 90 mcg/actuation inhaler INHALE 2 PUFFS INTO THE LUNGS EVERY 4 HOURS AS NEEDED 25.5 g 3    [DISCONTINUED] WIXELA INHUB 250-50 mcg/dose diskus inhaler INHALE 1 PUFF INTO THE LUNGS TWICE DAILY 60 each 11    colchicine (COLCRYS) 0.6 mg tablet Take 1 tablet (0.6 mg total) by mouth daily as needed (gout flare). 30 tablet 0    [DISCONTINUED] glimepiride (AMARYL) 4 MG tablet Take 1 tablet (4 mg total) by mouth before breakfast. 90 tablet 3    [DISCONTINUED] metFORMIN (GLUCOPHAGE) 500 MG tablet TAKE 1 TABLET(500 MG) BY MOUTH DAILY WITH BREAKFAST 90 tablet 3    [DISCONTINUED] varenicline (CHANTIX) 1 mg Tab take 1 tablet by mouth twice daily. Take with full glass of water & with food to avoid nausea 56 tablet 0     No current facility-administered medications on file prior to visit.     Social History     Socioeconomic History    Marital status:    Tobacco Use    Smoking status: Former     Current packs/day: 0.00     Average packs/day: 1.5 packs/day for 52.7 years (79.1 ttl pk-yrs)     Types: Cigarettes     Start date: 1971     Quit date: 9/15/2023     Years since quittin.3    Smokeless tobacco: Former   Substance and Sexual Activity    Alcohol use: Not Currently    Drug use: Never    Sexual activity: Not Currently     Partners: Female     Social Drivers of Health     Financial Resource Strain: Medium Risk (2024)    Overall Financial Resource Strain (CARDIA)     Difficulty of Paying  "Living Expenses: Somewhat hard   Food Insecurity: No Food Insecurity (11/7/2024)    Hunger Vital Sign     Worried About Running Out of Food in the Last Year: Never true     Ran Out of Food in the Last Year: Never true   Transportation Needs: No Transportation Needs (11/7/2024)    TRANSPORTATION NEEDS     Transportation : No   Physical Activity: Inactive (4/20/2024)    Exercise Vital Sign     Days of Exercise per Week: 0 days     Minutes of Exercise per Session: 0 min   Stress: Stress Concern Present (11/7/2024)    [employer] [place] [employer] - Occupational Stress Questionnaire     Feeling of Stress : To some extent   Housing Stability: Low Risk  (11/7/2024)    Housing Stability Vital Sign     Unable to Pay for Housing in the Last Year: No     Homeless in the Last Year: No     Family History   Problem Relation Name Age of Onset    Prostate cancer Father      Coronary artery disease Father  90    Alzheimer's disease Father      Hyperlipidemia Mother         Review of Systems   Constitutional:  Positive for fatigue. Negative for fever.   HENT:  Positive for postnasal drip, rhinorrhea and congestion.    Eyes:  Negative for redness and itching.   Respiratory:  Positive for cough, sputum production, shortness of breath, dyspnea on extertion, use of rescue inhaler and Paroxysmal Nocturnal Dyspnea.    Cardiovascular:  Negative for chest pain, palpitations and leg swelling.   Genitourinary:  Negative for difficulty urinating and hematuria.   Endocrine:  Negative for cold intolerance and heat intolerance.    Skin:  Negative for rash.   Gastrointestinal:  Negative for nausea and abdominal pain.   Neurological:  Negative for dizziness, syncope, weakness and light-headedness.   Hematological:  Negative for adenopathy. Does not bruise/bleed easily.   Psychiatric/Behavioral:  Negative for sleep disturbance. The patient is not nervous/anxious.      Objective:      /60   Pulse 85   Resp 18   Ht 5' 7" (1.702 m)  " " Wt 95 kg (209 lb 7 oz)   SpO2 98%   BMI 32.80 kg/m²   Physical Exam  Vitals and nursing note reviewed.   Constitutional:       Appearance: He is well-developed. He is obese.   HENT:      Head: Normocephalic and atraumatic.      Nose: Nose normal.   Eyes:      Conjunctiva/sclera: Conjunctivae normal.      Pupils: Pupils are equal, round, and reactive to light.   Neck:      Thyroid: No thyromegaly.      Vascular: No JVD.      Trachea: No tracheal deviation.   Cardiovascular:      Rate and Rhythm: Normal rate and regular rhythm.      Heart sounds: No murmur heard.  Pulmonary:      Breath sounds: Examination of the right-lower field reveals wheezing. Examination of the left-lower field reveals wheezing. Decreased breath sounds and wheezing present. No rhonchi or rales.   Abdominal:      General: Bowel sounds are normal.      Palpations: Abdomen is soft.   Musculoskeletal:         General: No tenderness.      Cervical back: Neck supple.      Comments: Decreased range of motion of knees.     Lymphadenopathy:      Cervical: No cervical adenopathy.   Skin:     General: Skin is warm and dry.   Neurological:      Mental Status: He is alert and oriented to person, place, and time.     Personal Diagnostic Review  CT of chest reviewed  - stable         1/27/2025     2:57 PM   Pulmonary Studies Review   SpO2 98 %   Height 5' 7" (1.702 m)   Weight 95 kg (209 lb 7 oz)   BMI (Calculated) 32.8   Predicted Distance 296.37   Predicted Distance Meters (Calculated) 445.75 meters       X-Ray Hand 3 View Right  Narrative: EXAMINATION:  XR HAND COMPLETE 3 VIEW RIGHT    CLINICAL HISTORY:  Right hand pain and swelling;    TECHNIQUE:  PA, lateral, and oblique views of the right hand were performed.    COMPARISON:  X-ray dated 08/31/2021    FINDINGS:  No acute fracture or dislocation.  Bone mineralization is within normal limits.  No aggressive lytic or blastic lesion.  No osseous erosion or aggressive periosteal reaction.  Degenerative " "changes noted at the radiocarpal joint, 1st CMC joint, and 1st MCP joint.  Additional mild degenerative spurring noted in the distal interphalangeal joints.  Normal joint alignment is well maintained.  Faint calcification noted at the TFCC and 3rd MCP joint.  Soft tissues are otherwise unremarkable.  Impression: No acute findings.  Scattered degenerative changes with evidence of chondrocalcinosis.    Electronically signed by: Miguel Cantrell  Date:    11/11/2024  Time:    10:57      Office Spirometry Results:         1/27/2025     2:57 PM 12/12/2024     8:19 AM 12/9/2024    10:40 AM 11/12/2024     7:52 AM 11/12/2024     4:16 AM 11/12/2024    12:39 AM 11/11/2024     8:00 PM   Pulmonary Function Tests   SpO2 98 %   93 % 95 % 96 % 95 %   Height 5' 7" (1.702 m) 5' 7" (1.702 m) 5' 7" (1.702 m)       Weight 95 kg (209 lb 7 oz) 88 kg (194 lb 0.1 oz) 94.1 kg (207 lb 7.3 oz)   90.8 kg (200 lb 2.8 oz)    BMI (Calculated) 32.8 30.4 32.5   31.3          1/27/2025     2:57 PM   Pulmonary Studies Review   SpO2 98 %   Height 5' 7" (1.702 m)   Weight 95 kg (209 lb 7 oz)   BMI (Calculated) 32.8   Predicted Distance 296.37   Predicted Distance Meters (Calculated) 445.75 meters           No results found for this or any previous visit (from the past 2 weeks).    Assessment:       Class 1 obesity due to excess calories with serious comorbidity and body mass index (BMI) of 33.0 to 33.9 in adult  Last documentation =       BMI noted to be elevated. Changes in weight over time reviewed in chart (if available) and by patient recollection. Patient advised and counseled concerning the adverse medical consequences of obesity. Treatment options discussed - calorie restriction, increasing calorie expenditure, medical and surgical options noted.  The patient's severe obesity was monitored, evaluated, addressed and/or treated.   2013 AHA/ACC/TOS guideline for the management of overweight and obesity in adults: a report of the American College " of Cardiology/American Heart Association Task Force on Practice Guidelines and The Obesity Society        Class 1 obesity due to excess calories with serious comorbidity and body mass index (BMI) of 32.0 to 32.9 in adult  Last documentation =       BMI noted to be elevated. Changes in weight over time reviewed in chart (if available) and by patient recollection. Patient advised and counseled concerning the adverse medical consequences of obesity. Treatment options discussed - calorie restriction, increasing calorie expenditure, medical and surgical options noted.  The patient's severe obesity was monitored, evaluated, addressed and/or treated.   2013 AHA/ACC/TOS guideline for the management of overweight and obesity in adults: a report of the American College of Cardiology/American Heart Association Task Force on Practice Guidelines and The Obesity Society    BRADLEY on CPAP    Chronic obstructive pulmonary disease, unspecified COPD type  -     fluticasone-salmeterol diskus inhaler 250-50 mcg; Inhale 1 puff into the lungs 2 (two) times daily.  Dispense: 180 each; Refill: 3  -     Complete PFT with bronchodilator; Future; Expected date: 01/27/2025    Class 1 obesity due to excess calories with serious comorbidity and body mass index (BMI) of 32.0 to 32.9 in adult    Class 1 obesity due to excess calories with serious comorbidity and body mass index (BMI) of 33.0 to 33.9 in adult    COPD with chronic bronchitis and emphysema  -     albuterol (PROVENTIL/VENTOLIN HFA) 90 mcg/actuation inhaler; Inhale 2 puffs into the lungs every 4 (four) hours as needed for Shortness of Breath or Wheezing.  Dispense: 25.5 g; Refill: 3  -     Six Minute Walk Test to qualify for Home Oxygen; Future    Nicotine dependence, cigarettes, in remission  -     CT Chest Lung Screening Low Dose; Future; Expected date: 01/27/2025    Exercise hypoxemia  -     Six Minute Walk Test to qualify for Home Oxygen; Future          Outpatient Encounter  Medications as of 1/27/2025   Medication Sig Dispense Refill    adalimumab (HUMIRA,CF, PEN) 40 mg/0.4 mL PnKt Inject 0.4 mLs (40 mg total) into the skin every 14 (fourteen) days. 2 pen 11    allopurinoL (ZYLOPRIM) 300 MG tablet TAKE 1 TABLET(300 MG) BY MOUTH EVERY DAY 90 tablet 3    aspirin (ECOTRIN) 81 MG EC tablet Take 81 mg by mouth once daily.      baclofen (LIORESAL) 10 MG tablet Take 1 tablet by mouth every evening.  2    bisoprolol (ZEBETA) 5 MG tablet TAKE 1/2 TABLET BY MOUTH EVERY DAY 90 tablet 3    blood sugar diagnostic Strp Check blood glucose twice per day 200 strip 3    blood-glucose meter (ACCU-CHEK GUIDE ME GLUCOSE MTR) Misc USE AS INSTRUCTED 1 each 0    empagliflozin (JARDIANCE) 10 mg tablet Take 1 tablet (10 mg total) by mouth once daily. 90 tablet 3    fluticasone propionate (FLONASE) 50 mcg/actuation nasal spray SHAKE AND SPRAY TWICE IN EACH NOSTRIL EVERY DAY 48 g 3    guaiFENesin 100 mg/5 ml (ROBITUSSIN) 100 mg/5 mL syrup Take 200 mg by mouth every evening.      HYDROcodone-acetaminophen (NORCO) 7.5-325 mg per tablet Take 1 tablet by mouth every 4 (four) hours as needed (for chronic pain). 20 tablet 0    lancets (ACCU-CHEK SOFTCLIX LANCETS) Misc Check BG daily 100 each 3    latanoprost 0.005 % ophthalmic solution Place 1 drop into the right eye every evening. 2.5 mL 12    magnesium oxide (MAG-OX) 400 mg (241.3 mg magnesium) tablet TAKE 2 TABLETS(800 MG) BY MOUTH TWICE DAILY 120 tablet 6    multivitamin-minerals-lutein Tab Take 1 tablet by mouth Daily.      omeprazole (PRILOSEC) 40 MG capsule TAKE 1 CAPSULE BY MOUTH EVERY DAY 90 capsule 3    potassium chloride SA (K-DUR,KLOR-CON) 20 MEQ tablet TAKE 3 TABLETS BY MOUTH TWICE DAILY 720 tablet 2    simvastatin (ZOCOR) 40 MG tablet TAKE 1 TABLET(40 MG) BY MOUTH EVERY EVENING 90 tablet 3    torsemide (DEMADEX) 20 MG Tab Take 2 tablets (40 mg total) by mouth once daily. 180 tablet 2    [DISCONTINUED] albuterol (PROVENTIL/VENTOLIN HFA) 90 mcg/actuation  inhaler INHALE 2 PUFFS INTO THE LUNGS EVERY 4 HOURS AS NEEDED 25.5 g 3    [DISCONTINUED] WIXELA INHUB 250-50 mcg/dose diskus inhaler INHALE 1 PUFF INTO THE LUNGS TWICE DAILY 60 each 11    albuterol (PROVENTIL/VENTOLIN HFA) 90 mcg/actuation inhaler Inhale 2 puffs into the lungs every 4 (four) hours as needed for Shortness of Breath or Wheezing. 25.5 g 3    colchicine (COLCRYS) 0.6 mg tablet Take 1 tablet (0.6 mg total) by mouth daily as needed (gout flare). 30 tablet 0    fluticasone-salmeterol diskus inhaler 250-50 mcg Inhale 1 puff into the lungs 2 (two) times daily. 180 each 3    [DISCONTINUED] glimepiride (AMARYL) 4 MG tablet Take 1 tablet (4 mg total) by mouth before breakfast. 90 tablet 3    [DISCONTINUED] metFORMIN (GLUCOPHAGE) 500 MG tablet TAKE 1 TABLET(500 MG) BY MOUTH DAILY WITH BREAKFAST 90 tablet 3    [DISCONTINUED] varenicline (CHANTIX) 1 mg Tab take 1 tablet by mouth twice daily. Take with full glass of water & with food to avoid nausea 56 tablet 0     No facility-administered encounter medications on file as of 1/27/2025.     Plan:       Requested Prescriptions     Signed Prescriptions Disp Refills    albuterol (PROVENTIL/VENTOLIN HFA) 90 mcg/actuation inhaler 25.5 g 3     Sig: Inhale 2 puffs into the lungs every 4 (four) hours as needed for Shortness of Breath or Wheezing.    fluticasone-salmeterol diskus inhaler 250-50 mcg 180 each 3     Sig: Inhale 1 puff into the lungs 2 (two) times daily.     Problem List Items Addressed This Visit       Chronic obstructive pulmonary disease (Chronic)    Overview     Wixela 250 mcg, Spiriva respimat. Continue Albuterol inhaler and albuterol nebs   Referral pul dis management, education and assistance with medication  Patient preference to only see a doctor, request follow up with Dr. Springer              Relevant Medications    fluticasone-salmeterol diskus inhaler 250-50 mcg    Other Relevant Orders    Complete PFT with bronchodilator    RESOLVED: Class 1 obesity  due to excess calories with serious comorbidity and body mass index (BMI) of 33.0 to 33.9 in adult (Chronic)    Current Assessment & Plan     Last documentation =       BMI noted to be elevated. Changes in weight over time reviewed in chart (if available) and by patient recollection. Patient advised and counseled concerning the adverse medical consequences of obesity. Treatment options discussed - calorie restriction, increasing calorie expenditure, medical and surgical options noted.  The patient's severe obesity was monitored, evaluated, addressed and/or treated.   2013 AHA/ACC/TOS guideline for the management of overweight and obesity in adults: a report of the American College of Cardiology/American Heart Association Task Force on Practice Guidelines and The Obesity Society             BRADLEY on CPAP - Primary (Chronic)    Overview     12/11/2019, 12/12/2019 Home Sleep Study  2 nights Severe BRADLEY (AHI=37) .   CPAP 10 cm.   Full face mask  HME: [facility]           Class 1 obesity due to excess calories with serious comorbidity and body mass index (BMI) of 32.0 to 32.9 in adult    Current Assessment & Plan     Last documentation =       BMI noted to be elevated. Changes in weight over time reviewed in chart (if available) and by patient recollection. Patient advised and counseled concerning the adverse medical consequences of obesity. Treatment options discussed - calorie restriction, increasing calorie expenditure, medical and surgical options noted.  The patient's severe obesity was monitored, evaluated, addressed and/or treated.   2013 AHA/ACC/TOS guideline for the management of overweight and obesity in adults: a report of the American College of Cardiology/American Heart Association Task Force on Practice Guidelines and The Obesity Society            Other Visit Diagnoses       COPD with chronic bronchitis and emphysema        Relevant Medications    albuterol (PROVENTIL/VENTOLIN HFA) 90 mcg/actuation inhaler     Other Relevant Orders    Six Minute Walk Test to qualify for Home Oxygen    Nicotine dependence, cigarettes, in remission        Relevant Orders    CT Chest Lung Screening Low Dose    Exercise hypoxemia        Relevant Orders    Six Minute Walk Test to qualify for Home Oxygen               Follow up in about 1 year (around 1/27/2026) for CT - on return visit, PFT -return, 6 min walk - on return.    MEDICAL DECISION MAKING: Moderate to high complexity.  Overall, the multiple problems listed are of moderate to high severity that may impact quality of life and activities of daily living. Side effects of medications, treatment plan as well as options and alternatives reviewed and discussed with patient. There was counseling of patient concerning these issues.    Total time spent in counseling and coordination of care - 33  minutes of total time spent on the encounter, which includes face to face time and non-face to face time preparing to see the patient (eg, review of tests), Obtaining and/or reviewing separately obtained history, Documenting clinical information in the electronic or other health record, Independently interpreting results (not separately reported) and communicating results to the patient/family/caregiver, or Care coordination (not separately reported).    Time was used in discussion of prognosis, risks, benefits of treatment, instructions and compliance with regimen . Discussion with other physicians and/or health care providers - home health or for use of durable medical equipment (oxygen, nebulizers, CPAP, BiPAP) occurred.

## 2025-01-27 NOTE — ASSESSMENT & PLAN NOTE
Last documentation =       BMI noted to be elevated. Changes in weight over time reviewed in chart (if available) and by patient recollection. Patient advised and counseled concerning the adverse medical consequences of obesity. Treatment options discussed - calorie restriction, increasing calorie expenditure, medical and surgical options noted.  The patient's severe obesity was monitored, evaluated, addressed and/or treated.   2013 AHA/ACC/TOS guideline for the management of overweight and obesity in adults: a report of the American College of Cardiology/American Heart Association Task Force on Practice Guidelines and The Obesity Society

## 2025-01-28 ENCOUNTER — PATIENT MESSAGE (OUTPATIENT)
Dept: PULMONOLOGY | Facility: CLINIC | Age: 75
End: 2025-01-28
Payer: MEDICARE

## 2025-02-03 ENCOUNTER — CLINICAL SUPPORT (OUTPATIENT)
Dept: SMOKING CESSATION | Facility: CLINIC | Age: 75
End: 2025-02-03
Payer: COMMERCIAL

## 2025-02-03 DIAGNOSIS — F17.200 NICOTINE DEPENDENCE: Primary | ICD-10-CM

## 2025-02-03 PROCEDURE — 99404 PREV MED CNSL INDIV APPRX 60: CPT | Mod: S$GLB,,, | Performed by: SPEECH-LANGUAGE PATHOLOGIST

## 2025-02-03 PROCEDURE — 99999 PR PBB SHADOW E&M-EST. PATIENT-LVL II: CPT | Mod: PBBFAC,,, | Performed by: SPEECH-LANGUAGE PATHOLOGIST

## 2025-02-03 NOTE — PROGRESS NOTES
"Individual Follow-Up Form    2/3/2025    Quit Date: 9/15/2024     Clinical Status of Patient: Outpatient    Length of Service: 60 minutes    Continuing Medication: no    Other Medications:      Target Symptoms: Withdrawal and medication side effects. The following were  rated moderate (3) to severe (4) on TCRS:  Moderate (3): anger/irritability/frustration, depressed mood/sadness, difficulty concentrating, anxious/nervous, restless/can't sit still/impatient  Severe (4): insomnia    Comments: Patient seen in clinic. Patient reports he has remained smoke free since last session; but reports "barely" due to the stressors that are occurring in his home with the decline of his brother's health & recent change with his mother's health declining. Assessed CO. CO 4. Administered TCRS with patient reporting mild symptoms of desire/crave, increased appetite/hunger, moderate symptoms of anger/irritability/frustration, depressed mood/sadness, difficulty concentrating, anxious/nervous, restless/can't sit still/impatient & severe symptoms of insomnia. Educated patient on the s/s of stress on the body & how they mimic the s/s of nicotine withdrawal. Active listening & emotional support provided. Patient reports he knows in his mind that smoking won't change anything & states as long as he keeps that at the forefront, he will be fine. Patient does utilize the cinnamon toothpicks which he reports help. Commended the patient for remaining smoke free despite the recent changes with the health status of his family & not turning to tobacco to cope. Goal is to remain smoke free. Follow up set for 2/24/2025 at 2:00 pm.       Diagnosis: F17.200    Next Visit: 4 weeks    "

## 2025-02-24 ENCOUNTER — CLINICAL SUPPORT (OUTPATIENT)
Dept: SMOKING CESSATION | Facility: CLINIC | Age: 75
End: 2025-02-24
Payer: COMMERCIAL

## 2025-02-24 ENCOUNTER — PATIENT MESSAGE (OUTPATIENT)
Dept: INTERNAL MEDICINE | Facility: CLINIC | Age: 75
End: 2025-02-24
Payer: MEDICARE

## 2025-02-24 DIAGNOSIS — F17.200 NICOTINE DEPENDENCE: Primary | ICD-10-CM

## 2025-02-24 PROCEDURE — 99999 PR PBB SHADOW E&M-EST. PATIENT-LVL II: CPT | Mod: PBBFAC,,, | Performed by: SPEECH-LANGUAGE PATHOLOGIST

## 2025-02-24 PROCEDURE — 99404 PREV MED CNSL INDIV APPRX 60: CPT | Mod: S$GLB,,, | Performed by: SPEECH-LANGUAGE PATHOLOGIST

## 2025-02-24 NOTE — PROGRESS NOTES
Individual Follow-Up Form    2/24/2025    Quit Date:  9/15/2023     Clinical Status of Patient: Outpatient     Length of Service: 60 minutes     Continuing Medication: no    Other Medications:      Target Symptoms: Withdrawal and medication side effects. The following were  rated moderate (3) to severe (4) on TCRS:  Moderate (3): anger/irritability/frustration, depressed mood/sadness, insomnia  Severe (4): none    Comments: Patient seen in clinic. Patient reports he has remained smoke free since last session. He reports he has been wanting to smoke really bad & was exposed to tobacco; but didn't partake. Administered TCRS with patient reporting Mild symptoms of desire/crave, difficulty concentrating, anxious/nervous, restless/can't sit still/impatient & moderate symptoms of anger/irritability/frustration, depressed mood/sadness, insomnia. Praised patient on not turning to tobacco despite his stress & access. Patient's goal is to remain smoke free. Follow up set for 3/24/2025 at 1:00 pm.     Diagnosis: F17.200    Next Visit: 4 weeks

## 2025-02-25 ENCOUNTER — LAB VISIT (OUTPATIENT)
Dept: LAB | Facility: HOSPITAL | Age: 75
End: 2025-02-25
Payer: MEDICARE

## 2025-02-25 ENCOUNTER — OFFICE VISIT (OUTPATIENT)
Dept: INTERNAL MEDICINE | Facility: CLINIC | Age: 75
End: 2025-02-25
Payer: MEDICARE

## 2025-02-25 VITALS
TEMPERATURE: 96 F | BODY MASS INDEX: 32.7 KG/M2 | OXYGEN SATURATION: 95 % | WEIGHT: 208.75 LBS | HEART RATE: 75 BPM | DIASTOLIC BLOOD PRESSURE: 73 MMHG | SYSTOLIC BLOOD PRESSURE: 107 MMHG

## 2025-02-25 DIAGNOSIS — F11.20 OPIOID DEPENDENCE, UNCOMPLICATED: ICD-10-CM

## 2025-02-25 DIAGNOSIS — E78.5 HYPERLIPIDEMIA ASSOCIATED WITH TYPE 2 DIABETES MELLITUS: ICD-10-CM

## 2025-02-25 DIAGNOSIS — I27.20 PULMONARY HTN: ICD-10-CM

## 2025-02-25 DIAGNOSIS — M1A.9XX0 CHRONIC GOUT WITHOUT TOPHUS, UNSPECIFIED CAUSE, UNSPECIFIED SITE: ICD-10-CM

## 2025-02-25 DIAGNOSIS — N18.32 STAGE 3B CHRONIC KIDNEY DISEASE: Primary | ICD-10-CM

## 2025-02-25 DIAGNOSIS — E11.8 DM (DIABETES MELLITUS) WITH COMPLICATIONS: ICD-10-CM

## 2025-02-25 DIAGNOSIS — E11.22 HYPERTENSION ASSOCIATED WITH STAGE 3 CHRONIC KIDNEY DISEASE DUE TO TYPE 2 DIABETES MELLITUS: ICD-10-CM

## 2025-02-25 DIAGNOSIS — L98.9 SKIN LESIONS: ICD-10-CM

## 2025-02-25 DIAGNOSIS — I12.9 HYPERTENSION ASSOCIATED WITH STAGE 3 CHRONIC KIDNEY DISEASE DUE TO TYPE 2 DIABETES MELLITUS: ICD-10-CM

## 2025-02-25 DIAGNOSIS — C61 PROSTATE CANCER: ICD-10-CM

## 2025-02-25 DIAGNOSIS — M10.9 ACUTE GOUT OF RIGHT HAND, UNSPECIFIED CAUSE: ICD-10-CM

## 2025-02-25 DIAGNOSIS — E11.69 HYPERLIPIDEMIA ASSOCIATED WITH TYPE 2 DIABETES MELLITUS: ICD-10-CM

## 2025-02-25 DIAGNOSIS — Z79.899 OTHER LONG TERM (CURRENT) DRUG THERAPY: ICD-10-CM

## 2025-02-25 DIAGNOSIS — N18.30 HYPERTENSION ASSOCIATED WITH STAGE 3 CHRONIC KIDNEY DISEASE DUE TO TYPE 2 DIABETES MELLITUS: ICD-10-CM

## 2025-02-25 LAB
ALBUMIN SERPL BCP-MCNC: 4.1 G/DL (ref 3.5–5.2)
ALP SERPL-CCNC: 86 U/L (ref 40–150)
ALT SERPL W/O P-5'-P-CCNC: 24 U/L (ref 10–44)
ANION GAP SERPL CALC-SCNC: 11 MMOL/L (ref 8–16)
AST SERPL-CCNC: 51 U/L (ref 10–40)
BASOPHILS # BLD AUTO: 0.06 K/UL (ref 0–0.2)
BASOPHILS NFR BLD: 0.8 % (ref 0–1.9)
BILIRUB SERPL-MCNC: 0.6 MG/DL (ref 0.1–1)
BUN SERPL-MCNC: 24 MG/DL (ref 8–23)
CALCIUM SERPL-MCNC: 9.6 MG/DL (ref 8.7–10.5)
CHLORIDE SERPL-SCNC: 100 MMOL/L (ref 95–110)
CHOLEST SERPL-MCNC: 135 MG/DL (ref 120–199)
CHOLEST/HDLC SERPL: 2.5 {RATIO} (ref 2–5)
CO2 SERPL-SCNC: 28 MMOL/L (ref 23–29)
COMPLEXED PSA SERPL-MCNC: 12.8 NG/ML (ref 0–4)
CREAT SERPL-MCNC: 1.8 MG/DL (ref 0.5–1.4)
DIFFERENTIAL METHOD BLD: ABNORMAL
EOSINOPHIL # BLD AUTO: 0.6 K/UL (ref 0–0.5)
EOSINOPHIL NFR BLD: 7.4 % (ref 0–8)
ERYTHROCYTE [DISTWIDTH] IN BLOOD BY AUTOMATED COUNT: 16.9 % (ref 11.5–14.5)
EST. GFR  (NO RACE VARIABLE): 39.3 ML/MIN/1.73 M^2
ESTIMATED AVG GLUCOSE: 126 MG/DL (ref 68–131)
GLUCOSE SERPL-MCNC: 107 MG/DL (ref 70–110)
HBA1C MFR BLD: 6 % (ref 4–5.6)
HCT VFR BLD AUTO: 43.6 % (ref 40–54)
HDLC SERPL-MCNC: 53 MG/DL (ref 40–75)
HDLC SERPL: 39.3 % (ref 20–50)
HGB BLD-MCNC: 13.7 G/DL (ref 14–18)
IMM GRANULOCYTES # BLD AUTO: 0.04 K/UL (ref 0–0.04)
IMM GRANULOCYTES NFR BLD AUTO: 0.5 % (ref 0–0.5)
LDLC SERPL CALC-MCNC: 66.4 MG/DL (ref 63–159)
LYMPHOCYTES # BLD AUTO: 1.5 K/UL (ref 1–4.8)
LYMPHOCYTES NFR BLD: 20.4 % (ref 18–48)
MCH RBC QN AUTO: 31.4 PG (ref 27–31)
MCHC RBC AUTO-ENTMCNC: 31.4 G/DL (ref 32–36)
MCV RBC AUTO: 100 FL (ref 82–98)
MONOCYTES # BLD AUTO: 0.4 K/UL (ref 0.3–1)
MONOCYTES NFR BLD: 5.8 % (ref 4–15)
NEUTROPHILS # BLD AUTO: 4.8 K/UL (ref 1.8–7.7)
NEUTROPHILS NFR BLD: 65.1 % (ref 38–73)
NONHDLC SERPL-MCNC: 82 MG/DL
NRBC BLD-RTO: 0 /100 WBC
PLATELET # BLD AUTO: 163 K/UL (ref 150–450)
PMV BLD AUTO: 12 FL (ref 9.2–12.9)
POTASSIUM SERPL-SCNC: 4.8 MMOL/L (ref 3.5–5.1)
PROT SERPL-MCNC: 7.5 G/DL (ref 6–8.4)
RBC # BLD AUTO: 4.37 M/UL (ref 4.6–6.2)
SODIUM SERPL-SCNC: 139 MMOL/L (ref 136–145)
TRIGL SERPL-MCNC: 78 MG/DL (ref 30–150)
TSH SERPL DL<=0.005 MIU/L-ACNC: 2.07 UIU/ML (ref 0.4–4)
URATE SERPL-MCNC: 5.4 MG/DL (ref 3.4–7)
WBC # BLD AUTO: 7.42 K/UL (ref 3.9–12.7)

## 2025-02-25 PROCEDURE — 80053 COMPREHEN METABOLIC PANEL: CPT

## 2025-02-25 PROCEDURE — 99999 PR PBB SHADOW E&M-EST. PATIENT-LVL V: CPT | Mod: PBBFAC,,,

## 2025-02-25 PROCEDURE — 84550 ASSAY OF BLOOD/URIC ACID: CPT

## 2025-02-25 PROCEDURE — 84443 ASSAY THYROID STIM HORMONE: CPT

## 2025-02-25 PROCEDURE — 85025 COMPLETE CBC W/AUTO DIFF WBC: CPT

## 2025-02-25 PROCEDURE — 84153 ASSAY OF PSA TOTAL: CPT

## 2025-02-25 PROCEDURE — 83036 HEMOGLOBIN GLYCOSYLATED A1C: CPT

## 2025-02-25 PROCEDURE — 80061 LIPID PANEL: CPT

## 2025-02-25 RX ORDER — COLCHICINE 0.6 MG/1
0.6 TABLET ORAL DAILY
Qty: 90 TABLET | Refills: 3 | Status: SHIPPED | OUTPATIENT
Start: 2025-02-25 | End: 2026-02-20

## 2025-02-25 RX ORDER — BISOPROLOL FUMARATE 5 MG/1
2.5 TABLET, FILM COATED ORAL DAILY
Qty: 90 TABLET | Refills: 3 | Status: SHIPPED | OUTPATIENT
Start: 2025-02-25

## 2025-02-25 RX ORDER — GLIMEPIRIDE 4 MG/1
4 TABLET ORAL
COMMUNITY
Start: 2025-02-19 | End: 2025-02-25 | Stop reason: SDUPTHER

## 2025-02-25 RX ORDER — SIMVASTATIN 40 MG/1
40 TABLET, FILM COATED ORAL NIGHTLY
Qty: 90 TABLET | Refills: 3 | Status: SHIPPED | OUTPATIENT
Start: 2025-02-25

## 2025-02-25 RX ORDER — GLIMEPIRIDE 4 MG/1
4 TABLET ORAL
Qty: 90 TABLET | Refills: 3 | Status: SHIPPED | OUTPATIENT
Start: 2025-02-25

## 2025-02-25 NOTE — PROGRESS NOTES
Patient ID: Santiago Khan is a 74 y.o. male.    Chief Complaint: Establish Care    History of Present Illness    CHIEF COMPLAINT:  - Mr. Khan presents for medication review and to discuss a skin lesion.    HPI:  Mr. Khan reports a skin lesion that is aggravating but not painful. He has been applying Neosporin to it. Dr. Acosta had previously frozen a similar lesion, but this has not been done for the current one.    He discusses his current medical conditions and medications. His blood pressure and blood sugar are well-controlled with his current medication regimen. He takes 3 Torsemide daily, down from 4 previously, as 2 tablets caused leg swelling.    He recounts a recent hospitalization in November due to an infection that affected his ability to walk. He underwent a knee incision to remove a large amount of infection and subsequently received antibiotic treatment, followed by a 2-week stay in a rehabilitation hospital for physical therapy. He is now able to walk again.    Regarding diabetes management, while in the hospital, he was taken off Metformin and Glimepiride, which caused his blood sugar to climb. He resumed taking Glimepiride, which stabilized his blood sugar. He continues to take Jardiance as recommended by his doctors due to its benefits for both blood sugar control and heart health.    He mentions a history of gout flare-ups, which he manages with colchicine. Colchicine seems to be more effective than allopurinol in controlling his symptoms.    He keeps a detailed record of his blood sugar, blood pressure, pulse, and blood oxygen levels. He quit smoking on September 14, 2023, approximately 1 year and 5 months ago.    Regarding pain management, he takes pain medication prescribed by a pain clinic. He takes it daily or less, rather than the prescribed 3 times daily, due to side effects such as constipation and discomfort. He describes his normal pain level as a steady 4-5, sometimes  reaching 6 or 7 out of 10.    He denies any current heart failure symptoms or shortness of breath.      ROS:  General: -fever, -chills, -fatigue, -weight gain, -weight loss  Eyes: -vision changes, -redness, -discharge  ENT: -ear pain, -nasal congestion, -sore throat  Cardiovascular: -chest pain, -palpitations, -lower extremity edema  Respiratory: -cough, -shortness of breath  Gastrointestinal: -abdominal pain, -nausea, -vomiting, -diarrhea, +constipation, -blood in stool  Genitourinary: -dysuria, -hematuria, -frequency  Musculoskeletal: -joint pain, -muscle pain  Skin: -rash, +lesion  Neurological: -headache, -dizziness, -numbness, -tingling  Psychiatric: -anxiety, -depression, -sleep difficulty         Pmh, Psh, Family Hx, Social Hx updated in Epic Tabs today.         2/25/2025     9:34 AM 5/21/2024    10:05 AM 6/12/2023    10:05 AM 5/11/2023     8:00 AM 3/23/2023    11:19 AM 3/11/2022     2:01 PM 8/10/2021    11:34 AM   Depression Patient Health Questionnaire   Over the last two weeks how often have you been bothered by little interest or pleasure in doing things Not at all Not at all Not at all Not at all Not at all  Not at all  Not at all    Over the last two weeks how often have you been bothered by feeling down, depressed or hopeless Several days More than half the days Not at all Not at all Not at all Not at all Not at all    PHQ-2 Total Score 1 2 0 0 0 0 0       Data saved with a previous flowsheet row definition       Active Problem List with Overview Notes    Diagnosis Date Noted    Stage 3b chronic kidney disease 02/25/2025    E coli bacteremia 11/04/2024    Weakness of both lower extremities 11/03/2024    Lower back pain 11/03/2024    Acute on chronic combined systolic and diastolic congestive heart failure 11/03/2024    Acute kidney injury superimposed on chronic kidney disease 11/03/2024    Acute respiratory failure with hypoxia and hypercarbia 11/03/2024    Diabetes 11/03/2024    Severe sepsis sec to  Cellulitis Legs complicated by E coli Bacteremia 11/03/2024    Secondary hyperparathyroidism of renal origin 08/21/2024    Pseudotumor, inflammatory, orbital, right 08/09/2024    Pulmonary HTN 04/23/2024    Orbital mass 09/05/2023    Chronic obstructive pulmonary disease 03/20/2023     Wixela 250 mcg, Spiriva respimat. Continue Albuterol inhaler and albuterol nebs   Referral pul dis management, education and assistance with medication  Patient preference to only see a doctor, request follow up with Dr. 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 02/16/2022    Chronic venous insufficiency     Closed fracture of tuft of distal phalanx of finger 12/17/2021    PVD (peripheral vascular disease) 12/04/2021    NICM (nonischemic cardiomyopathy) 12/04/2021    Prostate cancer      prostatectomy in 2010, rising PSA that started in 2021      Chronic combined systolic and diastolic congestive heart failure 07/09/2020    Coronary artery disease of native artery of native heart with stable angina pectoris 06/25/2020    Class 1 obesity due to excess calories with serious comorbidity and body mass index (BMI) of 32.0 to 32.9 in adult 11/25/2019    Hypertension associated with stage 3 chronic kidney disease due to type 2 diabetes mellitus     Hyperlipidemia associated with type 2 diabetes mellitus     Chronic kidney disease, stage 3     BRADLEY on CPAP      12/11/2019, 12/12/2019 Home Sleep Study  2 nights Severe BRADLEY (AHI=37) .   CPAP 10 cm.   Full face mask  HME: [facility]        History of gout     DDD (degenerative disc disease), lumbar     Nicotine dependence      77 pack year smoker. Interested in quitting.  Assistance with smoking cessation was offered, including:  []  Medications  [x]  Counseling  []  Printed Information on Smoking Cessation  [x]   Referral to a Smoking Cessation Program    Patient was counseled regarding smoking for 3-10 minutes.    9/27/2022 CT chest No worrisome pulmonary nodule.  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   Follow up as planned with Dr. Springer repeat CT chest 1 yr, September 2023.            Past Medical History:   Diagnosis Date    Chronic combined systolic and diastolic congestive heart failure 07/09/2020    Chronic kidney disease, stage 3     Chronic obstructive pulmonary disease 03/20/2023    Wixela 250 mcg, Spiriva respimat. Continue Albuterol inhaler and albuterol nebs   Referral pul dis management, education and assistance with medication  Patient preference to only see a doctor, request follow up with Dr. Springer             Chronic venous insufficiency     DDD (degenerative disc disease), lumbar     DM (diabetes mellitus) with complications     Drug-induced constipation 11/03/2024    History of gout     Hyperlipidemia associated with type 2 diabetes mellitus     Hypertension associated with stage 3 chronic kidney disease due to type 2 diabetes mellitus     BRADLEY on CPAP     Prostate cancer     prostatectomy in 2010, rising PSA that started in 2021       Past Surgical History:   Procedure Laterality Date    BICEPS TENDON REPAIR      COLONOSCOPY N/A 03/27/2019    Procedure: COLONOSCOPY;  Surgeon: James Roger MD;  Location: [facility] ENDO;  Service: Endoscopy;  Laterality: N/A;    EXPLORATION OF ORBIT Right 9/8/2023    Procedure: EXPLORATION, ORBIT;  Surgeon: Junaid Bartholomew MD;  Location: [facility] OR;  Service: ENT;  Laterality: Right;  possibly need frozen    HEMORRHOID SURGERY      INCISION AND DRAINAGE OF ABSCESS Right 11/8/2024    Procedure: INCISION AND DRAINAGE, ABSCESS;  Surgeon: Torres Magallanes MD;  Location: [facility] OR;  Service: General;  Laterality: Right;  4:30, patient ate    INGUINAL HERNIA REPAIR Left     KNEE ARTHROSCOPY Right     LEFT HEART CATHETERIZATION Left 06/29/2020    Procedure: CATHETERIZATION, HEART, LEFT;   Surgeon: Cheli Wilson MD;  Location: [facility] CATH LAB;  Service: Cardiology;  Laterality: Left;    LUMBAR LAMINECTOMY      PROSTATECTOMY      TONSILLECTOMY         Family History   Problem Relation Name Age of Onset    Prostate cancer Father      Coronary artery disease Father  90    Alzheimer's disease Father      Hyperlipidemia Mother         Social History     Socioeconomic History    Marital status:    Tobacco Use    Smoking status: Former     Current packs/day: 0.00     Average packs/day: 1.5 packs/day for 52.7 years (79.1 ttl pk-yrs)     Types: Cigarettes     Start date: 1971     Quit date: 9/15/2023     Years since quittin.4    Smokeless tobacco: Former   Substance and Sexual Activity    Alcohol use: Not Currently    Drug use: Never    Sexual activity: Not Currently     Partners: Female     Social Drivers of Health     Financial Resource Strain: Medium Risk (2024)    Overall Financial Resource Strain (CARDIA)     Difficulty of Paying Living Expenses: Somewhat hard   Food Insecurity: No Food Insecurity (2024)    Hunger Vital Sign     Worried About Running Out of Food in the Last Year: Never true     Ran Out of Food in the Last Year: Never true   Transportation Needs: No Transportation Needs (2024)    TRANSPORTATION NEEDS     Transportation : No   Physical Activity: Inactive (2024)    Exercise Vital Sign     Days of Exercise per Week: 0 days     Minutes of Exercise per Session: 0 min   Stress: Stress Concern Present (2024)    [employer] [place] [employer] - Occupational Stress Questionnaire     Feeling of Stress : To some extent   Housing Stability: Unknown (2024)    Housing Stability Vital Sign     Unable to Pay for Housing in the Last Year: No     Homeless in the Last Year: No       Medications Ordered Prior to Encounter[1]    Review of patient's allergies indicates:   Allergen Reactions    Amitriptyline Rash    Celecoxib Rash       General - Well  developed, alert and oriented in NAD  HEENT - normocephalic, no evidence of trauma, sclera white, EOMI  Neck - full range of motion  COR - regular rate and rhythm without murmurs or gallops  Lungs - Clear  Abdomen - soft, non-tender  Ext - no cyanosis or edema     Assessment:     1. Stage 3b chronic kidney disease    2. Hyperlipidemia associated with type 2 diabetes mellitus    3. Prostate cancer    4. Opioid dependence, uncomplicated    5. Pulmonary HTN    6. Acute gout of right hand, unspecified cause    7. DM (diabetes mellitus) with complications    8. Chronic gout without tophus, unspecified cause, unspecified site    9. Other long term (current) drug therapy    10. Hypertension associated with stage 3 chronic kidney disease due to type 2 diabetes mellitus        Pertinent Labs:    Chemistry        Component Value Date/Time     11/12/2024 0337    K 3.3 (L) 11/12/2024 0337    CL 98 11/12/2024 0337    CO2 27 11/12/2024 0337    BUN 16 11/12/2024 0337    CREATININE 1.3 11/12/2024 0337     (H) 11/12/2024 0337        Component Value Date/Time    CALCIUM 8.8 11/12/2024 0337    ALKPHOS 262 (H) 11/12/2024 0337    AST 51 (H) 11/12/2024 0337    ALT 92 (H) 11/12/2024 0337    BILITOT 0.6 11/12/2024 0337    ESTGFRAFRICA 43 (A) 07/18/2022 0941    EGFRNONAA 37 (A) 07/18/2022 0941          Lab Results   Component Value Date    WBC 12.75 (H) 11/12/2024    HGB 12.0 (L) 11/12/2024    HCT 36.8 (L) 11/12/2024    MCV 90 11/12/2024    MCH 29.5 11/12/2024    MCHC 32.6 11/12/2024    RDW 17.3 (H) 11/12/2024     11/12/2024    MPV 10.2 11/12/2024    PLTEST Appears normal 11/09/2024       Lab Results   Component Value Date    HGBA1C 5.6 08/14/2024    HGBA1C 12.8 (H) 05/15/2024    HGBA1C 7.1 (H) 11/13/2023     (H) 11/12/2024     Lab Results   Component Value Date    LDLCALC 69.4 08/14/2024     Lab Results   Component Value Date    TSH 0.490 11/03/2024     Lab Results   Component Value Date    CHOL 138 08/14/2024     "CHOL 144 05/15/2024    CHOL 142 11/13/2023     Lab Results   Component Value Date    TRIG 118 08/14/2024    TRIG 132 05/15/2024    TRIG 107 11/13/2023     Lab Results   Component Value Date    HDL 45 08/14/2024    HDL 54 05/15/2024    HDL 48 11/13/2023     Lab Results   Component Value Date    LDLCALC 69.4 08/14/2024    LDLCALC 63.6 05/15/2024    LDLCALC 72.6 11/13/2023     No results found for: "NONHDLC"  Lab Results   Component Value Date    CHOLHDL 32.6 08/14/2024    CHOLHDL 37.5 05/15/2024    CHOLHDL 33.8 11/13/2023       The 10-year ASCVD risk score (Taniya ESCOBAR, et al., 2019) is: 30.4%    Values used to calculate the score:      Age: 74 years      Sex: Male      Is Non- : No      Diabetic: Yes      Tobacco smoker: No      Systolic Blood Pressure: 107 mmHg      Is BP treated: Yes      HDL Cholesterol: 45 mg/dL      Total Cholesterol: 138 mg/dL    Plan:     Assessment & Plan    - Assessed current medication regimen for heart failure, diabetes, and fluid management.  - Evaluated need for continued use of Glimepiride, considering potential hypoglycemia risk with well-controlled A1c.  - Discussed potential risks of hypoglycemia with Glimepiride use, especially with well-controlled blood sugar.  - Reviewed gout management, deciding to maintain Allopurinol while adding Colchicine for inflammation.  - Explained importance of maintaining Allopurinol for uric acid control in gout management, despite symptom relief from Colchicine.  - Considered PSA monitoring given history of prostate cancer and prostatectomy.  - Noted self-reported pain levels and current pain management approach.  - Assessed cardiovascular status, including blood pressure control and pedal edema.  - Advised on benefits of Jardiance for both diabetes management and cardiovascular health.  - Provided information on target blood sugar, aiming for less than 140 mg/dL.  - Explained normal blood oxygen levels, noting that levels above " 88% are generally acceptable for patients with heart failure.  - Mr. Khan to continue monitoring blood sugar, blood pressure, and blood oxygen levels at home.  - Mr. Khan to maintain current fluid pill regimen to manage heart failure-related swelling.  - Mr. Khan to report any episodes of shortness of breath.  - Started Colchicine 0.6 mg daily for gout inflammation management.  - Continued Bisoprolol for heart failure management.  - Continued Jardiance for diabetes and heart benefits.  - Continued Torsemide 3 times daily for fluid management.  - Continued Glimepiride 4 mg for diabetes management, with potential future reassessment.  - Continued Allopurinol 300 mg for gout management.  - Continued pain medication as currently prescribed, to be taken as needed for pain management.  - Refilled Bisoprolol, Jardiance, Glimepiride, and cholesterol medication.  - CBC, diabetes levels, uric acid, PSA, kidney and liver function tests ordered.  - Referred to dermatologist for evaluation and potential treatment of skin lesions.  - Follow up after completing labs, either today or before next visit.  - Contact the office if experiencing increased shortness of breath.         1. Stage 3b chronic kidney disease    2. Hyperlipidemia associated with type 2 diabetes mellitus  - simvastatin (ZOCOR) 40 MG tablet; Take 1 tablet (40 mg total) by mouth every evening.  Dispense: 90 tablet; Refill: 3    3. Prostate cancer  - PROSTATE SPECIFIC ANTIGEN, DIAGNOSTIC; Future    4. Opioid dependence, uncomplicated    5. Pulmonary HTN    6. Acute gout of right hand, unspecified cause    7. DM (diabetes mellitus) with complications  - empagliflozin (JARDIANCE) 10 mg tablet; Take 1 tablet (10 mg total) by mouth once daily.  Dispense: 90 tablet; Refill: 3  - glimepiride (AMARYL) 4 MG tablet; Take 1 tablet (4 mg total) by mouth daily with breakfast.  Dispense: 90 tablet; Refill: 3    8. Chronic gout without tophus, unspecified cause,  unspecified site  - colchicine (COLCRYS) 0.6 mg tablet; Take 1 tablet (0.6 mg total) by mouth once daily.  Dispense: 90 tablet; Refill: 3    9. Other long term (current) drug therapy  - CBC Auto Differential; Future  - Hemoglobin A1C; Future  - Comprehensive Metabolic Panel; Future  - TSH; Future  - Lipid Panel; Future  - URIC ACID; Future    10. Hypertension associated with stage 3 chronic kidney disease due to type 2 diabetes mellitus  - bisoprolol (ZEBETA) 5 MG tablet; Take 0.5 tablets (2.5 mg total) by mouth once daily.  Dispense: 90 tablet; Refill: 3    CHF:  Bisoprolol 2.5 mg daily, Jardiance 10 mg daily, torsemide 60 mg daily.  DM2:  Glimepiride 4 mg daily, Jardiance 10 mg daily.  Gout:  Allopurinol 300 mg daily, colchicine 0.6 mg daily.  HLD:  Simvastatin 40 mg daily.  COPD:  Fluticasone-salmeterol Diskus inhaler 250-50 mcg bid, albuterol inhaler as needed.  Chronic pain:  Follows pain clinic.  On Norco 7.5 as needed  GERD:  Omeprazole 40 mg daily.    Immunization History   Administered Date(s) Administered    COVID-19, MRNA, LN-S, PF (Pfizer) (Purple Cap) 01/24/2021, 02/14/2021, 12/10/2021    Influenza (FLUAD) - Quadrivalent - Adjuvanted - PF *Preferred* (65+) 10/08/2020, 10/07/2021, 10/10/2022    Influenza - Quadrivalent - High Dose - PF (65 years and older) 08/16/2023    Influenza - Trivalent - Fluad - Adjuvanted - PF (65 years and older 09/28/2017    Influenza - Trivalent - Fluarix, Flulaval, Fluzone, Afluria - PF 12/04/2012, 09/24/2015    Influenza - Trivalent - Fluzone High Dose - PF (65 years and older) 10/12/2016, 09/20/2018, 10/05/2019    Pneumococcal Conjugate - 13 Valent 10/12/2016    Pneumococcal Conjugate - 20 Valent 11/03/2022    Tdap 12/04/2021    Zoster 05/23/2012    Zoster Recombinant 05/25/2023, 07/24/2023       Orders Placed This Encounter   Procedures    CBC Auto Differential    Hemoglobin A1C    Comprehensive Metabolic Panel    TSH    Lipid Panel    URIC ACID    PROSTATE SPECIFIC  ANTIGEN, DIAGNOSTIC       Portions of this note were generated by [employer].    Each patient to whom medical services by telemedicine are provided:  (1) informed of the relationship between the physician and patient and the respective role of any other health care provider with respect to management of the patient; and (2) notified that he or she may decline to receive medical services by telemedicine and may withdraw from such care at any time.    I spent a total of 35 minutes face to face and non-face to face on the date of this visit.This includes time preparing to see the patient (eg, review of tests, notes), obtaining and/or reviewing additional history from an independent historian and/or outside medical records, documenting clinical information in the electronic health record, independently interpreting results and/or communicating results to the patient/family/caregiver, or care coordinator.  Visit today included increased complexity associated with the care of the episodic problem addressed and managing the longitudinal care of the patient due to the serious and/or complex managed problem(s).      This note was generated with the assistance of ambient listening technology. Verbal consent was obtained by the patient and accompanying visitor(s) for the recording of patient appointment to facilitate this note. I attest to having reviewed and edited the generated note for accuracy, though some syntax or spelling errors may persist. Please contact the author of this note for any clarification.      Thomas Munoz MD         [1]   Current Outpatient Medications on File Prior to Visit   Medication Sig Dispense Refill    albuterol (PROVENTIL/VENTOLIN HFA) 90 mcg/actuation inhaler Inhale 2 puffs into the lungs every 4 (four) hours as needed for Shortness of Breath or Wheezing. 25.5 g 3    allopurinoL (ZYLOPRIM) 300 MG tablet TAKE 1 TABLET(300 MG) BY MOUTH EVERY DAY 90 tablet 3    aspirin (ECOTRIN) 81 MG EC tablet  Take 81 mg by mouth once daily.      baclofen (LIORESAL) 10 MG tablet Take 1 tablet by mouth every evening.  2    blood sugar diagnostic Strp Check blood glucose twice per day 200 strip 3    blood-glucose meter (ACCU-CHEK GUIDE ME GLUCOSE MTR) Misc USE AS INSTRUCTED 1 each 0    fluticasone propionate (FLONASE) 50 mcg/actuation nasal spray SHAKE AND SPRAY TWICE IN EACH NOSTRIL EVERY DAY 48 g 3    fluticasone-salmeterol diskus inhaler 250-50 mcg Inhale 1 puff into the lungs 2 (two) times daily. 180 each 3    guaiFENesin 100 mg/5 ml (ROBITUSSIN) 100 mg/5 mL syrup Take 200 mg by mouth every evening.      HYDROcodone-acetaminophen (NORCO) 7.5-325 mg per tablet Take 1 tablet by mouth every 4 (four) hours as needed (for chronic pain). 20 tablet 0    lancets (ACCU-CHEK SOFTCLIX LANCETS) Misc Check BG daily 100 each 3    latanoprost 0.005 % ophthalmic solution Place 1 drop into the right eye every evening. 2.5 mL 12    magnesium oxide (MAG-OX) 400 mg (241.3 mg magnesium) tablet TAKE 2 TABLETS(800 MG) BY MOUTH TWICE DAILY 120 tablet 6    multivitamin-minerals-lutein Tab Take 1 tablet by mouth Daily.      omeprazole (PRILOSEC) 40 MG capsule TAKE 1 CAPSULE BY MOUTH EVERY DAY 90 capsule 3    potassium chloride SA (K-DUR,KLOR-CON) 20 MEQ tablet TAKE 3 TABLETS BY MOUTH TWICE DAILY 720 tablet 2    torsemide (DEMADEX) 20 MG Tab Take 2 tablets (40 mg total) by mouth once daily. 180 tablet 2    [DISCONTINUED] adalimumab (HUMIRA,CF, PEN) 40 mg/0.4 mL PnKt Inject 0.4 mLs (40 mg total) into the skin every 14 (fourteen) days. 2 pen 11    [DISCONTINUED] bisoprolol (ZEBETA) 5 MG tablet TAKE 1/2 TABLET BY MOUTH EVERY DAY 90 tablet 3    [DISCONTINUED] empagliflozin (JARDIANCE) 10 mg tablet Take 1 tablet (10 mg total) by mouth once daily. 90 tablet 3    [DISCONTINUED] glimepiride (AMARYL) 4 MG tablet Take 4 mg by mouth.      [DISCONTINUED] simvastatin (ZOCOR) 40 MG tablet TAKE 1 TABLET(40 MG) BY MOUTH EVERY EVENING 90 tablet 3     [DISCONTINUED] colchicine (COLCRYS) 0.6 mg tablet Take 1 tablet (0.6 mg total) by mouth daily as needed (gout flare). 30 tablet 0     No current facility-administered medications on file prior to visit.

## 2025-02-26 ENCOUNTER — RESULTS FOLLOW-UP (OUTPATIENT)
Dept: INTERNAL MEDICINE | Facility: CLINIC | Age: 75
End: 2025-02-26

## 2025-02-26 DIAGNOSIS — C61 PROSTATE CANCER: Primary | ICD-10-CM

## 2025-02-26 DIAGNOSIS — R97.20 ELEVATED PSA, BETWEEN 10 AND LESS THAN 20 NG/ML: ICD-10-CM

## 2025-03-24 ENCOUNTER — CLINICAL SUPPORT (OUTPATIENT)
Dept: SMOKING CESSATION | Facility: CLINIC | Age: 75
End: 2025-03-24
Payer: COMMERCIAL

## 2025-03-24 DIAGNOSIS — F17.200 NICOTINE DEPENDENCE: Primary | ICD-10-CM

## 2025-03-24 PROCEDURE — 99404 PREV MED CNSL INDIV APPRX 60: CPT | Mod: S$GLB,,, | Performed by: SPEECH-LANGUAGE PATHOLOGIST

## 2025-03-24 PROCEDURE — 99999 PR PBB SHADOW E&M-EST. PATIENT-LVL II: CPT | Mod: PBBFAC,,, | Performed by: SPEECH-LANGUAGE PATHOLOGIST

## 2025-03-24 NOTE — PROGRESS NOTES
"Individual Follow-Up Form    3/24/2025    Quit Date: 9/15/2023     Clinical Status of Patient: Outpatient    Length of Service: 60 minutes    Continuing Medication: no    Other Medications:      Target Symptoms: Withdrawal and medication side effects. The following were  rated moderate (3) to severe (4) on TCRS:  Moderate (3):   Severe (4):     Comments: Patient seen in clinic. Patient reports he had a couple of puffs on a cigarette prior to his brother's recent death due to smoking the remains of a cigarette that was left behind family members. Patient reports he didn't like the taste of it & states he realized "why he quit". Patient states there was no trigger to smoke it other than they left it behind. He reports the death of his brother 3 days ago on Friday. Assessed CO. CO 3. Educated patient on the impact of just one puff. Commended the patient on not turning to tobacco to cope with recent life events. Goal is to remain smoke free. Follow up set for 4/28/2025 at 1:00 pm.     Diagnosis: F17.200    Next Visit: 5 weeks    "

## 2025-03-31 DIAGNOSIS — E78.5 HYPERLIPIDEMIA ASSOCIATED WITH TYPE 2 DIABETES MELLITUS: ICD-10-CM

## 2025-03-31 DIAGNOSIS — E11.69 HYPERLIPIDEMIA ASSOCIATED WITH TYPE 2 DIABETES MELLITUS: ICD-10-CM

## 2025-03-31 RX ORDER — SIMVASTATIN 40 MG/1
40 TABLET, FILM COATED ORAL NIGHTLY
Qty: 90 TABLET | Refills: 3 | Status: SHIPPED | OUTPATIENT
Start: 2025-03-31

## 2025-04-03 RX ORDER — POTASSIUM CHLORIDE 20 MEQ/1
60 TABLET, EXTENDED RELEASE ORAL 2 TIMES DAILY
Qty: 540 TABLET | Refills: 2 | Status: SHIPPED | OUTPATIENT
Start: 2025-04-03

## 2025-04-10 ENCOUNTER — PATIENT MESSAGE (OUTPATIENT)
Dept: HEMATOLOGY/ONCOLOGY | Facility: CLINIC | Age: 75
End: 2025-04-10
Payer: MEDICARE

## 2025-04-10 RX ORDER — LOSARTAN POTASSIUM 25 MG/1
25 TABLET ORAL
Qty: 90 TABLET | Refills: 3 | Status: SHIPPED | OUTPATIENT
Start: 2025-04-10

## 2025-04-21 DIAGNOSIS — H05.111: ICD-10-CM

## 2025-04-21 DIAGNOSIS — H05.89 ORBITAL MASS: ICD-10-CM

## 2025-04-21 DIAGNOSIS — J44.9 CHRONIC OBSTRUCTIVE PULMONARY DISEASE, UNSPECIFIED COPD TYPE: Chronic | ICD-10-CM

## 2025-04-21 DIAGNOSIS — H49.01 PARALYTIC STRABISMUS ASSOCIATED WITH RIGHT PARTIAL OCULOMOTOR NERVE PALSY: Primary | ICD-10-CM

## 2025-04-22 ENCOUNTER — OFFICE VISIT (OUTPATIENT)
Dept: CARDIOLOGY | Facility: CLINIC | Age: 75
End: 2025-04-22
Payer: MEDICARE

## 2025-04-22 ENCOUNTER — HOSPITAL ENCOUNTER (OUTPATIENT)
Dept: CARDIOLOGY | Facility: HOSPITAL | Age: 75
Discharge: HOME | End: 2025-04-22
Attending: INTERNAL MEDICINE
Payer: MEDICARE

## 2025-04-22 VITALS
HEART RATE: 95 BPM | WEIGHT: 219.56 LBS | OXYGEN SATURATION: 95 % | SYSTOLIC BLOOD PRESSURE: 127 MMHG | DIASTOLIC BLOOD PRESSURE: 77 MMHG | BODY MASS INDEX: 34.39 KG/M2

## 2025-04-22 DIAGNOSIS — I73.9 PVD (PERIPHERAL VASCULAR DISEASE): ICD-10-CM

## 2025-04-22 DIAGNOSIS — E11.69 HYPERLIPIDEMIA ASSOCIATED WITH TYPE 2 DIABETES MELLITUS: Chronic | ICD-10-CM

## 2025-04-22 DIAGNOSIS — I50.42 CHRONIC COMBINED SYSTOLIC AND DIASTOLIC CONGESTIVE HEART FAILURE: Primary | Chronic | ICD-10-CM

## 2025-04-22 DIAGNOSIS — H05.111: ICD-10-CM

## 2025-04-22 DIAGNOSIS — E11.22 HYPERTENSION ASSOCIATED WITH STAGE 3 CHRONIC KIDNEY DISEASE DUE TO TYPE 2 DIABETES MELLITUS: Chronic | ICD-10-CM

## 2025-04-22 DIAGNOSIS — H05.89 ORBITAL MASS: ICD-10-CM

## 2025-04-22 DIAGNOSIS — I25.118 CORONARY ARTERY DISEASE OF NATIVE ARTERY OF NATIVE HEART WITH STABLE ANGINA PECTORIS: Chronic | ICD-10-CM

## 2025-04-22 DIAGNOSIS — I27.20 PULMONARY HTN: ICD-10-CM

## 2025-04-22 DIAGNOSIS — H49.01 PARALYTIC STRABISMUS ASSOCIATED WITH RIGHT PARTIAL OCULOMOTOR NERVE PALSY: ICD-10-CM

## 2025-04-22 DIAGNOSIS — E78.5 HYPERLIPIDEMIA ASSOCIATED WITH TYPE 2 DIABETES MELLITUS: Chronic | ICD-10-CM

## 2025-04-22 DIAGNOSIS — I42.8 NICM (NONISCHEMIC CARDIOMYOPATHY): ICD-10-CM

## 2025-04-22 DIAGNOSIS — I87.2 CHRONIC VENOUS INSUFFICIENCY: ICD-10-CM

## 2025-04-22 DIAGNOSIS — N18.30 HYPERTENSION ASSOCIATED WITH STAGE 3 CHRONIC KIDNEY DISEASE DUE TO TYPE 2 DIABETES MELLITUS: Chronic | ICD-10-CM

## 2025-04-22 DIAGNOSIS — I12.9 HYPERTENSION ASSOCIATED WITH STAGE 3 CHRONIC KIDNEY DISEASE DUE TO TYPE 2 DIABETES MELLITUS: Chronic | ICD-10-CM

## 2025-04-22 DIAGNOSIS — J44.9 CHRONIC OBSTRUCTIVE PULMONARY DISEASE, UNSPECIFIED COPD TYPE: Chronic | ICD-10-CM

## 2025-04-22 LAB
OHS QRS DURATION: 132 MS
OHS QTC CALCULATION: 492 MS

## 2025-04-22 PROCEDURE — 4010F ACE/ARB THERAPY RXD/TAKEN: CPT | Mod: CPTII,S$GLB,, | Performed by: INTERNAL MEDICINE

## 2025-04-22 PROCEDURE — 99999 PR PBB SHADOW E&M-EST. PATIENT-LVL IV: CPT | Mod: PBBFAC,,, | Performed by: INTERNAL MEDICINE

## 2025-04-22 PROCEDURE — 99215 OFFICE O/P EST HI 40 MIN: CPT | Mod: S$GLB,,, | Performed by: INTERNAL MEDICINE

## 2025-04-22 PROCEDURE — 3074F SYST BP LT 130 MM HG: CPT | Mod: CPTII,S$GLB,, | Performed by: INTERNAL MEDICINE

## 2025-04-22 PROCEDURE — 1126F AMNT PAIN NOTED NONE PRSNT: CPT | Mod: CPTII,S$GLB,, | Performed by: INTERNAL MEDICINE

## 2025-04-22 PROCEDURE — 3078F DIAST BP <80 MM HG: CPT | Mod: CPTII,S$GLB,, | Performed by: INTERNAL MEDICINE

## 2025-04-22 PROCEDURE — 3288F FALL RISK ASSESSMENT DOCD: CPT | Mod: CPTII,S$GLB,, | Performed by: INTERNAL MEDICINE

## 2025-04-22 PROCEDURE — 3044F HG A1C LEVEL LT 7.0%: CPT | Mod: CPTII,S$GLB,, | Performed by: INTERNAL MEDICINE

## 2025-04-22 PROCEDURE — 1159F MED LIST DOCD IN RCRD: CPT | Mod: CPTII,S$GLB,, | Performed by: INTERNAL MEDICINE

## 2025-04-22 PROCEDURE — 3008F BODY MASS INDEX DOCD: CPT | Mod: CPTII,S$GLB,, | Performed by: INTERNAL MEDICINE

## 2025-04-22 PROCEDURE — 93010 ELECTROCARDIOGRAM REPORT: CPT | Mod: ,,, | Performed by: INTERNAL MEDICINE

## 2025-04-22 PROCEDURE — 1160F RVW MEDS BY RX/DR IN RCRD: CPT | Mod: CPTII,S$GLB,, | Performed by: INTERNAL MEDICINE

## 2025-04-22 PROCEDURE — 93005 ELECTROCARDIOGRAM TRACING: CPT

## 2025-04-22 PROCEDURE — 1101F PT FALLS ASSESS-DOCD LE1/YR: CPT | Mod: CPTII,S$GLB,, | Performed by: INTERNAL MEDICINE

## 2025-04-22 RX ORDER — TORSEMIDE 20 MG/1
40 TABLET ORAL 2 TIMES DAILY
Qty: 360 TABLET | Refills: 2 | Status: SHIPPED | OUTPATIENT
Start: 2025-04-22

## 2025-04-22 NOTE — PROGRESS NOTES
Subjective:   Patient ID:  Santiago Khan is a 74 y.o. male who presents for follow up of No chief complaint on file.      73 yo male, came in for 6 m f/u  PMH CAD s/p LHC in  D1 70%, no PCi, CHFmrEF 45% to 50%, normal LVEDP, DM 4 yrs, pulm HTN life long smoker, quit in ,  HTN, HLD, CKD III, prostates Ca s/p TURP in 2011, no h/o chemo RX and XRT. Gout. No h/o stroke and heart attack. Social drinker.  Remote LHC showed miminal blockage by Dr. Mayra CHAPPELL,    admitted for [facility] due to chest tightness. Had low K and Mg. Troponin x2 negative and EKG showed NSR, RBBB, and LVH. Echo showed EF 45% global HK and moderate MR. Had K and Mg supplement.  States that gained weight for 30 pounds since 3/202 after held Metolazone. Even he was taking Bumex 1.5 mg bid. In  BNP 15 and Cr 1.4  Still has GOVEA. No orthopnea, sleeps with CPAP. No chest pain, faint  NUKE stress done in  showed inferior ischemia with scar. EF 45%  LHC done in  D1 70% no PCI. Normal filling pressure  No chest pain . Left radial access ok  SOB, weight gain  Sleeps on 1 pillow. No PND  Taking Bumex 2 mg bid and DIamox   Pt c/o weight gain 30 pounds after held his metolazone in the past 4 months  C/o abd distanesion SOB.     11/23/2021 visit  SOB for few months, positive orthopnea. Sleeps on CPAP. + leg swelling and left leg pain and redness  The last seen was   On Bumex 2 mg bid. Held metolazone. Quit smoking  H/o prostate cancer and PSA recurrently elevated. CXR in  negative     12/2021   Leg swelling and erythema. At last visit, D/c bumex and added torsemide 40 mg bid. Good urination. BNP negative and Cr up to 1.6. Decreased torsemide to 20 mg bid  Still leg swelling and SOB. Quit smoking 9 months ago.      visit  Resumed metolazone 3 times a week about 2 weeks ago and BMP done two days ago showed elevated Cr.   Felt neck tightness and improved breathing after the head tilts back and  stretched up. Not f/u with pulm yet  On compresion socks.   Still SOB. Serial BNPs wnl and h/o LHC showed normal filling pressure suggested CHF controlled     visit  Quit smoking 1 yr and on breathing rx  No chest pain dizziness and faint. BP controlled  BNP < 10. Cr 1.7    echo  · Mild concentric left ventricular hypertrophy.  · Mildly decreased left ventricular systolic function. The estimated ejection fraction is 45%.  · Grade I (mild) left ventricular diastolic dysfunction consistent with impaired relaxation.  · Low normal right ventricular systolic function.  · Moderate mitral regurgitation.  · Normal central venous pressure (3 mmHg).  · The estimated PA systolic pressure is 23 mmHg.     visit  H/o 6MWT in : 250 meters, and peal VO 11 calculated  Cane dependent due to lower back pain  Resumed smoking. On breathing tx and f/u with Dr. Escudero  On torsemide 40 mg bid for PVD    10/23 visit  Quit smoking again and improved breathing, not like CPAP. F/u at pulm service. Inhaler helped for tightness  No PND chest pain palpitation dizziness. Remains mild leg swelling    04/24 visit  Weight stable. Quit smoking, chronic SOB. On cpap.   Leg swelling chronic  EKG reviewed by myself today reveals NSR nonspecific STT change, RBBB LAFB LVH PACs    09/24 visit  In 05/24 decreased torsemide from 40 mg bid to 20 mg daily due to elevation of Cr to 2.5   Repeat Cr in 08/24, improved to 1.6 BNP. Slightly elevated to 178  C/o leg swelling  SOB is the same and on breathing Rx. No orthpnea   Weight loss 15 lbs in 6 m   Quit smoking for a yr    Interval history  H/o cellulitis and knee infection, stayed in rehab for 2 weeks and not home.                       History of Present Illness    CHIEF COMPLAINT:  - Santiago presents for a six-month follow-up visit and to discuss recent hospitalization for leg infection.    HPI:  - Reports recent hospitalization due to cellulitis with associated bacteremia in lower  extremities, experiencing loss of sensation and requiring 2 weeks of admission followed by 2 weeks in rehabilitation  - Mentions knee swelling with fluid accumulation  - Notes occasional episodes of palpitations described as racing  - Sleeps with 1 or 2 pillows at night and often in a chair  - Medical history includes MI in 2020, ischemic cardiomyopathy, controlled CHF, PVD, pulmonary HTN, and HTN  - Echo from 11/2024 showed a mildly decreased EF of 35-40%, which was a decline from previous results, and a dilated heart  - Previously took 4 Torsemide pills daily for fluid retention, which was reduced to 2  - After retaining fluid again, the dosage was increased to 3 pills daily (2 in the morning, 1 in the evening)  - Denies chest pain, dyspnea, dizziness, or syncope    CARDIAC HISTORY:  - Echo 11/2024: EF 35-40%, mildly decreased function, dilated heart  - MI 2020: 70% stenosis in small diagonal 2  - EKG 2025-04-22: NSR with sinus tachycardia, bifascicular block (degenerative change), no significant change from 1 year ago except for faster heart rate    MEDICATIONS:  - Bisoprolol 5 mg daily  - Amlodipine 10 mg daily  - Losartan 25 mg daily  - Torsemide 60 mg total daily (20 mg in the morning, 20 mg in the morning, 20 mg in the evening) for fluid retention/congestive heart failure. Previously 80 mg daily, then decreased to 40 mg daily, which led to fluid retention. Subsequently increased to 60 mg daily.  - Prostamide 14 mg AM and 20 mg PM daily    TEST RESULTS:  - A1C: 6.0  - Creatinine: 1.8, previously 2.5  - LDL: 66    IMAGING:  - Cardiac catheterization: 2020, no big stenosis except for 70% stenosis in very small diagonal 2 artery    MEDICAL HISTORY:  - Cellulitis  - Bacteremia  - PVD  - Pulmonary HTN          Past Medical History:   Diagnosis Date    Chronic combined systolic and diastolic congestive heart failure 07/09/2020    Chronic kidney disease, stage 3     Chronic obstructive pulmonary disease 03/20/2023     Wixela 250 mcg, Spiriva respimat. Continue Albuterol inhaler and albuterol nebs   Referral pul dis management, education and assistance with medication  Patient preference to only see a doctor, request follow up with Dr. Springer             Chronic venous insufficiency     DDD (degenerative disc disease), lumbar     DM (diabetes mellitus) with complications     Drug-induced constipation 11/03/2024    History of gout     Hyperlipidemia associated with type 2 diabetes mellitus     Hypertension associated with stage 3 chronic kidney disease due to type 2 diabetes mellitus     BRADLEY on CPAP     Prostate cancer     prostatectomy in 2010, rising PSA that started in 2021       Past Surgical History:   Procedure Laterality Date    BICEPS TENDON REPAIR      COLONOSCOPY N/A 03/27/2019    Procedure: COLONOSCOPY;  Surgeon: James oRger MD;  Location: [facility] ENDO;  Service: Endoscopy;  Laterality: N/A;    EXPLORATION OF ORBIT Right 9/8/2023    Procedure: EXPLORATION, ORBIT;  Surgeon: Junaid Bartholomew MD;  Location: [facility] OR;  Service: ENT;  Laterality: Right;  possibly need frozen    HEMORRHOID SURGERY      INCISION AND DRAINAGE OF ABSCESS Right 11/8/2024    Procedure: INCISION AND DRAINAGE, ABSCESS;  Surgeon: Torres Magallanes MD;  Location: [facility] OR;  Service: General;  Laterality: Right;  4:30, patient ate    INGUINAL HERNIA REPAIR Left     KNEE ARTHROSCOPY Right     LEFT HEART CATHETERIZATION Left 06/29/2020    Procedure: CATHETERIZATION, HEART, LEFT;  Surgeon: Cheli Wilson MD;  Location: [facility] CATH LAB;  Service: Cardiology;  Laterality: Left;    LUMBAR LAMINECTOMY      PROSTATECTOMY      TONSILLECTOMY         Social History[1]    Family History   Problem Relation Name Age of Onset    Prostate cancer Father      Coronary artery disease Father  90    Alzheimer's disease Father      Hyperlipidemia Mother           ROS    Objective:   Physical Exam  HENT:      Head: Normocephalic.   Eyes:      Pupils: Pupils are equal, round,  and reactive to light.   Neck:      Thyroid: No thyromegaly.      Vascular: Normal carotid pulses. No carotid bruit or JVD.   Cardiovascular:      Rate and Rhythm: Normal rate and regular rhythm. No extrasystoles are present.     Chest Wall: PMI is not displaced.      Pulses: Normal pulses.      Heart sounds: Normal heart sounds. No murmur heard.     No gallop. No S3 sounds.   Pulmonary:      Effort: No respiratory distress.      Breath sounds: No stridor. Rales present.   Abdominal:      General: Bowel sounds are normal.      Palpations: Abdomen is soft.      Tenderness: There is no abdominal tenderness. There is no rebound.   Musculoskeletal:         General: Swelling present.   Skin:     Findings: No rash.   Neurological:      Mental Status: He is alert and oriented to person, place, and time.   Psychiatric:         Behavior: Behavior normal.         Lab Results   Component Value Date    CHOL 135 02/25/2025    CHOL 138 08/14/2024    CHOL 144 05/15/2024     Lab Results   Component Value Date    HDL 53 02/25/2025    HDL 45 08/14/2024    HDL 54 05/15/2024     Lab Results   Component Value Date    LDLCALC 66.4 02/25/2025    LDLCALC 69.4 08/14/2024    LDLCALC 63.6 05/15/2024     Lab Results   Component Value Date    TRIG 78 02/25/2025    TRIG 118 08/14/2024    TRIG 132 05/15/2024     Lab Results   Component Value Date    CHOLHDL 39.3 02/25/2025    CHOLHDL 32.6 08/14/2024    CHOLHDL 37.5 05/15/2024       Chemistry        Component Value Date/Time     02/25/2025 1038    K 4.8 02/25/2025 1038     02/25/2025 1038    CO2 28 02/25/2025 1038    BUN 24 (H) 02/25/2025 1038    CREATININE 1.8 (H) 02/25/2025 1038     02/25/2025 1038        Component Value Date/Time    CALCIUM 9.6 02/25/2025 1038    ALKPHOS 86 02/25/2025 1038    AST 51 (H) 02/25/2025 1038    ALT 24 02/25/2025 1038    BILITOT 0.6 02/25/2025 1038    ESTGFRAFRICA 43 (A) 07/18/2022 0941    EGFRNONAA 37 (A) 07/18/2022 0941          Lab Results    Component Value Date    HGBA1C 6.0 (H) 02/25/2025     Lab Results   Component Value Date    TSH 2.068 02/25/2025     Lab Results   Component Value Date    INR 1.0 06/26/2020    INR 1.0 03/10/2020     Lab Results   Component Value Date    WBC 7.42 02/25/2025    HGB 13.7 (L) 02/25/2025    HCT 43.6 02/25/2025     (H) 02/25/2025     02/25/2025     BMP  Sodium   Date Value Ref Range Status   02/25/2025 139 136 - 145 mmol/L Final     Potassium   Date Value Ref Range Status   02/25/2025 4.8 3.5 - 5.1 mmol/L Final     Chloride   Date Value Ref Range Status   02/25/2025 100 95 - 110 mmol/L Final     CO2   Date Value Ref Range Status   02/25/2025 28 23 - 29 mmol/L Final     BUN   Date Value Ref Range Status   02/25/2025 24 (H) 8 - 23 mg/dL Final     Creatinine   Date Value Ref Range Status   02/25/2025 1.8 (H) 0.5 - 1.4 mg/dL Final     Calcium   Date Value Ref Range Status   02/25/2025 9.6 8.7 - 10.5 mg/dL Final     Anion Gap   Date Value Ref Range Status   02/25/2025 11 8 - 16 mmol/L Final     eGFR if    Date Value Ref Range Status   07/18/2022 43 (A) >60 mL/min/1.73 m^2 Final     eGFR if non    Date Value Ref Range Status   07/18/2022 37 (A) >60 mL/min/1.73 m^2 Final     Comment:     Calculation used to obtain the estimated glomerular filtration  rate (eGFR) is the CKD-EPI equation.        BNP  @LABRCNTIP(BNP,BNPTRIAGEBLO)@  @LABRCNTIP(troponini)@  CrCl cannot be calculated (Patient's most recent lab result is older than the maximum 7 days allowed.).  No results found in the last 24 hours.  No results found in the last 24 hours.  No results found in the last 24 hours.    Assessment:      1. Chronic combined systolic and diastolic congestive heart failure    2. NICM (nonischemic cardiomyopathy)    3. PVD (peripheral vascular disease)    4. Pulmonary HTN    5. Hypertension associated with stage 3 chronic kidney disease due to type 2 diabetes mellitus    6. Hyperlipidemia  associated with type 2 diabetes mellitus    7. Coronary artery disease of native artery of native heart with stable angina pectoris    8. Chronic venous insufficiency        Plan:     Assessment & Plan    IMPRESSION:  - Detected fluid in lungs upon auscultation, indicating need for increased diuretic therapy.  - EKG showed increased heart rate from previous year.  - Changed ACE inhibitor to Entresto for improved heart failure management, replacing Losartan (Entresto is a preferred medication (plan A) compared to Losartan (plan B)), pending pharmacy authorization.    HEART FAILURE AND CARDIOMYOPATHY:  - Increased Torsemide from 60 mg daily (40 mg in morning, 20 mg in evening) to 80 mg daily (40 mg in morning, 40 mg in evening).  - Continued Bisoprolol 5 mg.  - Ordered echocardiogram in 4 weeks.  - Expect phone call from [facility] pharmacies regarding Entresto pre-authorization.    HYPERTENSION:  - Continued Amlodipine 10 mg.  - Discontinued Losartan 25 mg.    CHRONIC KIDNEY DISEASE:  - Ordered repeat lab work in 2 weeks.    FOLLOW-UP:  - Follow up in 4 weeks.          CHFrEF fluid overloaded and sinus tachy    D/c losartan 25 mg daily and add entresto 24/26 mg bid  Continue bisoprolol jardiance   Increase torsemide from  40 mg in Am and 20 mg In PM to 40 mg bid  Repeat NT BNP and BMP in 2 weeks      This note was generated with the assistance of ambient listening technology. Verbal consent was obtained by the patient and accompanying visitor(s) for the recording of patient appointment to facilitate this note. I attest to having reviewed and edited the generated note for accuracy, though some syntax or spelling errors may persist. Please contact the author of this note for any clarification.            [1]   Social History  Tobacco Use    Smoking status: Former     Current packs/day: 0.00     Average packs/day: 1.5 packs/day for 52.7 years (79.1 ttl pk-yrs)     Types: Cigarettes     Start date: 1/1/1971      Quit date: 9/15/2023     Years since quittin.6    Smokeless tobacco: Former   Substance Use Topics    Alcohol use: Not Currently    Drug use: Never

## 2025-04-28 ENCOUNTER — CLINICAL SUPPORT (OUTPATIENT)
Dept: SMOKING CESSATION | Facility: CLINIC | Age: 75
End: 2025-04-28
Payer: COMMERCIAL

## 2025-04-28 DIAGNOSIS — Z00.00 ENCOUNTER FOR MEDICARE ANNUAL WELLNESS EXAM: ICD-10-CM

## 2025-04-28 DIAGNOSIS — F17.200 NICOTINE DEPENDENCE: Primary | ICD-10-CM

## 2025-04-28 PROCEDURE — 99404 PREV MED CNSL INDIV APPRX 60: CPT | Mod: S$GLB,,, | Performed by: SPEECH-LANGUAGE PATHOLOGIST

## 2025-04-28 PROCEDURE — 99999 PR PBB SHADOW E&M-EST. PATIENT-LVL II: CPT | Mod: PBBFAC,,, | Performed by: SPEECH-LANGUAGE PATHOLOGIST

## 2025-04-28 NOTE — PROGRESS NOTES
Individual Follow-Up Form    2025    Quit Date: 9/15/2023     Clinical Status of Patient: Outpatient    Length of Service: 60 minutes    Continuing Medication: no    Other Medications:      Target Symptoms: Withdrawal and medication side effects. The following were  rated moderate (3) to severe (4) on TCRS:  Moderate (3):   Severe (4):     Comments: Patient seen in clinic. Patient reports he has remained smoke free & nicotine free since last session &  hasn't had any slips since the slip reported during March session. Assessed CO. CO 4. Patient reports they had his brother's  services & has been dealing with the dynamics surrounding those events/personal belongings. He reports he has some concerns regarding recent lab work for his prostate which he had prostate cancer 15 yrs ago & will see the dr Friday to discuss. Patient states he has thought about smoking; but hasn't acted on it. Commended the patient on not using tobacco during his times of uncertainty & stress & allowing the thought to pass. Goal is to remain smoke free. Follow up set for 2025 at 1:00 pm.       Diagnosis: F17.200    Next Visit: 4 weeks

## 2025-04-29 RX ORDER — ALLOPURINOL 300 MG/1
300 TABLET ORAL DAILY
Qty: 90 TABLET | Refills: 3 | Status: SHIPPED | OUTPATIENT
Start: 2025-04-29

## 2025-04-29 NOTE — TELEPHONE ENCOUNTER
Please see request for refills.    Requested Prescriptions     Pending Prescriptions Disp Refills    allopurinoL (ZYLOPRIM) 300 MG tablet 90 tablet 3     Sig: Take 1 tablet (300 mg total) by mouth.     LV   2/25/25  LF   8/5/24

## 2025-04-30 NOTE — PROGRESS NOTES
Clinic Note  4/30/2025      Subjective:         Chief Complaint:   HPI  Santiago Khan is a 74 y.o. male with a history of prostate cancer and now with an elevated PSA of 12.8.  Prostatectomy in 2010 at [facility]. Never had EBRT. Retired . Stopped smoking 1 year and 7 months ago.  Co-morbidities- CKD 3b, DM, HLP, HTN, DM, COPD. Uses cane for ambulation.    Lab Results   Component Value Date    PSA 0.15 02/01/2021    PSA <0.01 01/30/2019    PSADIAG 12.8 (H) 02/25/2025    PSADIAG 0.89 05/15/2024    PSADIAG 1.4 11/13/2023    PSADIAG 1.7 04/29/2022    PSADIAG 0.36 07/30/2021    PSADIAG 0.02 01/23/2020      Past Medical History:   Diagnosis Date    Chronic combined systolic and diastolic congestive heart failure 07/09/2020    Chronic kidney disease, stage 3     Chronic obstructive pulmonary disease 03/20/2023    Wixela 250 mcg, Spiriva respimat. Continue Albuterol inhaler and albuterol nebs   Referral pul dis management, education and assistance with medication  Patient preference to only see a doctor, request follow up with Dr. Springer             Chronic venous insufficiency     DDD (degenerative disc disease), lumbar     DM (diabetes mellitus) with complications     Drug-induced constipation 11/03/2024    History of gout     Hyperlipidemia associated with type 2 diabetes mellitus     Hypertension associated with stage 3 chronic kidney disease due to type 2 diabetes mellitus     BRADLEY on CPAP     Prostate cancer     prostatectomy in 2010, rising PSA that started in 2021     Family History   Problem Relation Name Age of Onset    Prostate cancer Father      Coronary artery disease Father  90    Alzheimer's disease Father      Hyperlipidemia Mother       Social History[1]  Past Surgical History:   Procedure Laterality Date    BICEPS TENDON REPAIR      COLONOSCOPY N/A 03/27/2019    Procedure: COLONOSCOPY;  Surgeon: James Roger MD;  Location: [facility];  Service: Endoscopy;  Laterality: N/A;    EXPLORATION  "OF ORBIT Right 9/8/2023    Procedure: EXPLORATION, ORBIT;  Surgeon: Junaid Bartholomew MD;  Location: [facility] OR;  Service: ENT;  Laterality: Right;  possibly need frozen    HEMORRHOID SURGERY      INCISION AND DRAINAGE OF ABSCESS Right 11/8/2024    Procedure: INCISION AND DRAINAGE, ABSCESS;  Surgeon: Torres Magallanes MD;  Location: [facility] OR;  Service: General;  Laterality: Right;  4:30, patient ate    INGUINAL HERNIA REPAIR Left     KNEE ARTHROSCOPY Right     LEFT HEART CATHETERIZATION Left 06/29/2020    Procedure: CATHETERIZATION, HEART, LEFT;  Surgeon: Cheli Wilson MD;  Location: [facility] CATH LAB;  Service: Cardiology;  Laterality: Left;    LUMBAR LAMINECTOMY      PROSTATECTOMY      TONSILLECTOMY       Problem List[2]  Review of Systems   Constitutional:  Negative for appetite change, chills, fatigue, fever and unexpected weight change.   HENT:  Negative for nosebleeds.    Respiratory:  Negative for shortness of breath and wheezing.    Cardiovascular:  Negative for chest pain, palpitations and leg swelling.   Gastrointestinal:  Negative for abdominal distention, abdominal pain, constipation, diarrhea, nausea and vomiting.   Genitourinary:  Negative for dysuria and hematuria.   Musculoskeletal:  Negative for arthralgias and back pain.   Skin:  Negative for pallor.   Neurological:  Negative for dizziness, seizures and syncope.   Hematological:  Negative for adenopathy.   Psychiatric/Behavioral:  Negative for dysphoric mood.          Objective:      There were no vitals taken for this visit.  Estimated body mass index is 34.39 kg/m² as calculated from the following:    Height as of 1/27/25: 5' 7" (1.702 m).    Weight as of 4/22/25: 99.6 kg (219 lb 9.3 oz).  Physical Exam      Assessment and Plan:   Discussed rising PSA and risk of BCR (biochemical recurrence). Will repeat PSA to verify.  GA68 PSMA scan for staging. Visit after PSMA scan.  Letter to Dr. Munoz.        Problem List Items Addressed This Visit       Prostate " cancer - Primary    Overview   prostatectomy in 2010, rising PSA that started in             Follow up:       Herminio Osman               [1]   Social History  Socioeconomic History    Marital status:    Tobacco Use    Smoking status: Former     Current packs/day: 0.00     Average packs/day: 1.5 packs/day for 52.7 years (79.1 ttl pk-yrs)     Types: Cigarettes     Start date: 1971     Quit date: 9/15/2023     Years since quittin.6    Smokeless tobacco: Former   Substance and Sexual Activity    Alcohol use: Not Currently    Drug use: Never    Sexual activity: Not Currently     Partners: Female     Social Drivers of Health     Financial Resource Strain: Medium Risk (2024)    Overall Financial Resource Strain (CARDIA)     Difficulty of Paying Living Expenses: Somewhat hard   Food Insecurity: No Food Insecurity (2024)    Hunger Vital Sign     Worried About Running Out of Food in the Last Year: Never true     Ran Out of Food in the Last Year: Never true   Transportation Needs: No Transportation Needs (2024)    TRANSPORTATION NEEDS     Transportation : No   Physical Activity: Inactive (2024)    Exercise Vital Sign     Days of Exercise per Week: 0 days     Minutes of Exercise per Session: 0 min   Stress: Stress Concern Present (2024)    [employer] [place] [employer] - Occupational Stress Questionnaire     Feeling of Stress : To some extent   Housing Stability: Unknown (2024)    Housing Stability Vital Sign     Unable to Pay for Housing in the Last Year: No     Homeless in the Last Year: No   [2]   Patient Active Problem List  Diagnosis    BRADLEY on CPAP    History of gout    DDD (degenerative disc disease), lumbar    Nicotine dependence    Hypertension associated with stage 3 chronic kidney disease due to type 2 diabetes mellitus    Hyperlipidemia associated with type 2 diabetes mellitus    Chronic kidney disease, stage 3    Class 1 obesity due to excess calories  with serious comorbidity and body mass index (BMI) of 32.0 to 32.9 in adult    Coronary artery disease of native artery of native heart with stable angina pectoris    Chronic combined systolic and diastolic congestive heart failure    Prostate cancer    PVD (peripheral vascular disease)    NICM (nonischemic cardiomyopathy)    Closed fracture of tuft of distal phalanx of finger    Chronic venous insufficiency    Third nerve palsy, right    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    DM (diabetes mellitus) with complications    Chronic obstructive pulmonary disease    Opioid dependence, uncomplicated    Orbital mass    Pulmonary HTN    Pseudotumor, inflammatory, orbital, right    Secondary hyperparathyroidism of renal origin    Weakness of both lower extremities    Lower back pain    Acute on chronic combined systolic and diastolic congestive heart failure    Acute kidney injury superimposed on chronic kidney disease    Acute respiratory failure with hypoxia and hypercarbia    Diabetes    Severe sepsis sec to Cellulitis Legs complicated by E coli Bacteremia    E coli bacteremia    Stage 3b chronic kidney disease

## 2025-05-02 ENCOUNTER — OFFICE VISIT (OUTPATIENT)
Dept: UROLOGY | Facility: CLINIC | Age: 75
End: 2025-05-02
Payer: MEDICARE

## 2025-05-02 ENCOUNTER — OFFICE VISIT (OUTPATIENT)
Dept: RHEUMATOLOGY | Facility: CLINIC | Age: 75
End: 2025-05-02
Payer: MEDICARE

## 2025-05-02 ENCOUNTER — TELEPHONE (OUTPATIENT)
Dept: UROLOGY | Facility: CLINIC | Age: 75
End: 2025-05-02
Payer: MEDICARE

## 2025-05-02 ENCOUNTER — LAB VISIT (OUTPATIENT)
Dept: LAB | Facility: HOSPITAL | Age: 75
End: 2025-05-02
Attending: UROLOGY
Payer: MEDICARE

## 2025-05-02 VITALS
SYSTOLIC BLOOD PRESSURE: 123 MMHG | HEIGHT: 67 IN | OXYGEN SATURATION: 95 % | BODY MASS INDEX: 34.39 KG/M2 | RESPIRATION RATE: 22 BRPM | TEMPERATURE: 97 F | DIASTOLIC BLOOD PRESSURE: 74 MMHG | HEART RATE: 86 BPM

## 2025-05-02 VITALS
HEART RATE: 86 BPM | DIASTOLIC BLOOD PRESSURE: 74 MMHG | WEIGHT: 220 LBS | HEIGHT: 67 IN | BODY MASS INDEX: 34.53 KG/M2 | SYSTOLIC BLOOD PRESSURE: 123 MMHG

## 2025-05-02 DIAGNOSIS — R97.20 ELEVATED PSA, BETWEEN 10 AND LESS THAN 20 NG/ML: ICD-10-CM

## 2025-05-02 DIAGNOSIS — N18.30 STAGE 3 CHRONIC KIDNEY DISEASE, UNSPECIFIED WHETHER STAGE 3A OR 3B CKD: Chronic | ICD-10-CM

## 2025-05-02 DIAGNOSIS — I25.118 CORONARY ARTERY DISEASE OF NATIVE ARTERY OF NATIVE HEART WITH STABLE ANGINA PECTORIS: Chronic | ICD-10-CM

## 2025-05-02 DIAGNOSIS — E11.69 HYPERLIPIDEMIA ASSOCIATED WITH TYPE 2 DIABETES MELLITUS: Chronic | ICD-10-CM

## 2025-05-02 DIAGNOSIS — N18.32 STAGE 3B CHRONIC KIDNEY DISEASE: ICD-10-CM

## 2025-05-02 DIAGNOSIS — E78.5 HYPERLIPIDEMIA ASSOCIATED WITH TYPE 2 DIABETES MELLITUS: Chronic | ICD-10-CM

## 2025-05-02 DIAGNOSIS — M11.20 CHONDROCALCINOSIS ARTICULARIS: ICD-10-CM

## 2025-05-02 DIAGNOSIS — H05.111 INFLAMMATORY PSEUDOTUMOR OF RIGHT ORBIT: ICD-10-CM

## 2025-05-02 DIAGNOSIS — C61 PROSTATE CANCER: Primary | ICD-10-CM

## 2025-05-02 DIAGNOSIS — I50.42 CHRONIC COMBINED SYSTOLIC AND DIASTOLIC CONGESTIVE HEART FAILURE: Chronic | ICD-10-CM

## 2025-05-02 DIAGNOSIS — D89.84 IGG4 RELATED DISEASE: Primary | ICD-10-CM

## 2025-05-02 DIAGNOSIS — I42.8 NICM (NONISCHEMIC CARDIOMYOPATHY): ICD-10-CM

## 2025-05-02 DIAGNOSIS — E08.00 DIABETES MELLITUS DUE TO UNDERLYING CONDITION WITH HYPEROSMOLARITY WITHOUT COMA, WITHOUT LONG-TERM CURRENT USE OF INSULIN: Chronic | ICD-10-CM

## 2025-05-02 DIAGNOSIS — R97.20 PSA ELEVATION: ICD-10-CM

## 2025-05-02 DIAGNOSIS — H51.21 INTERNUCLEAR OPHTHALMOPLEGIA OF RIGHT EYE: ICD-10-CM

## 2025-05-02 DIAGNOSIS — D84.821 IMMUNODEFICIENCY DUE TO DRUGS: ICD-10-CM

## 2025-05-02 DIAGNOSIS — C61 PROSTATE CANCER: ICD-10-CM

## 2025-05-02 DIAGNOSIS — M1A.09X0 IDIOPATHIC CHRONIC GOUT OF MULTIPLE SITES WITHOUT TOPHUS: ICD-10-CM

## 2025-05-02 DIAGNOSIS — J44.0 CHRONIC OBSTRUCTIVE PULMONARY DISEASE WITH ACUTE LOWER RESPIRATORY INFECTION: Chronic | ICD-10-CM

## 2025-05-02 DIAGNOSIS — Z51.81 MEDICATION MONITORING ENCOUNTER: ICD-10-CM

## 2025-05-02 DIAGNOSIS — Z79.899 IMMUNODEFICIENCY DUE TO DRUGS: ICD-10-CM

## 2025-05-02 PROCEDURE — 84153 ASSAY OF PSA TOTAL: CPT

## 2025-05-02 PROCEDURE — 99999 PR PBB SHADOW E&M-EST. PATIENT-LVL V: CPT | Mod: PBBFAC,,, | Performed by: UROLOGY

## 2025-05-02 PROCEDURE — 36415 COLL VENOUS BLD VENIPUNCTURE: CPT

## 2025-05-02 PROCEDURE — 99999 PR PBB SHADOW E&M-EST. PATIENT-LVL V: CPT | Mod: PBBFAC,,, | Performed by: INTERNAL MEDICINE

## 2025-05-02 NOTE — TELEPHONE ENCOUNTER
Left pt VM regarding reminder of 9a visit with Dr. Osman; will call pt again if no return call or no show of visit within the hour.

## 2025-05-02 NOTE — Clinical Note
Inflammatory pseudotumor of the orbit OD, biopsy proven.  Insufficient trial period for TNFi SC.  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  Plan for Uplizna (per FDA label) trial instead, once excluding recent elevation of PSA not from refractory prostate Ca.

## 2025-05-02 NOTE — PROGRESS NOTES
RHEUMATOLOGY OUTPATIENT CLINIC NOTE    5/2/2025    Attending Rheumatologist: Stanley Wren  Primary Care Provider/Physician Requesting Consultation: Thomas Munoz MD   Chief Complaint/Reason For Consultation:  Pseudotumor, orbit inflammatory      Subjective:     Santiago Khan is a 74 y.o. White male with orbital pseudotumor OD    Received Humira only 4 times, ran out of script.  Unchanged diplopia OD.  No acute complaints at present.      Review of Systems   Constitutional:  Negative for fever.   HENT:  Negative for nosebleeds.    Eyes:  Positive for double vision. Negative for photophobia and pain.        Denies sicca sx   Respiratory:  Positive for shortness of breath (GOVEA, chronic). Negative for cough.    Cardiovascular:  Negative for chest pain.   Gastrointestinal:  Negative for abdominal pain, blood in stool and melena.   Genitourinary:  Negative for dysuria, hematuria and urgency.   Musculoskeletal:  Negative for joint pain.   Skin:  Negative for rash.        No hx of PsO.  Denies RP   Neurological:  Negative for focal weakness and weakness.   Endo/Heme/Allergies:         No hx of DVT/PE       Chronic comorbid conditions affecting medical decision making today:  Past Medical History:   Diagnosis Date    Chronic combined systolic and diastolic congestive heart failure 07/09/2020    Chronic kidney disease, stage 3     Chronic obstructive pulmonary disease 03/20/2023    Wixela 250 mcg, Spiriva respimat. Continue Albuterol inhaler and albuterol nebs   Referral pul dis management, education and assistance with medication  Patient preference to only see a doctor, request follow up with Dr. Springer             Chronic venous insufficiency     DDD (degenerative disc disease), lumbar     DM (diabetes mellitus) with complications     Drug-induced constipation 11/03/2024    History of gout     Hyperlipidemia associated with type 2 diabetes mellitus     Hypertension associated with stage 3 chronic kidney  disease due to type 2 diabetes mellitus     BRADLEY on CPAP     Prostate cancer     prostatectomy in 2010, rising PSA that started in 2021     Past Surgical History:   Procedure Laterality Date    BICEPS TENDON REPAIR      COLONOSCOPY N/A 03/27/2019    Procedure: COLONOSCOPY;  Surgeon: James Roger MD;  Location: [facility] ENDO;  Service: Endoscopy;  Laterality: N/A;    EXPLORATION OF ORBIT Right 9/8/2023    Procedure: EXPLORATION, ORBIT;  Surgeon: Junaid Bartholomew MD;  Location: [facility] OR;  Service: ENT;  Laterality: Right;  possibly need frozen    HEMORRHOID SURGERY      INCISION AND DRAINAGE OF ABSCESS Right 11/8/2024    Procedure: INCISION AND DRAINAGE, ABSCESS;  Surgeon: Torres Magallanes MD;  Location: [facility] OR;  Service: General;  Laterality: Right;  4:30, patient ate    INGUINAL HERNIA REPAIR Left     KNEE ARTHROSCOPY Right     LEFT HEART CATHETERIZATION Left 06/29/2020    Procedure: CATHETERIZATION, HEART, LEFT;  Surgeon: Cheli Wilson MD;  Location: [facility] CATH LAB;  Service: Cardiology;  Laterality: Left;    LUMBAR LAMINECTOMY      PROSTATECTOMY      TONSILLECTOMY       Family History   Problem Relation Name Age of Onset    Prostate cancer Father      Coronary artery disease Father  90    Alzheimer's disease Father      Hyperlipidemia Mother       Tobacco Use History[1]  Current Medications[2]     Objective:     Vitals:    05/02/25 1047   BP: 123/74   Pulse: 86     Physical Exam   Eyes: Conjunctivae are normal.   Pulmonary/Chest: Effort normal. No respiratory distress.   Musculoskeletal:         General: No swelling or tenderness. Normal range of motion.   Skin: No rash noted.       Reviewed available old and all outside pertinent medical records available.    All lab results personally reviewed and interpreted by me.       ASSESSMENT / PLAN     1. IgG4 related disease  Plan for trial of Uplizna, monitor response after 6m.  Repeat CT for more recent BL, w/u for any primary RMD.  Angiotensin Converting Enzyme     Interleukin-2 Receptor    Cyclic Citrullinated Peptide Antibody, IgG    Rheumatoid Factor    NADIYA Screen w/Reflex    C-Reactive Protein    IgG 1, 2, 3, and 4      2. Inflammatory pseudotumor of right orbit  CT Orbits Sella Post Fossa Without Cont    Anti-Neutrophilic Cytoplasmic Antibody    Myeloperoxidase Antibody (MPO)    Proteinase 3 Autoantibodies    Angiotensin Converting Enzyme    Interleukin-2 Receptor    Cyclic Citrullinated Peptide Antibody, IgG    Rheumatoid Factor    IgG 1, 2, 3, and 4    Protein Electrophoresis, Serum    Immunofixation, Serum      3. 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 Medication monitoring encounter  Comprehensive Metabolic Panel      6. Hyperlipidemia associated with type 2 diabetes mellitus  Relative CI to CS      7. Stage 3b chronic kidney disease  Renally dose medications      8. PSA elevation  Exclude active malignancy that would represent heavy immunosuppression CI      9. Idiopathic chronic gout of multiple sites without tophus  On ULT per primary care      10. Chondrocalcinosis articularis  Local or CS PRN crystal arthropathy flare              Visit today included increased complexity associated with the care of the episodic problem IgG4 related disease addressed and managing the longitudinal care of the patient due to the serious and/or complex managed problem(s) inflammatory pseudotumor of right orbit, immunodeficiency due to drugs.    45 minutes of total time spent on the encounter, which includes face to face time and non-face to face time preparing to see the patient (eg, review of tests), Obtaining and/or reviewing separately obtained history, Documenting clinical information in the electronic or other health record,  Independently interpreting results (not separately reported) and communicating results to the patient/family/caregiver, or Care coordination (not separately reported).     Stanley Wren M.D.         [1]   Social History  Tobacco Use   Smoking Status Former    Current packs/day: 0.00    Average packs/day: 1.5 packs/day for 52.7 years (79.1 ttl pk-yrs)    Types: Cigarettes    Start date: 1971    Quit date: 9/15/2023    Years since quittin.6   Smokeless Tobacco Former   [2]   Current Outpatient Medications:     albuterol (PROVENTIL/VENTOLIN HFA) 90 mcg/actuation inhaler, Inhale 2 puffs into the lungs every 4 (four) hours as needed for Shortness of Breath or Wheezing., Disp: 25.5 g, Rfl: 3    allopurinoL (ZYLOPRIM) 300 MG tablet, Take 1 tablet (300 mg total) by mouth once daily., Disp: 90 tablet, Rfl: 3    aspirin (ECOTRIN) 81 MG EC tablet, Take 81 mg by mouth once daily., Disp: , Rfl:     baclofen (LIORESAL) 10 MG tablet, Take 1 tablet by mouth every evening., Disp: , Rfl: 2    bisoprolol (ZEBETA) 5 MG tablet, Take 0.5 tablets (2.5 mg total) by mouth once daily., Disp: 90 tablet, Rfl: 3    blood sugar diagnostic Strp, Check blood glucose twice per day, Disp: 200 strip, Rfl: 3    blood-glucose meter (ACCU-CHEK GUIDE ME GLUCOSE MTR) Misc, USE AS INSTRUCTED, Disp: 1 each, Rfl: 0    colchicine (COLCRYS) 0.6 mg tablet, Take 1 tablet (0.6 mg total) by mouth once daily., Disp: 90 tablet, Rfl: 3    empagliflozin (JARDIANCE) 10 mg tablet, Take 1 tablet (10 mg total) by mouth once daily., Disp: 90 tablet, Rfl: 3    fluticasone propionate (FLONASE) 50 mcg/actuation nasal spray, SHAKE AND SPRAY TWICE IN EACH NOSTRIL EVERY DAY, Disp: 48 g, Rfl: 3    fluticasone-salmeterol diskus inhaler 250-50 mcg, Inhale 1 puff into the lungs 2 (two) times daily., Disp: 180 each, Rfl: 3    glimepiride (AMARYL) 4 MG tablet, Take 1 tablet (4 mg total) by mouth daily with breakfast., Disp: 90 tablet, Rfl: 3    guaiFENesin 100 mg/5 ml  (ROBITUSSIN) 100 mg/5 mL syrup, Take 200 mg by mouth every evening., Disp: , Rfl:     HYDROcodone-acetaminophen (NORCO) 7.5-325 mg per tablet, Take 1 tablet by mouth every 4 (four) hours as needed (for chronic pain)., Disp: 20 tablet, Rfl: 0    lancets (ACCU-CHEK SOFTCLIX LANCETS) Misc, Check BG daily, Disp: 100 each, Rfl: 3    latanoprost 0.005 % ophthalmic solution, Place 1 drop into the right eye every evening., Disp: 2.5 mL, Rfl: 12    magnesium oxide (MAG-OX) 400 mg (241.3 mg magnesium) tablet, TAKE 2 TABLETS(800 MG) BY MOUTH TWICE DAILY, Disp: 120 tablet, Rfl: 6    multivitamin-minerals-lutein Tab, Take 1 tablet by mouth Daily., Disp: , Rfl:     omeprazole (PRILOSEC) 40 MG capsule, TAKE 1 CAPSULE BY MOUTH EVERY DAY, Disp: 90 capsule, Rfl: 3    potassium chloride SA (K-DUR,KLOR-CON) 20 MEQ tablet, TAKE 3 TABLETS BY MOUTH TWICE DAILY, Disp: 540 tablet, Rfl: 2    sacubitriL-valsartan (ENTRESTO) 24-26 mg per tablet, Take 1 tablet by mouth 2 (two) times daily., Disp: 60 tablet, Rfl: 5    simvastatin (ZOCOR) 40 MG tablet, Take 1 tablet (40 mg total) by mouth every evening., Disp: 90 tablet, Rfl: 3    torsemide (DEMADEX) 20 MG Tab, Take 2 tablets (40 mg total) by mouth 2 (two) times a day., Disp: 360 tablet, Rfl: 2

## 2025-05-03 LAB — PSA SERPL-MCNC: 23.47 NG/ML

## 2025-05-05 ENCOUNTER — HOSPITAL ENCOUNTER (OUTPATIENT)
Dept: RADIOLOGY | Facility: HOSPITAL | Age: 75
Discharge: HOME | End: 2025-05-05
Attending: INTERNAL MEDICINE
Payer: MEDICARE

## 2025-05-05 ENCOUNTER — TELEPHONE (OUTPATIENT)
Dept: RHEUMATOLOGY | Facility: CLINIC | Age: 75
End: 2025-05-05
Payer: MEDICARE

## 2025-05-05 DIAGNOSIS — H51.21 INTERNUCLEAR OPHTHALMOPLEGIA OF RIGHT EYE: ICD-10-CM

## 2025-05-05 DIAGNOSIS — H05.111 INFLAMMATORY PSEUDOTUMOR OF RIGHT ORBIT: ICD-10-CM

## 2025-05-05 PROCEDURE — 70480 CT ORBIT/EAR/FOSSA W/O DYE: CPT | Mod: TC

## 2025-05-05 PROCEDURE — 70480 CT ORBIT/EAR/FOSSA W/O DYE: CPT | Mod: 26,,, | Performed by: RADIOLOGY

## 2025-05-05 NOTE — TELEPHONE ENCOUNTER
----- Message from Stanley Wren MD sent at 5/2/2025 11:52 AM CDT -----  Inflammatory pseudotumor of the orbit OD, biopsy proven.  Insufficient trial period for TNFi SC.  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  Plan for Uplizna (per FDA label) trial instead, once excluding recent elevation of PSA not from refractory prostate Ca.

## 2025-05-06 ENCOUNTER — LAB VISIT (OUTPATIENT)
Dept: LAB | Facility: HOSPITAL | Age: 75
End: 2025-05-06
Attending: INTERNAL MEDICINE
Payer: MEDICARE

## 2025-05-06 ENCOUNTER — TELEPHONE (OUTPATIENT)
Dept: UROLOGY | Facility: CLINIC | Age: 75
End: 2025-05-06
Payer: MEDICARE

## 2025-05-06 DIAGNOSIS — I50.42 CHRONIC COMBINED SYSTOLIC AND DIASTOLIC CONGESTIVE HEART FAILURE: Chronic | ICD-10-CM

## 2025-05-06 LAB
ALBUMIN SERPL BCP-MCNC: 3.8 G/DL (ref 3.5–5.2)
ALP SERPL-CCNC: 106 UNIT/L (ref 40–150)
ALT SERPL W/O P-5'-P-CCNC: 25 UNIT/L (ref 10–44)
ANION GAP (OHS): 10 MMOL/L (ref 8–16)
AST SERPL-CCNC: 26 UNIT/L (ref 11–45)
BILIRUB SERPL-MCNC: 0.7 MG/DL (ref 0.1–1)
BUN SERPL-MCNC: 28 MG/DL (ref 8–23)
CALCIUM SERPL-MCNC: 9.3 MG/DL (ref 8.7–10.5)
CHLORIDE SERPL-SCNC: 97 MMOL/L (ref 95–110)
CO2 SERPL-SCNC: 32 MMOL/L (ref 23–29)
CREAT SERPL-MCNC: 2 MG/DL (ref 0.5–1.4)
GFR SERPLBLD CREATININE-BSD FMLA CKD-EPI: 35 ML/MIN/1.73/M2
GLUCOSE SERPL-MCNC: 137 MG/DL (ref 70–110)
POTASSIUM SERPL-SCNC: 4.7 MMOL/L (ref 3.5–5.1)
PROT SERPL-MCNC: 7 GM/DL (ref 6–8.4)
SODIUM SERPL-SCNC: 139 MMOL/L (ref 136–145)

## 2025-05-06 PROCEDURE — 36415 COLL VENOUS BLD VENIPUNCTURE: CPT

## 2025-05-06 PROCEDURE — 80053 COMPREHEN METABOLIC PANEL: CPT

## 2025-05-06 PROCEDURE — 83880 ASSAY OF NATRIURETIC PEPTIDE: CPT

## 2025-05-06 NOTE — TELEPHONE ENCOUNTER
Urology appt on 6/6? Urologist staff left  offering pt sooner appt in [facility] on 5/22.

## 2025-05-08 LAB — NT-PROBNP SERPL IA-MCNC: 1131 PG/ML

## 2025-05-11 ENCOUNTER — TELEPHONE (OUTPATIENT)
Dept: CARDIOLOGY | Facility: HOSPITAL | Age: 75
End: 2025-05-11
Payer: MEDICARE

## 2025-05-11 ENCOUNTER — RESULTS FOLLOW-UP (OUTPATIENT)
Dept: CARDIOLOGY | Facility: HOSPITAL | Age: 75
End: 2025-05-11

## 2025-05-11 RX ORDER — TORSEMIDE 20 MG/1
60 TABLET ORAL DAILY
Qty: 270 TABLET | Refills: 2 | Status: SHIPPED | OUTPATIENT
Start: 2025-05-11

## 2025-05-12 ENCOUNTER — TELEPHONE (OUTPATIENT)
Dept: CARDIOLOGY | Facility: CLINIC | Age: 75
End: 2025-05-12
Payer: MEDICARE

## 2025-05-12 NOTE — TELEPHONE ENCOUNTER
LVM for pt to call back regarding lab results.           ----- Message from Sabino Cr MD sent at 5/11/2025  4:42 PM CDT -----  The lab showed CHF is controlled  Decrease torsemide to 40 mg in AM and 20 mg in PM  ----- Message -----  From: Lab, Background User  Sent: 5/6/2025   9:13 PM CDT  To: Sabino Cr MD

## 2025-05-20 ENCOUNTER — HOSPITAL ENCOUNTER (OUTPATIENT)
Dept: RADIOLOGY | Facility: HOSPITAL | Age: 75
Discharge: HOME | End: 2025-05-20
Attending: UROLOGY
Payer: MEDICARE

## 2025-05-20 DIAGNOSIS — C61 PROSTATE CANCER: ICD-10-CM

## 2025-05-20 PROCEDURE — 78815 PET IMAGE W/CT SKULL-THIGH: CPT | Mod: 26,PI,, | Performed by: STUDENT IN AN ORGANIZED HEALTH CARE EDUCATION/TRAINING PROGRAM

## 2025-05-20 PROCEDURE — A9596 HC GALLIUM GA-68 GOZETOTIDE, DX (ILLUCCIX), PER 1 MCI: HCPCS | Mod: TB | Performed by: UROLOGY

## 2025-05-20 PROCEDURE — 78815 PET IMAGE W/CT SKULL-THIGH: CPT | Mod: TC

## 2025-05-20 RX ADMIN — KIT FOR THE PREPARATION OF GALLIUM GA 68 GOZETOTIDE INJECTION 4.22 MILLICURIE: KIT INTRAVENOUS at 12:05

## 2025-05-21 DIAGNOSIS — E11.9 TYPE 2 DIABETES MELLITUS WITHOUT COMPLICATION: ICD-10-CM

## 2025-05-22 ENCOUNTER — TELEPHONE (OUTPATIENT)
Dept: UROLOGY | Facility: CLINIC | Age: 75
End: 2025-05-22
Payer: MEDICARE

## 2025-05-22 ENCOUNTER — OFFICE VISIT (OUTPATIENT)
Dept: DERMATOLOGY | Facility: CLINIC | Age: 75
End: 2025-05-22
Payer: MEDICARE

## 2025-05-22 DIAGNOSIS — L98.9 SKIN LESIONS: ICD-10-CM

## 2025-05-22 DIAGNOSIS — L57.0 ACTINIC KERATOSES: Primary | ICD-10-CM

## 2025-05-22 PROCEDURE — 99202 OFFICE O/P NEW SF 15 MIN: CPT | Mod: 25,S$GLB,, | Performed by: STUDENT IN AN ORGANIZED HEALTH CARE EDUCATION/TRAINING PROGRAM

## 2025-05-22 PROCEDURE — 1101F PT FALLS ASSESS-DOCD LE1/YR: CPT | Mod: CPTII,S$GLB,, | Performed by: STUDENT IN AN ORGANIZED HEALTH CARE EDUCATION/TRAINING PROGRAM

## 2025-05-22 PROCEDURE — 1160F RVW MEDS BY RX/DR IN RCRD: CPT | Mod: CPTII,S$GLB,, | Performed by: STUDENT IN AN ORGANIZED HEALTH CARE EDUCATION/TRAINING PROGRAM

## 2025-05-22 PROCEDURE — 17000 DESTRUCT PREMALG LESION: CPT | Mod: S$GLB,,, | Performed by: STUDENT IN AN ORGANIZED HEALTH CARE EDUCATION/TRAINING PROGRAM

## 2025-05-22 PROCEDURE — 1159F MED LIST DOCD IN RCRD: CPT | Mod: CPTII,S$GLB,, | Performed by: STUDENT IN AN ORGANIZED HEALTH CARE EDUCATION/TRAINING PROGRAM

## 2025-05-22 PROCEDURE — 3288F FALL RISK ASSESSMENT DOCD: CPT | Mod: CPTII,S$GLB,, | Performed by: STUDENT IN AN ORGANIZED HEALTH CARE EDUCATION/TRAINING PROGRAM

## 2025-05-22 PROCEDURE — 4010F ACE/ARB THERAPY RXD/TAKEN: CPT | Mod: CPTII,S$GLB,, | Performed by: STUDENT IN AN ORGANIZED HEALTH CARE EDUCATION/TRAINING PROGRAM

## 2025-05-22 PROCEDURE — 17003 DESTRUCT PREMALG LES 2-14: CPT | Mod: S$GLB,,, | Performed by: STUDENT IN AN ORGANIZED HEALTH CARE EDUCATION/TRAINING PROGRAM

## 2025-05-22 PROCEDURE — 3044F HG A1C LEVEL LT 7.0%: CPT | Mod: CPTII,S$GLB,, | Performed by: STUDENT IN AN ORGANIZED HEALTH CARE EDUCATION/TRAINING PROGRAM

## 2025-05-22 PROCEDURE — 99999 PR PBB SHADOW E&M-EST. PATIENT-LVL IV: CPT | Mod: PBBFAC,,, | Performed by: STUDENT IN AN ORGANIZED HEALTH CARE EDUCATION/TRAINING PROGRAM

## 2025-05-22 PROCEDURE — 1125F AMNT PAIN NOTED PAIN PRSNT: CPT | Mod: CPTII,S$GLB,, | Performed by: STUDENT IN AN ORGANIZED HEALTH CARE EDUCATION/TRAINING PROGRAM

## 2025-05-22 NOTE — PROGRESS NOTES
Subjective:       Patient ID:  Santiago Khan is a 74 y.o. male who presents for   Chief Complaint   Patient presents with    Spot     C/o spot on scalp he wants looked at     History of Present Illness: The patient presents with chief complaint of lesions on the scalp.  Location: top of the scalp  Duration: present for several months.  Signs/Symptoms: red to brown, crusted and scaly growths. Otherwise, denies any rapidly growing, bleeding or non healing skin lesions.   Prior treatments: none. Has had these lesions treated with cryotherapy in the past.       Spot        Review of Systems   Constitutional:  Negative for fever and chills.   Skin:  Negative for itching, rash and dry skin.        Objective:    Physical Exam   Constitutional: He appears well-developed and well-nourished. No distress.   Neurological: He is alert and oriented to person, place, and time. He is not disoriented.   Psychiatric: He has a normal mood and affect.   Skin:   Areas Examined (abnormalities noted in diagram):   Scalp / Hair Palpated and Inspected  Head / Face Inspection Performed  Neck Inspection Performed  RUE Inspected  LUE Inspection Performed              Diagram Legend     Erythematous scaling macule/papule c/w actinic keratosis       Vascular papule c/w angioma      Pigmented verrucoid papule/plaque c/w seborrheic keratosis      Yellow umbilicated papule c/w sebaceous hyperplasia      Irregularly shaped tan macule c/w lentigo     1-2 mm smooth white papules consistent with Milia      Movable subcutaneous cyst with punctum c/w epidermal inclusion cyst      Subcutaneous movable cyst c/w pilar cyst      Firm pink to brown papule c/w dermatofibroma      Pedunculated fleshy papule(s) c/w skin tag(s)      Evenly pigmented macule c/w junctional nevus     Mildly variegated pigmented, slightly irregular-bordered macule c/w mildly atypical nevus      Flesh colored to evenly pigmented papule c/w intradermal nevus       Pink  pearly papule/plaque c/w basal cell carcinoma      Erythematous hyperkeratotic cursted plaque c/w SCC      Surgical scar with no sign of skin cancer recurrence      Open and closed comedones      Inflammatory papules and pustules      Verrucoid papule consistent consistent with wart     Erythematous eczematous patches and plaques     Dystrophic onycholytic nail with subungual debris c/w onychomycosis     Umbilicated papule    Erythematous-base heme-crusted tan verrucoid plaque consistent with inflamed seborrheic keratosis     Erythematous Silvery Scaling Plaque c/w Psoriasis     See annotation      Assessment / Plan:        Actinic keratoses  Cryosurgery Procedure Note    Verbal consent from the patient is obtained including, but not limited to, risk of hypopigmentation/hyperpigmentation, scar, recurrence of lesion. The patient is aware of the precancerous quality and need for treatment of these lesions. Liquid nitrogen cryosurgery is applied to the 5 actinic keratoses, as detailed in the physical exam, to produce a freeze injury. The patient is aware that blisters may form and is instructed on wound care with gentle cleansing and use of vaseline ointment to keep moist until healed. The patient is supplied a handout on cryosurgery and is instructed to call if lesions do not completely resolve.        Follow up in about 1 year (around 5/22/2026).

## 2025-05-27 ENCOUNTER — LAB VISIT (OUTPATIENT)
Dept: LAB | Facility: HOSPITAL | Age: 75
End: 2025-05-27
Attending: INTERNAL MEDICINE
Payer: MEDICARE

## 2025-05-27 ENCOUNTER — OFFICE VISIT (OUTPATIENT)
Dept: CARDIOLOGY | Facility: CLINIC | Age: 75
End: 2025-05-27
Payer: MEDICARE

## 2025-05-27 VITALS
HEART RATE: 94 BPM | DIASTOLIC BLOOD PRESSURE: 66 MMHG | SYSTOLIC BLOOD PRESSURE: 108 MMHG | OXYGEN SATURATION: 95 % | HEIGHT: 67 IN | WEIGHT: 222.69 LBS | BODY MASS INDEX: 34.95 KG/M2

## 2025-05-27 DIAGNOSIS — E11.69 HYPERLIPIDEMIA ASSOCIATED WITH TYPE 2 DIABETES MELLITUS: Chronic | ICD-10-CM

## 2025-05-27 DIAGNOSIS — I27.20 PULMONARY HTN: ICD-10-CM

## 2025-05-27 DIAGNOSIS — N18.30 HYPERTENSION ASSOCIATED WITH STAGE 3 CHRONIC KIDNEY DISEASE DUE TO TYPE 2 DIABETES MELLITUS: Chronic | ICD-10-CM

## 2025-05-27 DIAGNOSIS — I87.2 CHRONIC VENOUS INSUFFICIENCY: ICD-10-CM

## 2025-05-27 DIAGNOSIS — E11.22 HYPERTENSION ASSOCIATED WITH STAGE 3 CHRONIC KIDNEY DISEASE DUE TO TYPE 2 DIABETES MELLITUS: Chronic | ICD-10-CM

## 2025-05-27 DIAGNOSIS — E78.5 HYPERLIPIDEMIA ASSOCIATED WITH TYPE 2 DIABETES MELLITUS: Chronic | ICD-10-CM

## 2025-05-27 DIAGNOSIS — I73.9 PVD (PERIPHERAL VASCULAR DISEASE): ICD-10-CM

## 2025-05-27 DIAGNOSIS — I50.42 CHRONIC COMBINED SYSTOLIC AND DIASTOLIC CONGESTIVE HEART FAILURE: Chronic | ICD-10-CM

## 2025-05-27 DIAGNOSIS — I25.118 CORONARY ARTERY DISEASE OF NATIVE ARTERY OF NATIVE HEART WITH STABLE ANGINA PECTORIS: Chronic | ICD-10-CM

## 2025-05-27 DIAGNOSIS — I12.9 HYPERTENSION ASSOCIATED WITH STAGE 3 CHRONIC KIDNEY DISEASE DUE TO TYPE 2 DIABETES MELLITUS: Chronic | ICD-10-CM

## 2025-05-27 DIAGNOSIS — E11.8 DM (DIABETES MELLITUS) WITH COMPLICATIONS: ICD-10-CM

## 2025-05-27 DIAGNOSIS — I42.8 NICM (NONISCHEMIC CARDIOMYOPATHY): ICD-10-CM

## 2025-05-27 DIAGNOSIS — I50.42 CHRONIC COMBINED SYSTOLIC AND DIASTOLIC CONGESTIVE HEART FAILURE: Primary | Chronic | ICD-10-CM

## 2025-05-27 LAB
ANION GAP (OHS): 15 MMOL/L (ref 8–16)
BUN SERPL-MCNC: 32 MG/DL (ref 8–23)
CALCIUM SERPL-MCNC: 9.9 MG/DL (ref 8.7–10.5)
CHLORIDE SERPL-SCNC: 98 MMOL/L (ref 95–110)
CO2 SERPL-SCNC: 25 MMOL/L (ref 23–29)
CREAT SERPL-MCNC: 1.8 MG/DL (ref 0.5–1.4)
GFR SERPLBLD CREATININE-BSD FMLA CKD-EPI: 39 ML/MIN/1.73/M2
GLUCOSE SERPL-MCNC: 130 MG/DL (ref 70–110)
POTASSIUM SERPL-SCNC: 5.1 MMOL/L (ref 3.5–5.1)
SODIUM SERPL-SCNC: 138 MMOL/L (ref 136–145)

## 2025-05-27 PROCEDURE — 3288F FALL RISK ASSESSMENT DOCD: CPT | Mod: CPTII,S$GLB,, | Performed by: INTERNAL MEDICINE

## 2025-05-27 PROCEDURE — 1160F RVW MEDS BY RX/DR IN RCRD: CPT | Mod: CPTII,S$GLB,, | Performed by: INTERNAL MEDICINE

## 2025-05-27 PROCEDURE — 80048 BASIC METABOLIC PNL TOTAL CA: CPT

## 2025-05-27 PROCEDURE — 99214 OFFICE O/P EST MOD 30 MIN: CPT | Mod: S$GLB,,, | Performed by: INTERNAL MEDICINE

## 2025-05-27 PROCEDURE — 99999 PR PBB SHADOW E&M-EST. PATIENT-LVL IV: CPT | Mod: PBBFAC,,, | Performed by: INTERNAL MEDICINE

## 2025-05-27 PROCEDURE — 1101F PT FALLS ASSESS-DOCD LE1/YR: CPT | Mod: CPTII,S$GLB,, | Performed by: INTERNAL MEDICINE

## 2025-05-27 PROCEDURE — 3074F SYST BP LT 130 MM HG: CPT | Mod: CPTII,S$GLB,, | Performed by: INTERNAL MEDICINE

## 2025-05-27 PROCEDURE — 4010F ACE/ARB THERAPY RXD/TAKEN: CPT | Mod: CPTII,S$GLB,, | Performed by: INTERNAL MEDICINE

## 2025-05-27 PROCEDURE — 83880 ASSAY OF NATRIURETIC PEPTIDE: CPT

## 2025-05-27 PROCEDURE — 3078F DIAST BP <80 MM HG: CPT | Mod: CPTII,S$GLB,, | Performed by: INTERNAL MEDICINE

## 2025-05-27 PROCEDURE — 36415 COLL VENOUS BLD VENIPUNCTURE: CPT

## 2025-05-27 PROCEDURE — 1159F MED LIST DOCD IN RCRD: CPT | Mod: CPTII,S$GLB,, | Performed by: INTERNAL MEDICINE

## 2025-05-27 PROCEDURE — 3044F HG A1C LEVEL LT 7.0%: CPT | Mod: CPTII,S$GLB,, | Performed by: INTERNAL MEDICINE

## 2025-05-27 PROCEDURE — 3008F BODY MASS INDEX DOCD: CPT | Mod: CPTII,S$GLB,, | Performed by: INTERNAL MEDICINE

## 2025-05-27 NOTE — PROGRESS NOTES
Subjective:   Patient ID:  Santiago Khan is a 74 y.o. male who presents for follow up of No chief complaint on file.      73 yo male, came in for 6 m f/u  PMH CAD s/p LHC in  D1 70%, no PCi, CHFmrEF 45% to 50%, normal LVEDP, DM 4 yrs, pulm HTN life long smoker, quit in ,  HTN, HLD, CKD III, prostates Ca s/p TURP in 2011, no h/o chemo RX and XRT. Gout. No h/o stroke and heart attack. Social drinker.  Remote LHC showed miminal blockage by Dr. Mayra CHAPPELL,    admitted for [facility] due to chest tightness. Had low K and Mg. Troponin x2 negative and EKG showed NSR, RBBB, and LVH. Echo showed EF 45% global HK and moderate MR. Had K and Mg supplement.  States that gained weight for 30 pounds since 3/202 after held Metolazone. Even he was taking Bumex 1.5 mg bid. In  BNP 15 and Cr 1.4  Still has GOVEA. No orthopnea, sleeps with CPAP. No chest pain, faint  NUKE stress done in  showed inferior ischemia with scar. EF 45%  LHC done in  D1 70% no PCI. Normal filling pressure  No chest pain . Left radial access ok  SOB, weight gain  Sleeps on 1 pillow. No PND  Taking Bumex 2 mg bid and DIamox   Pt c/o weight gain 30 pounds after held his metolazone in the past 4 months  C/o abd distanesion SOB.     11/23/2021 visit  SOB for few months, positive orthopnea. Sleeps on CPAP. + leg swelling and left leg pain and redness  The last seen was   On Bumex 2 mg bid. Held metolazone. Quit smoking  H/o prostate cancer and PSA recurrently elevated. CXR in  negative     12/2021   Leg swelling and erythema. At last visit, D/c bumex and added torsemide 40 mg bid. Good urination. BNP negative and Cr up to 1.6. Decreased torsemide to 20 mg bid  Still leg swelling and SOB. Quit smoking 9 months ago.      visit  Resumed metolazone 3 times a week about 2 weeks ago and BMP done two days ago showed elevated Cr.   Felt neck tightness and improved breathing after the head tilts back  and stretched up. Not f/u with pulm yet  On compresion socks.   Still SOB. Serial BNPs wnl and h/o LHC showed normal filling pressure suggested CHF controlled     visit  Quit smoking 1 yr and on breathing rx  No chest pain dizziness and faint. BP controlled  BNP < 10. Cr 1.7    echo  · Mild concentric left ventricular hypertrophy.  · Mildly decreased left ventricular systolic function. The estimated ejection fraction is 45%.  · Grade I (mild) left ventricular diastolic dysfunction consistent with impaired relaxation.  · Low normal right ventricular systolic function.  · Moderate mitral regurgitation.  · Normal central venous pressure (3 mmHg).  · The estimated PA systolic pressure is 23 mmHg.     visit  H/o 6MWT in : 250 meters, and peal VO 11 calculated  Cane dependent due to lower back pain  Resumed smoking. On breathing tx and f/u with Dr. Escudero  On torsemide 40 mg bid for PVD    10/23 visit  Quit smoking again and improved breathing, not like CPAP. F/u at pulm service. Inhaler helped for tightness  No PND chest pain palpitation dizziness. Remains mild leg swelling    04/24 visit  Weight stable. Quit smoking, chronic SOB. On cpap.   Leg swelling chronic  EKG reviewed by myself today reveals NSR nonspecific STT change, RBBB LAFB LVH PACs    09/24 visit  In 05/24 decreased torsemide from 40 mg bid to 20 mg daily due to elevation of Cr to 2.5   Repeat Cr in 08/24, improved to 1.6 BNP. Slightly elevated to 178  C/o leg swelling  SOB is the same and on breathing Rx. No orthpnea   Weight loss 15 lbs in 6 m   Quit smoking for a yr    04/25 visit  H/o cellulitis and knee infection, stayed in rehab for 2 weeks and not home.    Interval history  CHFrF 35% and torsemide 40 mg bid  NT  and Cr 2.0  Remains on Losartan and not start entresto                         History of Present Illness               Past Medical History:   Diagnosis Date    Chronic combined systolic and diastolic  congestive heart failure 07/09/2020    Chronic kidney disease, stage 3     Chronic obstructive pulmonary disease 03/20/2023    Wixela 250 mcg, Spiriva respimat. Continue Albuterol inhaler and albuterol nebs   Referral pul dis management, education and assistance with medication  Patient preference to only see a doctor, request follow up with Dr. Springer             Chronic venous insufficiency     DDD (degenerative disc disease), lumbar     DM (diabetes mellitus) with complications     Drug-induced constipation 11/03/2024    History of gout     Hyperlipidemia associated with type 2 diabetes mellitus     Hypertension associated with stage 3 chronic kidney disease due to type 2 diabetes mellitus     BRADLEY on CPAP     Prostate cancer     prostatectomy in 2010, rising PSA that started in 2021       Past Surgical History:   Procedure Laterality Date    BICEPS TENDON REPAIR      COLONOSCOPY N/A 03/27/2019    Procedure: COLONOSCOPY;  Surgeon: James Roger MD;  Location: [facility] ENDO;  Service: Endoscopy;  Laterality: N/A;    EXPLORATION OF ORBIT Right 9/8/2023    Procedure: EXPLORATION, ORBIT;  Surgeon: Junaid Bartholomew MD;  Location: [facility] OR;  Service: ENT;  Laterality: Right;  possibly need frozen    HEMORRHOID SURGERY      INCISION AND DRAINAGE OF ABSCESS Right 11/8/2024    Procedure: INCISION AND DRAINAGE, ABSCESS;  Surgeon: Torres Magallanes MD;  Location: [facility] OR;  Service: General;  Laterality: Right;  4:30, patient ate    INGUINAL HERNIA REPAIR Left     KNEE ARTHROSCOPY Right     LEFT HEART CATHETERIZATION Left 06/29/2020    Procedure: CATHETERIZATION, HEART, LEFT;  Surgeon: Cheli Wilson MD;  Location: [facility] CATH LAB;  Service: Cardiology;  Laterality: Left;    LUMBAR LAMINECTOMY      PROSTATECTOMY      TONSILLECTOMY         Social History[1]    Family History   Problem Relation Name Age of Onset    Prostate cancer Father      Coronary artery disease Father  90    Alzheimer's disease Father      Hyperlipidemia  Mother           ROS    Objective:   Physical Exam  HENT:      Head: Normocephalic.   Eyes:      Pupils: Pupils are equal, round, and reactive to light.   Neck:      Thyroid: No thyromegaly.      Vascular: Normal carotid pulses. No carotid bruit or JVD.   Cardiovascular:      Rate and Rhythm: Normal rate and regular rhythm. No extrasystoles are present.     Chest Wall: PMI is not displaced.      Pulses: Normal pulses.      Heart sounds: Normal heart sounds. No murmur heard.     No gallop. No S3 sounds.   Pulmonary:      Effort: No respiratory distress.      Breath sounds: No stridor. Rales present.   Abdominal:      General: Bowel sounds are normal.      Palpations: Abdomen is soft.      Tenderness: There is no abdominal tenderness. There is no rebound.   Musculoskeletal:         General: Swelling present.   Skin:     Findings: No rash.   Neurological:      Mental Status: He is alert and oriented to person, place, and time.   Psychiatric:         Behavior: Behavior normal.         Lab Results   Component Value Date    CHOL 135 02/25/2025    CHOL 138 08/14/2024    CHOL 144 05/15/2024     Lab Results   Component Value Date    HDL 53 02/25/2025    HDL 45 08/14/2024    HDL 54 05/15/2024     Lab Results   Component Value Date    LDLCALC 66.4 02/25/2025    LDLCALC 69.4 08/14/2024    LDLCALC 63.6 05/15/2024     Lab Results   Component Value Date    TRIG 78 02/25/2025    TRIG 118 08/14/2024    TRIG 132 05/15/2024     Lab Results   Component Value Date    CHOLHDL 39.3 02/25/2025    CHOLHDL 32.6 08/14/2024    CHOLHDL 37.5 05/15/2024       Chemistry        Component Value Date/Time     05/06/2025 1059     02/25/2025 1038    K 4.7 05/06/2025 1059    K 4.8 02/25/2025 1038    CL 97 05/06/2025 1059     02/25/2025 1038    CO2 32 (H) 05/06/2025 1059    CO2 28 02/25/2025 1038    BUN 28 (H) 05/06/2025 1059    CREATININE 2.0 (H) 05/06/2025 1059     (H) 05/06/2025 1059     02/25/2025 1038        Component  Value Date/Time    CALCIUM 9.3 05/06/2025 1059    CALCIUM 9.6 02/25/2025 1038    ALKPHOS 106 05/06/2025 1059    ALKPHOS 86 02/25/2025 1038    AST 26 05/06/2025 1059    AST 51 (H) 02/25/2025 1038    ALT 25 05/06/2025 1059    ALT 24 02/25/2025 1038    BILITOT 0.7 05/06/2025 1059    BILITOT 0.6 02/25/2025 1038    ESTGFRAFRICA 43 (A) 07/18/2022 0941    EGFRNONAA 37 (A) 07/18/2022 0941          Lab Results   Component Value Date    HGBA1C 6.0 (H) 02/25/2025     Lab Results   Component Value Date    TSH 2.068 02/25/2025     Lab Results   Component Value Date    INR 1.0 06/26/2020    INR 1.0 03/10/2020     Lab Results   Component Value Date    WBC 8.07 05/02/2025    HGB 15.4 05/02/2025    HCT 47.5 05/02/2025    MCV 96 05/02/2025     05/02/2025     BMP  Sodium   Date Value Ref Range Status   05/06/2025 139 136 - 145 mmol/L Final   02/25/2025 139 136 - 145 mmol/L Final     Potassium   Date Value Ref Range Status   05/06/2025 4.7 3.5 - 5.1 mmol/L Final   02/25/2025 4.8 3.5 - 5.1 mmol/L Final     Chloride   Date Value Ref Range Status   05/06/2025 97 95 - 110 mmol/L Final   02/25/2025 100 95 - 110 mmol/L Final     CO2   Date Value Ref Range Status   05/06/2025 32 (H) 23 - 29 mmol/L Final   02/25/2025 28 23 - 29 mmol/L Final     BUN   Date Value Ref Range Status   05/06/2025 28 (H) 8 - 23 mg/dL Final     Creatinine   Date Value Ref Range Status   05/06/2025 2.0 (H) 0.5 - 1.4 mg/dL Final     Calcium   Date Value Ref Range Status   05/06/2025 9.3 8.7 - 10.5 mg/dL Final   02/25/2025 9.6 8.7 - 10.5 mg/dL Final     Anion Gap   Date Value Ref Range Status   05/06/2025 10 8 - 16 mmol/L Final     eGFR if    Date Value Ref Range Status   07/18/2022 43 (A) >60 mL/min/1.73 m^2 Final     eGFR if non    Date Value Ref Range Status   07/18/2022 37 (A) >60 mL/min/1.73 m^2 Final     Comment:     Calculation used to obtain the estimated glomerular filtration  rate (eGFR) is the CKD-EPI equation.         BNP  @LABRCNTIP(BNP,BNPTRIAGEBLO)@  @LABRCNTIP(troponini)@  CrCl cannot be calculated (Patient's most recent lab result is older than the maximum 7 days allowed.).  No results found in the last 24 hours.  No results found in the last 24 hours.  No results found in the last 24 hours.    Assessment:      1. Chronic combined systolic and diastolic congestive heart failure    2. Pulmonary HTN    3. PVD (peripheral vascular disease)    4. NICM (nonischemic cardiomyopathy)    5. Hypertension associated with stage 3 chronic kidney disease due to type 2 diabetes mellitus    6. Hyperlipidemia associated with type 2 diabetes mellitus    7. Coronary artery disease of native artery of native heart with stable angina pectoris    8. Chronic venous insufficiency    9. DM (diabetes mellitus) with complications        Plan:     Assessment & Plan             Resend entresto 24/26 mg bid and d/c losartan   Continue torsemide 40 mg bid bisoprlol and Jardiance for CHFrEF   Repeat NT NMP and BMP today  RTC in 2 m    Continue asa statin for HTN HLD DM     This note was generated with the assistance of ambient listening technology. Verbal consent was obtained by the patient and accompanying visitor(s) for the recording of patient appointment to facilitate this note. I attest to having reviewed and edited the generated note for accuracy, though some syntax or spelling errors may persist. Please contact the author of this note for any clarification.            [1]   Social History  Tobacco Use    Smoking status: Former     Current packs/day: 0.00     Average packs/day: 1.5 packs/day for 52.7 years (79.1 ttl pk-yrs)     Types: Cigarettes     Start date: 1971     Quit date: 9/15/2023     Years since quittin.6    Smokeless tobacco: Former   Substance Use Topics    Alcohol use: Not Currently    Drug use: Never

## 2025-05-28 ENCOUNTER — CLINICAL SUPPORT (OUTPATIENT)
Dept: SMOKING CESSATION | Facility: CLINIC | Age: 75
End: 2025-05-28
Payer: COMMERCIAL

## 2025-05-28 DIAGNOSIS — F17.200 NICOTINE DEPENDENCE: Primary | ICD-10-CM

## 2025-05-28 PROCEDURE — 99999 PR PBB SHADOW E&M-EST. PATIENT-LVL II: CPT | Mod: PBBFAC,,, | Performed by: SPEECH-LANGUAGE PATHOLOGIST

## 2025-05-28 PROCEDURE — 99404 PREV MED CNSL INDIV APPRX 60: CPT | Mod: S$GLB,,, | Performed by: SPEECH-LANGUAGE PATHOLOGIST

## 2025-05-28 NOTE — PROGRESS NOTES
Individual Follow-Up Form    5/28/2025    Quit Date: 9/15/2023    Clinical Status of Patient: Outpatient    Length of Service: 60 minutes    Continuing Medication: no    Other Medications:      Target Symptoms: Withdrawal and medication side effects. The following were  rated moderate (3) to severe (4) on TCRS:  Moderate (3):   Severe (4):     Comments: Patient seen in clinic. He reports he has remained smoke free since last session & doesn't use any NRT at this time. He reports he is waiting to hear from his Dr regarding his PET scan regarding his prostate due to his past history having prostate cancer. Patient reports he has been navigating the anxiety about waiting to speak with the Dr about the results. Active listening provided. Discussed with the patient ways to navigate stress which he states he could play a game or read on his tablet. Commended the patient on not turning to tobacco during this time of uncertainty.  Goal is to remain smoke free. Follow up set for 6/23/2025 at 1:00 pm.         Diagnosis: F17.200    Next Visit: 4 weeks

## 2025-05-29 ENCOUNTER — TELEPHONE (OUTPATIENT)
Dept: CARDIOLOGY | Facility: CLINIC | Age: 75
End: 2025-05-29
Payer: MEDICARE

## 2025-05-29 ENCOUNTER — RESULTS FOLLOW-UP (OUTPATIENT)
Dept: CARDIOLOGY | Facility: CLINIC | Age: 75
End: 2025-05-29

## 2025-05-29 LAB — NT-PROBNP SERPL IA-MCNC: 1484 PG/ML

## 2025-05-29 NOTE — TELEPHONE ENCOUNTER
LVM for pt to call back in regards to test results.           ----- Message from Sabino Cr MD sent at 5/29/2025  4:34 PM CDT -----  The lab showed CHF is stable  Continue torsemide 40 mg bid.  Continue current Rx.   F/U as scheduled    ----- Message -----  From: Lab, Background User  Sent: 5/27/2025  10:44 PM CDT  To: Sabino Cr MD

## 2025-06-05 ENCOUNTER — OFFICE VISIT (OUTPATIENT)
Dept: RHEUMATOLOGY | Facility: CLINIC | Age: 75
End: 2025-06-05
Payer: MEDICARE

## 2025-06-05 VITALS
HEIGHT: 67 IN | HEART RATE: 87 BPM | WEIGHT: 217.63 LBS | BODY MASS INDEX: 34.16 KG/M2 | SYSTOLIC BLOOD PRESSURE: 112 MMHG | DIASTOLIC BLOOD PRESSURE: 60 MMHG

## 2025-06-05 DIAGNOSIS — C61 RECURRENT PROSTATE CARCINOMA: ICD-10-CM

## 2025-06-05 DIAGNOSIS — R97.20 PSA ELEVATION: ICD-10-CM

## 2025-06-05 DIAGNOSIS — H05.111 INFLAMMATORY PSEUDOTUMOR OF RIGHT ORBIT: Primary | ICD-10-CM

## 2025-06-05 PROCEDURE — 4010F ACE/ARB THERAPY RXD/TAKEN: CPT | Mod: CPTII,S$GLB,, | Performed by: INTERNAL MEDICINE

## 2025-06-05 PROCEDURE — 1159F MED LIST DOCD IN RCRD: CPT | Mod: CPTII,S$GLB,, | Performed by: INTERNAL MEDICINE

## 2025-06-05 PROCEDURE — 1160F RVW MEDS BY RX/DR IN RCRD: CPT | Mod: CPTII,S$GLB,, | Performed by: INTERNAL MEDICINE

## 2025-06-05 PROCEDURE — 3078F DIAST BP <80 MM HG: CPT | Mod: CPTII,S$GLB,, | Performed by: INTERNAL MEDICINE

## 2025-06-05 PROCEDURE — 99999 PR PBB SHADOW E&M-EST. PATIENT-LVL IV: CPT | Mod: PBBFAC,,, | Performed by: INTERNAL MEDICINE

## 2025-06-05 PROCEDURE — 1125F AMNT PAIN NOTED PAIN PRSNT: CPT | Mod: CPTII,S$GLB,, | Performed by: INTERNAL MEDICINE

## 2025-06-05 PROCEDURE — 3288F FALL RISK ASSESSMENT DOCD: CPT | Mod: CPTII,S$GLB,, | Performed by: INTERNAL MEDICINE

## 2025-06-05 PROCEDURE — 3008F BODY MASS INDEX DOCD: CPT | Mod: CPTII,S$GLB,, | Performed by: INTERNAL MEDICINE

## 2025-06-05 PROCEDURE — 1101F PT FALLS ASSESS-DOCD LE1/YR: CPT | Mod: CPTII,S$GLB,, | Performed by: INTERNAL MEDICINE

## 2025-06-05 PROCEDURE — 99215 OFFICE O/P EST HI 40 MIN: CPT | Mod: S$GLB,,, | Performed by: INTERNAL MEDICINE

## 2025-06-05 PROCEDURE — 3074F SYST BP LT 130 MM HG: CPT | Mod: CPTII,S$GLB,, | Performed by: INTERNAL MEDICINE

## 2025-06-05 PROCEDURE — 3044F HG A1C LEVEL LT 7.0%: CPT | Mod: CPTII,S$GLB,, | Performed by: INTERNAL MEDICINE

## 2025-06-05 RX ORDER — ALLOPURINOL 300 MG/1
TABLET ORAL
COMMUNITY
Start: 2024-11-13

## 2025-06-19 NOTE — PROGRESS NOTES
Clinic Note  6/19/2025      Subjective:         Chief Complaint:   HPI  Santiago Khan is a 74 y.o. male with a history of prostate cancer and now with an elevated PSA of 12.8.  Prostatectomy in 2010 at [facility]. Never had EBRT. Retired . Stopped smoking 1 year and 7 months ago.  Co-morbidities- CKD 3b, DM, HLP, HTN, DM, COPD. Uses cane for ambulation.    6/20/2025- PSA 23.5 on 5/2/2025. Here with his friend Aurora.  PSMA scan-5/20/2025- TNTC avid bone mets (skull, l clavicle, scapula, spine, ribs, pelvis and femurs).      Lab Results   Component Value Date    PSA 23.47 (H) 05/02/2025    PSA 0.15 02/01/2021    PSA <0.01 01/30/2019    PSADIAG 12.8 (H) 02/25/2025    PSADIAG 0.89 05/15/2024    PSADIAG 1.4 11/13/2023    PSADIAG 1.7 04/29/2022    PSADIAG 0.36 07/30/2021    PSADIAG 0.02 01/23/2020      Past Medical History:   Diagnosis Date    Chronic combined systolic and diastolic congestive heart failure 07/09/2020    Chronic kidney disease, stage 3     Chronic obstructive pulmonary disease 03/20/2023    Wixela 250 mcg, Spiriva respimat. Continue Albuterol inhaler and albuterol nebs   Referral pul dis management, education and assistance with medication  Patient preference to only see a doctor, request follow up with Dr. Springer             Chronic venous insufficiency     DDD (degenerative disc disease), lumbar     DM (diabetes mellitus) with complications     Drug-induced constipation 11/03/2024    History of gout     Hyperlipidemia associated with type 2 diabetes mellitus     Hypertension associated with stage 3 chronic kidney disease due to type 2 diabetes mellitus     BRADLEY on CPAP     Prostate cancer     prostatectomy in 2010, rising PSA that started in 2021     Family History   Problem Relation Name Age of Onset    Prostate cancer Father      Coronary artery disease Father  90    Alzheimer's disease Father      Hyperlipidemia Mother       Social History[1]  Past Surgical History:   Procedure Laterality  "Date    BICEPS TENDON REPAIR      COLONOSCOPY N/A 03/27/2019    Procedure: COLONOSCOPY;  Surgeon: James Roger MD;  Location: [facility] ENDO;  Service: Endoscopy;  Laterality: N/A;    EXPLORATION OF ORBIT Right 9/8/2023    Procedure: EXPLORATION, ORBIT;  Surgeon: Junaid Bartholomew MD;  Location: [facility] OR;  Service: ENT;  Laterality: Right;  possibly need frozen    HEMORRHOID SURGERY      INCISION AND DRAINAGE OF ABSCESS Right 11/8/2024    Procedure: INCISION AND DRAINAGE, ABSCESS;  Surgeon: Torres Magallanes MD;  Location: [facility] OR;  Service: General;  Laterality: Right;  4:30, patient ate    INGUINAL HERNIA REPAIR Left     KNEE ARTHROSCOPY Right     LEFT HEART CATHETERIZATION Left 06/29/2020    Procedure: CATHETERIZATION, HEART, LEFT;  Surgeon: Cheli Wilson MD;  Location: [facility] CATH LAB;  Service: Cardiology;  Laterality: Left;    LUMBAR LAMINECTOMY      PROSTATECTOMY      TONSILLECTOMY       Problem List[2]  Review of Systems   Constitutional:  Negative for appetite change, chills, fatigue, fever and unexpected weight change.   HENT:  Negative for nosebleeds.    Respiratory:  Negative for shortness of breath and wheezing.    Cardiovascular:  Negative for chest pain, palpitations and leg swelling.   Gastrointestinal:  Negative for abdominal distention, abdominal pain, constipation, diarrhea, nausea and vomiting.   Musculoskeletal:  Negative for arthralgias and back pain.   Skin:  Negative for pallor.   Neurological:  Negative for dizziness, seizures and syncope.   Hematological:  Negative for adenopathy.   Psychiatric/Behavioral:  Negative for dysphoric mood.          Objective:      There were no vitals taken for this visit.  Estimated body mass index is 34.08 kg/m² as calculated from the following:    Height as of 6/5/25: 5' 7" (1.702 m).    Weight as of 6/5/25: 98.7 kg (217 lb 9.5 oz).  Physical Exam      Assessment and Plan:   Discussed management of HS mPCA. Discussed ADT + NGADT.        Problem List Items " Addressed This Visit       Recurrent prostate carcinoma - Primary    Overview   prostatectomy in , rising PSA that started in             Follow up:       Herminio Osman               [1]   Social History  Socioeconomic History    Marital status:    Tobacco Use    Smoking status: Former     Current packs/day: 0.00     Average packs/day: 1.5 packs/day for 52.7 years (79.1 ttl pk-yrs)     Types: Cigarettes     Start date: 1971     Quit date: 9/15/2023     Years since quittin.7    Smokeless tobacco: Former   Substance and Sexual Activity    Alcohol use: Not Currently    Drug use: Never    Sexual activity: Not Currently     Partners: Female     Social Drivers of Health     Financial Resource Strain: Patient Declined (5/15/2025)    Overall Financial Resource Strain (CARDIA)     Difficulty of Paying Living Expenses: Patient declined   Food Insecurity: Patient Declined (5/15/2025)    Hunger Vital Sign     Worried About Running Out of Food in the Last Year: Patient declined     Ran Out of Food in the Last Year: Patient declined   Transportation Needs: No Transportation Needs (5/15/2025)    PRAPARE - Transportation     Lack of Transportation (Medical): No     Lack of Transportation (Non-Medical): No   Physical Activity: Inactive (5/15/2025)    Exercise Vital Sign     Days of Exercise per Week: 0 days     Minutes of Exercise per Session: 0 min   Stress: Stress Concern Present (5/15/2025)    [employer] - Occupational Stress Questionnaire     Feeling of Stress : To some extent   Housing Stability: Unknown (5/15/2025)    Housing Stability Vital Sign     Unable to Pay for Housing in the Last Year: Patient declined     Number of Times Moved in the Last Year: 0     Homeless in the Last Year: No   [2]   Patient Active Problem List  Diagnosis    BRADLEY on CPAP    History of gout    DDD (degenerative disc disease), lumbar    Nicotine dependence    Hypertension associated with stage  3 chronic kidney disease due to type 2 diabetes mellitus    Hyperlipidemia associated with type 2 diabetes mellitus    Chronic kidney disease, stage 3    Class 1 obesity due to excess calories with serious comorbidity and body mass index (BMI) of 32.0 to 32.9 in adult    Coronary artery disease of native artery of native heart with stable angina pectoris    Chronic combined systolic and diastolic congestive heart failure    Recurrent prostate carcinoma    PVD (peripheral vascular disease)    NICM (nonischemic cardiomyopathy)    Closed fracture of tuft of distal phalanx of finger    Chronic venous insufficiency    Third nerve palsy, right    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    DM (diabetes mellitus) with complications    Chronic obstructive pulmonary disease    Opioid dependence, uncomplicated    Orbital mass    Pulmonary HTN    Pseudotumor, inflammatory, orbital, right    Secondary hyperparathyroidism of renal origin    Weakness of both lower extremities    Lower back pain    Acute on chronic combined systolic and diastolic congestive heart failure    Acute kidney injury superimposed on chronic kidney disease    Acute respiratory failure with hypoxia and hypercarbia    Diabetes    Severe sepsis sec to Cellulitis Legs complicated by E coli Bacteremia    E coli bacteremia    Stage 3b chronic kidney disease    Inflammatory pseudotumor of right orbit

## 2025-06-20 ENCOUNTER — OFFICE VISIT (OUTPATIENT)
Dept: UROLOGY | Facility: CLINIC | Age: 75
End: 2025-06-20
Payer: MEDICARE

## 2025-06-20 VITALS
WEIGHT: 217.63 LBS | RESPIRATION RATE: 16 BRPM | HEIGHT: 67 IN | SYSTOLIC BLOOD PRESSURE: 104 MMHG | HEART RATE: 71 BPM | DIASTOLIC BLOOD PRESSURE: 72 MMHG | BODY MASS INDEX: 34.16 KG/M2

## 2025-06-20 DIAGNOSIS — E11.8 DM (DIABETES MELLITUS) WITH COMPLICATIONS: ICD-10-CM

## 2025-06-20 DIAGNOSIS — I25.118 CORONARY ARTERY DISEASE OF NATIVE ARTERY OF NATIVE HEART WITH STABLE ANGINA PECTORIS: Chronic | ICD-10-CM

## 2025-06-20 DIAGNOSIS — N18.30 STAGE 3 CHRONIC KIDNEY DISEASE, UNSPECIFIED WHETHER STAGE 3A OR 3B CKD: Chronic | ICD-10-CM

## 2025-06-20 DIAGNOSIS — C61 RECURRENT PROSTATE CARCINOMA: Primary | ICD-10-CM

## 2025-06-20 PROCEDURE — 99999 PR PBB SHADOW E&M-EST. PATIENT-LVL V: CPT | Mod: PBBFAC,,, | Performed by: UROLOGY

## 2025-06-20 RX ORDER — BICALUTAMIDE 50 MG/1
50 TABLET, FILM COATED ORAL DAILY
Qty: 30 TABLET | Refills: 11 | Status: SHIPPED | OUTPATIENT
Start: 2025-06-20 | End: 2025-07-20

## 2025-06-20 NOTE — LETTER
June 20, 2025        Thomas Munoz MD  [id number] [address] [facility] [id number]             0[facility]  [address] [place] [address] DR DANIEL BOATENG [address]  Phone: [phone]  Fax: [phone]   Patient: Santiago Khan   MR Number: [id number]   YOB: 1950   Date of Visit: 6/20/2025       Dear Dr. Munoz:    Thank you for referring Santiago Khan to me for evaluation. Attached you will find relevant portions of my assessment and plan of care.    If you have questions, please do not hesitate to call me. I look forward to following Santiago Khan along with you.    Sincerely,      Herminio Osman MD            CC  No Recipients    Enclosure

## 2025-06-23 ENCOUNTER — CLINICAL SUPPORT (OUTPATIENT)
Dept: SMOKING CESSATION | Facility: CLINIC | Age: 75
End: 2025-06-23
Payer: COMMERCIAL

## 2025-06-23 DIAGNOSIS — F17.200 NICOTINE DEPENDENCE: Primary | ICD-10-CM

## 2025-06-23 DIAGNOSIS — K21.9 GASTROESOPHAGEAL REFLUX DISEASE WITHOUT ESOPHAGITIS: ICD-10-CM

## 2025-06-23 PROCEDURE — 99404 PREV MED CNSL INDIV APPRX 60: CPT | Mod: S$GLB,,, | Performed by: SPEECH-LANGUAGE PATHOLOGIST

## 2025-06-23 PROCEDURE — 99999 PR PBB SHADOW E&M-EST. PATIENT-LVL II: CPT | Mod: PBBFAC,,, | Performed by: SPEECH-LANGUAGE PATHOLOGIST

## 2025-06-23 RX ORDER — OMEPRAZOLE 40 MG/1
40 CAPSULE, DELAYED RELEASE ORAL DAILY
Qty: 90 CAPSULE | Refills: 3 | Status: SHIPPED | OUTPATIENT
Start: 2025-06-23

## 2025-06-23 NOTE — TELEPHONE ENCOUNTER
Received refill request from patient's pharmacy for    Requested Prescriptions     Pending Prescriptions Disp Refills    omeprazole (PRILOSEC) 40 MG capsule 90 capsule 3     Sig: Take 1 capsule (40 mg total) by mouth once daily.         Last visit- 02/25/2025 HS  Last filled- 09/27/2024  Next visit- 08/25/2025 HS    Please review and advise.

## 2025-06-23 NOTE — PROGRESS NOTES
Individual Follow-Up Form    6/23/2025    Quit Date: 9/15/2023    Clinical Status of Patient: Outpatient    Length of Service: 60 minutes    Continuing Medication: no    Other Medications:      Target Symptoms: Withdrawal and medication side effects. The following were  rated moderate (3) to severe (4) on TCRS:  Moderate (3):   Severe (4):     Comments: Patient seen in clinic. Patient reports he has remained smoke free since his last session & does not utilize any medication/NRT. He reports his prostate cancer has come back & has spread to his bones & is being treated with hormones to address it. He reports his situation with is eye can not be addressed until his cancer situation is addressed. Discussed the strategies the patient has been using to cope with his recent diagnosis. Patient states he has been accepting it & not worrying about it stating he knows it won't help the situation. Patient reports he has had moments of wanting to smoke; however he has not engaged in any tobacco usage. Commended the patient on not using tobacco as he is navigating this time of uncertainty. Goal is to remain smoke free. Follow up set for 7/21/2025 at 1:00 pm.         Diagnosis: F17.200    Next Visit: 4 weeks

## 2025-06-25 ENCOUNTER — DOCUMENTATION ONLY (OUTPATIENT)
Dept: HEMATOLOGY/ONCOLOGY | Facility: CLINIC | Age: 75
End: 2025-06-25
Payer: MEDICARE

## 2025-06-25 ENCOUNTER — TELEPHONE (OUTPATIENT)
Dept: HEMATOLOGY/ONCOLOGY | Facility: CLINIC | Age: 75
End: 2025-06-25
Payer: MEDICARE

## 2025-06-25 ENCOUNTER — TELEPHONE (OUTPATIENT)
Dept: UROLOGY | Facility: CLINIC | Age: 75
End: 2025-06-25
Payer: MEDICARE

## 2025-06-25 NOTE — TELEPHONE ENCOUNTER
Called and left  for pt informing him that his Lupron has not been approved for his visit on 6/27/25.  Asked pt to please call our office prior to leaving home to see if it has been approved.

## 2025-06-26 ENCOUNTER — TELEPHONE (OUTPATIENT)
Dept: UROLOGY | Facility: CLINIC | Age: 75
End: 2025-06-26
Payer: MEDICARE

## 2025-06-26 NOTE — TELEPHONE ENCOUNTER
This pt is scheduled for a Lupron injection 6/27/25.  Can you all please work on getting it approved?  Thanks

## 2025-06-27 ENCOUNTER — CLINICAL SUPPORT (OUTPATIENT)
Dept: UROLOGY | Facility: CLINIC | Age: 75
End: 2025-06-27
Payer: MEDICARE

## 2025-06-27 DIAGNOSIS — C61 RECURRENT PROSTATE CARCINOMA: Primary | ICD-10-CM

## 2025-06-27 PROCEDURE — 99999 PR PBB SHADOW E&M-EST. PATIENT-LVL III: CPT | Mod: PBBFAC,,,

## 2025-06-27 NOTE — PROGRESS NOTES
.Patient came today for Lupron injection. Donned proper PPE.  Using aseptic technique, pt was giving 45mg of Lupron IM to right dorsal gluteal. Pt tolerated procedure well. Pt was instructed to remain in clinic for 15 minutes and to report any adverse reaction to staff.    BRUNO Bui LPN

## 2025-07-09 DIAGNOSIS — E11.22 HYPERTENSION ASSOCIATED WITH STAGE 3 CHRONIC KIDNEY DISEASE DUE TO TYPE 2 DIABETES MELLITUS: ICD-10-CM

## 2025-07-09 DIAGNOSIS — I12.9 HYPERTENSION ASSOCIATED WITH STAGE 3 CHRONIC KIDNEY DISEASE DUE TO TYPE 2 DIABETES MELLITUS: ICD-10-CM

## 2025-07-09 DIAGNOSIS — N18.30 HYPERTENSION ASSOCIATED WITH STAGE 3 CHRONIC KIDNEY DISEASE DUE TO TYPE 2 DIABETES MELLITUS: ICD-10-CM

## 2025-07-09 RX ORDER — BISOPROLOL FUMARATE 5 MG/1
2.5 TABLET, FILM COATED ORAL DAILY
Qty: 90 TABLET | Refills: 3 | Status: SHIPPED | OUTPATIENT
Start: 2025-07-09

## 2025-07-09 NOTE — TELEPHONE ENCOUNTER
Received refill request from patient's pharmacy for    Requested Prescriptions     Pending Prescriptions Disp Refills    bisoprolol (ZEBETA) 5 MG tablet 90 tablet 3     Sig: Take 0.5 tablets (2.5 mg total) by mouth once daily.       Last visit- 02/25/2025 HS  Last filled- 08/25/2025 HS  Next visit- 02/25/2025 HS     Please review and advise.

## 2025-07-15 DIAGNOSIS — N18.32 STAGE 3B CHRONIC KIDNEY DISEASE: ICD-10-CM

## 2025-07-15 DIAGNOSIS — E83.42 HYPOMAGNESEMIA: ICD-10-CM

## 2025-07-15 RX ORDER — LANOLIN ALCOHOL/MO/W.PET/CERES
CREAM (GRAM) TOPICAL
Qty: 120 TABLET | Refills: 6 | Status: SHIPPED | OUTPATIENT
Start: 2025-07-15

## 2025-07-15 NOTE — TELEPHONE ENCOUNTER
Requested Prescriptions     Pending Prescriptions Disp Refills    magnesium oxide (MAG-OX) 400 mg (241.3 mg magnesium) tablet 120 tablet 6     Sig: TAKE 2 TABLETS(800 MG) BY MOUTH TWICE DAILY      2/25/25  LF 12/26/24

## 2025-07-17 RX ORDER — METFORMIN HYDROCHLORIDE 500 MG/1
500 TABLET ORAL
Qty: 90 TABLET | Refills: 3 | OUTPATIENT
Start: 2025-07-17

## 2025-07-17 NOTE — TELEPHONE ENCOUNTER
Received refill request from patient's pharmacy for    Requested Prescriptions     Pending Prescriptions Disp Refills    metFORMIN (GLUCOPHAGE) 500 MG tablet 90 tablet 3     Sig: Take 1 tablet (500 mg total) by mouth.       LV 02/25/2025  NV 08/25/2025  LF not on current med list.     Please review and advise.

## 2025-07-18 ENCOUNTER — LAB VISIT (OUTPATIENT)
Dept: LAB | Facility: HOSPITAL | Age: 75
End: 2025-07-18
Attending: INTERNAL MEDICINE
Payer: MEDICARE

## 2025-07-18 ENCOUNTER — OFFICE VISIT (OUTPATIENT)
Dept: HEMATOLOGY/ONCOLOGY | Facility: CLINIC | Age: 75
End: 2025-07-18
Payer: MEDICARE

## 2025-07-18 VITALS
SYSTOLIC BLOOD PRESSURE: 107 MMHG | HEART RATE: 80 BPM | OXYGEN SATURATION: 95 % | HEIGHT: 67 IN | TEMPERATURE: 97 F | DIASTOLIC BLOOD PRESSURE: 61 MMHG | WEIGHT: 218.13 LBS | BODY MASS INDEX: 34.24 KG/M2

## 2025-07-18 DIAGNOSIS — Z87.39 HISTORY OF GOUT: Chronic | ICD-10-CM

## 2025-07-18 DIAGNOSIS — C61 RECURRENT PROSTATE CARCINOMA: Primary | ICD-10-CM

## 2025-07-18 DIAGNOSIS — N18.32 STAGE 3B CHRONIC KIDNEY DISEASE: ICD-10-CM

## 2025-07-18 DIAGNOSIS — C79.51 METASTASIS TO BONE: ICD-10-CM

## 2025-07-18 DIAGNOSIS — G89.29 CHRONIC LOW BACK PAIN, UNSPECIFIED BACK PAIN LATERALITY, UNSPECIFIED WHETHER SCIATICA PRESENT: ICD-10-CM

## 2025-07-18 DIAGNOSIS — Z79.899 LONG TERM CURRENT USE OF THERAPEUTIC DRUG: ICD-10-CM

## 2025-07-18 DIAGNOSIS — Z79.818 ENCOUNTER FOR MONITORING ANDROGEN DEPRIVATION THERAPY: ICD-10-CM

## 2025-07-18 DIAGNOSIS — I25.118 CORONARY ARTERY DISEASE OF NATIVE ARTERY OF NATIVE HEART WITH STABLE ANGINA PECTORIS: Chronic | ICD-10-CM

## 2025-07-18 DIAGNOSIS — J44.9 CHRONIC OBSTRUCTIVE PULMONARY DISEASE, UNSPECIFIED COPD TYPE: Chronic | ICD-10-CM

## 2025-07-18 DIAGNOSIS — E11.69 TYPE 2 DIABETES MELLITUS WITH OTHER SPECIFIED COMPLICATION, UNSPECIFIED WHETHER LONG TERM INSULIN USE: Chronic | ICD-10-CM

## 2025-07-18 DIAGNOSIS — I50.42 CHRONIC COMBINED SYSTOLIC AND DIASTOLIC CONGESTIVE HEART FAILURE: Chronic | ICD-10-CM

## 2025-07-18 DIAGNOSIS — M54.50 CHRONIC LOW BACK PAIN, UNSPECIFIED BACK PAIN LATERALITY, UNSPECIFIED WHETHER SCIATICA PRESENT: ICD-10-CM

## 2025-07-18 DIAGNOSIS — C61 RECURRENT PROSTATE CARCINOMA: ICD-10-CM

## 2025-07-18 PROBLEM — A41.9 SEVERE SEPSIS: Status: RESOLVED | Noted: 2024-11-03 | Resolved: 2025-07-18

## 2025-07-18 PROBLEM — N18.9 ACUTE KIDNEY INJURY SUPERIMPOSED ON CHRONIC KIDNEY DISEASE: Status: RESOLVED | Noted: 2024-11-03 | Resolved: 2025-07-18

## 2025-07-18 PROBLEM — R78.81 E COLI BACTEREMIA: Status: RESOLVED | Noted: 2024-11-04 | Resolved: 2025-07-18

## 2025-07-18 PROBLEM — R65.20 SEVERE SEPSIS: Status: RESOLVED | Noted: 2024-11-03 | Resolved: 2025-07-18

## 2025-07-18 PROBLEM — N17.9 ACUTE KIDNEY INJURY SUPERIMPOSED ON CHRONIC KIDNEY DISEASE: Status: RESOLVED | Noted: 2024-11-03 | Resolved: 2025-07-18

## 2025-07-18 PROBLEM — B96.20 E COLI BACTEREMIA: Status: RESOLVED | Noted: 2024-11-04 | Resolved: 2025-07-18

## 2025-07-18 LAB
25(OH)D3+25(OH)D2 SERPL-MCNC: 48 NG/ML (ref 30–96)
ABSOLUTE NEUTROPHIL COUNT (OHS): 7.63 K/UL (ref 1.8–7.7)
ALBUMIN SERPL BCP-MCNC: 4.1 G/DL (ref 3.5–5.2)
ALP SERPL-CCNC: 323 UNIT/L (ref 40–150)
ALT SERPL W/O P-5'-P-CCNC: 26 UNIT/L (ref 10–44)
ANION GAP (OHS): 8 MMOL/L (ref 8–16)
AST SERPL-CCNC: 28 UNIT/L (ref 11–45)
BILIRUB SERPL-MCNC: 0.6 MG/DL (ref 0.1–1)
BUN SERPL-MCNC: 31 MG/DL (ref 8–23)
CALCIUM SERPL-MCNC: 9.7 MG/DL (ref 8.7–10.5)
CHLORIDE SERPL-SCNC: 100 MMOL/L (ref 95–110)
CO2 SERPL-SCNC: 31 MMOL/L (ref 23–29)
CREAT SERPL-MCNC: 2 MG/DL (ref 0.5–1.4)
ERYTHROCYTE [DISTWIDTH] IN BLOOD BY AUTOMATED COUNT: 16.8 % (ref 11.5–14.5)
GFR SERPLBLD CREATININE-BSD FMLA CKD-EPI: 35 ML/MIN/1.73/M2
GLUCOSE SERPL-MCNC: 74 MG/DL (ref 70–110)
HCT VFR BLD AUTO: 42.9 % (ref 40–54)
HGB BLD-MCNC: 13.8 GM/DL (ref 14–18)
IMM GRANULOCYTES # BLD AUTO: 0.04 K/UL (ref 0–0.04)
MCH RBC QN AUTO: 30.4 PG (ref 27–31)
MCHC RBC AUTO-ENTMCNC: 32.2 G/DL (ref 32–36)
MCV RBC AUTO: 95 FL (ref 82–98)
PLATELET # BLD AUTO: 165 K/UL (ref 150–450)
PMV BLD AUTO: 10.8 FL (ref 9.2–12.9)
POTASSIUM SERPL-SCNC: 5.1 MMOL/L (ref 3.5–5.1)
PROT SERPL-MCNC: 7.7 GM/DL (ref 6–8.4)
PSA SERPL-MCNC: 7.05 NG/ML
RBC # BLD AUTO: 4.54 M/UL (ref 4.6–6.2)
SODIUM SERPL-SCNC: 139 MMOL/L (ref 136–145)
TESTOST SERPL-MCNC: 25 NG/DL (ref 304–1227)
WBC # BLD AUTO: 10.74 K/UL (ref 3.9–12.7)

## 2025-07-18 PROCEDURE — 36415 COLL VENOUS BLD VENIPUNCTURE: CPT

## 2025-07-18 PROCEDURE — 84153 ASSAY OF PSA TOTAL: CPT

## 2025-07-18 PROCEDURE — 99999 PR PBB SHADOW E&M-EST. PATIENT-LVL V: CPT | Mod: PBBFAC,,, | Performed by: HOSPITALIST

## 2025-07-18 PROCEDURE — 84403 ASSAY OF TOTAL TESTOSTERONE: CPT

## 2025-07-18 PROCEDURE — 80053 COMPREHEN METABOLIC PANEL: CPT

## 2025-07-18 PROCEDURE — 85027 COMPLETE CBC AUTOMATED: CPT

## 2025-07-18 PROCEDURE — 82306 VITAMIN D 25 HYDROXY: CPT

## 2025-07-18 RX ORDER — CALCIUM CITRATE/VITAMIN D3 500MG-12.5
1 TABLET,CHEWABLE ORAL 2 TIMES DAILY
Qty: 60 TABLET | Refills: 5 | Status: SHIPPED | OUTPATIENT
Start: 2025-07-18

## 2025-07-18 NOTE — ASSESSMENT & PLAN NOTE
Hemoglobin A1C   Date Value Ref Range Status   02/25/2025 6.0 (H) 4.0 - 5.6 % Final     Comment:     ADA Screening Guidelines:  5.7-6.4%  Consistent with prediabetes  >or=6.5%  Consistent with diabetes    High levels of fetal hemoglobin interfere with the HbA1C  assay. Heterozygous hemoglobin variants (HbS, HgC, etc)do  not significantly interfere with this assay.   However, presence of multiple variants may affect accuracy.     08/14/2024 5.6 4.0 - 5.6 % Final     Comment:     ADA Screening Guidelines:  5.7-6.4%  Consistent with prediabetes  >or=6.5%  Consistent with diabetes    High levels of fetal hemoglobin interfere with the HbA1C  assay. Heterozygous hemoglobin variants (HbS, HgC, etc)do  not significantly interfere with this assay.   However, presence of multiple variants may affect accuracy.     05/15/2024 12.8 (H) 4.0 - 5.6 % Final     Comment:     ADA Screening Guidelines:  5.7-6.4%  Consistent with prediabetes  >or=6.5%  Consistent with diabetes    High levels of fetal hemoglobin interfere with the HbA1C  assay. Heterozygous hemoglobin variants (HbS, HgC, etc)do  not significantly interfere with this assay.   However, presence of multiple variants may affect accuracy.

## 2025-07-18 NOTE — PATIENT INSTRUCTIONS
We discussed your recent diagnosis of a metastatic prostate cancer. The cancer unfortunately returned many years after your prior prostatectomy. PSMA PET imaging shows spread throughout the bones. This may be causing some of your back pain.    We talked about treatment options for metastatic prsotate cancer. Treatment includes hormonal therapy with testerone lowering shots and testosterone blocking pills. You already received your first 6 month Lupron shot. These will continue every 24 weeks. We will also work to get you an apalutamide prescription to replace the bicalutamide pill.    We discussed typical side effects of hormone therapy including fatigue, weight gain, loss libido, forgetfulness, hot flashes, and muscle loss. Other longer term risks can include increased risk of cardiovascular disease and osteoporosis.    We will will send blood work to screen for a high risk prostate cancer gene. If you inherited one, we will schedule you an appointment with our genetics counseling clinic.    Please start taking calcium and vitamin D twice daily. We will schedule a bone mineral density test    - Prescription for apalutamide 240mg by mouth daily called into [facility]; they will contact you to arrange delivery in the next few days  - Can start apalutamide when you receive it. Stop bicalutamide when you start apalutamide  - FU with me in 6 weeks    Excellent patient resources for living with prostate cancer can be found at [email or web address]

## 2025-07-18 NOTE — PROGRESS NOTES
[facility]: New patient visit  Best Contact Phone Number(s): [phone] (home)       Cancer/Stage/TNM:    Cancer Staging   Recurrent prostate carcinoma  Staging form: Prostate, AJCC 8th Edition  - Clinical: Stage IVB (rcTX, rcNX, rcM1b) - Signed by Alberto Salazar MD on 7/18/2025        Reason for visit:  High volume metachronous prostate cancer    Molecular:  Germline Testing: PND  Somatic Testing: PND    Treatment History:   2010   Prostatectomy  06/27/25 -   ADT     HPI:   Santiago Khan is a 74 y.o. male with higher volume metachronous metastatc prostate cancer. Initially with remote diagnosis of prostate cancer, underwent prostatetomy 2010 at [facility] (records not available). Patient did not receive adjuvant radiation or hormonal therapy. PSA was undetectable until at least 2019. Rising PSA noted 02/2021. More recnetly with PSA to 23.5 05/2025 with evidence of RP harvinder ant higher volume osseous mets 05/2025. He start ADT 06/27/25. He presents to medical oncology clinic for initial evaluation.    Has some moderate mid back pain that has progressed over the last month. Up ot 8/10 with coughing. Using hydrocodone twice daily. Does have baseline chronic pain in his neck and knees that is generally stable. Appetite is good. No N/V/D. No CP or cough. Has some stable dyspnea associated with his COPD. At recent baseline. Ambulates generally indefinitively but may get a bit winded walking around [facility]. Lives with his 97 year old mother who remains in generally good health.       Patient has multiple competing medical comorbidities. Has known CAD on ASA and simvastatin. Additional ischemic cardiomyopathy / sCHF: LVEF 35% with known  bifasicular block: Takes torsemide 80mg daily, Entresto 24/296, and bisoprolol. Prior heavy smoking history with COPD: Uses fluticasone-salmeterol inhaler (Wixela) along with rescue inhaler. Uses albuterol most days. Has DM2 on Jardiance and glimeperide. Stage  IIIA CKD. Baseline Cr 1.8 Also with known medial right orbit pseudotumor. Bx 09/2023. Had considered Uplinza - concern for interaction with cancer treatment plan. Otherwise takes allopurinol with Colchicine prn.  Also uses hydrocodone-APAP and baclofen for chronic pain.     History has been obtained by chart review and discussion with the patient.    Oncology History Overview Note   2010: Prostatectomy ([facility])    01/30/19: PSA <0.01    02/01/21: PSA 0.15    04/29/22: PSA 1.7    05/15/24: PSA 0.89    02/25/25: PSA 12.8    05/02/25: PSA 23.47    05/20/25: PSMA PET CT  Impression:  - Enlarged prostate with markedly increased uptake.  There are 2 lymph nodes in the left pelvis which demonstrate increased P LY uptake.  Additionally there are numerous osseous sclerotic foci with increased uptake compatible with metastatic disease.     Recurrent prostate carcinoma   11/19/2021 Initial Diagnosis    Recurrent prostate carcinoma     7/18/2025 -  Chemotherapy    Treatment Summary   Plan Name: OP APALUTAMIDE DAILY  Treatment Goal: Palliative  Status: Active  Start Date: 7/18/2025  End Date: 7/18/2025  Provider: Alberto Salazar MD  Chemotherapy: apalutamide 240 mg Tab, 240 mg (100 % of original dose 240 mg), Oral, Daily, 1 of 1 cycle, Start date: 7/18/2025, End date: --  Dose modification: 240 mg (original dose 240 mg, Cycle 1)     7/18/2025 Cancer Staged    Staging form: Prostate, AJCC 8th Edition  - Clinical: Stage IVB (rcTX, rcNX, rcM1b)           Past Medical History:   Diagnosis Date    Chronic combined systolic and diastolic congestive heart failure 07/09/2020    Chronic kidney disease, stage 3     Chronic obstructive pulmonary disease 03/20/2023    Wixela 250 mcg, Spiriva respimat. Continue Albuterol inhaler and albuterol nebs   Referral pul dis management, education and assistance with medication  Patient preference to only see a doctor, request follow up with Dr. Springer             Chronic venous insufficiency     DDD  "(degenerative disc disease), lumbar     DM (diabetes mellitus) with complications     Drug-induced constipation 11/03/2024    E coli bacteremia 11/04/2024    History of gout     Hyperlipidemia associated with type 2 diabetes mellitus     Hypertension associated with stage 3 chronic kidney disease due to type 2 diabetes mellitus     BRADLEY on CPAP     Prostate cancer     prostatectomy in 2010, rising PSA that started in 2021    Severe sepsis sec to Cellulitis Legs complicated by E coli Bacteremia 11/03/2024         Past Surgical History:   Procedure Laterality Date    BICEPS TENDON REPAIR      COLONOSCOPY N/A 03/27/2019    Procedure: COLONOSCOPY;  Surgeon: James Roger MD;  Location: [facility] ENDO;  Service: Endoscopy;  Laterality: N/A;    EXPLORATION OF ORBIT Right 09/08/2023    Procedure: EXPLORATION, ORBIT;  Surgeon: Junaid Bartholomew MD;  Location: [facility] OR;  Service: ENT;  Laterality: Right;  possibly need frozen    HEMORRHOID SURGERY      INCISION AND DRAINAGE OF ABSCESS Right 11/08/2024    Procedure: INCISION AND DRAINAGE, ABSCESS;  Surgeon: Torres Magallanes MD;  Location: [facility] OR;  Service: General;  Laterality: Right;  4:30, patient ate    INGUINAL HERNIA REPAIR Left     KNEE ARTHROSCOPY Right     LEFT HEART CATHETERIZATION Left 06/29/2020    Procedure: CATHETERIZATION, HEART, LEFT;  Surgeon: Cheli Wilson MD;  Location: [facility] CATH LAB;  Service: Cardiology;  Laterality: Left;    LUMBAR LAMINECTOMY  1982    PROSTATECTOMY      TONSILLECTOMY           Review of patient's allergies indicates:   Allergen Reactions    Amitriptyline Rash    Celecoxib Rash         Current Medications[1]     Objective:      Physical Exam:   /61 (BP Location: Left arm, Patient Position: Sitting)   Pulse 80   Temp 97 °F (36.1 °C) (Temporal)   Ht 5' 7" (1.702 m)   Wt 98.9 kg (218 lb 2.3 oz)   SpO2 95%   BMI 34.17 kg/m²       ECOG Performance status: (2) Ambulatory and capable of self care, unable to carry out work activity, " up and about > 50% or waking hours     Physical Exam  Constitutional:       General: He is not in acute distress.     Appearance: Normal appearance.   HENT:      Head: Normocephalic.   Eyes:      General: No scleral icterus.     Extraocular Movements: Extraocular movements intact.      Conjunctiva/sclera: Conjunctivae normal.   Cardiovascular:      Rate and Rhythm: Normal rate.   Pulmonary:      Effort: Pulmonary effort is normal. No respiratory distress.   Abdominal:      General: There is no distension.      Palpations: Abdomen is soft.   Skin:     General: Skin is warm and dry.   Neurological:      Mental Status: He is alert and oriented to person, place, and time.      Motor: No weakness.   Psychiatric:         Mood and Affect: Mood normal.         Behavior: Behavior normal.         Thought Content: Thought content normal.          Recent Labs:   Lab Results   Component Value Date    WBC 8.07 05/02/2025    RBC 4.97 05/02/2025    HGB 15.4 05/02/2025    HCT 47.5 05/02/2025     05/02/2025     05/27/2025    K 5.1 05/27/2025    CL 98 05/27/2025    CO2 25 05/27/2025     (H) 05/27/2025    BUN 32 (H) 05/27/2025    CREATININE 1.8 (H) 05/27/2025    CALCIUM 9.9 05/27/2025    PHOS 3.1 11/12/2024    BILITOT 0.7 05/06/2025    AST 26 05/06/2025    ALT 25 05/06/2025          Lab Results   Component Value Date    PSADIAG 12.8 (H) 02/25/2025    PSADIAG 0.89 05/15/2024    PSADIAG 1.4 11/13/2023    PSADIAG 1.7 04/29/2022    PSADIAG 0.36 07/30/2021    PSADIAG 0.02 01/23/2020    PSA 23.47 (H) 05/02/2025    PSA 0.15 02/01/2021    PSA <0.01 01/30/2019        Cardiovascular Screening:  Primary care physician: Thomas Munoz MD      The 10-year ASCVD risk score (Taniya ESCOBAR, et al., 2019) is: 28.6%    Values used to calculate the score:      Age: 74 years      Sex: Male      Is Non- : No      Diabetic: Yes      Tobacco smoker: No      Systolic Blood Pressure: 107 mmHg      Is BP treated: Yes       HDL Cholesterol: 53 mg/dL      Total Cholesterol: 135 mg/dL    EKG:   Results for orders placed or performed during the hospital encounter of 04/22/25   EKG 12-lead    Collection Time: 04/22/25  3:31 PM   Result Value Ref Range    QRS Duration 132 ms    [facility] QTC Calculation 492 ms    Narrative    Test Reason : H49.01,H05.89,H05.111,J44.9,    Vent. Rate : 103 BPM     Atrial Rate : 103 BPM     P-R Int : 166 ms          QRS Dur : 132 ms      QT Int : 376 ms       P-R-T Axes :  64 -53  83 degrees    QTcB Int : 492 ms    Sinus tachycardia  Right bundle branch block  Left anterior fascicular block   Bifascicular block   LVH with repolarization abnormality ( R in aVL , Romhilt-Fernando )  Abnormal ECG  When compared with ECG of 02-Nov-2024 14:25,  No significant change was found  Confirmed by Andrey Carias ([id number]) on 4/22/2025 4:15:58 PM    Referred By: JAYNE KENNY           Confirmed By: Andrey Carias       Body mass index is 34.17 kg/m².    Lab Results   Component Value Date    CHOL 135 02/25/2025    LDLCALC 66.4 02/25/2025    HDL 53 02/25/2025    TRIG 78 02/25/2025    HGBA1C 6.0 (H) 02/25/2025          Bone Health    Lab Results   Component Value Date    BSGIILEV72CH 42 10/05/2021        No results found for this or any previous visit.         PSMA PET IMAGING+     No results found for this or any previous visit.       I have personally reviewed the above imaging.     Path:   Reviewed pathology as documented above.      Diagnoses:     1. Recurrent prostate carcinoma    2. Long term current use of therapeutic drug    3. Encounter for monitoring androgen deprivation therapy    4. Metastasis to bone    5. Coronary artery disease of native artery of native heart with stable angina pectoris    6. Chronic obstructive pulmonary disease, unspecified COPD type    7. Chronic combined systolic and diastolic congestive heart failure    8. Stage 3b chronic kidney disease    9. Type 2 diabetes mellitus with other specified  complication, unspecified whether long term insulin use    10. Chronic low back pain, unspecified back pain laterality, unspecified whether sciatica present    11. History of gout          Assessment and Plan:     1. Recurrent prostate carcinoma  Overview:  Higher volume metachronous metastatc prostate cancer.     Assessment & Plan:  Patient not fit for triplet therapy with docetaxel. Plan for doublet therapy with ADT + apalutamide.  - Con't ADT  - Next Lupron due 12/12/25  - Con't biclautamide for now  - RX for apalutamide 240mg po daily sent to [facility] - can start on arrival and stop bicalutamide  - Start Ca/D; scheduled DEXA  - Send [facility] xG and xF  - FU 6 weeks    Orders:  -     Ambulatory referral/consult to Hematology / Oncology  -     CBC Oncology; Standing  -     Comprehensive Metabolic Panel; Standing  -     DXA Bone Density Axial Skeleton 1 or more sites; Future; Expected date: 07/18/2025  -     Prostate Specific Antigen, Diagnostic; Standing  -     [facility] - Hereditary Cancer with RNA; Future; Expected date: 07/18/2025  -     [facility] - Solid Tumor - CtDNA; Future; Expected date: 07/18/2025  -     Testosterone,Total; Standing  -     Vitamin D; Future; Expected date: 07/18/2025  -     calcium citrate-vitamin D3 500 mg-12.5 mcg (500 unit) Chew; Take 1 tablet by mouth 2 (two) times a day.  Dispense: 60 tablet; Refill: 5  -     Physician communication order; Standing  -     Physician communication order; Standing  -     Physician communication order; Standing  -     Physician communication order; Standing  -     apalutamide 240 mg Tab; Take 1 tablet (240 mg total) by mouth once daily.  Dispense: 30 tablet; Refill: 5  -     Ambulatory referral/consult to Radiation Oncology; Future; Expected date: 07/25/2025    2. Long term current use of therapeutic drug  -     DXA Bone Density Axial Skeleton 1 or more sites; Future; Expected date: 07/18/2025  -     Vitamin D; Future; Expected date: 07/18/2025  -     calcium  citrate-vitamin D3 500 mg-12.5 mcg (500 unit) Chew; Take 1 tablet by mouth 2 (two) times a day.  Dispense: 60 tablet; Refill: 5    3. Encounter for monitoring androgen deprivation therapy  -     DXA Bone Density Axial Skeleton 1 or more sites; Future; Expected date: 07/18/2025  -     Vitamin D; Future; Expected date: 07/18/2025  -     calcium citrate-vitamin D3 500 mg-12.5 mcg (500 unit) Chew; Take 1 tablet by mouth 2 (two) times a day.  Dispense: 60 tablet; Refill: 5    4. Metastasis to bone  Overview:  See prostate cancer plan    Assessment & Plan:  - Start Ca/D  - DEXA scan  - Holding antiresorptive at present; consider on progresison to [facility]      5. Coronary artery disease of native artery of native heart with stable angina pectoris  Overview:  Home medications include ASA, bisoprolol, and simvastatin      6. Chronic obstructive pulmonary disease, unspecified COPD type  Overview:  Home medications include Wixela and albuterol prn      7. Chronic combined systolic and diastolic congestive heart failure  Overview:  Home medications include torsemide 80mg daily, Entresto 24/296, and bisoprolol         8. Stage 3b chronic kidney disease  Overview:  Baseline CR 1.8    Assessment & Plan:  - Stable      9. Type 2 diabetes mellitus with other specified complication, unspecified whether long term insulin use  Overview:  Home medications include Jardiance and glimeperide    Assessment & Plan:  Hemoglobin A1C   Date Value Ref Range Status   02/25/2025 6.0 (H) 4.0 - 5.6 % Final     Comment:     ADA Screening Guidelines:  5.7-6.4%  Consistent with prediabetes  >or=6.5%  Consistent with diabetes    High levels of fetal hemoglobin interfere with the HbA1C  assay. Heterozygous hemoglobin variants (HbS, HgC, etc)do  not significantly interfere with this assay.   However, presence of multiple variants may affect accuracy.     08/14/2024 5.6 4.0 - 5.6 % Final     Comment:     ADA Screening Guidelines:  5.7-6.4%  Consistent with  prediabetes  >or=6.5%  Consistent with diabetes    High levels of fetal hemoglobin interfere with the HbA1C  assay. Heterozygous hemoglobin variants (HbS, HgC, etc)do  not significantly interfere with this assay.   However, presence of multiple variants may affect accuracy.     05/15/2024 12.8 (H) 4.0 - 5.6 % Final     Comment:     ADA Screening Guidelines:  5.7-6.4%  Consistent with prediabetes  >or=6.5%  Consistent with diabetes    High levels of fetal hemoglobin interfere with the HbA1C  assay. Heterozygous hemoglobin variants (HbS, HgC, etc)do  not significantly interfere with this assay.   However, presence of multiple variants may affect accuracy.             10. Chronic low back pain, unspecified back pain laterality, unspecified whether sciatica present  Overview:  Chronic. Home medications include baclofen and Sharps.     Assessment & Plan:  Has newer midline back pain that may be related to vertebral mets  - Referral to rad onc      11. History of gout  Overview:  Home medications include allopurinol. Uses colchicine prn flare          94 minutes spent on this initial visit. More than half in direct patient consultation.    Follow up:   Route Chart for Scheduling    Med Onc Chart Routing      Follow up with physician 6 weeks.   Follow up with MARIA TERESA    Infusion scheduling note    Injection scheduling note    Labs CBC, CMP and PSA   Scheduling:  Preferred lab:  Lab interval:     Imaging DXA scan      Pharmacy appointment    Other referrals                 Treatment Plan Information   OP APALUTAMIDE DAILY Alberto Salazar MD   Associated diagnosis: Recurrent prostate carcinoma Stage IVB rcTX, rcNX, rcM1b noted on 11/19/2021   Line of treatment: First Line  Treatment Goal: Palliative     Upcoming Treatment Dates - OP APALUTAMIDE DAILY    No upcoming days in selected categories.    Supportive Plan Information  OP PROSTATE leuprolide (LUPRON) Q6M Alberto Salazar MD   Associated Diagnosis: Recurrent prostate  carcinoma Stage IVB rcTX, rcNX, rcM1b noted on 11/19/2021   Line of treatment: First Line   Treatment goal: Palliative     Upcoming Treatment Dates - OP PROSTATE leuprolide (LUPRON) Q6M    12/12/2025       Chemotherapy       leuprolide acetate (6 month) injection 45 mg  5/29/2026       Chemotherapy       leuprolide acetate (6 month) injection 45 mg         The above information has been reviewed with the patient and all questions have been answered to their apparent satisfaction.  They understand that they can call the clinic with any questions.    Alberto Salazar MD MPH  [place] [address]  Email: [email or web address]  Phone: o) [phone] (c) [phone]                  [1]   Current Outpatient Medications   Medication Sig Dispense Refill    albuterol (PROVENTIL/VENTOLIN HFA) 90 mcg/actuation inhaler Inhale 2 puffs into the lungs every 4 (four) hours as needed for Shortness of Breath or Wheezing. 25.5 g 3    allopurinoL (ZYLOPRIM) 300 MG tablet Take by mouth.      aspirin (ECOTRIN) 81 MG EC tablet Take 81 mg by mouth once daily.      baclofen (LIORESAL) 10 MG tablet Take 1 tablet by mouth every evening.  2    bicalutamide (CASODEX) 50 MG Tab Take 1 tablet (50 mg total) by mouth once daily. 30 tablet 11    bisoprolol (ZEBETA) 5 MG tablet Take 0.5 tablets (2.5 mg total) by mouth once daily. 90 tablet 3    blood sugar diagnostic Strp Check blood glucose twice per day 200 strip 3    blood-glucose meter (ACCU-CHEK GUIDE ME GLUCOSE MTR) Misc USE AS INSTRUCTED 1 each 0    colchicine (COLCRYS) 0.6 mg tablet Take 1 tablet (0.6 mg total) by mouth once daily. 90 tablet 3    empagliflozin (JARDIANCE) 10 mg tablet Take 1 tablet (10 mg total) by mouth once daily. 90 tablet 3    fluticasone propionate (FLONASE) 50 mcg/actuation nasal spray SHAKE AND SPRAY TWICE IN EACH NOSTRIL EVERY DAY 48 g 3    fluticasone-salmeterol diskus inhaler 250-50  mcg Inhale 1 puff into the lungs 2 (two) times daily. 180 each 3    glimepiride (AMARYL) 4 MG tablet Take 1 tablet (4 mg total) by mouth daily with breakfast. 90 tablet 3    guaiFENesin 100 mg/5 ml (ROBITUSSIN) 100 mg/5 mL syrup Take 200 mg by mouth every evening.      HYDROcodone-acetaminophen (NORCO) 7.5-325 mg per tablet Take 1 tablet by mouth every 4 (four) hours as needed (for chronic pain). 20 tablet 0    lancets (ACCU-CHEK SOFTCLIX LANCETS) Misc Check BG daily 100 each 3    latanoprost 0.005 % ophthalmic solution Place 1 drop into the right eye every evening. 2.5 mL 12    magnesium oxide (MAG-OX) 400 mg (241.3 mg magnesium) tablet TAKE 2 TABLETS(800 MG) BY MOUTH TWICE DAILY 120 tablet 6    multivitamin-minerals-lutein Tab Take 1 tablet by mouth Daily.      omeprazole (PRILOSEC) 40 MG capsule Take 1 capsule (40 mg total) by mouth once daily. 90 capsule 3    potassium chloride SA (K-DUR,KLOR-CON) 20 MEQ tablet TAKE 3 TABLETS BY MOUTH TWICE DAILY 540 tablet 2    sacubitriL-valsartan (ENTRESTO) 24-26 mg per tablet Take 1 tablet by mouth 2 (two) times daily. 60 tablet 5    simvastatin (ZOCOR) 40 MG tablet Take 1 tablet (40 mg total) by mouth every evening. 90 tablet 3    torsemide (DEMADEX) 20 MG Tab Take 3 tablets (60 mg total) by mouth once daily. 270 tablet 2    apalutamide 240 mg Tab Take 1 tablet (240 mg total) by mouth once daily. 30 tablet 5    calcium citrate-vitamin D3 500 mg-12.5 mcg (500 unit) Chew Take 1 tablet by mouth 2 (two) times a day. 60 tablet 5     No current facility-administered medications for this visit.

## 2025-07-18 NOTE — ASSESSMENT & PLAN NOTE
Patient not fit for triplet therapy with docetaxel. Plan for doublet therapy with ADT + apalutamide.  - Con't ADT  - Next Lupron due 12/12/25  - Con't biclautamide for now  - RX for apalutamide 240mg po daily sent to [facility] - can start on arrival and stop bicalutamide  - Start Ca/D; scheduled DEXA  - Send [facility] xG and xF  - FU 6 weeks

## 2025-07-21 ENCOUNTER — TELEPHONE (OUTPATIENT)
Dept: SMOKING CESSATION | Facility: CLINIC | Age: 75
End: 2025-07-21
Payer: MEDICARE

## 2025-07-21 ENCOUNTER — CLINICAL SUPPORT (OUTPATIENT)
Dept: SMOKING CESSATION | Facility: CLINIC | Age: 75
End: 2025-07-21
Payer: COMMERCIAL

## 2025-07-21 DIAGNOSIS — F17.200 NICOTINE DEPENDENCE: Primary | ICD-10-CM

## 2025-07-21 DIAGNOSIS — K21.9 GASTROESOPHAGEAL REFLUX DISEASE, UNSPECIFIED WHETHER ESOPHAGITIS PRESENT: Primary | ICD-10-CM

## 2025-07-21 PROCEDURE — 99404 PREV MED CNSL INDIV APPRX 60: CPT | Mod: S$GLB,,, | Performed by: SPEECH-LANGUAGE PATHOLOGIST

## 2025-07-21 PROCEDURE — 99999 PR PBB SHADOW E&M-EST. PATIENT-LVL II: CPT | Mod: PBBFAC,,, | Performed by: SPEECH-LANGUAGE PATHOLOGIST

## 2025-07-21 RX ORDER — PANTOPRAZOLE SODIUM 40 MG/1
40 TABLET, DELAYED RELEASE ORAL DAILY
Qty: 90 TABLET | Refills: 3 | Status: SHIPPED | OUTPATIENT
Start: 2025-07-21 | End: 2026-07-21

## 2025-07-21 NOTE — PROGRESS NOTES
Individual Follow-Up Form    7/21/2025    Quit Date: 9/15/2025    Clinical Status of Patient: Outpatient    Length of Service: 60 minutes    Continuing Medication: no    Other Medications:      Target Symptoms: Withdrawal and medication side effects. The following were  rated moderate (3) to severe (4) on TCRS:  Moderate (3):   Severe (4):     Comments: Patient seen in clinic. Patient reports he has remained smoke free since last session & doesn't use any NRT. He reports he saw a new oncologist to address the prostate cancer that has returned & was put on a new medication that he is waiting for a PA on it in addition to having to take a calcium vitamin D3 chew.  Educated the patient on the benefit of his quit allowing his body to absorb vitamins & minerals necessary for health. Assessed CO. CO 4. Commended the patient on his dedication to living smoke free while he navigates his current medical journey with the change in his medical status. No additional visits scheduled due to patient has remained smoke free & isn't on any NRT & his trust benefits are expiring.     Diagnosis: F17.200    Next Visit: No additional visits scheduled due to patient has remained smoke free & isn't on any NRT & his trust benefits are expiring.

## 2025-07-22 ENCOUNTER — APPOINTMENT (OUTPATIENT)
Dept: RADIOLOGY | Facility: HOSPITAL | Age: 75
End: 2025-07-22
Attending: HOSPITALIST
Payer: MEDICARE

## 2025-07-22 DIAGNOSIS — Z79.818 ENCOUNTER FOR MONITORING ANDROGEN DEPRIVATION THERAPY: ICD-10-CM

## 2025-07-22 DIAGNOSIS — C61 RECURRENT PROSTATE CARCINOMA: ICD-10-CM

## 2025-07-22 DIAGNOSIS — Z79.899 LONG TERM CURRENT USE OF THERAPEUTIC DRUG: ICD-10-CM

## 2025-07-22 PROCEDURE — 77080 DXA BONE DENSITY AXIAL: CPT | Mod: TC

## 2025-07-22 PROCEDURE — 77080 DXA BONE DENSITY AXIAL: CPT | Mod: 26,,, | Performed by: STUDENT IN AN ORGANIZED HEALTH CARE EDUCATION/TRAINING PROGRAM

## 2025-07-23 ENCOUNTER — TELEPHONE (OUTPATIENT)
Dept: HEMATOLOGY/ONCOLOGY | Facility: CLINIC | Age: 75
End: 2025-07-23
Payer: MEDICARE

## 2025-07-23 NOTE — TELEPHONE ENCOUNTER
Called pt to address recent distress screening.    He did not answer; left brief voicemail. Will send a message on the [facility] portal.        7/18/2025    10:46 AM 7/11/2025    10:57 AM   DISTRESS SCREENING   Distress Score 8 4   Practical Concerns  None of these   Social Concerns  None of these   Emotional Concerns Worry or anxiety;Sadness or depression;Fear;Loneliness Worry or anxiety;Fear   Spiritual or Uatsdin Concerns  None of these   Physical Concerns Fatigue;Sleep;Memory or concentration;Loss or change of physical abilities Pain;Sleep

## 2025-07-29 ENCOUNTER — LAB VISIT (OUTPATIENT)
Dept: LAB | Facility: HOSPITAL | Age: 75
End: 2025-07-29
Attending: INTERNAL MEDICINE
Payer: MEDICARE

## 2025-07-29 ENCOUNTER — OFFICE VISIT (OUTPATIENT)
Dept: CARDIOLOGY | Facility: CLINIC | Age: 75
End: 2025-07-29
Payer: MEDICARE

## 2025-07-29 VITALS
OXYGEN SATURATION: 93 % | SYSTOLIC BLOOD PRESSURE: 110 MMHG | WEIGHT: 220 LBS | DIASTOLIC BLOOD PRESSURE: 70 MMHG | BODY MASS INDEX: 34.53 KG/M2 | HEART RATE: 74 BPM | HEIGHT: 67 IN

## 2025-07-29 DIAGNOSIS — I25.118 CORONARY ARTERY DISEASE OF NATIVE ARTERY OF NATIVE HEART WITH STABLE ANGINA PECTORIS: Chronic | ICD-10-CM

## 2025-07-29 DIAGNOSIS — I12.9 HYPERTENSION ASSOCIATED WITH STAGE 3 CHRONIC KIDNEY DISEASE DUE TO TYPE 2 DIABETES MELLITUS: Chronic | ICD-10-CM

## 2025-07-29 DIAGNOSIS — I73.9 PVD (PERIPHERAL VASCULAR DISEASE): ICD-10-CM

## 2025-07-29 DIAGNOSIS — E11.22 HYPERTENSION ASSOCIATED WITH STAGE 3 CHRONIC KIDNEY DISEASE DUE TO TYPE 2 DIABETES MELLITUS: Chronic | ICD-10-CM

## 2025-07-29 DIAGNOSIS — I50.42 CHRONIC COMBINED SYSTOLIC AND DIASTOLIC CONGESTIVE HEART FAILURE: Primary | Chronic | ICD-10-CM

## 2025-07-29 DIAGNOSIS — N18.32 STAGE 3B CHRONIC KIDNEY DISEASE: ICD-10-CM

## 2025-07-29 DIAGNOSIS — I27.20 PULMONARY HTN: ICD-10-CM

## 2025-07-29 DIAGNOSIS — G47.33 OSA ON CPAP: Chronic | ICD-10-CM

## 2025-07-29 DIAGNOSIS — E11.69 HYPERLIPIDEMIA ASSOCIATED WITH TYPE 2 DIABETES MELLITUS: Chronic | ICD-10-CM

## 2025-07-29 DIAGNOSIS — I42.8 NICM (NONISCHEMIC CARDIOMYOPATHY): ICD-10-CM

## 2025-07-29 DIAGNOSIS — E11.8 DM (DIABETES MELLITUS) WITH COMPLICATIONS: ICD-10-CM

## 2025-07-29 DIAGNOSIS — I87.2 CHRONIC VENOUS INSUFFICIENCY: ICD-10-CM

## 2025-07-29 DIAGNOSIS — E78.5 HYPERLIPIDEMIA ASSOCIATED WITH TYPE 2 DIABETES MELLITUS: Chronic | ICD-10-CM

## 2025-07-29 DIAGNOSIS — I50.42 CHRONIC COMBINED SYSTOLIC AND DIASTOLIC CONGESTIVE HEART FAILURE: Chronic | ICD-10-CM

## 2025-07-29 DIAGNOSIS — N18.30 HYPERTENSION ASSOCIATED WITH STAGE 3 CHRONIC KIDNEY DISEASE DUE TO TYPE 2 DIABETES MELLITUS: Chronic | ICD-10-CM

## 2025-07-29 PROCEDURE — 3078F DIAST BP <80 MM HG: CPT | Mod: CPTII,S$GLB,, | Performed by: INTERNAL MEDICINE

## 2025-07-29 PROCEDURE — 1126F AMNT PAIN NOTED NONE PRSNT: CPT | Mod: CPTII,S$GLB,, | Performed by: INTERNAL MEDICINE

## 2025-07-29 PROCEDURE — 83880 ASSAY OF NATRIURETIC PEPTIDE: CPT

## 2025-07-29 PROCEDURE — 99999 PR PBB SHADOW E&M-EST. PATIENT-LVL V: CPT | Mod: PBBFAC,,, | Performed by: INTERNAL MEDICINE

## 2025-07-29 PROCEDURE — 1160F RVW MEDS BY RX/DR IN RCRD: CPT | Mod: CPTII,S$GLB,, | Performed by: INTERNAL MEDICINE

## 2025-07-29 PROCEDURE — 3044F HG A1C LEVEL LT 7.0%: CPT | Mod: CPTII,S$GLB,, | Performed by: INTERNAL MEDICINE

## 2025-07-29 PROCEDURE — 1159F MED LIST DOCD IN RCRD: CPT | Mod: CPTII,S$GLB,, | Performed by: INTERNAL MEDICINE

## 2025-07-29 PROCEDURE — 82435 ASSAY OF BLOOD CHLORIDE: CPT

## 2025-07-29 PROCEDURE — 4010F ACE/ARB THERAPY RXD/TAKEN: CPT | Mod: CPTII,S$GLB,, | Performed by: INTERNAL MEDICINE

## 2025-07-29 PROCEDURE — 3288F FALL RISK ASSESSMENT DOCD: CPT | Mod: CPTII,S$GLB,, | Performed by: INTERNAL MEDICINE

## 2025-07-29 PROCEDURE — 36415 COLL VENOUS BLD VENIPUNCTURE: CPT

## 2025-07-29 PROCEDURE — 99215 OFFICE O/P EST HI 40 MIN: CPT | Mod: S$GLB,,, | Performed by: INTERNAL MEDICINE

## 2025-07-29 PROCEDURE — 3074F SYST BP LT 130 MM HG: CPT | Mod: CPTII,S$GLB,, | Performed by: INTERNAL MEDICINE

## 2025-07-29 PROCEDURE — 3008F BODY MASS INDEX DOCD: CPT | Mod: CPTII,S$GLB,, | Performed by: INTERNAL MEDICINE

## 2025-07-29 PROCEDURE — 1101F PT FALLS ASSESS-DOCD LE1/YR: CPT | Mod: CPTII,S$GLB,, | Performed by: INTERNAL MEDICINE

## 2025-07-29 NOTE — PROGRESS NOTES
Subjective:   Patient ID:  Santiago Khan is a 74 y.o. male who presents for follow up of No chief complaint on file.      75 yo male, came in for 2 m f/u  PMH CAD s/p LHC in  D1 70%, no PCi, CHFmrEF 45% to 50%, normal LVEDP, DM 4 yrs, pulm HTN life long smoker, quit in ,  HTN, HLD, CKD III, prostates Ca s/p TURP in 2011, no h/o chemo RX and XRT. Gout. No h/o stroke and heart attack. Social drinker.  Remote LHC showed miminal blockage by Dr. Mayra CHAPPELL,    admitted for [facility] due to chest tightness. Had low K and Mg. Troponin x2 negative and EKG showed NSR, RBBB, and LVH. Echo showed EF 45% global HK and moderate MR. Had K and Mg supplement.  States that gained weight for 30 pounds since 3/202 after held Metolazone. Even he was taking Bumex 1.5 mg bid. In  BNP 15 and Cr 1.4  Still has GOVEA. No orthopnea, sleeps with CPAP. No chest pain, faint  NUKE stress done in  showed inferior ischemia with scar. EF 45%  LHC done in  D1 70% no PCI. Normal filling pressure  No chest pain . Left radial access ok  SOB, weight gain  Sleeps on 1 pillow. No PND  Taking Bumex 2 mg bid and DIamox   Pt c/o weight gain 30 pounds after held his metolazone in the past 4 months  C/o abd distanesion SOB.     11/23/2021 visit  SOB for few months, positive orthopnea. Sleeps on CPAP. + leg swelling and left leg pain and redness  The last seen was   On Bumex 2 mg bid. Held metolazone. Quit smoking  H/o prostate cancer and PSA recurrently elevated. CXR in  negative     12/2021   Leg swelling and erythema. At last visit, D/c bumex and added torsemide 40 mg bid. Good urination. BNP negative and Cr up to 1.6. Decreased torsemide to 20 mg bid  Still leg swelling and SOB. Quit smoking 9 months ago.      visit  Resumed metolazone 3 times a week about 2 weeks ago and BMP done two days ago showed elevated Cr.   Felt neck tightness and improved breathing after the head tilts back and  stretched up. Not f/u with pulm yet  On compresion socks.   Still SOB. Serial BNPs wnl and h/o LHC showed normal filling pressure suggested CHF controlled     visit  Quit smoking 1 yr and on breathing rx  No chest pain dizziness and faint. BP controlled  BNP < 10. Cr 1.7    echo  · Mild concentric left ventricular hypertrophy.  · Mildly decreased left ventricular systolic function. The estimated ejection fraction is 45%.  · Grade I (mild) left ventricular diastolic dysfunction consistent with impaired relaxation.  · Low normal right ventricular systolic function.  · Moderate mitral regurgitation.  · Normal central venous pressure (3 mmHg).  · The estimated PA systolic pressure is 23 mmHg.     visit  H/o 6MWT in : 250 meters, and peal VO 11 calculated  Cane dependent due to lower back pain  Resumed smoking. On breathing tx and f/u with Dr. Escudero  On torsemide 40 mg bid for PVD    10/23 visit  Quit smoking again and improved breathing, not like CPAP. F/u at pulm service. Inhaler helped for tightness  No PND chest pain palpitation dizziness. Remains mild leg swelling    04/24 visit  Weight stable. Quit smoking, chronic SOB. On cpap.   Leg swelling chronic  EKG reviewed by myself today reveals NSR nonspecific STT change, RBBB LAFB LVH PACs    09/24 visit  In 05/24 decreased torsemide from 40 mg bid to 20 mg daily due to elevation of Cr to 2.5   Repeat Cr in 08/24, improved to 1.6 BNP. Slightly elevated to 178  C/o leg swelling  SOB is the same and on breathing Rx. No orthpnea   Weight loss 15 lbs in 6 m   Quit smoking for a yr    04/25 visit  H/o cellulitis and knee infection, stayed in rehab for 2 weeks and not home.    05/25 visit  CHFrF 35% and torsemide 40 mg bid  NT  and Cr 2.0  Remains on Losartan and not start entresto    Interval history  Recurrent prostate ca and on hormone RX  On entresto Bisoprololl jardiance and torsemide 40 mg bid  05/25 NT BNP 1440 and Cr 2.0 GFR 33  No  orthopnea and PND   EF 35%                         History of Present Illness    CHIEF COMPLAINT:  - Presents for follow-up to discuss ongoing management of congestive heart failure and prostate cancer.    HPI:  - Last seen 2 months ago  - History of prostate cancer diagnosed in 2010, recently recurred  - Currently undergoing hormone treatment for prostate cancer as directed by oncologist  - Breathing unchanged since last visit  - Sleeps with head elevated in a chair  - Describes pain across the back when coughing; onset and duration not specified  - Renal function stable, with consistent GFR of 39  - Denies needing chemotherapy or radiation therapy for prostate cancer  - Denies any changes in breathing or dyspnea    MEDICATIONS:  - Entresto for congestive heart failure, replaced losartan  - Jardiance for diabetes and congestive heart failure  - Bisoprolol  - Torsemide 40 mg twice daily    TEST RESULTS:  - GFR: 39  - Interminal BNP: 1440  - Creatinine: 2.02-2.43    MEDICAL HISTORY:  - Prostate cancer: 2010, recurrence  - Chronic renal disease          Past Medical History:   Diagnosis Date    Chronic combined systolic and diastolic congestive heart failure 07/09/2020    Chronic kidney disease, stage 3     Chronic obstructive pulmonary disease 03/20/2023    Wixela 250 mcg, Spiriva respimat. Continue Albuterol inhaler and albuterol nebs   Referral pul dis management, education and assistance with medication  Patient preference to only see a doctor, request follow up with Dr. Springer             Chronic venous insufficiency     DDD (degenerative disc disease), lumbar     DM (diabetes mellitus) with complications     Drug-induced constipation 11/03/2024    E coli bacteremia 11/04/2024    History of gout     Hyperlipidemia associated with type 2 diabetes mellitus     Hypertension associated with stage 3 chronic kidney disease due to type 2 diabetes mellitus     BRADLEY on CPAP     Prostate cancer     prostatectomy in 2010,  rising PSA that started in 2021    Severe sepsis sec to Cellulitis Legs complicated by E coli Bacteremia 11/03/2024       Past Surgical History:   Procedure Laterality Date    BICEPS TENDON REPAIR      COLONOSCOPY N/A 03/27/2019    Procedure: COLONOSCOPY;  Surgeon: James Roger MD;  Location: [facility] ENDO;  Service: Endoscopy;  Laterality: N/A;    EXPLORATION OF ORBIT Right 09/08/2023    Procedure: EXPLORATION, ORBIT;  Surgeon: Junaid Bartholomew MD;  Location: [facility] OR;  Service: ENT;  Laterality: Right;  possibly need frozen    HEMORRHOID SURGERY      INCISION AND DRAINAGE OF ABSCESS Right 11/08/2024    Procedure: INCISION AND DRAINAGE, ABSCESS;  Surgeon: Torres Magallanes MD;  Location: [facility] OR;  Service: General;  Laterality: Right;  4:30, patient ate    INGUINAL HERNIA REPAIR Left     KNEE ARTHROSCOPY Right     LEFT HEART CATHETERIZATION Left 06/29/2020    Procedure: CATHETERIZATION, HEART, LEFT;  Surgeon: Cheli Wilson MD;  Location: [facility] CATH LAB;  Service: Cardiology;  Laterality: Left;    LUMBAR LAMINECTOMY  1982    PROSTATECTOMY      TONSILLECTOMY         Social History[1]    Family History   Problem Relation Name Age of Onset    Hyperlipidemia Mother      Prostate cancer Father      Coronary artery disease Father  90    Alzheimer's disease Father      Liver cancer Brother      Breast cancer Neg Hx      Pancreatic cancer Neg Hx      Ovarian cancer Neg Hx           ROS    Objective:   Physical Exam  HENT:      Head: Normocephalic.   Eyes:      Pupils: Pupils are equal, round, and reactive to light.   Neck:      Thyroid: No thyromegaly.      Vascular: Normal carotid pulses. No carotid bruit or JVD.   Cardiovascular:      Rate and Rhythm: Normal rate and regular rhythm. No extrasystoles are present.     Chest Wall: PMI is not displaced.      Pulses: Normal pulses.      Heart sounds: Normal heart sounds. No murmur heard.     No gallop. No S3 sounds.   Pulmonary:      Effort: No respiratory distress.       Breath sounds: No stridor. Rales present.   Abdominal:      General: Bowel sounds are normal.      Palpations: Abdomen is soft.      Tenderness: There is no abdominal tenderness. There is no rebound.   Musculoskeletal:         General: Swelling present.   Skin:     Findings: No rash.   Neurological:      Mental Status: He is alert and oriented to person, place, and time.   Psychiatric:         Behavior: Behavior normal.         Lab Results   Component Value Date    CHOL 135 02/25/2025    CHOL 138 08/14/2024    CHOL 144 05/15/2024     Lab Results   Component Value Date    HDL 53 02/25/2025    HDL 45 08/14/2024    HDL 54 05/15/2024     Lab Results   Component Value Date    LDLCALC 66.4 02/25/2025    LDLCALC 69.4 08/14/2024    LDLCALC 63.6 05/15/2024     Lab Results   Component Value Date    TRIG 78 02/25/2025    TRIG 118 08/14/2024    TRIG 132 05/15/2024     Lab Results   Component Value Date    CHOLHDL 39.3 02/25/2025    CHOLHDL 32.6 08/14/2024    CHOLHDL 37.5 05/15/2024       Chemistry        Component Value Date/Time     07/29/2025 1626     02/25/2025 1038    K 5.0 07/29/2025 1626    K 4.8 02/25/2025 1038     07/29/2025 1626     02/25/2025 1038    CO2 32 (H) 07/29/2025 1626    CO2 28 02/25/2025 1038    BUN 32 (H) 07/29/2025 1626    CREATININE 2.1 (H) 07/29/2025 1626     (H) 07/29/2025 1626     02/25/2025 1038        Component Value Date/Time    CALCIUM 9.5 07/29/2025 1626    CALCIUM 9.6 02/25/2025 1038    ALKPHOS 323 (H) 07/18/2025 1243    ALKPHOS 86 02/25/2025 1038    AST 28 07/18/2025 1243    AST 51 (H) 02/25/2025 1038    ALT 26 07/18/2025 1243    ALT 24 02/25/2025 1038    BILITOT 0.6 07/18/2025 1243    BILITOT 0.6 02/25/2025 1038    ESTGFRAFRICA 43 (A) 07/18/2022 0941    EGFRNONAA 37 (A) 07/18/2022 0941          Lab Results   Component Value Date    HGBA1C 6.0 (H) 02/25/2025     Lab Results   Component Value Date    TSH 2.068 02/25/2025     Lab Results   Component Value Date     INR 1.0 06/26/2020    INR 1.0 03/10/2020     Lab Results   Component Value Date    WBC 10.74 07/18/2025    HGB 13.8 (L) 07/18/2025    HCT 42.9 07/18/2025    MCV 95 07/18/2025     07/18/2025     BMP  Sodium   Date Value Ref Range Status   07/29/2025 142 136 - 145 mmol/L Final   02/25/2025 139 136 - 145 mmol/L Final     Potassium   Date Value Ref Range Status   07/29/2025 5.0 3.5 - 5.1 mmol/L Final   02/25/2025 4.8 3.5 - 5.1 mmol/L Final     Chloride   Date Value Ref Range Status   07/29/2025 100 95 - 110 mmol/L Final   02/25/2025 100 95 - 110 mmol/L Final     CO2   Date Value Ref Range Status   07/29/2025 32 (H) 23 - 29 mmol/L Final   02/25/2025 28 23 - 29 mmol/L Final     BUN   Date Value Ref Range Status   07/29/2025 32 (H) 8 - 23 mg/dL Final     Creatinine   Date Value Ref Range Status   07/29/2025 2.1 (H) 0.5 - 1.4 mg/dL Final     Calcium   Date Value Ref Range Status   07/29/2025 9.5 8.7 - 10.5 mg/dL Final   02/25/2025 9.6 8.7 - 10.5 mg/dL Final     Anion Gap   Date Value Ref Range Status   07/29/2025 10 8 - 16 mmol/L Final     eGFR if    Date Value Ref Range Status   07/18/2022 43 (A) >60 mL/min/1.73 m^2 Final     eGFR if non    Date Value Ref Range Status   07/18/2022 37 (A) >60 mL/min/1.73 m^2 Final     Comment:     Calculation used to obtain the estimated glomerular filtration  rate (eGFR) is the CKD-EPI equation.        BNP  @LABRCNTIP(BNP,BNPTRIAGEBLO)@  @LABRCNTIP(troponini)@  Estimated Creatinine Clearance: 35.3 mL/min (A) (based on SCr of 2.1 mg/dL (H)).  No results found in the last 24 hours.  No results found in the last 24 hours.  No results found in the last 24 hours.    Assessment:      1. Chronic combined systolic and diastolic congestive heart failure    2. NICM (nonischemic cardiomyopathy)    3. Pulmonary HTN    4. PVD (peripheral vascular disease)    5. Hypertension associated with stage 3 chronic kidney disease due to type 2 diabetes mellitus    6.  Hyperlipidemia associated with type 2 diabetes mellitus    7. Coronary artery disease of native artery of native heart with stable angina pectoris    8. Chronic venous insufficiency    9. Stage 3b chronic kidney disease    10. DM (diabetes mellitus) with complications    11. BRADLEY on CPAP        Plan:     Assessment & Plan    IMPRESSION:  - Added new medication for congestive heart failure, pending insurance approval (to be filled at [facility] after pre-authorization).  - Continued Entresto (previously resumed to replace losartan).  - Continued Jardiance.  - Continued Bisoprolol.  - Continued Torsemide 40 mg twice daily.    CONGESTIVE HEART FAILURE:  - Explained importance of fluid intake management for kidney health and congestive heart failure.  - Santiago to check blood pressure at home regularly, limit daily fluid intake to 50 oz including all liquids   - Added new medication for congestive heart failure, pending insurance approval (to be filled at [facility] pharmacy after pre-authorization).  - Continued Entresto (previously resumed to replace losartan).  - Continued Bisoprolol.  - Continued Torsemide 40 mg twice daily.    TYPE 2 DIABETES WITH CHRONIC KIDNEY DISEASE:  - Continued Jardiance.    ABNORMAL BLOOD CHEMISTRY AND CHRONIC KIDNEY DISEASE:  - Ordered lab work to monitor heart and renal functions.    FOLLOW-UP:  - Follow up in 4 months for check-up.           Fluid restriction 50 oz  Add vericiguat 2.5 mg daily  Check NTBNP and BMP in 4 m    This note was generated with the assistance of ambient listening technology. Verbal consent was obtained by the patient and accompanying visitor(s) for the recording of patient appointment to facilitate this note. I attest to having reviewed and edited the generated note for accuracy, though some syntax or spelling errors may persist. Please contact the author of this note for any clarification.            [1]   Social History  Tobacco Use    Smoking status: Former      Current packs/day: 0.00     Average packs/day: 1.5 packs/day for 52.7 years (79.1 ttl pk-yrs)     Types: Cigarettes     Start date: 1971     Quit date: 9/15/2023     Years since quittin.8    Smokeless tobacco: Former   Substance Use Topics    Alcohol use: Not Currently    Drug use: Never

## 2025-07-30 ENCOUNTER — TELEPHONE (OUTPATIENT)
Dept: CARDIOLOGY | Facility: CLINIC | Age: 75
End: 2025-07-30
Payer: MEDICARE

## 2025-07-30 LAB
ANION GAP (OHS): 10 MMOL/L (ref 8–16)
BUN SERPL-MCNC: 32 MG/DL (ref 8–23)
CALCIUM SERPL-MCNC: 9.5 MG/DL (ref 8.7–10.5)
CHLORIDE SERPL-SCNC: 100 MMOL/L (ref 95–110)
CO2 SERPL-SCNC: 32 MMOL/L (ref 23–29)
CREAT SERPL-MCNC: 2.1 MG/DL (ref 0.5–1.4)
GFR SERPLBLD CREATININE-BSD FMLA CKD-EPI: 33 ML/MIN/1.73/M2
GLUCOSE SERPL-MCNC: 175 MG/DL (ref 70–110)
NT-PROBNP SERPL-MCNC: 932 PG/ML
POTASSIUM SERPL-SCNC: 5 MMOL/L (ref 3.5–5.1)
SODIUM SERPL-SCNC: 142 MMOL/L (ref 136–145)

## 2025-07-30 NOTE — TELEPHONE ENCOUNTER
I have attempted without success to contact this patient by phone to review results. Pt was instructed via VM to return call to clinic. ----- Message from Sabino Cr MD sent at 7/30/2025  3:47 PM CDT -----  The lab showed CHF improved  Continue current Rx.   F/U as scheduled  Repeat BNP and BMP in 4 weeks  ----- Message -----  From: Lab, Background User  Sent: 7/30/2025   2:27 PM CDT  To: Sabino Cr MD

## 2025-08-05 ENCOUNTER — OFFICE VISIT (OUTPATIENT)
Dept: RADIATION ONCOLOGY | Facility: CLINIC | Age: 75
End: 2025-08-05
Payer: MEDICARE

## 2025-08-05 VITALS
HEIGHT: 67 IN | BODY MASS INDEX: 34.71 KG/M2 | WEIGHT: 221.13 LBS | TEMPERATURE: 98 F | OXYGEN SATURATION: 95 % | RESPIRATION RATE: 19 BRPM | DIASTOLIC BLOOD PRESSURE: 82 MMHG | SYSTOLIC BLOOD PRESSURE: 123 MMHG | HEART RATE: 63 BPM

## 2025-08-05 DIAGNOSIS — C61 RECURRENT PROSTATE CARCINOMA: ICD-10-CM

## 2025-08-05 PROCEDURE — 99999 PR PBB SHADOW E&M-EST. PATIENT-LVL V: CPT | Mod: PBBFAC,,, | Performed by: SPECIALIST

## 2025-08-05 PROCEDURE — 1101F PT FALLS ASSESS-DOCD LE1/YR: CPT | Mod: CPTII,S$GLB,, | Performed by: SPECIALIST

## 2025-08-05 PROCEDURE — 99205 OFFICE O/P NEW HI 60 MIN: CPT | Mod: S$GLB,,, | Performed by: SPECIALIST

## 2025-08-05 PROCEDURE — 1159F MED LIST DOCD IN RCRD: CPT | Mod: CPTII,S$GLB,, | Performed by: SPECIALIST

## 2025-08-05 PROCEDURE — 1126F AMNT PAIN NOTED NONE PRSNT: CPT | Mod: CPTII,S$GLB,, | Performed by: SPECIALIST

## 2025-08-05 PROCEDURE — 3074F SYST BP LT 130 MM HG: CPT | Mod: CPTII,S$GLB,, | Performed by: SPECIALIST

## 2025-08-05 PROCEDURE — 3288F FALL RISK ASSESSMENT DOCD: CPT | Mod: CPTII,S$GLB,, | Performed by: SPECIALIST

## 2025-08-05 PROCEDURE — 3079F DIAST BP 80-89 MM HG: CPT | Mod: CPTII,S$GLB,, | Performed by: SPECIALIST

## 2025-08-05 PROCEDURE — 3008F BODY MASS INDEX DOCD: CPT | Mod: CPTII,S$GLB,, | Performed by: SPECIALIST

## 2025-08-05 PROCEDURE — 4010F ACE/ARB THERAPY RXD/TAKEN: CPT | Mod: CPTII,S$GLB,, | Performed by: SPECIALIST

## 2025-08-05 PROCEDURE — 3044F HG A1C LEVEL LT 7.0%: CPT | Mod: CPTII,S$GLB,, | Performed by: SPECIALIST

## 2025-08-05 NOTE — PROGRESS NOTES
[facility] / [facility] - Radiation Oncology Consult Note    HISTORY OF PRESENT ILLNESS:  74-year-old man with a history of chronic orthopedic neck, lower back, and knee pain for which he has been on hydrocodone for many years.    He also has  prostate cancer treated with prostatectomy in 2010, followed by biochemical failure with a PSA as high as 23.47 on 05/02/2025.      PSMA PET on 05/2025, which I have reviewed today, shows an enlarged prostate with increased uptake and 2 avid left pelvic lymph nodes, along with numerous sclerotic avid bony metastatic sites.  This includes several spinal and posteromedial rib lesions, none dominant or concerning for spinal cord compromise.        ADT with Lupron and Casodex was initiated on 06/27/2025 with a good initial PSA response down to 7.05 on 07/18/2025 when testosterone measured 25 and apalutamide was added    Tempus testing on 07/18/2025 showed no pathogenic mutations      REVIEW OF SYSTEMS:  He has longstanding issues with lower back pain, knee pain, and neck pain of orthopedic origin for which he has taken hydrocodone for many years.  He currently takes it b.i.d. and has not noted meaningful change in his presenting pain when medicated.  He describes a 1-2 month history of initially progressive pain in the spine at the mid to lower shoulder blade level radiating across the back bilaterally when coughing, no pain when not coughing.  It was also exacerbated by leaning over and stretching out to  something off the floor when sitting.  Since initiation of endocrine therapy, he has noted a possible modestly improvement in the pain on coughing and near-complete resolution of the pain when leaning over.  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 Continue Albuterol inhaler and albuterol nebs   Referral pul dis management, education and assistance with medication  Patient preference to only see a doctor, request follow up with Dr. Springer             Chronic venous insufficiency     DDD (degenerative disc disease), lumbar     DM (diabetes mellitus) with complications     Drug-induced constipation 11/03/2024    E coli bacteremia 11/04/2024    History of gout     Hyperlipidemia associated with type 2 diabetes mellitus     Hypertension associated with stage 3 chronic kidney disease due to type 2 diabetes mellitus     BRADLEY on CPAP     Prostate cancer     prostatectomy in 2010, rising PSA that started in 2021    Severe sepsis sec to Cellulitis Legs complicated by E coli Bacteremia 11/03/2024       PAST SURGICAL HISTORY:  Past Surgical History:   Procedure Laterality Date    BICEPS TENDON REPAIR      COLONOSCOPY N/A 03/27/2019    Procedure: COLONOSCOPY;  Surgeon: James Roger MD;  Location: [facility] ENDO;  Service: Endoscopy;  Laterality: N/A;    EXPLORATION OF ORBIT Right 09/08/2023    Procedure: EXPLORATION, ORBIT;  Surgeon: Junaid Bartholomew MD;  Location: [facility] OR;  Service: ENT;  Laterality: Right;  possibly need frozen    HEMORRHOID SURGERY      INCISION AND DRAINAGE OF ABSCESS Right 11/08/2024    Procedure: INCISION AND DRAINAGE, ABSCESS;  Surgeon: Torres Magallanes MD;  Location: [facility] OR;  Service: General;  Laterality: Right;  4:30, patient ate    INGUINAL HERNIA REPAIR Left     KNEE ARTHROSCOPY Right     LEFT HEART CATHETERIZATION Left 06/29/2020    Procedure: CATHETERIZATION, HEART, LEFT;  Surgeon: Cheli Wilson MD;  Location: [facility] CATH LAB;  Service: Cardiology;  Laterality: Left;    LUMBAR LAMINECTOMY  1982    PROSTATECTOMY      TONSILLECTOMY         ALLERGIES:    Review of patient's allergies indicates:   Allergen Reactions    Amitriptyline Rash    Celecoxib Rash       MEDICATIONS:  Current Outpatient Medications   Medication Sig    albuterol (PROVENTIL/VENTOLIN HFA) 90 mcg/actuation inhaler Inhale 2 puffs into the lungs every 4 (four) hours as needed for Shortness of Breath or Wheezing.    allopurinoL (ZYLOPRIM) 300 MG tablet Take by mouth.    apalutamide 240 mg Tab Take 1 tablet (240 mg total) by mouth once daily.    aspirin (ECOTRIN) 81 MG EC tablet Take 81 mg by mouth once daily.    baclofen (LIORESAL) 10 MG tablet Take 1 tablet by mouth every evening.    bicalutamide (CASODEX) 50 MG Tab Take 1 tablet (50 mg total) by mouth once daily.    bisoprolol (ZEBETA) 5 MG tablet Take 0.5 tablets (2.5 mg total) by mouth once daily.    blood sugar diagnostic Strp Check blood glucose twice per day    blood-glucose meter (ACCU-CHEK GUIDE ME GLUCOSE MTR) Misc USE AS INSTRUCTED    calcium citrate-vitamin D3 500 mg-12.5 mcg (500 unit) Chew Take 1 tablet by mouth 2 (two) times a day.    colchicine (COLCRYS) 0.6 mg tablet Take 1 tablet (0.6 mg total) by mouth once daily.    empagliflozin (JARDIANCE) 10 mg tablet Take 1 tablet (10 mg total) by mouth once daily.    fluticasone propionate (FLONASE) 50 mcg/actuation nasal spray SHAKE AND SPRAY TWICE IN EACH NOSTRIL EVERY DAY    fluticasone-salmeterol diskus inhaler 250-50 mcg Inhale 1 puff into the lungs 2 (two) times daily.    glimepiride (AMARYL) 4 MG tablet Take 1 tablet (4 mg total) by mouth daily with breakfast.    guaiFENesin 100 mg/5 ml (ROBITUSSIN) 100 mg/5 mL syrup Take 200 mg by mouth every evening.    HYDROcodone-acetaminophen (NORCO) 7.5-325 mg per tablet Take 1 tablet by mouth every 4 (four) hours as needed (for chronic pain).    lancets (ACCU-CHEK SOFTCLIX LANCETS) Misc Check BG daily    latanoprost 0.005 % ophthalmic solution Place 1 drop into the right eye every evening.    magnesium oxide (MAG-OX) 400 mg (241.3 mg  "magnesium) tablet TAKE 2 TABLETS(800 MG) BY MOUTH TWICE DAILY    multivitamin-minerals-lutein Tab Take 1 tablet by mouth Daily.    pantoprazole (PROTONIX) 40 MG tablet Take 1 tablet (40 mg total) by mouth once daily.    potassium chloride SA (K-DUR,KLOR-CON) 20 MEQ tablet TAKE 3 TABLETS BY MOUTH TWICE DAILY    sacubitriL-valsartan (ENTRESTO) 24-26 mg per tablet Take 1 tablet by mouth 2 (two) times daily.    simvastatin (ZOCOR) 40 MG tablet Take 1 tablet (40 mg total) by mouth every evening.    torsemide (DEMADEX) 20 MG Tab Take 3 tablets (60 mg total) by mouth once daily.    vericiguat 2.5 mg Tab Take 1 tablet (2.5 mg total) by mouth once daily.     No current facility-administered medications for this visit.       SOCIAL HISTORY:  Social History[1]    FAMILY HISTORY:  Family History   Problem Relation Name Age of Onset    Hyperlipidemia Mother      Prostate cancer Father      Coronary artery disease Father  90    Alzheimer's disease Father      Liver cancer Brother      Breast cancer Neg Hx      Pancreatic cancer Neg Hx      Ovarian cancer Neg Hx           PHYSICAL EXAMINATION:       Vitals:    08/05/25 0901   BP: 123/82   Pulse: 63   Resp: 19   Temp: 97.5 °F (36.4 °C)   SpO2: 95%   Weight: 100.3 kg (221 lb 1.9 oz)   Height: 5' 7" (1.702 m)        General:  A&O x4, NAD, ambulates with the aid of a cane due to longstanding lower back and knee difficulties   Head:  PERRLA, right globe has no medial gaze consistent with his known pseudotumor, otherwise EOM intact, CN II-XII otherwise intact  Lymphatics:  No cervical or supraclavicular adenopathy   Lungs: Clear to auscultation bilaterally   Heart regular:  rate and rhythm  Abdomen:  nontender and nondistended with positive bowel sounds     KPS:  80    ASSESSMENT:  Distant history of prostatectomy for prostate cancer, now with a rising PSA and PET evidence of widely disseminated disease.  His only symptomatic site is between the shoulder blades when coughing or leaning " over, with some improvement since initiation of endocrine therapy about 6 weeks ago.  Review of his PET shows multiple spinal and medial rib lesions that could correlate with the his pain, without a dominant site and no site worrisome for cord compromise with progression.    PLAN:  We had a very lengthy discussion regarding the role of radiotherapy in the setting of painful bony metastatic prostate cancer.  We reviewed the logistics of treatment to the upper thoracic spine, including the planning and treatment visits, as well as possible acute and chronic side effects in great detail.    We also reviewed the possibility that his newly initiated endocrine therapy is likely responsible for the modestly interval improvement he has noted recently, and may go on to experience further or even complete pain relief from endocrine therapy.    Allowing additional time for his pain-endocrine response to declare would be a reasonable approach.    He voiced an understanding and a desire to give endocrine therapy more time to improve his pain.  He has agreed to return in one-month for additional assessment.      I spent approximately 60 minutes reviewing the available records and evaluating the patient, out of which over 50% of the time was spent face to face with the patient in counseling and coordinating this patient's care.             [1]   Social History  Socioeconomic History    Marital status:    Tobacco Use    Smoking status: Former     Current packs/day: 0.00     Average packs/day: 1.5 packs/day for 52.7 years (79.1 ttl pk-yrs)     Types: Cigarettes     Start date: 1971     Quit date: 9/15/2023     Years since quittin.8    Smokeless tobacco: Former   Substance and Sexual Activity    Alcohol use: Not Currently    Drug use: Never    Sexual activity: Not Currently     Partners: Female   Social History Narrative    Lives in [place] with his 97-year old mother. Retired . Former smoker. Quit  09/2023. Smoked about 2ppd x 55 years. No signficant EtOH.     Social Drivers of Health     Financial Resource Strain: Patient Declined (5/15/2025)    Overall Financial Resource Strain (CARDIA)     Difficulty of Paying Living Expenses: Patient declined   Food Insecurity: Patient Declined (5/15/2025)    Hunger Vital Sign     Worried About Running Out of Food in the Last Year: Patient declined     Ran Out of Food in the Last Year: Patient declined   Transportation Needs: No Transportation Needs (5/15/2025)    PRAPARE - Transportation     Lack of Transportation (Medical): No     Lack of Transportation (Non-Medical): No   Physical Activity: Inactive (5/15/2025)    Exercise Vital Sign     Days of Exercise per Week: 0 days     Minutes of Exercise per Session: 0 min   Stress: Stress Concern Present (5/15/2025)    [employer] [place] [employer] - Occupational Stress Questionnaire     Feeling of Stress : To some extent   Housing Stability: Unknown (5/15/2025)    Housing Stability Vital Sign     Unable to Pay for Housing in the Last Year: Patient declined     Number of Times Moved in the Last Year: 0     Homeless in the Last Year: No

## 2025-08-06 ENCOUNTER — LAB VISIT (OUTPATIENT)
Dept: LAB | Facility: HOSPITAL | Age: 75
End: 2025-08-06
Attending: INTERNAL MEDICINE
Payer: MEDICARE

## 2025-08-06 DIAGNOSIS — Z51.81 MEDICATION MONITORING ENCOUNTER: ICD-10-CM

## 2025-08-06 DIAGNOSIS — D84.821 IMMUNODEFICIENCY DUE TO DRUGS: ICD-10-CM

## 2025-08-06 DIAGNOSIS — D89.84 IGG4 RELATED DISEASE: ICD-10-CM

## 2025-08-06 DIAGNOSIS — Z79.899 IMMUNODEFICIENCY DUE TO DRUGS: ICD-10-CM

## 2025-08-06 LAB
ABSOLUTE EOSINOPHIL (OHS): 0.66 K/UL
ABSOLUTE MONOCYTE (OHS): 0.31 K/UL (ref 0.3–1)
ABSOLUTE NEUTROPHIL COUNT (OHS): 7.32 K/UL (ref 1.8–7.7)
ALBUMIN SERPL BCP-MCNC: 4.1 G/DL (ref 3.5–5.2)
ALP SERPL-CCNC: 334 UNIT/L (ref 40–150)
ALT SERPL W/O P-5'-P-CCNC: 23 UNIT/L (ref 10–44)
ANION GAP (OHS): 13 MMOL/L (ref 8–16)
AST SERPL-CCNC: 26 UNIT/L (ref 11–45)
BASOPHILS # BLD AUTO: 0.04 K/UL
BASOPHILS NFR BLD AUTO: 0.4 %
BILIRUB SERPL-MCNC: 0.5 MG/DL (ref 0.1–1)
BUN SERPL-MCNC: 29 MG/DL (ref 8–23)
CALCIUM SERPL-MCNC: 9.3 MG/DL (ref 8.7–10.5)
CHLORIDE SERPL-SCNC: 100 MMOL/L (ref 95–110)
CO2 SERPL-SCNC: 29 MMOL/L (ref 23–29)
CREAT SERPL-MCNC: 1.8 MG/DL (ref 0.5–1.4)
CRP SERPL-MCNC: 2.2 MG/L
ERYTHROCYTE [DISTWIDTH] IN BLOOD BY AUTOMATED COUNT: 17.2 % (ref 11.5–14.5)
GFR SERPLBLD CREATININE-BSD FMLA CKD-EPI: 39 ML/MIN/1.73/M2
GLUCOSE SERPL-MCNC: 185 MG/DL (ref 70–110)
HCT VFR BLD AUTO: 39.3 % (ref 40–54)
HGB BLD-MCNC: 13.1 GM/DL (ref 14–18)
IMM GRANULOCYTES # BLD AUTO: 0.03 K/UL (ref 0–0.04)
IMM GRANULOCYTES NFR BLD AUTO: 0.3 % (ref 0–0.5)
LYMPHOCYTES # BLD AUTO: 1.39 K/UL (ref 1–4.8)
MCH RBC QN AUTO: 31.5 PG (ref 27–31)
MCHC RBC AUTO-ENTMCNC: 33.3 G/DL (ref 32–36)
MCV RBC AUTO: 95 FL (ref 82–98)
NUCLEATED RBC (/100WBC) (OHS): 0 /100 WBC
PLATELET # BLD AUTO: 162 K/UL (ref 150–450)
PMV BLD AUTO: 10.6 FL (ref 9.2–12.9)
POTASSIUM SERPL-SCNC: 5 MMOL/L (ref 3.5–5.1)
PROT SERPL-MCNC: 7.1 GM/DL (ref 6–8.4)
RBC # BLD AUTO: 4.16 M/UL (ref 4.6–6.2)
RELATIVE EOSINOPHIL (OHS): 6.8 %
RELATIVE LYMPHOCYTE (OHS): 14.3 % (ref 18–48)
RELATIVE MONOCYTE (OHS): 3.2 % (ref 4–15)
RELATIVE NEUTROPHIL (OHS): 75 % (ref 38–73)
SODIUM SERPL-SCNC: 142 MMOL/L (ref 136–145)
WBC # BLD AUTO: 9.75 K/UL (ref 3.9–12.7)

## 2025-08-06 PROCEDURE — 86140 C-REACTIVE PROTEIN: CPT

## 2025-08-06 PROCEDURE — 36415 COLL VENOUS BLD VENIPUNCTURE: CPT

## 2025-08-06 PROCEDURE — 85025 COMPLETE CBC W/AUTO DIFF WBC: CPT

## 2025-08-06 PROCEDURE — 82565 ASSAY OF CREATININE: CPT

## 2025-08-08 ENCOUNTER — OFFICE VISIT (OUTPATIENT)
Dept: URGENT CARE | Facility: CLINIC | Age: 75
End: 2025-08-08
Payer: MEDICARE

## 2025-08-08 ENCOUNTER — HOSPITAL ENCOUNTER (OUTPATIENT)
Dept: RADIOLOGY | Facility: CLINIC | Age: 75
Discharge: HOME | End: 2025-08-08
Attending: PHYSICIAN ASSISTANT
Payer: MEDICARE

## 2025-08-08 VITALS
OXYGEN SATURATION: 95 % | DIASTOLIC BLOOD PRESSURE: 65 MMHG | HEART RATE: 74 BPM | HEIGHT: 67 IN | TEMPERATURE: 97 F | BODY MASS INDEX: 34.93 KG/M2 | RESPIRATION RATE: 18 BRPM | WEIGHT: 222.56 LBS | SYSTOLIC BLOOD PRESSURE: 126 MMHG

## 2025-08-08 DIAGNOSIS — M79.645 PAIN OF LEFT THUMB: ICD-10-CM

## 2025-08-08 DIAGNOSIS — L03.012 CELLULITIS OF LEFT THUMB: Primary | ICD-10-CM

## 2025-08-08 PROCEDURE — 73140 X-RAY EXAM OF FINGER(S): CPT | Mod: LT,S$GLB,, | Performed by: RADIOLOGY

## 2025-08-08 RX ORDER — CEPHALEXIN 500 MG/1
500 CAPSULE ORAL EVERY 12 HOURS
Qty: 14 CAPSULE | Refills: 0 | Status: SHIPPED | OUTPATIENT
Start: 2025-08-08 | End: 2025-08-15

## 2025-08-08 NOTE — PROGRESS NOTES
"Subjective:      Patient ID: Santiago Khan is a 74 y.o. male.    Vitals:  height is 5' 7" (1.702 m) and weight is 101 kg (222 lb 8.9 oz). His tympanic temperature is 96.8 °F (36 °C). His blood pressure is 126/65 and his pulse is 74. His respiration is 18 and oxygen saturation is 95%.     Chief Complaint: Hand Pain    Pt presents with left thumb pain ongoing a week now.Pt states he thought he had a splinter a week ago and tried digging to get it out of his thumb leaving the wound open. Pt states he has been cleaning it and keeping it dry, but today as it is healing it is still hurting really bad. Pt not taking any medications for sxs at this time    Hand Pain   His dominant hand is their left hand. The incident occurred 5 to 7 days ago. The incident occurred at home. There was no injury mechanism. The pain is present in the left hand. The quality of the pain is described as stabbing. The pain does not radiate. The pain is at a severity of 5/10. The pain is moderate. The pain has been Constant since the incident. The symptoms are aggravated by movement. He has tried nothing for the symptoms. The treatment provided no relief.     ROS   Objective:     Physical Exam   Constitutional: He is oriented to person, place, and time. He appears well-developed. He is cooperative. No distress.   HENT:   Head: Normocephalic and atraumatic.   Nose: Nose normal.   Mouth/Throat: Oropharynx is clear and moist and mucous membranes are normal.   Eyes: Conjunctivae and lids are normal.   Neck: Trachea normal and phonation normal. Neck supple.   Cardiovascular: Normal rate, regular rhythm, normal heart sounds and normal pulses.   Pulmonary/Chest: Effort normal and breath sounds normal.   Abdominal: Normal appearance and bowel sounds are normal. He exhibits no mass. Soft.   Musculoskeletal:         General: No deformity.        Hands:    Neurological: He is alert and oriented to person, place, and time. He has normal strength and " normal reflexes. No sensory deficit.   Skin: Skin is warm, dry, intact and not diaphoretic.   Psychiatric: His speech is normal and behavior is normal. Judgment and thought content normal.   Nursing note and vitals reviewed.      Assessment:     1. Cellulitis of left thumb    2. Pain of left thumb        Plan:       Cellulitis of left thumb  -     cephALEXin (KEFLEX) 500 MG capsule; Take 1 capsule (500 mg total) by mouth every 12 (twelve) hours. for 7 days  Dispense: 14 capsule; Refill: 0    Pain of left thumb  -     X-Ray Finger 2 or More Views Left; Future; Expected date: 08/08/2025      No acute findings on xray on wet read.  Pending formal read per radiologist.    Tylenol and/or ibuprofen as needed for pain.  Keflex sent to pharmacy.  Follow up with Pcp in 3-5 days.  RTC or go to the ER with new or worsening symptoms.

## 2025-08-10 ENCOUNTER — TELEPHONE (OUTPATIENT)
Dept: URGENT CARE | Facility: CLINIC | Age: 75
End: 2025-08-10
Payer: MEDICARE

## 2025-08-12 DIAGNOSIS — J43.9 COPD WITH CHRONIC BRONCHITIS AND EMPHYSEMA: ICD-10-CM

## 2025-08-12 DIAGNOSIS — J44.89 COPD WITH CHRONIC BRONCHITIS AND EMPHYSEMA: ICD-10-CM

## 2025-08-12 RX ORDER — ALBUTEROL SULFATE 90 UG/1
2 INHALANT RESPIRATORY (INHALATION) EVERY 4 HOURS PRN
Qty: 25.5 G | Refills: 3 | Status: SHIPPED | OUTPATIENT
Start: 2025-08-12

## 2025-08-15 ENCOUNTER — OFFICE VISIT (OUTPATIENT)
Dept: RHEUMATOLOGY | Facility: CLINIC | Age: 75
End: 2025-08-15
Payer: MEDICARE

## 2025-08-15 VITALS
DIASTOLIC BLOOD PRESSURE: 57 MMHG | SYSTOLIC BLOOD PRESSURE: 103 MMHG | WEIGHT: 220.88 LBS | BODY MASS INDEX: 34.67 KG/M2 | HEART RATE: 79 BPM | HEIGHT: 67 IN

## 2025-08-15 DIAGNOSIS — C61 RECURRENT PROSTATE CARCINOMA: ICD-10-CM

## 2025-08-15 DIAGNOSIS — E11.8 DM (DIABETES MELLITUS) WITH COMPLICATIONS: ICD-10-CM

## 2025-08-15 DIAGNOSIS — N18.32 STAGE 3B CHRONIC KIDNEY DISEASE: ICD-10-CM

## 2025-08-15 DIAGNOSIS — C79.51 METASTASIS TO BONE: ICD-10-CM

## 2025-08-15 DIAGNOSIS — H05.111: Primary | ICD-10-CM

## 2025-08-15 PROCEDURE — 99999 PR PBB SHADOW E&M-EST. PATIENT-LVL III: CPT | Mod: PBBFAC,,, | Performed by: INTERNAL MEDICINE

## 2025-08-18 ENCOUNTER — PATIENT MESSAGE (OUTPATIENT)
Dept: HEMATOLOGY/ONCOLOGY | Facility: CLINIC | Age: 75
End: 2025-08-18
Payer: MEDICARE

## 2025-08-20 ENCOUNTER — PATIENT MESSAGE (OUTPATIENT)
Dept: ADMINISTRATIVE | Facility: OTHER | Age: 75
End: 2025-08-20
Payer: MEDICARE

## 2025-08-25 ENCOUNTER — OFFICE VISIT (OUTPATIENT)
Dept: INTERNAL MEDICINE | Facility: CLINIC | Age: 75
End: 2025-08-25
Payer: MEDICARE

## 2025-08-25 ENCOUNTER — LAB VISIT (OUTPATIENT)
Dept: LAB | Facility: HOSPITAL | Age: 75
End: 2025-08-25
Payer: MEDICARE

## 2025-08-25 VITALS
BODY MASS INDEX: 35.5 KG/M2 | SYSTOLIC BLOOD PRESSURE: 110 MMHG | DIASTOLIC BLOOD PRESSURE: 60 MMHG | HEART RATE: 85 BPM | HEIGHT: 67 IN | TEMPERATURE: 97 F | WEIGHT: 226.19 LBS | OXYGEN SATURATION: 95 %

## 2025-08-25 DIAGNOSIS — M1A.9XX0 CHRONIC GOUT WITHOUT TOPHUS, UNSPECIFIED CAUSE, UNSPECIFIED SITE: ICD-10-CM

## 2025-08-25 DIAGNOSIS — E83.42 HYPOMAGNESEMIA: ICD-10-CM

## 2025-08-25 DIAGNOSIS — E11.8 DM (DIABETES MELLITUS) WITH COMPLICATIONS: ICD-10-CM

## 2025-08-25 DIAGNOSIS — E11.69 HYPERLIPIDEMIA ASSOCIATED WITH TYPE 2 DIABETES MELLITUS: ICD-10-CM

## 2025-08-25 DIAGNOSIS — E78.5 HYPERLIPIDEMIA ASSOCIATED WITH TYPE 2 DIABETES MELLITUS: ICD-10-CM

## 2025-08-25 DIAGNOSIS — I12.9 HYPERTENSION ASSOCIATED WITH STAGE 3 CHRONIC KIDNEY DISEASE DUE TO TYPE 2 DIABETES MELLITUS: Primary | ICD-10-CM

## 2025-08-25 DIAGNOSIS — N18.30 HYPERTENSION ASSOCIATED WITH STAGE 3 CHRONIC KIDNEY DISEASE DUE TO TYPE 2 DIABETES MELLITUS: Primary | ICD-10-CM

## 2025-08-25 DIAGNOSIS — E11.22 HYPERTENSION ASSOCIATED WITH STAGE 3 CHRONIC KIDNEY DISEASE DUE TO TYPE 2 DIABETES MELLITUS: Primary | ICD-10-CM

## 2025-08-25 DIAGNOSIS — N18.32 STAGE 3B CHRONIC KIDNEY DISEASE: ICD-10-CM

## 2025-08-25 LAB
EAG (OHS): 131 MG/DL (ref 68–131)
HBA1C MFR BLD: 6.2 % (ref 4–5.6)
URATE SERPL-MCNC: 4.9 MG/DL (ref 3.4–7)

## 2025-08-25 PROCEDURE — 3078F DIAST BP <80 MM HG: CPT | Mod: CPTII,S$GLB,,

## 2025-08-25 PROCEDURE — 1125F AMNT PAIN NOTED PAIN PRSNT: CPT | Mod: CPTII,S$GLB,,

## 2025-08-25 PROCEDURE — 84550 ASSAY OF BLOOD/URIC ACID: CPT

## 2025-08-25 PROCEDURE — 83036 HEMOGLOBIN GLYCOSYLATED A1C: CPT

## 2025-08-25 PROCEDURE — 36415 COLL VENOUS BLD VENIPUNCTURE: CPT | Mod: PO

## 2025-08-25 PROCEDURE — 99214 OFFICE O/P EST MOD 30 MIN: CPT | Mod: S$GLB,,,

## 2025-08-25 PROCEDURE — 99999 PR PBB SHADOW E&M-EST. PATIENT-LVL V: CPT | Mod: PBBFAC,,,

## 2025-08-25 PROCEDURE — 3008F BODY MASS INDEX DOCD: CPT | Mod: CPTII,S$GLB,,

## 2025-08-25 PROCEDURE — 1101F PT FALLS ASSESS-DOCD LE1/YR: CPT | Mod: CPTII,S$GLB,,

## 2025-08-25 PROCEDURE — 3288F FALL RISK ASSESSMENT DOCD: CPT | Mod: CPTII,S$GLB,,

## 2025-08-25 PROCEDURE — 4010F ACE/ARB THERAPY RXD/TAKEN: CPT | Mod: CPTII,S$GLB,,

## 2025-08-25 PROCEDURE — 1159F MED LIST DOCD IN RCRD: CPT | Mod: CPTII,S$GLB,,

## 2025-08-25 PROCEDURE — 3074F SYST BP LT 130 MM HG: CPT | Mod: CPTII,S$GLB,,

## 2025-08-25 PROCEDURE — 3044F HG A1C LEVEL LT 7.0%: CPT | Mod: CPTII,S$GLB,,

## 2025-08-25 PROCEDURE — G2211 COMPLEX E/M VISIT ADD ON: HCPCS | Mod: S$GLB,,,

## 2025-08-25 RX ORDER — GLIMEPIRIDE 2 MG/1
2 TABLET ORAL
Qty: 90 TABLET | Refills: 3 | Status: SHIPPED | OUTPATIENT
Start: 2025-08-25

## 2025-08-25 RX ORDER — LANOLIN ALCOHOL/MO/W.PET/CERES
CREAM (GRAM) TOPICAL
Qty: 120 TABLET | Refills: 6 | Status: SHIPPED | OUTPATIENT
Start: 2025-08-25

## 2025-08-25 RX ORDER — ALLOPURINOL 300 MG/1
300 TABLET ORAL DAILY
Qty: 90 TABLET | Refills: 3 | Status: SHIPPED | OUTPATIENT
Start: 2025-08-25

## 2025-08-26 ENCOUNTER — TELEPHONE (OUTPATIENT)
Dept: INTERNAL MEDICINE | Facility: CLINIC | Age: 75
End: 2025-08-26
Payer: MEDICARE

## 2025-08-29 ENCOUNTER — LAB VISIT (OUTPATIENT)
Dept: LAB | Facility: HOSPITAL | Age: 75
End: 2025-08-29
Attending: HOSPITALIST
Payer: MEDICARE

## 2025-08-29 ENCOUNTER — OFFICE VISIT (OUTPATIENT)
Dept: HEMATOLOGY/ONCOLOGY | Facility: CLINIC | Age: 75
End: 2025-08-29
Payer: MEDICARE

## 2025-08-29 VITALS
TEMPERATURE: 97 F | OXYGEN SATURATION: 93 % | DIASTOLIC BLOOD PRESSURE: 57 MMHG | HEIGHT: 67 IN | HEART RATE: 73 BPM | BODY MASS INDEX: 35.18 KG/M2 | SYSTOLIC BLOOD PRESSURE: 99 MMHG | WEIGHT: 224.13 LBS | RESPIRATION RATE: 19 BRPM

## 2025-08-29 DIAGNOSIS — C79.51 METASTASIS TO BONE: ICD-10-CM

## 2025-08-29 DIAGNOSIS — C61 RECURRENT PROSTATE CARCINOMA: ICD-10-CM

## 2025-08-29 DIAGNOSIS — E11.69 TYPE 2 DIABETES MELLITUS WITH OTHER SPECIFIED COMPLICATION, UNSPECIFIED WHETHER LONG TERM INSULIN USE: Chronic | ICD-10-CM

## 2025-08-29 DIAGNOSIS — M54.50 CHRONIC LOW BACK PAIN, UNSPECIFIED BACK PAIN LATERALITY, UNSPECIFIED WHETHER SCIATICA PRESENT: ICD-10-CM

## 2025-08-29 DIAGNOSIS — Z87.39 HISTORY OF GOUT: Chronic | ICD-10-CM

## 2025-08-29 DIAGNOSIS — I25.118 CORONARY ARTERY DISEASE OF NATIVE ARTERY OF NATIVE HEART WITH STABLE ANGINA PECTORIS: Chronic | ICD-10-CM

## 2025-08-29 DIAGNOSIS — C61 RECURRENT PROSTATE CARCINOMA: Primary | ICD-10-CM

## 2025-08-29 DIAGNOSIS — G89.29 CHRONIC LOW BACK PAIN, UNSPECIFIED BACK PAIN LATERALITY, UNSPECIFIED WHETHER SCIATICA PRESENT: ICD-10-CM

## 2025-08-29 PROBLEM — E11.8 DM (DIABETES MELLITUS) WITH COMPLICATIONS: Status: RESOLVED | Noted: 2022-04-29 | Resolved: 2025-08-29

## 2025-08-29 LAB
ABSOLUTE NEUTROPHIL COUNT (OHS): 3.7 K/UL (ref 1.8–7.7)
ALBUMIN SERPL BCP-MCNC: 4.1 G/DL (ref 3.5–5.2)
ALP SERPL-CCNC: 162 UNIT/L (ref 40–150)
ALT SERPL W/O P-5'-P-CCNC: 14 UNIT/L (ref 10–44)
ANION GAP (OHS): 10 MMOL/L (ref 8–16)
AST SERPL-CCNC: 24 UNIT/L (ref 11–45)
BILIRUB SERPL-MCNC: 0.4 MG/DL (ref 0.1–1)
BUN SERPL-MCNC: 27 MG/DL (ref 8–23)
CALCIUM SERPL-MCNC: 9.7 MG/DL (ref 8.7–10.5)
CHLORIDE SERPL-SCNC: 102 MMOL/L (ref 95–110)
CO2 SERPL-SCNC: 29 MMOL/L (ref 23–29)
CREAT SERPL-MCNC: 1.9 MG/DL (ref 0.5–1.4)
ERYTHROCYTE [DISTWIDTH] IN BLOOD BY AUTOMATED COUNT: 17.1 % (ref 11.5–14.5)
GFR SERPLBLD CREATININE-BSD FMLA CKD-EPI: 37 ML/MIN/1.73/M2
GLUCOSE SERPL-MCNC: 110 MG/DL (ref 70–110)
HCT VFR BLD AUTO: 37 % (ref 40–54)
HGB BLD-MCNC: 12.5 GM/DL (ref 14–18)
IMM GRANULOCYTES # BLD AUTO: 0.03 K/UL (ref 0–0.04)
MCH RBC QN AUTO: 31.3 PG (ref 27–31)
MCHC RBC AUTO-ENTMCNC: 33.8 G/DL (ref 32–36)
MCV RBC AUTO: 93 FL (ref 82–98)
PLATELET # BLD AUTO: 154 K/UL (ref 150–450)
PMV BLD AUTO: 10.2 FL (ref 9.2–12.9)
POTASSIUM SERPL-SCNC: 4.7 MMOL/L (ref 3.5–5.1)
PROT SERPL-MCNC: 7.1 GM/DL (ref 6–8.4)
RBC # BLD AUTO: 3.99 M/UL (ref 4.6–6.2)
SODIUM SERPL-SCNC: 141 MMOL/L (ref 136–145)
WBC # BLD AUTO: 6.08 K/UL (ref 3.9–12.7)

## 2025-08-29 PROCEDURE — 84403 ASSAY OF TOTAL TESTOSTERONE: CPT

## 2025-08-29 PROCEDURE — 80048 BASIC METABOLIC PNL TOTAL CA: CPT

## 2025-08-29 PROCEDURE — 99999 PR PBB SHADOW E&M-EST. PATIENT-LVL V: CPT | Mod: PBBFAC,,, | Performed by: HOSPITALIST

## 2025-08-29 PROCEDURE — 36415 COLL VENOUS BLD VENIPUNCTURE: CPT

## 2025-08-29 PROCEDURE — 84153 ASSAY OF PSA TOTAL: CPT

## 2025-08-29 PROCEDURE — 85027 COMPLETE CBC AUTOMATED: CPT

## 2025-08-30 LAB
PSA SERPL-MCNC: 0.24 NG/ML
TESTOST SERPL-MCNC: 17 NG/DL (ref 304–1227)

## 2025-09-04 ENCOUNTER — OFFICE VISIT (OUTPATIENT)
Dept: INTERNAL MEDICINE | Facility: CLINIC | Age: 75
End: 2025-09-04
Payer: MEDICARE

## 2025-09-04 VITALS
OXYGEN SATURATION: 95 % | WEIGHT: 223.56 LBS | HEART RATE: 90 BPM | RESPIRATION RATE: 20 BRPM | TEMPERATURE: 97 F | BODY MASS INDEX: 38.17 KG/M2 | HEIGHT: 64 IN | SYSTOLIC BLOOD PRESSURE: 100 MMHG | DIASTOLIC BLOOD PRESSURE: 60 MMHG

## 2025-09-04 DIAGNOSIS — M65.332 TRIGGER MIDDLE FINGER OF LEFT HAND: ICD-10-CM

## 2025-09-04 DIAGNOSIS — C61 PROSTATE CANCER: ICD-10-CM

## 2025-09-04 DIAGNOSIS — J03.90 ACUTE TONSILLITIS, UNSPECIFIED ETIOLOGY: Primary | ICD-10-CM

## 2025-09-04 PROCEDURE — 3008F BODY MASS INDEX DOCD: CPT | Mod: CPTII,S$GLB,,

## 2025-09-04 PROCEDURE — 1126F AMNT PAIN NOTED NONE PRSNT: CPT | Mod: CPTII,S$GLB,,

## 2025-09-04 PROCEDURE — 3078F DIAST BP <80 MM HG: CPT | Mod: CPTII,S$GLB,,

## 2025-09-04 PROCEDURE — 99214 OFFICE O/P EST MOD 30 MIN: CPT | Mod: S$GLB,,,

## 2025-09-04 PROCEDURE — 1101F PT FALLS ASSESS-DOCD LE1/YR: CPT | Mod: CPTII,S$GLB,,

## 2025-09-04 PROCEDURE — 4010F ACE/ARB THERAPY RXD/TAKEN: CPT | Mod: CPTII,S$GLB,,

## 2025-09-04 PROCEDURE — 1159F MED LIST DOCD IN RCRD: CPT | Mod: CPTII,S$GLB,,

## 2025-09-04 PROCEDURE — 3044F HG A1C LEVEL LT 7.0%: CPT | Mod: CPTII,S$GLB,,

## 2025-09-04 PROCEDURE — 99999 PR PBB SHADOW E&M-EST. PATIENT-LVL V: CPT | Mod: PBBFAC,,,

## 2025-09-04 PROCEDURE — G2211 COMPLEX E/M VISIT ADD ON: HCPCS | Mod: S$GLB,,,

## 2025-09-04 PROCEDURE — 3074F SYST BP LT 130 MM HG: CPT | Mod: CPTII,S$GLB,,

## 2025-09-04 PROCEDURE — 3288F FALL RISK ASSESSMENT DOCD: CPT | Mod: CPTII,S$GLB,,

## 2025-09-04 RX ORDER — AMOXICILLIN AND CLAVULANATE POTASSIUM 875; 125 MG/1; MG/1
1 TABLET, FILM COATED ORAL EVERY 12 HOURS
Qty: 14 TABLET | Refills: 0 | Status: SHIPPED | OUTPATIENT
Start: 2025-09-04 | End: 2025-09-11

## (undated) DEVICE — SOL NACL IRR 1000ML BTL

## (undated) DEVICE — HANDSWITCH SUCTION COAG

## (undated) DEVICE — MANIFOLD 4 PORT

## (undated) DEVICE — TUBING MEDI-VAC 20FT .25IN

## (undated) DEVICE — EVACUATOR PENCIL SMOKE NEPTUNE

## (undated) DEVICE — SYR B-D DISP CONTROL 10CC100/C

## (undated) DEVICE — DRESSING TRANS 2X2 TEGADERM

## (undated) DEVICE — NDL ECLIPSE SAFETY 18GX1-1/2IN

## (undated) DEVICE — CATH JR4 5FR

## (undated) DEVICE — TOWEL OR DISP STRL BLUE 4/PK

## (undated) DEVICE — SOL NACL IRR 3000ML

## (undated) DEVICE — WIRE GUIDE TEFLON 3CM .035 145

## (undated) DEVICE — PACK CATH LAB CUSTOM BR

## (undated) DEVICE — ANGIOTOUCH KIT

## (undated) DEVICE — SUT 5-0 CHROMIC GUT / P-3

## (undated) DEVICE — ELECTRODE REM PLYHSV RETURN 9

## (undated) DEVICE — CATH PIGTAIL 5FX110CM

## (undated) DEVICE — GLOVE BIOGEL 7.5

## (undated) DEVICE — CATH JL4 5FR

## (undated) DEVICE — DRAPE U SPLIT SHEET 54X76IN

## (undated) DEVICE — GUIDEWIRE WHOLEY HI TORQ 175CM

## (undated) DEVICE — APPLICATOR CHLORAPREP ORN 26ML

## (undated) DEVICE — NDL HYPO 27G X 1 1/2

## (undated) DEVICE — KIT GLIDESHEATH SLEND 6FR 10CM

## (undated) DEVICE — KIT SYR REUSABLE

## (undated) DEVICE — UNDERGLOVES BIOGEL PI SIZE 8

## (undated) DEVICE — COVER LIGHT HANDLE 80/CA

## (undated) DEVICE — SPONGE PATTY SURGICAL .5X3IN

## (undated) DEVICE — SUT 4-0 ETHILON 18 PS-2

## (undated) DEVICE — KIT MANIFOLD LOW PRESS TUBING

## (undated) DEVICE — BAND TR COMP DEVICE REG 24CM

## (undated) DEVICE — PACK BASIC SETUP SC BR

## (undated) DEVICE — SYR LUER LOCK STERILE 10ML

## (undated) DEVICE — CONTRAST OMNIPAQUE 240 150ML

## (undated) DEVICE — SUT 4/0 18IN CHROMIC GUT PS